# Patient Record
Sex: FEMALE | Race: WHITE | NOT HISPANIC OR LATINO
[De-identification: names, ages, dates, MRNs, and addresses within clinical notes are randomized per-mention and may not be internally consistent; named-entity substitution may affect disease eponyms.]

---

## 2015-10-14 RX ORDER — ROSUVASTATIN CALCIUM 5 MG/1
1 TABLET ORAL
Qty: 0 | Refills: 0 | DISCHARGE
Start: 2015-10-14

## 2015-10-18 RX ORDER — LABETALOL HCL 100 MG
1 TABLET ORAL
Qty: 0 | Refills: 0 | COMMUNITY
Start: 2015-10-18

## 2015-10-18 RX ORDER — HYDRALAZINE HCL 50 MG
1 TABLET ORAL
Qty: 0 | Refills: 0 | COMMUNITY
Start: 2015-10-18

## 2017-01-02 ENCOUNTER — RX RENEWAL (OUTPATIENT)
Age: 67
End: 2017-01-02

## 2017-01-13 ENCOUNTER — MEDICATION RENEWAL (OUTPATIENT)
Age: 67
End: 2017-01-13

## 2017-03-31 ENCOUNTER — RX RENEWAL (OUTPATIENT)
Age: 67
End: 2017-03-31

## 2017-04-19 ENCOUNTER — APPOINTMENT (OUTPATIENT)
Dept: CARDIOLOGY | Facility: CLINIC | Age: 67
End: 2017-04-19

## 2017-04-24 ENCOUNTER — RX RENEWAL (OUTPATIENT)
Age: 67
End: 2017-04-24

## 2017-05-17 ENCOUNTER — APPOINTMENT (OUTPATIENT)
Dept: WOUND CARE | Facility: CLINIC | Age: 67
End: 2017-05-17

## 2017-05-25 ENCOUNTER — RX RENEWAL (OUTPATIENT)
Age: 67
End: 2017-05-25

## 2017-05-25 ENCOUNTER — MEDICATION RENEWAL (OUTPATIENT)
Age: 67
End: 2017-05-25

## 2017-06-22 ENCOUNTER — NON-APPOINTMENT (OUTPATIENT)
Age: 67
End: 2017-06-22

## 2017-06-22 ENCOUNTER — APPOINTMENT (OUTPATIENT)
Dept: CARDIOLOGY | Facility: CLINIC | Age: 67
End: 2017-06-22

## 2017-06-22 VITALS
OXYGEN SATURATION: 95 % | TEMPERATURE: 98 F | HEIGHT: 64 IN | HEART RATE: 77 BPM | SYSTOLIC BLOOD PRESSURE: 160 MMHG | DIASTOLIC BLOOD PRESSURE: 82 MMHG | WEIGHT: 250 LBS | BODY MASS INDEX: 42.68 KG/M2

## 2017-07-21 ENCOUNTER — APPOINTMENT (OUTPATIENT)
Dept: CARDIOLOGY | Facility: CLINIC | Age: 67
End: 2017-07-21

## 2017-07-21 VITALS — HEART RATE: 67 BPM | DIASTOLIC BLOOD PRESSURE: 90 MMHG | OXYGEN SATURATION: 96 % | SYSTOLIC BLOOD PRESSURE: 170 MMHG

## 2017-07-25 ENCOUNTER — NON-APPOINTMENT (OUTPATIENT)
Age: 67
End: 2017-07-25

## 2017-08-11 ENCOUNTER — APPOINTMENT (OUTPATIENT)
Dept: CARDIOLOGY | Facility: CLINIC | Age: 67
End: 2017-08-11
Payer: MEDICARE

## 2017-08-11 VITALS — HEIGHT: 64 IN | SYSTOLIC BLOOD PRESSURE: 156 MMHG | HEART RATE: 76 BPM | DIASTOLIC BLOOD PRESSURE: 88 MMHG

## 2017-08-11 DIAGNOSIS — I87.8 OTHER SPECIFIED DISORDERS OF VEINS: ICD-10-CM

## 2017-08-11 DIAGNOSIS — I89.0 LYMPHEDEMA, NOT ELSEWHERE CLASSIFIED: ICD-10-CM

## 2017-08-11 DIAGNOSIS — I87.2 VENOUS INSUFFICIENCY (CHRONIC) (PERIPHERAL): ICD-10-CM

## 2017-08-11 DIAGNOSIS — R60.9 EDEMA, UNSPECIFIED: ICD-10-CM

## 2017-08-11 DIAGNOSIS — L03.115 CELLULITIS OF RIGHT LOWER LIMB: ICD-10-CM

## 2017-08-11 PROCEDURE — 99215 OFFICE O/P EST HI 40 MIN: CPT

## 2017-09-15 ENCOUNTER — APPOINTMENT (OUTPATIENT)
Dept: CARDIOLOGY | Facility: CLINIC | Age: 67
End: 2017-09-15
Payer: MEDICARE

## 2017-09-15 VITALS
TEMPERATURE: 98.1 F | DIASTOLIC BLOOD PRESSURE: 82 MMHG | BODY MASS INDEX: 46.78 KG/M2 | HEIGHT: 64 IN | SYSTOLIC BLOOD PRESSURE: 140 MMHG | OXYGEN SATURATION: 93 % | WEIGHT: 274 LBS | HEART RATE: 75 BPM

## 2017-09-15 PROCEDURE — 99215 OFFICE O/P EST HI 40 MIN: CPT

## 2017-10-18 ENCOUNTER — MEDICATION RENEWAL (OUTPATIENT)
Age: 67
End: 2017-10-18

## 2017-10-19 ENCOUNTER — RX RENEWAL (OUTPATIENT)
Age: 67
End: 2017-10-19

## 2017-10-20 ENCOUNTER — MEDICATION RENEWAL (OUTPATIENT)
Age: 67
End: 2017-10-20

## 2017-10-22 ENCOUNTER — RX RENEWAL (OUTPATIENT)
Age: 67
End: 2017-10-22

## 2017-11-10 ENCOUNTER — APPOINTMENT (OUTPATIENT)
Dept: CARDIOLOGY | Facility: CLINIC | Age: 67
End: 2017-11-10
Payer: MEDICARE

## 2017-11-10 VITALS
TEMPERATURE: 98.1 F | OXYGEN SATURATION: 96 % | HEART RATE: 76 BPM | SYSTOLIC BLOOD PRESSURE: 162 MMHG | HEIGHT: 64 IN | DIASTOLIC BLOOD PRESSURE: 82 MMHG

## 2017-11-10 PROCEDURE — 99214 OFFICE O/P EST MOD 30 MIN: CPT

## 2017-11-10 PROCEDURE — 93000 ELECTROCARDIOGRAM COMPLETE: CPT

## 2017-11-10 RX ORDER — SILVER SULFADIAZINE 10 MG/G
1 CREAM TOPICAL
Qty: 50 | Refills: 0 | Status: DISCONTINUED | COMMUNITY
Start: 2017-07-11

## 2017-11-10 RX ORDER — CLONIDINE HYDROCHLORIDE 0.1 MG/1
0.1 TABLET ORAL 3 TIMES DAILY
Qty: 90 | Refills: 0 | Status: DISCONTINUED | COMMUNITY
Start: 2017-09-15 | End: 2017-11-10

## 2017-11-10 RX ORDER — SULFAMETHOXAZOLE AND TRIMETHOPRIM 800; 160 MG/1; MG/1
800-160 TABLET ORAL
Qty: 28 | Refills: 0 | Status: DISCONTINUED | COMMUNITY
Start: 2017-08-25

## 2018-01-24 ENCOUNTER — MEDICATION RENEWAL (OUTPATIENT)
Age: 68
End: 2018-01-24

## 2018-02-20 ENCOUNTER — RX RENEWAL (OUTPATIENT)
Age: 68
End: 2018-02-20

## 2018-03-10 ENCOUNTER — INPATIENT (INPATIENT)
Facility: HOSPITAL | Age: 68
LOS: 2 days | Discharge: HOME | DRG: 281 | End: 2018-03-13
Attending: HOSPITALIST | Admitting: STUDENT IN AN ORGANIZED HEALTH CARE EDUCATION/TRAINING PROGRAM
Payer: MEDICARE

## 2018-03-10 VITALS
TEMPERATURE: 97 F | DIASTOLIC BLOOD PRESSURE: 85 MMHG | OXYGEN SATURATION: 95 % | RESPIRATION RATE: 18 BRPM | SYSTOLIC BLOOD PRESSURE: 148 MMHG | HEART RATE: 93 BPM

## 2018-03-10 DIAGNOSIS — E11.9 TYPE 2 DIABETES MELLITUS WITHOUT COMPLICATIONS: ICD-10-CM

## 2018-03-10 DIAGNOSIS — I25.10 ATHEROSCLEROTIC HEART DISEASE OF NATIVE CORONARY ARTERY WITHOUT ANGINA PECTORIS: ICD-10-CM

## 2018-03-10 DIAGNOSIS — E03.9 HYPOTHYROIDISM, UNSPECIFIED: ICD-10-CM

## 2018-03-10 DIAGNOSIS — Z29.9 ENCOUNTER FOR PROPHYLACTIC MEASURES, UNSPECIFIED: ICD-10-CM

## 2018-03-10 DIAGNOSIS — I21.4 NON-ST ELEVATION (NSTEMI) MYOCARDIAL INFARCTION: ICD-10-CM

## 2018-03-10 DIAGNOSIS — I10 ESSENTIAL (PRIMARY) HYPERTENSION: ICD-10-CM

## 2018-03-10 DIAGNOSIS — I89.0 LYMPHEDEMA, NOT ELSEWHERE CLASSIFIED: ICD-10-CM

## 2018-03-10 DIAGNOSIS — Z98.89 OTHER SPECIFIED POSTPROCEDURAL STATES: Chronic | ICD-10-CM

## 2018-03-10 LAB
ALBUMIN SERPL ELPH-MCNC: 4 G/DL — SIGNIFICANT CHANGE UP (ref 3.3–5)
ALP SERPL-CCNC: 95 U/L — SIGNIFICANT CHANGE UP (ref 40–120)
ALT FLD-CCNC: 15 U/L RC — SIGNIFICANT CHANGE UP (ref 10–45)
ANION GAP SERPL CALC-SCNC: 14 MMOL/L — SIGNIFICANT CHANGE UP (ref 5–17)
APPEARANCE UR: CLEAR — SIGNIFICANT CHANGE UP
APTT BLD: 48 SEC — HIGH (ref 27.5–37.4)
AST SERPL-CCNC: 17 U/L — SIGNIFICANT CHANGE UP (ref 10–40)
BASOPHILS # BLD AUTO: 0 K/UL — SIGNIFICANT CHANGE UP (ref 0–0.2)
BASOPHILS NFR BLD AUTO: 0 % — SIGNIFICANT CHANGE UP (ref 0–2)
BILIRUB SERPL-MCNC: 0.7 MG/DL — SIGNIFICANT CHANGE UP (ref 0.2–1.2)
BILIRUB UR-MCNC: NEGATIVE — SIGNIFICANT CHANGE UP
BUN SERPL-MCNC: 21 MG/DL — SIGNIFICANT CHANGE UP (ref 7–23)
CALCIUM SERPL-MCNC: 9 MG/DL — SIGNIFICANT CHANGE UP (ref 8.4–10.5)
CHLORIDE SERPL-SCNC: 100 MMOL/L — SIGNIFICANT CHANGE UP (ref 96–108)
CK MB BLD-MCNC: 7 % — HIGH (ref 0–3.5)
CK MB CFR SERPL CALC: 8.3 NG/ML — HIGH (ref 0–3.8)
CK SERPL-CCNC: 119 U/L — SIGNIFICANT CHANGE UP (ref 25–170)
CO2 SERPL-SCNC: 27 MMOL/L — SIGNIFICANT CHANGE UP (ref 22–31)
COLOR SPEC: YELLOW — SIGNIFICANT CHANGE UP
CREAT SERPL-MCNC: 0.98 MG/DL — SIGNIFICANT CHANGE UP (ref 0.5–1.3)
DIFF PNL FLD: NEGATIVE — SIGNIFICANT CHANGE UP
EOSINOPHIL # BLD AUTO: 0 K/UL — SIGNIFICANT CHANGE UP (ref 0–0.5)
EOSINOPHIL NFR BLD AUTO: 0.6 % — SIGNIFICANT CHANGE UP (ref 0–6)
EPI CELLS # UR: SIGNIFICANT CHANGE UP /HPF
GLUCOSE BLDC GLUCOMTR-MCNC: 114 MG/DL — HIGH (ref 70–99)
GLUCOSE SERPL-MCNC: 115 MG/DL — HIGH (ref 70–99)
GLUCOSE UR QL: NEGATIVE — SIGNIFICANT CHANGE UP
HCT VFR BLD CALC: 41.4 % — SIGNIFICANT CHANGE UP (ref 34.5–45)
HGB BLD-MCNC: 14.1 G/DL — SIGNIFICANT CHANGE UP (ref 11.5–15.5)
INR BLD: 1.08 RATIO — SIGNIFICANT CHANGE UP (ref 0.88–1.16)
KETONES UR-MCNC: NEGATIVE — SIGNIFICANT CHANGE UP
LEUKOCYTE ESTERASE UR-ACNC: NEGATIVE — SIGNIFICANT CHANGE UP
LYMPHOCYTES # BLD AUTO: 0.8 K/UL — LOW (ref 1–3.3)
LYMPHOCYTES # BLD AUTO: 11.2 % — LOW (ref 13–44)
MCHC RBC-ENTMCNC: 28.9 PG — SIGNIFICANT CHANGE UP (ref 27–34)
MCHC RBC-ENTMCNC: 34.1 GM/DL — SIGNIFICANT CHANGE UP (ref 32–36)
MCV RBC AUTO: 84.9 FL — SIGNIFICANT CHANGE UP (ref 80–100)
MONOCYTES # BLD AUTO: 0.5 K/UL — SIGNIFICANT CHANGE UP (ref 0–0.9)
MONOCYTES NFR BLD AUTO: 7.5 % — SIGNIFICANT CHANGE UP (ref 2–14)
NEUTROPHILS # BLD AUTO: 5.7 K/UL — SIGNIFICANT CHANGE UP (ref 1.8–7.4)
NEUTROPHILS NFR BLD AUTO: 80.7 % — HIGH (ref 43–77)
NITRITE UR-MCNC: NEGATIVE — SIGNIFICANT CHANGE UP
NT-PROBNP SERPL-SCNC: 708 PG/ML — HIGH (ref 0–300)
PH UR: 7 — SIGNIFICANT CHANGE UP (ref 5–8)
PLATELET # BLD AUTO: 303 K/UL — SIGNIFICANT CHANGE UP (ref 150–400)
POTASSIUM SERPL-MCNC: 3.7 MMOL/L — SIGNIFICANT CHANGE UP (ref 3.5–5.3)
POTASSIUM SERPL-SCNC: 3.7 MMOL/L — SIGNIFICANT CHANGE UP (ref 3.5–5.3)
PROT SERPL-MCNC: 8.3 G/DL — SIGNIFICANT CHANGE UP (ref 6–8.3)
PROT UR-MCNC: NEGATIVE — SIGNIFICANT CHANGE UP
PROTHROM AB SERPL-ACNC: 11.8 SEC — SIGNIFICANT CHANGE UP (ref 9.8–12.7)
RBC # BLD: 4.87 M/UL — SIGNIFICANT CHANGE UP (ref 3.8–5.2)
RBC # FLD: 14.8 % — HIGH (ref 10.3–14.5)
RBC CASTS # UR COMP ASSIST: SIGNIFICANT CHANGE UP /HPF (ref 0–2)
SODIUM SERPL-SCNC: 141 MMOL/L — SIGNIFICANT CHANGE UP (ref 135–145)
SP GR SPEC: 1.01 — SIGNIFICANT CHANGE UP (ref 1.01–1.02)
TROPONIN T SERPL-MCNC: 0.02 NG/ML — SIGNIFICANT CHANGE UP (ref 0–0.06)
TROPONIN T SERPL-MCNC: 0.08 NG/ML — HIGH (ref 0–0.06)
UROBILINOGEN FLD QL: NEGATIVE — SIGNIFICANT CHANGE UP
WBC # BLD: 7.1 K/UL — SIGNIFICANT CHANGE UP (ref 3.8–10.5)
WBC # FLD AUTO: 7.1 K/UL — SIGNIFICANT CHANGE UP (ref 3.8–10.5)
WBC UR QL: SIGNIFICANT CHANGE UP /HPF (ref 0–5)

## 2018-03-10 PROCEDURE — 99223 1ST HOSP IP/OBS HIGH 75: CPT

## 2018-03-10 PROCEDURE — 99285 EMERGENCY DEPT VISIT HI MDM: CPT | Mod: 25,GC

## 2018-03-10 PROCEDURE — 93010 ELECTROCARDIOGRAM REPORT: CPT | Mod: GC

## 2018-03-10 PROCEDURE — 71045 X-RAY EXAM CHEST 1 VIEW: CPT | Mod: 26

## 2018-03-10 RX ORDER — HEPARIN SODIUM 5000 [USP'U]/ML
5000 INJECTION INTRAVENOUS; SUBCUTANEOUS ONCE
Qty: 0 | Refills: 0 | Status: COMPLETED | OUTPATIENT
Start: 2018-03-10 | End: 2018-03-10

## 2018-03-10 RX ORDER — DEXTROSE 50 % IN WATER 50 %
12.5 SYRINGE (ML) INTRAVENOUS ONCE
Qty: 0 | Refills: 0 | Status: DISCONTINUED | OUTPATIENT
Start: 2018-03-10 | End: 2018-03-13

## 2018-03-10 RX ORDER — CLOPIDOGREL BISULFATE 75 MG/1
75 TABLET, FILM COATED ORAL DAILY
Qty: 0 | Refills: 0 | Status: DISCONTINUED | OUTPATIENT
Start: 2018-03-10 | End: 2018-03-13

## 2018-03-10 RX ORDER — VALSARTAN 80 MG/1
320 TABLET ORAL DAILY
Qty: 0 | Refills: 0 | Status: DISCONTINUED | OUTPATIENT
Start: 2018-03-10 | End: 2018-03-13

## 2018-03-10 RX ORDER — DEXTROSE 50 % IN WATER 50 %
25 SYRINGE (ML) INTRAVENOUS ONCE
Qty: 0 | Refills: 0 | Status: DISCONTINUED | OUTPATIENT
Start: 2018-03-10 | End: 2018-03-13

## 2018-03-10 RX ORDER — LEVOTHYROXINE SODIUM 125 MCG
75 TABLET ORAL DAILY
Qty: 0 | Refills: 0 | Status: DISCONTINUED | OUTPATIENT
Start: 2018-03-10 | End: 2018-03-13

## 2018-03-10 RX ORDER — INSULIN LISPRO 100/ML
VIAL (ML) SUBCUTANEOUS
Qty: 0 | Refills: 0 | Status: DISCONTINUED | OUTPATIENT
Start: 2018-03-10 | End: 2018-03-13

## 2018-03-10 RX ORDER — HEPARIN SODIUM 5000 [USP'U]/ML
6000 INJECTION INTRAVENOUS; SUBCUTANEOUS EVERY 6 HOURS
Qty: 0 | Refills: 0 | Status: DISCONTINUED | OUTPATIENT
Start: 2018-03-10 | End: 2018-03-12

## 2018-03-10 RX ORDER — ETHACRYNIC ACID 25 MG/1
25 TABLET ORAL DAILY
Qty: 0 | Refills: 0 | Status: DISCONTINUED | OUTPATIENT
Start: 2018-03-10 | End: 2018-03-13

## 2018-03-10 RX ORDER — ATORVASTATIN CALCIUM 80 MG/1
80 TABLET, FILM COATED ORAL AT BEDTIME
Qty: 0 | Refills: 0 | Status: DISCONTINUED | OUTPATIENT
Start: 2018-03-10 | End: 2018-03-13

## 2018-03-10 RX ORDER — HEPARIN SODIUM 5000 [USP'U]/ML
1000 INJECTION INTRAVENOUS; SUBCUTANEOUS
Qty: 25000 | Refills: 0 | Status: DISCONTINUED | OUTPATIENT
Start: 2018-03-10 | End: 2018-03-12

## 2018-03-10 RX ORDER — ASPIRIN/CALCIUM CARB/MAGNESIUM 324 MG
81 TABLET ORAL DAILY
Qty: 0 | Refills: 0 | Status: DISCONTINUED | OUTPATIENT
Start: 2018-03-11 | End: 2018-03-13

## 2018-03-10 RX ORDER — SODIUM CHLORIDE 9 MG/ML
3 INJECTION INTRAMUSCULAR; INTRAVENOUS; SUBCUTANEOUS ONCE
Qty: 0 | Refills: 0 | Status: COMPLETED | OUTPATIENT
Start: 2018-03-10 | End: 2018-03-10

## 2018-03-10 RX ORDER — INSULIN LISPRO 100/ML
VIAL (ML) SUBCUTANEOUS AT BEDTIME
Qty: 0 | Refills: 0 | Status: DISCONTINUED | OUTPATIENT
Start: 2018-03-10 | End: 2018-03-13

## 2018-03-10 RX ORDER — GLUCAGON INJECTION, SOLUTION 0.5 MG/.1ML
1 INJECTION, SOLUTION SUBCUTANEOUS ONCE
Qty: 0 | Refills: 0 | Status: DISCONTINUED | OUTPATIENT
Start: 2018-03-10 | End: 2018-03-13

## 2018-03-10 RX ORDER — ASPIRIN/CALCIUM CARB/MAGNESIUM 324 MG
243 TABLET ORAL ONCE
Qty: 0 | Refills: 0 | Status: COMPLETED | OUTPATIENT
Start: 2018-03-10 | End: 2018-03-10

## 2018-03-10 RX ORDER — SODIUM CHLORIDE 9 MG/ML
1000 INJECTION, SOLUTION INTRAVENOUS
Qty: 0 | Refills: 0 | Status: DISCONTINUED | OUTPATIENT
Start: 2018-03-10 | End: 2018-03-13

## 2018-03-10 RX ORDER — INFLUENZA VIRUS VACCINE 15; 15; 15; 15 UG/.5ML; UG/.5ML; UG/.5ML; UG/.5ML
0.5 SUSPENSION INTRAMUSCULAR ONCE
Qty: 0 | Refills: 0 | Status: DISCONTINUED | OUTPATIENT
Start: 2018-03-10 | End: 2018-03-13

## 2018-03-10 RX ORDER — DEXTROSE 50 % IN WATER 50 %
1 SYRINGE (ML) INTRAVENOUS ONCE
Qty: 0 | Refills: 0 | Status: DISCONTINUED | OUTPATIENT
Start: 2018-03-10 | End: 2018-03-13

## 2018-03-10 RX ORDER — HYDRALAZINE HCL 50 MG
100 TABLET ORAL
Qty: 0 | Refills: 0 | Status: DISCONTINUED | OUTPATIENT
Start: 2018-03-10 | End: 2018-03-13

## 2018-03-10 RX ORDER — LABETALOL HCL 100 MG
300 TABLET ORAL
Qty: 0 | Refills: 0 | Status: DISCONTINUED | OUTPATIENT
Start: 2018-03-10 | End: 2018-03-11

## 2018-03-10 RX ADMIN — Medication 243 MILLIGRAM(S): at 19:54

## 2018-03-10 RX ADMIN — SODIUM CHLORIDE 3 MILLILITER(S): 9 INJECTION INTRAMUSCULAR; INTRAVENOUS; SUBCUTANEOUS at 13:00

## 2018-03-10 RX ADMIN — HEPARIN SODIUM 1000 UNIT(S)/HR: 5000 INJECTION INTRAVENOUS; SUBCUTANEOUS at 19:52

## 2018-03-10 RX ADMIN — HEPARIN SODIUM 5000 UNIT(S): 5000 INJECTION INTRAVENOUS; SUBCUTANEOUS at 19:52

## 2018-03-10 NOTE — ED ADULT NURSE NOTE - OBJECTIVE STATEMENT
67 y.o F arrived to ED via EMS from home c/o episode of SOB/dizziness/diaphoresis lasting 10 to 15 minutes that resolved. A&Ox3, ambulatory with walker. able to ambulate short distances without assistance. pt states she was standing and praying when episode came on, states she felt like she was going to pass out but did not pass out. no fall no LOC. states she sat down and episode resolved. no chest pain or palpitations with or prior to episode. pmh HTN, lymphedema, venous insufficiency, CAD s/p stents, morbid obesity. pt states she currently only has minor lightheadedness. respirations nonlabored, O2 sat 95%, pt in no acute distress, abdomen soft nontender, neuro intact, moves all extremities. pt states she has chronic pain to B/L lower extremities. redness, swelling, dry/flaky skin noted to B/L lower extremities, pt states this is her baseline. Denies CP, SOB, N/V/D, fevers, chills, HA, pain/ burning upon urination, abdominal pain. Safety and comfort provided/maintained. VSS. EKG performed, pt on cardiac monitoring. upon performing safety and suicide/homicide screens pt states she feels safe at home. denies HI, states about a month ago she felt some SI due to her declining health but feels better now because one of her doctors has given her hope that she will get better. currently denies any SI/HI. MD Askew made aware, states pt does not need social work consult.

## 2018-03-10 NOTE — ED PROVIDER NOTE - ATTENDING CONTRIBUTION TO CARE
67 yof pmhx cad s/p 3 stents (most recent cath in 2015 w stents to lad and d1, follows w dr lisker), signif bilat lymphadenopathy longstanding to bilat legs, no hx CHF, morbid obesity, presents with episode of sob and near-syncope pta. states was standing at home for prolonged period of time while praying when she became lightheaded - states called [facility] who felt she was anxious, so called EMS. pt states upon sitting felt much better, and is feeling better in ED now. denies cp. states felt anxious. states + SI few months ago, but not recent or today, no HI. lives alone w a roommate who she states "doesn't help me at all." no family in the area. states sob is resolved, no recent prolonged immob or travel.     ROS:   constitutional - no fever, no chills  eyes - no visual changes, no redness  eent - no sore throat, no nasal congestion  cvs - no chest pain, + chronic leg swelling  resp - + shortness of breath, no cough  gi - no abdominal pain, no vomiting, no diarrhea  gu - no dysuria, no hematuria  msk - no acute back pain, no joint swelling  skin - no rashes, no jaundice  neuro - no headache, no focal weakness  psych - no acute mental health issue     Physical Exam:   constitutional - appears somewhat anxious, awake and alert, oriented x3, morbidly obese  head - no external evidence of trauma  cvs - rrr, no murmurs, signif bilat LE lymphedema w chronic overlying skin changes  resp - breath sounds clear and equal bilat  gi - abdomen soft and nontender, no rigidity, guarding or rebound, bowel sounds present  msk - moving all extremities spontaneously  neuro - alert and oriented x3, no focal deficits, CNs 2-12 grossly intact  skin- no jaundice, warm and dry  psych - anxious, no apparent risk to self or others     ? anxiety vs acs (no acute st changes, ekg unchanged from old, most recent cath w 2 stents placed however no subsequent stress or provocative testing since). dx pe considered however no hypoxia or signif tachycardia noted in ED, chronic bilat leg swelling due to lymphedema, however pt is ambulatory and has not had signif prolonged immob. JOSE Askew MD

## 2018-03-10 NOTE — H&P ADULT - NSHPPHYSICALEXAM_GEN_ALL_CORE
PHYSICAL EXAM:  Vital Signs Last 24 Hrs  T(F): 98.1 (10 Mar 2018 20:40), Max: 98.1 (10 Mar 2018 15:05)  HR: 76 (10 Mar 2018 20:40) (73 - 93)  BP: 122/63 (10 Mar 2018 20:40) (101/64 - 148/85)  RR: 20 (10 Mar 2018 20:40) (17 - 20)  SpO2: 97% (10 Mar 2018 20:40) (95% - 97%)  GENERAL: NAD, well-developed  HEAD: Atraumatic, Normocephalic  EYES: EOMI, PERRLA, conjunctiva and sclera clear  NECK: Supple, No JVD  CHEST/LUNG: Clear to auscultation bilaterally; No wheezes/rales/rhonchi  HEART: Regular rate and rhythm; No murmurs, rubs, or gallops  ABDOMEN: Soft, Nontender, Nondistended; Bowel sounds present  EXTREMITIES:  2+ dP pulses b/l, No clubbing, cyanosis, or edema  PSYCH: reactive affect  NEUROLOGY: AAOx3, non-focal  SKIN: No rashes or lesions PHYSICAL EXAM:  Vital Signs Last 24 Hrs  T(F): 98.1 (10 Mar 2018 20:40), Max: 98.1 (10 Mar 2018 15:05)  HR: 76 (10 Mar 2018 20:40) (73 - 93)  BP: 122/63 (10 Mar 2018 20:40) (101/64 - 148/85)  RR: 20 (10 Mar 2018 20:40) (17 - 20)  SpO2: 97% (10 Mar 2018 20:40) (95% - 97%)  GENERAL: NAD, well-developed, obese  HEAD: Atraumatic, Normocephalic  EYES: EOMI, PERRLA, conjunctiva and sclera clear  NECK: Supple  CHEST/LUNG: limited due to body habitus, although clear to auscultation bilaterally; No wheezes/rales/rhonchi appreciated  HEART: Regular rate and rhythm; No murmurs, rubs, or gallops  ABDOMEN: Soft, Nontender, obese; Bowel sounds present  EXTREMITIES: 2+ dP pulses b/l, bilateral LE non-pitting edema R>>L with stasis dermatitis  PSYCH: reactive affect  NEUROLOGY: AAOx3, non-focal, anxious at times  SKIN: see extremities

## 2018-03-10 NOTE — H&P ADULT - ASSESSMENT
66 yo F w/PMH of T2DM, HTN, CAD s/p PAOLA (most recently to LAD and D1 in October 2015), chronic LE lymphedema who presented with a brief, self-limiting episode of dizziness and SOB admitted with NSTEMI 66 yo F w/PMH of T2DM, HTN, CAD s/p PAOLA (most recently to LAD and D1 in October 2015), hypothyroidism, chronic LE lymphedema who presented with a brief, self-limiting episode of dizziness and SOB admitted with NSTEMI

## 2018-03-10 NOTE — ED PROVIDER NOTE - PROGRESS NOTE DETAILS
Cardiologist Jay Lisker's office called, awaiting callback. Spoke w/ Jay Lisker, agreed w/ plan for delta trop and if not uptrending, send pt home to f/u with him. Spoke w/ Lisker about elevated trop, recommends NSTEMI treatment (heparin, aspirin) and admission to hospitalist service; Lisker will see pt.

## 2018-03-10 NOTE — H&P ADULT - FAMILY HISTORY
Family history of lung cancer     Sibling  Still living? Unknown  Family history of myocardial infarction, Age at diagnosis: Age Unknown Sibling  Still living? Unknown  Family history of myocardial infarction, Age at diagnosis: Age Unknown     Father  Still living? Unknown  Family history of lung cancer, Age at diagnosis: Age Unknown

## 2018-03-10 NOTE — H&P ADULT - PROBLEM SELECTOR PLAN 1
Patient presented with an episode of dizziness/shortness of breath that was self-limited, found to have rising troponin in the setting of dynamic EKG changes concerning for NSTEMI  - admit to telemetry  - c/w heparin gtt  - trend cardiac enzymes, repeat EKG with next set  - c/w ASA/Plavix  - per cardiology, for potential cardiac catheterization tomorrow

## 2018-03-10 NOTE — ED PROVIDER NOTE - MEDICAL DECISION MAKING DETAILS
Hx of CAD, DM-2, HTN, p/w episode of dizziness, SOB, and palpitations for approx 15 minutes while praying earlier. Symptoms subsequently resolved. No CP, no cough, no fever. Unclear etiology of symptoms. Considered PE but unlikely as pt has no hx of PE, no recent immobilization or travel, is not tachycardic or hypoxic. ACS considered given hx of CAD and DM, will obtain 2 sets of trops. Infectious etiology also considered although also less likely given lack of fever but will obtain cbc and x-ray.

## 2018-03-10 NOTE — H&P ADULT - HISTORY OF PRESENT ILLNESS
Patient is a 68 yo F w/PMH of T2DM, HTN, CAD s/p PAOLA (most recently to LAD and D1 in October 2015), chronic LE lymphedema who presented with an episode of palpitations x 15 minutes earlier today.    In the ED, VS on presentation: T 97.4, HR 93, /85, RR 18, SpO2 95% on RA  CBC, CMP unremarkable. Initial Troponin 0.02, repeat 0.08. Pro-BnP 708 (previously 300 in 10/2015). UA unremarkable. CXR significant for mild pulmonary vascular congestion. She was given ASA 243mg x 1 and heparin gtt initiated for NSTEMI. She was admitted to medicine on telemetry for further management. Patient is a 68 yo F w/PMH of T2DM, HTN, CAD s/p PAOLA (most recently to LAD and D1 in October 2015), chronic LE lymphedema who presented with an episode of dizziness and SOB x 15 minutes earlier today. She reports that she was with her friends when she developed sudden onset of dizziness and shortness of breath for a brief episode lasting approximately 10-15 minutes around 11:30 this morning, non-exertional, self-resolved. She denies chest pain during this episode. This prompted her to present to the ER given her known cardiac history. She states that she had another episode while in the ER around 3pm which was also brief and self-limited. Denies fevers, chills, wheezing, cough, chest pain, nausea, vomiting, diaphoresis, abdominal pain, dysuria or changes in bowel habits. She is currently symptom free.    Of note, she also reports lower extremity lymphedema for which she has been following with Dr. Maxwell of vascular surgery. Reports right is always more swollen than the left. Notes chronic LE pain in this setting as well.    In the ED, VS on presentation: T 97.4, HR 93, /85, RR 18, SpO2 95% on RA  CBC, CMP unremarkable. Initial Troponin 0.02, repeat 0.08. Pro-BnP 708 (previously 300 in 10/2015). UA unremarkable. CXR significant for mild pulmonary vascular congestion. She was given ASA 243mg x 1 and heparin gtt initiated for NSTEMI. She was admitted to medicine on telemetry for further management. Patient is a 68 yo F w/PMH of T2DM, HTN, CAD s/p PAOLA (most recently to LAD and D1 in October 2015), hypothyroidism, chronic LE lymphedema who presented with an episode of dizziness and SOB x 15 minutes earlier today. She reports that she was with her friends when she developed sudden onset of dizziness and shortness of breath for a brief episode lasting approximately 10-15 minutes around 11:30 this morning, non-exertional, self-resolved. She denies chest pain during this episode. This prompted her to present to the ER given her known cardiac history. She states that she had another episode while in the ER around 3pm which was also brief and self-limited. Denies fevers, chills, wheezing, cough, chest pain, nausea, vomiting, diaphoresis, abdominal pain, dysuria or changes in bowel habits. She is currently symptom free.    Of note, she also reports lower extremity lymphedema for which she has been following with Dr. Maxwell of vascular surgery. Reports right is always more swollen than the left. Notes chronic LE pain in this setting as well.    In the ED, VS on presentation: T 97.4, HR 93, /85, RR 18, SpO2 95% on RA  CBC, CMP unremarkable. Initial Troponin 0.02, repeat 0.08. Pro-BnP 708 (previously 300 in 10/2015). UA unremarkable. CXR significant for mild pulmonary vascular congestion. She was given ASA 243mg x 1 and heparin gtt initiated for NSTEMI. She was admitted to medicine on telemetry for further management.

## 2018-03-10 NOTE — ED PROVIDER NOTE - PMH
Carpal tunnel syndrome of right wrist    DM (diabetes mellitus)    Essential hypertension    HTN (hypertension)    Hypothyroid    Obesity    Obesity (BMI 30-39.9)    Type 2 diabetes mellitus without complication

## 2018-03-10 NOTE — H&P ADULT - PROBLEM SELECTOR PLAN 4
Patient with reported history of T2DM, not currently on medications, patient reports she no longer has diabetes  - will start SSI with [employer] checks AC/HS  - check A1c

## 2018-03-10 NOTE — H&P ADULT - NSHPREVIEWOFSYSTEMS_GEN_ALL_CORE
Constitutional: denies fevers, chills, night sweats, weight loss  HEENT: denies visual changes, hearing changes, rhinitis, odynophagia, or dysphagia  Cardiovascular: denies palpitations, chest pain, edema  Respiratory: denies SOB, wheezing  Gastrointestinal: denies N/V/D, abdominal pain, hematochezia, melena  : denies dysuria, hematuria  MSK: denies weakness, joint pain  Neuro: no numbness or tingling  Psych: no depression or anxiety  Skin: denies new rashes or masses Constitutional: denies fevers, chills  HEENT: denies visual changes, hearing changes, rhinitis  Cardiovascular: denies palpitations, chest pain +chronic LE edema  Respiratory: denies SOB, wheezing  Gastrointestinal: denies N/V/D, abdominal pain  : denies dysuria, hematuria  MSK: denies weakness, joint pain  Neuro: no numbness or tingling  Psych: no depression +anxiety  Skin: denies new rashes or masses

## 2018-03-10 NOTE — H&P ADULT - NSHPSOCIALHISTORY_GEN_ALL_CORE
She is a never smoker, denies EtOH use except for special occasions. No recreational drug use. She is a retired teacher.

## 2018-03-10 NOTE — ED PROVIDER NOTE - CARE PLAN
Principal Discharge DX:	Dizziness Principal Discharge DX:	NSTEMI (non-ST elevated myocardial infarction)

## 2018-03-10 NOTE — ED PROVIDER NOTE - OBJECTIVE STATEMENT
66 y/o F w/ hx of CAD s/p coronary stenting in 2016, DM-2, HTN, lymphedema, p/w episode of dizziness, dyspnea, and palpitations while praying earlier today at approx 11am. Symptoms lasted for approx 15 min. Never had chest pain. No cough, fever, chills, n/v/d.   primary care physician: Pascual Edgar

## 2018-03-10 NOTE — H&P ADULT - ATTENDING COMMENTS
NIGHT HOSPITALIST:  Patient UNKNOWN to me previously, assigned to me at this point via the ER to admit this 68 y/o F--followed by Dr. Winter and Dr. Lisker as above--patient with a history of type 2 DM previously on treatment, essential HTN, CAD with cardiac catheterization in October 2015 with PAOLA to the LAD and D1, hypothyroidism, chronic RIGHT>left chronic lymphedema with patient self referring to [place] with an episode of dizziness and dyspnea while observing prayers.  No chest pain/pressure.  No dyspnea at present.  No HA<no focal weakness.  No abdominal pain, no red blood per rectum or melena.  No back pain, no tearing back pain.  No anorexia.  Patient with severely limited exercise tolerance that patient ascribes to her B/L lymphedema.  Remaining review of systems not contributory.  No tobacco or ethanol history.  Patient is a retired teacher.  Physical ex NIGHT HOSPITALIST:  Patient UNKNOWN to me previously, assigned to me at this point via the ER to admit this 68 y/o F--followed by Dr. Winter and Dr. Lisker as above--patient with a history of type 2 DM previously on treatment, essential HTN, CAD with cardiac catheterization in October 2015 with PAOLA to the LAD and D1, hypothyroidism, chronic RIGHT>left chronic lymphedema with patient self referring to [place] with an episode of dizziness and dyspnea while observing prayers.  No chest pain/pressure.  No dyspnea at present.  No HA<no focal weakness.  No abdominal pain, no red blood per rectum or melena.  No back pain, no tearing back pain.  No anorexia.  Patient with severely limited exercise tolerance that patient ascribes to her B/L lymphedema.  Remaining review of systems not contributory.  No tobacco or ethanol history.  Patient is a retired teacher.  Physical exam with a morbidly obese F in no acute distress.  BP  120/64  Afebrile.  HR  76  RR 14.  96% on RA.  HEENT< PERRL< EOMI, neck supple, chest clear poor effort.  Cor s1 s2, declined breast exam.  Abdomen soft normal bowel sounds, nontender,   Ext with 3+++ B/L oedema with RIGHT slightly more prominent than left.  poor nail hygiene but no ulcers.  Dry skin.  Neurologic exam AxOx3.  Speech fluent.  Cognition intact.  UE/LE 5/5.  EKG tracing reviewed with NSR at 75 with Q V1 V2.  WBC 7.1.  hgb 14.1.  Platelets of 303K.  INR 1.0.  K+ 3.7.  Random glucose of 115.  CR 0.9.  Alb 4.0.  Troponin 0.02>>0.08.  U/A 3 WBC.  Chest radiograph reviewed with mild PVC.  Admitted to telemetry.  On IV heparin for ACS protocol per cardiology.  Risks/benefits reviewed with patient who agrees with IV heparin as above.  Patient with variable compliance with statin and with Plavix as above.  Given patient's comorbidities, patient's long term prognosis is guarded.  Emotional support provided to patient.  Patient aware of course and agrees with plan/care as above.  Care reviewed with Dr. Anna.  Care assumed by the DAY HOSPITALIST.    Sebastián Maier MD  [facility]

## 2018-03-10 NOTE — CHART NOTE - NSCHARTNOTEFT_GEN_A_CORE
Spoke with RN re: patient refusing statin at this time. Discussed with patient at bedside the importance of statin therapy in lowering CVD risk, patient still refused despite likely active coronary event. Patient also refused sliding scale insulin at this time. Discussed importance of glycemic control, patient still refused.    Daniela Anna MD, R2  Pager: [phone] / [phone]

## 2018-03-10 NOTE — ED PROVIDER NOTE - PHYSICAL EXAMINATION
Gen: NAD  Eyes:  sclerae white, no icterus  ENT: Moist mucous membranes. No exudates  Neck: supple, no LAD, mass or goiter, trachea midline  CV: RRR. Audible S1 and S2. No murmurs, rubs, gallops, S3, nor S4  Pulm: Clear to auscultation bilaterally. No wheezes, rales, or rhonchi  Abd: Obese, nontender to palpitation  Musculoskeletal:  LE edema bilaterally  Neurologic: AAOx3

## 2018-03-10 NOTE — H&P ADULT - NSHPLABSRESULTS_GEN_ALL_CORE
Personally reviewed labs.   Personally reviewed imaging. CXR mild pulmonary vascular congestion.  Personally reviewed EKG.                           14.1   7.1   )-----------( 303      ( 10 Mar 2018 14:03 )             41.4       03-10    141  |  100  |  21  ----------------------------<  115<H>  3.7   |  27  |  0.98    Ca    9.0      10 Mar 2018 14:03    TPro  8.3  /  Alb  4.0  /  TBili  0.7  /  DBili  x   /  AST  17  /  ALT  15  /  AlkPhos  95  03-10      CARDIAC MARKERS ( 10 Mar 2018 18:13 )  x     / 0.08 ng/mL / x     / x     / x      CARDIAC MARKERS ( 10 Mar 2018 14:03 )  x     / 0.02 ng/mL / x     / x     / x        LIVER FUNCTIONS - ( 10 Mar 2018 14:03 )  Alb: 4.0 g/dL / Pro: 8.3 g/dL / ALK PHOS: 95 U/L / ALT: 15 U/L RC / AST: 17 U/L / GGT: x           PT/INR - ( 10 Mar 2018 14:03 )   PT: 11.8 sec;   INR: 1.08 ratio    PTT - ( 10 Mar 2018 14:03 )  PTT:48.0 sec    Urinalysis Basic - ( 10 Mar 2018 14:03 )  Color: Yellow / Appearance: Clear / S.010 / pH: x  Gluc: x / Ketone: Negative  / Bili: Negative / Urobili: Negative   Blood: x / Protein: Negative / Nitrite: Negative   Leuk Esterase: Negative / RBC: 0-2 /HPF / WBC 3-5 /HPF   Sq Epi: x / Non Sq Epi: OCC /HPF / Bacteria: x Personally reviewed labs.   Personally reviewed imaging. CXR mild pulmonary vascular congestion.  Personally reviewed EKG. NSR, TWI in V1, V2, I, AvL (old). On repeat, TWI in V2 resolved.                          14.1   7.1   )-----------( 303      ( 10 Mar 2018 14:03 )             41.4       03-10    141  |  100  |  21  ----------------------------<  115<H>  3.7   |  27  |  0.98    Ca    9.0      10 Mar 2018 14:03    TPro  8.3  /  Alb  4.0  /  TBili  0.7  /  DBili  x   /  AST  17  /  ALT  15  /  AlkPhos  95  03-10      CARDIAC MARKERS ( 10 Mar 2018 18:13 )  x     / 0.08 ng/mL / x     / x     / x      CARDIAC MARKERS ( 10 Mar 2018 14:03 )  x     / 0.02 ng/mL / x     / x     / x        LIVER FUNCTIONS - ( 10 Mar 2018 14:03 )  Alb: 4.0 g/dL / Pro: 8.3 g/dL / ALK PHOS: 95 U/L / ALT: 15 U/L RC / AST: 17 U/L / GGT: x           PT/INR - ( 10 Mar 2018 14:03 )   PT: 11.8 sec;   INR: 1.08 ratio    PTT - ( 10 Mar 2018 14:03 )  PTT:48.0 sec    Urinalysis Basic - ( 10 Mar 2018 14:03 )  Color: Yellow / Appearance: Clear / S.010 / pH: x  Gluc: x / Ketone: Negative  / Bili: Negative / Urobili: Negative   Blood: x / Protein: Negative / Nitrite: Negative   Leuk Esterase: Negative / RBC: 0-2 /HPF / WBC 3-5 /HPF   Sq Epi: x / Non Sq Epi: OCC /HPF / Bacteria: x

## 2018-03-10 NOTE — H&P ADULT - PROBLEM SELECTOR PLAN 2
Patient with significant CAD s/p PAOLA x 3, most recently to LAD and D1 in October 2015  - c/w ASA/Plavix and heparin gtt for NSTEMI  - unclear why patient not on statin - previously on rosouvastatin 20, will start therapeutic interchange of atorvastatin 80 will admitted

## 2018-03-10 NOTE — H&P ADULT - PROBLEM SELECTOR PLAN 6
Patient reports long history of lymphedema of bilateral lower extremities with R worse than left  - follows with Dr. Maxwell as an outpatient  - wound care c/s for dressing changes Patient reports long history of lymphedema of bilateral lower extremities with R worse than left  - follows with Dr. aMxwell as an outpatient  - wound care c/s for dressing changes  - c/w ethacrynic acid (patient has reported sulfa allergy)

## 2018-03-11 LAB
ANION GAP SERPL CALC-SCNC: 14 MMOL/L — SIGNIFICANT CHANGE UP (ref 5–17)
ANION GAP SERPL CALC-SCNC: 19 MMOL/L — HIGH (ref 5–17)
APTT BLD: 54 SEC — HIGH (ref 27.5–37.4)
APTT BLD: 56 SEC — HIGH (ref 27.5–37.4)
BASOPHILS # BLD AUTO: 0.1 K/UL — SIGNIFICANT CHANGE UP (ref 0–0.2)
BASOPHILS NFR BLD AUTO: 2.1 % — HIGH (ref 0–2)
BUN SERPL-MCNC: 22 MG/DL — SIGNIFICANT CHANGE UP (ref 7–23)
BUN SERPL-MCNC: 25 MG/DL — HIGH (ref 7–23)
CALCIUM SERPL-MCNC: 9.3 MG/DL — SIGNIFICANT CHANGE UP (ref 8.4–10.5)
CALCIUM SERPL-MCNC: 9.4 MG/DL — SIGNIFICANT CHANGE UP (ref 8.4–10.5)
CHLORIDE SERPL-SCNC: 97 MMOL/L — SIGNIFICANT CHANGE UP (ref 96–108)
CHLORIDE SERPL-SCNC: 99 MMOL/L — SIGNIFICANT CHANGE UP (ref 96–108)
CK MB BLD-MCNC: 6.5 % — HIGH (ref 0–3.5)
CK MB CFR SERPL CALC: 4.9 NG/ML — HIGH (ref 0–3.8)
CK MB CFR SERPL CALC: 6.6 NG/ML — HIGH (ref 0–3.8)
CK SERPL-CCNC: 101 U/L — SIGNIFICANT CHANGE UP (ref 25–170)
CK SERPL-CCNC: 85 U/L — SIGNIFICANT CHANGE UP (ref 25–170)
CO2 SERPL-SCNC: 22 MMOL/L — SIGNIFICANT CHANGE UP (ref 22–31)
CO2 SERPL-SCNC: 28 MMOL/L — SIGNIFICANT CHANGE UP (ref 22–31)
CREAT SERPL-MCNC: 0.94 MG/DL — SIGNIFICANT CHANGE UP (ref 0.5–1.3)
CREAT SERPL-MCNC: 1.13 MG/DL — SIGNIFICANT CHANGE UP (ref 0.5–1.3)
EOSINOPHIL # BLD AUTO: 0.1 K/UL — SIGNIFICANT CHANGE UP (ref 0–0.5)
EOSINOPHIL NFR BLD AUTO: 1.4 % — SIGNIFICANT CHANGE UP (ref 0–6)
GLUCOSE BLDC GLUCOMTR-MCNC: 114 MG/DL — HIGH (ref 70–99)
GLUCOSE BLDC GLUCOMTR-MCNC: 121 MG/DL — HIGH (ref 70–99)
GLUCOSE BLDC GLUCOMTR-MCNC: 200 MG/DL — HIGH (ref 70–99)
GLUCOSE SERPL-MCNC: 103 MG/DL — HIGH (ref 70–99)
GLUCOSE SERPL-MCNC: 120 MG/DL — HIGH (ref 70–99)
HBA1C BLD-MCNC: 5.6 % — SIGNIFICANT CHANGE UP (ref 4–5.6)
HBA1C BLD-MCNC: 5.8 % — HIGH (ref 4–5.6)
HCT VFR BLD CALC: 38.4 % — SIGNIFICANT CHANGE UP (ref 34.5–45)
HCT VFR BLD CALC: 39.5 % — SIGNIFICANT CHANGE UP (ref 34.5–45)
HGB BLD-MCNC: 13.1 G/DL — SIGNIFICANT CHANGE UP (ref 11.5–15.5)
HGB BLD-MCNC: 13.6 G/DL — SIGNIFICANT CHANGE UP (ref 11.5–15.5)
LYMPHOCYTES # BLD AUTO: 0.8 K/UL — LOW (ref 1–3.3)
LYMPHOCYTES # BLD AUTO: 15.8 % — SIGNIFICANT CHANGE UP (ref 13–44)
MAGNESIUM SERPL-MCNC: 1.9 MG/DL — SIGNIFICANT CHANGE UP (ref 1.6–2.6)
MAGNESIUM SERPL-MCNC: 2 MG/DL — SIGNIFICANT CHANGE UP (ref 1.6–2.6)
MCHC RBC-ENTMCNC: 29.1 PG — SIGNIFICANT CHANGE UP (ref 27–34)
MCHC RBC-ENTMCNC: 29.3 PG — SIGNIFICANT CHANGE UP (ref 27–34)
MCHC RBC-ENTMCNC: 34.3 GM/DL — SIGNIFICANT CHANGE UP (ref 32–36)
MCHC RBC-ENTMCNC: 34.3 GM/DL — SIGNIFICANT CHANGE UP (ref 32–36)
MCV RBC AUTO: 84.7 FL — SIGNIFICANT CHANGE UP (ref 80–100)
MCV RBC AUTO: 85.5 FL — SIGNIFICANT CHANGE UP (ref 80–100)
MONOCYTES # BLD AUTO: 0.4 K/UL — SIGNIFICANT CHANGE UP (ref 0–0.9)
MONOCYTES NFR BLD AUTO: 7.8 % — SIGNIFICANT CHANGE UP (ref 2–14)
NEUTROPHILS # BLD AUTO: 3.9 K/UL — SIGNIFICANT CHANGE UP (ref 1.8–7.4)
NEUTROPHILS NFR BLD AUTO: 72.9 % — SIGNIFICANT CHANGE UP (ref 43–77)
PHOSPHATE SERPL-MCNC: 3.5 MG/DL — SIGNIFICANT CHANGE UP (ref 2.5–4.5)
PHOSPHATE SERPL-MCNC: 3.9 MG/DL — SIGNIFICANT CHANGE UP (ref 2.5–4.5)
PLATELET # BLD AUTO: 288 K/UL — SIGNIFICANT CHANGE UP (ref 150–400)
PLATELET # BLD AUTO: 289 K/UL — SIGNIFICANT CHANGE UP (ref 150–400)
POTASSIUM SERPL-MCNC: 3.3 MMOL/L — LOW (ref 3.5–5.3)
POTASSIUM SERPL-MCNC: 3.4 MMOL/L — LOW (ref 3.5–5.3)
POTASSIUM SERPL-SCNC: 3.3 MMOL/L — LOW (ref 3.5–5.3)
POTASSIUM SERPL-SCNC: 3.4 MMOL/L — LOW (ref 3.5–5.3)
RBC # BLD: 4.49 M/UL — SIGNIFICANT CHANGE UP (ref 3.8–5.2)
RBC # BLD: 4.67 M/UL — SIGNIFICANT CHANGE UP (ref 3.8–5.2)
RBC # FLD: 14.8 % — HIGH (ref 10.3–14.5)
RBC # FLD: 14.9 % — HIGH (ref 10.3–14.5)
SODIUM SERPL-SCNC: 139 MMOL/L — SIGNIFICANT CHANGE UP (ref 135–145)
SODIUM SERPL-SCNC: 140 MMOL/L — SIGNIFICANT CHANGE UP (ref 135–145)
TROPONIN T SERPL-MCNC: 0.05 NG/ML — SIGNIFICANT CHANGE UP (ref 0–0.06)
TROPONIN T SERPL-MCNC: 0.07 NG/ML — HIGH (ref 0–0.06)
WBC # BLD: 5.4 K/UL — SIGNIFICANT CHANGE UP (ref 3.8–10.5)
WBC # BLD: 7.1 K/UL — SIGNIFICANT CHANGE UP (ref 3.8–10.5)
WBC # FLD AUTO: 5.4 K/UL — SIGNIFICANT CHANGE UP (ref 3.8–10.5)
WBC # FLD AUTO: 7.1 K/UL — SIGNIFICANT CHANGE UP (ref 3.8–10.5)

## 2018-03-11 PROCEDURE — 12345: CPT | Mod: NC

## 2018-03-11 PROCEDURE — 99233 SBSQ HOSP IP/OBS HIGH 50: CPT

## 2018-03-11 PROCEDURE — 99223 1ST HOSP IP/OBS HIGH 75: CPT | Mod: GC

## 2018-03-11 PROCEDURE — 93010 ELECTROCARDIOGRAM REPORT: CPT

## 2018-03-11 RX ORDER — POTASSIUM CHLORIDE 20 MEQ
40 PACKET (EA) ORAL EVERY 4 HOURS
Qty: 0 | Refills: 0 | Status: COMPLETED | OUTPATIENT
Start: 2018-03-11 | End: 2018-03-11

## 2018-03-11 RX ORDER — CARVEDILOL PHOSPHATE 80 MG/1
6.25 CAPSULE, EXTENDED RELEASE ORAL EVERY 12 HOURS
Qty: 0 | Refills: 0 | Status: DISCONTINUED | OUTPATIENT
Start: 2018-03-11 | End: 2018-03-13

## 2018-03-11 RX ADMIN — Medication 40 MILLIEQUIVALENT(S): at 17:31

## 2018-03-11 RX ADMIN — VALSARTAN 320 MILLIGRAM(S): 80 TABLET ORAL at 09:27

## 2018-03-11 RX ADMIN — Medication 75 MICROGRAM(S): at 09:27

## 2018-03-11 RX ADMIN — Medication 100 MILLIGRAM(S): at 12:27

## 2018-03-11 RX ADMIN — CLOPIDOGREL BISULFATE 75 MILLIGRAM(S): 75 TABLET, FILM COATED ORAL at 14:46

## 2018-03-11 RX ADMIN — ETHACRYNIC ACID 25 MILLIGRAM(S): 25 TABLET ORAL at 09:26

## 2018-03-11 RX ADMIN — Medication 40 MILLIEQUIVALENT(S): at 09:27

## 2018-03-11 RX ADMIN — Medication 81 MILLIGRAM(S): at 13:03

## 2018-03-11 RX ADMIN — Medication 100 MILLIGRAM(S): at 21:31

## 2018-03-11 RX ADMIN — HEPARIN SODIUM 1000 UNIT(S)/HR: 5000 INJECTION INTRAVENOUS; SUBCUTANEOUS at 11:49

## 2018-03-11 RX ADMIN — HEPARIN SODIUM 1000 UNIT(S)/HR: 5000 INJECTION INTRAVENOUS; SUBCUTANEOUS at 03:39

## 2018-03-11 RX ADMIN — Medication 300 MILLIGRAM(S): at 09:27

## 2018-03-11 RX ADMIN — CARVEDILOL PHOSPHATE 6.25 MILLIGRAM(S): 80 CAPSULE, EXTENDED RELEASE ORAL at 17:31

## 2018-03-11 NOTE — PROGRESS NOTE ADULT - SUBJECTIVE AND OBJECTIVE BOX
HPI:    TELE: SR 60 -90 with 5 beats of WCT this AM.    The medical history is unchanged.  Review Of Systems:  No orthopnea or change in leg edema. The remainder of his ROS is unchanged.    Medications:  aspirin enteric coated 81 milliGRAM(s) Oral daily  atorvastatin 80 milliGRAM(s) Oral at bedtime  clopidogrel Tablet 75 milliGRAM(s) Oral daily  dextrose 5%. 1000 milliLiter(s) IV Continuous <Continuous>  dextrose 50% Injectable 12.5 Gram(s) IV Push once  dextrose 50% Injectable 25 Gram(s) IV Push once  dextrose 50% Injectable 25 Gram(s) IV Push once  dextrose Gel 1 Dose(s) Oral once PRN  ethacrynic acid 25 milliGRAM(s) Oral daily  glucagon  Injectable 1 milliGRAM(s) IntraMuscular once PRN  heparin  Infusion. 1000 Unit(s)/Hr IV Continuous <Continuous>  heparin  Injectable 6000 Unit(s) IV Push every 6 hours PRN  hydrALAZINE 100 milliGRAM(s) Oral two times a day  influenza   Vaccine 0.5 milliLiter(s) IntraMuscular once  insulin lispro (HumaLOG) corrective regimen sliding scale   SubCutaneous three times a day before meals  insulin lispro (HumaLOG) corrective regimen sliding scale   SubCutaneous at bedtime  labetalol 300 milliGRAM(s) Oral two times a day  levothyroxine 75 MICROGram(s) Oral daily  potassium chloride    Tablet ER 40 milliEquivalent(s) Oral every 4 hours  valsartan 320 milliGRAM(s) Oral daily    Allergies    penicillin (Hives)  sulfa drugs (Unknown)  Tetracycline Hydrochloride (Unknown)    Intolerances        Vitals:  T(F): 98.3 (03-11-18 @ 03:28), Max: 98.3 (03-11-18 @ 03:28)  HR: 77 (03-11-18 @ 03:28) (73 - 93)  BP: 160/74 (03-11-18 @ 03:28) (101/64 - 160/74)  RR: 20 (03-11-18 @ 03:28) (17 - 20)  SpO2: 94% (03-11-18 @ 03:28) (94% - 97%)  Daily Height in cm: 162.56 (10 Mar 2018 22:21)    Daily   I&O's Summary      Physical Exam:  Appearance: NAD  HEENT: No icterus or JVD  Cardiovascular: Regular rhythm, normal S1, S2, No murmurs or rubs, No edema  Respiratory: clear to ascultation bilaterally, no crackles or wheeze.  Gastrointestinal: +BS, soft  Musculoskeletal: No clubbing  Neurologic: Non-focal, alert and oriented, Mood & affect appropriate  Lymphatic: No lymphadenopathy  Skin: Warm and moist, no cyanosis                          13.6   5.4   )-----------( 289      ( 11 Mar 2018 11:21 )             39.5     03-11    139  |  97  |  22  ----------------------------<  120<H>  3.4<L>   |  28  |  0.94    Ca    9.4      11 Mar 2018 11:21  Phos  3.5     03-11  Mg     2.0     03-11    TPro  8.3  /  Alb  4.0  /  TBili  0.7  /  DBili  x   /  AST  17  /  ALT  15  /  AlkPhos  95  03-10    CARDIAC MARKERS ( 11 Mar 2018 11:21 )  x     / 0.05 ng/mL / 85 U/L / x     / 4.9 ng/mL  CARDIAC MARKERS ( 11 Mar 2018 01:37 )  x     / 0.07 ng/mL / 101 U/L / x     / 6.6 ng/mL  CARDIAC MARKERS ( 10 Mar 2018 18:13 )  x     / 0.08 ng/mL / 119 U/L / x     / 8.3 ng/mL  CARDIAC MARKERS ( 10 Mar 2018 14:03 )  x     / 0.02 ng/mL / x     / x     / x          PT/INR - ( 10 Mar 2018 14:03 )   PT: 11.8 sec;   INR: 1.08 ratio         PTT - ( 11 Mar 2018 11:21 )  PTT:56.0 sec  Serum Pro-Brain Natriuretic Peptide: 708 pg/mL (03-10 @ 14:03)      Hemoglobin A1C, Whole Blood: 5.6 % (03-11 @ 06:52)      Interpretation of Telemetry:  ECG:  Echo:  Stress Testing:   Cath:  Imaging: HPI: She feels okay. No chest pain or dizziness.    TELE: SR 60 - 90 with 5 beats of WCT this AM.    The medical history is unchanged.  Review Of Systems:  No orthopnea or change in leg edema. The remainder of his ROS is unchanged.    Medications:  aspirin enteric coated 81 milliGRAM(s) Oral daily  atorvastatin 80 milliGRAM(s) Oral at bedtime  clopidogrel Tablet 75 milliGRAM(s) Oral daily  dextrose 5%. 1000 milliLiter(s) IV Continuous <Continuous>  dextrose 50% Injectable 12.5 Gram(s) IV Push once  dextrose 50% Injectable 25 Gram(s) IV Push once  dextrose 50% Injectable 25 Gram(s) IV Push once  dextrose Gel 1 Dose(s) Oral once PRN  ethacrynic acid 25 milliGRAM(s) Oral daily  glucagon  Injectable 1 milliGRAM(s) IntraMuscular once PRN  heparin  Infusion. 1000 Unit(s)/Hr IV Continuous <Continuous>  heparin  Injectable 6000 Unit(s) IV Push every 6 hours PRN  hydrALAZINE 100 milliGRAM(s) Oral two times a day  influenza   Vaccine 0.5 milliLiter(s) IntraMuscular once  insulin lispro (HumaLOG) corrective regimen sliding scale   SubCutaneous three times a day before meals  insulin lispro (HumaLOG) corrective regimen sliding scale   SubCutaneous at bedtime  labetalol 300 milliGRAM(s) Oral two times a day  levothyroxine 75 MICROGram(s) Oral daily  potassium chloride    Tablet ER 40 milliEquivalent(s) Oral every 4 hours  valsartan 320 milliGRAM(s) Oral daily    Allergies    penicillin (Hives)  sulfa drugs (Unknown)  Tetracycline Hydrochloride (Unknown)    Intolerances        Vitals:  T(F): 98.3 (03-11-18 @ 03:28), Max: 98.3 (03-11-18 @ 03:28)  HR: 77 (03-11-18 @ 03:28) (73 - 93)  BP: 160/74 (03-11-18 @ 03:28) (101/64 - 160/74)  RR: 20 (03-11-18 @ 03:28) (17 - 20)  SpO2: 94% (03-11-18 @ 03:28) (94% - 97%)  Daily Height in cm: 162.56 (10 Mar 2018 22:21)    Daily   I&O's Summary      Physical Exam:  Appearance: Obese man in NAD  HEENT: No icterus or JVD  Cardiovascular: Regular rhythm, normal S1, S2, No murmurs or rubs, R>>L lymphedema.  Respiratory: clear to ascultation bilaterally, no crackles or wheeze.  Gastrointestinal: +BS, soft  Musculoskeletal: No clubbing  Neurologic: Non-focal, alert and oriented, Mood & affect histrionic.  Skin: Warm and moist, no cyanosis                          13.6   5.4   )-----------( 289      ( 11 Mar 2018 11:21 )             39.5     03-11    139  |  97  |  22  ----------------------------<  120<H>  3.4<L>   |  28  |  0.94    Ca    9.4      11 Mar 2018 11:21  Phos  3.5     03-11  Mg     2.0     03-11    TPro  8.3  /  Alb  4.0  /  TBili  0.7  /  DBili  x   /  AST  17  /  ALT  15  /  AlkPhos  95  03-10    CARDIAC MARKERS ( 11 Mar 2018 11:21 )  x     / 0.05 ng/mL / 85 U/L / x     / 4.9 ng/mL  CARDIAC MARKERS ( 11 Mar 2018 01:37 )  x     / 0.07 ng/mL / 101 U/L / x     / 6.6 ng/mL  CARDIAC MARKERS ( 10 Mar 2018 18:13 )  x     / 0.08 ng/mL / 119 U/L / x     / 8.3 ng/mL  CARDIAC MARKERS ( 10 Mar 2018 14:03 )  x     / 0.02 ng/mL / x     / x     / x          PT/INR - ( 10 Mar 2018 14:03 )   PT: 11.8 sec;   INR: 1.08 ratio         PTT - ( 11 Mar 2018 11:21 )  PTT:56.0 sec  Serum Pro-Brain Natriuretic Peptide: 708 pg/mL (03-10 @ 14:03)      Hemoglobin A1C, Whole Blood: 5.6 % (03-11 @ 06:52)

## 2018-03-11 NOTE — PROGRESS NOTE ADULT - PROBLEM SELECTOR PLAN 1
Feels better, no acute chest pain, SOB or dizziness this am. Tele- no arrythmia, Sinus 60-80s  - Reviewed Krunal, trending down with normal CPKs. Spoke to Cardiologist- Dr Jay Lisker. Might need Ischemic w/u given elevated Krunal  - C/W ASA, Plavix, Statin, and IV heparin gtt  - c/w her home meds Labetalol 300mg bid, Hydralazine 100mg bid and Diovan 320mg daily

## 2018-03-11 NOTE — PROGRESS NOTE ADULT - SUBJECTIVE AND OBJECTIVE BOX
Patient is a 67y old  Female who presents with a chief complaint of dizziness, palpitations.    SUBJECTIVE / OVERNIGHT EVENTS:  Feels lot better, no cheat pain, palpitations or dizziness now. Resting, BP improved. Tele- Sinus 60-80s      MEDICATIONS  (STANDING):  aspirin enteric coated 81 milliGRAM(s) Oral daily  atorvastatin 80 milliGRAM(s) Oral at bedtime  clopidogrel Tablet 75 milliGRAM(s) Oral daily  dextrose 5%. 1000 milliLiter(s) (50 mL/Hr) IV Continuous <Continuous>  dextrose 50% Injectable 12.5 Gram(s) IV Push once  dextrose 50% Injectable 25 Gram(s) IV Push once  dextrose 50% Injectable 25 Gram(s) IV Push once  ethacrynic acid 25 milliGRAM(s) Oral daily  heparin  Infusion. 1000 Unit(s)/Hr (10 mL/Hr) IV Continuous <Continuous>  hydrALAZINE 100 milliGRAM(s) Oral two times a day  influenza   Vaccine 0.5 milliLiter(s) IntraMuscular once  insulin lispro (HumaLOG) corrective regimen sliding scale   SubCutaneous three times a day before meals  insulin lispro (HumaLOG) corrective regimen sliding scale   SubCutaneous at bedtime  labetalol 300 milliGRAM(s) Oral two times a day  levothyroxine 75 MICROGram(s) Oral daily  potassium chloride    Tablet ER 40 milliEquivalent(s) Oral every 4 hours  valsartan 320 milliGRAM(s) Oral daily    MEDICATIONS  (PRN):  dextrose Gel 1 Dose(s) Oral once PRN Blood Glucose LESS THAN 70 milliGRAM(s)/deciliter  glucagon  Injectable 1 milliGRAM(s) IntraMuscular once PRN Glucose LESS THAN 70 milligrams/deciliter  heparin  Injectable 6000 Unit(s) IV Push every 6 hours PRN For aPTT less than 40      Vital Signs Last 24 Hrs  T(C): 36.8 (11 Mar 2018 03:28), Max: 36.8 (11 Mar 2018 03:28)  T(F): 98.3 (11 Mar 2018 03:28), Max: 98.3 (11 Mar 2018 03:28)  HR: 77 (11 Mar 2018 03:28) (73 - 93)  BP: 160/74 (11 Mar 2018 03:28) (101/64 - 160/74)  BP(mean): --  RR: 20 (11 Mar 2018 03:28) (17 - 20)  SpO2: 94% (11 Mar 2018 03:28) (94% - 97%)  CAPILLARY BLOOD GLUCOSE      POCT Blood Glucose.: 114 mg/dL (11 Mar 2018 08:55)  POCT Blood Glucose.: 114 mg/dL (10 Mar 2018 21:46)  POCT Blood Glucose.: 118 mg/dL (10 Mar 2018 12:57)    I&O's Summary      PHYSICAL EXAM:-  GENERAL: NAD, well-developed  EYES: EOMI, PERRLA, conjunctiva and sclera clear  NECK: Supple, No JVD, no thyromegaly  CHEST/LUNG: Clear to auscultation bilaterally; No wheeze  HEART: Regular rate and rhythm; S1, S2 audible, No murmurs, rubs, or gallops  ABDOMEN: Soft, Nontender, Nondistended; Bowel sounds present  EXTREMITIES:  2+ Peripheral Pulses, No clubbing, cyanosis, or edema  NEURO: AAOx3, no focal deficit      LABS:                        13.6   5.4   )-----------( 289      ( 11 Mar 2018 11:21 )             39.5     03-11    140  |  99  |  25<H>  ----------------------------<  103<H>  3.3<L>   |  22  |  1.13    Ca    9.3      11 Mar 2018 01:37  Phos  3.9     03-11  Mg     1.9     03-11    TPro  8.3  /  Alb  4.0  /  TBili  0.7  /  DBili  x   /  AST  17  /  ALT  15  /  AlkPhos  95  03-10    PT/INR - ( 10 Mar 2018 14:03 )   PT: 11.8 sec;   INR: 1.08 ratio        RADIOLOGY & ADDITIONAL TESTS:    Imaging Personally Reviewed: CXR  Consultant(s) Notes Reviewed: Card  Care Discussed with Consultants/Other Providers: card

## 2018-03-11 NOTE — PROGRESS NOTE ADULT - PROBLEM SELECTOR PLAN 2
Patient with significant CAD s/p PAOLA x 3, most recently to LAD and D1 in October 2015  - c/w ASA/Plavix/statin and heparin gtt for NSTEMI  - awaiting on Cardiology plan for possible cath

## 2018-03-11 NOTE — PROVIDER CONTACT NOTE (OTHER) - ASSESSMENT
Pt A/Ox4. VSS. Pt states " I am not diabetic anymore, I am tired of getting poke" Pt refuses finger stick.

## 2018-03-12 LAB
ANION GAP SERPL CALC-SCNC: 11 MMOL/L — SIGNIFICANT CHANGE UP (ref 5–17)
APTT BLD: 52.2 SEC — HIGH (ref 27.5–37.4)
APTT BLD: 57.3 SEC — HIGH (ref 27.5–37.4)
BUN SERPL-MCNC: 21 MG/DL — SIGNIFICANT CHANGE UP (ref 7–23)
CALCIUM SERPL-MCNC: 8.7 MG/DL — SIGNIFICANT CHANGE UP (ref 8.4–10.5)
CHLORIDE SERPL-SCNC: 101 MMOL/L — SIGNIFICANT CHANGE UP (ref 96–108)
CHOLEST SERPL-MCNC: 198 MG/DL — SIGNIFICANT CHANGE UP (ref 10–199)
CO2 SERPL-SCNC: 29 MMOL/L — SIGNIFICANT CHANGE UP (ref 22–31)
CREAT SERPL-MCNC: 0.97 MG/DL — SIGNIFICANT CHANGE UP (ref 0.5–1.3)
GLUCOSE BLDC GLUCOMTR-MCNC: 103 MG/DL — HIGH (ref 70–99)
GLUCOSE BLDC GLUCOMTR-MCNC: 106 MG/DL — HIGH (ref 70–99)
GLUCOSE BLDC GLUCOMTR-MCNC: 123 MG/DL — HIGH (ref 70–99)
GLUCOSE SERPL-MCNC: 126 MG/DL — HIGH (ref 70–99)
HCT VFR BLD CALC: 38.6 % — SIGNIFICANT CHANGE UP (ref 34.5–45)
HDLC SERPL-MCNC: 23 MG/DL — LOW (ref 40–125)
HGB BLD-MCNC: 13.3 G/DL — SIGNIFICANT CHANGE UP (ref 11.5–15.5)
LIPID PNL WITH DIRECT LDL SERPL: 102 MG/DL — SIGNIFICANT CHANGE UP
MCHC RBC-ENTMCNC: 29.3 PG — SIGNIFICANT CHANGE UP (ref 27–34)
MCHC RBC-ENTMCNC: 34.4 GM/DL — SIGNIFICANT CHANGE UP (ref 32–36)
MCV RBC AUTO: 85.3 FL — SIGNIFICANT CHANGE UP (ref 80–100)
PLATELET # BLD AUTO: 266 K/UL — SIGNIFICANT CHANGE UP (ref 150–400)
POTASSIUM SERPL-MCNC: 3.4 MMOL/L — LOW (ref 3.5–5.3)
POTASSIUM SERPL-SCNC: 3.4 MMOL/L — LOW (ref 3.5–5.3)
RBC # BLD: 4.53 M/UL — SIGNIFICANT CHANGE UP (ref 3.8–5.2)
RBC # FLD: 14.8 % — HIGH (ref 10.3–14.5)
SODIUM SERPL-SCNC: 142 MMOL/L — SIGNIFICANT CHANGE UP (ref 135–145)
TOTAL CHOLESTEROL/HDL RATIO MEASUREMENT: 8.6 RATIO — HIGH (ref 3.3–7.1)
TRIGL SERPL-MCNC: 367 MG/DL — HIGH (ref 10–149)
TSH SERPL-MCNC: 1.79 UIU/ML — SIGNIFICANT CHANGE UP (ref 0.27–4.2)
WBC # BLD: 5 K/UL — SIGNIFICANT CHANGE UP (ref 3.8–10.5)
WBC # FLD AUTO: 5 K/UL — SIGNIFICANT CHANGE UP (ref 3.8–10.5)

## 2018-03-12 PROCEDURE — 93458 L HRT ARTERY/VENTRICLE ANGIO: CPT | Mod: 26

## 2018-03-12 PROCEDURE — 99152 MOD SED SAME PHYS/QHP 5/>YRS: CPT

## 2018-03-12 PROCEDURE — 99233 SBSQ HOSP IP/OBS HIGH 50: CPT

## 2018-03-12 PROCEDURE — 99232 SBSQ HOSP IP/OBS MODERATE 35: CPT

## 2018-03-12 RX ORDER — LABETALOL HCL 100 MG
10 TABLET ORAL ONCE
Qty: 0 | Refills: 0 | Status: COMPLETED | OUTPATIENT
Start: 2018-03-12 | End: 2018-03-12

## 2018-03-12 RX ORDER — POTASSIUM CHLORIDE 20 MEQ
40 PACKET (EA) ORAL ONCE
Qty: 0 | Refills: 0 | Status: COMPLETED | OUTPATIENT
Start: 2018-03-12 | End: 2018-03-12

## 2018-03-12 RX ADMIN — Medication 75 MICROGRAM(S): at 09:54

## 2018-03-12 RX ADMIN — Medication 100 MILLIGRAM(S): at 19:14

## 2018-03-12 RX ADMIN — Medication 40 MILLIEQUIVALENT(S): at 19:14

## 2018-03-12 RX ADMIN — HEPARIN SODIUM 1200 UNIT(S)/HR: 5000 INJECTION INTRAVENOUS; SUBCUTANEOUS at 13:37

## 2018-03-12 RX ADMIN — CARVEDILOL PHOSPHATE 6.25 MILLIGRAM(S): 80 CAPSULE, EXTENDED RELEASE ORAL at 21:08

## 2018-03-12 RX ADMIN — Medication 10 MILLIGRAM(S): at 18:06

## 2018-03-12 RX ADMIN — Medication 100 MILLIGRAM(S): at 09:54

## 2018-03-12 RX ADMIN — CARVEDILOL PHOSPHATE 6.25 MILLIGRAM(S): 80 CAPSULE, EXTENDED RELEASE ORAL at 09:55

## 2018-03-12 RX ADMIN — Medication 81 MILLIGRAM(S): at 11:39

## 2018-03-12 RX ADMIN — VALSARTAN 320 MILLIGRAM(S): 80 TABLET ORAL at 09:54

## 2018-03-12 RX ADMIN — HEPARIN SODIUM 1200 UNIT(S)/HR: 5000 INJECTION INTRAVENOUS; SUBCUTANEOUS at 06:38

## 2018-03-12 RX ADMIN — ETHACRYNIC ACID 25 MILLIGRAM(S): 25 TABLET ORAL at 09:54

## 2018-03-12 NOTE — CHART NOTE - NSCHARTNOTEFT_GEN_A_CORE
received patient back from cath lab in no pain   as per RN patient refusing Plavix    Discussed with Cath Lab patient with Patent stent  history   diagnotic study done at cath lab   D/C Heparin gtt    DR Lisker Aware patient refused her plavix will reassess tomorrow   past history stent was over a year   NO possible need for the Plavix and heparin gtt received patient back from cath lab in no pain   as per RN patient refusing Plavix and Tele    patient seen and spoke with report she will put tele back after dinner   sign out to night NP patient understand risk    Discussed with Cath Lab patient with Patent stent  history   diagnotic study done at cath lab   D/C Heparin gtt    DR Lisker Aware patient refused her plavix will reassess tomorrow   past history stent was over a year   NO possible need for the Plavix and heparin gtt

## 2018-03-12 NOTE — CONSULT NOTE ADULT - ATTENDING COMMENTS
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  Has made plans to attend lymphedema clinic in [place]  Denies h/o DVT  previous venous duplex is negative for DVT, none done recently in [facility]  patient is tearful, but reports that she is now hopeful about receiving treatments for her lymphedema  For cardiac cath today  No open wounds of either leg, but findings c/w bilateral lymphedema R>L  status discussed Chart reviewed  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

## 2018-03-12 NOTE — PROGRESS NOTE ADULT - SUBJECTIVE AND OBJECTIVE BOX
HPI: She feels well this morning.  No chest pain or unusual dyspnea.  NO bleeding on heparin.    The medical history is unchanged.  Review Of Systems:  No orthopnea or change in leg edema. The remainder of his ROS is unchanged.    Medications:  aspirin enteric coated 81 milliGRAM(s) Oral daily  atorvastatin 80 milliGRAM(s) Oral at bedtime  carvedilol 6.25 milliGRAM(s) Oral every 12 hours  clopidogrel Tablet 75 milliGRAM(s) Oral daily  dextrose 5%. 1000 milliLiter(s) IV Continuous <Continuous>  dextrose 50% Injectable 12.5 Gram(s) IV Push once  dextrose 50% Injectable 25 Gram(s) IV Push once  dextrose 50% Injectable 25 Gram(s) IV Push once  dextrose Gel 1 Dose(s) Oral once PRN  ethacrynic acid 25 milliGRAM(s) Oral daily  glucagon  Injectable 1 milliGRAM(s) IntraMuscular once PRN  heparin  Infusion. 1000 Unit(s)/Hr IV Continuous <Continuous>  heparin  Injectable 6000 Unit(s) IV Push every 6 hours PRN  hydrALAZINE 100 milliGRAM(s) Oral two times a day  influenza   Vaccine 0.5 milliLiter(s) IntraMuscular once  insulin lispro (HumaLOG) corrective regimen sliding scale   SubCutaneous three times a day before meals  insulin lispro (HumaLOG) corrective regimen sliding scale   SubCutaneous at bedtime  levothyroxine 75 MICROGram(s) Oral daily  potassium chloride    Tablet ER 40 milliEquivalent(s) Oral once  valsartan 320 milliGRAM(s) Oral daily    Allergies    penicillin (Hives)  sulfa drugs (Unknown)  Tetracycline Hydrochloride (Unknown)    Intolerances        Vitals:  T(F): 98.1 (03-12-18 @ 04:40), Max: 98.6 (03-11-18 @ 13:12)  HR: 80 (03-12-18 @ 04:48) (69 - 86)  BP: 160/80 (03-12-18 @ 04:48) (158/70 - 163/80)  RR: 19 (03-11-18 @ 13:12) (19 - 19)  SpO2: 97% (03-11-18 @ 13:12) (97% - 97%)  Daily     Daily   I&O's Summary    11 Mar 2018 07:01  -  12 Mar 2018 07:00  --------------------------------------------------------  IN: 1100 mL / OUT: 500 mL / NET: 600 mL        Physical Exam:  Appearance: NAD  HEENT: No icterus or JVD  Cardiovascular: Regular rhythm, normal S1, S2, No murmurs or rubs, Bilateral pitting edema R>>>L  Respiratory: clear to ascultation bilaterally, no crackles or wheeze.  Gastrointestinal: +BS, soft  Musculoskeletal: No clubbing  Neurologic: Non-focal, alert and oriented, Mood & affect appropriate  Skin: Warm and moist, no cyanosis                          13.3   5.0   )-----------( 266      ( 12 Mar 2018 06:11 )             38.6     03-12    142  |  101  |  21  ----------------------------<  126<H>  3.4<L>   |  29  |  0.97    Ca    8.7      12 Mar 2018 06:11  Phos  3.5     03-11  Mg     2.0     03-11    TPro  8.3  /  Alb  4.0  /  TBili  0.7  /  DBili  x   /  AST  17  /  ALT  15  /  AlkPhos  95  03-10    CARDIAC MARKERS ( 11 Mar 2018 11:21 )  x     / 0.05 ng/mL / 85 U/L / x     / 4.9 ng/mL  CARDIAC MARKERS ( 11 Mar 2018 01:37 )  x     / 0.07 ng/mL / 101 U/L / x     / 6.6 ng/mL  CARDIAC MARKERS ( 10 Mar 2018 18:13 )  x     / 0.08 ng/mL / 119 U/L / x     / 8.3 ng/mL  CARDIAC MARKERS ( 10 Mar 2018 14:03 )  x     / 0.02 ng/mL / x     / x     / x          PT/INR - ( 10 Mar 2018 14:03 )   PT: 11.8 sec;   INR: 1.08 ratio         PTT - ( 12 Mar 2018 06:11 )  PTT:52.2 sec  Serum Pro-Brain Natriuretic Peptide: 708 pg/mL (03-10 @ 14:03)      Hemoglobin A1C, Whole Blood: 5.8 % (03-11 @ 13:34)  Hemoglobin A1C, Whole Blood: 5.6 % (03-11 @ 06:52)      Interpretation of Telemetry: SR no VT today.

## 2018-03-12 NOTE — PROGRESS NOTE ADULT - PROBLEM SELECTOR PLAN 1
atyplical chest pain - no events on telemetry  very upset now - wants heparin gtt stopped (despite cath lab stating she should go down on heparin gtt), refused plavix today (stating she will take it only after cath), and refused potassium.   CE with mildly elevated troponin trending down.   Cardiology f/u appreciated - Dr Jay Lisker - cath today  - recommend C/W ASA, Plavix, Statin, and IV heparin gtt pending cath  - c/w her home meds Labetalol 300mg bid, Hydralazine 100mg bid and Diovan 320mg daily

## 2018-03-12 NOTE — PROGRESS NOTE ADULT - ASSESSMENT
66 yo F w/PMH of T2DM, HTN, CAD s/p PAOLA (most recently to LAD and D1 in October 2015), hypothyroidism, chronic LE lymphedema who presented with a brief, self-limiting episode of dizziness and SOB admitted with NSTEMI
66 yo F w/PMH of T2DM, HTN, CAD s/p PAOLA (most recently to LAD and D1 in October 2015), hypothyroidism, chronic LE lymphedema who presented with a brief, self-limiting episode of dizziness and SOB admitted with NSTEMI
She is a 67 year old with chronic hypertension, obesity, lymphedema and CAD (LAD/D1, RCA stents in 2015) normal EF, p/w atypical symptoms, similar ECG, but  elevated troponin levels that are not in a pattern consistent with acute thrombotic event.   NSVT on tele yesterday.  Would proceed with coronary angiography 3.12. She agrees to proceed.  Continue IV heparin till cath.  Continue asa/plavix/statin.   BP control not optimized, will incresase coreg to 12.5 bid, Continue valsartan and hydralazine for now.     Cath arranged for this PM. She may have a light lunch.
She is a 67 year old with chronic hypertension obesity, lymphedema and CAD (LAD/D1, RCA stents in 2015 p/w atypical symptoms, similar ECG, but  elevated troponin levels that are not in a pattern consistent with acute thrombotic event. Normal EF prior.  Suspect atrial or ventricular arrhythmia as the source of her event. WCT on tele.  Would proceed with coronary angiography 3.12 or sooner if symptoms arise.  Nuclear perfusion imaging is not likely to provide definitive results in this setting.  Monitor on Tele and cardiac enzymes.  Continue IV heparin till cath.  Continue asa/plavix/statin. Stressed the importance of plavix, she will take.  BP control, will attempt coreg 6.25 bid instead of labetalol for its anti-arrhythmic effects. Continue hydralazine for now.     d/w Dr. Day.

## 2018-03-12 NOTE — CONSULT NOTE ADULT - ASSESSMENT
She is a  year old with chronic hypertenion, obesity, lymphedema and CAD (LAD/D1, RCA stents in 2015 p/w atypical symptoms, similar ECG, but uptrending troponin levels with no other explanation. Normal EF prior.  Suspect NSTEMI  Discussed the benefits of cath. She is apprehensive about the procedure as it was emotionally uncomfortably for her, but agrees, as long as the enzymes trend up.  Monitor on Tele and cardiac enzymes.  Urgent cath if symptoms recur.   Continue IV heparin till cath or 24 hours.  Continue asa/plavix/statin.  BP control with labetalol and hydralazine for now.  Vascular - Dr. Andino if any questions re: leg arise.  d/w resident.
A/P: 66 yo F w/PMH of T2DM, HTN, CAD s/p PAOLA (most recently to LAD and D1 in October 2015), hypothyroidism, chronic LE lymphedema who presented with a brief, self-limiting episode of dizziness and SOB admitted with NSTEMI  BLE elevation  Moisturize BLE  BLE compression wrapping & lymphedema tx after r/o DVT  f/u at [facility]- has appt this Friday  Consider Duplex to r/o RLE DVR w/ R>LLE edema  con't Nutrition (as tolerated)  con't Offloading   con't Pericare  Care as per medicine, remain available as requested  Upon discharge [facility] [address] [place] [address] [phone]  Seen w/ attng and D/w team  Thank you for this consult  Siria Finch PA-C [id number]

## 2018-03-12 NOTE — PROGRESS NOTE ADULT - PROBLEM SELECTOR PLAN 4
Patient with reported history of T2DM, not currently on medications, patient reports she no longer has diabetic, HbA1C is 5.6%  HISS for coverage

## 2018-03-12 NOTE — PROGRESS NOTE ADULT - PROBLEM SELECTOR PLAN 2
Patient with significant CAD s/p PAOLA x 3, most recently to LAD and D1 in October 2015  - c/w ASA/Plavix/statin and heparin gtt for NSTEMI  cath today

## 2018-03-12 NOTE — CONSULT NOTE ADULT - SUBJECTIVE AND OBJECTIVE BOX
Patient seen and evaluated @ 9pm  Chief Complaint: Sudden dizziness and shortness of breath.  She is a 67 year old with a history of CAD s/p LAD/D1 and RCA PCI in Oct. 2015. She has chronically difficult to control HTN and lymphedema.  She is currently under the care of Dr. Maxwell for vascular issues/lymphedema in her legs. R>L  No reported chest pain today.   She has chronic orthopnea.       PMH:   Carpal tunnel syndrome of right wrist  Obesity (BMI 30-39.9)  Type 2 diabetes mellitus without complication  Hypothyroid  Essential hypertension  Obesity  HTN (hypertension)  DM (diabetes mellitus)    PSH:   S/P carpal tunnel release    Medications:   heparin  Infusion. 1000 Unit(s)/Hr IV Continuous <Continuous>  heparin  Injectable 6000 Unit(s) IV Push every 6 hours PRN  influenza   Vaccine 0.5 milliLiter(s) IntraMuscular once    Allergies:  penicillin (Hives)  sulfa drugs (Unknown)  Tetracycline Hydrochloride (Unknown)    FAMILY HISTORY:  Family history of myocardial infarction (Sibling)  Family history of lung cancer    Social History:  Smoking:  Alcohol:  Drugs:    Review of Systems:  [ ]Unable to obtain  The remainder of the ROS is negative.    Physical Exam:  T(C): 36.7 (03-10-18 @ 20:40), Max: 36.7 (03-10-18 @ 15:05)  HR: 76 (03-10-18 @ 20:40) (73 - 93)  BP: 122/63 (03-10-18 @ 20:40) (101/64 - 148/85)  RR: 20 (03-10-18 @ 20:40) (17 - 20)  SpO2: 97% (03-10-18 @ 20:40) (95% - 97%)  Wt(kg): --    Daily       Appearance: Morbidly obese but in NAD  Eyes:  EOMI, no scleral icterus   HENT: moist oral mucosa, No JVD  Cardiovascular: Regular rhythm, Normal S1 and S2, no murmur, rub;   no JVD, bilateral leg edema R>>L  Respiratory: Clear to auscultation bilaterally  Gastrointestinal: Soft, +BS  Musculoskeletal: No clubbing   Neurologic: No focal weakness  Psychiatry: A&Ox3 with appropriate mood and affect  Skin: No rashes, ecchymoses, or cyanosis    Cardiovascular Diagnostic Testing:  ECG: SR, LAD, Lateral T-wave inversions and T-wave changes in V1 and V2    Cath:< from: Cardiac Cath Lab - Adult (10.12.15 @ 13:23) >  VENTRICLES: Global left ventricular function was normal. EF estimated was  65 %.  CORONARY VESSELS: The coronary circulation is right dominant.  LM:   --  LM: Normal.  LAD:   --  Proximal LAD: There was a 80 % stenosis.  --  D1: There was a 90 % stenosis.  CX:   --  Circumflex: Normal.  RCA:   --  Mid RCA: There was a 80 % stenosis.  COMPLICATIONS: There were no complications.    < end of copied text >    Imaging:    Labs:                        14.1   7.1   )-----------( 303      ( 10 Mar 2018 14:03 )             41.4     03-10    141  |  100  |  21  ----------------------------<  115<H>  3.7   |  27  |  0.98    Ca    9.0      10 Mar 2018 14:03    TPro  8.3  /  Alb  4.0  /  TBili  0.7  /  DBili  x   /  AST  17  /  ALT  15  /  AlkPhos  95  03-10    PT/INR - ( 10 Mar 2018 14:03 )   PT: 11.8 sec;   INR: 1.08 ratio         PTT - ( 10 Mar 2018 14:03 )  PTT:48.0 sec  CARDIAC MARKERS ( 10 Mar 2018 18:13 )  x     / 0.08 ng/mL / x     / x     / x      CARDIAC MARKERS ( 10 Mar 2018 14:03 )  x     / 0.02 ng/mL / x     / x     / x          Serum Pro-Brain Natriuretic Peptide: 708 pg/mL (03-10 @ 14:03)
Wound SURGERY CONSULT NOTE    HPI:  Patient is a 66 yo F w/PMH of T2DM, HTN, CAD s/p PAOLA (most recently to LAD and D1 in October 2015), hypothyroidism, chronic LE lymphedema who presented with an episode of dizziness and SOB x 15 minutes earlier today. She reports that she was with her friends when she developed sudden onset of dizziness and shortness of breath for a brief episode lasting approximately 10-15 minutes around 11:30 this morning, non-exertional, self-resolved. She denies chest pain during this episode. This prompted her to present to the ER given her known cardiac history. She states that she had another episode while in the ER around 3pm which was also brief and self-limited. Denies fevers, chills, wheezing, cough, chest pain, nausea, vomiting, diaphoresis, abdominal pain, dysuria or changes in bowel habits. She is currently symptom free.    Of note, she also reports lower extremity lymphedema for which she has been following with Dr. Maxwell of vascular surgery. Reports right is always more swollen than the left. Notes chronic LE pain in this setting as well.    In the ED, VS on presentation: T 97.4, HR 93, /85, RR 18, SpO2 95% on RA  CBC, CMP unremarkable. Initial Troponin 0.02, repeat 0.08. Pro-BnP 708 (previously 300 in 10/2015). UA unremarkable. CXR significant for mild pulmonary vascular congestion. She was given ASA 243mg x 1 and heparin gtt initiated for NSTEMI. She was admitted to medicine on telemetry for further management.    Pt well known to team.  Pt w/ PVD & Lymphedema.  RLE has been getting bigger.  no sob or cp.  BLE getting heavier and she can't move.  Pt was noncompliant w/ compression.  Pt now going to try lymphedema therapy.  Pt says she's using the pump daily w/o improvement.  we discussed importance of exercise & weight loss & compression.      PAST MEDICAL & SURGICAL HISTORY:  Hypothyroid  Essential hypertension  Obesity  HTN (hypertension)  DM (diabetes mellitus)  S/P carpal tunnel release      REVIEW OF SYSTEMS    Skin/Musculoskeletal:	see HPI all other systems negative  All other systems negative	    MEDICATIONS  (STANDING):  aspirin enteric coated 81 milliGRAM(s) Oral daily  atorvastatin 80 milliGRAM(s) Oral at bedtime  carvedilol 6.25 milliGRAM(s) Oral every 12 hours  clopidogrel Tablet 75 milliGRAM(s) Oral daily  dextrose 5%. 1000 milliLiter(s) (50 mL/Hr) IV Continuous <Continuous>  dextrose 50% Injectable 12.5 Gram(s) IV Push once  dextrose 50% Injectable 25 Gram(s) IV Push once  dextrose 50% Injectable 25 Gram(s) IV Push once  ethacrynic acid 25 milliGRAM(s) Oral daily  heparin  Infusion. 1000 Unit(s)/Hr (10 mL/Hr) IV Continuous <Continuous>  hydrALAZINE 100 milliGRAM(s) Oral two times a day  influenza   Vaccine 0.5 milliLiter(s) IntraMuscular once  insulin lispro (HumaLOG) corrective regimen sliding scale   SubCutaneous three times a day before meals  insulin lispro (HumaLOG) corrective regimen sliding scale   SubCutaneous at bedtime  levothyroxine 75 MICROGram(s) Oral daily  potassium chloride    Tablet ER 40 milliEquivalent(s) Oral once  valsartan 320 milliGRAM(s) Oral daily    MEDICATIONS  (PRN):  dextrose Gel 1 Dose(s) Oral once PRN Blood Glucose LESS THAN 70 milliGRAM(s)/deciliter  glucagon  Injectable 1 milliGRAM(s) IntraMuscular once PRN Glucose LESS THAN 70 milligrams/deciliter  heparin  Injectable 6000 Unit(s) IV Push every 6 hours PRN For aPTT less than 40      Allergies    penicillin (Hives)  sulfa drugs (Unknown)  Tetracycline Hydrochloride (Unknown)      SOCIAL HISTORY:  single; (+)retired teacher; Denies smoking, ETOH, drugs    FAMILY HISTORY:  Family history of myocardial infarction (Sibling)  Family history of lung cancer (Father)      Vital Signs Last 24 Hrs  T(C): 36.7 (12 Mar 2018 04:40), Max: 36.7 (12 Mar 2018 04:40)  T(F): 98.1 (12 Mar 2018 04:40), Max: 98.1 (12 Mar 2018 04:40)  HR: 75 (12 Mar 2018 09:50) (75 - 86)  BP: 144/84 (12 Mar 2018 09:50) (144/84 - 160/80)  BP(mean): --  RR: --  SpO2: --    NAD /  A&Ox3  MO/ Disheveled  Versa Care P500 bed    Cardiovascular: RRR     Respiratory: CTA    Gastrointestinal soft NT/ND (+)BS    Neurology  weakened strength & sensation grossly intact    Musculoskeletal/Vascular: FROM x4   R>LLE edema  (+)BLE w/ palpable DP/PT pulses  BLE w/hemosiderin staining  no deformities/ contractures  (+)Varicose veins noted  no open wounds  No drainage, odor, erythema, increased warmth, tenderness, induration, fluctuance    Skin:  moist w/ good turgor      LABS:                        13.3   5.0   )-----------( 266      ( 12 Mar 2018 06:11 )             38.6     03-12    142  |  101  |  21  ----------------------------<  126<H>  3.4<L>   |  29  |  0.97    Ca    8.7      12 Mar 2018 06:11  Phos  3.5     03-11  Mg     2.0     03-11      PTT - ( 12 Mar 2018 13:05 )  PTT:57.3 sec      Albumin, Serum: 4.0 g/dL (03-10 @ 14:03)      HgA1c  03-11 @ 13:34  5.8        RADIOLOGY & ADDITIONAL STUDIES:  Xray Chest 1 View AP/PA (03.10.18 @ 14:27) >  Indication: Shortness of breath.    Impression:    Poor inspiratory effort. The heart is enlarged. Mild pulmonary vascular   congestion.

## 2018-03-12 NOTE — PROGRESS NOTE ADULT - SUBJECTIVE AND OBJECTIVE BOX
Patient is a 67y old  Female who presents with a chief complaint of palpitations (10 Mar 2018 21:15)        SUBJECTIVE / OVERNIGHT EVENTS:      MEDICATIONS  (STANDING):  aspirin enteric coated 81 milliGRAM(s) Oral daily  atorvastatin 80 milliGRAM(s) Oral at bedtime  carvedilol 6.25 milliGRAM(s) Oral every 12 hours  clopidogrel Tablet 75 milliGRAM(s) Oral daily  dextrose 5%. 1000 milliLiter(s) (50 mL/Hr) IV Continuous <Continuous>  dextrose 50% Injectable 12.5 Gram(s) IV Push once  dextrose 50% Injectable 25 Gram(s) IV Push once  dextrose 50% Injectable 25 Gram(s) IV Push once  ethacrynic acid 25 milliGRAM(s) Oral daily  heparin  Infusion. 1000 Unit(s)/Hr (10 mL/Hr) IV Continuous <Continuous>  hydrALAZINE 100 milliGRAM(s) Oral two times a day  influenza   Vaccine 0.5 milliLiter(s) IntraMuscular once  insulin lispro (HumaLOG) corrective regimen sliding scale   SubCutaneous three times a day before meals  insulin lispro (HumaLOG) corrective regimen sliding scale   SubCutaneous at bedtime  levothyroxine 75 MICROGram(s) Oral daily  potassium chloride    Tablet ER 40 milliEquivalent(s) Oral once  valsartan 320 milliGRAM(s) Oral daily    MEDICATIONS  (PRN):  dextrose Gel 1 Dose(s) Oral once PRN Blood Glucose LESS THAN 70 milliGRAM(s)/deciliter  glucagon  Injectable 1 milliGRAM(s) IntraMuscular once PRN Glucose LESS THAN 70 milligrams/deciliter  heparin  Injectable 6000 Unit(s) IV Push every 6 hours PRN For aPTT less than 40      Vital Signs Last 24 Hrs  T(C): 36.7 (12 Mar 2018 04:40), Max: 36.7 (12 Mar 2018 04:40)  T(F): 98.1 (12 Mar 2018 04:40), Max: 98.1 (12 Mar 2018 04:40)  HR: 75 (12 Mar 2018 09:50) (75 - 86)  BP: 144/84 (12 Mar 2018 09:50) (144/84 - 160/80)  BP(mean): --  RR: --  SpO2: --  CAPILLARY BLOOD GLUCOSE      POCT Blood Glucose.: 106 mg/dL (12 Mar 2018 12:53)  POCT Blood Glucose.: 123 mg/dL (12 Mar 2018 08:57)  POCT Blood Glucose.: 121 mg/dL (11 Mar 2018 17:57)    I&O's Summary    11 Mar 2018 07:01  -  12 Mar 2018 07:00  --------------------------------------------------------  IN: 1100 mL / OUT: 500 mL / NET: 600 mL    12 Mar 2018 07:01  -  12 Mar 2018 14:47  --------------------------------------------------------  IN: 360 mL / OUT: 0 mL / NET: 360 mL        PHYSICAL EXAM:  GENERAL: NAD  HEAD:  Atraumatic, Normocephalic  EYES: conjunctiva and sclera clear  NECK: No JVD  CHEST/LUNG: CTA b/l  HEART: S1 S2 RRR  ABDOMEN: +BS Soft, NT/ND  EXTREMITIES:  2+ DP Pulses, No c/c/e  NEUROLOGY: AAOx3, no focal deficits   SKIN: No rashes or lesions    LABS:                        13.3   5.0   )-----------( 266      ( 12 Mar 2018 06:11 )             38.6     03-12    142  |  101  |  21  ----------------------------<  126<H>  3.4<L>   |  29  |  0.97    Ca    8.7      12 Mar 2018 06:11  Phos  3.5     03-11  Mg     2.0     03-11      PTT - ( 12 Mar 2018 13:05 )  PTT:57.3 sec  CARDIAC MARKERS ( 11 Mar 2018 11:21 )  x     / 0.05 ng/mL / 85 U/L / x     / 4.9 ng/mL  CARDIAC MARKERS ( 11 Mar 2018 01:37 )  x     / 0.07 ng/mL / 101 U/L / x     / 6.6 ng/mL  CARDIAC MARKERS ( 10 Mar 2018 18:13 )  x     / 0.08 ng/mL / 119 U/L / x     / 8.3 ng/mL          RADIOLOGY & ADDITIONAL TESTS:    Imaging Personally Reviewed: EKG tracing reviewed - no acute ischemic changes  Consultant(s) Notes Reviewed: Cards   Care Discussed with Consultants/Other Providers: Patient is a 67y old  Female who presents with a chief complaint of palpitations (10 Mar 2018 21:15)        SUBJECTIVE / OVERNIGHT EVENTS: c/o telemetry monitor, heparin gtt. denies chest pain      MEDICATIONS  (STANDING):  aspirin enteric coated 81 milliGRAM(s) Oral daily  atorvastatin 80 milliGRAM(s) Oral at bedtime  carvedilol 6.25 milliGRAM(s) Oral every 12 hours  clopidogrel Tablet 75 milliGRAM(s) Oral daily  dextrose 5%. 1000 milliLiter(s) (50 mL/Hr) IV Continuous <Continuous>  dextrose 50% Injectable 12.5 Gram(s) IV Push once  dextrose 50% Injectable 25 Gram(s) IV Push once  dextrose 50% Injectable 25 Gram(s) IV Push once  ethacrynic acid 25 milliGRAM(s) Oral daily  heparin  Infusion. 1000 Unit(s)/Hr (10 mL/Hr) IV Continuous <Continuous>  hydrALAZINE 100 milliGRAM(s) Oral two times a day  influenza   Vaccine 0.5 milliLiter(s) IntraMuscular once  insulin lispro (HumaLOG) corrective regimen sliding scale   SubCutaneous three times a day before meals  insulin lispro (HumaLOG) corrective regimen sliding scale   SubCutaneous at bedtime  levothyroxine 75 MICROGram(s) Oral daily  potassium chloride    Tablet ER 40 milliEquivalent(s) Oral once  valsartan 320 milliGRAM(s) Oral daily    MEDICATIONS  (PRN):  dextrose Gel 1 Dose(s) Oral once PRN Blood Glucose LESS THAN 70 milliGRAM(s)/deciliter  glucagon  Injectable 1 milliGRAM(s) IntraMuscular once PRN Glucose LESS THAN 70 milligrams/deciliter  heparin  Injectable 6000 Unit(s) IV Push every 6 hours PRN For aPTT less than 40      Vital Signs Last 24 Hrs  T(C): 36.7 (12 Mar 2018 04:40), Max: 36.7 (12 Mar 2018 04:40)  T(F): 98.1 (12 Mar 2018 04:40), Max: 98.1 (12 Mar 2018 04:40)  HR: 75 (12 Mar 2018 09:50) (75 - 86)  BP: 144/84 (12 Mar 2018 09:50) (144/84 - 160/80)  BP(mean): --  RR: --  SpO2: --  CAPILLARY BLOOD GLUCOSE      POCT Blood Glucose.: 106 mg/dL (12 Mar 2018 12:53)  POCT Blood Glucose.: 123 mg/dL (12 Mar 2018 08:57)  POCT Blood Glucose.: 121 mg/dL (11 Mar 2018 17:57)    I&O's Summary    11 Mar 2018 07:01  -  12 Mar 2018 07:00  --------------------------------------------------------  IN: 1100 mL / OUT: 500 mL / NET: 600 mL    12 Mar 2018 07:01  -  12 Mar 2018 14:47  --------------------------------------------------------  IN: 360 mL / OUT: 0 mL / NET: 360 mL        PHYSICAL EXAM:  GENERAL: NAD, obese  HEAD:  Atraumatic, Normocephalic  EYES: conjunctiva and sclera clear  NECK: No JVD  CHEST/LUNG: CTA b/l  HEART: S1 S2 RRR  ABDOMEN: +BS Soft, NT/ND  EXTREMITIES:  2+ DP Pulses, No c/c. 3+b/l LE edema R>L  NEUROLOGY: AAOx3, no focal deficits   SKIN: chronic skin changes b/l LE    LABS:                        13.3   5.0   )-----------( 266      ( 12 Mar 2018 06:11 )             38.6     03-12    142  |  101  |  21  ----------------------------<  126<H>  3.4<L>   |  29  |  0.97    Ca    8.7      12 Mar 2018 06:11  Phos  3.5     03-11  Mg     2.0     03-11      PTT - ( 12 Mar 2018 13:05 )  PTT:57.3 sec  CARDIAC MARKERS ( 11 Mar 2018 11:21 )  x     / 0.05 ng/mL / 85 U/L / x     / 4.9 ng/mL  CARDIAC MARKERS ( 11 Mar 2018 01:37 )  x     / 0.07 ng/mL / 101 U/L / x     / 6.6 ng/mL  CARDIAC MARKERS ( 10 Mar 2018 18:13 )  x     / 0.08 ng/mL / 119 U/L / x     / 8.3 ng/mL          RADIOLOGY & ADDITIONAL TESTS:    Imaging Personally Reviewed: EKG tracing reviewed - no acute ischemic changes  Consultant(s) Notes Reviewed: Cards   Care Discussed with Consultants/Other Providers:

## 2018-03-12 NOTE — PROGRESS NOTE ADULT - PROBLEM SELECTOR PLAN 6
Patient reports long history of lymphedema of bilateral lower extremities with R worse than left - chronic per patient - recent outpatient LE doppler negative per patient  - follows with Dr. Maxwell's input  - wound care c/s for dressing changes  - c/w ethacrynic acid (patient has reported sulfa allergy)  - PT consult

## 2018-03-13 ENCOUNTER — TRANSCRIPTION ENCOUNTER (OUTPATIENT)
Age: 68
End: 2018-03-13

## 2018-03-13 VITALS
OXYGEN SATURATION: 96 % | SYSTOLIC BLOOD PRESSURE: 138 MMHG | RESPIRATION RATE: 18 BRPM | DIASTOLIC BLOOD PRESSURE: 78 MMHG | HEART RATE: 88 BPM

## 2018-03-13 LAB
ANION GAP SERPL CALC-SCNC: 14 MMOL/L — SIGNIFICANT CHANGE UP (ref 5–17)
BUN SERPL-MCNC: 15 MG/DL — SIGNIFICANT CHANGE UP (ref 7–23)
CALCIUM SERPL-MCNC: 9.4 MG/DL — SIGNIFICANT CHANGE UP (ref 8.4–10.5)
CHLORIDE SERPL-SCNC: 100 MMOL/L — SIGNIFICANT CHANGE UP (ref 96–108)
CO2 SERPL-SCNC: 27 MMOL/L — SIGNIFICANT CHANGE UP (ref 22–31)
CREAT SERPL-MCNC: 0.88 MG/DL — SIGNIFICANT CHANGE UP (ref 0.5–1.3)
GLUCOSE BLDC GLUCOMTR-MCNC: 109 MG/DL — HIGH (ref 70–99)
GLUCOSE BLDC GLUCOMTR-MCNC: 119 MG/DL — HIGH (ref 70–99)
GLUCOSE SERPL-MCNC: 114 MG/DL — HIGH (ref 70–99)
HCT VFR BLD CALC: 39.7 % — SIGNIFICANT CHANGE UP (ref 34.5–45)
HGB BLD-MCNC: 13.4 G/DL — SIGNIFICANT CHANGE UP (ref 11.5–15.5)
MCHC RBC-ENTMCNC: 28.9 PG — SIGNIFICANT CHANGE UP (ref 27–34)
MCHC RBC-ENTMCNC: 33.9 GM/DL — SIGNIFICANT CHANGE UP (ref 32–36)
MCV RBC AUTO: 85.2 FL — SIGNIFICANT CHANGE UP (ref 80–100)
PLATELET # BLD AUTO: 248 K/UL — SIGNIFICANT CHANGE UP (ref 150–400)
POTASSIUM SERPL-MCNC: 3.6 MMOL/L — SIGNIFICANT CHANGE UP (ref 3.5–5.3)
POTASSIUM SERPL-SCNC: 3.6 MMOL/L — SIGNIFICANT CHANGE UP (ref 3.5–5.3)
RBC # BLD: 4.66 M/UL — SIGNIFICANT CHANGE UP (ref 3.8–5.2)
RBC # FLD: 14.9 % — HIGH (ref 10.3–14.5)
SODIUM SERPL-SCNC: 141 MMOL/L — SIGNIFICANT CHANGE UP (ref 135–145)
WBC # BLD: 5 K/UL — SIGNIFICANT CHANGE UP (ref 3.8–10.5)
WBC # FLD AUTO: 5 K/UL — SIGNIFICANT CHANGE UP (ref 3.8–10.5)

## 2018-03-13 PROCEDURE — 83036 HEMOGLOBIN GLYCOSYLATED A1C: CPT

## 2018-03-13 PROCEDURE — 93458 L HRT ARTERY/VENTRICLE ANGIO: CPT

## 2018-03-13 PROCEDURE — 85730 THROMBOPLASTIN TIME PARTIAL: CPT

## 2018-03-13 PROCEDURE — 83735 ASSAY OF MAGNESIUM: CPT

## 2018-03-13 PROCEDURE — 82550 ASSAY OF CK (CPK): CPT

## 2018-03-13 PROCEDURE — 80048 BASIC METABOLIC PNL TOTAL CA: CPT

## 2018-03-13 PROCEDURE — 85610 PROTHROMBIN TIME: CPT

## 2018-03-13 PROCEDURE — 84100 ASSAY OF PHOSPHORUS: CPT

## 2018-03-13 PROCEDURE — 97162 PT EVAL MOD COMPLEX 30 MIN: CPT

## 2018-03-13 PROCEDURE — 84443 ASSAY THYROID STIM HORMONE: CPT

## 2018-03-13 PROCEDURE — C1769: CPT

## 2018-03-13 PROCEDURE — 93005 ELECTROCARDIOGRAM TRACING: CPT

## 2018-03-13 PROCEDURE — 80061 LIPID PANEL: CPT

## 2018-03-13 PROCEDURE — 99239 HOSP IP/OBS DSCHRG MGMT >30: CPT

## 2018-03-13 PROCEDURE — 82553 CREATINE MB FRACTION: CPT

## 2018-03-13 PROCEDURE — 71045 X-RAY EXAM CHEST 1 VIEW: CPT

## 2018-03-13 PROCEDURE — C1887: CPT

## 2018-03-13 PROCEDURE — C1894: CPT

## 2018-03-13 PROCEDURE — 99285 EMERGENCY DEPT VISIT HI MDM: CPT | Mod: 25

## 2018-03-13 PROCEDURE — 99152 MOD SED SAME PHYS/QHP 5/>YRS: CPT

## 2018-03-13 PROCEDURE — 84484 ASSAY OF TROPONIN QUANT: CPT

## 2018-03-13 PROCEDURE — 81001 URINALYSIS AUTO W/SCOPE: CPT

## 2018-03-13 PROCEDURE — 85027 COMPLETE CBC AUTOMATED: CPT

## 2018-03-13 PROCEDURE — 80053 COMPREHEN METABOLIC PANEL: CPT

## 2018-03-13 PROCEDURE — 83880 ASSAY OF NATRIURETIC PEPTIDE: CPT

## 2018-03-13 PROCEDURE — 82962 GLUCOSE BLOOD TEST: CPT

## 2018-03-13 RX ORDER — ETHACRYNIC ACID 25 MG/1
1 TABLET ORAL
Qty: 0 | Refills: 0 | DISCHARGE
Start: 2018-03-13

## 2018-03-13 RX ORDER — CARVEDILOL PHOSPHATE 80 MG/1
1 CAPSULE, EXTENDED RELEASE ORAL
Qty: 60 | Refills: 0 | OUTPATIENT
Start: 2018-03-13 | End: 2018-04-11

## 2018-03-13 RX ORDER — CLOPIDOGREL BISULFATE 75 MG/1
1 TABLET, FILM COATED ORAL
Qty: 0 | Refills: 0 | COMMUNITY
Start: 2018-03-13

## 2018-03-13 RX ORDER — HYDRALAZINE HCL 50 MG
1 TABLET ORAL
Qty: 0 | Refills: 0 | COMMUNITY
Start: 2018-03-13

## 2018-03-13 RX ORDER — LEVOTHYROXINE SODIUM 125 MCG
1 TABLET ORAL
Qty: 0 | Refills: 0 | DISCHARGE
Start: 2018-03-13

## 2018-03-13 RX ORDER — ASPIRIN/CALCIUM CARB/MAGNESIUM 324 MG
1 TABLET ORAL
Qty: 0 | Refills: 0 | DISCHARGE
Start: 2018-03-13

## 2018-03-13 RX ORDER — VALSARTAN 80 MG/1
1 TABLET ORAL
Qty: 0 | Refills: 0 | COMMUNITY
Start: 2018-03-13

## 2018-03-13 RX ORDER — ACETAMINOPHEN 500 MG
650 TABLET ORAL ONCE
Qty: 0 | Refills: 0 | Status: COMPLETED | OUTPATIENT
Start: 2018-03-13 | End: 2018-03-13

## 2018-03-13 RX ADMIN — Medication 650 MILLIGRAM(S): at 06:55

## 2018-03-13 RX ADMIN — VALSARTAN 320 MILLIGRAM(S): 80 TABLET ORAL at 09:38

## 2018-03-13 RX ADMIN — Medication 100 MILLIGRAM(S): at 09:38

## 2018-03-13 RX ADMIN — ETHACRYNIC ACID 25 MILLIGRAM(S): 25 TABLET ORAL at 09:38

## 2018-03-13 RX ADMIN — Medication 81 MILLIGRAM(S): at 09:53

## 2018-03-13 RX ADMIN — CLOPIDOGREL BISULFATE 75 MILLIGRAM(S): 75 TABLET, FILM COATED ORAL at 09:38

## 2018-03-13 RX ADMIN — Medication 75 MICROGRAM(S): at 09:38

## 2018-03-13 RX ADMIN — CARVEDILOL PHOSPHATE 6.25 MILLIGRAM(S): 80 CAPSULE, EXTENDED RELEASE ORAL at 09:38

## 2018-03-13 RX ADMIN — Medication 650 MILLIGRAM(S): at 06:07

## 2018-03-13 NOTE — DISCHARGE NOTE ADULT - PLAN OF CARE
s/p Diagnostic cath , symptoms resolved please follow up with DR Lisker Coronary artery disease is a condition where the arteries the supply the heart muscle get clogges with fatty deposits & puts you at risk for a heart attack  Call your doctor if you have any new pain, pressure, or discomfort in the center of your chest, pain, tingling or discomfort in arms, back, neck, jaw, or stomach, shortness of breath, nausea, vomiting, burping or heartburn, sweating, cold and clammy skin, racing or abnormal heartbeat for more than 10 minutes or if they keep coming & going.  Call 911 and do not tr to get to hospital by care  You can help yourself with lefestyle changes (quitting smoking if you smoke), eat lots of fruits & vegetables & low fat dairy products, not a lot of meat & fatty foods, walk or some form of physical activity most days of the week, lose weight if you are overweight  Take your cardiac medication as prescribed to lower cholesterol, to lower blood pressure, aspirin to prevent blood clots, and diabetes control  Make sure to keep appointments with doctor for cardiac follow up care Follow up with your medical doctor to establish long term blood pressure treatment goals. HgA1C this admission.  Make sure you get your HgA1c checked every three months.  If you take oral diabetes medications, check your blood glucose two times a day.  If you take insulin, check your blood glucose before meals and at bedtime.  It's important not to skip any meals.  Keep a log of your blood glucose results and always take it with you to your doctor appointments.  Keep a list of your current medications including injectables and over the counter medications and bring this medication list with you to all your doctor appointments.  If you have not seen your opthalmologist this year call for appointment.  Check your feet daily for redness, sores, or openings. Do not self treat. If no improvement in two days call your primary care physician for an appointment.  Low blood sugar (hypoglycemia) is a blood sugar below 70mg/dl. Check your blood sugar if you feel signs/symptoms of hypoglycemia. If your blood sugar is below 70 take 15 grams of carbohydrates (ex 4 oz of apple juice, 3-4 glucosr tablets, or 4-6 oz of regular soda) wait 15 minutes and repeat blood sugar to make sure it comes up above 70.  If your blood sugar is above 70 and you are due for a meal, have a meal.  If you are not due for a meal have a snack.  This snack helps keeps your blood sugar at a safe range. seen by Wound care recommending ACE wrap for Lymphedema   please follow up with DR Andino  and Lymphedema clinic in [place]

## 2018-03-13 NOTE — DISCHARGE NOTE ADULT - CARE PROVIDER_API CALL
Adam Maxwell), Surgery; Vascular Surgery  [address]  [address]  [place] [address]  Phone: [phone]  Fax: [phone]    Lisker, Jay J (MD), Cardiovascular Disease; Internal Medicine  [address]  [address]  [place] [address]  Phone: [phone]  Fax: [phone]

## 2018-03-13 NOTE — DISCHARGE NOTE ADULT - HOME CARE AGENCY
[facility]  [phone] FOR SN AND HHA SERVICES AT HOME [facility] FOR SN AND HHA SERVICES AT HOME

## 2018-03-13 NOTE — PHYSICAL THERAPY INITIAL EVALUATION ADULT - ADDITIONAL COMMENTS
pt lives in co-op with room mate elevator access ; had HHA 4 hrs x 5 days a week x 1 month HCS pt lives in co-op with room mate elevator access ; had HHA 4 hrs x 5 days a week x 1 month HCS ; was paying someone then 3x /wk to help with wrapping legs and personal care ; pt has rollator but is broken and shower chair; pt report situation with room mate is difficult and trying to get room mate to move out (pt report took her in and has known her since age 6, but room mate is not grateful for help) pt lives in co-op with room mate elevator access ; had HHA 4 hrs x 5 days a week x 1 month HCS ; was paying someone then 3x /wk to help with wrapping legs and personal care ; pt has rollator but is one brake is broken ( it is 4 yrs old pt understood to call company where came from ot can but new one ) and shower chair; pt report situation with room mate is difficult and trying to get room mate to move out (pt report took her in and has known her since age 6, but room mate is not grateful for help)

## 2018-03-13 NOTE — CHART NOTE - NSCHARTNOTEFT_GEN_A_CORE
68 yo F w/PMH of T2DM, HTN, CAD s/p PAOLA (most recently to LAD and D1 in October 2015), hypothyroidism, chronic LE lymphedema who presented with a brief, self-limiting episode of dizziness and SOB admitted with NSTEMI     Problem/Plan - 1:  ·  Problem: NSTEMI (non-ST elevated myocardial infarction).  Plan: atyplical chest pain - no events on telemetry  very upset now - wants heparin gtt stopped (despite cath lab stating she should go down on heparin gtt), refused plavix today (stating she will take it only after cath), and refused potassium.   CE with mildly elevated troponin trending down.   Cardiology f/u appreciated - Dr Jay Lisker - cath today  - recommend C/W ASA, Plavix, Statin, and IV heparin gtt pending cath  - c/w her home meds Labetalol 300mg bid, Hydralazine 100mg bid and Diovan 320mg daily.      Problem/Plan - 2:  ·  Problem: Coronary artery disease.  Plan: Patient with significant CAD s/p PAOLA x 3, most recently to LAD and D1 in October 2015  - c/w ASA/Plavix/statin and heparin gtt for NSTEMI  cath today.      Problem/Plan - 3:  ·  Problem: Essential hypertension.  Plan: BP has been better control  - c/w home dose of  Labetalol, Hydralazine, Diovan for hypertension.      Problem/Plan - 4:  ·  Problem: Type 2 diabetes mellitus without complication.  Plan: Patient with reported history of T2DM, not currently on medications, patient reports she no longer has diabetic, HbA1C is 5.6%  HISS for coverage.      Problem/Plan - 5:  ·  Problem: Hypothyroid.  Plan: - c/w home Synthroid 75ug daily.      Problem/Plan - 6:  Problem: Lymphedema of both lower extremities. Plan: Patient reports long history of lymphedema of bilateral lower extremities with R worse than left - chronic per patient - recent outpatient LE doppler negative per patient  - follows with Dr. Maxwell's input  - wound care c/s for dressing changes  - c/w ethacrynic acid (patient has reported sulfa allergy)  - PT consult.     Problem/Plan - 7:  ·  Problem: Need for prophylactic measure.  Plan: Heparin gtt for ACS  DASH/TLC, consistent carbohydrate diet. patient seen and examined. denies chest pain and sob.   66 yo F w/PMH of T2DM, HTN, CAD s/p PAOLA (most recently to LAD and D1 in October 2015), hypothyroidism, chronic LE lymphedema who presented with a brief, self-limiting episode of dizziness and SOB admitted with elevated troponin up to 0.07 initially thought to be NSTEMI- subsequently trended down. patient s/p cath with nonobstructive CAD. plavix, heparin gtt d/porfirio - discussed with Dr. Lisker. Labetalol changed to coreg - patient feels dizziness with coreg. Orthostatics negative. patient was changed back to labetalol. hga1c 5.6 off medications. patient to continue diet control at home. lymphadema unchanged from baseline. patient had US duplex of LE as outpatient - d/w Dr. Maxwell negative. patient recommended for follow up with lymphedema clinic as outpatient. seen by wound care inpatient and recommended BLE elevation, Moisturize BLE, and BLE compression wrapping.   discharge time - 40 minutes patient seen and examined. denies chest pain and sob. Lungs clear. unchanged b/l LE edema and skin changes. vitals and labs reviewed  68 yo F w/PMH of T2DM, HTN, CAD s/p PAOLA (most recently to LAD and D1 in October 2015), hypothyroidism, chronic LE lymphedema who presented with a brief, self-limiting episode of dizziness and SOB admitted with elevated troponin up to 0.07 initially thought to be NSTEMI- subsequently trended down. patient s/p cath with nonobstructive CAD. plavix, heparin gtt d/porfirio - discussed with Dr. Lisker. Labetalol changed to coreg - patient feels dizziness with coreg. Orthostatics negative. patient was changed back to labetalol. hga1c 5.6 off medications. patient to continue diet control at home. lymphadema unchanged from baseline. patient had US duplex of LE as outpatient - d/w Dr. Maxwell negative. patient recommended for follow up with lymphedema clinic as outpatient. seen by wound care inpatient and recommended BLE elevation, Moisturize BLE, and BLE compression wrapping.   discharge time - 40 minutes

## 2018-03-13 NOTE — PHYSICAL THERAPY INITIAL EVALUATION ADULT - TRANSFER SAFETY CONCERNS NOTED: SIT/STAND, REHAB EVAL
decreased balance during turns/pt transfer independent at rolling walker steady fair + balance(w/o device close supervision mild unsteady fair - to fair )/decreased weight-shifting ability/decreased step length

## 2018-03-13 NOTE — DISCHARGE NOTE ADULT - MEDICATION SUMMARY - MEDICATIONS TO STOP TAKING
I will STOP taking the medications listed below when I get home from the hospital:  None I will STOP taking the medications listed below when I get home from the hospital:    labetalol 300 mg oral tablet  -- 1 tab(s) by mouth 2 times a day

## 2018-03-13 NOTE — PHYSICAL THERAPY INITIAL EVALUATION ADULT - PREDICTED DURATION OF THERAPY (DAYS/WKS), PT EVAL
pt clear for discharge from pT standpoint will continue to advance while in hospital and place on AMB AIDE PROGRAM while in hospital

## 2018-03-13 NOTE — DISCHARGE NOTE ADULT - SECONDARY DIAGNOSIS.
Coronary artery disease HTN (hypertension) Type 2 diabetes mellitus without complication Lymphedema of both lower extremities

## 2018-03-13 NOTE — DISCHARGE NOTE ADULT - HOSPITAL COURSE
66 yo F w/PMH of T2DM, HTN, CAD s/p PAOLA (most recently to LAD and D1 in October 2015), hypothyroidism, chronic LE lymphedema who presented with a brief, self-limiting episode of dizziness and SOB admitted with NSTEMI.      Pt was seen by cardiology DR Lisker , s/P Diagnostic cath , symptoms resolved .  Wound care team  recommends B/L ACE wrap and follow up with lympedema clinic in [place] . 66 yo F w/PMH of T2DM, HTN, CAD s/p PAOLA (most recently to LAD and D1 in October 2015), hypothyroidism, chronic LE lymphedema who presented with a brief, self-limiting episode of dizziness and SOB admitted with elevated troponin up to 0.07 initially thought to be NSTEMI- subsequently trended down. patient s/p cath with nonobstructive CAD. plavix, heparin gtt d/porfirio - discussed with Dr. Lisker. Labetalol changed to coreg - patient feels dizziness with coreg. Orthostatics negative. patient was changed back to labetalol. hga1c 5.6 off medications. patient to continue diet control at home. lymphadema unchanged from baseline. patient had US duplex of LE as outpatient - d/w Dr. Maxwell negative. patient recommended for follow up with lymphedema clinic as outpatient. seen by wound care inpatient and recommended BLE elevation, Moisturize BLE, and  Wound care team  recommends B/L ACE wrap and follow up with lympedema clinic in [place]. patient to follow up with pmd - Dr. Winter, cardiology - Dr. Lisker, and Dr. Maxwell

## 2018-03-13 NOTE — DISCHARGE NOTE ADULT - CARE PLAN
Principal Discharge DX:	NSTEMI (non-ST elevated myocardial infarction)  Goal:	s/p Diagnostic cath , symptoms resolved  Secondary Diagnosis:	Coronary artery disease  Secondary Diagnosis:	HTN (hypertension)  Secondary Diagnosis:	Type 2 diabetes mellitus without complication Principal Discharge DX:	NSTEMI (non-ST elevated myocardial infarction)  Goal:	s/p Diagnostic cath , symptoms resolved  Assessment and plan of treatment:	please follow up with DR Lisker  Secondary Diagnosis:	Coronary artery disease  Assessment and plan of treatment:	Coronary artery disease is a condition where the arteries the supply the heart muscle get clogges with fatty deposits & puts you at risk for a heart attack  Call your doctor if you have any new pain, pressure, or discomfort in the center of your chest, pain, tingling or discomfort in arms, back, neck, jaw, or stomach, shortness of breath, nausea, vomiting, burping or heartburn, sweating, cold and clammy skin, racing or abnormal heartbeat for more than 10 minutes or if they keep coming & going.  Call 911 and do not tr to get to hospital by care  You can help yourself with lefestyle changes (quitting smoking if you smoke), eat lots of fruits & vegetables & low fat dairy products, not a lot of meat & fatty foods, walk or some form of physical activity most days of the week, lose weight if you are overweight  Take your cardiac medication as prescribed to lower cholesterol, to lower blood pressure, aspirin to prevent blood clots, and diabetes control  Make sure to keep appointments with doctor for cardiac follow up care  Secondary Diagnosis:	HTN (hypertension)  Assessment and plan of treatment:	Follow up with your medical doctor to establish long term blood pressure treatment goals.  Secondary Diagnosis:	Type 2 diabetes mellitus without complication  Assessment and plan of treatment:	HgA1C this admission.  Make sure you get your HgA1c checked every three months.  If you take oral diabetes medications, check your blood glucose two times a day.  If you take insulin, check your blood glucose before meals and at bedtime.  It's important not to skip any meals.  Keep a log of your blood glucose results and always take it with you to your doctor appointments.  Keep a list of your current medications including injectables and over the counter medications and bring this medication list with you to all your doctor appointments.  If you have not seen your opthalmologist this year call for appointment.  Check your feet daily for redness, sores, or openings. Do not self treat. If no improvement in two days call your primary care physician for an appointment.  Low blood sugar (hypoglycemia) is a blood sugar below 70mg/dl. Check your blood sugar if you feel signs/symptoms of hypoglycemia. If your blood sugar is below 70 take 15 grams of carbohydrates (ex 4 oz of apple juice, 3-4 glucosr tablets, or 4-6 oz of regular soda) wait 15 minutes and repeat blood sugar to make sure it comes up above 70.  If your blood sugar is above 70 and you are due for a meal, have a meal.  If you are not due for a meal have a snack.  This snack helps keeps your blood sugar at a safe range.  Secondary Diagnosis:	Lymphedema of both lower extremities  Assessment and plan of treatment:	seen by Wound care recommending ACE wrap for Lymphedema   please follow up with DR nAdino  and Lymphedema clinic in [place]

## 2018-03-13 NOTE — DISCHARGE NOTE ADULT - MEDICATION SUMMARY - MEDICATIONS TO TAKE
I will START or STAY ON the medications listed below when I get home from the hospital:    aspirin 81 mg oral delayed release tablet  -- 1 tab(s) by mouth once a day  -- Indication: For Coronary artery disease    valsartan 320 mg oral tablet  -- 1 tab(s) by mouth once a day  -- Indication: For HTN (hypertension)    rosuvastatin 20 mg oral tablet  -- 1 tab(s) by mouth once a day (at bedtime)  -- Indication: For Coronary artery disease    clopidogrel 75 mg oral tablet  -- 1 tab(s) by mouth once a day  -- Indication: For Coronary artery disease    labetalol 300 mg oral tablet  -- 1 tab(s) by mouth 2 times a day  -- Indication: For HTN (hypertension)    ethacrynic acid 25 mg oral tablet  -- 1 tab(s) by mouth once a day  -- Indication: For Edema    levothyroxine 75 mcg (0.075 mg) oral tablet  -- 1 tab(s) by mouth once a day  -- Indication: For Hypothyroid    hydrALAZINE 100 mg oral tablet  -- 1 tab(s) by mouth 2 times a day  -- Indication: For HTN (hypertension) I will START or STAY ON the medications listed below when I get home from the hospital:    aspirin 81 mg oral delayed release tablet  -- 1 tab(s) by mouth once a day  -- Indication: For Coronary artery disease    valsartan 320 mg oral tablet  -- 1 tab(s) by mouth once a day  -- Indication: For Coronary artery disease    rosuvastatin 20 mg oral tablet  -- 1 tab(s) by mouth once a day (at bedtime)  -- Indication: For Coronary artery disease    carvedilol 6.25 mg oral tablet  -- 1 tab(s) by mouth every 12 hours  -- Indication: For Coronary artery disease    ethacrynic acid 25 mg oral tablet  -- 1 tab(s) by mouth once a day  -- Indication: For Edema    levothyroxine 75 mcg (0.075 mg) oral tablet  -- 1 tab(s) by mouth once a day  -- Indication: For Hypothyroid    hydrALAZINE 100 mg oral tablet  -- 1 tab(s) by mouth 2 times a day  -- Indication: For HTN (hypertension) I will START or STAY ON the medications listed below when I get home from the hospital:    aspirin 81 mg oral delayed release tablet  -- 1 tab(s) by mouth once a day  -- Indication: For Coronary artery disease    valsartan 320 mg oral tablet  -- 1 tab(s) by mouth once a day  -- Indication: For Coronary artery disease    rosuvastatin 20 mg oral tablet  -- 1 tab(s) by mouth once a day (at bedtime)  -- Indication: For Coronary artery disease    carvedilol 6.25 mg oral tablet  -- 1 tab(s) by mouth every 12 hours  -- Indication: For Cad    ethacrynic acid 25 mg oral tablet  -- 1 tab(s) by mouth once a day  -- Indication: For Edema    levothyroxine 75 mcg (0.075 mg) oral tablet  -- 1 tab(s) by mouth once a day  -- Indication: For Hypothyroid    hydrALAZINE 100 mg oral tablet  -- 1 tab(s) by mouth 2 times a day  -- Indication: For HTN (hypertension)

## 2018-03-13 NOTE — PHYSICAL THERAPY INITIAL EVALUATION ADULT - PLANNED THERAPY INTERVENTIONS, PT EVAL
transfer training/gait training/balance training/strengthening balance training/strengthening/transfer training/GOALS listed below/gait training

## 2018-03-13 NOTE — PHYSICAL THERAPY INITIAL EVALUATION ADULT - ASR EQUIP NEEDS DISCH PT EVAL
pt has rollator but is broken per pt , HAVEN Mathias informed ; pt has shower seat pt has rollator but is broken per pt , HAVEN Mathias informed see additional comment above ; pt has shower seat; pt has compressive sleeves for full legs and unit for lymphedema

## 2018-03-13 NOTE — PHYSICAL THERAPY INITIAL EVALUATION ADULT - LEVEL OF INDEPENDENCE: STAIR NEGOTIATION, REHAB EVAL
pt report does not do steps ; pt has elevator access in coop goes in through back entrance/unable to perform

## 2018-03-13 NOTE — PHYSICAL THERAPY INITIAL EVALUATION ADULT - IMPAIRMENTS CONTRIBUTING TO GAIT DEVIATIONS, PT EVAL
impaired sensory feedback/decreased strength/decrease endurance ; pt amb w/o device 15 ft miln unsteady cg to close supervision pt understood ; pt amb with rolling walker independent steady gait pt understood/decreased sensation/impaired balance/impaired postural control

## 2018-03-13 NOTE — DISCHARGE NOTE ADULT - ADDITIONAL INSTRUCTIONS
please wrap B/L LE with ACE wrap and follow up with Lymph edema clinic in [place]   please follow up with DR Andino   please follow up with DR Lisker   please follow up with PCP with michelle week of discharge continue all medications as prescribed  please wrap B/L LE with ACE wrap and follow up with Lymph edema clinic in [place]   please follow up with DR Andino   please follow up with DR Lisker   please follow up with PCP with michelle week of discharge continue all medications as prescribed  please wrap B/L LE with ACE wrap and follow up with Lymph edema clinic in [place]   please follow up with DR Andino   please follow up with DR Lisker   please follow up with Your PCP DR Herve Garner   please follow up with PCP with in a week of discharge

## 2018-03-13 NOTE — DISCHARGE NOTE ADULT - CARE PROVIDERS DIRECT ADDRESSES
[email or web address]

## 2018-03-13 NOTE — PHYSICAL THERAPY INITIAL EVALUATION ADULT - GAIT DEVIATIONS NOTED, PT EVAL
decreased ruth ann/increased time in double stance/decreased step length/decreased stride length/decreased weight-shifting ability

## 2018-03-13 NOTE — PHYSICAL THERAPY INITIAL EVALUATION ADULT - PRECAUTIONS/LIMITATIONS, REHAB EVAL
66 yo F w/PMH of T2DM, HTN, CAD s/p PAOLA (most recently to LAD and D1 in October 2015), hypothyroidism, chronic LE lymphedema who presented with an episode of dizziness and SOB x 15 minutes earlier today. She reports that she was with her friends when she developed sudden onset of dizziness and shortness of breath for a brief episode lasting approximately 10-15 minutes around 11:30 this morning, non-exertional, self-resolved. She denies chest pain during this episode. This prompted her to present to the ER given her known cardiac history. She states that she had another episode while in the ER around 3pm which was also brief and self-limited. 68 yo F w/PMH of T2DM, HTN, CAD s/p PAOLA (most recently to LAD and D1 in October 2015), hypothyroidism, chronic LE lymphedema who presented with an episode of dizziness and SOB x 15 minutes earlier today. She reports that she was with her friends when she developed sudden onset of dizziness and shortness of breath for a brief episode lasting approximately 10-15 minutes around 11:30 this morning, non-exertional, self-resolved. She denies chest pain during this episode. This prompted her to present to the ER given her known cardiac history. She states that she had another episode while in the ER around 3pm which was also brief and self-limited./fall precautions

## 2018-03-13 NOTE — PHYSICAL THERAPY INITIAL EVALUATION ADULT - BALANCE DISTURBANCE, IDENTIFIED IMPAIRMENT CONTRIBUTE, REHAB EVAL
vc to stand upright x2 pt self correct/decreased sensation/impaired sensory feedback/decreased strength/impaired postural control

## 2018-03-13 NOTE — DISCHARGE NOTE ADULT - PATIENT PORTAL LINK FT
You can access the [facility] Portal, offered by [facility], by registering with the following website: [email or web address]

## 2018-03-13 NOTE — PHYSICAL THERAPY INITIAL EVALUATION ADULT - GENERAL OBSERVATIONS, REHAB EVAL
pt received in BR PCA in room ; pt able to toilet self independent ; pt transfer independent ; amb with no device 10 ft min unsteady cg to close supervision ;see below with amb with rolling walker ; lymphedema le's R> L  pt educate re lymphedema with compressive wrapping of le's , stockings, elevation le's , therex ; pt being followed by Dr Hans rojas and pt going to be going to Lymphedema clinic for therapy for lymphedema ; pt on a diet trying to lose weight as well;

## 2018-05-07 ENCOUNTER — RX RENEWAL (OUTPATIENT)
Age: 68
End: 2018-05-07

## 2018-05-29 ENCOUNTER — RX RENEWAL (OUTPATIENT)
Age: 68
End: 2018-05-29

## 2018-08-13 RX ORDER — HYDRALAZINE HYDROCHLORIDE 100 MG/1
100 TABLET ORAL
Qty: 90 | Refills: 0 | Status: DISCONTINUED | COMMUNITY
Start: 2017-05-25 | End: 2018-08-13

## 2018-08-16 ENCOUNTER — INPATIENT (INPATIENT)
Facility: HOSPITAL | Age: 68
LOS: 3 days | Discharge: NURSING, REHAB OR LONG-TERM CARE | DRG: 554 | End: 2018-08-20
Attending: INTERNAL MEDICINE | Admitting: INTERNAL MEDICINE
Payer: MEDICARE

## 2018-08-16 VITALS
DIASTOLIC BLOOD PRESSURE: 75 MMHG | RESPIRATION RATE: 18 BRPM | HEART RATE: 68 BPM | OXYGEN SATURATION: 97 % | TEMPERATURE: 98 F | WEIGHT: 250 LBS | SYSTOLIC BLOOD PRESSURE: 118 MMHG

## 2018-08-16 DIAGNOSIS — Z98.89 OTHER SPECIFIED POSTPROCEDURAL STATES: Chronic | ICD-10-CM

## 2018-08-16 LAB
ALBUMIN SERPL ELPH-MCNC: 3.3 G/DL — LOW (ref 3.5–5)
ALP SERPL-CCNC: 91 U/L — SIGNIFICANT CHANGE UP (ref 40–120)
ALT FLD-CCNC: 22 U/L DA — SIGNIFICANT CHANGE UP (ref 10–60)
ANION GAP SERPL CALC-SCNC: 6 MMOL/L — SIGNIFICANT CHANGE UP (ref 5–17)
AST SERPL-CCNC: 14 U/L — SIGNIFICANT CHANGE UP (ref 10–40)
BASOPHILS # BLD AUTO: 0.1 K/UL — SIGNIFICANT CHANGE UP (ref 0–0.2)
BASOPHILS NFR BLD AUTO: 1.2 % — SIGNIFICANT CHANGE UP (ref 0–2)
BILIRUB SERPL-MCNC: 0.5 MG/DL — SIGNIFICANT CHANGE UP (ref 0.2–1.2)
BUN SERPL-MCNC: 19 MG/DL — HIGH (ref 7–18)
CALCIUM SERPL-MCNC: 8.3 MG/DL — LOW (ref 8.4–10.5)
CHLORIDE SERPL-SCNC: 106 MMOL/L — SIGNIFICANT CHANGE UP (ref 96–108)
CO2 SERPL-SCNC: 28 MMOL/L — SIGNIFICANT CHANGE UP (ref 22–31)
CREAT SERPL-MCNC: 0.85 MG/DL — SIGNIFICANT CHANGE UP (ref 0.5–1.3)
EOSINOPHIL # BLD AUTO: 0.1 K/UL — SIGNIFICANT CHANGE UP (ref 0–0.5)
EOSINOPHIL NFR BLD AUTO: 1.9 % — SIGNIFICANT CHANGE UP (ref 0–6)
GLUCOSE SERPL-MCNC: 107 MG/DL — HIGH (ref 70–99)
HCT VFR BLD CALC: 40.5 % — SIGNIFICANT CHANGE UP (ref 34.5–45)
HGB BLD-MCNC: 13.4 G/DL — SIGNIFICANT CHANGE UP (ref 11.5–15.5)
LYMPHOCYTES # BLD AUTO: 1 K/UL — SIGNIFICANT CHANGE UP (ref 1–3.3)
LYMPHOCYTES # BLD AUTO: 14.5 % — SIGNIFICANT CHANGE UP (ref 13–44)
MCHC RBC-ENTMCNC: 28.1 PG — SIGNIFICANT CHANGE UP (ref 27–34)
MCHC RBC-ENTMCNC: 33.2 GM/DL — SIGNIFICANT CHANGE UP (ref 32–36)
MCV RBC AUTO: 84.6 FL — SIGNIFICANT CHANGE UP (ref 80–100)
MONOCYTES # BLD AUTO: 0.6 K/UL — SIGNIFICANT CHANGE UP (ref 0–0.9)
MONOCYTES NFR BLD AUTO: 7.9 % — SIGNIFICANT CHANGE UP (ref 2–14)
NEUTROPHILS # BLD AUTO: 5.3 K/UL — SIGNIFICANT CHANGE UP (ref 1.8–7.4)
NEUTROPHILS NFR BLD AUTO: 74.6 % — SIGNIFICANT CHANGE UP (ref 43–77)
PLATELET # BLD AUTO: 262 K/UL — SIGNIFICANT CHANGE UP (ref 150–400)
POTASSIUM SERPL-MCNC: 3.5 MMOL/L — SIGNIFICANT CHANGE UP (ref 3.5–5.3)
POTASSIUM SERPL-SCNC: 3.5 MMOL/L — SIGNIFICANT CHANGE UP (ref 3.5–5.3)
PROT SERPL-MCNC: 7.5 G/DL — SIGNIFICANT CHANGE UP (ref 6–8.3)
RBC # BLD: 4.79 M/UL — SIGNIFICANT CHANGE UP (ref 3.8–5.2)
RBC # FLD: 14.3 % — SIGNIFICANT CHANGE UP (ref 10.3–14.5)
SODIUM SERPL-SCNC: 140 MMOL/L — SIGNIFICANT CHANGE UP (ref 135–145)
WBC # BLD: 7.1 K/UL — SIGNIFICANT CHANGE UP (ref 3.8–10.5)
WBC # FLD AUTO: 7.1 K/UL — SIGNIFICANT CHANGE UP (ref 3.8–10.5)

## 2018-08-16 PROCEDURE — 73562 X-RAY EXAM OF KNEE 3: CPT | Mod: 26,RT

## 2018-08-16 PROCEDURE — 73700 CT LOWER EXTREMITY W/O DYE: CPT | Mod: 26,RT

## 2018-08-16 PROCEDURE — 93971 EXTREMITY STUDY: CPT | Mod: 26,RT

## 2018-08-16 PROCEDURE — 73590 X-RAY EXAM OF LOWER LEG: CPT | Mod: 26,RT

## 2018-08-16 RX ORDER — ACETAMINOPHEN 500 MG
1000 TABLET ORAL ONCE
Qty: 0 | Refills: 0 | Status: COMPLETED | OUTPATIENT
Start: 2018-08-16 | End: 2018-08-16

## 2018-08-16 RX ORDER — ACETAMINOPHEN 500 MG
650 TABLET ORAL ONCE
Qty: 0 | Refills: 0 | Status: COMPLETED | OUTPATIENT
Start: 2018-08-16 | End: 2018-08-16

## 2018-08-16 RX ADMIN — Medication 400 MILLIGRAM(S): at 17:18

## 2018-08-16 RX ADMIN — Medication 1000 MILLIGRAM(S): at 18:20

## 2018-08-16 RX ADMIN — Medication 650 MILLIGRAM(S): at 21:58

## 2018-08-16 RX ADMIN — Medication 1000 MILLIGRAM(S): at 17:40

## 2018-08-16 NOTE — ED ADULT NURSE NOTE - OBJECTIVE STATEMENT
Pt AOx3, obese, BIBA, ambulatory with walker, c/o right leg pain and edema. Pt s/p, ablation 2 weeks ago worsening today. Pt endorses stiffness, joint swelling, tenderness. Pt denies SOB, chest pain, dizziness. No bleeding noted.

## 2018-08-16 NOTE — ED PROVIDER NOTE - PROGRESS NOTE DETAILS
Hall:  Pt received in signout, pending Doppler and X-ray of RLE as well as labs, and if normal anticipate D/C home.  Doppler read as negative for DVT.  Will continue to follow. Hall:  X-ray tib-fib shows possible tibial plateau Fx.  Radiologist recommends X-ray of knee (dedicated films) so I ordered that and CT as well.  Pt tells me that she cannot bear weight even with walker and feels unsafe at home, and lives alone, and wants to be admitted.  No PMD here.  Will call unattached doctor, Dr. Higginbotham.  She only wants PO Tylenol for pain. CT shows no Fx/dislocation, however Pt may have ligamentous injury, consider inpatient MRI.   Dr. Higginbotham paged but no response.

## 2018-08-16 NOTE — ED PROVIDER NOTE - OBJECTIVE STATEMENT
pt with complaint of pain and swelling pt with complaint of pain and swelling  pt had venous ablation last week at doctors office and now thinks she "pulled someithing" in the leg  has hx of lymphedema in leg at baseline  pain is in post knee, lateral calf and post calf

## 2018-08-16 NOTE — ED ADULT NURSE NOTE - NSIMPLEMENTINTERV_GEN_ALL_ED
Implemented All Fall with Harm Risk Interventions:  [place] to call system. Call bell, personal items and telephone within reach. Instruct patient to call for assistance. Room bathroom lighting operational. Non-slip footwear when patient is off stretcher. Physically safe environment: no spills, clutter or unnecessary equipment. Stretcher in lowest position, wheels locked, appropriate side rails in place. Provide visual cue, wrist band, yellow gown, etc. Monitor gait and stability. Monitor for mental status changes and reorient to person, place, and time. Review medications for side effects contributing to fall risk. Reinforce activity limits and safety measures with patient and family. Provide visual clues: red socks.

## 2018-08-16 NOTE — ED PROVIDER NOTE - CARE PLAN
Principal Discharge DX:	Right calf pain Principal Discharge DX:	Right calf pain  Secondary Diagnosis:	Ambulatory dysfunction

## 2018-08-17 DIAGNOSIS — R09.89 OTHER SPECIFIED SYMPTOMS AND SIGNS INVOLVING THE CIRCULATORY AND RESPIRATORY SYSTEMS: ICD-10-CM

## 2018-08-17 DIAGNOSIS — Z29.9 ENCOUNTER FOR PROPHYLACTIC MEASURES, UNSPECIFIED: ICD-10-CM

## 2018-08-17 DIAGNOSIS — M79.661 PAIN IN RIGHT LOWER LEG: ICD-10-CM

## 2018-08-17 DIAGNOSIS — I10 ESSENTIAL (PRIMARY) HYPERTENSION: ICD-10-CM

## 2018-08-17 DIAGNOSIS — M25.561 PAIN IN RIGHT KNEE: ICD-10-CM

## 2018-08-17 DIAGNOSIS — I89.0 LYMPHEDEMA, NOT ELSEWHERE CLASSIFIED: ICD-10-CM

## 2018-08-17 LAB
24R-OH-CALCIDIOL SERPL-MCNC: 25.4 NG/ML — LOW (ref 30–80)
APPEARANCE UR: CLEAR — SIGNIFICANT CHANGE UP
BACTERIA # UR AUTO: ABNORMAL /HPF
BILIRUB UR-MCNC: NEGATIVE — SIGNIFICANT CHANGE UP
COLOR SPEC: YELLOW — SIGNIFICANT CHANGE UP
COMMENT - URINE: SIGNIFICANT CHANGE UP
DIFF PNL FLD: ABNORMAL
EPI CELLS # UR: ABNORMAL /HPF
FOLATE SERPL-MCNC: >20 NG/ML — SIGNIFICANT CHANGE UP
GLUCOSE UR QL: NEGATIVE — SIGNIFICANT CHANGE UP
KETONES UR-MCNC: NEGATIVE — SIGNIFICANT CHANGE UP
LEUKOCYTE ESTERASE UR-ACNC: NEGATIVE — SIGNIFICANT CHANGE UP
NITRITE UR-MCNC: NEGATIVE — SIGNIFICANT CHANGE UP
PH UR: 6 — SIGNIFICANT CHANGE UP (ref 5–8)
PROT UR-MCNC: 30 MG/DL
RBC CASTS # UR COMP ASSIST: SIGNIFICANT CHANGE UP /HPF (ref 0–2)
SP GR SPEC: 1.02 — SIGNIFICANT CHANGE UP (ref 1.01–1.02)
UROBILINOGEN FLD QL: 1
WBC UR QL: SIGNIFICANT CHANGE UP /HPF (ref 0–5)

## 2018-08-17 PROCEDURE — 99285 EMERGENCY DEPT VISIT HI MDM: CPT | Mod: 25

## 2018-08-17 RX ORDER — LABETALOL HCL 100 MG
600 TABLET ORAL THREE TIMES A DAY
Qty: 0 | Refills: 0 | Status: DISCONTINUED | OUTPATIENT
Start: 2018-08-17 | End: 2018-08-17

## 2018-08-17 RX ORDER — ASPIRIN/CALCIUM CARB/MAGNESIUM 324 MG
81 TABLET ORAL DAILY
Qty: 0 | Refills: 0 | Status: DISCONTINUED | OUTPATIENT
Start: 2018-08-17 | End: 2018-08-20

## 2018-08-17 RX ORDER — ACETAMINOPHEN 500 MG
650 TABLET ORAL EVERY 6 HOURS
Qty: 0 | Refills: 0 | Status: DISCONTINUED | OUTPATIENT
Start: 2018-08-17 | End: 2018-08-19

## 2018-08-17 RX ORDER — OXYCODONE AND ACETAMINOPHEN 5; 325 MG/1; MG/1
1 TABLET ORAL EVERY 6 HOURS
Qty: 0 | Refills: 0 | Status: DISCONTINUED | OUTPATIENT
Start: 2018-08-17 | End: 2018-08-17

## 2018-08-17 RX ORDER — LEVOTHYROXINE SODIUM 125 MCG
75 TABLET ORAL DAILY
Qty: 0 | Refills: 0 | Status: DISCONTINUED | OUTPATIENT
Start: 2018-08-17 | End: 2018-08-20

## 2018-08-17 RX ORDER — LOSARTAN POTASSIUM 100 MG/1
100 TABLET, FILM COATED ORAL DAILY
Qty: 0 | Refills: 0 | Status: DISCONTINUED | OUTPATIENT
Start: 2018-08-17 | End: 2018-08-20

## 2018-08-17 RX ORDER — CHOLECALCIFEROL (VITAMIN D3) 125 MCG
1000 CAPSULE ORAL DAILY
Qty: 0 | Refills: 0 | Status: DISCONTINUED | OUTPATIENT
Start: 2018-08-17 | End: 2018-08-20

## 2018-08-17 RX ORDER — LABETALOL HCL 100 MG
600 TABLET ORAL THREE TIMES A DAY
Qty: 0 | Refills: 0 | Status: DISCONTINUED | OUTPATIENT
Start: 2018-08-17 | End: 2018-08-20

## 2018-08-17 RX ORDER — LABETALOL HCL 100 MG
600 TABLET ORAL ONCE
Qty: 0 | Refills: 0 | Status: COMPLETED | OUTPATIENT
Start: 2018-08-17 | End: 2018-08-17

## 2018-08-17 RX ORDER — ENOXAPARIN SODIUM 100 MG/ML
40 INJECTION SUBCUTANEOUS DAILY
Qty: 0 | Refills: 0 | Status: DISCONTINUED | OUTPATIENT
Start: 2018-08-17 | End: 2018-08-20

## 2018-08-17 RX ORDER — ETHACRYNIC ACID 25 MG/1
25 TABLET ORAL DAILY
Qty: 0 | Refills: 0 | Status: DISCONTINUED | OUTPATIENT
Start: 2018-08-17 | End: 2018-08-20

## 2018-08-17 RX ADMIN — Medication 81 MILLIGRAM(S): at 11:29

## 2018-08-17 RX ADMIN — Medication 600 MILLIGRAM(S): at 17:03

## 2018-08-17 RX ADMIN — Medication 1000 UNIT(S): at 11:29

## 2018-08-17 RX ADMIN — ETHACRYNIC ACID 25 MILLIGRAM(S): 25 TABLET ORAL at 10:51

## 2018-08-17 RX ADMIN — Medication 600 MILLIGRAM(S): at 10:48

## 2018-08-17 RX ADMIN — Medication 650 MILLIGRAM(S): at 03:17

## 2018-08-17 RX ADMIN — LOSARTAN POTASSIUM 100 MILLIGRAM(S): 100 TABLET, FILM COATED ORAL at 05:51

## 2018-08-17 RX ADMIN — Medication 650 MILLIGRAM(S): at 02:47

## 2018-08-17 RX ADMIN — Medication 75 MICROGRAM(S): at 05:51

## 2018-08-17 RX ADMIN — Medication 600 MILLIGRAM(S): at 22:00

## 2018-08-17 NOTE — H&P ADULT - ASSESSMENT
68 year old female patient with PMH of hypertension, CAD s/p 3 stents, asthma, lymphedema s/p venous ablation and carpal tunnel syndrome presented to ED with right knee pain. Patient reports that she has hx of venous insufficiency and lymphedema for last 3 years and has been following with her vascular surgeon. She recently got venous ablation of her right leg x 2 weeks ago which improved her swelling however, yesterday she experienced sudden onset of pain in behind her right knee which is radiating to her thigh and has  progressively worsened. Patient rates her pain as 4-5/10 in intensity while sitting and 8/10 on standing and walking. She denies any hx of trauma or fall,  numbness or weakness,  urinary or fecal incontinence.    IN ED,    Vital Signs: HR 68, /75,  TEMP 98.3F,  RR18,  SPO2 97%    X-RAY Tibia and fibula: Possible depression at the lateral tibial plateau..  Moderate to severe osteoarthritis at the right knee.      CT Lower extremity: No fracture or dislocation of the right knee. 68 year old female patient with PMH of hypertension, CAD s/p 3 stents, asthma, lymphedema s/p venous ablation and carpal tunnel syndrome presented to ED with right knee pain. Patient reports that she has hx of venous insufficiency and lymphedema for last 3 years and has been following with her vascular surgeon. She recently got venous ablation of her right leg x 2 weeks ago which improved her swelling however, yesterday she experienced sudden onset of pain in behind her right knee which is radiating to her thigh and has  progressively worsened. Patient rates her pain as 4-5/10 in intensity while sitting and 8/10 on standing and walking. She denies any hx of trauma or fall,  numbness or weakness,  urinary or fecal incontinence.

## 2018-08-17 NOTE — PHYSICAL THERAPY INITIAL EVALUATION ADULT - IMPAIRMENTS FOUND, PT EVAL
gross motor/tone/aerobic capacity/endurance/gait, locomotion, and balance/joint integrity and mobility/muscle strength/ROM

## 2018-08-17 NOTE — H&P ADULT - HISTORY OF PRESENT ILLNESS
68 year old female patient with PMH of hypertension, CAD s/p 3 stents, asthma, lymphedema s/p venous ablation and carpal tunnel syndrome presented to ED with right knee pain. patient reports that she has hx of venous insufficiency and lymphedema for last 3 years and has been following with her vascular surgeon. She recently got venous ablation of her right leg x 2 weeks ago which improved her swelling however, yesterday she experienced sudden onset of pain in behind her right knee which is non-radiating and  progressively worsened. Patient rates her pain as 4-5/10 in intensity while sitting and 8/10 on standing and walking. She denies any hx of trauma or fall,  numbness or weakness,  urinary or fecal incontinence. 68 year old female patient with PMH of hypertension, CAD s/p 3 stents, asthma, lymphedema s/p venous ablation and carpal tunnel syndrome presented to ED with right knee pain. patient reports that she has hx of venous insufficiency and lymphedema for last 3 years and has been following with her vascular surgeon. She recently got venous ablation of her right leg x 2 weeks ago which improved her swelling however, yesterday she experienced sudden onset of pain in behind her right knee which is radiating to her thigh and has  progressively worsened. Patient rates her pain as 4-5/10 in intensity while sitting and 8/10 on standing and walking. She denies any hx of trauma or fall,  numbness or weakness,  urinary or fecal incontinence. 68 year old female patient with PMH of hypertension, CAD s/p 3 stents, asthma, lymphedema s/p venous ablation and carpal tunnel syndrome presented to ED with right knee pain. patient reports that she has hx of venous insufficiency and lymphedema for last 3 years and has been following with her vascular surgeon. She recently got venous ablation of her right leg x 2 weeks ago which improved her swelling however, yesterday she experienced sudden onset of pain in behind her right knee which is radiating to her thigh and has  progressively worsened. Patient rates her pain as 4-5/10 in intensity while sitting and 8/10 on standing and walking. She denies any hx of trauma or fall,  numbness or weakness,  urinary or fecal incontinence.    IN ED,    Vital Signs: HR 68, /75,  TEMP 98.3F,  RR18,  SPO2 97%    X-RAY Tibia and fibula: Possible depression at the lateral tibial plateau..  Moderate to severe osteoarthritis at the right knee.      CT Lower extremity: No fracture or dislocation of the right knee. 68 year old female patient with PMH of hypertension, CAD s/p 3 stents, asthma, lymphedema s/p venous ablation and carpal tunnel syndrome presented to ED with right knee pain. patient reports that she has hx of venous insufficiency and lymphedema for last 3 years and has been following with her vascular surgeon. She recently got venous ablation of her right leg x 2 weeks ago which improved her swelling however, yesterday she experienced sudden onset of pain in behind her right knee which is radiating to her thigh and has  progressively worsened. Patient rates her pain as 4-5/10 in intensity while sitting and 8/10 on standing and walking. She denies any hx of trauma or fall,  numbness or weakness,  urinary or fecal incontinence.    IN ED,    Vital Signs: HR 68, /75,  TEMP 98.3F,  RR18,  SPO2 97%    X-RAY Tibia and fibula: Possible depression at the lateral tibial plateau..  Moderate to severe osteoarthritis at the right knee.    CT Lower extremity: No fracture or dislocation of the right knee. 68 year old female patient with PMH of hypertension, CAD s/p 3 stents, asthma, lymphedema, venous stasis s/p venous ablation and carpal tunnel syndrome presented to ED with right knee pain. Patient reports that she has hx of venous insufficiency and lymphedema for last 3 years and has been following with her vascular surgeon. She recently got venous ablation of her right leg x 2 weeks ago which improved her swelling however, yesterday she experienced sudden onset of pain in behind her right knee which is radiating to her thigh and has  progressively worsened. Patient rates her pain as 4-5/10 in intensity while sitting and 8/10 on standing and walking. She denies any hx of trauma or fall,  numbness or weakness,  urinary or fecal incontinence.    IN ED,    Vital Signs: HR 68, /75,  TEMP 98.3F,  RR18,  SPO2 97%    X-RAY Tibia and fibula: Possible depression at the lateral tibial plateau..  Moderate to severe osteoarthritis at the right knee.    CT Lower extremity: No fracture or dislocation of the right knee.

## 2018-08-17 NOTE — H&P ADULT - NSHPSOCIALHISTORY_GEN_ALL_CORE
Patient lives alone and has no one to take care of her. She denies smoking tobacco, Alcohol  or use of other recreational drugs.

## 2018-08-17 NOTE — H&P ADULT - PROBLEM SELECTOR PLAN 1
-Patient came in with right knee/calf pain, she recently had right sided venous ablation given hx of venous insufficiency.  -Venous Duplex: No evidence of right lower extremity deep venous thrombosis.  -CT Lower extremity: No fracture or dislocation of the right knee. Small baker cyst is seen  -Will start pain medication  -PT consult -Patient came in with right knee/calf pain, she recently had right sided venous ablation given hx of venous insufficiency.  -Venous Duplex: No evidence of right lower extremity deep venous thrombosis.  -CT Lower extremity: No fracture or dislocation of the right knee. Small baker cyst is seen.  -Pain could be secondary to bakers cyst.  -Will start pain medication  -PT consult  -Ortho consult -Patient came in with right knee/calf pain, she recently had right sided venous ablation given hx of venous insufficiency.  -Venous Duplex: No evidence of right lower extremity deep venous thrombosis.  -CT Lower extremity: No fracture or dislocation of the right knee. Small baker cyst is seen.  -Pain could be secondary to bakers cyst.  -Will start pain medication  -PT consult  -Ortho consult sent.

## 2018-08-17 NOTE — H&P ADULT - NSHPPHYSICALEXAM_GEN_ALL_CORE
CONSTITUTIONAL: OBESE, well nourished, awake, alert.  ENMT: Airway patent, Nasal mucosa clear. Mouth with normal mucosa. Throat has no vesicles, no oropharyngeal exudates and uvula is midline.  EYES: Clear bilaterally, pupils equal, round and reactive to light. EOMI.  CARDIAC: Normal rate, regular rhythm.  Heart sounds S1, S2.  No murmurs, rubs or gallops   RESPIRATORY: Breath sounds clear and equal bilaterally. No wheezes, rhales or rhonchi  MUSCULOSKELETAL: Spine appears normal, range of motion is not limited, no muscle or joint tenderness  EXTREMITIES: LYMPHEDEMA BILATERAL R>L, DECREASED RANGE OF MOTION ON RIGHT SIDE AT KNEE.   NEUROLOGICAL: Alert and oriented, no focal deficits, no motor or sensory deficits.  SKIN: No rash, skin turgor

## 2018-08-17 NOTE — CONSULT NOTE ADULT - SUBJECTIVE AND OBJECTIVE BOX
Pt Name: TIANA BROWN  MRN: [id number]    ORTHOPEDIC CONSULT:    Orthopedic diagnosis:    HPI: Patient is a 68y Female presenting with right knee pain for 2 days. Patient states she had RLE venous ablation surgery done 2 weeks ago which went well with no complications. She has been following her vascular surgeon until 2 days ago when the pain started. The pain is generalized to the back of her right knee with no radiation. She states it hurts when she gets up from seated position and also while she walks. Rest and pain meds help alleviate pain. She denies any fall or trauma to right knee other than surgery 2 weeks ago. Denies any CP, SOB, paresthesias.      PAST MEDICAL & SURGICAL HISTORY:  Lymphedema  Essential hypertension  Carpal tunnel syndrome of right wrist  Obesity (BMI 30-39.9)  Type 2 diabetes mellitus without complication  Hypothyroid  Essential hypertension  Obesity  HTN (hypertension)  DM (diabetes mellitus)  No significant past surgical history  S/P carpal tunnel release      ALLERGIES: penicillin (Hives)  penicillin (Rash)  sulfa drugs (Unknown)  Tetracycline Hydrochloride (Unknown)      MEDICATIONS: acetaminophen   Tablet. 650 milliGRAM(s) Oral every 6 hours PRN  aspirin  chewable 81 milliGRAM(s) Oral daily  cholecalciferol 1000 Unit(s) Oral daily  enoxaparin Injectable 40 milliGRAM(s) SubCutaneous daily  ethacrynic acid 25 milliGRAM(s) Oral daily  labetalol 600 milliGRAM(s) Oral three times a day  levothyroxine 75 MICROGram(s) Oral daily  losartan 100 milliGRAM(s) Oral daily      PHYSICAL EXAM:    Vital Signs Last 24 Hrs  T(C): 36.8 (17 Aug 2018 05:47), Max: 37 (16 Aug 2018 18:55)  T(F): 98.3 (17 Aug 2018 05:47), Max: 98.6 (16 Aug 2018 18:55)  HR: 88 (17 Aug 2018 09:49) (68 - 88)  BP: 159/85 (17 Aug 2018 09:49) (117/75 - 188/81)  BP(mean): --  RR: 18 (17 Aug 2018 05:47) (18 - 18)  SpO2: 96% (17 Aug 2018 05:47) (96% - 97%)    Right Knee: Swelling to RLE. Skin intact. Pain at posterior aspect of right knee. Sensation intact. 1+ pulses distally. ROM of toes and ankles B/L. ROM of right knee 0-90 degrees.        LABS:                        13.4   7.1   )-----------( 262      ( 16 Aug 2018 17:17 )             40.5     08-16    140  |  106  |  19<H>  ----------------------------<  107<H>  3.5   |  28  |  0.85    Ca    8.3<L>      16 Aug 2018 17:17    TPro  7.5  /  Alb  3.3<L>  /  TBili  0.5  /  DBili  x   /  AST  14  /  ALT  22  /  AlkPhos  91  08-16        RADIOLOGY: < from: Xray Knee 3 Views, Right (08.16.18 @ 21:57) >  EXAM:  KNEE AP.LAT&OBL.RIGHT                            PROCEDURE DATE:  08/16/2018          INTERPRETATION:  Right knee x-rays    Indication: Possible tibial plateau fracture on x-rays of the right lower   leg.    4 views of the right knee are acquired for evaluation.    Impression: No evidence for an acute fracture or dislocation.    Severe osteoarthritis.    The osseous mineralization is within normal limits.    Mild suprapatellar joint effusion.                CESIA HOUSER M.D., ATTENDING RADIOLOGIST  This document has been electronically signed. Aug 17 2018  9:47AM    < end of copied text >    < from: CT Lower Extremity No Cont, Right (08.16.18 @ 22:44) >  EXAM:  CT LWR EXT RT                            PROCEDURE DATE:  08/16/2018          INTERPRETATION:  CLINICAL INFORMATION: Right knee and lower leg pain and   swelling.    TECHNIQUE: CT of the right knee was performed in bone and soft tissue   windows with coronal and sagittal reformats. No intravenous contrast was   administered.    COMPARISON: No similar prior studies are available for comparison.    FINDINGS:    Bone: No fracture or dislocation is demonstrated. Tricompartmental   osteophyteformation is seen with severe medial tibiofemoral and   patellofemoral compartment cartilage space narrowing compatible with   osteoarthritis.    Soft tissues: Fatty atrophy of the vastus lateralis muscle is noted. A   small Baker's cyst is seen. Moderate subcutaneous inflammatory change and   fluid is seen in the posterior distal thigh and proximal calf which could   be posttraumatic in nature or due to subcutaneous edema.    IMPRESSION:  No fracture or dislocation of the right knee.                DEVAN MICHEL M.D., ATTENDING RADIOLOGIST  This document has been electronically signed. Aug 16 2018 11:59PM    < end of copied text >    IMPRESSION: Pt is a  68y Female with  Right Knee Osteoarthritis  PLAN:  -  Pain management  -  DVT prophylaxis  -  Daily PT- WBAT with walker  -  No orthopedic surgical intervention at this time  -  Discussed with patient osteoarthritis and treatment including but not limited to pain meds, injection, Physical therapy, surgery  -  Patient to follow up as outpatient with Orthopedist Dr. Grant at [phone] within 7-10 days  -  Case discussed with Dr. Grant  -  Patient to follow up with own vascular surgeon post-op

## 2018-08-17 NOTE — H&P ADULT - NSHPREVIEWOFSYSTEMS_GEN_ALL_CORE
REVIEW OF SYSTEMS:    CONSTITUTIONAL: No weakness, fevers or chills  EYES/ENT: No visual changes;  No vertigo or throat pain   NECK: No pain or stiffness  RESPIRATORY: No cough, wheezing, hemoptysis; No shortness of breath  CARDIOVASCULAR: No chest pain or palpitations  GASTROINTESTINAL: No abdominal or epigastric pain. No nausea, vomiting, or hematemesis; No diarrhea or constipation. No melena or hematochezia.  GENITOURINARY: No dysuria, frequency or hematuria  NEUROLOGICAL: No numbness or weakness.   MUSCULOSKELETAL: Difficulty in walking due to pain in right knee.  SKIN: No itching, burning, rashes, or lesions   All other review of systems is negative unless indicated above.

## 2018-08-17 NOTE — CHART NOTE - NSCHARTNOTEFT_GEN_A_CORE
Patient seen and examined at bedside, patient reports that her pain is worse and it is difficult for her to continue taking care of herself at home, she lives alone, she would like to go to rehab. BP was elevated. She has ace wraps applied to right leg. Patient seen and examined at bedside, patient reports that her pain is worse and it is difficult for her to continue taking care of herself at home, she lives alone, she would like to go to rehab. BP was elevated. She has ace wraps applied to right leg.     PT saw patient and recommended rehab, options given to patient by . Patient seen and examined at bedside, patient reports that her pain is worse and it is difficult for her to continue taking care of herself at home, she lives alone, she would like to go to rehab. BP was elevated. She has ace wraps applied to right leg.   Recommended by attending to get vascular consult, spoke with vascular PA that since patient is having chronic lymphedema and currently there is no acute vascular intervention required for her case so rene not f/up for now . Patient vascular surgeon name is Dr. Cherelle Fuchs. ([phone])    PT saw patient and recommended rehab, options given to patient by .

## 2018-08-17 NOTE — H&P ADULT - PROBLEM SELECTOR PLAN 2
-Patient has hx of lymphedema for past 3 years.  -She underwent venous ablation 2 weeks ago.  -Leg elevation and wear support harness  -Out-patient Vascular surgeon: Dr. Cherelle Fuchs -Patient has hx of lymphedema and venous stasis for past 3 years.  -She underwent venous ablation 2 weeks ago.  -Leg elevation and wear support harness  -Out-patient Vascular surgeon: Dr. Cherelle Fuchs

## 2018-08-18 LAB
ANION GAP SERPL CALC-SCNC: 7 MMOL/L — SIGNIFICANT CHANGE UP (ref 5–17)
BUN SERPL-MCNC: 21 MG/DL — HIGH (ref 7–18)
CALCIUM SERPL-MCNC: 8.6 MG/DL — SIGNIFICANT CHANGE UP (ref 8.4–10.5)
CHLORIDE SERPL-SCNC: 105 MMOL/L — SIGNIFICANT CHANGE UP (ref 96–108)
CO2 SERPL-SCNC: 30 MMOL/L — SIGNIFICANT CHANGE UP (ref 22–31)
CREAT SERPL-MCNC: 1.13 MG/DL — SIGNIFICANT CHANGE UP (ref 0.5–1.3)
GLUCOSE SERPL-MCNC: 119 MG/DL — HIGH (ref 70–99)
HCT VFR BLD CALC: 40.6 % — SIGNIFICANT CHANGE UP (ref 34.5–45)
HGB BLD-MCNC: 13.3 G/DL — SIGNIFICANT CHANGE UP (ref 11.5–15.5)
MAGNESIUM SERPL-MCNC: 2.1 MG/DL — SIGNIFICANT CHANGE UP (ref 1.6–2.6)
MCHC RBC-ENTMCNC: 28.2 PG — SIGNIFICANT CHANGE UP (ref 27–34)
MCHC RBC-ENTMCNC: 32.8 GM/DL — SIGNIFICANT CHANGE UP (ref 32–36)
MCV RBC AUTO: 85.8 FL — SIGNIFICANT CHANGE UP (ref 80–100)
PHOSPHATE SERPL-MCNC: 3.5 MG/DL — SIGNIFICANT CHANGE UP (ref 2.5–4.5)
PLATELET # BLD AUTO: 275 K/UL — SIGNIFICANT CHANGE UP (ref 150–400)
POTASSIUM SERPL-MCNC: 3.4 MMOL/L — LOW (ref 3.5–5.3)
POTASSIUM SERPL-SCNC: 3.4 MMOL/L — LOW (ref 3.5–5.3)
RBC # BLD: 4.74 M/UL — SIGNIFICANT CHANGE UP (ref 3.8–5.2)
RBC # FLD: 14.5 % — SIGNIFICANT CHANGE UP (ref 10.3–14.5)
SODIUM SERPL-SCNC: 142 MMOL/L — SIGNIFICANT CHANGE UP (ref 135–145)
WBC # BLD: 7.6 K/UL — SIGNIFICANT CHANGE UP (ref 3.8–10.5)
WBC # FLD AUTO: 7.6 K/UL — SIGNIFICANT CHANGE UP (ref 3.8–10.5)

## 2018-08-18 RX ORDER — POTASSIUM CHLORIDE 20 MEQ
20 PACKET (EA) ORAL
Qty: 0 | Refills: 0 | Status: COMPLETED | OUTPATIENT
Start: 2018-08-18 | End: 2018-08-18

## 2018-08-18 RX ADMIN — Medication 75 MICROGRAM(S): at 06:41

## 2018-08-18 RX ADMIN — Medication 20 MILLIEQUIVALENT(S): at 17:37

## 2018-08-18 RX ADMIN — Medication 650 MILLIGRAM(S): at 18:24

## 2018-08-18 RX ADMIN — Medication 650 MILLIGRAM(S): at 06:40

## 2018-08-18 RX ADMIN — Medication 650 MILLIGRAM(S): at 03:26

## 2018-08-18 RX ADMIN — Medication 20 MILLIEQUIVALENT(S): at 19:28

## 2018-08-18 RX ADMIN — Medication 600 MILLIGRAM(S): at 22:06

## 2018-08-18 RX ADMIN — Medication 600 MILLIGRAM(S): at 06:41

## 2018-08-18 RX ADMIN — Medication 81 MILLIGRAM(S): at 11:37

## 2018-08-18 RX ADMIN — Medication 650 MILLIGRAM(S): at 19:23

## 2018-08-18 RX ADMIN — LOSARTAN POTASSIUM 100 MILLIGRAM(S): 100 TABLET, FILM COATED ORAL at 06:41

## 2018-08-18 RX ADMIN — Medication 1000 UNIT(S): at 11:37

## 2018-08-18 RX ADMIN — ETHACRYNIC ACID 25 MILLIGRAM(S): 25 TABLET ORAL at 06:42

## 2018-08-18 RX ADMIN — Medication 600 MILLIGRAM(S): at 14:16

## 2018-08-19 ENCOUNTER — TRANSCRIPTION ENCOUNTER (OUTPATIENT)
Age: 68
End: 2018-08-19

## 2018-08-19 LAB
ANION GAP SERPL CALC-SCNC: 9 MMOL/L — SIGNIFICANT CHANGE UP (ref 5–17)
BUN SERPL-MCNC: 24 MG/DL — HIGH (ref 7–18)
CALCIUM SERPL-MCNC: 8.2 MG/DL — LOW (ref 8.4–10.5)
CHLORIDE SERPL-SCNC: 107 MMOL/L — SIGNIFICANT CHANGE UP (ref 96–108)
CO2 SERPL-SCNC: 28 MMOL/L — SIGNIFICANT CHANGE UP (ref 22–31)
CREAT SERPL-MCNC: 1.08 MG/DL — SIGNIFICANT CHANGE UP (ref 0.5–1.3)
GLUCOSE BLDC GLUCOMTR-MCNC: 127 MG/DL — HIGH (ref 70–99)
GLUCOSE SERPL-MCNC: 112 MG/DL — HIGH (ref 70–99)
HCT VFR BLD CALC: 38.8 % — SIGNIFICANT CHANGE UP (ref 34.5–45)
HGB BLD-MCNC: 12.5 G/DL — SIGNIFICANT CHANGE UP (ref 11.5–15.5)
MAGNESIUM SERPL-MCNC: 2.2 MG/DL — SIGNIFICANT CHANGE UP (ref 1.6–2.6)
MCHC RBC-ENTMCNC: 27.9 PG — SIGNIFICANT CHANGE UP (ref 27–34)
MCHC RBC-ENTMCNC: 32.2 GM/DL — SIGNIFICANT CHANGE UP (ref 32–36)
MCV RBC AUTO: 86.4 FL — SIGNIFICANT CHANGE UP (ref 80–100)
PHOSPHATE SERPL-MCNC: 3.8 MG/DL — SIGNIFICANT CHANGE UP (ref 2.5–4.5)
PLATELET # BLD AUTO: 260 K/UL — SIGNIFICANT CHANGE UP (ref 150–400)
POTASSIUM SERPL-MCNC: 3.5 MMOL/L — SIGNIFICANT CHANGE UP (ref 3.5–5.3)
POTASSIUM SERPL-SCNC: 3.5 MMOL/L — SIGNIFICANT CHANGE UP (ref 3.5–5.3)
RBC # BLD: 4.49 M/UL — SIGNIFICANT CHANGE UP (ref 3.8–5.2)
RBC # FLD: 14.2 % — SIGNIFICANT CHANGE UP (ref 10.3–14.5)
SODIUM SERPL-SCNC: 144 MMOL/L — SIGNIFICANT CHANGE UP (ref 135–145)
WBC # BLD: 5.5 K/UL — SIGNIFICANT CHANGE UP (ref 3.8–10.5)
WBC # FLD AUTO: 5.5 K/UL — SIGNIFICANT CHANGE UP (ref 3.8–10.5)

## 2018-08-19 RX ORDER — TRAMADOL HYDROCHLORIDE 50 MG/1
25 TABLET ORAL THREE TIMES A DAY
Qty: 0 | Refills: 0 | Status: DISCONTINUED | OUTPATIENT
Start: 2018-08-19 | End: 2018-08-20

## 2018-08-19 RX ORDER — LIDOCAINE HCL 20 MG/ML
5 VIAL (ML) INJECTION ONCE
Qty: 0 | Refills: 0 | Status: DISCONTINUED | OUTPATIENT
Start: 2018-08-19 | End: 2018-08-20

## 2018-08-19 RX ORDER — LIDOCAINE 4 G/100G
1 CREAM TOPICAL DAILY
Qty: 0 | Refills: 0 | Status: DISCONTINUED | OUTPATIENT
Start: 2018-08-19 | End: 2018-08-20

## 2018-08-19 RX ORDER — ACETAMINOPHEN 500 MG
1000 TABLET ORAL
Qty: 0 | Refills: 0 | Status: DISCONTINUED | OUTPATIENT
Start: 2018-08-19 | End: 2018-08-19

## 2018-08-19 RX ORDER — ACETAMINOPHEN 500 MG
650 TABLET ORAL EVERY 6 HOURS
Qty: 0 | Refills: 0 | Status: DISCONTINUED | OUTPATIENT
Start: 2018-08-19 | End: 2018-08-20

## 2018-08-19 RX ADMIN — Medication 1000 UNIT(S): at 11:24

## 2018-08-19 RX ADMIN — Medication 650 MILLIGRAM(S): at 01:58

## 2018-08-19 RX ADMIN — Medication 600 MILLIGRAM(S): at 05:42

## 2018-08-19 RX ADMIN — Medication 650 MILLIGRAM(S): at 15:59

## 2018-08-19 RX ADMIN — LOSARTAN POTASSIUM 100 MILLIGRAM(S): 100 TABLET, FILM COATED ORAL at 05:43

## 2018-08-19 RX ADMIN — Medication 75 MICROGRAM(S): at 05:43

## 2018-08-19 RX ADMIN — Medication 600 MILLIGRAM(S): at 21:43

## 2018-08-19 RX ADMIN — Medication 650 MILLIGRAM(S): at 16:59

## 2018-08-19 RX ADMIN — ETHACRYNIC ACID 25 MILLIGRAM(S): 25 TABLET ORAL at 05:42

## 2018-08-19 RX ADMIN — Medication 650 MILLIGRAM(S): at 02:58

## 2018-08-19 RX ADMIN — Medication 81 MILLIGRAM(S): at 11:24

## 2018-08-19 RX ADMIN — Medication 600 MILLIGRAM(S): at 15:51

## 2018-08-19 NOTE — DISCHARGE NOTE ADULT - PATIENT PORTAL LINK FT
You can access the [facility] Patient Portal, offered by [facility], by registering with the following website: [email or web address]

## 2018-08-19 NOTE — DISCHARGE NOTE ADULT - MEDICATION SUMMARY - MEDICATIONS TO TAKE
I will START or STAY ON the medications listed below when I get home from the hospital:    aspirin 81 mg oral delayed release tablet  -- 1 tab(s) by mouth once a day  -- Indication: For cardiopriotective     rosuvastatin 20 mg oral tablet  -- 1 tab(s) by mouth once a day (at bedtime)  -- Indication: For HLD    labetalol 300 mg oral tablet  -- 2 tab(s) by mouth 3 times a day  -- Indication: For HTN (hypertension)    Lidoderm 5% topical film  -- Apply on skin to affected area once a day   -- For external use only.  Remove old patch prior to applying a new patch.    -- Indication: For Pain of right lower leg    ethacrynic acid 25 mg oral tablet  -- 1 tab(s) by mouth once a day  -- Indication: For HTN (hypertension)    levothyroxine 75 mcg (0.075 mg) oral tablet  -- 1 tab(s) by mouth once a day  -- Indication: For Hypothyroid    cholecalciferol oral tablet  -- 1000 unit(s) by mouth once a day  -- Indication: For Supplements I will START or STAY ON the medications listed below when I get home from the hospital:    aspirin 81 mg oral delayed release tablet  -- 1 tab(s) by mouth once a day  -- Indication: For cardiopriotective     losartan 100 mg oral tablet  -- 1 tab(s) by mouth once a day   -- Do not take this drug if you are pregnant.  It is very important that you take or use this exactly as directed.  Do not skip doses or discontinue unless directed by your doctor.  Some non-prescription drugs may aggravate your condition.  Read all labels carefully.  If a warning appears, check with your doctor before taking.    -- Indication: For HTN (hypertension)    rosuvastatin 20 mg oral tablet  -- 1 tab(s) by mouth once a day (at bedtime)  -- Indication: For HLD    labetalol 300 mg oral tablet  -- 2 tab(s) by mouth 3 times a day  -- Indication: For HTN (hypertension)    Lidoderm 5% topical film  -- Apply on skin to affected area once a day   -- For external use only.  Remove old patch prior to applying a new patch.    -- Indication: For Pain of right lower leg    ethacrynic acid 25 mg oral tablet  -- 1 tab(s) by mouth once a day  -- Indication: For HTN (hypertension)    levothyroxine 75 mcg (0.075 mg) oral tablet  -- 1 tab(s) by mouth once a day  -- Indication: For Hypothyroid    cholecalciferol oral tablet  -- 1000 unit(s) by mouth once a day  -- Indication: For Supplements I will START or STAY ON the medications listed below when I get home from the hospital:    aspirin 81 mg oral delayed release tablet  -- 1 tab(s) by mouth once a day  -- Indication: For cardiopriotective     losartan 100 mg oral tablet  -- 1 tab(s) by mouth once a day   -- Do not take this drug if you are pregnant.  It is very important that you take or use this exactly as directed.  Do not skip doses or discontinue unless directed by your doctor.  Some non-prescription drugs may aggravate your condition.  Read all labels carefully.  If a warning appears, check with your doctor before taking.    -- Indication: For HTN (hypertension)    rosuvastatin 20 mg oral tablet  -- 1 tab(s) by mouth once a day (at bedtime)  -- Indication: For HLD    labetalol 300 mg oral tablet  -- 2 tab(s) by mouth 3 times a day  -- Indication: For HTN (hypertension)    Lidoderm 5% topical film  -- Apply on skin to affected area once a day   -- For external use only.  Remove old patch prior to applying a new patch.    -- Indication: For Pain of right lower leg    ethacrynic acid 25 mg oral tablet  -- 1 tab(s) by mouth once a day  -- Indication: For HTN (hypertension)    levothyroxine 75 mcg (0.075 mg) oral tablet  -- 1 tab(s) by mouth once a day  -- Indication: For Hypothyroid    cholecalciferol oral tablet  -- 1000 unit(s) by mouth once a day  -- Indication: For Supplements     cyanocobalamin 1000 mcg oral tablet  -- 1 tab(s) by mouth once a day  -- Indication: For Supplements I will START or STAY ON the medications listed below when I get home from the hospital:    aspirin 81 mg oral delayed release tablet  -- 1 tab(s) by mouth once a day  -- Indication: For cardiopriotective     losartan 100 mg oral tablet  -- 1 tab(s) by mouth once a day   -- Do not take this drug if you are pregnant.  It is very important that you take or use this exactly as directed.  Do not skip doses or discontinue unless directed by your doctor.  Some non-prescription drugs may aggravate your condition.  Read all labels carefully.  If a warning appears, check with your doctor before taking.    -- Indication: For HTN (hypertension)    rosuvastatin 20 mg oral tablet  -- 1 tab(s) by mouth once a day (at bedtime)  -- Indication: For HLD    labetalol 300 mg oral tablet  -- 2 tab(s) by mouth 3 times a day  -- Indication: For HTN (hypertension)    amLODIPine 10 mg oral tablet  -- 1 tab(s) by mouth once a day  -- Indication: For HTN (hypertension)    Lidoderm 5% topical film  -- Apply on skin to affected area once a day   -- For external use only.  Remove old patch prior to applying a new patch.    -- Indication: For Pain of right lower leg    ethacrynic acid 25 mg oral tablet  -- 1 tab(s) by mouth once a day  -- Indication: For HTN (hypertension)    levothyroxine 75 mcg (0.075 mg) oral tablet  -- 1 tab(s) by mouth once a day  -- Indication: For Hypothyroid    cholecalciferol oral tablet  -- 1000 unit(s) by mouth once a day  -- Indication: For Supplements     cyanocobalamin 1000 mcg oral tablet  -- 1 tab(s) by mouth once a day  -- Indication: For Supplements

## 2018-08-19 NOTE — DISCHARGE NOTE ADULT - CARE PROVIDER_API CALL
Matthew Grant), Orthopaedic Surgery  [address] [place] [address]  Phone: [phone]  Fax: [phone]

## 2018-08-19 NOTE — DISCHARGE NOTE ADULT - PLAN OF CARE
Prevent from worsening You came with R knee pain likely ue to severe arthritis, small baker cyst, seen by ortho, given steroid injection, need to f./up opt with Orthopedist Dr. Grant at [phone] within 7-10 days  Take pain med tylenol, can use Lidoderm patch Outpatient f/up in 7--10 days with vascular surgeon You came with elevated BP, meds adjusted,. c/w losartan 100, labetalol 600 BID, ethacrynic acid 25, need to take low salt duiet <2 gm. outpatient f/up recommended,. C./ w synthyroids You came with elevated BP, meds adjusted,. c/w losartan 100, labetalol 600 TID, ethacrynic acid 25, need to take low salt duiet <2 gm. outpatient f/up recommended,. C./ w synthyroid You came with elevated BP, meds adjusted,. c/w losartan 100, labetalol 600 TID, ethacrynic acid 25, need to take low salt duiet <2 gm. outpatient f/up recommended,., added amlodipine 10 mg

## 2018-08-19 NOTE — DISCHARGE NOTE ADULT - MEDICATION SUMMARY - MEDICATIONS TO CHANGE
I will SWITCH the dose or number of times a day I take the medications listed below when I get home from the hospital:    ethacrynic acid 25 mg oral tablet  -- 2 tab(s) by mouth 2 times a day

## 2018-08-19 NOTE — DISCHARGE NOTE ADULT - MEDICATION SUMMARY - MEDICATIONS TO STOP TAKING
I will STOP taking the medications listed below when I get home from the hospital:    losartan I will STOP taking the medications listed below when I get home from the hospital:  None

## 2018-08-19 NOTE — CONSULT NOTE ADULT - SUBJECTIVE AND OBJECTIVE BOX
HPI: Patient is a 68 year old female patient who is admitted to hospital for medical work up. Patient was noted to complain of right knee pain.     PAST MEDICAL & SURGICAL HISTORY:  Lymphedema  Essential hypertension  Carpal tunnel syndrome of right wrist  Obesity (BMI 30-39.9)  Type 2 diabetes mellitus without complication  Hypothyroid  Essential hypertension  Obesity  HTN (hypertension)  DM (diabetes mellitus)  No significant past surgical history  S/P carpal tunnel release      Review of systems: Non Contributory    MEDICATIONS  (STANDING):  aspirin  chewable 81 milliGRAM(s) Oral daily  cholecalciferol 1000 Unit(s) Oral daily  cyanocobalamin 1000 MICROGram(s) Oral daily  enoxaparin Injectable 40 milliGRAM(s) SubCutaneous daily  ergocalciferol 38586 Unit(s) Oral <User Schedule>  ethacrynic acid 25 milliGRAM(s) Oral daily  insulin lispro (HumaLOG) corrective regimen sliding scale   SubCutaneous Before meals and at bedtime  labetalol 600 milliGRAM(s) Oral three times a day  levothyroxine 75 MICROGram(s) Oral daily  lidocaine   Patch 1 Patch Transdermal daily  lidocaine 1% Injectable 5 milliLiter(s) Local Injection once  losartan 100 milliGRAM(s) Oral daily  methylPREDNISolone acetate Injectable 40 milliGRAM(s) IntraMuscular once    Allergies: penicillin (Hives)  penicillin (Rash)  sulfa drugs (Unknown)  Tetracycline Hydrochloride (Unknown)    Vital Signs Last 24 Hrs  T(C): 36.7 (20 Aug 2018 13:38), Max: 36.8 (19 Aug 2018 20:45)  T(F): 98 (20 Aug 2018 13:38), Max: 98.2 (19 Aug 2018 20:45)  HR: 71 (20 Aug 2018 13:38) (67 - 73)  BP: 187/83 (20 Aug 2018 13:38) (152/82 - 187/83)  BP(mean): --  RR: 18 (20 Aug 2018 13:38) (17 - 18)  SpO2: 97% (20 Aug 2018 13:38) (93% - 97%)    Physical Examination:    Musculoskeletal: Severe swelling of right lower extremity due to lymphedema is noted.     Right knee: Positive tenderness to palpation of joint line, mild  effusion, pain on flexion of the knee, decreased range of motion.      Neurovascularly Intact    LABS:                        12.8   5.6   )-----------( 279      ( 20 Aug 2018 07:08 )             39.8     08-20    141  |  105  |  26<H>  ----------------------------<  136<H>  3.5   |  29  |  1.13    Ca    8.6      20 Aug 2018 07:08  Phos  3.2     08-20  Mg     2.3     08-20      RADIOLOGY & ADDITIONAL STUDIES: CT scan and X-ray of right knee showed severe osteoarthritis.    ASSESSMENT: Right knee DJD, pain    PLAN/RECOMMENDATION:  Under sterile condition, right knee was injected with 40 mg of Depomedrol and 5 CC of lidocaine. Patient tolerated procedure well and expressed relief of pain.     Apply ice. Take anti-inflammatory medication. Apply Voltaren gel as needed.     FOLLOW UP: With office after discharge

## 2018-08-19 NOTE — DISCHARGE NOTE ADULT - HOSPITAL COURSE
68 year old female patient with PMH of hypertension, CAD s/p 3 stents, asthma, lymphedema s/p venous ablation and carpal tunnel syndrome presented to ED with right knee pain. Patient reports that she has hx of venous insufficiency and lymphedema for last 3 years and has been following with her vascular surgery.     In the hospital being managed for right knee pain, all imaging were negative for fracture, shows severe arthritis of right knee and small baker cyst. Patient seen by ortho Dr Cuenca, recommended PT and pain management, no acute orthopedic intervention at this time. Recommended opt f/up. Given steroid injection. ----------. Patient has severe HTN, home meds losartan, ethacrynic acid, labetalol continued, BP remain controlled on it. Leida hypothyroidism synthroid continued. Pain control was achieved with lidocaine, tramadol. PT recommended Subacute rehab.     Patient stable to be discharged to home, discussed with attending  Need to f/up with PCP  Please refer to hospital documentation for complete summary. 68 year old female patient with PMH of hypertension, CAD s/p 3 stents, asthma, lymphedema s/p venous ablation and carpal tunnel syndrome presented to ED with right knee pain. Patient reports that she has hx of venous insufficiency and lymphedema for last 3 years and has been following with her vascular surgery.     In the hospital being managed for right knee pain, all imaging were negative for fracture, shows severe arthritis of right knee and small baker cyst. Patient seen by ortho Dr Cuenca, recommended PT and pain management, no acute orthopedic intervention at this time. Recommended opt f/up. Given steroid injection Patient has severe HTN, home meds losartan, ethacrynic acid, labetalol continued, BP remain controlled on it. Leida hypothyroidism synthroid continued. Pain control was achieved with lidocaine, tramadol. PT recommended Subacute rehab.     Patient stable to be discharged to rehab,  discussed with attending  Need to f/up with PCP, vascular surgeon, orthopedic dr  Please refer to hospital documentation for complete summary.

## 2018-08-19 NOTE — DISCHARGE NOTE ADULT - CARE PLAN
Principal Discharge DX:	Right knee pain  Secondary Diagnosis:	Lymphedema  Secondary Diagnosis:	HTN (hypertension)  Secondary Diagnosis:	Hypothyroid  Secondary Diagnosis:	DM (diabetes mellitus) Principal Discharge DX:	Right knee pain  Goal:	Prevent from worsening  Assessment and plan of treatment:	You came with R knee pain likely ue to severe arthritis, small baker cyst, seen by ortho, given steroid injection, need to f./up opt with Orthopedist Dr. Grant at [phone] within 7-10 days  Take pain med tylenol, can use Lidoderm patch  Secondary Diagnosis:	Lymphedema  Assessment and plan of treatment:	Outpatient f/up in 7--10 days with vascular surgeon  Secondary Diagnosis:	HTN (hypertension)  Assessment and plan of treatment:	You came with elevated BP, meds adjusted,. c/w losartan 100, labetalol 600 BID, ethacrynic acid 25, need to take low salt duiet <2 gm. outpatient f/up recommended,.  Secondary Diagnosis:	Hypothyroid  Assessment and plan of treatment:	C./ w synthyroids Principal Discharge DX:	Right knee pain  Goal:	Prevent from worsening  Assessment and plan of treatment:	You came with R knee pain likely ue to severe arthritis, small baker cyst, seen by ortho, given steroid injection, need to f./up opt with Orthopedist Dr. Grant at [phone] within 7-10 days  Take pain med tylenol, can use Lidoderm patch  Secondary Diagnosis:	Lymphedema  Assessment and plan of treatment:	Outpatient f/up in 7--10 days with vascular surgeon  Secondary Diagnosis:	HTN (hypertension)  Assessment and plan of treatment:	You came with elevated BP, meds adjusted,. c/w losartan 100, labetalol 600 TID, ethacrynic acid 25, need to take low salt duiet <2 gm. outpatient f/up recommended,.  Secondary Diagnosis:	Hypothyroid  Assessment and plan of treatment:	C./ w synthyroid Principal Discharge DX:	Right knee pain  Goal:	Prevent from worsening  Assessment and plan of treatment:	You came with R knee pain likely ue to severe arthritis, small baker cyst, seen by ortho, given steroid injection, need to f./up opt with Orthopedist Dr. Grant at [phone] within 7-10 days  Take pain med tylenol, can use Lidoderm patch  Secondary Diagnosis:	Lymphedema  Assessment and plan of treatment:	Outpatient f/up in 7--10 days with vascular surgeon  Secondary Diagnosis:	HTN (hypertension)  Assessment and plan of treatment:	You came with elevated BP, meds adjusted,. c/w losartan 100, labetalol 600 TID, ethacrynic acid 25, need to take low salt duiet <2 gm. outpatient f/up recommended,., added amlodipine 10 mg  Secondary Diagnosis:	Hypothyroid  Assessment and plan of treatment:	C./ w synthyroid

## 2018-08-20 VITALS
DIASTOLIC BLOOD PRESSURE: 77 MMHG | HEART RATE: 74 BPM | RESPIRATION RATE: 18 BRPM | TEMPERATURE: 98 F | SYSTOLIC BLOOD PRESSURE: 162 MMHG | OXYGEN SATURATION: 100 %

## 2018-08-20 DIAGNOSIS — E03.9 HYPOTHYROIDISM, UNSPECIFIED: ICD-10-CM

## 2018-08-20 LAB
ANION GAP SERPL CALC-SCNC: 7 MMOL/L — SIGNIFICANT CHANGE UP (ref 5–17)
BUN SERPL-MCNC: 26 MG/DL — HIGH (ref 7–18)
CALCIUM SERPL-MCNC: 8.6 MG/DL — SIGNIFICANT CHANGE UP (ref 8.4–10.5)
CHLORIDE SERPL-SCNC: 105 MMOL/L — SIGNIFICANT CHANGE UP (ref 96–108)
CHOLEST SERPL-MCNC: 201 MG/DL — HIGH (ref 10–199)
CO2 SERPL-SCNC: 29 MMOL/L — SIGNIFICANT CHANGE UP (ref 22–31)
CREAT SERPL-MCNC: 1.13 MG/DL — SIGNIFICANT CHANGE UP (ref 0.5–1.3)
GLUCOSE SERPL-MCNC: 136 MG/DL — HIGH (ref 70–99)
HBA1C BLD-MCNC: 5.9 % — HIGH (ref 4–5.6)
HCT VFR BLD CALC: 39.8 % — SIGNIFICANT CHANGE UP (ref 34.5–45)
HDLC SERPL-MCNC: 27 MG/DL — LOW
HGB BLD-MCNC: 12.8 G/DL — SIGNIFICANT CHANGE UP (ref 11.5–15.5)
LIPID PNL WITH DIRECT LDL SERPL: 124 MG/DL — SIGNIFICANT CHANGE UP
MAGNESIUM SERPL-MCNC: 2.3 MG/DL — SIGNIFICANT CHANGE UP (ref 1.6–2.6)
MCHC RBC-ENTMCNC: 28.1 PG — SIGNIFICANT CHANGE UP (ref 27–34)
MCHC RBC-ENTMCNC: 32.1 GM/DL — SIGNIFICANT CHANGE UP (ref 32–36)
MCV RBC AUTO: 87.8 FL — SIGNIFICANT CHANGE UP (ref 80–100)
PHOSPHATE SERPL-MCNC: 3.2 MG/DL — SIGNIFICANT CHANGE UP (ref 2.5–4.5)
PLATELET # BLD AUTO: 279 K/UL — SIGNIFICANT CHANGE UP (ref 150–400)
POTASSIUM SERPL-MCNC: 3.5 MMOL/L — SIGNIFICANT CHANGE UP (ref 3.5–5.3)
POTASSIUM SERPL-SCNC: 3.5 MMOL/L — SIGNIFICANT CHANGE UP (ref 3.5–5.3)
RBC # BLD: 4.53 M/UL — SIGNIFICANT CHANGE UP (ref 3.8–5.2)
RBC # FLD: 14.8 % — HIGH (ref 10.3–14.5)
SODIUM SERPL-SCNC: 141 MMOL/L — SIGNIFICANT CHANGE UP (ref 135–145)
TOTAL CHOLESTEROL/HDL RATIO MEASUREMENT: 7.4 RATIO — HIGH (ref 3.3–7.1)
TRIGL SERPL-MCNC: 250 MG/DL — HIGH (ref 10–149)
TSH SERPL-MCNC: 1.28 UU/ML — SIGNIFICANT CHANGE UP (ref 0.34–4.82)
VIT B12 SERPL-MCNC: 204 PG/ML — LOW (ref 232–1245)
WBC # BLD: 5.6 K/UL — SIGNIFICANT CHANGE UP (ref 3.8–10.5)
WBC # FLD AUTO: 5.6 K/UL — SIGNIFICANT CHANGE UP (ref 3.8–10.5)

## 2018-08-20 PROCEDURE — 82607 VITAMIN B-12: CPT

## 2018-08-20 PROCEDURE — 84443 ASSAY THYROID STIM HORMONE: CPT

## 2018-08-20 PROCEDURE — 82962 GLUCOSE BLOOD TEST: CPT

## 2018-08-20 PROCEDURE — 83735 ASSAY OF MAGNESIUM: CPT

## 2018-08-20 PROCEDURE — 80061 LIPID PANEL: CPT

## 2018-08-20 PROCEDURE — 80048 BASIC METABOLIC PNL TOTAL CA: CPT

## 2018-08-20 PROCEDURE — 73700 CT LOWER EXTREMITY W/O DYE: CPT

## 2018-08-20 PROCEDURE — 81001 URINALYSIS AUTO W/SCOPE: CPT

## 2018-08-20 PROCEDURE — 83036 HEMOGLOBIN GLYCOSYLATED A1C: CPT

## 2018-08-20 PROCEDURE — 97161 PT EVAL LOW COMPLEX 20 MIN: CPT

## 2018-08-20 PROCEDURE — 93971 EXTREMITY STUDY: CPT

## 2018-08-20 PROCEDURE — 99285 EMERGENCY DEPT VISIT HI MDM: CPT | Mod: 25

## 2018-08-20 PROCEDURE — 85027 COMPLETE CBC AUTOMATED: CPT

## 2018-08-20 PROCEDURE — 80053 COMPREHEN METABOLIC PANEL: CPT

## 2018-08-20 PROCEDURE — 82306 VITAMIN D 25 HYDROXY: CPT

## 2018-08-20 PROCEDURE — 84100 ASSAY OF PHOSPHORUS: CPT

## 2018-08-20 PROCEDURE — 96374 THER/PROPH/DIAG INJ IV PUSH: CPT

## 2018-08-20 PROCEDURE — 73562 X-RAY EXAM OF KNEE 3: CPT

## 2018-08-20 PROCEDURE — 82746 ASSAY OF FOLIC ACID SERUM: CPT

## 2018-08-20 PROCEDURE — 93005 ELECTROCARDIOGRAM TRACING: CPT

## 2018-08-20 PROCEDURE — 73590 X-RAY EXAM OF LOWER LEG: CPT

## 2018-08-20 RX ORDER — LEVOTHYROXINE SODIUM 125 MCG
0 TABLET ORAL
Qty: 0 | Refills: 0 | COMMUNITY

## 2018-08-20 RX ORDER — PREGABALIN 225 MG/1
1000 CAPSULE ORAL DAILY
Qty: 0 | Refills: 0 | Status: DISCONTINUED | OUTPATIENT
Start: 2018-08-20 | End: 2018-08-20

## 2018-08-20 RX ORDER — LOSARTAN POTASSIUM 100 MG/1
1 TABLET, FILM COATED ORAL
Qty: 0 | Refills: 0 | COMMUNITY

## 2018-08-20 RX ORDER — LOSARTAN POTASSIUM 100 MG/1
1 TABLET, FILM COATED ORAL
Qty: 30 | Refills: 0 | OUTPATIENT
Start: 2018-08-20 | End: 2018-09-18

## 2018-08-20 RX ORDER — PREGABALIN 225 MG/1
1 CAPSULE ORAL
Qty: 30 | Refills: 0
Start: 2018-08-20 | End: 2018-09-18

## 2018-08-20 RX ORDER — AMLODIPINE BESYLATE 2.5 MG/1
1 TABLET ORAL
Qty: 30 | Refills: 0
Start: 2018-08-20 | End: 2018-09-18

## 2018-08-20 RX ORDER — CHOLECALCIFEROL (VITAMIN D3) 125 MCG
1000 CAPSULE ORAL
Qty: 0 | Refills: 0 | DISCHARGE
Start: 2018-08-20

## 2018-08-20 RX ORDER — LABETALOL HCL 100 MG
0 TABLET ORAL
Qty: 0 | Refills: 0 | COMMUNITY

## 2018-08-20 RX ORDER — INSULIN LISPRO 100/ML
VIAL (ML) SUBCUTANEOUS
Qty: 0 | Refills: 0 | Status: DISCONTINUED | OUTPATIENT
Start: 2018-08-20 | End: 2018-08-20

## 2018-08-20 RX ORDER — CHOLECALCIFEROL (VITAMIN D3) 125 MCG
1 CAPSULE ORAL
Qty: 0 | Refills: 0 | COMMUNITY

## 2018-08-20 RX ORDER — CHOLECALCIFEROL (VITAMIN D3) 125 MCG
2000 CAPSULE ORAL
Qty: 0 | Refills: 0 | DISCHARGE
Start: 2018-08-20

## 2018-08-20 RX ORDER — LIDOCAINE 4 G/100G
1 CREAM TOPICAL
Qty: 5 | Refills: 0
Start: 2018-08-20 | End: 2018-08-24

## 2018-08-20 RX ORDER — LOSARTAN POTASSIUM 100 MG/1
1 TABLET, FILM COATED ORAL
Qty: 30 | Refills: 0
Start: 2018-08-20 | End: 2018-09-18

## 2018-08-20 RX ORDER — ETHACRYNIC ACID 25 MG/1
2 TABLET ORAL
Qty: 0 | Refills: 0 | COMMUNITY

## 2018-08-20 RX ORDER — TRAMADOL HYDROCHLORIDE 50 MG/1
25 TABLET ORAL THREE TIMES A DAY
Qty: 0 | Refills: 0 | Status: DISCONTINUED | OUTPATIENT
Start: 2018-08-20 | End: 2018-08-20

## 2018-08-20 RX ORDER — ASPIRIN/CALCIUM CARB/MAGNESIUM 324 MG
1 TABLET ORAL
Qty: 0 | Refills: 0 | COMMUNITY

## 2018-08-20 RX ORDER — LOSARTAN POTASSIUM 100 MG/1
0 TABLET, FILM COATED ORAL
Qty: 0 | Refills: 0 | COMMUNITY

## 2018-08-20 RX ORDER — AMLODIPINE BESYLATE 2.5 MG/1
10 TABLET ORAL DAILY
Qty: 0 | Refills: 0 | Status: DISCONTINUED | OUTPATIENT
Start: 2018-08-20 | End: 2018-08-20

## 2018-08-20 RX ORDER — ERGOCALCIFEROL 1.25 MG/1
50000 CAPSULE ORAL
Qty: 0 | Refills: 0 | Status: DISCONTINUED | OUTPATIENT
Start: 2018-08-20 | End: 2018-08-20

## 2018-08-20 RX ADMIN — ETHACRYNIC ACID 25 MILLIGRAM(S): 25 TABLET ORAL at 05:56

## 2018-08-20 RX ADMIN — Medication 1000 UNIT(S): at 11:24

## 2018-08-20 RX ADMIN — Medication 75 MICROGRAM(S): at 05:56

## 2018-08-20 RX ADMIN — Medication 600 MILLIGRAM(S): at 13:40

## 2018-08-20 RX ADMIN — LOSARTAN POTASSIUM 100 MILLIGRAM(S): 100 TABLET, FILM COATED ORAL at 05:56

## 2018-08-20 RX ADMIN — Medication 650 MILLIGRAM(S): at 00:56

## 2018-08-20 RX ADMIN — Medication 650 MILLIGRAM(S): at 15:56

## 2018-08-20 RX ADMIN — Medication 600 MILLIGRAM(S): at 05:56

## 2018-08-20 RX ADMIN — Medication 650 MILLIGRAM(S): at 16:56

## 2018-08-20 RX ADMIN — Medication 81 MILLIGRAM(S): at 11:25

## 2018-08-20 RX ADMIN — AMLODIPINE BESYLATE 10 MILLIGRAM(S): 2.5 TABLET ORAL at 17:27

## 2018-08-20 RX ADMIN — Medication 650 MILLIGRAM(S): at 00:26

## 2018-08-20 RX ADMIN — ERGOCALCIFEROL 50000 UNIT(S): 1.25 CAPSULE ORAL at 17:27

## 2018-08-20 NOTE — PROGRESS NOTE ADULT - SUBJECTIVE AND OBJECTIVE BOX
HPI:  68 year old female patient with PMH of hypertension, CAD s/p 3 stents, asthma, lymphedema, venous stasis s/p venous ablation and carpal tunnel syndrome presented to ED with right knee pain. Patient reports that she has hx of venous insufficiency and lymphedema for last 3 years and has been following with her vascular surgeon. She recently got venous ablation of her right leg x 2 weeks ago which improved her swelling however, yesterday she experienced sudden onset of pain in behind her right knee which is radiating to her thigh and has  progressively worsened. Patient rates her pain as 4-5/10 in intensity while sitting and 8/10 on standing and walking. She denies any hx of trauma or fall,  numbness or weakness,  urinary or fecal incontinence.    IN ED,    Vital Signs: HR 68, /75,  TEMP 98.3F,  RR18,  SPO2 97%    X-RAY Tibia and fibula: Possible depression at the lateral tibial plateau..  Moderate to severe osteoarthritis at the right knee.    CT Lower extremity: No fracture or dislocation of the right knee. (17 Aug 2018 00:19)      Patient is a 68y old  Female who presents with a chief complaint of pain behind right knee r/o fracture (17 Aug 2018 00:19)      INTERVAL HPI/OVERNIGHT EVENTS:  T(C): 36.9 (08-19-18 @ 04:55), Max: 36.9 (08-19-18 @ 04:55)  HR: 71 (08-19-18 @ 15:50) (71 - 79)  BP: 172/80 (08-19-18 @ 15:50) (156/78 - 172/80)  RR: 18 (08-19-18 @ 04:55) (18 - 18)  SpO2: 97% (08-19-18 @ 04:55) (94% - 97%)  Wt(kg): --  I&O's Summary    19 Aug 2018 07:01  -  19 Aug 2018 17:46  --------------------------------------------------------  IN: 500 mL / OUT: 4 mL / NET: 496 mL        REVIEW OF SYSTEMS: denies fever, chills, SOB, palpitations, chest pain, abdominal pain, nausea, vomitting, diarrhea, constipation, dizziness    MEDICATIONS  (STANDING):  acetaminophen   Tablet. 1000 milliGRAM(s) Oral two times a day  aspirin  chewable 81 milliGRAM(s) Oral daily  cholecalciferol 1000 Unit(s) Oral daily  enoxaparin Injectable 40 milliGRAM(s) SubCutaneous daily  ethacrynic acid 25 milliGRAM(s) Oral daily  labetalol 600 milliGRAM(s) Oral three times a day  levothyroxine 75 MICROGram(s) Oral daily  lidocaine 1% Injectable 5 milliLiter(s) Local Injection once  losartan 100 milliGRAM(s) Oral daily  methylPREDNISolone acetate Injectable 40 milliGRAM(s) IntraMuscular once    MEDICATIONS  (PRN):      PHYSICAL EXAM:  GENERAL: NAD, well-groomed, well-developed  HEAD:  Atraumatic, Normocephalic  EYES: EOMI, PERRLA, conjunctiva and sclera clear  ENMT: No tonsillar erythema, exudates, or enlargement; Moist mucous membranes, Good dentition, No lesions  NECK: Supple, No JVD, Normal thyroid  NERVOUS SYSTEM:  Alert & Oriented X3, Good concentration; Motor Strength 5/5 B/L upper and lower extremities; DTRs 2+ intact and symmetric  CHEST/LUNG: Clear to percussion bilaterally; No rales, rhonchi, wheezing, or rubs  HEART: Regular rate and rhythm; No murmurs, rubs, or gallops  ABDOMEN: Soft, Nontender, Nondistended; Bowel sounds present  EXTREMITIES:  2+ Peripheral Pulses, No clubbing, cyanosis, or edema  LYMPH: No lymphadenopathy noted  SKIN: No rashes or lesions  LABS:                        12.5   5.5   )-----------( 260      ( 19 Aug 2018 05:51 )             38.8     08-19    144  |  107  |  24<H>  ----------------------------<  112<H>  3.5   |  28  |  1.08    Ca    8.2<L>      19 Aug 2018 05:51  Phos  3.8     08-19  Mg     2.2     08-19          CAPILLARY BLOOD GLUCOSE      POCT Blood Glucose.: 127 mg/dL (19 Aug 2018 07:52)
HPI:  68 year old female patient with PMH of hypertension, CAD s/p 3 stents, asthma, lymphedema, venous stasis s/p venous ablation and carpal tunnel syndrome presented to ED with right knee pain. Patient reports that she has hx of venous insufficiency and lymphedema for last 3 years and has been following with her vascular surgeon. She recently got venous ablation of her right leg x 2 weeks ago which improved her swelling however, yesterday she experienced sudden onset of pain in behind her right knee which is radiating to her thigh and has  progressively worsened. Patient rates her pain as 4-5/10 in intensity while sitting and 8/10 on standing and walking. She denies any hx of trauma or fall,  numbness or weakness,  urinary or fecal incontinence.    IN ED,    Vital Signs: HR 68, /75,  TEMP 98.3F,  RR18,  SPO2 97%    X-RAY Tibia and fibula: Possible depression at the lateral tibial plateau..  Moderate to severe osteoarthritis at the right knee.    CT Lower extremity: No fracture or dislocation of the right knee. (17 Aug 2018 00:19)      Patient is a 68y old  Female who presents with a chief complaint of pain behind right knee r/o fracture (17 Aug 2018 00:19)      INTERVAL HPI/OVERNIGHT EVENTS:  T(C): 36.7 (18 @ 05:43), Max: 36.7 (18 @ 05:43)  HR: 79 (18 @ 14:16) (77 - 87)  BP: 158/88 (18 @ 14:16) (158/88 - 178/90)  RR: 18 (18 @ 05:43) (18 - 18)  SpO2: 95% (18 @ 05:43) (95% - 95%)  Wt(kg): --  I&O's Summary      REVIEW OF SYSTEMS: denies fever, chills, SOB, palpitations, chest pain, abdominal pain, nausea, vomitting, diarrhea, constipation, dizziness    MEDICATIONS  (STANDING):  aspirin  chewable 81 milliGRAM(s) Oral daily  cholecalciferol 1000 Unit(s) Oral daily  enoxaparin Injectable 40 milliGRAM(s) SubCutaneous daily  ethacrynic acid 25 milliGRAM(s) Oral daily  labetalol 600 milliGRAM(s) Oral three times a day  levothyroxine 75 MICROGram(s) Oral daily  losartan 100 milliGRAM(s) Oral daily  potassium chloride    Tablet ER 20 milliEquivalent(s) Oral every 2 hours    MEDICATIONS  (PRN):  acetaminophen   Tablet. 650 milliGRAM(s) Oral every 6 hours PRN Moderate Pain (4 - 6)      PHYSICAL EXAM:  GENERAL: NAD, well-groomed, well-developed  HEAD:  Atraumatic, Normocephalic  EYES: EOMI, PERRLA, conjunctiva and sclera clear  ENMT: No tonsillar erythema, exudates, or enlargement; Moist mucous membranes, Good dentition, No lesions  NECK: Supple, No JVD, Normal thyroid  NERVOUS SYSTEM:  Alert & Oriented X3, Good concentration; Motor Strength 5/5 B/L upper and lower extremities; DTRs 2+ intact and symmetric  CHEST/LUNG: Clear to percussion bilaterally; No rales, rhonchi, wheezing, or rubs  HEART: Regular rate and rhythm; No murmurs, rubs, or gallops  ABDOMEN: Soft, Nontender, Nondistended; Bowel sounds present  EXTREMITIES:  2+ Peripheral Pulses, No clubbing, cyanosis, or edema  LYMPH: No lymphadenopathy noted  SKIN: No rashes or lesions  LABS:                        13.3   7.6   )-----------( 275      ( 18 Aug 2018 08:28 )             40.6     08-18    142  |  105  |  21<H>  ----------------------------<  119<H>  3.4<L>   |  30  |  1.13    Ca    8.6      18 Aug 2018 08:28  Phos  3.5     08-18  Mg     2.1     08-18    TPro  7.5  /  Alb  3.3<L>  /  TBili  0.5  /  DBili  x   /  AST  14  /  ALT  22  /  AlkPhos  91  08-16      Urinalysis Basic - ( 17 Aug 2018 09:17 )    Color: Yellow / Appearance: Clear / S.020 / pH: x  Gluc: x / Ketone: Negative  / Bili: Negative / Urobili: 1   Blood: x / Protein: 30 mg/dL / Nitrite: Negative   Leuk Esterase: Negative / RBC: 0-2 /HPF / WBC 0-2 /HPF   Sq Epi: x / Non Sq Epi: Occasional /HPF / Bacteria: Few /HPF      CAPILLARY BLOOD GLUCOSE            Urinalysis Basic - ( 17 Aug 2018 09:17 )    Color: Yellow / Appearance: Clear / S.020 / pH: x  Gluc: x / Ketone: Negative  / Bili: Negative / Urobili: 1   Blood: x / Protein: 30 mg/dL / Nitrite: Negative   Leuk Esterase: Negative / RBC: 0-2 /HPF / WBC 0-2 /HPF   Sq Epi: x / Non Sq Epi: Occasional /HPF / Bacteria: Few /HPF
HPI:  68 year old female patient with PMH of hypertension, CAD s/p 3 stents, asthma, lymphedema, venous stasis s/p venous ablation and carpal tunnel syndrome presented to ED with right knee pain. Patient reports that she has hx of venous insufficiency and lymphedema for last 3 years and has been following with her vascular surgeon. She recently got venous ablation of her right leg x 2 weeks ago which improved her swelling however, yesterday she experienced sudden onset of pain in behind her right knee which is radiating to her thigh and has  progressively worsened. Patient rates her pain as 4-5/10 in intensity while sitting and 8/10 on standing and walking. She denies any hx of trauma or fall,  numbness or weakness,  urinary or fecal incontinence.    IN ED,    Vital Signs: HR 68, /75,  TEMP 98.3F,  RR18,  SPO2 97%    X-RAY Tibia and fibula: Possible depression at the lateral tibial plateau..  Moderate to severe osteoarthritis at the right knee.    CT Lower extremity: No fracture or dislocation of the right knee. (17 Aug 2018 00:19)      Patient is a 68y old  Female who presents with a chief complaint of pain behind right knee r/o fracture (17 Aug 2018 00:19)      INTERVAL HPI/OVERNIGHT EVENTS:  T(C): 36.8 (18 @ 05:47), Max: 37 (18 @ 18:55)  HR: 88 (18 @ 09:49) (68 - 88)  BP: 159/85 (18 @ 09:49) (117/75 - 188/81)  RR: 18 (18 @ 05:47) (18 - 18)  SpO2: 96% (18 @ 05:47) (96% - 97%)  Wt(kg): --  I&O's Summary      REVIEW OF SYSTEMS: denies fever, chills, SOB, palpitations, chest pain, abdominal pain, nausea, vomitting, diarrhea, constipation, dizziness    MEDICATIONS  (STANDING):  aspirin  chewable 81 milliGRAM(s) Oral daily  cholecalciferol 1000 Unit(s) Oral daily  enoxaparin Injectable 40 milliGRAM(s) SubCutaneous daily  ethacrynic acid 25 milliGRAM(s) Oral daily  labetalol 600 milliGRAM(s) Oral three times a day  levothyroxine 75 MICROGram(s) Oral daily  losartan 100 milliGRAM(s) Oral daily    MEDICATIONS  (PRN):  acetaminophen   Tablet. 650 milliGRAM(s) Oral every 6 hours PRN Moderate Pain (4 - 6)      PHYSICAL EXAM:  GENERAL: NAD, well-groomed, well-developed  HEAD:  Atraumatic, Normocephalic  EYES: EOMI, PERRLA, conjunctiva and sclera clear  ENMT: No tonsillar erythema, exudates, or enlargement; Moist mucous membranes, Good dentition, No lesions  NECK: Supple, No JVD, Normal thyroid  NERVOUS SYSTEM:  Alert & Oriented X3, Good concentration; Motor Strength 5/5 B/L upper and lower extremities; DTRs 2+ intact and symmetric  CHEST/LUNG: Clear to percussion bilaterally; No rales, rhonchi, wheezing, or rubs  HEART: Regular rate and rhythm; No murmurs, rubs, or gallops  ABDOMEN: Soft, Nontender, Nondistended; Bowel sounds present  EXTREMITIES:  2+ Peripheral Pulses, No clubbing, cyanosis, or edema  LYMPH: No lymphadenopathy noted  SKIN: No rashes or lesions  LABS:                        13.4   7.1   )-----------( 262      ( 16 Aug 2018 17:17 )             40.5     08-16    140  |  106  |  19<H>  ----------------------------<  107<H>  3.5   |  28  |  0.85    Ca    8.3<L>      16 Aug 2018 17:17    TPro  7.5  /  Alb  3.3<L>  /  TBili  0.5  /  DBili  x   /  AST  14  /  ALT  22  /  AlkPhos  91  08-16      Urinalysis Basic - ( 17 Aug 2018 09:17 )    Color: Yellow / Appearance: Clear / S.020 / pH: x  Gluc: x / Ketone: Negative  / Bili: Negative / Urobili: 1   Blood: x / Protein: 30 mg/dL / Nitrite: Negative   Leuk Esterase: Negative / RBC: 0-2 /HPF / WBC 0-2 /HPF   Sq Epi: x / Non Sq Epi: Occasional /HPF / Bacteria: Few /HPF      CAPILLARY BLOOD GLUCOSE            Urinalysis Basic - ( 17 Aug 2018 09:17 )    Color: Yellow / Appearance: Clear / S.020 / pH: x  Gluc: x / Ketone: Negative  / Bili: Negative / Urobili: 1   Blood: x / Protein: 30 mg/dL / Nitrite: Negative   Leuk Esterase: Negative / RBC: 0-2 /HPF / WBC 0-2 /HPF   Sq Epi: x / Non Sq Epi: Occasional /HPF / Bacteria: Few /HPF
PGY 1 Note discussed with supervising resident and primary attending    Patient is a 68y old  Female who presents with a chief complaint of pain behind right knee r/o fracture (17 Aug 2018 00:19)      INTERVAL HPI/OVERNIGHT EVENTS: offers no new complaints; current symptoms resolving    MEDICATIONS  (STANDING):  aspirin  chewable 81 milliGRAM(s) Oral daily  cholecalciferol 1000 Unit(s) Oral daily  enoxaparin Injectable 40 milliGRAM(s) SubCutaneous daily  ethacrynic acid 25 milliGRAM(s) Oral daily  labetalol 600 milliGRAM(s) Oral three times a day  levothyroxine 75 MICROGram(s) Oral daily  losartan 100 milliGRAM(s) Oral daily    MEDICATIONS  (PRN):  acetaminophen   Tablet. 650 milliGRAM(s) Oral every 6 hours PRN Moderate Pain (4 - 6)      __________________________________________________  REVIEW OF SYSTEMS:    CONSTITUTIONAL: No fever,   EYES: no acute visual disturbances  NECK: No pain or stiffness  RESPIRATORY: No cough; No shortness of breath  CARDIOVASCULAR: No chest pain, no palpitations  GASTROINTESTINAL: No pain. No nausea or vomiting; No diarrhea   NEUROLOGICAL: No headache or numbness, no tremors  MUSCULOSKELETAL: No joint pain, no muscle pain  GENITOURINARY: no dysuria, no frequency, no hesitancy  PSYCHIATRY: no depression , no anxiety  ALL OTHER  ROS negative        Vital Signs Last 24 Hrs  T(C): 36.7 (18 Aug 2018 05:43), Max: 36.7 (18 Aug 2018 05:43)  T(F): 98.1 (18 Aug 2018 05:43), Max: 98.1 (18 Aug 2018 05:43)  HR: 77 (18 Aug 2018 05:43) (77 - 87)  BP: 176/67 (18 Aug 2018 05:43) (176/67 - 178/90)  BP(mean): --  RR: 18 (18 Aug 2018 05:43) (18 - 18)  SpO2: 95% (18 Aug 2018 05:43) (95% - 95%)    ________________________________________________  PHYSICAL EXAM:  GENERAL: NAD  HEENT: Normocephalic;  conjunctivae and sclerae clear; moist mucous membranes;   NECK : supple  CHEST/LUNG: Clear to auscultation bilaterally with good air entry   HEART: S1 S2  regular; no murmurs, gallops or rubs  ABDOMEN: Soft, Nontender, Nondistended; Bowel sounds present  EXTREMITIES: no cyanosis; no edema; no calf tenderness  SKIN: warm and dry; no rash  NERVOUS SYSTEM:  Awake and alert; Oriented  to place, person and time ; no new deficits    _________________________________________________  LABS:                        13.3   7.6   )-----------( 275      ( 18 Aug 2018 08:28 )             40.6     08-18    142  |  105  |  21<H>  ----------------------------<  119<H>  3.4<L>   |  30  |  1.13    Ca    8.6      18 Aug 2018 08:28  Phos  3.5     08-18  Mg     2.1     08-18    TPro  7.5  /  Alb  3.3<L>  /  TBili  0.5  /  DBili  x   /  AST  14  /  ALT  22  /  AlkPhos  91  08-16      Urinalysis Basic - ( 17 Aug 2018 09:17 )    Color: Yellow / Appearance: Clear / S.020 / pH: x  Gluc: x / Ketone: Negative  / Bili: Negative / Urobili: 1   Blood: x / Protein: 30 mg/dL / Nitrite: Negative   Leuk Esterase: Negative / RBC: 0-2 /HPF / WBC 0-2 /HPF   Sq Epi: x / Non Sq Epi: Occasional /HPF / Bacteria: Few /HPF      CAPILLARY BLOOD GLUCOSE            RADIOLOGY & ADDITIONAL TESTS:    Consultant(s) Notes Reviewed:   YES    Plan of care was discussed with patient and /or primary care giver; all questions and concerns were addressed and care was aligned with patient's wishes. YES
Patient is a 68y old  Female who presents with a chief complaint of pain behind right knee r/o fracture (19 Aug 2018 21:47)      INTERVAL HPI/OVERNIGHT EVENTS: no new complaints, pain much imporved, received injection yesterday     MEDICATIONS  (STANDING):  aspirin  chewable 81 milliGRAM(s) Oral daily  cholecalciferol 1000 Unit(s) Oral daily  enoxaparin Injectable 40 milliGRAM(s) SubCutaneous daily  ethacrynic acid 25 milliGRAM(s) Oral daily  insulin lispro (HumaLOG) corrective regimen sliding scale   SubCutaneous Before meals and at bedtime  labetalol 600 milliGRAM(s) Oral three times a day  levothyroxine 75 MICROGram(s) Oral daily  lidocaine   Patch 1 Patch Transdermal daily  lidocaine 1% Injectable 5 milliLiter(s) Local Injection once  losartan 100 milliGRAM(s) Oral daily  methylPREDNISolone acetate Injectable 40 milliGRAM(s) IntraMuscular once    MEDICATIONS  (PRN):  acetaminophen   Tablet. 650 milliGRAM(s) Oral every 6 hours PRN Mild Pain (1 - 3)  traMADol 25 milliGRAM(s) Oral three times a day PRN Severe Pain (7 - 10)      Allergies    penicillin (Hives)  penicillin (Rash)  sulfa drugs (Unknown)  Tetracycline Hydrochloride (Unknown)      REVIEW OF SYSTEMS:    CONSTITUTIONAL: No fever,   EYES: no acute visual disturbances  NECK: No pain or stiffness  RESPIRATORY: No cough; No shortness of breath  CARDIOVASCULAR: No chest pain, no palpitations  GASTROINTESTINAL: No pain. No nausea or vomiting; No diarrhea   NEUROLOGICAL: No headache or numbness, no tremors  HEME/LYMPH: No  bleeding gums    Vital Signs Last 24 Hrs  T(C): 36.5 (20 Aug 2018 05:47), Max: 36.8 (19 Aug 2018 20:45)  T(F): 97.7 (20 Aug 2018 05:47), Max: 98.2 (19 Aug 2018 20:45)  HR: 70 (20 Aug 2018 09:13) (67 - 73)  BP: 159/76 (20 Aug 2018 09:13) (152/82 - 185/88)  BP(mean): --  RR: 18 (20 Aug 2018 05:47) (17 - 18)  SpO2: 93% (20 Aug 2018 05:47) (93% - 95%)    ________________________________________________  PHYSICAL EXAM:  GENERAL: NAD,   HEAD:  , Normocephalic  EYES:  conjunctiva and sclera clear  NECK: Supple, No JVD    NERVOUS SYSTEM:  Alert & Oriented X3,   CHEST/LUNG: Clear to auscuitation bilaterally;   HEART: S1 S2+;   ABDOMEN: Soft, Nontender, Nondistended; Bowel sounds present  EXTREMITIES: no cyanosis; b/l lymphedema     _________________________________________________  LABS:                        12.8   5.6   )-----------( 279      ( 20 Aug 2018 07:08 )             39.8     08-20    141  |  105  |  26<H>  ----------------------------<  136<H>  3.5   |  29  |  1.13    Ca    8.6      20 Aug 2018 07:08  Phos  3.2     08-20  Mg     2.3     08-20          CAPILLARY BLOOD GLUCOSE

## 2018-08-20 NOTE — PROGRESS NOTE ADULT - PROBLEM SELECTOR PLAN 1
Likely due to worsening osteoarthritis, small baker cyst  Venous Duplex: No evidence of right lower extremity deep venous thrombosis.  CT Lower extremity: No fracture or dislocation of the right knee. Small baker cyst is seen.  c/w pain management ( lidocaine patch and tramadol)  PT eval: rehab  Ortho consult appreciated: no orthopedic surgical intervention at this time, f/up clinic, s/p solumderol injection in R Knee on 8/19  Dispo Rehab
-Patient came in with right knee/calf pain, she recently had right sided venous ablation given hx of venous insufficiency.  -Venous Duplex: No evidence of right lower extremity deep venous thrombosis.  -CT Lower extremity: No fracture or dislocation of the right knee. Small baker cyst is seen.  -Pain could be secondary to bakers cyst.  -c/w pain management  -PT eval: rehab  -Ortho consult appreciated: no orthopedic surgical intervention at this time, f/up clinic  - as per CM , patient has to stay for 3 nights in order to go to rehab

## 2018-08-20 NOTE — PROGRESS NOTE ADULT - ASSESSMENT
68 year old female patient with PMH of hypertension, CAD s/p 3 stents, asthma, lymphedema s/p venous ablation and carpal tunnel syndrome presented to ED with right knee pain, admitted for it.
seen and examined  pt is alert awake  c/o rt hip pain.  as per her she was given injection in rt hip by ortho  Dr Harrison a few hours ago and was told your pain will be better in 30 mnts.  ( no note from ortho)   knee seems ok    in mild pain.  i told her that effect takes some time    ice, tylenol rtc, tramadol prn .  wt reduction  cad stable
68 year old female patient with PMH of hypertension, CAD s/p 3 stents, asthma, lymphedema s/p venous ablation and carpal tunnel syndrome presented to ED with right knee pain. Patient reports that she has hx of venous insufficiency and lymphedema for last 3 years and has been following with her vascular surgeon. She recently got venous ablation of her right leg x 2 weeks ago which improved her swelling however, yesterday she experienced sudden onset of pain in behind her right knee which is radiating to her thigh and has  progressively worsened. Patient rates her pain as 4-5/10 in intensity while sitting and 8/10 on standing and walking. She denies any hx of trauma or fall,  numbness or weakness,  urinary or fecal incontinence.
68 yr old female , obese, was trying to get up from seat 2 days back felt severe pain in back of lt knee  going up to thigh.  no back pain  has h/o asthma, cad s/p stent, lymphedema, had procedure/venous ablation done by vascular recently.  didnt have pain pain before  so acute onset from 2-3 days only.  no numbness in leg  vs stable ( sbp mod high now pt got labetalol will watch)   physical acceptable  rt knee not swollen, non tender  rom ok.    ls spine non tender  labs ok  ct rt lower extrem  no fx, small baker cyst  a/p knee pain severe  pain meds, ortho  cad s/p stent  no cp or sob.  pt has card out pt  obesity  diet wt reduction, bariatric surgery consult out pt  may need PT
binh nd examined  pain little better but still has .  no cp or sob or palp  vs stable physical uncanges  rt knee not swollen  rom ok  k 3.4  ice on knee.  tylenol  short course of nsaid  seen by ortho as per pt was offered for injection tx  otherwise surgery  htn, hld, hypothyrodism, cad s/p stent cont same managment  wt reduction

## 2018-08-20 NOTE — PROGRESS NOTE ADULT - PROBLEM SELECTOR PLAN 4
C/w synthyroid, tsh f//up
IMPROVE VTE score:  [ ] Previous VTE                                                3  [ ] Thrombophilia                                             2  [ ] Lower limb paralysis                                  2        (unable to hold up >15 seconds)    [ ] Current Cancer (within 6 months)            2   [x] Immobilization > 24 hrs                              1  [ ] ICU/CCU stay > 24 hours                            1  [x] Age > 60                                                         1  IMPROVE Score 2, will start Lovenox 40 mg OD

## 2018-08-20 NOTE — PROGRESS NOTE ADULT - PROBLEM SELECTOR PLAN 3
Uncontrolled  C/w Labetalol 300mg 2 tabs x 3 daily and Losartan 100mg OD, ethacrynic acid 25 mg  DASH diet
-Patient has hx of Hypertension  -She takes Labetalol 300mg 2 tabs x 3 daily and Losartan 100mg OD.  -Her BP in hospital has been stable.  -Will continue Losartan and hold Labetalol for now.  -Monitor BP, may start Labetalol if BP is elevated.  -DASH diet

## 2018-08-20 NOTE — PROGRESS NOTE ADULT - PROBLEM SELECTOR PLAN 5
IMPROVE VTE score:  [ ] Previous VTE                                                3  [ ] Thrombophilia                                             2  [ ] Lower limb paralysis                                  2        (unable to hold up >15 seconds)    [ ] Current Cancer (within 6 months)            2   [x] Immobilization > 24 hrs                              1  [ ] ICU/CCU stay > 24 hours                            1  [x] Age > 60                                                         1  IMPROVE Score 2, c/w Lovenox 40 mg OD

## 2018-08-20 NOTE — PROGRESS NOTE ADULT - PROBLEM SELECTOR PLAN 2
Hx of lymphedema and venous stasis for past 3 years., underwent venous ablation 2 weeks ago.  Recommended Leg elevation and wear support harness  Out-patient Vascular surgeon: Dr. Cherelle Fuchs  Folate wnl, Vitamin d low s/p replacement
-Patient has hx of lymphedema and venous stasis for past 3 years.  -She underwent venous ablation 2 weeks ago.  -Leg elevation and wear support harness  -Out-patient Vascular surgeon: Dr. Cherelle Fuchs

## 2019-01-08 NOTE — PHYSICAL THERAPY INITIAL EVALUATION ADULT - PERTINENT HX OF CURRENT PROBLEM, REHAB EVAL
Chasidy Acevedo is a 5 year old male who presents for 5 year well child exam.  Patient is accompanied by both his parents    Attends [employer]    -    At school - a nuturer -  One day declined to do family picture because he does not like to draw         Nursing notes reviewed and accepted.   Birth history, medical history, surgical history, and family history reviewed and updated.  Medications and allergies reviewed.    PARENTAL CONCERNS:  none    INTERVAL HISTORY:   Significant or recent illnesses:  Hives in September  Diet/Feeding:    Number of meals:  3 meals and healthy snacks   Diet:  Milk 1 cup with dinner and  - eats yogurt and cheese  - eats fruits 7p97 0c  80 5    Drinking water:   Teeth seem good - well    Elimination concerns:  Regular  Toilet training:   Dry at night -  - limit drink at night -   Sleep:    Naps: nap at  -    Night:  Good bedtime ritual [id number]     Speech concerns:    none  Hearing/Vision issues:  None  Developmental issues:  None  Surgeries/Hospitalizations:  None    SOCIAL:  Lives with:  Parents and 2 older siblings  :  [facility]    Pets:  none  Smoke exposure:  none    GROWTH AND DEVELOPMENT:  [x] Yes [] No - Balances on 1 foot for 5 seconds?  [x] Yes [] No - Hops on 1 foot?  [x] Yes [] No - Throws a ball overhead?   [x] Yes [] No - Counts 4 pennies/circles?  [x] Yes [] No - Tells a story?  [x] Yes [] No - Comprehends adjectives - cold, tired, hungry?   JACKET - GO TO SLEEP - EAT FOOD  - counts 1 through 19 100  [x] Yes [] No - Speech understandable?  [x] Yes [] No - Plays parallel with others well?  [x] Yes [] No - Starting to share?  [x] Yes [] No - Recognizes at least 3-4 colors?   [x] Yes [] No - Can dress/do bigger buttons?    ANTICIPATORY GUIDANCE PROVIDED:              Safety/car/bicycle/fire/sharp objects/falls/water, stranger safety              Development: independent, imitates adults, fast moving   Questions about gender/parts,  importance of  parental role models,               Children are like sponges with regard to:  TV, aggression exposure, language    Plays well with children, dresses/undresses              Discipline:  Time outs, 1,2,3, Magic, SOS for Parents              Diet:  3 Meals/day with snacks, offer good choices              Television/Screen Time - importance of PLAY              Analgesics/antipyretics              Sun exposure / insect repellent              Tobacco-free home              Dental care    Past Medical History: No past medical history on file.    Family History:  Family History   Problem Relation Age of Onset   • Myocardial Infarction Maternal Grandfather         2 small heart attacks in his late 50's   • Cancer Paternal Grandfather         prostate   • Other Paternal Grandmother         autoimmune disease - gave her swelling and paralysis of the spine       Problem List:  There is no problem list on file for this patient.      PHYSICAL EXAMINATION: (Check if normal, discuss abnormals)  Blood pressure (!) 76/48, pulse 88, height 3' 7.25\" (1.099 m), weight 20.3 kg.  GROWTH CURVE PARAMETERS: 95 %ile (Z= 1.63) based on CDC (Boys, 2-20 Years) weight-for-age data using vitals from 1/8/2019. 96 %ile (Z= 1.71) based on CDC (Boys, 2-20 Years) Stature-for-age data based on Stature recorded on 1/8/2019. No head circumference on file for this encounter.  General Appearance  [x]    Skin    [x]  Head    [x]  Eyes    [x]  Ears    [x]  Nose    [x]  Oral pharynx   [x]  Neck    [x]  Nodes    [x]  Chest    [x]  Lungs    [x]  Heart    [x]  Abdomen   [x]  External genitalia  [x]  Neurological   [x]  Hips/Extremities  [x]    SCREENING TESTS:  Hearing:Pure tones   Left:  [x] Normal [] Abnormal   Right:   [x] Normal [] Abnormal  Vision:  Right: 20/25    Left 20/25 [x] Shane chart     ASSESSMENT:  5 year old male well child.  Normal growth and development    PLAN:  Discussed the differences between Lucio and Chasidy.  Discussed differences in  approach to parenting style  All parental concerns and questions discussed.  Anticipatory guidance reviewed.     WIR reviewed and updated. Received ProQuad, Kinrix, influenza  Immunizations ordered per orders.  Risks and benefits reviewed.     Return to clinic in 1 year for well child examination or sooner as needed for illness/concerns.                       In the ED, VS on presentation: T 97.4, HR 93, /85, RR 18, SpO2 95% on RA; CBC, CMP unremarkable. Initial Troponin 0.02, repeat 0.08. Pro-BnP 708 (previously 300 in 10/2015). UA unremarkable. CXR significant for mild pulmonary vascular congestion. She was given ASA 243mg x 1 and heparin gtt initiated for NSTEMI.

## 2019-02-22 ENCOUNTER — APPOINTMENT (OUTPATIENT)
Dept: ORTHOPEDIC SURGERY | Facility: CLINIC | Age: 69
End: 2019-02-22
Payer: MEDICARE

## 2019-02-22 VITALS — BODY MASS INDEX: 46.78 KG/M2 | HEIGHT: 64 IN | RESPIRATION RATE: 20 BRPM | WEIGHT: 274 LBS

## 2019-02-22 DIAGNOSIS — G56.00 CARPAL TUNNEL SYNDROME, UNSPECIFIED UPPER LIMB: ICD-10-CM

## 2019-02-22 PROCEDURE — 73110 X-RAY EXAM OF WRIST: CPT | Mod: 50

## 2019-02-22 PROCEDURE — 20526 THER INJECTION CARP TUNNEL: CPT | Mod: LT

## 2019-02-22 PROCEDURE — 99214 OFFICE O/P EST MOD 30 MIN: CPT | Mod: 25

## 2019-04-04 ENCOUNTER — EMERGENCY (EMERGENCY)
Facility: HOSPITAL | Age: 69
LOS: 1 days | Discharge: HOME | End: 2019-04-04
Attending: EMERGENCY MEDICINE
Payer: MEDICARE

## 2019-04-04 VITALS
DIASTOLIC BLOOD PRESSURE: 118 MMHG | OXYGEN SATURATION: 95 % | HEIGHT: 65 IN | HEART RATE: 86 BPM | TEMPERATURE: 98 F | SYSTOLIC BLOOD PRESSURE: 196 MMHG | WEIGHT: 250 LBS | RESPIRATION RATE: 18 BRPM

## 2019-04-04 VITALS — DIASTOLIC BLOOD PRESSURE: 88 MMHG | SYSTOLIC BLOOD PRESSURE: 154 MMHG

## 2019-04-04 DIAGNOSIS — Z98.89 OTHER SPECIFIED POSTPROCEDURAL STATES: Chronic | ICD-10-CM

## 2019-04-04 PROCEDURE — 99284 EMERGENCY DEPT VISIT MOD MDM: CPT | Mod: 25

## 2019-04-04 PROCEDURE — 93010 ELECTROCARDIOGRAM REPORT: CPT

## 2019-04-04 PROCEDURE — 93005 ELECTROCARDIOGRAM TRACING: CPT

## 2019-04-04 NOTE — ED PROVIDER NOTE - CLINICAL SUMMARY MEDICAL DECISION MAKING FREE TEXT BOX
------------ATTENDING NOTE------------   pt w/ aide sent to ED by PCP for concerns of asymptomatic hypertension, pt describes being noncompliant w/ salt diet and ate while chicken pot pie 2 days and has increased stress/anxiety, has been compliant w/ blood pressure medications and took extra dose per her cardiologist, asymptomatic in ED apart from anxiety (no HA/AMS or visual change or chest pain/discomfort or SOB/dyspnea), BP decreasing in ED w/o treatment, d/w cardiologist, in depth dw all about ddx, tx, hines, continued close outpt fu.  - Huan Jacques MD   --------------------------------------------------------

## 2019-04-04 NOTE — ED PROVIDER NOTE - FAMILY HISTORY
No pertinent family history in first degree relatives     Sibling  Still living? Unknown  Family history of myocardial infarction, Age at diagnosis: Age Unknown     Father  Still living? Unknown  Family history of lung cancer, Age at diagnosis: Age Unknown

## 2019-04-04 NOTE — ED PROVIDER NOTE - PMH
Carpal tunnel syndrome of right wrist    DM (diabetes mellitus)    Essential hypertension    Essential hypertension    HTN (hypertension)    Hypothyroid    Lymphedema    Obesity    Obesity (BMI 30-39.9)    Type 2 diabetes mellitus without complication

## 2019-04-04 NOTE — ED PROVIDER NOTE - PHYSICAL EXAMINATION
Well Appearing, Nontoxic, NAD;  Symm Facies, PERRL 3mm, (-)Pallor, Anicteric, MMM;  No JVD/Bruits or stridor;  RRR w/o m/g/r;   CTAB w/o w/r/r;   Abd soft, nt/nd, +bs;  No CVAT;  No calf tender, (+) symmetric peripheral edema;  No rash;  AOX3, Normal speech, normal strength/sensation/gait

## 2019-04-10 ENCOUNTER — NON-APPOINTMENT (OUTPATIENT)
Age: 69
End: 2019-04-10

## 2019-04-10 ENCOUNTER — APPOINTMENT (OUTPATIENT)
Dept: CARDIOLOGY | Facility: CLINIC | Age: 69
End: 2019-04-10
Payer: MEDICARE

## 2019-04-10 VITALS
TEMPERATURE: 98.4 F | BODY MASS INDEX: 46.1 KG/M2 | DIASTOLIC BLOOD PRESSURE: 100 MMHG | OXYGEN SATURATION: 96 % | WEIGHT: 270 LBS | HEIGHT: 64 IN | SYSTOLIC BLOOD PRESSURE: 144 MMHG | HEART RATE: 82 BPM

## 2019-04-10 PROCEDURE — 93000 ELECTROCARDIOGRAM COMPLETE: CPT

## 2019-04-10 PROCEDURE — 99214 OFFICE O/P EST MOD 30 MIN: CPT

## 2019-04-10 NOTE — DISCUSSION/SUMMARY
[FreeTextEntry1] : She is a 68-year-old, morbidly obese woman with resistant hypertension, Lymphedema and coronary artery disease, status post drug-eluting stents. She has no anginal symptoms or CHF. \par Leg edema is due to Lymphedema and she will see the specialist in 2 months.\par Her BP is still high, but better. Continue all meds as is.\par We stressed diet as a means of weight loss.\par

## 2019-04-10 NOTE — HISTORY OF PRESENT ILLNESS
[FreeTextEntry1] : Recent hospitalization for accelerated HTN.\par She had been reducing her medicatoins on her own.\par Recently told that her leg swelling would be treated in another 2 months.\par  \par No change in dyspnea.\par Off clondine.\par \par

## 2019-04-10 NOTE — REASON FOR VISIT
[FreeTextEntry1] : She returns today for followup of her hypertension, leg edema, and coronary artery disease.

## 2019-04-10 NOTE — CARDIOLOGY SUMMARY
[___] : [unfilled] [LVEF ___%] : LVEF [unfilled]% [None] : no pulmonary hypertension [Mild] : mild mitral regurgitation

## 2019-04-10 NOTE — REVIEW OF SYSTEMS
[Headache] : no headache [Recent Weight Gain (___ Lbs)] : no recent weight gain [Feeling Fatigued] : not feeling fatigued [Recent Weight Loss (___ Lbs)] : no recent weight loss [Blurry Vision] : blurred vision [Nausea] : nausea [Palpitations] : palpitations [Joint Stiffness] : joint stiffness [Ankle Pain] : ankle pain [Ankle Swelling] : ankle swelling [Itching] : itching [Anxiety] : anxiety [Negative] : Heme/Lymph

## 2019-04-10 NOTE — PHYSICAL EXAM
[General Appearance - Well Nourished] : well nourished [Normal Conjunctiva] : the conjunctiva exhibited no abnormalities [No Oral Pallor] : no oral pallor [Normal Jugular Venous V Waves Present] : normal jugular venous V waves present [Respiration, Rhythm And Depth] : normal respiratory rhythm and effort [Auscultation Breath Sounds / Voice Sounds] : lungs were clear to auscultation bilaterally [Heart Sounds] : normal S1 and S2 [Heart Rate And Rhythm] : heart rate and rhythm were normal [Murmurs] : no murmurs present [Abdomen Soft] : soft [Abdomen Tenderness] : non-tender [Abdomen Mass (___ Cm)] : no abdominal mass palpated [] : no ischemic changes [Oriented To Time, Place, And Person] : oriented to person, place, and time [FreeTextEntry1] : anxious Affect [Skin Color & Pigmentation] : normal skin color and pigmentation

## 2019-10-31 ENCOUNTER — NON-APPOINTMENT (OUTPATIENT)
Age: 69
End: 2019-10-31

## 2019-10-31 ENCOUNTER — APPOINTMENT (OUTPATIENT)
Dept: CARDIOLOGY | Facility: CLINIC | Age: 69
End: 2019-10-31
Payer: MEDICARE

## 2019-10-31 VITALS
OXYGEN SATURATION: 96 % | HEIGHT: 64 IN | WEIGHT: 271 LBS | HEART RATE: 85 BPM | DIASTOLIC BLOOD PRESSURE: 125 MMHG | SYSTOLIC BLOOD PRESSURE: 207 MMHG | BODY MASS INDEX: 46.26 KG/M2

## 2019-10-31 PROCEDURE — 93000 ELECTROCARDIOGRAM COMPLETE: CPT

## 2019-10-31 PROCEDURE — 99214 OFFICE O/P EST MOD 30 MIN: CPT

## 2019-10-31 NOTE — HISTORY OF PRESENT ILLNESS
[FreeTextEntry1] : Recent hospitalization for GI bleeding. Stopped baby aspirin.\par Had her medication changed.\par Hasnt taken her labetalol today.\par Possible transient AF after colonoscopy.\par No chest pains or unusual dyspnea.\par \par \par

## 2019-10-31 NOTE — PHYSICAL EXAM
[General Appearance - Well Nourished] : well nourished [Normal Conjunctiva] : the conjunctiva exhibited no abnormalities [No Oral Pallor] : no oral pallor [Normal Jugular Venous V Waves Present] : normal jugular venous V waves present [Respiration, Rhythm And Depth] : normal respiratory rhythm and effort [Auscultation Breath Sounds / Voice Sounds] : lungs were clear to auscultation bilaterally [Heart Rate And Rhythm] : heart rate and rhythm were normal [Heart Sounds] : normal S1 and S2 [Murmurs] : no murmurs present [Abdomen Soft] : soft [Abdomen Tenderness] : non-tender [Abdomen Mass (___ Cm)] : no abdominal mass palpated [] : no ischemic changes [Skin Color & Pigmentation] : normal skin color and pigmentation [Oriented To Time, Place, And Person] : oriented to person, place, and time [FreeTextEntry1] : anxious Affect

## 2019-10-31 NOTE — REVIEW OF SYSTEMS
[Headache] : no headache [Recent Weight Gain (___ Lbs)] : no recent weight gain [Feeling Fatigued] : not feeling fatigued [Recent Weight Loss (___ Lbs)] : no recent weight loss [Blurry Vision] : blurred vision [Palpitations] : palpitations [Nausea] : nausea [Joint Stiffness] : joint stiffness [Ankle Pain] : ankle pain [Ankle Swelling] : ankle swelling [Itching] : itching [Anxiety] : anxiety [Negative] : Heme/Lymph

## 2019-10-31 NOTE — DISCUSSION/SUMMARY
[FreeTextEntry1] : She is a 69-year-old, morbidly obese woman with resistant hypertension, Lymphedema and coronary artery disease, status post drug-eluting stents. She has no anginal symptoms or CHF.\par Urged her to restart aspirin unless directed by GI not to. \par Leg edema is due to Lymphedema and she stil wants to see the specialist in the near future.\par Her BP is still high, I increased her labetalol and stressed compliance.\par We discussed diet as a means of weight loss and improved lymph edema.\par

## 2019-11-01 LAB
APPEARANCE: CLEAR
BILIRUBIN URINE: NEGATIVE
BLOOD URINE: NEGATIVE
COLOR: NORMAL
GLUCOSE QUALITATIVE U: NEGATIVE
KETONES URINE: NEGATIVE
LEUKOCYTE ESTERASE URINE: NEGATIVE
NITRITE URINE: NEGATIVE
PH URINE: 7.5
PROTEIN URINE: NEGATIVE
SPECIFIC GRAVITY URINE: 1.01
UROBILINOGEN URINE: NORMAL

## 2019-11-04 ENCOUNTER — INPATIENT (INPATIENT)
Facility: HOSPITAL | Age: 69
LOS: 8 days | Discharge: NURSING, REHAB OR LONG-TERM CARE | DRG: 872 | End: 2019-11-13
Attending: STUDENT IN AN ORGANIZED HEALTH CARE EDUCATION/TRAINING PROGRAM | Admitting: STUDENT IN AN ORGANIZED HEALTH CARE EDUCATION/TRAINING PROGRAM
Payer: MEDICARE

## 2019-11-04 VITALS
TEMPERATURE: 99 F | HEART RATE: 116 BPM | SYSTOLIC BLOOD PRESSURE: 132 MMHG | DIASTOLIC BLOOD PRESSURE: 83 MMHG | RESPIRATION RATE: 28 BRPM | HEIGHT: 62 IN | OXYGEN SATURATION: 92 % | WEIGHT: 160.06 LBS

## 2019-11-04 DIAGNOSIS — Z98.89 OTHER SPECIFIED POSTPROCEDURAL STATES: Chronic | ICD-10-CM

## 2019-11-04 DIAGNOSIS — Z29.9 ENCOUNTER FOR PROPHYLACTIC MEASURES, UNSPECIFIED: ICD-10-CM

## 2019-11-04 DIAGNOSIS — E03.9 HYPOTHYROIDISM, UNSPECIFIED: ICD-10-CM

## 2019-11-04 DIAGNOSIS — I25.10 ATHEROSCLEROTIC HEART DISEASE OF NATIVE CORONARY ARTERY WITHOUT ANGINA PECTORIS: ICD-10-CM

## 2019-11-04 DIAGNOSIS — J18.9 PNEUMONIA, UNSPECIFIED ORGANISM: ICD-10-CM

## 2019-11-04 DIAGNOSIS — A41.9 SEPSIS, UNSPECIFIED ORGANISM: ICD-10-CM

## 2019-11-04 DIAGNOSIS — I10 ESSENTIAL (PRIMARY) HYPERTENSION: ICD-10-CM

## 2019-11-04 DIAGNOSIS — Z71.89 OTHER SPECIFIED COUNSELING: ICD-10-CM

## 2019-11-04 DIAGNOSIS — L03.90 CELLULITIS, UNSPECIFIED: ICD-10-CM

## 2019-11-04 DIAGNOSIS — R06.02 SHORTNESS OF BREATH: ICD-10-CM

## 2019-11-04 DIAGNOSIS — E11.9 TYPE 2 DIABETES MELLITUS WITHOUT COMPLICATIONS: ICD-10-CM

## 2019-11-04 DIAGNOSIS — R94.5 ABNORMAL RESULTS OF LIVER FUNCTION STUDIES: ICD-10-CM

## 2019-11-04 LAB
ALBUMIN SERPL ELPH-MCNC: 3.7 G/DL — SIGNIFICANT CHANGE UP (ref 3.5–5)
ALP SERPL-CCNC: 189 U/L — HIGH (ref 40–120)
ALT FLD-CCNC: 163 U/L DA — HIGH (ref 10–60)
ANION GAP SERPL CALC-SCNC: 7 MMOL/L — SIGNIFICANT CHANGE UP (ref 5–17)
APPEARANCE UR: CLEAR — SIGNIFICANT CHANGE UP
APTT BLD: 36.1 SEC — SIGNIFICANT CHANGE UP (ref 27.5–36.3)
AST SERPL-CCNC: 257 U/L — HIGH (ref 10–40)
BASE EXCESS BLDV CALC-SCNC: 2.8 MMOL/L — HIGH (ref -2–2)
BASOPHILS # BLD AUTO: 0 K/UL — SIGNIFICANT CHANGE UP (ref 0–0.2)
BASOPHILS NFR BLD AUTO: 0 % — SIGNIFICANT CHANGE UP (ref 0–2)
BILIRUB SERPL-MCNC: 2.1 MG/DL — HIGH (ref 0.2–1.2)
BILIRUB UR-MCNC: NEGATIVE — SIGNIFICANT CHANGE UP
BUN SERPL-MCNC: 21 MG/DL — HIGH (ref 7–18)
CALCIUM SERPL-MCNC: 8.4 MG/DL — SIGNIFICANT CHANGE UP (ref 8.4–10.5)
CHLORIDE SERPL-SCNC: 103 MMOL/L — SIGNIFICANT CHANGE UP (ref 96–108)
CO2 SERPL-SCNC: 28 MMOL/L — SIGNIFICANT CHANGE UP (ref 22–31)
COLOR SPEC: YELLOW — SIGNIFICANT CHANGE UP
CREAT SERPL-MCNC: 1.08 MG/DL — SIGNIFICANT CHANGE UP (ref 0.5–1.3)
DIFF PNL FLD: ABNORMAL
EOSINOPHIL # BLD AUTO: 0 K/UL — SIGNIFICANT CHANGE UP (ref 0–0.5)
EOSINOPHIL NFR BLD AUTO: 0 % — SIGNIFICANT CHANGE UP (ref 0–6)
FLU A RESULT: SIGNIFICANT CHANGE UP
FLU A RESULT: SIGNIFICANT CHANGE UP
FLUAV AG NPH QL: SIGNIFICANT CHANGE UP
FLUBV AG NPH QL: SIGNIFICANT CHANGE UP
GLUCOSE BLDC GLUCOMTR-MCNC: 113 MG/DL — HIGH (ref 70–99)
GLUCOSE BLDC GLUCOMTR-MCNC: 129 MG/DL — HIGH (ref 70–99)
GLUCOSE BLDC GLUCOMTR-MCNC: 155 MG/DL — HIGH (ref 70–99)
GLUCOSE SERPL-MCNC: 143 MG/DL — HIGH (ref 70–99)
GLUCOSE UR QL: NEGATIVE — SIGNIFICANT CHANGE UP
HCO3 BLDV-SCNC: 28 MMOL/L — SIGNIFICANT CHANGE UP (ref 21–29)
HCT VFR BLD CALC: 38.6 % — SIGNIFICANT CHANGE UP (ref 34.5–45)
HGB BLD-MCNC: 11.7 G/DL — SIGNIFICANT CHANGE UP (ref 11.5–15.5)
HOROWITZ INDEX BLDV+IHG-RTO: 21 — SIGNIFICANT CHANGE UP
INR BLD: 1.14 RATIO — SIGNIFICANT CHANGE UP (ref 0.88–1.16)
KETONES UR-MCNC: NEGATIVE — SIGNIFICANT CHANGE UP
LACTATE SERPL-SCNC: 2.5 MMOL/L — HIGH (ref 0.7–2)
LACTATE SERPL-SCNC: 3.7 MMOL/L — HIGH (ref 0.7–2)
LEUKOCYTE ESTERASE UR-ACNC: NEGATIVE — SIGNIFICANT CHANGE UP
LIDOCAIN IGE QN: 234 U/L — SIGNIFICANT CHANGE UP (ref 73–393)
LYMPHOCYTES # BLD AUTO: 0.26 K/UL — LOW (ref 1–3.3)
LYMPHOCYTES # BLD AUTO: 2 % — LOW (ref 13–44)
MCHC RBC-ENTMCNC: 26.7 PG — LOW (ref 27–34)
MCHC RBC-ENTMCNC: 30.3 GM/DL — LOW (ref 32–36)
MCV RBC AUTO: 87.9 FL — SIGNIFICANT CHANGE UP (ref 80–100)
MONOCYTES # BLD AUTO: 0.79 K/UL — SIGNIFICANT CHANGE UP (ref 0–0.9)
MONOCYTES NFR BLD AUTO: 6 % — SIGNIFICANT CHANGE UP (ref 2–14)
NEUTROPHILS # BLD AUTO: 11.97 K/UL — HIGH (ref 1.8–7.4)
NEUTROPHILS NFR BLD AUTO: 84 % — HIGH (ref 43–77)
NITRITE UR-MCNC: NEGATIVE — SIGNIFICANT CHANGE UP
PCO2 BLDV: 52 MMHG — HIGH (ref 35–50)
PH BLDV: 7.36 — SIGNIFICANT CHANGE UP (ref 7.35–7.45)
PH UR: 6 — SIGNIFICANT CHANGE UP (ref 5–8)
PLATELET # BLD AUTO: 293 K/UL — SIGNIFICANT CHANGE UP (ref 150–400)
PO2 BLDV: 29 MMHG — SIGNIFICANT CHANGE UP (ref 25–45)
POTASSIUM SERPL-MCNC: 3.4 MMOL/L — LOW (ref 3.5–5.3)
POTASSIUM SERPL-SCNC: 3.4 MMOL/L — LOW (ref 3.5–5.3)
PROT SERPL-MCNC: 8.3 G/DL — SIGNIFICANT CHANGE UP (ref 6–8.3)
PROT UR-MCNC: 15
PROTHROM AB SERPL-ACNC: 12.7 SEC — SIGNIFICANT CHANGE UP (ref 10–12.9)
RAPID RVP RESULT: SIGNIFICANT CHANGE UP
RBC # BLD: 4.39 M/UL — SIGNIFICANT CHANGE UP (ref 3.8–5.2)
RBC # FLD: 15.3 % — HIGH (ref 10.3–14.5)
RSV RESULT: SIGNIFICANT CHANGE UP
RSV RNA RESP QL NAA+PROBE: SIGNIFICANT CHANGE UP
SAO2 % BLDV: 44 % — LOW (ref 67–88)
SODIUM SERPL-SCNC: 138 MMOL/L — SIGNIFICANT CHANGE UP (ref 135–145)
SP GR SPEC: 1.01 — SIGNIFICANT CHANGE UP (ref 1.01–1.02)
TROPONIN I SERPL-MCNC: 0.02 NG/ML — SIGNIFICANT CHANGE UP (ref 0–0.04)
TSH SERPL-MCNC: 1.82 UU/ML — SIGNIFICANT CHANGE UP (ref 0.34–4.82)
TSH SERPL-MCNC: 1.88 UU/ML — SIGNIFICANT CHANGE UP (ref 0.34–4.82)
UROBILINOGEN FLD QL: NEGATIVE — SIGNIFICANT CHANGE UP
WBC # BLD: 13.15 K/UL — HIGH (ref 3.8–10.5)
WBC # FLD AUTO: 13.15 K/UL — HIGH (ref 3.8–10.5)

## 2019-11-04 PROCEDURE — 99285 EMERGENCY DEPT VISIT HI MDM: CPT

## 2019-11-04 PROCEDURE — 71045 X-RAY EXAM CHEST 1 VIEW: CPT | Mod: 26

## 2019-11-04 PROCEDURE — 99223 1ST HOSP IP/OBS HIGH 75: CPT | Mod: AI,GC

## 2019-11-04 RX ORDER — INSULIN LISPRO 100/ML
VIAL (ML) SUBCUTANEOUS
Refills: 0 | Status: DISCONTINUED | OUTPATIENT
Start: 2019-11-04 | End: 2019-11-13

## 2019-11-04 RX ORDER — VANCOMYCIN HCL 1 G
1000 VIAL (EA) INTRAVENOUS ONCE
Refills: 0 | Status: COMPLETED | OUTPATIENT
Start: 2019-11-04 | End: 2019-11-04

## 2019-11-04 RX ORDER — ENOXAPARIN SODIUM 100 MG/ML
40 INJECTION SUBCUTANEOUS DAILY
Refills: 0 | Status: DISCONTINUED | OUTPATIENT
Start: 2019-11-05 | End: 2019-11-13

## 2019-11-04 RX ORDER — ACETAMINOPHEN 500 MG
975 TABLET ORAL ONCE
Refills: 0 | Status: COMPLETED | OUTPATIENT
Start: 2019-11-04 | End: 2019-11-04

## 2019-11-04 RX ORDER — ENOXAPARIN SODIUM 100 MG/ML
120 INJECTION SUBCUTANEOUS ONCE
Refills: 0 | Status: COMPLETED | OUTPATIENT
Start: 2019-11-04 | End: 2019-11-04

## 2019-11-04 RX ORDER — FOLIC ACID 0.8 MG
1 TABLET ORAL DAILY
Refills: 0 | Status: DISCONTINUED | OUTPATIENT
Start: 2019-11-04 | End: 2019-11-13

## 2019-11-04 RX ORDER — LABETALOL HCL 100 MG
400 TABLET ORAL
Refills: 0 | Status: DISCONTINUED | OUTPATIENT
Start: 2019-11-04 | End: 2019-11-06

## 2019-11-04 RX ORDER — SODIUM CHLORIDE 9 MG/ML
1000 INJECTION INTRAMUSCULAR; INTRAVENOUS; SUBCUTANEOUS
Refills: 0 | Status: DISCONTINUED | OUTPATIENT
Start: 2019-11-04 | End: 2019-11-05

## 2019-11-04 RX ORDER — LABETALOL HCL 100 MG
2 TABLET ORAL
Qty: 0 | Refills: 0 | DISCHARGE

## 2019-11-04 RX ORDER — TRAMADOL HYDROCHLORIDE 50 MG/1
25 TABLET ORAL EVERY 8 HOURS
Refills: 0 | Status: DISCONTINUED | OUTPATIENT
Start: 2019-11-04 | End: 2019-11-05

## 2019-11-04 RX ORDER — CHOLECALCIFEROL (VITAMIN D3) 125 MCG
2000 CAPSULE ORAL DAILY
Refills: 0 | Status: DISCONTINUED | OUTPATIENT
Start: 2019-11-04 | End: 2019-11-13

## 2019-11-04 RX ORDER — ETHACRYNIC ACID 25 MG/1
25 TABLET ORAL
Refills: 0 | Status: DISCONTINUED | OUTPATIENT
Start: 2019-11-04 | End: 2019-11-06

## 2019-11-04 RX ORDER — ALPRAZOLAM 0.25 MG
0.5 TABLET ORAL ONCE
Refills: 0 | Status: DISCONTINUED | OUTPATIENT
Start: 2019-11-04 | End: 2019-11-04

## 2019-11-04 RX ORDER — LOSARTAN POTASSIUM 100 MG/1
100 TABLET, FILM COATED ORAL DAILY
Refills: 0 | Status: DISCONTINUED | OUTPATIENT
Start: 2019-11-04 | End: 2019-11-13

## 2019-11-04 RX ORDER — ACETAMINOPHEN 500 MG
650 TABLET ORAL EVERY 6 HOURS
Refills: 0 | Status: DISCONTINUED | OUTPATIENT
Start: 2019-11-04 | End: 2019-11-13

## 2019-11-04 RX ORDER — AZTREONAM 2 G
2000 VIAL (EA) INJECTION ONCE
Refills: 0 | Status: COMPLETED | OUTPATIENT
Start: 2019-11-04 | End: 2019-11-04

## 2019-11-04 RX ORDER — VANCOMYCIN HCL 1 G
1000 VIAL (EA) INTRAVENOUS EVERY 8 HOURS
Refills: 0 | Status: DISCONTINUED | OUTPATIENT
Start: 2019-11-04 | End: 2019-11-05

## 2019-11-04 RX ORDER — HYDRALAZINE HCL 50 MG
10 TABLET ORAL ONCE
Refills: 0 | Status: COMPLETED | OUTPATIENT
Start: 2019-11-04 | End: 2019-11-04

## 2019-11-04 RX ORDER — IPRATROPIUM/ALBUTEROL SULFATE 18-103MCG
3 AEROSOL WITH ADAPTER (GRAM) INHALATION
Refills: 0 | Status: COMPLETED | OUTPATIENT
Start: 2019-11-04 | End: 2019-11-04

## 2019-11-04 RX ORDER — IPRATROPIUM/ALBUTEROL SULFATE 18-103MCG
3 AEROSOL WITH ADAPTER (GRAM) INHALATION EVERY 6 HOURS
Refills: 0 | Status: DISCONTINUED | OUTPATIENT
Start: 2019-11-04 | End: 2019-11-13

## 2019-11-04 RX ORDER — SODIUM CHLORIDE 9 MG/ML
1500 INJECTION INTRAMUSCULAR; INTRAVENOUS; SUBCUTANEOUS ONCE
Refills: 0 | Status: COMPLETED | OUTPATIENT
Start: 2019-11-04 | End: 2019-11-04

## 2019-11-04 RX ADMIN — Medication 3 MILLILITER(S): at 07:50

## 2019-11-04 RX ADMIN — Medication 2000 UNIT(S): at 22:16

## 2019-11-04 RX ADMIN — Medication 975 MILLIGRAM(S): at 11:59

## 2019-11-04 RX ADMIN — Medication 3 MILLILITER(S): at 07:49

## 2019-11-04 RX ADMIN — Medication 0.5 MILLIGRAM(S): at 09:28

## 2019-11-04 RX ADMIN — Medication 1 MILLIGRAM(S): at 09:13

## 2019-11-04 RX ADMIN — SODIUM CHLORIDE 750 MILLILITER(S): 9 INJECTION INTRAMUSCULAR; INTRAVENOUS; SUBCUTANEOUS at 09:21

## 2019-11-04 RX ADMIN — Medication 100 MILLIGRAM(S): at 09:44

## 2019-11-04 RX ADMIN — Medication 250 MILLIGRAM(S): at 22:13

## 2019-11-04 RX ADMIN — Medication 100 MILLIGRAM(S): at 18:12

## 2019-11-04 RX ADMIN — ETHACRYNIC ACID 25 MILLIGRAM(S): 25 TABLET ORAL at 22:21

## 2019-11-04 RX ADMIN — Medication 400 MILLIGRAM(S): at 17:41

## 2019-11-04 RX ADMIN — Medication 3 MILLILITER(S): at 07:40

## 2019-11-04 RX ADMIN — Medication 975 MILLIGRAM(S): at 09:09

## 2019-11-04 RX ADMIN — Medication 1: at 22:13

## 2019-11-04 RX ADMIN — LOSARTAN POTASSIUM 100 MILLIGRAM(S): 100 TABLET, FILM COATED ORAL at 22:21

## 2019-11-04 RX ADMIN — Medication 250 MILLIGRAM(S): at 09:10

## 2019-11-04 NOTE — H&P ADULT - PROBLEM SELECTOR PLAN 2
- p/w SOB and pitting edema hx of chronic lymphedema)  - no  orthopnea or PND   - possible 2/2 RIDGE or Obesity hypoventilation S less likely PNA  - no h/o HF , hb 11.7 , proBNP 810 , can be falsely low in obese people  - ABG showed high CO2 chronic retainer   - flu -ve   - O2 Sat on RA : 97%   - CXR mild interstitial infiltrate (official reading no focal conslolidation)  - c/w  duonebs PRN   - f/u CT chest   - f/u ECHo to r/u PHTN (if HF consult Dr. Rosenberg) , (if Diastolic dysf or PHTN consult Dr. Murray)  ** ID Consulted Dr. Pettit

## 2019-11-04 NOTE — ED PROVIDER NOTE - NS ED ROS FT
Review of Systems:  	•	CONSTITUTIONAL: no fever  	•	SKIN: no rash  	•	RESPIRATORY: +shortness of breath; cough  	•	CARDIAC: +chest pain, no palpitations  	•	GI:  no abd pain, no nausea, no vomiting, no diarrhea  	•	GENITO-URINARY:  no dysuria; no hematuria    	•	MUSCULOSKELETAL:  no back pain  	•	NEUROLOGIC: no weakness  	•	ALLERGY: no rhinitis  	•	PSYSCHIATRIC: +anxiety

## 2019-11-04 NOTE — H&P ADULT - HISTORY OF PRESENT ILLNESS
69 year old Female Lives at home with roommate ambulates with a walker (pt was getting HHA visits) with PMH hypertension, CAD s/p 3 PAOLA , asthma, Chronic Lymphedema , venous stasis s/p venous ablation and carpal tunnel syndrome presenting to ED with worsening shortness of breath. . She was found to have elevated WBC and fever in ED to 102F. Patient is moaning sitting at edge of bed on my arrival. says she feels cold and chills. Also complained of back pain for which she takes Advil chronically. Pt denies wheezing, cough, chest pain, nausea, vomiting, diaphoresis, abdominal pain, dysuria or changes in bowel habits.     Pt is FULL CODE.   pt had colonoscopy 2 weeks ago which showed no abnormalities

## 2019-11-04 NOTE — H&P ADULT - NSHPPHYSICALEXAM_GEN_ALL_CORE
Vital Signs Last 24 Hrs  T(C): 37.7 (04 Nov 2019 10:21), Max: 39.3 (04 Nov 2019 08:32)  T(F): 99.8 (04 Nov 2019 10:21), Max: 102.8 (04 Nov 2019 08:32)  HR: 108 (04 Nov 2019 10:21) (108 - 116)  BP: 192/79 (04 Nov 2019 10:21) (132/83 - 192/79)  BP(mean): --  RR: 24 (04 Nov 2019 10:21) (22 - 28)  SpO2: 92% (04 Nov 2019 10:21) (92% - 98%) Vital Signs Last 24 Hrs  T(C): 37.7 (04 Nov 2019 10:21), Max: 39.3 (04 Nov 2019 08:32)  T(F): 99.8 (04 Nov 2019 10:21), Max: 102.8 (04 Nov 2019 08:32)  HR: 108 (04 Nov 2019 10:21) (108 - 116)  BP: 192/79 (04 Nov 2019 10:21) (132/83 - 192/79)  BP(mean): --  RR: 24 (04 Nov 2019 10:21) (22 - 28)  SpO2: 92% (04 Nov 2019 10:21) (92% - 98%)    *GEN: No acute distress, well appearing; A+O x3 ,  Obese  *HEAD: Normocephalic, Atraumatic  *EYES/NOSE: PERRL & EOMI b/l  *THROAT: airway patent, moist mucous membranes  *NECK: Neck supple, no masses  *PULMONARY: CTAb/l, rales bibasilar  *CARDIAC: s1s2, regular rhythm, no Murmur  *ABDOMEN:  Non Tender, Distended, soft, no guarding, no rebound, no masses   *BACK: no CVA tenderness, Normal  spine   *EXTREMITIES: Right LE redness, warmth and swelling from thigh to calf medially , Left LE edema without any redness  *SKIN: no rash or bruising   *NEUROLOGIC: alert, CN 2-12 intact, moves all 4 extremities, full active & passive ROM in all extremities, normal baseline gait  *PSYCH: anxiety causing hyperventilation

## 2019-11-04 NOTE — H&P ADULT - PROBLEM SELECTOR PLAN 3
- p/w Elevated liver function tests on admission   - soft non tender distended abdomen  - ALK : 189  - AST : 257  - ALT : 163  - Bilirubin 2.1  - c/w IVF.  - f/u lipase 234  - f/u U/S liver  - f/u LFTs at AM  - f/u gamma glutamyl transferase   - f/u hepatitis panel  - f/u acetaminophen level , direct bilirubin

## 2019-11-04 NOTE — ED PROVIDER NOTE - PROGRESS NOTE DETAILS
supriya: Discussed need to admit with patient & discussed risk and benefits.  Patient agreed to admission.  Discussed case w/ admitting doctor - agreed to admit to their service &MAR

## 2019-11-04 NOTE — H&P ADULT - PROBLEM SELECTOR PLAN 4
- Patient takes Losartan , Ethacrynic acid and Labetalol   - c/w Losartan , Ethacrynic acid (patient has reported sulfa allergy) and Labetalol with parameters  - Monitor BP closely and adjust medications if clinically indicated.  - DASH diet.

## 2019-11-04 NOTE — ED ADULT NURSE REASSESSMENT NOTE - NS ED NURSE REASSESS COMMENT FT1
10 am - axox3 ,nad , refuse for HAWK cath to be inserted , endorsed to nexty nurse in stable condition BASANDA <pt admitted to REG FLOOR 10 am - axox3 ,nad , refuse for HAWK cath to be inserted , endorsed to nexty nurse in stable condition BASANDA <pt admitted to REG FLOOR- pt is not complaints  with IV fluid - not done 0n time - still getting  it ,..

## 2019-11-04 NOTE — H&P ADULT - ATTENDING COMMENTS
Patient seen and examined with PGY2; Agree with PGY2 MAR A/P above with editing as needed. My independent assessment, findings on exam, diagnosis and plan of care as listed below. Discussed with Dr. Field.    79 y/o Female presenting to ED with worsening shortness of breath. Poor historian. She was found to have elevated WBC and fever in ED. Patient seen and examined with PGY2; Agree with PGY2 MAR A/P above with editing as needed. My independent assessment, findings on exam, diagnosis and plan of care as listed below. Discussed with Dr. Field.    79 y/o Female with hx Obesity and Chronic Lymphedema  presenting to ED with worsening shortness of breath. Poor historian. She was found to have elevated WBC and fever in ED to 102F. Patient is moaning sitting at edge of bed on my arrival. says she feels cold and chills. Also complained of back pain for which she takes Advil chronically. She is obese with increased abdominal girth Truncal obesity. She ambulate with a walker at baseline. Shares apartment with a friend according to the patient.     Patient admitted with concern for Pneumonia. On my exam found to have significant redness and swelling in her right leg from thigh to calf. Also swollen left leg without any redness.       ROS/FH/SH: As above;     Vital Signs Last 24 Hrs  T(C): 36.5 (04 Nov 2019 11:50), Max: 39.3 (04 Nov 2019 08:32)  T(F): 97.7 (04 Nov 2019 11:50), Max: 102.8 (04 Nov 2019 08:32)  HR: 101 (04 Nov 2019 11:50) (101 - 116)  BP: 171/76 (04 Nov 2019 11:50) (132/83 - 192/79)  RR: 20 (04 Nov 2019 11:50) (20 - 28)  SpO2: 94% (04 Nov 2019 11:50) (92% - 98%)    P/E:  Elderly female, morbidly obese in mild distress due to pain and shortness of breath  Neuro: AAO x3; No gross focal neurological deficits  CVs: S1S2 present  Resp: BLAE+, No wheeze or Rhonchi  GI: Soft, BS+, Obese, Non tender, Distended  Extr: Right LE redness, warmth and swelling from thigh to calf medially  Left LE edema without any redness    Labs:                        11.7   13.15 )-----------( 293      ( 04 Nov 2019 07:46 )             38.6   11-04    138  |  103  |  21<H>  ----------------------------<  143<H>  3.4<L>   |  28  |  1.08    Ca    8.4      04 Nov 2019 07:46    TPro  8.3  /  Alb  3.7  /  TBili  2.1<H>  /  DBili  x   /  AST  257<H>  /  ALT  163<H>  /  AlkPhos  189<H>  11-04    Xray Chest 1 View-PORTABLE IMMEDIATE (11.04.19 @ 07:45)    FINDINGS: There is no focal consolidation. There are low lung volumes. The heart size is enlarged, as on prior exam 3/10/2018. There is no pleural   effusion or pneumothorax.    IMPRESSION: No focal consolidation.    D/D;  Fever. leucocytosis likely due to Sepsis with Right LE Cellulitis Less likely Flu   SOB with leg edema and increased lung markings concerning for Heart failure especially Diastolic dysfunction  SOB with Obesity concerning for Pneumonia although less likely, more likely RIDGE with  Pulmonary HTN  Uncontrolled HTN possibly related to Sleep apnea  Type 2 DM  Hypothyroid    Plan:  Medicine; Blood Cx x 2; U/A and Urine Cx; Rapid flu; Contact isolation  IV vancomycin as per body weight; Vanco trough before 4th dose  Get CT chest non contrast; If evidence of Pneumonia will add broader coverage  Discussed with ID consult Dr. Pettit  Will get 2 D Echo to assess LV functio as well as evaluate for any diastolic dysfunction.   If any evidence of Diastolic dysfunction and Pulmonary HTN will get Pulmonary evaluation Dr. Murray in AM  If any Heart failure will get Cardio Dr. Rosenberg tomorrow  Get venous doppler of both lower extremities to rule out DVT; Lovenox therapeutic x 1 dose until DVT is negative  Humalog scale; Add on TSH, A1c  Continue rest of home meds; Please confirm with her pharmacy    Discussed with PGY2 REEMA Field and ED Yanira RN  Discussed with patient plan of care

## 2019-11-04 NOTE — H&P ADULT - NSICDXPASTMEDICALHX_GEN_ALL_CORE_FT
PAST MEDICAL HISTORY:  Carpal tunnel syndrome of right wrist     DM (diabetes mellitus)     Essential hypertension     Essential hypertension     HTN (hypertension)     Hypothyroid     Lymphedema     Obesity     Obesity (BMI 30-39.9)     Type 2 diabetes mellitus without complication

## 2019-11-04 NOTE — INPATIENT CERTIFICATION FOR MEDICARE PATIENTS - RISKS OF ADVERSE EVENTS
Concern for delay in diagnosis and treatment/Concern for worsening infectious process Concern for delay in diagnosis and treatment

## 2019-11-04 NOTE — ED PROVIDER NOTE - PHYSICAL EXAMINATION
*GEN: No acute distress, well appearing; A+O x3   *HEAD: Normocephalic, Atraumatic  *EYES/NOSE: PERRL & EOMI b/l  *THROAT: airway patent, moist mucous membranes  *NECK: Neck supple, no masses  *PULMONARY: CTA b/l, rales bibasilar  *CARDIAC: s1s2, regular rhythm, no Murmur  *ABDOMEN:  Non Tender, Non Distended, soft, no guarding, no rebound, no masses   *BACK: no CVA tenderness, Normal  spine   *EXTREMITIES: symmetric pulses, 2+ dp & radial pulses, capillary refill < 2 seconds, no cyanosis, chronic b/l LE lymphedema   *SKIN: no rash or bruising   *NEUROLOGIC: alert, CN 2-12 intact, moves all 4 extremities, full active & passive ROM in all extremities, normal baseline gait  *PSYCH: anxiety causing hyperventilation

## 2019-11-04 NOTE — ED PROVIDER NOTE - OBJECTIVE STATEMENT
69yoF HTN, CAD s/p 3 stents, asthma, lymphedema, venous stasis s/p venous ablation p/w cc sob x1day. also having f/c, & dry cough. had cp earlier.

## 2019-11-04 NOTE — H&P ADULT - PROBLEM SELECTOR PLAN 1
- p/w SOB and sepsis and found to have Right LE redness, warmth and swelling from thigh to calf medially warm to touch with some tenderness  Left LE edema without any redness  - s/p Vanco and Aztreonam in ED  - Leukocytosis 13 K  - febrile   - Recommended B/L leg elevation to the patient  - fall precautions   - Lactate 3.7--> 2.5   - UA -ve    - c/w pain meds Tylenol q6 while awake   - c/w vanco 1000 q8 weght base according eGFR (actual weight 122 Kg)   - f/u Vanco trough before 4th dose   - f/u Doppler LE to r/o DVT (s/p full dose lovenox)  - f/u ESR , CRP  - f/u Lactate AM   - f/u Blood Cx (Specimen received)  - f/u Urine Cx (Specimen received)  - f/u PT.   - f/u CM (pt was getting HHA visits)  ** ID Consulted Dr. Pettit

## 2019-11-04 NOTE — H&P ADULT - PROBLEM SELECTOR PLAN 5
- Patient takes no medications at home   - well controlled DM   - will start sliding scale  - Monitor blood sugars AC/HS and adjust as needed  - goal -180.   - f/u HgA1c  - Carbohydrate consistent diabetic diet with evening snacks.

## 2019-11-04 NOTE — H&P ADULT - ASSESSMENT
69 year old Female Lives at home with roommate ambulates with a walker (pt was getting HHA visits) with PMH hypertension, CAD s/p 3 PAOLA , asthma, Chronic Lymphedema , venous stasis s/p venous ablation and carpal tunnel syndrome presenting to ED with worsening shortness of breath. . She was found to have elevated WBC and fever in ED to 102F. Patient is moaning sitting at edge of bed on my arrival. says she feels cold and chills. Also complained of back pain for which she takes Advil chronically. Pt denies wheezing, cough, chest pain, nausea, vomiting, diaphoresis, abdominal pain, dysuria or changes in bowel habits.     Pt is FULL CODE.   pt had colonoscopy 2 weeks ago which showed no abnormalities     Pt admitted for management of Sepsis

## 2019-11-04 NOTE — H&P ADULT - PROBLEM SELECTOR PLAN 7
- pt has hx of CAD with 3 PAOLA  - not on ASA or Statins  *** I tried to reach the pharmacy on this phone number [phone] but no response morning team to confirm   - would hold statins anyways for elevated LFTs

## 2019-11-05 DIAGNOSIS — D64.9 ANEMIA, UNSPECIFIED: ICD-10-CM

## 2019-11-05 LAB
ALBUMIN SERPL ELPH-MCNC: 3 G/DL — LOW (ref 3.5–5)
ALP SERPL-CCNC: 167 U/L — HIGH (ref 40–120)
ALT FLD-CCNC: 127 U/L DA — HIGH (ref 10–60)
ANION GAP SERPL CALC-SCNC: 8 MMOL/L — SIGNIFICANT CHANGE UP (ref 5–17)
ANION GAP SERPL CALC-SCNC: 9 MMOL/L — SIGNIFICANT CHANGE UP (ref 5–17)
APAP SERPL-MCNC: <2 UG/ML — LOW (ref 10–30)
AST SERPL-CCNC: 72 U/L — HIGH (ref 10–40)
BASOPHILS # BLD AUTO: 0.02 K/UL — SIGNIFICANT CHANGE UP (ref 0–0.2)
BASOPHILS NFR BLD AUTO: 0.3 % — SIGNIFICANT CHANGE UP (ref 0–2)
BILIRUB DIRECT SERPL-MCNC: 3 MG/DL — HIGH (ref 0–0.2)
BILIRUB INDIRECT FLD-MCNC: 1.6 MG/DL — HIGH (ref 0.2–1)
BILIRUB SERPL-MCNC: 4.6 MG/DL — HIGH (ref 0.2–1.2)
BILIRUB SERPL-MCNC: 4.6 MG/DL — HIGH (ref 0.2–1.2)
BUN SERPL-MCNC: 21 MG/DL — HIGH (ref 7–18)
BUN SERPL-MCNC: 25 MG/DL — HIGH (ref 7–18)
CALCIUM SERPL-MCNC: 8.2 MG/DL — LOW (ref 8.4–10.5)
CALCIUM SERPL-MCNC: 8.5 MG/DL — SIGNIFICANT CHANGE UP (ref 8.4–10.5)
CHLORIDE SERPL-SCNC: 100 MMOL/L — SIGNIFICANT CHANGE UP (ref 96–108)
CHLORIDE SERPL-SCNC: 97 MMOL/L — SIGNIFICANT CHANGE UP (ref 96–108)
CHOLEST SERPL-MCNC: 148 MG/DL — SIGNIFICANT CHANGE UP (ref 10–199)
CO2 SERPL-SCNC: 30 MMOL/L — SIGNIFICANT CHANGE UP (ref 22–31)
CO2 SERPL-SCNC: 33 MMOL/L — HIGH (ref 22–31)
CREAT SERPL-MCNC: 1.12 MG/DL — SIGNIFICANT CHANGE UP (ref 0.5–1.3)
CREAT SERPL-MCNC: 1.4 MG/DL — HIGH (ref 0.5–1.3)
CRP SERPL-MCNC: 16.93 MG/DL — HIGH (ref 0–0.4)
CULTURE RESULTS: SIGNIFICANT CHANGE UP
EOSINOPHIL # BLD AUTO: 0.01 K/UL — SIGNIFICANT CHANGE UP (ref 0–0.5)
EOSINOPHIL NFR BLD AUTO: 0.1 % — SIGNIFICANT CHANGE UP (ref 0–6)
ERYTHROCYTE [SEDIMENTATION RATE] IN BLOOD: 33 MM/HR — HIGH (ref 0–20)
GGT SERPL-CCNC: 270 U/L — HIGH (ref 8–40)
GLUCOSE BLDC GLUCOMTR-MCNC: 115 MG/DL — HIGH (ref 70–99)
GLUCOSE BLDC GLUCOMTR-MCNC: 165 MG/DL — HIGH (ref 70–99)
GLUCOSE BLDC GLUCOMTR-MCNC: 170 MG/DL — HIGH (ref 70–99)
GLUCOSE BLDC GLUCOMTR-MCNC: 173 MG/DL — HIGH (ref 70–99)
GLUCOSE SERPL-MCNC: 128 MG/DL — HIGH (ref 70–99)
GLUCOSE SERPL-MCNC: 147 MG/DL — HIGH (ref 70–99)
HAV IGM SER-ACNC: SIGNIFICANT CHANGE UP
HBA1C BLD-MCNC: 5.4 % — SIGNIFICANT CHANGE UP (ref 4–5.6)
HBV CORE IGM SER-ACNC: SIGNIFICANT CHANGE UP
HBV SURFACE AG SER-ACNC: SIGNIFICANT CHANGE UP
HCT VFR BLD CALC: 32.1 % — LOW (ref 34.5–45)
HCV AB S/CO SERPL IA: 0.18 S/CO — SIGNIFICANT CHANGE UP (ref 0–0.99)
HCV AB SERPL-IMP: SIGNIFICANT CHANGE UP
HDLC SERPL-MCNC: 26 MG/DL — LOW
HGB BLD-MCNC: 9.9 G/DL — LOW (ref 11.5–15.5)
IMM GRANULOCYTES NFR BLD AUTO: 0.5 % — SIGNIFICANT CHANGE UP (ref 0–1.5)
LACTATE SERPL-SCNC: 1.2 MMOL/L — SIGNIFICANT CHANGE UP (ref 0.7–2)
LIPID PNL WITH DIRECT LDL SERPL: 98 MG/DL — SIGNIFICANT CHANGE UP
LYMPHOCYTES # BLD AUTO: 0.32 K/UL — LOW (ref 1–3.3)
LYMPHOCYTES # BLD AUTO: 4.1 % — LOW (ref 13–44)
MAGNESIUM SERPL-MCNC: 2 MG/DL — SIGNIFICANT CHANGE UP (ref 1.6–2.6)
MCHC RBC-ENTMCNC: 26.6 PG — LOW (ref 27–34)
MCHC RBC-ENTMCNC: 30.8 GM/DL — LOW (ref 32–36)
MCV RBC AUTO: 86.3 FL — SIGNIFICANT CHANGE UP (ref 80–100)
MONOCYTES # BLD AUTO: 0.62 K/UL — SIGNIFICANT CHANGE UP (ref 0–0.9)
MONOCYTES NFR BLD AUTO: 7.9 % — SIGNIFICANT CHANGE UP (ref 2–14)
NEUTROPHILS # BLD AUTO: 6.8 K/UL — SIGNIFICANT CHANGE UP (ref 1.8–7.4)
NEUTROPHILS NFR BLD AUTO: 87.1 % — HIGH (ref 43–77)
NRBC # BLD: 0 /100 WBCS — SIGNIFICANT CHANGE UP (ref 0–0)
PHOSPHATE SERPL-MCNC: 3 MG/DL — SIGNIFICANT CHANGE UP (ref 2.5–4.5)
PLATELET # BLD AUTO: 248 K/UL — SIGNIFICANT CHANGE UP (ref 150–400)
POTASSIUM SERPL-MCNC: 2.5 MMOL/L — CRITICAL LOW (ref 3.5–5.3)
POTASSIUM SERPL-MCNC: 2.7 MMOL/L — CRITICAL LOW (ref 3.5–5.3)
POTASSIUM SERPL-SCNC: 2.5 MMOL/L — CRITICAL LOW (ref 3.5–5.3)
POTASSIUM SERPL-SCNC: 2.7 MMOL/L — CRITICAL LOW (ref 3.5–5.3)
PROT SERPL-MCNC: 7.2 G/DL — SIGNIFICANT CHANGE UP (ref 6–8.3)
RBC # BLD: 3.72 M/UL — LOW (ref 3.8–5.2)
RBC # FLD: 15.7 % — HIGH (ref 10.3–14.5)
SODIUM SERPL-SCNC: 138 MMOL/L — SIGNIFICANT CHANGE UP (ref 135–145)
SODIUM SERPL-SCNC: 139 MMOL/L — SIGNIFICANT CHANGE UP (ref 135–145)
SPECIMEN SOURCE: SIGNIFICANT CHANGE UP
TOTAL CHOLESTEROL/HDL RATIO MEASUREMENT: 5.7 RATIO — SIGNIFICANT CHANGE UP (ref 3.3–7.1)
TRIGL SERPL-MCNC: 120 MG/DL — SIGNIFICANT CHANGE UP (ref 10–149)
WBC # BLD: 7.81 K/UL — SIGNIFICANT CHANGE UP (ref 3.8–10.5)
WBC # FLD AUTO: 7.81 K/UL — SIGNIFICANT CHANGE UP (ref 3.8–10.5)

## 2019-11-05 PROCEDURE — 99233 SBSQ HOSP IP/OBS HIGH 50: CPT | Mod: GC

## 2019-11-05 PROCEDURE — 71250 CT THORAX DX C-: CPT | Mod: 26

## 2019-11-05 RX ORDER — BENZOCAINE AND MENTHOL 5; 1 G/100ML; G/100ML
1 LIQUID ORAL EVERY 4 HOURS
Refills: 0 | Status: DISCONTINUED | OUTPATIENT
Start: 2019-11-05 | End: 2019-11-13

## 2019-11-05 RX ORDER — BENZOCAINE AND MENTHOL 5; 1 G/100ML; G/100ML
1 LIQUID ORAL ONCE
Refills: 0 | Status: COMPLETED | OUTPATIENT
Start: 2019-11-05 | End: 2019-11-05

## 2019-11-05 RX ORDER — ONDANSETRON 8 MG/1
4 TABLET, FILM COATED ORAL ONCE
Refills: 0 | Status: COMPLETED | OUTPATIENT
Start: 2019-11-05 | End: 2019-11-05

## 2019-11-05 RX ORDER — POTASSIUM CHLORIDE 20 MEQ
40 PACKET (EA) ORAL ONCE
Refills: 0 | Status: COMPLETED | OUTPATIENT
Start: 2019-11-05 | End: 2019-11-05

## 2019-11-05 RX ORDER — PREGABALIN 225 MG/1
1000 CAPSULE ORAL DAILY
Refills: 0 | Status: DISCONTINUED | OUTPATIENT
Start: 2019-11-05 | End: 2019-11-13

## 2019-11-05 RX ORDER — DEXTROSE MONOHYDRATE, SODIUM CHLORIDE, AND POTASSIUM CHLORIDE 50; .745; 4.5 G/1000ML; G/1000ML; G/1000ML
1000 INJECTION, SOLUTION INTRAVENOUS
Refills: 0 | Status: DISCONTINUED | OUTPATIENT
Start: 2019-11-05 | End: 2019-11-06

## 2019-11-05 RX ORDER — POTASSIUM CHLORIDE 20 MEQ
40 PACKET (EA) ORAL EVERY 4 HOURS
Refills: 0 | Status: DISCONTINUED | OUTPATIENT
Start: 2019-11-05 | End: 2019-11-05

## 2019-11-05 RX ORDER — IOHEXOL 300 MG/ML
30 INJECTION, SOLUTION INTRAVENOUS ONCE
Refills: 0 | Status: COMPLETED | OUTPATIENT
Start: 2019-11-06 | End: 2019-11-06

## 2019-11-05 RX ORDER — AZTREONAM 2 G
2000 VIAL (EA) INJECTION ONCE
Refills: 0 | Status: COMPLETED | OUTPATIENT
Start: 2019-11-05 | End: 2019-11-05

## 2019-11-05 RX ORDER — ONDANSETRON 8 MG/1
4 TABLET, FILM COATED ORAL ONCE
Refills: 0 | Status: DISCONTINUED | OUTPATIENT
Start: 2019-11-05 | End: 2019-11-05

## 2019-11-05 RX ORDER — AZTREONAM 2 G
2000 VIAL (EA) INJECTION EVERY 8 HOURS
Refills: 0 | Status: DISCONTINUED | OUTPATIENT
Start: 2019-11-05 | End: 2019-11-07

## 2019-11-05 RX ORDER — LEVOTHYROXINE SODIUM 125 MCG
75 TABLET ORAL DAILY
Refills: 0 | Status: DISCONTINUED | OUTPATIENT
Start: 2019-11-05 | End: 2019-11-13

## 2019-11-05 RX ORDER — PANTOPRAZOLE SODIUM 20 MG/1
40 TABLET, DELAYED RELEASE ORAL
Refills: 0 | Status: DISCONTINUED | OUTPATIENT
Start: 2019-11-05 | End: 2019-11-13

## 2019-11-05 RX ORDER — POTASSIUM CHLORIDE 20 MEQ
10 PACKET (EA) ORAL
Refills: 0 | Status: COMPLETED | OUTPATIENT
Start: 2019-11-05 | End: 2019-11-06

## 2019-11-05 RX ORDER — POTASSIUM CHLORIDE 20 MEQ
10 PACKET (EA) ORAL
Refills: 0 | Status: DISCONTINUED | OUTPATIENT
Start: 2019-11-05 | End: 2019-11-05

## 2019-11-05 RX ORDER — AZTREONAM 2 G
VIAL (EA) INJECTION
Refills: 0 | Status: DISCONTINUED | OUTPATIENT
Start: 2019-11-05 | End: 2019-11-07

## 2019-11-05 RX ADMIN — Medication 40 MILLIEQUIVALENT(S): at 10:45

## 2019-11-05 RX ADMIN — Medication 100 MILLIGRAM(S): at 16:38

## 2019-11-05 RX ADMIN — Medication 650 MILLIGRAM(S): at 13:05

## 2019-11-05 RX ADMIN — ONDANSETRON 4 MILLIGRAM(S): 8 TABLET, FILM COATED ORAL at 22:42

## 2019-11-05 RX ADMIN — ETHACRYNIC ACID 25 MILLIGRAM(S): 25 TABLET ORAL at 06:19

## 2019-11-05 RX ADMIN — LOSARTAN POTASSIUM 100 MILLIGRAM(S): 100 TABLET, FILM COATED ORAL at 06:18

## 2019-11-05 RX ADMIN — Medication 100 MILLIGRAM(S): at 22:41

## 2019-11-05 RX ADMIN — ETHACRYNIC ACID 25 MILLIGRAM(S): 25 TABLET ORAL at 17:27

## 2019-11-05 RX ADMIN — Medication 100 MILLIGRAM(S): at 17:26

## 2019-11-05 RX ADMIN — Medication 100 MILLIEQUIVALENT(S): at 22:54

## 2019-11-05 RX ADMIN — Medication 250 MILLIGRAM(S): at 06:18

## 2019-11-05 RX ADMIN — BENZOCAINE AND MENTHOL 1 LOZENGE: 5; 1 LIQUID ORAL at 10:44

## 2019-11-05 RX ADMIN — Medication 400 MILLIGRAM(S): at 17:26

## 2019-11-05 RX ADMIN — Medication 250 MILLIGRAM(S): at 13:32

## 2019-11-05 RX ADMIN — Medication 2000 UNIT(S): at 12:05

## 2019-11-05 RX ADMIN — BENZOCAINE AND MENTHOL 1 LOZENGE: 5; 1 LIQUID ORAL at 04:22

## 2019-11-05 RX ADMIN — Medication 650 MILLIGRAM(S): at 12:04

## 2019-11-05 RX ADMIN — Medication 400 MILLIGRAM(S): at 06:19

## 2019-11-05 NOTE — PROGRESS NOTE ADULT - PROBLEM SELECTOR PLAN 5
- Patient takes no medications at home   - well controlled DM   - will start sliding scale  - Monitor blood sugars AC/HS and adjust as needed  - goal -180.   -  HgA1c is 5.4  - Carbohydrate consistent diabetic diet with evening snacks.

## 2019-11-05 NOTE — PROGRESS NOTE ADULT - PROBLEM SELECTOR PLAN 2
- p/w SOB and pitting edema hx of chronic lymphedema)  - improving  - possible 2/2 RIDGE or Obesity hypoventilation   - ECHO showed EF >55%  - flu -ve   - CXR and CT chest showed no signs of PNA   - c/w  duonebs PRN

## 2019-11-05 NOTE — PROGRESS NOTE ADULT - PROBLEM SELECTOR PLAN 2
- p/w SOB and pitting edema hx of chronic lymphedema)  - no  orthopnea or PND   - possible 2/2 RIDGE or Obesity hypoventilation S less likely PNA  - no h/o HF , hb 11.7 , proBNP 810 , can be falsely low in obese people  - ABG showed high CO2 chronic retainer   - flu -ve   - O2 Sat on RA : 97%   - CXR mild interstitial infiltrate (official reading no focal consolidation)  - c/w  duonebs PRN   - f/u CT chest   - Echo- mild MR, mild diastolic dysfunction  ** ID Consulted- Dr. Pettit

## 2019-11-05 NOTE — PROGRESS NOTE ADULT - SUBJECTIVE AND OBJECTIVE BOX
PGY 1 Note discussed with supervising resident and primary attending    Patient is a 69y old  Female who presents with a chief complaint of Shortness of breath and fever (2019 11:22)    INTERVAL HPI/OVERNIGHT EVENTS: Patient seen and examined at bedside. Patient states she feels a little better from this morning. CT chest     MEDICATIONS  (STANDING):  aztreonam  IVPB      cholecalciferol 2000 Unit(s) Oral daily  clindamycin IVPB      enoxaparin Injectable 40 milliGRAM(s) SubCutaneous daily  ethacrynic acid 25 milliGRAM(s) Oral two times a day  folic acid 1 milliGRAM(s) Oral daily  insulin lispro (HumaLOG) corrective regimen sliding scale   SubCutaneous Before meals and at bedtime  labetalol 400 milliGRAM(s) Oral two times a day  losartan 100 milliGRAM(s) Oral daily  sodium chloride 0.9%. 1000 milliLiter(s) (50 mL/Hr) IV Continuous <Continuous>    MEDICATIONS  (PRN):  acetaminophen   Tablet .. 650 milliGRAM(s) Oral every 6 hours PRN Temp greater or equal to 38C (100.4F), Mild Pain (1 - 3)  albuterol/ipratropium for Nebulization 3 milliLiter(s) Nebulizer every 6 hours PRN Shortness of Breath and/or Wheezing  benzocaine 15 mG/menthol 3.6 mG (Sugar-Free) Lozenge 1 Lozenge Oral every 4 hours PRN Sore Throat  guaiFENesin   Syrup  (Sugar-Free) 100 milliGRAM(s) Oral every 6 hours PRN Cough  traMADol 25 milliGRAM(s) Oral every 8 hours PRN Moderate Pain (4 - 6)      __________________________________________________  REVIEW OF SYSTEMS:  CONSTITUTIONAL: No fever   EYES: no visual disturbances  NECK: No pain    RESPIRATORY: No cough; Mild shortness of breath  CARDIOVASCULAR: No chest pain   GASTROINTESTINAL: No pain. No nausea or vomiting  NEUROLOGICAL: No headache    MUSCULOSKELETAL: No joint pain, no muscle pain  GENITOURINARY: no dysuria  PSYCHIATRY: no depression   ALL OTHER  ROS negative        Vital Signs Last 24 Hrs  T(C): 36.8 (2019 08:10), Max: 37.4 (2019 17:27)  T(F): 98.2 (2019 08:10), Max: 99.3 (2019 17:27)  HR: 75 (2019 08:10) (75 - 100)  BP: 141/55 (2019 08:10) (141/55 - 170/77)  RR: 17 (2019 08:10) (17 - 18)  SpO2: 99% (2019 08:10) (93% - 99%)    ________________________________________________  PHYSICAL EXAM:  GENERAL: Patient seen resting in bed and in mild distress  HEENT: Normocephalic; conjunctivae and sclerae clear   NECK: supple  CHEST/LUNG: Congestion present in lungs bilaterally  HEART: S1 S2, regular; no murmurs   ABDOMEN: Soft, Nontender, Distended; Bowel sounds present; tenderness on left side of back   EXTREMITIES: no cyanosis; chronic edema of lower extremities bilaterally; no calf tenderness; erythema of RLE  SKIN: warm and dry  NERVOUS SYSTEM: Awake and alert; Oriented to place, person and time      _________________________________________________  LABS:                        9.9    7.81  )-----------( 248      ( 2019 06:24 )             32.1         139  |  100  |  21<H>  ----------------------------<  128<H>  2.5<LL>   |  30  |  1.12    Ca    8.2<L>      2019 06:24  Phos  3.0       Mg     2.0         TPro  7.2  /  Alb  3.0<L>  /  TBili  4.6<H>  /  DBili  3.0<H>  /  AST  72<H>  /  ALT  127<H>  /  AlkPhos  167<H>      PT/INR - ( 2019 07:46 )   PT: 12.7 sec;   INR: 1.14 ratio         PTT - ( 2019 07:46 )  PTT:36.1 sec  Urinalysis Basic - ( 2019 08:53 )    Color: Yellow / Appearance: Clear / S.010 / pH: x  Gluc: x / Ketone: Negative  / Bili: Negative / Urobili: Negative   Blood: x / Protein: 15 / Nitrite: Negative   Leuk Esterase: Negative / RBC: 0-2 /HPF / WBC 0-2 /HPF   Sq Epi: x / Non Sq Epi: Few /HPF / Bacteria: Few /HPF      CAPILLARY BLOOD GLUCOSE    POCT Blood Glucose.: 170 mg/dL (2019 11:53)  POCT Blood Glucose.: 173 mg/dL (2019 08:23)  POCT Blood Glucose.: 155 mg/dL (2019 21:24)  POCT Blood Glucose.: 129 mg/dL (2019 17:01)      RADIOLOGY & ADDITIONAL TESTS:    Imaging Personally Reviewed:  YES     < from: Transthoracic Echocardiogram (19 @ 15:02) >  CONCLUSIONS:  1. Mitral annular calcification. Mild mitral regurgitation.    2. Calcified aortic valve with decreased opening. Peak  transaortic valve gradient equals 21 mm Hg, mean  transaortic valve gradient equals 12 mm Hg, estimated  aortic valve area equals 1.8 sqcm (by continuity equation),  consistent with mild aortic stenosis.  3. Mild left atrial enlargement.  4. Mild concentric left ventricular hypertrophy.  5. Endocardium not well visualized; grossly normal left  ventricular systolic function.   Segmental wall motion  could not be assessed.   Mild diastolic dysfunction (stage  I).  6. Normal right ventricular size and function.    < end of copied text >    < from: CT Chest No Cont (19 @ 09:12) >  Impression: No evidence for pulmonary consolidation.    Small collapsed hiatal hernia versus distal esophageal mural thickening.   EGD may be pursued for further evaluation.    Splenomegaly.    < end of copied text >    Consultant(s) Notes Reviewed:   YES     Care Discussed with Consultants :     Plan of care was discussed with patient and /or primary care giver; all questions and concerns were addressed and care was aligned with patient's wishes.

## 2019-11-05 NOTE — CONSULT NOTE ADULT - MUSCULOSKELETAL
detailed exam no joint erythema/ROM intact/no joint swelling no joint warmth/no joint swelling/no joint erythema

## 2019-11-05 NOTE — PROGRESS NOTE ADULT - PROBLEM SELECTOR PLAN 1
- p/w SOB and sepsis and found to have Right LE redness, warmth and swelling   -resolving  - currently afebrile   - Lactate currently wnl  - UA -ve    - c/w pain meds Tylenol q6    - as per ID, discontinue vancomycin    - f/u Doppler LE to r/o DVT (s/p full dose lovenox)  - Blood Cx and urine cx pending   - f/u PT   -CT abdomen with oral and IV contrast ordered to evaluate for cholecystitis  -f/u ID Dr. Pettit

## 2019-11-05 NOTE — PROGRESS NOTE ADULT - SUBJECTIVE AND OBJECTIVE BOX
MS3 Note discussed with supervising resident    Patient is a 69y old  Female who presents with a chief complaint of Shortness of breath and fever (2019 11:22)      INTERVAL HPI/OVERNIGHT EVENTS: Patient seen and examined at bedside with no new complaints    MEDICATIONS  (STANDING):  cholecalciferol 2000 Unit(s) Oral daily  enoxaparin Injectable 40 milliGRAM(s) SubCutaneous daily  ethacrynic acid 25 milliGRAM(s) Oral two times a day  folic acid 1 milliGRAM(s) Oral daily  insulin lispro (HumaLOG) corrective regimen sliding scale   SubCutaneous Before meals and at bedtime  labetalol 400 milliGRAM(s) Oral two times a day  losartan 100 milliGRAM(s) Oral daily  sodium chloride 0.9%. 1000 milliLiter(s) (50 mL/Hr) IV Continuous <Continuous>  vancomycin  IVPB 1000 milliGRAM(s) IV Intermittent every 8 hours    MEDICATIONS  (PRN):  acetaminophen   Tablet .. 650 milliGRAM(s) Oral every 6 hours PRN Temp greater or equal to 38C (100.4F), Mild Pain (1 - 3)  albuterol/ipratropium for Nebulization 3 milliLiter(s) Nebulizer every 6 hours PRN Shortness of Breath and/or Wheezing  benzocaine 15 mG/menthol 3.6 mG (Sugar-Free) Lozenge 1 Lozenge Oral every 4 hours PRN Sore Throat  guaiFENesin   Syrup  (Sugar-Free) 100 milliGRAM(s) Oral every 6 hours PRN Cough  traMADol 25 milliGRAM(s) Oral every 8 hours PRN Moderate Pain (4 - 6)      __________________________________________________  REVIEW OF SYSTEMS:  RESPIRATORY: No cough; No shortness of breath  CARDIOVASCULAR: No chest pain, no palpitations  GASTROINTESTINAL: No pain. No nausea or vomiting; No diarrhea       Vital Signs Last 24 Hrs  T(C): 36.8 (2019 08:10), Max: 37.4 (2019 17:27)  T(F): 98.2 (2019 08:10), Max: 99.3 (2019 17:27)  HR: 75 (2019 08:10) (75 - 100)  BP: 141/55 (2019 08:10) (141/55 - 170/77)  BP(mean): --  RR: 17 (2019 08:10) (17 - 18)  SpO2: 99% (2019 08:10) (93% - 99%)    ________________________________________________  PHYSICAL EXAM:  GENERAL: NAD,   HEENT: Normocephalic;  conjunctivae and sclerae clear; moist mucous membranes;   NECK : supple  CHEST/LUNG: Clear to auscultation bilaterally with good air entry   HEART: S1 S2  regular; no murmurs, gallops or rubs  ABDOMEN: Soft, Nontender, Nondistended; Bowel sounds present  EXTREMITIES: No cyanosis; chronic lymphedema; no calf tenderness  NERVOUS SYSTEM:  Awake and alert; Oriented to place, person and time ; no new deficits    _________________________________________________  LABS:                        9.9    7.81  )-----------( 248      ( 2019 06:24 )             32.1     11    139  |  100  |  21<H>  ----------------------------<  128<H>  2.5<LL>   |  30  |  1.12    Ca    8.2<L>      2019 06:24  Phos  3.0       Mg     2.0         TPro  7.2  /  Alb  3.0<L>  /  TBili  4.6<H>  /  DBili  3.0<H>  /  AST  72<H>  /  ALT  127<H>  /  AlkPhos  167<H>  1105    PT/INR - ( 2019 07:46 )   PT: 12.7 sec;   INR: 1.14 ratio         PTT - ( 2019 07:46 )  PTT:36.1 sec  Urinalysis Basic - ( 2019 08:53 )    Color: Yellow / Appearance: Clear / S.010 / pH: x  Gluc: x / Ketone: Negative  / Bili: Negative / Urobili: Negative   Blood: x / Protein: 15 / Nitrite: Negative   Leuk Esterase: Negative / RBC: 0-2 /HPF / WBC 0-2 /HPF   Sq Epi: x / Non Sq Epi: Few /HPF / Bacteria: Few /HPF      CAPILLARY BLOOD GLUCOSE      POCT Blood Glucose.: 170 mg/dL (2019 11:53)  POCT Blood Glucose.: 173 mg/dL (2019 08:23)  POCT Blood Glucose.: 155 mg/dL (2019 21:24)  POCT Blood Glucose.: 129 mg/dL (2019 17:01)      Lactate, Blood: 1.2 mmol/L ( @ 06:19)    ECHO (TTE)     1. Mitral annular calcification. Mild mitral regurgitation.    2. Calcified aortic valve with decreased opening. Peak  transaortic valve gradient equals 21 mm Hg, mean  transaortic valve gradient equals 12 mm Hg, estimated  aortic valve area equals 1.8 sqcm (by continuity equation),  consistent with mild aortic stenosis.  3. Mild left atrial enlargement.  4. Mild concentric left ventricular hypertrophy.  5. Endocardium not well visualized; grossly normal left  ventricular systolic function.   Segmental wall motion  could not be assessed.   Mild diastolic dysfunction (stage  I).  6. Normal right ventricular size and function.      RADIOLOGY, EKG & ADDITIONAL TESTS: Reviewed.     RADIOLOGY & ADDITIONAL TESTS:    Imaging Personally Reviewed:  YES    Consultant(s) Notes Reviewed:   YES    Care Discussed with Consultants : YES    Plan of care was discussed with patient and /or primary care giver; all questions and concerns were addressed and care was aligned with patient's wishes.

## 2019-11-05 NOTE — CONSULT NOTE ADULT - EXTREMITIES COMMENTS
R leg ; warm to touch  , non tender , edematous and red from knee to ankle with chronic skin changes  L leg ; edematous less than right ,non tenderness , no redness R leg ; warmer to touch than left leg( always that way as per patient)  , non tender , edematous and red from knee to ankle with chronic skin changes  L leg ; edematous less than right ,no tenderness , no redness

## 2019-11-05 NOTE — CONSULT NOTE ADULT - GASTROINTESTINAL DETAILS
no organomegaly/bowel sounds normal/no guarding/nontender/no rebound tenderness/no rigidity/no masses palpable/soft

## 2019-11-05 NOTE — PROGRESS NOTE ADULT - PROBLEM SELECTOR PLAN 3
- p/w Elevated liver function tests on admission   -now downtrending but still elevated; high direct bili  - soft non tender distended abdomen  - f/u U/S liver  - f/u hepatitis panel  -f/u CT abd  -started on azactam and clindamycin (pt has allergy to pcn)

## 2019-11-05 NOTE — PROGRESS NOTE ADULT - PROBLEM SELECTOR PLAN 3
- p/w Elevated liver function tests on admission   - soft non tender distended abdomen  - ALK : 167  - AST : 72  - ALT : 127  - Bilirubin 4.6  - gamma glutamyl transferase elevated-270  - acetaminophen level WNL  - f/u hepatitis panel  - f/u lipase   - f/u U/S liver  - f/u LFTs at AM  - c/w IVF  - f/u MRCP

## 2019-11-05 NOTE — CONSULT NOTE ADULT - ASSESSMENT
Fever - unknown etiology  Transaminitis with elevated Bili - r/o acute cholecystitis  Leukocytosis - normalized    Plan - DC Vanco  Start Azactam 2 gms iv q8hrs( she weighs 260 LBS and not 72kgs)  Start Clindamycin 900mgs iv q8hrs  get CT scan of abdomen with po and IV contrast in am.

## 2019-11-05 NOTE — CONSULT NOTE ADULT - SUBJECTIVE AND OBJECTIVE BOX
HPI:  69 year old Female Lives at home with roommate ambulates with a walker (pt was getting HHA visits) with PMH hypertension, CAD s/p 3 PAOLA , asthma, Chronic Lymphedema , venous stasis s/p venous ablation and carpal tunnel syndrome presenting to ED with worsening shortness of breath. . She was found to have elevated WBC and fever in ED to 102F. Patient is moaning sitting at edge of bed on my arrival. says she feels cold and chills. Also complained of back pain for which she takes Advil chronically. Pt denies wheezing, cough, chest pain, nausea, vomiting, diaphoresis, abdominal pain, dysuria or changes in bowel habits.     Pt is FULL CODE.   pt had colonoscopy 2 weeks ago which showed no abnormalities (2019 11:43)    History as above, 69 y f morbidly obese with Asthma( not on inhalers) chronic Lymphedema of right leg and venous stasis s/p ablation of the Leg leg presented with SOB and fever at home. ID consulted for for eval of right leg cellulitis and concern of pneumonia. Pt anxious about infection and doesnt believe that she has infection of R leg.Admits to have some SOB but denies any cough, sputum production,nausea , vomiting , abdominal pain,dysuria , diarrhea or constipation.Pt says she has gall stones.     PAST MEDICAL & SURGICAL HISTORY:  Lymphedema  Essential hypertension  Carpal tunnel syndrome of right wrist  Obesity (BMI 30-39.9)  Type 2 diabetes mellitus without complication  Hypothyroid  Essential hypertension  Obesity  HTN (hypertension)  DM (diabetes mellitus)  No significant past surgical history  S/P carpal tunnel release      penicillin (Hives)  penicillin (Rash)  sulfa drugs (Unknown)  Tetracycline Hydrochloride (Unknown)      Meds:  acetaminophen   Tablet .. 650 milliGRAM(s) Oral every 6 hours PRN  albuterol/ipratropium for Nebulization 3 milliLiter(s) Nebulizer every 6 hours PRN  benzocaine 15 mG/menthol 3.6 mG (Sugar-Free) Lozenge 1 Lozenge Oral every 4 hours PRN  cholecalciferol 2000 Unit(s) Oral daily  enoxaparin Injectable 40 milliGRAM(s) SubCutaneous daily  ethacrynic acid 25 milliGRAM(s) Oral two times a day  folic acid 1 milliGRAM(s) Oral daily  guaiFENesin   Syrup  (Sugar-Free) 100 milliGRAM(s) Oral every 6 hours PRN  insulin lispro (HumaLOG) corrective regimen sliding scale   SubCutaneous Before meals and at bedtime  labetalol 400 milliGRAM(s) Oral two times a day  losartan 100 milliGRAM(s) Oral daily  sodium chloride 0.9%. 1000 milliLiter(s) IV Continuous <Continuous>  traMADol 25 milliGRAM(s) Oral every 8 hours PRN  vancomycin  IVPB 1000 milliGRAM(s) IV Intermittent every 8 hours      SOCIAL HISTORY:  Smoker:  No   ETOH use:  No   Ilicit Drug use:  no   Occupation:  Assisted device use (Cane / Walker): walker  Live with: roommates     FAMILY HISTORY:  Family history of myocardial infarction (Sibling)  Family history of lung cancer      VITALS:  Vital Signs Last 24 Hrs  T(C): 36.8 (2019 08:10), Max: 37.4 (2019 17:27)  T(F): 98.2 (2019 08:10), Max: 99.3 (2019 17:27)  HR: 75 (2019 08:10) (75 - 102)  BP: 141/55 (2019 08:10) (141/55 - 177/80)  BP(mean): --  RR: 17 (2019 08:10) (17 - 20)  SpO2: 99% (2019 08:10) (93% - 99%)    LABS/DIAGNOSTIC TESTS:                          9.9    7.81  )-----------( 248      ( 2019 06:24 )             32.1     WBC Count: 7.81 K/uL ( @ 06:24)  WBC Count: 13.15 K/uL ( @ 07:46)          139  |  100  |  21<H>  ----------------------------<  128<H>  2.5<LL>   |  30  |  1.12    Ca    8.2<L>      2019 06:24  Phos  3.0       Mg     2.0         TPro  7.2  /  Alb  3.0<L>  /  TBili  4.6<H>  /  DBili  3.0<H>  /  AST  72<H>  /  ALT  127<H>  /  AlkPhos  167<H>        Urinalysis Basic - ( 2019 08:53 )    Color: Yellow / Appearance: Clear / S.010 / pH: x  Gluc: x / Ketone: Negative  / Bili: Negative / Urobili: Negative   Blood: x / Protein: 15 / Nitrite: Negative   Leuk Esterase: Negative / RBC: 0-2 /HPF / WBC 0-2 /HPF   Sq Epi: x / Non Sq Epi: Few /HPF / Bacteria: Few /HPF        LIVER FUNCTIONS - ( 2019 09:29 )  Alb: x     / Pro: x     / ALK PHOS: x     / ALT: x     / AST: x     / GGT: 270 U/L         PT/INR - ( 2019 07:46 )   PT: 12.7 sec;   INR: 1.14 ratio         PTT - ( 2019 07:46 )  PTT:36.1 sec    LACTATE:Lactate, Blood: 1.2 mmol/L ( @ 06:19)  Lactate, Blood: 2.5 mmol/L ( @ 14:53)      ABG -     CULTURES:   .Urine   @ 14:39   <10,000 CFU/mL Normal Urogenital Chitra  --  --    RADIOLOGY:  < from: CT Chest No Cont (19 @ 09:12) >  INTERPRETATION:  Chest CT without IV contrast    Indication: Shortness of breath.    Technique: Axial multidetector CT images of the chest are acquired   without IV contrast.    Comparison: No prior chest CT is available for comparison.    Findings: No pleural or pericardial effusion. Cardiomegaly. Aortic,   mitral annular and coronary artery calcifications are present. Mild   ectasia of the ascending aorta at 3.8 cm in diameter. Small collapsed   hiatal hernia versus distal esophageal mural thickening. EGD may be   pursued for further evaluation. Allowing for the noncontrast technique,   there is no grossly enlarged mediastinal, hilar or axillary lymph node.    The trachea and central bronchi are patent.    Mild scattered atelectasis bilaterally. No evidence for pulmonary   consolidation or pneumothorax.    Limited sections through the upper abdomen demonstrate colonic   diverticulosis. Nonspecific mild left adrenal thickening. Splenomegaly at   15.1 cm.    Impression: No evidence for pulmonary consolidation.    Small collapsed hiatal hernia versus distal esophageal mural thickening.   EGD may be pursued for further evaluation.    Splenomegaly.      < end of copied text >      < from: Xray Chest 1 View-PORTABLE IMMEDIATE (19 @ 07:45) >  EXAM:  XR CHEST PORTABLE IMMED 1V                            PROCEDURE DATE:  2019          INTERPRETATION:  CLINICAL INFORMATION: Shortness of breath.    TECHNIQUE: Single view of the chest.    COMPARISON: Chest radiograph 3/10/2018.    FINDINGS:     There is no focal consolidation. There are low lung volumes. The heart   size is enlarged, as on prior exam 3/10/2018. There is no pleural   effusion or pneumothorax.    IMPRESSION:     No focal consolidation.    < end of copied text >    ROS :   REVIEW OF SYSTEMS:  CONSTITUTIONAL: No fever , chills   EYES/ENT: No visual changes;  No vertigo or throat pain   RESPIRATORY: No cough, wheezing, hemoptysis; mild SOB  CARDIOVASCULAR: No chest pain or palpitations  GASTROINTESTINAL: No abdominal  pain. No nausea, vomiting. No diarrhea or constipation. No melena or hematochezia.  GENITOURINARY: No dysuria,hematuria , increased frequency   NEUROLOGICAL: No numbness or weakness  SKIN: No itching, rashes HPI:  69 year old Female Lives at home with roommate ambulates with a walker (pt was getting HHA visits) with PMH hypertension, CAD s/p 3 PAOLA , asthma, Chronic Lymphedema , venous stasis s/p venous ablation and carpal tunnel syndrome presenting to ED with worsening shortness of breath. . She was found to have elevated WBC and fever in ED to 102F. Patient is moaning sitting at edge of bed on my arrival. says she feels cold and chills. Also complained of back pain for which she takes Advil chronically. Pt denies wheezing, cough, chest pain, nausea, vomiting, diaphoresis, abdominal pain, dysuria or changes in bowel habits.     Pt is FULL CODE.   pt had colonoscopy 2 weeks ago which showed no abnormalities (2019 11:43)    History as above, 69 y f morbidly obese with Asthma( not on inhalers) chronic Lymphedema of right leg and venous stasis s/p ablation of the Leg leg presented with SOB and fever at home. ID consulted for for eval of right leg cellulitis and concern of pneumonia. Pt anxious about infection and doesnt believe that she has infection of R leg.Admits to have some SOB but denies any cough, sputum production,nausea , vomiting , abdominal pain,dysuria , diarrhea or constipation.Pt says she has gall stones. Stool color is light brwon     PAST MEDICAL & SURGICAL HISTORY:  Lymphedema  Essential hypertension  Carpal tunnel syndrome of right wrist  Obesity (BMI 30-39.9)  Type 2 diabetes mellitus without complication  Hypothyroid  Essential hypertension  Obesity  HTN (hypertension)  DM (diabetes mellitus)  No significant past surgical history  S/P carpal tunnel release      penicillin (Hives)  penicillin (Rash)  sulfa drugs (Unknown)  Tetracycline Hydrochloride (Unknown)      Meds:  acetaminophen   Tablet .. 650 milliGRAM(s) Oral every 6 hours PRN  albuterol/ipratropium for Nebulization 3 milliLiter(s) Nebulizer every 6 hours PRN  benzocaine 15 mG/menthol 3.6 mG (Sugar-Free) Lozenge 1 Lozenge Oral every 4 hours PRN  cholecalciferol 2000 Unit(s) Oral daily  enoxaparin Injectable 40 milliGRAM(s) SubCutaneous daily  ethacrynic acid 25 milliGRAM(s) Oral two times a day  folic acid 1 milliGRAM(s) Oral daily  guaiFENesin   Syrup  (Sugar-Free) 100 milliGRAM(s) Oral every 6 hours PRN  insulin lispro (HumaLOG) corrective regimen sliding scale   SubCutaneous Before meals and at bedtime  labetalol 400 milliGRAM(s) Oral two times a day  losartan 100 milliGRAM(s) Oral daily  sodium chloride 0.9%. 1000 milliLiter(s) IV Continuous <Continuous>  traMADol 25 milliGRAM(s) Oral every 8 hours PRN  vancomycin  IVPB 1000 milliGRAM(s) IV Intermittent every 8 hours      SOCIAL HISTORY:  Smoker:  No   ETOH use:  No   Ilicit Drug use:  no   Occupation:  Assisted device use (Cane / Walker): walker  Live with: roommates     FAMILY HISTORY:  Family history of myocardial infarction (Sibling)  Family history of lung cancer      VITALS:  Vital Signs Last 24 Hrs  T(C): 36.8 (2019 08:10), Max: 37.4 (2019 17:27)  T(F): 98.2 (2019 08:10), Max: 99.3 (2019 17:27)  HR: 75 (2019 08:10) (75 - 102)  BP: 141/55 (2019 08:10) (141/55 - 177/80)  BP(mean): --  RR: 17 (2019 08:10) (17 - 20)  SpO2: 99% (2019 08:10) (93% - 99%)    LABS/DIAGNOSTIC TESTS:                          9.9    7.81  )-----------( 248      ( 2019 06:24 )             32.1     WBC Count: 7.81 K/uL ( @ 06:24)  WBC Count: 13.15 K/uL ( @ 07:46)          139  |  100  |  21<H>  ----------------------------<  128<H>  2.5<LL>   |  30  |  1.12    Ca    8.2<L>      2019 06:24  Phos  3.0       Mg     2.0         TPro  7.2  /  Alb  3.0<L>  /  TBili  4.6<H>  /  DBili  3.0<H>  /  AST  72<H>  /  ALT  127<H>  /  AlkPhos  167<H>        Urinalysis Basic - ( 2019 08:53 )    Color: Yellow / Appearance: Clear / S.010 / pH: x  Gluc: x / Ketone: Negative  / Bili: Negative / Urobili: Negative   Blood: x / Protein: 15 / Nitrite: Negative   Leuk Esterase: Negative / RBC: 0-2 /HPF / WBC 0-2 /HPF   Sq Epi: x / Non Sq Epi: Few /HPF / Bacteria: Few /HPF        LIVER FUNCTIONS - ( 2019 09:29 )  Alb: x     / Pro: x     / ALK PHOS: x     / ALT: x     / AST: x     / GGT: 270 U/L         PT/INR - ( 2019 07:46 )   PT: 12.7 sec;   INR: 1.14 ratio         PTT - ( 2019 07:46 )  PTT:36.1 sec    LACTATE:Lactate, Blood: 1.2 mmol/L ( @ 06:19)  Lactate, Blood: 2.5 mmol/L ( @ 14:53)      ABG -     CULTURES:   .Urine   @ 14:39   <10,000 CFU/mL Normal Urogenital Chitra  --  --    RADIOLOGY:  < from: CT Chest No Cont (19 @ 09:12) >  INTERPRETATION:  Chest CT without IV contrast    Indication: Shortness of breath.    Technique: Axial multidetector CT images of the chest are acquired   without IV contrast.    Comparison: No prior chest CT is available for comparison.    Findings: No pleural or pericardial effusion. Cardiomegaly. Aortic,   mitral annular and coronary artery calcifications are present. Mild   ectasia of the ascending aorta at 3.8 cm in diameter. Small collapsed   hiatal hernia versus distal esophageal mural thickening. EGD may be   pursued for further evaluation. Allowing for the noncontrast technique,   there is no grossly enlarged mediastinal, hilar or axillary lymph node.    The trachea and central bronchi are patent.    Mild scattered atelectasis bilaterally. No evidence for pulmonary   consolidation or pneumothorax.    Limited sections through the upper abdomen demonstrate colonic   diverticulosis. Nonspecific mild left adrenal thickening. Splenomegaly at   15.1 cm.    Impression: No evidence for pulmonary consolidation.    Small collapsed hiatal hernia versus distal esophageal mural thickening.   EGD may be pursued for further evaluation.    Splenomegaly.      < end of copied text >      < from: Xray Chest 1 View-PORTABLE IMMEDIATE (19 @ 07:45) >  EXAM:  XR CHEST PORTABLE IMMED 1V                            PROCEDURE DATE:  2019          INTERPRETATION:  CLINICAL INFORMATION: Shortness of breath.    TECHNIQUE: Single view of the chest.    COMPARISON: Chest radiograph 3/10/2018.    FINDINGS:     There is no focal consolidation. There are low lung volumes. The heart   size is enlarged, as on prior exam 3/10/2018. There is no pleural   effusion or pneumothorax.    IMPRESSION:     No focal consolidation.    < end of copied text >    ROS :   REVIEW OF SYSTEMS:  CONSTITUTIONAL: No fever , chills   EYES/ENT: No visual changes;  No vertigo or throat pain   RESPIRATORY: No cough, wheezing, hemoptysis; mild SOB  CARDIOVASCULAR: No chest pain or palpitations  GASTROINTESTINAL: No abdominal  pain. No nausea, vomiting. No diarrhea or constipation. No melena or hematochezia.  GENITOURINARY: No dysuria,hematuria , increased frequency   NEUROLOGICAL: No numbness or weakness  SKIN: No itching, rashes HPI:  69 year old Female Lives at home with roommate ambulates with a walker (pt was getting HHA visits) with PMH hypertension, CAD s/p 3 PAOLA , asthma, Chronic Lymphedema , venous stasis s/p venous ablation and carpal tunnel syndrome presenting to ED with worsening shortness of breath. . She was found to have elevated WBC and fever in ED to 102F. Patient is moaning sitting at edge of bed on my arrival. says she feels cold and chills. Also complained of back pain for which she takes Advil chronically. Pt denies wheezing, cough, chest pain, nausea, vomiting, diaphoresis, abdominal pain, dysuria or changes in bowel habits.     Pt is FULL CODE.   pt had colonoscopy 2 weeks ago which showed no abnormalities (2019 11:43)    History as above, 69 y f morbidly obese with Asthma( not on inhalers) chronic Lymphedema of right leg and venous stasis s/p ablation of the Leg leg presented with SOB and fever at home. ID consulted for for eval of right leg cellulitis and concern of pneumonia. Pt anxious about infection and doesnt believe that she has infection of R leg.Admits to have some SOB but denies any cough, sputum production,nausea , vomiting , abdominal pain,dysuria , diarrhea or constipation.Pt says she has gall stones. Stool color is light brown. Pt afebrile since admission , white cell count and lactate normalised . Direct Bilirubin and GGT trending up. ESR and CRP high.     PAST MEDICAL & SURGICAL HISTORY:  Lymphedema  Essential hypertension  Carpal tunnel syndrome of right wrist  Obesity (BMI 30-39.9)  Type 2 diabetes mellitus without complication  Hypothyroid  Essential hypertension  Obesity  HTN (hypertension)  DM (diabetes mellitus)  No significant past surgical history  S/P carpal tunnel release      penicillin (Hives)  penicillin (Rash)  sulfa drugs (Unknown)  Tetracycline Hydrochloride (Unknown)      Meds:  acetaminophen   Tablet .. 650 milliGRAM(s) Oral every 6 hours PRN  albuterol/ipratropium for Nebulization 3 milliLiter(s) Nebulizer every 6 hours PRN  benzocaine 15 mG/menthol 3.6 mG (Sugar-Free) Lozenge 1 Lozenge Oral every 4 hours PRN  cholecalciferol 2000 Unit(s) Oral daily  enoxaparin Injectable 40 milliGRAM(s) SubCutaneous daily  ethacrynic acid 25 milliGRAM(s) Oral two times a day  folic acid 1 milliGRAM(s) Oral daily  guaiFENesin   Syrup  (Sugar-Free) 100 milliGRAM(s) Oral every 6 hours PRN  insulin lispro (HumaLOG) corrective regimen sliding scale   SubCutaneous Before meals and at bedtime  labetalol 400 milliGRAM(s) Oral two times a day  losartan 100 milliGRAM(s) Oral daily  sodium chloride 0.9%. 1000 milliLiter(s) IV Continuous <Continuous>  traMADol 25 milliGRAM(s) Oral every 8 hours PRN  vancomycin  IVPB 1000 milliGRAM(s) IV Intermittent every 8 hours      SOCIAL HISTORY:  Smoker:  No   ETOH use:  No   Ilicit Drug use:  no   Occupation:  Assisted device use (Cane / Walker): walker  Live with: roommates     FAMILY HISTORY:  Family history of myocardial infarction (Sibling)  Family history of lung cancer      VITALS:  Vital Signs Last 24 Hrs  T(C): 36.8 (2019 08:10), Max: 37.4 (2019 17:27)  T(F): 98.2 (2019 08:10), Max: 99.3 (2019 17:27)  HR: 75 (2019 08:10) (75 - 102)  BP: 141/55 (2019 08:10) (141/55 - 177/80)  BP(mean): --  RR: 17 (2019 08:10) (17 - 20)  SpO2: 99% (2019 08:10) (93% - 99%)    LABS/DIAGNOSTIC TESTS:                          9.9    7.81  )-----------( 248      ( 2019 06:24 )             32.1     WBC Count: 7.81 K/uL ( @ 06:24)  WBC Count: 13.15 K/uL ( @ 07:46)          139  |  100  |  21<H>  ----------------------------<  128<H>  2.5<LL>   |  30  |  1.12    Ca    8.2<L>      2019 06:24  Phos  3.0       Mg     2.0         TPro  7.2  /  Alb  3.0<L>  /  TBili  4.6<H>  /  DBili  3.0<H>  /  AST  72<H>  /  ALT  127<H>  /  AlkPhos  167<H>        Urinalysis Basic - ( 2019 08:53 )    Color: Yellow / Appearance: Clear / S.010 / pH: x  Gluc: x / Ketone: Negative  / Bili: Negative / Urobili: Negative   Blood: x / Protein: 15 / Nitrite: Negative   Leuk Esterase: Negative / RBC: 0-2 /HPF / WBC 0-2 /HPF   Sq Epi: x / Non Sq Epi: Few /HPF / Bacteria: Few /HPF        LIVER FUNCTIONS - ( 2019 09:29 )  Alb: x     / Pro: x     / ALK PHOS: x     / ALT: x     / AST: x     / GGT: 270 U/L         PT/INR - ( 2019 07:46 )   PT: 12.7 sec;   INR: 1.14 ratio         PTT - ( 2019 07:46 )  PTT:36.1 sec    LACTATE:Lactate, Blood: 1.2 mmol/L ( @ 06:19)  Lactate, Blood: 2.5 mmol/L ( @ 14:53)      ABG -     CULTURES:   .Urine   @ 14:39   <10,000 CFU/mL Normal Urogenital Chitra  --  --    RADIOLOGY:  < from: CT Chest No Cont (19 @ 09:12) >  INTERPRETATION:  Chest CT without IV contrast    Indication: Shortness of breath.    Technique: Axial multidetector CT images of the chest are acquired   without IV contrast.    Comparison: No prior chest CT is available for comparison.    Findings: No pleural or pericardial effusion. Cardiomegaly. Aortic,   mitral annular and coronary artery calcifications are present. Mild   ectasia of the ascending aorta at 3.8 cm in diameter. Small collapsed   hiatal hernia versus distal esophageal mural thickening. EGD may be   pursued for further evaluation. Allowing for the noncontrast technique,   there is no grossly enlarged mediastinal, hilar or axillary lymph node.    The trachea and central bronchi are patent.    Mild scattered atelectasis bilaterally. No evidence for pulmonary   consolidation or pneumothorax.    Limited sections through the upper abdomen demonstrate colonic   diverticulosis. Nonspecific mild left adrenal thickening. Splenomegaly at   15.1 cm.    Impression: No evidence for pulmonary consolidation.    Small collapsed hiatal hernia versus distal esophageal mural thickening.   EGD may be pursued for further evaluation.    Splenomegaly.      < end of copied text >      < from: Xray Chest 1 View-PORTABLE IMMEDIATE (19 @ 07:45) >  EXAM:  XR CHEST PORTABLE IMMED 1V                            PROCEDURE DATE:  2019          INTERPRETATION:  CLINICAL INFORMATION: Shortness of breath.    TECHNIQUE: Single view of the chest.    COMPARISON: Chest radiograph 3/10/2018.    FINDINGS:     There is no focal consolidation. There are low lung volumes. The heart   size is enlarged, as on prior exam 3/10/2018. There is no pleural   effusion or pneumothorax.    IMPRESSION:     No focal consolidation.    < end of copied text >    ROS :   REVIEW OF SYSTEMS:  CONSTITUTIONAL: No fever , chills   EYES/ENT: No visual changes;  No vertigo or throat pain   RESPIRATORY: No cough, wheezing, hemoptysis; mild SOB  CARDIOVASCULAR: No chest pain or palpitations  GASTROINTESTINAL: No abdominal  pain. No nausea, vomiting. No diarrhea or constipation. No melena or hematochezia.  GENITOURINARY: No dysuria,hematuria , increased frequency   NEUROLOGICAL: No numbness or weakness  SKIN: No itching, rashes HPI:  69 year old Female Lives at home with roommate ambulates with a walker (pt was getting HHA visits) with PMH hypertension, CAD s/p 3 PAOLA , asthma, Chronic Lymphedema , venous stasis s/p venous ablation and carpal tunnel syndrome presenting to ED with worsening shortness of breath. . She was found to have elevated WBC and fever in ED to 102F. Patient is moaning sitting at edge of bed on my arrival. says she feels cold and chills. Also complained of back pain for which she takes Advil chronically. Pt denies wheezing, cough, chest pain, nausea, vomiting, diaphoresis, abdominal pain, dysuria or changes in bowel habits.     Pt is FULL CODE.   pt had colonoscopy 2 weeks ago for GIB, showed diverticulosis.    History as above, 69 y f morbidly obese with Asthma( not on inhalers) chronic Lymphedema of right leg and venous stasis s/p ablation of the Leg leg presented with SOB and fever at home. ID consulted for  eval of ?right leg cellulitis and concern of pneumonia. Pt anxious about infection and doesn't believe that she has infection of R leg.Admits to have some SOB but denies any cough, sputum production,nausea , vomiting , abdominal pain, dysuria , diarrhea or constipation. Pt says she has gall stones. Stool color is light brown. Pt afebrile since admission , white cell count and lactate normalized . Direct Bilirubin and GGT trending up. ESR and CRP high. Had dental work in the last 6 months.    PAST MEDICAL & SURGICAL HISTORY:  Lymphedema  Essential hypertension  Carpal tunnel syndrome of right wrist  Obesity (BMI 30-39.9)  Type 2 diabetes mellitus without complication  Hypothyroid  Essential hypertension  Obesity  HTN (hypertension)  DM (diabetes mellitus)  No significant past surgical history  S/P carpal tunnel release      penicillin (Hives)  penicillin (Rash)  sulfa drugs (Unknown)  Tetracycline Hydrochloride (Unknown)      Meds:  acetaminophen   Tablet .. 650 milliGRAM(s) Oral every 6 hours PRN  albuterol/ipratropium for Nebulization 3 milliLiter(s) Nebulizer every 6 hours PRN  benzocaine 15 mG/menthol 3.6 mG (Sugar-Free) Lozenge 1 Lozenge Oral every 4 hours PRN  cholecalciferol 2000 Unit(s) Oral daily  enoxaparin Injectable 40 milliGRAM(s) SubCutaneous daily  ethacrynic acid 25 milliGRAM(s) Oral two times a day  folic acid 1 milliGRAM(s) Oral daily  guaiFENesin   Syrup  (Sugar-Free) 100 milliGRAM(s) Oral every 6 hours PRN  insulin lispro (HumaLOG) corrective regimen sliding scale   SubCutaneous Before meals and at bedtime  labetalol 400 milliGRAM(s) Oral two times a day  losartan 100 milliGRAM(s) Oral daily  sodium chloride 0.9%. 1000 milliLiter(s) IV Continuous <Continuous>  traMADol 25 milliGRAM(s) Oral every 8 hours PRN  vancomycin  IVPB 1000 milliGRAM(s) IV Intermittent every 8 hours      SOCIAL HISTORY:  Smoker:  No   ETOH use:  No   Ilicit Drug use:  no   Occupation:  Assisted device use (Cane / Walker): walker  Live with: roommates     FAMILY HISTORY:  Family history of myocardial infarction (Sibling)  Family history of lung cancer      VITALS:  Vital Signs Last 24 Hrs  T(C): 36.8 (2019 08:10), Max: 37.4 (2019 17:27)  T(F): 98.2 (2019 08:10), Max: 99.3 (2019 17:27)  HR: 75 (2019 08:10) (75 - 102)  BP: 141/55 (2019 08:10) (141/55 - 177/80)  BP(mean): --  RR: 17 (2019 08:10) (17 - 20)  SpO2: 99% (2019 08:10) (93% - 99%)    LABS/DIAGNOSTIC TESTS:                          9.9    7.81  )-----------( 248      ( 2019 06:24 )             32.1     WBC Count: 7.81 K/uL ( @ 06:24)  WBC Count: 13.15 K/uL ( @ 07:46)          139  |  100  |  21<H>  ----------------------------<  128<H>  2.5<LL>   |  30  |  1.12    Ca    8.2<L>      2019 06:24  Phos  3.0       Mg     2.0         TPro  7.2  /  Alb  3.0<L>  /  TBili  4.6<H>  /  DBili  3.0<H>  /  AST  72<H>  /  ALT  127<H>  /  AlkPhos  167<H>        Urinalysis Basic - ( 2019 08:53 )    Color: Yellow / Appearance: Clear / S.010 / pH: x  Gluc: x / Ketone: Negative  / Bili: Negative / Urobili: Negative   Blood: x / Protein: 15 / Nitrite: Negative   Leuk Esterase: Negative / RBC: 0-2 /HPF / WBC 0-2 /HPF   Sq Epi: x / Non Sq Epi: Few /HPF / Bacteria: Few /HPF        LIVER FUNCTIONS - ( 2019 09:29 )  Alb: x     / Pro: x     / ALK PHOS: x     / ALT: x     / AST: x     / GGT: 270 U/L         PT/INR - ( 2019 07:46 )   PT: 12.7 sec;   INR: 1.14 ratio         PTT - ( 2019 07:46 )  PTT:36.1 sec    LACTATE:Lactate, Blood: 1.2 mmol/L ( @ 06:19)  Lactate, Blood: 2.5 mmol/L ( @ 14:53)      ABG -     CULTURES:   .Urine   @ 14:39   <10,000 CFU/mL Normal Urogenital Chitra  --  --    RADIOLOGY:  < from: CT Chest No Cont (19 @ 09:12) >  INTERPRETATION:  Chest CT without IV contrast    Indication: Shortness of breath.    Technique: Axial multidetector CT images of the chest are acquired   without IV contrast.    Comparison: No prior chest CT is available for comparison.    Findings: No pleural or pericardial effusion. Cardiomegaly. Aortic,   mitral annular and coronary artery calcifications are present. Mild   ectasia of the ascending aorta at 3.8 cm in diameter. Small collapsed   hiatal hernia versus distal esophageal mural thickening. EGD may be   pursued for further evaluation. Allowing for the noncontrast technique,   there is no grossly enlarged mediastinal, hilar or axillary lymph node.    The trachea and central bronchi are patent.    Mild scattered atelectasis bilaterally. No evidence for pulmonary   consolidation or pneumothorax.    Limited sections through the upper abdomen demonstrate colonic   diverticulosis. Nonspecific mild left adrenal thickening. Splenomegaly at   15.1 cm.    Impression: No evidence for pulmonary consolidation.    Small collapsed hiatal hernia versus distal esophageal mural thickening.   EGD may be pursued for further evaluation.    Splenomegaly.      < end of copied text >      < from: Xray Chest 1 View-PORTABLE IMMEDIATE (19 @ 07:45) >  EXAM:  XR CHEST PORTABLE IMMED 1V                            PROCEDURE DATE:  2019          INTERPRETATION:  CLINICAL INFORMATION: Shortness of breath.    TECHNIQUE: Single view of the chest.    COMPARISON: Chest radiograph 3/10/2018.    FINDINGS:     There is no focal consolidation. There are low lung volumes. The heart   size is enlarged, as on prior exam 3/10/2018. There is no pleural   effusion or pneumothorax.    IMPRESSION:     No focal consolidation.    < end of copied text >    ROS :   REVIEW OF SYSTEMS:  CONSTITUTIONAL: No fever , chills   EYES/ENT: No visual changes;  No vertigo or throat pain   RESPIRATORY: No cough, wheezing, hemoptysis; mild SOB  CARDIOVASCULAR: No chest pain or palpitations  GASTROINTESTINAL: No abdominal  pain. No nausea, vomiting. No diarrhea or constipation. No melena or hematochezia.  GENITOURINARY: No dysuria,hematuria , increased frequency   NEUROLOGICAL: No numbness or weakness  SKIN: No itching

## 2019-11-05 NOTE — PROGRESS NOTE ADULT - PROBLEM SELECTOR PLAN 1
- p/w SOB and sepsis and found to have Right LE redness, warmth and swelling from thigh to calf medially warm to touch with some tenderness  Left LE edema without any redness  - fall precautions   - UA -ve    - c/w pain meds Tylenol q6 while awake   - c/w vanco 1000 q8 weght base according eGFR (actual weight 122 Kg)   - f/u Vanco trough before 4th dose   - f/u Doppler LE to r/o DVT (s/p full dose lovenox)  -WBC normalized now - 7.8K  - ESR= 33, will follow  -  Lactate normalized 1.2  - f/u Blood Cx (Specimen received)  - f/u PT.   - f/u CM (pt was getting HHA visits)  ** ID Consulted Dr. Pettit

## 2019-11-05 NOTE — PROGRESS NOTE ADULT - PROBLEM SELECTOR PLAN 5
- Patient takes Losartan, Ethacrynic acid and Labetalol   - c/w Losartan , Ethacrynic acid (patient has reported sulfa allergy) and Labetalol with parameters  - Monitor BP closely and adjust medications if clinically indicated.  - DASH diet.

## 2019-11-05 NOTE — PROGRESS NOTE ADULT - ATTENDING COMMENTS
Patient is a 69y old  Female who presents with a chief complaint of Shortness of breath (2019 14:30), found to have sepsis with tachycardia, fever and leukocytosis. Was started on Vancomycin empirically for Cellulitis. CXR and CT chest neg for pneumonia. Patient found to have transaminitis and elevated ALP with direct bilirubin.   Patient reports sudden severe (8/10) sharp RUQ intermittent pain day before admission which resolved with antibiotics  (?) she had at home ( does not remember the name). She reports the RLE swelling and erythema is same for last 3 years, denies any pain. She reports having h/o gall stones.   She also reports her lymphedema for both LE is same for last 3 years.   PMH:  PMH hypertension, CAD s/p 3 PAOLA , asthma, Chronic Lymphedema , venous stasis s/p venous ablation and carpal tunnel syndrome, hypothyroidism.    Today:  Patient was seen and examined at bedside today  She reports feeling better today  SOB improved     REVIEW OF SYSTEMS: denies fever, chills, SOB, palpitations, chest pain, abdominal pain, nausea, vomiting, diarrhea, constipation, dizziness    MEDICATIONS  (STANDING):  aztreonam  IVPB 2000 milliGRAM(s) IV Intermittent once  aztreonam  IVPB 2000 milliGRAM(s) IV Intermittent every 8 hours  aztreonam  IVPB      cholecalciferol 2000 Unit(s) Oral daily  clindamycin IVPB 900 milliGRAM(s) IV Intermittent once  clindamycin IVPB 900 milliGRAM(s) IV Intermittent every 8 hours  clindamycin IVPB      enoxaparin Injectable 40 milliGRAM(s) SubCutaneous daily  ethacrynic acid 25 milliGRAM(s) Oral two times a day  folic acid 1 milliGRAM(s) Oral daily  insulin lispro (HumaLOG) corrective regimen sliding scale   SubCutaneous Before meals and at bedtime  labetalol 400 milliGRAM(s) Oral two times a day  losartan 100 milliGRAM(s) Oral daily  sodium chloride 0.9%. 1000 milliLiter(s) (50 mL/Hr) IV Continuous <Continuous>    MEDICATIONS  (PRN):  acetaminophen   Tablet .. 650 milliGRAM(s) Oral every 6 hours PRN Temp greater or equal to 38C (100.4F), Mild Pain (1 - 3)  albuterol/ipratropium for Nebulization 3 milliLiter(s) Nebulizer every 6 hours PRN Shortness of Breath and/or Wheezing  benzocaine 15 mG/menthol 3.6 mG (Sugar-Free) Lozenge 1 Lozenge Oral every 4 hours PRN Sore Throat  guaiFENesin   Syrup  (Sugar-Free) 100 milliGRAM(s) Oral every 6 hours PRN Cough  traMADol 25 milliGRAM(s) Oral every 8 hours PRN Moderate Pain (4 - 6)    PHYSICAL EXAM:  GENERAL: NAD, morbidly obese  NERVOUS SYSTEM:  Alert & Oriented X3, Good concentration; no focal deficit  CHEST/LUNG: Clear to auscultation bilaterally; No rales, rhonchi, wheezing, or rubs  HEART: Regular rate and rhythm; No murmurs, rubs, or gallops  ABDOMEN: obese, Soft, Nontender, Bowel sounds present, tender in Left renal angle (exam was difficult for body habitus)  EXTREMITIES: chronic lymphedema, right leg more swollen and erythematous then left, no tenderness or warmth  LYMPH: No lymphadenopathy noted  SKIN: No rashes or lesions  LABS:                      9.9    7.81  )-----------( 248      ( 2019 06:24 )             32.1     139  |  100  |  21<H>  ----------------------------<  128<H>  2.5<LL>   |  30  |  1.12    TPro  7.2  /  Alb  3.0<L>  /  TBili  4.6<H>  /  DBili  3.0<H>  /  AST  72<H>  /  ALT  127<H>  /  AlkPhos  167<H>  11-05  PT/INR - ( 2019 07:46 )   PT: 12.7 sec;   INR: 1.14 ratio    Urinalysis Basic - ( 2019 08:53 )  Color: Yellow / Appearance: Clear / S.010 / pH: x  Gluc: x / Ketone: Negative  / Bili: Negative / Urobili: Negative   Blood: x / Protein: 15 / Nitrite: Negative   Leuk Esterase: Negative / RBC: 0-2 /HPF / WBC 0-2 /HPF   Sq Epi: x / Non Sq Epi: Few /HPF / Bacteria: Few /HPF Patient is a 69y old  Female who presents with a chief complaint of Shortness of breath (05 Nov 2019 14:30), found to have sepsis with tachycardia, fever and leukocytosis. Was started on Vancomycin empirically for Cellulitis. CXR and CT chest neg for pneumonia. Patient found to have transaminitis and elevated ALP with direct bilirubin.   Patient reports sudden severe (8/10) sharp RUQ intermittent pain day before admission which resolved with antibiotics  (?) she had at home ( does not remember the name). She reports the RLE swelling and erythema is same for last 3 years, denies any pain. She reports having h/o renal stones before.  She also reports her lymphedema for both LE is same for last 3 years.   PMH:  PMH hypertension, CAD s/p 3 PAOLA , asthma, Chronic Lymphedema , venous stasis s/p venous ablation and carpal tunnel syndrome, hypothyroidism, recent GIB due to diverticulosis     Today:  Patient was seen and examined at bedside today  She reports feeling better today  SOB improved     REVIEW OF SYSTEMS: denies fever, chills, SOB, palpitations, chest pain, abdominal pain, nausea, vomiting, diarrhea, constipation, dizziness    MEDICATIONS  (STANDING):  aztreonam  IVPB 2000 milliGRAM(s) IV Intermittent once  aztreonam  IVPB 2000 milliGRAM(s) IV Intermittent every 8 hours  aztreonam  IVPB      cholecalciferol 2000 Unit(s) Oral daily  clindamycin IVPB 900 milliGRAM(s) IV Intermittent once  clindamycin IVPB 900 milliGRAM(s) IV Intermittent every 8 hours  clindamycin IVPB      enoxaparin Injectable 40 milliGRAM(s) SubCutaneous daily  ethacrynic acid 25 milliGRAM(s) Oral two times a day  folic acid 1 milliGRAM(s) Oral daily  insulin lispro (HumaLOG) corrective regimen sliding scale   SubCutaneous Before meals and at bedtime  labetalol 400 milliGRAM(s) Oral two times a day  losartan 100 milliGRAM(s) Oral daily  sodium chloride 0.9%. 1000 milliLiter(s) (50 mL/Hr) IV Continuous <Continuous>    MEDICATIONS  (PRN):  acetaminophen   Tablet .. 650 milliGRAM(s) Oral every 6 hours PRN Temp greater or equal to 38C (100.4F), Mild Pain (1 - 3)  albuterol/ipratropium for Nebulization 3 milliLiter(s) Nebulizer every 6 hours PRN Shortness of Breath and/or Wheezing  benzocaine 15 mG/menthol 3.6 mG (Sugar-Free) Lozenge 1 Lozenge Oral every 4 hours PRN Sore Throat  guaiFENesin   Syrup  (Sugar-Free) 100 milliGRAM(s) Oral every 6 hours PRN Cough  traMADol 25 milliGRAM(s) Oral every 8 hours PRN Moderate Pain (4 - 6)    PHYSICAL EXAM:  GENERAL: NAD, morbidly obese  NERVOUS SYSTEM:  Alert & Oriented X3, Good concentration; no focal deficit  CHEST/LUNG: Clear to auscultation bilaterally; No rales, rhonchi, wheezing, or rubs  HEART: Regular rate and rhythm; No murmurs, rubs, or gallops  ABDOMEN: obese, Soft, Nontender, Bowel sounds present, tender in Left renal angle (exam was difficult for body habitus)  EXTREMITIES: chronic lymphedema, right leg more swollen and erythematous then left, no tenderness or warmth  LYMPH: No lymphadenopathy noted  SKIN: No rashes or lesions  LABS:                      9.9    7.81  )-----------( 248      ( 05 Nov 2019 06:24 )             32.1     139  |  100  |  21<H>  ----------------------------<  128<H>  2.5<LL>   |  30  |  1.12    TPro  7.2  /  Alb  3.0<L>  /  TBili  4.6<H>  /  DBili  3.0<H>  /  AST  72<H>  /  ALT  127<H>  /  AlkPhos  167<H>  11-05  PT/INR - ( 04 Nov 2019 07:46 )   PT: 12.7 sec;   INR: 1.14 ratio    UA neg  Assessment and plan:  1. Sepsis present on admission   Possible source Cellulitis/ Cholecystitis  Unlikely PNA  Cellulitis improved today- Vancomycin discontinued; Started on Aztreonam and Clindamycin  Lactate trended down to normal  Will obtain CT abd with PO and IV contrast tomorrow   Trend LFTs  Send hepatitis panel    2. Abnormal LFT: both hepatocellular and obstructive pattern, will abd CT abd with PO and IV contrast   PCP Dr Edgar at [phone] was contacted, patient had blood work done on 9/24 when LFT was WNL    3. CAD s/p stents   Spoke with Dr Lisker [phone], TTE was normal  TTE noted here, Grade 1 diastolic dysfunction     4. Anemia   Recent H/o GIB  Obtain Anemia panel    5. HTN: Cont Home medications  6. DM: ISS  7. Hypothyroidism: Cont home dose synthroid  8. Supportive: Diet Diabetic, Lovenox for DVT prophylaxis, Fall precaution

## 2019-11-05 NOTE — CHART NOTE - NSCHARTNOTEFT_GEN_A_CORE
Paged by RN that potassium is 2.7 . KCl tablet 40mg x 3 was given. Please follow up in the morning.   Also pt is vomiting after dose of IV abx aztreonam. Zofran IV was given. Paged by RN that potassium is 2.7 . IV K 10mEq x 3 was given. Please follow up in the morning.   Also pt is vomiting after dose of IV abx aztreonam. Zofran IV was given.  Cr is trending up to 1.4. Paged by RN that potassium is 2.7 . IV K 10mEq x 3 was given. Cr is trending up to 1.4 and IVF (NS w/ 20mEq K) is started for 12 hrs. Please follow up BMP in the morning.   Also pt is vomiting after a dose of IV abx aztreonam. Zofran IV was given. Paged by RN that potassium is 2.7 . IV K 10mEq x 3 was given. Cr is trending up to 1.4 and IVF (NS w/ 20mEq K) is started for 12 hrs but pt is refusing as per RN.. Please follow up BMP in the morning.   Also pt is vomiting after a dose of IV abx aztreonam. Zofran IV was given. Pt had n/v again and IV Reglan was given.

## 2019-11-05 NOTE — PROGRESS NOTE ADULT - ASSESSMENT
69 year old Female Lives at home with roommate ambulates with a walker (pt was getting HHA visits) with PMH hypertension, CAD s/p 3 PAOLA , asthma, Chronic Lymphedema , venous stasis s/p venous ablation and carpal tunnel syndrome presenting to ED with worsening shortness of breath. She was found to have elevated WBC and fever in ED to 102F. WBC normalized now patient tolerating vanco, breathing improved with duonebs, Blood cultures pending. LFTs trending down, pending ultrasound liver and pancreas given obstructive pattern of labs and elevated GGT. Patient seen at bedside no apparent distress.

## 2019-11-06 ENCOUNTER — TRANSCRIPTION ENCOUNTER (OUTPATIENT)
Age: 69
End: 2019-11-06

## 2019-11-06 LAB
ALBUMIN SERPL ELPH-MCNC: 3.3 G/DL — LOW (ref 3.5–5)
ALP SERPL-CCNC: 186 U/L — HIGH (ref 40–120)
ALT FLD-CCNC: 107 U/L DA — HIGH (ref 10–60)
ANION GAP SERPL CALC-SCNC: 8 MMOL/L — SIGNIFICANT CHANGE UP (ref 5–17)
ANION GAP SERPL CALC-SCNC: 9 MMOL/L — SIGNIFICANT CHANGE UP (ref 5–17)
AST SERPL-CCNC: 41 U/L — HIGH (ref 10–40)
BASOPHILS # BLD AUTO: 0.02 K/UL — SIGNIFICANT CHANGE UP (ref 0–0.2)
BASOPHILS NFR BLD AUTO: 0.4 % — SIGNIFICANT CHANGE UP (ref 0–2)
BILIRUB DIRECT SERPL-MCNC: 2.7 MG/DL — HIGH (ref 0–0.2)
BILIRUB SERPL-MCNC: 3.6 MG/DL — HIGH (ref 0.2–1.2)
BUN SERPL-MCNC: 23 MG/DL — HIGH (ref 7–18)
BUN SERPL-MCNC: 24 MG/DL — HIGH (ref 7–18)
CALCIUM SERPL-MCNC: 8.5 MG/DL — SIGNIFICANT CHANGE UP (ref 8.4–10.5)
CALCIUM SERPL-MCNC: 8.6 MG/DL — SIGNIFICANT CHANGE UP (ref 8.4–10.5)
CHLORIDE SERPL-SCNC: 97 MMOL/L — SIGNIFICANT CHANGE UP (ref 96–108)
CHLORIDE SERPL-SCNC: 97 MMOL/L — SIGNIFICANT CHANGE UP (ref 96–108)
CHLORIDE UR-SCNC: 97 MMOL/L — SIGNIFICANT CHANGE UP (ref 55–125)
CO2 SERPL-SCNC: 33 MMOL/L — HIGH (ref 22–31)
CO2 SERPL-SCNC: 34 MMOL/L — HIGH (ref 22–31)
CREAT SERPL-MCNC: 1.36 MG/DL — HIGH (ref 0.5–1.3)
CREAT SERPL-MCNC: 1.37 MG/DL — HIGH (ref 0.5–1.3)
EOSINOPHIL # BLD AUTO: 0.05 K/UL — SIGNIFICANT CHANGE UP (ref 0–0.5)
EOSINOPHIL NFR BLD AUTO: 1.1 % — SIGNIFICANT CHANGE UP (ref 0–6)
FERRITIN SERPL-MCNC: 125 NG/ML — SIGNIFICANT CHANGE UP (ref 15–150)
FOLATE SERPL-MCNC: 12.6 NG/ML — SIGNIFICANT CHANGE UP
FOLATE SERPL-MCNC: 12.7 NG/ML — SIGNIFICANT CHANGE UP
GLUCOSE BLDC GLUCOMTR-MCNC: 124 MG/DL — HIGH (ref 70–99)
GLUCOSE BLDC GLUCOMTR-MCNC: 131 MG/DL — HIGH (ref 70–99)
GLUCOSE BLDC GLUCOMTR-MCNC: 140 MG/DL — HIGH (ref 70–99)
GLUCOSE BLDC GLUCOMTR-MCNC: 153 MG/DL — HIGH (ref 70–99)
GLUCOSE SERPL-MCNC: 130 MG/DL — HIGH (ref 70–99)
GLUCOSE SERPL-MCNC: 131 MG/DL — HIGH (ref 70–99)
HAPTOGLOB SERPL-MCNC: 253 MG/DL — HIGH (ref 34–200)
HAV IGM SER-ACNC: SIGNIFICANT CHANGE UP
HBV CORE IGM SER-ACNC: SIGNIFICANT CHANGE UP
HBV SURFACE AG SER-ACNC: SIGNIFICANT CHANGE UP
HCT VFR BLD CALC: 33.4 % — LOW (ref 34.5–45)
HCV AB S/CO SERPL IA: 0.18 S/CO — SIGNIFICANT CHANGE UP (ref 0–0.99)
HCV AB SERPL-IMP: SIGNIFICANT CHANGE UP
HGB BLD-MCNC: 10.1 G/DL — LOW (ref 11.5–15.5)
IMM GRANULOCYTES NFR BLD AUTO: 0.4 % — SIGNIFICANT CHANGE UP (ref 0–1.5)
IRON SATN MFR SERPL: 25 UG/DL — LOW (ref 40–150)
IRON SATN MFR SERPL: 9 % — LOW (ref 15–50)
LDH SERPL L TO P-CCNC: 188 U/L — SIGNIFICANT CHANGE UP (ref 120–225)
LYMPHOCYTES # BLD AUTO: 0.33 K/UL — LOW (ref 1–3.3)
LYMPHOCYTES # BLD AUTO: 7.4 % — LOW (ref 13–44)
MAGNESIUM SERPL-MCNC: 2 MG/DL — SIGNIFICANT CHANGE UP (ref 1.6–2.6)
MCHC RBC-ENTMCNC: 26.4 PG — LOW (ref 27–34)
MCHC RBC-ENTMCNC: 30.2 GM/DL — LOW (ref 32–36)
MCV RBC AUTO: 87.2 FL — SIGNIFICANT CHANGE UP (ref 80–100)
MONOCYTES # BLD AUTO: 0.63 K/UL — SIGNIFICANT CHANGE UP (ref 0–0.9)
MONOCYTES NFR BLD AUTO: 14.1 % — HIGH (ref 2–14)
NEUTROPHILS # BLD AUTO: 3.41 K/UL — SIGNIFICANT CHANGE UP (ref 1.8–7.4)
NEUTROPHILS NFR BLD AUTO: 76.6 % — SIGNIFICANT CHANGE UP (ref 43–77)
NRBC # BLD: 0 /100 WBCS — SIGNIFICANT CHANGE UP (ref 0–0)
OSMOLALITY UR: 319 MOS/KG — SIGNIFICANT CHANGE UP (ref 50–1200)
PHOSPHATE SERPL-MCNC: 3.3 MG/DL — SIGNIFICANT CHANGE UP (ref 2.5–4.5)
PLATELET # BLD AUTO: 258 K/UL — SIGNIFICANT CHANGE UP (ref 150–400)
POTASSIUM SERPL-MCNC: 2.5 MMOL/L — CRITICAL LOW (ref 3.5–5.3)
POTASSIUM SERPL-MCNC: 2.6 MMOL/L — CRITICAL LOW (ref 3.5–5.3)
POTASSIUM SERPL-SCNC: 2.5 MMOL/L — CRITICAL LOW (ref 3.5–5.3)
POTASSIUM SERPL-SCNC: 2.6 MMOL/L — CRITICAL LOW (ref 3.5–5.3)
POTASSIUM UR-SCNC: 22 MMOL/L — LOW (ref 25–125)
PROT ?TM UR-MCNC: 14 MG/DL — HIGH (ref 0–12)
PROT SERPL-MCNC: 7.7 G/DL — SIGNIFICANT CHANGE UP (ref 6–8.3)
RBC # BLD: 3.83 M/UL — SIGNIFICANT CHANGE UP (ref 3.8–5.2)
RBC # BLD: 3.83 M/UL — SIGNIFICANT CHANGE UP (ref 3.8–5.2)
RBC # FLD: 15.9 % — HIGH (ref 10.3–14.5)
RETICS #: 74.7 K/UL — SIGNIFICANT CHANGE UP (ref 25–125)
RETICS/RBC NFR: 2 % — SIGNIFICANT CHANGE UP (ref 0.5–2.5)
SODIUM SERPL-SCNC: 138 MMOL/L — SIGNIFICANT CHANGE UP (ref 135–145)
SODIUM SERPL-SCNC: 140 MMOL/L — SIGNIFICANT CHANGE UP (ref 135–145)
SODIUM UR-SCNC: 76 MMOL/L — SIGNIFICANT CHANGE UP (ref 40–220)
TIBC SERPL-MCNC: 277 UG/DL — SIGNIFICANT CHANGE UP (ref 250–450)
UIBC SERPL-MCNC: 252 UG/DL — SIGNIFICANT CHANGE UP (ref 110–370)
VIT B12 SERPL-MCNC: 494 PG/ML — SIGNIFICANT CHANGE UP (ref 232–1245)
VIT B12 SERPL-MCNC: 502 PG/ML — SIGNIFICANT CHANGE UP (ref 232–1245)
WBC # BLD: 4.46 K/UL — SIGNIFICANT CHANGE UP (ref 3.8–10.5)
WBC # FLD AUTO: 4.46 K/UL — SIGNIFICANT CHANGE UP (ref 3.8–10.5)

## 2019-11-06 PROCEDURE — 76705 ECHO EXAM OF ABDOMEN: CPT | Mod: 26

## 2019-11-06 PROCEDURE — 93970 EXTREMITY STUDY: CPT | Mod: 26

## 2019-11-06 PROCEDURE — 99233 SBSQ HOSP IP/OBS HIGH 50: CPT | Mod: GC

## 2019-11-06 PROCEDURE — 74177 CT ABD & PELVIS W/CONTRAST: CPT | Mod: 26

## 2019-11-06 RX ORDER — ONDANSETRON 8 MG/1
4 TABLET, FILM COATED ORAL ONCE
Refills: 0 | Status: COMPLETED | OUTPATIENT
Start: 2019-11-06 | End: 2019-11-06

## 2019-11-06 RX ORDER — POTASSIUM CHLORIDE 20 MEQ
10 PACKET (EA) ORAL
Refills: 0 | Status: DISCONTINUED | OUTPATIENT
Start: 2019-11-06 | End: 2019-11-06

## 2019-11-06 RX ORDER — SODIUM CHLORIDE 9 MG/ML
1000 INJECTION, SOLUTION INTRAVENOUS
Refills: 0 | Status: DISCONTINUED | OUTPATIENT
Start: 2019-11-06 | End: 2019-11-11

## 2019-11-06 RX ORDER — ZINC OXIDE 200 MG/G
1 OINTMENT TOPICAL
Refills: 0 | Status: DISCONTINUED | OUTPATIENT
Start: 2019-11-06 | End: 2019-11-13

## 2019-11-06 RX ORDER — POLYETHYLENE GLYCOL 3350 17 G/17G
17 POWDER, FOR SOLUTION ORAL ONCE
Refills: 0 | Status: COMPLETED | OUTPATIENT
Start: 2019-11-06 | End: 2019-11-06

## 2019-11-06 RX ORDER — POTASSIUM CHLORIDE 20 MEQ
40 PACKET (EA) ORAL EVERY 4 HOURS
Refills: 0 | Status: COMPLETED | OUTPATIENT
Start: 2019-11-06 | End: 2019-11-06

## 2019-11-06 RX ORDER — METOCLOPRAMIDE HCL 10 MG
10 TABLET ORAL ONCE
Refills: 0 | Status: COMPLETED | OUTPATIENT
Start: 2019-11-06 | End: 2019-11-06

## 2019-11-06 RX ORDER — LABETALOL HCL 100 MG
400 TABLET ORAL THREE TIMES A DAY
Refills: 0 | Status: DISCONTINUED | OUTPATIENT
Start: 2019-11-06 | End: 2019-11-10

## 2019-11-06 RX ORDER — SODIUM CHLORIDE 9 MG/ML
1000 INJECTION, SOLUTION INTRAVENOUS
Refills: 0 | Status: DISCONTINUED | OUTPATIENT
Start: 2019-11-06 | End: 2019-11-06

## 2019-11-06 RX ADMIN — Medication 100 MILLIEQUIVALENT(S): at 00:06

## 2019-11-06 RX ADMIN — Medication 2000 UNIT(S): at 12:38

## 2019-11-06 RX ADMIN — POLYETHYLENE GLYCOL 3350 17 GRAM(S): 17 POWDER, FOR SOLUTION ORAL at 22:43

## 2019-11-06 RX ADMIN — Medication 75 MICROGRAM(S): at 05:05

## 2019-11-06 RX ADMIN — Medication 100 MILLIGRAM(S): at 14:02

## 2019-11-06 RX ADMIN — ZINC OXIDE 1 APPLICATION(S): 200 OINTMENT TOPICAL at 17:58

## 2019-11-06 RX ADMIN — Medication 100 MILLIGRAM(S): at 23:34

## 2019-11-06 RX ADMIN — PREGABALIN 1000 MICROGRAM(S): 225 CAPSULE ORAL at 12:38

## 2019-11-06 RX ADMIN — IOHEXOL 30 MILLILITER(S): 300 INJECTION, SOLUTION INTRAVENOUS at 09:30

## 2019-11-06 RX ADMIN — SODIUM CHLORIDE 120 MILLILITER(S): 9 INJECTION, SOLUTION INTRAVENOUS at 14:02

## 2019-11-06 RX ADMIN — Medication 400 MILLIGRAM(S): at 14:11

## 2019-11-06 RX ADMIN — ONDANSETRON 4 MILLIGRAM(S): 8 TABLET, FILM COATED ORAL at 17:47

## 2019-11-06 RX ADMIN — Medication 40 MILLIEQUIVALENT(S): at 17:51

## 2019-11-06 RX ADMIN — DEXTROSE MONOHYDRATE, SODIUM CHLORIDE, AND POTASSIUM CHLORIDE 60 MILLILITER(S): 50; .745; 4.5 INJECTION, SOLUTION INTRAVENOUS at 01:04

## 2019-11-06 RX ADMIN — Medication 10 MILLIGRAM(S): at 05:04

## 2019-11-06 RX ADMIN — Medication 400 MILLIGRAM(S): at 22:43

## 2019-11-06 RX ADMIN — PANTOPRAZOLE SODIUM 40 MILLIGRAM(S): 20 TABLET, DELAYED RELEASE ORAL at 05:09

## 2019-11-06 RX ADMIN — LOSARTAN POTASSIUM 100 MILLIGRAM(S): 100 TABLET, FILM COATED ORAL at 05:05

## 2019-11-06 RX ADMIN — ETHACRYNIC ACID 25 MILLIGRAM(S): 25 TABLET ORAL at 05:05

## 2019-11-06 RX ADMIN — Medication 100 MILLIGRAM(S): at 14:01

## 2019-11-06 RX ADMIN — Medication 100 MILLIEQUIVALENT(S): at 01:03

## 2019-11-06 RX ADMIN — ONDANSETRON 4 MILLIGRAM(S): 8 TABLET, FILM COATED ORAL at 02:01

## 2019-11-06 RX ADMIN — Medication 40 MILLIEQUIVALENT(S): at 12:38

## 2019-11-06 RX ADMIN — Medication 400 MILLIGRAM(S): at 05:05

## 2019-11-06 RX ADMIN — Medication 40 MILLIEQUIVALENT(S): at 22:10

## 2019-11-06 RX ADMIN — SODIUM CHLORIDE 120 MILLILITER(S): 9 INJECTION, SOLUTION INTRAVENOUS at 17:54

## 2019-11-06 NOTE — PROGRESS NOTE ADULT - ATTENDING COMMENTS
Patient is a 69y old  Female who presents with a chief complaint of Shortness of breath (05 Nov 2019 14:30), found to have sepsis with tachycardia, fever and leukocytosis. Was started on Vancomycin empirically for Cellulitis. CXR and CT chest neg for pneumonia. Patient found to have transaminitis and elevated ALP with direct bilirubin.   Patient reports sudden severe (8/10) sharp RUQ intermittent pain day before admission which resolved with antibiotics  (?) she had at home ( does not remember the name). She reports the RLE swelling and erythema is same for last 3 years, denies any pain. She reports having h/o renal stones before.  She also reports her lymphedema for both LE is same for last 3 years.   PMH:  PMH hypertension, CAD s/p 3 PAOLA , asthma, Chronic Lymphedema , venous stasis s/p venous ablation and carpal tunnel syndrome, hypothyroidism, recent GIB due to diverticulosis     Today:  Patient was seen and examined at bedside today  Denies any pain  had nausea last night but it has resolved now    REVIEW OF SYSTEMS: denies fever, chills, SOB, palpitations, chest pain, abdominal pain    PHYSICAL EXAM:  GENERAL: NAD, morbidly obese  NERVOUS SYSTEM:  Alert & Oriented X3, Good concentration; no focal deficit  CHEST/LUNG: Clear to auscultation bilaterally; No rales, rhonchi, wheezing, or rubs  HEART: Regular rate and rhythm; No murmurs, rubs, or gallops  ABDOMEN: obese, Soft, Nontender, Bowel sounds present, (exam was difficult for body habitus)  EXTREMITIES: chronic lymphedema, right leg more swollen and erythematous then left, no tenderness or warmth    LABS:   138  |  97  |  23<H>  ----------------------------<  131<H>  2.5<LL>   |  33<H>  |  1.37<H>    TPro  7.7  /  Alb  3.3<L>  /  TBili  3.6<H>  /  DBili  2.7<H>  /  AST  41<H>  /  ALT  107<H>  /  AlkPhos  186<H>  11-06  PT/INR - ( 04 Nov 2019 07:46 )   PT: 12.7 sec;   INR: 1.14 ratio        Assessment and plan:  1. Sepsis present on admission   Possible source Cellulitis/ Cholecystitis  US shows sludge in GB with wall thickening   CT abd neg   Will obtain HIDA   Unlikely PNA  Cellulitis improved Vancomycin discontinued; Started on Aztreonam and Clindamycin  Trend LFTs    2. Abnormal LFT: both hepatocellular and obstructive pattern, CT abd and US noted   PCP Dr Edgar at [phone] was contacted, patient had blood work done on 9/24 when LFT was WNL    3. Hypokalemia: Supplemented accordingly, dc ethacrynic acid     3. CAD s/p stents   Spoke with Dr Lisker [phone], TTE was normal  TTE noted here, Grade 1 diastolic dysfunction     4. Anemia   Recent H/o GIB  Obtain Anemia panel    5. HTN: increase Labetalol to 400 TID   6. DM: ISS  7. Hypothyroidism: Cont home dose synthroid  8. Supportive: Diet Diabetic, Lovenox for DVT prophylaxis, Fall precaution

## 2019-11-06 NOTE — PROGRESS NOTE ADULT - PROBLEM SELECTOR PLAN 8
- pt has hx of CAD with 3 PAOLA  - not on ASA or Statins  - would hold statins for elevated LFTs - pt has hx of CAD with 3 PAOLA  - not on ASA or Statins  - hold statins for elevated LFTs

## 2019-11-06 NOTE — DISCHARGE NOTE PROVIDER - PROVIDER TOKENS
PROVIDER:[TOKEN:[[id number]]],PROVIDER:[TOKEN:[[id number]]] PROVIDER:[TOKEN:[[id number]]],PROVIDER:[TOKEN:[[id number]]],PROVIDER:[TOKEN:[[id number]]]

## 2019-11-06 NOTE — DISCHARGE NOTE PROVIDER - NSDCPNSUBOBJ_GEN_ALL_CORE
Patient is a 69y old  Female who presents with a chief complaint of Shortness of breath (05 Nov 2019 14:30), found to have sepsis with tachycardia, fever and leukocytosis. Was started on Vancomycin empirically for Cellulitis. CXR and CT chest neg for pneumonia. Patient found to have transaminitis and elevated ALP with direct bilirubin which improved. She had difficult to control HTN, metabolic alkalosis and persistent hypokalemia.         Today:    Patient was seen and examined at bedside    BP well controlled today        REVIEW OF SYSTEMS: denies fever, chills, SOB, palpitations, chest pain, abdominal pain, nausea, vomiting, diarrhea, constipation, dizziness        MEDICATIONS  (STANDING):    cholecalciferol 2000 Unit(s) Oral daily    cyanocobalamin 1000 MICROGram(s) Oral daily    enoxaparin Injectable 40 milliGRAM(s) SubCutaneous daily    folic acid 1 milliGRAM(s) Oral daily    hydrALAZINE 25 milliGRAM(s) Oral every 8 hours    insulin lispro (HumaLOG) corrective regimen sliding scale   SubCutaneous Before meals and at bedtime    labetalol 600 milliGRAM(s) Oral three times a day    levothyroxine 75 MICROGram(s) Oral daily    losartan 100 milliGRAM(s) Oral daily    pantoprazole    Tablet 40 milliGRAM(s) Oral before breakfast    potassium acid phosphate/sodium acid phosphate tablet (K-PHOS No. 2) 1 Tablet(s) Oral four times a day with meals    zinc oxide 20% Ointment 1 Application(s) Topical two times a day        MEDICATIONS  (PRN):    acetaminophen   Tablet .. 650 milliGRAM(s) Oral every 6 hours PRN Temp greater or equal to 38C (100.4F), Mild Pain (1 - 3)    albuterol/ipratropium for Nebulization 3 milliLiter(s) Nebulizer every 6 hours PRN Shortness of Breath and/or Wheezing    benzocaine 15 mG/menthol 3.6 mG (Sugar-Free) Lozenge 1 Lozenge Oral every 4 hours PRN Sore Throat    guaiFENesin   Syrup  (Sugar-Free) 100 milliGRAM(s) Oral every 6 hours PRN Cough    hydrocortisone 1% Cream 1 Application(s) Topical three times a day PRN Itching    polyethylene glycol 3350 17 Gram(s) Oral daily PRN Constipation            PHYSICAL EXAM:    GENERAL: NAD, morbidly obese    NERVOUS SYSTEM:  Alert & Oriented X3, Good concentration; no focal deficit    CHEST/LUNG: Clear to auscultation bilaterally; No rales, rhonchi, wheezing, or rubs    HEART: Regular rate and rhythm; No murmurs, rubs, or gallops    ABDOMEN: obese, Soft, Nontender, Bowel sounds present    EXTREMITIES: chronic BL lymphedema, right leg swelling and erythema has improved,  chronic skin change, no tenderness or warmth        LABS:                            10.8     5.17  )-----------( 354      ( 13 Nov 2019 05:33 )               36.5         11-13        138  |  99  |  16    ----------------------------<  135<H>    3.4<L>   |  34<H>  |  1.01        Ca    8.7      13 Nov 2019 05:33    Phos  4.1     11-13    Mg     2.3     11-13        Assessment and plan:     1. Persistent Hypokalemia with metabolic alkalosis with resistant HTN:     BP improved after adding Hydralazine today     Unlikely hyperaldo     Pending work up for Cushing syndrome and Pheochromocytoma    Renal US shows non obstructive calculus     Cont Labetalol 600mg TID, Losartan 100mg  and add Hydralazine 25mg TID    Patient reports allergy to Aldactone too    Endocrine and renal consult appreciated         2.  HTN: cont Labetalol to 600 TID and Losartan 100mg qd, would avoid CCB given chronic lymphedema, she is allergic to Norvasc     Cont Hydralazine 25mg TID         3. Sepsis present on admission - resolved    Possible source Cellulitis/ Cholecystitis with cholelithiasis; Unlikely PNA    S/p Vanc, Aztreonam and Clindamycin     Allergic to multiple antibiotics- off any antibiotics now         4. CAD s/p stents     Spoke with Dr Lisker- cardiologist [phone], TTE was normal    TTE noted here, Grade 1 diastolic dysfunction         5. TITO    Continue Folic acid    Start Iron supplement if patient agrees        6. DM: ISS        7. Hypothyroidism: cont home dose synthroid, TSH in 4 weeks         8. Chronic lymphedema: Cont Zinc Oxide and Hydrocortisone cream         Patient is stable to be discharged to [facility]. Discharge plan discussed with patient. She will follow up with Endocrine, nephrology and GI clinic as outpatient.

## 2019-11-06 NOTE — PROGRESS NOTE ADULT - SUBJECTIVE AND OBJECTIVE BOX
PGY 1 Note discussed with supervising resident and primary attending    Patient is a 69y old  Female who presents with a chief complaint of Shortness of breath (2019 14:30)      INTERVAL HPI/OVERNIGHT EVENTS: Patient seen and examined at the bedside.     MEDICATIONS  (STANDING):  aztreonam  IVPB 2000 milliGRAM(s) IV Intermittent every 8 hours  aztreonam  IVPB      cholecalciferol 2000 Unit(s) Oral daily  clindamycin IVPB 900 milliGRAM(s) IV Intermittent every 8 hours  clindamycin IVPB      cyanocobalamin 1000 MICROGram(s) Oral daily  enoxaparin Injectable 40 milliGRAM(s) SubCutaneous daily  ethacrynic acid 25 milliGRAM(s) Oral two times a day  folic acid 1 milliGRAM(s) Oral daily  insulin lispro (HumaLOG) corrective regimen sliding scale   SubCutaneous Before meals and at bedtime  iohexol 300 mG (iodine)/mL Oral Solution 30 milliLiter(s) Oral once  labetalol 400 milliGRAM(s) Oral two times a day  levothyroxine 75 MICROGram(s) Oral daily  losartan 100 milliGRAM(s) Oral daily  pantoprazole    Tablet 40 milliGRAM(s) Oral before breakfast  sodium chloride 0.9% with potassium chloride 20 mEq/L 1000 milliLiter(s) (60 mL/Hr) IV Continuous <Continuous>    MEDICATIONS  (PRN):  acetaminophen   Tablet .. 650 milliGRAM(s) Oral every 6 hours PRN Temp greater or equal to 38C (100.4F), Mild Pain (1 - 3)  albuterol/ipratropium for Nebulization 3 milliLiter(s) Nebulizer every 6 hours PRN Shortness of Breath and/or Wheezing  benzocaine 15 mG/menthol 3.6 mG (Sugar-Free) Lozenge 1 Lozenge Oral every 4 hours PRN Sore Throat  guaiFENesin   Syrup  (Sugar-Free) 100 milliGRAM(s) Oral every 6 hours PRN Cough      __________________________________________________  REVIEW OF SYSTEMS:    CONSTITUTIONAL: No fever,   EYES: no acute visual disturbances  NECK: No pain or stiffness  RESPIRATORY: No cough; No shortness of breath  CARDIOVASCULAR: No chest pain, no palpitations  GASTROINTESTINAL: No pain. No nausea or vomiting; No diarrhea   NEUROLOGICAL: No headache or numbness, no tremors  MUSCULOSKELETAL: No joint pain, no muscle pain  GENITOURINARY: no dysuria, no frequency, no hesitancy  PSYCHIATRY: no depression , no anxiety  ALL OTHER  ROS negative        Vital Signs Last 24 Hrs  T(C): 37 (2019 00:47), Max: 37 (2019 00:47)  T(F): 98.6 (2019 00:47), Max: 98.6 (2019 00:47)  HR: 75 (2019 05:11) (75 - 80)  BP: 167/92 (2019 05:11) (141/55 - 175/-)  BP(mean): --  RR: 17 (2019 00:47) (17 - 17)  SpO2: 89% (2019 00:47) (89% - 99%)    ________________________________________________  PHYSICAL EXAM:  GENERAL: NAD  HEENT: Normocephalic;  conjunctivae and sclerae clear; moist mucous membranes;   NECK : supple  CHEST/LUNG: Clear to auscultation bilaterally with good air entry   HEART: S1 S2  regular; no murmurs, gallops or rubs  ABDOMEN: Soft, Nontender, Nondistended; Bowel sounds present  EXTREMITIES: no cyanosis; no edema; no calf tenderness  SKIN: warm and dry; no rash  NERVOUS SYSTEM:  Awake and alert; Oriented  to place, person and time ; no new deficits    _________________________________________________  LABS:                        10.1   4.46  )-----------( 258      ( 2019 06:31 )             33.4     11-06    138  |  97  |  23<H>  ----------------------------<  131<H>  2.5<LL>   |  33<H>  |  1.37<H>    Ca    8.6      2019 06:31  Phos  3.3     11-  Mg     2.0     11    TPro  7.7  /  Alb  3.3<L>  /  TBili  3.6<H>  /  DBili  2.7<H>  /  AST  41<H>  /  ALT  107<H>  /  AlkPhos  186<H>  11-    PT/INR - ( 2019 07:46 )   PT: 12.7 sec;   INR: 1.14 ratio         PTT - ( 2019 07:46 )  PTT:36.1 sec  Urinalysis Basic - ( 2019 08:53 )    Color: Yellow / Appearance: Clear / S.010 / pH: x  Gluc: x / Ketone: Negative  / Bili: Negative / Urobili: Negative   Blood: x / Protein: 15 / Nitrite: Negative   Leuk Esterase: Negative / RBC: 0-2 /HPF / WBC 0-2 /HPF   Sq Epi: x / Non Sq Epi: Few /HPF / Bacteria: Few /HPF      CAPILLARY BLOOD GLUCOSE      POCT Blood Glucose.: 165 mg/dL (2019 21:22)  POCT Blood Glucose.: 115 mg/dL (2019 16:38)  POCT Blood Glucose.: 170 mg/dL (2019 11:53)  POCT Blood Glucose.: 173 mg/dL (2019 08:23)        RADIOLOGY & ADDITIONAL TESTS:    Imaging Personally Reviewed:  YES/NO    Consultant(s) Notes Reviewed:   YES/ No    Care Discussed with Consultants :     Plan of care was discussed with patient and /or primary care giver; all questions and concerns were addressed and care was aligned with patient's wishes. PGY 1 Note discussed with supervising resident and primary attending    Patient is a 69y old  Female who presents with a chief complaint of Shortness of breath (2019 14:30)    INTERVAL HPI/OVERNIGHT EVENTS: Patient seen and examined at the bedside. Patient noted to have low potassium levels since yesterday. Potassium levels continue to be low today. Patient to undergo CT scan of abdomen and hepatic US. Patient currently denies any concerns or complaints.     MEDICATIONS  (STANDING):  aztreonam  IVPB 2000 milliGRAM(s) IV Intermittent every 8 hours  aztreonam  IVPB      cholecalciferol 2000 Unit(s) Oral daily  clindamycin IVPB 900 milliGRAM(s) IV Intermittent every 8 hours  clindamycin IVPB      cyanocobalamin 1000 MICROGram(s) Oral daily  enoxaparin Injectable 40 milliGRAM(s) SubCutaneous daily  ethacrynic acid 25 milliGRAM(s) Oral two times a day  folic acid 1 milliGRAM(s) Oral daily  insulin lispro (HumaLOG) corrective regimen sliding scale   SubCutaneous Before meals and at bedtime  iohexol 300 mG (iodine)/mL Oral Solution 30 milliLiter(s) Oral once  labetalol 400 milliGRAM(s) Oral two times a day  levothyroxine 75 MICROGram(s) Oral daily  losartan 100 milliGRAM(s) Oral daily  pantoprazole    Tablet 40 milliGRAM(s) Oral before breakfast  sodium chloride 0.9% with potassium chloride 20 mEq/L 1000 milliLiter(s) (60 mL/Hr) IV Continuous <Continuous>    MEDICATIONS  (PRN):  acetaminophen   Tablet .. 650 milliGRAM(s) Oral every 6 hours PRN Temp greater or equal to 38C (100.4F), Mild Pain (1 - 3)  albuterol/ipratropium for Nebulization 3 milliLiter(s) Nebulizer every 6 hours PRN Shortness of Breath and/or Wheezing  benzocaine 15 mG/menthol 3.6 mG (Sugar-Free) Lozenge 1 Lozenge Oral every 4 hours PRN Sore Throat  guaiFENesin   Syrup  (Sugar-Free) 100 milliGRAM(s) Oral every 6 hours PRN Cough      __________________________________________________  REVIEW OF SYSTEMS:  CONSTITUTIONAL: No fever  EYES: no visual disturbances  NECK: No pain   RESPIRATORY: No cough; No shortness of breath  CARDIOVASCULAR: No chest pain  GASTROINTESTINAL: No pain. No nausea or vomiting   NEUROLOGICAL: No headache  MUSCULOSKELETAL: No joint pain, no muscle pain  GENITOURINARY: no dysuria   PSYCHIATRY: no anxiety  ALL OTHER  ROS negative        Vital Signs Last 24 Hrs  T(C): 37 (2019 00:47), Max: 37 (2019 00:47)  T(F): 98.6 (2019 00:47), Max: 98.6 (2019 00:47)  HR: 75 (2019 05:11) (75 - 80)  BP: 167/92 (2019 05:11) (141/55 - 175/-)  RR: 17 (2019 00:47) (17 - 17)  SpO2: 89% (2019 00:47) (89% - 99%)    ________________________________________________  PHYSICAL EXAM:  GENERAL: Patient seen sitting in chair and in no apparent distress  HEENT: Normocephalic; conjunctivae and sclerae clear   NECK: supple  CHEST/LUNG: Clear to auscultation bilaterally   HEART: S1 S2, regular; no murmurs   ABDOMEN: Soft, Nontender, Distended; Bowel sounds present  EXTREMITIES: chronic edema of lower extremities; no tenderness to palpation  SKIN: warm and dry  NERVOUS SYSTEM: Awake and alert; Oriented to place, person and time     _________________________________________________  LABS:                        10.1   4.46  )-----------( 258      ( 2019 06:31 )             33.4     11-06    138  |  97  |  23<H>  ----------------------------<  131<H>  2.5<LL>   |  33<H>  |  1.37<H>    Ca    8.6      2019 06:31  Phos  3.3       Mg     2.0         TPro  7.7  /  Alb  3.3<L>  /  TBili  3.6<H>  /  DBili  2.7<H>  /  AST  41<H>  /  ALT  107<H>  /  AlkPhos  186<H>  11-    PT/INR - ( 2019 07:46 )   PT: 12.7 sec;   INR: 1.14 ratio         PTT - ( 2019 07:46 )  PTT:36.1 sec  Urinalysis Basic - ( 2019 08:53 )    Color: Yellow / Appearance: Clear / S.010 / pH: x  Gluc: x / Ketone: Negative  / Bili: Negative / Urobili: Negative   Blood: x / Protein: 15 / Nitrite: Negative   Leuk Esterase: Negative / RBC: 0-2 /HPF / WBC 0-2 /HPF   Sq Epi: x / Non Sq Epi: Few /HPF / Bacteria: Few /HPF      CAPILLARY BLOOD GLUCOSE    POCT Blood Glucose.: 165 mg/dL (2019 21:22)  POCT Blood Glucose.: 115 mg/dL (2019 16:38)  POCT Blood Glucose.: 170 mg/dL (2019 11:53)  POCT Blood Glucose.: 173 mg/dL (2019 08:23)      RADIOLOGY & ADDITIONAL TESTS:    Imaging Personally Reviewed:  YES    < from: CT Chest No Cont (19 @ 09:12) >  Impression: No evidence for pulmonary consolidation.    Small collapsed hiatal hernia versus distal esophageal mural thickening.   EGD may be pursued for further evaluation.    Splenomegaly.    < end of copied text >    Consultant(s) Notes Reviewed:   YES/ No    Care Discussed with Consultants :     Plan of care was discussed with patient and /or primary care giver; all questions and concerns were addressed and care was aligned with patient's wishes.

## 2019-11-06 NOTE — PROGRESS NOTE ADULT - PROBLEM SELECTOR PLAN 5
- Patient takes Losartan, Ethacrynic acid and Labetalol   - c/w Losartan , Ethacrynic acid (patient has reported sulfa allergy) and Labetalol with parameters  - Monitor BP closely and adjust medications if clinically indicated.  - DASH diet. - Patient takes Losartan, Ethacrynic acid and Labetalol   - c/w Losartan,   - Labetalol increased to TID to help with high bp  - Monitor BP closely and adjust medications if clinically indicated.  - DASH diet  -hold ethacrynic acid due to elevated bicarb

## 2019-11-06 NOTE — DISCHARGE NOTE PROVIDER - CARE PROVIDER_API CALL
Jessy Graff (MD)  Internal Medicine  [address] [facility], [address]rd [place] [address]  Phone: [phone]  Follow Up Time:     Nasir Syed)  Nephrology  [address] [id number]  Phone: [phone]  Fax: [phone]  Follow Up Time: Jessy Graff (MD)  Internal Medicine  [address] [facility], [address]rd [place] [address]  Phone: [phone]  Follow Up Time:     Nasir Syed)  Nephrology  [address] [id number]  Phone: [phone]  Fax: [phone]  Follow Up Time:     Tai Rolon)  Gastroenterology; Internal Medicine  [address] [place] [address]  Phone: [phone]  Fax: [phone]  Follow Up Time:

## 2019-11-06 NOTE — PROGRESS NOTE ADULT - PROBLEM SELECTOR PLAN 4
-f/u anemia panel -possibly from iron def  -will continue to monitor  -if sudden drop in Hgb, do FOBT and consult GI

## 2019-11-06 NOTE — PROGRESS NOTE ADULT - PROBLEM SELECTOR PLAN 9
IMPROVE VTE Individual Risk Assessment    RISK                                                                Points    [  ] Previous VTE                                                  3    [  ] Thrombophilia                                               2    [  ] Lower limb paralysis                                      2        (unable to hold up >15 seconds)    [  ] Current Cancer                                              2         (within 6 months)  [X] Immobilization > 24 hrs                                1  [  ] ICU/CCU stay > 24 hours                              1  [X] Age > 60                                                      1    IMPROVE VTE Score _____2____  c/w Lovenox 40 mg qd  no need for GI ppx. IMPROVE VTE Individual Risk Assessment    RISK                                                                Points    [  ] Previous VTE                                                  3    [  ] Thrombophilia                                               2    [  ] Lower limb paralysis                                      2        (unable to hold up >15 seconds)    [  ] Current Cancer                                              2         (within 6 months)  [X] Immobilization > 24 hrs                                1  [  ] ICU/CCU stay > 24 hours                              1  [X] Age > 60                                                      1    IMPROVE VTE Score _____2____  c/w Lovenox 40 mg qd

## 2019-11-06 NOTE — PHYSICAL THERAPY INITIAL EVALUATION ADULT - CRITERIA FOR SKILLED THERAPEUTIC INTERVENTIONS
therapy frequency/predicted duration of therapy intervention/rehab potential/anticipated discharge recommendation/impairments found

## 2019-11-06 NOTE — PROGRESS NOTE ADULT - PROBLEM SELECTOR PLAN 1
- p/w SOB and sepsis and found to have Right LE redness, warmth and swelling; resolving  - currently afebrile   - Lactate currently wnl  - UA -ve    - c/w pain meds Tylenol q6    - as per ID, discontinue vancomycin    - f/u Doppler LE to r/o DVT (s/p full dose lovenox)  - Blood Cx and urine cx pending   - f/u PT   -CT abdomen with oral and IV contrast ordered to evaluate for cholecystitis  -f/u ID Dr. Pettit - p/w SOB and sepsis and found to have Right LE redness, warmth and swelling; resolving  - currently afebrile   - c/w pain meds Tylenol q6    -concern for possible cholecystitis  - continue clindamycin and azactam  - f/u Doppler LE to r/o DVT    - Blood Cx and urine cx negative  - f/u PT eval  -CT abdomen with oral and IV contrast ordered to evaluate for cholecystitis  -f/u ID Dr. Pettit  -f/u US hepatic - p/w SOB and sepsis and found to have Right LE redness, warmth and swelling; resolving  - currently afebrile   - c/w pain meds Tylenol q6    -concern for possible cholecystitis  - continue clindamycin and azactam  - f/u Doppler LE to r/o DVT    - Blood Cx and urine cx negative  - f/u PT eval  -CT abdomen with oral and IV contrast ordered to evaluate for cholecystitis  -f/u ID Dr. Pettit  -f/u US hepatic  -ANIKA possibly due to dehydration and vancomycin; will give LR for 24 hours and monitor for improvement

## 2019-11-06 NOTE — DISCHARGE NOTE PROVIDER - NSDCCPCAREPLAN_GEN_ALL_CORE_FT
PRINCIPAL DISCHARGE DIAGNOSIS  Diagnosis: Sepsis  Assessment and Plan of Treatment:       SECONDARY DISCHARGE DIAGNOSES  Diagnosis: Hypokalemia  Assessment and Plan of Treatment: Hypokalemia    Diagnosis: Anemia  Assessment and Plan of Treatment: Anemia    Diagnosis: Liver function test abnormality  Assessment and Plan of Treatment: Liver function test abnormality    Diagnosis: Essential hypertension  Assessment and Plan of Treatment: Essential hypertension    Diagnosis: ANIKA (acute kidney injury)  Assessment and Plan of Treatment: ANIKA (acute kidney injury)    Diagnosis: Hypothyroid  Assessment and Plan of Treatment: Hypothyroid PRINCIPAL DISCHARGE DIAGNOSIS  Diagnosis: Sepsis  Assessment and Plan of Treatment: Patient came to the ED with worsening shortness of breath.  Noted to have some possible cellulitis of RLE.   She was found to have elevated WBC and fever in ED to 102F.   Given a dose of vancomycin and aztreonam in ED.   UA was negative. Treated with pain meds Tylenol q6.   Vancomycin was continued. Infectious Diseases Dr. Pettit was consulted.   Vancomycin was discontinued and patient was started on aztreonam and clindamycin.   Patient was discontinued off of antibiotics and did not need further antibiotics.   Doppler done of lower extremities showed no signs of DVT.   Blood and urine cultures were negative.   ECHO showed grade I diastolic dysfunction. Flu panel was negative.   Patient noted to have elevated LFTs. Hepatic US showed gallstones. LFTs downtrended during admission.  Recommend to follow up with your PCP within 1 week for further evaluation and management.      SECONDARY DISCHARGE DIAGNOSES  Diagnosis: Hypokalemia  Assessment and Plan of Treatment: You were noted to have low potassium levels during admission. You were supplemented with oral and IV potassium.    Diagnosis: Anemia  Assessment and Plan of Treatment: You were noted to have low hemoglobin levels. Your Hgb levels were stable during admission.    Diagnosis: Liver function test abnormality  Assessment and Plan of Treatment: Patient noted to have elevated LFTs. Hepatic US showed gallstones. LFTs downtrended during admission.  Follow up with your PCP for further evaluation and management.    Diagnosis: Essential hypertension  Assessment and Plan of Treatment: You were noted to have high blood pressure despite being on losartan and labetalol. You were also started on clonidine. Renal US was ordered.   Recommended to have a low salt/DASH diet. Follow up with PCP for management of hypertension.    Diagnosis: ANIKA (acute kidney injury)  Assessment and Plan of Treatment: Noted to have elevated creatinine levels while in hospital. Treated with IVF. Likely due to contrast from CT scan and from vancomycin. Your creatinine level returned to normal limits.    Diagnosis: Hypothyroid  Assessment and Plan of Treatment: Continue your home medications. Follow up with your PCP for further evaluation and management. PRINCIPAL DISCHARGE DIAGNOSIS  Diagnosis: Sepsis  Assessment and Plan of Treatment: Patient came to the ED with worsening shortness of breath.  Noted to have some possible cellulitis of RLE.   She was found to have elevated WBC and fever in ED to 102F.   Given a dose of vancomycin and aztreonam in ED.   UA was negative. Treated with pain meds Tylenol q6.   Infectious Diseases Dr. Pettit was consulted.   Vancomycin was discontinued and patient was started on aztreonam and clindamycin.   Patient was discontinued off of antibiotics and did not need further antibiotics.   Doppler done of lower extremities showed no signs of DVT.   Blood and urine cultures were negative.   ECHO showed grade I diastolic dysfunction. Flu panel was negative.   Patient noted to have elevated LFTs. Hepatic US showed gallstones. LFTs downtrended during admission.  Recommend to follow up with your PCP within 1 week for further evaluation and management.      SECONDARY DISCHARGE DIAGNOSES  Diagnosis: Hypokalemia  Assessment and Plan of Treatment: You were noted to have low potassium levels during admission. You were supplemented with oral and IV potassium. Your low potassium was likely due to ethacrynic acid. Your potassium levels have been improving since discontinuing the ethacrynic acid.  Recommend to continue taking potassium supplement daily. Recommended to repeat BMP with your PCP within 3 days to follow up potassium levels. Follow up with nephrologist, Dr. Syed as outpatient for further evaluation and management.    Diagnosis: Anemia  Assessment and Plan of Treatment: You were noted to have low hemoglobin levels. Your Hgb levels were stable during admission.   Follow up with your PCP within 1 week for further evaluation and management.    Diagnosis: Liver function test abnormality  Assessment and Plan of Treatment: Patient noted to have elevated LFTs. Hepatic US showed gallstones. LFTs downtrended during admission.  Follow up with your PCP for further evaluation and management.    Diagnosis: Essential hypertension  Assessment and Plan of Treatment: You were noted to have high blood pressure despite being on losartan and labetalol. You were started on hydralazine which helped improve your blood pressure. Renal US done showed nonobstructing calculi. Left kidney slightly smaller than the left. Renin and aldosterone levels are pending to rule out secondary causes of hypertension.   Recommended to have a low salt/DASH diet. Follow up with PCP for management of hypertension. Follow up with endocrinologist, Dr. Graff for further evaluation and recommendation. May increase hydralazine dose if needed for better blood pressure control.    Diagnosis: ANIKA (acute kidney injury)  Assessment and Plan of Treatment: Noted to have elevated creatinine levels while in hospital. Treated with IVF. Likely due to contrast from CT scan and from  Your creatinine level returned to normal limits.  Recommended to follow up with PCP for further evaluation. Follow up with nephrologist, Dr. Syed as outpatient.    Diagnosis: Hypothyroid  Assessment and Plan of Treatment: Continue your home medications of synthroid. Follow up with your PCP for further evaluation and management. Recommended to repeat TSH levels in about 2-4 weeks. PRINCIPAL DISCHARGE DIAGNOSIS  Diagnosis: Sepsis  Assessment and Plan of Treatment: Patient came to the ED with worsening shortness of breath.  Noted to have some possible cellulitis of RLE.   She was found to have elevated WBC and fever in ED to 102F.   Given a dose of vancomycin and aztreonam in ED.   UA was negative. Treated with pain meds Tylenol q6.   Infectious Diseases Dr. Pettit was consulted.   Vancomycin was discontinued and patient was started on aztreonam and clindamycin.   Patient was discontinued off of antibiotics and did not need further antibiotics.   Doppler done of lower extremities showed no signs of DVT.   Blood and urine cultures were negative.   ECHO showed grade I diastolic dysfunction. Flu panel was negative.   Patient noted to have elevated LFTs. Hepatic US showed gallstones. LFTs downtrended during admission.  Recommend to follow up with your PCP within 1 week for further evaluation and management.      SECONDARY DISCHARGE DIAGNOSES  Diagnosis: Hypokalemia  Assessment and Plan of Treatment: You were noted to have low potassium levels during admission. You were supplemented with oral and IV potassium. Your low potassium was likely due to ethacrynic acid. Your potassium levels have been improving since discontinuing the ethacrynic acid.  Recommend to continue taking potassium supplement daily. Recommended to repeat BMP with your PCP within 3 days to follow up potassium levels. Follow up with nephrologist, Dr. Syed as outpatient for further evaluation and management.    Diagnosis: Anemia  Assessment and Plan of Treatment: You were noted to have low hemoglobin levels. Your Hgb levels were stable during admission.   Follow up with your PCP within 1 week for further evaluation and management.    Diagnosis: Liver function test abnormality  Assessment and Plan of Treatment: Patient noted to have elevated LFTs. Hepatic US showed gallstones. LFTs downtrended during admission.  Follow up with your PCP for further evaluation and management. Can repeat LFTs as outpatient once acute issues resolved as per Dr. Gonzales ZAYAS.    Diagnosis: Essential hypertension  Assessment and Plan of Treatment: You were noted to have high blood pressure despite being on losartan and labetalol. You were started on hydralazine which helped improve your blood pressure. Renal US done showed nonobstructing calculi. Left kidney slightly smaller than the left. Renin and aldosterone levels are pending to rule out secondary causes of hypertension.   Recommended to have a low salt/DASH diet. Follow up with PCP for management of hypertension. Follow up with endocrinologist, Dr. Graff for further evaluation and recommendation. May increase hydralazine dose if needed for better blood pressure control.    Diagnosis: ANIKA (acute kidney injury)  Assessment and Plan of Treatment: Noted to have elevated creatinine levels while in hospital. Treated with IVF. Likely due to contrast from CT scan and from  Your creatinine level returned to normal limits.  Recommended to follow up with PCP for further evaluation. Follow up with nephrologist, Dr. Syed as outpatient.    Diagnosis: Hypothyroid  Assessment and Plan of Treatment: Continue your home medications of synthroid. Follow up with your PCP for further evaluation and management. Recommended to repeat TSH levels in about 2-4 weeks. PRINCIPAL DISCHARGE DIAGNOSIS  Diagnosis: Sepsis  Assessment and Plan of Treatment: She was found to have elevated WBC and fever in ED to 102F. Noted to have possible cellulitis of RLE. Given a dose of vancomycin and aztreonam in ED and continued on Vancomycin. Infectious Diseases Dr. Pettit was consulted. CXR and CT abd did not show any PNA. She was also noted to have transaminitis and elevated Bilirubin and Alk Phos. Vancomycin was discontinued and patient was started on aztreonam and clindamycin. Hepatic US showed gallstones but no dilatation of CBD. Patient was to have HIDA scan done but refused due to length of exam and not being able to lay back for a long period of time. LFTs downtrended during admission. Blood and urine cultures were negative. Patient was discontinued off of antibiotics and did not need further antibiotics.      SECONDARY DISCHARGE DIAGNOSES  Diagnosis: Essential hypertension  Assessment and Plan of Treatment: You were noted to have high blood pressure despite being on losartan and labetalol. You were started on hydralazine which helped improve your blood pressure. Renal US done showed nonobstructing calculi. Left kidney slightly smaller than the left. Renin and aldosterone levels are pending to rule out secondary causes of hypertension.   Recommended to have a low salt/DASH diet. Follow up with PCP for management of hypertension. Follow up with endocrinologist, Dr. Graff for further evaluation and recommendation. May increase hydralazine dose if needed for better blood pressure control.    Diagnosis: Hypothyroid  Assessment and Plan of Treatment: Continue your home medications of synthroid. Follow up with your PCP for further evaluation and management. Recommended to repeat TSH levels in about 2-4 weeks.    Diagnosis: Liver function test abnormality  Assessment and Plan of Treatment: Patient noted to have elevated LFTs. Hepatic US showed gallstones. LFTs downtrended during admission.  Follow up with your PCP for further evaluation and management. Can repeat LFTs as outpatient once acute issues resolved as per Dr. Gonzales ZAYAS.    Diagnosis: Anemia  Assessment and Plan of Treatment: You were noted to have low hemoglobin levels. Your Hgb levels were stable during admission.   Follow up with your PCP within 1 week for further evaluation and management.    Diagnosis: Hypokalemia  Assessment and Plan of Treatment: You were noted to have low potassium levels during admission. You were supplemented with oral and IV potassium. Your low potassium was likely due to ethacrynic acid. Your potassium levels have been improving since discontinuing the ethacrynic acid.  Recommend to continue taking potassium supplement daily. Recommended to repeat BMP with your PCP within 3 days to follow up potassium levels. Follow up with nephrologist, Dr. Syed as outpatient for further evaluation and management.    Diagnosis: ANIKA (acute kidney injury)  Assessment and Plan of Treatment: Noted to have elevated creatinine levels while in hospital. Treated with IVF. Likely due to contrast from CT scan and from  Your creatinine level returned to normal limits.  Recommended to follow up with PCP for further evaluation. Follow up with nephrologist, Dr. Syed as outpatient.

## 2019-11-06 NOTE — DISCHARGE NOTE PROVIDER - NSDCMRMEDTOKEN_GEN_ALL_CORE_FT
cholecalciferol oral tablet: 2000 unit(s) orally once a day  ethacrynic acid 25 mg oral tablet: 1 tab(s) orally 2 times a day  folic acid 1 mg oral tablet: 1 tab(s) orally once a day  labetalol 200 mg oral tablet: 2 tab(s) orally 2 times a day  levothyroxine 75 mcg (0.075 mg) oral tablet: 1 tab(s) orally once a day  losartan 100 mg oral tablet: 1 tab(s) orally once a day   pantoprazole 40 mg oral delayed release tablet: 1 tab(s) orally once a day  Vitamin B-12 1000 mcg oral tablet: 1 tab(s) orally once a day cholecalciferol oral tablet: 2000 unit(s) orally once a day  folic acid 1 mg oral tablet: 1 tab(s) orally once a day  hydrALAZINE 25 mg oral tablet: 1 tab(s) orally every 8 hours  hydrocortisone 1% topical cream: 1 application topically 3 times a day, As needed, Itching  K-Tab 20 mEq oral tablet, extended release: 2 tab(s) orally once a day   labetalol 300 mg oral tablet: 2 tab(s) orally 3 times a day  levothyroxine 75 mcg (0.075 mg) oral tablet: 1 tab(s) orally once a day  losartan 100 mg oral tablet: 1 tab(s) orally once a day  pantoprazole 40 mg oral delayed release tablet: 1 tab(s) orally once a day  Vitamin B-12 1000 mcg oral tablet: 1 tab(s) orally once a day  zinc oxide 20% topical ointment: 1 application topically 2 times a day

## 2019-11-06 NOTE — CHART NOTE - NSCHARTNOTEFT_GEN_A_CORE
Am lab showed K is 2.5, trended down after 3 rider + IVF w/ Kcl. I ordered BMP STAT again. Please f/u repeat BMP.

## 2019-11-06 NOTE — PROGRESS NOTE ADULT - PROBLEM SELECTOR PLAN 6
- Patient takes no medications at home   - well controlled DM   - will start sliding scale  - Monitor blood sugars AC/HS and adjust as needed  - goal -180.   - f/u HgA1c  - Carbohydrate consistent diabetic diet with evening snacks. - Patient takes no medications at home   - well controlled DM   - continue sliding scale  - Monitor blood sugars AC/HS and adjust as needed  - goal -180.   - Carbohydrate consistent diabetic diet with evening snacks.

## 2019-11-06 NOTE — DISCHARGE NOTE PROVIDER - HOSPITAL COURSE
Patient is a 69 year old female, who lives at home with roommate, ambulates with a walker (pt was getting HHA visits), with PMH of hypertension, CAD s/p 3 PAOLA , asthma, Chronic Lymphedema , venous stasis s/p venous ablation and carpal tunnel syndrome presenting to ED with worsening shortness of breath. . She was found to have elevated WBC and fever in ED to 102F. Patient is moaning sitting at edge of bed on my arrival. says she feels cold and chills. Also complained of back pain for which she takes Advil chronically. Pt denies wheezing, cough, chest pain, nausea, vomiting, diaphoresis, abdominal pain, dysuria or changes in bowel habits. Patient is a 69 year old female, who lives at home with roommate, ambulates with a walker (pt was getting HHA visits), with PMH of hypertension, CAD s/p 3 PAOLA, asthma, chronic lymphedema, venous stasis s/p venous ablation and carpal tunnel syndrome presenting to the ED with worsening shortness of breath.        Noted to have some possible cellulitis of RLE.         She was found to have elevated WBC and fever in ED to 102F.         Given a dose of vancomycin and aztreonam in ED.         UA was negative. Treated with pain meds Tylenol q6.         Vancomycin was continued. Infectious Diseases Dr. Pettit was consulted.     Vancomycin was discontinued and patient was started on aztreonam and clindamycin.         Doppler done of lower extremities showed no signs of DVT.         Blood and urine cultures were negative.         ECHO showed grade I diastolic dysfunction. Flu panel was negative.         Patient noted to have elevated LFTs. Hepatic US showed gallstones. LFTs downtrended during admission.        Patient was to have HIDA scan done but refused due to length of exam and not being able to lay back for a long     period of time.         Noted to have hypokalemia on admission which was not being corrected with supplements. Patient's ethacrynic acid was     held due to possible hypokalemia from diuretics. IVF with potassium given.         Elevated creatinine during admission possibly due to diuretic use and vancomycin. Treated with IVF.         NOTE NOT COMPLETE Patient is a 69 year old female, who lives at home with roommate, ambulates with a walker (pt was getting HHA visits), with PMH of hypertension, CAD s/p 3 PAOLA, asthma, chronic lymphedema, venous stasis s/p venous ablation and carpal tunnel syndrome presenting to the ED with worsening shortness of breath.        Noted to have some possible cellulitis of RLE.         She was found to have elevated WBC and fever in ED to 102F.         Given a dose of vancomycin and aztreonam in ED.         UA was negative. Treated with pain meds Tylenol q6.         Vancomycin was continued. Infectious Diseases Dr. Pettit was consulted.     Vancomycin was discontinued and patient was started on aztreonam and clindamycin.     Patient was discontinued off of antibiotics and did not need further antibiotics.         Doppler done of lower extremities showed no signs of DVT.         Blood and urine cultures were negative.         ECHO showed grade I diastolic dysfunction. Flu panel was negative.         Patient noted to have elevated LFTs. Hepatic US showed gallstones. LFTs downtrended during admission.        Patient was to have HIDA scan done but refused due to length of exam and not being able to lay back for a long     period of time.         Noted to have hypokalemia on admission which was not being corrected with supplements. Patient's ethacrynic acid was     held due to possible hypokalemia from diuretics. IVF with potassium given.         Elevated creatinine during admission possibly due to diuretic use and vancomycin. Treated with IVF. Creatinine now within normal limits.         NOTE NOT COMPLETE Patient is a 69 year old female, who lives at home with roommate, ambulates with a walker (pt was getting HHA visits), with PMH of hypertension, CAD s/p 3 PAOLA, asthma, chronic lymphedema, venous stasis s/p venous ablation and carpal tunnel syndrome presenting to the ED with worsening shortness of breath.        Noted to have some possible cellulitis of RLE.         She was found to have elevated WBC and fever in ED to 102F.         Given a dose of vancomycin and aztreonam in ED.         UA was negative. Treated with pain meds Tylenol q6.         Vancomycin was continued. Infectious Diseases Dr. Pettit was consulted.     Vancomycin was discontinued and patient was started on aztreonam and clindamycin.     Patient was discontinued off of antibiotics and did not need further antibiotics.         Doppler done of lower extremities showed no signs of DVT.         Blood and urine cultures were negative.         ECHO showed grade I diastolic dysfunction. Flu panel was negative.         Patient noted to have elevated LFTs. Hepatic US showed gallstones. LFTs downtrended during admission.        Patient was to have HIDA scan done but refused due to length of exam and not being able to lay back for a long     period of time.         Noted to have hypokalemia on admission which was not being corrected with supplements. Patient's ethacrynic acid was     held due to possible hypokalemia from diuretics. IVF with potassium given.         Elevated creatinine during admission possibly due to diuretic use and vancomycin. Treated with IVF. Creatinine now within normal limits.         Patient noted to have elevated blood pressure despite being on losartan and labetalol. Clonidine was added. Nephro consulted. Renal US ordered.         NOTE NOT COMPLETE Patient is a 69 year old female, who lives at home with roommate, ambulates with a walker (pt was getting HHA visits), with PMH of hypertension, CAD s/p 3 PAOLA, asthma, chronic lymphedema, venous stasis s/p venous ablation and carpal tunnel syndrome presenting to the ED with worsening shortness of breath.        Noted to have some possible cellulitis of RLE.         She was found to have elevated WBC and fever in ED to 102F.         Given a dose of vancomycin and aztreonam in ED.         UA was negative. Treated with pain meds Tylenol q6.         Vancomycin was continued. Infectious Diseases Dr. Pettit was consulted.     Vancomycin was discontinued and patient was started on aztreonam and clindamycin.     Patient was discontinued off of antibiotics and did not need further antibiotics.         Doppler done of lower extremities showed no signs of DVT.         Blood and urine cultures were negative.         ECHO showed grade I diastolic dysfunction. Flu panel was negative.         Patient noted to have elevated LFTs. Hepatic US showed gallstones. LFTs downtrended during admission.        Patient was to have HIDA scan done but refused due to length of exam and not being able to lay back for a long     period of time.         Noted to have hypokalemia on admission which was not being corrected with supplements. Patient's ethacrynic acid was     held due to possible hypokalemia from diuretics. IVF with potassium given.         Elevated creatinine during admission possibly due to diuretic use and vancomycin. Treated with IVF. Creatinine now within normal limits.         Patient noted to have elevated blood pressure despite being on losartan and labetalol. Labetalol dose was increased. Hydralazine added.        Nephro consulted. Renal US done showing nonobstructing calculi. Left kidney slightly smaller than right.         Endocrine was consulted. Renin and aldosterone levels sent.         Patient's blood pressure improved. Patient to follow up as outpatient with endocrinologist, Dr. Graff and nephrologist, Dr. Syed.        [facility].        Patient is medically stable for discharge. Case was discussed with attending. Patient is a 69 year old female, who lives at home with roommate, ambulates with a walker (pt was getting HHA visits), with PMH of hypertension, CAD s/p 3 PAOLA, asthma, chronic lymphedema, venous stasis s/p venous ablation and carpal tunnel syndrome presenting to the ED with worsening shortness of breath.        Noted to have possible cellulitis of RLE.         She was found to have elevated WBC and fever in ED to 102F.         Given a dose of vancomycin and aztreonam in ED.         UA was negative. Treated with pain meds Tylenol q6.         Infectious Diseases Dr. Pettit was consulted.     Vancomycin was discontinued and patient was started on aztreonam and clindamycin.     Patient was discontinued off of antibiotics and did not need further antibiotics.         Doppler done of lower extremities showed no signs of DVT.         Blood and urine cultures were negative.         ECHO showed grade I diastolic dysfunction. Flu panel was negative.         Patient noted to have elevated LFTs. Hepatic US showed gallstones. LFTs downtrended during admission.        Patient was to have HIDA scan done but refused due to length of exam and not being able to lay back for a long     period of time.         Noted to have hypokalemia on admission which was not being corrected with supplements. Patient's ethacrynic acid was     held due to possible hypokalemia from diuretics. IVF with potassium given.         Elevated creatinine during admission possibly due to diuretic use and vancomycin. Treated with IVF. Creatinine now within normal limits.         Patient noted to have elevated blood pressure despite being on losartan and labetalol. Labetalol dose was increased. Hydralazine added.        Nephro consulted. Renal US done showing nonobstructing calculi. Left kidney slightly smaller than right.         Endocrine was consulted. Renin and aldosterone levels sent.         Patient's blood pressure improved. Patient to follow up as outpatient with endocrinologist, Dr. Graff and nephrologist, Dr. Syed.        Patient to be [facility].        Patient is medically stable for discharge. Case was discussed with attending. Patient is a 69 year old female, who lives at home with roommate, ambulates with a walker (pt was getting HHA visits), with PMH of hypertension, CAD s/p 3 PAOLA, asthma, chronic lymphedema, venous stasis s/p venous ablation and carpal tunnel syndrome presenting to the ED with worsening shortness of breath.        Noted to have possible cellulitis of RLE.         She was found to have elevated WBC and fever in ED to 102F.         Given a dose of vancomycin and aztreonam in ED.         UA was negative. Treated with pain meds Tylenol q6.         Infectious Diseases Dr. Pettit was consulted.     Vancomycin was discontinued and patient was started on aztreonam and clindamycin.     Patient was discontinued off of antibiotics and did not need further antibiotics.         Doppler done of lower extremities showed no signs of DVT.         Blood and urine cultures were negative.         ECHO showed grade I diastolic dysfunction. Flu panel was negative.         Patient noted to have elevated LFTs. Hepatic US showed gallstones. LFTs downtrended during admission.        Patient was to have HIDA scan done but refused due to length of exam and not being able to lay back for a long     period of time.         Noted to have hypokalemia on admission which was not being corrected with supplements. Patient's ethacrynic acid was     held due to possible hypokalemia from diuretics. IVF with potassium given.         Elevated creatinine during admission possibly due to diuretic use and vancomycin. Treated with IVF. Creatinine now within normal limits.         Patient noted to have elevated blood pressure despite being on losartan and labetalol. Labetalol dose was increased. Hydralazine added.        Nephro consulted. Renal US done showing nonobstructing calculi. Left kidney slightly smaller than right.         Endocrine was consulted. Renin and aldosterone levels sent. Possible secondary cause of hypertension includes hyperaldosteronism.         Patient's blood pressure improved. Patient to follow up as outpatient with endocrinologist, Dr. Graff and nephrologist, Dr. Syed.        Patient to be [facility].        Patient is medically stable for discharge. Case was discussed with attending. Patient is a 69 year old female, who lives at home with roommate, ambulates with a walker (pt was getting HHA visits), with PMH of hypertension, CAD s/p 3 PAOLA, asthma, chronic lymphedema, venous stasis s/p venous ablation and carpal tunnel syndrome presenting to the ED with worsening shortness of breath. She was found to have elevated WBC and fever in ED to 102F. Noted to have possible cellulitis of RLE. Given a dose of vancomycin and aztreonam in ED and continued on Vancomycin. Infectious Diseases Dr. Pettit was consulted. CXR and CT abd did not show any PNA. She was also noted to have transaminitis and elevated Bilirubin and Alk Phos. Vancomycin was discontinued and patient was started on aztreonam and clindamycin. Hepatic US showed gallstones. LFTs downtrended during admission. Patient was discontinued off of antibiotics and did not need further antibiotics.         Doppler done of lower extremities showed no signs of DVT.         Blood and urine cultures were negative.         ECHO showed grade I diastolic dysfunction. Flu panel was negative.         Patient noted to have elevated LFTs.         Patient was to have HIDA scan done but refused due to length of exam and not being able to lay back for a long     period of time.         Noted to have hypokalemia on admission which was not being corrected with supplements. Patient's ethacrynic acid was     held due to possible hypokalemia from diuretics. IVF with potassium given.         Elevated creatinine during admission possibly due to diuretic use and vancomycin. Treated with IVF. Creatinine now within normal limits.         Patient noted to have elevated blood pressure despite being on losartan and labetalol. Labetalol dose was increased. Hydralazine added.        Nephro consulted. Renal US done showing nonobstructing calculi. Left kidney slightly smaller than right.         Endocrine was consulted. Renin and aldosterone levels sent. Possible secondary cause of hypertension includes hyperaldosteronism.         Patient's blood pressure improved. Patient to follow up as outpatient with endocrinologist, Dr. Graff and nephrologist, Dr. Syed.        Patient to be [facility] to [facility].        Patient is medically stable for discharge. Case was discussed with attending. Patient is a 69 year old female, who lives at home with roommate, ambulates with a walker (pt was getting HHA visits), with PMH of hypertension, CAD s/p 3 PAOLA, asthma, chronic lymphedema, venous stasis s/p venous ablation and carpal tunnel syndrome presenting to the ED with worsening shortness of breath. She was found to have elevated WBC and fever in ED to 102F. Noted to have possible cellulitis of RLE. Given a dose of vancomycin and aztreonam in ED and continued on Vancomycin. Infectious Diseases Dr. Pettit was consulted. CXR and CT abd did not show any PNA. She was also noted to have transaminitis and elevated Bilirubin and Alk Phos. Vancomycin was discontinued and patient was started on aztreonam and clindamycin. Hepatic US showed gallstones but no dilatation of CBD. Patient was to have HIDA scan done but refused due to length of exam and not being able to lay back for a long period of time. LFTs downtrended during admission. Blood and urine cultures were negative. Patient was discontinued off of antibiotics and did not need further antibiotics.     Elevated creatinine during admission possibly due to diuretic use and vancomycin. Treated with IVF. Creatinine now within normal limits.         ECHO showed grade I diastolic dysfunction. Flu panel was negative. Doppler done of lower extremities showed no signs of DVT.         Noted to have hypokalemia on admission which was not being corrected with supplements. Patient's ethacrynic acid was     held due to possible hypokalemia from diuretics. Patient noted to have elevated blood pressure despite being on losartan and labetalol. Labetalol dose was increased. Hydralazine added with better control of BP. Nephro consulted. Renal US done showing nonobstructing calculi. Left kidney slightly smaller than right. Endocrine was consulted. Renin and aldosterone levels sent. Possible secondary cause of hypertension includes hyperaldosteronism.         Patient's blood pressure improved. Patient to follow up as outpatient with endocrinologist, Dr. Graff and nephrologist, Dr. Syed for pending work ups.         Patient to be [facility].        Patient is medically stable for discharge. Case was discussed with attending.

## 2019-11-06 NOTE — PROGRESS NOTE ADULT - ASSESSMENT
69 year old Female Lives at home with roommate ambulates with a walker (pt was getting HHA visits) with PMH hypertension, CAD s/p 3 PAOLA , asthma, Chronic Lymphedema , venous stasis s/p venous ablation, gi bleed 2/2 to diverticulosis and carpal tunnel syndrome presenting to ED with worsening shortness of breath. . She was found to have elevated WBC and fever in ED to 102F. Patient is moaning sitting at edge of bed on my arrival. says she feels cold and chills. Also complained of back pain for which she takes Advil chronically. Pt denies wheezing, cough, chest pain, nausea, vomiting, diaphoresis, abdominal pain, dysuria or changes in bowel habits.     Pt is FULL CODE.   pt had colonoscopy 2 weeks ago which showed no abnormalities     Pt admitted for management of Sepsis

## 2019-11-06 NOTE — DISCHARGE NOTE PROVIDER - CARE PROVIDERS DIRECT ADDRESSES
,DirectAddress_Unknown,DirectAddress_Unknown ,DirectAddress_Unknown,DirectAddress_Unknown,[email or web address]

## 2019-11-06 NOTE — PROGRESS NOTE ADULT - PROBLEM SELECTOR PLAN 3
- p/w Elevated liver function tests on admission   -now downtrending but still elevated; high direct bili  - soft non tender distended abdomen  - f/u U/S liver  - f/u hepatitis panel  -f/u CT abd  -started on azactam and clindamycin (pt has allergy to pcn) - p/w Elevated liver function tests on admission   -now downtrending but still elevated; high direct bili  - soft non tender distended abdomen  - f/u U/S liver  - hepatitis panel negative  -f/u CT abd  -continue azactam and clindamycin (pt has allergy to pcn)

## 2019-11-07 DIAGNOSIS — N17.9 ACUTE KIDNEY FAILURE, UNSPECIFIED: ICD-10-CM

## 2019-11-07 DIAGNOSIS — E87.6 HYPOKALEMIA: ICD-10-CM

## 2019-11-07 LAB
ALBUMIN SERPL ELPH-MCNC: 2.9 G/DL — LOW (ref 3.5–5)
ALP SERPL-CCNC: 180 U/L — HIGH (ref 40–120)
ALT FLD-CCNC: 81 U/L DA — HIGH (ref 10–60)
ANION GAP SERPL CALC-SCNC: 7 MMOL/L — SIGNIFICANT CHANGE UP (ref 5–17)
ANION GAP SERPL CALC-SCNC: 8 MMOL/L — SIGNIFICANT CHANGE UP (ref 5–17)
ANION GAP SERPL CALC-SCNC: 9 MMOL/L — SIGNIFICANT CHANGE UP (ref 5–17)
AST SERPL-CCNC: 34 U/L — SIGNIFICANT CHANGE UP (ref 10–40)
BASOPHILS # BLD AUTO: 0.04 K/UL — SIGNIFICANT CHANGE UP (ref 0–0.2)
BASOPHILS NFR BLD AUTO: 1 % — SIGNIFICANT CHANGE UP (ref 0–2)
BILIRUB DIRECT SERPL-MCNC: 1.9 MG/DL — HIGH (ref 0–0.2)
BILIRUB SERPL-MCNC: 2.8 MG/DL — HIGH (ref 0.2–1.2)
BUN SERPL-MCNC: 22 MG/DL — HIGH (ref 7–18)
BUN SERPL-MCNC: 23 MG/DL — HIGH (ref 7–18)
BUN SERPL-MCNC: 24 MG/DL — HIGH (ref 7–18)
CALCIUM SERPL-MCNC: 8 MG/DL — LOW (ref 8.4–10.5)
CALCIUM SERPL-MCNC: 8.1 MG/DL — LOW (ref 8.4–10.5)
CALCIUM SERPL-MCNC: 8.1 MG/DL — LOW (ref 8.4–10.5)
CHLORIDE SERPL-SCNC: 95 MMOL/L — LOW (ref 96–108)
CHLORIDE SERPL-SCNC: 98 MMOL/L — SIGNIFICANT CHANGE UP (ref 96–108)
CHLORIDE SERPL-SCNC: 98 MMOL/L — SIGNIFICANT CHANGE UP (ref 96–108)
CO2 SERPL-SCNC: 31 MMOL/L — SIGNIFICANT CHANGE UP (ref 22–31)
CO2 SERPL-SCNC: 32 MMOL/L — HIGH (ref 22–31)
CO2 SERPL-SCNC: 33 MMOL/L — HIGH (ref 22–31)
CREAT SERPL-MCNC: 1.36 MG/DL — HIGH (ref 0.5–1.3)
CREAT SERPL-MCNC: 1.41 MG/DL — HIGH (ref 0.5–1.3)
CREAT SERPL-MCNC: 1.5 MG/DL — HIGH (ref 0.5–1.3)
EOSINOPHIL # BLD AUTO: 0.02 K/UL — SIGNIFICANT CHANGE UP (ref 0–0.5)
EOSINOPHIL NFR BLD AUTO: 0.5 % — SIGNIFICANT CHANGE UP (ref 0–6)
GIANT PLATELETS BLD QL SMEAR: PRESENT — SIGNIFICANT CHANGE UP
GLUCOSE BLDC GLUCOMTR-MCNC: 134 MG/DL — HIGH (ref 70–99)
GLUCOSE BLDC GLUCOMTR-MCNC: 136 MG/DL — HIGH (ref 70–99)
GLUCOSE BLDC GLUCOMTR-MCNC: 153 MG/DL — HIGH (ref 70–99)
GLUCOSE BLDC GLUCOMTR-MCNC: 190 MG/DL — HIGH (ref 70–99)
GLUCOSE SERPL-MCNC: 126 MG/DL — HIGH (ref 70–99)
GLUCOSE SERPL-MCNC: 154 MG/DL — HIGH (ref 70–99)
GLUCOSE SERPL-MCNC: 155 MG/DL — HIGH (ref 70–99)
HCT VFR BLD CALC: 32.5 % — LOW (ref 34.5–45)
HGB BLD-MCNC: 9.7 G/DL — LOW (ref 11.5–15.5)
LYMPHOCYTES # BLD AUTO: 0.79 K/UL — LOW (ref 1–3.3)
LYMPHOCYTES # BLD AUTO: 22 % — SIGNIFICANT CHANGE UP (ref 13–44)
MAGNESIUM SERPL-MCNC: 1.8 MG/DL — SIGNIFICANT CHANGE UP (ref 1.6–2.6)
MANUAL SMEAR VERIFICATION: SIGNIFICANT CHANGE UP
MCHC RBC-ENTMCNC: 26.3 PG — LOW (ref 27–34)
MCHC RBC-ENTMCNC: 29.8 GM/DL — LOW (ref 32–36)
MCV RBC AUTO: 88.1 FL — SIGNIFICANT CHANGE UP (ref 80–100)
MONOCYTES # BLD AUTO: 0.48 K/UL — SIGNIFICANT CHANGE UP (ref 0–0.9)
MONOCYTES NFR BLD AUTO: 13.5 % — SIGNIFICANT CHANGE UP (ref 2–14)
NEUTROPHILS # BLD AUTO: 2.26 K/UL — SIGNIFICANT CHANGE UP (ref 1.8–7.4)
NEUTROPHILS NFR BLD AUTO: 60.5 % — SIGNIFICANT CHANGE UP (ref 43–77)
NEUTS BAND # BLD: 2.5 % — SIGNIFICANT CHANGE UP (ref 0–8)
NRBC # BLD: 0 /100 — SIGNIFICANT CHANGE UP (ref 0–0)
PHOSPHATE 24H UR-MCNC: 16.2 MG/DL — SIGNIFICANT CHANGE UP
PHOSPHATE SERPL-MCNC: 3.5 MG/DL — SIGNIFICANT CHANGE UP (ref 2.5–4.5)
PLAT MORPH BLD: NORMAL — SIGNIFICANT CHANGE UP
PLATELET # BLD AUTO: 229 K/UL — SIGNIFICANT CHANGE UP (ref 150–400)
POTASSIUM SERPL-MCNC: 2.7 MMOL/L — CRITICAL LOW (ref 3.5–5.3)
POTASSIUM SERPL-MCNC: 2.8 MMOL/L — CRITICAL LOW (ref 3.5–5.3)
POTASSIUM SERPL-MCNC: 2.8 MMOL/L — CRITICAL LOW (ref 3.5–5.3)
POTASSIUM SERPL-SCNC: 2.7 MMOL/L — CRITICAL LOW (ref 3.5–5.3)
POTASSIUM SERPL-SCNC: 2.8 MMOL/L — CRITICAL LOW (ref 3.5–5.3)
POTASSIUM SERPL-SCNC: 2.8 MMOL/L — CRITICAL LOW (ref 3.5–5.3)
PROT SERPL-MCNC: 7.4 G/DL — SIGNIFICANT CHANGE UP (ref 6–8.3)
RBC # BLD: 3.69 M/UL — LOW (ref 3.8–5.2)
RBC # FLD: 15.9 % — HIGH (ref 10.3–14.5)
RBC BLD AUTO: NORMAL — SIGNIFICANT CHANGE UP
SODIUM SERPL-SCNC: 136 MMOL/L — SIGNIFICANT CHANGE UP (ref 135–145)
SODIUM SERPL-SCNC: 137 MMOL/L — SIGNIFICANT CHANGE UP (ref 135–145)
SODIUM SERPL-SCNC: 138 MMOL/L — SIGNIFICANT CHANGE UP (ref 135–145)
WBC # BLD: 3.59 K/UL — LOW (ref 3.8–10.5)
WBC # FLD AUTO: 3.59 K/UL — LOW (ref 3.8–10.5)

## 2019-11-07 PROCEDURE — 99233 SBSQ HOSP IP/OBS HIGH 50: CPT | Mod: GC

## 2019-11-07 RX ORDER — POTASSIUM CHLORIDE 20 MEQ
40 PACKET (EA) ORAL
Refills: 0 | Status: COMPLETED | OUTPATIENT
Start: 2019-11-07 | End: 2019-11-07

## 2019-11-07 RX ORDER — ONDANSETRON 8 MG/1
4 TABLET, FILM COATED ORAL ONCE
Refills: 0 | Status: COMPLETED | OUTPATIENT
Start: 2019-11-07 | End: 2019-11-07

## 2019-11-07 RX ORDER — POTASSIUM CHLORIDE 20 MEQ
40 PACKET (EA) ORAL EVERY 4 HOURS
Refills: 0 | Status: DISCONTINUED | OUTPATIENT
Start: 2019-11-07 | End: 2019-11-07

## 2019-11-07 RX ORDER — HYDRALAZINE HCL 50 MG
10 TABLET ORAL ONCE
Refills: 0 | Status: COMPLETED | OUTPATIENT
Start: 2019-11-07 | End: 2019-11-07

## 2019-11-07 RX ORDER — POTASSIUM CHLORIDE 20 MEQ
10 PACKET (EA) ORAL ONCE
Refills: 0 | Status: COMPLETED | OUTPATIENT
Start: 2019-11-07 | End: 2019-11-07

## 2019-11-07 RX ADMIN — Medication 2000 UNIT(S): at 11:14

## 2019-11-07 RX ADMIN — Medication 650 MILLIGRAM(S): at 05:57

## 2019-11-07 RX ADMIN — Medication 40 MILLIEQUIVALENT(S): at 11:15

## 2019-11-07 RX ADMIN — Medication 100 MILLIGRAM(S): at 13:02

## 2019-11-07 RX ADMIN — ONDANSETRON 4 MILLIGRAM(S): 8 TABLET, FILM COATED ORAL at 22:02

## 2019-11-07 RX ADMIN — PREGABALIN 1000 MICROGRAM(S): 225 CAPSULE ORAL at 11:14

## 2019-11-07 RX ADMIN — Medication 400 MILLIGRAM(S): at 13:03

## 2019-11-07 RX ADMIN — PANTOPRAZOLE SODIUM 40 MILLIGRAM(S): 20 TABLET, DELAYED RELEASE ORAL at 06:28

## 2019-11-07 RX ADMIN — Medication 40 MILLIEQUIVALENT(S): at 06:28

## 2019-11-07 RX ADMIN — Medication 75 MICROGRAM(S): at 06:27

## 2019-11-07 RX ADMIN — Medication 50 MILLIEQUIVALENT(S): at 19:57

## 2019-11-07 RX ADMIN — Medication 100 MILLIGRAM(S): at 06:28

## 2019-11-07 RX ADMIN — ZINC OXIDE 1 APPLICATION(S): 200 OINTMENT TOPICAL at 17:01

## 2019-11-07 RX ADMIN — Medication 400 MILLIGRAM(S): at 22:02

## 2019-11-07 RX ADMIN — ZINC OXIDE 1 APPLICATION(S): 200 OINTMENT TOPICAL at 06:36

## 2019-11-07 RX ADMIN — Medication 40 MILLIEQUIVALENT(S): at 03:02

## 2019-11-07 RX ADMIN — Medication 1 MILLIGRAM(S): at 11:14

## 2019-11-07 RX ADMIN — SODIUM CHLORIDE 80 MILLILITER(S): 9 INJECTION, SOLUTION INTRAVENOUS at 16:55

## 2019-11-07 RX ADMIN — Medication 400 MILLIGRAM(S): at 06:27

## 2019-11-07 RX ADMIN — Medication 10 MILLIGRAM(S): at 16:55

## 2019-11-07 RX ADMIN — Medication 40 MILLIEQUIVALENT(S): at 13:02

## 2019-11-07 RX ADMIN — Medication 650 MILLIGRAM(S): at 06:54

## 2019-11-07 RX ADMIN — LOSARTAN POTASSIUM 100 MILLIGRAM(S): 100 TABLET, FILM COATED ORAL at 06:28

## 2019-11-07 NOTE — PROGRESS NOTE ADULT - ASSESSMENT
Cellulitis of right leg that has resolved  Fever - resolved    Plan - dc azactam as she has refused to take all other abxs will not start anything else  DC planning  reconsult prn.

## 2019-11-07 NOTE — PROGRESS NOTE ADULT - SUBJECTIVE AND OBJECTIVE BOX
PGY 1 Note discussed with supervising resident and primary attending    Patient is a 69y old  Female who presents with a chief complaint of Shortness of breath (06 Nov 2019 14:25)    INTERVAL HPI/OVERNIGHT EVENTS: Patient seen and examined at the bedside. No acute events overnight. Patient continues to have low potassium noted. Patient refused to have HIDA scan done today due to length of test. Bilirubin levels have been downtrending. No white count and afebrile.     MEDICATIONS  (STANDING):  aztreonam  IVPB 2000 milliGRAM(s) IV Intermittent every 8 hours  aztreonam  IVPB      cholecalciferol 2000 Unit(s) Oral daily  clindamycin IVPB 900 milliGRAM(s) IV Intermittent every 8 hours  clindamycin IVPB      cyanocobalamin 1000 MICROGram(s) Oral daily  enoxaparin Injectable 40 milliGRAM(s) SubCutaneous daily  folic acid 1 milliGRAM(s) Oral daily  insulin lispro (HumaLOG) corrective regimen sliding scale   SubCutaneous Before meals and at bedtime  labetalol 400 milliGRAM(s) Oral three times a day  lactated ringers 1000 milliLiter(s) (120 mL/Hr) IV Continuous <Continuous>  levothyroxine 75 MICROGram(s) Oral daily  losartan 100 milliGRAM(s) Oral daily  pantoprazole    Tablet 40 milliGRAM(s) Oral before breakfast  potassium chloride    Tablet ER 40 milliEquivalent(s) Oral every 2 hours  zinc oxide 20% Ointment 1 Application(s) Topical two times a day    MEDICATIONS  (PRN):  acetaminophen   Tablet .. 650 milliGRAM(s) Oral every 6 hours PRN Temp greater or equal to 38C (100.4F), Mild Pain (1 - 3)  albuterol/ipratropium for Nebulization 3 milliLiter(s) Nebulizer every 6 hours PRN Shortness of Breath and/or Wheezing  benzocaine 15 mG/menthol 3.6 mG (Sugar-Free) Lozenge 1 Lozenge Oral every 4 hours PRN Sore Throat  guaiFENesin   Syrup  (Sugar-Free) 100 milliGRAM(s) Oral every 6 hours PRN Cough      __________________________________________________  REVIEW OF SYSTEMS:  CONSTITUTIONAL: No fever   EYES: no visual disturbances  NECK: No pain   RESPIRATORY: No cough; No shortness of breath  CARDIOVASCULAR: No chest pain  GASTROINTESTINAL: No pain. No nausea or vomiting   NEUROLOGICAL: No headache  MUSCULOSKELETAL: Denies lower extremity pain; mild pain in right scapular area   GENITOURINARY: no dysuria  PSYCHIATRY: no anxiety  ALL OTHER  ROS negative        Vital Signs Last 24 Hrs  T(C): 36.9 (07 Nov 2019 08:17), Max: 37.7 (06 Nov 2019 15:54)  T(F): 98.4 (07 Nov 2019 08:17), Max: 99.9 (06 Nov 2019 15:54)  HR: 64 (07 Nov 2019 08:17) (64 - 85)  BP: 157/78 (07 Nov 2019 08:17) (157/71 - 188/78)  RR: 16 (07 Nov 2019 08:17) (16 - 17)  SpO2: 97% (07 Nov 2019 08:17) (93% - 97%)    ________________________________________________  PHYSICAL EXAM:  GENERAL: Patient seen sitting in chair and in mild distress  HEENT: Normocephalic; conjunctivae and sclerae clear   NECK: supple  CHEST/LUNG: Clear to auscultation bilaterally with good air entry   HEART: S1 S2, regular; no murmurs  ABDOMEN: Soft, Nontender, Distended; Bowel sounds present  EXTREMITIES: chronic edema; no calf tenderness  SKIN: warm and dry  NERVOUS SYSTEM: Awake and alert; Oriented to place, person and time     _________________________________________________  LABS:                        9.7    3.59  )-----------( 229      ( 07 Nov 2019 06:35 )             32.5     11-07    137  |  98  |  23<H>  ----------------------------<  126<H>  2.8<LL>   |  31  |  1.41<H>    Ca    8.0<L>      07 Nov 2019 06:35  Phos  3.5     11-07  Mg     1.8     11-07    TPro  7.4  /  Alb  2.9<L>  /  TBili  2.8<H>  /  DBili  1.9<H>  /  AST  34  /  ALT  81<H>  /  AlkPhos  180<H>  11-07      CAPILLARY BLOOD GLUCOSE    POCT Blood Glucose.: 134 mg/dL (07 Nov 2019 08:39)  POCT Blood Glucose.: 131 mg/dL (06 Nov 2019 21:25)  POCT Blood Glucose.: 124 mg/dL (06 Nov 2019 17:02)  POCT Blood Glucose.: 153 mg/dL (06 Nov 2019 12:42)      RADIOLOGY & ADDITIONAL TESTS:    Imaging Personally Reviewed:  YES     < from: CT Abdomen and Pelvis w/ Oral Cont and w/ IV Cont (11.06.19 @ 11:13) >  IMPRESSION: Mild gallbladder wall thickening. No evidence of radiopaque   gallstone or biliary dilatation. Duodenal diverticulum versus   choledochocele.    < end of copied text >    < from: US Hepatic & Pancreatic (11.06.19 @ 11:48) >  Impression: Mild hepatomegaly.    Gallstones, layering sludge, and mild bladder wall thickening are noted.   No evidence for pericholecystic fluid or ultrasonicMurphy's sign.   Findings are equivocal for acute cholecystitis. If clinically indicated,   HIDA scan may be pursued for further evaluation.    < end of copied text >    Consultant(s) Notes Reviewed:   YES     Care Discussed with Consultants :     Plan of care was discussed with patient and /or primary care giver; all questions and concerns were addressed and care was aligned with patient's wishes.

## 2019-11-07 NOTE — PROGRESS NOTE ADULT - PROBLEM SELECTOR PLAN 2
-ANIKA possibly due to dehydration and vancomycin; will give LR and monitor for improvement  -possibly due to contrast and vancomycin

## 2019-11-07 NOTE — PROGRESS NOTE ADULT - PROBLEM SELECTOR PLAN 6
- Patient takes Losartan, Ethacrynic acid and Labetalol   - c/w Losartan and Labetalol   - Monitor BP closely and adjust medications if clinically indicated.  - DASH diet  -hold ethacrynic acid due to elevated bicarb

## 2019-11-07 NOTE — PROGRESS NOTE ADULT - RS GEN PE MLT RESP DETAILS PC
rales/no wheezes/no rhonchi no rales/breath sounds equal/clear to auscultation bilaterally/good air movement/no wheezes/no rhonchi

## 2019-11-07 NOTE — PROGRESS NOTE ADULT - SUBJECTIVE AND OBJECTIVE BOX
69y Female admitted for Fever, transaminitis. Overnight Pt refused Clindamycin IV as she complains that it makes her nauseous. CT a/p with cont didnt showed any evidence of GB stone and CBD dilatation m,showed duodenal diverticula . US RUQ noted for  Gallstones, layering sludge, and mild bladder wall thickening are noted. No evidence for pericholecystic fluid or ultrasonic Cunningham's sign. Pt refused HIDA scan. Pt has been afebrile since yesterday afternoon , normal white count.Bili and LFTs trending down. Pt feels ok,denies any abd pain , leg pain , SOB or  CP     Meds:  aztreonam  IVPB 2000 milliGRAM(s) IV Intermittent every 8 hours  aztreonam  IVPB      clindamycin IVPB 900 milliGRAM(s) IV Intermittent every 8 hours  clindamycin IVPB        Allergies    penicillin (Hives)  penicillin (Rash)  sulfa drugs (Unknown)  Tetracycline Hydrochloride (Unknown)    Intolerances        VITALS:  Vital Signs Last 24 Hrs  T(C): 36.9 (07 Nov 2019 08:17), Max: 37.7 (06 Nov 2019 15:54)  T(F): 98.4 (07 Nov 2019 08:17), Max: 99.9 (06 Nov 2019 15:54)  HR: 68 (07 Nov 2019 11:32) (64 - 85)  BP: 157/78 (07 Nov 2019 08:17) (157/71 - 188/78)  BP(mean): --  RR: 16 (07 Nov 2019 08:17) (16 - 17)  SpO2: 98% (07 Nov 2019 11:32) (94% - 98%)    LABS/DIAGNOSTIC TESTS:                          9.7    3.59  )-----------( 229      ( 07 Nov 2019 06:35 )             32.5         11-07    137  |  98  |  23<H>  ----------------------------<  126<H>  2.8<LL>   |  31  |  1.41<H>    Ca    8.0<L>      07 Nov 2019 06:35  Phos  3.5     11-07  Mg     1.8     11-07    TPro  7.4  /  Alb  2.9<L>  /  TBili  2.8<H>  /  DBili  1.9<H>  /  AST  34  /  ALT  81<H>  /  AlkPhos  180<H>  11-07      LIVER FUNCTIONS - ( 07 Nov 2019 06:35 )  Alb: 2.9 g/dL / Pro: 7.4 g/dL / ALK PHOS: 180 U/L / ALT: 81 U/L DA / AST: 34 U/L / GGT: x             CULTURES: .Urine  11-04 @ 14:39   <10,000 CFU/mL Normal Urogenital Chitra  --  --      .Blood  11-04 @ 14:28   No growth to date.  --  --    RADIOLOGY:    < from: US Hepatic & Pancreatic (11.06.19 @ 11:48) >  EXAM:  US LIVER AND PANCREAS                            PROCEDURE DATE:  11/06/2019          INTERPRETATION:  Right upper quadrant abdominal ultrasound    Indication: Elevated liver enzymes.    Right upper quadrant abdominal ultrasound is performedwithout a previous   abdominal ultrasound for comparison. The liver spans 17.4 cm which is   mildly enlarged. The liver maintains normal echogenicity and echotexture   without evidence for a focal hepatic lesion. Duplex Doppler interrogation   of themain portal vein demonstrates normal hepatopedal flow.    The common bile duct measures 5.5 mm in caliber which is within normal   limits.    Gallstones, layering sludge, and mild bladder wall thickening are noted.   No evidence for pericholecystic fluid or ultrasonic Cunningham's sign.    The visualized pancreas appears grossly unremarkable.     The right kidney measures 11.3 cm in length which is within normal limits   in size. The right kidney appears grossly unremarkable without   hydronephrosis.    No ascites is detected. The visualized IVC and aorta appear grossly   unremarkable.    Impression: Mild hepatomegaly.    Gallstones, layering sludge, and mild bladder wall thickening are noted.   No evidence for pericholecystic fluid or ultrasonicMurphy's sign.   Findings are equivocal for acute cholecystitis. If clinically indicated,   HIDA scan may be pursued for further evaluation.      < from: CT Abdomen and Pelvis w/ Oral Cont and w/ IV Cont (11.06.19 @ 11:13) >  EXAM:  CT ABDOMEN AND PELVIS OC IC                            PROCEDURE DATE:  11/06/2019          INTERPRETATION:  CT of the Abdomen with contrast and CT of the Pelvis   with contrast    HISTORY: Cholecystitis    TECHNIQUE: Multiple axial scans ofthe abdomen and pelvis were obtained   after administration of bowel contrast and 90 mL of Omnipaque 350   intravenous contrast material.      Interpretation: Liver: No focal lesions.      Biliary tree: Not dilated. The gallbladder shows mild wall thickening   without pericholecystic fluid or inflammation of adjacent fat.    Solid organs: Small left renal cyst and nonobstructing calculus in the   right collecting system.      Retroperitoneum: Small air-fluid level near the second portion of the   duodenum either represents duodenal diverticulum or choledochocele.      Bowel: No evidence of ascites, bowel obstruction or free air. There are   diverticuli of the descending colon and sigmoid colon without   radiographic evidence of diverticulitis    Appendix: Unremarkable    Urinary bladder: Collapsed.      Pelvis: Shows no free fluid.      The inguinal regions are unremarkable.      The lung bases are clear.      IMPRESSION: Mild gallbladder wall thickening. No evidence of radiopaque   gallstone or biliary dilatation. Duodenal diverticulum versus   choledochocele.      < from: US Duplex Venous Lower Ext Complete, Bilateral (11.06.19 @ 11:14) >    EXAM:  US DPLX LWR EXT VEINS COMPL BI                            PROCEDURE DATE:  11/06/2019          INTERPRETATION:  Venous Duplex Ultrasound of the Lower Extremities    Clinical information: Lower extremity lymphedema    Sonographic evaluation of the lower extremity veins to the level of the   posterior tibial veins was performed using gray-scale and color Doppler   imaging. Evaluation of the left calf vessels is limited due to patient's   large body habitus.    Interpretation:     Right leg:  The visualized veins are patent and compressible.  No   abnormal echoes or flow disturbances are identified.    Left leg:  The visualized veins are patent and compressible.  No abnormal   echoes or flow disturbances are identified.      IMPRESSION: No ultrasound evidence of DVT. 69y Female admitted for Fever, transaminitis. Overnight Pt refused Clindamycin IV as she complains that it makes her nauseous. CT a/p with cont didnt showed any evidence of GB stone and CBD dilatation m,showed duodenal diverticula . US RUQ noted for  Gallstones, layering sludge, and mild bladder wall thickening are noted. No evidence for pericholecystic fluid or ultrasonic Cunningham's sign. Pt refused HIDA scan. Pt has been afebrile since yesterday afternoon , normal white count.Bili and LFTs trending down. Pt feels ok,denies any abd pain , leg pain , SOB or  CP . The patient has been refusing her clindamycin stating it makes her feel funny. She refused to do a HIDA scan and states she is allergic to quinolones in addition to azithromycin , PCN , doxycycline and bactrim but from the reactions she tells us it is not allergic reactions, pt will not listen to reason as far using various abxs either.    Meds:  aztreonam  IVPB 2000 milliGRAM(s) IV Intermittent every 8 hours  aztreonam  IVPB      clindamycin IVPB 900 milliGRAM(s) IV Intermittent every 8 hours  clindamycin IVPB        Allergies    penicillin (Hives)  penicillin (Rash)  sulfa drugs (Unknown)  Tetracycline Hydrochloride (Unknown)    Intolerances        VITALS:  Vital Signs Last 24 Hrs  T(C): 36.9 (07 Nov 2019 08:17), Max: 37.7 (06 Nov 2019 15:54)  T(F): 98.4 (07 Nov 2019 08:17), Max: 99.9 (06 Nov 2019 15:54)  HR: 68 (07 Nov 2019 11:32) (64 - 85)  BP: 157/78 (07 Nov 2019 08:17) (157/71 - 188/78)  BP(mean): --  RR: 16 (07 Nov 2019 08:17) (16 - 17)  SpO2: 98% (07 Nov 2019 11:32) (94% - 98%)    LABS/DIAGNOSTIC TESTS:                          9.7    3.59  )-----------( 229      ( 07 Nov 2019 06:35 )             32.5         11-07    137  |  98  |  23<H>  ----------------------------<  126<H>  2.8<LL>   |  31  |  1.41<H>    Ca    8.0<L>      07 Nov 2019 06:35  Phos  3.5     11-07  Mg     1.8     11-07    TPro  7.4  /  Alb  2.9<L>  /  TBili  2.8<H>  /  DBili  1.9<H>  /  AST  34  /  ALT  81<H>  /  AlkPhos  180<H>  11-07      LIVER FUNCTIONS - ( 07 Nov 2019 06:35 )  Alb: 2.9 g/dL / Pro: 7.4 g/dL / ALK PHOS: 180 U/L / ALT: 81 U/L DA / AST: 34 U/L / GGT: x             CULTURES: .Urine  11-04 @ 14:39   <10,000 CFU/mL Normal Urogenital Chitra  --  --      .Blood  11-04 @ 14:28   No growth to date.  --  --    RADIOLOGY:    < from: US Hepatic & Pancreatic (11.06.19 @ 11:48) >  EXAM:  US LIVER AND PANCREAS                            PROCEDURE DATE:  11/06/2019          INTERPRETATION:  Right upper quadrant abdominal ultrasound    Indication: Elevated liver enzymes.    Right upper quadrant abdominal ultrasound is performedwithout a previous   abdominal ultrasound for comparison. The liver spans 17.4 cm which is   mildly enlarged. The liver maintains normal echogenicity and echotexture   without evidence for a focal hepatic lesion. Duplex Doppler interrogation   of themain portal vein demonstrates normal hepatopedal flow.    The common bile duct measures 5.5 mm in caliber which is within normal   limits.    Gallstones, layering sludge, and mild bladder wall thickening are noted.   No evidence for pericholecystic fluid or ultrasonic Cunningham's sign.    The visualized pancreas appears grossly unremarkable.     The right kidney measures 11.3 cm in length which is within normal limits   in size. The right kidney appears grossly unremarkable without   hydronephrosis.    No ascites is detected. The visualized IVC and aorta appear grossly   unremarkable.    Impression: Mild hepatomegaly.    Gallstones, layering sludge, and mild bladder wall thickening are noted.   No evidence for pericholecystic fluid or ultrasonicMurphy's sign.   Findings are equivocal for acute cholecystitis. If clinically indicated,   HIDA scan may be pursued for further evaluation.      < from: CT Abdomen and Pelvis w/ Oral Cont and w/ IV Cont (11.06.19 @ 11:13) >  EXAM:  CT ABDOMEN AND PELVIS OC IC                            PROCEDURE DATE:  11/06/2019          INTERPRETATION:  CT of the Abdomen with contrast and CT of the Pelvis   with contrast    HISTORY: Cholecystitis    TECHNIQUE: Multiple axial scans ofthe abdomen and pelvis were obtained   after administration of bowel contrast and 90 mL of Omnipaque 350   intravenous contrast material.      Interpretation: Liver: No focal lesions.      Biliary tree: Not dilated. The gallbladder shows mild wall thickening   without pericholecystic fluid or inflammation of adjacent fat.    Solid organs: Small left renal cyst and nonobstructing calculus in the   right collecting system.      Retroperitoneum: Small air-fluid level near the second portion of the   duodenum either represents duodenal diverticulum or choledochocele.      Bowel: No evidence of ascites, bowel obstruction or free air. There are   diverticuli of the descending colon and sigmoid colon without   radiographic evidence of diverticulitis    Appendix: Unremarkable    Urinary bladder: Collapsed.      Pelvis: Shows no free fluid.      The inguinal regions are unremarkable.      The lung bases are clear.      IMPRESSION: Mild gallbladder wall thickening. No evidence of radiopaque   gallstone or biliary dilatation. Duodenal diverticulum versus   choledochocele.      < from: US Duplex Venous Lower Ext Complete, Bilateral (11.06.19 @ 11:14) >    EXAM:  US DPLX LWR EXT VEINS COMPL BI                            PROCEDURE DATE:  11/06/2019          INTERPRETATION:  Venous Duplex Ultrasound of the Lower Extremities    Clinical information: Lower extremity lymphedema    Sonographic evaluation of the lower extremity veins to the level of the   posterior tibial veins was performed using gray-scale and color Doppler   imaging. Evaluation of the left calf vessels is limited due to patient's   large body habitus.    Interpretation:     Right leg:  The visualized veins are patent and compressible.  No   abnormal echoes or flow disturbances are identified.    Left leg:  The visualized veins are patent and compressible.  No abnormal   echoes or flow disturbances are identified.      IMPRESSION: No ultrasound evidence of DVT.

## 2019-11-07 NOTE — CHART NOTE - NSCHARTNOTEFT_GEN_A_CORE
paged by RN that pt is refusing one of IV abx. Pt stated she doesn't want to take clindamycin as it makes her feel uncomfortable. I explained that it's important to take antibiotics as we suspect infection in gallbladder, and pt got angry a doctor during the day said she doesn't have infection is her abdomen. Pt kept refusing and clindamycin due at 10pm was not given.

## 2019-11-07 NOTE — PROGRESS NOTE ADULT - EXTREMITIES COMMENTS
R LE more edematous than L LE( usually like that as per pt) . No warmth, erythema or tenderness , chronic skin discoloration with scaling present  L LLE : Edematous less than right, skin discoloration present, no warmth, erythema or tenderness R LE more edematous than L LE( usually like that as per pt) . No warmth, erythema or tenderness , chronic skin discoloration with scaling present  L LLE : Edematous less than right, skin discoloration present, no warmth, erythema or tenderness  In fact her right leg is cooler than it was 2 days ago and so she actually may have had cellulitis of the leg that resolved.

## 2019-11-07 NOTE — PROGRESS NOTE ADULT - ATTENDING COMMENTS
Patient is a 69y old  Female who presents with a chief complaint of Shortness of breath (05 Nov 2019 14:30), found to have sepsis with tachycardia, fever and leukocytosis. Was started on Vancomycin empirically for Cellulitis. CXR and CT chest neg for pneumonia. Patient found to have transaminitis and elevated ALP with direct bilirubin.   She also reports her lymphedema for both LE is same for last 3 years.   PMH:  HTN, CAD s/p 3 PAOLA , asthma, Chronic Lymphedema , venous stasis s/p venous ablation and carpal tunnel syndrome, hypothyroidism, recent GIB due to diverticulosis     Today:  Patient was seen and examined at bedside today  Reports feeling better  Refuses HIDA scan- unable to lie flat on back     REVIEW OF SYSTEMS: denies fever, chills, SOB, palpitations, chest pain, abdominal pain    PHYSICAL EXAM:  GENERAL: NAD, morbidly obese  NERVOUS SYSTEM:  Alert & Oriented X3, Good concentration; no focal deficit  CHEST/LUNG: Clear to auscultation bilaterally; No rales, rhonchi, wheezing, or rubs  HEART: Regular rate and rhythm; No murmurs, rubs, or gallops  ABDOMEN: obese, Soft, Nontender, Bowel sounds present, (exam was difficult for body habitus)  EXTREMITIES: chronic lymphedema, right leg more swollen and erythematous then left, chronic skin change, no tenderness or warmth    LABS:   LFTs including bilirubin trending down  Hypokalemia with metabolic alkalosis   Cr stable at 1.36      Assessment and plan:  1. Sepsis present on admission   Resolved now  Possible source Cellulitis/ Cholecystitis with cholelithiasis; Unlikely PNA  US shows sludge in GB with wall thickening   CT abd neg for acute cholecystitis  LFTs trending down  Refused HIDA   Cellulitis improved ; Started on Aztreonam and Clindamycin but patient refused clindamycin  Allergic to multiple antibiotics  Trend LFTs  Dr Edgar at [phone] was contacted, patient had blood work done on 9/24 when LFT was WNL    3. Persistent Hypokalemia with metabolic alkalosis with HTN: discontinued ethacrynic acid yesterday, still low; will send Renin and Aldosterone level    3. CAD s/p stents   Spoke with Dr Lisker- cardiologist [phone], TTE was normal  TTE noted here, Grade 1 diastolic dysfunction     4. TITO  Recent H/o GIB due to diverticulosis  Anemia panel suggestive of TITO  Continue Folic acid  Start Iron supplement if patient agrees    5. HTN: cont Labetalol to 400 TID and Losartan 100mg qd, would avoid CCB given chronic lymphedema     6. DM: ISS    7. Hypothyroidism: Cont home dose synthroid    8. Supportive: Diet Diabetic, Lovenox for DVT prophylaxis, Fall precaution; Zinc oxide for LE skin changes   PT eval recommended home PT  Discharge plan discussed with patient wants to go Rehab, will ask for PT re-evaluation Patient is a 69y old  Female who presents with a chief complaint of Shortness of breath (05 Nov 2019 14:30), found to have sepsis with tachycardia, fever and leukocytosis. Was started on Vancomycin empirically for Cellulitis. CXR and CT chest neg for pneumonia. Patient found to have transaminitis and elevated ALP with direct bilirubin.   She also reports her lymphedema for both LE is same for last 3 years.   PMH:  HTN, CAD s/p 3 PAOLA , asthma, Chronic Lymphedema , venous stasis s/p venous ablation and carpal tunnel syndrome, hypothyroidism, recent GIB due to diverticulosis     Today:  Patient was seen and examined at bedside today  Reports feeling better  Refuses HIDA scan- unable to lie flat on back     REVIEW OF SYSTEMS: denies fever, chills, SOB, palpitations, chest pain, abdominal pain    PHYSICAL EXAM:  GENERAL: NAD, morbidly obese  NERVOUS SYSTEM:  Alert & Oriented X3, Good concentration; no focal deficit  CHEST/LUNG: Clear to auscultation bilaterally; No rales, rhonchi, wheezing, or rubs  HEART: Regular rate and rhythm; No murmurs, rubs, or gallops  ABDOMEN: obese, Soft, Nontender, Bowel sounds present, (exam was difficult for body habitus)  EXTREMITIES: chronic lymphedema, right leg more swollen and erythematous then left, chronic skin change, no tenderness or warmth    LABS:   LFTs including bilirubin trending down  Hypokalemia with metabolic alkalosis   Cr stable at 1.36      Assessment and plan:  1. Sepsis present on admission   Resolved now  Possible source Cellulitis/ Cholecystitis with cholelithiasis; Unlikely PNA  US shows sludge in GB with wall thickening   CT abd neg for acute cholecystitis  LFTs trending down  Refused HIDA   Cellulitis improved ; Started on Aztreonam and Clindamycin but patient refused clindamycin  Allergic to multiple antibiotics- off any antibiotics now  Trend LFTs  Dr Edgar at [phone] was contacted, patient had blood work done on 9/24 when LFT was WNL    3. Persistent Hypokalemia with metabolic alkalosis with HTN: discontinued ethacrynic acid yesterday, still low; will send Renin and Aldosterone level    3. CAD s/p stents   Spoke with Dr Lisker- cardiologist [phone], TTE was normal  TTE noted here, Grade 1 diastolic dysfunction     4. TITO  Recent H/o GIB due to diverticulosis  Anemia panel suggestive of TITO  Continue Folic acid  Start Iron supplement if patient agrees    5. HTN: cont Labetalol to 400 TID and Losartan 100mg qd, would avoid CCB given chronic lymphedema     6. DM: ISS    7. Hypothyroidism: Cont home dose synthroid    8. Supportive: Diet Diabetic, Lovenox for DVT prophylaxis, Fall precaution; Zinc oxide for LE skin changes   PT eval recommended home PT  Discharge plan discussed with patient wants to go Rehab, will ask for PT re-evaluation

## 2019-11-07 NOTE — PROGRESS NOTE ADULT - PROBLEM SELECTOR PLAN 4
- p/w Elevated liver function tests on admission   -now downtrending    - soft non tender distended abdomen  - U/S liver showing gallstones  - patient refusing HIDA scan   -continue azactam and clindamycin (pt has allergy to pcn)  -f/u ID Dr Pettit

## 2019-11-07 NOTE — PROGRESS NOTE ADULT - PROBLEM SELECTOR PLAN 1
-noted to have low K levels the past few days  -has been replaced with tablets and powder with minimal resolution  -potassium added to IVF  -continue to monitor potassium levels

## 2019-11-07 NOTE — PROGRESS NOTE ADULT - GASTROINTESTINAL DETAILS
nontender/no masses palpable/soft/bowel sounds normal nontender/no distention/soft/no masses palpable/bowel sounds normal/no rebound tenderness/no guarding/no rigidity

## 2019-11-07 NOTE — PROGRESS NOTE ADULT - ASSESSMENT
69 year old Female Lives at home with roommate ambulates with a walker (pt was getting HHA visits) with PMH hypertension, CAD s/p 3 PAOLA , asthma, Chronic Lymphedema , venous stasis s/p venous ablation, gi bleed 2/2 to diverticulosis and carpal tunnel syndrome presenting to ED with worsening shortness of breath.    Pt admitted for management of Sepsis

## 2019-11-07 NOTE — PROGRESS NOTE ADULT - PROBLEM SELECTOR PLAN 3
- p/w SOB and sepsis and found to have Right LE redness, warmth and swelling; resolving  -may be due to cellulitis vs cholecystitis  - currently afebrile with no leukocytosis   - c/w pain meds Tylenol q6    - continue clindamycin and azactam  - Blood Cx and urine cx negative  -f/u ID Dr. Pettit  -patient refused to have HIDA scan due to length of test and didn't want sedation as well

## 2019-11-08 LAB
ALBUMIN SERPL ELPH-MCNC: 2.7 G/DL — LOW (ref 3.5–5)
ALDOST SERPL-MCNC: 10.4 NG/DL — SIGNIFICANT CHANGE UP
ALP SERPL-CCNC: 181 U/L — HIGH (ref 40–120)
ALT FLD-CCNC: 62 U/L DA — HIGH (ref 10–60)
ANION GAP SERPL CALC-SCNC: 6 MMOL/L — SIGNIFICANT CHANGE UP (ref 5–17)
AST SERPL-CCNC: 20 U/L — SIGNIFICANT CHANGE UP (ref 10–40)
BASOPHILS # BLD AUTO: 0.03 K/UL — SIGNIFICANT CHANGE UP (ref 0–0.2)
BASOPHILS NFR BLD AUTO: 0.7 % — SIGNIFICANT CHANGE UP (ref 0–2)
BILIRUB DIRECT SERPL-MCNC: 2 MG/DL — HIGH (ref 0–0.2)
BILIRUB SERPL-MCNC: 2.7 MG/DL — HIGH (ref 0.2–1.2)
BUN SERPL-MCNC: 17 MG/DL — SIGNIFICANT CHANGE UP (ref 7–18)
CALCIUM SERPL-MCNC: 8.5 MG/DL — SIGNIFICANT CHANGE UP (ref 8.4–10.5)
CHLORIDE SERPL-SCNC: 97 MMOL/L — SIGNIFICANT CHANGE UP (ref 96–108)
CO2 SERPL-SCNC: 33 MMOL/L — HIGH (ref 22–31)
CREAT SERPL-MCNC: 1.07 MG/DL — SIGNIFICANT CHANGE UP (ref 0.5–1.3)
EOSINOPHIL # BLD AUTO: 0.06 K/UL — SIGNIFICANT CHANGE UP (ref 0–0.5)
EOSINOPHIL NFR BLD AUTO: 1.3 % — SIGNIFICANT CHANGE UP (ref 0–6)
GLUCOSE BLDC GLUCOMTR-MCNC: 126 MG/DL — HIGH (ref 70–99)
GLUCOSE BLDC GLUCOMTR-MCNC: 139 MG/DL — HIGH (ref 70–99)
GLUCOSE BLDC GLUCOMTR-MCNC: 148 MG/DL — HIGH (ref 70–99)
GLUCOSE SERPL-MCNC: 138 MG/DL — HIGH (ref 70–99)
HCT VFR BLD CALC: 32.1 % — LOW (ref 34.5–45)
HGB BLD-MCNC: 9.7 G/DL — LOW (ref 11.5–15.5)
IMM GRANULOCYTES NFR BLD AUTO: 0.4 % — SIGNIFICANT CHANGE UP (ref 0–1.5)
LYMPHOCYTES # BLD AUTO: 0.58 K/UL — LOW (ref 1–3.3)
LYMPHOCYTES # BLD AUTO: 13 % — SIGNIFICANT CHANGE UP (ref 13–44)
MAGNESIUM SERPL-MCNC: 1.9 MG/DL — SIGNIFICANT CHANGE UP (ref 1.6–2.6)
MCHC RBC-ENTMCNC: 26.4 PG — LOW (ref 27–34)
MCHC RBC-ENTMCNC: 30.2 GM/DL — LOW (ref 32–36)
MCV RBC AUTO: 87.2 FL — SIGNIFICANT CHANGE UP (ref 80–100)
MONOCYTES # BLD AUTO: 0.57 K/UL — SIGNIFICANT CHANGE UP (ref 0–0.9)
MONOCYTES NFR BLD AUTO: 12.8 % — SIGNIFICANT CHANGE UP (ref 2–14)
NEUTROPHILS # BLD AUTO: 3.21 K/UL — SIGNIFICANT CHANGE UP (ref 1.8–7.4)
NEUTROPHILS NFR BLD AUTO: 71.8 % — SIGNIFICANT CHANGE UP (ref 43–77)
NRBC # BLD: 0 /100 WBCS — SIGNIFICANT CHANGE UP (ref 0–0)
PHOSPHATE SERPL-MCNC: 2.5 MG/DL — SIGNIFICANT CHANGE UP (ref 2.5–4.5)
PLATELET # BLD AUTO: 266 K/UL — SIGNIFICANT CHANGE UP (ref 150–400)
POTASSIUM SERPL-MCNC: 3 MMOL/L — LOW (ref 3.5–5.3)
POTASSIUM SERPL-SCNC: 3 MMOL/L — LOW (ref 3.5–5.3)
PROT SERPL-MCNC: 7.1 G/DL — SIGNIFICANT CHANGE UP (ref 6–8.3)
RBC # BLD: 3.68 M/UL — LOW (ref 3.8–5.2)
RBC # FLD: 15.9 % — HIGH (ref 10.3–14.5)
SODIUM SERPL-SCNC: 136 MMOL/L — SIGNIFICANT CHANGE UP (ref 135–145)
WBC # BLD: 4.47 K/UL — SIGNIFICANT CHANGE UP (ref 3.8–10.5)
WBC # FLD AUTO: 4.47 K/UL — SIGNIFICANT CHANGE UP (ref 3.8–10.5)

## 2019-11-08 PROCEDURE — 99233 SBSQ HOSP IP/OBS HIGH 50: CPT | Mod: GC

## 2019-11-08 RX ORDER — POTASSIUM CHLORIDE 20 MEQ
40 PACKET (EA) ORAL EVERY 4 HOURS
Refills: 0 | Status: DISCONTINUED | OUTPATIENT
Start: 2019-11-08 | End: 2019-11-08

## 2019-11-08 RX ORDER — POTASSIUM CHLORIDE 20 MEQ
40 PACKET (EA) ORAL ONCE
Refills: 0 | Status: COMPLETED | OUTPATIENT
Start: 2019-11-08 | End: 2019-11-08

## 2019-11-08 RX ORDER — ONDANSETRON 8 MG/1
4 TABLET, FILM COATED ORAL ONCE
Refills: 0 | Status: COMPLETED | OUTPATIENT
Start: 2019-11-08 | End: 2019-11-08

## 2019-11-08 RX ORDER — POTASSIUM CHLORIDE 20 MEQ
10 PACKET (EA) ORAL
Refills: 0 | Status: COMPLETED | OUTPATIENT
Start: 2019-11-08 | End: 2019-11-08

## 2019-11-08 RX ORDER — HYDRALAZINE HCL 50 MG
5 TABLET ORAL ONCE
Refills: 0 | Status: COMPLETED | OUTPATIENT
Start: 2019-11-08 | End: 2019-11-08

## 2019-11-08 RX ORDER — LABETALOL HCL 100 MG
10 TABLET ORAL ONCE
Refills: 0 | Status: COMPLETED | OUTPATIENT
Start: 2019-11-08 | End: 2019-11-08

## 2019-11-08 RX ORDER — HYDROCORTISONE 1 %
1 OINTMENT (GRAM) TOPICAL THREE TIMES A DAY
Refills: 0 | Status: DISCONTINUED | OUTPATIENT
Start: 2019-11-08 | End: 2019-11-13

## 2019-11-08 RX ORDER — POLYETHYLENE GLYCOL 3350 17 G/17G
17 POWDER, FOR SOLUTION ORAL ONCE
Refills: 0 | Status: COMPLETED | OUTPATIENT
Start: 2019-11-08 | End: 2019-11-08

## 2019-11-08 RX ORDER — POTASSIUM CHLORIDE 20 MEQ
40 PACKET (EA) ORAL EVERY 4 HOURS
Refills: 0 | Status: COMPLETED | OUTPATIENT
Start: 2019-11-08 | End: 2019-11-09

## 2019-11-08 RX ADMIN — ZINC OXIDE 1 APPLICATION(S): 200 OINTMENT TOPICAL at 06:02

## 2019-11-08 RX ADMIN — ONDANSETRON 4 MILLIGRAM(S): 8 TABLET, FILM COATED ORAL at 02:49

## 2019-11-08 RX ADMIN — ZINC OXIDE 1 APPLICATION(S): 200 OINTMENT TOPICAL at 17:29

## 2019-11-08 RX ADMIN — Medication 400 MILLIGRAM(S): at 06:00

## 2019-11-08 RX ADMIN — Medication 1 MILLIGRAM(S): at 11:57

## 2019-11-08 RX ADMIN — Medication 100 MILLIEQUIVALENT(S): at 13:22

## 2019-11-08 RX ADMIN — LOSARTAN POTASSIUM 100 MILLIGRAM(S): 100 TABLET, FILM COATED ORAL at 06:00

## 2019-11-08 RX ADMIN — Medication 400 MILLIGRAM(S): at 21:58

## 2019-11-08 RX ADMIN — POLYETHYLENE GLYCOL 3350 17 GRAM(S): 17 POWDER, FOR SOLUTION ORAL at 03:54

## 2019-11-08 RX ADMIN — ONDANSETRON 4 MILLIGRAM(S): 8 TABLET, FILM COATED ORAL at 16:39

## 2019-11-08 RX ADMIN — Medication 10 MILLIGRAM(S): at 17:25

## 2019-11-08 RX ADMIN — Medication 40 MILLIEQUIVALENT(S): at 16:48

## 2019-11-08 RX ADMIN — Medication 2000 UNIT(S): at 11:57

## 2019-11-08 RX ADMIN — PREGABALIN 1000 MICROGRAM(S): 225 CAPSULE ORAL at 11:57

## 2019-11-08 RX ADMIN — Medication 400 MILLIGRAM(S): at 13:22

## 2019-11-08 RX ADMIN — Medication 1 APPLICATION(S): at 06:01

## 2019-11-08 NOTE — DIETITIAN INITIAL EVALUATION ADULT. - PERTINENT LABORATORY DATA
11-08 Na136 mmol/L Glu 138 mg/dL<H> K+ 3.0 mmol/L<L> Cr  1.07 mg/dL BUN 17 mg/dL 11-08 Phos 2.5 mg/dL 11-08 Alb 2.7 g/dL<L> 11-05 [id number] 5.4 % 11-05 Chol 148 mg/dL LDL 98 mg/dL HDL 26 mg/dL<L> Trig 120 mg/dL

## 2019-11-08 NOTE — PROGRESS NOTE ADULT - ATTENDING COMMENTS
Patient was seen and examined by myself. Case was discussed with house staff in details. I have reviewed and agree with the plan as outlined above with edits where appropriate.    Vital Signs Last 24 Hrs  T(C): 36.8 (08 Nov 2019 15:49), Max: 37.1 (08 Nov 2019 05:22)  T(F): 98.2 (08 Nov 2019 15:49), Max: 98.8 (08 Nov 2019 05:22)  HR: 73 (08 Nov 2019 22:38) (71 - 84)  BP: 174/76 (08 Nov 2019 22:38) (157/81 - 191/88)  BP(mean): --  RR: 16 (08 Nov 2019 15:49) (16 - 18)  SpO2: 94% (08 Nov 2019 15:49) (91% - 94%)                          9.7    4.47  )-----------( 266      ( 08 Nov 2019 07:17 )             32.1       11-08    136  |  97  |  17  ----------------------------<  138<H>  3.0<L>   |  33<H>  |  1.07    Ca    8.5      08 Nov 2019 07:17  Phos  2.5     11-08  Mg     1.9     11-08    TPro  7.1  /  Alb  2.7<L>  /  TBili  2.7<H>  /  DBili  2.0<H>  /  AST  20  /  ALT  62<H>  /  AlkPhos  181<H>  11-08      A/P: 1. lower extremity cellulitis  2. Chronic lymphedema  3. Morbid obesity  4. Hypokalemia  5. Hypoalbuminemia  6. anemia      elevate legs while in bed  Potassium replaced.  awaiting discharge to rehab possibly tomorrow. Patient was seen and examined by myself. Case was discussed with house staff in details. I have reviewed and agree with the plan as outlined above with edits where appropriate.    Vital Signs Last 24 Hrs  T(C): 36.8 (08 Nov 2019 15:49), Max: 37.1 (08 Nov 2019 05:22)  T(F): 98.2 (08 Nov 2019 15:49), Max: 98.8 (08 Nov 2019 05:22)  HR: 73 (08 Nov 2019 22:38) (71 - 84)  BP: 174/76 (08 Nov 2019 22:38) (157/81 - 191/88)  BP(mean): --  RR: 16 (08 Nov 2019 15:49) (16 - 18)  SpO2: 94% (08 Nov 2019 15:49) (91% - 94%)                          9.7    4.47  )-----------( 266      ( 08 Nov 2019 07:17 )             32.1       11-08    136  |  97  |  17  ----------------------------<  138<H>  3.0<L>   |  33<H>  |  1.07    Ca    8.5      08 Nov 2019 07:17  Phos  2.5     11-08  Mg     1.9     11-08    TPro  7.1  /  Alb  2.7<L>  /  TBili  2.7<H>  /  DBili  2.0<H>  /  AST  20  /  ALT  62<H>  /  AlkPhos  181<H>  11-08      A/P: 1. lower extremity cellulitis  2. Chronic lymphedema  3. Uncontrolled HTN- continue Losartan, labetalol and Ethacrynic acid  4. Hypokalemia  5. Hypoalbuminemia  6. anemia  7. Morbid obesity    elevate legs while in bed  Potassium replaced.  Encourage compliance with meds.  Dose adjustments of antihypertensives to optimize BP control.  other plan as outlined above  awaiting discharge to rehab possibly tomorrow.

## 2019-11-08 NOTE — DIETITIAN INITIAL EVALUATION ADULT. - OTHER INFO
Patient declined interview at this time. per Rn , patient is not consuming meals due to food preferences. no information on wt loss available

## 2019-11-08 NOTE — PROGRESS NOTE ADULT - PROBLEM SELECTOR PLAN 4
- p/w Elevated liver function tests on admission   -now downtrending    - soft non tender distended abdomen  - U/S liver showing gallstones  - patient refusing HIDA scan   -no longer on abx

## 2019-11-08 NOTE — PROGRESS NOTE ADULT - SUBJECTIVE AND OBJECTIVE BOX
PGY 1 Note discussed with supervising resident and primary attending    Patient is a 69y old  Female who presents with a chief complaint of Shortness of breath (07 Nov 2019 13:32)    INTERVAL HPI/OVERNIGHT EVENTS: Patient seen and examined at the bedside. No acute events overnight. Patient's potassium slowly improving. LFTs continue to downtrend. No longer on antibiotics. Patient denies any complaints at this time.     MEDICATIONS  (STANDING):  cholecalciferol 2000 Unit(s) Oral daily  cyanocobalamin 1000 MICROGram(s) Oral daily  enoxaparin Injectable 40 milliGRAM(s) SubCutaneous daily  folic acid 1 milliGRAM(s) Oral daily  insulin lispro (HumaLOG) corrective regimen sliding scale   SubCutaneous Before meals and at bedtime  labetalol 400 milliGRAM(s) Oral three times a day  lactated ringers 1000 milliLiter(s) (80 mL/Hr) IV Continuous <Continuous>  levothyroxine 75 MICROGram(s) Oral daily  losartan 100 milliGRAM(s) Oral daily  pantoprazole    Tablet 40 milliGRAM(s) Oral before breakfast  potassium chloride  10 mEq/100 mL IVPB 10 milliEquivalent(s) IV Intermittent every 1 hour  zinc oxide 20% Ointment 1 Application(s) Topical two times a day    MEDICATIONS  (PRN):  acetaminophen   Tablet .. 650 milliGRAM(s) Oral every 6 hours PRN Temp greater or equal to 38C (100.4F), Mild Pain (1 - 3)  albuterol/ipratropium for Nebulization 3 milliLiter(s) Nebulizer every 6 hours PRN Shortness of Breath and/or Wheezing  benzocaine 15 mG/menthol 3.6 mG (Sugar-Free) Lozenge 1 Lozenge Oral every 4 hours PRN Sore Throat  guaiFENesin   Syrup  (Sugar-Free) 100 milliGRAM(s) Oral every 6 hours PRN Cough  hydrocortisone 1% Cream 1 Application(s) Topical three times a day PRN Itching      __________________________________________________  REVIEW OF SYSTEMS:  CONSTITUTIONAL: No fever   EYES: no visual disturbances  NECK: No pain   RESPIRATORY: No cough; No shortness of breath  CARDIOVASCULAR: No chest pain  GASTROINTESTINAL: No pain. No nausea or vomiting   NEUROLOGICAL: No headache   MUSCULOSKELETAL: No joint pain, no muscle pain  GENITOURINARY: no dysuria   PSYCHIATRY: no depression   ALL OTHER  ROS negative        Vital Signs Last 24 Hrs  T(C): 37.1 (08 Nov 2019 07:47), Max: 37.1 (07 Nov 2019 15:48)  T(F): 98.7 (08 Nov 2019 07:47), Max: 98.8 (08 Nov 2019 05:22)  HR: 78 (08 Nov 2019 07:47) (68 - 84)  BP: 190/96 (08 Nov 2019 07:47) (169/69 - 190/96)  RR: 18 (08 Nov 2019 07:47) (16 - 18)  SpO2: 92% (08 Nov 2019 07:47) (91% - 95%)    ________________________________________________  PHYSICAL EXAM:  GENERAL: Patient seen sitting in chair and in no apparent distress  HEENT: Normocephalic; conjunctivae and sclerae clear   NECK: supple  CHEST/LUNG: Clear to auscultation bilaterally with good air entry   HEART: S1 S2, regular; no murmurs  ABDOMEN: Soft, Nontender, Distended; Bowel sounds present  EXTREMITIES: chronic edema right slightly worse than left; no calf tenderness  SKIN: warm and dry   NERVOUS SYSTEM: Awake and alert; Oriented to place, person and time     _________________________________________________  LABS:                        9.7    4.47  )-----------( 266      ( 08 Nov 2019 07:17 )             32.1     11-08    136  |  97  |  17  ----------------------------<  138<H>  3.0<L>   |  33<H>  |  1.07    Ca    8.5      08 Nov 2019 07:17  Phos  2.5     11-08  Mg     1.9     11-08    TPro  7.1  /  Alb  2.7<L>  /  TBili  2.7<H>  /  DBili  2.0<H>  /  AST  20  /  ALT  62<H>  /  AlkPhos  181<H>  11-08    CAPILLARY BLOOD GLUCOSE    POCT Blood Glucose.: 126 mg/dL (08 Nov 2019 11:47)  POCT Blood Glucose.: 139 mg/dL (08 Nov 2019 08:17)  POCT Blood Glucose.: 153 mg/dL (07 Nov 2019 20:58)  POCT Blood Glucose.: 136 mg/dL (07 Nov 2019 16:48)      RADIOLOGY & ADDITIONAL TESTS:    Imaging Personally Reviewed:  YES    No new imaging     Consultant(s) Notes Reviewed:   YES    Care Discussed with Consultants :     Plan of care was discussed with patient and /or primary care giver; all questions and concerns were addressed and care was aligned with patient's wishes.

## 2019-11-08 NOTE — PROGRESS NOTE ADULT - PROBLEM SELECTOR PLAN 2
-ANIKA possibly due to dehydration and vancomycin; was given LR for past 2 days  -creatinine now within normal limits; was possibly due to contrast and vancomycin

## 2019-11-08 NOTE — PROGRESS NOTE ADULT - PROBLEM SELECTOR PLAN 5
-possibly from iron def  -has been stable  -will continue to monitor  -if sudden drop in Hgb, do FOBT and consult GI

## 2019-11-08 NOTE — PROGRESS NOTE ADULT - PROBLEM SELECTOR PLAN 3
- p/w SOB and sepsis and found to have Right LE redness, warmth and swelling; resolving  -may be due to cellulitis vs cholecystitis  - currently afebrile with no leukocytosis   - c/w pain meds Tylenol q6    - antibiotics discontinued  - Blood Cx and urine cx negative  -patient refused to have HIDA scan due to length of test and didn't want sedation as well

## 2019-11-08 NOTE — PROGRESS NOTE ADULT - PROBLEM SELECTOR PLAN 1
-noted to have low K levels the past few days; possibly due to diuretic use. ethacrynic acid was stopped   -has been replaced with tablets and powder with minimal resolution  -potassium added to IVF  -levels slowly improving; replaced with IV riders today  -continue to monitor potassium levels

## 2019-11-09 LAB
ALBUMIN SERPL ELPH-MCNC: 2.7 G/DL — LOW (ref 3.5–5)
ALP SERPL-CCNC: 193 U/L — HIGH (ref 40–120)
ALT FLD-CCNC: 52 U/L DA — SIGNIFICANT CHANGE UP (ref 10–60)
ANION GAP SERPL CALC-SCNC: 5 MMOL/L — SIGNIFICANT CHANGE UP (ref 5–17)
AST SERPL-CCNC: 21 U/L — SIGNIFICANT CHANGE UP (ref 10–40)
BASOPHILS # BLD AUTO: 0.03 K/UL — SIGNIFICANT CHANGE UP (ref 0–0.2)
BASOPHILS NFR BLD AUTO: 0.6 % — SIGNIFICANT CHANGE UP (ref 0–2)
BILIRUB SERPL-MCNC: 3.2 MG/DL — HIGH (ref 0.2–1.2)
BUN SERPL-MCNC: 15 MG/DL — SIGNIFICANT CHANGE UP (ref 7–18)
CALCIUM SERPL-MCNC: 8.8 MG/DL — SIGNIFICANT CHANGE UP (ref 8.4–10.5)
CHLORIDE SERPL-SCNC: 99 MMOL/L — SIGNIFICANT CHANGE UP (ref 96–108)
CHLORIDE UR-SCNC: 142 MMOL/L — HIGH (ref 55–125)
CO2 SERPL-SCNC: 34 MMOL/L — HIGH (ref 22–31)
CREAT ?TM UR-MCNC: 14 MG/DL — SIGNIFICANT CHANGE UP
CREAT ?TM UR-MCNC: <13 MG/DL — SIGNIFICANT CHANGE UP
CREAT SERPL-MCNC: 1.01 MG/DL — SIGNIFICANT CHANGE UP (ref 0.5–1.3)
CULTURE RESULTS: SIGNIFICANT CHANGE UP
CULTURE RESULTS: SIGNIFICANT CHANGE UP
EOSINOPHIL # BLD AUTO: 0.06 K/UL — SIGNIFICANT CHANGE UP (ref 0–0.5)
EOSINOPHIL NFR BLD AUTO: 1.2 % — SIGNIFICANT CHANGE UP (ref 0–6)
GLUCOSE BLDC GLUCOMTR-MCNC: 135 MG/DL — HIGH (ref 70–99)
GLUCOSE BLDC GLUCOMTR-MCNC: 137 MG/DL — HIGH (ref 70–99)
GLUCOSE BLDC GLUCOMTR-MCNC: 169 MG/DL — HIGH (ref 70–99)
GLUCOSE BLDC GLUCOMTR-MCNC: 196 MG/DL — HIGH (ref 70–99)
GLUCOSE SERPL-MCNC: 136 MG/DL — HIGH (ref 70–99)
HCT VFR BLD CALC: 32.5 % — LOW (ref 34.5–45)
HGB BLD-MCNC: 9.7 G/DL — LOW (ref 11.5–15.5)
IMM GRANULOCYTES NFR BLD AUTO: 0.8 % — SIGNIFICANT CHANGE UP (ref 0–1.5)
LYMPHOCYTES # BLD AUTO: 0.64 K/UL — LOW (ref 1–3.3)
LYMPHOCYTES # BLD AUTO: 13.3 % — SIGNIFICANT CHANGE UP (ref 13–44)
MAGNESIUM SERPL-MCNC: 2 MG/DL — SIGNIFICANT CHANGE UP (ref 1.6–2.6)
MCHC RBC-ENTMCNC: 26 PG — LOW (ref 27–34)
MCHC RBC-ENTMCNC: 29.8 GM/DL — LOW (ref 32–36)
MCV RBC AUTO: 87.1 FL — SIGNIFICANT CHANGE UP (ref 80–100)
MONOCYTES # BLD AUTO: 0.56 K/UL — SIGNIFICANT CHANGE UP (ref 0–0.9)
MONOCYTES NFR BLD AUTO: 11.6 % — SIGNIFICANT CHANGE UP (ref 2–14)
NEUTROPHILS # BLD AUTO: 3.49 K/UL — SIGNIFICANT CHANGE UP (ref 1.8–7.4)
NEUTROPHILS NFR BLD AUTO: 72.5 % — SIGNIFICANT CHANGE UP (ref 43–77)
NRBC # BLD: 0 /100 WBCS — SIGNIFICANT CHANGE UP (ref 0–0)
OSMOLALITY UR: 288 MOS/KG — SIGNIFICANT CHANGE UP (ref 50–1200)
PHOSPHATE SERPL-MCNC: 2.3 MG/DL — LOW (ref 2.5–4.5)
PLATELET # BLD AUTO: 254 K/UL — SIGNIFICANT CHANGE UP (ref 150–400)
POTASSIUM SERPL-MCNC: 3.1 MMOL/L — LOW (ref 3.5–5.3)
POTASSIUM SERPL-SCNC: 3.1 MMOL/L — LOW (ref 3.5–5.3)
POTASSIUM UR-SCNC: 25 MMOL/L — SIGNIFICANT CHANGE UP (ref 25–125)
PROT ?TM UR-MCNC: 8 MG/DL — SIGNIFICANT CHANGE UP (ref 0–12)
PROT ?TM UR-MCNC: <5 MG/DL — SIGNIFICANT CHANGE UP (ref 0–12)
PROT SERPL-MCNC: 7 G/DL — SIGNIFICANT CHANGE UP (ref 6–8.3)
RBC # BLD: 3.73 M/UL — LOW (ref 3.8–5.2)
RBC # FLD: 16 % — HIGH (ref 10.3–14.5)
RENIN DIRECT, PLASMA: 2.7 PG/ML — SIGNIFICANT CHANGE UP
SODIUM SERPL-SCNC: 138 MMOL/L — SIGNIFICANT CHANGE UP (ref 135–145)
SODIUM UR-SCNC: 117 MMOL/L — SIGNIFICANT CHANGE UP (ref 40–220)
SPECIMEN SOURCE: SIGNIFICANT CHANGE UP
SPECIMEN SOURCE: SIGNIFICANT CHANGE UP
WBC # BLD: 4.82 K/UL — SIGNIFICANT CHANGE UP (ref 3.8–10.5)
WBC # FLD AUTO: 4.82 K/UL — SIGNIFICANT CHANGE UP (ref 3.8–10.5)

## 2019-11-09 PROCEDURE — 99233 SBSQ HOSP IP/OBS HIGH 50: CPT | Mod: GC

## 2019-11-09 RX ORDER — ETHACRYNIC ACID 25 MG/1
25 TABLET ORAL
Refills: 0 | Status: DISCONTINUED | OUTPATIENT
Start: 2019-11-09 | End: 2019-11-09

## 2019-11-09 RX ORDER — POTASSIUM PHOSPHATE, MONOBASIC POTASSIUM PHOSPHATE, DIBASIC 236; 224 MG/ML; MG/ML
15 INJECTION, SOLUTION INTRAVENOUS ONCE
Refills: 0 | Status: COMPLETED | OUTPATIENT
Start: 2019-11-09 | End: 2019-11-09

## 2019-11-09 RX ORDER — POTASSIUM CHLORIDE 20 MEQ
40 PACKET (EA) ORAL ONCE
Refills: 0 | Status: COMPLETED | OUTPATIENT
Start: 2019-11-09 | End: 2019-11-09

## 2019-11-09 RX ORDER — LABETALOL HCL 100 MG
10 TABLET ORAL ONCE
Refills: 0 | Status: COMPLETED | OUTPATIENT
Start: 2019-11-09 | End: 2019-11-09

## 2019-11-09 RX ORDER — SODIUM,POTASSIUM PHOSPHATES 278-250MG
1 POWDER IN PACKET (EA) ORAL
Refills: 0 | Status: DISCONTINUED | OUTPATIENT
Start: 2019-11-09 | End: 2019-11-13

## 2019-11-09 RX ORDER — ONDANSETRON 8 MG/1
4 TABLET, FILM COATED ORAL ONCE
Refills: 0 | Status: COMPLETED | OUTPATIENT
Start: 2019-11-09 | End: 2019-11-09

## 2019-11-09 RX ADMIN — ZINC OXIDE 1 APPLICATION(S): 200 OINTMENT TOPICAL at 18:38

## 2019-11-09 RX ADMIN — Medication 40 MILLIEQUIVALENT(S): at 10:08

## 2019-11-09 RX ADMIN — PREGABALIN 1000 MICROGRAM(S): 225 CAPSULE ORAL at 12:46

## 2019-11-09 RX ADMIN — Medication 400 MILLIGRAM(S): at 21:15

## 2019-11-09 RX ADMIN — Medication 2000 UNIT(S): at 12:46

## 2019-11-09 RX ADMIN — Medication 1 APPLICATION(S): at 18:28

## 2019-11-09 RX ADMIN — Medication 0.1 MILLIGRAM(S): at 18:27

## 2019-11-09 RX ADMIN — Medication 10 MILLIGRAM(S): at 05:25

## 2019-11-09 RX ADMIN — Medication 1 TABLET(S): at 21:15

## 2019-11-09 RX ADMIN — Medication 1 TABLET(S): at 18:27

## 2019-11-09 RX ADMIN — LOSARTAN POTASSIUM 100 MILLIGRAM(S): 100 TABLET, FILM COATED ORAL at 05:37

## 2019-11-09 RX ADMIN — ONDANSETRON 4 MILLIGRAM(S): 8 TABLET, FILM COATED ORAL at 10:05

## 2019-11-09 RX ADMIN — ZINC OXIDE 1 APPLICATION(S): 200 OINTMENT TOPICAL at 05:37

## 2019-11-09 RX ADMIN — ETHACRYNIC ACID 25 MILLIGRAM(S): 25 TABLET ORAL at 10:43

## 2019-11-09 RX ADMIN — Medication 400 MILLIGRAM(S): at 12:46

## 2019-11-09 NOTE — PROGRESS NOTE ADULT - PROBLEM SELECTOR PLAN 4
- p/w Elevated liver function tests on admission   -now downtrending    - soft non tender distended abdomen  - U/S liver showing gallstones  - patient refusing HIDA scan   -no longer on abx - p/w SOB and sepsis and found to have Right LE redness, warmth and swelling; resolving  -may be due to cellulitis vs cholecystitis  - currently afebrile with no leukocytosis   - c/w pain meds Tylenol q6    - antibiotics discontinued  - Blood Cx and urine cx negative  -patient refused to have HIDA scan due to length of test and didn't want sedation as well

## 2019-11-09 NOTE — PROGRESS NOTE ADULT - SUBJECTIVE AND OBJECTIVE BOX
PGY 1 Note discussed with supervising resident and primary attending    Patient is a 69y old  Female who presents with a chief complaint of Shortness of breath (08 Nov 2019 13:19)    INTERVAL HPI/OVERNIGHT EVENTS: Patient seen and examined at the bedside. Patient's blood pressure noted to be elevated. Patient restarted back on ethacrynic acid. Potassium levels noted to be improving.     MEDICATIONS  (STANDING):  cholecalciferol 2000 Unit(s) Oral daily  cyanocobalamin 1000 MICROGram(s) Oral daily  enoxaparin Injectable 40 milliGRAM(s) SubCutaneous daily  ethacrynic acid 25 milliGRAM(s) Oral two times a day  folic acid 1 milliGRAM(s) Oral daily  insulin lispro (HumaLOG) corrective regimen sliding scale   SubCutaneous Before meals and at bedtime  labetalol 400 milliGRAM(s) Oral three times a day  lactated ringers 1000 milliLiter(s) (80 mL/Hr) IV Continuous <Continuous>  levothyroxine 75 MICROGram(s) Oral daily  losartan 100 milliGRAM(s) Oral daily  pantoprazole    Tablet 40 milliGRAM(s) Oral before breakfast  zinc oxide 20% Ointment 1 Application(s) Topical two times a day    MEDICATIONS  (PRN):  acetaminophen   Tablet .. 650 milliGRAM(s) Oral every 6 hours PRN Temp greater or equal to 38C (100.4F), Mild Pain (1 - 3)  albuterol/ipratropium for Nebulization 3 milliLiter(s) Nebulizer every 6 hours PRN Shortness of Breath and/or Wheezing  benzocaine 15 mG/menthol 3.6 mG (Sugar-Free) Lozenge 1 Lozenge Oral every 4 hours PRN Sore Throat  guaiFENesin   Syrup  (Sugar-Free) 100 milliGRAM(s) Oral every 6 hours PRN Cough  hydrocortisone 1% Cream 1 Application(s) Topical three times a day PRN Itching      __________________________________________________  REVIEW OF SYSTEMS:  CONSTITUTIONAL: No fever   EYES: no visual disturbances  NECK: No pain   RESPIRATORY: No cough; No shortness of breath  CARDIOVASCULAR: No chest pain  GASTROINTESTINAL: No pain. No nausea or vomiting   NEUROLOGICAL: No headache   MUSCULOSKELETAL: No joint pain, no muscle pain  GENITOURINARY: no dysuria  ALL OTHER  ROS negative        Vital Signs Last 24 Hrs  T(C): 36.3 (09 Nov 2019 07:38), Max: 37.1 (09 Nov 2019 05:28)  T(F): 97.3 (09 Nov 2019 07:38), Max: 98.7 (09 Nov 2019 05:28)  HR: 73 (09 Nov 2019 07:38) (69 - 75)  BP: 207/80 (09 Nov 2019 07:38) (157/81 - 218/87)  RR: 18 (09 Nov 2019 07:38) (16 - 18)  SpO2: 90% (09 Nov 2019 07:38) (90% - 94%)    ________________________________________________  PHYSICAL EXAM:  GENERAL: Patient seen resting in bed and in no acute distress  HEENT: Normocephalic; conjunctivae and sclerae clear   NECK: supple  CHEST/LUNG: Clear to auscultation bilaterally with good air entry   HEART: S1 S2  regular  ABDOMEN: Soft, Nontender, Distended; Bowel sounds present  EXTREMITIES: chronic lower extremity edema; no calf tenderness  SKIN: warm and dry  NERVOUS SYSTEM: Awake and alert; Oriented to place, person and time    _________________________________________________  LABS:                        9.7    4.82  )-----------( 254      ( 09 Nov 2019 06:13 )             32.5     11-09    138  |  99  |  15  ----------------------------<  136<H>  3.1<L>   |  34<H>  |  1.01    Ca    8.8      09 Nov 2019 06:13  Phos  2.3     11-09  Mg     2.0     11-09    TPro  7.0  /  Alb  2.7<L>  /  TBili  3.2<H>  /  DBili  x   /  AST  21  /  ALT  52  /  AlkPhos  193<H>  11-09      CAPILLARY BLOOD GLUCOSE      POCT Blood Glucose.: 135 mg/dL (09 Nov 2019 11:37)  POCT Blood Glucose.: 169 mg/dL (09 Nov 2019 08:13)  POCT Blood Glucose.: 148 mg/dL (08 Nov 2019 16:44)      RADIOLOGY & ADDITIONAL TESTS:    Imaging Personally Reviewed:  YES     No new imaging     Consultant(s) Notes Reviewed:   YES     Care Discussed with Consultants :     Plan of care was discussed with patient and /or primary care giver; all questions and concerns were addressed and care was aligned with patient's wishes.

## 2019-11-09 NOTE — PROGRESS NOTE ADULT - PROBLEM SELECTOR PLAN 3
- p/w SOB and sepsis and found to have Right LE redness, warmth and swelling; resolving  -may be due to cellulitis vs cholecystitis  - currently afebrile with no leukocytosis   - c/w pain meds Tylenol q6    - antibiotics discontinued  - Blood Cx and urine cx negative  -patient refused to have HIDA scan due to length of test and didn't want sedation as well -ANIKA possibly due to dehydration and vancomycin; was given LR   -creatinine now within normal limits

## 2019-11-09 NOTE — PROGRESS NOTE ADULT - PROBLEM SELECTOR PLAN 1
-noted to have low K levels the past few days; possibly due to diuretic use.   -has been replaced with tablets and powder with minimal resolution  -potassium levels improving  -continue to monitor potassium levels - Patient takes Losartan, Ethacrynic acid and Labetalol   - ethacrynic acid was on hold before due to low potassium  -bp noted to be elevated; since potassium improving will restart ethacrynic acid  -recheck BP   - Monitor BP closely and adjust medications if clinically indicated.  - DASH diet  -f/u cortisol level in AM  -f/u renal US  -started on clonidine

## 2019-11-09 NOTE — PROGRESS NOTE ADULT - PROBLEM SELECTOR PLAN 2
-ANIKA possibly due to dehydration and vancomycin; was given LR   -creatinine now within normal limits -noted to have low K levels the past few days; possibly due to diuretic use.   -has been replaced with tablets and powder with minimal resolution  -potassium levels improving  -continue to monitor potassium levels  -renal US  -f/u nephro Dr. Sky

## 2019-11-09 NOTE — PROGRESS NOTE ADULT - PROBLEM SELECTOR PLAN 5
-possibly from iron def  -has been stable  -will continue to monitor  -if sudden drop in Hgb, do FOBT and consult GI - p/w Elevated liver function tests on admission   -now downtrending    - soft non tender distended abdomen  - U/S liver showing gallstones  - patient refusing HIDA scan   -no longer on abx

## 2019-11-09 NOTE — PROGRESS NOTE ADULT - ATTENDING COMMENTS
Patient was examined and discussed with Dr. Alvarez.     BP is high today.  Patient c/o nausea from hydralazine.  Ethacrinic acid has been held because of hypokalemia.   No headache, no SOB, no chest pain.  Patient c/o frequent urination.     Alert woman, poor hygiene, in NAD, seated  Vital Signs Last 24 Hrs  T(C): 36.3 (09 Nov 2019 07:38), Max: 37.1 (09 Nov 2019 05:28)  T(F): 97.3 (09 Nov 2019 07:38), Max: 98.7 (09 Nov 2019 05:28)  HR: 73 (09 Nov 2019 07:38) (69 - 75)  BP: 207/80 (09 Nov 2019 07:38) (157/81 - 218/87)  BP(mean): --  RR: 18 (09 Nov 2019 07:38) (16 - 18)  SpO2: 90% (09 Nov 2019 07:38) (90% - 94%)  Lungs, clear  Cor, I/VI systolic murmur  Abdomen, obese, soft  Ext, edema bilaterally, R > L, no skin breakdown.                           9.7    4.82  )-----------( 254      ( 09 Nov 2019 06:13 )             32.5   11-09    138  |  99  |  15  ----------------------------<  136<H>  3.1<L>   |  34<H>  |  1.01    Ca    8.8      09 Nov 2019 06:13  Phos  2.3     11-09  Mg     2.0     11-09    TPro  7.0  /  Alb  2.7<L>  /  TBili  3.2<H>  /  DBili  x   /  AST  21  /  ALT  52  /  AlkPhos  193<H>  11-09  Osmolality, Random Urine: 319 mos/kg (11.06.19 @ 14:17)  Potassium, Random Urine: 22: Reference Ranges have NOT been established for random urine analytes due  to variability in fluid intake and concentration. mmol/L (11.06.19 @ 14:17)  Sodium, Random Urine: 76: Reference Ranges have NOT been established for random urine analytes due  to variability in fluid intake and concentration. mmol/L (11.06.19 @ 14:17)  Urinalysis (11.04.19 @ 08:53)    Glucose Qualitative, Urine: Negative    Blood, Urine: Small    pH Urine: 6.0    Color: Yellow    Urine Appearance: Clear    Bilirubin: Negative    Ketone - Urine: Negative    Specific Gravity: 1.010    Protein, Urine: 15    Urobilinogen: Negative    Nitrite: Negative    Leukocyte Esterase Concentration: Negative  < from: Transthoracic Echocardiogram (11.05.19 @ 15:02) >    CONCLUSIONS:  1. Mitral annular calcification. Mild mitral regurgitation.    2. Calcified aortic valve with decreased opening. Peak  transaortic valve gradient equals 21 mm Hg, mean  transaortic valve gradient equals 12 mm Hg, estimated  aortic valve area equals 1.8 sqcm (by continuity equation),  consistent with mild aortic stenosis.  3. Mild left atrial enlargement.  4. Mild concentric left ventricular hypertrophy.  5. Endocardium not well visualized; grossly normal left  ventricular systolic function.   Segmental wall motion  could not be assessed.   Mild diastolic dysfunction (stage  I).  6. Normal right ventricular size and function.    < end of copied text >    IMP:  Hypertension, poorly controlled without a diuretic.  Ethacrinic acid was chosen because she is allergic to sulfa.  Held         because of hypokalemia, which persists, despite replacement.  Patient does not wish to take hydralazine. r/o secondary         hypertension.  Renin and nickolas are WNL.           Lymphedema, will go to a lymphedema clinic.  Venous stasis, also, by history          Obesity          Urinary frequency, r/o overflow incontinence  Plan:  Clonidine 0.1 mg x 1;  if tolerated, 0.1 mg po q 12 hours           Repeat urine electrolytes           Cortisol level           Nephrology consultation, Dr. Sky           Renal and bladder ultrasound to r/o overflow.

## 2019-11-09 NOTE — PROGRESS NOTE ADULT - PROBLEM SELECTOR PLAN 6
- Patient takes Losartan, Ethacrynic acid and Labetalol   - ethacrynic acid was on hold before due to low potassium  -bp noted to be elevated; since potassium improving will restart ethacrynic acid  -recheck BP   - Monitor BP closely and adjust medications if clinically indicated.  - DASH diet -possibly from iron def  -has been stable  -will continue to monitor  -if sudden drop in Hgb, do FOBT and consult GI

## 2019-11-10 LAB
ALBUMIN SERPL ELPH-MCNC: 2.8 G/DL — LOW (ref 3.5–5)
ALP SERPL-CCNC: 206 U/L — HIGH (ref 40–120)
ALT FLD-CCNC: 57 U/L DA — SIGNIFICANT CHANGE UP (ref 10–60)
ANION GAP SERPL CALC-SCNC: 7 MMOL/L — SIGNIFICANT CHANGE UP (ref 5–17)
ANION GAP SERPL CALC-SCNC: 7 MMOL/L — SIGNIFICANT CHANGE UP (ref 5–17)
AST SERPL-CCNC: 30 U/L — SIGNIFICANT CHANGE UP (ref 10–40)
BASOPHILS # BLD AUTO: 0.06 K/UL — SIGNIFICANT CHANGE UP (ref 0–0.2)
BASOPHILS NFR BLD AUTO: 1 % — SIGNIFICANT CHANGE UP (ref 0–2)
BILIRUB SERPL-MCNC: 4.5 MG/DL — HIGH (ref 0.2–1.2)
BUN SERPL-MCNC: 17 MG/DL — SIGNIFICANT CHANGE UP (ref 7–18)
BUN SERPL-MCNC: 19 MG/DL — HIGH (ref 7–18)
CALCIUM SERPL-MCNC: 8.5 MG/DL — SIGNIFICANT CHANGE UP (ref 8.4–10.5)
CALCIUM SERPL-MCNC: 8.9 MG/DL — SIGNIFICANT CHANGE UP (ref 8.4–10.5)
CHLORIDE SERPL-SCNC: 94 MMOL/L — LOW (ref 96–108)
CHLORIDE SERPL-SCNC: 96 MMOL/L — SIGNIFICANT CHANGE UP (ref 96–108)
CO2 SERPL-SCNC: 33 MMOL/L — HIGH (ref 22–31)
CO2 SERPL-SCNC: 35 MMOL/L — HIGH (ref 22–31)
COLLECT DURATION TIME UR: 24 HR — SIGNIFICANT CHANGE UP
CORTIS AM PEAK SERPL-MCNC: 16.5 UG/DL — SIGNIFICANT CHANGE UP (ref 6–18.4)
CREAT SERPL-MCNC: 1.13 MG/DL — SIGNIFICANT CHANGE UP (ref 0.5–1.3)
CREAT SERPL-MCNC: 1.17 MG/DL — SIGNIFICANT CHANGE UP (ref 0.5–1.3)
EOSINOPHIL # BLD AUTO: 0.07 K/UL — SIGNIFICANT CHANGE UP (ref 0–0.5)
EOSINOPHIL NFR BLD AUTO: 1.2 % — SIGNIFICANT CHANGE UP (ref 0–6)
GLUCOSE BLDC GLUCOMTR-MCNC: 135 MG/DL — HIGH (ref 70–99)
GLUCOSE BLDC GLUCOMTR-MCNC: 154 MG/DL — HIGH (ref 70–99)
GLUCOSE SERPL-MCNC: 138 MG/DL — HIGH (ref 70–99)
GLUCOSE SERPL-MCNC: 158 MG/DL — HIGH (ref 70–99)
HCT VFR BLD CALC: 34.9 % — SIGNIFICANT CHANGE UP (ref 34.5–45)
HGB BLD-MCNC: 10.5 G/DL — LOW (ref 11.5–15.5)
IMM GRANULOCYTES NFR BLD AUTO: 0.3 % — SIGNIFICANT CHANGE UP (ref 0–1.5)
LYMPHOCYTES # BLD AUTO: 0.76 K/UL — LOW (ref 1–3.3)
LYMPHOCYTES # BLD AUTO: 12.8 % — LOW (ref 13–44)
MAGNESIUM SERPL-MCNC: 2 MG/DL — SIGNIFICANT CHANGE UP (ref 1.6–2.6)
MCHC RBC-ENTMCNC: 26.3 PG — LOW (ref 27–34)
MCHC RBC-ENTMCNC: 30.1 GM/DL — LOW (ref 32–36)
MCV RBC AUTO: 87.3 FL — SIGNIFICANT CHANGE UP (ref 80–100)
MONOCYTES # BLD AUTO: 0.59 K/UL — SIGNIFICANT CHANGE UP (ref 0–0.9)
MONOCYTES NFR BLD AUTO: 9.9 % — SIGNIFICANT CHANGE UP (ref 2–14)
NEUTROPHILS # BLD AUTO: 4.43 K/UL — SIGNIFICANT CHANGE UP (ref 1.8–7.4)
NEUTROPHILS NFR BLD AUTO: 74.8 % — SIGNIFICANT CHANGE UP (ref 43–77)
NRBC # BLD: 0 /100 WBCS — SIGNIFICANT CHANGE UP (ref 0–0)
PHOSPHATE 24H UR-MCNC: <5 MG/DL — SIGNIFICANT CHANGE UP
PHOSPHATE SERPL-MCNC: 3.7 MG/DL — SIGNIFICANT CHANGE UP (ref 2.5–4.5)
PLATELET # BLD AUTO: 318 K/UL — SIGNIFICANT CHANGE UP (ref 150–400)
POTASSIUM SERPL-MCNC: 2.7 MMOL/L — CRITICAL LOW (ref 3.5–5.3)
POTASSIUM SERPL-MCNC: 3 MMOL/L — LOW (ref 3.5–5.3)
POTASSIUM SERPL-SCNC: 2.7 MMOL/L — CRITICAL LOW (ref 3.5–5.3)
POTASSIUM SERPL-SCNC: 3 MMOL/L — LOW (ref 3.5–5.3)
PROT SERPL-MCNC: 7.5 G/DL — SIGNIFICANT CHANGE UP (ref 6–8.3)
RBC # BLD: 4 M/UL — SIGNIFICANT CHANGE UP (ref 3.8–5.2)
RBC # FLD: 16.6 % — HIGH (ref 10.3–14.5)
SODIUM SERPL-SCNC: 136 MMOL/L — SIGNIFICANT CHANGE UP (ref 135–145)
SODIUM SERPL-SCNC: 136 MMOL/L — SIGNIFICANT CHANGE UP (ref 135–145)
TOTAL VOLUME - 24 HOUR: 1700 ML — SIGNIFICANT CHANGE UP
URINE CREATININE CALCULATION: 0.8 G/24 H — SIGNIFICANT CHANGE UP (ref 0.6–2.5)
WBC # BLD: 5.93 K/UL — SIGNIFICANT CHANGE UP (ref 3.8–10.5)
WBC # FLD AUTO: 5.93 K/UL — SIGNIFICANT CHANGE UP (ref 3.8–10.5)

## 2019-11-10 PROCEDURE — 99233 SBSQ HOSP IP/OBS HIGH 50: CPT | Mod: GC

## 2019-11-10 PROCEDURE — 99233 SBSQ HOSP IP/OBS HIGH 50: CPT | Mod: 25

## 2019-11-10 RX ORDER — POTASSIUM CHLORIDE 20 MEQ
40 PACKET (EA) ORAL EVERY 4 HOURS
Refills: 0 | Status: COMPLETED | OUTPATIENT
Start: 2019-11-10 | End: 2019-11-10

## 2019-11-10 RX ORDER — LABETALOL HCL 100 MG
600 TABLET ORAL THREE TIMES A DAY
Refills: 0 | Status: DISCONTINUED | OUTPATIENT
Start: 2019-11-10 | End: 2019-11-13

## 2019-11-10 RX ORDER — POTASSIUM CHLORIDE 20 MEQ
10 PACKET (EA) ORAL
Refills: 0 | Status: COMPLETED | OUTPATIENT
Start: 2019-11-10 | End: 2019-11-10

## 2019-11-10 RX ORDER — SPIRONOLACTONE 25 MG/1
25 TABLET, FILM COATED ORAL
Refills: 0 | Status: DISCONTINUED | OUTPATIENT
Start: 2019-11-10 | End: 2019-11-11

## 2019-11-10 RX ADMIN — SPIRONOLACTONE 25 MILLIGRAM(S): 25 TABLET, FILM COATED ORAL at 17:48

## 2019-11-10 RX ADMIN — Medication 1 MILLIGRAM(S): at 12:00

## 2019-11-10 RX ADMIN — Medication 650 MILLIGRAM(S): at 21:17

## 2019-11-10 RX ADMIN — Medication 40 MILLIEQUIVALENT(S): at 16:21

## 2019-11-10 RX ADMIN — Medication 1 TABLET(S): at 23:20

## 2019-11-10 RX ADMIN — Medication 40 MILLIEQUIVALENT(S): at 21:31

## 2019-11-10 RX ADMIN — PREGABALIN 1000 MICROGRAM(S): 225 CAPSULE ORAL at 12:00

## 2019-11-10 RX ADMIN — Medication 650 MILLIGRAM(S): at 20:17

## 2019-11-10 RX ADMIN — Medication 40 MILLIEQUIVALENT(S): at 12:00

## 2019-11-10 RX ADMIN — Medication 40 MILLIEQUIVALENT(S): at 17:25

## 2019-11-10 RX ADMIN — Medication 1 TABLET(S): at 12:00

## 2019-11-10 RX ADMIN — Medication 1 TABLET(S): at 17:25

## 2019-11-10 RX ADMIN — Medication 600 MILLIGRAM(S): at 21:31

## 2019-11-10 RX ADMIN — Medication 400 MILLIGRAM(S): at 05:50

## 2019-11-10 RX ADMIN — ZINC OXIDE 1 APPLICATION(S): 200 OINTMENT TOPICAL at 05:50

## 2019-11-10 RX ADMIN — LOSARTAN POTASSIUM 100 MILLIGRAM(S): 100 TABLET, FILM COATED ORAL at 05:50

## 2019-11-10 RX ADMIN — Medication 2000 UNIT(S): at 12:00

## 2019-11-10 RX ADMIN — Medication 0.1 MILLIGRAM(S): at 05:50

## 2019-11-10 NOTE — CONSULT NOTE ADULT - SUBJECTIVE AND OBJECTIVE BOX
This consult is incomplete  Patient is a 69y Female whom presented to the hospital with     HPI:  69 year old Female Lives at home with roommate ambulates with a walker (pt was getting HHA visits) with PMH hypertension, CAD s/p 3 PAOLA , asthma, Chronic Lymphedema , venous stasis s/p venous ablation and carpal tunnel syndrome presenting to ED with worsening shortness of breath. . She was found to have elevated WBC and fever in ED to 102F. Patient is moaning sitting at edge of bed on my arrival. says she feels cold and chills. Also complained of back pain for which she takes Advil chronically. Pt denies wheezing, cough, chest pain, nausea, vomiting, diaphoresis, abdominal pain, dysuria or changes in bowel habits.     Pt is FULL CODE.   pt had colonoscopy 2 weeks ago which showed no abnormalities (04 Nov 2019 11:43)    pts current chart reviewed and case discussed with resident  pt denies pmh of renal ds, proteinuria, renal stones, recurrent uti or nephrotic edema or nephrotic syndrome  pt give pmh of retinopathy and s/p laser treatment  pt denies Nsaids use or otc other nephrotoxic supplements  PAST MEDICAL & SURGICAL HISTORY:  Lymphedema  Essential hypertension  Carpal tunnel syndrome of right wrist  Obesity (BMI 30-39.9)  Type 2 diabetes mellitus without complication  Hypothyroid  Essential hypertension  Obesity  HTN (hypertension)  DM (diabetes mellitus)  No significant past surgical history  S/P carpal tunnel release      Home Medications: Reviewed    MEDICATIONS  (STANDING):  cholecalciferol 2000 Unit(s) Oral daily  cyanocobalamin 1000 MICROGram(s) Oral daily  enoxaparin Injectable 40 milliGRAM(s) SubCutaneous daily  folic acid 1 milliGRAM(s) Oral daily  insulin lispro (HumaLOG) corrective regimen sliding scale   SubCutaneous Before meals and at bedtime  labetalol 600 milliGRAM(s) Oral three times a day  lactated ringers 1000 milliLiter(s) (80 mL/Hr) IV Continuous <Continuous>  levothyroxine 75 MICROGram(s) Oral daily  losartan 100 milliGRAM(s) Oral daily  pantoprazole    Tablet 40 milliGRAM(s) Oral before breakfast  potassium acid phosphate/sodium acid phosphate tablet (K-PHOS No. 2) 1 Tablet(s) Oral four times a day with meals  potassium chloride    Tablet ER 40 milliEquivalent(s) Oral every 4 hours  spironolactone 25 milliGRAM(s) Oral two times a day  zinc oxide 20% Ointment 1 Application(s) Topical two times a day    MEDICATIONS  (PRN):  acetaminophen   Tablet .. 650 milliGRAM(s) Oral every 6 hours PRN Temp greater or equal to 38C (100.4F), Mild Pain (1 - 3)  albuterol/ipratropium for Nebulization 3 milliLiter(s) Nebulizer every 6 hours PRN Shortness of Breath and/or Wheezing  benzocaine 15 mG/menthol 3.6 mG (Sugar-Free) Lozenge 1 Lozenge Oral every 4 hours PRN Sore Throat  guaiFENesin   Syrup  (Sugar-Free) 100 milliGRAM(s) Oral every 6 hours PRN Cough  hydrocortisone 1% Cream 1 Application(s) Topical three times a day PRN Itching      Allergies    penicillin (Hives)  penicillin (Rash)  sulfa drugs (Unknown)  Tetracycline Hydrochloride (Unknown)    Intolerances        SOCIAL HISTORY:  Alcohol use [X ] No  [ ] Yes  Smoking  [ X] No  [ ] Yes  Drug Abuse [X ] No  [ ] Yes  Tattoo [X ] No  [ ] Yes  History of Blood transfusion [ X] No  [ ] Yes    FAMILY HISTORY:  Family history of myocardial infarction (Sibling)  Family history of lung cancer      Diabetes [ ]  Hypertension [ ]  Kidney Stones [ ]  Heart Disease [ ]  Hyperlipidemia [ ]  Cancer [ ]    REVIEW OF SYSTEMS:  CONSTITUTIONAL: No weakness, fevers or chills  EYES/ENT: No visual changes;  No vertigo or throat pain   NECK: No pain or stiffness  RESPIRATORY: No cough, wheezing, hemoptysis; No shortness of breath  CARDIOVASCULAR: No chest pain or palpitations  GASTROINTESTINAL: No abdominal or epigastric pain. No nausea, vomiting, or hematemesis; No diarrhea or constipation. No melena or hematochezia.  GENITOURINARY: No dysuria, frequency or hematuria  NEUROLOGICAL: No numbness or weakness  SKIN: No itching, burning, rashes, or lesions   All other review of systems is negative unless indicated above    Vital Signs  T(F): 98.3 (11-10-19 @ 08:30), Max: 98.4 (11-09-19 @ 21:02)  HR: 65 (11-10-19 @ 14:10) (58 - 71)  BP: 187/74 (11-10-19 @ 14:10) (181/64 - 206/86)  ABP: --  RR: 18 (11-10-19 @ 14:10) (18 - 18)  SpO2: 95% (11-10-19 @ 14:10) (90% - 95%)  Wt(kg): --  CVP(cm H2O): --  CO: --  PCWP: --    I and O's:    Daily     Daily     PHYSICAL EXAM:  Constitutional: well developed, well nourished  and in nad  HEENT: PERRLA,  no icteric sclera and mild pallor of conjunctiva noted  Neck: No JVD, thyromegaly or adenopathy  Respiratory: reduced air entry lower lungs with no rales, wheezing or rhonchi  Cardiovascular: S1 and S2 normally heard  Gastrointestinal: soft, nondistended, nontender and normal bowel sounds heard  Extremities: No peripheral edema or cyanosis  Neurological: A/O x 3, no focal deficits  Psychiatric: Normal mood, normal affect  : No flank tenderness  Skin: No rashes        LABS:                        10.5   5.93  )-----------( 318      ( 10 Nov 2019 06:20 )             34.9     3.7  --  2.3  --  2.5  --        11-10    136  |  94<L>  |  17  ----------------------------<  158<H>  2.7<LL>   |  35<H>  |  1.17  11-09    138  |  99  |  15  ----------------------------<  136<H>  3.1<L>   |  34<H>  |  1.01  11-08    136  |  97  |  17  ----------------------------<  138<H>  3.0<L>   |  33<H>  |  1.07    Ca    8.9      10 Nov 2019 06:20  Ca    8.8      09 Nov 2019 06:13  Ca    8.5      08 Nov 2019 07:17  Phos  3.7     11-10  Phos  2.3     11-09  Phos  2.5     11-08  Mg     2.0     11-10  Mg     2.0     11-09  Mg     1.9     11-08    TPro  7.5  /  Alb  2.8<L>  /  TBili  4.5<H>  /  DBili  x   /  AST  30  /  ALT  57  /  AlkPhos  206<H>  11-10  TPro  7.0  /  Alb  2.7<L>  /  TBili  3.2<H>  /  DBili  x   /  AST  21  /  ALT  52  /  AlkPhos  193<H>  11-09  TPro  7.1  /  Alb  2.7<L>  /  TBili  2.7<H>  /  DBili  2.0<H>  /  AST  20  /  ALT  62<H>  /  AlkPhos  181<H>  11-08          URINE STUDIES:      Osmolality, Random Urine: 288 mos/kg (11-09-19 @ 17:23)  Creatinine, Random Urine: <13 mg/dL (11-09-19 @ 17:23)  Chloride, Random Urine: 142 mmol/L (11-09-19 @ 15:08)  Creatinine, Random Urine: 14 mg/dL (11-09-19 @ 15:08)  Sodium, Random Urine: 117 mmol/L (11-09-19 @ 15:08)  Potassium, Random Urine: 25 mmol/L (11-09-19 @ 15:08)  Potassium, Random Urine: 22 mmol/L (11-06-19 @ 14:17)  Chloride, Random Urine: 97 mmol/L (11-06-19 @ 14:17)  Sodium, Random Urine: 76 mmol/L (11-06-19 @ 14:17)  Osmolality, Random Urine: 319 mos/kg (11-06-19 @ 14:17)                RADIOLOGY & ADDITIONAL STUDIES: Patient is a 69y Female whom presented to the hospital with sob and back pain , being treated for hypertensive urgency , noted to have persistant hypokalemia.    Medical HPI:  69 year old Female Lives at home with roommate ambulates with a walker (pt was getting HHA visits) with PMH hypertension, CAD s/p 3 PAOLA , asthma, Chronic Lymphedema , venous stasis s/p venous ablation and carpal tunnel syndrome presenting to ED with worsening shortness of breath. . She was found to have elevated WBC and fever in ED to 102F. Patient is moaning sitting at edge of bed on my arrival. says she feels cold and chills. Also complained of back pain for which she takes Advil chronically. Pt denies wheezing, cough, chest pain, nausea, vomiting, diaphoresis, abdominal pain, dysuria or changes in bowel habits.     Renal HPI:  pts current chart reviewed and case discussed with resident  pt is known to have ckd-satge 3 as per old labs with baseline cr 1.13  and  long standing hx of htn   pt denies pmh of hypertensive urgency or hypokalemia in the past  pt denies pmh of  proteinuria, renal stones, recurrent uti or nephrotic edema or nephrotic syndrome  pt gives no hx of diabetic retinopathy  pt denies Nsaids use or otc other nephrotoxic supplements recently  pt does have chronic lymhedema on Ethacrynic acid for >3 yrs  with polyurea   she currently denies vomiting or chronic laxative use  pt was given iv contrast few days ago with mild elevated cr post contrast noted  PAST MEDICAL & SURGICAL HISTORY:  Lymphedema  Essential hypertension  Carpal tunnel syndrome of right wrist  Obesity (BMI 30-39.9)  Type 2 diabetes mellitus without complication  Hypothyroid  Essential hypertension  Obesity  CAD  HTN (hypertension)  DM (diabetes mellitus)  past surgical history  S/P carpal tunnel release  S/P multiple coronary stent -last one 4 yrs ago    Home Medications: Reviewed    MEDICATIONS  (STANDING):  cholecalciferol 2000 Unit(s) Oral daily  cyanocobalamin 1000 MICROGram(s) Oral daily  enoxaparin Injectable 40 milliGRAM(s) SubCutaneous daily  folic acid 1 milliGRAM(s) Oral daily  insulin lispro (HumaLOG) corrective regimen sliding scale   SubCutaneous Before meals and at bedtime  labetalol 600 milliGRAM(s) Oral three times a day  lactated ringers 1000 milliLiter(s) (80 mL/Hr) IV Continuous <Continuous>  levothyroxine 75 MICROGram(s) Oral daily  losartan 100 milliGRAM(s) Oral daily  pantoprazole    Tablet 40 milliGRAM(s) Oral before breakfast  potassium acid phosphate/sodium acid phosphate tablet (K-PHOS No. 2) 1 Tablet(s) Oral four times a day with meals  potassium chloride    Tablet ER 40 milliEquivalent(s) Oral every 4 hours  spironolactone 25 milliGRAM(s) Oral two times a day  zinc oxide 20% Ointment 1 Application(s) Topical two times a day    MEDICATIONS  (PRN):  acetaminophen   Tablet .. 650 milliGRAM(s) Oral every 6 hours PRN Temp greater or equal to 38C (100.4F), Mild Pain (1 - 3)  albuterol/ipratropium for Nebulization 3 milliLiter(s) Nebulizer every 6 hours PRN Shortness of Breath and/or Wheezing  benzocaine 15 mG/menthol 3.6 mG (Sugar-Free) Lozenge 1 Lozenge Oral every 4 hours PRN Sore Throat  guaiFENesin   Syrup  (Sugar-Free) 100 milliGRAM(s) Oral every 6 hours PRN Cough  hydrocortisone 1% Cream 1 Application(s) Topical three times a day PRN Itching      Allergies    penicillin (Hives)  penicillin (Rash)  sulfa drugs (Unknown)  Tetracycline Hydrochloride (Unknown)    Intolerances        SOCIAL HISTORY:  Alcohol use [X ] No  [ ] Yes  Smoking  [ X] No  [ ] Yes  Drug Abuse [X ] No  [ ] Yes  Tattoo [X ] No  [ ] Yes  History of Blood transfusion [ X] No  [ ] Yes    FAMILY HISTORY:  Family history of myocardial infarction (Sibling)  Family history of lung cancer      REVIEW OF SYSTEMS:  CONSTITUTIONAL: No weakness, fevers or chills  EYES/ENT: No visual changes;  No vertigo or throat pain   NECK: No pain or stiffness  RESPIRATORY: No cough, wheezing, hemoptysis; No shortness of breath  CARDIOVASCULAR: No chest pain or palpitations  GASTROINTESTINAL: No abdominal or epigastric pain. No nausea, vomiting, or hematemesis; No diarrhea or constipation. No melena or hematochezia.  GENITOURINARY: No dysuria, frequency or hematuria  +polyurea  NEUROLOGICAL: No numbness or weakness  SKIN: No itching, burning, rashes, or lesions   All other review of systems is negative unless indicated above    Vital Signs  T(F): 98.3 (11-10-19 @ 08:30), Max: 98.4 (11-09-19 @ 21:02)  HR: 65 (11-10-19 @ 14:10) (58 - 71)  BP: 187/74 (11-10-19 @ 14:10) (181/64 - 206/86)  ABP: --  RR: 18 (11-10-19 @ 14:10) (18 - 18)  SpO2: 95% (11-10-19 @ 14:10) (90% - 95%)  Wt(kg): --  CVP(cm H2O): --  CO: --  PCWP: --    I and O's:    Daily     Daily     PHYSICAL EXAM:  Constitutional: well developed, obese  and in nad  HEENT: PERRLA,  no icteric sclera and mild pallor of conjunctiva noted  Neck: No JVD, thyromegaly   Respiratory: reduced air entry lower lungs with no rales, wheezing or rhonchi  Cardiovascular: S1 and S2 normally heard  Gastrointestinal: soft, nondistended, nontender and normal bowel sounds heard  Extremities: B/L Lymphedema present   Neurological: A/O x 3, no focal deficits  Skin: No rashes        LABS:                        10.5   5.93  )-----------( 318      ( 10 Nov 2019 06:20 )             34.9     3.7  --  2.3  --  2.5  --        11-10    136  |  94<L>  |  17  ----------------------------<  158<H>  2.7<LL>   |  35<H>  |  1.17  11-09    138  |  99  |  15  ----------------------------<  136<H>  3.1<L>   |  34<H>  |  1.01  11-08    136  |  97  |  17  ----------------------------<  138<H>  3.0<L>   |  33<H>  |  1.07    Ca    8.9      10 Nov 2019 06:20  Ca    8.8      09 Nov 2019 06:13  Ca    8.5      08 Nov 2019 07:17  Phos  3.7     11-10  Phos  2.3     11-09  Phos  2.5     11-08  Mg     2.0     11-10  Mg     2.0     11-09  Mg     1.9     11-08    TPro  7.5  /  Alb  2.8<L>  /  TBili  4.5<H>  /  DBili  x   /  AST  30  /  ALT  57  /  AlkPhos  206<H>  11-10  TPro  7.0  /  Alb  2.7<L>  /  TBili  3.2<H>  /  DBili  x   /  AST  21  /  ALT  52  /  AlkPhos  193<H>  11-09  TPro  7.1  /  Alb  2.7<L>  /  TBili  2.7<H>  /  DBili  2.0<H>  /  AST  20  /  ALT  62<H>  /  AlkPhos  181<H>  11-08          URINE STUDIES:  Osmolality, Random Urine: 288 mos/kg (11-09-19 @ 17:23)  Chloride, Random Urine: 142 mmol/L (11-09-19 @ 15:08)  Sodium, Random Urine: 117 mmol/L (11-09-19 @ 15:08)  Potassium, Random Urine: 25 mmol/L (11-09-19 @ 15:08)  TTKG(Trans Tubular K Gradient>8 % ) c/w renal wasting    Potassium, Random Urine: 22 mmol/L (11-06-19 @ 14:17)  Chloride, Random Urine: 97 mmol/L (11-06-19 @ 14:17)  Sodium, Random Urine: 76 mmol/L (11-06-19 @ 14:17)  Osmolality, Random Urine: 319 mos/kg (11-06-19 @ 14:17)      Total Protein, Random Urine (11.09.19 @ 17:23)    Total Protein, Random Urine: <5 mg/dL    Creatinine, Random Urine (11.09.19 @ 17:23)    Creatinine, Random Urine: <13: Reference Ranges have NOT been established for random urine analytes due  to variability in fluid intake and concentration. mg/dL            RADIOLOGY & ADDITIONAL STUDIES:    < from: US Hepatic & Pancreatic (11.06.19 @ 11:48) >  EXAM:  US LIVER AND PANCREAS                            PROCEDURE DATE:  11/06/2019          INTERPRETATION:  Right upper quadrant abdominal ultrasound    Indication: Elevated liver enzymes.    Right upper quadrant abdominal ultrasound is performedwithout a previous   abdominal ultrasound for comparison. The liver spans 17.4 cm which is   mildly enlarged. The liver maintains normal echogenicity and echotexture   without evidence for a focal hepatic lesion. Duplex Doppler interrogation   of themain portal vein demonstrates normal hepatopedal flow.    The common bile duct measures 5.5 mm in caliber which is within normal   limits.    Gallstones, layering sludge, and mild bladder wall thickening are noted.   No evidence for pericholecystic fluid or ultrasonic Cunningham's sign.    The visualized pancreas appears grossly unremarkable.     The right kidney measures 11.3 cm in length which is within normal limits   in size. The right kidney appears grossly unremarkable without   hydronephrosis.    No ascites is detected. The visualized IVC and aorta appear grossly   unremarkable.    Impression: Mild hepatomegaly.    Gallstones, layering sludge, and mild bladder wall thickening are noted.   No evidence for pericholecystic fluid or ultrasonicMurphy's sign.   Findings are equivocal for acute cholecystitis. If clinically indicated,   HIDA scan may be pursued for further evaluation.      < end of copied text >  < from: CT Abdomen and Pelvis w/ Oral Cont and w/ IV Cont (11.06.19 @ 11:13) >  EXAM:  CT ABDOMEN AND PELVIS OC IC                            PROCEDURE DATE:  11/06/2019          INTERPRETATION:  CT of the Abdomen with contrast and CT of the Pelvis   with contrast    HISTORY: Cholecystitis    TECHNIQUE: Multiple axial scans ofthe abdomen and pelvis were obtained   after administration of bowel contrast and 90 mL of Omnipaque 350   intravenous contrast material.      Interpretation: Liver: No focal lesions.      Biliary tree: Not dilated. The gallbladder shows mild wall thickening   without pericholecystic fluid or inflammation of adjacent fat.    Solid organs: Small left renal cyst and nonobstructing calculus in the   right collecting system.      Retroperitoneum: Small air-fluid level near the second portion of the   duodenum either represents duodenal diverticulum or choledochocele.      Bowel: No evidence of ascites, bowel obstruction or free air. There are   diverticuli of the descending colon and sigmoid colon without   radiographic evidence of diverticulitis    Appendix: Unremarkable    Urinary bladder: Collapsed.      Pelvis: Shows no free fluid.      The inguinal regions are unremarkable.      The lung bases are clear.      IMPRESSION: Mild gallbladder wall thickening. No evidence of radiopaque   gallstone or biliary dilatation. Duodenal diverticulum versus   choledochocele.      < end of copied text >  < from: US Duplex Venous Lower Ext Complete, Bilateral (11.06.19 @ 11:14) >  EXAM:  US DPLX LWR EXT VEINS COMPL BI                            PROCEDURE DATE:  11/06/2019          INTERPRETATION:  Venous Duplex Ultrasound of the Lower Extremities    Clinical information: Lower extremity lymphedema    Sonographic evaluation of the lower extremity veins to the level of the   posterior tibial veins was performed using gray-scale and color Doppler   imaging. Evaluation of the left calf vessels is limited due to patient's   large body habitus.    Interpretation:     Right leg:  The visualized veins are patent and compressible.  No   abnormal echoes or flow disturbances are identified.    Left leg:  The visualized veins are patent and compressible.  No abnormal   echoes or flow disturbances are identified.      IMPRESSION: No ultrasound evidence of DVT.    < end of copied text >

## 2019-11-10 NOTE — CONSULT NOTE ADULT - ASSESSMENT
A/P:  1.HYPOKALEMIA: secondary to renal wasting(high TTKG as above) ,r/o secondary to diuretic induced (Ethacrynic acid) vs hyperlado(secondary ) secondary to piyush-vascular htn(GINA) vs Endocrine disorder like excess cortisol secretion  -suggest to add PO Aldactone to counter act renal k wasting and get Endocrine consult  -f/u 24 hr urine for Cortisol, Na ,K and cr  -keep k >4 and <5  -f/u k daily  -replace po kcl prn for k >3 and <3.5  -add iv kcl for k<3 prn  -avoid Ethacrynic acid   2.HTN: most likley essential htn, r/o renal artery stenosis induced ,secondary to atherosclerosis from DM/HTN  -suggest to titrate bp meds to keep bp<130/80  -low salt diet  -add Aldactone 25mg po bid  3.CKD: stage 3 with baseline cr 1.13 with no proteinuria, secondary to ischemic renal ds (secondary to atherosclerosis)  -pts renal function at baseline today   -keep pt evolemic, adjust meds as per egfr and avoid nephrotoxic meds like Nsaids/Aminoglycosides/iv contrast ,,etc  -may continue Losartan for now   4.METABOLIC ALKALOSIS:mild  with high urine Cl ,c/w volume resistant alkalosis  -avoid iv hydration  -f/u co2 daily  5.ANEMIA:mild  -f/u Hb daily

## 2019-11-10 NOTE — PROGRESS NOTE ADULT - SUBJECTIVE AND OBJECTIVE BOX
Patient is a 69y old  Female who presents with a chief complaint of Shortness of breath (09 Nov 2019 11:59)    HPI:  69 year old Female Lives at home with roommate ambulates with a walker (pt was getting A visits) with PMH hypertension, CAD s/p 3 PAOLA , asthma, Chronic Lymphedema , venous stasis s/p venous ablation and carpal tunnel syndrome presenting to ED with worsening shortness of breath. She was found to have elevated WBC and fever in ED to 102F. Also complained of back pain for which she takes Advil chronically. Pt denies wheezing, cough, chest pain, nausea, vomiting, diaphoresis, abdominal pain, dysuria or changes in bowel habits.     pt had colonoscopy 2 weeks ago which showed no abnormalities (04 Nov 2019 11:43)    Today:   Pt is upset on renal US being ordered prior to her knowledge. Pt is in agreement to have US done now. Pt also noted to have uncontrolled BP. Pt is on clonidine po and is refusing to use the clonidine patch. Pt states she is allergic to hydralazine and norvasc.   No chest pain, no sob, no headaches or weakness.     PAST MEDICAL & SURGICAL HISTORY:  Lymphedema  Essential hypertension  Carpal tunnel syndrome of right wrist  Obesity (BMI 30-39.9)  Type 2 diabetes mellitus without complication  Hypothyroid  Essential hypertension  Obesity  HTN (hypertension)  DM (diabetes mellitus)  No significant past surgical history  S/P carpal tunnel release    MEDICATIONS  (STANDING):  cholecalciferol 2000 Unit(s) Oral daily  cyanocobalamin 1000 MICROGram(s) Oral daily  enoxaparin Injectable 40 milliGRAM(s) SubCutaneous daily  folic acid 1 milliGRAM(s) Oral daily  insulin lispro (HumaLOG) corrective regimen sliding scale   SubCutaneous Before meals and at bedtime  labetalol 600 milliGRAM(s) Oral three times a day  lactated ringers 1000 milliLiter(s) (80 mL/Hr) IV Continuous <Continuous>  levothyroxine 75 MICROGram(s) Oral daily  losartan 100 milliGRAM(s) Oral daily  pantoprazole    Tablet 40 milliGRAM(s) Oral before breakfast  potassium acid phosphate/sodium acid phosphate tablet (K-PHOS No. 2) 1 Tablet(s) Oral four times a day with meals  potassium chloride   Powder 40 milliEquivalent(s) Oral every 4 hours  zinc oxide 20% Ointment 1 Application(s) Topical two times a day    MEDICATIONS  (PRN):  acetaminophen   Tablet .. 650 milliGRAM(s) Oral every 6 hours PRN Temp greater or equal to 38C (100.4F), Mild Pain (1 - 3)  albuterol/ipratropium for Nebulization 3 milliLiter(s) Nebulizer every 6 hours PRN Shortness of Breath and/or Wheezing  benzocaine 15 mG/menthol 3.6 mG (Sugar-Free) Lozenge 1 Lozenge Oral every 4 hours PRN Sore Throat  guaiFENesin   Syrup  (Sugar-Free) 100 milliGRAM(s) Oral every 6 hours PRN Cough  hydrocortisone 1% Cream 1 Application(s) Topical three times a day PRN Itching    EXAM:  Vital Signs Last 24 Hrs  T(C): 36.8 (10 Nov 2019 08:30), Max: 36.9 (09 Nov 2019 21:02)  T(F): 98.3 (10 Nov 2019 08:30), Max: 98.4 (09 Nov 2019 21:02)  HR: 58 (10 Nov 2019 08:30) (58 - 71)  BP: 181/64 (10 Nov 2019 08:30) (181/64 - 206/86)  BP(mean): --  RR: 18 (10 Nov 2019 08:30) (18 - 18)  SpO2: 95% (10 Nov 2019 08:30) (90% - 95%)    PHYSICAL EXAM:  Constitutional: awake, sitting in her chair. NAD.   Neck: supple  Respiratory: bilaterally clear to auscultation, no wheezing, no rhonchi, no crackles, no decreased air entry  Cardiovascular: s1s2  Neurological: alert and oriented to person, date and place.   Psychiatric: appropriate affect.     LABS:  LIVER FUNCTIONS - ( 10 Nov 2019 06:20 )  Alb: 2.8 g/dL / Pro: 7.5 g/dL / ALK PHOS: 206 U/L / ALT: 57 U/L DA / AST: 30 U/L / GGT: x                           10.5   5.93  )-----------( 318      ( 10 Nov 2019 06:20 )             34.9   11-10  136  |  94<L>  |  17  ----------------------------<  158<H>  2.7<LL>   |  35<H>  |  1.17  Ca    8.9      10 Nov 2019 06:20  Phos  3.7     11-10  Mg     2.0     11-10    TPro  7.5  /  Alb  2.8<L>  /  TBili  4.5<H>  /  DBili  x   /  AST  30  /  ALT  57  /  AlkPhos  206<H>  11-10

## 2019-11-10 NOTE — PROGRESS NOTE ADULT - ASSESSMENT
69 year old Female Lives at home with roommate ambulates with a walker (pt was getting HHA visits) with PMH hypertension, CAD s/p 3 PAOLA , asthma, Chronic Lymphedema , venous stasis s/p venous ablation, gi bleed 2/2 to diverticulosis and carpal tunnel syndrome presenting to ED with worsening shortness of breath.  Pt admitted for sepsis.    -Hypertensive Urgency:  Plan: - Patient takes Losartan, Ethacrynic acid and Labetalol at home.   ethacrynic acid was held due to low potassium.   Bp has remained elevated. Discussed with pt who was started on clonidine 0.1mg bid with no relief. Pt does not want to use the catapres patch.   Pt refusing to take Norvasc and hydralazine as she notes having allergies.   Will increase losartan to 600mg TID (1800mg daily)  Will cw losartan.   Renal US pending.     - Hypokalemia.    persistent hypokalemia. Ethacrynic acid stopped as cause of hypokalemia.   Today's K is 2.7. Started on KCL 40meq x2 and daily K-Phos.   Repeat BMP.   Renal consulted. Dr Sky pending. Renal US pending.     - ANIKA (acute kidney injury).  Plan: -ANIKA possibly due to dehydration  sp hydration. Creatinine now within normal limits.     -Sepsis due to cellulitis vs ?cholecystitis"   Pt on admission noted for RLE cellulitis Sp Vancomycin and was to be switched to clindamycin but refused. ID consulted and discontinued all antibiotics. Pt has no leukocytosis and has remained afebrile.   No suggestion of cholecystitis clinically. Pt refused on HIDA.     -Liver function test abnormality: remains elevated.   p/w Elevated liver function tests on admission   U/S liver showing gallstones   patient refusing HIDA scan, will monitor    -Normocytic Anemia.  stable. May benefit from iron po trial.     - DM (diabetes mellitus).    Plan: - Patient takes no medications at home   - Monitor blood sugars AC/HS and adjust as needed  - goal -196.   - Carbohydrate consistent diabetic diet with evening snacks.     -Hypothyroid.  Plan: - pt has hx of hypothyroidism   - Pt does not take meds at home  - TSH 1.82.     -Prophylactic measure.  Plan: IMPROVE VTE Individual Risk Assessment  RISK                                                                Point  [  ] Previous VTE                                                  3    [  ] Thrombophilia                                               2    [  ] Lower limb paralysis                                      2        (unable to hold up >15 seconds)    [  ] Current Cancer                                              2         (within 6 months)  [X] Immobilization > 24 hrs                                1  [  ] ICU/CCU stay > 24 hours                              1  [X] Age > 60                                                      1    IMPROVE VTE Score _____2____  c/w Lovenox 40 mg qd.

## 2019-11-11 LAB
ALBUMIN SERPL ELPH-MCNC: 2.8 G/DL — LOW (ref 3.5–5)
ALP SERPL-CCNC: 205 U/L — HIGH (ref 40–120)
ALT FLD-CCNC: 53 U/L DA — SIGNIFICANT CHANGE UP (ref 10–60)
ANION GAP SERPL CALC-SCNC: 3 MMOL/L — LOW (ref 5–17)
AST SERPL-CCNC: 25 U/L — SIGNIFICANT CHANGE UP (ref 10–40)
BASOPHILS # BLD AUTO: 0.07 K/UL — SIGNIFICANT CHANGE UP (ref 0–0.2)
BASOPHILS NFR BLD AUTO: 1.3 % — SIGNIFICANT CHANGE UP (ref 0–2)
BILIRUB SERPL-MCNC: 4.3 MG/DL — HIGH (ref 0.2–1.2)
BUN SERPL-MCNC: 17 MG/DL — SIGNIFICANT CHANGE UP (ref 7–18)
CALCIUM SERPL-MCNC: 8.7 MG/DL — SIGNIFICANT CHANGE UP (ref 8.4–10.5)
CHLORIDE SERPL-SCNC: 99 MMOL/L — SIGNIFICANT CHANGE UP (ref 96–108)
CO2 SERPL-SCNC: 35 MMOL/L — HIGH (ref 22–31)
COLLECT DURATION TIME UR: 24 HR — SIGNIFICANT CHANGE UP
COLLECT DURATION TIME UR: 24 HR — SIGNIFICANT CHANGE UP
CREAT SERPL-MCNC: 1.13 MG/DL — SIGNIFICANT CHANGE UP (ref 0.5–1.3)
EOSINOPHIL # BLD AUTO: 0.16 K/UL — SIGNIFICANT CHANGE UP (ref 0–0.5)
EOSINOPHIL NFR BLD AUTO: 2.9 % — SIGNIFICANT CHANGE UP (ref 0–6)
GLUCOSE BLDC GLUCOMTR-MCNC: 138 MG/DL — HIGH (ref 70–99)
GLUCOSE BLDC GLUCOMTR-MCNC: 145 MG/DL — HIGH (ref 70–99)
GLUCOSE BLDC GLUCOMTR-MCNC: 154 MG/DL — HIGH (ref 70–99)
GLUCOSE SERPL-MCNC: 123 MG/DL — HIGH (ref 70–99)
HCT VFR BLD CALC: 35.6 % — SIGNIFICANT CHANGE UP (ref 34.5–45)
HGB BLD-MCNC: 10.7 G/DL — LOW (ref 11.5–15.5)
IMM GRANULOCYTES NFR BLD AUTO: 0.6 % — SIGNIFICANT CHANGE UP (ref 0–1.5)
LYMPHOCYTES # BLD AUTO: 0.75 K/UL — LOW (ref 1–3.3)
LYMPHOCYTES # BLD AUTO: 13.8 % — SIGNIFICANT CHANGE UP (ref 13–44)
MAGNESIUM SERPL-MCNC: 2.2 MG/DL — SIGNIFICANT CHANGE UP (ref 1.6–2.6)
MCHC RBC-ENTMCNC: 26.3 PG — LOW (ref 27–34)
MCHC RBC-ENTMCNC: 30.1 GM/DL — LOW (ref 32–36)
MCV RBC AUTO: 87.5 FL — SIGNIFICANT CHANGE UP (ref 80–100)
MONOCYTES # BLD AUTO: 0.44 K/UL — SIGNIFICANT CHANGE UP (ref 0–0.9)
MONOCYTES NFR BLD AUTO: 8.1 % — SIGNIFICANT CHANGE UP (ref 2–14)
NEUTROPHILS # BLD AUTO: 4 K/UL — SIGNIFICANT CHANGE UP (ref 1.8–7.4)
NEUTROPHILS NFR BLD AUTO: 73.3 % — SIGNIFICANT CHANGE UP (ref 43–77)
NRBC # BLD: 0 /100 WBCS — SIGNIFICANT CHANGE UP (ref 0–0)
PHOSPHATE SERPL-MCNC: 3.8 MG/DL — SIGNIFICANT CHANGE UP (ref 2.5–4.5)
PLATELET # BLD AUTO: 332 K/UL — SIGNIFICANT CHANGE UP (ref 150–400)
POTASSIUM 24H UR-MRATE: 33 MMOL/24HR — SIGNIFICANT CHANGE UP (ref 25–125)
POTASSIUM SERPL-MCNC: 3.3 MMOL/L — LOW (ref 3.5–5.3)
POTASSIUM SERPL-SCNC: 3.3 MMOL/L — LOW (ref 3.5–5.3)
PROT SERPL-MCNC: 7.4 G/DL — SIGNIFICANT CHANGE UP (ref 6–8.3)
RBC # BLD: 4.07 M/UL — SIGNIFICANT CHANGE UP (ref 3.8–5.2)
RBC # FLD: 16.6 % — HIGH (ref 10.3–14.5)
SODIUM ?TM UR-SCNC: 56 MMOL/24HR — SIGNIFICANT CHANGE UP (ref 40–220)
SODIUM SERPL-SCNC: 137 MMOL/L — SIGNIFICANT CHANGE UP (ref 135–145)
TOTAL VOLUME - 24 HOUR: 1500 ML — SIGNIFICANT CHANGE UP
TOTAL VOLUME - 24 HOUR: 1500 ML — SIGNIFICANT CHANGE UP
URINE CREATININE CALCULATION: 1 G/24 H — SIGNIFICANT CHANGE UP (ref 0.6–2.5)
URINE CREATININE CALCULATION: 1 G/24 H — SIGNIFICANT CHANGE UP (ref 0.6–2.5)
WBC # BLD: 5.45 K/UL — SIGNIFICANT CHANGE UP (ref 3.8–10.5)
WBC # FLD AUTO: 5.45 K/UL — SIGNIFICANT CHANGE UP (ref 3.8–10.5)

## 2019-11-11 PROCEDURE — 76770 US EXAM ABDO BACK WALL COMP: CPT | Mod: 26

## 2019-11-11 PROCEDURE — 76775 US EXAM ABDO BACK WALL LIM: CPT | Mod: 26

## 2019-11-11 PROCEDURE — 99233 SBSQ HOSP IP/OBS HIGH 50: CPT | Mod: GC

## 2019-11-11 PROCEDURE — 99223 1ST HOSP IP/OBS HIGH 75: CPT

## 2019-11-11 RX ORDER — POTASSIUM CHLORIDE 20 MEQ
40 PACKET (EA) ORAL EVERY 4 HOURS
Refills: 0 | Status: COMPLETED | OUTPATIENT
Start: 2019-11-11 | End: 2019-11-11

## 2019-11-11 RX ADMIN — Medication 600 MILLIGRAM(S): at 05:49

## 2019-11-11 RX ADMIN — Medication 1 TABLET(S): at 12:11

## 2019-11-11 RX ADMIN — Medication 40 MILLIEQUIVALENT(S): at 17:39

## 2019-11-11 RX ADMIN — Medication 40 MILLIEQUIVALENT(S): at 12:11

## 2019-11-11 RX ADMIN — Medication 1 TABLET(S): at 23:13

## 2019-11-11 RX ADMIN — Medication 600 MILLIGRAM(S): at 14:27

## 2019-11-11 RX ADMIN — Medication 1 APPLICATION(S): at 17:40

## 2019-11-11 RX ADMIN — LOSARTAN POTASSIUM 100 MILLIGRAM(S): 100 TABLET, FILM COATED ORAL at 05:49

## 2019-11-11 RX ADMIN — Medication 2000 UNIT(S): at 12:12

## 2019-11-11 RX ADMIN — Medication 1 TABLET(S): at 08:52

## 2019-11-11 RX ADMIN — SPIRONOLACTONE 25 MILLIGRAM(S): 25 TABLET, FILM COATED ORAL at 05:49

## 2019-11-11 RX ADMIN — PREGABALIN 1000 MICROGRAM(S): 225 CAPSULE ORAL at 12:12

## 2019-11-11 RX ADMIN — ZINC OXIDE 1 APPLICATION(S): 200 OINTMENT TOPICAL at 05:51

## 2019-11-11 RX ADMIN — Medication 600 MILLIGRAM(S): at 23:14

## 2019-11-11 RX ADMIN — Medication 1 TABLET(S): at 17:39

## 2019-11-11 NOTE — PROGRESS NOTE ADULT - ASSESSMENT
69 year old Female Lives at home with roommate ambulates with a walker (pt was getting HHA visits) with PMH hypertension, CAD s/p 3 PAOLA, asthma, Chronic Lymphedema, venous stasis s/p venous ablation, gi bleed 2/2 to diverticulosis and carpal tunnel syndrome presenting to ED with worsening shortness of breath.    Pt admitted for management of Sepsis

## 2019-11-11 NOTE — PROGRESS NOTE ADULT - PROBLEM SELECTOR PLAN 2
-noted to have low K levels the past few days; possibly due to diuretic use.   -has been replaced with tablets and powder   -potassium levels improving; 3.3 today  -continue to monitor potassium levels  -renal US  -f/u nephro Dr. Sky

## 2019-11-11 NOTE — PROGRESS NOTE ADULT - ASSESSMENT
1.HYPOKALEMIA: secondary to renal wasting(high TTKG as above) ,r/o secondary to diuretic induced (Ethacrynic acid) vs hyperlado(secondary ) secondary to piyush-vascular htn(GINA) vs Endocrine disorder like excess cortisol secretion  -pt reported of allergic reaction to aldactone,   - pt continues to have hypokalemia, metabolic acidosis, and hypertensive  - serum cortisol AM WNL. serum Nickolas 10.4  -f/u repeat serum nickolas, renin  -f/u 24 hr urine cortisol and aldosterone level   -f/u 24 hr urine for Cortisol, Na ,K and cr  -keep k >4 and <5  -f/u k daily  -replace po kcl prn for k >3 and <3.5  -add iv kcl for k<3 prn  -avoid Ethacrynic acid   2.HTN: most likley essential htn, r/o renal artery stenosis induced ,secondary to atherosclerosis from DM/HTN  -suggest to titrate bp meds to keep bp<130/80  -low salt diet  -add Aldactone 25mg po bid  3.CKD: stage 3 with baseline cr 1.13 with no proteinuria, secondary to ischemic renal ds (secondary to atherosclerosis)  -pts renal function at baseline today   -keep pt evolemic, adjust meds as per egfr and avoid nephrotoxic meds like Nsaids/Aminoglycosides/iv contrast ,,etc  -may continue Losartan for now   4.METABOLIC ALKALOSIS:mild  with high urine Cl ,c/w volume resistant alkalosis  -avoid iv hydration  -f/u co2 daily  5.ANEMIA:mild  -f/u Hb daily 1.HYPOKALEMIA: secondary to renal wasting(high TTKG as above) ,r/o secondary to diuretic induced (Ethacrynic acid) vs hyperlado(secondary ) secondary to piyush-vascular htn(GINA) vs Endocrine disorder like excess cortisol secretion  -pt reported of allergic reaction to aldactone,   - pt continues to have hypokalemia, metabolic acidosis, and hypertensive  - serum cortisol AM WNL. serum Nickolas 10.4  -f/u repeat serum nickolas, renin  -f/u 24 hr urine cortisol and aldosterone level   -f/u 24 hr urine for Cortisol, Na ,K and cr  -keep k >4 and <5  -f/u k daily  -replace po kcl prn for k >3 and <3.5  -add iv kcl for k<3 prn  -avoid Ethacrynic acid   -Endocrine consult  2.HTN: most likley essential htn, r/o renal artery stenosis induced(small left kidney) ,secondary to atherosclerosis from DM/HTN  -suggest to titrate bp meds to keep bp<130/80  -low salt diet  -no Aldactone as pt is having side effects/allergic reaction  3.CKD: stage 3 with baseline cr 1.13 with no proteinuria, secondary to ischemic renal ds (secondary to atherosclerosis)  -pts renal function at baseline today   -keep pt evolemic, adjust meds as per egfr and avoid nephrotoxic meds like Nsaids/Aminoglycosides/iv contrast ,,etc  -may continue Losartan for now   4.METABOLIC ALKALOSIS:mild  with high urine Cl ,c/w volume resistant alkalosis  -avoid iv hydration  -f/u co2 daily  5.ANEMIA:mild  -f/u Hb daily  6.B/L RENAL STONES:pt is known to have renal stones ,asympatomatic at this time, no flank pain or gross hematuria,,etc  -needs renal f/u as outpatient for further care 1.HYPOKALEMIA: secondary to renal wasting(high TTKG as above) ,r/o secondary to diuretic induced (Ethacrynic acid) vs hyperlado(secondary ) secondary to piyush-vascular htn(GINA) vs Endocrine disorder like excess cortisol secretion  -pt reported of allergic reaction to aldactone,   - pt continues to have hypokalemia, alkalosis, and hypertensive  - serum cortisol AM WNL. serum Nickolas 10.4  -f/u repeat serum nickolas, renin  -f/u 24 hr urine cortisol and aldosterone level   -f/u 24 hr urine for Cortisol, Na ,K and cr  -keep k >4 and <5  -f/u k daily  -replace po kcl prn for k >3 and <3.5  -add iv kcl for k<3 prn  -avoid Ethacrynic acid   -Endocrine consult  2.HTN: most likley essential htn, r/o renal artery stenosis induced(small left kidney) ,secondary to atherosclerosis from DM/HTN  -suggest to titrate bp meds to keep bp<130/80  -low salt diet  -no Aldactone as pt is having side effects/allergic reaction  3.CKD: stage 3 with baseline cr 1.13 with no proteinuria, secondary to ischemic renal ds (secondary to atherosclerosis)  -pts renal function at baseline today   -keep pt evolemic, adjust meds as per egfr and avoid nephrotoxic meds like Nsaids/Aminoglycosides/iv contrast ,,etc  -may continue Losartan for now   4.METABOLIC ALKALOSIS:mild  with high urine Cl ,c/w volume resistant alkalosis  -avoid iv hydration  -f/u co2 daily  5.ANEMIA:mild  -f/u Hb daily  6.B/L RENAL STONES:pt is known to have renal stones ,asympatomatic at this time, no flank pain or gross hematuria,,etc  -needs renal f/u as outpatient for further care

## 2019-11-11 NOTE — PROGRESS NOTE ADULT - PROBLEM SELECTOR PLAN 3
-ANIKA possibly due to dehydration and vancomycin; was given LR   -creatinine now within normal limits  -resolved

## 2019-11-11 NOTE — PROGRESS NOTE ADULT - SUBJECTIVE AND OBJECTIVE BOX
PGY 1 Note discussed with supervising resident and primary attending    Patient is a 69y old  Female who presents with a chief complaint of Shortness of breath (10 Nov 2019 15:30)    INTERVAL HPI/OVERNIGHT EVENTS: Patient seen and examined at the bedside. Patient was taken off of clonidine yesterday. Labetalol dose increased. Patient to have renal US done today. Patient states she had a reaction to aldactone this morning. Patient feeling better now. Denies any other complaints at this time.     MEDICATIONS  (STANDING):  cholecalciferol 2000 Unit(s) Oral daily  cyanocobalamin 1000 MICROGram(s) Oral daily  enoxaparin Injectable 40 milliGRAM(s) SubCutaneous daily  folic acid 1 milliGRAM(s) Oral daily  insulin lispro (HumaLOG) corrective regimen sliding scale   SubCutaneous Before meals and at bedtime  labetalol 600 milliGRAM(s) Oral three times a day  levothyroxine 75 MICROGram(s) Oral daily  losartan 100 milliGRAM(s) Oral daily  pantoprazole    Tablet 40 milliGRAM(s) Oral before breakfast  potassium acid phosphate/sodium acid phosphate tablet (K-PHOS No. 2) 1 Tablet(s) Oral four times a day with meals  potassium chloride    Tablet ER 40 milliEquivalent(s) Oral every 4 hours  zinc oxide 20% Ointment 1 Application(s) Topical two times a day    MEDICATIONS  (PRN):  acetaminophen   Tablet .. 650 milliGRAM(s) Oral every 6 hours PRN Temp greater or equal to 38C (100.4F), Mild Pain (1 - 3)  albuterol/ipratropium for Nebulization 3 milliLiter(s) Nebulizer every 6 hours PRN Shortness of Breath and/or Wheezing  benzocaine 15 mG/menthol 3.6 mG (Sugar-Free) Lozenge 1 Lozenge Oral every 4 hours PRN Sore Throat  guaiFENesin   Syrup  (Sugar-Free) 100 milliGRAM(s) Oral every 6 hours PRN Cough  hydrocortisone 1% Cream 1 Application(s) Topical three times a day PRN Itching    __________________________________________________  REVIEW OF SYSTEMS:  CONSTITUTIONAL: No fever  EYES: no visual disturbances  NECK: No pain   RESPIRATORY: No cough; No shortness of breath  CARDIOVASCULAR: No chest pain  GASTROINTESTINAL: No pain. No nausea or vomiting   NEUROLOGICAL: No headache  MUSCULOSKELETAL: No joint pain, no muscle pain  GENITOURINARY: no dysuria  ALL OTHER  ROS negative        Vital Signs Last 24 Hrs  T(C): 36.7 (11 Nov 2019 08:11), Max: 36.7 (10 Nov 2019 15:32)  T(F): 98 (11 Nov 2019 08:11), Max: 98 (10 Nov 2019 15:32)  HR: 65 (11 Nov 2019 08:11) (65 - 68)  BP: 162/69 (11 Nov 2019 08:11) (162/69 - 199/89)  RR: 18 (11 Nov 2019 08:11) (18 - 18)  SpO2: 93% (11 Nov 2019 08:11) (63% - 96%)    ________________________________________________  PHYSICAL EXAM:  GENERAL: Patient seen resting in bed and in no apparent distress  HEENT: Normocephalic; conjunctivae and sclerae clear   NECK: supple  CHEST/LUNG: Clear to auscultation bilaterally with good air entry   HEART: S1 S2, regular  ABDOMEN: Soft, Nontender, Distended; Bowel sounds present  EXTREMITIES: no cyanosis; no calf tenderness; chronic edema bilaterally  SKIN: warm and dry  NERVOUS SYSTEM: Awake and alert; Oriented to place, person and time    _________________________________________________  LABS:                        10.7   5.45  )-----------( 332      ( 11 Nov 2019 05:54 )             35.6     11-11    137  |  99  |  17  ----------------------------<  123<H>  3.3<L>   |  35<H>  |  1.13    Ca    8.7      11 Nov 2019 05:54  Phos  3.8     11-11  Mg     2.2     11-11    TPro  7.4  /  Alb  2.8<L>  /  TBili  4.3<H>  /  DBili  x   /  AST  25  /  ALT  53  /  AlkPhos  205<H>  11-11      CAPILLARY BLOOD GLUCOSE      POCT Blood Glucose.: 145 mg/dL (11 Nov 2019 08:33)  POCT Blood Glucose.: 154 mg/dL (10 Nov 2019 17:00)  POCT Blood Glucose.: 135 mg/dL (10 Nov 2019 12:06)      RADIOLOGY & ADDITIONAL TESTS:    Imaging Personally Reviewed:  YES     No new imaging     Consultant(s) Notes Reviewed:   YES    Care Discussed with Consultants :     Plan of care was discussed with patient and /or primary care giver; all questions and concerns were addressed and care was aligned with patient's wishes.

## 2019-11-11 NOTE — PROGRESS NOTE ADULT - SUBJECTIVE AND OBJECTIVE BOX
pt seen and examined.pts current chart reviewed and case discussed with resident covering.    SUBJECTIVE: pt feels fine and denies cp,sob,gi or gu/uremic  symptons, patient had an allergic response to aldactone, complained to rash, itching and throat pain, continues to have low K,     REVIEW OF SYSTEMS:  CONSTITUTIONAL: No weakness, fevers or chills  EYES/ENT: No visual changes;  No vertigo or throat pain   NECK: No pain or stiffness  RESPIRATORY: No cough, wheezing, hemoptysis; No shortness of breath  CARDIOVASCULAR: No chest pain or palpitations  GASTROINTESTINAL: No abdominal or epigastric pain. No nausea, vomiting, or hematemesis; No diarrhea or constipation. No melena or hematochezia.  GENITOURINARY: No dysuria, frequency , hematuria, flank pain or nocturia  NEUROLOGICAL: No numbness or weakness  SKIN: chronic lower Ext swelling   All other review of systems is negative unless indicated above    Current meds:    acetaminophen   Tablet .. 650 milliGRAM(s) Oral every 6 hours PRN  albuterol/ipratropium for Nebulization 3 milliLiter(s) Nebulizer every 6 hours PRN  benzocaine 15 mG/menthol 3.6 mG (Sugar-Free) Lozenge 1 Lozenge Oral every 4 hours PRN  cholecalciferol 2000 Unit(s) Oral daily  cyanocobalamin 1000 MICROGram(s) Oral daily  enoxaparin Injectable 40 milliGRAM(s) SubCutaneous daily  folic acid 1 milliGRAM(s) Oral daily  guaiFENesin   Syrup  (Sugar-Free) 100 milliGRAM(s) Oral every 6 hours PRN  hydrocortisone 1% Cream 1 Application(s) Topical three times a day PRN  insulin lispro (HumaLOG) corrective regimen sliding scale   SubCutaneous Before meals and at bedtime  labetalol 600 milliGRAM(s) Oral three times a day  levothyroxine 75 MICROGram(s) Oral daily  losartan 100 milliGRAM(s) Oral daily  pantoprazole    Tablet 40 milliGRAM(s) Oral before breakfast  potassium acid phosphate/sodium acid phosphate tablet (K-PHOS No. 2) 1 Tablet(s) Oral four times a day with meals  potassium chloride    Tablet ER 40 milliEquivalent(s) Oral every 4 hours  zinc oxide 20% Ointment 1 Application(s) Topical two times a day      Vital Signs    T(F): 98 (11-11-19 @ 08:11), Max: 98 (11-10-19 @ 15:32)  HR: 65 (11-11-19 @ 08:11) (65 - 68)  BP: 162/69 (11-11-19 @ 08:11) (162/69 - 199/89)  ABP: --  RR: 18 (11-11-19 @ 08:11) (18 - 18)  SpO2: 93% (11-11-19 @ 08:11) (63% - 96%)  Wt(kg): --  CVP(cm H2O): --  CO: --  PCWP: --    I and O's:    Daily     Daily     PHYSICAL EXAM:  Constitutional: well developed, obese  and in nad  HEENT: PERRLA,  no icteric sclera and mild pallor of conjunctiva noted  Neck: No JVD, thyromegaly   Respiratory: reduced air entry lower lungs with no rales, wheezing or rhonchi  Cardiovascular: S1 and S2 normally heard  Gastrointestinal: soft, nondistended, nontender and normal bowel sounds heard  Extremities: B/L Lymphedema present with chronic skin pigmentation and chronic skin changes   Neurological: A/O x 3, no focal deficits  Skin: No rashes        LABS:    CBC:                          10.7   5.45  )-----------( 332      ( 11 Nov 2019 05:54 )             35.6           BMP:    11-11    137  |  99  |  17  ----------------------------<  123<H>  3.3<L>   |  35<H>  |  1.13  11-10    136  |  96  |  19<H>  ----------------------------<  138<H>  3.0<L>   |  33<H>  |  1.13  11-10    136  |  94<L>  |  17  ----------------------------<  158<H>  2.7<LL>   |  35<H>  |  1.17  11-09    138  |  99  |  15  ----------------------------<  136<H>  3.1<L>   |  34<H>  |  1.01    Ca    8.7      11 Nov 2019 05:54  Ca    8.5      10 Nov 2019 16:12  Ca    8.9      10 Nov 2019 06:20  Ca    8.8      09 Nov 2019 06:13  Phos  3.8     11-11  Phos  3.7     11-10  Phos  2.3     11-09  Mg     2.2     11-11  Mg     2.0     11-10  Mg     2.0     11-09    TPro  7.4  /  Alb  2.8<L>  /  TBili  4.3<H>  /  DBili  x   /  AST  25  /  ALT  53  /  AlkPhos  205<H>  11-11  TPro  7.5  /  Alb  2.8<L>  /  TBili  4.5<H>  /  DBili  x   /  AST  30  /  ALT  57  /  AlkPhos  206<H>  11-10  TPro  7.0  /  Alb  2.7<L>  /  TBili  3.2<H>  /  DBili  x   /  AST  21  /  ALT  52  /  AlkPhos  193<H>  11-09          URINE STUDIES:        Osmolality, Random Urine: 288 mos/kg (11-09 @ 17:23)  Creatinine, Random Urine: <13 mg/dL (11-09 @ 17:23)  Chloride, Random Urine: 142 mmol/L (11-09 @ 15:08)  Creatinine, Random Urine: 14 mg/dL (11-09 @ 15:08)  Sodium, Random Urine: 117 mmol/L (11-09 @ 15:08)  Potassium, Random Urine: 25 mmol/L (11-09 @ 15:08)  Potassium, Random Urine: 22 mmol/L (11-06 @ 14:17)  Chloride, Random Urine: 97 mmol/L (11-06 @ 14:17)  Sodium, Random Urine: 76 mmol/L (11-06 @ 14:17)  Osmolality, Random Urine: 319 mos/kg (11-06 @ 14:17)                  RADIOLOGY & ADDITIONAL STUDIES:      EXAM:  US KIDNEY(S)                            PROCEDURE DATE:  11/11/2019          INTERPRETATION:  CLINICAL INFORMATION: Hypertension    COMPARISON: CT abdomen and pelvis dated 11/06/2019.    TECHNIQUE: Sonography of the kidneys and bladder.     FINDINGS:    The studies limited by patient body habitus.    Right kidney:  11.1 cm. No hydronephrosis. Nonobstructing calculus in the   lower pole measuring 1.0 cm.    Left kidney:  10.2 cm. No hydronephrosis. There is a 0.8 cm calculus in   the lower pole. 1.3 cm cyst in the upper pole.    Urinary bladder: Underdistended.    IMPRESSION:     No hydronephrosis. Bilateral nonobstructing renal calculi, as described   above. pt seen and examined.pts current chart reviewed and case discussed with resident covering.    SUBJECTIVE: pt feels fine and denies cp,sob,gi or gu/uremic  symptons, patient had an allergic response to aldactone, complained to rash, itching and throat pain, continues to have low K,     REVIEW OF SYSTEMS:  CONSTITUTIONAL: No weakness, fevers or chills  EYES/ENT: No visual changes;  No vertigo or throat pain   NECK: No pain or stiffness  RESPIRATORY: No cough, wheezing, hemoptysis; No shortness of breath  CARDIOVASCULAR: No chest pain or palpitations  GASTROINTESTINAL: No abdominal or epigastric pain. No nausea, vomiting, or hematemesis; No diarrhea or constipation. No melena or hematochezia.  GENITOURINARY: No dysuria, frequency , hematuria, flank pain or nocturia  NEUROLOGICAL: No numbness or weakness  SKIN: chronic lower Ext swelling   All other review of systems is negative unless indicated above    Current meds:    acetaminophen   Tablet .. 650 milliGRAM(s) Oral every 6 hours PRN  albuterol/ipratropium for Nebulization 3 milliLiter(s) Nebulizer every 6 hours PRN  benzocaine 15 mG/menthol 3.6 mG (Sugar-Free) Lozenge 1 Lozenge Oral every 4 hours PRN  cholecalciferol 2000 Unit(s) Oral daily  cyanocobalamin 1000 MICROGram(s) Oral daily  enoxaparin Injectable 40 milliGRAM(s) SubCutaneous daily  folic acid 1 milliGRAM(s) Oral daily  guaiFENesin   Syrup  (Sugar-Free) 100 milliGRAM(s) Oral every 6 hours PRN  hydrocortisone 1% Cream 1 Application(s) Topical three times a day PRN  insulin lispro (HumaLOG) corrective regimen sliding scale   SubCutaneous Before meals and at bedtime  labetalol 600 milliGRAM(s) Oral three times a day  levothyroxine 75 MICROGram(s) Oral daily  losartan 100 milliGRAM(s) Oral daily  pantoprazole    Tablet 40 milliGRAM(s) Oral before breakfast  potassium acid phosphate/sodium acid phosphate tablet (K-PHOS No. 2) 1 Tablet(s) Oral four times a day with meals  potassium chloride    Tablet ER 40 milliEquivalent(s) Oral every 4 hours  zinc oxide 20% Ointment 1 Application(s) Topical two times a day      Vital Signs    T(F): 98 (11-11-19 @ 08:11), Max: 98 (11-10-19 @ 15:32)  HR: 65 (11-11-19 @ 08:11) (65 - 68)  BP: 162/69 (11-11-19 @ 08:11) (162/69 - 199/89)  ABP: --  RR: 18 (11-11-19 @ 08:11) (18 - 18)  SpO2: 93% (11-11-19 @ 08:11) (63% - 96%)  Wt(kg): --  CVP(cm H2O): --  CO: --  PCWP: --    I and O's:    Daily     Daily     PHYSICAL EXAM:  Constitutional: well developed, obese  and in nad  HEENT: PERRLA,  no icteric sclera and mild pallor of conjunctiva noted  Neck: No JVD, thyromegaly   Respiratory: reduced air entry lower lungs with no rales, wheezing or rhonchi  Cardiovascular: S1 and S2 normally heard  Gastrointestinal: soft, nondistended, nontender and normal bowel sounds heard  Extremities: B/L Lymphedema present with chronic skin pigmentation and chronic skin changes   Neurological: A/O x 3, no focal deficits  Skin: No rashes        LABS:    CBC:                          10.7   5.45  )-----------( 332      ( 11 Nov 2019 05:54 )             35.6           BMP:    11-11    137  |  99  |  17  ----------------------------<  123<H>  3.3<L>   |  35<H>  |  1.13  11-10    136  |  96  |  19<H>  ----------------------------<  138<H>  3.0<L>   |  33<H>  |  1.13  11-10    136  |  94<L>  |  17  ----------------------------<  158<H>  2.7<LL>   |  35<H>  |  1.17  11-09    138  |  99  |  15  ----------------------------<  136<H>  3.1<L>   |  34<H>  |  1.01    Ca    8.7      11 Nov 2019 05:54  Ca    8.5      10 Nov 2019 16:12  Ca    8.9      10 Nov 2019 06:20  Ca    8.8      09 Nov 2019 06:13  Phos  3.8     11-11  Phos  3.7     11-10  Phos  2.3     11-09  Mg     2.2     11-11  Mg     2.0     11-10  Mg     2.0     11-09    TPro  7.4  /  Alb  2.8<L>  /  TBili  4.3<H>  /  DBili  x   /  AST  25  /  ALT  53  /  AlkPhos  205<H>  11-11  TPro  7.5  /  Alb  2.8<L>  /  TBili  4.5<H>  /  DBili  x   /  AST  30  /  ALT  57  /  AlkPhos  206<H>  11-10  TPro  7.0  /  Alb  2.7<L>  /  TBili  3.2<H>  /  DBili  x   /  AST  21  /  ALT  52  /  AlkPhos  193<H>  11-09          URINE STUDIES:  Osmolality, Random Urine: 288 mos/kg (11-09 @ 17:23)  Creatinine, Random Urine: <13 mg/dL (11-09 @ 17:23)  Chloride, Random Urine: 142 mmol/L (11-09 @ 15:08)  Creatinine, Random Urine: 14 mg/dL (11-09 @ 15:08)  Sodium, Random Urine: 117 mmol/L (11-09 @ 15:08)  Potassium, Random Urine: 25 mmol/L (11-09 @ 15:08)  Potassium, Random Urine: 22 mmol/L (11-06 @ 14:17)  Chloride, Random Urine: 97 mmol/L (11-06 @ 14:17)  Sodium, Random Urine: 76 mmol/L (11-06 @ 14:17)  Osmolality, Random Urine: 319 mos/kg (11-06 @ 14:17)    Sodium, Urine 24 Hour (11.11.19 @ 18:14)    Interval Timed: 24 HR    Sodium Calculation: 56 mmol/24hr    Total Volume - 24 Hour: 1500 mL    Urine Creatinine Calculation: 1.0 g/24 h    Potassium, Urine 24 Hour (11.11.19 @ 18:14)    Urine Creatinine Calculation: 1.0 g/24 h    Total Volume - 24 Hour: 1500 mL    Potassium Calculation: 33 mmol/24hr    Interval Timed: 24 HR    Creatinine, Random Urine (11.09.19 @ 17:23)    Creatinine, Random Urine: <13: Reference Ranges have NOT been established for random urine analytes due  to variability in fluid intake and concentration. mg/dL                  RADIOLOGY & ADDITIONAL STUDIES:      EXAM:  US KIDNEY(S)                            PROCEDURE DATE:  11/11/2019          INTERPRETATION:  CLINICAL INFORMATION: Hypertension    COMPARISON: CT abdomen and pelvis dated 11/06/2019.    TECHNIQUE: Sonography of the kidneys and bladder.     FINDINGS:    The studies limited by patient body habitus.    Right kidney:  11.1 cm. No hydronephrosis. Nonobstructing calculus in the   lower pole measuring 1.0 cm.    Left kidney:  10.2 cm. No hydronephrosis. There is a 0.8 cm calculus in   the lower pole. 1.3 cm cyst in the upper pole.    Urinary bladder: Underdistended.    IMPRESSION:     No hydronephrosis. Bilateral nonobstructing renal calculi, as described   above.

## 2019-11-11 NOTE — CONSULT NOTE ADULT - ASSESSMENT
69 year old female with PMH of hypertension, CAD s/p 3 PAOLA , asthma, CKD 3, Chronic Lymphedema, venous stasis s/p venous ablation, and carpal tunnel syndrome presented to ED with worsening shortness of breath. Endocrinology was consulted for evaluation of secondary HTN.    1. Secondary HTN  -concern for endocrine etiology of uncontrolled HTN with hypokalemia   -Will rule out secondary causes of HTN including pheochromocytoma, primary hyperaldosteronism, and Cushing's syndrome  -initial renin and aldosterone levels were within normal limits, though difficult to interpret as hypokalemia can affect these results  -repeat renin and aldosterone levels tomorrow AM  -pending results of urine catecholamines and 24 hr urine free cortisol   -TFTS within normal limits  -also consider Obstructive sleep apnea given obesity  -replete K    2. DM  -A1c at goal 5.4%  -BG well controlled on low dose insulin sliding scale with meals  -recommend to continue mealtime rapid acting insulin as below  BG 70-100mg/dL 0 units  -150 mg/dL 0 units  -200 mg/dL 1 units  -250 mg/dL 2 units  -300 mg/dL 3 units  -350 mg/dL 4 units  -400 mg/dL 5 units  BG > 400mg/dL 6 units  -FS AC HS  -ensure consistent carbohydrate diet  -will monitor FS and adjust accordingly   --if patient is NPO for any reason please change FS and sliding scale to NPO lispro scale     3. Hypothyroidism  -TFTs at goal  -patient self discontinued levothyroxine 10 days prior to admission and has refused levothyroxine throughout hospital stay  -recommend repeat TFTS in 2 weeks (takes ~4-6 weeks to see change in TSH) for evaluation of need for levothyroxine     Plan discussed with primary team  Thank you for allowing me to participate in the care of this patient  Please  call  w/ any questions or concerns [phone] 69 year old female with PMH of hypertension, CAD s/p 3 PAOLA , asthma, CKD 3, Chronic Lymphedema, venous stasis s/p venous ablation, and carpal tunnel syndrome presented to ED with worsening shortness of breath. Endocrinology was consulted for evaluation of secondary HTN.    1. Uncontrolled HTN  -concern for endocrine etiology of uncontrolled HTN with hypokalemia   -Will rule out secondary causes of HTN including pheochromocytoma, primary hyperaldosteronism, and Cushing's syndrome  -initial renin and aldosterone levels were within normal limits, though difficult to interpret as hypokalemia can affect these results  -repeat renin and aldosterone levels tomorrow AM once K is repleted   -pending results of urine catecholamines and 24 hr urine free cortisol   -TFTS within normal limits  -also consider Obstructive sleep apnea given obesity  -replete K    2. DM  -A1c at goal 5.4%  -BG well controlled on low dose insulin sliding scale with meals  -recommend to continue mealtime rapid acting insulin as below  BG 70-100mg/dL 0 units  -150 mg/dL 0 units  -200 mg/dL 1 units  -250 mg/dL 2 units  -300 mg/dL 3 units  -350 mg/dL 4 units  -400 mg/dL 5 units  BG > 400mg/dL 6 units  -FS AC HS  -ensure consistent carbohydrate diet  -will monitor FS and adjust accordingly   --if patient is NPO for any reason please change FS and sliding scale to NPO lispro scale     3. Hypothyroidism  -TFTs at goal  -patient self discontinued levothyroxine 10 days prior to admission and has refused levothyroxine throughout hospital stay  -recommend repeat TFTS in 2 weeks (takes ~4-6 weeks to see change in TSH) for evaluation of need for levothyroxine     Plan discussed with primary team  Thank you for allowing me to participate in the care of this patient  Please  call  w/ any questions or concerns [phone]

## 2019-11-11 NOTE — PROGRESS NOTE ADULT - PROBLEM SELECTOR PLAN 4
- p/w SOB and sepsis and found to have Right LE redness, warmth and swelling; resolving  -may be due to cellulitis    - currently afebrile with no leukocytosis   - c/w pain meds Tylenol q6    - antibiotics discontinued  - Blood Cx and urine cx negative

## 2019-11-11 NOTE — CONSULT NOTE ADULT - SUBJECTIVE AND OBJECTIVE BOX
69 year old female with PMH of hypertension, CAD s/p 3 PAOLA , asthma, CKD 3, Chronic Lymphedema, venous stasis s/p venous ablation, and carpal tunnel syndrome presented to ED with worsening shortness of breath. Endocrinology was consulted for evaluation of secondary HTN.    The patient reports a longstanding history of uncontrolled HTN diagnosed 10 years ago. She is on a home regimen of labetalol and losartan. She has also intermittently been on ethacrynic acid for over 3 years. The patient has remained hypokalemic throughout the hospital stay. Renin and aldosterone levels were within normal limits, though interpretation is limited given hypokalemia at time of blood draw. She received 1 dose of spironolactone and was unable to tolerate as she reported allergy to the medication.  BP remains above goal on current regimen of Labetalol and Losartan. She denies episodes of flushing, tremors, or palpitations. She also reports a longstanding history of borderline hypothyroidism treated with levothyroxine 75mcg PO daily. In an attempt to decrease her medications, she stopped the levothyroxine 10 days prior to admission. She has been refusing the levothyroxine while inpatient. TSH on admission was at goal 1.8. She reports chronic fatigue and difficulty losing weight but denies temperature intolerance or constipation.    Allergies    penicillin (Hives)  penicillin (Rash)  sulfa drugs (Unknown)  Tetracycline Hydrochloride (Unknown)    PAST MEDICAL & SURGICAL HISTORY:  Lymphedema  Essential hypertension  Carpal tunnel syndrome of right wrist  Obesity (BMI 30-39.9)  Type 2 diabetes mellitus without complication  Hypothyroid  Essential hypertension  Obesity  HTN (hypertension)  DM (diabetes mellitus)  No significant past surgical history  S/P carpal tunnel release      SOCIAL HISTORY  Alcohol:  Tobacco:  Illicit substance use:      FAMILY HISTORY:          MEDICATIONS  (STANDING):  cholecalciferol 2000 Unit(s) Oral daily  cyanocobalamin 1000 MICROGram(s) Oral daily  enoxaparin Injectable 40 milliGRAM(s) SubCutaneous daily  folic acid 1 milliGRAM(s) Oral daily  insulin lispro (HumaLOG) corrective regimen sliding scale   SubCutaneous Before meals and at bedtime  labetalol 600 milliGRAM(s) Oral three times a day  levothyroxine 75 MICROGram(s) Oral daily  losartan 100 milliGRAM(s) Oral daily  pantoprazole    Tablet 40 milliGRAM(s) Oral before breakfast  potassium acid phosphate/sodium acid phosphate tablet (K-PHOS No. 2) 1 Tablet(s) Oral four times a day with meals  potassium chloride    Tablet ER 40 milliEquivalent(s) Oral every 4 hours  zinc oxide 20% Ointment 1 Application(s) Topical two times a day    MEDICATIONS  (PRN):  acetaminophen   Tablet .. 650 milliGRAM(s) Oral every 6 hours PRN Temp greater or equal to 38C (100.4F), Mild Pain (1 - 3)  albuterol/ipratropium for Nebulization 3 milliLiter(s) Nebulizer every 6 hours PRN Shortness of Breath and/or Wheezing  benzocaine 15 mG/menthol 3.6 mG (Sugar-Free) Lozenge 1 Lozenge Oral every 4 hours PRN Sore Throat  guaiFENesin   Syrup  (Sugar-Free) 100 milliGRAM(s) Oral every 6 hours PRN Cough  hydrocortisone 1% Cream 1 Application(s) Topical three times a day PRN Itching      REVIEW OF SYSTEMS:  CONSTITUTIONAL: No fever, weight loss, or fatigue  EYES: No eye pain, visual disturbances, or discharge  ENMT:  No difficulty hearing, tinnitus, vertigo; No sinus or throat pain  NECK: No pain or stiffness  RESPIRATORY: No cough, wheezing, chills or hemoptysis; No shortness of breath  CARDIOVASCULAR: No chest pain, palpitations, dizziness, or leg swelling  GASTROINTESTINAL: No abdominal or epigastric pain. No nausea, vomiting, or hematemesis; No diarrhea or constipation. No melena or hematochezia.  GENITOURINARY: No dysuria, frequency, hematuria, or incontinence  NEUROLOGICAL: No headaches, memory loss, loss of strength, numbness, or tremors  SKIN: chronic lymphedema, No itching, burning, rashes, or lesions   LYMPH NODES: No enlarged glands  ENDOCRINE: No heat or cold intolerance; No hair loss  MUSCULOSKELETAL: No joint pain or swelling; No muscle, back, or extremity pain  PSYCHIATRIC: No depression, anxiety, mood swings, or difficulty sleeping  HEME/LYMPH: No easy bruising, or bleeding gums  ALLERGY AND IMMUNOLOGIC: No hives or eczema    PHYSICAL EXAM:    VITAL SIGNS  T(C): 36.7 (11-11-19 @ 08:11), Max: 36.7 (11-10-19 @ 15:32)  HR: 65 (11-11-19 @ 08:11) (65 - 68)  BP: 162/69 (11-11-19 @ 08:11) (162/69 - 199/89)  RR: 18 (11-11-19 @ 08:11) (18 - 18)  SpO2: 93% (11-11-19 @ 08:11) (63% - 96%)    GENERAL: NAD, well-groomed, well-developed  HEAD:  Atraumatic, Normocephalic  EYES: EOMI, PERRLA, conjunctiva and sclera clear  ENMT: No tonsillar erythema, exudates, or enlargement; Moist mucous membranes, No lesions  NECK: Supple, No JVD, Normal thyroid  NERVOUS SYSTEM:  Alert & Oriented X3, Good concentration; Motor Strength 5/5 B/L upper and lower extremities;  CHEST/LUNG: Clear to ausculation bilaterally; No rales, rhonchi, wheezing, or rubs  HEART: Regular rate and rhythm; No murmurs, rubs, or gallops  ABDOMEN: Soft, Nontender, Nondistended; Bowel sounds present  EXTREMITIES:  2+ Peripheral Pulses, No clubbing, cyanosis, or edema  LYMPH: No lymphadenopathy noted  SKIN: No rashes or lesions    LABS:                        10.7   5.45  )-----------( 332      ( 11 Nov 2019 05:54 )             35.6     11-11    137  |  99  |  17  ----------------------------<  123<H>  3.3<L>   |  35<H>  |  1.13    Ca    8.7      11 Nov 2019 05:54  Phos  3.8     11-11  Mg     2.2     11-11    TPro  7.4  /  Alb  2.8<L>  /  TBili  4.3<H>  /  DBili  x   /  AST  25  /  ALT  53  /  AlkPhos  205<H>  11-11        CAPILLARY BLOOD GLUCOSE      POCT Blood Glucose.: 154 mg/dL (11 Nov 2019 12:12)  POCT Blood Glucose.: 145 mg/dL (11 Nov 2019 08:33)  POCT Blood Glucose.: 154 mg/dL (10 Nov 2019 17:00)

## 2019-11-11 NOTE — PROGRESS NOTE ADULT - ATTENDING COMMENTS
Patient is a 69y old  Female who presents with a chief complaint of Shortness of breath (05 Nov 2019 14:30), found to have sepsis with tachycardia, fever and leukocytosis. Was started on Vancomycin empirically for Cellulitis. CXR and CT chest neg for pneumonia. Patient found to have transaminitis and elevated ALP with direct bilirubin which improved. She had difficult to control HTN, metabolic alkalosis and persistent hypokalemia.     PMH:  HTN, CAD s/p 3 PAOLA , asthma, Chronic Lymphedema , venous stasis s/p venous ablation and carpal tunnel syndrome, hypothyroidism, recent GIB due to diverticulosis     Today:  Patient was seen and examined at bedside today  Denies any complains     REVIEW OF SYSTEMS: denies fever, chills, SOB, palpitations, chest pain, abdominal pain    Vitals: BP still high    PHYSICAL EXAM:  GENERAL: NAD, morbidly obese  NERVOUS SYSTEM:  Alert & Oriented X3, Good concentration; no focal deficit  CHEST/LUNG: Clear to auscultation bilaterally; No rales, rhonchi, wheezing, or rubs  HEART: Regular rate and rhythm; No murmurs, rubs, or gallops  ABDOMEN: obese, Soft, Nontender, Bowel sounds present  EXTREMITIES: chronic lymphedema, right leg more swollen and erythematous then left, chronic skin change, no tenderness or warmth    LABS:   Transaminitis improved but ALP and Bilirubin still high  Hypokalemia with metabolic alkalosis   Cr stable     Assessment and plan:  1. Persistent Hypokalemia with metabolic alkalosis with resistant HTN:   discontinued ethacrynic acid initial Renin and Aldosterone level was done while patient was hypokalemic  Will repeat tomorrow  Pending work up for Cushing syndrome and Pheochromocytoma  Renal US shows non obstructive calculus   Replete K as per protocol   Cont Labetalol 600mg TID  Patient reports allergy to Aldactone too  Will consider starting Eplerenone tomorrow   Endocrine and renal consult appreciated     2.  HTN: cont Labetalol to 600 TID and Losartan 100mg qd, would avoid CCB given chronic lymphedema, she is allergic to Norvasc and Hydralazine     3. Sepsis present on admission   Possible source Cellulitis/ Cholecystitis with cholelithiasis; Unlikely PNA  S/p Vanc,  Aztreonam and Clindamycin   Allergic to multiple antibiotics- off any antibiotics now  Trend LFTs     4. CAD s/p stents   Spoke with Dr Lisker- cardiologist [phone], TTE was normal  TTE noted here, Grade 1 diastolic dysfunction     5. TITO  Continue Folic acid  Start Iron supplement if patient agrees    6. DM: ISS    7. Hypothyroidism: cont home dose synthroid, TSH normal now, TSH in 4 weeks     8. Chronic lymphedema: Cont Zinc Oxide and Hydrocortisone cream     9. Supportive: Diet Diabetic, Lovenox for DVT prophylaxis, Fall precaution; PT re-evaluation

## 2019-11-11 NOTE — PROGRESS NOTE ADULT - PROBLEM SELECTOR PLAN 1
- Patient takes Losartan, Ethacrynic acid and Labetalol   - ethacrynic acid was on hold before due to low potassium  -labetalol dose increased yesterday; bp better controlled this morning   -monitor BP and adjust medications if clinically indicated.  - DASH diet  -f/u renal US  -f/u nephro  -f/u endo Dr. Graff consulted

## 2019-11-12 LAB
ALBUMIN SERPL ELPH-MCNC: 2.7 G/DL — LOW (ref 3.5–5)
ALP SERPL-CCNC: 203 U/L — HIGH (ref 40–120)
ALT FLD-CCNC: 45 U/L DA — SIGNIFICANT CHANGE UP (ref 10–60)
ANION GAP SERPL CALC-SCNC: 7 MMOL/L — SIGNIFICANT CHANGE UP (ref 5–17)
AST SERPL-CCNC: 18 U/L — SIGNIFICANT CHANGE UP (ref 10–40)
BASOPHILS # BLD AUTO: 0.06 K/UL — SIGNIFICANT CHANGE UP (ref 0–0.2)
BASOPHILS NFR BLD AUTO: 1.1 % — SIGNIFICANT CHANGE UP (ref 0–2)
BILIRUB SERPL-MCNC: 4.2 MG/DL — HIGH (ref 0.2–1.2)
BUN SERPL-MCNC: 18 MG/DL — SIGNIFICANT CHANGE UP (ref 7–18)
CALCIUM SERPL-MCNC: 9 MG/DL — SIGNIFICANT CHANGE UP (ref 8.4–10.5)
CHLORIDE SERPL-SCNC: 98 MMOL/L — SIGNIFICANT CHANGE UP (ref 96–108)
CO2 SERPL-SCNC: 32 MMOL/L — HIGH (ref 22–31)
CREAT SERPL-MCNC: 1.15 MG/DL — SIGNIFICANT CHANGE UP (ref 0.5–1.3)
EOSINOPHIL # BLD AUTO: 0.12 K/UL — SIGNIFICANT CHANGE UP (ref 0–0.5)
EOSINOPHIL NFR BLD AUTO: 2.3 % — SIGNIFICANT CHANGE UP (ref 0–6)
GLUCOSE BLDC GLUCOMTR-MCNC: 130 MG/DL — HIGH (ref 70–99)
GLUCOSE SERPL-MCNC: 167 MG/DL — HIGH (ref 70–99)
HCT VFR BLD CALC: 35.7 % — SIGNIFICANT CHANGE UP (ref 34.5–45)
HGB BLD-MCNC: 10.6 G/DL — LOW (ref 11.5–15.5)
IMM GRANULOCYTES NFR BLD AUTO: 0.6 % — SIGNIFICANT CHANGE UP (ref 0–1.5)
LYMPHOCYTES # BLD AUTO: 0.82 K/UL — LOW (ref 1–3.3)
LYMPHOCYTES # BLD AUTO: 15.7 % — SIGNIFICANT CHANGE UP (ref 13–44)
MCHC RBC-ENTMCNC: 26 PG — LOW (ref 27–34)
MCHC RBC-ENTMCNC: 29.7 GM/DL — LOW (ref 32–36)
MCV RBC AUTO: 87.5 FL — SIGNIFICANT CHANGE UP (ref 80–100)
MONOCYTES # BLD AUTO: 0.44 K/UL — SIGNIFICANT CHANGE UP (ref 0–0.9)
MONOCYTES NFR BLD AUTO: 8.4 % — SIGNIFICANT CHANGE UP (ref 2–14)
NEUTROPHILS # BLD AUTO: 3.76 K/UL — SIGNIFICANT CHANGE UP (ref 1.8–7.4)
NEUTROPHILS NFR BLD AUTO: 71.9 % — SIGNIFICANT CHANGE UP (ref 43–77)
NRBC # BLD: 0 /100 WBCS — SIGNIFICANT CHANGE UP (ref 0–0)
PLATELET # BLD AUTO: 349 K/UL — SIGNIFICANT CHANGE UP (ref 150–400)
POTASSIUM SERPL-MCNC: 3.3 MMOL/L — LOW (ref 3.5–5.3)
POTASSIUM SERPL-SCNC: 3.3 MMOL/L — LOW (ref 3.5–5.3)
PROT SERPL-MCNC: 7.2 G/DL — SIGNIFICANT CHANGE UP (ref 6–8.3)
RBC # BLD: 4.08 M/UL — SIGNIFICANT CHANGE UP (ref 3.8–5.2)
RBC # FLD: 16.6 % — HIGH (ref 10.3–14.5)
RENIN PLAS-CCNC: <0.167 NG/ML/HR — LOW (ref 0.17–5.38)
SODIUM SERPL-SCNC: 137 MMOL/L — SIGNIFICANT CHANGE UP (ref 135–145)
WBC # BLD: 5.23 K/UL — SIGNIFICANT CHANGE UP (ref 3.8–10.5)
WBC # FLD AUTO: 5.23 K/UL — SIGNIFICANT CHANGE UP (ref 3.8–10.5)

## 2019-11-12 PROCEDURE — 99231 SBSQ HOSP IP/OBS SF/LOW 25: CPT

## 2019-11-12 PROCEDURE — 99233 SBSQ HOSP IP/OBS HIGH 50: CPT | Mod: GC

## 2019-11-12 RX ORDER — HYDRALAZINE HCL 50 MG
25 TABLET ORAL EVERY 8 HOURS
Refills: 0 | Status: DISCONTINUED | OUTPATIENT
Start: 2019-11-12 | End: 2019-11-13

## 2019-11-12 RX ORDER — POLYETHYLENE GLYCOL 3350 17 G/17G
17 POWDER, FOR SOLUTION ORAL DAILY
Refills: 0 | Status: DISCONTINUED | OUTPATIENT
Start: 2019-11-12 | End: 2019-11-13

## 2019-11-12 RX ORDER — POTASSIUM CHLORIDE 20 MEQ
40 PACKET (EA) ORAL EVERY 4 HOURS
Refills: 0 | Status: COMPLETED | OUTPATIENT
Start: 2019-11-12 | End: 2019-11-12

## 2019-11-12 RX ORDER — ONDANSETRON 8 MG/1
4 TABLET, FILM COATED ORAL ONCE
Refills: 0 | Status: COMPLETED | OUTPATIENT
Start: 2019-11-12 | End: 2019-11-12

## 2019-11-12 RX ADMIN — Medication 40 MILLIEQUIVALENT(S): at 10:31

## 2019-11-12 RX ADMIN — Medication 1 TABLET(S): at 09:09

## 2019-11-12 RX ADMIN — Medication 600 MILLIGRAM(S): at 22:19

## 2019-11-12 RX ADMIN — Medication 25 MILLIGRAM(S): at 15:55

## 2019-11-12 RX ADMIN — ONDANSETRON 4 MILLIGRAM(S): 8 TABLET, FILM COATED ORAL at 02:24

## 2019-11-12 RX ADMIN — Medication 1 TABLET(S): at 17:27

## 2019-11-12 RX ADMIN — ZINC OXIDE 1 APPLICATION(S): 200 OINTMENT TOPICAL at 17:28

## 2019-11-12 RX ADMIN — ZINC OXIDE 1 APPLICATION(S): 200 OINTMENT TOPICAL at 07:07

## 2019-11-12 RX ADMIN — Medication 600 MILLIGRAM(S): at 15:55

## 2019-11-12 RX ADMIN — Medication 25 MILLIGRAM(S): at 22:19

## 2019-11-12 RX ADMIN — Medication 1 TABLET(S): at 12:39

## 2019-11-12 RX ADMIN — Medication 600 MILLIGRAM(S): at 07:05

## 2019-11-12 RX ADMIN — PREGABALIN 1000 MICROGRAM(S): 225 CAPSULE ORAL at 12:39

## 2019-11-12 RX ADMIN — Medication 1 TABLET(S): at 22:19

## 2019-11-12 RX ADMIN — Medication 40 MILLIEQUIVALENT(S): at 15:55

## 2019-11-12 RX ADMIN — LOSARTAN POTASSIUM 100 MILLIGRAM(S): 100 TABLET, FILM COATED ORAL at 07:08

## 2019-11-12 RX ADMIN — Medication 2000 UNIT(S): at 12:39

## 2019-11-12 NOTE — PROGRESS NOTE ADULT - ATTENDING COMMENTS
Patient is a 69y old  Female who presents with a chief complaint of Shortness of breath (05 Nov 2019 14:30), found to have sepsis with tachycardia, fever and leukocytosis. Was started on Vancomycin empirically for Cellulitis. CXR and CT chest neg for pneumonia. Patient found to have transaminitis and elevated ALP with direct bilirubin which improved. She had difficult to control HTN, metabolic alkalosis and persistent hypokalemia.     PMH:  HTN, CAD s/p 3 PAOLA , asthma, Chronic Lymphedema , venous stasis s/p venous ablation and carpal tunnel syndrome, hypothyroidism, recent GIB due to diverticulosis     Today:  Patient was seen and examined at bedside today  Reports she feels better today but still concerned about HTN   Having problem with her housing    REVIEW OF SYSTEMS: denies fever, chills, SOB, palpitations, chest pain, abdominal pain    Vitals: BP still high    PHYSICAL EXAM:  GENERAL: NAD, morbidly obese  NERVOUS SYSTEM:  Alert & Oriented X3, Good concentration; no focal deficit  CHEST/LUNG: Clear to auscultation bilaterally; No rales, rhonchi, wheezing, or rubs  HEART: Regular rate and rhythm; No murmurs, rubs, or gallops  ABDOMEN: obese, Soft, Nontender, Bowel sounds present  EXTREMITIES: chronic BL lymphedema, right leg swelling and erythema has improved,  chronic skin change, no tenderness or warmth    LABS:   LFT- Obstructive pattern   Hypokalemia with metabolic alkalosis   Cr stable   Repeat renin borderline low, pending aldosterone level     Assessment and plan:  1. Persistent Hypokalemia with metabolic alkalosis with resistant HTN:   To r/o  hyperaldosteronism   Repeat renin borderline low, pending aldosterone level   discontinued ethacrynic acid   Pending work up for Cushing syndrome and Pheochromocytoma  Renal US shows non obstructive calculus   Replete K as per protocol   Cont Labetalol 600mg TID, Losartan 100mg  and add Hydralazine 25mg TID  Patient reports allergy to Aldactone too  Endocrine and renal consult appreciated     2.  HTN: cont Labetalol to 600 TID and Losartan 100mg qd, would avoid CCB given chronic lymphedema, she is allergic to Norvasc   Will start Hydralazine 25mg TID     3. Sepsis present on admission - resolved  Possible source Cellulitis/ Cholecystitis with cholelithiasis; Unlikely PNA  S/p Vanc, Aztreonam and Clindamycin   Allergic to multiple antibiotics- off any antibiotics now  Trend LFTs     4. CAD s/p stents   Spoke with Dr Lisker- cardiologist [phone], TTE was normal  TTE noted here, Grade 1 diastolic dysfunction     5. TITO  Continue Folic acid  Start Iron supplement if patient agrees    6. DM: ISS    7. Hypothyroidism: cont home dose synthroid, TSH in 4 weeks     8. Chronic lymphedema: Cont Zinc Oxide and Hydrocortisone cream     9. Supportive: Diet Diabetic, Lovenox for DVT prophylaxis, Fall precaution; PT re-evaluation

## 2019-11-12 NOTE — PROGRESS NOTE ADULT - SUBJECTIVE AND OBJECTIVE BOX
pt seen and examined.pts current chart reviewed and case discussed with resident covering.    SUBJECTIVE:  pt feels fine and denies cp,sob,gi or gu/uremic  symptons    REVIEW OF SYSTEMS:  CONSTITUTIONAL: No weakness, fevers or chills  EYES/ENT: No visual changes;  No vertigo or throat pain   NECK: No pain or stiffness  RESPIRATORY: No cough, wheezing, hemoptysis; No shortness of breath  CARDIOVASCULAR: No chest pain or palpitations  GASTROINTESTINAL: No abdominal or epigastric pain. No nausea, vomiting, or hematemesis; No diarrhea or constipation. No melena or hematochezia.  GENITOURINARY: No dysuria, frequency , hematuria, flank pain or nocturia  NEUROLOGICAL: No numbness or weakness  SKIN: No itching, burning, rashes, or lesions   All other review of systems is negative unless indicated above    Current meds:    acetaminophen   Tablet .. 650 milliGRAM(s) Oral every 6 hours PRN  albuterol/ipratropium for Nebulization 3 milliLiter(s) Nebulizer every 6 hours PRN  benzocaine 15 mG/menthol 3.6 mG (Sugar-Free) Lozenge 1 Lozenge Oral every 4 hours PRN  cholecalciferol 2000 Unit(s) Oral daily  cyanocobalamin 1000 MICROGram(s) Oral daily  enoxaparin Injectable 40 milliGRAM(s) SubCutaneous daily  folic acid 1 milliGRAM(s) Oral daily  guaiFENesin   Syrup  (Sugar-Free) 100 milliGRAM(s) Oral every 6 hours PRN  hydrALAZINE 25 milliGRAM(s) Oral every 8 hours  hydrocortisone 1% Cream 1 Application(s) Topical three times a day PRN  insulin lispro (HumaLOG) corrective regimen sliding scale   SubCutaneous Before meals and at bedtime  labetalol 600 milliGRAM(s) Oral three times a day  levothyroxine 75 MICROGram(s) Oral daily  losartan 100 milliGRAM(s) Oral daily  pantoprazole    Tablet 40 milliGRAM(s) Oral before breakfast  potassium acid phosphate/sodium acid phosphate tablet (K-PHOS No. 2) 1 Tablet(s) Oral four times a day with meals  potassium chloride    Tablet ER 40 milliEquivalent(s) Oral every 4 hours  zinc oxide 20% Ointment 1 Application(s) Topical two times a day      Vital Signs    T(F): 97.8 (11-12-19 @ 08:15), Max: 98 (11-11-19 @ 16:57)  HR: 64 (11-12-19 @ 08:15) (60 - 66)  BP: 178/68 (11-12-19 @ 08:15) (158/69 - 186/81)  ABP: --  RR: 18 (11-12-19 @ 08:15) (16 - 18)  SpO2: 97% (11-12-19 @ 08:15) (91% - 97%)  Wt(kg): --  CVP(cm H2O): --  CO: --  PCWP: --    I and O's:    Daily     Daily     PHYSICAL EXAM:  Constitutional: well developed, well nourished  and in nad  HEENT: PERRLA,  no icteric sclera and mild pallor of conjunctiva noted  Neck: No JVD, thyromegaly or adenopathy  Respiratory: reduced air entry lower lungs with no rales, wheezing or rhonchi  Cardiovascular: S1 and S2 normally heard  Gastrointestinal: soft, nondistended, nontender and normal bowel sounds heard  Extremities: No peripheral edema or cyanosis  Neurological: A/O x 3, no focal deficits  : No flank or cva tenderness palpated.  Skin: No rashes      LABS:    CBC:                          10.6   5.23  )-----------( 349      ( 12 Nov 2019 06:49 )             35.7           BMP:    11-12    137  |  98  |  18  ----------------------------<  167<H>  3.3<L>   |  32<H>  |  1.15  11-11    137  |  99  |  17  ----------------------------<  123<H>  3.3<L>   |  35<H>  |  1.13  11-10    136  |  96  |  19<H>  ----------------------------<  138<H>  3.0<L>   |  33<H>  |  1.13  11-10    136  |  94<L>  |  17  ----------------------------<  158<H>  2.7<LL>   |  35<H>  |  1.17    Ca    9.0      12 Nov 2019 06:49  Ca    8.7      11 Nov 2019 05:54  Ca    8.5      10 Nov 2019 16:12  Ca    8.9      10 Nov 2019 06:20  Phos  3.8     11-11  Phos  3.7     11-10  Mg     2.2     11-11  Mg     2.0     11-10    TPro  7.2  /  Alb  2.7<L>  /  TBili  4.2<H>  /  DBili  x   /  AST  18  /  ALT  45  /  AlkPhos  203<H>  11-12  TPro  7.4  /  Alb  2.8<L>  /  TBili  4.3<H>  /  DBili  x   /  AST  25  /  ALT  53  /  AlkPhos  205<H>  11-11  TPro  7.5  /  Alb  2.8<L>  /  TBili  4.5<H>  /  DBili  x   /  AST  30  /  ALT  57  /  AlkPhos  206<H>  11-10          URINE STUDIES:        Osmolality, Random Urine: 288 mos/kg (11-09 @ 17:23)  Creatinine, Random Urine: <13 mg/dL (11-09 @ 17:23)  Chloride, Random Urine: 142 mmol/L (11-09 @ 15:08)  Creatinine, Random Urine: 14 mg/dL (11-09 @ 15:08)  Sodium, Random Urine: 117 mmol/L (11-09 @ 15:08)  Potassium, Random Urine: 25 mmol/L (11-09 @ 15:08)  Potassium, Random Urine: 22 mmol/L (11-06 @ 14:17)  Chloride, Random Urine: 97 mmol/L (11-06 @ 14:17)  Sodium, Random Urine: 76 mmol/L (11-06 @ 14:17)  Osmolality, Random Urine: 319 mos/kg (11-06 @ 14:17)                  RADIOLOGY & ADDITIONAL STUDIES: pt seen and examined.pts current chart reviewed and case discussed with resident covering.    SUBJECTIVE: Patient denies any symptoms, continues to have hypokalemia and Hypertensive   pt feels fine and denies cp,sob,gi or gu/uremic  symptons    REVIEW OF SYSTEMS:  CONSTITUTIONAL: No weakness, fevers or chills  EYES/ENT: No visual changes;  No vertigo or throat pain   NECK: No pain or stiffness  RESPIRATORY: No cough, wheezing, hemoptysis; No shortness of breath  CARDIOVASCULAR: No chest pain or palpitations  GASTROINTESTINAL: No abdominal or epigastric pain. No nausea, vomiting, or hematemesis; No diarrhea or constipation. No melena or hematochezia.  GENITOURINARY: No dysuria, frequency , hematuria, flank pain or nocturia  NEUROLOGICAL: No numbness or weakness  SKIN: No itching, burning, rashes, or lesions   All other review of systems is negative unless indicated above    Current meds:    acetaminophen   Tablet .. 650 milliGRAM(s) Oral every 6 hours PRN  albuterol/ipratropium for Nebulization 3 milliLiter(s) Nebulizer every 6 hours PRN  benzocaine 15 mG/menthol 3.6 mG (Sugar-Free) Lozenge 1 Lozenge Oral every 4 hours PRN  cholecalciferol 2000 Unit(s) Oral daily  cyanocobalamin 1000 MICROGram(s) Oral daily  enoxaparin Injectable 40 milliGRAM(s) SubCutaneous daily  folic acid 1 milliGRAM(s) Oral daily  guaiFENesin   Syrup  (Sugar-Free) 100 milliGRAM(s) Oral every 6 hours PRN  hydrALAZINE 25 milliGRAM(s) Oral every 8 hours  hydrocortisone 1% Cream 1 Application(s) Topical three times a day PRN  insulin lispro (HumaLOG) corrective regimen sliding scale   SubCutaneous Before meals and at bedtime  labetalol 600 milliGRAM(s) Oral three times a day  levothyroxine 75 MICROGram(s) Oral daily  losartan 100 milliGRAM(s) Oral daily  pantoprazole    Tablet 40 milliGRAM(s) Oral before breakfast  potassium acid phosphate/sodium acid phosphate tablet (K-PHOS No. 2) 1 Tablet(s) Oral four times a day with meals  potassium chloride    Tablet ER 40 milliEquivalent(s) Oral every 4 hours  zinc oxide 20% Ointment 1 Application(s) Topical two times a day      Vital Signs    T(F): 97.8 (11-12-19 @ 08:15), Max: 98 (11-11-19 @ 16:57)  HR: 64 (11-12-19 @ 08:15) (60 - 66)  BP: 178/68 (11-12-19 @ 08:15) (158/69 - 186/81)  ABP: --  RR: 18 (11-12-19 @ 08:15) (16 - 18)  SpO2: 97% (11-12-19 @ 08:15) (91% - 97%)  Wt(kg): --  CVP(cm H2O): --  CO: --  PCWP: --    I and O's:    Daily     Daily     PHYSICAL EXAM:  Constitutional: well developed, obese  and in nad  HEENT: PERRLA,  no icteric sclera and mild pallor of conjunctiva noted  Neck: No JVD, thyromegaly   Respiratory: reduced air entry lower lungs with no rales, wheezing or rhonchi  Cardiovascular: S1 and S2 normally heard  Gastrointestinal: soft, nondistended, nontender and normal bowel sounds heard  Extremities: B/L Lymphedema present with chronic skin pigmentation and chronic skin changes   Neurological: A/O x 3, no focal deficits  Skin: No rashes        LABS:    CBC:                          10.6   5.23  )-----------( 349      ( 12 Nov 2019 06:49 )             35.7           BMP:    11-12    137  |  98  |  18  ----------------------------<  167<H>  3.3<L>   |  32<H>  |  1.15  11-11    137  |  99  |  17  ----------------------------<  123<H>  3.3<L>   |  35<H>  |  1.13  11-10    136  |  96  |  19<H>  ----------------------------<  138<H>  3.0<L>   |  33<H>  |  1.13  11-10    136  |  94<L>  |  17  ----------------------------<  158<H>  2.7<LL>   |  35<H>  |  1.17    Ca    9.0      12 Nov 2019 06:49  Ca    8.7      11 Nov 2019 05:54  Ca    8.5      10 Nov 2019 16:12  Ca    8.9      10 Nov 2019 06:20  Phos  3.8     11-11  Phos  3.7     11-10  Mg     2.2     11-11  Mg     2.0     11-10    TPro  7.2  /  Alb  2.7<L>  /  TBili  4.2<H>  /  DBili  x   /  AST  18  /  ALT  45  /  AlkPhos  203<H>  11-12  TPro  7.4  /  Alb  2.8<L>  /  TBili  4.3<H>  /  DBili  x   /  AST  25  /  ALT  53  /  AlkPhos  205<H>  11-11  TPro  7.5  /  Alb  2.8<L>  /  TBili  4.5<H>  /  DBili  x   /  AST  30  /  ALT  57  /  AlkPhos  206<H>  11-10          URINE STUDIES:        Osmolality, Random Urine: 288 mos/kg (11-09 @ 17:23)  Creatinine, Random Urine: <13 mg/dL (11-09 @ 17:23)  Chloride, Random Urine: 142 mmol/L (11-09 @ 15:08)  Creatinine, Random Urine: 14 mg/dL (11-09 @ 15:08)  Sodium, Random Urine: 117 mmol/L (11-09 @ 15:08)  Potassium, Random Urine: 25 mmol/L (11-09 @ 15:08)  Potassium, Random Urine: 22 mmol/L (11-06 @ 14:17)  Chloride, Random Urine: 97 mmol/L (11-06 @ 14:17)  Sodium, Random Urine: 76 mmol/L (11-06 @ 14:17)  Osmolality, Random Urine: 319 mos/kg (11-06 @ 14:17)                  RADIOLOGY & ADDITIONAL STUDIES: pt seen , examined and case discussed with resident covering.    SUBJECTIVE: Patient denies any symptoms, continues to have hypokalemia and Hypertensive   pt feels fine and denies cp,sob,gi or gu  symptoms    REVIEW OF SYSTEMS:  CONSTITUTIONAL: No weakness, fevers or chills  RESPIRATORY: No cough, wheezing, hemoptysis; No shortness of breath  CARDIOVASCULAR: No chest pain or palpitations  GASTROINTESTINAL: No abdominal or epigastric pain. No nausea, vomiting, or hematemesis; No diarrhea or constipation. No melena or hematochezia.  GENITOURINARY: No dysuria, frequency , hematuria, flank pain or nocturia  NEUROLOGICAL: No numbness or weakness  SKIN: No itching, burning, rashes, or lesions   All other review of systems is negative unless indicated above    Current meds:    acetaminophen   Tablet .. 650 milliGRAM(s) Oral every 6 hours PRN  albuterol/ipratropium for Nebulization 3 milliLiter(s) Nebulizer every 6 hours PRN  benzocaine 15 mG/menthol 3.6 mG (Sugar-Free) Lozenge 1 Lozenge Oral every 4 hours PRN  cholecalciferol 2000 Unit(s) Oral daily  cyanocobalamin 1000 MICROGram(s) Oral daily  enoxaparin Injectable 40 milliGRAM(s) SubCutaneous daily  folic acid 1 milliGRAM(s) Oral daily  guaiFENesin   Syrup  (Sugar-Free) 100 milliGRAM(s) Oral every 6 hours PRN  hydrALAZINE 25 milliGRAM(s) Oral every 8 hours  hydrocortisone 1% Cream 1 Application(s) Topical three times a day PRN  insulin lispro (HumaLOG) corrective regimen sliding scale   SubCutaneous Before meals and at bedtime  labetalol 600 milliGRAM(s) Oral three times a day  levothyroxine 75 MICROGram(s) Oral daily  losartan 100 milliGRAM(s) Oral daily  pantoprazole    Tablet 40 milliGRAM(s) Oral before breakfast  potassium acid phosphate/sodium acid phosphate tablet (K-PHOS No. 2) 1 Tablet(s) Oral four times a day with meals  potassium chloride    Tablet ER 40 milliEquivalent(s) Oral every 4 hours  zinc oxide 20% Ointment 1 Application(s) Topical two times a day      Vital Signs    T(F): 97.8 (11-12-19 @ 08:15), Max: 98 (11-11-19 @ 16:57)  HR: 64 (11-12-19 @ 08:15) (60 - 66)  BP: 178/68 (11-12-19 @ 08:15) (158/69 - 186/81)  ABP: --  RR: 18 (11-12-19 @ 08:15) (16 - 18)  SpO2: 97% (11-12-19 @ 08:15) (91% - 97%)  Wt(kg): --  CVP(cm H2O): --  CO: --  PCWP: --    I and O's:    Daily     Daily     PHYSICAL EXAM:  Constitutional: well developed, obese  and in nad  HEENT: PERRLA,  no icteric sclera and mild pallor of conjunctiva noted  Neck: No JVD, thyromegaly   Respiratory: reduced air entry lower lungs with no rales, wheezing or rhonchi  Cardiovascular: S1 and S2 normally heard  Gastrointestinal: soft, nondistended, nontender and normal bowel sounds heard  Extremities: B/L Lymphedema present with chronic skin pigmentation and chronic skin changes   Neurological: A/O x 3, no focal deficits  Skin: No rashes        LABS:    CBC:                          10.6   5.23  )-----------( 349      ( 12 Nov 2019 06:49 )             35.7       BMP:    11-12    137  |  98  |  18  ----------------------------<  167<H>  3.3<L>   |  32<H>  |  1.15  11-11    137  |  99  |  17  ----------------------------<  123<H>  3.3<L>   |  35<H>  |  1.13  11-10    136  |  96  |  19<H>  ----------------------------<  138<H>  3.0<L>   |  33<H>  |  1.13  11-10    136  |  94<L>  |  17  ----------------------------<  158<H>  2.7<LL>   |  35<H>  |  1.17    Ca    9.0      12 Nov 2019 06:49  Ca    8.7      11 Nov 2019 05:54  Ca    8.5      10 Nov 2019 16:12  Ca    8.9      10 Nov 2019 06:20  Phos  3.8     11-11  Phos  3.7     11-10  Mg     2.2     11-11  Mg     2.0     11-10    TPro  7.2  /  Alb  2.7<L>  /  TBili  4.2<H>  /  DBili  x   /  AST  18  /  ALT  45  /  AlkPhos  203<H>  11-12  TPro  7.4  /  Alb  2.8<L>  /  TBili  4.3<H>  /  DBili  x   /  AST  25  /  ALT  53  /  AlkPhos  205<H>  11-11  TPro  7.5  /  Alb  2.8<L>  /  TBili  4.5<H>  /  DBili  x   /  AST  30  /  ALT  57  /  AlkPhos  206<H>  11-10      Renin Activity, Plasma (11.09.19 @ 06:08)    Renin Activity, Plasma: <0.167: This test was developed and its performance characteristics  determined by [facility]. It has not been cleared or  approved by the Food and Drug Administration.  Performed At: [address] [id number]  Steven Neves MD Ph:[phone] ng/mL/hr    Renin Direct, Plasma (11.08.19 @ 12:55)    Renin Direct, Plasma: 2.7: Values flagged as "Out of Range" require correlation with information  below to determine if clinically abnormal.  The expected values for Renin are pg/mL  Patient's Posture                                  Age     Range(pg/mL)  Upright                                  < 40                4.2 - 52.2  Supine                                                  < 40                3.2 - 33.2  Upright                                                 > 40                3.6 - 81.6  Supine                                               > 40                2.5 - 45.1 pg/mL    Aldosterone, Serum (11.08.19 @ 09:56)    Aldosterone, Serum: 10.4: Values flagged as "Out of Range" require correlation with information  below to determine if clinically abnormal.  The expected values for Aldosterone are ng/dL  Patients Posture                                  Age            Range (ng/dL)  Upright                                            21-65                  <3.0-39.2  Supine                                               21-65                  <3.0-23.2  Upright - EDTA                                 21-65            <3.0-35.3  Supine - EDTA                                  21-65             <3.0-23.6 ng/dL    Cortisol AM, Serum (11.10.19 @ 11:38)    Cortisol AM, Serum: 16.5 ug/dL        URINE STUDIES:  Osmolality, Random Urine: 288 mos/kg (11-09 @ 17:23)  Creatinine, Random Urine: <13 mg/dL (11-09 @ 17:23)  Chloride, Random Urine: 142 mmol/L (11-09 @ 15:08)  Creatinine, Random Urine: 14 mg/dL (11-09 @ 15:08)  Sodium, Random Urine: 117 mmol/L (11-09 @ 15:08)  Potassium, Random Urine: 25 mmol/L (11-09 @ 15:08)  Potassium, Random Urine: 22 mmol/L (11-06 @ 14:17)  Chloride, Random Urine: 97 mmol/L (11-06 @ 14:17)  Sodium, Random Urine: 76 mmol/L (11-06 @ 14:17)  Osmolality, Random Urine: 319 mos/kg (11-06 @ 14:17)    Potassium, Urine 24 Hour (11.11.19 @ 18:14)    Potassium Calculation: 33 mmol/24hr    Interval Timed: 24 HR    Total Volume - 24 Hour: 1500 mL    Urine Creatinine Calculation: 1.0 g/24 h    Sodium, Urine 24 Hour (11.11.19 @ 18:14)    Urine Creatinine Calculation: 1.0 g/24 h    Total Volume - 24 Hour: 1500 mL    Sodium Calculation: 56 mmol/24hr    Interval Timed: 24 HR    Creatinine, Urine 24 Hour (11.10.19 @ 18:05)    Total Volume - 24 Hour: 1700 mL    Urine Creatinine Calculation: 0.8 g/24 h    Interval Timed: 24 HR

## 2019-11-12 NOTE — PROGRESS NOTE ADULT - SUBJECTIVE AND OBJECTIVE BOX
PGY 1 Note discussed with supervising resident and primary attending    Patient is a 69y old  Female who presents with a chief complaint of Shortness of breath (12 Nov 2019 10:47)    INTERVAL HPI/OVERNIGHT EVENTS: Patient seen and examined at the bedside. Patient's blood pressure remains high. Renin and aldosterone levels were repeated this morning. Patient states she is doing fine this morning. Denies any acute complaints or concerns at this time.     MEDICATIONS  (STANDING):  cholecalciferol 2000 Unit(s) Oral daily  cyanocobalamin 1000 MICROGram(s) Oral daily  enoxaparin Injectable 40 milliGRAM(s) SubCutaneous daily  folic acid 1 milliGRAM(s) Oral daily  insulin lispro (HumaLOG) corrective regimen sliding scale   SubCutaneous Before meals and at bedtime  labetalol 600 milliGRAM(s) Oral three times a day  levothyroxine 75 MICROGram(s) Oral daily  losartan 100 milliGRAM(s) Oral daily  pantoprazole    Tablet 40 milliGRAM(s) Oral before breakfast  potassium acid phosphate/sodium acid phosphate tablet (K-PHOS No. 2) 1 Tablet(s) Oral four times a day with meals  potassium chloride    Tablet ER 40 milliEquivalent(s) Oral every 4 hours  zinc oxide 20% Ointment 1 Application(s) Topical two times a day    MEDICATIONS  (PRN):  acetaminophen   Tablet .. 650 milliGRAM(s) Oral every 6 hours PRN Temp greater or equal to 38C (100.4F), Mild Pain (1 - 3)  albuterol/ipratropium for Nebulization 3 milliLiter(s) Nebulizer every 6 hours PRN Shortness of Breath and/or Wheezing  benzocaine 15 mG/menthol 3.6 mG (Sugar-Free) Lozenge 1 Lozenge Oral every 4 hours PRN Sore Throat  guaiFENesin   Syrup  (Sugar-Free) 100 milliGRAM(s) Oral every 6 hours PRN Cough  hydrocortisone 1% Cream 1 Application(s) Topical three times a day PRN Itching      __________________________________________________  REVIEW OF SYSTEMS:  CONSTITUTIONAL: No fever   EYES: no visual disturbances  NECK: No pain   RESPIRATORY: No cough; No shortness of breath  CARDIOVASCULAR: No chest pain  GASTROINTESTINAL: No pain. No diarrhea   NEUROLOGICAL: No headache   MUSCULOSKELETAL: No joint pain, no muscle pain  GENITOURINARY: no dysuria  PSYCHIATRY: no anxiety  ALL OTHER  ROS negative        Vital Signs Last 24 Hrs  T(C): 36.6 (12 Nov 2019 08:15), Max: 36.7 (11 Nov 2019 16:57)  T(F): 97.8 (12 Nov 2019 08:15), Max: 98 (11 Nov 2019 16:57)  HR: 64 (12 Nov 2019 08:15) (60 - 66)  BP: 178/68 (12 Nov 2019 08:15) (158/69 - 186/81)  RR: 18 (12 Nov 2019 08:15) (16 - 18)  SpO2: 97% (12 Nov 2019 08:15) (91% - 97%)    ________________________________________________  PHYSICAL EXAM:  GENERAL: Patient seen sitting in chair and in no apparent distress  HEENT: Normocephalic; conjunctivae and sclerae clear  NECK: supple  CHEST/LUNG: Clear to auscultation bilaterally with good air entry   HEART: S1 S2, regular; no murmurs  ABDOMEN: Soft, Nontender, Distended; Bowel sounds present  EXTREMITIES: chronic b/l edema; no calf tenderness; no increased warmth  SKIN: warm and dry  NERVOUS SYSTEM: Awake and alert; Oriented to place, person and time     _________________________________________________  LABS:                        10.6   5.23  )-----------( 349      ( 12 Nov 2019 06:49 )             35.7     11-12    137  |  98  |  18  ----------------------------<  167<H>  3.3<L>   |  32<H>  |  1.15    Ca    9.0      12 Nov 2019 06:49  Phos  3.8     11-11  Mg     2.2     11-11    TPro  7.2  /  Alb  2.7<L>  /  TBili  4.2<H>  /  DBili  x   /  AST  18  /  ALT  45  /  AlkPhos  203<H>  11-12      CAPILLARY BLOOD GLUCOSE    POCT Blood Glucose.: 138 mg/dL (11 Nov 2019 21:15)  POCT Blood Glucose.: 154 mg/dL (11 Nov 2019 12:12)      RADIOLOGY & ADDITIONAL TESTS:    Imaging Personally Reviewed:  YES     < from: US Renal (11.11.19 @ 12:35) >  IMPRESSION:     No hydronephrosis. Bilateral nonobstructing renal calculi, as described   above.    < end of copied text >    Consultant(s) Notes Reviewed:   YES    Care Discussed with Consultants :     Plan of care was discussed with patient and /or primary care giver; all questions and concerns were addressed and care was aligned with patient's wishes.

## 2019-11-12 NOTE — PROGRESS NOTE ADULT - PROBLEM SELECTOR PROBLEM 7
CAD (coronary artery disease)
DM (diabetes mellitus)
Hypothyroid
DM (diabetes mellitus)
DM (diabetes mellitus)
Hypothyroid

## 2019-11-12 NOTE — PROGRESS NOTE ADULT - PROBLEM/PLAN-7
Discontinue Regimen: Ketoconazole shampoo
Detail Level: Zone
Action 2: Continue
Continue Regimen: Ketoconazole cream once a week for maintenance
Otc Regimen: T/Sal shampoo QD or QOD, leaving on scalp for course of shower
DISPLAY PLAN FREE TEXT

## 2019-11-12 NOTE — PROGRESS NOTE ADULT - PROBLEM SELECTOR PLAN 1
- Patient takes Losartan, Ethacrynic acid and Labetalol at home  - ethacrynic acid was on hold before due to low potassium  -bp continue to be elevated while being on high doses of losartan and labetalol    -monitor BP  - DASH diet  -f/u nephro  -f/u endo Dr. Graff; awaiting renin and aldosterone levels

## 2019-11-12 NOTE — PROGRESS NOTE ADULT - PROBLEM SELECTOR PLAN 2
-noted to have low K levels the past few days; possibly due to diuretic use.   -has been replaced with tablets and powder   -potassium levels improving; 3.3 today  -continue to monitor potassium levels  -f/u nephro Dr. Sky

## 2019-11-12 NOTE — PROGRESS NOTE ADULT - PROBLEM SELECTOR PLAN 10
Goals of care discussed with patient  meaning of CPR described in detail and wants everything to be done after understanding risk associated with age.  Pt is FULL CODE.

## 2019-11-12 NOTE — PROGRESS NOTE ADULT - ASSESSMENT
1.HYPOKALEMIA: secondary to renal wasting(high TTKG as above) ,r/o secondary to diuretic induced (Ethacrynic acid) vs hyperlado(secondary ) secondary to piyush-vascular htn(GINA) vs Endocrine disorder like excess cortisol secretion  -pt reported of allergic reaction to aldactone,   - pt continues to have hypokalemia, alkalosis, and hypertensive  - serum cortisol AM WNL. serum Nickolas 10.4  -f/u repeat serum nickolas, renin  -f/u 24 hr urine cortisol and aldosterone level   -f/u 24 hr urine for Cortisol, Na ,K and cr  -keep k >4 and <5  -f/u k daily  -replace po kcl prn for k >3 and <3.5  -add iv kcl for k<3 prn  -avoid Ethacrynic acid   -Endocrine consult  2.HTN: most likley essential htn, r/o renal artery stenosis induced(small left kidney) ,secondary to atherosclerosis from DM/HTN  -suggest to titrate bp meds to keep bp<130/80  -low salt diet  -no Aldactone as pt is having side effects/allergic reaction  3.CKD: stage 3 with baseline cr 1.13 with no proteinuria, secondary to ischemic renal ds (secondary to atherosclerosis)  -pts renal function at baseline today   -keep pt evolemic, adjust meds as per egfr and avoid nephrotoxic meds like Nsaids/Aminoglycosides/iv contrast ,,etc  -may continue Losartan for now   4.METABOLIC ALKALOSIS:mild  with high urine Cl ,c/w volume resistant alkalosis  -avoid iv hydration  -f/u co2 daily  5.ANEMIA:mild  -f/u Hb daily  6.B/L RENAL STONES:pt is known to have renal stones ,asympatomatic at this time, no flank pain or gross hematuria,,etc  -needs renal f/u as outpatient for further care 1.HYPOKALEMIA: secondary to renal wasting(high TTKG as above) ,r/o secondary to diuretic induced (Ethacrynic acid) vs hyperlado(secondary ) secondary to piyush-vascular htn(GINA) vs Endocrine disorder like excess cortisol secretion  - pt continues to have hypokalemia, alkalosis, and hypertensive  - serum cortisol AM WNL. serum Nickolas 10.4  - Renin plasma activity <0.1, with borderline high serum Nickolas, still concern of hyperaldo    -f/u repeat serum nickolas, renin  -f/u 24 hr urine cortisol and aldosterone level   -f/u 24 hr urine for Cortisol, Na ,K and cr  -keep k >4 and <5  -f/u k daily  -replace po kcl prn for k >3 and <3.5  -add iv kcl for k<3 prn  -avoid Ethacrynic acid   -Endocrine consult  2.HTN: most likley essential htn, r/o renal artery stenosis induced(small left kidney) ,secondary to atherosclerosis from DM/HTN  -suggest to titrate bp meds to keep bp<130/80  - recommend to add hydralazine 25 mg TID to home medications   -low salt diet  -no Aldactone as pt is having side effects/allergic reaction  3.CKD: stage 3 with baseline cr 1.13 with no proteinuria, secondary to ischemic renal ds (secondary to atherosclerosis)  -pts renal function at baseline today   -keep pt evolemic, adjust meds as per egfr and avoid nephrotoxic meds like Nsaids/Aminoglycosides/iv contrast ,,etc  -may continue Losartan for now   4.METABOLIC ALKALOSIS:mild  with high urine Cl ,c/w volume resistant alkalosis  -avoid iv hydration  -f/u co2 daily  5.ANEMIA:mild  -f/u Hb daily  6.B/L RENAL STONES:pt is known to have renal stones ,asympatomatic at this time, no flank pain or gross hematuria,,etc  -needs renal f/u as outpatient for further care 1.HYPOKALEMIA: secondary to renal wasting(high TTKG as above) ,r/o secondary to diuretic induced (Ethacrynic acid) vs hyperlado(secondary ) secondary to piyush-vascular htn(GINA) vs Endocrine disorder like excess cortisol secretion  - pt continues to have hypokalemia, alkalosis, and hypertensive  - serum cortisol AM WNL. serum Nickolas 10.4  - Renin plasma activity <0.1, with borderline high serum Nickolas, still concern of hyperaldo    -f/u repeat serum nickolas, renin  -f/u 24 hr urine cortisol and aldosterone level   -f/u 24 hr urine for Cortisol  -keep k >4 and <5  -f/u k daily  -replace po kcl prn for k >3 and <3.5  -add iv kcl for k<3 prn  -avoid Ethacrynic acid   -Endocrine f/u   2.HTN: most likley essential htn, r/o renal artery stenosis induced(small left kidney) ,secondary to atherosclerosis from DM/HTN  -suggest to titrate bp meds to keep bp<130/80  - recommend to add hydralazine 25 mg TID to home medications   -low salt diet  -no Aldactone as pt is having side effects/allergic reaction  3.CKD: stage 3 with baseline cr 1.13 with no proteinuria, secondary to ischemic renal ds (secondary to atherosclerosis)  -pts renal function at baseline today   -keep pt evolemic, adjust meds as per egfr and avoid nephrotoxic meds like Nsaids/Aminoglycosides/iv contrast ,,etc  -may continue Losartan for now   4.METABOLIC ALKALOSIS:mild  with high urine Cl ,c/w volume resistant alkalosis  -avoid iv hydration  -f/u co2 daily  5.ANEMIA:mild  -f/u Hb daily  6.B/L RENAL STONES:pt is known to have renal stones ,asympatomatic at this time, no flank pain or gross hematuria,,etc  -needs renal f/u as outpatient for further care

## 2019-11-12 NOTE — PROGRESS NOTE ADULT - PROBLEM SELECTOR PLAN 8
- pt has hx of hypothyroidism; not currently on any meds  - TSH 1.82  -as per Dr goss Anton, patient can repeat TSH in a few weeks to monitor

## 2019-11-12 NOTE — PROGRESS NOTE ADULT - PROBLEM SELECTOR PROBLEM 8
CAD (coronary artery disease)
Hypothyroid
Prophylactic measure
Hypothyroid
Hypothyroid
CAD (coronary artery disease)

## 2019-11-12 NOTE — PROGRESS NOTE ADULT - PROBLEM SELECTOR PROBLEM 9
Goals of care, counseling/discussion
Prophylactic measure

## 2019-11-12 NOTE — PROGRESS NOTE ADULT - ASSESSMENT
69 year old female with PMH of hypertension, CAD s/p 3 PAOLA , asthma, CKD 3, Chronic Lymphedema, venous stasis s/p venous ablation, and carpal tunnel syndrome presented to ED with worsening shortness of breath. Endocrinology was consulted for evaluation of secondary HTN.    1. Uncontrolled HTN  -concern for endocrine etiology of uncontrolled HTN with hypokalemia   -Will rule out secondary causes of HTN including pheochromocytoma, primary hyperaldosteronism, and Cushing's syndrome  -initial renin and aldosterone levels were within normal limits, though difficult to interpret as hypokalemia can affect these results  -pending results of repeat renin and aldosterone levels drawn today  -pending results of urine catecholamines and 24 hr urine free cortisol   -TFTS within normal limits  -also consider Obstructive sleep apnea given obesity  -replete K    2. DM  -A1c at goal 5.4%  -BG well controlled on low dose insulin sliding scale with meals  -recommend to continue mealtime rapid acting insulin as below  BG 70-100mg/dL 0 units  -150 mg/dL 0 units  -200 mg/dL 1 units  -250 mg/dL 2 units  -300 mg/dL 3 units  -350 mg/dL 4 units  -400 mg/dL 5 units  BG > 400mg/dL 6 units  -FS AC HS  -ensure consistent carbohydrate diet  -will monitor FS and adjust accordingly   --if patient is NPO for any reason please change FS and sliding scale to NPO lispro scale     Plan discussed with primary team  Please  call  w/ any questions or concerns [phone] 69 year old female with PMH of hypertension, CAD s/p 3 PAOLA , asthma, CKD 3, Chronic Lymphedema, venous stasis s/p venous ablation, and carpal tunnel syndrome presented to ED with worsening shortness of breath. Endocrinology was consulted for evaluation of secondary HTN.    1. Uncontrolled HTN  -concern for endocrine etiology of uncontrolled HTN with hypokalemia   -Will rule out secondary causes of HTN including pheochromocytoma, primary hyperaldosteronism, and Cushing's syndrome  -initial renin and aldosterone levels were within normal limits, though difficult to interpret as hypokalemia can affect these results  -pending results of repeat renin and aldosterone levels drawn today  -pending results of urine catecholamines and 24 hr urine free cortisol   -TFTS within normal limits  -also consider Obstructive sleep apnea given obesity  -adjust BP as needed   -replete K    2. DM  -A1c at goal 5.4%  -BG well controlled on low dose insulin sliding scale with meals  -recommend to continue mealtime rapid acting insulin as below  BG 70-100mg/dL 0 units  -150 mg/dL 0 units  -200 mg/dL 1 units  -250 mg/dL 2 units  -300 mg/dL 3 units  -350 mg/dL 4 units  -400 mg/dL 5 units  BG > 400mg/dL 6 units  -FS AC HS  -ensure consistent carbohydrate diet  -will monitor FS and adjust accordingly   --if patient is NPO for any reason please change FS and sliding scale to NPO lispro scale     Plan discussed with primary team  Please  call  w/ any questions or concerns [phone]

## 2019-11-12 NOTE — CHART NOTE - NSCHARTNOTEFT_GEN_A_CORE
RYLIE Beckford reviewed patient's medicine note and noticed a goals of care discussion occurred today and the patient stated she wishes to be full code.  RYLIE Beckford went to the bedside and introduced herself.  Patient stated she does not speak with anyone when she is eating.  RYLIE Beckford requested patient's permission to return tomorrow.  Patient agreed.  RYLIE Beckford will follow up with the patient tomorrow.

## 2019-11-12 NOTE — PROGRESS NOTE ADULT - SUBJECTIVE AND OBJECTIVE BOX
69 year old female with PMH of hypertension, CAD s/p 3 PAOLA , asthma, CKD 3, Chronic Lymphedema, venous stasis s/p venous ablation, and carpal tunnel syndrome presented to ED with worsening shortness of breath. Endocrinology was consulted for evaluation of secondary HTN.    Patient seen and examined at bedside. She reports feeling well overall and is anxious for discharge due to concerns about losing her rehab bed. BP remains uncontrolled despite labetalol and losartan. BG remains well controlled on current low dose insulin sliding scale with meals.    MEDICATIONS  (STANDING):  cholecalciferol 2000 Unit(s) Oral daily  cyanocobalamin 1000 MICROGram(s) Oral daily  enoxaparin Injectable 40 milliGRAM(s) SubCutaneous daily  folic acid 1 milliGRAM(s) Oral daily  insulin lispro (HumaLOG) corrective regimen sliding scale   SubCutaneous Before meals and at bedtime  labetalol 600 milliGRAM(s) Oral three times a day  levothyroxine 75 MICROGram(s) Oral daily  losartan 100 milliGRAM(s) Oral daily  pantoprazole    Tablet 40 milliGRAM(s) Oral before breakfast  potassium acid phosphate/sodium acid phosphate tablet (K-PHOS No. 2) 1 Tablet(s) Oral four times a day with meals  potassium chloride    Tablet ER 40 milliEquivalent(s) Oral every 4 hours  zinc oxide 20% Ointment 1 Application(s) Topical two times a day    MEDICATIONS  (PRN):  acetaminophen   Tablet .. 650 milliGRAM(s) Oral every 6 hours PRN Temp greater or equal to 38C (100.4F), Mild Pain (1 - 3)  albuterol/ipratropium for Nebulization 3 milliLiter(s) Nebulizer every 6 hours PRN Shortness of Breath and/or Wheezing  benzocaine 15 mG/menthol 3.6 mG (Sugar-Free) Lozenge 1 Lozenge Oral every 4 hours PRN Sore Throat  guaiFENesin   Syrup  (Sugar-Free) 100 milliGRAM(s) Oral every 6 hours PRN Cough  hydrocortisone 1% Cream 1 Application(s) Topical three times a day PRN Itching      REVIEW OF SYSTEMS:  CONSTITUTIONAL: No fever, weight loss, or fatigue  EYES: No eye pain, visual disturbances, or discharge  ENMT:  No difficulty hearing, tinnitus, vertigo; No sinus or throat pain  NECK: No pain or stiffness  RESPIRATORY: No cough, wheezing, chills or hemoptysis; No shortness of breath  CARDIOVASCULAR: No chest pain, palpitations, dizziness, or leg swelling  GASTROINTESTINAL: No abdominal or epigastric pain. No nausea, vomiting, or hematemesis; No diarrhea or constipation. No melena or hematochezia.  GENITOURINARY: No dysuria, frequency, hematuria, or incontinence  NEUROLOGICAL: No headaches, memory loss, loss of strength, numbness, or tremors  SKIN: chronic lymphedema, No itching, burning, rashes, or lesions   LYMPH NODES: No enlarged glands  ENDOCRINE: No heat or cold intolerance; No hair loss  MUSCULOSKELETAL: No joint pain or swelling; No muscle, back, or extremity pain  PSYCHIATRIC: No depression, anxiety, mood swings, or difficulty sleeping  HEME/LYMPH: No easy bruising, or bleeding gums  ALLERGY AND IMMUNOLOGIC: No hives or eczema        PHYSICAL EXAM:    VITAL SIGNS  T(C): 36.6 (11-12-19 @ 08:15), Max: 36.7 (11-11-19 @ 16:57)  HR: 64 (11-12-19 @ 08:15) (60 - 66)  BP: 178/68 (11-12-19 @ 08:15) (158/69 - 186/81)  RR: 18 (11-12-19 @ 08:15) (16 - 18)  SpO2: 97% (11-12-19 @ 08:15) (91% - 97%)    General: Alert and oriented. No acute distress.   Eye: Extraocular movements are intact.    HENT:  Normocephalic.    Respiratory: Respirations are non-labored, Symmetrical chest wall expansion.    Gastrointestinal:  Non-distended.    Neurologic:  Alert and oriented X 3, No focal defects, Cranial Nerves II-XII are grossly intact.    Psychiatric:  Cooperative, Appropriate mood & affect.  SKIN: No rashes or lesions    LABS:                        10.6   5.23  )-----------( 349      ( 12 Nov 2019 06:49 )             35.7     11-12    137  |  98  |  18  ----------------------------<  167<H>  3.3<L>   |  32<H>  |  1.15    Ca    9.0      12 Nov 2019 06:49  Phos  3.8     11-11  Mg     2.2     11-11    TPro  7.2  /  Alb  2.7<L>  /  TBili  4.2<H>  /  DBili  x   /  AST  18  /  ALT  45  /  AlkPhos  203<H>  11-12        CAPILLARY BLOOD GLUCOSE      POCT Blood Glucose.: 138 mg/dL (11 Nov 2019 21:15)  POCT Blood Glucose.: 154 mg/dL (11 Nov 2019 12:12)

## 2019-11-12 NOTE — PROGRESS NOTE ADULT - PROBLEM SELECTOR PLAN 7
- Patient takes no medications at home   - well controlled DM   - continue sliding scale  - Monitor blood sugars AC/HS and adjust as needed  - goal -180.   - Carbohydrate consistent diabetic diet with evening snacks.
- pt has hx of CAD with 3 PAOLA  - not on ASA or Statins  *** I tried to reach the pharmacy on this phone number [phone] but no response morning team to confirm   - Holding statins regardless given elevated LFTs
- pt has hx of hypothyroidism   - Pt does not take meds at home  - TSH 1.82
- Patient takes no medications at home   - well controlled DM   - continue sliding scale  - Monitor blood sugars AC/HS and adjust as needed  - goal -180.   - Carbohydrate consistent diabetic diet with evening snacks.
- Patient takes no medications at home   - well controlled DM   - continue sliding scale  - Monitor blood sugars AC/HS and adjust as needed  - goal -180.   - Carbohydrate consistent diabetic diet with evening snacks.
- pt has hx of hypothyroidism   - Pt does not take meds at home  - TSH 1.82

## 2019-11-13 ENCOUNTER — TRANSCRIPTION ENCOUNTER (OUTPATIENT)
Age: 69
End: 2019-11-13

## 2019-11-13 VITALS
HEART RATE: 59 BPM | SYSTOLIC BLOOD PRESSURE: 150 MMHG | OXYGEN SATURATION: 95 % | RESPIRATION RATE: 16 BRPM | DIASTOLIC BLOOD PRESSURE: 65 MMHG | TEMPERATURE: 98 F

## 2019-11-13 DIAGNOSIS — K81.9 CHOLECYSTITIS, UNSPECIFIED: ICD-10-CM

## 2019-11-13 LAB
ALBUMIN SERPL ELPH-MCNC: 2.8 G/DL — LOW (ref 3.5–5)
ALDOST SERPL-MCNC: 11.8 NG/DL — SIGNIFICANT CHANGE UP
ALP SERPL-CCNC: 212 U/L — HIGH (ref 40–120)
ALT FLD-CCNC: 45 U/L DA — SIGNIFICANT CHANGE UP (ref 10–60)
ANION GAP SERPL CALC-SCNC: 5 MMOL/L — SIGNIFICANT CHANGE UP (ref 5–17)
AST SERPL-CCNC: 21 U/L — SIGNIFICANT CHANGE UP (ref 10–40)
BASOPHILS # BLD AUTO: 0.06 K/UL — SIGNIFICANT CHANGE UP (ref 0–0.2)
BASOPHILS NFR BLD AUTO: 1.2 % — SIGNIFICANT CHANGE UP (ref 0–2)
BILIRUB SERPL-MCNC: 4.1 MG/DL — HIGH (ref 0.2–1.2)
BUN SERPL-MCNC: 16 MG/DL — SIGNIFICANT CHANGE UP (ref 7–18)
CALCIUM SERPL-MCNC: 8.7 MG/DL — SIGNIFICANT CHANGE UP (ref 8.4–10.5)
CHLORIDE SERPL-SCNC: 99 MMOL/L — SIGNIFICANT CHANGE UP (ref 96–108)
CO2 SERPL-SCNC: 34 MMOL/L — HIGH (ref 22–31)
CORTIS 24H UR-MRATE: 7.7 MCG/24 H — SIGNIFICANT CHANGE UP (ref 3.5–45)
CORTIS UR-MCNC: 1700 ML — SIGNIFICANT CHANGE UP
CORTIS UR-MCNC: 24 H — SIGNIFICANT CHANGE UP
CREAT SERPL-MCNC: 1.01 MG/DL — SIGNIFICANT CHANGE UP (ref 0.5–1.3)
EOSINOPHIL # BLD AUTO: 0.1 K/UL — SIGNIFICANT CHANGE UP (ref 0–0.5)
EOSINOPHIL NFR BLD AUTO: 1.9 % — SIGNIFICANT CHANGE UP (ref 0–6)
GLUCOSE SERPL-MCNC: 135 MG/DL — HIGH (ref 70–99)
HCT VFR BLD CALC: 36.5 % — SIGNIFICANT CHANGE UP (ref 34.5–45)
HGB BLD-MCNC: 10.8 G/DL — LOW (ref 11.5–15.5)
IMM GRANULOCYTES NFR BLD AUTO: 0.6 % — SIGNIFICANT CHANGE UP (ref 0–1.5)
LYMPHOCYTES # BLD AUTO: 0.73 K/UL — LOW (ref 1–3.3)
LYMPHOCYTES # BLD AUTO: 14.1 % — SIGNIFICANT CHANGE UP (ref 13–44)
MAGNESIUM SERPL-MCNC: 2.3 MG/DL — SIGNIFICANT CHANGE UP (ref 1.6–2.6)
MCHC RBC-ENTMCNC: 26 PG — LOW (ref 27–34)
MCHC RBC-ENTMCNC: 29.6 GM/DL — LOW (ref 32–36)
MCV RBC AUTO: 88 FL — SIGNIFICANT CHANGE UP (ref 80–100)
MONOCYTES # BLD AUTO: 0.43 K/UL — SIGNIFICANT CHANGE UP (ref 0–0.9)
MONOCYTES NFR BLD AUTO: 8.3 % — SIGNIFICANT CHANGE UP (ref 2–14)
NEUTROPHILS # BLD AUTO: 3.82 K/UL — SIGNIFICANT CHANGE UP (ref 1.8–7.4)
NEUTROPHILS NFR BLD AUTO: 73.9 % — SIGNIFICANT CHANGE UP (ref 43–77)
NRBC # BLD: 0 /100 WBCS — SIGNIFICANT CHANGE UP (ref 0–0)
PHOSPHATE SERPL-MCNC: 4.1 MG/DL — SIGNIFICANT CHANGE UP (ref 2.5–4.5)
PLATELET # BLD AUTO: 354 K/UL — SIGNIFICANT CHANGE UP (ref 150–400)
POTASSIUM SERPL-MCNC: 3.4 MMOL/L — LOW (ref 3.5–5.3)
POTASSIUM SERPL-SCNC: 3.4 MMOL/L — LOW (ref 3.5–5.3)
PROT SERPL-MCNC: 7.4 G/DL — SIGNIFICANT CHANGE UP (ref 6–8.3)
RBC # BLD: 4.15 M/UL — SIGNIFICANT CHANGE UP (ref 3.8–5.2)
RBC # FLD: 16.8 % — HIGH (ref 10.3–14.5)
RENIN DIRECT, PLASMA: <2.1 PG/ML — SIGNIFICANT CHANGE UP
SODIUM SERPL-SCNC: 138 MMOL/L — SIGNIFICANT CHANGE UP (ref 135–145)
WBC # BLD: 5.17 K/UL — SIGNIFICANT CHANGE UP (ref 3.8–10.5)
WBC # FLD AUTO: 5.17 K/UL — SIGNIFICANT CHANGE UP (ref 3.8–10.5)

## 2019-11-13 PROCEDURE — 82570 ASSAY OF URINE CREATININE: CPT

## 2019-11-13 PROCEDURE — 83935 ASSAY OF URINE OSMOLALITY: CPT

## 2019-11-13 PROCEDURE — 82607 VITAMIN B-12: CPT

## 2019-11-13 PROCEDURE — 83010 ASSAY OF HAPTOGLOBIN QUANT: CPT

## 2019-11-13 PROCEDURE — 83550 IRON BINDING TEST: CPT

## 2019-11-13 PROCEDURE — 93306 TTE W/DOPPLER COMPLETE: CPT

## 2019-11-13 PROCEDURE — 85730 THROMBOPLASTIN TIME PARTIAL: CPT

## 2019-11-13 PROCEDURE — 84484 ASSAY OF TROPONIN QUANT: CPT

## 2019-11-13 PROCEDURE — 87631 RESP VIRUS 3-5 TARGETS: CPT

## 2019-11-13 PROCEDURE — 85652 RBC SED RATE AUTOMATED: CPT

## 2019-11-13 PROCEDURE — 83690 ASSAY OF LIPASE: CPT

## 2019-11-13 PROCEDURE — 82746 ASSAY OF FOLIC ACID SERUM: CPT

## 2019-11-13 PROCEDURE — 86140 C-REACTIVE PROTEIN: CPT

## 2019-11-13 PROCEDURE — 71045 X-RAY EXAM CHEST 1 VIEW: CPT

## 2019-11-13 PROCEDURE — 76775 US EXAM ABDO BACK WALL LIM: CPT

## 2019-11-13 PROCEDURE — 83615 LACTATE (LD) (LDH) ENZYME: CPT

## 2019-11-13 PROCEDURE — 82436 ASSAY OF URINE CHLORIDE: CPT

## 2019-11-13 PROCEDURE — 87086 URINE CULTURE/COLONY COUNT: CPT

## 2019-11-13 PROCEDURE — 71250 CT THORAX DX C-: CPT

## 2019-11-13 PROCEDURE — 36415 COLL VENOUS BLD VENIPUNCTURE: CPT

## 2019-11-13 PROCEDURE — 80053 COMPREHEN METABOLIC PANEL: CPT

## 2019-11-13 PROCEDURE — 84156 ASSAY OF PROTEIN URINE: CPT

## 2019-11-13 PROCEDURE — 83540 ASSAY OF IRON: CPT

## 2019-11-13 PROCEDURE — 96374 THER/PROPH/DIAG INJ IV PUSH: CPT | Mod: XU

## 2019-11-13 PROCEDURE — 87581 M.PNEUMON DNA AMP PROBE: CPT

## 2019-11-13 PROCEDURE — 83036 HEMOGLOBIN GLYCOSYLATED A1C: CPT

## 2019-11-13 PROCEDURE — 82384 ASSAY THREE CATECHOLAMINES: CPT

## 2019-11-13 PROCEDURE — 82533 TOTAL CORTISOL: CPT

## 2019-11-13 PROCEDURE — 94640 AIRWAY INHALATION TREATMENT: CPT

## 2019-11-13 PROCEDURE — 84244 ASSAY OF RENIN: CPT

## 2019-11-13 PROCEDURE — 93005 ELECTROCARDIOGRAM TRACING: CPT

## 2019-11-13 PROCEDURE — 96375 TX/PRO/DX INJ NEW DRUG ADDON: CPT

## 2019-11-13 PROCEDURE — 76770 US EXAM ABDO BACK WALL COMP: CPT

## 2019-11-13 PROCEDURE — 82088 ASSAY OF ALDOSTERONE: CPT

## 2019-11-13 PROCEDURE — 87633 RESP VIRUS 12-25 TARGETS: CPT

## 2019-11-13 PROCEDURE — 87798 DETECT AGENT NOS DNA AMP: CPT

## 2019-11-13 PROCEDURE — 83880 ASSAY OF NATRIURETIC PEPTIDE: CPT

## 2019-11-13 PROCEDURE — 83605 ASSAY OF LACTIC ACID: CPT

## 2019-11-13 PROCEDURE — 82728 ASSAY OF FERRITIN: CPT

## 2019-11-13 PROCEDURE — 87040 BLOOD CULTURE FOR BACTERIA: CPT

## 2019-11-13 PROCEDURE — 84300 ASSAY OF URINE SODIUM: CPT

## 2019-11-13 PROCEDURE — 99239 HOSP IP/OBS DSCHRG MGMT >30: CPT

## 2019-11-13 PROCEDURE — 80074 ACUTE HEPATITIS PANEL: CPT

## 2019-11-13 PROCEDURE — 82977 ASSAY OF GGT: CPT

## 2019-11-13 PROCEDURE — 76705 ECHO EXAM OF ABDOMEN: CPT

## 2019-11-13 PROCEDURE — 99285 EMERGENCY DEPT VISIT HI MDM: CPT | Mod: 25

## 2019-11-13 PROCEDURE — 83735 ASSAY OF MAGNESIUM: CPT

## 2019-11-13 PROCEDURE — 84105 ASSAY OF URINE PHOSPHORUS: CPT

## 2019-11-13 PROCEDURE — 82530 CORTISOL FREE: CPT

## 2019-11-13 PROCEDURE — 82248 BILIRUBIN DIRECT: CPT

## 2019-11-13 PROCEDURE — 85027 COMPLETE CBC AUTOMATED: CPT

## 2019-11-13 PROCEDURE — 84133 ASSAY OF URINE POTASSIUM: CPT

## 2019-11-13 PROCEDURE — 93970 EXTREMITY STUDY: CPT

## 2019-11-13 PROCEDURE — 80061 LIPID PANEL: CPT

## 2019-11-13 PROCEDURE — 80048 BASIC METABOLIC PNL TOTAL CA: CPT

## 2019-11-13 PROCEDURE — 74177 CT ABD & PELVIS W/CONTRAST: CPT

## 2019-11-13 PROCEDURE — 85045 AUTOMATED RETICULOCYTE COUNT: CPT

## 2019-11-13 PROCEDURE — 82803 BLOOD GASES ANY COMBINATION: CPT

## 2019-11-13 PROCEDURE — 87486 CHLMYD PNEUM DNA AMP PROBE: CPT

## 2019-11-13 PROCEDURE — 84443 ASSAY THYROID STIM HORMONE: CPT

## 2019-11-13 PROCEDURE — 80307 DRUG TEST PRSMV CHEM ANLYZR: CPT

## 2019-11-13 PROCEDURE — 85610 PROTHROMBIN TIME: CPT

## 2019-11-13 PROCEDURE — 84100 ASSAY OF PHOSPHORUS: CPT

## 2019-11-13 PROCEDURE — 82962 GLUCOSE BLOOD TEST: CPT

## 2019-11-13 PROCEDURE — 81001 URINALYSIS AUTO W/SCOPE: CPT

## 2019-11-13 RX ORDER — HYDRALAZINE HCL 50 MG
1 TABLET ORAL
Qty: 0 | Refills: 0 | DISCHARGE
Start: 2019-11-13

## 2019-11-13 RX ORDER — LABETALOL HCL 100 MG
2 TABLET ORAL
Qty: 0 | Refills: 0 | DISCHARGE
Start: 2019-11-13

## 2019-11-13 RX ORDER — LOSARTAN POTASSIUM 100 MG/1
1 TABLET, FILM COATED ORAL
Qty: 0 | Refills: 0 | DISCHARGE
Start: 2019-11-13

## 2019-11-13 RX ORDER — POTASSIUM CHLORIDE 20 MEQ
2 PACKET (EA) ORAL
Qty: 60 | Refills: 0
Start: 2019-11-13 | End: 2019-12-12

## 2019-11-13 RX ORDER — HYDROCORTISONE 1 %
1 OINTMENT (GRAM) TOPICAL
Qty: 0 | Refills: 0 | DISCHARGE
Start: 2019-11-13

## 2019-11-13 RX ORDER — LABETALOL HCL 100 MG
2 TABLET ORAL
Qty: 0 | Refills: 0 | DISCHARGE

## 2019-11-13 RX ORDER — ZINC OXIDE 200 MG/G
1 OINTMENT TOPICAL
Qty: 0 | Refills: 0 | DISCHARGE
Start: 2019-11-13

## 2019-11-13 RX ADMIN — PREGABALIN 1000 MICROGRAM(S): 225 CAPSULE ORAL at 12:59

## 2019-11-13 RX ADMIN — ENOXAPARIN SODIUM 40 MILLIGRAM(S): 100 INJECTION SUBCUTANEOUS at 13:00

## 2019-11-13 RX ADMIN — LOSARTAN POTASSIUM 100 MILLIGRAM(S): 100 TABLET, FILM COATED ORAL at 06:34

## 2019-11-13 RX ADMIN — Medication 1 MILLIGRAM(S): at 13:00

## 2019-11-13 RX ADMIN — Medication 2000 UNIT(S): at 12:59

## 2019-11-13 RX ADMIN — Medication 650 MILLIGRAM(S): at 13:00

## 2019-11-13 RX ADMIN — Medication 600 MILLIGRAM(S): at 06:34

## 2019-11-13 RX ADMIN — Medication 1 TABLET(S): at 13:02

## 2019-11-13 RX ADMIN — Medication 1 TABLET(S): at 08:38

## 2019-11-13 RX ADMIN — ZINC OXIDE 1 APPLICATION(S): 200 OINTMENT TOPICAL at 06:37

## 2019-11-13 RX ADMIN — Medication 25 MILLIGRAM(S): at 14:20

## 2019-11-13 RX ADMIN — Medication 600 MILLIGRAM(S): at 14:20

## 2019-11-13 RX ADMIN — Medication 25 MILLIGRAM(S): at 06:34

## 2019-11-13 NOTE — CONSULT NOTE ADULT - SUBJECTIVE AND OBJECTIVE BOX
Patient is a 69y old  Female who presents with a chief complaint of Shortness of breath (13 Nov 2019 13:00)    69 year old Female Lives at home with roommate ambulates with a walker (pt was getting HHA visits) with PMH hypertension, CAD s/p 3 PAOLA , asthma, Chronic Lymphedema , venous stasis s/p venous ablation and carpal tunnel syndrome presenting to ED with worsening shortness of breath. . She was found to have elevated WBC and fever in ED to 102F. Patient is moaning sitting at edge of bed on my arrival. says she feels cold and chills. Also complained of back pain for which she takes Advil chronically. Pt denies wheezing, cough, chest pain, nausea, vomiting, diaphoresis, abdominal pain, dysuria or changes in bowel habits.       69 year old female with a past medical history of CAD and stents , chronic lymphedema, presents to our ED with worsening SOB. She was founds to have a significant leukocytosis and fever.   She is currently under treatment  of cellulitis. GI was consulted for persistent elevated bilirubin.         REVIEW OF SYSTEMS  Constitutional:   No fever, no fatigue, no pallor, no night sweats, no weight loss.  HEENT:   No eye pain, no vision changes, no icterus, no mouth ulcers.  Respiratory:   No shortness of breath, no cough, no respiratory distress.   Cardiovascular:   No chest pain, no palpitations.   Gastrointestinal: No abdominal pain, no nausea, no vomiting , no diarrhea no constipation, no hematochezia, no melena.  Skin:   No rashes, no jaundice, no eczema.   Musculoskeletal:   No joint pain, no swelling, no myalgia.   Neurologic:   No headache, no seizure, no weakness.   Genitourinary:   No dysuria, no decreased urine output.  Psychiatric:  No depression, no anxiety,   Endocrine:   No thyroid disease, no diabetes.  Heme/Lymphatic:   No anemia, no blood transfusions, no lymph node enlargement, no bleeding, no bruising.  ___________________________________________________________________________________________  Allergies    penicillin (Hives)  penicillin (Rash)  sulfa drugs (Unknown)  Tetracycline Hydrochloride (Unknown)    Intolerances      MEDICATIONS  (STANDING):  cholecalciferol 2000 Unit(s) Oral daily  cyanocobalamin 1000 MICROGram(s) Oral daily  enoxaparin Injectable 40 milliGRAM(s) SubCutaneous daily  folic acid 1 milliGRAM(s) Oral daily  hydrALAZINE 25 milliGRAM(s) Oral every 8 hours  insulin lispro (HumaLOG) corrective regimen sliding scale   SubCutaneous Before meals and at bedtime  labetalol 600 milliGRAM(s) Oral three times a day  levothyroxine 75 MICROGram(s) Oral daily  losartan 100 milliGRAM(s) Oral daily  pantoprazole    Tablet 40 milliGRAM(s) Oral before breakfast  potassium acid phosphate/sodium acid phosphate tablet (K-PHOS No. 2) 1 Tablet(s) Oral four times a day with meals  zinc oxide 20% Ointment 1 Application(s) Topical two times a day    MEDICATIONS  (PRN):  acetaminophen   Tablet .. 650 milliGRAM(s) Oral every 6 hours PRN Temp greater or equal to 38C (100.4F), Mild Pain (1 - 3)  albuterol/ipratropium for Nebulization 3 milliLiter(s) Nebulizer every 6 hours PRN Shortness of Breath and/or Wheezing  benzocaine 15 mG/menthol 3.6 mG (Sugar-Free) Lozenge 1 Lozenge Oral every 4 hours PRN Sore Throat  guaiFENesin   Syrup  (Sugar-Free) 100 milliGRAM(s) Oral every 6 hours PRN Cough  hydrocortisone 1% Cream 1 Application(s) Topical three times a day PRN Itching  polyethylene glycol 3350 17 Gram(s) Oral daily PRN Constipation      PAST MEDICAL & SURGICAL HISTORY:  Lymphedema  Essential hypertension  Carpal tunnel syndrome of right wrist  Obesity (BMI 30-39.9)  Type 2 diabetes mellitus without complication  Hypothyroid  Essential hypertension  Obesity  HTN (hypertension)  DM (diabetes mellitus)  No significant past surgical history  S/P carpal tunnel release    FAMILY HISTORY:  Family history of myocardial infarction (Sibling)  Family history of lung cancer    Social History: No history of : Tobacco use, IVDA, EToH  ______________________________________________________________________________________    PHYSICAL EXAM    Daily     Daily   BMI: 29.3 (11-04 @ 07:03)  Change in Weight:  Vital Signs Last 24 Hrs  T(C): 36.7 (13 Nov 2019 07:52), Max: 36.7 (13 Nov 2019 05:34)  T(F): 98 (13 Nov 2019 07:52), Max: 98 (13 Nov 2019 05:34)  HR: 59 (13 Nov 2019 07:52) (59 - 70)  BP: 150/65 (13 Nov 2019 07:52) (150/65 - 194/80)  BP(mean): --  RR: 16 (13 Nov 2019 07:52) (16 - 18)  SpO2: 95% (13 Nov 2019 07:52) (95% - 99%)    General:  Well developed, well nourished, alert and active, no pallor, NAD.  HEENT:    Normal appearance of conjunctiva, ears, nose, lips, oropharynx, and oral mucosa, anicteric.  Neck:  No masses, no asymmetry.  Lymph Nodes:  No lymphadenopathy.   Cardiovascular:  RRR normal S1/S2, no murmur.  Respiratory:  CTA B/L, normal respiratory effort.   Abdominal:   soft, no masses or tenderness, normoactive BS, NT/ND, no HSM.  Extremities:   No clubbing or cyanosis, normal capillary refill, no edema.     _______________________________________________________________________________________________  Lab Results:                          10.8   5.17  )-----------( 354      ( 13 Nov 2019 05:33 )             36.5     11-13    138  |  99  |  16  ----------------------------<  135<H>  3.4<L>   |  34<H>  |  1.01    Ca    8.7      13 Nov 2019 05:33  Phos  4.1     11-13  Mg     2.3     11-13    TPro  7.4  /  Alb  2.8<L>  /  TBili  4.1<H>  /  DBili  x   /  AST  21  /  ALT  45  /  AlkPhos  212<H>  11-13    LIVER FUNCTIONS - ( 13 Nov 2019 05:33 )  Alb: 2.8 g/dL / Pro: 7.4 g/dL / ALK PHOS: 212 U/L / ALT: 45 U/L DA / AST: 21 U/L / GGT: x                   Stool Results:          RADIOLOGY RESULTS:  < from: US Hepatic & Pancreatic (11.06.19 @ 11:48) >    EXAM:  US LIVER AND PANCREAS                            PROCEDURE DATE:  11/06/2019          INTERPRETATION:  Right upper quadrant abdominal ultrasound    Indication: Elevated liver enzymes.    Right upper quadrant abdominal ultrasound is performedwithout a previous   abdominal ultrasound for comparison. The liver spans 17.4 cm which is   mildly enlarged. The liver maintains normal echogenicity and echotexture   without evidence for a focal hepatic lesion. Duplex Doppler interrogation   of themain portal vein demonstrates normal hepatopedal flow.    The common bile duct measures 5.5 mm in caliber which is within normal   limits.    Gallstones, layering sludge, and mild bladder wall thickening are noted.   No evidence for pericholecystic fluid or ultrasonic Cunningham's sign.    The visualized pancreas appears grossly unremarkable.     The right kidney measures 11.3 cm in length which is within normal limits   in size. The right kidney appears grossly unremarkable without   hydronephrosis.    No ascites is detected. The visualized IVC and aorta appear grossly   unremarkable.    Impression: Mild hepatomegaly.    Gallstones, layering sludge, and mild bladder wall thickening are noted.   No evidence for pericholecystic fluid or ultrasonicMurphy's sign.   Findings are equivocal for acute cholecystitis. If clinically indicated,   HIDA scan may be pursued for further evaluation.                CESIA HOUSER M.D., ATTENDING RADIOLOGIST  This document has been electronically signed. Nov 6 2019  2:08PM                < end of copied text >    SURGICAL PATHOLOGY:

## 2019-11-13 NOTE — CONSULT NOTE ADULT - ATTENDING COMMENTS
I was physically present for the key portions of the evaluation and management (E/M) service provided.  The patient was personally seen and examined at bedside.    Thank you for your consultation and allowing  me to participate in the care of your patients. If you have further questions please contact me at [phone].     Tai Bhatti M.D.       _________________________________________________________________________________________________  [facility]  [address] [place] [address]  Office: [phone]    Benjamin Lofton PA-C  ___________________________________________________________________________________________________
pt seen and  examined with ID resident

## 2019-11-13 NOTE — PROGRESS NOTE ADULT - ASSESSMENT
1.HYPOKALEMIA: secondary to renal wasting(high TTKG as above) ,r/o secondary to diuretic induced (Ethacrynic acid) vs hyperlado(secondary ) secondary to piyush-vascular htn(GINA) vs Endocrine disorder like excess cortisol secretion  - pt continues to have hypokalemia, alkalosis, and hypertensive  - Renin plasma activity <0.1, with borderline high serum Nickolas, still concern of hyperaldo    -f/u repeat serum nickolas, renin  -f/u 24 hr urine cortisol and aldosterone level   -f/u 24 hr urine for Cortisol  -keep k >4 and <5  -f/u k daily  -replace po kcl prn for k >3 and <3.5  -add iv kcl for k<3 prn  -avoid Ethacrynic acid   -Endocrine f/u   2.HTN: most likley essential htn, r/o renal artery stenosis induced(small left kidney) ,secondary to atherosclerosis from DM/HTN  -suggest to titrate bp meds to keep bp<130/80  - recommend to add hydralazine 25 mg TID to home medications   -low salt diet  -no Aldactone as pt is having side effects/allergic reaction  3.CKD: stage 3 with baseline cr 1.13 with no proteinuria, secondary to ischemic renal ds (secondary to atherosclerosis)  -pts renal function at baseline today   -keep pt evolemic, adjust meds as per egfr and avoid nephrotoxic meds like Nsaids/Aminoglycosides/iv contrast ,,etc  -may continue Losartan for now   4.METABOLIC ALKALOSIS:mild  with high urine Cl ,c/w volume resistant alkalosis  -avoid iv hydration  -f/u co2 daily  5.ANEMIA:mild  -f/u Hb daily  6.B/L RENAL STONES:pt is known to have renal stones ,asympatomatic at this time, no flank pain or gross hematuria,,etc  -needs renal f/u as outpatient for further care with Dr Mcdonnell   - outpatient Endocrinology F/u 1.HYPOKALEMIA: secondary to renal wasting(high TTKG as above) ,r/o secondary to diuretic induced (Ethacrynic acid) vs hyperlado(secondary ) secondary to piyush-vascular htn(GINA) vs Endocrine disorder like excess cortisol secretion  - pt continues to have hypokalemia, alkalosis, and hypertensive but bp is improving and ok for discharge today  - Renin plasma activity <0.1, with borderline high serum Zia, still concern of hyperaldo    -f/u repeat serum zia, renin-pending   -f/u 24 hr urine cortisol and aldosterone level -pending  -f/u 24 hr urine for Cortisol-pending   -keep k >4 and <5  -f/u k in 3 days   -avoid Ethacrynic acid   -Endocrine f/u as outpatient and renal f/u with Dr Yahaira Best for further care   2.HTN: most likley essential htn, r/o renal artery stenosis induced(small left kidney) ,secondary to atherosclerosis from DM/HTN  -suggest to titrate bp meds to keep bp<130/80  - recommend to titrate  hydralazine dose  TID to keep bp<130/80 as out patient  -low salt diet  -no Aldactone as pt is having side effects/allergic reaction  3.CKD: stage 3 with baseline cr 1.13 with no proteinuria, secondary to ischemic renal ds (secondary to atherosclerosis)  -pts renal function at baseline last 2 days  -keep pt evolemic, adjust meds as per egfr and avoid nephrotoxic meds like Nsaids/Aminoglycosides/iv contrast ,,etc  -may continue Losartan for now   4.METABOLIC ALKALOSIS:mild  with high urine Cl ,c/w volume resistant alkalosis  -f/u co2 daily  5.ANEMIA:mild  -f/u Hb daily  6.B/L RENAL STONES:pt is known to have renal stones ,asympatomatic at this time, no flank pain or gross hematuria,,etc  -needs renal f/u as outpatient for further care with Dr Mcdonnell   - outpatient Endocrinology F/u

## 2019-11-13 NOTE — CONSULT NOTE ADULT - PROBLEM SELECTOR RECOMMENDATION 9
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  -No further Gi workup needed

## 2019-11-13 NOTE — DISCHARGE NOTE NURSING/CASE MANAGEMENT/SOCIAL WORK - PATIENT PORTAL LINK FT
You can access the FollowMyHealth Patient Portal offered by [facility] by registering at the following website: [email or web address]. By joining [employer]’s FollowMyHealth portal, you will also be able to view your health information using other applications (apps) compatible with our system.

## 2019-11-13 NOTE — PROGRESS NOTE ADULT - SUBJECTIVE AND OBJECTIVE BOX
pt seen and examined.pts current chart reviewed and case discussed with resident covering.    SUBJECTIVE: Patient denies any complaints, Hypokalemia continues to improve   pt feels fine and denies cp,sob,gi or gu/uremic  symptons    REVIEW OF SYSTEMS:  CONSTITUTIONAL: No weakness, fevers or chills  EYES/ENT: No visual changes;  No vertigo or throat pain   NECK: No pain or stiffness  RESPIRATORY: No cough, wheezing, hemoptysis; No shortness of breath  CARDIOVASCULAR: No chest pain or palpitations  GASTROINTESTINAL: No abdominal or epigastric pain. No nausea, vomiting, or hematemesis; No diarrhea or constipation. No melena or hematochezia.  GENITOURINARY: No dysuria, frequency , hematuria, flank pain or nocturia  NEUROLOGICAL: No numbness or weakness  SKIN: No itching, burning, rashes, or lesions   All other review of systems is negative unless indicated above    Current meds:    acetaminophen   Tablet .. 650 milliGRAM(s) Oral every 6 hours PRN  albuterol/ipratropium for Nebulization 3 milliLiter(s) Nebulizer every 6 hours PRN  benzocaine 15 mG/menthol 3.6 mG (Sugar-Free) Lozenge 1 Lozenge Oral every 4 hours PRN  cholecalciferol 2000 Unit(s) Oral daily  cyanocobalamin 1000 MICROGram(s) Oral daily  enoxaparin Injectable 40 milliGRAM(s) SubCutaneous daily  folic acid 1 milliGRAM(s) Oral daily  guaiFENesin   Syrup  (Sugar-Free) 100 milliGRAM(s) Oral every 6 hours PRN  hydrALAZINE 25 milliGRAM(s) Oral every 8 hours  hydrocortisone 1% Cream 1 Application(s) Topical three times a day PRN  insulin lispro (HumaLOG) corrective regimen sliding scale   SubCutaneous Before meals and at bedtime  labetalol 600 milliGRAM(s) Oral three times a day  levothyroxine 75 MICROGram(s) Oral daily  losartan 100 milliGRAM(s) Oral daily  pantoprazole    Tablet 40 milliGRAM(s) Oral before breakfast  polyethylene glycol 3350 17 Gram(s) Oral daily PRN  potassium acid phosphate/sodium acid phosphate tablet (K-PHOS No. 2) 1 Tablet(s) Oral four times a day with meals  zinc oxide 20% Ointment 1 Application(s) Topical two times a day      Vital Signs    T(F): 98 (11-13-19 @ 07:52), Max: 98 (11-13-19 @ 05:34)  HR: 59 (11-13-19 @ 07:52) (59 - 70)  BP: 150/65 (11-13-19 @ 07:52) (150/65 - 194/80)  ABP: --  RR: 16 (11-13-19 @ 07:52) (16 - 18)  SpO2: 95% (11-13-19 @ 07:52) (95% - 99%)  Wt(kg): --  CVP(cm H2O): --  CO: --  PCWP: --    I and O's:    Daily     Daily     PHYSICAL EXAM:  Constitutional: well developed, obese  and in nad  HEENT: PERRLA,  no icteric sclera and mild pallor of conjunctiva noted  Neck: No JVD, thyromegaly   Respiratory: reduced air entry lower lungs with no rales, wheezing or rhonchi  Cardiovascular: S1 and S2 normally heard  Gastrointestinal: soft, nondistended, nontender and normal bowel sounds heard  Extremities: B/L Lymphedema present with chronic skin pigmentation and chronic skin changes   Neurological: A/O x 3, no focal deficits  Skin: No rashes    LABS:    CBC:                          10.8   5.17  )-----------( 354      ( 13 Nov 2019 05:33 )             36.5           BMP:    11-13    138  |  99  |  16  ----------------------------<  135<H>  3.4<L>   |  34<H>  |  1.01  11-12    137  |  98  |  18  ----------------------------<  167<H>  3.3<L>   |  32<H>  |  1.15  11-11    137  |  99  |  17  ----------------------------<  123<H>  3.3<L>   |  35<H>  |  1.13  11-10    136  |  96  |  19<H>  ----------------------------<  138<H>  3.0<L>   |  33<H>  |  1.13    Ca    8.7      13 Nov 2019 05:33  Ca    9.0      12 Nov 2019 06:49  Ca    8.7      11 Nov 2019 05:54  Ca    8.5      10 Nov 2019 16:12  Phos  4.1     11-13  Phos  3.8     11-11  Mg     2.3     11-13  Mg     2.2     11-11    TPro  7.4  /  Alb  2.8<L>  /  TBili  4.1<H>  /  DBili  x   /  AST  21  /  ALT  45  /  AlkPhos  212<H>  11-13  TPro  7.2  /  Alb  2.7<L>  /  TBili  4.2<H>  /  DBili  x   /  AST  18  /  ALT  45  /  AlkPhos  203<H>  11-12  TPro  7.4  /  Alb  2.8<L>  /  TBili  4.3<H>  /  DBili  x   /  AST  25  /  ALT  53  /  AlkPhos  205<H>  11-11          URINE STUDIES:        Osmolality, Random Urine: 288 mos/kg (11-09 @ 17:23)  Creatinine, Random Urine: <13 mg/dL (11-09 @ 17:23)  Chloride, Random Urine: 142 mmol/L (11-09 @ 15:08)  Creatinine, Random Urine: 14 mg/dL (11-09 @ 15:08)  Sodium, Random Urine: 117 mmol/L (11-09 @ 15:08)  Potassium, Random Urine: 25 mmol/L (11-09 @ 15:08)  Potassium, Random Urine: 22 mmol/L (11-06 @ 14:17)  Chloride, Random Urine: 97 mmol/L (11-06 @ 14:17)  Sodium, Random Urine: 76 mmol/L (11-06 @ 14:17)  Osmolality, Random Urine: 319 mos/kg (11-06 @ 14:17)                  RADIOLOGY & ADDITIONAL STUDIES: pt seen , examined and case discussed with resident covering.    SUBJECTIVE: Patient denies any complaints, Hypokalemia continues to improve   pt feels fine and denies cp,sob,gi or gu symptoms  pt is voiding normally  REVIEW OF SYSTEMS:  CONSTITUTIONAL: No weakness, fevers or chills  RESPIRATORY: No cough, wheezing, hemoptysis; No shortness of breath  CARDIOVASCULAR: No chest pain or palpitations  GASTROINTESTINAL: No abdominal or epigastric pain. No nausea, vomiting, or hematemesis; No diarrhea or constipation. No melena or hematochezia.  GENITOURINARY: No dysuria, frequency , hematuria, flank pain or nocturia  NEUROLOGICAL: No numbness or weakness  SKIN: No itching, burning, rashes, or lesions   All other review of systems is negative unless indicated above    Current meds:    acetaminophen   Tablet .. 650 milliGRAM(s) Oral every 6 hours PRN  albuterol/ipratropium for Nebulization 3 milliLiter(s) Nebulizer every 6 hours PRN  benzocaine 15 mG/menthol 3.6 mG (Sugar-Free) Lozenge 1 Lozenge Oral every 4 hours PRN  cholecalciferol 2000 Unit(s) Oral daily  cyanocobalamin 1000 MICROGram(s) Oral daily  enoxaparin Injectable 40 milliGRAM(s) SubCutaneous daily  folic acid 1 milliGRAM(s) Oral daily  guaiFENesin   Syrup  (Sugar-Free) 100 milliGRAM(s) Oral every 6 hours PRN  hydrALAZINE 25 milliGRAM(s) Oral every 8 hours  hydrocortisone 1% Cream 1 Application(s) Topical three times a day PRN  insulin lispro (HumaLOG) corrective regimen sliding scale   SubCutaneous Before meals and at bedtime  labetalol 600 milliGRAM(s) Oral three times a day  levothyroxine 75 MICROGram(s) Oral daily  losartan 100 milliGRAM(s) Oral daily  pantoprazole    Tablet 40 milliGRAM(s) Oral before breakfast  polyethylene glycol 3350 17 Gram(s) Oral daily PRN  potassium acid phosphate/sodium acid phosphate tablet (K-PHOS No. 2) 1 Tablet(s) Oral four times a day with meals  zinc oxide 20% Ointment 1 Application(s) Topical two times a day      Vital Signs    T(F): 98 (11-13-19 @ 07:52), Max: 98 (11-13-19 @ 05:34)  HR: 59 (11-13-19 @ 07:52) (59 - 70)  BP: 150/65 (11-13-19 @ 07:52) (150/65 - 194/80)  ABP: --  RR: 16 (11-13-19 @ 07:52) (16 - 18)  SpO2: 95% (11-13-19 @ 07:52) (95% - 99%)  Wt(kg): --  CVP(cm H2O): --  CO: --  PCWP: --    I and O's:    Daily     Daily     PHYSICAL EXAM:  Constitutional: well developed, obese  and in nad  HEENT: PERRLA,  no icteric sclera and mild pallor of conjunctiva noted  Neck: No JVD, thyromegaly   Respiratory: reduced air entry lower lungs with no rales, wheezing or rhonchi  Cardiovascular: S1 and S2 normally heard  Gastrointestinal: soft, nondistended, nontender and normal bowel sounds heard  Extremities: B/L Lymphedema present with chronic skin pigmentation and chronic skin changes   Neurological: A/O x 3, no focal deficits  Skin: No rashes    LABS:    CBC:                          10.8   5.17  )-----------( 354      ( 13 Nov 2019 05:33 )             36.5           BMP:    11-13    138  |  99  |  16  ----------------------------<  135<H>  3.4<L>   |  34<H>  |  1.01  11-12    137  |  98  |  18  ----------------------------<  167<H>  3.3<L>   |  32<H>  |  1.15  11-11    137  |  99  |  17  ----------------------------<  123<H>  3.3<L>   |  35<H>  |  1.13  11-10    136  |  96  |  19<H>  ----------------------------<  138<H>  3.0<L>   |  33<H>  |  1.13    Ca    8.7      13 Nov 2019 05:33  Ca    9.0      12 Nov 2019 06:49  Ca    8.7      11 Nov 2019 05:54  Ca    8.5      10 Nov 2019 16:12  Phos  4.1     11-13  Phos  3.8     11-11  Mg     2.3     11-13  Mg     2.2     11-11    TPro  7.4  /  Alb  2.8<L>  /  TBili  4.1<H>  /  DBili  x   /  AST  21  /  ALT  45  /  AlkPhos  212<H>  11-13  TPro  7.2  /  Alb  2.7<L>  /  TBili  4.2<H>  /  DBili  x   /  AST  18  /  ALT  45  /  AlkPhos  203<H>  11-12  TPro  7.4  /  Alb  2.8<L>  /  TBili  4.3<H>  /  DBili  x   /  AST  25  /  ALT  53  /  AlkPhos  205<H>  11-11

## 2019-11-17 LAB
ALDOST 24H UR-MCNC: SIGNIFICANT CHANGE UP
CREAT ?TM UR-MCNC: 84 MG/DL — SIGNIFICANT CHANGE UP (ref 20–275)
DOPAMINE UR-MCNC: 61 MCG/G CR — SIGNIFICANT CHANGE UP (ref 40–390)
EPINEPH UR-MCNC: SIGNIFICANT CHANGE UP
EPINEPHRINE/NOREPINEPHRINE - RANDOM URINE: 24 MCG/G CR — SIGNIFICANT CHANGE UP (ref 9–74)
NOREPINEPH 24H UR-MCNC: 24 MCG/G CR — SIGNIFICANT CHANGE UP (ref 7–65)

## 2019-11-21 ENCOUNTER — NON-APPOINTMENT (OUTPATIENT)
Age: 69
End: 2019-11-21

## 2019-11-21 ENCOUNTER — APPOINTMENT (OUTPATIENT)
Dept: CARDIOLOGY | Facility: CLINIC | Age: 69
End: 2019-11-21
Payer: MEDICARE

## 2019-11-21 VITALS — HEART RATE: 74 BPM | SYSTOLIC BLOOD PRESSURE: 125 MMHG | DIASTOLIC BLOOD PRESSURE: 70 MMHG

## 2019-11-21 VITALS — HEART RATE: 65 BPM | WEIGHT: 261 LBS | BODY MASS INDEX: 44.8 KG/M2 | OXYGEN SATURATION: 95 %

## 2019-11-21 PROCEDURE — 93000 ELECTROCARDIOGRAM COMPLETE: CPT

## 2019-11-21 PROCEDURE — 99214 OFFICE O/P EST MOD 30 MIN: CPT

## 2019-11-21 NOTE — HISTORY OF PRESENT ILLNESS
[FreeTextEntry1] : In rehab.\par BP has been up and down.\par She has been losing weight. No chest pain. Diffuse itching noted. Elevated Bilirubin noted on prior labs.\par Labetalol has been increased.\par \par

## 2019-11-21 NOTE — REVIEW OF SYSTEMS
[Blurry Vision] : blurred vision [Palpitations] : palpitations [Nausea] : nausea [Joint Stiffness] : joint stiffness [Ankle Pain] : ankle pain [Ankle Swelling] : ankle swelling [Itching] : itching [Anxiety] : anxiety [Negative] : Heme/Lymph [Headache] : no headache [Recent Weight Gain (___ Lbs)] : no recent weight gain [Feeling Fatigued] : not feeling fatigued [Recent Weight Loss (___ Lbs)] : no recent weight loss

## 2019-11-21 NOTE — DISCUSSION/SUMMARY
[FreeTextEntry1] : She is a 69-year-old, morbidly obese woman with resistant hypertension, Lymphedema and coronary artery disease, status post drug-eluting stents. She has no anginal symptoms or CHF.\par Her reduced BP on her current regimen is encouraging.\par I told her to avoid salt.\par Continue current regimen as is.\par Use a large cuff to check BP.\par Labs to monitor LFTs. Unclear source of itching.\par Continue weight loss. Hold diuretic for now.

## 2019-11-22 LAB
ALBUMIN SERPL ELPH-MCNC: 3.8 G/DL
ALP BLD-CCNC: 237 U/L
ALT SERPL-CCNC: 21 U/L
ANION GAP SERPL CALC-SCNC: 14 MMOL/L
AST SERPL-CCNC: 17 U/L
BASOPHILS # BLD AUTO: 0.09 K/UL
BASOPHILS NFR BLD AUTO: 1.4 %
BILIRUB SERPL-MCNC: 3.7 MG/DL
BUN SERPL-MCNC: 26 MG/DL
CALCIUM SERPL-MCNC: 9.5 MG/DL
CHLORIDE SERPL-SCNC: 100 MMOL/L
CO2 SERPL-SCNC: 25 MMOL/L
CREAT SERPL-MCNC: 1.25 MG/DL
EOSINOPHIL # BLD AUTO: 0.17 K/UL
EOSINOPHIL NFR BLD AUTO: 2.7 %
GLUCOSE SERPL-MCNC: 121 MG/DL
HCT VFR BLD CALC: 40.9 %
HGB BLD-MCNC: 12.1 G/DL
IMM GRANULOCYTES NFR BLD AUTO: 0.3 %
LYMPHOCYTES # BLD AUTO: 0.99 K/UL
LYMPHOCYTES NFR BLD AUTO: 15.6 %
MAN DIFF?: NORMAL
MCHC RBC-ENTMCNC: 26 PG
MCHC RBC-ENTMCNC: 29.6 GM/DL
MCV RBC AUTO: 88 FL
MONOCYTES # BLD AUTO: 0.58 K/UL
MONOCYTES NFR BLD AUTO: 9.1 %
NEUTROPHILS # BLD AUTO: 4.49 K/UL
NEUTROPHILS NFR BLD AUTO: 70.9 %
PLATELET # BLD AUTO: 504 K/UL
POTASSIUM SERPL-SCNC: 4.3 MMOL/L
PROT SERPL-MCNC: 7.1 G/DL
RBC # BLD: 4.65 M/UL
RBC # FLD: 16.2 %
SODIUM SERPL-SCNC: 139 MMOL/L
TSH SERPL-ACNC: 4.69 UIU/ML
WBC # FLD AUTO: 6.34 K/UL

## 2020-01-07 ENCOUNTER — APPOINTMENT (OUTPATIENT)
Dept: CARDIOLOGY | Facility: CLINIC | Age: 70
End: 2020-01-07
Payer: MEDICARE

## 2020-01-07 VITALS
WEIGHT: 250 LBS | BODY MASS INDEX: 42.68 KG/M2 | OXYGEN SATURATION: 95 % | DIASTOLIC BLOOD PRESSURE: 82 MMHG | HEART RATE: 72 BPM | SYSTOLIC BLOOD PRESSURE: 186 MMHG | HEIGHT: 64 IN

## 2020-01-07 PROCEDURE — 99214 OFFICE O/P EST MOD 30 MIN: CPT

## 2020-01-07 RX ORDER — LOSARTAN POTASSIUM 100 MG/1
100 TABLET, FILM COATED ORAL
Refills: 0 | Status: DISCONTINUED | COMMUNITY
End: 2020-01-07

## 2020-01-08 RX ORDER — POTASSIUM CHLORIDE 1500 MG/1
20 TABLET, EXTENDED RELEASE ORAL
Qty: 60 | Refills: 0 | Status: DISCONTINUED | COMMUNITY
Start: 2019-12-28

## 2020-01-08 RX ORDER — CHLORHEXIDINE GLUCONATE 4 %
1000 LIQUID (ML) TOPICAL
Qty: 30 | Refills: 0 | Status: DISCONTINUED | COMMUNITY
Start: 2019-12-28

## 2020-01-08 RX ORDER — SIMVASTATIN 20 MG/1
20 TABLET, FILM COATED ORAL
Qty: 30 | Refills: 0 | Status: DISCONTINUED | COMMUNITY
Start: 2019-12-28

## 2020-01-08 RX ORDER — MULTIVIT-MIN/FOLIC/VIT K/LYCOP 400-300MCG
25 MCG TABLET ORAL
Qty: 60 | Refills: 0 | Status: DISCONTINUED | COMMUNITY
Start: 2019-12-28

## 2020-01-08 RX ORDER — AZITHROMYCIN 250 MG/1
250 TABLET, FILM COATED ORAL
Qty: 6 | Refills: 0 | Status: DISCONTINUED | COMMUNITY
Start: 2020-01-03

## 2020-01-08 RX ORDER — SITAGLIPTIN 100 MG/1
100 TABLET, FILM COATED ORAL
Qty: 30 | Refills: 0 | Status: DISCONTINUED | COMMUNITY
Start: 2019-12-26

## 2020-01-08 NOTE — DISCUSSION/SUMMARY
[FreeTextEntry1] : She is a 69-year-old, morbidly obese woman with resistant hypertension, Lymphedema and coronary artery disease, status post drug-eluting stents. She has no anginal symptoms or CHF.\par Her  BP on her current regimen is not optimal, and her leg edema is concerning. I suggested that she try stopping Januvia for a week then restart if no improvement.\par \par Use a large cuff to check BP.\par LFTs are normal. Continue diuretic as is .\par See me in one month.

## 2020-01-08 NOTE — HISTORY OF PRESENT ILLNESS
[FreeTextEntry1] : Now home. She had blood work recently.\par She is very concerned about her variable BP \par Recent labs have an elevated a1c and she was started on Januvia. LDL is too high.\par Her leg swelling is concering to her as she thinks it coincided with starting Januvia.\par Labs reviewed.\par \par

## 2020-02-20 ENCOUNTER — NON-APPOINTMENT (OUTPATIENT)
Age: 70
End: 2020-02-20

## 2020-02-20 ENCOUNTER — APPOINTMENT (OUTPATIENT)
Dept: CARDIOLOGY | Facility: CLINIC | Age: 70
End: 2020-02-20
Payer: MEDICARE

## 2020-02-20 VITALS
BODY MASS INDEX: 43.54 KG/M2 | WEIGHT: 255 LBS | OXYGEN SATURATION: 97 % | HEART RATE: 78 BPM | RESPIRATION RATE: 17 BRPM | DIASTOLIC BLOOD PRESSURE: 96 MMHG | HEIGHT: 64 IN | TEMPERATURE: 97.9 F | SYSTOLIC BLOOD PRESSURE: 156 MMHG

## 2020-02-20 PROCEDURE — 93000 ELECTROCARDIOGRAM COMPLETE: CPT

## 2020-02-20 PROCEDURE — 99214 OFFICE O/P EST MOD 30 MIN: CPT

## 2020-02-20 NOTE — DISCUSSION/SUMMARY
[FreeTextEntry1] : She is a 69-year-old, morbidly obese woman with resistant hypertension, Lymphedema and coronary artery disease, status post drug-eluting stents. She has no anginal symptoms or CHF. Her palpitations are likely to tino related to her reduction of labetalol. Restart TID labetalol...or at least bid.\par Her  BP on her current regimen is not optimal, and her leg edema is similar to prior.\par ECG is unchanged.\par

## 2020-02-20 NOTE — HISTORY OF PRESENT ILLNESS
[FreeTextEntry1] : Reduced her own labetalol frequiency.\par Had some increased weight.\par Brief episode of palpitations.\par She also stopped her baby aspirin. \par Seen by Dr. Krueger and has a good LDL on simvastatin 20mg. LFTs okay.\par

## 2020-02-20 NOTE — CARDIOLOGY SUMMARY
[LVEF ___%] : LVEF [unfilled]% [___] : [unfilled] [Mild] : mild mitral regurgitation [None] : no pulmonary hypertension

## 2020-02-20 NOTE — PHYSICAL EXAM
[General Appearance - Well Nourished] : well nourished [Normal Conjunctiva] : the conjunctiva exhibited no abnormalities [No Oral Pallor] : no oral pallor [Normal Jugular Venous V Waves Present] : normal jugular venous V waves present [Auscultation Breath Sounds / Voice Sounds] : lungs were clear to auscultation bilaterally [Respiration, Rhythm And Depth] : normal respiratory rhythm and effort [Heart Rate And Rhythm] : heart rate and rhythm were normal [Heart Sounds] : normal S1 and S2 [Murmurs] : no murmurs present [Abdomen Soft] : soft [Abdomen Tenderness] : non-tender [Abdomen Mass (___ Cm)] : no abdominal mass palpated [] : no ischemic changes [Skin Color & Pigmentation] : normal skin color and pigmentation [Oriented To Time, Place, And Person] : oriented to person, place, and time [FreeTextEntry1] : anxious Affect

## 2020-02-20 NOTE — REVIEW OF SYSTEMS
[Feeling Fatigued] : not feeling fatigued [Recent Weight Gain (___ Lbs)] : no recent weight gain [Headache] : no headache [Blurry Vision] : blurred vision [Recent Weight Loss (___ Lbs)] : no recent weight loss [Nausea] : nausea [Palpitations] : palpitations [Ankle Pain] : ankle pain [Joint Stiffness] : joint stiffness [Ankle Swelling] : ankle swelling [Itching] : itching [Anxiety] : anxiety [Negative] : Heme/Lymph

## 2020-03-30 ENCOUNTER — APPOINTMENT (OUTPATIENT)
Dept: CARDIOLOGY | Facility: CLINIC | Age: 70
End: 2020-03-30
Payer: MEDICARE

## 2020-03-30 PROCEDURE — 99442: CPT | Mod: PD

## 2020-03-30 PROCEDURE — G2012 BRIEF CHECK IN BY MD/QHP: CPT

## 2020-03-30 NOTE — DISCUSSION/SUMMARY
[FreeTextEntry1] : She is a 69-year-old, morbidly obese woman with resistant hypertension, Lymphedema and coronary artery disease, status post drug-eluting stents. She has no reported anginal symptoms or CHF. \par Her pain seems to be the biggest issue.\par I agree with continued neurontin 600 tid. She may use PRN tylenol or motrin (only for a short time)\par Referrd to ortho and PMD - [place].\par

## 2020-03-30 NOTE — HISTORY OF PRESENT ILLNESS
[FreeTextEntry1] : \par TELEPHONE VISIT\par \par TeleHealth Telephone Encounter:\par  \par Initiated by:\par _x_Patient Call\par __Patient Election for TeleHealth visit\par  \par Consented for TeleHealth visit\par Patient Location:  Home\par Physician Location:\par _x_Office(238; [address], [address], [place], [place], [address])\par __Home\par  \par Narrative:\par  She is taking labetalol bid\par clonidine and edecrine qd.\par She is unable to leave her home due to an elevator repair issue.\par She complains of excruciating pain in her leg/groin.\par \par Follow-up:\par  2 weeks\par  \par Duration of Encounter:  16 minutes, at least 50% of which was spent in direct counseling and coordination of care\par \par \par (reminder to task Jaylan)\par \par \par

## 2020-03-30 NOTE — PHYSICAL EXAM
[Auscultation Breath Sounds / Voice Sounds] : lungs were clear to auscultation bilaterally [Murmurs] : no murmurs present [Abdomen Tenderness] : non-tender [Abdomen Mass (___ Cm)] : no abdominal mass palpated [] : no ischemic changes

## 2020-04-02 ENCOUNTER — INPATIENT (INPATIENT)
Facility: HOSPITAL | Age: 70
LOS: 0 days | Discharge: HOME | DRG: 918 | End: 2020-04-03
Attending: INTERNAL MEDICINE | Admitting: INTERNAL MEDICINE
Payer: COMMERCIAL

## 2020-04-02 VITALS
DIASTOLIC BLOOD PRESSURE: 75 MMHG | OXYGEN SATURATION: 90 % | HEIGHT: 67 IN | RESPIRATION RATE: 18 BRPM | WEIGHT: 229.94 LBS | TEMPERATURE: 98 F | SYSTOLIC BLOOD PRESSURE: 112 MMHG | HEART RATE: 76 BPM

## 2020-04-02 DIAGNOSIS — T42.6X1A POISONING BY OTHER ANTIEPILEPTIC AND SEDATIVE-HYPNOTIC DRUGS, ACCIDENTAL (UNINTENTIONAL), INITIAL ENCOUNTER: ICD-10-CM

## 2020-04-02 DIAGNOSIS — Z98.89 OTHER SPECIFIED POSTPROCEDURAL STATES: Chronic | ICD-10-CM

## 2020-04-02 LAB
ALBUMIN SERPL ELPH-MCNC: 3.9 G/DL — SIGNIFICANT CHANGE UP (ref 3.3–5)
ALP SERPL-CCNC: 92 U/L — SIGNIFICANT CHANGE UP (ref 40–120)
ALT FLD-CCNC: 25 U/L — SIGNIFICANT CHANGE UP (ref 10–45)
ANION GAP SERPL CALC-SCNC: 18 MMOL/L — HIGH (ref 5–17)
AST SERPL-CCNC: 23 U/L — SIGNIFICANT CHANGE UP (ref 10–40)
BASOPHILS # BLD AUTO: 0.07 K/UL — SIGNIFICANT CHANGE UP (ref 0–0.2)
BASOPHILS NFR BLD AUTO: 0.7 % — SIGNIFICANT CHANGE UP (ref 0–2)
BILIRUB SERPL-MCNC: 1 MG/DL — SIGNIFICANT CHANGE UP (ref 0.2–1.2)
BUN SERPL-MCNC: 34 MG/DL — HIGH (ref 7–23)
CALCIUM SERPL-MCNC: 9.2 MG/DL — SIGNIFICANT CHANGE UP (ref 8.4–10.5)
CHLORIDE SERPL-SCNC: 99 MMOL/L — SIGNIFICANT CHANGE UP (ref 96–108)
CK MB CFR SERPL CALC: 1.7 NG/ML — SIGNIFICANT CHANGE UP (ref 0–3.8)
CK SERPL-CCNC: 42 U/L — SIGNIFICANT CHANGE UP (ref 25–170)
CO2 SERPL-SCNC: 25 MMOL/L — SIGNIFICANT CHANGE UP (ref 22–31)
CREAT SERPL-MCNC: 1.68 MG/DL — HIGH (ref 0.5–1.3)
EOSINOPHIL # BLD AUTO: 0.05 K/UL — SIGNIFICANT CHANGE UP (ref 0–0.5)
EOSINOPHIL NFR BLD AUTO: 0.5 % — SIGNIFICANT CHANGE UP (ref 0–6)
GAS PNL BLDV: SIGNIFICANT CHANGE UP
GLUCOSE SERPL-MCNC: 159 MG/DL — HIGH (ref 70–99)
HCT VFR BLD CALC: 46.9 % — HIGH (ref 34.5–45)
HGB BLD-MCNC: 14.6 G/DL — SIGNIFICANT CHANGE UP (ref 11.5–15.5)
IMM GRANULOCYTES NFR BLD AUTO: 0.4 % — SIGNIFICANT CHANGE UP (ref 0–1.5)
LYMPHOCYTES # BLD AUTO: 0.88 K/UL — LOW (ref 1–3.3)
LYMPHOCYTES # BLD AUTO: 8.6 % — LOW (ref 13–44)
MAGNESIUM SERPL-MCNC: 2.2 MG/DL — SIGNIFICANT CHANGE UP (ref 1.6–2.6)
MCHC RBC-ENTMCNC: 27.5 PG — SIGNIFICANT CHANGE UP (ref 27–34)
MCHC RBC-ENTMCNC: 31.1 GM/DL — LOW (ref 32–36)
MCV RBC AUTO: 88.3 FL — SIGNIFICANT CHANGE UP (ref 80–100)
MONOCYTES # BLD AUTO: 0.75 K/UL — SIGNIFICANT CHANGE UP (ref 0–0.9)
MONOCYTES NFR BLD AUTO: 7.3 % — SIGNIFICANT CHANGE UP (ref 2–14)
NEUTROPHILS # BLD AUTO: 8.42 K/UL — HIGH (ref 1.8–7.4)
NEUTROPHILS NFR BLD AUTO: 82.5 % — HIGH (ref 43–77)
NRBC # BLD: 0 /100 WBCS — SIGNIFICANT CHANGE UP (ref 0–0)
PLATELET # BLD AUTO: 302 K/UL — SIGNIFICANT CHANGE UP (ref 150–400)
POTASSIUM SERPL-MCNC: 3.9 MMOL/L — SIGNIFICANT CHANGE UP (ref 3.5–5.3)
POTASSIUM SERPL-SCNC: 3.9 MMOL/L — SIGNIFICANT CHANGE UP (ref 3.5–5.3)
PROT SERPL-MCNC: 7.9 G/DL — SIGNIFICANT CHANGE UP (ref 6–8.3)
RBC # BLD: 5.31 M/UL — HIGH (ref 3.8–5.2)
RBC # FLD: 14.6 % — HIGH (ref 10.3–14.5)
SODIUM SERPL-SCNC: 142 MMOL/L — SIGNIFICANT CHANGE UP (ref 135–145)
TROPONIN T, HIGH SENSITIVITY RESULT: 30 NG/L — SIGNIFICANT CHANGE UP (ref 0–51)
TROPONIN T, HIGH SENSITIVITY RESULT: 30 NG/L — SIGNIFICANT CHANGE UP (ref 0–51)
TROPONIN T, HIGH SENSITIVITY RESULT: 31 NG/L — SIGNIFICANT CHANGE UP (ref 0–51)
WBC # BLD: 10.21 K/UL — SIGNIFICANT CHANGE UP (ref 3.8–10.5)
WBC # FLD AUTO: 10.21 K/UL — SIGNIFICANT CHANGE UP (ref 3.8–10.5)

## 2020-04-02 PROCEDURE — 72100 X-RAY EXAM L-S SPINE 2/3 VWS: CPT | Mod: 26

## 2020-04-02 PROCEDURE — 99285 EMERGENCY DEPT VISIT HI MDM: CPT

## 2020-04-02 PROCEDURE — 71045 X-RAY EXAM CHEST 1 VIEW: CPT | Mod: 26

## 2020-04-02 RX ORDER — GLUCAGON INJECTION, SOLUTION 0.5 MG/.1ML
1 INJECTION, SOLUTION SUBCUTANEOUS ONCE
Refills: 0 | Status: DISCONTINUED | OUTPATIENT
Start: 2020-04-02 | End: 2020-04-03

## 2020-04-02 RX ORDER — DEXTROSE 50 % IN WATER 50 %
12.5 SYRINGE (ML) INTRAVENOUS ONCE
Refills: 0 | Status: DISCONTINUED | OUTPATIENT
Start: 2020-04-02 | End: 2020-04-03

## 2020-04-02 RX ORDER — SODIUM CHLORIDE 9 MG/ML
500 INJECTION INTRAMUSCULAR; INTRAVENOUS; SUBCUTANEOUS ONCE
Refills: 0 | Status: COMPLETED | OUTPATIENT
Start: 2020-04-02 | End: 2020-04-02

## 2020-04-02 RX ORDER — OXYCODONE AND ACETAMINOPHEN 5; 325 MG/1; MG/1
1 TABLET ORAL EVERY 6 HOURS
Refills: 0 | Status: DISCONTINUED | OUTPATIENT
Start: 2020-04-02 | End: 2020-04-03

## 2020-04-02 RX ORDER — HEPARIN SODIUM 5000 [USP'U]/ML
5000 INJECTION INTRAVENOUS; SUBCUTANEOUS EVERY 12 HOURS
Refills: 0 | Status: DISCONTINUED | OUTPATIENT
Start: 2020-04-02 | End: 2020-04-03

## 2020-04-02 RX ORDER — LEVOTHYROXINE SODIUM 125 MCG
75 TABLET ORAL DAILY
Refills: 0 | Status: DISCONTINUED | OUTPATIENT
Start: 2020-04-02 | End: 2020-04-03

## 2020-04-02 RX ORDER — PANTOPRAZOLE SODIUM 20 MG/1
40 TABLET, DELAYED RELEASE ORAL
Refills: 0 | Status: DISCONTINUED | OUTPATIENT
Start: 2020-04-02 | End: 2020-04-03

## 2020-04-02 RX ORDER — ACETAMINOPHEN 500 MG
650 TABLET ORAL EVERY 6 HOURS
Refills: 0 | Status: DISCONTINUED | OUTPATIENT
Start: 2020-04-02 | End: 2020-04-03

## 2020-04-02 RX ORDER — SODIUM CHLORIDE 9 MG/ML
1000 INJECTION, SOLUTION INTRAVENOUS
Refills: 0 | Status: DISCONTINUED | OUTPATIENT
Start: 2020-04-02 | End: 2020-04-03

## 2020-04-02 RX ORDER — ACETAMINOPHEN 500 MG
1000 TABLET ORAL ONCE
Refills: 0 | Status: COMPLETED | OUTPATIENT
Start: 2020-04-02 | End: 2020-04-02

## 2020-04-02 RX ORDER — DEXTROSE 50 % IN WATER 50 %
15 SYRINGE (ML) INTRAVENOUS ONCE
Refills: 0 | Status: DISCONTINUED | OUTPATIENT
Start: 2020-04-02 | End: 2020-04-03

## 2020-04-02 RX ORDER — INSULIN LISPRO 100/ML
VIAL (ML) SUBCUTANEOUS AT BEDTIME
Refills: 0 | Status: DISCONTINUED | OUTPATIENT
Start: 2020-04-02 | End: 2020-04-03

## 2020-04-02 RX ORDER — POTASSIUM CHLORIDE 20 MEQ
20 PACKET (EA) ORAL DAILY
Refills: 0 | Status: DISCONTINUED | OUTPATIENT
Start: 2020-04-02 | End: 2020-04-03

## 2020-04-02 RX ORDER — LABETALOL HCL 100 MG
600 TABLET ORAL THREE TIMES A DAY
Refills: 0 | Status: DISCONTINUED | OUTPATIENT
Start: 2020-04-02 | End: 2020-04-03

## 2020-04-02 RX ORDER — DEXTROSE 50 % IN WATER 50 %
25 SYRINGE (ML) INTRAVENOUS ONCE
Refills: 0 | Status: DISCONTINUED | OUTPATIENT
Start: 2020-04-02 | End: 2020-04-03

## 2020-04-02 RX ORDER — HYDRALAZINE HCL 50 MG
25 TABLET ORAL EVERY 8 HOURS
Refills: 0 | Status: DISCONTINUED | OUTPATIENT
Start: 2020-04-02 | End: 2020-04-03

## 2020-04-02 RX ORDER — OXYCODONE AND ACETAMINOPHEN 5; 325 MG/1; MG/1
2 TABLET ORAL EVERY 6 HOURS
Refills: 0 | Status: DISCONTINUED | OUTPATIENT
Start: 2020-04-02 | End: 2020-04-03

## 2020-04-02 RX ORDER — INSULIN LISPRO 100/ML
VIAL (ML) SUBCUTANEOUS
Refills: 0 | Status: DISCONTINUED | OUTPATIENT
Start: 2020-04-02 | End: 2020-04-03

## 2020-04-02 RX ORDER — LOSARTAN POTASSIUM 100 MG/1
100 TABLET, FILM COATED ORAL DAILY
Refills: 0 | Status: DISCONTINUED | OUTPATIENT
Start: 2020-04-02 | End: 2020-04-03

## 2020-04-02 RX ORDER — GABAPENTIN 400 MG/1
300 CAPSULE ORAL
Refills: 0 | Status: DISCONTINUED | OUTPATIENT
Start: 2020-04-02 | End: 2020-04-03

## 2020-04-02 RX ADMIN — SODIUM CHLORIDE 500 MILLILITER(S): 9 INJECTION INTRAMUSCULAR; INTRAVENOUS; SUBCUTANEOUS at 13:15

## 2020-04-02 RX ADMIN — SODIUM CHLORIDE 500 MILLILITER(S): 9 INJECTION INTRAMUSCULAR; INTRAVENOUS; SUBCUTANEOUS at 14:49

## 2020-04-02 RX ADMIN — Medication 1000 MILLIGRAM(S): at 14:49

## 2020-04-02 RX ADMIN — Medication 600 MILLIGRAM(S): at 22:32

## 2020-04-02 NOTE — ED PROVIDER NOTE - PROGRESS NOTE DETAILS
PA note: Patient presents with low back pain, doubled her Neurontin dose this morning and passed out. Will get L-spine xrays, labs, reassess. ~JEFF Mix PA note: Labs, CXR, EKG discussed with patient. Advised patient to recheck kidney functions in 1 week. Rec Ortho spine Dr. for back pain f/u, possible epidural injections. Patient re-examined and re-evaluated. Patient feels better at this time. Patient is not a good candidate for NSAIDS/Narcotics, will give Tylenol here in ED, same rec outpatient. Patient spoke with her cardiologist Dr. Lisker who lowered her Clonidine dosing and changed Lasix to QOD. ED evaluation, Diagnosis and management discussed with the patient in detail. Workup results discussed with ED attending LISETH Dolan to dc home. ORTHO SPINE follow up encouraged.  Strict ED return instructions discussed in detail and patient given the opportunity to ask any questions about their discharge diagnosis and instructions. Patient verbalized understanding. ~ JEFF Mix Dr. Yao re-evaluated patient, now she wants to go to a rehab facility because she has no aids at home to help her and she has bad back pain. Dr. Yao to call  for dispo planning. ~JEFF Mix Dr. Yao discussed case with SW, will get PT evaluation and dispo. ~JEFF Mix Maximilian allen: discussed w dr bradley accepts pt for unable to ambulate and PT eval for rehab placement. Dr. Yao discussed case with SW, will get PT evaluation and dispo. ~JEFF Mix md: discussed w patient about non-ambulatory status w/ her sciatica pain. She agrees w/ SW and possible PT and placement in rehab. Discussed w SW about admit, PT and will try to get PT today or tomorrow for placement. Labs show Cr elevated likely 2/2 constipation, urinated/bowel movement in ED.

## 2020-04-02 NOTE — ED PROVIDER NOTE - ATTENDING CONTRIBUTION TO CARE
Dr. Maximilian COPELAND: I performed a face to face bedside interview with patient regarding history of present illness, review of symptoms and past medical history. I completed an independent physical exam.  I have discussed patient's plan of care with PA.   I agree with note as stated above, having amended the EMR as needed to reflect my findings.   This includes HISTORY OF PRESENT ILLNESS, HIV, PAST MEDICAL/SURGICAL/FAMILY/SOCIAL HISTORY, ALLERGIES AND HOME MEDICATIONS, REVIEW OF SYSTEMS, PHYSICAL EXAM, and any PROGRESS NOTES during the time I functioned as the attending physician for this patient.

## 2020-04-02 NOTE — ED PROVIDER NOTE - CHPI ED SYMPTOMS NEG
no cough/no shortness of breath/no chest pain/no dizziness/no fever/no vomiting/no diaphoresis/no chills/no nausea

## 2020-04-02 NOTE — ED PROVIDER NOTE - OBJECTIVE STATEMENT
PA note: Patient is a 69 year old female with PMHx of DM, HTN, Hypothyroidism, Lymphedema, Obesity, chronic back pain who presents to [facility] ED with a syncope episode this morning. Patient was found in her chair, passed out. No fall injury or trauma. Patient usually takes Neurontin 600mg TID for back pain, but doubled her dose this morning because of worsening lower back pain. Patient DENIES any narcotics use for pain control. EMS found her BP 70/40, , gave 500cc of NS and repeat BP was 112/70. Patient c/o lower back pain, otherwise feeling a lot better at this time in ED. DENIES cough, fever, chest congestion, n/v/d. No known COVID exposure or sick contacts. DENIES dizziness, lightheadedness, chest pain, palpitations, TRAORE, SOB. No prior syncope episodes in the past. ~JEFF Mix

## 2020-04-02 NOTE — H&P ADULT - NSHPREVIEWOFSYSTEMS_GEN_ALL_CORE
REVIEW OF SYSTEMS:    CONSTITUTIONAL: No weakness, fevers or chills  EYES/ENT: No visual changes;  No vertigo or throat pain   NECK: No pain or stiffness  RESPIRATORY: No cough, wheezing, hemoptysis; No shortness of breath  CARDIOVASCULAR: No chest pain or palpitations  GASTROINTESTINAL: No abdominal or epigastric pain. No nausea, vomiting, or hematemesis; No diarrhea or constipation. No melena or hematochezia.  GENITOURINARY: No dysuria, frequency or hematuria  NEUROLOGICAL: No numbness or weakness Back pain with radiation L leg  SKIN: No itching, burning, rashes, or lesions   All other review of systems is negative unless indicated above.

## 2020-04-02 NOTE — H&P ADULT - NSHPLABSRESULTS_GEN_ALL_CORE
14.6   10.21 )-----------( 302      ( 02 Apr 2020 12:14 )             46.9       04-02    142  |  99  |  34<H>  ----------------------------<  159<H>  3.9   |  25  |  1.68<H>    Ca    9.2      02 Apr 2020 12:14  Mg     2.2     04-02    TPro  7.9  /  Alb  3.9  /  TBili  1.0  /  DBili  x   /  AST  23  /  ALT  25  /  AlkPhos  92  04-02          POCT Blood Glucose.: 138 mg/dL (02 Apr 2020 12:50)    < from: Xray Chest 1 View AP/PA (04.02.20 @ 12:23) >    Impression: Probable cardiomegaly. Clear lungs.    < end of copied text >  < from: Xray Lumbar Spine AP + Lateral (04.02.20 @ 12:23) >    IMPRESSION:  Multilevel spondylosis of the lumbar spine, similar in appearance to prior CT scan.    < end of copied text >    EKG SR NSST/T

## 2020-04-02 NOTE — ED ADULT NURSE NOTE - OBJECTIVE STATEMENT
70 y/o female BIBA from home, pt's roommate called EMS d/t finding pt unresponsive sitting in chair, EMS found pt to be 70/40 [id number] minimally responsive. EMS inserted IV and administered 500cc LR. Upon arrival, pt is A&Ox4, does not remember event but remembers feeling weak this AM. C/o sciatica pain down L leg and reports taking 600mg neurotin TID but doubled dose this AM d/t pain. Reports feeling much better and "like normal" upon arrival to ED. Denies N/V/D, feeling ill recently, fevers, cough, dizziness, chest pain or tightness, palpitations, headache, SOB, weakness, decreased PO intake. PMH of sciatica, HTN, obesity, DM II, hypothyroid, lymphedema. A&Ox4, breath sounds clear bilaterally, abd is obese but nontender and soft, BLE edema, skin warm dry and intact, IV placed by EMS. Pt normotensive upon arrival.

## 2020-04-02 NOTE — PATIENT PROFILE ADULT - DISASTER - FUNCTIONAL SCREEN CURRENT LEVEL: COMMUNICATION, MLM
Mildly to Moderately Impaired: difficulty hearing in some environments or speaker may need to increase volume or speak distinctly
0 = understands/communicates without difficulty

## 2020-04-02 NOTE — H&P ADULT - HISTORY OF PRESENT ILLNESS
69 year old female with PMHx of DM, HTN, Hypothyroidism, Lymphedema, Obesity, chronic back pain who presents to [facility] ED with a syncope episode this morning. Patient was found in her chair, passed out. No fall injury or trauma. Patient usually takes Neurontin 600mg TID for back pain, but doubled her dose this morning because of worsening lower back pain. Patient DENIES any narcotics use for pain control. EMS found her BP 70/40, , gave 500cc of NS and repeat BP was 112/70. Patient c/o lower back pain, otherwise feeling a lot better at this time in ED. DENIES cough, fever, chest congestion, n/v/d. No known COVID exposure or sick contacts. DENIES dizziness, lightheadedness, chest pain, palpitations, TRAORE, SOB. No prior syncope episodes in the past.

## 2020-04-02 NOTE — PHYSICAL THERAPY INITIAL EVALUATION ADULT - GENERAL OBSERVATIONS, REHAB EVAL
Pt rashid 35 min eval fair. Pt a/w LBP. Pt flagged by Lyndsay [id number]. Pt rec'd in bed, NAD. Pt agreed to session. Pt reports "they said if I met with you, I could go to [facility]." Explained role and benefits of PT to pt.

## 2020-04-02 NOTE — PHYSICAL THERAPY INITIAL EVALUATION ADULT - SOCIAL CONCERNS
pt reports HHA recently stopped working 2/2 Covid-19 exposure concerns. pt states roommate unable to assist her. pt reports HHA recently stopped working 2/2 Covid-19 concerns/wishing to social distance herself. pt states roommate unable to assist her. pt reports HHA recently stopped working 2/2 Covid-19 concerns/wishing to socially distance herself. pt states roommate unable to assist her.

## 2020-04-02 NOTE — PHYSICAL THERAPY INITIAL EVALUATION ADULT - ADDITIONAL COMMENTS
Pt states she lives with a roommate in an apartment, ~5 steps to enter, then ~20 steps to room (+handrail). Pt states the elevator stopped working and won't be fixed for a time. Pt states prior to admission being independent with functional mobility using a rollator. Pt states she had a HHA coming to her house 6 hours a day/7 days a week, but HHA stopped coming (Covid-19 concerns). Pt states HHA would assist her with ADLs.

## 2020-04-02 NOTE — ED PROVIDER NOTE - FAMILY HISTORY
Family history of lung cancer     Sibling  Still living? Unknown  Family history of myocardial infarction, Age at diagnosis: Age Unknown

## 2020-04-02 NOTE — H&P ADULT - NSHPSOCIALHISTORY_GEN_ALL_CORE
Social History:    Marital Status:  (   )    ( x  ) Single    (   )    (  )   Occupation:   Lives with: (  ) alone  (  ) children   (  ) spouse   (  ) parents  ( x ) other    Substance Use (street drugs): ( x ) never used  (  ) other:  Tobacco Usage:  ( x ) never smoked   (   ) former smoker   (   ) current smoker  (     ) pack years  (        ) last cigarette date  Alcohol Usage:    (     ) Advanced Directives: (     ) None    (      ) DNR    (     ) DNI    (     ) Health Care Proxy:

## 2020-04-02 NOTE — PHYSICAL THERAPY INITIAL EVALUATION ADULT - PLANNED THERAPY INTERVENTIONS, PT EVAL
balance training/bed mobility training/transfer training/GOAL: Pt will negotiate 9 steps with unilateral handrail in a step-to pattern independently within 4 weeks/gait training/strengthening

## 2020-04-02 NOTE — H&P ADULT - ASSESSMENT
69 f with    Syncope  - probably from extra dose of Gabapentin  - cardiac enzymes  - cardiology evaluation Dr. Lisker    Back pain  - pain control  - PT    Hypothyroid  - continue Rx  - TSH    DM (diabetes mellitus)   - BS control  - ADA diet    Essential hypertension  - control     CKD   - follow closely    Lymphedema   - local Rx    Obesity   - nutrition education    DCP rehab    Further action as per clinical course     Russell Bowling MD pager [phone]

## 2020-04-02 NOTE — ED PROVIDER NOTE - NSFOLLOWUPINSTRUCTIONS_ED_ALL_ED_FT
Return to the emergency room if you experience worsening symptoms, fevers, nausea/vomiting, abdominal pain, weakness, unable to walk, difficulty with urinating or defecation, or new concerning symptoms.    Follow up with your primary care doctor in 1-2 days    C/w with your current prescription regiment. Return to the emergency room if you experience worsening symptoms, fevers, nausea/vomiting, abdominal pain, weakness, unable to walk, difficulty with urinating or defecation, or new concerning symptoms.    Follow up with your primary care doctor in 1-2 days    C/w with your current prescription regiment.      BACK PAIN - General Information     Back Pain    WHAT YOU NEED TO KNOW:    What do I need to know about back pain? Back pain is common. You may feel sore or stiff on one or both sides of your back. The pain may spread to your buttocks or thighs.    What causes or increases my risk for back pain? Conditions that affect the spine, joints, or muscles can cause back pain. These may include arthritis, spinal stenosis (narrowing of the spinal column), muscle tension, or breakdown of the spinal discs. The following increase your risk of back pain:     Repeated bending, lifting, or twisting, or lifting heavy items      Injury from a fall or accident      Lack of regular physical activity       Obesity or pregnancy       Smoking      Aging      Driving, sitting, or standing for long periods      Bad posture while sitting or standing    How is back pain diagnosed? Your healthcare provider will ask if you have any medical conditions. He or she may ask if you have a history of back pain and how it started. He or she may watch you stand and walk, and check your range of motion. Show him or her where you feel pain and what makes it better or worse. Describe the pain, how bad it is, and how long it lasts. Tell your provider if your pain worsens at night or when you lie on your back.    How is back pain treated?    NSAIDs help decrease swelling and pain. This medicine is available with or without a doctor's order. NSAIDs can cause stomach bleeding or kidney problems in certain people. If you take blood thinner medicine, always ask your healthcare provider if NSAIDs are safe for you. Always read the medicine label and follow directions.      Acetaminophen decreases pain and fever. It is available without a doctor's order. Ask how much to take and how often to take it. Follow directions. Read the labels of all other medicines you are using to see if they also contain acetaminophen, or ask your doctor or pharmacist. Acetaminophen can cause liver damage if not taken correctly. Do not use more than 4 grams (4,000 milligrams) total of acetaminophen in one day.       Muscle relaxers help decrease muscle spasms and back pain.      Prescription pain medicine may be given. Ask your healthcare provider how to take this medicine safely. Some prescription pain medicines contain acetaminophen. Do not take other medicines that contain acetaminophen without talking to your healthcare provider. Too much acetaminophen may cause liver damage. Prescription pain medicine may cause constipation. Ask your healthcare provider how to prevent or treat constipation.     How do I manage my back pain?     Apply ice on your back for 15 to 20 minutes every hour or as directed. Use an ice pack, or put crushed ice in a plastic bag. Cover it with a towel before you apply it to your skin. Ice helps prevent tissue damage and decreases pain.      Apply heat on your back for 20 to 30 minutes every 2 hours for as many days as directed. Heat helps decrease pain and muscle spasms.      Stay active as much as you can without causing more pain. Bed rest could make your back pain worse. Avoid heavy lifting until your pain is gone.      Go to physical therapy as directed. A physical therapist can teach you exercises to help improve movement and strength, and to decrease pain.    When should I seek immediate care?     You have pain, numbness, or weakness in one or both legs.      Your pain becomes so severe that you cannot walk.      You cannot control your urine or bowel movements.      You have severe back pain with chest pain.      You have severe back pain, nausea, and vomiting.      You have severe back pain that spreads to your side or genital area.    When should I contact my healthcare provider?     You have back pain that does not get better with rest and pain medicine.      You have a fever.      You have pain that worsens when you are on your back or when you rest.      You have pain that worsens when you cough or sneeze.      You lose weight without trying.      You have questions or concerns about your condition or care.    CARE AGREEMENT:    You have the right to help plan your care. Learn about your health condition and how it may be treated. Discuss treatment options with your healthcare providers to decide what care you want to receive. You always have the right to refuse treatment.

## 2020-04-02 NOTE — ED PROVIDER NOTE - CARE PLAN
Principal Discharge DX:	Gabapentin overdose, accidental or unintentional, initial encounter Principal Discharge DX:	Gabapentin overdose, accidental or unintentional, initial encounter  Secondary Diagnosis:	Unable to walk  Secondary Diagnosis:	Syncope, unspecified syncope type

## 2020-04-02 NOTE — PHYSICAL THERAPY INITIAL EVALUATION ADULT - BALANCE TRAINING, PT EVAL
GOAL: Pt will increase static/dynamic sitting and standing balance to at least "good" within 4 weeks

## 2020-04-02 NOTE — H&P ADULT - NSICDXFAMILYHX_GEN_ALL_CORE_FT
FAMILY HISTORY:  Family history of lung cancer    Sibling  Still living? Unknown  Family history of myocardial infarction, Age at diagnosis: Age Unknown

## 2020-04-02 NOTE — H&P ADULT - NSHPLANGLIMITEDENGLISH_GEN_A_CORE
No Implemented All Universal Safety Interventions:  [place] to call system. Call bell, personal items and telephone within reach. Instruct patient to call for assistance. Room bathroom lighting operational. Non-slip footwear when patient is off stretcher. Physically safe environment: no spills, clutter or unnecessary equipment. Stretcher in lowest position, wheels locked, appropriate side rails in place.

## 2020-04-02 NOTE — ED ADULT NURSE NOTE - NSIMPLEMENTINTERV_GEN_ALL_ED
Implemented All Fall Risk Interventions:  [place] to call system. Call bell, personal items and telephone within reach. Instruct patient to call for assistance. Room bathroom lighting operational. Non-slip footwear when patient is off stretcher. Physically safe environment: no spills, clutter or unnecessary equipment. Stretcher in lowest position, wheels locked, appropriate side rails in place. Provide visual cue, wrist band, yellow gown, etc. Monitor gait and stability. Monitor for mental status changes and reorient to person, place, and time. Review medications for side effects contributing to fall risk. Reinforce activity limits and safety measures with patient and family.

## 2020-04-02 NOTE — ED PROVIDER NOTE - CLINICAL SUMMARY MEDICAL DECISION MAKING FREE TEXT BOX
69F PMHx of sciatca, obesity, dm, htn p/w syncope and hypotension after doubling her dose of gabapentin at 12PM (600 mg x 2). (+) LOC. Patient took normal dose of 1200mg gabapentin in AM, began having her typical left sided sciatica pain described as left sided, achy, radiating down leg. She then took 2 extra doses of gabapentin and passed out. Denies any (+) SI/HI, n/v, abdominal pain, cp, sob, cough, fevers, headaches, neck pain, weakness, subjective neurological deficits, fecal or urinary incontinence or retention, trauma/fall. Lives w/ a housemate for 4 years. VS: reviewed. 69F PMHx of sciatca, obesity, dm, htn p/w syncope and hypotension after doubling her dose of gabapentin at 12PM (600 mg x 2). (+) LOC. Patient took normal dose of 1200mg gabapentin in AM, began having her typical left sided sciatica pain described as left sided, achy, radiating down leg. She then took 2 extra doses of gabapentin and passed out. Denies any (+) SI/HI, n/v, abdominal pain, cp, sob, cough, fevers, headaches, neck pain, weakness, subjective neurological deficits, fecal or urinary incontinence or retention, trauma/fall. Lives w/ a housemate for 4 years. VS: reviewed. Gen well appearing, in nad, HEENT: NC/AT, PERRLA/EOMI, supple neck, MMM. CV: rrr no mgr, lungs CTABL, no wrr, abd; soft nd nt (+) BS, ext: moving all extremities x 4, no joint/muscular tenderness, neuro intact, 1/5 str in LLE, 5/5 in RLE, otherwise 5/5 str in UE b/l, no sensory deficits, good distal pulses 2+. PLAN: XR, lumbar, CXR, labs, likely 2/2 extra dose of Gabapentin which is a sedative-hypnotic. Otherwise stable VS, well appearing, re-eval for Dispo. 69F PMHx of sciatca, obesity, dm, htn p/w syncope and hypotension after doubling her dose of gabapentin at 12PM (600 mg x 2). (+) LOC. Patient took normal dose of 1200mg gabapentin in AM, began having her typical left sided sciatica pain described as left sided, achy, radiating down leg. She then took 2 extra doses of gabapentin and passed out. Denies any (+) SI/HI, n/v, abdominal pain, cp, sob, cough, fevers, headaches, neck pain, weakness, subjective neurological deficits, fecal or urinary incontinence or retention, trauma/fall. Lives w/ a housemate for 4 years. VS: reviewed. Gen well appearing, in nad, HEENT: NC/AT, PERRLA/EOMI, supple neck, MMM. CV: rrr no mgr, lungs CTABL, no wrr, abd; soft nd nt (+) BS, ext: moving all extremities x 4, no joint/muscular tenderness, neuro intact, 1/5 str in LLE, 5/5 in RLE, otherwise 5/5 str in UE b/l, no sensory deficits, good distal pulses 2+. PLAN: Nonconcerning for cord compression, no saddle anesthesia, or sensory deficits, no red flags for back pain. XR, lumbar, CXR, labs, likely 2/2 extra dose of Gabapentin which is a sedative-hypnotic. Otherwise stable VS, well appearing, re-eval for Dispo.

## 2020-04-02 NOTE — ED PROVIDER NOTE - MUSCULOSKELETAL, MLM
Spine appears normal, range of motion is not limited, no muscle or joint tenderness. BACK: +Mild muscular tenderness lower back paravertebral lumbar area. Painful ROM.

## 2020-04-02 NOTE — PHYSICAL THERAPY INITIAL EVALUATION ADULT - PERTINENT HX OF CURRENT PROBLEM, REHAB EVAL
68 y/o F with PMHx of DM, HTN, Hypothyroidism, Lymphedema, Obesity, chronic back pain p/w syncope episode. Pt was found in her chair, passed out. Pt usually takes Neurontin 600mg TID for back pain, but doubled her dose this morning because of worsening lower back pain. EMS found her BP 70/40, , gave 500cc of NS and repeat BP was 112/70. XRAY LUMBAR SPINE: multilevel spondylosis of lumbar spine.

## 2020-04-03 ENCOUNTER — TRANSCRIPTION ENCOUNTER (OUTPATIENT)
Age: 70
End: 2020-04-03

## 2020-04-03 VITALS
RESPIRATION RATE: 18 BRPM | TEMPERATURE: 98 F | SYSTOLIC BLOOD PRESSURE: 123 MMHG | DIASTOLIC BLOOD PRESSURE: 70 MMHG | HEART RATE: 78 BPM | OXYGEN SATURATION: 95 %

## 2020-04-03 LAB
ANION GAP SERPL CALC-SCNC: 16 MMOL/L — SIGNIFICANT CHANGE UP (ref 5–17)
BUN SERPL-MCNC: 38 MG/DL — HIGH (ref 7–23)
CALCIUM SERPL-MCNC: 9.1 MG/DL — SIGNIFICANT CHANGE UP (ref 8.4–10.5)
CHLORIDE SERPL-SCNC: 97 MMOL/L — SIGNIFICANT CHANGE UP (ref 96–108)
CO2 SERPL-SCNC: 25 MMOL/L — SIGNIFICANT CHANGE UP (ref 22–31)
CREAT SERPL-MCNC: 1.45 MG/DL — HIGH (ref 0.5–1.3)
GLUCOSE BLDC GLUCOMTR-MCNC: 122 MG/DL — HIGH (ref 70–99)
GLUCOSE BLDC GLUCOMTR-MCNC: 142 MG/DL — HIGH (ref 70–99)
GLUCOSE BLDC GLUCOMTR-MCNC: 190 MG/DL — HIGH (ref 70–99)
GLUCOSE BLDC GLUCOMTR-MCNC: 192 MG/DL — HIGH (ref 70–99)
GLUCOSE SERPL-MCNC: 151 MG/DL — HIGH (ref 70–99)
HCT VFR BLD CALC: 39.7 % — SIGNIFICANT CHANGE UP (ref 34.5–45)
HCV AB S/CO SERPL IA: 0.22 S/CO — SIGNIFICANT CHANGE UP (ref 0–0.99)
HCV AB SERPL-IMP: SIGNIFICANT CHANGE UP
HGB BLD-MCNC: 12.4 G/DL — SIGNIFICANT CHANGE UP (ref 11.5–15.5)
MCHC RBC-ENTMCNC: 27.7 PG — SIGNIFICANT CHANGE UP (ref 27–34)
MCHC RBC-ENTMCNC: 31.2 GM/DL — LOW (ref 32–36)
MCV RBC AUTO: 88.6 FL — SIGNIFICANT CHANGE UP (ref 80–100)
NRBC # BLD: 0 /100 WBCS — SIGNIFICANT CHANGE UP (ref 0–0)
PLATELET # BLD AUTO: 263 K/UL — SIGNIFICANT CHANGE UP (ref 150–400)
POTASSIUM SERPL-MCNC: 3.5 MMOL/L — SIGNIFICANT CHANGE UP (ref 3.5–5.3)
POTASSIUM SERPL-SCNC: 3.5 MMOL/L — SIGNIFICANT CHANGE UP (ref 3.5–5.3)
RBC # BLD: 4.48 M/UL — SIGNIFICANT CHANGE UP (ref 3.8–5.2)
RBC # FLD: 14.6 % — HIGH (ref 10.3–14.5)
SODIUM SERPL-SCNC: 138 MMOL/L — SIGNIFICANT CHANGE UP (ref 135–145)
TROPONIN T, HIGH SENSITIVITY RESULT: 26 NG/L — SIGNIFICANT CHANGE UP (ref 0–51)
TSH SERPL-MCNC: 0.6 UIU/ML — SIGNIFICANT CHANGE UP (ref 0.27–4.2)
WBC # BLD: 6.31 K/UL — SIGNIFICANT CHANGE UP (ref 3.8–10.5)
WBC # FLD AUTO: 6.31 K/UL — SIGNIFICANT CHANGE UP (ref 3.8–10.5)

## 2020-04-03 PROCEDURE — 97116 GAIT TRAINING THERAPY: CPT

## 2020-04-03 PROCEDURE — 97110 THERAPEUTIC EXERCISES: CPT

## 2020-04-03 PROCEDURE — 82435 ASSAY OF BLOOD CHLORIDE: CPT

## 2020-04-03 PROCEDURE — 83605 ASSAY OF LACTIC ACID: CPT

## 2020-04-03 PROCEDURE — 82803 BLOOD GASES ANY COMBINATION: CPT

## 2020-04-03 PROCEDURE — 84295 ASSAY OF SERUM SODIUM: CPT

## 2020-04-03 PROCEDURE — 71045 X-RAY EXAM CHEST 1 VIEW: CPT

## 2020-04-03 PROCEDURE — 82550 ASSAY OF CK (CPK): CPT

## 2020-04-03 PROCEDURE — 84484 ASSAY OF TROPONIN QUANT: CPT

## 2020-04-03 PROCEDURE — 82330 ASSAY OF CALCIUM: CPT

## 2020-04-03 PROCEDURE — 82553 CREATINE MB FRACTION: CPT

## 2020-04-03 PROCEDURE — 84132 ASSAY OF SERUM POTASSIUM: CPT

## 2020-04-03 PROCEDURE — 82947 ASSAY GLUCOSE BLOOD QUANT: CPT

## 2020-04-03 PROCEDURE — 83735 ASSAY OF MAGNESIUM: CPT

## 2020-04-03 PROCEDURE — 97161 PT EVAL LOW COMPLEX 20 MIN: CPT

## 2020-04-03 PROCEDURE — 82962 GLUCOSE BLOOD TEST: CPT

## 2020-04-03 PROCEDURE — 80048 BASIC METABOLIC PNL TOTAL CA: CPT

## 2020-04-03 PROCEDURE — 99285 EMERGENCY DEPT VISIT HI MDM: CPT | Mod: 25

## 2020-04-03 PROCEDURE — 72100 X-RAY EXAM L-S SPINE 2/3 VWS: CPT

## 2020-04-03 PROCEDURE — 96360 HYDRATION IV INFUSION INIT: CPT

## 2020-04-03 PROCEDURE — 99223 1ST HOSP IP/OBS HIGH 75: CPT

## 2020-04-03 PROCEDURE — 80053 COMPREHEN METABOLIC PANEL: CPT

## 2020-04-03 PROCEDURE — 85014 HEMATOCRIT: CPT

## 2020-04-03 PROCEDURE — 85027 COMPLETE CBC AUTOMATED: CPT

## 2020-04-03 PROCEDURE — 93005 ELECTROCARDIOGRAM TRACING: CPT

## 2020-04-03 PROCEDURE — 86803 HEPATITIS C AB TEST: CPT

## 2020-04-03 PROCEDURE — 84443 ASSAY THYROID STIM HORMONE: CPT

## 2020-04-03 RX ORDER — OXYCODONE HYDROCHLORIDE 5 MG/1
1 TABLET ORAL
Qty: 0 | Refills: 0 | DISCHARGE
Start: 2020-04-03

## 2020-04-03 RX ORDER — OXYCODONE HYDROCHLORIDE 5 MG/1
10 TABLET ORAL EVERY 6 HOURS
Refills: 0 | Status: DISCONTINUED | OUTPATIENT
Start: 2020-04-03 | End: 2020-04-03

## 2020-04-03 RX ORDER — GABAPENTIN 400 MG/1
1 CAPSULE ORAL
Qty: 0 | Refills: 0 | DISCHARGE
Start: 2020-04-03

## 2020-04-03 RX ORDER — GABAPENTIN 400 MG/1
2 CAPSULE ORAL
Qty: 0 | Refills: 0 | DISCHARGE
Start: 2020-04-03

## 2020-04-03 RX ORDER — OXYCODONE HYDROCHLORIDE 5 MG/1
5 TABLET ORAL EVERY 4 HOURS
Refills: 0 | Status: DISCONTINUED | OUTPATIENT
Start: 2020-04-03 | End: 2020-04-03

## 2020-04-03 RX ORDER — ASPIRIN/CALCIUM CARB/MAGNESIUM 324 MG
81 TABLET ORAL DAILY
Refills: 0 | Status: DISCONTINUED | OUTPATIENT
Start: 2020-04-03 | End: 2020-04-03

## 2020-04-03 RX ORDER — LABETALOL HCL 100 MG
400 TABLET ORAL THREE TIMES A DAY
Refills: 0 | Status: DISCONTINUED | OUTPATIENT
Start: 2020-04-03 | End: 2020-04-03

## 2020-04-03 RX ORDER — LABETALOL HCL 100 MG
2 TABLET ORAL
Qty: 0 | Refills: 0 | DISCHARGE
Start: 2020-04-03

## 2020-04-03 RX ORDER — ACETAMINOPHEN 500 MG
2 TABLET ORAL
Qty: 0 | Refills: 0 | DISCHARGE
Start: 2020-04-03

## 2020-04-03 RX ADMIN — Medication 20 MILLIEQUIVALENT(S): at 13:27

## 2020-04-03 RX ADMIN — OXYCODONE HYDROCHLORIDE 10 MILLIGRAM(S): 5 TABLET ORAL at 18:02

## 2020-04-03 RX ADMIN — Medication 650 MILLIGRAM(S): at 05:50

## 2020-04-03 RX ADMIN — GABAPENTIN 300 MILLIGRAM(S): 400 CAPSULE ORAL at 05:49

## 2020-04-03 RX ADMIN — Medication 600 MILLIGRAM(S): at 05:50

## 2020-04-03 RX ADMIN — Medication 75 MICROGRAM(S): at 05:49

## 2020-04-03 RX ADMIN — GABAPENTIN 300 MILLIGRAM(S): 400 CAPSULE ORAL at 17:14

## 2020-04-03 RX ADMIN — OXYCODONE HYDROCHLORIDE 5 MILLIGRAM(S): 5 TABLET ORAL at 13:25

## 2020-04-03 RX ADMIN — Medication 81 MILLIGRAM(S): at 13:27

## 2020-04-03 RX ADMIN — LOSARTAN POTASSIUM 100 MILLIGRAM(S): 100 TABLET, FILM COATED ORAL at 05:49

## 2020-04-03 NOTE — DISCHARGE NOTE PROVIDER - CARE PROVIDER_API CALL
Lisker, Jay J (MD)  Cardiovascular Disease; Internal Medicine  [address]  [place] [address]  Phone: [phone]  Fax: [phone]  Follow Up Time:

## 2020-04-03 NOTE — PROGRESS NOTE ADULT - SUBJECTIVE AND OBJECTIVE BOX
Patient is a 69y old  Female who presents with a chief complaint of syncope, leg pain, back pain (03 Apr 2020 11:38)      SUBJECTIVE / OVERNIGHT EVENTS: feels better.  Review of Systems  chest pain no  palpitations no  sob no  nausea no  headache no    MEDICATIONS  (STANDING):  aspirin enteric coated 81 milliGRAM(s) Oral daily  dextrose 5%. 1000 milliLiter(s) (50 mL/Hr) IV Continuous <Continuous>  dextrose 50% Injectable 12.5 Gram(s) IV Push once  dextrose 50% Injectable 25 Gram(s) IV Push once  dextrose 50% Injectable 25 Gram(s) IV Push once  gabapentin 300 milliGRAM(s) Oral two times a day  heparin  Injectable 5000 Unit(s) SubCutaneous every 12 hours  hydrALAZINE 25 milliGRAM(s) Oral every 8 hours  insulin lispro (HumaLOG) corrective regimen sliding scale   SubCutaneous three times a day before meals  insulin lispro (HumaLOG) corrective regimen sliding scale   SubCutaneous at bedtime  labetalol 400 milliGRAM(s) Oral three times a day  levothyroxine 75 MICROGram(s) Oral daily  losartan 100 milliGRAM(s) Oral daily  pantoprazole    Tablet 40 milliGRAM(s) Oral before breakfast  potassium chloride    Tablet ER 20 milliEquivalent(s) Oral daily    MEDICATIONS  (PRN):  acetaminophen   Tablet .. 650 milliGRAM(s) Oral every 6 hours PRN Temp greater or equal to 38.5C (101.3F), Mild Pain (1 - 3)  dextrose 40% Gel 15 Gram(s) Oral once PRN Blood Glucose LESS THAN 70 milliGRAM(s)/deciliter  glucagon  Injectable 1 milliGRAM(s) IntraMuscular once PRN Glucose LESS THAN 70 milligrams/deciliter  oxyCODONE    IR 5 milliGRAM(s) Oral every 4 hours PRN Moderate Pain (4 - 6)  oxyCODONE    IR 10 milliGRAM(s) Oral every 6 hours PRN Severe Pain (7 - 10)      Vital Signs Last 24 Hrs  T(C): 36.8 (03 Apr 2020 17:00), Max: 37.1 (03 Apr 2020 13:23)  T(F): 98.2 (03 Apr 2020 17:00), Max: 98.7 (03 Apr 2020 13:23)  HR: 78 (03 Apr 2020 17:00) (72 - 78)  BP: 123/70 (03 Apr 2020 17:00) (105/68 - 166/75)  BP(mean): --  RR: 18 (03 Apr 2020 17:00) (18 - 18)  SpO2: 95% (03 Apr 2020 17:00) (93% - 95%)    PHYSICAL EXAM:  GENERAL: NAD, well-developed  HEAD:  Atraumatic, Normocephalic  EYES: EOMI, PERRLA, conjunctiva and sclera clear  NECK: Supple, No JVD  CHEST/LUNG: Clear to auscultation bilaterally; No wheeze  HEART: Regular rate and rhythm; No murmurs, rubs, or gallops  ABDOMEN: Soft, Nontender, Nondistended; Bowel sounds present  EXTREMITIES:  2+bipedal edema  PSYCH: AAOx3  NEUROLOGY: non-focal  SKIN: No rashes or lesions    LABS:                        12.4   6.31  )-----------( 263      ( 03 Apr 2020 11:12 )             39.7     04-03    138  |  97  |  38<H>  ----------------------------<  151<H>  3.5   |  25  |  1.45<H>    Ca    9.1      03 Apr 2020 11:12  Mg     2.2     04-02    TPro  7.9  /  Alb  3.9  /  TBili  1.0  /  DBili  x   /  AST  23  /  ALT  25  /  AlkPhos  92  04-02      CARDIAC MARKERS ( 02 Apr 2020 22:29 )  x     / x     / 42 U/L / x     / 1.7 ng/mL            RADIOLOGY & ADDITIONAL TESTS:    Imaging Personally Reviewed:    Consultant(s) Notes Reviewed:      Care Discussed with Consultants/Other Providers:

## 2020-04-03 NOTE — DISCHARGE NOTE PROVIDER - NSDCMRMEDTOKEN_GEN_ALL_CORE_FT
cholecalciferol oral tablet: 2000 unit(s) orally once a day  folic acid 1 mg oral tablet: 1 tab(s) orally once a day  hydrALAZINE 25 mg oral tablet: 1 tab(s) orally every 8 hours  hydrocortisone 1% topical cream: 1 application topically 3 times a day, As needed, Itching  K-Tab 20 mEq oral tablet, extended release: 2 tab(s) orally once a day   labetalol 300 mg oral tablet: 2 tab(s) orally 3 times a day  levothyroxine 75 mcg (0.075 mg) oral tablet: 1 tab(s) orally once a day  losartan 100 mg oral tablet: 1 tab(s) orally once a day  pantoprazole 40 mg oral delayed release tablet: 1 tab(s) orally once a day  Vitamin B-12 1000 mcg oral tablet: 1 tab(s) orally once a day  zinc oxide 20% topical ointment: 1 application topically 2 times a day acetaminophen 325 mg oral tablet: 2 tab(s) orally every 6 hours, As needed, Temp greater or equal to 38.5C (101.3F), Mild Pain (1 - 3)  aspirin 81 mg oral delayed release tablet: 1 tab(s) orally once a day  cholecalciferol oral tablet: 2000 unit(s) orally once a day  folic acid 1 mg oral tablet: 1 tab(s) orally once a day  gabapentin 300 mg oral capsule: 1 cap(s) orally 2 times a day  hydrALAZINE 25 mg oral tablet: 1 tab(s) orally every 8 hours  hydrocortisone 1% topical cream: 1 application topically 3 times a day, As needed, Itching  K-Tab 20 mEq oral tablet, extended release: 2 tab(s) orally once a day   labetalol 200 mg oral tablet: 2 tab(s) orally 3 times a day  levothyroxine 75 mcg (0.075 mg) oral tablet: 1 tab(s) orally once a day  losartan 100 mg oral tablet: 1 tab(s) orally once a day  oxyCODONE 10 mg oral tablet: 1 tab(s) orally every 6 hours, As needed, Severe Pain (7 - 10)  oxyCODONE 5 mg oral tablet: 1 tab(s) orally every 4 hours, As needed, Moderate Pain (4 - 6)  pantoprazole 40 mg oral delayed release tablet: 1 tab(s) orally once a day  Vitamin B-12 1000 mcg oral tablet: 1 tab(s) orally once a day  zinc oxide 20% topical ointment: 1 application topically 2 times a day

## 2020-04-03 NOTE — DISCHARGE NOTE PROVIDER - NSDCFUADDAPPT_GEN_ALL_CORE_FT
Follow up with PMD 1-2 weeks after discharge  Follow up with Cardiology 1-2 weeks after discharge, Dr. Lisker

## 2020-04-03 NOTE — DISCHARGE NOTE PROVIDER - NSDCCPCAREPLAN_GEN_ALL_CORE_FT
PRINCIPAL DISCHARGE DIAGNOSIS  Diagnosis: Syncope, unspecified syncope type  Assessment and Plan of Treatment: keep well hydrated, cautious when changing positions, when lying down sit up and dangle feet for a few mintues before standing up

## 2020-04-03 NOTE — DISCHARGE NOTE PROVIDER - HOSPITAL COURSE
69 year old female with PMHx of DM, HTN, Hypothyroidism, Lymphedema, Obesity, chronic back pain who presents to [facility] ED with a syncope episode this morning. Patient was found in her chair, passed out. No fall injury or trauma. Patient usually takes Neurontin 600mg TID for back pain, but doubled her dose this morning because of worsening lower back pain. Patient DENIES any narcotics use for pain control. EMS found her BP 70/40, , gave 500cc of NS and repeat BP was 112/70. Patient c/o lower back pain, otherwise feeling a lot better at this time in ED. DENIES cough, fever, chest congestion, n/v/d. No known COVID exposure or sick contacts. DENIES dizziness, lightheadedness, chest pain, palpitations, TRAORE, SOB. No prior syncope episodes in the past. 69 year old female with PMHx of DM, HTN, Hypothyroidism, Lymphedema, Obesity, chronic back pain who presents to [facility] ED with a syncope episode this morning. Patient was found in her chair, passed out. No fall injury or trauma. Patient usually takes Neurontin 600mg TID for back pain, but doubled her dose this morning because of worsening lower back pain. Patient DENIES any narcotics use for pain control. EMS found her BP 70/40, , gave 500cc of NS and repeat BP was 112/70. Patient c/o lower back pain, otherwise feeling a lot better at this time in ED. DENIES cough, fever, chest congestion, n/v/d. No known COVID exposure or sick contacts. DENIES dizziness, lightheadedness, chest pain, palpitations, TRAORE, SOB. No prior syncope episodes in the past.     4/3: troponin negative x3, no further episodes of dizziness or syncope, to be discharged to [facility]

## 2020-04-03 NOTE — CONSULT NOTE ADULT - SUBJECTIVE AND OBJECTIVE BOX
70 yo F with hx. of DM, HTN, hypothyroidism, obesity & chronic back pain.  Presents to [facility] ED yesterday after syncope episode that morning. Patient was found in her chair, passed out. No fall injury or trauma. Patient usually takes Neurontin 600mg TID for back pain, but doubled her dose yesterday morning because of worsening lower back pain. Patient DENIES any narcotics use for pain control.   EMS found her BP to be 70/40 & .  EMS gave 500cc of NS and repeat BP was 112/70. Patient c/o lower back pain, otherwise feeling a lot better by time of arrival in ED.  Denied cough, fever, chest congestion, n/v/d. No known COVID exposure or sick contacts. DENIES dizziness, lightheadedness, chest pain, palpitations, TRAORE, SOB.   No prior syncope episodes in the past. (02 Apr 2020 20:06)            PMH:   Lymphedema  Essential hypertension  Carpal tunnel syndrome of right wrist  Obesity (BMI 30-39.9)  Type 2 diabetes mellitus without complication  Hypothyroid    PSH:   S/P carpal tunnel release      Home Medications:  cholecalciferol oral tablet: 2000 unit(s) orally once a day (05 Nov 2019 08:50)  folic acid 1 mg oral tablet: 1 tab(s) orally once a day (05 Nov 2019 08:50)  hydrALAZINE 25 mg oral tablet: 1 tab(s) orally every 8 hours (13 Nov 2019 11:47)  hydrocortisone 1% topical cream: 1 application topically 3 times a day, As needed, Itching (13 Nov 2019 11:47)  labetalol 300 mg oral tablet: 2 tab(s) orally 3 times a day (13 Nov 2019 11:47)  levothyroxine 75 mcg (0.075 mg) oral tablet: 1 tab(s) orally once a day (05 Nov 2019 08:50)  losartan 100 mg oral tablet: 1 tab(s) orally once a day (13 Nov 2019 11:47)  pantoprazole 40 mg oral delayed release tablet: 1 tab(s) orally once a day (05 Nov 2019 08:50)  Vitamin B-12 1000 mcg oral tablet: 1 tab(s) orally once a day (05 Nov 2019 08:50)  zinc oxide 20% topical ointment: 1 application topically 2 times a day (13 Nov 2019 11:47)        Medications:   acetaminophen   Tablet .. 650 milliGRAM(s) Oral every 6 hours PRN  dextrose 40% Gel 15 Gram(s) Oral once PRN  dextrose 5%. 1000 milliLiter(s) IV Continuous <Continuous>  dextrose 50% Injectable 12.5 Gram(s) IV Push once  dextrose 50% Injectable 25 Gram(s) IV Push once  dextrose 50% Injectable 25 Gram(s) IV Push once  gabapentin 300 milliGRAM(s) Oral two times a day  glucagon  Injectable 1 milliGRAM(s) IntraMuscular once PRN  heparin  Injectable 5000 Unit(s) SubCutaneous every 12 hours  hydrALAZINE 25 milliGRAM(s) Oral every 8 hours  insulin lispro (HumaLOG) corrective regimen sliding scale   SubCutaneous three times a day before meals  insulin lispro (HumaLOG) corrective regimen sliding scale   SubCutaneous at bedtime  labetalol 600 milliGRAM(s) Oral three times a day  levothyroxine 75 MICROGram(s) Oral daily  losartan 100 milliGRAM(s) Oral daily  oxycodone    5 mG/acetaminophen 325 mG 1 Tablet(s) Oral every 6 hours PRN  oxycodone    5 mG/acetaminophen 325 mG 2 Tablet(s) Oral every 6 hours PRN  pantoprazole    Tablet 40 milliGRAM(s) Oral before breakfast  potassium chloride    Tablet ER 20 milliEquivalent(s) Oral daily    Allergies:  penicillin (Hives; rash)  sulfa drugs (Unknown)  Tetracycline Hydrochloride (Unknown)      FAMILY HISTORY:  Family history of myocardial infarction (Sibling)  Family history of lung cancer      Social History:  Smoking:  No  Alcohol:  No  Drugs:  No      ROS:  General: no fatigue/malaise, weight loss/gain.  Skin: no rashes.  Eyes: no blurred vision, no loss of vision. 	  ENT: no sore throat, rhinorrhea, sinus congestion.  Respiratory: no SOB, cough or wheeze.  Gastrointestinal:  no N/V/D, no melena/hematemesis/hematochezia.  Genitourinary: no dysuria/hesitancy or hematuria.  Musculoskeletal: no myalgias or arthralgias.  Neurological: no changes in vision or hearing.	  Psychiatric: no unusual stress/anxiety.   Hematology/Lymphatics: no unusual bleeding, bruising and no lymphadenopathy.  All others negative except as stated above and in HPI.       Vital Signs Last 24 Hrs  T(C): 36.6 (03 Apr 2020 05:42), Max: 36.9 (02 Apr 2020 11:15)  T(F): 97.8 (03 Apr 2020 05:42), Max: 98.5 (02 Apr 2020 11:15)  HR: 76 (03 Apr 2020 09:24) (72 - 82)  BP: 105/68 (03 Apr 2020 09:24) (105/68 - 166/75)  RR: 18 (03 Apr 2020 05:42) (18 - 20)  SpO2: 93% (03 Apr 2020 05:42) (90% - 97%)            EXAM:  GENERAL:  Alert, no distress  HEENT: Normocephalic, EOM intact, PERRLA  NECK: Normal carotid upstrokes, no bruit; No JVD  CHEST:  Clear to auscultation  HEART: PMI not displaced. Normal S1 and S2.  No murmur, rub or gallop.  ABDOMEN: Normal bowel sounds, no bruit. Soft, non-tender.  Liver and spleen not palpable; aorta not palpable.  EXT:  No edema, no varicosities, 2+ pulses in upper and lower extremities.  PSYCH:  A&Ox3, normal insight, no anxiety  NEURO: Non-focal  SKIN:  No rash            12 Lead ECG (04.02.20 @ 12:03)    NSR at 74 BPM   P-R Interval 184 ms, QRS Duration 88 ms, Q-T Interval 446 ms   Axis -14 degrees   POSSIBLE LEFT ATRIAL ENLARGEMENT, LEFT VENTRICULAR HYPERTROPHY WITH REPOLARIZATION ABNORMALITY  Confirmed by ANNEMARIE CORNEJO MD ([id number]) on 4/3/2020 8:43:24 AM    Labs:                      14.6   10.21 )-----------( 302      ( 02 Apr 2020 12:14 )             46.9     04-02  142  |  99  |  34<H>  ----------------------------<  159<H>  3.9   |  25  |  1.68<H>    Ca    9.2      02 Apr 2020 12:14  Mg     2.2     04-02    TPro  7.9  /  Alb  3.9  /  TBili  1.0  /  DBili  x   /  AST  23  /  ALT  25  /  AlkPhos  92  04-02    CARDIAC MARKERS ( 02 Apr 2020 22:29 )  x     / x     / 42 U/L / x     / 1.7 ng/mL 70 yo F with hx. of DM, HTN, CAD s/p PCI/stent in 2015 to LAD, 1st diagonal a. and to RCA.  Also, hx of hypothyroidism, morbid obesity,  chronic back pain and lymphedema.  Presents to [facility] ED yesterday.  Was sitting in chair; when roommate called her, there was no response and she had suffered apparent LOC while in chair.  EMS found her BP to be 70/40 & .  EMS gave 500cc of NS and repeat BP was 112/70 and she felt much better..  No cardiac sxs - no dizziness, lightheadedness, chest pain, palpitations, TRAORE, SOB.   No fall, injury or trauma.   Denied cough, fever, chest congestion, n/v/d. No known COVID exposure or sick contacts.  No prior syncope episodes in the past.     At home, takes clonidine 0.1 mg tab per day and Edecrin 25 mg. qod for lymphedema.  Also, says she took extra gabapentin for back pain.      PMH:   Lymphedema  Essential hypertension  Carpal tunnel syndrome of right wrist  Obesity (BMI 30-39.9)  Type 2 diabetes mellitus without complication  Hypothyroid    PSH:   S/P carpal tunnel release      Medications:   acetaminophen   Tablet .. 650 milliGRAM(s) Oral every 6 hours PRN  dextrose 40% Gel 15 Gram(s) Oral once PRN  dextrose 5%. 1000 milliLiter(s) IV Continuous <Continuous>  dextrose 50% Injectable 12.5 Gram(s) IV Push once  dextrose 50% Injectable 25 Gram(s) IV Push once  dextrose 50% Injectable 25 Gram(s) IV Push once  gabapentin 300 milliGRAM(s) Oral two times a day  glucagon  Injectable 1 milliGRAM(s) IntraMuscular once PRN  heparin  Injectable 5000 Unit(s) SubCutaneous every 12 hours  hydrALAZINE 25 milliGRAM(s) Oral every 8 hours  insulin lispro (HumaLOG) corrective regimen sliding scale   SubCutaneous three times a day before meals  insulin lispro (HumaLOG) corrective regimen sliding scale   SubCutaneous at bedtime  labetalol 600 milliGRAM(s) Oral three times a day  levothyroxine 75 MICROGram(s) Oral daily  losartan 100 milliGRAM(s) Oral daily  oxycodone    5 mG/acetaminophen 325 mG 1 Tablet(s) Oral every 6 hours PRN  oxycodone    5 mG/acetaminophen 325 mG 2 Tablet(s) Oral every 6 hours PRN  pantoprazole    Tablet 40 milliGRAM(s) Oral before breakfast  potassium chloride    Tablet ER 20 milliEquivalent(s) Oral daily    Allergies:  penicillin (Hives; rash)  sulfa drugs (Unknown)  Tetracycline Hydrochloride (Unknown)      FAMILY HISTORY:  Family history of myocardial infarction (Sibling)  Family history of lung cancer      Social History:  Smoking:  No  Alcohol:  No  Drugs:  No      ROS:  General: no fatigue/malaise, weight loss/gain.  Skin: no rashes.  Eyes: no blurred vision, no loss of vision. 	  ENT: no sore throat, rhinorrhea, sinus congestion.  Respiratory: no SOB, cough or wheeze.  Gastrointestinal:  no N/V/D, no melena/hematemesis/hematochezia.  Genitourinary: no dysuria/hesitancy or hematuria.  Musculoskeletal: no myalgias or arthralgias.  Neurological: no changes in vision or hearing.	  Psychiatric: no unusual stress/anxiety.   Hematology/Lymphatics: no unusual bleeding, bruising and no lymphadenopathy.  All others negative except as stated above and in HPI.       Vital Signs Last 24 Hrs  T(C): 36.6 (03 Apr 2020 05:42), Max: 36.9 (02 Apr 2020 11:15)  T(F): 97.8 (03 Apr 2020 05:42), Max: 98.5 (02 Apr 2020 11:15)  HR: 76 (03 Apr 2020 09:24) (72 - 82)  BP: 105/68 (03 Apr 2020 09:24) (105/68 - 166/75)  RR: 18 (03 Apr 2020 05:42) (18 - 20)  SpO2: 93% (03 Apr 2020 05:42) (90% - 97%)      GENERAL:  Alert, no distress  HEENT: Normocephalic, EOM intact, PERRLA  NECK: Normal carotid upstrokes, no bruit; No JVD  CHEST:  Clear to auscultation  HEART: PMI not displaced. Normal S1 and S2.  2/6 systolic murmur at base; no rub or gallop.  ABDOMEN: Normal bowel sounds, no bruit. Soft, non-tender.  Liver and spleen not palpable; aorta not palpable.  EXT:  2+ LE edema, 2+ pulses in upper and lower extremities.  PSYCH:  A&Ox3, normal insight  NEURO: Non-focal  SKIN:  No rash            12 Lead ECG (04.02.20 @ 12:03)    NSR at 74 BPM   P-R Interval 184 ms, QRS Duration 88 ms, Q-T Interval 446 ms   Axis -14 degrees   POSSIBLE LEFT ATRIAL ENLARGEMENT, LEFT VENTRICULAR HYPERTROPHY WITH REPOLARIZATION ABNORMALITY  Confirmed by ANNEMARIE CORNEJO MD ([id number]) on 4/3/2020 8:43:24 AM    Labs:                      14.6   10.21 )-----------( 302      ( 02 Apr 2020 12:14 )             46.9     04-02  142  |  99  |  34<H>  ----------------------------<  159<H>  3.9   |  25  |  1.68<H>    Ca    9.2      02 Apr 2020 12:14  Mg     2.2     04-02    TPro  7.9  /  Alb  3.9  /  TBili  1.0  /  DBili  x   /  AST  23  /  ALT  25  /  AlkPhos  92  04-02    CARDIAC MARKERS ( 02 Apr 2020 22:29 )  x     / x     / 42 U/L / x     / 1.7 ng/mL    Troponin T, High Sensitivity (04.02.20 @ 22:29)    Troponin T, High Sensitivity Result: 30: Rapid upward or downward changes in high-sensitivity troponin levels  suggest acute myocardial injury. Renal impairment may cause sustained  troponin elevations.  Normal: <6 - 14 ng/L  Indeterminate: 15-51 ng/L  Elevated: > 51 ng/L  See http://labs/test/[email or web address] on the [facility] for more  information ng/L    Troponin T, High Sensitivity (04.02.20 @ 12:14)    Troponin T, High Sensitivity Result: 31: Rapid upward or downward changes in high-sensitivity troponin levels  suggest acute myocardial injury. Renal impairment may cause sustained  troponin elevations.  Normal: <6 - 14 ng/L  Indeterminate: 15-51 ng/L  Elevated: > 51 ng/L  See [email or web address]/test/[email or web address] on the [facility] for more  information ng/L      Transthoracic Echocardiogram (11.05.19 @ 15:02) >  Patient name: TIANA BROWN  YOB: 1950   Age: 69 (F)   MR#: [id number]  Study Date: 11/5/2019  Location: [address] [place], [place] [address]onographer: Stephany Phelan [facility]  Study quality: Technically difficult    DIMENSIONS:  Dimensions:     Normal Values:  LA:     5.0 cm    2.0 - 4.0 cm  Ao:     3.2 cm    2.0 - 3.8 cm  SEPTUM: 1.0 cm    0.6 - 1.2 cm  PWT:    1.0 cm    0.6 - 1.1 cm  LVIDd:  5.4 cm    3.0 - 5.6 cm  LVIDs:  3.4 cm    1.8 - 4.0 cm    Derived Variables:  LVMI: 107 g/m2  RWT: 0.37  Ejection Fraction Visual Estimate: >55 %  Peak Velocity (m/sec): AoV=2.3  ------------------------------------------------------------------------  OBSERVATIONS:  Mitral Valve: Mitral annular calcification. Mild mitral regurgitation.  Aortic Root: Normal aortic root.  Aortic Valve: Calcified aortic valve with decreased opening. Peak transaortic valve gradient equals 21 mm Hg, mean transaortic valve gradient equals 12 mm Hg, estimated aortic valve area equals 1.8 sqcm (by continuity equation), consistent with mild aortic stenosis.   Left Atrium: Mild left atrial enlargement.   Left Ventricle: Endocardium not well visualized; grossly normal left ventricular systolic function.   Segmental wall motion could not be assessed.   Mild diastolic dysfunction (stage I). Mild concentric left ventricular hypertrophy.   Right Heart: Normal right atrium. Normal right ventricular size and function. There is trace tricuspid regurgitation. There is trace pulmonic regurgitation.  Pericardium/PleuraNormal pericardium with no pericardial effusion.  Hemodynamic: Incomplete tricuspid regurgitation jet precludes accurate assessment of pulmonary artery systolic pressure.  ------------------------------------------------------------------------  CONCLUSIONS:  1. Mitral annular calcification. Mild mitral regurgitation.  2. Calcified aortic valve with decreased opening. Peak transaortic valve gradient equals 21 mm Hg, mean transaortic valve gradient equals 12 mm Hg, estimated aortic valve area equals 1.8 sqcm (by continuity equation),  consistent with mild aortic stenosis.  3. Mild left atrial enlargement.  4. Mild concentric left ventricular hypertrophy.  5. Endocardium not well visualized; grossly normal left ventricular systolic function.   Segmental wall motion could not be assessed.   Mild diastolic dysfunction (stage I).  6. Normal right ventricular size and function.  ------------------------------------------------------------------------  Confirmed on  11/5/2019 - 12:26:58 by Jeet Coppola MD  ------------------------------------------------------------------------

## 2020-04-03 NOTE — CONSULT NOTE ADULT - ASSESSMENT
68 yo F with hx. of DM, HTN, hypothyroidism, obesity & chronic back pain.  Presents to [facility] ED yesterday after syncope episode that morning. Patient was found in her chair, passed out. No fall injury or trauma. Patient usually takes Neurontin 600mg TID for back pain, but doubled her dose yesterday morning because of worsening lower back pain. Patient DENIES any narcotics use for pain control.   EMS found her BP to be 70/40 & .  EMS gave 500cc of NS and repeat BP was 112/70. Patient c/o lower back pain, otherwise feeling a lot better by time of arrival in ED.  Denied cough, fever, chest congestion, n/v/d. No known COVID exposure or sick contacts. DENIES dizziness, lightheadedness, chest pain, palpitations, TRAORE, SOB.   No prior syncope episodes in the past. (02 Apr 2020 20:06)      69 f with    Syncope  - probably from extra dose of Gabapentin  - cardiac enzymes  - cardiology evaluation Dr. Lisker    Back pain  - pain control  - PT    Hypothyroid  - continue Rx  - TSH    DM (diabetes mellitus)   - BS control  - ADA diet    Essential hypertension  - control     CKD   - follow closely    Lymphedema   - local Rx    Obesity   - nutrition education 68 yo F with hx. of DM, HTN, hypothyroidism, obesity & chronic back pain.  Presented to [facility] ED yesterday after unresponsive episode/apparent syncope.  Found to be hypotensive when EMS arrived (BP 70/40);   EMS gave 500cc of NS and repeat BP was 112/70.   No known COVID exposure or sick contacts.  No prior syncope episodes in the past.   Reports that she took  extra gabapentin for chronic back  pain.      REC:  1.  Syncope, with decreased BP reported by EMS  - ?overmedicated.  Acknowledged that she took extra dose of Gabapentin; not clear to me that she takes BP meds as prescribed by Dr. Lisker.  - cardiac enzymes appear neg.    2.  Mild A.S. by TTE in Nov. 2019; insufficient to cause LOC.    3.  CAD  - no evidence of MI by cardiac enz.  - ASA 81 mg qd  - continue statin  - BP meds    4. HLD  - continue simvastatin    5.  Essential hypertension  - would d/c clonidine (has not been receiving here).  - would reduce labetolol to 400 mg tid given hypotension on presentation    6.  Lymphedema  - continue Edecrin 25 mg. qod      Will discuss with Dr. Lisker and with Dr. Tawnya Wesley M.D.  Office: [phone]  Beeper: [phone] 68 yo F with hx. of DM, HTN, hypothyroidism, obesity & chronic back pain.  Presented to [facility] ED yesterday after unresponsive episode/apparent syncope.  Found to be hypotensive when EMS arrived (BP 70/40);   EMS gave 500cc of NS and repeat BP was 112/70.   No known COVID exposure or sick contacts.  No prior syncope episodes in the past.   Reports that she took  extra gabapentin for chronic back  pain.      REC:  1.  Syncope, apparently due to hypotension as reported by EMS  - ?overmedicated.  Acknowledged that she took extra dose of gabapentin; not clear to me that she takes BP meds as prescribed by Dr. Lisker.  - cardiac enzymes appear neg.    2.  Mild A.S. by TTE in Nov. 2019; insufficient to cause LOC.    3.  CAD  - no evidence of MI by cardiac enz.  - ASA 81 mg qd  - continue statin  - BP meds    4. HLD  - continue simvastatin    5.  Essential hypertension  - would d/c clonidine (has not been receiving here).  - would reduce labetolol to 400 mg tid given hypotension on presentation    6.  Lymphedema  - continue Edecrin 25 mg. qod      Will discuss with Dr. Lisker and with Dr. Tawnya Wesley M.D.  Office: [phone]  Beeper: [phone]

## 2020-04-03 NOTE — PROGRESS NOTE ADULT - ASSESSMENT
69 f with    Syncope  - probably from extra dose of Gabapentin  - cardiac enzymes noted  - cardiology evaluation     Back pain  - pain control  - PT    Hypothyroid  - continue Rx    DM (diabetes mellitus)   - BS control  - ADA diet    Essential hypertension  - control     CKD   - follow closely    Lymphedema   - local Rx    Obesity   - nutrition education    DCP rehab vs home.     Russell Bowling MD pager [phone]

## 2020-04-07 ENCOUNTER — APPOINTMENT (OUTPATIENT)
Dept: CARDIOLOGY | Facility: CLINIC | Age: 70
End: 2020-04-07
Payer: MEDICARE

## 2020-04-07 PROCEDURE — 99441: CPT

## 2020-04-07 NOTE — HISTORY OF PRESENT ILLNESS
[FreeTextEntry1] : TELEPHONE VISIT\par \par TeleHealth Telephone Encounter:\par  \par Initiated by:\par _x_Patient Call\par x__Patient Election for TeleHealth visit\par  \par Consented for TeleHealth visit\par Patient Location:  Home\par Physician Location:\par x__Office([address]; [address], [address], [place], [place], [address])\par __Home\par  \par Narrative[facility] \par Taking some medicine for pain\par concerned about UTI\par Weaning off clonidine at rehab.\par  \par Assessment: Pt reasured\par  \par Plan: Follouwp in May\par  \par \par Follow-up:\par  \par  \par Duration of Encounter:  __5_ minutes, at least 50% of which was spent in direct counseling and coordination of care\par \par  \par

## 2020-05-19 ENCOUNTER — RX RENEWAL (OUTPATIENT)
Age: 70
End: 2020-05-19

## 2020-06-03 ENCOUNTER — APPOINTMENT (OUTPATIENT)
Dept: CARDIOLOGY | Facility: CLINIC | Age: 70
End: 2020-06-03

## 2020-06-15 ENCOUNTER — APPOINTMENT (OUTPATIENT)
Dept: CARDIOLOGY | Facility: CLINIC | Age: 70
End: 2020-06-15
Payer: MEDICARE

## 2020-06-15 ENCOUNTER — NON-APPOINTMENT (OUTPATIENT)
Age: 70
End: 2020-06-15

## 2020-06-15 VITALS
SYSTOLIC BLOOD PRESSURE: 142 MMHG | TEMPERATURE: 97.4 F | HEART RATE: 71 BPM | DIASTOLIC BLOOD PRESSURE: 88 MMHG | OXYGEN SATURATION: 97 %

## 2020-06-15 DIAGNOSIS — I25.10 ATHEROSCLEROTIC HEART DISEASE OF NATIVE CORONARY ARTERY W/OUT ANGINA PECTORIS: ICD-10-CM

## 2020-06-15 PROCEDURE — 93000 ELECTROCARDIOGRAM COMPLETE: CPT

## 2020-06-15 PROCEDURE — 99214 OFFICE O/P EST MOD 30 MIN: CPT

## 2020-06-15 RX ORDER — NYSTATIN 100000 [USP'U]/G
100000 POWDER TOPICAL
Qty: 30 | Refills: 0 | Status: DISCONTINUED | COMMUNITY
Start: 2020-05-19

## 2020-06-15 RX ORDER — GABAPENTIN 100 MG/1
100 CAPSULE ORAL
Qty: 14 | Refills: 0 | Status: DISCONTINUED | COMMUNITY
Start: 2020-03-18

## 2020-06-15 RX ORDER — ASCORBIC ACID 500 MG
500 TABLET ORAL
Qty: 30 | Refills: 0 | Status: DISCONTINUED | COMMUNITY
Start: 2020-06-07

## 2020-06-15 RX ORDER — POTASSIUM CHLORIDE 1.5 G/1.58G
20 POWDER, FOR SOLUTION ORAL
Qty: 60 | Refills: 0 | Status: DISCONTINUED | COMMUNITY
Start: 2020-05-19

## 2020-06-15 RX ORDER — LOSARTAN POTASSIUM 100 MG/1
100 TABLET, FILM COATED ORAL
Qty: 1 | Refills: 1 | Status: DISCONTINUED | COMMUNITY
Start: 2018-08-13 | End: 2020-06-15

## 2020-06-15 RX ORDER — LEVOTHYROXINE SODIUM 75 UG/1
75 TABLET ORAL
Refills: 0 | Status: DISCONTINUED | COMMUNITY
End: 2020-06-15

## 2020-06-15 RX ORDER — ADHESIVE TAPE 3"X 2.3 YD
50 MCG TAPE, NON-MEDICATED TOPICAL
Qty: 30 | Refills: 0 | Status: DISCONTINUED | COMMUNITY
Start: 2020-01-30

## 2020-06-15 RX ORDER — POTASSIUM CHLORIDE 1500 MG/1
20 TABLET, FILM COATED, EXTENDED RELEASE ORAL
Refills: 0 | Status: DISCONTINUED | COMMUNITY
End: 2020-06-15

## 2020-06-15 RX ORDER — TOBRAMYCIN AND DEXAMETHASONE 3; 1 MG/ML; MG/ML
0.3-0.1 SUSPENSION/ DROPS OPHTHALMIC
Qty: 2 | Refills: 0 | Status: DISCONTINUED | COMMUNITY
Start: 2020-02-21

## 2020-06-15 RX ORDER — LIDOCAINE 5% 700 MG/1
5 PATCH TOPICAL
Qty: 30 | Refills: 0 | Status: DISCONTINUED | COMMUNITY
Start: 2020-03-22

## 2020-06-15 NOTE — CARDIOLOGY SUMMARY
[___] : [unfilled] [LVEF ___%] : LVEF [unfilled]% [None] : no pulmonary hypertension [Mild] : mild mitral regurgitation [___] : [unfilled]

## 2020-06-15 NOTE — HISTORY OF PRESENT ILLNESS
[FreeTextEntry1] : She was seen by a PMD, Dr. Rivera.\par Covid Ab seen.\par She is concerned about getting COVID.\par She is accompanied by home health aide.\par She has lost some weight and her leg edema appears less.\par She has been taking her medications and reviewed them in detail.\par She reports not walking very much at home and relies on wheelchairs.\par

## 2020-06-15 NOTE — PHYSICAL EXAM
[Murmurs] : no murmurs present [Auscultation Breath Sounds / Voice Sounds] : lungs were clear to auscultation bilaterally [Abdomen Mass (___ Cm)] : no abdominal mass palpated [Abdomen Tenderness] : non-tender [] : no ischemic changes [Normal Conjunctiva] : the conjunctiva exhibited no abnormalities [Normal Jugular Venous V Waves Present] : normal jugular venous V waves present [No Oral Pallor] : no oral pallor [Heart Sounds] : normal S1 and S2 [Respiration, Rhythm And Depth] : normal respiratory rhythm and effort [Arterial Pulses Normal] : the arterial pulses were normal [Abdomen Soft] : soft [Bowel Sounds] : normal bowel sounds [Cyanosis, Localized] : no localized cyanosis [FreeTextEntry1] : Gait was not assessed. [Skin Turgor] : normal skin turgor [Oriented To Time, Place, And Person] : oriented to person, place, and time

## 2020-06-15 NOTE — DISCUSSION/SUMMARY
[FreeTextEntry1] : She is a 69-year-old, morbidly obese woman with resistant hypertension, Lymphedema and coronary artery disease, status post drug-eluting stents. She has no reported anginal symptoms or CHF. \par She has lost weight and I encouraged her to be more physically active.  Her blood pressure control is much improved and I have suggested that she continue her current medical regimen as is.\par I encouraged her to follow-up with her primary doctor, Dr. Rivera and to have his labs forwarded to me for review.

## 2020-06-18 ENCOUNTER — RX RENEWAL (OUTPATIENT)
Age: 70
End: 2020-06-18

## 2020-07-20 ENCOUNTER — TRANSCRIPTION ENCOUNTER (OUTPATIENT)
Age: 70
End: 2020-07-20

## 2020-07-20 ENCOUNTER — INPATIENT (INPATIENT)
Facility: HOSPITAL | Age: 70
LOS: 13 days | Discharge: NURSING, REHAB OR LONG-TERM CARE | DRG: 281 | End: 2020-08-03
Attending: INTERNAL MEDICINE | Admitting: INTERNAL MEDICINE
Payer: MEDICARE

## 2020-07-20 VITALS — HEART RATE: 112 BPM | RESPIRATION RATE: 20 BRPM | WEIGHT: 199.96 LBS | OXYGEN SATURATION: 92 %

## 2020-07-20 DIAGNOSIS — Z98.89 OTHER SPECIFIED POSTPROCEDURAL STATES: Chronic | ICD-10-CM

## 2020-07-20 DIAGNOSIS — E11.9 TYPE 2 DIABETES MELLITUS WITHOUT COMPLICATIONS: ICD-10-CM

## 2020-07-20 DIAGNOSIS — N17.9 ACUTE KIDNEY FAILURE, UNSPECIFIED: ICD-10-CM

## 2020-07-20 DIAGNOSIS — I10 ESSENTIAL (PRIMARY) HYPERTENSION: ICD-10-CM

## 2020-07-20 DIAGNOSIS — Z29.9 ENCOUNTER FOR PROPHYLACTIC MEASURES, UNSPECIFIED: ICD-10-CM

## 2020-07-20 DIAGNOSIS — I48.91 UNSPECIFIED ATRIAL FIBRILLATION: ICD-10-CM

## 2020-07-20 DIAGNOSIS — E66.9 OBESITY, UNSPECIFIED: ICD-10-CM

## 2020-07-20 DIAGNOSIS — E03.9 HYPOTHYROIDISM, UNSPECIFIED: ICD-10-CM

## 2020-07-20 DIAGNOSIS — I89.0 LYMPHEDEMA, NOT ELSEWHERE CLASSIFIED: ICD-10-CM

## 2020-07-20 LAB
ALBUMIN SERPL ELPH-MCNC: 3.3 G/DL — LOW (ref 3.5–5)
ALP SERPL-CCNC: 107 U/L — SIGNIFICANT CHANGE UP (ref 40–120)
ALT FLD-CCNC: 41 U/L DA — SIGNIFICANT CHANGE UP (ref 10–60)
ANION GAP SERPL CALC-SCNC: 9 MMOL/L — SIGNIFICANT CHANGE UP (ref 5–17)
APTT BLD: 30.8 SEC — SIGNIFICANT CHANGE UP (ref 27.5–35.5)
AST SERPL-CCNC: 41 U/L — HIGH (ref 10–40)
BASOPHILS # BLD AUTO: 0.06 K/UL — SIGNIFICANT CHANGE UP (ref 0–0.2)
BASOPHILS NFR BLD AUTO: 0.7 % — SIGNIFICANT CHANGE UP (ref 0–2)
BILIRUB SERPL-MCNC: 1.7 MG/DL — HIGH (ref 0.2–1.2)
BUN SERPL-MCNC: 15 MG/DL — SIGNIFICANT CHANGE UP (ref 7–18)
CALCIUM SERPL-MCNC: 9.3 MG/DL — SIGNIFICANT CHANGE UP (ref 8.4–10.5)
CHLORIDE SERPL-SCNC: 106 MMOL/L — SIGNIFICANT CHANGE UP (ref 96–108)
CO2 SERPL-SCNC: 26 MMOL/L — SIGNIFICANT CHANGE UP (ref 22–31)
CREAT SERPL-MCNC: 1.64 MG/DL — HIGH (ref 0.5–1.3)
D DIMER BLD IA.RAPID-MCNC: 410 NG/ML DDU — HIGH
EOSINOPHIL # BLD AUTO: 0.04 K/UL — SIGNIFICANT CHANGE UP (ref 0–0.5)
EOSINOPHIL NFR BLD AUTO: 0.5 % — SIGNIFICANT CHANGE UP (ref 0–6)
GLUCOSE BLDC GLUCOMTR-MCNC: 117 MG/DL — HIGH (ref 70–99)
GLUCOSE SERPL-MCNC: 130 MG/DL — HIGH (ref 70–99)
HCT VFR BLD CALC: 43 % — SIGNIFICANT CHANGE UP (ref 34.5–45)
HGB BLD-MCNC: 14.9 G/DL — SIGNIFICANT CHANGE UP (ref 11.5–15.5)
IMM GRANULOCYTES NFR BLD AUTO: 0.3 % — SIGNIFICANT CHANGE UP (ref 0–1.5)
INR BLD: 1.15 RATIO — SIGNIFICANT CHANGE UP (ref 0.88–1.16)
LYMPHOCYTES # BLD AUTO: 0.96 K/UL — LOW (ref 1–3.3)
LYMPHOCYTES # BLD AUTO: 10.8 % — LOW (ref 13–44)
MCHC RBC-ENTMCNC: 29.9 PG — SIGNIFICANT CHANGE UP (ref 27–34)
MCHC RBC-ENTMCNC: 34.7 GM/DL — SIGNIFICANT CHANGE UP (ref 32–36)
MCV RBC AUTO: 86.3 FL — SIGNIFICANT CHANGE UP (ref 80–100)
MONOCYTES # BLD AUTO: 0.59 K/UL — SIGNIFICANT CHANGE UP (ref 0–0.9)
MONOCYTES NFR BLD AUTO: 6.7 % — SIGNIFICANT CHANGE UP (ref 2–14)
NEUTROPHILS # BLD AUTO: 7.19 K/UL — SIGNIFICANT CHANGE UP (ref 1.8–7.4)
NEUTROPHILS NFR BLD AUTO: 81 % — HIGH (ref 43–77)
NRBC # BLD: 0 /100 WBCS — SIGNIFICANT CHANGE UP (ref 0–0)
NT-PROBNP SERPL-SCNC: 4439 PG/ML — HIGH (ref 0–125)
PLATELET # BLD AUTO: 330 K/UL — SIGNIFICANT CHANGE UP (ref 150–400)
POTASSIUM SERPL-MCNC: 3.6 MMOL/L — SIGNIFICANT CHANGE UP (ref 3.5–5.3)
POTASSIUM SERPL-SCNC: 3.6 MMOL/L — SIGNIFICANT CHANGE UP (ref 3.5–5.3)
PROT SERPL-MCNC: 7.4 G/DL — SIGNIFICANT CHANGE UP (ref 6–8.3)
PROTHROM AB SERPL-ACNC: 13.4 SEC — SIGNIFICANT CHANGE UP (ref 10.6–13.6)
RBC # BLD: 4.98 M/UL — SIGNIFICANT CHANGE UP (ref 3.8–5.2)
RBC # FLD: 13.6 % — SIGNIFICANT CHANGE UP (ref 10.3–14.5)
SODIUM SERPL-SCNC: 141 MMOL/L — SIGNIFICANT CHANGE UP (ref 135–145)
TROPONIN I SERPL-MCNC: 2.81 NG/ML — HIGH (ref 0–0.04)
TSH SERPL-MCNC: 0.6 UU/ML — SIGNIFICANT CHANGE UP (ref 0.34–4.82)
WBC # BLD: 8.87 K/UL — SIGNIFICANT CHANGE UP (ref 3.8–10.5)
WBC # FLD AUTO: 8.87 K/UL — SIGNIFICANT CHANGE UP (ref 3.8–10.5)

## 2020-07-20 PROCEDURE — 71045 X-RAY EXAM CHEST 1 VIEW: CPT | Mod: 26

## 2020-07-20 PROCEDURE — 99285 EMERGENCY DEPT VISIT HI MDM: CPT | Mod: 25

## 2020-07-20 PROCEDURE — 93970 EXTREMITY STUDY: CPT | Mod: 26

## 2020-07-20 PROCEDURE — 93880 EXTRACRANIAL BILAT STUDY: CPT | Mod: 26

## 2020-07-20 RX ORDER — SODIUM CHLORIDE 9 MG/ML
1000 INJECTION INTRAMUSCULAR; INTRAVENOUS; SUBCUTANEOUS
Refills: 0 | Status: DISCONTINUED | OUTPATIENT
Start: 2020-07-20 | End: 2020-07-20

## 2020-07-20 RX ORDER — ACETAMINOPHEN 500 MG
650 TABLET ORAL EVERY 6 HOURS
Refills: 0 | Status: DISCONTINUED | OUTPATIENT
Start: 2020-07-20 | End: 2020-08-03

## 2020-07-20 RX ORDER — LABETALOL HCL 100 MG
400 TABLET ORAL THREE TIMES A DAY
Refills: 0 | Status: DISCONTINUED | OUTPATIENT
Start: 2020-07-20 | End: 2020-07-23

## 2020-07-20 RX ORDER — PANTOPRAZOLE SODIUM 20 MG/1
40 TABLET, DELAYED RELEASE ORAL
Refills: 0 | Status: DISCONTINUED | OUTPATIENT
Start: 2020-07-20 | End: 2020-08-03

## 2020-07-20 RX ORDER — CHOLECALCIFEROL (VITAMIN D3) 125 MCG
1000 CAPSULE ORAL DAILY
Refills: 0 | Status: DISCONTINUED | OUTPATIENT
Start: 2020-07-20 | End: 2020-08-03

## 2020-07-20 RX ORDER — DIGOXIN 250 MCG
0.5 TABLET ORAL ONCE
Refills: 0 | Status: DISCONTINUED | OUTPATIENT
Start: 2020-07-20 | End: 2020-07-20

## 2020-07-20 RX ORDER — DIGOXIN 250 MCG
0.5 TABLET ORAL ONCE
Refills: 0 | Status: COMPLETED | OUTPATIENT
Start: 2020-07-20 | End: 2020-07-20

## 2020-07-20 RX ORDER — FOLIC ACID 0.8 MG
1 TABLET ORAL DAILY
Refills: 0 | Status: DISCONTINUED | OUTPATIENT
Start: 2020-07-20 | End: 2020-08-03

## 2020-07-20 RX ORDER — BENZOCAINE AND MENTHOL 5; 1 G/100ML; G/100ML
1 LIQUID ORAL THREE TIMES A DAY
Refills: 0 | Status: DISCONTINUED | OUTPATIENT
Start: 2020-07-20 | End: 2020-08-03

## 2020-07-20 RX ORDER — PREGABALIN 225 MG/1
1000 CAPSULE ORAL DAILY
Refills: 0 | Status: DISCONTINUED | OUTPATIENT
Start: 2020-07-20 | End: 2020-08-03

## 2020-07-20 RX ORDER — ASPIRIN/CALCIUM CARB/MAGNESIUM 324 MG
81 TABLET ORAL DAILY
Refills: 0 | Status: DISCONTINUED | OUTPATIENT
Start: 2020-07-20 | End: 2020-08-03

## 2020-07-20 RX ORDER — TOBRAMYCIN 0.3 %
1 DROPS OPHTHALMIC (EYE)
Refills: 0 | Status: COMPLETED | OUTPATIENT
Start: 2020-07-20 | End: 2020-07-27

## 2020-07-20 RX ORDER — DILTIAZEM HCL 120 MG
5 CAPSULE, EXT RELEASE 24 HR ORAL ONCE
Refills: 0 | Status: COMPLETED | OUTPATIENT
Start: 2020-07-20 | End: 2020-07-20

## 2020-07-20 RX ORDER — HEPARIN SODIUM 5000 [USP'U]/ML
INJECTION INTRAVENOUS; SUBCUTANEOUS
Qty: 25000 | Refills: 0 | Status: DISCONTINUED | OUTPATIENT
Start: 2020-07-20 | End: 2020-07-22

## 2020-07-20 RX ORDER — INSULIN LISPRO 100/ML
VIAL (ML) SUBCUTANEOUS
Refills: 0 | Status: DISCONTINUED | OUTPATIENT
Start: 2020-07-20 | End: 2020-08-03

## 2020-07-20 RX ORDER — DILTIAZEM HCL 120 MG
20 CAPSULE, EXT RELEASE 24 HR ORAL ONCE
Refills: 0 | Status: DISCONTINUED | OUTPATIENT
Start: 2020-07-20 | End: 2020-07-20

## 2020-07-20 RX ORDER — SODIUM CHLORIDE 9 MG/ML
500 INJECTION INTRAMUSCULAR; INTRAVENOUS; SUBCUTANEOUS ONCE
Refills: 0 | Status: COMPLETED | OUTPATIENT
Start: 2020-07-20 | End: 2020-07-20

## 2020-07-20 RX ORDER — GABAPENTIN 400 MG/1
300 CAPSULE ORAL
Refills: 0 | Status: DISCONTINUED | OUTPATIENT
Start: 2020-07-20 | End: 2020-08-03

## 2020-07-20 RX ORDER — LEVOTHYROXINE SODIUM 125 MCG
75 TABLET ORAL DAILY
Refills: 0 | Status: DISCONTINUED | OUTPATIENT
Start: 2020-07-20 | End: 2020-08-03

## 2020-07-20 RX ADMIN — SODIUM CHLORIDE 85 MILLILITER(S): 9 INJECTION INTRAMUSCULAR; INTRAVENOUS; SUBCUTANEOUS at 15:16

## 2020-07-20 RX ADMIN — HEPARIN SODIUM 1700 UNIT(S)/HR: 5000 INJECTION INTRAVENOUS; SUBCUTANEOUS at 17:30

## 2020-07-20 RX ADMIN — BENZOCAINE AND MENTHOL 1 LOZENGE: 5; 1 LIQUID ORAL at 22:08

## 2020-07-20 RX ADMIN — Medication 5 MILLIGRAM(S): at 13:44

## 2020-07-20 RX ADMIN — SODIUM CHLORIDE 500 MILLILITER(S): 9 INJECTION INTRAMUSCULAR; INTRAVENOUS; SUBCUTANEOUS at 15:16

## 2020-07-20 RX ADMIN — Medication 400 MILLIGRAM(S): at 22:08

## 2020-07-20 RX ADMIN — Medication 0.5 MILLIGRAM(S): at 14:00

## 2020-07-20 NOTE — DISCHARGE NOTE PROVIDER - PROVIDER TOKENS
PROVIDER:[TOKEN:[[id number]],FOLLOWUP:[1 week]],PROVIDER:[TOKEN:[[id number]],FOLLOWUP:[1 week]]

## 2020-07-20 NOTE — DISCHARGE NOTE PROVIDER - CARE PROVIDER_API CALL
JUAN DANIEL FORBES  Family Practice  [id number] [place] [address]  Phone: [phone]  Fax: [phone]  Follow Up Time: 1 week    Miller Kuo)  Cardiology  [address] [place] [address]  Phone: [phone]  Fax: [phone]  Follow Up Time: 1 week

## 2020-07-20 NOTE — CHART NOTE - NSCHARTNOTEFT_GEN_A_CORE
EVENT:   - HPI: 69 y/o F with a significant PMHx of carpal tunnel syndrome, HTN, hypothyroid, lymphedema, presents to the ED for lightheadedness and dizziness today. In the ED,  Pt is noted to be AAOX3, No visible distress Vitals- Hypotensive 88/52 On admission, s/p Dig  Repeat 181/106, CT Head- no acute changes, EKG- new onset AFib with RVR, S/p Dig loading with  0.5mg and Diltiazem 5mg. Repeat EKG showed NSR with T wave inversions. Trops- 2.8, BNP- 4400. Pt admitted for New onset Afib RVR.    - Pt seen at bedside and reported that she's been taking Tobramycin 0.3% eye drops for both eyes, BID, for the past 3 days and has to take it for a total of 10 days as per her PCP for meet eye infection. She has not brought her med with her.    OBJECTIVE:  Vital Signs Last 24 Hrs  T(C): 36.9 (20 Jul 2020 22:00), Max: 37.8 (20 Jul 2020 13:05)  T(F): 98.5 (20 Jul 2020 22:00), Max: 100 (20 Jul 2020 13:05)  HR: 84 (20 Jul 2020 22:00) (84 - 112)  BP: 197/97 (20 Jul 2020 22:00) (88/52 - 197/97)  BP(mean): 130 (20 Jul 2020 22:00) (130 - 130)  RR: 20 (20 Jul 2020 22:00) (20 - 20)  SpO2: 97% (20 Jul 2020 22:00) (92% - 97%)    FOCUSED PHYSICAL EXAM:  Neuro: awake, alert, oriented x 3. No neuro deficit  Cardiovascular: Pulses +2 B/L in lower and upper extremities, HR regular, BP stable, No edema.  Respiratory: Respirations regular, unlabored, breath sounds clear B/L.   GI: Abdomen soft, non-tender, positive bowel sounds.  : no bladder distention noted. No complaints at this time.  Skin: Dry, intact, no bruising, no diaphoresis.    LABS:                        14.9   8.87  )-----------( 330      ( 20 Jul 2020 13:36 )             43.0   CARDIAC MARKERS ( 20 Jul 2020 13:36 )  2.810 ng/mL / x     / x     / x     / x        07-20    141  |  106  |  15  ----------------------------<  130<H>  3.6   |  26  |  1.64<H>    Ca    9.3      20 Jul 2020 13:36    TPro  7.4  /  Alb  3.3<L>  /  TBili  1.7<H>  /  DBili  x   /  AST  41<H>  /  ALT  41  /  AlkPhos  107  07-20      EKG:   IMGAGING:      Assessment/problem: Meet eye infection    PLAN:   1. order Tobramycin 0.3% Opth solution, instill in OU, BID x 7 more days  2. Cont present care/treatment

## 2020-07-20 NOTE — H&P ADULT - PROBLEM SELECTOR PLAN 3
Pt has PMH of HTN   Pt on admission was Hypotensive 88/52  s/p Dig loading BP - 188/108  Home meds- Labetalol 400mg TID   c/w home meds with parameters  Monitor vitals

## 2020-07-20 NOTE — DISCHARGE NOTE PROVIDER - NSDCCPCAREPLAN_GEN_ALL_CORE_FT
PRINCIPAL DISCHARGE DIAGNOSIS  Diagnosis: NSTEMI (non-ST elevated myocardial infarction)  Assessment and Plan of Treatment: You were admitted to hospital with dizziness and light headedness. Youwere admitted to telemetry unit for syncopal workup . YOu cardiac enzymes were elevated and you were found to have NSTEMI. We started you on heparin drip and later switched it to oral anticoagulants. Your stress test was negative,  Echo showed structual defects but you are at higher risk of further cardiac compications so it is very important to take all the medications as prescribed and closely monitor your blood pressure. Your blood pressure is poorly controlled which puts additional strain on your heart      SECONDARY DISCHARGE DIAGNOSES  Diagnosis: Hypertensive urgency  Assessment and Plan of Treatment: Your blood pressure was running on higher side in the hospital. You are taking clonidine outpatient and it is a very potent medication for BP control but stopping htis medications all of a sudden can lead to rebound hypertension which is very dangerous as it can lead to stroke.  It is recommended to take your medications as prescribed regularly, Take regular diet, do regular execrise  DASh Diet. This diet emphasizes vegetables, fruits, and fat-free or low-fat dairy products.  Includes whole grains, fish, poultry, beans, seeds, nuts, and vegetable oils. Please limit sodium, sweets, sugary beverages, and red meat  Monitor your Heart rate aswell    Diagnosis: DM (diabetes mellitus)  Assessment and Plan of Treatment: .Your sugar was controlled in hospital, we gave you insulin in hospital. Continue with your home medications on discharge and adhere to low carbohydrate diet.    Diagnosis: Atrial fibrillation, unspecified type  Assessment and Plan of Treatment: You were diagnosed with paroxysmal atrial fibrillation.   Please continue your arrythmia medication and anticoagulation   Follow up with PCP within 2 weeks of discharge.    Diagnosis: Hypothyroid  Assessment and Plan of Treatment: Continue your home medications    Diagnosis: ANIKA (acute kidney injury)  Assessment and Plan of Treatment: - You presented with Elevated Creatinine unknown baseline likely multifactorial due to severe dehydration , Hypertension and diabetes.    - Please continue oral hydration as much as possible within the daily drinking limit of 2 L per day  - You are medically stable to be discharged from the hospital.  - You need to follow up with your Primary Care Physician .    Diagnosis: Anxiety  Assessment and Plan of Treatment: Your anxiety leads to nausea and elevated blood pressure. It is recommended to keep yourself relaxed and keep yourself busy in health activities.   At this point, you will likely benefit from anti anxiety medications PRINCIPAL DISCHARGE DIAGNOSIS  Diagnosis: NSTEMI (non-ST elevated myocardial infarction)  Assessment and Plan of Treatment: You were admitted to hospital with dizziness and light headedness. Youwere admitted to telemetry unit for syncopal workup . YOu cardiac enzymes were elevated and you were found to have NSTEMI. We started you on heparin drip and later switched it to oral anticoagulants. Your stress test was negative,  Echo showed structual defects but you are at higher risk of further cardiac compications so it is very important to take all the medications as prescribed and closely monitor your blood pressure. Your blood pressure is poorly controlled which puts additional strain on your heart. Please continue taking your Eliquis as prescribed.  FOLLOW UP WITH CARDIOLOGIST IN 1 WEEK.         SECONDARY DISCHARGE DIAGNOSES  Diagnosis: Complicated UTI (urinary tract infection)  Assessment and Plan of Treatment: A urine test was done and it showed that you had an infection in your bladder. You were started on IV antibiotics. Your urine infection has since improved, please continue taking your antibiotics as directed. Please follow up with your PCP .    Diagnosis: Hypertensive urgency  Assessment and Plan of Treatment: Your blood pressure was very high in the hospital. You are taking clonidine at home and it is a very strong medication for BP control but stopping this medications all of a sudden can lead to rebound hypertension which is very dangerous as it can lead to stroke.  It is recommended to take your medications as prescribed regularly, Take regular diet, do regular execrise, DASH Diet. This diet emphasizes vegetables, fruits, and fat-free or low-fat dairy products.  Includes whole grains, fish, poultry, beans, seeds, nuts, and vegetable oils. Please limit sodium, sweets, sugary beverages, and red meat. Please follow up with your PCP in 1 week.    Diagnosis: ANIKA (acute kidney injury)  Assessment and Plan of Treatment: -You presented with Elevated kidney function with unknown baseline likely multifactorial due to severe dehydration , Hypertension and diabetes.    - Please continue oral hydration as much as possible within the daily drinking limit of 2 L per day  - You are medically stable to be discharged from the hospital.  - You need to follow up with your  PCP in 1 week.    Diagnosis: Atrial fibrillation, unspecified type  Assessment and Plan of Treatment: You were diagnosed with atrial fibrillation which is an abnormal heart rhythm.Please continue your medications as prescribed.  Follow up with PCP within 1 week.    Diagnosis: Hypothyroid  Assessment and Plan of Treatment: You have history of hypothyroidism. Please continue your home medications as prescribed.    Diagnosis: DM (diabetes mellitus)  Assessment and Plan of Treatment: Your blood sugar was controlled in hospital, we gave you insulin. Continue with your home medications on discharge and adhere to low carbohydrate diet.    Diagnosis: Anxiety  Assessment and Plan of Treatment: Your anxiety leads to nausea and elevated blood pressure. It is recommended to keep yourself relaxed and keep yourself busy in health activities. At this point, you will likely benefit from anti anxiety medications. PRINCIPAL DISCHARGE DIAGNOSIS  Diagnosis: NSTEMI (non-ST elevated myocardial infarction)  Assessment and Plan of Treatment: You were admitted to hospital with dizziness and light headedness. Youwere admitted to telemetry unit for syncopal workup . YOu cardiac enzymes were elevated and you were found to have NSTEMI. We started you on heparin drip and later switched it to oral anticoagulants. Your stress test was negative,  Echo showed structual defects but you are at higher risk of further cardiac compications so it is very important to take all the medications as prescribed and closely monitor your blood pressure. Your blood pressure is poorly controlled which puts additional strain on your heart. Please continue taking your Eliquis as prescribed.  FOLLOW UP WITH CARDIOLOGIST IN 1 WEEK.         SECONDARY DISCHARGE DIAGNOSES  Diagnosis: Complicated UTI (urinary tract infection)  Assessment and Plan of Treatment: A urine test was done and it showed that you had an infection in your bladder. You were started on IV antibiotics. Your urine infection has since improved, please continue taking ZYVOX until 8/4/20. Please follow up with your PCP .    Diagnosis: Hypertensive urgency  Assessment and Plan of Treatment: Your blood pressure was very high in the hospital. You are taking clonidine at home and it is a very strong medication for BP control but stopping this medications all of a sudden can lead to rebound hypertension which is very dangerous as it can lead to stroke.  It is recommended to take your medications as prescribed regularly, Take regular diet, do regular execrise, DASH Diet. This diet emphasizes vegetables, fruits, and fat-free or low-fat dairy products.  Includes whole grains, fish, poultry, beans, seeds, nuts, and vegetable oils. Please limit sodium, sweets, sugary beverages, and red meat. Please follow up with your PCP in 1 week.    Diagnosis: ANIKA (acute kidney injury)  Assessment and Plan of Treatment: -You presented with Elevated kidney function with unknown baseline likely multifactorial due to severe dehydration , Hypertension and diabetes.    - Please continue oral hydration as much as possible within the daily drinking limit of 2 L per day  - You are medically stable to be discharged from the hospital.  - You need to follow up with your  PCP in 1 week.    Diagnosis: Atrial fibrillation, unspecified type  Assessment and Plan of Treatment: You were diagnosed with atrial fibrillation which is an abnormal heart rhythm.Please continue your medications as prescribed.  Follow up with PCP within 1 week.    Diagnosis: Hypothyroid  Assessment and Plan of Treatment: You have history of hypothyroidism. Please continue your home medications as prescribed.    Diagnosis: DM (diabetes mellitus)  Assessment and Plan of Treatment: Your blood sugar was controlled in hospital, we gave you insulin. Continue with your home medications on discharge and adhere to low carbohydrate diet.    Diagnosis: Anxiety  Assessment and Plan of Treatment: Your anxiety leads to nausea and elevated blood pressure. It is recommended to keep yourself relaxed and keep yourself busy in health activities. At this point, you will likely benefit from anti anxiety medications. PRINCIPAL DISCHARGE DIAGNOSIS  Diagnosis: NSTEMI (non-ST elevated myocardial infarction)  Assessment and Plan of Treatment: You were admitted to hospital with dizziness and light headedness. Youwere admitted to telemetry unit for syncopal workup . YOu cardiac enzymes were elevated and you were found to have NSTEMI. We started you on heparin drip and later switched it to oral anticoagulants. Your stress test was negative,  Echo showed structual defects but you are at higher risk of further cardiac compications so it is very important to take all the medications as prescribed and closely monitor your blood pressure. Your blood pressure is poorly controlled which puts additional strain on your heart. Please continue taking your Eliquis as prescribed.  FOLLOW UP WITH CARDIOLOGIST IN 1 WEEK.         SECONDARY DISCHARGE DIAGNOSES  Diagnosis: Complicated UTI (urinary tract infection)  Assessment and Plan of Treatment: A urine test was done and it showed that you had an infection in your bladder. You were started on IV antibiotics. Your urine infection has since improved, you completed antibiotics. Please follow up with your PCP .    Diagnosis: Hypertensive urgency  Assessment and Plan of Treatment: Your blood pressure was very high in the hospital. You are taking clonidine at home and it is a very strong medication for BP control but stopping this medications all of a sudden can lead to rebound hypertension which is very dangerous as it can lead to stroke.  It is recommended to take your medications as prescribed regularly, Take regular diet, do regular execrise, DASH Diet. This diet emphasizes vegetables, fruits, and fat-free or low-fat dairy products.  Includes whole grains, fish, poultry, beans, seeds, nuts, and vegetable oils. Please limit sodium, sweets, sugary beverages, and red meat. Please follow up with your PCP in 1 week.    Diagnosis: ANIKA (acute kidney injury)  Assessment and Plan of Treatment: -You presented with Elevated kidney function with unknown baseline likely multifactorial due to severe dehydration , Hypertension and diabetes.    - Please continue oral hydration as much as possible within the daily drinking limit of 2 L per day  - You are medically stable to be discharged from the hospital.  - You need to follow up with your  PCP in 1 week.    Diagnosis: Atrial fibrillation, unspecified type  Assessment and Plan of Treatment: You were diagnosed with atrial fibrillation which is an abnormal heart rhythm.Please continue your medications as prescribed.  Follow up with PCP within 1 week.    Diagnosis: Hypothyroid  Assessment and Plan of Treatment: You have history of hypothyroidism. Please continue your home medications as prescribed.    Diagnosis: DM (diabetes mellitus)  Assessment and Plan of Treatment: Your blood sugar was controlled in hospital, we gave you insulin. Continue with your home medications on discharge and adhere to low carbohydrate diet.    Diagnosis: Anxiety  Assessment and Plan of Treatment: Your anxiety leads to nausea and elevated blood pressure. It is recommended to keep yourself relaxed and keep yourself busy in health activities. At this point, you will likely benefit from anti anxiety medications.    Diagnosis: Constipation  Assessment and Plan of Treatment: you were also found to have constipation, you were started on Laxatives and stool softners. continue medications as prescribed

## 2020-07-20 NOTE — ED PROVIDER NOTE - OBJECTIVE STATEMENT
71 y/o F with a significant PMHx of carpal tunnel syndrome, DM, HTN, hypothyroid, lymphedema, presents to the ED for lightheadedness and dizziness today. Patient reports feeling dehydrated. Denies chest pain, nausea, vomiting, room spinning, headache, calf pain/swelling or any other acute complaints. Otherwise, endorses that her visiting home nurse checked in on her and told her to come to the hospital.

## 2020-07-20 NOTE — H&P ADULT - PROBLEM SELECTOR PLAN 2
Pt had elevated cr levels on admission 1.64  Likely preRenal from dehydration  F.u urinelytes  f/u BMP   c/w mild hydration x 12 hours

## 2020-07-20 NOTE — H&P ADULT - NSHPPHYSICALEXAM_GEN_ALL_CORE
Vital Signs (24 Hrs):  T(C): 36.4 (07-20-20 @ 14:34), Max: 37.8 (07-20-20 @ 13:05)  HR: 88 (07-20-20 @ 14:34) (88 - 112)  BP: 181/106 (07-20-20 @ 14:34) (88/52 - 181/106)  RR: 20 (07-20-20 @ 14:34) (20 - 20)  SpO2: 95% (07-20-20 @ 14:34) (92% - 95%)  Wt(kg): --  Daily     Daily     I&O's Summary

## 2020-07-20 NOTE — ED ADULT TRIAGE NOTE - CHIEF COMPLAINT QUOTE
BIBA c/o dizziness, patient verbalized that she is dehydrated, patient had nothing to eat and drink since last night and took her DM medication this morning

## 2020-07-20 NOTE — ED ADULT NURSE NOTE - OBJECTIVE STATEMENT
pt is here for dizziness.  pt stated that dizziness, dehydrated, nothing to eat and drink since last night, denied pain or palpitation, denied N/V/D, no distress noted at this time. pt is here for dizziness.  pt stated that dizziness, dehydrated, nothing to eat and drink since last night, denied pain or palpitation, denied N/V/D, b/l discoloration at lower extremities no distress noted at this time.

## 2020-07-20 NOTE — DISCHARGE NOTE PROVIDER - HOSPITAL COURSE
69 y/o F with a significant PMHx of carpal tunnel syndrome, HTN, hypothyroid, lymphedema, presents to the ED for lightheadedness and dizziness today. In the ED,    Pt is noted to be AAOX3, No visible distress Vitals- Hypotensive 88/52 On admission, s/p Dig  Repeat 181/106, CT Head- no acute changes, EKG- new onset AFib with RVR, S/p Dig loading with  0.5mg and Diltiazem 5mg. Repeat EKG showed NSR with T wave inversions. Trops- 2.8, BNP- 4400. Pt admitted for New onset Afib RVR. Cardiologist, Dr. Kuo was consulted. ECHO ordered.            >>>>>>>>>>>>>>>>>>>>>>INCOMPLETE>>>>>>>>>>>>>>>>>>>>>>>>>>>>>>>>>>>>>> 71 y/o F with PMHx of obesity, HTN, Hypothyroidism and lymphedema presented to ED for  dizziness. Patient felt thirsty and became lightheaded when she stood up from bed for water. Describes it as lightheadedness and not room spinning. Also endorsed nausea and lack of appetite for last 3-4 days. In ED CT head was negative, found to have hypotension 88/52 and Afib with RVR, s/p Digoxin loading (5mg)-> /106 and NSR with T wave inversions. Initial Trop was 2.8, BNP was 4400 and D-dimer was 410. Patient was started on Heparin drip and admitted for NSTEMI. Doppler US of b/l lower extremities was negative for DVT, CXR negative for infiltrate. Cardiology following patient. Echo on 7/21 significant for LV hypertrophy and stage I diastolic dysfunction. Patient refused heparin drip, was started on lovenox which she also refused. Was eventually started on oral AC (Eliquis). Stress test was negative for any reversible ischemia. After stress test patient was nauseous and has hypertension. Managed with zofran and reglan. Due to nausea missed afternoon meds, received IV labetalol and hydralazine without improvement in BP. Critical care was consulted and patient was placed back on full home regimen of HTN medications. Critical care followed patient overnight with stabilization of BP. Some meds were held to avoid hypotension. BP was monitored and medications optimized. Plan for discharge home on PO AC (Eliquis). 69 y/o F with PMH of obesity, HTN, Hypothyroidism and lymphedema presented to ED for dizziness. Patient felt thirsty and became lightheaded when she stood up from bed for water. Describes it as lightheadedness and not room spinning. Also endorsed nausea and lack of appetite for last 3-4 days. In ED CT head was negative, found to have hypotension and in Afib with RVR, s/p Digoxin loading (5mg)-> BP elevated and EKG showed NSR with T wave inversions. Initial Troponin, BNP, and D-dimer were elevated. Patient was started on Heparin drip and admitted for NSTEMI. Doppler US of b/l lower extremities was negative for DVT, CXR negative for infiltrate. Cardiology was consulted. Echo on 7/21 significant for LV hypertrophy and stage I diastolic dysfunction. Patient refused heparin drip, was started on lovenox which she also refused. She was eventually started on oral AC (Eliquis). Stress test was negative for any reversible ischemia. After stress test patient was nauseous and was hypertensive. Managed with zofran and reglan. Due to nausea missed afternoon meds, received IV labetalol and hydralazine without improvement in BP. Critical care was consulted and patient was placed back on full home regimen of HTN medications. Critical care followed patient overnight with stabilization of BP. Some meds were held to avoid hypotension. BP was monitored and medications optimized. Pt was found to have ANIKA, which improved with IV fluids. Nephrology was consulted. UA was (+), IV Ceftriaxone was started. Urine culture grew enterococcus. ID was consulted, Vancomycin was started.         Pt was evaluated by PT and was recommended for BINH. Pt is clinically improving and medically optimized for discharge on PO Eliquis.                  >>>>>>>>>>>>>>>>>>>>>>INCOMPLETE 7/29 69 y/o F with PMH of obesity, HTN, Hypothyroidism and lymphedema presented to ED for dizziness. Patient felt thirsty and became lightheaded when she stood up from bed for water. Describes it as lightheadedness and not room spinning. Also endorsed nausea and lack of appetite for last 3-4 days. In ED CT head was negative, found to have hypotension and in Afib with RVR, s/p Digoxin loading (5mg)-> BP elevated and EKG showed NSR with T wave inversions. Initial Troponin, BNP, and D-dimer were elevated. Patient was started on Heparin drip and admitted for NSTEMI. Doppler US of b/l lower extremities was negative for DVT, CXR negative for infiltrate. Cardiology was consulted. Echo on 7/21 significant for LV hypertrophy and stage I diastolic dysfunction. Patient refused heparin drip, was started on lovenox which she also refused. She was eventually started on oral AC (Eliquis). Stress test was negative for any reversible ischemia. After stress test patient was nauseous and was hypertensive. Managed with zofran and reglan. Due to nausea missed afternoon meds, received IV labetalol and hydralazine without improvement in BP. Critical care was consulted and patient was placed back on full home regimen of HTN medications. Critical care followed patient overnight with stabilization of BP. Some meds were held to avoid hypotension. BP was monitored and medications optimized. Pt was found to have ANIKA, which improved with IV fluids. Nephrology was consulted. UA was (+), IV Ceftriaxone was started. Urine culture grew enterococcus. ID was consulted, Oral Zyvox was started to be completed on 8/4/20. Pt was evaluated by PT and was recommended for BINH. Pt is clinically improving and medically optimized for discharge.                >> 69 y/o F with PMH of obesity, HTN, Hypothyroidism and lymphedema presented to ED for dizziness. Patient felt thirsty and became lightheaded when she stood up from bed for water. Describes it as lightheadedness and not room spinning. Also endorsed nausea and lack of appetite for last 3-4 days. In ED CT head was negative, found to have hypotension and in Afib with RVR, s/p Digoxin loading (5mg)-> BP elevated and EKG showed NSR with T wave inversions. Initial Troponin, BNP, and D-dimer were elevated. Patient was started on Heparin drip and admitted for NSTEMI. Doppler US of b/l lower extremities was negative for DVT, CXR negative for infiltrate. Cardiology was consulted. Echo on 7/21 significant for LV hypertrophy and stage I diastolic dysfunction. Patient refused heparin drip, was started on lovenox which she also refused. She was eventually started on oral AC (Eliquis). Stress test was negative for any reversible ischemia. After stress test patient was nauseous and was hypertensive. Managed with zofran and reglan. Due to nausea missed afternoon meds, received IV labetalol and hydralazine without improvement in BP. Critical care was consulted and patient was placed back on full home regimen of HTN medications. Critical care followed patient overnight with stabilization of BP. Some meds were held to avoid hypotension. BP was monitored and medications optimized. Pt was found to have ANIKA, which improved with IV fluids. Nephrology was consulted. UA was (+), IV Ceftriaxone was started. Urine culture grew enterococcus. ID was consulted, Oral Zyvox was completed  pt c/o intermittent abd pain, likely due to persistent constipation, CT abd with ? colitis, afebrile, no leukocytosis, diarrhea or abd tenderness, started on  bowel regimen, Surgery consulted for findings of questionable Early ischemic changes and no acute interventions were recommended     Pt was evaluated by PT and was recommended for BINH. Pt is clinically improving and medically optimized for discharge.

## 2020-07-20 NOTE — ED PROVIDER NOTE - CLINICAL SUMMARY MEDICAL DECISION MAKING FREE TEXT BOX
Patient presenting with afib, new onset. noting hypotension but normal mental status. will control hr. reassess vital. lab, assess acs, pe. ed obs and reassess

## 2020-07-20 NOTE — H&P ADULT - PROBLEM SELECTOR PLAN 1
Pt admitted for dizziness  EKG- new onset afib with RVR  S/P Dig 0.5 and diltiazem 5mg  Repeat EKG - Normal sinus rhythm   CHADSVASc score- 3 ( eligible for AC)  HAS-BLED score- 3   Cardio- Dr. Kuo  f/u ECHO Pt admitted for dizziness  EKG- new onset afib with RVR, trops- 2.8  S/P Dig 0.5 and diltiazem 5mg  Repeat EKG - Normal sinus rhythm   CHADSVASc score- 3 ( eligible for AC)  HAS-BLED score- 3   Cardio- Dr. Kuo  f/u ECHO  f/u trops 2 and 3 Pt admitted for dizziness  EKG- new onset afib with RVR, trops- 2.8  S/P Dig 0.5 and diltiazem 5mg  Repeat EKG - Normal sinus rhythm   CHADSVASc score- 3 ( eligible for AC)  HAS-BLED score- 3   Cardio- Dr. Kuo  c/w labetelol  c/w heparin infusion  f/u ECHO  f/u trops 2 and 3

## 2020-07-20 NOTE — H&P ADULT - ATTENDING COMMENTS
70 F with pmhx hypothyroidism, htn, lymphedema p/w dizziness noted to have A.fib with rvr.   Patient seen and examined. Patient very anxious, tearful.     T(C): 36.4 (07-20-20 @ 14:34), Max: 37.8 (07-20-20 @ 13:05)  HR: 88 (07-20-20 @ 14:34) (88 - 112)  BP: 181/106 (07-20-20 @ 14:34) (88/52 - 181/106)  RR: 20 (07-20-20 @ 14:34) (20 - 20)  SpO2: 95% (07-20-20 @ 14:34) (92% - 95%)    Reviewed labs, Ekg.   Elevated Troponin.                         14.9   8.87  )-----------( 330      ( 20 Jul 2020 13:36 )             43.0       CARDIAC MARKERS ( 20 Jul 2020 13:36 )  2.810 ng/mL / x     / x     / x     / x          LIVER FUNCTIONS - ( 20 Jul 2020 13:36 )  Alb: 3.3 g/dL / Pro: 7.4 g/dL / ALK PHOS: 107 U/L / ALT: 41 U/L DA / AST: 41 U/L / GGT: x           PT/INR - ( 20 Jul 2020 14:52 )   PT: 13.4 sec;   INR: 1.15 ratio         PTT - ( 20 Jul 2020 14:52 )  PTT:30.8 sec  141|106|15<130  3.6|26|1.64  9.3,--,--  07-20 @ 13:36    Echo reviewed 11/2019-normal LV systolic function and mild diastolic dysfunction   s/p Diltiazem and Digoxin in the ed.     A.fib with RVR  R/O ACS  ANIKA on CKD   Hypothyroidism   HTN   Anxiety     Start patient on Heparin drip, follow up Echo.   Cards consult. Trend down CE. Elevated Troponin likely from demand ischemia.   Add Xanax   Follow up TSH. Follow up A1C.   Dopplers r//o DVT

## 2020-07-20 NOTE — ED PROVIDER NOTE - PROGRESS NOTE DETAILS
hr improved, hr86 on repeat ekg, sinus hr improved, hr86 on repeat ekg, sinus. bp now hypertensive, no longer hypotensive with rate controlled. admitted to dr davila. patient never had cp, trop likely due afib, repeat ekg show no avelina elevatioin

## 2020-07-20 NOTE — DISCHARGE NOTE PROVIDER - NSDCMRMEDTOKEN_GEN_ALL_CORE_FT
acetaminophen 325 mg oral tablet: 2 tab(s) orally every 6 hours, As needed, Temp greater or equal to 38.5C (101.3F), Mild Pain (1 - 3)  aspirin 81 mg oral delayed release tablet: 1 tab(s) orally once a day  cholecalciferol oral tablet: 2000 unit(s) orally once a day  folic acid 1 mg oral tablet: 1 tab(s) orally once a day  gabapentin 300 mg oral capsule: 1 cap(s) orally 2 times a day  hydrocortisone 1% topical cream: 1 application topically 3 times a day, As needed, Itching  labetalol 200 mg oral tablet: 2 tab(s) orally 3 times a day  levothyroxine 75 mcg (0.075 mg) oral tablet: 1 tab(s) orally once a day  oxyCODONE 10 mg oral tablet: 1 tab(s) orally every 6 hours, As needed, Severe Pain (7 - 10)  oxyCODONE 5 mg oral tablet: 1 tab(s) orally every 4 hours, As needed, Moderate Pain (4 - 6)  pantoprazole 40 mg oral delayed release tablet: 1 tab(s) orally once a day  Vitamin B-12 1000 mcg oral tablet: 1 tab(s) orally once a day  zinc oxide 20% topical ointment: 1 application topically 2 times a day acetaminophen 325 mg oral tablet: 2 tab(s) orally every 6 hours, As needed, Moderate Pain (4 - 6)  apixaban 5 mg oral tablet: 1 tab(s) orally every 12 hours  aspirin 81 mg oral delayed release tablet: 1 tab(s) orally once a day  atorvastatin 80 mg oral tablet: 1 tab(s) orally once a day (at bedtime)  cholecalciferol oral tablet: 2000 unit(s) orally once a day  cloNIDine 0.1 mg oral tablet: 2 tab(s) orally 3 times a day  ethacrynic acid 25 mg oral tablet: 1 tab(s) orally once a day  folic acid 1 mg oral tablet: 1 tab(s) orally once a day  gabapentin 300 mg oral capsule: 2 cap(s) orally 3 times a day  hydrALAZINE 25 mg oral tablet: 1 tab(s) orally every 8 hours  hydrocortisone 1% topical cream: 1 application topically 3 times a day, As needed, Itching  Januvia 100 mg oral tablet: 1 tab(s) orally once a day  labetalol 300 mg oral tablet: 2 tab(s) orally 3 times a day  levothyroxine 75 mcg (0.075 mg) oral tablet: 1 tab(s) orally once a day  pantoprazole 40 mg oral delayed release tablet: 1 tab(s) orally once a day  potassium chloride 20 mEq oral powder for reconstitution: 2 packet(s) orally once a day  pregabalin 50 mg oral capsule: 1 cap(s) orally 2 times a day  simvastatin 20 mg oral tablet: 1 tab(s) orally once a day (at bedtime)  Vitamin B-12 1000 mcg oral tablet: 1 tab(s) orally once a day  Vitamin B1: 1000 microgram(s) orally once a day  Vitamin C 500 mg oral capsule: 1 cap(s) orally once a day  zinc oxide 20% topical ointment: 1 application topically 2 times a day acetaminophen 325 mg oral tablet: 2 tab(s) orally every 6 hours, As needed, Moderate Pain (4 - 6)  ALPRAZolam 1 mg oral tablet: 1 tab(s) orally every 8 hours  apixaban 5 mg oral tablet: 1 tab(s) orally every 12 hours  aspirin 81 mg oral delayed release tablet: 1 tab(s) orally once a day  atorvastatin 80 mg oral tablet: 1 tab(s) orally once a day (at bedtime)  cholecalciferol oral tablet: 2000 unit(s) orally once a day  cloNIDine 0.1 mg oral tablet: 2 tab(s) orally 3 times a day  clopidogrel 75 mg oral tablet: 1 tab(s) orally once a day  ethacrynic acid 25 mg oral tablet: 1 tab(s) orally once a day  folic acid 1 mg oral tablet: 1 tab(s) orally once a day  gabapentin 300 mg oral capsule: 2 cap(s) orally 3 times a day  hydrALAZINE 25 mg oral tablet: 1 tab(s) orally every 8 hours  hydrocortisone 1% topical cream: 1 application topically 3 times a day, As needed, Itching  Januvia 100 mg oral tablet: 1 tab(s) orally once a day  labetalol 300 mg oral tablet: 2 tab(s) orally 3 times a day  levothyroxine 75 mcg (0.075 mg) oral tablet: 1 tab(s) orally once a day  linezolid 600 mg oral tablet: 1 tab(s) orally every 12 hours  menthol-benzocaine 3.6 mg-15 mg mucous membrane lozenge:  mucous membrane   pantoprazole 40 mg oral delayed release tablet: 1 tab(s) orally once a day  potassium chloride 20 mEq oral powder for reconstitution: 2 packet(s) orally once a day  pregabalin 50 mg oral capsule: 1 cap(s) orally 2 times a day  simvastatin 20 mg oral tablet: 1 tab(s) orally once a day (at bedtime)  Vitamin B-12 1000 mcg oral tablet: 1 tab(s) orally once a day  Vitamin B1: 1000 microgram(s) orally once a day  Vitamin C 500 mg oral capsule: 1 cap(s) orally once a day  zinc oxide 20% topical ointment: 1 application topically 2 times a day acetaminophen 325 mg oral tablet: 2 tab(s) orally every 6 hours, As needed, Moderate Pain (4 - 6)  ALPRAZolam 1 mg oral tablet: 0.5 milligram(s) orally every 8 hours  apixaban 5 mg oral tablet: 1 tab(s) orally every 12 hours  aspirin 81 mg oral delayed release tablet: 1 tab(s) orally once a day  atorvastatin 80 mg oral tablet: 1 tab(s) orally once a day (at bedtime)  bisacodyl 10 mg rectal suppository: 1 suppository(ies) rectal once a day, As needed, Constipation  cholecalciferol oral tablet: 2000 unit(s) orally once a day  cloNIDine 0.1 mg oral tablet: 2 tab(s) orally 3 times a day  clopidogrel 75 mg oral tablet: 1 tab(s) orally once a day  ethacrynic acid 25 mg oral tablet: 1 tab(s) orally once a day  folic acid 1 mg oral tablet: 1 tab(s) orally once a day  gabapentin 300 mg oral capsule: 2 cap(s) orally 3 times a day  hydrALAZINE 50 mg oral tablet: 1 tab(s) orally 3 times a day  hydrocortisone 1% topical cream: 1 application topically 3 times a day, As needed, Itching  Januvia 100 mg oral tablet: 1 tab(s) orally once a day  labetalol 300 mg oral tablet: 2 tab(s) orally 3 times a day  lactulose 10 g/15 mL oral syrup: 15 milliliter(s) orally once a day   levothyroxine 75 mcg (0.075 mg) oral tablet: 1 tab(s) orally once a day  menthol-benzocaine 3.6 mg-15 mg mucous membrane lozenge:  mucous membrane   pantoprazole 40 mg oral delayed release tablet: 1 tab(s) orally once a day  polyethylene glycol 3350 oral powder for reconstitution: 17 gram(s) orally once a day  potassium chloride 20 mEq oral powder for reconstitution: 2 packet(s) orally once a day  pregabalin 50 mg oral capsule: 1 cap(s) orally 2 times a day  senna oral tablet: 2 tab(s) orally once a day (at bedtime)  Vitamin B-12 1000 mcg oral tablet: 1 tab(s) orally once a day  Vitamin B1: 1000 microgram(s) orally once a day  Vitamin C 500 mg oral capsule: 1 cap(s) orally once a day  zinc oxide 20% topical ointment: 1 application topically 2 times a day

## 2020-07-20 NOTE — H&P ADULT - HISTORY OF PRESENT ILLNESS
71 y/o F with a significant PMHx of carpal tunnel syndrome, DM, HTN, hypothyroid, lymphedema, presents to the ED for lightheadedness and dizziness today. Patient reports feeling dehydrated. Denies chest pain, nausea, vomiting, room spinning, headache, calf pain/swelling or any other acute complaints. Pt was recently admitted in [facility] for a syncopal episode from gabapentin overdosing,     In the ED,  Pt is noted to be AAOX3, No visible distress  Vitals- Hypotensive 88/52 On admission, s/p Dig  Repeat 181/106  CT Head- no acute changes  EKG- new onset AFib with RVR, S/p Dig loading 0.5mg and Diltiazem 5mg  Repeat EKG- NSR with T wave inversions  Trops- 2.8, BNP- 4400 71 y/o F with a significant PMHx of, HTN, Hypothyroidism,  carpal tunnel syndrome, lymphedema, presents to the ED for  dizziness today. Pt in ED AAOX3 and able to give all pertinent medical information. As per the Patient, she was in her usual state of health until this morning when she got up from her bed to get water as her throat was very dry and itchy and she noted sudden onset of  dizziness. She was unable and laid down tbut the dizziness did not resolved and her room mate then called the 911. As per the patient, she had not been eating/drinking well since 2 days due to nausea which is resolved now. Pt denies any chest pain, vomiting, room spinning, headache, shortness of breath, fall , any recent head trauma.  Pt was recently admitted in [facility] for a syncopal episode from gabapentin overdosing,     In the ED,  Pt is noted to be AAOX3, No visible distress. Pt very upset about being hospitalized  Vitals- Hypotensive 88/52 On admission, s/p Dig  Repeat 181/106  CT Head- no acute changes  EKG- new onset AFib with RVR, S/p Dig loading 0.5mg and Diltiazem 5mg  Repeat EKG- NSR with T wave inversions  Trops- 2.8, BNP- 4400

## 2020-07-20 NOTE — H&P ADULT - NSHPSOCIALHISTORY_GEN_ALL_CORE
Pt stays at home with HHA 7Hrs x 7 days  Pt denies any smoking, alcohol or any form of substance abuse

## 2020-07-20 NOTE — H&P ADULT - NSHPLABSRESULTS_GEN_ALL_CORE
14.9   8.87  )-----------( 330      ( 20 Jul 2020 13:36 )             43.0       07-20    141  |  106  |  15  ----------------------------<  130<H>  3.6   |  26  |  1.64<H>    Ca    9.3      20 Jul 2020 13:36    TPro  7.4  /  Alb  3.3<L>  /  TBili  1.7<H>  /  DBili  x   /  AST  41<H>  /  ALT  41  /  AlkPhos  107  07-20                      Lactate Trend      CARDIAC MARKERS ( 20 Jul 2020 13:36 )  2.810 ng/mL / x     / x     / x     / x            CAPILLARY BLOOD GLUCOSE      POCT Blood Glucose.: 148 mg/dL (20 Jul 2020 12:54) 14.9   8.87  )-----------( 330      ( 20 Jul 2020 13:36 )             43.0       07-20    141  |  106  |  15  ----------------------------<  130<H>  3.6   |  26  |  1.64<H>    Ca    9.3      20 Jul 2020 13:36    TPro  7.4  /  Alb  3.3<L>  /  TBili  1.7<H>  /  DBili  x   /  AST  41<H>  /  ALT  41  /  AlkPhos  107  07-20    < from: Xray Chest 1 View-PORTABLE IMMEDIATE (07.20.20 @ 13:43) >    IMPRESSION: No focal consolidation or pleural effusion.     < end of copied text >                          Lactate Trend      CARDIAC MARKERS ( 20 Jul 2020 13:36 )  2.810 ng/mL / x     / x     / x     / x            CAPILLARY BLOOD GLUCOSE      POCT Blood Glucose.: 148 mg/dL (20 Jul 2020 12:54)

## 2020-07-20 NOTE — H&P ADULT - ASSESSMENT
69 y/o F with a significant PMHx of carpal tunnel syndrome, DM, HTN, hypothyroid, lymphedema, presents to the ED for lightheadedness and dizziness today.      In the ED,  Pt is noted to be AAOX3, No visible distress  Vitals- Hypotensive 88/52 On admission, s/p Dig  Repeat 181/106  CT Head- no acute changes  EKG- new onset AFib with RVR, S/p Dig loading 0.5mg and Diltiazem 5mg  Repeat EKG- NSR with T wave inversions  Trops- 2.8, BNP- 4400     Pt admitted for New onset Afib RVR 69 y/o F with a significant PMHx of carpal tunnel syndrome, HTN, hypothyroid, lymphedema, presents to the ED for lightheadedness and dizziness today.      In the ED,  Pt is noted to be AAOX3, No visible distress  Vitals- Hypotensive 88/52 On admission, s/p Dig  Repeat 181/106  CT Head- no acute changes  EKG- new onset AFib with RVR, S/p Dig loading 0.5mg and Diltiazem 5mg  Repeat EKG- NSR with T wave inversions  Trops- 2.8, BNP- 4400     Pt admitted for New onset Afib RVR

## 2020-07-21 DIAGNOSIS — I21.4 NON-ST ELEVATION (NSTEMI) MYOCARDIAL INFARCTION: ICD-10-CM

## 2020-07-21 LAB
A1C WITH ESTIMATED AVERAGE GLUCOSE RESULT: 5.7 % — HIGH (ref 4–5.6)
ANION GAP SERPL CALC-SCNC: 11 MMOL/L — SIGNIFICANT CHANGE UP (ref 5–17)
ANION GAP SERPL CALC-SCNC: 9 MMOL/L — SIGNIFICANT CHANGE UP (ref 5–17)
APTT BLD: 139.5 SEC — CRITICAL HIGH (ref 27.5–35.5)
APTT BLD: 158 SEC — CRITICAL HIGH (ref 27.5–35.5)
APTT BLD: 93.6 SEC — HIGH (ref 27.5–35.5)
BUN SERPL-MCNC: 18 MG/DL — SIGNIFICANT CHANGE UP (ref 7–18)
BUN SERPL-MCNC: 20 MG/DL — HIGH (ref 7–18)
CALCIUM SERPL-MCNC: 8.7 MG/DL — SIGNIFICANT CHANGE UP (ref 8.4–10.5)
CALCIUM SERPL-MCNC: 8.8 MG/DL — SIGNIFICANT CHANGE UP (ref 8.4–10.5)
CHLORIDE SERPL-SCNC: 102 MMOL/L — SIGNIFICANT CHANGE UP (ref 96–108)
CHLORIDE SERPL-SCNC: 104 MMOL/L — SIGNIFICANT CHANGE UP (ref 96–108)
CHOLEST SERPL-MCNC: 233 MG/DL — HIGH (ref 10–199)
CK MB BLD-MCNC: 12 % — HIGH (ref 0–3.5)
CK MB CFR SERPL CALC: 66.4 NG/ML — HIGH (ref 0–3.6)
CK SERPL-CCNC: 553 U/L — HIGH (ref 21–215)
CO2 SERPL-SCNC: 23 MMOL/L — SIGNIFICANT CHANGE UP (ref 22–31)
CO2 SERPL-SCNC: 25 MMOL/L — SIGNIFICANT CHANGE UP (ref 22–31)
CREAT SERPL-MCNC: 1.23 MG/DL — SIGNIFICANT CHANGE UP (ref 0.5–1.3)
CREAT SERPL-MCNC: 1.53 MG/DL — HIGH (ref 0.5–1.3)
ESTIMATED AVERAGE GLUCOSE: 117 MG/DL — HIGH (ref 68–114)
FERRITIN SERPL-MCNC: 107 NG/ML — SIGNIFICANT CHANGE UP (ref 15–150)
FOLATE SERPL-MCNC: 13.2 NG/ML — SIGNIFICANT CHANGE UP
GLUCOSE BLDC GLUCOMTR-MCNC: 111 MG/DL — HIGH (ref 70–99)
GLUCOSE BLDC GLUCOMTR-MCNC: 124 MG/DL — HIGH (ref 70–99)
GLUCOSE BLDC GLUCOMTR-MCNC: 140 MG/DL — HIGH (ref 70–99)
GLUCOSE BLDC GLUCOMTR-MCNC: 145 MG/DL — HIGH (ref 70–99)
GLUCOSE SERPL-MCNC: 106 MG/DL — HIGH (ref 70–99)
GLUCOSE SERPL-MCNC: 169 MG/DL — HIGH (ref 70–99)
HCT VFR BLD CALC: 41.6 % — SIGNIFICANT CHANGE UP (ref 34.5–45)
HCT VFR BLD CALC: 44 % — SIGNIFICANT CHANGE UP (ref 34.5–45)
HDLC SERPL-MCNC: 27 MG/DL — LOW
HGB BLD-MCNC: 14.5 G/DL — SIGNIFICANT CHANGE UP (ref 11.5–15.5)
HGB BLD-MCNC: 15.4 G/DL — SIGNIFICANT CHANGE UP (ref 11.5–15.5)
LIPID PNL WITH DIRECT LDL SERPL: 157 MG/DL — SIGNIFICANT CHANGE UP
MAGNESIUM SERPL-MCNC: 1.8 MG/DL — SIGNIFICANT CHANGE UP (ref 1.6–2.6)
MCHC RBC-ENTMCNC: 29.7 PG — SIGNIFICANT CHANGE UP (ref 27–34)
MCHC RBC-ENTMCNC: 30.1 PG — SIGNIFICANT CHANGE UP (ref 27–34)
MCHC RBC-ENTMCNC: 34.9 GM/DL — SIGNIFICANT CHANGE UP (ref 32–36)
MCHC RBC-ENTMCNC: 35 GM/DL — SIGNIFICANT CHANGE UP (ref 32–36)
MCV RBC AUTO: 85.2 FL — SIGNIFICANT CHANGE UP (ref 80–100)
MCV RBC AUTO: 85.9 FL — SIGNIFICANT CHANGE UP (ref 80–100)
NRBC # BLD: 0 /100 WBCS — SIGNIFICANT CHANGE UP (ref 0–0)
NRBC # BLD: 0 /100 WBCS — SIGNIFICANT CHANGE UP (ref 0–0)
PHOSPHATE SERPL-MCNC: 3.1 MG/DL — SIGNIFICANT CHANGE UP (ref 2.5–4.5)
PLATELET # BLD AUTO: 260 K/UL — SIGNIFICANT CHANGE UP (ref 150–400)
PLATELET # BLD AUTO: 280 K/UL — SIGNIFICANT CHANGE UP (ref 150–400)
POTASSIUM SERPL-MCNC: 2.7 MMOL/L — CRITICAL LOW (ref 3.5–5.3)
POTASSIUM SERPL-MCNC: 3.3 MMOL/L — LOW (ref 3.5–5.3)
POTASSIUM SERPL-SCNC: 2.7 MMOL/L — CRITICAL LOW (ref 3.5–5.3)
POTASSIUM SERPL-SCNC: 3.3 MMOL/L — LOW (ref 3.5–5.3)
RBC # BLD: 4.88 M/UL — SIGNIFICANT CHANGE UP (ref 3.8–5.2)
RBC # BLD: 5.12 M/UL — SIGNIFICANT CHANGE UP (ref 3.8–5.2)
RBC # FLD: 13.5 % — SIGNIFICANT CHANGE UP (ref 10.3–14.5)
RBC # FLD: 13.6 % — SIGNIFICANT CHANGE UP (ref 10.3–14.5)
SARS-COV-2 IGG SERPL QL IA: POSITIVE
SARS-COV-2 IGM SERPL IA-ACNC: 7.72 INDEX — HIGH
SARS-COV-2 RNA SPEC QL NAA+PROBE: SIGNIFICANT CHANGE UP
SODIUM SERPL-SCNC: 136 MMOL/L — SIGNIFICANT CHANGE UP (ref 135–145)
SODIUM SERPL-SCNC: 138 MMOL/L — SIGNIFICANT CHANGE UP (ref 135–145)
TOTAL CHOLESTEROL/HDL RATIO MEASUREMENT: 8.6 RATIO — HIGH (ref 3.3–7.1)
TRIGL SERPL-MCNC: 243 MG/DL — HIGH (ref 10–149)
TROPONIN I SERPL-MCNC: 13.4 NG/ML — HIGH (ref 0–0.04)
TROPONIN I SERPL-MCNC: 16.9 NG/ML — HIGH (ref 0–0.04)
TROPONIN I SERPL-MCNC: 18.1 NG/ML — HIGH (ref 0–0.04)
TSH SERPL-MCNC: 0.34 UU/ML — SIGNIFICANT CHANGE UP (ref 0.34–4.82)
VIT B12 SERPL-MCNC: 1156 PG/ML — SIGNIFICANT CHANGE UP (ref 232–1245)
WBC # BLD: 5.93 K/UL — SIGNIFICANT CHANGE UP (ref 3.8–10.5)
WBC # BLD: 7.3 K/UL — SIGNIFICANT CHANGE UP (ref 3.8–10.5)
WBC # FLD AUTO: 5.93 K/UL — SIGNIFICANT CHANGE UP (ref 3.8–10.5)
WBC # FLD AUTO: 7.3 K/UL — SIGNIFICANT CHANGE UP (ref 3.8–10.5)

## 2020-07-21 PROCEDURE — 70450 CT HEAD/BRAIN W/O DYE: CPT | Mod: 26

## 2020-07-21 RX ORDER — MAGNESIUM SULFATE 500 MG/ML
1 VIAL (ML) INJECTION ONCE
Refills: 0 | Status: COMPLETED | OUTPATIENT
Start: 2020-07-21 | End: 2020-07-21

## 2020-07-21 RX ORDER — HYDRALAZINE HCL 50 MG
5 TABLET ORAL ONCE
Refills: 0 | Status: COMPLETED | OUTPATIENT
Start: 2020-07-21 | End: 2020-07-21

## 2020-07-21 RX ORDER — ATORVASTATIN CALCIUM 80 MG/1
80 TABLET, FILM COATED ORAL AT BEDTIME
Refills: 0 | Status: DISCONTINUED | OUTPATIENT
Start: 2020-07-21 | End: 2020-08-03

## 2020-07-21 RX ORDER — POTASSIUM CHLORIDE 20 MEQ
40 PACKET (EA) ORAL EVERY 4 HOURS
Refills: 0 | Status: COMPLETED | OUTPATIENT
Start: 2020-07-21 | End: 2020-07-21

## 2020-07-21 RX ORDER — CLOPIDOGREL BISULFATE 75 MG/1
300 TABLET, FILM COATED ORAL ONCE
Refills: 0 | Status: COMPLETED | OUTPATIENT
Start: 2020-07-21 | End: 2020-07-21

## 2020-07-21 RX ORDER — ATORVASTATIN CALCIUM 80 MG/1
40 TABLET, FILM COATED ORAL AT BEDTIME
Refills: 0 | Status: DISCONTINUED | OUTPATIENT
Start: 2020-07-21 | End: 2020-07-21

## 2020-07-21 RX ORDER — CLOPIDOGREL BISULFATE 75 MG/1
75 TABLET, FILM COATED ORAL DAILY
Refills: 0 | Status: DISCONTINUED | OUTPATIENT
Start: 2020-07-22 | End: 2020-08-03

## 2020-07-21 RX ORDER — ALPRAZOLAM 0.25 MG
1 TABLET ORAL EVERY 8 HOURS
Refills: 0 | Status: DISCONTINUED | OUTPATIENT
Start: 2020-07-21 | End: 2020-07-28

## 2020-07-21 RX ORDER — POTASSIUM CHLORIDE 20 MEQ
40 PACKET (EA) ORAL ONCE
Refills: 0 | Status: DISCONTINUED | OUTPATIENT
Start: 2020-07-21 | End: 2020-07-21

## 2020-07-21 RX ADMIN — BENZOCAINE AND MENTHOL 1 LOZENGE: 5; 1 LIQUID ORAL at 14:36

## 2020-07-21 RX ADMIN — BENZOCAINE AND MENTHOL 1 LOZENGE: 5; 1 LIQUID ORAL at 05:18

## 2020-07-21 RX ADMIN — HEPARIN SODIUM 0 UNIT(S)/HR: 5000 INJECTION INTRAVENOUS; SUBCUTANEOUS at 17:24

## 2020-07-21 RX ADMIN — HEPARIN SODIUM 1400 UNIT(S)/HR: 5000 INJECTION INTRAVENOUS; SUBCUTANEOUS at 10:25

## 2020-07-21 RX ADMIN — HEPARIN SODIUM 0 UNIT(S)/HR: 5000 INJECTION INTRAVENOUS; SUBCUTANEOUS at 09:08

## 2020-07-21 RX ADMIN — Medication 1 MILLIGRAM(S): at 12:38

## 2020-07-21 RX ADMIN — Medication 75 MICROGRAM(S): at 05:18

## 2020-07-21 RX ADMIN — Medication 400 MILLIGRAM(S): at 05:17

## 2020-07-21 RX ADMIN — Medication 1 DROP(S): at 17:26

## 2020-07-21 RX ADMIN — PANTOPRAZOLE SODIUM 40 MILLIGRAM(S): 20 TABLET, DELAYED RELEASE ORAL at 05:17

## 2020-07-21 RX ADMIN — Medication 400 MILLIGRAM(S): at 14:37

## 2020-07-21 RX ADMIN — Medication 40 MILLIEQUIVALENT(S): at 21:50

## 2020-07-21 RX ADMIN — Medication 5 MILLIGRAM(S): at 01:43

## 2020-07-21 RX ADMIN — HEPARIN SODIUM 1700 UNIT(S)/HR: 5000 INJECTION INTRAVENOUS; SUBCUTANEOUS at 01:43

## 2020-07-21 RX ADMIN — HEPARIN SODIUM 1100 UNIT(S)/HR: 5000 INJECTION INTRAVENOUS; SUBCUTANEOUS at 18:55

## 2020-07-21 RX ADMIN — Medication 1 DROP(S): at 06:11

## 2020-07-21 RX ADMIN — Medication 1000 UNIT(S): at 12:38

## 2020-07-21 RX ADMIN — CLOPIDOGREL BISULFATE 300 MILLIGRAM(S): 75 TABLET, FILM COATED ORAL at 14:37

## 2020-07-21 RX ADMIN — ATORVASTATIN CALCIUM 80 MILLIGRAM(S): 80 TABLET, FILM COATED ORAL at 21:50

## 2020-07-21 RX ADMIN — Medication 40 MILLIEQUIVALENT(S): at 09:12

## 2020-07-21 RX ADMIN — BENZOCAINE AND MENTHOL 1 LOZENGE: 5; 1 LIQUID ORAL at 21:51

## 2020-07-21 RX ADMIN — Medication 100 GRAM(S): at 12:37

## 2020-07-21 RX ADMIN — Medication 400 MILLIGRAM(S): at 21:50

## 2020-07-21 RX ADMIN — PREGABALIN 1000 MICROGRAM(S): 225 CAPSULE ORAL at 12:37

## 2020-07-21 RX ADMIN — Medication 40 MILLIEQUIVALENT(S): at 14:37

## 2020-07-21 RX ADMIN — Medication 81 MILLIGRAM(S): at 14:35

## 2020-07-21 NOTE — PROGRESS NOTE ADULT - PROBLEM SELECTOR PLAN 6
IMPROVE VTE Individual Risk Assessment    RISK                                                          Points  [] Previous VTE                                           3  [] Thrombophilia                                        2  [] Lower limb paralysis                              2   [] Current Cancer                                       2   [x] Immobilization > 24 hrs                        1  [] ICU/CCU stay > 24 hours                       1  [x] Age > 60                                                   1    IMPROVE VTE Score: 2 Pt has PMh of B/l lymphedema  R> L lymphedema   c/o pain in LE  F/U venous doppler= NEGATIVE for DVT

## 2020-07-21 NOTE — CHART NOTE - NSCHARTNOTEFT_GEN_A_CORE
EVENT:   - HPI:  69 y/o F with a significant PMHx of, HTN, Hypothyroidism,  carpal tunnel syndrome, lymphedema, presents to the ED for  dizziness today. Pt in ED AAOX3 and able to give all pertinent medical information. As per the Patient, she was in her usual state of health until this morning when she got up from her bed to get water as her throat was very dry and itchy and she noted sudden onset of  dizziness. She was unable and laid down tbut the dizziness did not resolved and her room mate then called the 911. As per the patient, she had not been eating/drinking well since 2 days due to nausea which is resolved now. Pt denies any chest pain, vomiting, room spinning, headache, shortness of breath, fall , any recent head trauma.  Pt was recently admitted in [facility] for a syncopal episode from gabapentin overdosing,   In the ED, Pt is noted to be AAOX3, No visible distress. Pt very upset about being hospitalized  Vitals- Hypotensive 88/52 On admission, s/p Dig  Repeat 181/106  CT Head- no acute changes  EKG- new onset AFib with RVR, S/p Dig loading 0.5mg and Diltiazem 5mg  Repeat EKG- NSR with T wave inversions  Trops- 2.8, BNP- 4400 (20 Jul 2020 14:53)    - 2nd Troponin was ordered to be done. Pt is refusing any more blood draws for tonight      OBJECTIVE:  Vital Signs Last 24 Hrs  T(C): 36.7 (21 Jul 2020 00:36), Max: 37.8 (20 Jul 2020 13:05)  T(F): 98.1 (21 Jul 2020 00:36), Max: 100 (20 Jul 2020 13:05)  HR: 70 (21 Jul 2020 00:36) (70 - 112)  BP: 182/90 (21 Jul 2020 00:36) (88/52 - 197/97)  BP(mean): 130 (20 Jul 2020 22:00) (130 - 130)  RR: 20 (21 Jul 2020 00:36) (20 - 20)  SpO2: 97% (21 Jul 2020 00:36) (92% - 97%)    FOCUSED PHYSICAL EXAM:  Neuro: awake, alert, oriented x 3. No neuro deficit  Cardiovascular: Pulses +2 B/L in lower and upper extremities, HR regular, BP stable, No edema.  Respiratory: Respirations regular, unlabored, breath sounds clear B/L.   GI: Abdomen soft, non-tender, positive bowel sounds.  : no bladder distention noted. No complaints at this time.  Skin: Dry, intact, no bruising, no diaphoresis.    LABS:                        15.4   7.30  )-----------( 280      ( 21 Jul 2020 00:26 )             44.0   CARDIAC MARKERS ( 20 Jul 2020 13:36 )  2.810 ng/mL / x     / x     / x     / x        07-20    141  |  106  |  15  ----------------------------<  130<H>  3.6   |  26  |  1.64<H>    Ca    9.3      20 Jul 2020 13:36    TPro  7.4  /  Alb  3.3<L>  /  TBili  1.7<H>  /  DBili  x   /  AST  41<H>  /  ALT  41  /  AlkPhos  107  07-20      EKG:   IMGAGING:    ASSESSMENT/Problem: Refusal of blood draw for Troponin      PLAN:   1. Educate pt on the importance of trending her troponin  2. Supportive care given  3. Despite counseling, pt still refused any more blood draw for now.  4. Will try again with AM labs

## 2020-07-21 NOTE — PROGRESS NOTE ADULT - PROBLEM SELECTOR PLAN 3
Pt has PMH of HTN   Pt on admission was Hypotensive 88/52  s/p Dig loading BP - 188/108  Home meds- Labetalol 400mg TID   c/w home meds with parameters  Monitor vitals Pt had elevated cr levels on admission 1.64  Likely preRenal from dehydration  F.u urinel ytes  f/u BMP   c/w mild hydration x 12 hours

## 2020-07-21 NOTE — CONSULT NOTE ADULT - ASSESSMENT
Patient is a 69yo Female with HTN, Hypothyroidism, lymphedema p/w dizziness. Nephrology consulted for Elevated serum creatinine.     1. ANIKA  2. Hpokalemia  3. Afib with RVR-  4. Essential HTN Patient is a 71yo Female with HTN, Hypothyroidism, lymphedema p/w dizziness a/w Afib with RVR, NSTEMI and ANIKA.  Nephrology consulted for Elevated serum creatinine.     1. ANIKA- likely hemodynamically mediated in the setting of decreased PO intake with hypotension/ Afib. Renal function improving s/p IVF. Can defer urine lytes and Renal US since ANIKA is resolving. If worsening, check urine lytes and restart IVF. Strict I/Os. Avoid nephrotoxins/ NSAIDs/ RCA. Monitor BMP.  2. Hypokalemia- pt give PO KCl. Repeat BMP this evening. Monitor lytes.   3. Afib with RVR- plan as per cardiology.   4. Essential HTN- BP controlled. Continue with current anti-hypertensive medications. Monitor BP.

## 2020-07-21 NOTE — PROGRESS NOTE ADULT - PROBLEM SELECTOR PLAN 5
Pt has PMh of B/l lymphedema  R> L lymphedema   c/o pain in LE  F/U venous doppler Pt has PMh of HYpothyroidism   Home meds- levothyroxine  c/w home meds  f/u TSH

## 2020-07-21 NOTE — PROGRESS NOTE ADULT - SUBJECTIVE AND OBJECTIVE BOX
PGY-1 Progress Note discussed with attending    PAGER #: [[phone]] TILL 5:00 PM  PLEASE CONTACT ON CALL TEAM:   - On Call Team (Please refer to Ashley) FROM 5:00 PM - 8:30PM  - Nightfloat Team FROM 8:30 -7:30 AM    CHIEF COMPLAINT & BRIEF HOSPITAL COURSE:      INTERVAL HPI/OVERNIGHT EVENTS:       REVIEW OF SYSTEMS:  CONSTITUTIONAL: No fever, weight loss, or fatigue  RESPIRATORY: No cough, wheezing, chills or hemoptysis; No shortness of breath  CARDIOVASCULAR: No chest pain, palpitations, dizziness, or leg swelling  GASTROINTESTINAL: No abdominal pain. No nausea, vomiting, or hematemesis; No diarrhea or constipation. No melena or hematochezia.  GENITOURINARY: No dysuria or hematuria, urinary frequency  NEUROLOGICAL: No headaches, memory loss, loss of strength, numbness, or tremors  SKIN: No itching, burning, rashes, or lesions     MEDICATIONS  (STANDING):  ALPRAZolam 1 milliGRAM(s) Oral every 8 hours  aspirin enteric coated 81 milliGRAM(s) Oral daily  benzocaine 15 mG/menthol 3.6 mG (Sugar-Free) Lozenge 1 Lozenge Oral three times a day  cholecalciferol 1000 Unit(s) Oral daily  clopidogrel Tablet 300 milliGRAM(s) Oral once  cyanocobalamin 1000 MICROGram(s) Oral daily  folic acid 1 milliGRAM(s) Oral daily  gabapentin 300 milliGRAM(s) Oral two times a day  heparin  Infusion.  Unit(s)/Hr (17 mL/Hr) IV Continuous <Continuous>  insulin lispro (HumaLOG) corrective regimen sliding scale   SubCutaneous three times a day before meals  labetalol 400 milliGRAM(s) Oral three times a day  levothyroxine 75 MICROGram(s) Oral daily  pantoprazole    Tablet 40 milliGRAM(s) Oral before breakfast  potassium chloride    Tablet ER 40 milliEquivalent(s) Oral every 4 hours  tobramycin 0.3% Ophthalmic Solution 1 Drop(s) Both EYES two times a day    MEDICATIONS  (PRN):  acetaminophen   Tablet .. 650 milliGRAM(s) Oral every 6 hours PRN Moderate Pain (4 - 6)      Vital Signs Last 24 Hrs  T(C): 36.7 (21 Jul 2020 11:05), Max: 36.9 (20 Jul 2020 22:00)  T(F): 98.1 (21 Jul 2020 11:05), Max: 98.5 (20 Jul 2020 22:00)  HR: 70 (21 Jul 2020 12:23) (67 - 88)  BP: 145/78 (21 Jul 2020 12:23) (128/88 - 197/97)  BP(mean): 130 (20 Jul 2020 22:00) (130 - 130)  RR: 18 (21 Jul 2020 11:05) (16 - 20)  SpO2: 99% (21 Jul 2020 12:23) (94% - 100%)    PHYSICAL EXAMINATION:  GENERAL: NAD, well built  HEAD:  Atraumatic, Normocephalic  EYES:  conjunctiva and sclera clear  NECK: Supple, No JVD, Normal thyroid  CHEST/LUNG: Clear to auscultation. Clear to percussion bilaterally; No rales, rhonchi, wheezing, or rubs  HEART: Regular rate and rhythm; No murmurs, rubs, or gallops  ABDOMEN: Soft, Nontender, Nondistended; Bowel sounds present  NERVOUS SYSTEM:  Alert & Oriented X3,    EXTREMITIES:  2+ Peripheral Pulses, No clubbing, cyanosis, or edema  SKIN: warm dry                          14.5   5.93  )-----------( 260      ( 21 Jul 2020 07:04 )             41.6     07-21    138  |  104  |  18  ----------------------------<  106<H>  2.7<LL>   |  23  |  1.23    Ca    8.7      21 Jul 2020 07:04  Phos  3.1     07-21  Mg     1.8     07-21    TPro  7.4  /  Alb  3.3<L>  /  TBili  1.7<H>  /  DBili  x   /  AST  41<H>  /  ALT  41  /  AlkPhos  107  07-20    LIVER FUNCTIONS - ( 20 Jul 2020 13:36 )  Alb: 3.3 g/dL / Pro: 7.4 g/dL / ALK PHOS: 107 U/L / ALT: 41 U/L DA / AST: 41 U/L / GGT: x           CARDIAC MARKERS ( 21 Jul 2020 08:35 )  x     / x     / 553 U/L / x     / 66.4 ng/mL  CARDIAC MARKERS ( 21 Jul 2020 07:04 )  16.900 ng/mL / x     / x     / x     / x      CARDIAC MARKERS ( 20 Jul 2020 13:36 )  2.810 ng/mL / x     / x     / x     / x          PT/INR - ( 20 Jul 2020 14:52 )   PT: 13.4 sec;   INR: 1.15 ratio         PTT - ( 21 Jul 2020 08:20 )  PTT:139.5 sec        I&O's Summary        CAPILLARY BLOOD GLUCOSE  CAPILLARY BLOOD GLUCOSE      POCT Blood Glucose.: 140 mg/dL (21 Jul 2020 11:23)    CAPILLARY BLOOD GLUCOSE      POCT Blood Glucose.: 140 mg/dL (21 Jul 2020 11:23)  POCT Blood Glucose.: 124 mg/dL (21 Jul 2020 08:02)  POCT Blood Glucose.: 117 mg/dL (20 Jul 2020 22:07)      RADIOLOGY & ADDITIONAL TESTS: PGY-1 Progress Note discussed with attending    PAGER #: [[phone]] TILL 5:00 PM  PLEASE CONTACT ON CALL TEAM:   - On Call Team (Please refer to Ashley) FROM 5:00 PM - 8:30PM  - Nightfloat Team FROM 8:30 -7:30 AM    CHIEF COMPLAINT & BRIEF HOSPITAL COURSE:  69 y/o F with PMHx of obesity, HTN, Hypothyroidism and lymphedema presented to ED for  dizziness. Patient felt thirsty and became lightheaded when she stood up from bed for water. Describes it as lightheadedness and not room spinning. Also endorsed nausea and lack of appetite for last 3-4 days. In ED CT head was negative, found to have hypotension 88/52 and Afib with RVR, s/p Digoxin loading (5mg)-> /106 and NSR with T wave inversions. Initial Trop was 2.8, BNP was 4400 and D-dimer was 410. Patient was started on Heparin drip and admitted for NSTEMI. Doppler US of b/l lower extremities was negative for DVT, CXR negative for infiltrate ***    In the ED,  Pt is noted to be AAOX3, No visible distress. Pt very upset about being hospitalized  Vitals- Hypotensive 88/52 On admission, s/p Dig  Repeat 181/106  CT Head- no acute changes  EKG- new onset AFib with RVR, S/p Dig loading 0.5mg and Diltiazem 5mg  Repeat EKG- NSR with T wave inversions  Trops- 2.8, BNP- 4400 (20 Jul 2020 14:53)        INTERVAL HPI/OVERNIGHT EVENTS:       REVIEW OF SYSTEMS:  CONSTITUTIONAL: No fever, weight loss, or fatigue  RESPIRATORY: No cough, wheezing, chills or hemoptysis; No shortness of breath  CARDIOVASCULAR: No chest pain, palpitations, dizziness, or leg swelling  GASTROINTESTINAL: No abdominal pain. No nausea, vomiting, or hematemesis; No diarrhea or constipation. No melena or hematochezia.  GENITOURINARY: No dysuria or hematuria, urinary frequency  NEUROLOGICAL: No headaches, memory loss, loss of strength, numbness, or tremors  SKIN: No itching, burning, rashes, or lesions     MEDICATIONS  (STANDING):  ALPRAZolam 1 milliGRAM(s) Oral every 8 hours  aspirin enteric coated 81 milliGRAM(s) Oral daily  benzocaine 15 mG/menthol 3.6 mG (Sugar-Free) Lozenge 1 Lozenge Oral three times a day  cholecalciferol 1000 Unit(s) Oral daily  clopidogrel Tablet 300 milliGRAM(s) Oral once  cyanocobalamin 1000 MICROGram(s) Oral daily  folic acid 1 milliGRAM(s) Oral daily  gabapentin 300 milliGRAM(s) Oral two times a day  heparin  Infusion.  Unit(s)/Hr (17 mL/Hr) IV Continuous <Continuous>  insulin lispro (HumaLOG) corrective regimen sliding scale   SubCutaneous three times a day before meals  labetalol 400 milliGRAM(s) Oral three times a day  levothyroxine 75 MICROGram(s) Oral daily  pantoprazole    Tablet 40 milliGRAM(s) Oral before breakfast  potassium chloride    Tablet ER 40 milliEquivalent(s) Oral every 4 hours  tobramycin 0.3% Ophthalmic Solution 1 Drop(s) Both EYES two times a day    MEDICATIONS  (PRN):  acetaminophen   Tablet .. 650 milliGRAM(s) Oral every 6 hours PRN Moderate Pain (4 - 6)      Vital Signs Last 24 Hrs  T(C): 36.7 (21 Jul 2020 11:05), Max: 36.9 (20 Jul 2020 22:00)  T(F): 98.1 (21 Jul 2020 11:05), Max: 98.5 (20 Jul 2020 22:00)  HR: 70 (21 Jul 2020 12:23) (67 - 88)  BP: 145/78 (21 Jul 2020 12:23) (128/88 - 197/97)  BP(mean): 130 (20 Jul 2020 22:00) (130 - 130)  RR: 18 (21 Jul 2020 11:05) (16 - 20)  SpO2: 99% (21 Jul 2020 12:23) (94% - 100%)    PHYSICAL EXAMINATION:  GENERAL: NAD, well built  HEAD:  Atraumatic, Normocephalic  EYES:  conjunctiva and sclera clear  NECK: Supple, No JVD, Normal thyroid  CHEST/LUNG: Clear to auscultation. Clear to percussion bilaterally; No rales, rhonchi, wheezing, or rubs  HEART: Regular rate and rhythm; No murmurs, rubs, or gallops  ABDOMEN: Soft, Nontender, Nondistended; Bowel sounds present  NERVOUS SYSTEM:  Alert & Oriented X3,    EXTREMITIES:  2+ Peripheral Pulses, No clubbing, cyanosis, or edema  SKIN: warm dry                          14.5   5.93  )-----------( 260      ( 21 Jul 2020 07:04 )             41.6     07-21    138  |  104  |  18  ----------------------------<  106<H>  2.7<LL>   |  23  |  1.23    Ca    8.7      21 Jul 2020 07:04  Phos  3.1     07-21  Mg     1.8     07-21    TPro  7.4  /  Alb  3.3<L>  /  TBili  1.7<H>  /  DBili  x   /  AST  41<H>  /  ALT  41  /  AlkPhos  107  07-20    LIVER FUNCTIONS - ( 20 Jul 2020 13:36 )  Alb: 3.3 g/dL / Pro: 7.4 g/dL / ALK PHOS: 107 U/L / ALT: 41 U/L DA / AST: 41 U/L / GGT: x           CARDIAC MARKERS ( 21 Jul 2020 08:35 )  x     / x     / 553 U/L / x     / 66.4 ng/mL  CARDIAC MARKERS ( 21 Jul 2020 07:04 )  16.900 ng/mL / x     / x     / x     / x      CARDIAC MARKERS ( 20 Jul 2020 13:36 )  2.810 ng/mL / x     / x     / x     / x          PT/INR - ( 20 Jul 2020 14:52 )   PT: 13.4 sec;   INR: 1.15 ratio         PTT - ( 21 Jul 2020 08:20 )  PTT:139.5 sec        I&O's Summary        CAPILLARY BLOOD GLUCOSE  CAPILLARY BLOOD GLUCOSE      POCT Blood Glucose.: 140 mg/dL (21 Jul 2020 11:23)    CAPILLARY BLOOD GLUCOSE      POCT Blood Glucose.: 140 mg/dL (21 Jul 2020 11:23)  POCT Blood Glucose.: 124 mg/dL (21 Jul 2020 08:02)  POCT Blood Glucose.: 117 mg/dL (20 Jul 2020 22:07)      RADIOLOGY & ADDITIONAL TESTS: PGY-1 Progress Note discussed with attending    PAGER #: [[phone]] TILL 5:00 PM  PLEASE CONTACT ON CALL TEAM:   - On Call Team (Please refer to Ashley) FROM 5:00 PM - 8:30PM  - Nightfloat Team FROM 8:30 -7:30 AM    CHIEF COMPLAINT & BRIEF HOSPITAL COURSE:  71 y/o F with PMHx of obesity, HTN, Hypothyroidism and lymphedema presented to ED for  dizziness. Patient felt thirsty and became lightheaded when she stood up from bed for water. Describes it as lightheadedness and not room spinning. Also endorsed nausea and lack of appetite for last 3-4 days. In ED CT head was negative, found to have hypotension 88/52 and Afib with RVR, s/p Digoxin loading (5mg)-> /106 and NSR with T wave inversions. Initial Trop was 2.8, BNP was 4400 and D-dimer was 410. Patient was started on Heparin drip and admitted for NSTEMI. Doppler US of b/l lower extremities was negative for DVT, CXR negative for infiltrate. Cardiology following patient, plan for ECHO and Cath.     INTERVAL HPI/OVERNIGHT EVENTS:   Patient refused labs overnight, refused repeat troponin. On lab results Troponin trended upwards. Repeat EKG at bedside showed no significant ST elevations or depressions. No T wave inversions. Patient remained asymptomatic although anxious and irritable. No chest pain, SOB. Denies any complaints beyond some nausea prior to meals. Cardiology saw patient. Echo today with plans for Cath.     REVIEW OF SYSTEMS:  CONSTITUTIONAL: No fever, weight loss, or fatigue  RESPIRATORY: No cough, wheezing, chills or hemoptysis; No shortness of breath  CARDIOVASCULAR: + lightheadedness. No chest pain, palpitations, dizziness, or leg swelling  GASTROINTESTINAL: + Nausea. No abdominal pain. No vomiting, or hematemesis; No diarrhea or constipation. No melena or hematochezia.  GENITOURINARY: No dysuria or hematuria, urinary frequency  NEUROLOGICAL: No headaches, memory loss, loss of strength, numbness, or tremors  PSYCH: + anxiety  SKIN: No itching, burning, rashes, or lesions     MEDICATIONS  (STANDING):  ALPRAZolam 1 milliGRAM(s) Oral every 8 hours  aspirin enteric coated 81 milliGRAM(s) Oral daily  benzocaine 15 mG/menthol 3.6 mG (Sugar-Free) Lozenge 1 Lozenge Oral three times a day  cholecalciferol 1000 Unit(s) Oral daily  clopidogrel Tablet 300 milliGRAM(s) Oral once  cyanocobalamin 1000 MICROGram(s) Oral daily  folic acid 1 milliGRAM(s) Oral daily  gabapentin 300 milliGRAM(s) Oral two times a day  heparin  Infusion.  Unit(s)/Hr (17 mL/Hr) IV Continuous <Continuous>  insulin lispro (HumaLOG) corrective regimen sliding scale   SubCutaneous three times a day before meals  labetalol 400 milliGRAM(s) Oral three times a day  levothyroxine 75 MICROGram(s) Oral daily  pantoprazole    Tablet 40 milliGRAM(s) Oral before breakfast  potassium chloride    Tablet ER 40 milliEquivalent(s) Oral every 4 hours  tobramycin 0.3% Ophthalmic Solution 1 Drop(s) Both EYES two times a day    MEDICATIONS  (PRN):  acetaminophen   Tablet .. 650 milliGRAM(s) Oral every 6 hours PRN Moderate Pain (4 - 6)      Vital Signs Last 24 Hrs  T(C): 36.7 (21 Jul 2020 11:05), Max: 36.9 (20 Jul 2020 22:00)  T(F): 98.1 (21 Jul 2020 11:05), Max: 98.5 (20 Jul 2020 22:00)  HR: 70 (21 Jul 2020 12:23) (67 - 88)  BP: 145/78 (21 Jul 2020 12:23) (128/88 - 197/97)  BP(mean): 130 (20 Jul 2020 22:00) (130 - 130)  RR: 18 (21 Jul 2020 11:05) (16 - 20)  SpO2: 99% (21 Jul 2020 12:23) (94% - 100%)    PHYSICAL EXAMINATION:  GENERAL: Obese female, anxious affect, poor hygiene  HEAD:  Atraumatic, Normocephalic  EYES:  conjunctiva and sclera clear  NECK: Supple, No JVD, Normal thyroid  CHEST/LUNG: Clear to auscultation. Clear to percussion bilaterally; No rales, rhonchi, wheezing, or rubs  HEART: Regular rate and rhythm; No murmurs, rubs, or gallops  ABDOMEN: Soft, Nontender, Nondistended; Bowel sounds present  NERVOUS SYSTEM:  Alert & Oriented X3,    EXTREMITIES:  2+ Peripheral Pulses, No clubbing, cyanosis. + Chronic lower extremity edema, non-pitting.   SKIN: warm dry, + diffuse ecchymoses to b/l lower extremities.                            14.5   5.93  )-----------( 260      ( 21 Jul 2020 07:04 )             41.6     07-21    138  |  104  |  18  ----------------------------<  106<H>  2.7<LL>   |  23  |  1.23    Ca    8.7      21 Jul 2020 07:04  Phos  3.1     07-21  Mg     1.8     07-21    TPro  7.4  /  Alb  3.3<L>  /  TBili  1.7<H>  /  DBili  x   /  AST  41<H>  /  ALT  41  /  AlkPhos  107  07-20    LIVER FUNCTIONS - ( 20 Jul 2020 13:36 )  Alb: 3.3 g/dL / Pro: 7.4 g/dL / ALK PHOS: 107 U/L / ALT: 41 U/L DA / AST: 41 U/L / GGT: x           TROPONINS:  Troponin I, Serum: 18.100 @ 14:32 on 7/21/2020  CARDIAC MARKERS ( 21 Jul 2020 08:35 )  x     / x     / 553 U/L / x     / 66.4 ng/mL  CARDIAC MARKERS ( 21 Jul 2020 07:04 )  16.900 ng/mL / x     / x     / x     / x      CARDIAC MARKERS ( 20 Jul 2020 13:36 )  2.810 ng/mL / x     / x     / x     / x          PT/INR - ( 20 Jul 2020 14:52 )   PT: 13.4 sec;   INR: 1.15 ratio    PTT - ( 21 Jul 2020 08:20 )  PTT:139.5 sec [on HEPARIN DRIP]    I&O's Summary        CAPILLARY BLOOD GLUCOSE  POCT Blood Glucose.: 140 mg/dL (21 Jul 2020 11:23)    CAPILLARY BLOOD GLUCOSE  POCT Blood Glucose.: 140 mg/dL (21 Jul 2020 11:23)  POCT Blood Glucose.: 124 mg/dL (21 Jul 2020 08:02)  POCT Blood Glucose.: 117 mg/dL (20 Jul 2020 22:07)      RADIOLOGY & ADDITIONAL TESTS:      --------------------------------------------------------------------------------------------  CT BRAIN 7/21    EXAM:  CT BRAIN                        PROCEDURE DATE:  07/21/2020    INTERPRETATION:  HISTORY: Dizziness. Evaluate for stroke.  COMPARISON: None.    TECHNIQUE: Axial noncontrast CT images from the skull base to the vertex were obtainedand submitted for interpretation. Coronal and sagittal reformatted images were performed. Bone and soft tissue windows were evaluated.    FINDINGS:    There is no acute intracranial mass-effect, hemorrhage, midline shift, or abnormal extra-axial fluid collection. Gray-white differentiation is maintained.    Patchy and confluent regions of periventricular and deep cerebral white matter hypoattenuation due to chronic microangiopathic ischemic changes. Atheromatous calcifications along the carotid siphons are present.    Age-indeterminate left corona radiata lacunar infarction noted.    Mild centrally predominant cerebral volume loss noted.  No evidence of hydrocephalus. Basal cisterns are patent.     Right greater than left maxillary sinus polyps or retention cyst noted. Remaining paranasal sinuses  and mastoid air cells are clear. Calvarium is intact.     IMPRESSION:     Age-indeterminate left corona radiata lacunar infarction. No acute intracranial bleeding. Consider MRI as clinically indicated.     --------------------------------------------------------------------------------------------  CXR 7/21    EXAM:  XR CHEST PORTABLE IMMED 1V                        PROCEDURE DATE:  07/20/2020    INTERPRETATION:  CLINICAL INFORMATION:Dizziness    TECHNIQUE: AP chest film. Comparison is made to 04/02/2020    FINDINGS:There is no focal consolidation or pleural effusion.  Heart size appears enlarged, likely exaggerated by projection. The osseous structures are intact.    IMPRESSION: No focal consolidation or pleural effusion.         --------------------------------------------------------------------------------------------  ECHO previous 11/2019:    Patient name: TIANA BROWN  YOB: 1950   Age: 69 (F)   MR#: [id number]  Study Date: 11/5/2019  Location: [address] [place], [place] [address]onographer: Stephany Phelan [facility]  Study quality: Technically difficult  Referring Physician: Cassi Galan MD  Blood Pressure: 141/55 mmHg  Height: 157 cm  Weight: 93 kg  BSA: 1.9 m2  ------------------------------------------------------------------------    PROCEDURE: Transthoracic echocardiogram with 2-D, M-Mode  and complete spectral and color flow Doppler.  INDICATION: Dyspnea, unspecified (R06.00)  HISTORY:  ------------------------------------------------------------------------  DIMENSIONS:  Dimensions:     Normal Values:  LA:     5.0 cm    2.0 - 4.0 cm  Ao:     3.2 cm    2.0 - 3.8 cm  SEPTUM: 1.0 cm    0.6 - 1.2 cm  PWT:    1.0 cm    0.6 - 1.1 cm  LVIDd:  5.4 cm    3.0 - 5.6 cm  LVIDs:  3.4 cm    1.8 - 4.0 cm      Derived Variables:  LVMI: 107 g/m2  RWT: 0.37  Ejection Fraction Visual Estimate: >55 %  Peak Velocity (m/sec): AoV=2.3  ------------------------------------------------------------------------  ------------------------------------------------------------------------  CONCLUSIONS:  1. Mitral annular calcification. Mild mitral regurgitation.    2. Calcified aortic valve with decreased opening. Peak  transaortic valve gradient equals 21 mm Hg, mean  transaortic valve gradient equals 12 mm Hg, estimated  aortic valve area equals 1.8 sqcm (by continuity equation),  consistent with mild aortic stenosis.  3. Mild left atrial enlargement.  4. Mild concentric left ventricular hypertrophy.  5. Endocardium not well visualized; grossly normal left  ventricular systolic function.   Segmental wall motion  could not be assessed.   Mild diastolic dysfunction (stage  I).  6. Normal right ventricular size and function.    ------------------------------------------------------------------------  --------------------------------------------------------------------------------------------

## 2020-07-21 NOTE — PHYSICAL THERAPY INITIAL EVALUATION ADULT - LIVES WITH, PROFILE
alone/in an apartment with elevator access; no stairs to negotiate. Pt. has a private hire help for 5 hours daily.

## 2020-07-21 NOTE — CONSULT NOTE ADULT - SUBJECTIVE AND OBJECTIVE BOX
HPI:  69 y/o F with a significant PMHx of, HTN, Hypothyroidism,  carpal tunnel syndrome, lymphedema, presents to the ED for  dizziness today. Pt in ED AAOX3 and able to give all pertinent medical information. As per the Patient, she was in her usual state of health until this morning when she got up from her bed to get water as her throat was very dry and itchy and she noted sudden onset of  dizziness. She was unable and laid down tbut the dizziness did not resolved and her room mate then called the 911. As per the patient, she had not been eating/drinking well since 2 days due to nausea which is resolved now. Pt denies any chest pain, vomiting, room spinning, headache, shortness of breath, fall , any recent head trauma.  Pt was recently admitted in [facility] for a syncopal episode from gabapentin overdosing,     In the ED,  Pt is noted to be AAOX3, No visible distress. Pt very upset about being hospitalized  Vitals- Hypotensive 88/52 On admission, s/p Dig  Repeat 181/106  CT Head- no acute changes  EKG- new onset AFib with RVR, S/p Dig loading 0.5mg and Diltiazem 5mg  Repeat EKG- NSR with T wave inversions  Trops- 2.8, BNP- 4400 (20 Jul 2020 14:53)    Interval History:     MEDICATIONS  (STANDING):  aspirin enteric coated 81 milliGRAM(s) Oral daily  atorvastatin 40 milliGRAM(s) Oral at bedtime  benzocaine 15 mG/menthol 3.6 mG (Sugar-Free) Lozenge 1 Lozenge Oral three times a day  cholecalciferol 1000 Unit(s) Oral daily  cyanocobalamin 1000 MICROGram(s) Oral daily  folic acid 1 milliGRAM(s) Oral daily  gabapentin 300 milliGRAM(s) Oral two times a day  heparin  Infusion.  Unit(s)/Hr (17 mL/Hr) IV Continuous <Continuous>  insulin lispro (HumaLOG) corrective regimen sliding scale   SubCutaneous three times a day before meals  labetalol 400 milliGRAM(s) Oral three times a day  levothyroxine 75 MICROGram(s) Oral daily  pantoprazole    Tablet 40 milliGRAM(s) Oral before breakfast  potassium chloride    Tablet ER 40 milliEquivalent(s) Oral every 4 hours  tobramycin 0.3% Ophthalmic Solution 1 Drop(s) Both EYES two times a day    MEDICATIONS  (PRN):  acetaminophen   Tablet .. 650 milliGRAM(s) Oral every 6 hours PRN Moderate Pain (4 - 6)    PAST MEDICAL & SURGICAL HISTORY:  Lymphedema  Essential hypertension  Carpal tunnel syndrome of right wrist  Obesity (BMI 30-39.9)  Type 2 diabetes mellitus without complication  Hypothyroid  Essential hypertension  Obesity  HTN (hypertension)  DM (diabetes mellitus)  No significant past surgical history  S/P carpal tunnel release    SOCIAL HISTORY:  Smoker:  YES / NO        PACK YEARS:                         WHEN QUIT?  ETOH use:  YES / NO               FREQUENCY / QUANTITY:  Ilicit Drug use:  YES / NO      REVIEW OF SYSTEMS:    CONSTITUTIONAL: No weakness, fevers or chills  RESPIRATORY: No cough, wheezing, hemoptysis; No shortness of breath  CARDIOVASCULAR: No chest pain or palpitations  GASTROINTESTINAL: No abdominal or epigastric pain. No nausea, vomiting, or hematemesis; No diarrhea or constipation. No melena or hematochezia.  NEUROLOGICAL: No numbness or weakness  All other review of systems is negative unless indicated above.    Vital Signs Last 24 Hrs  T(C): 36.8 (21 Jul 2020 07:38), Max: 37.8 (20 Jul 2020 13:05)  T(F): 98.2 (21 Jul 2020 07:38), Max: 100 (20 Jul 2020 13:05)  HR: 67 (21 Jul 2020 07:38) (67 - 112)  BP: 143/81 (21 Jul 2020 07:38) (88/52 - 197/97)  BP(mean): 130 (20 Jul 2020 22:00) (130 - 130)  RR: 18 (21 Jul 2020 07:38) (16 - 20)  SpO2: 97% (21 Jul 2020 07:38) (92% - 98%)  General: A/ox 3, No acute Distress  Neck: Supple, NO JVD  Cardiac: S1 S2, No M/R/G  Pulmonary: CTAB, Breathing unlabored, No Rhonchi/Rales/Wheezing  Abdomen: Soft, Non -tender, +BS x 4 quads  Extremities: No Rashes, No edema  Neuro: A/o x 3, No focal deficits        Telemetry:  EKG:  CHADSVASC:                          14.5   5.93  )-----------( 260      ( 21 Jul 2020 07:04 )             41.6     07-21    138  |  104  |  18  ----------------------------<  106<H>  2.7<LL>   |  23  |  1.23    Ca    8.7      21 Jul 2020 07:04  Phos  3.1     07-21  Mg     1.8     07-21    TPro  7.4  /  Alb  3.3<L>  /  TBili  1.7<H>  /  DBili  x   /  AST  41<H>  /  ALT  41  /  AlkPhos  107  07-20

## 2020-07-21 NOTE — PROGRESS NOTE ADULT - PROBLEM SELECTOR PLAN 1
Pt admitted for dizziness  EKG- new onset afib with RVR, trops- 2.8  S/P Dig 0.5 and diltiazem 5mg  Repeat EKG - Normal sinus rhythm   CHADSVASc score- 3 ( eligible for AC)  HAS-BLED score- 3   Cardio- Dr. Kuo  c/w labetelol  c/w heparin infusion  f/u ECHO  f/u trops 2 and 3 P/w lightheadedness and nausea x 1 day.   - initally AFib with RVR then NSR with T wave unversions after Dig load.  - No ST elevations or depressions  - trop 2.8/16.9/18.001  - repeat EKG negative for ischemic findings  - f/u ECHO  - pending Cath.  - Cardiology following Dr. Kuo  - full AC with Heparin drip / ASA/ Plavix

## 2020-07-21 NOTE — PHYSICAL THERAPY INITIAL EVALUATION ADULT - PERTINENT HX OF CURRENT PROBLEM, REHAB EVAL
Pt. admitted for dizziness. Head CT revealed age-indeterminate left corona radiata lacunar infarction. No acute intracranial bleeding.

## 2020-07-21 NOTE — PROGRESS NOTE ADULT - ASSESSMENT
71 y/o F with a significant PMHx of carpal tunnel syndrome, HTN, hypothyroid, lymphedema, presents to the ED for lightheadedness and dizziness today.      In the ED,  Pt is noted to be AAOX3, No visible distress  Vitals- Hypotensive 88/52 On admission, s/p Dig  Repeat 181/106  CT Head- no acute changes  EKG- new onset AFib with RVR, S/p Dig loading 0.5mg and Diltiazem 5mg  Repeat EKG- NSR with T wave inversions  Trops- 2.8, BNP- 4400     Pt admitted for New onset Afib RVR 69 y/o F with a significant PMHx of carpal tunnel syndrome, HTN, hypothyroid, lymphedema, presents to the ED for lightheadedness and dizziness. Found to be hypotensive with elevated troponin. Admitted for NSTEMI.

## 2020-07-21 NOTE — PROGRESS NOTE ADULT - PROBLEM SELECTOR PLAN 2
Pt had elevated cr levels on admission 1.64  Likely preRenal from dehydration  F.u urinelytes  f/u BMP   c/w mild hydration x 12 hours Pt admitted for dizziness/ligheadedness  S/P Dig 0.5 and diltiazem 5mg  Repeat EKG - Normal sinus rhythm   CHADSVASc score- 3 ( eligible for AC)  HAS-BLED score- 3   Cardio- Dr. Kuo  - c/w labetalol  - c/w heparin infusion  - f/u ECHO  - Trops- 2.8 > 16.9 > 18.001

## 2020-07-21 NOTE — PROGRESS NOTE ADULT - PROBLEM SELECTOR PLAN 4
Pt has PMh of HYpothyroidism   Home meds- levothyroxine  c/w home meds  f/u TSH Pt has PMH of HTN   Pt on admission was Hypotensive 88/52  s/p Dig loading BP - 188/108  - BP at /54 HR 70  Home meds- Labetalol 400mg TID   c/w home meds with parameters  Monitor vitals
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
## 2020-07-21 NOTE — CONSULT NOTE ADULT - ASSESSMENT
Problem/Plan - 1:   NSTEMI with episode of afib+RVR   patient withy h/o CAD s/p 3 stent 3 years ago, admitted with Dizziness 2/2 dehydration vs hypoxia in setting of NSTEMI with episode of Afib with RVR   ECG on admission significant  for Afib wit ST depression   pt s/p Dig 0.5 on ED,   Current ECG remarkable for ST depression in lateral leads   Troponin trended up T1 : 2.8 , T2 :16.9  c/w heparin drip, start dual anti platelet therapy with Plavix and aspirin   c/w Statin and Beta blocker  check Echo  cardiac Catheretization after Echo    replace potassium   keep potassium >4 and magnesium >2   Pro BNP 4439, mild pulmonary congestion   CHADSVASc score- 3 ( eligible for AC)  HAS-BLED score- 3        Problem/Plan - 2:  ·  Problem: ANIKA (acute kidney injury).  Plan: Pt had elevated cr levels on admission 1.64  resolved   Likely preRenal from dehydration  F.u urine lytes   f/u BMP   s/p gentle hydrationn      Problem/Plan - 3:  ·  Problem: HTN (hypertension).  Plan: Pt has PMH of HTN   Pt on admission was Hypotensive 88/52, could be a wrong read   s/p Dig loading BP - 188/108  Thyroid Stimulating Hormone, Serum: 0.34 uU/mL (07.21.20 @ 07:04)    Home meds- Labetalol 400mg TID   c/w home meds with parameters  Monitor vitals.      Problem/Plan - 4:  ·  Problem: Hypothyroid.  Plan: Pt has PMh of HYpothyroidism   Home meds- levothyroxine  c/w home meds  TSH WNL      Problem/Plan - 5:  ·  Problem: Lymphedema.  Plan: Pt has PMh of B/l lymphedema  R> L lymphedema   c/o pain in LE  F/U venous doppler.      Problem/Plan - 6:  Problem: Prophylactic measure. Plan: IMPROVE VTE Individual Risk Assessment    RISK                                                          Points  [] Previous VTE                                           3  [] Thrombophilia                                        2  [] Lower limb paralysis                              2   [] Current Cancer                                       2   [x] Immobilization > 24 hrs                        1  [] ICU/CCU stay > 24 hours                       1  [x] Age > 60                                                   1    IMPROVE VTE Score: 2.

## 2020-07-21 NOTE — CHART NOTE - NSCHARTNOTEFT_GEN_A_CORE
Patient refusing second set of cardiac enzymes at this time. Primary team to follow enzymes with morning labs. Patient refusing second set of cardiac enzymes at this time. Primary team to follow enzymes with morning labs. Patient's blood pressure also noted to be running high. Given hydralazine 5mg IV push x1 with improvement in BP. Will continue monitoring.

## 2020-07-22 ENCOUNTER — TRANSCRIPTION ENCOUNTER (OUTPATIENT)
Age: 70
End: 2020-07-22

## 2020-07-22 DIAGNOSIS — F41.9 ANXIETY DISORDER, UNSPECIFIED: ICD-10-CM

## 2020-07-22 LAB
ANION GAP SERPL CALC-SCNC: 7 MMOL/L — SIGNIFICANT CHANGE UP (ref 5–17)
APPEARANCE UR: CLEAR — SIGNIFICANT CHANGE UP
APTT BLD: 55.9 SEC — HIGH (ref 27.5–35.5)
BACTERIA # UR AUTO: ABNORMAL /HPF
BILIRUB UR-MCNC: ABNORMAL
BUN SERPL-MCNC: 19 MG/DL — HIGH (ref 7–18)
CALCIUM SERPL-MCNC: 8.6 MG/DL — SIGNIFICANT CHANGE UP (ref 8.4–10.5)
CHLORIDE SERPL-SCNC: 105 MMOL/L — SIGNIFICANT CHANGE UP (ref 96–108)
CO2 SERPL-SCNC: 27 MMOL/L — SIGNIFICANT CHANGE UP (ref 22–31)
COLOR SPEC: YELLOW — SIGNIFICANT CHANGE UP
COMMENT - URINE: SIGNIFICANT CHANGE UP
CREAT SERPL-MCNC: 1.33 MG/DL — HIGH (ref 0.5–1.3)
DIFF PNL FLD: ABNORMAL
EPI CELLS # UR: SIGNIFICANT CHANGE UP /HPF
GLUCOSE BLDC GLUCOMTR-MCNC: 103 MG/DL — HIGH (ref 70–99)
GLUCOSE BLDC GLUCOMTR-MCNC: 132 MG/DL — HIGH (ref 70–99)
GLUCOSE SERPL-MCNC: 113 MG/DL — HIGH (ref 70–99)
GLUCOSE UR QL: 100 MG/DL
KETONES UR-MCNC: ABNORMAL
LEUKOCYTE ESTERASE UR-ACNC: ABNORMAL
NITRITE UR-MCNC: NEGATIVE — SIGNIFICANT CHANGE UP
PH UR: 5 — SIGNIFICANT CHANGE UP (ref 5–8)
POTASSIUM SERPL-MCNC: 3.7 MMOL/L — SIGNIFICANT CHANGE UP (ref 3.5–5.3)
POTASSIUM SERPL-SCNC: 3.7 MMOL/L — SIGNIFICANT CHANGE UP (ref 3.5–5.3)
PROT UR-MCNC: 30 MG/DL
RBC CASTS # UR COMP ASSIST: SIGNIFICANT CHANGE UP /HPF (ref 0–2)
SODIUM SERPL-SCNC: 139 MMOL/L — SIGNIFICANT CHANGE UP (ref 135–145)
SP GR SPEC: 1.02 — SIGNIFICANT CHANGE UP (ref 1.01–1.02)
UROBILINOGEN FLD QL: 4
WBC UR QL: ABNORMAL /HPF (ref 0–5)

## 2020-07-22 RX ORDER — LABETALOL HCL 100 MG
5 TABLET ORAL ONCE
Refills: 0 | Status: DISCONTINUED | OUTPATIENT
Start: 2020-07-22 | End: 2020-07-22

## 2020-07-22 RX ORDER — ENOXAPARIN SODIUM 100 MG/ML
90 INJECTION SUBCUTANEOUS EVERY 12 HOURS
Refills: 0 | Status: DISCONTINUED | OUTPATIENT
Start: 2020-07-22 | End: 2020-07-23

## 2020-07-22 RX ORDER — HYDRALAZINE HCL 50 MG
10 TABLET ORAL
Refills: 0 | Status: DISCONTINUED | OUTPATIENT
Start: 2020-07-22 | End: 2020-07-22

## 2020-07-22 RX ORDER — HYDRALAZINE HCL 50 MG
25 TABLET ORAL EVERY 8 HOURS
Refills: 0 | Status: DISCONTINUED | OUTPATIENT
Start: 2020-07-22 | End: 2020-07-31

## 2020-07-22 RX ORDER — ONDANSETRON 8 MG/1
4 TABLET, FILM COATED ORAL ONCE
Refills: 0 | Status: COMPLETED | OUTPATIENT
Start: 2020-07-22 | End: 2020-07-22

## 2020-07-22 RX ADMIN — BENZOCAINE AND MENTHOL 1 LOZENGE: 5; 1 LIQUID ORAL at 06:08

## 2020-07-22 RX ADMIN — Medication 1 MILLIGRAM(S): at 12:53

## 2020-07-22 RX ADMIN — Medication 1000 UNIT(S): at 12:53

## 2020-07-22 RX ADMIN — Medication 10 MILLIGRAM(S): at 16:38

## 2020-07-22 RX ADMIN — Medication 1 DROP(S): at 17:41

## 2020-07-22 RX ADMIN — HEPARIN SODIUM 1300 UNIT(S)/HR: 5000 INJECTION INTRAVENOUS; SUBCUTANEOUS at 02:13

## 2020-07-22 RX ADMIN — Medication 1 DROP(S): at 06:10

## 2020-07-22 RX ADMIN — PREGABALIN 1000 MICROGRAM(S): 225 CAPSULE ORAL at 12:53

## 2020-07-22 RX ADMIN — CLOPIDOGREL BISULFATE 75 MILLIGRAM(S): 75 TABLET, FILM COATED ORAL at 12:53

## 2020-07-22 RX ADMIN — Medication 81 MILLIGRAM(S): at 12:51

## 2020-07-22 RX ADMIN — Medication 400 MILLIGRAM(S): at 12:58

## 2020-07-22 RX ADMIN — Medication 400 MILLIGRAM(S): at 06:09

## 2020-07-22 RX ADMIN — Medication 400 MILLIGRAM(S): at 21:25

## 2020-07-22 RX ADMIN — BENZOCAINE AND MENTHOL 1 LOZENGE: 5; 1 LIQUID ORAL at 17:40

## 2020-07-22 RX ADMIN — ONDANSETRON 4 MILLIGRAM(S): 8 TABLET, FILM COATED ORAL at 09:23

## 2020-07-22 RX ADMIN — Medication 1 MILLIGRAM(S): at 21:24

## 2020-07-22 RX ADMIN — PANTOPRAZOLE SODIUM 40 MILLIGRAM(S): 20 TABLET, DELAYED RELEASE ORAL at 06:10

## 2020-07-22 RX ADMIN — Medication 25 MILLIGRAM(S): at 21:25

## 2020-07-22 RX ADMIN — Medication 25 MILLIGRAM(S): at 17:40

## 2020-07-22 RX ADMIN — Medication 75 MICROGRAM(S): at 06:06

## 2020-07-22 NOTE — PROGRESS NOTE ADULT - PROBLEM SELECTOR PLAN 1
P/w lightheadedness and nausea x 1 day.   - initally AFib with RVR then NSR with T wave unversions after Dig load.  - No ST elevations or depressions  - Trops- 2.8 > 16.9 > 18.001 > 13.4 downtrend on 7/22  - repeat EKG negative for ischemic findings  - f/u ECHO  - pending Cath.  - Cardiology following Dr. Kuo  - full AC with Heparin drip / ASA/ Plavix > Heparin D/C'd on 7/22  - start Lovenox 7/22  / ASA/ Plavix   - Echo sig for LV hypertrophy and stage I diastolic dysfunction (see above)  - Plan for stress test and eventual cath if patient agreeable

## 2020-07-22 NOTE — PROGRESS NOTE ADULT - PROBLEM SELECTOR PLAN 4
Pt has PMH of HTN. Pt on admission was Hypotensive 88/52. s/p Dig loading BP of 188/108  - BP at /54 HR 70 on 7/21  - increasing /102 on 7/22, patient refused Hydralazine. WIll recheck and monitor. Patient remains agitated.   - c/w home Labetalol 400mg TID   - monitor vitals

## 2020-07-22 NOTE — PROGRESS NOTE ADULT - SUBJECTIVE AND OBJECTIVE BOX
DATE OF SERVICE:Patient was seen and examined :07/22/2020    PRESENTING CC:Dizziness    SUBJ: 69 y/o F with a significant PMHx of, HTN, Hypothyroidism,  carpal tunnel syndrome, lymphedema, presents to the ED for  dizzinessEKG- new onset AFib with RVR,now in sinus rhythm no chest pain      PMH -reviewed admission note, no change since admission  Heart failure: acute [ ] chronic [ ] acute or chronic [ ] diastolic [ ] systolic [ ] combined systolic and diastolic[ ]  ANIKA: ATN[ ] renal medullary necrosis [ ] CKD I [ ]CKDII [ ]CKD III [ ]CKD IV [ ]CKD V [ ]Other pathological lesions [ ]    MEDICATIONS  (STANDING):  ALPRAZolam 1 milliGRAM(s) Oral every 8 hours  aspirin enteric coated 81 milliGRAM(s) Oral daily  atorvastatin 80 milliGRAM(s) Oral at bedtime  benzocaine 15 mG/menthol 3.6 mG (Sugar-Free) Lozenge 1 Lozenge Oral three times a day  cholecalciferol 1000 Unit(s) Oral daily  clopidogrel Tablet 75 milliGRAM(s) Oral daily  cyanocobalamin 1000 MICROGram(s) Oral daily  folic acid 1 milliGRAM(s) Oral daily  gabapentin 300 milliGRAM(s) Oral two times a day  insulin lispro (HumaLOG) corrective regimen sliding scale   SubCutaneous three times a day before meals  labetalol 400 milliGRAM(s) Oral three times a day  levothyroxine 75 MICROGram(s) Oral daily  pantoprazole    Tablet 40 milliGRAM(s) Oral before breakfast  tobramycin 0.3% Ophthalmic Solution 1 Drop(s) Both EYES two times a day    MEDICATIONS  (PRN):  acetaminophen   Tablet .. 650 milliGRAM(s) Oral every 6 hours PRN Moderate Pain (4 - 6)              REVIEW OF SYSTEMS:  Constitutional: [ ] fever, [ ]weight loss,  [x ]fatigue  Eyes: [ ] visual changes  Respiratory: [ ]shortness of breath;  [ ] cough, [ ]wheezing, [ ]chills, [ ]hemoptysis  Cardiovascular: [ ] chest pain, [ ]palpitations, [ ]dizziness,  [ ]leg swelling[ ]orthopnea[ ]PND  Gastrointestinal: [ ] abdominal pain, [ ]nausea, [ ]vomiting,  [ ]diarrhea   Genitourinary: [ ] dysuria, [ ] hematuria  Neurologic: [ ] headaches [ ] tremors[ ]weakness  Skin: [ ] itching, [ ]burning, [ ] rashes  Endocrine: [ ] heat or cold intolerance  Musculoskeletal: [ ] joint pain or swelling; [ ] muscle, back, or extremity pain  Psychiatric: [ ] depression, [ x]anxiety, [ ]mood swings, or [ ]difficulty sleeping  Hematologic: [ ] easy bruising, [ ] bleeding gums    [x] All remaining systems negative except as per above.   [ ]Unable to obtain.    Vital Signs Last 24 Hrs  T(C): 36.3 (22 Jul 2020 07:43), Max: 36.9 (21 Jul 2020 23:49)  T(F): 97.4 (22 Jul 2020 07:43), Max: 98.5 (21 Jul 2020 23:49)  HR: 72 (22 Jul 2020 07:55) (70 - 79)  BP: 199/99 (22 Jul 2020 07:55) (145/78 - 227/107)  RR: 20 (22 Jul 2020 07:43) (18 - 20)  SpO2: 96% (22 Jul 2020 07:43) (96% - 100%)  I&O's Summary    21 Jul 2020 07:01  -  22 Jul 2020 07:00  --------------------------------------------------------  IN: 143 mL / OUT: 0 mL / NET: 143 mL        PHYSICAL EXAM:  General: No acute distress BMI-34  HEENT: EOMI, PERRL  Neck: Supple, [ ] JVD  Lungs: Equal air entry bilaterally; [ ] rales [ ] wheezing [ ] rhonchi  Heart: Regular rate and rhythm; [x ] murmur   2/6 [x ] systolic [ ] diastolic [ ] radiation[ ] rubs [ ]  gallops  Abdomen: Nontender, bowel sounds present  Extremities: No clubbing, cyanosis, [ ] edema  Nervous system:  Alert & Oriented X3, no focal deficits  Psychiatric: Normal affect  Skin: No rashes or lesions    LABS:  07-21    136  |  102  |  20<H>  ----------------------------<  169<H>  3.3<L>   |  25  |  1.53<H>    Ca    8.8      21 Jul 2020 14:32  Phos  3.1     07-21  Mg     1.8     07-21    TPro  7.4  /  Alb  3.3<L>  /  TBili  1.7<H>  /  DBili  x   /  AST  41<H>  /  ALT  41  /  AlkPhos  107  07-20    Creatinine Trend: 1.53<--, 1.23<--, 1.64<--                        14.5   5.93  )-----------( 260      ( 21 Jul 2020 07:04 )             41.6     PT/INR - ( 20 Jul 2020 14:52 )   PT: 13.4 sec;   INR: 1.15 ratio         PTT - ( 22 Jul 2020 00:55 )  PTT:55.9 sec      Cardiac Enzymes: CARDIAC MARKERS ( 21 Jul 2020 21:53 )  13.400 ng/mL / x     / x     / x     / x      CARDIAC MARKERS ( 21 Jul 2020 14:32 )  18.100 ng/mL / x     / x     / x     / x      CARDIAC MARKERS ( 21 Jul 2020 08:35 )  x     / x     / 553 U/L / x     / 66.4 ng/mL  CARDIAC MARKERS ( 21 Jul 2020 07:04 )  16.900 ng/mL / x     / x     / x     / x      CARDIAC MARKERS ( 20 Jul 2020 13:36 )  2.810 ng/mL / x     / x     / x     / x          Serum Pro-Brain Natriuretic Peptide: 4439 pg/mL (07-20-20 @ 13:36)      ECG [my interpretation]:Sinus rhythm LVH    TELEMETRY:Sinus Rhythm    ECHO:  Study Date: 7/21/2020  CONCLUSIONS:  1. Mitral annular calcification, and calcified mitral leaflets with normal diastolic opening. Trace mitral regurgitation.  2. Mildly dilated left atrium.  LA volume index = 39 cc/m2.  3. Severe concentric left ventricular hypertrophy.  4. Endocardium not well visualized; grossly hyperdynamic left ventricular systolic function.        Mild diastolic dysfunction (stage I).  There is a mild intracavitary gradient of 19 mmHg.  5. Normal right ventricular size and function.  6. Trivial pericardial effusion.    CATHETERIZATION:Study date: 03/12/2018  CORONARY VESSELS: The coronary circulation is right dominant.  LM:   --  LM: Normal.  LAD:   --  Mid LAD: There was a 50 % stenosis.  --  D1: There was a 30 % stenosis.  CX:   --  Circumflex: Normal.  RCA:   --  Proximal RCA: There was a 30 % stenosis.    IMPRESSION AND PLAN:    Problem/Plan - 1:   NSTEMI with episode of afib+RVR   patient withy h/o CAD s/p 3 stent 3 years ago, admitted with Dizziness 2/2 dehydration vs hypoxia in setting of NSTEMI with episode of Afib with RVR   ECG on admission significant  for Afib wit ST depression   pt s/p Dig 0.5 on ED,   CHADSVASc score- 3 ( eligible for AC)  HAS-BLED score- 3  Continue DAPT  TTE Hyperdynamic LV Function LVH  Discontinue Heparin gtt  Possible troponin leak 2/2 demand  Pharmacological Nuclear stress Test         Problem/Plan - 2:  ·  Problem: ANIKA (acute kidney injury).  Plan: Pt had elevated cr levels on admission 1.64  resolved   Likely preRenal from dehydration  F.u urine lytes   f/u BMP   s/p gentle hydrationn      Problem/Plan - 3:  ·  Problem: HTN (hypertension).  Plan: Pt has PMH of HTN   Pt on admission was Hypotensive 88/52, could be a wrong read   s/p Dig loading BP - 188/108  Thyroid Stimulating Hormone, Serum: 0.34 uU/mL (07.21.20 @ 07:04)    Home meds- Labetalol 400mg TID   c/w home meds with parameters  Monitor vitals.      Problem/Plan - 4:  ·  Problem: Hypothyroid.  Plan: Pt has PMh of HYpothyroidism   Home meds- levothyroxine  c/w home meds  TSH WNL      Problem/Plan - 5:  ·  Problem: Lymphedema.  Plan: Pt has PMh of B/l lymphedema  R> L lymphedema   c/o pain in LE  F/U venous doppler.

## 2020-07-22 NOTE — PROGRESS NOTE ADULT - PROBLEM SELECTOR PLAN 2
Pt admitted for dizziness/ligheadedness  S/P Dig 0.5 and diltiazem 5mg  Repeat EKG - Normal sinus rhythm; rate controlled  CHADSVASc score- 3 ( eligible for AC)  HAS-BLED score- 3   Cardio- Dr. Kuo  - c/w labetalol  - transition from heparin infusion > lovenox on 7/22  - watch aPTT  - Trops- 2.8 > 16.9 > 18.001 > 13.4 downtrend on 7/22

## 2020-07-22 NOTE — PROGRESS NOTE ADULT - PROBLEM SELECTOR PLAN 8
- on full AC for NSTEMI and Afib    IMPROVE VTE Individual Risk Assessment    RISK                                                          Points  [] Previous VTE                                           3  [] Thrombophilia                                        2  [] Lower limb paralysis                              2   [] Current Cancer                                       2   [x] Immobilization > 24 hrs                        1  [] ICU/CCU stay > 24 hours                       1  [x] Age > 60                                                   1    IMPROVE VTE Score: 2

## 2020-07-22 NOTE — PROGRESS NOTE ADULT - PROBLEM SELECTOR PLAN 3
Pt had elevated cr levels on admission 1.64. Likely pre Renal from dehydration - patient not eating/drinking for 3-4 days.  - f/u urinel ytes  - f/u BMP   - c/w mild hydration x 12 hours  - BUN/Creat 19/1.33 on 7/22 (improving)

## 2020-07-22 NOTE — PROGRESS NOTE ADULT - SUBJECTIVE AND OBJECTIVE BOX
PGY-1 Progress Note discussed with attending    PAGER #: [[phone]] TILL 5:00 PM  PLEASE CONTACT ON CALL TEAM:   - On Call Team (Please refer to Ashley) FROM 5:00 PM - 8:30PM  - Nightfloat Team FROM 8:30 -7:30 AM    CHIEF COMPLAINT & BRIEF HOSPITAL COURSE:      INTERVAL HPI/OVERNIGHT EVENTS:       REVIEW OF SYSTEMS:  CONSTITUTIONAL: No fever, weight loss, or fatigue  RESPIRATORY: No cough, wheezing, chills or hemoptysis; No shortness of breath  CARDIOVASCULAR: No chest pain, palpitations, dizziness, or leg swelling  GASTROINTESTINAL: No abdominal pain. No nausea, vomiting, or hematemesis; No diarrhea or constipation. No melena or hematochezia.  GENITOURINARY: No dysuria or hematuria, urinary frequency  NEUROLOGICAL: No headaches, memory loss, loss of strength, numbness, or tremors  SKIN: No itching, burning, rashes, or lesions     MEDICATIONS  (STANDING):  ALPRAZolam 1 milliGRAM(s) Oral every 8 hours  aspirin enteric coated 81 milliGRAM(s) Oral daily  atorvastatin 80 milliGRAM(s) Oral at bedtime  benzocaine 15 mG/menthol 3.6 mG (Sugar-Free) Lozenge 1 Lozenge Oral three times a day  cholecalciferol 1000 Unit(s) Oral daily  clopidogrel Tablet 75 milliGRAM(s) Oral daily  cyanocobalamin 1000 MICROGram(s) Oral daily  folic acid 1 milliGRAM(s) Oral daily  gabapentin 300 milliGRAM(s) Oral two times a day  heparin  Infusion.  Unit(s)/Hr (17 mL/Hr) IV Continuous <Continuous>  insulin lispro (HumaLOG) corrective regimen sliding scale   SubCutaneous three times a day before meals  labetalol 400 milliGRAM(s) Oral three times a day  levothyroxine 75 MICROGram(s) Oral daily  pantoprazole    Tablet 40 milliGRAM(s) Oral before breakfast  tobramycin 0.3% Ophthalmic Solution 1 Drop(s) Both EYES two times a day    MEDICATIONS  (PRN):  acetaminophen   Tablet .. 650 milliGRAM(s) Oral every 6 hours PRN Moderate Pain (4 - 6)      Vital Signs Last 24 Hrs  T(C): 36.3 (22 Jul 2020 07:43), Max: 36.9 (21 Jul 2020 23:49)  T(F): 97.4 (22 Jul 2020 07:43), Max: 98.5 (21 Jul 2020 23:49)  HR: 72 (22 Jul 2020 07:55) (70 - 79)  BP: 199/99 (22 Jul 2020 07:55) (145/78 - 227/107)  BP(mean): --  RR: 20 (22 Jul 2020 07:43) (18 - 20)  SpO2: 96% (22 Jul 2020 07:43) (96% - 100%)    PHYSICAL EXAMINATION:  GENERAL: NAD, well built  HEAD:  Atraumatic, Normocephalic  EYES:  conjunctiva and sclera clear  NECK: Supple, No JVD, Normal thyroid  CHEST/LUNG: Clear to auscultation. Clear to percussion bilaterally; No rales, rhonchi, wheezing, or rubs  HEART: Regular rate and rhythm; No murmurs, rubs, or gallops  ABDOMEN: Soft, Nontender, Nondistended; Bowel sounds present  NERVOUS SYSTEM:  Alert & Oriented X3,    EXTREMITIES:  2+ Peripheral Pulses, No clubbing, cyanosis, or edema  SKIN: warm dry                          14.5   5.93  )-----------( 260      ( 21 Jul 2020 07:04 )             41.6     07-21    136  |  102  |  20<H>  ----------------------------<  169<H>  3.3<L>   |  25  |  1.53<H>    Ca    8.8      21 Jul 2020 14:32  Phos  3.1     07-21  Mg     1.8     07-21    TPro  7.4  /  Alb  3.3<L>  /  TBili  1.7<H>  /  DBili  x   /  AST  41<H>  /  ALT  41  /  AlkPhos  107  07-20    LIVER FUNCTIONS - ( 20 Jul 2020 13:36 )  Alb: 3.3 g/dL / Pro: 7.4 g/dL / ALK PHOS: 107 U/L / ALT: 41 U/L DA / AST: 41 U/L / GGT: x           CARDIAC MARKERS ( 21 Jul 2020 21:53 )  13.400 ng/mL / x     / x     / x     / x      CARDIAC MARKERS ( 21 Jul 2020 14:32 )  18.100 ng/mL / x     / x     / x     / x      CARDIAC MARKERS ( 21 Jul 2020 08:35 )  x     / x     / 553 U/L / x     / 66.4 ng/mL  CARDIAC MARKERS ( 21 Jul 2020 07:04 )  16.900 ng/mL / x     / x     / x     / x      CARDIAC MARKERS ( 20 Jul 2020 13:36 )  2.810 ng/mL / x     / x     / x     / x          PT/INR - ( 20 Jul 2020 14:52 )   PT: 13.4 sec;   INR: 1.15 ratio         PTT - ( 22 Jul 2020 00:55 )  PTT:55.9 sec        I&O's Summary    21 Jul 2020 07:01  -  22 Jul 2020 07:00  --------------------------------------------------------  IN: 143 mL / OUT: 0 mL / NET: 143 mL          CAPILLARY BLOOD GLUCOSE  CAPILLARY BLOOD GLUCOSE      POCT Blood Glucose.: 145 mg/dL (21 Jul 2020 21:42)    CAPILLARY BLOOD GLUCOSE      POCT Blood Glucose.: 145 mg/dL (21 Jul 2020 21:42)  POCT Blood Glucose.: 111 mg/dL (21 Jul 2020 16:38)  POCT Blood Glucose.: 140 mg/dL (21 Jul 2020 11:23)      RADIOLOGY & ADDITIONAL TESTS: PGY-1 Progress Note discussed with attending    PAGER #: [[phone]] TILL 5:00 PM  PLEASE CONTACT ON CALL TEAM:   - On Call Team (Please refer to Ashley) FROM 5:00 PM - 8:30PM  - Nightfloat Team FROM 8:30 -7:30 AM    CHIEF COMPLAINT & BRIEF HOSPITAL COURSE:  69 y/o F with PMHx of obesity, HTN, Hypothyroidism and lymphedema presented to ED for  dizziness. Patient felt thirsty and became lightheaded when she stood up from bed for water. Describes it as lightheadedness and not room spinning. Also endorsed nausea and lack of appetite for last 3-4 days. In ED CT head was negative, found to have hypotension 88/52 and Afib with RVR, s/p Digoxin loading (5mg)-> /106 and NSR with T wave inversions. Initial Trop was 2.8, BNP was 4400 and D-dimer was 410. Patient was started on Heparin drip and admitted for NSTEMI. Doppler US of b/l lower extremities was negative for DVT, CXR negative for infiltrate. Cardiology following patient. Echo on 7/21 significant for LV hypertrophy and stage I diastolic dysfunction.       INTERVAL HPI/OVERNIGHT EVENTS:   Patient refused repeat trop overnight. Slight increase yesterday in trop has now come down. No EKG signs, no symptoms. Trops trending down on this AM labs. Patient refusing heparin, discontinued and will begin Lovenox this PM. Patient does not want cath for ischemic w/u and intervention. Is agreeable to stress test.     REVIEW OF SYSTEMS:  CONSTITUTIONAL: No fever, weight loss, or fatigue  RESPIRATORY: No cough, wheezing, chills or hemoptysis; No shortness of breath  CARDIOVASCULAR: No chest pain, palpitations, dizziness, or leg swelling  GASTROINTESTINAL:+ nausea. No abdominal pain. No vomiting, or hematemesis; No diarrhea or constipation. No melena or hematochezia.  GENITOURINARY: No dysuria or hematuria, urinary frequency  NEUROLOGICAL: No headaches, memory loss, loss of strength, numbness, or tremors  SKIN: No itching, burning, rashes, or lesions     MEDICATIONS  (STANDING):  ALPRAZolam 1 milliGRAM(s) Oral every 8 hours  aspirin enteric coated 81 milliGRAM(s) Oral daily  atorvastatin 80 milliGRAM(s) Oral at bedtime  benzocaine 15 mG/menthol 3.6 mG (Sugar-Free) Lozenge 1 Lozenge Oral three times a day  cholecalciferol 1000 Unit(s) Oral daily  clopidogrel Tablet 75 milliGRAM(s) Oral daily  cyanocobalamin 1000 MICROGram(s) Oral daily  folic acid 1 milliGRAM(s) Oral daily  gabapentin 300 milliGRAM(s) Oral two times a day  heparin  Infusion.  Unit(s)/Hr (17 mL/Hr) IV Continuous <Continuous>  insulin lispro (HumaLOG) corrective regimen sliding scale   SubCutaneous three times a day before meals  labetalol 400 milliGRAM(s) Oral three times a day  levothyroxine 75 MICROGram(s) Oral daily  pantoprazole    Tablet 40 milliGRAM(s) Oral before breakfast  tobramycin 0.3% Ophthalmic Solution 1 Drop(s) Both EYES two times a day    MEDICATIONS  (PRN):  acetaminophen   Tablet .. 650 milliGRAM(s) Oral every 6 hours PRN Moderate Pain (4 - 6)      Vital Signs Last 24 Hrs  T(C): 36.3 (22 Jul 2020 07:43), Max: 36.9 (21 Jul 2020 23:49)  T(F): 97.4 (22 Jul 2020 07:43), Max: 98.5 (21 Jul 2020 23:49)  HR: 72 (22 Jul 2020 07:55) (70 - 79)  BP: 199/99 (22 Jul 2020 07:55) (145/78 - 227/107)  BP(mean): --  RR: 20 (22 Jul 2020 07:43) (18 - 20)  SpO2: 96% (22 Jul 2020 07:43) (96% - 100%)    PHYSICAL EXAMINATION:  GENERAL: Obese female  HEAD:  Atraumatic, Normocephalic  EYES:  conjunctiva and sclera clear  NECK: Supple, No JVD, Normal thyroid  CHEST/LUNG: Clear to auscultation. Clear to percussion bilaterally; No rales, rhonchi, wheezing, or rubs  HEART: Regular rate and rhythm; No murmurs, rubs, or gallops  ABDOMEN: Soft, Nontender, Nondistended; Bowel sounds present  NERVOUS SYSTEM:  Alert & Oriented X3,    EXTREMITIES:  2+ Peripheral Pulses, No clubbing, cyanosis. + Chronic lower extremity edema, non-pitting.   SKIN: warm dry, + diffuse ecchymoses to b/l lower extremities stable.                            14.5   5.93  )-----------( 260      ( 21 Jul 2020 07:04 )             41.6     07-21    136  |  102  |  20<H>  ----------------------------<  169<H>  3.3<L>   |  25  |  1.53<H>    Ca    8.8      21 Jul 2020 14:32  Phos  3.1     07-21  Mg     1.8     07-21    TPro  7.4  /  Alb  3.3<L>  /  TBili  1.7<H>  /  DBili  x   /  AST  41<H>  /  ALT  41  /  AlkPhos  107  07-20    LIVER FUNCTIONS - ( 20 Jul 2020 13:36 )  Alb: 3.3 g/dL / Pro: 7.4 g/dL / ALK PHOS: 107 U/L / ALT: 41 U/L DA / AST: 41 U/L / GGT: x           CARDIAC MARKERS ( 21 Jul 2020 21:53 )  13.400 ng/mL / x     / x     / x     / x      CARDIAC MARKERS ( 21 Jul 2020 14:32 )  18.100 ng/mL / x     / x     / x     / x      CARDIAC MARKERS ( 21 Jul 2020 08:35 )  x     / x     / 553 U/L / x     / 66.4 ng/mL  CARDIAC MARKERS ( 21 Jul 2020 07:04 )  16.900 ng/mL / x     / x     / x     / x      CARDIAC MARKERS ( 20 Jul 2020 13:36 )  2.810 ng/mL / x     / x     / x     / x          PT/INR - ( 20 Jul 2020 14:52 )   PT: 13.4 sec;   INR: 1.15 ratio      PTT - ( 22 Jul 2020 00:55 )  PTT:55.9 sec        I&O's Summary    21 Jul 2020 07:01  -  22 Jul 2020 07:00  --------------------------------------------------------  IN: 143 mL / OUT: 0 mL / NET: 143 mL          CAPILLARY BLOOD GLUCOSE  POCT Blood Glucose.: 145 mg/dL (21 Jul 2020 21:42)    CAPILLARY BLOOD GLUCOSE  POCT Blood Glucose.: 145 mg/dL (21 Jul 2020 21:42)  POCT Blood Glucose.: 111 mg/dL (21 Jul 2020 16:38)  POCT Blood Glucose.: 140 mg/dL (21 Jul 2020 11:23)    A1C with Estimated Average Glucose Result: 5.7      RADIOLOGY & ADDITIONAL TESTS:    Patient name: TIANA BROWN  YOB: 1950   Age: 70 (F)   MR#: [id number]  Study Date: 7/21/2020  Location: [address] [place], [place] [address]Sonographer: Matthew Hargrove [facility]  Study quality: Technically difficult  Referring Physician: Miya Francis MD  Blood Pressure: 128/88 mmHg  Height: 160 cm  Weight: 91 kg  BSA: 1.9 m2  ------------------------------------------------------------------------    PROCEDURE: Transthoracic echocardiogram with 2-D, M-Mode  and complete spectral and color flow Doppler.  INDICATION: Unspecified atrial fibrillation (I48.91)  HISTORY:  ------------------------------------------------------------------------  DIMENSIONS:  Dimensions:     Normal Values:  LA:     4.2 cm    2.0 - 4.0 cm  Ao:     3.3 cm    2.0 - 3.8 cm  SEPTUM: 1.4 cm    0.6 - 1.2 cm  PWT:    1.3 cm    0.6 - 1.1 cm  LVIDd:  4.0 cm    3.0 - 5.6 cm  LVIDs:  2.7 cm    1.8 - 4.0 cm      Derived Variables:  LVMI: 102 g/m2  RWT: 0.65  Ejection Fraction Visual Estimate: >55 %    ------------------------------------------------------------------------  OBSERVATIONS:  Mitral Valve: Mitral annular calcification, and calcified  mitral leaflets with normal diastolic opening. Trace mitral  regurgitation.  Aortic Root: Normal aortic root.  Aortic Valve: Calcified aortic valve with normal opening.  Left Atrium: Mildly dilated left atrium.  LA volume index =  39 cc/m2.  Left Ventricle: Endocardium not well visualized; grossly  hyperdynamic left ventricular systolic function.   Mild  diastolic dysfunction (stage I).There is a mild  intracavitary gradient of 19 mmHg. Severe concentric left  ventricular hypertrophy.  Right Heart: Normal right atrium. Normal right ventricular  size and function. There is mild tricuspid regurgitation.  There is trace pulmonic regurgitation.  Pericardium/PleuraTrivial pericardial effusion.  Hemodynamic: Incomplete tricuspid regurgitation jet  precludes accurate assessment of pulmonary artery systolic  pressure.  ------------------------------------------------------------------------  CONCLUSIONS:  1. Mitral annular calcification, and calcified mitral  leaflets with normal diastolic opening. Trace mitral  regurgitation.  2. Mildly dilated left atrium.  LA volume index = 39 cc/m2.  3. Severe concentric left ventricular hypertrophy.  4. Endocardium not well visualized; grossly hyperdynamic  left ventricular systolic function.   Mild diastolic  dysfunction (stage I).  There is a mild intracavitary  gradient of 19 mmHg.  5. Normal right ventricular size and function.  6. Trivial pericardial effusion.    ------------------------------------------------------------------------  Confirmed on  7/21/2020 - 17:53:59 by Jeet Coppola MD  ------------------------------------------------------------------------

## 2020-07-22 NOTE — PROGRESS NOTE ADULT - SUBJECTIVE AND OBJECTIVE BOX
[facility] NEPHROLOGY- PROGRESS NOTE    Patient is a 71yo Female with HTN, Hypothyroidism, lymphedema p/w dizziness a/w Afib with RVR, NSTEMI and ANIKA.  Nephrology consulted for Elevated serum creatinine.     Hospital Medications: Medications reviewed.  REVIEW OF SYSTEMS:  CONSTITUTIONAL: No fevers or chills  RESPIRATORY: No shortness of breath  CARDIOVASCULAR: No chest pain.  GASTROINTESTINAL: No nausea, vomiting, diarrhea or abdominal pain.   VASCULAR: No bilateral lower extremity edema.     VITALS:  T(F): 98.1 (20 @ 11:16), Max: 98.5 (20 @ 23:49)  HR: 77 (20 @ 11:17)  BP: 165/82 (20 @ 11:17)  RR: 18 (20 @ 11:16)  SpO2: 97% (20 @ 11:16)  Wt(kg): --    Weight (kg): 90.7 ( @ 12:47)     @ 07:01  -   @ 07:00  --------------------------------------------------------  IN: 143 mL / OUT: 0 mL / NET: 143 mL      Neurological: Awake and Alert/ upset  HEENT: anicteric sclera,   Respiratory: CTA b/l   Cardiovascular: S1, S2, RRR  Gastrointestinal: BS+, soft, NT  : No aguilar.  Extremities: No peripheral edema  hyperpigmentation of LE/     LABS:      139  |  105  |  19<H>  ----------------------------<  113<H>  3.7   |  27  |  1.33<H>    Ca    8.6      2020 12:51  Phos  3.1       Mg     1.8           Creatinine Trend: 1.33 <--, 1.53 <--, 1.23 <--, 1.64 <--                        14.5   5.93  )-----------( 260      ( 2020 07:04 )             41.6     Urine Studies:  Urinalysis Basic - ( 2020 12:16 )    Color: Yellow / Appearance: Clear / S.025 / pH:   Gluc:  / Ketone: Trace  / Bili: Small / Urobili: 4   Blood:  / Protein: 30 mg/dL / Nitrite: Negative   Leuk Esterase: Moderate / RBC: 0-2 /HPF / WBC 6-10 /HPF   Sq Epi:  / Non Sq Epi: Few /HPF / Bacteria: Few /HPF        RADIOLOGY & ADDITIONAL STUDIES:

## 2020-07-22 NOTE — PROGRESS NOTE ADULT - ASSESSMENT
Patient is a 69yo Female with HTN, Hypothyroidism, lymphedema p/w dizziness a/w Afib with RVR, NSTEMI and ANIKA.  Nephrology consulted for Elevated serum creatinine.     1. ANIKA- likely hemodynamically mediated in the setting of decreased PO intake with hypotension/ Afib. Renal function improving s/p IVF. Recc NS @ 75cc/hr x 24 hrs. Can defer urine lytes and Renal US since ANIKA is resolving. If worsening, check urine lytes and restart IVF. Strict I/Os. Avoid nephrotoxins/ NSAIDs/ RCA. Monitor BMP.  2. Hypokalemia- Recc KCl 20 meq PO x1; keep K at 4.  Monitor lytes.   3. Afib with RVR- plan as per cardiology.   4. Essential HTN- BP elevated, Recc to d/c Hydralazine and start Nifedipine ER 30mg PO daily, titrate as needed. Monitor BP.

## 2020-07-22 NOTE — PROGRESS NOTE ADULT - PROBLEM SELECTOR PLAN 6
Pt has PMh of B/l lymphedema, R> L lymphedema and c/o pain in LE on admission.  - no further complaints of pain  - venous doppler= NEGATIVE for DVT

## 2020-07-22 NOTE — DISCHARGE NOTE NURSING/CASE MANAGEMENT/SOCIAL WORK - PATIENT PORTAL LINK FT
You can access the FollowMyHealth Patient Portal offered by [facility] by registering at the following website: [email or web address]. By joining [facility]’s FollowMyHealth portal, you will also be able to view your health information using other applications (apps) compatible with our system.

## 2020-07-23 LAB
ANION GAP SERPL CALC-SCNC: 9 MMOL/L — SIGNIFICANT CHANGE UP (ref 5–17)
BUN SERPL-MCNC: 19 MG/DL — HIGH (ref 7–18)
CALCIUM SERPL-MCNC: 8.7 MG/DL — SIGNIFICANT CHANGE UP (ref 8.4–10.5)
CHLORIDE SERPL-SCNC: 108 MMOL/L — SIGNIFICANT CHANGE UP (ref 96–108)
CK MB BLD-MCNC: 5.9 % — HIGH (ref 0–3.5)
CK MB CFR SERPL CALC: 3.6 NG/ML — SIGNIFICANT CHANGE UP (ref 0–3.6)
CK SERPL-CCNC: 61 U/L — SIGNIFICANT CHANGE UP (ref 21–215)
CO2 SERPL-SCNC: 25 MMOL/L — SIGNIFICANT CHANGE UP (ref 22–31)
CREAT SERPL-MCNC: 1.27 MG/DL — SIGNIFICANT CHANGE UP (ref 0.5–1.3)
GLUCOSE BLDC GLUCOMTR-MCNC: 114 MG/DL — HIGH (ref 70–99)
GLUCOSE BLDC GLUCOMTR-MCNC: 136 MG/DL — HIGH (ref 70–99)
GLUCOSE BLDC GLUCOMTR-MCNC: 155 MG/DL — HIGH (ref 70–99)
GLUCOSE SERPL-MCNC: 88 MG/DL — SIGNIFICANT CHANGE UP (ref 70–99)
HCT VFR BLD CALC: 41.8 % — SIGNIFICANT CHANGE UP (ref 34.5–45)
HGB BLD-MCNC: 14.3 G/DL — SIGNIFICANT CHANGE UP (ref 11.5–15.5)
MCHC RBC-ENTMCNC: 29.9 PG — SIGNIFICANT CHANGE UP (ref 27–34)
MCHC RBC-ENTMCNC: 34.2 GM/DL — SIGNIFICANT CHANGE UP (ref 32–36)
MCV RBC AUTO: 87.3 FL — SIGNIFICANT CHANGE UP (ref 80–100)
NRBC # BLD: 0 /100 WBCS — SIGNIFICANT CHANGE UP (ref 0–0)
PLATELET # BLD AUTO: 232 K/UL — SIGNIFICANT CHANGE UP (ref 150–400)
POTASSIUM SERPL-MCNC: 3.3 MMOL/L — LOW (ref 3.5–5.3)
POTASSIUM SERPL-SCNC: 3.3 MMOL/L — LOW (ref 3.5–5.3)
RBC # BLD: 4.79 M/UL — SIGNIFICANT CHANGE UP (ref 3.8–5.2)
RBC # FLD: 14 % — SIGNIFICANT CHANGE UP (ref 10.3–14.5)
SODIUM SERPL-SCNC: 142 MMOL/L — SIGNIFICANT CHANGE UP (ref 135–145)
T4 FREE SERPL-MCNC: 1.4 NG/DL — SIGNIFICANT CHANGE UP (ref 0.9–1.8)
TROPONIN I SERPL-MCNC: 4.87 NG/ML — HIGH (ref 0–0.04)
WBC # BLD: 5.22 K/UL — SIGNIFICANT CHANGE UP (ref 3.8–10.5)
WBC # FLD AUTO: 5.22 K/UL — SIGNIFICANT CHANGE UP (ref 3.8–10.5)

## 2020-07-23 PROCEDURE — 93016 CV STRESS TEST SUPVJ ONLY: CPT

## 2020-07-23 PROCEDURE — 78452 HT MUSCLE IMAGE SPECT MULT: CPT | Mod: 26

## 2020-07-23 PROCEDURE — 93018 CV STRESS TEST I&R ONLY: CPT

## 2020-07-23 PROCEDURE — 93010 ELECTROCARDIOGRAM REPORT: CPT | Mod: 59

## 2020-07-23 RX ORDER — LABETALOL HCL 100 MG
400 TABLET ORAL ONCE
Refills: 0 | Status: DISCONTINUED | OUTPATIENT
Start: 2020-07-23 | End: 2020-07-23

## 2020-07-23 RX ORDER — POTASSIUM CHLORIDE 20 MEQ
40 PACKET (EA) ORAL ONCE
Refills: 0 | Status: COMPLETED | OUTPATIENT
Start: 2020-07-23 | End: 2020-07-23

## 2020-07-23 RX ORDER — LABETALOL HCL 100 MG
600 TABLET ORAL THREE TIMES A DAY
Refills: 0 | Status: DISCONTINUED | OUTPATIENT
Start: 2020-07-23 | End: 2020-07-24

## 2020-07-23 RX ORDER — LABETALOL HCL 100 MG
10 TABLET ORAL ONCE
Refills: 0 | Status: COMPLETED | OUTPATIENT
Start: 2020-07-23 | End: 2020-07-23

## 2020-07-23 RX ORDER — LABETALOL HCL 100 MG
10 TABLET ORAL EVERY 4 HOURS
Refills: 0 | Status: DISCONTINUED | OUTPATIENT
Start: 2020-07-23 | End: 2020-08-03

## 2020-07-23 RX ORDER — HYDRALAZINE HCL 50 MG
5 TABLET ORAL EVERY 6 HOURS
Refills: 0 | Status: DISCONTINUED | OUTPATIENT
Start: 2020-07-23 | End: 2020-08-03

## 2020-07-23 RX ORDER — HYDRALAZINE HCL 50 MG
20 TABLET ORAL ONCE
Refills: 0 | Status: DISCONTINUED | OUTPATIENT
Start: 2020-07-23 | End: 2020-07-23

## 2020-07-23 RX ORDER — HYDRALAZINE HCL 50 MG
25 TABLET ORAL ONCE
Refills: 0 | Status: DISCONTINUED | OUTPATIENT
Start: 2020-07-23 | End: 2020-07-23

## 2020-07-23 RX ORDER — ONDANSETRON 8 MG/1
4 TABLET, FILM COATED ORAL ONCE
Refills: 0 | Status: COMPLETED | OUTPATIENT
Start: 2020-07-23 | End: 2020-07-23

## 2020-07-23 RX ORDER — HYDRALAZINE HCL 50 MG
20 TABLET ORAL ONCE
Refills: 0 | Status: COMPLETED | OUTPATIENT
Start: 2020-07-23 | End: 2020-07-23

## 2020-07-23 RX ORDER — ALPRAZOLAM 0.25 MG
1 TABLET ORAL ONCE
Refills: 0 | Status: DISCONTINUED | OUTPATIENT
Start: 2020-07-23 | End: 2020-07-23

## 2020-07-23 RX ORDER — HYDRALAZINE HCL 50 MG
10 TABLET ORAL ONCE
Refills: 0 | Status: COMPLETED | OUTPATIENT
Start: 2020-07-23 | End: 2020-07-23

## 2020-07-23 RX ORDER — APIXABAN 2.5 MG/1
5 TABLET, FILM COATED ORAL EVERY 12 HOURS
Refills: 0 | Status: DISCONTINUED | OUTPATIENT
Start: 2020-07-23 | End: 2020-08-03

## 2020-07-23 RX ORDER — METOCLOPRAMIDE HCL 10 MG
10 TABLET ORAL ONCE
Refills: 0 | Status: COMPLETED | OUTPATIENT
Start: 2020-07-23 | End: 2020-07-23

## 2020-07-23 RX ORDER — LABETALOL HCL 100 MG
600 TABLET ORAL THREE TIMES A DAY
Refills: 0 | Status: DISCONTINUED | OUTPATIENT
Start: 2020-07-23 | End: 2020-07-23

## 2020-07-23 RX ORDER — HYDRALAZINE HCL 50 MG
25 TABLET ORAL ONCE
Refills: 0 | Status: COMPLETED | OUTPATIENT
Start: 2020-07-23 | End: 2020-07-23

## 2020-07-23 RX ADMIN — Medication 1 DROP(S): at 06:26

## 2020-07-23 RX ADMIN — Medication 10 MILLIGRAM(S): at 14:23

## 2020-07-23 RX ADMIN — Medication 25 MILLIGRAM(S): at 19:55

## 2020-07-23 RX ADMIN — Medication 10 MILLIGRAM(S): at 13:19

## 2020-07-23 RX ADMIN — Medication 25 MILLIGRAM(S): at 06:21

## 2020-07-23 RX ADMIN — Medication 10 MILLIGRAM(S): at 17:59

## 2020-07-23 RX ADMIN — Medication 1 PATCH: at 15:05

## 2020-07-23 RX ADMIN — Medication 650 MILLIGRAM(S): at 09:19

## 2020-07-23 RX ADMIN — Medication 1 MILLIGRAM(S): at 06:21

## 2020-07-23 RX ADMIN — Medication 75 MICROGRAM(S): at 05:48

## 2020-07-23 RX ADMIN — Medication 20 MILLIGRAM(S): at 18:38

## 2020-07-23 RX ADMIN — Medication 650 MILLIGRAM(S): at 08:20

## 2020-07-23 RX ADMIN — Medication 1 MILLIGRAM(S): at 18:37

## 2020-07-23 RX ADMIN — APIXABAN 5 MILLIGRAM(S): 2.5 TABLET, FILM COATED ORAL at 18:37

## 2020-07-23 RX ADMIN — ONDANSETRON 4 MILLIGRAM(S): 8 TABLET, FILM COATED ORAL at 11:21

## 2020-07-23 RX ADMIN — Medication 400 MILLIGRAM(S): at 06:21

## 2020-07-23 RX ADMIN — PANTOPRAZOLE SODIUM 40 MILLIGRAM(S): 20 TABLET, DELAYED RELEASE ORAL at 06:23

## 2020-07-23 RX ADMIN — Medication 600 MILLIGRAM(S): at 20:00

## 2020-07-23 RX ADMIN — ONDANSETRON 4 MILLIGRAM(S): 8 TABLET, FILM COATED ORAL at 12:54

## 2020-07-23 RX ADMIN — Medication 0.2 MILLIGRAM(S): at 20:52

## 2020-07-23 NOTE — PROGRESS NOTE ADULT - ASSESSMENT
Patient is a 71yo Female with HTN, Hypothyroidism, lymphedema p/w dizziness a/w Afib with RVR, NSTEMI and ANIKA.  Nephrology consulted for Elevated serum creatinine.     1. ANIKA- likely hemodynamically mediated in the setting of decreased PO intake with hypotension/ Afib. Renal function improving s/p IVF. Pt now with n/v, unable to tolerate PO; consider short course of IVF.    Can defer urine lytes and Renal US since ANIKA is resolving. If worsening, check urine lytes and restart IVF. Strict I/Os. Avoid nephrotoxins/ NSAIDs/ RCA. Monitor BMP.  2. Hypokalemia- Recc KCl 40 meq PO x1; keep K at 4.  Monitor lytes.   3. Afib with RVR- plan as per cardiology.   4. Essential HTN- BP elevated, Pt unable to tolerate PO . Pt started on Clonidine patch. Consider Labetalol 10mg IV q6hrs prn BP >160/90. Monitor BP.

## 2020-07-23 NOTE — PROGRESS NOTE ADULT - ASSESSMENT
IMPRESSION AND PLAN:    Problem/Plan - 1:   NSTEMI with episode of afib+RVR   patient withy h/o CAD s/p 3 stent 3 years ago, admitted with Dizziness 2/2 dehydration vs hypoxia in setting of NSTEMI with episode of Afib with RVR   ECG on admission significant  for Afib wit ST depression   pt s/p Dig 0.5 on ED,   CHADSVASc score- 3 ( eligible for AC)  HAS-BLED score- 3  Continue DAPT  TTE Hyperdynamic LV Function LVH  Discontinue Heparin gtt   patient currently on Lovenox full dose  Possible troponin leak 2/2 demand  Pharmacological Nuclear stress Test negative for reversible ischemia  Consider Dc full dose Lovenox        Problem/Plan - 2:  ·  Problem: ANIKA (acute kidney injury).  Plan: Pt had elevated cr levels on admission 1.64  resolved   Likely preRenal from dehydration  F.u urine lytes   f/u BMP   s/p gentle hydrationn      Problem/Plan - 3:  ·  Problem: HTN (hypertension).  Plan: Pt has PMH of HTN   Pt on admission was Hypotensive 88/52, could be a wrong read   s/p Dig loading BP - 188/108  Thyroid Stimulating Hormone, Serum: 0.34 uU/mL (07.21.20 @ 07:04)    Home meds- Labetalol 400mg TID   c/w home meds with parameters  Monitor vitals.      Problem/Plan - 4:  ·  Problem: Hypothyroid.  Plan: Pt has PMh of HYpothyroidism   Home meds- levothyroxine  c/w home meds  TSH WNL      Problem/Plan - 5:  ·  Problem: Lymphedema.  Plan: Pt has PMh of B/l lymphedema  R> L lymphedema   c/o pain in LE  F/U venous doppler. IMPRESSION AND PLAN:    Problem/Plan - 1:   NSTEMI with episode of afib+RVR   patient withy h/o CAD s/p 3 stent 3 years ago, admitted with Dizziness 2/2 dehydration vs hypoxia in setting of NSTEMI with episode of Afib with RVR   ECG on admission significant  for Afib wit ST depression   pt s/p Dig 0.5 on ED,   CHADSVASc score- 3 ( eligible for AC)  HAS-BLED score- 3  Continue DAPT  TTE Hyperdynamic LV Function LVH  Discontinue Heparin gtt   patient currently on Lovenox full dose  Possible troponin leak 2/2 demand  Pharmacological Nuclear stress Test negative for reversible ischemia  Consider Dc full dose Lovenox AM        Problem/Plan - 2:  ·  Problem: ANIKA (acute kidney injury).  Plan: Pt had elevated cr levels on admission 1.64  resolved   Likely preRenal from dehydration  F.u urine lytes   f/u BMP   s/p gentle hydrationn      Problem/Plan - 3:  ·  Problem: HTN (hypertension).  Plan: Pt has PMH of HTN   Pt on admission was Hypotensive 88/52, could be a wrong read   s/p Dig loading BP - 188/108  Thyroid Stimulating Hormone, Serum: 0.34 uU/mL (07.21.20 @ 07:04)    Home meds- Labetalol 400mg TID   c/w home meds with parameters  Monitor vitals.      Problem/Plan - 4:  ·  Problem: Hypothyroid.  Plan: Pt has PMh of HYpothyroidism   Home meds- levothyroxine  c/w home meds  TSH WNL      Problem/Plan - 5:  ·  Problem: Lymphedema.  Plan: Pt has PMh of B/l lymphedema  R> L lymphedema   c/o pain in LE  F/U venous doppler.

## 2020-07-23 NOTE — PROGRESS NOTE ADULT - PROBLEM SELECTOR PLAN 5
PMhx hypothyroidism  - c/w home med levothyroxine 75 mcg QD  - TSH: 0.34  - f/u fT4 PMhx hypothyroidism  - c/w home med levothyroxine 75 mcg QD  - TSH: 0.34  - Free Thyroxine, Serum: 1.4 ng/dL (07.23.20 @ 10:24)

## 2020-07-23 NOTE — CONSULT NOTE ADULT - ATTENDING COMMENTS
[facility] NEPHROLOGY  Hi Perez M.D.  Nicholas Martin D.O.  Patria Zhang M.D.  Riri Schulz, MSN, ANP-C  [phone]    [address] [place] [address]
69 y/o F with  HTN, Hypothyroidism,  carpal tunnel syndrome, lymphedema, admitted with dizziness   She was admitted to [facility] for NSTEMI   Hospital course complicated by Afib w RVR requirng rate controlling meds, Elevated Troponins prompted Cardiology evaluationa dn stress test done which showed no areas of reversible ischemia   ICU consulted for elevated BP    Problem:  1. Rebound hypertension due to clonidine withdrawal  2. NSTEMI  3. AFib, paroxysmal  4. Hypothyroidism  5. Lymphedema  6. Anxiety    Plan   Missed anti HTN meds earlier as hse was off the floor presumably at tsress test. Will re initiate oral anti HTn and PRN IVp. Aim to reduce SBP by 25%  over next 4-6 hrs and keep SBP around 140 -160. will initiate IV infusion only if above rec fails   Rate control for Afib

## 2020-07-23 NOTE — CONSULT NOTE ADULT - SUBJECTIVE AND OBJECTIVE BOX
Patient is a 70y old  Female who presents with a chief complaint of Dizziness (2020 15:33)      HPI:  69 y/o F with a significant PMHx of, HTN, Hypothyroidism,  carpal tunnel syndrome, lymphedema, presents to the ED for  dizziness today. Pt in ED AAOX3 and able to give all pertinent medical information. As per the Patient, she was in her usual state of health until this morning when she got up from her bed to get water as her throat was very dry and itchy and she noted sudden onset of  dizziness. She was unable and laid down tbut the dizziness did not resolved and her room mate then called the 911. As per the patient, she had not been eating/drinking well since 2 days due to nausea which is resolved now. Pt denies any chest pain, vomiting, room spinning, headache, shortness of breath, fall , any recent head trauma.  Pt was recently admitted in [facility] for a syncopal episode from gabapentin overdosing.    Hospital course:  In ED patient had Afib with RVR, was stared on dig loading, received 1 dose of digoxin 0.5, did not complete the full loading. Was later given one dose of cardizem 5mg IV. Repeat EKG showed sinus rythm with atrial enlargement. During hospital course patient had elevation of troponin. Trop trended up to 18. She was started on heparin drip, plavix, aspirin, statin. Echo showed BAILEY with mild diastolic dysfunction. Stress test showed no reversible ischemia. After stress test she started having nausea and vomiting. She had 4 episodes with bilious vomitus, no food contents, small amount. Her Bp was high running above 200/100. She is also very anxious women and refused her oral BP medications due to nausea. She was given Iv labetalol and hydralazine after which BP came down to 190/100.    In the ED,  Pt is noted to be AAOX3, No visible distress. Pt very upset about being hospitalized  Vitals- Hypotensive 88/52 On admission, s/p Dig  Repeat 181/106  CT Head- no acute changes  EKG- new onset AFib with RVR, S/p Dig loading 0.5mg and Diltiazem 5mg  Repeat EKG- NSR with T wave inversions  Trops- 2.8, BNP- 4400 (2020 14:53)      Allergies    penicillin (Hives)  penicillin (Rash)  sulfa drugs (Unknown)  Tetracycline Hydrochloride (Unknown)    Intolerances        MEDICATIONS  (STANDING):  ALPRAZolam 1 milliGRAM(s) Oral every 8 hours  apixaban 5 milliGRAM(s) Oral every 12 hours  aspirin enteric coated 81 milliGRAM(s) Oral daily  atorvastatin 80 milliGRAM(s) Oral at bedtime  benzocaine 15 mG/menthol 3.6 mG (Sugar-Free) Lozenge 1 Lozenge Oral three times a day  cholecalciferol 1000 Unit(s) Oral daily  clopidogrel Tablet 75 milliGRAM(s) Oral daily  cyanocobalamin 1000 MICROGram(s) Oral daily  folic acid 1 milliGRAM(s) Oral daily  gabapentin 300 milliGRAM(s) Oral two times a day  hydrALAZINE 25 milliGRAM(s) Oral every 8 hours  hydrALAZINE 25 milliGRAM(s) Oral once  insulin lispro (HumaLOG) corrective regimen sliding scale   SubCutaneous three times a day before meals  labetalol 400 milliGRAM(s) Oral three times a day  labetalol 400 milliGRAM(s) Oral once  levothyroxine 75 MICROGram(s) Oral daily  pantoprazole    Tablet 40 milliGRAM(s) Oral before breakfast  potassium chloride    Tablet ER 40 milliEquivalent(s) Oral once  tobramycin 0.3% Ophthalmic Solution 1 Drop(s) Both EYES two times a day    MEDICATIONS  (PRN):  acetaminophen   Tablet .. 650 milliGRAM(s) Oral every 6 hours PRN Moderate Pain (4 - 6)  hydrALAZINE Injectable 5 milliGRAM(s) IV Push every 6 hours PRN BP >200/100  labetalol Injectable 10 milliGRAM(s) IV Push every 4 hours PRN Systolic/Diastolic > 200/100      Drug Dosing Weight  Height (cm): 162.6 (2020 18:15)  Weight (kg): 90.7 (2020 12:47)  BMI (kg/m2): 34.3 (2020 12:47)  BSA (m2): 1.96 (2020 12:47)    PAST MEDICAL & SURGICAL HISTORY:  Lymphedema  Essential hypertension  Carpal tunnel syndrome of right wrist  Obesity (BMI 30-39.9)  Type 2 diabetes mellitus without complication  Hypothyroid  Essential hypertension  Obesity  HTN (hypertension)  DM (diabetes mellitus)  No significant past surgical history  S/P carpal tunnel release      FAMILY HISTORY:  Family history of myocardial infarction (Sibling)  Family history of lung cancer      SOCIAL HISTORY:    ADVANCE DIRECTIVES:    REVIEW OF SYSTEMS:    CONSTITUTIONAL: No fever, weight loss, or fatigue  EYES: No eye pain, visual disturbances, or discharge  ENMT:  No difficulty hearing, tinnitus, vertigo; No sinus or throat pain  NECK: No pain or stiffness  BREASTS: No pain, masses, or nipple discharge  RESPIRATORY: No cough, wheezing, chills or hemoptysis; No shortness of breath  CARDIOVASCULAR: No chest pain, palpitations, dizziness, or leg swelling  GASTROINTESTINAL: No abdominal or epigastric pain. No nausea, vomiting, or hematemesis; No diarrhea or constipation. No melena or hematochezia.  GENITOURINARY: No dysuria, frequency, hematuria, or incontinence  NEUROLOGICAL: No headaches, memory loss, loss of strength, numbness, or tremors  SKIN: No itching, burning, rashes, or lesions   LYMPH NODES: No enlarged glands  ENDOCRINE: No heat or cold intolerance; No hair loss  MUSCULOSKELETAL: No joint pain or swelling; No muscle, back, or extremity pain  PSYCHIATRIC: No depression, anxiety, mood swings, or difficulty sleeping  HEME/LYMPH: No easy bruising, or bleeding gums  ALLERGY AND IMMUNOLOGIC: No hives or eczema          ICU Vital Signs Last 24 Hrs  T(C): 36.7 (2020 17:22), Max: 36.9 (2020 19:40)  T(F): 98 (2020 17:22), Max: 98.4 (2020 19:40)  HR: 106 (2020 18:51) (75 - 106)  BP: 190/102 (2020 18:51) (165/85 - 229/98)  RR: 18 (2020 17:22) (18 - 19)  SpO2: 96% (2020 17:22) (96% - 98%)    I&O's Detail  PHYSICAL EXAM:  GENERAL: Obese female, complaining of nausea  HEAD:  Atraumatic, Normocephalic  EYES:  conjunctiva and sclera clear  NECK: Supple, No JVD, Normal thyroid  CHEST/LUNG: Clear to auscultation. Clear to percussion bilaterally; No rales, rhonchi, wheezing, or rubs  HEART: Regular rate and rhythm; No murmurs, rubs, or gallops  ABDOMEN: Soft, Nontender, Nondistended; Bowel sounds present  NERVOUS SYSTEM:  Alert & Oriented X3,    EXTREMITIES:  2+ Peripheral Pulses, No clubbing, cyanosis. + Chronic lower extremity edema,    LABS:  CBC Full  -  ( 2020 07:09 )  WBC Count : 5.22 K/uL  RBC Count : 4.79 M/uL  Hemoglobin : 14.3 g/dL  Hematocrit : 41.8 %  Platelet Count - Automated : 232 K/uL  Mean Cell Volume : 87.3 fl  Mean Cell Hemoglobin : 29.9 pg  Mean Cell Hemoglobin Concentration : 34.2 gm/dL  Auto Neutrophil # : x  Auto Lymphocyte # : x  Auto Monocyte # : x  Auto Eosinophil # : x  Auto Basophil # : x  Auto Neutrophil % : x  Auto Lymphocyte % : x  Auto Monocyte % : x  Auto Eosinophil % : x  Auto Basophil % : x    07-    142  |  108  |  19<H>  ----------------------------<  88  3.3<L>   |  25  |  1.27    Ca    8.7      2020 07:09      CAPILLARY BLOOD GLUCOSE      POCT Blood Glucose.: 155 mg/dL (2020 16:46)    PTT - ( 2020 00:55 )  PTT:55.9 sec  Urinalysis Basic - ( 2020 12:16 )    Color: Yellow / Appearance: Clear / S.025 / pH: x  Gluc: x / Ketone: Trace  / Bili: Small / Urobili: 4   Blood: x / Protein: 30 mg/dL / Nitrite: Negative   Leuk Esterase: Moderate / RBC: 0-2 /HPF / WBC 6-10 /HPF   Sq Epi: x / Non Sq Epi: Few /HPF / Bacteria: Few /HPF      CARDIAC MARKERS ( 2020 21:53 )  13.400 ng/mL / x     / x     / x     / x          EKG:    ECHO, US:  1. Mitral annular calcification, and calcified mitral  leaflets with normal diastolic opening. Trace mitral  regurgitation.  2. Mildly dilated left atrium.  LA volume index = 39 cc/m2.  3. Severe concentric left ventricular hypertrophy.  4. Endocardium not well visualized; grossly hyperdynamic  left ventricular systolic function.   Mild diastolic  dysfunction (stage I).  There is a mild intracavitary  gradient of 19 mmHg.  5. Normal right ventricular size and function.  6. Trivial pericardial effusion.    Stress test  * There is asmall, predominantly fixed, mild intensity  defect in the mid to basal anterior wall that seems to  thicken, most consistent with attenuation artifact.  * Transient ischemic dilation (TID) ratio was elevated at:  1.37. However, this may be due to gating and processing  artifact in setting of LVH.  * Unable to accurately assess LVEF due to gating artifact,  appears hyperdynamic by visual estimation.  * Negative study for significant reversible ischemia        RADIOLOGY:    CRITICAL CARE TIME SPENT:

## 2020-07-23 NOTE — PROGRESS NOTE ADULT - SUBJECTIVE AND OBJECTIVE BOX
[facility] NEPHROLOGY- PROGRESS NOTE    Patient is a 71yo Female with HTN, Hypothyroidism, lymphedema p/w dizziness a/w Afib with RVR, NSTEMI and ANIKA.  Nephrology consulted for Elevated serum creatinine.     Hospital Medications: Medications reviewed.  REVIEW OF SYSTEMS:  CONSTITUTIONAL: No fevers or chills  RESPIRATORY: No shortness of breath  CARDIOVASCULAR: No chest pain.  GASTROINTESTINAL: No diarrhea. Pt c/o severe nausea with vomiting with diffuse abdominal pain s/p contrast from nuclear stress test  VASCULAR: No bilateral lower extremity edema.     VITALS:  T(F): 97.6 (20 @ 12:12), Max: 98.8 (20 @ 15:36)  HR: 82 (20 @ 12:30)  BP: 229/98 (20 @ 12:30)  RR: 18 (20 @ 12:12)  SpO2: 97% (20 @ 12:30)  Wt(kg): --      General: Sleepy  HEENT: anicteric sclera,   Respiratory: CTA b/l   Cardiovascular: S1, S2, RRR  Gastrointestinal: BS+, diffuse tenderness  : No aguilar.  Extremities: No peripheral edema  hyperpigmentation of LE/       LABS:      142  |  108  |  19<H>  ----------------------------<  88  3.3<L>   |  25  |  1.27    Ca    8.7      2020 07:09      Creatinine Trend: 1.27 <--, 1.33 <--, 1.53 <--, 1.23 <--, 1.64 <--                        14.3   5.22  )-----------( 232      ( 2020 07:09 )             41.8     Urine Studies:  Urinalysis Basic - ( 2020 12:16 )    Color: Yellow / Appearance: Clear / S.025 / pH:   Gluc:  / Ketone: Trace  / Bili: Small / Urobili: 4   Blood:  / Protein: 30 mg/dL / Nitrite: Negative   Leuk Esterase: Moderate / RBC: 0-2 /HPF / WBC 6-10 /HPF   Sq Epi:  / Non Sq Epi: Few /HPF / Bacteria: Few /HPF

## 2020-07-23 NOTE — PROGRESS NOTE ADULT - SUBJECTIVE AND OBJECTIVE BOX
SUBJ:  patient agreed on NST    MEDICATIONS  (STANDING):  ALPRAZolam 1 milliGRAM(s) Oral every 8 hours  aspirin enteric coated 81 milliGRAM(s) Oral daily  atorvastatin 80 milliGRAM(s) Oral at bedtime  benzocaine 15 mG/menthol 3.6 mG (Sugar-Free) Lozenge 1 Lozenge Oral three times a day  cholecalciferol 1000 Unit(s) Oral daily  cloNIDine Patch 0.2 mG/24Hr(s) 1 patch Transdermal every 7 days  clopidogrel Tablet 75 milliGRAM(s) Oral daily  cyanocobalamin 1000 MICROGram(s) Oral daily  enoxaparin Injectable 90 milliGRAM(s) SubCutaneous every 12 hours  folic acid 1 milliGRAM(s) Oral daily  gabapentin 300 milliGRAM(s) Oral two times a day  hydrALAZINE 25 milliGRAM(s) Oral every 8 hours  insulin lispro (HumaLOG) corrective regimen sliding scale   SubCutaneous three times a day before meals  labetalol 400 milliGRAM(s) Oral three times a day  levothyroxine 75 MICROGram(s) Oral daily  pantoprazole    Tablet 40 milliGRAM(s) Oral before breakfast  potassium chloride    Tablet ER 40 milliEquivalent(s) Oral once  tobramycin 0.3% Ophthalmic Solution 1 Drop(s) Both EYES two times a day    MEDICATIONS  (PRN):  acetaminophen   Tablet .. 650 milliGRAM(s) Oral every 6 hours PRN Moderate Pain (4 - 6)            Vital Signs Last 24 Hrs  T(C): 36.4 (23 Jul 2020 12:12), Max: 37.1 (22 Jul 2020 15:36)  T(F): 97.6 (23 Jul 2020 12:12), Max: 98.8 (22 Jul 2020 15:36)  HR: 82 (23 Jul 2020 12:30) (74 - 83)  BP: 229/98 (23 Jul 2020 12:30) (152/77 - 229/98)  BP(mean): --  RR: 18 (23 Jul 2020 12:12) (18 - 19)  SpO2: 97% (23 Jul 2020 12:30) (96% - 99%)    TELEMETRY:    ECG:    TTE:    LABS:                        14.3   5.22  )-----------( 232      ( 23 Jul 2020 07:09 )             41.8     07-23    142  |  108  |  19<H>  ----------------------------<  88  3.3<L>   |  25  |  1.27    Ca    8.7      23 Jul 2020 07:09      CARDIAC MARKERS ( 21 Jul 2020 21:53 )  13.400 ng/mL / x     / x     / x     / x          PTT - ( 22 Jul 2020 00:55 )  PTT:55.9 sec  Creatinine Trend: 1.27<--, 1.33<--, 1.53<--, 1.23<--, 1.64<--  I&O's Summary    BNP  RADIOLOGY & ADDITIONAL STUDIES:    IMPRESSION AND PLAN:

## 2020-07-23 NOTE — PROGRESS NOTE ADULT - ASSESSMENT
71 y/o F with a significant PMHx of carpal tunnel syndrome, HTN, hypothyroid, lymphedema, presents to the ED for lightheadedness and dizziness. Found to be hypotensive with elevated troponin. Admitted for NSTEMI.

## 2020-07-23 NOTE — CHART NOTE - NSCHARTNOTEFT_GEN_A_CORE
Patient remains with elevated BP with Nausea. NO headache, NO neurological deficit. Patient is anxious and upset. Last BP was ~ 200/100. Difficult to measure accurate BP with device. Manual BP cuff used and systolic was under 200 (approx 170).     Need to use LARGE CUFF and instruct patient to relax. Use manual BP if possible.    Patient Given: IV labetalol, IV Hydralazine at 2:30 PM. Repeat BP remains high on machine (230/110 HR 90 @ 6 pm). Will order IV Hydralazine 20 mg STAT.   Dr. Francis aware.     Will re-asses BP in 30 and monitor. Patient remains with elevated BP with Nausea. NO headache, NO neurological deficit. Patient is anxious and upset. Last BP was ~ 200/100. Difficult to measure accurate BP with device. Manual BP cuff used and systolic was under 200 (approx 170).     Need to use LARGE CUFF and instruct patient to relax. Use manual BP if possible.    Patient Given: IV labetalol, IV Hydralazine at 2:30 PM. Repeat BP remains high on machine (230/110 HR 90 @ 6 pm). NO neurological deficit. NO headache.    Ordered IV Hydralazine 20 mg STAT.   Dr. Francis aware.     Will re-asses BP in 30 and monitor. Patient remains with elevated BP with Nausea. NO headache, NO neurological deficit. Patient is anxious and upset. Last BP was ~ 200/100. Difficult to measure accurate BP with device. Manual BP cuff used and systolic was under 200 (approx 170).   Need to use LARGE CUFF and instruct patient to relax. Use manual BP if possible.    Patient Given: IV labetalol, IV Hydralazine at 2:30 PM. Patient refused noon PO medications due to nausea.  Repeat BP remains high on machine (230/110 HR 90 @ 6 pm).   NO neurological deficit. NO headache.    Ordered IV Hydralazine 20 mg STAT -> improved to 190/100.   Patient feels asymptomatic.    Dr. Francis aware.   Critical Care was consulted. Will follow.  Will follow BP     Giving PO antihypertensives evening dose early since patient's nausea has resolved and she is able to tolerate PO meds.    MED REC DONE. UP TO DATE.  HOME HTN MEDS:  Clonidine 0.02 three times a day  Labetalol 600mg three times a day  Losartan-K 100mg x daily Patient remains with elevated BP with Nausea. NO headache, NO neurological deficit. Patient is anxious and upset. Last BP was ~ 200/100. Difficult to measure accurate BP with device. Manual BP cuff used and systolic was under 200 (approx 170).   Need to use LARGE CUFF and instruct patient to relax. Use manual BP if possible.    Patient Given: IV labetalol, IV Hydralazine at 2:30 PM. Patient refused noon PO medications due to nausea.  Repeat BP remains high on machine (230/110 HR 90 @ 6 pm).   NO neurological deficit. NO headache.    Ordered IV Hydralazine 20 mg STAT -> improved to 190/100.   Patient feels asymptomatic.    Dr. Francis aware.   Critical Care was consulted. Will follow.  Will follow BP     Giving PO antihypertensives evening dose early since patient's nausea has resolved and she is able to tolerate PO meds.  Repeat /109 > Giving PO Labetalol 600 and PO Hydral 25  Will monitor and make night team aware.    MED REC DONE. UP TO DATE on 7/23    HOME HTN MEDS:    Clonidine 0.02 three times a day    Labetalol 600mg three times a day    Losartan-K 100mg x daily (holding for now) Patient remains with elevated BP with Nausea. NO headache, NO neurological deficit. Patient is anxious and upset. Last BP was ~ 200/100. Difficult to measure accurate BP with device. Manual BP cuff used and systolic was under 200 (approx 170).   Need to use LARGE CUFF and instruct patient to relax. Use manual BP if possible.    Patient Given: IV labetalol, IV Hydralazine at 2:30 PM. Patient refused noon PO medications due to nausea.  Repeat BP remains high on machine (230/110 HR 90 @ 6 pm).   NO neurological deficit. NO headache.    Ordered IV Hydralazine 20 mg STAT -> improved to 190/100.   Patient feels asymptomatic.    Dr. Francis aware.   Critical Care was consulted. Will follow.  Will follow BP     Giving PO antihypertensives evening dose early since patient's nausea has resolved and she is able to tolerate PO meds.  Repeat /109 > Giving PO Labetalol 600 and PO Hydral 25  Will monitor and make night team aware.    MED REC DONE. UP TO DATE on 7/23    HOME HTN MEDS:    Clonidine 0.2 three times a day    Labetalol 600mg three times a day    Losartan-K 100mg x daily (holding for now)

## 2020-07-23 NOTE — CONSULT NOTE ADULT - ASSESSMENT
HPI:  71 y/o F with a significant PMHx of, HTN, Hypothyroidism,  carpal tunnel syndrome, lymphedema, presents to the ED for  dizziness today. Pt in ED AAOX3 and able to give all pertinent medical information. As per the Patient, she was in her usual state of health until this morning when she got up from her bed to get water as her throat was very dry and itchy and she noted sudden onset of  dizziness. She was unable and laid down tbut the dizziness did not resolved and her room mate then called the 911. As per the patient, she had not been eating/drinking well since 2 days due to nausea which is resolved now. Pt denies any chest pain, vomiting, room spinning, headache, shortness of breath, fall , any recent head trauma.  Pt was recently admitted in [facility] for a syncopal episode from gabapentin overdosing.    She was admitted to [facility] for NSTEMI  Hospital course:  In ED patient had Afib with RVR, was stared on dig loading, received 1 dose of digoxin 0.5, did not complete the full loading. Was later given one dose of cardizem 5mg IV. Repeat EKG showed sinus rythm with atrial enlargement. During hospital course patient had elevation of troponin. Trop trended up to 18. She was started on heparin drip, plavix, aspirin, statin. Echo showed BAILEY with mild diastolic dysfunction. Stress test showed no reversible ischemia. After stress test she started having nausea and vomiting. She had 4 episodes with bilious vomitus, no food contents, small amount. Her Bp was high running above 200/100. She is also very anxious women and refused her oral BP medications due to nausea. She was given Iv labetalol and hydralazine after which BP came down to 190/100. She has been hypertensive since she came, except for one reading in ED which showed low BP, could be wrong reading. She was taking losartan 100mg, labetalol 600xtid, clonidine 0.2xtid at home. On floor she was receiving labetalol 300 tid, hydralazine 25 tid. Clonidine was restarted today. Her  hypertensive urgency could be due to clonidine withdrawal. ICU was consulted for hypertensive urgency. Will adjust current medications as per home doses and observe on floor. Reconsult ICU if needed.     Problem:  1. Rebound hypertension due to clonidine withdrawal  2. NSTEMI  3. AFib, paroxysmal  4. Hypothyroidism  5. Lymphedema  6. Anxiety      Neuro:  Patient has h/o anxiety. She is very anxious at baseline. Was being given xanax on floor. She has been refusing her anti hypertensives due to anxiety.    Cardio:  Patient was found to have afib with RVR on admission, was given dig 0.5 one dose, cardizem 5mg one dose after which rythm was changed to sinus.  Troponin trended up without significant EKG changes, upto 18.   Ischemic workup was negative. Echo showed normal EF.  She was started on heparin drip, later she refused it. Then was started on lovenox full dose AC, also refused. Was switched to eliquis for paroxysmal afib vs NSTEMI.  She has h/o Htn, was taking labetalol, losartan , clonidine at home. She was started on labetalol and hydralazine on floor. Clonidine was not given since admission. She went into withdrawal hypertension on floor, Bp was consistently high in 200s/100s. She was started on clonidine today.   Will recommend adjusting dosage of labetalol as per her home dose, 600mg tid  c/w clonidine 0.2xtid  Will hold losartan in view of resolving ANIKA  c/w hydralazine which was started during hospital course.  Monitor Bp     Pulmonology:  No issues    GI:  Complaining of nausea and vomiting since she had stress test this AM.  Was given reglan and zofran   Symptoms are resolving  Monitor for signs of Htn emergency    Nephro:  Patient had ANIKA on admission which is resolved now  Will hold off losartan in view of resolving ANIKA  Patient has chronic lymphedema of b/l LE, doppler neg for DVT  Monitor BMP    Endo:  Has h/o hypothyroidism  Will c/w levo home dose    Prophylactic:  On eliquis  On protonix for  prophylaxis

## 2020-07-23 NOTE — PROGRESS NOTE ADULT - PROBLEM SELECTOR PLAN 4
Pt has PMH of HTN. Pt on admission was Hypotensive 88/52. s/p Dig loading BP of 188/108  - BP at /54 HR 70 on 7/21  - increasing /102 on 7/22, patient refused Hydralazine. WIll recheck and monitor. Patient remains agitated.   - c/w home Labetalol 400mg TID   - monitor vitals Pt has PMH of HTN. Pt on admission was Hypotensive 88/52. s/p Dig loading BP of 188/108  - BP now trending up and not responding well to medications. No neurological compromise. No headache. Will medically manage.  - patient on clonidine as outpatient (0.1 mg x 2 three times a day), will restart clonidine patch as patient is nauseous and vomiting  - increasing /102 on 7/22, patient refused Hydralazine. WIll recheck and monitor. Patient remains agitated.   - c/w home Labetalol 400mg TID   - monitor vitals

## 2020-07-23 NOTE — PROGRESS NOTE ADULT - SUBJECTIVE AND OBJECTIVE BOX
PGY-1 Progress Note discussed with attending    PAGER #: [[phone]] TILL 5:00 PM  PLEASE CONTACT ON CALL TEAM:   - On Call Team (Please refer to Ashley) FROM 5:00 PM - 8:30PM  - Nightfloat Team FROM 8:30 -7:30 AM    CHIEF COMPLAINT & BRIEF HOSPITAL COURSE:      INTERVAL HPI/OVERNIGHT EVENTS:       REVIEW OF SYSTEMS:  CONSTITUTIONAL: No fever, weight loss, or fatigue  RESPIRATORY: No cough, wheezing, chills or hemoptysis; No shortness of breath  CARDIOVASCULAR: No chest pain, palpitations, dizziness, or leg swelling  GASTROINTESTINAL: No abdominal pain. No nausea, vomiting, or hematemesis; No diarrhea or constipation. No melena or hematochezia.  GENITOURINARY: No dysuria or hematuria, urinary frequency  NEUROLOGICAL: No headaches, memory loss, loss of strength, numbness, or tremors  SKIN: No itching, burning, rashes, or lesions     MEDICATIONS  (STANDING):  ALPRAZolam 1 milliGRAM(s) Oral every 8 hours  aspirin enteric coated 81 milliGRAM(s) Oral daily  atorvastatin 80 milliGRAM(s) Oral at bedtime  benzocaine 15 mG/menthol 3.6 mG (Sugar-Free) Lozenge 1 Lozenge Oral three times a day  cholecalciferol 1000 Unit(s) Oral daily  clopidogrel Tablet 75 milliGRAM(s) Oral daily  cyanocobalamin 1000 MICROGram(s) Oral daily  enoxaparin Injectable 90 milliGRAM(s) SubCutaneous every 12 hours  folic acid 1 milliGRAM(s) Oral daily  gabapentin 300 milliGRAM(s) Oral two times a day  hydrALAZINE 25 milliGRAM(s) Oral every 8 hours  insulin lispro (HumaLOG) corrective regimen sliding scale   SubCutaneous three times a day before meals  labetalol 400 milliGRAM(s) Oral three times a day  levothyroxine 75 MICROGram(s) Oral daily  ondansetron    Tablet 4 milliGRAM(s) Oral once  pantoprazole    Tablet 40 milliGRAM(s) Oral before breakfast  tobramycin 0.3% Ophthalmic Solution 1 Drop(s) Both EYES two times a day    MEDICATIONS  (PRN):  acetaminophen   Tablet .. 650 milliGRAM(s) Oral every 6 hours PRN Moderate Pain (4 - 6)      Vital Signs Last 24 Hrs  T(C): 36.7 (23 Jul 2020 08:18), Max: 37.1 (22 Jul 2020 15:36)  T(F): 98 (23 Jul 2020 08:18), Max: 98.8 (22 Jul 2020 15:36)  HR: 75 (23 Jul 2020 05:46) (74 - 83)  BP: 185/96 (23 Jul 2020 05:46) (152/77 - 197/91)  BP(mean): --  RR: 18 (23 Jul 2020 08:18) (18 - 19)  SpO2: 98% (23 Jul 2020 08:18) (96% - 99%)    PHYSICAL EXAMINATION:  GENERAL: NAD, well built  HEAD:  Atraumatic, Normocephalic  EYES:  conjunctiva and sclera clear  NECK: Supple, No JVD, Normal thyroid  CHEST/LUNG: Clear to auscultation. Clear to percussion bilaterally; No rales, rhonchi, wheezing, or rubs  HEART: Regular rate and rhythm; No murmurs, rubs, or gallops  ABDOMEN: Soft, Nontender, Nondistended; Bowel sounds present  NERVOUS SYSTEM:  Alert & Oriented X3,    EXTREMITIES:  2+ Peripheral Pulses, No clubbing, cyanosis, or edema  SKIN: warm dry                          14.3   5.22  )-----------( 232      ( 23 Jul 2020 07:09 )             41.8     07-23    142  |  108  |  19<H>  ----------------------------<  88  3.3<L>   |  25  |  1.27    Ca    8.7      23 Jul 2020 07:09        CARDIAC MARKERS ( 21 Jul 2020 21:53 )  13.400 ng/mL / x     / x     / x     / x      CARDIAC MARKERS ( 21 Jul 2020 14:32 )  18.100 ng/mL / x     / x     / x     / x          PTT - ( 22 Jul 2020 00:55 )  PTT:55.9 sec        I&O's Summary        CAPILLARY BLOOD GLUCOSE  CAPILLARY BLOOD GLUCOSE      POCT Blood Glucose.: 103 mg/dL (22 Jul 2020 16:58)    CAPILLARY BLOOD GLUCOSE      POCT Blood Glucose.: 103 mg/dL (22 Jul 2020 16:58)  POCT Blood Glucose.: 132 mg/dL (22 Jul 2020 11:45)      RADIOLOGY & ADDITIONAL TESTS: PGY-1 Progress Note discussed with attending    PAGER #: [[phone]] TILL 5:00 PM  PLEASE CONTACT ON CALL TEAM:   - On Call Team (Please refer to Ashley) FROM 5:00 PM - 8:30PM  - Nightfloat Team FROM 8:30 -7:30 AM    CHIEF COMPLAINT & BRIEF HOSPITAL COURSE:  71 y/o F with PMHx of obesity, HTN, Hypothyroidism and lymphedema presented to ED for  dizziness. Patient felt thirsty and became lightheaded when she stood up from bed for water. Describes it as lightheadedness and not room spinning. Also endorsed nausea and lack of appetite for last 3-4 days. In ED CT head was negative, found to have hypotension 88/52 and Afib with RVR, s/p Digoxin loading (5mg)-> /106 and NSR with T wave inversions. Initial Trop was 2.8, BNP was 4400 and D-dimer was 410. Patient was started on Heparin drip and admitted for NSTEMI. Doppler US of b/l lower extremities was negative for DVT, CXR negative for infiltrate. Cardiology following patient. Echo on  significant for LV hypertrophy and stage I diastolic dysfunction.       INTERVAL HPI/OVERNIGHT EVENTS:   Patient refused her lovenox injection, aspirin, gabapentin medications. Went for cardiac stress test today. Afterwards was nauseous and found to be hypertensive. Not responding anti-hypertensive medications. Ordered IV Hydralazine, IV labetalol and clonidine patch. No complaints of chest pain, headache, numbness or weakness. BP on manual cuff shows under 200 systolic, approx 170 systolic BP.     REVIEW OF SYSTEMS:  CONSTITUTIONAL: No fever, weight loss, or fatigue  RESPIRATORY: No cough, wheezing, chills or hemoptysis; No shortness of breath  CARDIOVASCULAR: No chest pain, palpitations, dizziness, or leg swelling  GASTROINTESTINAL: No abdominal pain. No nausea, vomiting, or hematemesis; No diarrhea or constipation. No melena or hematochezia.  GENITOURINARY: No dysuria or hematuria, urinary frequency  NEUROLOGICAL: No headaches, memory loss, loss of strength, numbness, or tremors  SKIN: No itching, burning, rashes, or lesions     MEDICATIONS  (STANDING):  ALPRAZolam 1 milliGRAM(s) Oral every 8 hours  aspirin enteric coated 81 milliGRAM(s) Oral daily  atorvastatin 80 milliGRAM(s) Oral at bedtime  benzocaine 15 mG/menthol 3.6 mG (Sugar-Free) Lozenge 1 Lozenge Oral three times a day  cholecalciferol 1000 Unit(s) Oral daily  clopidogrel Tablet 75 milliGRAM(s) Oral daily  cyanocobalamin 1000 MICROGram(s) Oral daily  enoxaparin Injectable 90 milliGRAM(s) SubCutaneous every 12 hours  folic acid 1 milliGRAM(s) Oral daily  gabapentin 300 milliGRAM(s) Oral two times a day  hydrALAZINE 25 milliGRAM(s) Oral every 8 hours  insulin lispro (HumaLOG) corrective regimen sliding scale   SubCutaneous three times a day before meals  labetalol 400 milliGRAM(s) Oral three times a day  levothyroxine 75 MICROGram(s) Oral daily  ondansetron    Tablet 4 milliGRAM(s) Oral once  pantoprazole    Tablet 40 milliGRAM(s) Oral before breakfast  tobramycin 0.3% Ophthalmic Solution 1 Drop(s) Both EYES two times a day    MEDICATIONS  (PRN):  acetaminophen   Tablet .. 650 milliGRAM(s) Oral every 6 hours PRN Moderate Pain (4 - 6)      Vital Signs Last 24 Hrs  T(C): 36.7 (2020 08:18), Max: 37.1 (2020 15:36)  T(F): 98 (2020 08:18), Max: 98.8 (2020 15:36)  HR: 75 (2020 05:46) (74 - 83)  BP: 185/96 (2020 05:46) (152/77 - 197/91)  BP(mean): --  RR: 18 (2020 08:18) (18 - 19)  SpO2: 98% (2020 08:18) (96% - 99%)    PHYSICAL EXAMINATION:  GENERAL: Obese female, complaining of nausea  HEAD:  Atraumatic, Normocephalic  EYES:  conjunctiva and sclera clear  NECK: Supple, No JVD, Normal thyroid  CHEST/LUNG: Clear to auscultation. Clear to percussion bilaterally; No rales, rhonchi, wheezing, or rubs  HEART: Regular rate and rhythm; No murmurs, rubs, or gallops  ABDOMEN: Soft, Nontender, Nondistended; Bowel sounds present  NERVOUS SYSTEM:  Alert & Oriented X3,    EXTREMITIES:  2+ Peripheral Pulses, No clubbing, cyanosis. + Chronic lower extremity edema, non-pitting.   SKIN: warm dry, + diffuse ecchymoses to b/l lower extremities stable.                          14.3   5.22  )-----------( 232      ( 2020 07:09 )             41.8     07-    142  |  108  |  19<H>  ----------------------------<  88  3.3<L>   |  25  |  1.27    Ca    8.7      2020 07:09        CARDIAC MARKERS ( 2020 21:53 )  13.400 ng/mL / x     / x     / x     / x      CARDIAC MARKERS ( 2020 14:32 )  18.100 ng/mL / x     / x     / x     / x          PTT - ( 2020 00:55 )  PTT:55.9 sec        I&O's Summary        CAPILLARY BLOOD GLUCOSE  POCT Blood Glucose.: 103 mg/dL (2020 16:58)    CAPILLARY BLOOD GLUCOSE  POCT Blood Glucose.: 103 mg/dL (2020 16:58)  POCT Blood Glucose.: 132 mg/dL (2020 11:45)      RADIOLOGY & ADDITIONAL TESTS:      PATIENT: TIANA BROWN  : 1950   AGE: 70 (F)   MR#: [id number]  STUDY DATE: 2020  LOCATION: [address] [place] [address]. PHYSICIAN(S): Miya Francis MD  FELLOW:  ------------------------------------------------------------------------    TYPEOF TEST: Rest/Stress Pharmacologic  INDICATION: Chest pain, unspecified (R07.9)  HEIGHT: 165 cm  WEIGHT: 91.0 kg  ------------------------------------------------------------------------  HISTORY:  CARDIAC HISTORY: 70 years old female with  OTHER HISTORY: HTN,DM,Obesity,Hypothryoid  RISK FACTORS: Diabetes, Hypertension, Obesity, Post  Menopausal  MEDICATIONS: Palvix,Lovenox,Neurontin,Normopdyne,Synthroid  ------------------------------------------------------------------------    BASELINE ELECTROCARDIOGRAM:  Rhythm: Normal Sinus Rhythm with nonspecific ST-T wave  abnormality    ------------------------------------------------------------------------    HEMODYNAMIC PARAMETERS:                                      HR      BP  Baseline  Pre-Injection             74  153/98  00:00     Inject Regadenoson         0  00:30     Post Injection            75  168/93  01:00     Post Injection            80  167/98  01:00     Recovery                  81  109/63  02:00     Recovery                  81  110/66  03:00     Recovery                  82  118/62  04:00     Recovery                  81  123/64  ------------------------------------------------------------------------    Agent: Regadenoson 0.4 mg/5 ml NS. injected over 10 sec.  HR: Baseline HR: 74 bpm   Peak HR: 82 bpm (54% of MPHR)  MPHR: 151 bpm   85% of MPHR: 128 bpm  BP: Baseline BP: 153/98 mmHg   Peak BP: 168/93 mmHg   Peak  RPP: [id number] (Rate Pressure Product)  HR Isotope Injected: 74 bpm (Tc 99m Tetrofosmin)  75 bpm (Tc 99m Tetrofosmin)  Last Caffeine intake: 12 hrs  Terminated: Completion of protocol  ------------------------------------------------------------------------    SYMPTOMS/FINDINGS:  Symptoms: Abdominal discomfort  ------------------------------------------------------------------------    ECG ABNORMALITIES DURING/AFTER STRESS:   Abnormalities: no changes  ------------------------------------------------------------------------    NEW ARRHYTHMIAS DEVELOPED DURING/AFTER STRESS:  None  ------------------------------------------------------------------------    STRESS TEST IMPRESSIONS:    Symptom: Abdominal discomfort.  Heart Rhythm: Normal Sinus Rhythm with nonspecific ST-T  wave abnormality.  ECG Abnormalities: no changes.  Arrhythmia: None.    ------------------------------------------------------------------------    PROCEDURE:  9.6 mCi of Tc 99m Tetrofosmin were injected intravenously  at rest. Approximately 45 minutes later, tomographic  images were obtained in a 180 degree arc from right  anterioroblique to left anterior oblique with 64 stops.  At a separate time, 29.9 mCi of Tc 99m Tetrofosmin were  injected intravenously during stress protocol.  Approximately 45 minute(s) later, tomographic images were  obtained in a 180 degree arc from right anterior oblique  to left anterior oblique with 64 stops. The tomographic  slices were reconstructed in 3 orthogonal planes (short  axis, horizontal long axis and vertical long axis).  Interpretation was performed both by visual and  quantitative analysis.    ------------------------------------------------------------------------    NUCLEAR FINDINGS:  Review of raw data shows: Gating artifact.  There is a small, mild defect in anterior wall consistent  with attenuation artifact.  Transient ischemic dilation (TID) ratio was elevated at:  1.37. However, this may be due to gating and processing  artifact.  ------------------------------------------------------------------------      GATED ANALYSIS:  Unable to accurately assess LVEF due to gating artifact.  ------------------------------------------------------------------------      LV/RV OBSERVATION:  No gross wall motion abnormalities  ------------------------------------------------------------------------    IMPRESSIONS:  * There is a small, predominantly fixed, mild intensity  defect in the mid to basal anterior wall that seems to  thicken, most consistent with attenuation artifact.  * Transient ischemic dilation (TID) ratio was elevated at:  1.37. However, this may be due to gating and processing  artifact in setting of LVH.  * Unable to accurately assess LVEF due to gating artifact,  appears hyperdynamic by visual estimation.  * Negative study for significant reversible ischemia    Results discussed with cardiologist Dr. Kuo    ------------------------------------------------------------------------      ------------------------------------------------------------------------    Confirmed on  2020 - 13:57:18 at [place] by  Jewel Rosenberg MD  ------------------------------------------------------------------------

## 2020-07-24 LAB
ANION GAP SERPL CALC-SCNC: 8 MMOL/L — SIGNIFICANT CHANGE UP (ref 5–17)
BUN SERPL-MCNC: 21 MG/DL — HIGH (ref 7–18)
CALCIUM SERPL-MCNC: 8.9 MG/DL — SIGNIFICANT CHANGE UP (ref 8.4–10.5)
CHLORIDE SERPL-SCNC: 105 MMOL/L — SIGNIFICANT CHANGE UP (ref 96–108)
CO2 SERPL-SCNC: 26 MMOL/L — SIGNIFICANT CHANGE UP (ref 22–31)
CREAT SERPL-MCNC: 1.43 MG/DL — HIGH (ref 0.5–1.3)
GLUCOSE BLDC GLUCOMTR-MCNC: 109 MG/DL — HIGH (ref 70–99)
GLUCOSE BLDC GLUCOMTR-MCNC: 121 MG/DL — HIGH (ref 70–99)
GLUCOSE BLDC GLUCOMTR-MCNC: 161 MG/DL — HIGH (ref 70–99)
GLUCOSE BLDC GLUCOMTR-MCNC: 194 MG/DL — HIGH (ref 70–99)
GLUCOSE SERPL-MCNC: 106 MG/DL — HIGH (ref 70–99)
HCT VFR BLD CALC: 43.9 % — SIGNIFICANT CHANGE UP (ref 34.5–45)
HGB BLD-MCNC: 15 G/DL — SIGNIFICANT CHANGE UP (ref 11.5–15.5)
MCHC RBC-ENTMCNC: 29.6 PG — SIGNIFICANT CHANGE UP (ref 27–34)
MCHC RBC-ENTMCNC: 34.2 GM/DL — SIGNIFICANT CHANGE UP (ref 32–36)
MCV RBC AUTO: 86.8 FL — SIGNIFICANT CHANGE UP (ref 80–100)
NRBC # BLD: 0 /100 WBCS — SIGNIFICANT CHANGE UP (ref 0–0)
PLATELET # BLD AUTO: 287 K/UL — SIGNIFICANT CHANGE UP (ref 150–400)
POTASSIUM SERPL-MCNC: 3.3 MMOL/L — LOW (ref 3.5–5.3)
POTASSIUM SERPL-SCNC: 3.3 MMOL/L — LOW (ref 3.5–5.3)
RBC # BLD: 5.06 M/UL — SIGNIFICANT CHANGE UP (ref 3.8–5.2)
RBC # FLD: 14.1 % — SIGNIFICANT CHANGE UP (ref 10.3–14.5)
SODIUM SERPL-SCNC: 139 MMOL/L — SIGNIFICANT CHANGE UP (ref 135–145)
WBC # BLD: 6.74 K/UL — SIGNIFICANT CHANGE UP (ref 3.8–10.5)
WBC # FLD AUTO: 6.74 K/UL — SIGNIFICANT CHANGE UP (ref 3.8–10.5)

## 2020-07-24 RX ORDER — POTASSIUM CHLORIDE 20 MEQ
40 PACKET (EA) ORAL
Refills: 0 | Status: COMPLETED | OUTPATIENT
Start: 2020-07-24 | End: 2020-07-25

## 2020-07-24 RX ORDER — APIXABAN 2.5 MG/1
1 TABLET, FILM COATED ORAL
Qty: 0 | Refills: 0 | DISCHARGE
Start: 2020-07-24

## 2020-07-24 RX ORDER — ACETAMINOPHEN 500 MG
2 TABLET ORAL
Qty: 0 | Refills: 0 | DISCHARGE
Start: 2020-07-24

## 2020-07-24 RX ORDER — ATORVASTATIN CALCIUM 80 MG/1
1 TABLET, FILM COATED ORAL
Qty: 0 | Refills: 0 | DISCHARGE
Start: 2020-07-24

## 2020-07-24 RX ORDER — HYDRALAZINE HCL 50 MG
1 TABLET ORAL
Qty: 0 | Refills: 0 | DISCHARGE
Start: 2020-07-24

## 2020-07-24 RX ORDER — LOSARTAN POTASSIUM 100 MG/1
1 TABLET, FILM COATED ORAL
Qty: 0 | Refills: 0 | DISCHARGE

## 2020-07-24 RX ORDER — LABETALOL HCL 100 MG
400 TABLET ORAL THREE TIMES A DAY
Refills: 0 | Status: DISCONTINUED | OUTPATIENT
Start: 2020-07-24 | End: 2020-08-03

## 2020-07-24 RX ADMIN — Medication 25 MILLIGRAM(S): at 22:03

## 2020-07-24 RX ADMIN — BENZOCAINE AND MENTHOL 1 LOZENGE: 5; 1 LIQUID ORAL at 14:39

## 2020-07-24 RX ADMIN — Medication 75 MICROGRAM(S): at 05:54

## 2020-07-24 RX ADMIN — Medication 25 MILLIGRAM(S): at 05:54

## 2020-07-24 RX ADMIN — Medication 1 DROP(S): at 06:52

## 2020-07-24 RX ADMIN — Medication 1 MILLIGRAM(S): at 14:43

## 2020-07-24 RX ADMIN — BENZOCAINE AND MENTHOL 1 LOZENGE: 5; 1 LIQUID ORAL at 22:02

## 2020-07-24 RX ADMIN — Medication 0.2 MILLIGRAM(S): at 22:03

## 2020-07-24 RX ADMIN — Medication 400 MILLIGRAM(S): at 14:41

## 2020-07-24 RX ADMIN — BENZOCAINE AND MENTHOL 1 LOZENGE: 5; 1 LIQUID ORAL at 06:23

## 2020-07-24 RX ADMIN — ATORVASTATIN CALCIUM 80 MILLIGRAM(S): 80 TABLET, FILM COATED ORAL at 22:03

## 2020-07-24 RX ADMIN — PREGABALIN 1000 MICROGRAM(S): 225 CAPSULE ORAL at 14:43

## 2020-07-24 RX ADMIN — Medication 600 MILLIGRAM(S): at 05:54

## 2020-07-24 RX ADMIN — Medication 1 DROP(S): at 20:00

## 2020-07-24 RX ADMIN — Medication 0.2 MILLIGRAM(S): at 14:39

## 2020-07-24 RX ADMIN — CLOPIDOGREL BISULFATE 75 MILLIGRAM(S): 75 TABLET, FILM COATED ORAL at 14:43

## 2020-07-24 RX ADMIN — Medication 25 MILLIGRAM(S): at 14:40

## 2020-07-24 RX ADMIN — Medication 1: at 12:26

## 2020-07-24 RX ADMIN — Medication 0.2 MILLIGRAM(S): at 05:54

## 2020-07-24 RX ADMIN — Medication 1 MILLIGRAM(S): at 14:45

## 2020-07-24 RX ADMIN — Medication 81 MILLIGRAM(S): at 14:42

## 2020-07-24 RX ADMIN — APIXABAN 5 MILLIGRAM(S): 2.5 TABLET, FILM COATED ORAL at 05:55

## 2020-07-24 RX ADMIN — Medication 1 MILLIGRAM(S): at 05:57

## 2020-07-24 RX ADMIN — Medication 1000 UNIT(S): at 14:42

## 2020-07-24 RX ADMIN — PANTOPRAZOLE SODIUM 40 MILLIGRAM(S): 20 TABLET, DELAYED RELEASE ORAL at 05:54

## 2020-07-24 RX ADMIN — Medication 1: at 17:31

## 2020-07-24 RX ADMIN — GABAPENTIN 300 MILLIGRAM(S): 400 CAPSULE ORAL at 05:57

## 2020-07-24 RX ADMIN — Medication 400 MILLIGRAM(S): at 22:03

## 2020-07-24 NOTE — PROGRESS NOTE ADULT - SUBJECTIVE AND OBJECTIVE BOX
PGY-1 Progress Note discussed with attending    PAGER #: [[phone]] TILL 5:00 PM  PLEASE CONTACT ON CALL TEAM:   - On Call Team (Please refer to Ashley) FROM 5:00 PM - 8:30PM  - Nightfloat Team FROM 8:30 -7:30 AM    CHIEF COMPLAINT & BRIEF HOSPITAL COURSE:      INTERVAL HPI/OVERNIGHT EVENTS:       REVIEW OF SYSTEMS:  CONSTITUTIONAL: No fever, weight loss, or fatigue  RESPIRATORY: No cough, wheezing, chills or hemoptysis; No shortness of breath  CARDIOVASCULAR: No chest pain, palpitations, dizziness, or leg swelling  GASTROINTESTINAL: No abdominal pain. No nausea, vomiting, or hematemesis; No diarrhea or constipation. No melena or hematochezia.  GENITOURINARY: No dysuria or hematuria, urinary frequency  NEUROLOGICAL: No headaches, memory loss, loss of strength, numbness, or tremors  SKIN: No itching, burning, rashes, or lesions     MEDICATIONS  (STANDING):  ALPRAZolam 1 milliGRAM(s) Oral every 8 hours  apixaban 5 milliGRAM(s) Oral every 12 hours  aspirin enteric coated 81 milliGRAM(s) Oral daily  atorvastatin 80 milliGRAM(s) Oral at bedtime  benzocaine 15 mG/menthol 3.6 mG (Sugar-Free) Lozenge 1 Lozenge Oral three times a day  cholecalciferol 1000 Unit(s) Oral daily  cloNIDine 0.2 milliGRAM(s) Oral three times a day  clopidogrel Tablet 75 milliGRAM(s) Oral daily  cyanocobalamin 1000 MICROGram(s) Oral daily  folic acid 1 milliGRAM(s) Oral daily  gabapentin 300 milliGRAM(s) Oral two times a day  hydrALAZINE 25 milliGRAM(s) Oral every 8 hours  insulin lispro (HumaLOG) corrective regimen sliding scale   SubCutaneous three times a day before meals  labetalol 400 milliGRAM(s) Oral three times a day  levothyroxine 75 MICROGram(s) Oral daily  pantoprazole    Tablet 40 milliGRAM(s) Oral before breakfast  tobramycin 0.3% Ophthalmic Solution 1 Drop(s) Both EYES two times a day    MEDICATIONS  (PRN):  acetaminophen   Tablet .. 650 milliGRAM(s) Oral every 6 hours PRN Moderate Pain (4 - 6)  hydrALAZINE Injectable 5 milliGRAM(s) IV Push every 6 hours PRN BP >200/100  labetalol Injectable 10 milliGRAM(s) IV Push every 4 hours PRN Systolic/Diastolic > 200/100      Vital Signs Last 24 Hrs  T(C): 36.5 (24 Jul 2020 07:30), Max: 37.7 (23 Jul 2020 20:22)  T(F): 97.7 (24 Jul 2020 07:30), Max: 99.8 (23 Jul 2020 20:22)  HR: 71 (24 Jul 2020 07:30) (71 - 114)  BP: 119/67 (24 Jul 2020 07:30) (102/50 - 229/98)  BP(mean): --  RR: 18 (24 Jul 2020 07:30) (18 - 18)  SpO2: 95% (24 Jul 2020 07:30) (95% - 98%)    PHYSICAL EXAMINATION:  GENERAL: NAD, well built  HEAD:  Atraumatic, Normocephalic  EYES:  conjunctiva and sclera clear  NECK: Supple, No JVD, Normal thyroid  CHEST/LUNG: Clear to auscultation. Clear to percussion bilaterally; No rales, rhonchi, wheezing, or rubs  HEART: Regular rate and rhythm; No murmurs, rubs, or gallops  ABDOMEN: Soft, Nontender, Nondistended; Bowel sounds present  NERVOUS SYSTEM:  Alert & Oriented X3,    EXTREMITIES:  2+ Peripheral Pulses, No clubbing, cyanosis, or edema  SKIN: warm dry                          15.0   6.74  )-----------( 287      ( 24 Jul 2020 07:29 )             43.9     07-24    139  |  105  |  21<H>  ----------------------------<  106<H>  3.3<L>   |  26  |  1.43<H>    Ca    8.9      24 Jul 2020 07:29        CARDIAC MARKERS ( 23 Jul 2020 19:17 )  4.870 ng/mL / x     / 61 U/L / x     / 3.6 ng/mL              I&O's Summary        CAPILLARY BLOOD GLUCOSE  CAPILLARY BLOOD GLUCOSE      POCT Blood Glucose.: 136 mg/dL (23 Jul 2020 20:51)    CAPILLARY BLOOD GLUCOSE      POCT Blood Glucose.: 136 mg/dL (23 Jul 2020 20:51)  POCT Blood Glucose.: 155 mg/dL (23 Jul 2020 16:46)      RADIOLOGY & ADDITIONAL TESTS: PGY-1 Progress Note discussed with attending    PAGER #: [[phone]] TILL 5:00 PM  PLEASE CONTACT ON CALL TEAM:   - On Call Team (Please refer to Ashley) FROM 5:00 PM - 8:30PM  - Nightfloat Team FROM 8:30 -7:30 AM    CHIEF COMPLAINT & BRIEF HOSPITAL COURSE:  71 y/o F with PMHx of obesity, HTN, Hypothyroidism and lymphedema presented to ED for  dizziness. Patient felt thirsty and became lightheaded when she stood up from bed for water. Describes it as lightheadedness and not room spinning. Also endorsed nausea and lack of appetite for last 3-4 days. In ED CT head was negative, found to have hypotension 88/52 and Afib with RVR, s/p Digoxin loading (5mg)-> /106 and NSR with T wave inversions. Initial Trop was 2.8, BNP was 4400 and D-dimer was 410. Patient was started on Heparin drip and admitted for NSTEMI. Doppler US of b/l lower extremities was negative for DVT, CXR negative for infiltrate. Cardiology following patient. Echo on  significant for LV hypertrophy and stage I diastolic dysfunction. Patient refused heparin drip, was started on lovenox which she also refused. Was eventually started on oral AC (Eliquis). Stress test was negative for any reversible ischemia. After stress test patient was nauseous and has hypertension. Managed with zofran and reglan. Due to nausea missed afternoon meds, received IV labetalol and hydralazine without improvement in BP. Critical care was consulted and patient was placed back on full home regimen of HTN medications. Critical care followed patient overnight with stabilization of BP. Some meds were held to avoid hypotension. BP was monitored and medications optimized. Plan for discharge home on PO AC (Eliquis).       INTERVAL HPI/OVERNIGHT EVENTS:   Yesterday patient had stress test and was nauseous afterwards. BP was high and not responding to meds. Critical care was consulted and BP was managed with home meds. Held some BP meds due to c/f hypotension. Lowered home dosing for same reason. Patient feels much improved. No headache, no weakness no nausea or vomiting since. BP stable.    REVIEW OF SYSTEMS:  CONSTITUTIONAL: No fever, weight loss, or fatigue  RESPIRATORY: No cough, wheezing, chills or hemoptysis; No shortness of breath  CARDIOVASCULAR: No chest pain, palpitations, dizziness, or leg swelling  GASTROINTESTINAL: No abdominal pain. No nausea, vomiting, or hematemesis; No diarrhea or constipation. No melena or hematochezia.  GENITOURINARY: No dysuria or hematuria, urinary frequency  NEUROLOGICAL: No headaches, memory loss, loss of strength, numbness, or tremors  SKIN: No itching, burning, rashes, or lesions     MEDICATIONS  (STANDING):  ALPRAZolam 1 milliGRAM(s) Oral every 8 hours  apixaban 5 milliGRAM(s) Oral every 12 hours  aspirin enteric coated 81 milliGRAM(s) Oral daily  atorvastatin 80 milliGRAM(s) Oral at bedtime  benzocaine 15 mG/menthol 3.6 mG (Sugar-Free) Lozenge 1 Lozenge Oral three times a day  cholecalciferol 1000 Unit(s) Oral daily  cloNIDine 0.2 milliGRAM(s) Oral three times a day  clopidogrel Tablet 75 milliGRAM(s) Oral daily  cyanocobalamin 1000 MICROGram(s) Oral daily  folic acid 1 milliGRAM(s) Oral daily  gabapentin 300 milliGRAM(s) Oral two times a day  hydrALAZINE 25 milliGRAM(s) Oral every 8 hours  insulin lispro (HumaLOG) corrective regimen sliding scale   SubCutaneous three times a day before meals  labetalol 400 milliGRAM(s) Oral three times a day  levothyroxine 75 MICROGram(s) Oral daily  pantoprazole    Tablet 40 milliGRAM(s) Oral before breakfast  tobramycin 0.3% Ophthalmic Solution 1 Drop(s) Both EYES two times a day    MEDICATIONS  (PRN):  acetaminophen   Tablet .. 650 milliGRAM(s) Oral every 6 hours PRN Moderate Pain (4 - 6)  hydrALAZINE Injectable 5 milliGRAM(s) IV Push every 6 hours PRN BP >200/100  labetalol Injectable 10 milliGRAM(s) IV Push every 4 hours PRN Systolic/Diastolic > 200/100      Vital Signs Last 24 Hrs  T(C): 36.5 (2020 07:30), Max: 37.7 (2020 20:22)  T(F): 97.7 (2020 07:30), Max: 99.8 (2020 20:22)  HR: 71 (2020 07:30) (71 - 114)  BP: 119/67 (2020 07:30) (102/50 - 229/98)  BP(mean): --  RR: 18 (2020 07:30) (18 - 18)  SpO2: 95% (2020 07:30) (95% - 98%)    PHYSICAL EXAMINATION:  GENERAL: Obese female, in NAD  HEAD:  Atraumatic, Normocephalic  EYES:  conjunctiva and sclera clear  NECK: Supple, No JVD, Normal thyroid  CHEST/LUNG: Clear to auscultation. Clear to percussion bilaterally; No rales, rhonchi, wheezing, or rubs  HEART: Regular rate and rhythm; No murmurs, rubs, or gallops  ABDOMEN: Soft, Nontender, Nondistended; Bowel sounds present  NERVOUS SYSTEM:  Alert & Oriented X3,    EXTREMITIES:  2+ Peripheral Pulses, No clubbing, cyanosis. + Chronic lower extremity edema, non-pitting.   SKIN: warm dry, + improved ecchymoses to b/l lower extremities stable.                              15.0   6.74  )-----------( 287      ( 2020 07:29 )             43.9     07-24    139  |  105  |  21<H>  ----------------------------<  106<H>  3.3<L>   |  26  |  1.43<H>    Ca    8.9      2020 07:29        CARDIAC MARKERS ( 2020 19:17 )  4.870 ng/mL / x     / 61 U/L / x     / 3.6 ng/mL      I&O's Summary        CAPILLARY BLOOD GLUCOSE  POCT Blood Glucose.: 136 mg/dL (2020 20:51)    CAPILLARY BLOOD GLUCOSE  POCT Blood Glucose.: 136 mg/dL (2020 20:51)  POCT Blood Glucose.: 155 mg/dL (2020 16:46)      RADIOLOGY & ADDITIONAL TESTS:    PATIENT: TIANA BROWN  : 1950   AGE: 70 (F)   MR#: [id number]  STUDY DATE: 2020  LOCATION: [address] [place] [address]  REF. PHYSICIAN(S): Miya Francis MD  FELLOW:  ------------------------------------------------------------------------    TYPEOF TEST: Rest/Stress Pharmacologic  INDICATION: Chest pain, unspecified (R07.9)  HEIGHT: 165 cm  WEIGHT: 91.0 kg  ------------------------------------------------------------------------  HISTORY:  CARDIAC HISTORY: 70 years old female with  OTHER HISTORY: HTN,DM,Obesity,Hypothryoid  RISK FACTORS: Diabetes, Hypertension, Obesity, Post  Menopausal  MEDICATIONS: Palvix,Lovenox,Neurontin,Normopdyne,Synthroid  ------------------------------------------------------------------------    BASELINE ELECTROCARDIOGRAM:  Rhythm: Normal Sinus Rhythm with nonspecific ST-T wave  abnormality    ------------------------------------------------------------------------    HEMODYNAMIC PARAMETERS:                                      HR      BP  Baseline  Pre-Injection             74  153/98  00:00     Inject Regadenoson         0  00:30     Post Injection            75  168/93  01:00     Post Injection            80  167/98  01:00     Recovery                  81  109/63  02:00     Recovery                  81  110/66  03:00     Recovery                  82  118/62  04:00     Recovery                  81  123/64  ------------------------------------------------------------------------    Agent: Regadenoson 0.4 mg/5 ml NS. injected over 10 sec.  HR: Baseline HR: 74 bpm   Peak HR: 82 bpm (54% of MPHR)  MPHR: 151 bpm   85% of MPHR: 128 bpm  BP: Baseline BP: 153/98 mmHg   Peak BP: 168/93 mmHg   Peak  RPP: 01776 (Rate Pressure Product)  HR Isotope Injected: 74 bpm (Tc 99m Tetrofosmin)  75 bpm (Tc 99m Tetrofosmin)  Last Caffeine intake: 12 hrs  Terminated: Completion of protocol  ------------------------------------------------------------------------    SYMPTOMS/FINDINGS:  Symptoms: Abdominal discomfort  ------------------------------------------------------------------------    ECG ABNORMALITIES DURING/AFTER STRESS:   Abnormalities: no changes  ------------------------------------------------------------------------    NEW ARRHYTHMIAS DEVELOPED DURING/AFTER STRESS:  None  ------------------------------------------------------------------------    STRESS TEST IMPRESSIONS:    Symptom: Abdominal discomfort.  Heart Rhythm: Normal Sinus Rhythm with nonspecific ST-T  wave abnormality.  ECG Abnormalities: no changes.  Arrhythmia: None.    ------------------------------------------------------------------------    PROCEDURE:  9.6 mCi of Tc 99m Tetrofosmin were injected intravenously  at rest. Approximately 45 minutes later, tomographic  images were obtained in a 180 degree arc from right  anterioroblique to left anterior oblique with 64 stops.  At a separate time, 29.9 mCi of Tc 99m Tetrofosmin were  injected intravenously during stress protocol.  Approximately 45 minute(s) later, tomographic images were  obtained in a 180 degree arc from right anterior oblique  to left anterior oblique with 64 stops. The tomographic  slices were reconstructed in 3 orthogonal planes (short  axis, horizontal long axis and vertical long axis).  Interpretation was performed both by visual and  quantitative analysis.    ------------------------------------------------------------------------    NUCLEAR FINDINGS:  Review of raw data shows: Gating artifact.  There is a small, mild defect in anterior wall consistent  with attenuation artifact.  Transient ischemic dilation (TID) ratio was elevated at:  1.37. However, this may be due to gating and processing  artifact.  ------------------------------------------------------------------------      GATED ANALYSIS:  Unable to accurately assess LVEF due to gating artifact.  ------------------------------------------------------------------------      LV/RV OBSERVATION:  No gross wall motion abnormalities  ------------------------------------------------------------------------    IMPRESSIONS:  * There is asmall, predominantly fixed, mild intensity  defect in the mid to basal anterior wall that seems to  thicken, most consistent with attenuation artifact.  * Transient ischemic dilation (TID) ratio was elevated at:  1.37. However, this may be due to gating and processing  artifact in setting of LVH.  * Unable to accurately assess LVEF due to gating artifact,  appears hyperdynamic by visual estimation.  * Negative study for significant reversible ischemia    Results discussed with cardiologist Dr. Kuo    ------------------------------------------------------------------------      ------------------------------------------------------------------------    Confirmed on  2020 - 13:57:18 at [facility] by  Jewel Rosenberg MD  --------------------------------------------------------------------

## 2020-07-24 NOTE — PROGRESS NOTE ADULT - PROBLEM SELECTOR PLAN 1
P/w lightheadedness and nausea x 1 day.   - initally AFib with RVR then NSR with T wave unversions after Dig load.  - No ST elevations or depressions  - Trops- 2.8 > 16.9 > 18.001 > 13.4 downtrend on 7/22  - repeat EKG negative for ischemic findings  - f/u ECHO  - pending Cath.  - Cardiology following Dr. Kuo  - full AC with Heparin drip / ASA/ Plavix > Heparin D/C'd on 7/22  - start Lovenox 7/22  / ASA/ Plavix   - Echo sig for LV hypertrophy and stage I diastolic dysfunction (see above)  - Plan for stress test and eventual cath if patient agreeable P/w lightheadedness and nausea x 1 day.   - initally AFib with RVR then NSR with T wave unversions after Dig load.  - No ST elevations or depressions  - Trops- 2.8 > 16.9 > 18.001 > 13.4 downtrend on 7/22  - repeat EKG negative for ischemic findings  - Cardiology following Dr. Kuo  - full AC with Heparin drip / ASA/ Plavix > Heparin D/C'd on 7/22  - start Lovenox 7/22  / ASA/ Plavix   - refused Lovenox on 7/22 > started on PO Eliquis  - Echo sig for LV hypertrophy and stage I diastolic dysfunction (see above)  - Stress test negative for reversible ischemia  - HTN with repeat EKG no ST elev/dep in contiguous leads, repeat trop trended down to 4.870  - refusing any invasive testing including cardiac cath

## 2020-07-24 NOTE — PROGRESS NOTE ADULT - PROBLEM SELECTOR PLAN 5
PMhx hypothyroidism  - c/w home med levothyroxine 75 mcg QD  - TSH: 0.34  - Free Thyroxine, Serum: 1.4 ng/dL (07.23.20 @ 10:24)

## 2020-07-24 NOTE — PROGRESS NOTE ADULT - PROBLEM SELECTOR PLAN 2
Pt admitted for dizziness/ligheadedness  S/P Dig 0.5 and diltiazem 5mg  Repeat EKG - Normal sinus rhythm; rate controlled  CHADSVASc score- 3 ( eligible for AC)  HAS-BLED score- 3   Cardio- Dr. Kuo  - c/w labetalol  - transition from heparin infusion > lovenox on 7/22  - watch aPTT  - Trops- 2.8 > 16.9 > 18.001 > 13.4 downtrend on 7/22 Pt admitted for dizziness/ligheadedness  S/P Dig 0.5 and diltiazem 5mg  Repeat EKG - Normal sinus rhythm; rate controlled  CHADSVASc score- 3 ( eligible for AC)  HAS-BLED score- 3   Cardio- Dr. Kuo  - c/w labetalol  - transition from heparin infusion > lovenox on 7/22 > refused 7/22  - started on Eliquis 7/23  - Trops- 2.8 > 16.9 > 18.001 > 13.4 > 4.870 on 7/24

## 2020-07-24 NOTE — PROGRESS NOTE ADULT - SUBJECTIVE AND OBJECTIVE BOX
[facility] NEPHROLOGY- PROGRESS NOTE    Patient is a 71yo Female with HTN, Hypothyroidism, lymphedema p/w dizziness a/w Afib with RVR, NSTEMI and ANIKA.  Nephrology consulted for Elevated serum creatinine.     Hospital Medications: Medications reviewed.  REVIEW OF SYSTEMS:  CONSTITUTIONAL: No fevers or chills  RESPIRATORY: No shortness of breath  CARDIOVASCULAR: No chest pain.  GASTROINTESTINAL: No diarrhea. Pt c/o severe nausea with vomiting with diffuse abdominal pain s/p contrast from nuclear stress test  VASCULAR: No bilateral lower extremity edema.     VITALS:  T(F): 97.6 (20 @ 12:12), Max: 98.8 (20 @ 15:36)  HR: 82 (20 @ 12:30)  BP: 229/98 (20 @ 12:30)  RR: 18 (20 @ 12:12)  SpO2: 97% (20 @ 12:30)  Wt(kg): --      General: Sleepy  HEENT: anicteric sclera,   Respiratory: CTA b/l   Cardiovascular: S1, S2, RRR  Gastrointestinal: BS+, diffuse tenderness  : No aguilar.  Extremities: No peripheral edema  hyperpigmentation of LE/       LABS:      142  |  108  |  19<H>  ----------------------------<  88  3.3<L>   |  25  |  1.27    Ca    8.7      2020 07:09      Creatinine Trend: 1.27 <--, 1.33 <--, 1.53 <--, 1.23 <--, 1.64 <--                        14.3   5.22  )-----------( 232      ( 2020 07:09 )             41.8     Urine Studies:  Urinalysis Basic - ( 2020 12:16 )    Color: Yellow / Appearance: Clear / S.025 / pH:   Gluc:  / Ketone: Trace  / Bili: Small / Urobili: 4   Blood:  / Protein: 30 mg/dL / Nitrite: Negative   Leuk Esterase: Moderate / RBC: 0-2 /HPF / WBC 6-10 /HPF   Sq Epi:  / Non Sq Epi: Few /HPF / Bacteria: Few /HPF [facility] NEPHROLOGY- PROGRESS NOTE    Patient is a 69yo Female with HTN, Hypothyroidism, lymphedema p/w dizziness a/w Afib with RVR, NSTEMI and ANIKA.  Nephrology consulted for Elevated serum creatinine.     Hospital Medications: Medications reviewed.  REVIEW OF SYSTEMS:  CONSTITUTIONAL: No fevers or chills  RESPIRATORY: No shortness of breath  CARDIOVASCULAR: No chest pain.  GASTROINTESTINAL: No diarrhea. N/V resolved; denies any episodes today. +lack of appetite; just wants to sleep  VASCULAR: No bilateral lower extremity edema.     VITALS:  T(F): 98.7 (20 @ 11:35), Max: 99.8 (20 @ 20:22)  HR: 74 (20 @ 11:35)  BP: 133/71 (20 @ 11:35)  RR: 18 (20 @ 11:35)  SpO2: 98% (20 @ 11:35)  Wt(kg): --    General: Sleepy  HEENT: anicteric sclera,   Respiratory: CTA b/l   Cardiovascular: S1, S2, RRR  Gastrointestinal: BS+, diffuse tenderness  : No aguilar.  Extremities: No peripheral edema  hyperpigmentation of b/l LE      LABS:      139  |  105  |  21<H>  ----------------------------<  106<H>  3.3<L>   |  26  |  1.43<H>    Ca    8.9      2020 07:29      Creatinine Trend: 1.43 <--, 1.27 <--, 1.33 <--, 1.53 <--, 1.23 <--, 1.64 <--                        15.0   6.74  )-----------( 287      ( 2020 07:29 )             43.9     Urine Studies:  Urinalysis Basic - ( 2020 12:16 )    Color: Yellow / Appearance: Clear / S.025 / pH:   Gluc:  / Ketone: Trace  / Bili: Small / Urobili: 4   Blood:  / Protein: 30 mg/dL / Nitrite: Negative   Leuk Esterase: Moderate / RBC: 0-2 /HPF / WBC 6-10 /HPF   Sq Epi:  / Non Sq Epi: Few /HPF / Bacteria: Few /HPF

## 2020-07-24 NOTE — PROGRESS NOTE ADULT - ASSESSMENT
Assessment and Plan:   · Assessment	  IMPRESSION AND PLAN:    Problem/Plan - 1:   NSTEMI with episode of afib+RVR   patient withy h/o CAD s/p 3 stent 3 years ago, admitted with Dizziness 2/2 dehydration vs hypoxia in setting of NSTEMI with episode of Afib with RVR   ECG on admission significant  for Afib wit ST depression   pt s/p Dig 0.5 on ED,   CHADSVASc score- 3 ( eligible for AC)  HAS-BLED score- 3  Continue DAPT  TTE Hyperdynamic LV Function LVH  Discontinue Heparin gtt   patient currently on Lovenox full dose  Possible troponin leak 2/2 demand  Pharmacological Nuclear stress Test negative for reversible ischemia  Eliquis can be resumed       Problem/Plan - 2:  ·  Problem: ANIKA (acute kidney injury).  Plan: Pt had elevated cr levels on admission 1.64  resolved   Likely preRenal from dehydration  F.u urine lytes   f/u BMP   s/p gentle hydrationn      Problem/Plan - 3:  ·  Problem: HTN (hypertension).  Plan: Pt has PMH of HTN   Pt on admission was Hypotensive 88/52, could be a wrong read   s/p Dig loading BP - 188/108  Thyroid Stimulating Hormone, Serum: 0.34 uU/mL (07.21.20 @ 07:04)    Home meds- Labetalol 400mg TID   c/w home meds with parameters  Monitor vitals.      Problem/Plan - 4:  ·  Problem: Hypothyroid.  Plan: Pt has PMh of HYpothyroidism   Home meds- levothyroxine  c/w home meds  TSH WNL      Problem/Plan - 5:  ·  Problem: Lymphedema.  Plan: Pt has PMh of B/l lymphedema  R> L lymphedema   c/o pain in LE  F/U venous doppler.

## 2020-07-24 NOTE — PROGRESS NOTE ADULT - ASSESSMENT
Patient is a 71yo Female with HTN, Hypothyroidism, lymphedema p/w dizziness a/w Afib with RVR, NSTEMI and ANIKA.  Nephrology consulted for Elevated serum creatinine.     1. ANIKA- likely hemodynamically mediated in the setting of decreased PO intake with hypotension/ Afib. Renal function improving s/p IVF. Pt now with n/v, unable to tolerate PO; consider short course of IVF.    Can defer urine lytes and Renal US since ANIKA is resolving. If worsening, check urine lytes and restart IVF. Strict I/Os. Avoid nephrotoxins/ NSAIDs/ RCA. Monitor BMP.  2. Hypokalemia- Recc KCl 40 meq PO x1; keep K at 4.  Monitor lytes.   3. Afib with RVR- plan as per cardiology.   4. Essential HTN- BP elevated, Pt unable to tolerate PO . Pt started on Clonidine patch. Consider Labetalol 10mg IV q6hrs prn BP >160/90. Monitor BP. Patient is a 69yo Female with HTN, Hypothyroidism, lymphedema p/w dizziness a/w Afib with RVR, NSTEMI and ANIKA.  Nephrology consulted for Elevated serum creatinine.     1. ANIKA- likely hemodynamically mediated in the setting of decreased PO intake with hypotension/ Afib. Initially improved s/p IVF; now with mild increase in the setting of n/v; lack of appetite.   Recc brief course of IVF NS @ 85 cc/hr x12 hrs. Can defer urine lytes and Renal US at this time. If worsening, check urine lytes. Strict I/Os. Avoid nephrotoxins/ NSAIDs/ RCA. Monitor BMP.  2. Hypokalemia- Recc KCl 40 meq PO x1; keep K at 4.  Monitor lytes.   3. Afib with RVR- plan as per cardiology.   4. Essential HTN- BP improving. c/w current antihypertensive medications. Monitor BP.

## 2020-07-24 NOTE — PROGRESS NOTE ADULT - PROBLEM SELECTOR PLAN 4
Pt has PMH of HTN. Pt on admission was Hypotensive 88/52. s/p Dig loading BP of 188/108  - BP now trending up and not responding well to medications. No neurological compromise. No headache. Will medically manage.  - patient on clonidine as outpatient (0.1 mg x 2 three times a day), will restart clonidine patch as patient is nauseous and vomiting  - increasing /102 on 7/22, patient refused Hydralazine. WIll recheck and monitor. Patient remains agitated.   - c/w home Labetalol 400mg TID   - monitor vitals Pt has PMH of HTN. Pt on admission was Hypotensive 88/52. s/p Dig loading BP of 188/108.  - increasing /102 on 7/22, patient refused PO Hydralazine. WIll recheck and monitor. Patient remains agitated.   - BP now trending up and not responding well to medications. No neurological compromise. No headache. Will medically manage.  - patient on clonidine as outpatient (0.1 mg x 2 three times a day)  - c/w home Labetalol 400 mg TID (lowered from home dose of 600 TID 2/2 hypotension)   - monitor vitals + BP  - IV 5 Hydral and IV 10 labetalol PRN for breakthrough HTN  - Critical care consulted on 7/24. Following.

## 2020-07-24 NOTE — PROGRESS NOTE ADULT - PROBLEM SELECTOR PLAN 3
Pt had elevated cr levels on admission 1.64. Likely pre Renal from dehydration - patient not eating/drinking for 3-4 days.  - f/u urinel ytes  - f/u BMP   - c/w mild hydration x 12 hours  - BUN/Creat 19/1.33 on 7/22 (improving) Pt had elevated cr levels on admission 1.64. Likely pre Renal from dehydration - patient not eating/drinking for 3-4 days.  - f/u urine lytes  - f/u BMP   - c/w mild hydration x 12 hours  - BUN/Creat monitoring

## 2020-07-24 NOTE — PROGRESS NOTE ADULT - SUBJECTIVE AND OBJECTIVE BOX
SUBJ: Patient seems to be lethargic, likely 2/2 BP over correction over night     Patient with high BP over night , s/p labetalol and hydralazine IV , BP over corrected       MEDICATIONS  (STANDING):  ALPRAZolam 1 milliGRAM(s) Oral every 8 hours  apixaban 5 milliGRAM(s) Oral every 12 hours  aspirin enteric coated 81 milliGRAM(s) Oral daily  atorvastatin 80 milliGRAM(s) Oral at bedtime  benzocaine 15 mG/menthol 3.6 mG (Sugar-Free) Lozenge 1 Lozenge Oral three times a day  cholecalciferol 1000 Unit(s) Oral daily  cloNIDine 0.2 milliGRAM(s) Oral three times a day  clopidogrel Tablet 75 milliGRAM(s) Oral daily  cyanocobalamin 1000 MICROGram(s) Oral daily  folic acid 1 milliGRAM(s) Oral daily  gabapentin 300 milliGRAM(s) Oral two times a day  hydrALAZINE 25 milliGRAM(s) Oral every 8 hours  insulin lispro (HumaLOG) corrective regimen sliding scale   SubCutaneous three times a day before meals  labetalol 400 milliGRAM(s) Oral three times a day  levothyroxine 75 MICROGram(s) Oral daily  pantoprazole    Tablet 40 milliGRAM(s) Oral before breakfast  tobramycin 0.3% Ophthalmic Solution 1 Drop(s) Both EYES two times a day    MEDICATIONS  (PRN):  acetaminophen   Tablet .. 650 milliGRAM(s) Oral every 6 hours PRN Moderate Pain (4 - 6)  hydrALAZINE Injectable 5 milliGRAM(s) IV Push every 6 hours PRN BP >200/100  labetalol Injectable 10 milliGRAM(s) IV Push every 4 hours PRN Systolic/Diastolic > 200/100            Vital Signs Last 24 Hrs  T(C): 37.1 (24 Jul 2020 11:35), Max: 37.7 (23 Jul 2020 20:22)  T(F): 98.7 (24 Jul 2020 11:35), Max: 99.8 (23 Jul 2020 20:22)  HR: 74 (24 Jul 2020 11:35) (71 - 114)  BP: 133/71 (24 Jul 2020 11:35) (102/50 - 229/98)  BP(mean): --  RR: 18 (24 Jul 2020 11:35) (18 - 18)  SpO2: 98% (24 Jul 2020 11:35) (95% - 98%)        ECG:    TTE:    LABS:                        15.0   6.74  )-----------( 287      ( 24 Jul 2020 07:29 )             43.9     07-24    139  |  105  |  21<H>  ----------------------------<  106<H>  3.3<L>   |  26  |  1.43<H>    Ca    8.9      24 Jul 2020 07:29      CARDIAC MARKERS ( 23 Jul 2020 19:17 )  4.870 ng/mL / x     / 61 U/L / x     / 3.6 ng/mL        Creatinine Trend: 1.43<--, 1.27<--, 1.33<--, 1.53<--, 1.23<--, 1.64<--  I&O's Summary    BNP  RADIOLOGY & ADDITIONAL STUDIES:    IMPRESSION AND PLAN:

## 2020-07-25 LAB
ANION GAP SERPL CALC-SCNC: 6 MMOL/L — SIGNIFICANT CHANGE UP (ref 5–17)
APPEARANCE UR: CLEAR — SIGNIFICANT CHANGE UP
BACTERIA # UR AUTO: ABNORMAL /HPF
BILIRUB UR-MCNC: ABNORMAL
BUN SERPL-MCNC: 31 MG/DL — HIGH (ref 7–18)
CALCIUM SERPL-MCNC: 8.6 MG/DL — SIGNIFICANT CHANGE UP (ref 8.4–10.5)
CHLORIDE SERPL-SCNC: 105 MMOL/L — SIGNIFICANT CHANGE UP (ref 96–108)
CO2 SERPL-SCNC: 29 MMOL/L — SIGNIFICANT CHANGE UP (ref 22–31)
COLOR SPEC: YELLOW — SIGNIFICANT CHANGE UP
CREAT ?TM UR-MCNC: 224 MG/DL — SIGNIFICANT CHANGE UP
CREAT SERPL-MCNC: 1.82 MG/DL — HIGH (ref 0.5–1.3)
DIFF PNL FLD: ABNORMAL
EPI CELLS # UR: SIGNIFICANT CHANGE UP /HPF
GLUCOSE BLDC GLUCOMTR-MCNC: 124 MG/DL — HIGH (ref 70–99)
GLUCOSE BLDC GLUCOMTR-MCNC: 180 MG/DL — HIGH (ref 70–99)
GLUCOSE BLDC GLUCOMTR-MCNC: 187 MG/DL — HIGH (ref 70–99)
GLUCOSE SERPL-MCNC: 105 MG/DL — HIGH (ref 70–99)
GLUCOSE UR QL: 50 MG/DL
HCT VFR BLD CALC: 41.8 % — SIGNIFICANT CHANGE UP (ref 34.5–45)
HGB BLD-MCNC: 13.9 G/DL — SIGNIFICANT CHANGE UP (ref 11.5–15.5)
HYALINE CASTS # UR AUTO: ABNORMAL /LPF
KETONES UR-MCNC: ABNORMAL
LEUKOCYTE ESTERASE UR-ACNC: ABNORMAL
MCHC RBC-ENTMCNC: 30.1 PG — SIGNIFICANT CHANGE UP (ref 27–34)
MCHC RBC-ENTMCNC: 33.3 GM/DL — SIGNIFICANT CHANGE UP (ref 32–36)
MCV RBC AUTO: 90.5 FL — SIGNIFICANT CHANGE UP (ref 80–100)
NITRITE UR-MCNC: NEGATIVE — SIGNIFICANT CHANGE UP
NRBC # BLD: 0 /100 WBCS — SIGNIFICANT CHANGE UP (ref 0–0)
PH UR: 5 — SIGNIFICANT CHANGE UP (ref 5–8)
PLATELET # BLD AUTO: 242 K/UL — SIGNIFICANT CHANGE UP (ref 150–400)
POTASSIUM SERPL-MCNC: 3.6 MMOL/L — SIGNIFICANT CHANGE UP (ref 3.5–5.3)
POTASSIUM SERPL-SCNC: 3.6 MMOL/L — SIGNIFICANT CHANGE UP (ref 3.5–5.3)
PROT UR-MCNC: 30 MG/DL
RBC # BLD: 4.62 M/UL — SIGNIFICANT CHANGE UP (ref 3.8–5.2)
RBC # FLD: 14.6 % — HIGH (ref 10.3–14.5)
RBC CASTS # UR COMP ASSIST: ABNORMAL /HPF (ref 0–2)
SODIUM SERPL-SCNC: 140 MMOL/L — SIGNIFICANT CHANGE UP (ref 135–145)
SODIUM UR-SCNC: 30 MMOL/L — SIGNIFICANT CHANGE UP
SP GR SPEC: 1.02 — SIGNIFICANT CHANGE UP (ref 1.01–1.02)
UROBILINOGEN FLD QL: 1
WBC # BLD: 6.6 K/UL — SIGNIFICANT CHANGE UP (ref 3.8–10.5)
WBC # FLD AUTO: 6.6 K/UL — SIGNIFICANT CHANGE UP (ref 3.8–10.5)
WBC UR QL: ABNORMAL /HPF (ref 0–5)

## 2020-07-25 RX ORDER — POTASSIUM CHLORIDE 20 MEQ
40 PACKET (EA) ORAL ONCE
Refills: 0 | Status: DISCONTINUED | OUTPATIENT
Start: 2020-07-25 | End: 2020-07-25

## 2020-07-25 RX ORDER — SODIUM CHLORIDE 9 MG/ML
1000 INJECTION INTRAMUSCULAR; INTRAVENOUS; SUBCUTANEOUS
Refills: 0 | Status: DISCONTINUED | OUTPATIENT
Start: 2020-07-25 | End: 2020-08-03

## 2020-07-25 RX ADMIN — Medication 0.2 MILLIGRAM(S): at 06:35

## 2020-07-25 RX ADMIN — BENZOCAINE AND MENTHOL 1 LOZENGE: 5; 1 LIQUID ORAL at 06:36

## 2020-07-25 RX ADMIN — Medication 0.2 MILLIGRAM(S): at 13:13

## 2020-07-25 RX ADMIN — PREGABALIN 1000 MICROGRAM(S): 225 CAPSULE ORAL at 13:13

## 2020-07-25 RX ADMIN — Medication 81 MILLIGRAM(S): at 13:13

## 2020-07-25 RX ADMIN — ATORVASTATIN CALCIUM 80 MILLIGRAM(S): 80 TABLET, FILM COATED ORAL at 21:57

## 2020-07-25 RX ADMIN — Medication 1 DROP(S): at 17:27

## 2020-07-25 RX ADMIN — Medication 1 MILLIGRAM(S): at 13:19

## 2020-07-25 RX ADMIN — Medication 400 MILLIGRAM(S): at 21:58

## 2020-07-25 RX ADMIN — Medication 40 MILLIEQUIVALENT(S): at 06:37

## 2020-07-25 RX ADMIN — APIXABAN 5 MILLIGRAM(S): 2.5 TABLET, FILM COATED ORAL at 06:35

## 2020-07-25 RX ADMIN — Medication 1000 UNIT(S): at 13:14

## 2020-07-25 RX ADMIN — BENZOCAINE AND MENTHOL 1 LOZENGE: 5; 1 LIQUID ORAL at 13:14

## 2020-07-25 RX ADMIN — BENZOCAINE AND MENTHOL 1 LOZENGE: 5; 1 LIQUID ORAL at 21:58

## 2020-07-25 RX ADMIN — Medication 25 MILLIGRAM(S): at 06:37

## 2020-07-25 RX ADMIN — Medication 1 MILLIGRAM(S): at 22:07

## 2020-07-25 RX ADMIN — SODIUM CHLORIDE 85 MILLILITER(S): 9 INJECTION INTRAMUSCULAR; INTRAVENOUS; SUBCUTANEOUS at 13:19

## 2020-07-25 RX ADMIN — GABAPENTIN 300 MILLIGRAM(S): 400 CAPSULE ORAL at 06:41

## 2020-07-25 RX ADMIN — CLOPIDOGREL BISULFATE 75 MILLIGRAM(S): 75 TABLET, FILM COATED ORAL at 13:13

## 2020-07-25 RX ADMIN — Medication 1 MILLIGRAM(S): at 07:47

## 2020-07-25 RX ADMIN — APIXABAN 5 MILLIGRAM(S): 2.5 TABLET, FILM COATED ORAL at 17:28

## 2020-07-25 RX ADMIN — PANTOPRAZOLE SODIUM 40 MILLIGRAM(S): 20 TABLET, DELAYED RELEASE ORAL at 06:35

## 2020-07-25 RX ADMIN — Medication 75 MICROGRAM(S): at 06:35

## 2020-07-25 RX ADMIN — Medication 400 MILLIGRAM(S): at 06:35

## 2020-07-25 RX ADMIN — Medication 1: at 12:50

## 2020-07-25 RX ADMIN — Medication 400 MILLIGRAM(S): at 13:13

## 2020-07-25 RX ADMIN — Medication 1 DROP(S): at 06:37

## 2020-07-25 RX ADMIN — Medication 1 MILLIGRAM(S): at 13:13

## 2020-07-25 RX ADMIN — Medication 25 MILLIGRAM(S): at 21:57

## 2020-07-25 RX ADMIN — Medication 0.2 MILLIGRAM(S): at 21:57

## 2020-07-25 NOTE — PROGRESS NOTE ADULT - SUBJECTIVE AND OBJECTIVE BOX
PGY-1 Progress Note discussed with attending    PAGER #: [[phone]] TILL 5:00 PM  PLEASE CONTACT ON CALL TEAM:   - On Call Team (Please refer to Ashley) FROM 5:00 PM - 8:30PM  - Nightfloat Team FROM 8:30 -7:30 AM    CHIEF COMPLAINT & BRIEF HOSPITAL COURSE:  71 y/o F with PMHx of obesity, HTN, Hypothyroidism and lymphedema presented to ED for  dizziness. Patient felt thirsty and became lightheaded when she stood up from bed for water. Describes it as lightheadedness and not room spinning. Also endorsed nausea and lack of appetite for last 3-4 days. In ED CT head was negative, found to have hypotension 88/52 and Afib with RVR, s/p Digoxin loading (5mg)-> /106 and NSR with T wave inversions. Initial Trop was 2.8, BNP was 4400 and D-dimer was 410. Patient was started on Heparin drip and admitted for NSTEMI. Doppler US of b/l lower extremities was negative for DVT, CXR negative for infiltrate. Cardiology following patient. Echo on 7/21 significant for LV hypertrophy and stage I diastolic dysfunction. Patient refused heparin drip, was started on lovenox which she also refused. Was eventually started on oral AC (Eliquis). Stress test was negative for any reversible ischemia. After stress test patient was nauseous and has hypertension. Managed with zofran and reglan. Due to nausea missed afternoon meds, received IV labetalol and hydralazine without improvement in BP. Critical care was consulted and patient was placed back on full home regimen of HTN medications. Critical care followed patient overnight with stabilization of BP. Some meds were held to avoid hypotension. BP was monitored and medications optimized. Given gentle short course NS IV fluid hydration for ANIKA. Plan for discharge home on PO AC (Eliquis).       INTERVAL HPI/OVERNIGHT EVENTS: ***      REVIEW OF SYSTEMS:  CONSTITUTIONAL: No fever, weight loss, or fatigue  RESPIRATORY: No cough, wheezing, chills or hemoptysis; No shortness of breath  CARDIOVASCULAR: No chest pain, palpitations, dizziness, or leg swelling  GASTROINTESTINAL: No abdominal pain. No nausea, vomiting, or hematemesis; No diarrhea or constipation. No melena or hematochezia.  GENITOURINARY: No dysuria or hematuria, urinary frequency  NEUROLOGICAL: No headaches, memory loss, loss of strength, numbness, or tremors  SKIN: No itching, burning, rashes, or lesions     MEDICATIONS  (STANDING):  ALPRAZolam 1 milliGRAM(s) Oral every 8 hours  apixaban 5 milliGRAM(s) Oral every 12 hours  aspirin enteric coated 81 milliGRAM(s) Oral daily  atorvastatin 80 milliGRAM(s) Oral at bedtime  benzocaine 15 mG/menthol 3.6 mG (Sugar-Free) Lozenge 1 Lozenge Oral three times a day  cholecalciferol 1000 Unit(s) Oral daily  cloNIDine 0.2 milliGRAM(s) Oral three times a day  clopidogrel Tablet 75 milliGRAM(s) Oral daily  cyanocobalamin 1000 MICROGram(s) Oral daily  folic acid 1 milliGRAM(s) Oral daily  gabapentin 300 milliGRAM(s) Oral two times a day  hydrALAZINE 25 milliGRAM(s) Oral every 8 hours  insulin lispro (HumaLOG) corrective regimen sliding scale   SubCutaneous three times a day before meals  labetalol 400 milliGRAM(s) Oral three times a day  levothyroxine 75 MICROGram(s) Oral daily  pantoprazole    Tablet 40 milliGRAM(s) Oral before breakfast  potassium chloride    Tablet ER 40 milliEquivalent(s) Oral once  sodium chloride 0.9%. 1000 milliLiter(s) (85 mL/Hr) IV Continuous <Continuous>  tobramycin 0.3% Ophthalmic Solution 1 Drop(s) Both EYES two times a day    MEDICATIONS  (PRN):  acetaminophen   Tablet .. 650 milliGRAM(s) Oral every 6 hours PRN Moderate Pain (4 - 6)  hydrALAZINE Injectable 5 milliGRAM(s) IV Push every 6 hours PRN BP >200/100  labetalol Injectable 10 milliGRAM(s) IV Push every 4 hours PRN Systolic/Diastolic > 200/100      Vital Signs Last 24 Hrs  T(C): 36.8 (25 Jul 2020 05:00), Max: 37.1 (24 Jul 2020 11:35)  T(F): 98.2 (25 Jul 2020 05:00), Max: 98.7 (24 Jul 2020 11:35)  HR: 70 (25 Jul 2020 05:00) (70 - 74)  BP: 122/68 (25 Jul 2020 05:00) (104/60 - 133/71)  BP(mean): --  RR: 18 (25 Jul 2020 05:00) (18 - 18)  SpO2: 96% (25 Jul 2020 05:00) (93% - 98%)    PHYSICAL EXAMINATION:  GENERAL: NAD, well built  HEAD:  Atraumatic, Normocephalic  EYES:  conjunctiva and sclera clear  NECK: Supple, No JVD, Normal thyroid  CHEST/LUNG: Clear to auscultation. Clear to percussion bilaterally; No rales, rhonchi, wheezing, or rubs  HEART: Regular rate and rhythm; No murmurs, rubs, or gallops  ABDOMEN: Soft, Nontender, Nondistended; Bowel sounds present  NERVOUS SYSTEM:  Alert & Oriented X3,    EXTREMITIES:  2+ Peripheral Pulses, No clubbing, cyanosis, or edema  SKIN: warm dry                          15.0   6.74  )-----------( 287      ( 24 Jul 2020 07:29 )             43.9     07-24    139  |  105  |  21<H>  ----------------------------<  106<H>  3.3<L>   |  26  |  1.43<H>    Ca    8.9      24 Jul 2020 07:29        CARDIAC MARKERS ( 23 Jul 2020 19:17 )  4.870 ng/mL / x     / 61 U/L / x     / 3.6 ng/mL              I&O's Summary        CAPILLARY BLOOD GLUCOSE  CAPILLARY BLOOD GLUCOSE      POCT Blood Glucose.: 121 mg/dL (24 Jul 2020 21:31)    CAPILLARY BLOOD GLUCOSE      POCT Blood Glucose.: 121 mg/dL (24 Jul 2020 21:31)  POCT Blood Glucose.: 194 mg/dL (24 Jul 2020 16:44)  POCT Blood Glucose.: 161 mg/dL (24 Jul 2020 11:46)      RADIOLOGY & ADDITIONAL TESTS: PGY-1 Progress Note discussed with attending    PAGER #: [[phone]] TILL 5:00 PM  PLEASE CONTACT ON CALL TEAM:   - On Call Team (Please refer to Ashley) FROM 5:00 PM - 8:30PM  - Nightfloat Team FROM 8:30 -7:30 AM    CHIEF COMPLAINT & BRIEF HOSPITAL COURSE:  69 y/o F with PMHx of obesity, HTN, Hypothyroidism and lymphedema presented to ED for  dizziness. Patient felt thirsty and became lightheaded when she stood up from bed for water. Describes it as lightheadedness and not room spinning. Also endorsed nausea and lack of appetite for last 3-4 days. In ED CT head was negative, found to have hypotension 88/52 and Afib with RVR, s/p Digoxin loading (5mg)-> /106 and NSR with T wave inversions. Initial Trop was 2.8, BNP was 4400 and D-dimer was 410. Patient was started on Heparin drip and admitted for NSTEMI. Doppler US of b/l lower extremities was negative for DVT, CXR negative for infiltrate. Cardiology following patient. Echo on 7/21 significant for LV hypertrophy and stage I diastolic dysfunction. Patient refused heparin drip, was started on lovenox which she also refused. Was eventually started on oral AC (Eliquis). Stress test was negative for any reversible ischemia. After stress test patient was nauseous and has hypertension. Managed with zofran and reglan. Due to nausea missed afternoon meds, received IV labetalol and hydralazine without improvement in BP. Critical care was consulted and patient was placed back on full home regimen of HTN medications. Critical care followed patient overnight with stabilization of BP. Some meds were held to avoid hypotension. BP was monitored and medications optimized. Given gentle short course NS IV fluid hydration for ANIKA. Plan for discharge home on PO AC (Eliquis).       INTERVAL HPI/OVERNIGHT EVENTS:   Patient was seen and examined at bedside. No complaints. NAD. VSS, BP stable. No complaints. Refused some medications overnight.      REVIEW OF SYSTEMS:  CONSTITUTIONAL: No fever, weight loss, or fatigue  RESPIRATORY: No cough, wheezing, chills or hemoptysis; No shortness of breath  CARDIOVASCULAR: No chest pain, palpitations, dizziness, or leg swelling  GASTROINTESTINAL: No abdominal pain. No nausea, vomiting, or hematemesis; No diarrhea or constipation. No melena or hematochezia.  GENITOURINARY: No dysuria or hematuria, urinary frequency  NEUROLOGICAL: No headaches, memory loss, loss of strength, numbness, or tremors  SKIN: No itching, burning, rashes, or lesions     MEDICATIONS  (STANDING):  ALPRAZolam 1 milliGRAM(s) Oral every 8 hours  apixaban 5 milliGRAM(s) Oral every 12 hours  aspirin enteric coated 81 milliGRAM(s) Oral daily  atorvastatin 80 milliGRAM(s) Oral at bedtime  benzocaine 15 mG/menthol 3.6 mG (Sugar-Free) Lozenge 1 Lozenge Oral three times a day  cholecalciferol 1000 Unit(s) Oral daily  cloNIDine 0.2 milliGRAM(s) Oral three times a day  clopidogrel Tablet 75 milliGRAM(s) Oral daily  cyanocobalamin 1000 MICROGram(s) Oral daily  folic acid 1 milliGRAM(s) Oral daily  gabapentin 300 milliGRAM(s) Oral two times a day  hydrALAZINE 25 milliGRAM(s) Oral every 8 hours  insulin lispro (HumaLOG) corrective regimen sliding scale   SubCutaneous three times a day before meals  labetalol 400 milliGRAM(s) Oral three times a day  levothyroxine 75 MICROGram(s) Oral daily  pantoprazole    Tablet 40 milliGRAM(s) Oral before breakfast  potassium chloride    Tablet ER 40 milliEquivalent(s) Oral once  sodium chloride 0.9%. 1000 milliLiter(s) (85 mL/Hr) IV Continuous <Continuous>  tobramycin 0.3% Ophthalmic Solution 1 Drop(s) Both EYES two times a day    MEDICATIONS  (PRN):  acetaminophen   Tablet .. 650 milliGRAM(s) Oral every 6 hours PRN Moderate Pain (4 - 6)  hydrALAZINE Injectable 5 milliGRAM(s) IV Push every 6 hours PRN BP >200/100  labetalol Injectable 10 milliGRAM(s) IV Push every 4 hours PRN Systolic/Diastolic > 200/100      Vital Signs Last 24 Hrs  T(C): 36.8 (25 Jul 2020 05:00), Max: 37.1 (24 Jul 2020 11:35)  T(F): 98.2 (25 Jul 2020 05:00), Max: 98.7 (24 Jul 2020 11:35)  HR: 70 (25 Jul 2020 05:00) (70 - 74)  BP: 122/68 (25 Jul 2020 05:00) (104/60 - 133/71)  BP(mean): --  RR: 18 (25 Jul 2020 05:00) (18 - 18)  SpO2: 96% (25 Jul 2020 05:00) (93% - 98%)    PHYSICAL EXAMINATION:  GENERAL: Obese female, in NAD  HEAD:  Atraumatic, Normocephalic  EYES:  conjunctiva and sclera clear  NECK: Supple, No JVD, Normal thyroid  CHEST/LUNG: Clear to auscultation. Clear to percussion bilaterally; No rales, rhonchi, wheezing, or rubs  HEART: Regular rate and rhythm; No murmurs, rubs, or gallops  ABDOMEN: Soft, Nontender, Nondistended; Bowel sounds present  NERVOUS SYSTEM:  Alert & Oriented X3,    EXTREMITIES:  2+ Peripheral Pulses, No clubbing, cyanosis. + Chronic lower extremity edema, non-pitting.   SKIN: warm dry, + resolving ecchymoses to b/l lower extremities stable.                            15.0   6.74  )-----------( 287      ( 24 Jul 2020 07:29 )             43.9     07-24    139  |  105  |  21<H>  ----------------------------<  106<H>  3.3<L>   |  26  |  1.43<H>    Ca    8.9      24 Jul 2020 07:29        CARDIAC MARKERS ( 23 Jul 2020 19:17 )  4.870 ng/mL / x     / 61 U/L / x     / 3.6 ng/mL      I&O's Summary        CAPILLARY BLOOD GLUCOSE  POCT Blood Glucose.: 121 mg/dL (24 Jul 2020 21:31)    CAPILLARY BLOOD GLUCOSE  POCT Blood Glucose.: 121 mg/dL (24 Jul 2020 21:31)  POCT Blood Glucose.: 194 mg/dL (24 Jul 2020 16:44)  POCT Blood Glucose.: 161 mg/dL (24 Jul 2020 11:46)      RADIOLOGY & ADDITIONAL TESTS:

## 2020-07-25 NOTE — PROGRESS NOTE ADULT - ASSESSMENT
69 y/o F with a significant PMHx of carpal tunnel syndrome, HTN, hypothyroid, lymphedema, presents to the ED for lightheadedness and dizziness. Found to be hypotensive with elevated troponin. Admitted for NSTEMI.

## 2020-07-25 NOTE — PROGRESS NOTE ADULT - PROBLEM SELECTOR PLAN 4
Pt has PMH of HTN. Pt on admission was Hypotensive 88/52. s/p Dig loading BP of 188/108.  - increasing /102 on 7/22, patient refused PO Hydralazine. WIll recheck and monitor. Patient remains agitated.   - BP now trending up and not responding well to medications. No neurological compromise. No headache. Will medically manage.  - patient on clonidine as outpatient (0.1 mg x 2 three times a day)  - c/w home Labetalol 400 mg TID (lowered from home dose of 600 TID 2/2 hypotension)   - monitor vitals + BP  - IV 5 Hydral and IV 10 labetalol PRN for breakthrough HTN  - Critical care consulted on 7/24. Following.  - gentle fluid hydration 1000ml NS @ 85 x 12 hours 7/25 Pt has PMH of HTN. Pt on admission was Hypotensive 88/52. s/p Dig loading BP of 188/108.  - increasing /102 on 7/22, patient refused PO Hydralazine. WIll recheck and monitor. Patient remains agitated.   - BP now trending up and not responding well to medications. No neurological compromise. No headache. Will medically manage.  - patient on clonidine as outpatient (0.1 mg x 2 three times a day)  - c/w home Labetalol 400 mg TID (lowered from home dose of 600 TID 2/2 hypotension)   - monitor vitals + BP  - IV 5 Hydral and IV 10 labetalol PRN for breakthrough HTN  - Critical care consulted on 7/24. Following  - refusing hydralazine on occasions 7/25

## 2020-07-25 NOTE — PROGRESS NOTE ADULT - ASSESSMENT
Patient is a 69yo Female with HTN, Hypothyroidism, lymphedema p/w dizziness a/w Afib with RVR, NSTEMI and ANIKA.  Nephrology consulted for Elevated serum creatinine.     1. ANIKA- likely hemodynamically mediated in the setting of decreased PO intake with hypotension/ Afib. Initially improved s/p IVF; now with increasing Scr in the setting of n/v; lack of appetite.   IVF ordered this morning. Check UA, urine sodium and urine creatinine. Check bladder scan. Strict I/Os. Avoid nephrotoxins/ NSAIDs/ RCA. Monitor BMP.  2. Hypokalemia- Resolved s/p repletion, keep K at 4.  Monitor lytes.   3. Afib with RVR- plan as per cardiology.   4. Essential HTN- BP improving. c/w current antihypertensive medications. Monitor BP.

## 2020-07-25 NOTE — PROGRESS NOTE ADULT - PROBLEM SELECTOR PLAN 1
P/w lightheadedness and nausea x 1 day.   - initally AFib with RVR then NSR with T wave unversions after Dig load.  - No ST elevations or depressions  - Trops- 2.8 > 16.9 > 18.001 > 13.4 downtrend on 7/22  - repeat EKG negative for ischemic findings  - Cardiology following Dr. Kuo  - full AC with Heparin drip / ASA/ Plavix > Heparin D/C'd on 7/22  - start Lovenox 7/22  / ASA/ Plavix   - refused Lovenox on 7/22 > started on PO Eliquis  - Echo sig for LV hypertrophy and stage I diastolic dysfunction (see above)  - Stress test negative for reversible ischemia  - HTN with repeat EKG no ST elev/dep in contiguous leads, repeat trop trended down to 4.870  - refusing any invasive testing including cardiac cath P/w lightheadedness and nausea x 1 day.   - initally AFib with RVR then NSR with T wave unversions after Dig load.  - No ST elevations or depressions  - Trops- 2.8 > 16.9 > 18.001 > 13.4 downtrend on 7/22  - repeat EKG negative for ischemic findings  - Cardiology following Dr. Kuo  - full AC with Heparin drip / ASA/ Plavix > Heparin D/C'd on 7/22  - start Lovenox 7/22  / ASA/ Plavix   - refused Lovenox on 7/22 > started on PO Eliquis  - Echo sig for LV hypertrophy and stage I diastolic dysfunction (see above)  - Stress test negative for reversible ischemia  - HTN with repeat EKG no ST elev/dep in contiguous leads, repeat trop trended down to 4.870  - refusing any invasive testing including cardiac cath  - outpatient f/u with cardiology

## 2020-07-25 NOTE — PROGRESS NOTE ADULT - PROBLEM SELECTOR PLAN 3
Pt had elevated cr levels on admission 1.64. Likely pre Renal from dehydration - patient not eating/drinking for 3-4 days.  - f/u urine lytes  - f/u BMP   - c/w mild hydration x 12 hours  - BUN/Creat monitoring  - given 40mEq PO K, keep K over 4

## 2020-07-25 NOTE — PROGRESS NOTE ADULT - PROBLEM SELECTOR PLAN 2
Pt admitted for dizziness/ligheadedness  S/P Dig 0.5 and diltiazem 5mg  Repeat EKG - Normal sinus rhythm; rate controlled  CHADSVASc score- 3 ( eligible for AC)  HAS-BLED score- 3   Cardio- Dr. Kuo  - c/w labetalol  - transition from heparin infusion > lovenox on 7/22 > refused 7/22  - started on Eliquis 7/23  - Trops- 2.8 > 16.9 > 18.001 > 13.4 > 4.870 on 7/24

## 2020-07-26 LAB
ANION GAP SERPL CALC-SCNC: 6 MMOL/L — SIGNIFICANT CHANGE UP (ref 5–17)
BUN SERPL-MCNC: 30 MG/DL — HIGH (ref 7–18)
CALCIUM SERPL-MCNC: 8.3 MG/DL — LOW (ref 8.4–10.5)
CHLORIDE SERPL-SCNC: 107 MMOL/L — SIGNIFICANT CHANGE UP (ref 96–108)
CO2 SERPL-SCNC: 25 MMOL/L — SIGNIFICANT CHANGE UP (ref 22–31)
CREAT SERPL-MCNC: 1.49 MG/DL — HIGH (ref 0.5–1.3)
GLUCOSE BLDC GLUCOMTR-MCNC: 129 MG/DL — HIGH (ref 70–99)
GLUCOSE BLDC GLUCOMTR-MCNC: 134 MG/DL — HIGH (ref 70–99)
GLUCOSE BLDC GLUCOMTR-MCNC: 172 MG/DL — HIGH (ref 70–99)
GLUCOSE BLDC GLUCOMTR-MCNC: 205 MG/DL — HIGH (ref 70–99)
GLUCOSE SERPL-MCNC: 111 MG/DL — HIGH (ref 70–99)
HCT VFR BLD CALC: 40.6 % — SIGNIFICANT CHANGE UP (ref 34.5–45)
HGB BLD-MCNC: 13.5 G/DL — SIGNIFICANT CHANGE UP (ref 11.5–15.5)
MCHC RBC-ENTMCNC: 30 PG — SIGNIFICANT CHANGE UP (ref 27–34)
MCHC RBC-ENTMCNC: 33.3 GM/DL — SIGNIFICANT CHANGE UP (ref 32–36)
MCV RBC AUTO: 90.2 FL — SIGNIFICANT CHANGE UP (ref 80–100)
NRBC # BLD: 0 /100 WBCS — SIGNIFICANT CHANGE UP (ref 0–0)
PLATELET # BLD AUTO: 214 K/UL — SIGNIFICANT CHANGE UP (ref 150–400)
POTASSIUM SERPL-MCNC: 4 MMOL/L — SIGNIFICANT CHANGE UP (ref 3.5–5.3)
POTASSIUM SERPL-SCNC: 4 MMOL/L — SIGNIFICANT CHANGE UP (ref 3.5–5.3)
RBC # BLD: 4.5 M/UL — SIGNIFICANT CHANGE UP (ref 3.8–5.2)
RBC # FLD: 14.2 % — SIGNIFICANT CHANGE UP (ref 10.3–14.5)
SODIUM SERPL-SCNC: 138 MMOL/L — SIGNIFICANT CHANGE UP (ref 135–145)
WBC # BLD: 7.54 K/UL — SIGNIFICANT CHANGE UP (ref 3.8–10.5)
WBC # FLD AUTO: 7.54 K/UL — SIGNIFICANT CHANGE UP (ref 3.8–10.5)

## 2020-07-26 RX ADMIN — Medication 0.2 MILLIGRAM(S): at 06:37

## 2020-07-26 RX ADMIN — Medication 400 MILLIGRAM(S): at 06:37

## 2020-07-26 RX ADMIN — BENZOCAINE AND MENTHOL 1 LOZENGE: 5; 1 LIQUID ORAL at 14:50

## 2020-07-26 RX ADMIN — Medication 25 MILLIGRAM(S): at 14:51

## 2020-07-26 RX ADMIN — Medication 0.2 MILLIGRAM(S): at 21:16

## 2020-07-26 RX ADMIN — Medication 1 DROP(S): at 06:38

## 2020-07-26 RX ADMIN — BENZOCAINE AND MENTHOL 1 LOZENGE: 5; 1 LIQUID ORAL at 06:37

## 2020-07-26 RX ADMIN — Medication 1 MILLIGRAM(S): at 06:43

## 2020-07-26 RX ADMIN — Medication 1 MILLIGRAM(S): at 14:50

## 2020-07-26 RX ADMIN — CLOPIDOGREL BISULFATE 75 MILLIGRAM(S): 75 TABLET, FILM COATED ORAL at 12:17

## 2020-07-26 RX ADMIN — APIXABAN 5 MILLIGRAM(S): 2.5 TABLET, FILM COATED ORAL at 06:39

## 2020-07-26 RX ADMIN — APIXABAN 5 MILLIGRAM(S): 2.5 TABLET, FILM COATED ORAL at 18:19

## 2020-07-26 RX ADMIN — PANTOPRAZOLE SODIUM 40 MILLIGRAM(S): 20 TABLET, DELAYED RELEASE ORAL at 06:38

## 2020-07-26 RX ADMIN — BENZOCAINE AND MENTHOL 1 LOZENGE: 5; 1 LIQUID ORAL at 21:25

## 2020-07-26 RX ADMIN — Medication 1 MILLIGRAM(S): at 12:17

## 2020-07-26 RX ADMIN — PREGABALIN 1000 MICROGRAM(S): 225 CAPSULE ORAL at 12:17

## 2020-07-26 RX ADMIN — Medication 1000 UNIT(S): at 12:17

## 2020-07-26 RX ADMIN — Medication 25 MILLIGRAM(S): at 21:16

## 2020-07-26 RX ADMIN — Medication 1 DROP(S): at 18:19

## 2020-07-26 RX ADMIN — Medication 0.2 MILLIGRAM(S): at 14:50

## 2020-07-26 RX ADMIN — GABAPENTIN 300 MILLIGRAM(S): 400 CAPSULE ORAL at 06:43

## 2020-07-26 RX ADMIN — GABAPENTIN 300 MILLIGRAM(S): 400 CAPSULE ORAL at 18:19

## 2020-07-26 RX ADMIN — Medication 75 MICROGRAM(S): at 06:39

## 2020-07-26 RX ADMIN — Medication 81 MILLIGRAM(S): at 12:17

## 2020-07-26 RX ADMIN — Medication 2: at 12:16

## 2020-07-26 RX ADMIN — Medication 25 MILLIGRAM(S): at 06:40

## 2020-07-26 RX ADMIN — Medication 400 MILLIGRAM(S): at 14:51

## 2020-07-26 RX ADMIN — Medication 400 MILLIGRAM(S): at 21:15

## 2020-07-26 NOTE — PROGRESS NOTE ADULT - PROBLEM SELECTOR PLAN 1
P/w lightheadedness and nausea x 1 day.   - initally AFib with RVR then NSR with T wave unversions after Dig load.  - No ST elevations or depressions  - Trops- 2.8 > 16.9 > 18.001 > 13.4 downtrend on 7/22  - repeat EKG negative for ischemic findings  - Cardiology following Dr. Kuo  - full AC with Heparin drip / ASA/ Plavix > Heparin D/C'd on 7/22  - start Lovenox 7/22  / ASA/ Plavix   - refused Lovenox on 7/22 > started on PO Eliquis  - Echo sig for LV hypertrophy and stage I diastolic dysfunction (see above)  - Stress test negative for reversible ischemia  - HTN with repeat EKG no ST elev/dep in contiguous leads, repeat trop trended down to 4.870  - refusing any invasive testing including cardiac cath  - outpatient f/u with cardiology

## 2020-07-26 NOTE — PROGRESS NOTE ADULT - SUBJECTIVE AND OBJECTIVE BOX
CHIEF COMPLAINT & BRIEF HOSPITAL COURSE:  71 y/o F with PMHx of obesity, HTN, Hypothyroidism and lymphedema presented to ED for  dizziness. Patient felt thirsty and became lightheaded when she stood up from bed for water. Describes it as lightheadedness and not room spinning. Also endorsed nausea and lack of appetite for last 3-4 days. In ED CT head was negative, found to have hypotension 88/52 and Afib with RVR, s/p Digoxin loading (5mg)-> /106 and NSR with T wave inversions. Initial Trop was 2.8, BNP was 4400 and D-dimer was 410. Patient was started on Heparin drip and admitted for NSTEMI. Doppler US of b/l lower extremities was negative for DVT, CXR negative for infiltrate. Cardiology following patient. Echo on 7/21 significant for LV hypertrophy and stage I diastolic dysfunction. Patient refused heparin drip, was started on lovenox which she also refused. Was eventually started on oral AC (Eliquis). Stress test was negative for any reversible ischemia. After stress test patient was nauseous and has hypertension. Managed with zofran and reglan. Due to nausea missed afternoon meds, received IV labetalol and hydralazine without improvement in BP. Critical care was consulted and patient was placed back on full home regimen of HTN medications. Critical care followed patient overnight with stabilization of BP. Some meds were held to avoid hypotension. BP was monitored and medications optimized. Given gentle short course NS IV fluid hydration for ANIKA. Plan for discharge home on PO AC (Eliquis).       INTERVAL HPI/OVERNIGHT EVENTS:   Patient was seen and examined at bedside. No complaints. NAD. VSS, BP stable. No complaints. Refused some medications overnight.      REVIEW OF SYSTEMS:  CONSTITUTIONAL: No fever, weight loss, or fatigue  RESPIRATORY: No cough, wheezing, chills or hemoptysis; No shortness of breath  CARDIOVASCULAR: No chest pain, palpitations, dizziness, or leg swelling  GASTROINTESTINAL: No abdominal pain. No nausea, vomiting, or hematemesis; No diarrhea or constipation. No melena or hematochezia.  GENITOURINARY: No dysuria or hematuria, urinary frequency  NEUROLOGICAL: No headaches, memory loss, loss of strength, numbness, or tremors  SKIN: No itching, burning, rashes, or lesions     MEDICATIONS  (STANDING):  ALPRAZolam 1 milliGRAM(s) Oral every 8 hours  apixaban 5 milliGRAM(s) Oral every 12 hours  aspirin enteric coated 81 milliGRAM(s) Oral daily  atorvastatin 80 milliGRAM(s) Oral at bedtime  benzocaine 15 mG/menthol 3.6 mG (Sugar-Free) Lozenge 1 Lozenge Oral three times a day  cholecalciferol 1000 Unit(s) Oral daily  cloNIDine 0.2 milliGRAM(s) Oral three times a day  clopidogrel Tablet 75 milliGRAM(s) Oral daily  cyanocobalamin 1000 MICROGram(s) Oral daily  folic acid 1 milliGRAM(s) Oral daily  gabapentin 300 milliGRAM(s) Oral two times a day  hydrALAZINE 25 milliGRAM(s) Oral every 8 hours  insulin lispro (HumaLOG) corrective regimen sliding scale   SubCutaneous three times a day before meals  labetalol 400 milliGRAM(s) Oral three times a day  levothyroxine 75 MICROGram(s) Oral daily  pantoprazole    Tablet 40 milliGRAM(s) Oral before breakfast  sodium chloride 0.9%. 1000 milliLiter(s) (85 mL/Hr) IV Continuous <Continuous>  tobramycin 0.3% Ophthalmic Solution 1 Drop(s) Both EYES two times a day    MEDICATIONS  (PRN):  acetaminophen   Tablet .. 650 milliGRAM(s) Oral every 6 hours PRN Moderate Pain (4 - 6)  hydrALAZINE Injectable 5 milliGRAM(s) IV Push every 6 hours PRN BP >200/100  labetalol Injectable 10 milliGRAM(s) IV Push every 4 hours PRN Systolic/Diastolic > 200/100          PHYSICAL EXAMINATION:  GENERAL: Obese female, in NAD  HEAD:  Atraumatic, Normocephalic  EYES:  conjunctiva and sclera clear  NECK: Supple, No JVD, Normal thyroid  CHEST/LUNG: Clear to auscultation. Clear to percussion bilaterally; No rales, rhonchi, wheezing, or rubs  HEART: Regular rate and rhythm; No murmurs, rubs, or gallops  ABDOMEN: Soft, Nontender, Nondistended; Bowel sounds present  NERVOUS SYSTEM:  Alert & Oriented X3,    EXTREMITIES:  2+ Peripheral Pulses, No clubbing, cyanosis. + Chronic lower extremity edema, non-pitting.   SKIN: warm dry, + resolving ecchymoses to b/l lower extremities stable.                                     13.5   7.54  )-----------( 214      ( 26 Jul 2020 07:16 )             40.6               138|107|30<111  4.0|25|1.49  8.3,--,--  07-26 @ 07:16      CAPILLARY BLOOD GLUCOSE      POCT Blood Glucose.: 172 mg/dL (26 Jul 2020 20:45)  POCT Blood Glucose.: 134 mg/dL (26 Jul 2020 17:07)  POCT Blood Glucose.: 205 mg/dL (26 Jul 2020 11:27)  POCT Blood Glucose.: 129 mg/dL (26 Jul 2020 07:48)    RADIOLOGY & ADDITIONAL TESTS:

## 2020-07-26 NOTE — PROGRESS NOTE ADULT - SUBJECTIVE AND OBJECTIVE BOX
[facility] NEPHROLOGY- PROGRESS NOTE    Patient is a 71yo Female with HTN, Hypothyroidism, lymphedema p/w dizziness a/w Afib with RVR, NSTEMI and ANIKA.  Nephrology consulted for Elevated serum creatinine.     Hospital Medications: Medications reviewed.      REVIEW OF SYSTEMS:  CONSTITUTIONAL: No fevers or chills  RESPIRATORY: No shortness of breath  CARDIOVASCULAR: No chest pain.  GASTROINTESTINAL: No diarrhea. N/V resolved; denies any episodes today. +lack of appetite  VASCULAR: No bilateral lower extremity edema.       VITALS:  T(F): 97.5 (20 @ 11:04), Max: 99 (20 @ 15:10)  HR: 70 (20 @ 11:04)  BP: 100/71 (20 @ 11:04)  RR: 18 (20 @ 11:04)  SpO2: 98% (20 @ 11:04)  Wt(kg): --     @ 07:01  -   @ 14:18  --------------------------------------------------------  IN: 200 mL / OUT: 0 mL / NET: 200 mL        General: Sleepy  Respiratory: CTA b/l   Cardiovascular: S1, S2, RRR  Gastrointestinal: BS+, diffuse tenderness  : No aguilar.  Extremities: No peripheral edema  hyperpigmentation of b/l LE      LABS:      138  |  107  |  30<H>  ----------------------------<  111<H>  4.0   |  25  |  1.49<H>    Ca    8.3<L>      2020 07:16      Creatinine Trend: 1.49 <--, 1.82 <--, 1.43 <--, 1.27 <--, 1.33 <--, 1.53 <--, 1.23 <--, 1.64 <--                        13.5   7.54  )-----------( 214      ( 2020 07:16 )             40.6     Urine Studies:  Urinalysis Basic - ( 2020 16:29 )    Color: Yellow / Appearance: Clear / S.020 / pH:   Gluc:  / Ketone: Trace  / Bili: Small / Urobili: 1   Blood:  / Protein: 30 mg/dL / Nitrite: Negative   Leuk Esterase: Moderate / RBC: 25-50 /HPF / WBC 11-25 /HPF   Sq Epi:  / Non Sq Epi: Few /HPF / Bacteria: Few /HPF      Sodium, Random Urine: 30 mmol/L ( @ 16:29)  Creatinine, Random Urine: 224 mg/dL ( @ 16:29)

## 2020-07-26 NOTE — PROGRESS NOTE ADULT - PROBLEM SELECTOR PLAN 4
Pt has PMH of HTN. Pt on admission was Hypotensive 88/52. s/p Dig loading BP of 188/108.  - patient on clonidine as outpatient (0.1 mg x 2 three times a day)  - c/w home Labetalol 400 mg TID (lowered from home dose of 600 TID 2/2 hypotension)   - IV 5 Hydral and IV 10 labetalol PRN for breakthrough HTN  - Critical care consulted on 7/24. Following  - refusing hydralazine on occasions 7/25

## 2020-07-26 NOTE — DIETITIAN INITIAL EVALUATION ADULT. - OTHER INFO
Pt from home, alert, oriented, well-communicated, seen by RD 7/21/2020 for food/nutrition related issues, visited today, able to communicate, but very weak today; appetite good PTA,, wts fluctuated, denied GI distress, chewing or swallowing problem at present, food choices obtained and forwarded to Dietary, decreased appetite with varied intake depending on food served in-house, 50 to 100% intake at times per flow sheet. Noted h/o DM, not on any DM medication, AwaO9I=6.7, finger stick ulwln=369 to 194, not on DM diet per MD Pt from home, alert, oriented, well-communicated, seen by RD 7/21/2020 for food/nutrition related issues; visited again, able to communicate, but very weak today; appetite good PTA, wts fluctuated, denied GI distress, chewing or swallowing problem at present, food choices updated and forwarded to Dietary, decreased appetite with varied intake depending on food served in-house, 50 to 100% intake at times per flow sheet. Noted h/o DM, not on any DM medication, NweM4F=5.7, finger stick wmlni=724 to 194, not on DM diet per MD

## 2020-07-26 NOTE — DIETITIAN INITIAL EVALUATION ADULT. - PERTINENT LABORATORY DATA
07-26 Na138 mmol/L Glu 111 mg/dL<H> K+ 4.0 mmol/L Cr  1.49 mg/dL<H> BUN 30 mg/dL<H>   07-21 Phos 3.1 mg/dL   07-20 Alb 3.3 g/dL<L>       07-21 Chol 233 mg/dL<H>  mg/dL HDL 27 mg/dL<L> Trig 243 mg/dL<H>  07-21-20 @ 10:18 HgbA1C 5.7 [4.0 - 5.6]

## 2020-07-26 NOTE — PROGRESS NOTE ADULT - ASSESSMENT
Patient is a 69yo Female with HTN, Hypothyroidism, lymphedema p/w dizziness a/w Afib with RVR, NSTEMI and ANIKA.  Nephrology consulted for Elevated serum creatinine.     1. ANIKA- likely hemodynamically mediated in the setting of decreased PO intake with hypotension/ Afib. Initially improved s/p IVF; now with increasing Scr in the setting of n/v; lack of appetite.   Scr improved with IVF ordered on 7/25 with hyaline casts on UA and low FeNa. Encourage PO intake. Check urine culture give pyuria and microscopic hematuria r/o UTI. Strict I/Os. Avoid nephrotoxins/ NSAIDs/ RCA. Monitor BMP.  2. Hypokalemia- Resolved s/p repletion, keep K at 4.  Monitor lytes.   3. Afib with RVR- plan as per cardiology.   4. Essential HTN- BP low this morning. Can stagger BP medications to avoid hypotension. Monitor BP.

## 2020-07-26 NOTE — DIETITIAN INITIAL EVALUATION ADULT. - PERTINENT MEDS FT
MEDICATIONS  (STANDING):  ALPRAZolam 1 milliGRAM(s) Oral every 8 hours  apixaban 5 milliGRAM(s) Oral every 12 hours  aspirin enteric coated 81 milliGRAM(s) Oral daily  atorvastatin 80 milliGRAM(s) Oral at bedtime  benzocaine 15 mG/menthol 3.6 mG (Sugar-Free) Lozenge 1 Lozenge Oral three times a day  cholecalciferol 1000 Unit(s) Oral daily  cloNIDine 0.2 milliGRAM(s) Oral three times a day  clopidogrel Tablet 75 milliGRAM(s) Oral daily  cyanocobalamin 1000 MICROGram(s) Oral daily  folic acid 1 milliGRAM(s) Oral daily  gabapentin 300 milliGRAM(s) Oral two times a day  hydrALAZINE 25 milliGRAM(s) Oral every 8 hours  insulin lispro (HumaLOG) corrective regimen sliding scale   SubCutaneous three times a day before meals  labetalol 400 milliGRAM(s) Oral three times a day  levothyroxine 75 MICROGram(s) Oral daily  pantoprazole    Tablet 40 milliGRAM(s) Oral before breakfast  sodium chloride 0.9%. 1000 milliLiter(s) (85 mL/Hr) IV Continuous <Continuous>  tobramycin 0.3% Ophthalmic Solution 1 Drop(s) Both EYES two times a day

## 2020-07-26 NOTE — DIETITIAN INITIAL EVALUATION ADULT. - NS FNS WEIGHT USED FOR CALC
ideal/Ht=5' 4"   WKL=516 lb+10%=132 lb    Admission ga=083 lb   BMI=34.2   Current gt=842 lb 7/22/2020; 203.4 lb 7/24/2020

## 2020-07-26 NOTE — PROGRESS NOTE ADULT - PROBLEM SELECTOR PLAN 3
Pt had elevated cr levels on admission 1.64. Likely pre Renal from dehydration - patient not eating/drinking for 3-4 days.  - f/u urine lytes  - s/p mild hydration x 12 hours  - BUN/Creat monitoring  - given 40mEq PO K, keep K over 4

## 2020-07-27 DIAGNOSIS — N39.0 URINARY TRACT INFECTION, SITE NOT SPECIFIED: ICD-10-CM

## 2020-07-27 LAB
ANION GAP SERPL CALC-SCNC: 7 MMOL/L — SIGNIFICANT CHANGE UP (ref 5–17)
BUN SERPL-MCNC: 33 MG/DL — HIGH (ref 7–18)
CALCIUM SERPL-MCNC: 8.4 MG/DL — SIGNIFICANT CHANGE UP (ref 8.4–10.5)
CHLORIDE SERPL-SCNC: 105 MMOL/L — SIGNIFICANT CHANGE UP (ref 96–108)
CO2 SERPL-SCNC: 26 MMOL/L — SIGNIFICANT CHANGE UP (ref 22–31)
CREAT SERPL-MCNC: 1.66 MG/DL — HIGH (ref 0.5–1.3)
GLUCOSE BLDC GLUCOMTR-MCNC: 148 MG/DL — HIGH (ref 70–99)
GLUCOSE BLDC GLUCOMTR-MCNC: 159 MG/DL — HIGH (ref 70–99)
GLUCOSE BLDC GLUCOMTR-MCNC: 168 MG/DL — HIGH (ref 70–99)
GLUCOSE SERPL-MCNC: 143 MG/DL — HIGH (ref 70–99)
HCT VFR BLD CALC: 40.3 % — SIGNIFICANT CHANGE UP (ref 34.5–45)
HGB BLD-MCNC: 13.4 G/DL — SIGNIFICANT CHANGE UP (ref 11.5–15.5)
MCHC RBC-ENTMCNC: 30.2 PG — SIGNIFICANT CHANGE UP (ref 27–34)
MCHC RBC-ENTMCNC: 33.3 GM/DL — SIGNIFICANT CHANGE UP (ref 32–36)
MCV RBC AUTO: 91 FL — SIGNIFICANT CHANGE UP (ref 80–100)
NRBC # BLD: 0 /100 WBCS — SIGNIFICANT CHANGE UP (ref 0–0)
PLATELET # BLD AUTO: 230 K/UL — SIGNIFICANT CHANGE UP (ref 150–400)
POTASSIUM SERPL-MCNC: 4.1 MMOL/L — SIGNIFICANT CHANGE UP (ref 3.5–5.3)
POTASSIUM SERPL-SCNC: 4.1 MMOL/L — SIGNIFICANT CHANGE UP (ref 3.5–5.3)
RBC # BLD: 4.43 M/UL — SIGNIFICANT CHANGE UP (ref 3.8–5.2)
RBC # FLD: 14.4 % — SIGNIFICANT CHANGE UP (ref 10.3–14.5)
SODIUM SERPL-SCNC: 138 MMOL/L — SIGNIFICANT CHANGE UP (ref 135–145)
WBC # BLD: 5.64 K/UL — SIGNIFICANT CHANGE UP (ref 3.8–10.5)
WBC # FLD AUTO: 5.64 K/UL — SIGNIFICANT CHANGE UP (ref 3.8–10.5)

## 2020-07-27 RX ORDER — CEFTRIAXONE 500 MG/1
INJECTION, POWDER, FOR SOLUTION INTRAMUSCULAR; INTRAVENOUS
Refills: 0 | Status: DISCONTINUED | OUTPATIENT
Start: 2020-07-27 | End: 2020-07-29

## 2020-07-27 RX ORDER — CEFTRIAXONE 500 MG/1
1000 INJECTION, POWDER, FOR SOLUTION INTRAMUSCULAR; INTRAVENOUS ONCE
Refills: 0 | Status: COMPLETED | OUTPATIENT
Start: 2020-07-27 | End: 2020-07-27

## 2020-07-27 RX ORDER — CEFTRIAXONE 500 MG/1
1000 INJECTION, POWDER, FOR SOLUTION INTRAMUSCULAR; INTRAVENOUS EVERY 24 HOURS
Refills: 0 | Status: DISCONTINUED | OUTPATIENT
Start: 2020-07-28 | End: 2020-07-29

## 2020-07-27 RX ADMIN — APIXABAN 5 MILLIGRAM(S): 2.5 TABLET, FILM COATED ORAL at 05:21

## 2020-07-27 RX ADMIN — Medication 1 MILLIGRAM(S): at 11:26

## 2020-07-27 RX ADMIN — Medication 25 MILLIGRAM(S): at 22:08

## 2020-07-27 RX ADMIN — PANTOPRAZOLE SODIUM 40 MILLIGRAM(S): 20 TABLET, DELAYED RELEASE ORAL at 08:34

## 2020-07-27 RX ADMIN — GABAPENTIN 300 MILLIGRAM(S): 400 CAPSULE ORAL at 05:22

## 2020-07-27 RX ADMIN — Medication 0.2 MILLIGRAM(S): at 22:08

## 2020-07-27 RX ADMIN — Medication 25 MILLIGRAM(S): at 14:47

## 2020-07-27 RX ADMIN — CEFTRIAXONE 100 MILLIGRAM(S): 500 INJECTION, POWDER, FOR SOLUTION INTRAMUSCULAR; INTRAVENOUS at 11:25

## 2020-07-27 RX ADMIN — Medication 1 DROP(S): at 05:22

## 2020-07-27 RX ADMIN — ATORVASTATIN CALCIUM 80 MILLIGRAM(S): 80 TABLET, FILM COATED ORAL at 22:08

## 2020-07-27 RX ADMIN — Medication 0.2 MILLIGRAM(S): at 14:47

## 2020-07-27 RX ADMIN — PREGABALIN 1000 MICROGRAM(S): 225 CAPSULE ORAL at 11:26

## 2020-07-27 RX ADMIN — Medication 400 MILLIGRAM(S): at 22:09

## 2020-07-27 RX ADMIN — Medication 1: at 08:01

## 2020-07-27 RX ADMIN — Medication 400 MILLIGRAM(S): at 05:22

## 2020-07-27 RX ADMIN — Medication 0.2 MILLIGRAM(S): at 05:21

## 2020-07-27 RX ADMIN — Medication 1000 UNIT(S): at 11:26

## 2020-07-27 RX ADMIN — Medication 1 DROP(S): at 18:08

## 2020-07-27 RX ADMIN — BENZOCAINE AND MENTHOL 1 LOZENGE: 5; 1 LIQUID ORAL at 22:11

## 2020-07-27 RX ADMIN — Medication 75 MICROGRAM(S): at 05:22

## 2020-07-27 RX ADMIN — Medication 81 MILLIGRAM(S): at 11:26

## 2020-07-27 RX ADMIN — CLOPIDOGREL BISULFATE 75 MILLIGRAM(S): 75 TABLET, FILM COATED ORAL at 11:25

## 2020-07-27 RX ADMIN — APIXABAN 5 MILLIGRAM(S): 2.5 TABLET, FILM COATED ORAL at 17:22

## 2020-07-27 RX ADMIN — Medication 1 MILLIGRAM(S): at 14:47

## 2020-07-27 RX ADMIN — BENZOCAINE AND MENTHOL 1 LOZENGE: 5; 1 LIQUID ORAL at 14:49

## 2020-07-27 RX ADMIN — Medication 1 MILLIGRAM(S): at 23:48

## 2020-07-27 RX ADMIN — Medication 400 MILLIGRAM(S): at 14:49

## 2020-07-27 RX ADMIN — Medication 25 MILLIGRAM(S): at 05:22

## 2020-07-27 RX ADMIN — BENZOCAINE AND MENTHOL 1 LOZENGE: 5; 1 LIQUID ORAL at 05:22

## 2020-07-27 RX ADMIN — Medication 1 MILLIGRAM(S): at 05:22

## 2020-07-27 NOTE — PROGRESS NOTE ADULT - PROBLEM SELECTOR PLAN 7
- c/w home medication Alprazolam 1mg PO q 8 Pt has PMh of B/l lymphedema, R> L lymphedema and c/o pain in LE on admission.  - no further complaints of pain  - venous doppler= NEGATIVE for DVT

## 2020-07-27 NOTE — PROGRESS NOTE ADULT - PROBLEM SELECTOR PLAN 5
PMhx hypothyroidism  - c/w home med levothyroxine 75 mcg QD Pt has PMH of HTN. Pt on admission was Hypotensive 88/52. s/p Dig loading BP of 188/108.  - patient on clonidine as outpatient (0.1 mg x 2 three times a day)  - c/w home Labetalol 400 mg TID (lowered from home dose of 600 TID 2/2 hypotension)   - IV 5 Hydral and IV 10 labetalol PRN for breakthrough HTN  - Critical care consulted on 7/24. Following  - refusing hydralazine on occasions 7/25

## 2020-07-27 NOTE — PROGRESS NOTE ADULT - ASSESSMENT
Patient is a 69yo Female with HTN, Hypothyroidism, lymphedema p/w dizziness a/w Afib with RVR, NSTEMI and ANIKA.  Nephrology consulted for Elevated serum creatinine.     1. ANIKA- likely hemodynamically mediated in the setting of decreased PO intake with hypotension/ Afib. Initially improved s/p IVF; now with increasing Scr in the setting of n/v; lack of appetite.   Scr improved with IVF ordered on 7/25 with hyaline casts on UA and low FeNa. Encourage PO intake; pt refusing further IVF. Urine culture sent; will f/u given pyuria and microscopic hematuria r/o UTI. Strict I/Os. Avoid nephrotoxins/ NSAIDs/ RCA. Monitor BMP.  2. Hypokalemia- Resolved, keep K at 4.  Monitor lytes.   3. Afib with RVR- plan as per cardiology.   4. Essential HTN- BP elevated this am. Repeat BP, if no improvement, consider increasing Hydralazine to 50mg PO q8hrs. Monitor BP.

## 2020-07-27 NOTE — PROGRESS NOTE ADULT - SUBJECTIVE AND OBJECTIVE BOX
[facility] NEPHROLOGY- PROGRESS NOTE    Patient is a 69yo Female with HTN, Hypothyroidism, lymphedema p/w dizziness a/w Afib with RVR, NSTEMI and ANIKA.  Nephrology consulted for Elevated serum creatinine.     Hospital Medications: Medications reviewed.      REVIEW OF SYSTEMS:  CONSTITUTIONAL: No fevers or chills  RESPIRATORY: No shortness of breath  CARDIOVASCULAR: No chest pain.  GASTROINTESTINAL: No diarrhea. N/V resolved; denies any episodes today. +lack of appetite  VASCULAR: No bilateral lower extremity edema.     VITALS:  T(F): 97.8 (20 @ 11:31), Max: 98.4 (20 @ 07:28)  HR: 70 (20 @ 11:31)  BP: 159/83 (20 @ 11:31)  RR: 18 (20 @ 11:31)  SpO2: 98% (20 @ 11:31)  Wt(kg): --     @ 07:  -   @ 07:00  --------------------------------------------------------  IN: 200 mL / OUT: 0 mL / NET: 200 mL     @ 07:01  -   @ 12:32  --------------------------------------------------------  IN: 430 mL / OUT: 0 mL / NET: 430 mL      General: Sleepy  Respiratory: CTA b/l   Cardiovascular: S1, S2, RRR  Gastrointestinal: BS+, dNT  : No aguilar.  Extremities: No peripheral edema  hyperpigmentation of b/l LE      LABS:      138  |  105  |  33<H>  ----------------------------<  143<H>  4.1   |  26  |  1.66<H>    Ca    8.4      2020 07:28      Creatinine Trend: 1.66 <--, 1.49 <--, 1.82 <--, 1.43 <--, 1.27 <--, 1.33 <--, 1.53 <--, 1.23 <--, 1.64 <--                        13.4   5.64  )-----------( 230      ( 2020 07:28 )             40.3     Urine Studies:  Urinalysis Basic - ( 2020 16:29 )    Color: Yellow / Appearance: Clear / S.020 / pH:   Gluc:  / Ketone: Trace  / Bili: Small / Urobili: 1   Blood:  / Protein: 30 mg/dL / Nitrite: Negative   Leuk Esterase: Moderate / RBC: 25-50 /HPF / WBC 11-25 /HPF   Sq Epi:  / Non Sq Epi: Few /HPF / Bacteria: Few /HPF      Sodium, Random Urine: 30 mmol/L ( @ 16:29)  Creatinine, Random Urine: 224 mg/dL ( @ 16:29)

## 2020-07-27 NOTE — PROGRESS NOTE ADULT - PROBLEM SELECTOR PLAN 8
- on full AC for NSTEMI and Afib    IMPROVE VTE Individual Risk Assessment    RISK                                                          Points  [] Previous VTE                                           3  [] Thrombophilia                                        2  [] Lower limb paralysis                              2   [] Current Cancer                                       2   [x] Immobilization > 24 hrs                        1  [] ICU/CCU stay > 24 hours                       1  [x] Age > 60                                                   1    IMPROVE VTE Score: 2 - c/w home medication Alprazolam 1mg PO q 8

## 2020-07-27 NOTE — PROGRESS NOTE ADULT - PROBLEM SELECTOR PLAN 4
Pt has PMH of HTN. Pt on admission was Hypotensive 88/52. s/p Dig loading BP of 188/108.  - patient on clonidine as outpatient (0.1 mg x 2 three times a day)  - c/w home Labetalol 400 mg TID (lowered from home dose of 600 TID 2/2 hypotension)   - IV 5 Hydral and IV 10 labetalol PRN for breakthrough HTN  - Critical care consulted on 7/24. Following  - refusing hydralazine on occasions 7/25 -UA showed signs of infection  -pending urine cultures.  -patient started on ROCEPHIN

## 2020-07-27 NOTE — PROGRESS NOTE ADULT - SUBJECTIVE AND OBJECTIVE BOX
PGY-1 Progress Note discussed with attending    PAGER #: [[phone]] TILL 5:00 PM  PLEASE CONTACT ON CALL TEAM:  - On Call Team (Please refer to Ashley) FROM 5:00 PM - 8:30PM  - Nightfloat Team FROM 8:30 -7:30 AM    CHIEF COMPLAINT & BRIEF HOSPITAL COURSE:    71 y/o F with a significant PMHx of, HTN, Hypothyroidism,  carpal tunnel syndrome, lymphedema, presents to the ED for  dizziness today. Pt in ED AAOX3 and able to give all pertinent medical information. As per the Patient, she was in her usual state of health until this morning when she got up from her bed to get water as her throat was very dry and itchy and she noted sudden onset of  dizziness.     INTERVAL HPI/OVERNIGHT EVENTS:   patient examined bed side. no acute events over night.      REVIEW OF SYSTEMS:  CONSTITUTIONAL: No fever, weight loss, or fatigue  RESPIRATORY: No cough, wheezing, chills or hemoptysis; No shortness of breath  CARDIOVASCULAR: No chest pain, palpitations, dizziness, or leg swelling  GASTROINTESTINAL: No abdominal pain. No nausea, vomiting, or hematemesis; No diarrhea or constipation. No melena or hematochezia.  GENITOURINARY: No dysuria or hematuria, urinary frequency  NEUROLOGICAL: No headaches, memory loss, loss of strength, numbness, or tremors  SKIN: No itching, burning, rashes, or lesions     MEDICATIONS  (STANDING):  ALPRAZolam 1 milliGRAM(s) Oral every 8 hours  apixaban 5 milliGRAM(s) Oral every 12 hours  aspirin enteric coated 81 milliGRAM(s) Oral daily  atorvastatin 80 milliGRAM(s) Oral at bedtime  benzocaine 15 mG/menthol 3.6 mG (Sugar-Free) Lozenge 1 Lozenge Oral three times a day  cholecalciferol 1000 Unit(s) Oral daily  cloNIDine 0.2 milliGRAM(s) Oral three times a day  clopidogrel Tablet 75 milliGRAM(s) Oral daily  cyanocobalamin 1000 MICROGram(s) Oral daily  folic acid 1 milliGRAM(s) Oral daily  gabapentin 300 milliGRAM(s) Oral two times a day  hydrALAZINE 25 milliGRAM(s) Oral every 8 hours  insulin lispro (HumaLOG) corrective regimen sliding scale   SubCutaneous three times a day before meals  labetalol 400 milliGRAM(s) Oral three times a day  levothyroxine 75 MICROGram(s) Oral daily  pantoprazole    Tablet 40 milliGRAM(s) Oral before breakfast  sodium chloride 0.9%. 1000 milliLiter(s) (85 mL/Hr) IV Continuous <Continuous>  tobramycin 0.3% Ophthalmic Solution 1 Drop(s) Both EYES two times a day    MEDICATIONS  (PRN):  acetaminophen   Tablet .. 650 milliGRAM(s) Oral every 6 hours PRN Moderate Pain (4 - 6)  hydrALAZINE Injectable 5 milliGRAM(s) IV Push every 6 hours PRN BP >200/100  labetalol Injectable 10 milliGRAM(s) IV Push every 4 hours PRN Systolic/Diastolic > 200/100      Vital Signs Last 24 Hrs  T(C): 36.9 (27 Jul 2020 07:28), Max: 36.9 (27 Jul 2020 07:28)  T(F): 98.4 (27 Jul 2020 07:28), Max: 98.4 (27 Jul 2020 07:28)  HR: 65 (27 Jul 2020 07:28) (65 - 75)  BP: 167/88 (27 Jul 2020 07:28) (100/71 - 180/66)  BP(mean): --  RR: 18 (27 Jul 2020 07:28) (18 - 18)  SpO2: 97% (27 Jul 2020 07:28) (96% - 100%)    PHYSICAL EXAMINATION:  GENERAL: NAD, obese  HEAD:  Atraumatic, Normocephalic  EYES:  conjunctiva and sclera clear  NECK: Supple, No JVD, Normal thyroid  CHEST/LUNG: Clear to auscultation. Clear to percussion bilaterally; No rales, rhonchi, wheezing, or rubs  HEART: Regular rate and rhythm; No murmurs, rubs, or gallops  ABDOMEN: Soft, Nontender, Nondistended; Bowel sounds present  NERVOUS SYSTEM:  Alert & Oriented X3,    EXTREMITIES:  2+ Peripheral Pulses, No clubbing, Chronic lower extremity edema, non-pitting  SKIN: warm dry                          13.4   5.64  )-----------( 230      ( 27 Jul 2020 07:28 )             40.3     07-27    138  |  105  |  33<H>  ----------------------------<  143<H>  4.1   |  26  |  1.66<H>    Ca    8.4      27 Jul 2020 07:28                    CAPILLARY BLOOD GLUCOSE  CAPILLARY BLOOD GLUCOSE      POCT Blood Glucose.: 159 mg/dL (27 Jul 2020 07:39)    CAPILLARY BLOOD GLUCOSE      POCT Blood Glucose.: 159 mg/dL (27 Jul 2020 07:39)  POCT Blood Glucose.: 172 mg/dL (26 Jul 2020 20:45)  POCT Blood Glucose.: 134 mg/dL (26 Jul 2020 17:07)  POCT Blood Glucose.: 205 mg/dL (26 Jul 2020 11:27)      RADIOLOGY & ADDITIONAL TESTS:    ct head:Age-indeterminate left corona radiata lacunar infarction. No acute intracranial bleeding. Consider MRI as clinically indicated.

## 2020-07-27 NOTE — PROGRESS NOTE ADULT - PROBLEM SELECTOR PLAN 6
Pt has PMh of B/l lymphedema, R> L lymphedema and c/o pain in LE on admission.  - no further complaints of pain  - venous doppler= NEGATIVE for DVT PMhx hypothyroidism  - c/w home med levothyroxine 75 mcg QD

## 2020-07-28 DIAGNOSIS — Z02.9 ENCOUNTER FOR ADMINISTRATIVE EXAMINATIONS, UNSPECIFIED: ICD-10-CM

## 2020-07-28 LAB
ALBUMIN SERPL ELPH-MCNC: 2.4 G/DL — LOW (ref 3.5–5)
ALP SERPL-CCNC: 73 U/L — SIGNIFICANT CHANGE UP (ref 40–120)
ALT FLD-CCNC: 18 U/L DA — SIGNIFICANT CHANGE UP (ref 10–60)
ANION GAP SERPL CALC-SCNC: 4 MMOL/L — LOW (ref 5–17)
AST SERPL-CCNC: 18 U/L — SIGNIFICANT CHANGE UP (ref 10–40)
BILIRUB SERPL-MCNC: 0.4 MG/DL — SIGNIFICANT CHANGE UP (ref 0.2–1.2)
BUN SERPL-MCNC: 31 MG/DL — HIGH (ref 7–18)
CALCIUM SERPL-MCNC: 8.5 MG/DL — SIGNIFICANT CHANGE UP (ref 8.4–10.5)
CHLORIDE SERPL-SCNC: 108 MMOL/L — SIGNIFICANT CHANGE UP (ref 96–108)
CO2 SERPL-SCNC: 29 MMOL/L — SIGNIFICANT CHANGE UP (ref 22–31)
CREAT ?TM UR-MCNC: 126 MG/DL — SIGNIFICANT CHANGE UP
CREAT SERPL-MCNC: 1.46 MG/DL — HIGH (ref 0.5–1.3)
GLUCOSE BLDC GLUCOMTR-MCNC: 122 MG/DL — HIGH (ref 70–99)
GLUCOSE BLDC GLUCOMTR-MCNC: 124 MG/DL — HIGH (ref 70–99)
GLUCOSE BLDC GLUCOMTR-MCNC: 130 MG/DL — HIGH (ref 70–99)
GLUCOSE BLDC GLUCOMTR-MCNC: 177 MG/DL — HIGH (ref 70–99)
GLUCOSE BLDC GLUCOMTR-MCNC: 202 MG/DL — HIGH (ref 70–99)
GLUCOSE SERPL-MCNC: 113 MG/DL — HIGH (ref 70–99)
HCT VFR BLD CALC: 39.5 % — SIGNIFICANT CHANGE UP (ref 34.5–45)
HGB BLD-MCNC: 12.9 G/DL — SIGNIFICANT CHANGE UP (ref 11.5–15.5)
MAGNESIUM SERPL-MCNC: 2.2 MG/DL — SIGNIFICANT CHANGE UP (ref 1.6–2.6)
MCHC RBC-ENTMCNC: 29.8 PG — SIGNIFICANT CHANGE UP (ref 27–34)
MCHC RBC-ENTMCNC: 32.7 GM/DL — SIGNIFICANT CHANGE UP (ref 32–36)
MCV RBC AUTO: 91.2 FL — SIGNIFICANT CHANGE UP (ref 80–100)
NRBC # BLD: 0 /100 WBCS — SIGNIFICANT CHANGE UP (ref 0–0)
OSMOLALITY UR: 532 MOS/KG — SIGNIFICANT CHANGE UP (ref 50–1200)
PHOSPHATE SERPL-MCNC: 3.8 MG/DL — SIGNIFICANT CHANGE UP (ref 2.5–4.5)
PLATELET # BLD AUTO: 232 K/UL — SIGNIFICANT CHANGE UP (ref 150–400)
POTASSIUM SERPL-MCNC: 4.1 MMOL/L — SIGNIFICANT CHANGE UP (ref 3.5–5.3)
POTASSIUM SERPL-SCNC: 4.1 MMOL/L — SIGNIFICANT CHANGE UP (ref 3.5–5.3)
PROT SERPL-MCNC: 6.1 G/DL — SIGNIFICANT CHANGE UP (ref 6–8.3)
RBC # BLD: 4.33 M/UL — SIGNIFICANT CHANGE UP (ref 3.8–5.2)
RBC # FLD: 14 % — SIGNIFICANT CHANGE UP (ref 10.3–14.5)
SODIUM SERPL-SCNC: 141 MMOL/L — SIGNIFICANT CHANGE UP (ref 135–145)
SODIUM UR-SCNC: 19 MMOL/L — SIGNIFICANT CHANGE UP
WBC # BLD: 5.55 K/UL — SIGNIFICANT CHANGE UP (ref 3.8–10.5)
WBC # FLD AUTO: 5.55 K/UL — SIGNIFICANT CHANGE UP (ref 3.8–10.5)

## 2020-07-28 RX ORDER — ALPRAZOLAM 0.25 MG
1 TABLET ORAL EVERY 8 HOURS
Refills: 0 | Status: DISCONTINUED | OUTPATIENT
Start: 2020-07-29 | End: 2020-08-03

## 2020-07-28 RX ADMIN — Medication 1 MILLIGRAM(S): at 21:57

## 2020-07-28 RX ADMIN — CEFTRIAXONE 100 MILLIGRAM(S): 500 INJECTION, POWDER, FOR SOLUTION INTRAMUSCULAR; INTRAVENOUS at 08:55

## 2020-07-28 RX ADMIN — APIXABAN 5 MILLIGRAM(S): 2.5 TABLET, FILM COATED ORAL at 18:34

## 2020-07-28 RX ADMIN — Medication 25 MILLIGRAM(S): at 05:54

## 2020-07-28 RX ADMIN — Medication 0.2 MILLIGRAM(S): at 05:54

## 2020-07-28 RX ADMIN — Medication 0.2 MILLIGRAM(S): at 21:55

## 2020-07-28 RX ADMIN — Medication 1 MILLIGRAM(S): at 11:16

## 2020-07-28 RX ADMIN — CLOPIDOGREL BISULFATE 75 MILLIGRAM(S): 75 TABLET, FILM COATED ORAL at 11:16

## 2020-07-28 RX ADMIN — Medication 25 MILLIGRAM(S): at 21:55

## 2020-07-28 RX ADMIN — Medication 400 MILLIGRAM(S): at 13:54

## 2020-07-28 RX ADMIN — BENZOCAINE AND MENTHOL 1 LOZENGE: 5; 1 LIQUID ORAL at 21:57

## 2020-07-28 RX ADMIN — Medication 400 MILLIGRAM(S): at 21:55

## 2020-07-28 RX ADMIN — Medication 2: at 11:28

## 2020-07-28 RX ADMIN — ATORVASTATIN CALCIUM 80 MILLIGRAM(S): 80 TABLET, FILM COATED ORAL at 21:55

## 2020-07-28 RX ADMIN — Medication 1 MILLIGRAM(S): at 13:56

## 2020-07-28 RX ADMIN — Medication 75 MICROGRAM(S): at 05:55

## 2020-07-28 RX ADMIN — Medication 5 MILLIGRAM(S): at 18:32

## 2020-07-28 RX ADMIN — Medication 1000 UNIT(S): at 11:17

## 2020-07-28 RX ADMIN — Medication 0.2 MILLIGRAM(S): at 13:54

## 2020-07-28 RX ADMIN — PANTOPRAZOLE SODIUM 40 MILLIGRAM(S): 20 TABLET, DELAYED RELEASE ORAL at 05:57

## 2020-07-28 RX ADMIN — Medication 400 MILLIGRAM(S): at 05:55

## 2020-07-28 RX ADMIN — APIXABAN 5 MILLIGRAM(S): 2.5 TABLET, FILM COATED ORAL at 05:55

## 2020-07-28 RX ADMIN — Medication 25 MILLIGRAM(S): at 13:54

## 2020-07-28 RX ADMIN — Medication 81 MILLIGRAM(S): at 11:16

## 2020-07-28 RX ADMIN — PREGABALIN 1000 MICROGRAM(S): 225 CAPSULE ORAL at 11:16

## 2020-07-28 RX ADMIN — Medication 1 MILLIGRAM(S): at 05:59

## 2020-07-28 NOTE — PROGRESS NOTE ADULT - PROBLEM SELECTOR PLAN 1
NSTEMI (non-ST elevated myocardial infarction).  Plan: P/w lightheadedness and nausea x 1 day.    initally AFib with RVR then NSR with T wave inversions after dig load.  No ST elevations or depressions  Repeat EKG negative for ischemic findings  Echo-see above  Stress test negative for reversible ischemia  Pt  is refusing any invasive testing including cardiac cath  Continue Eliquis, Plavix, high dose statin, and baby ASA  outpatient f/u with cardiology  Cardiology Dr. Kuo

## 2020-07-28 NOTE — PROGRESS NOTE ADULT - SUBJECTIVE AND OBJECTIVE BOX
69 y/o F with PMHx of obesity, HTN, Hypothyroidism and lymphedema presented to ED for dizziness.  In ED CT head was negative, found hypotensive and  Afib with RVR, s/p Digoxin loading (5mg)-> /106 and NSR with T wave inversions. Initial Trop was 2.8, BNP was 4400 and D-dimer was 410. Patient was started on Heparin drip and admitted for NSTEMI. Doppler US of b/l lower extremities was negative for DVT, CXR negative for infiltrate. Cardiology following. Echo on 7/21 significant for LV hypertrophy and stage I diastolic dysfunction. Patient refused heparin drip, was started on lovenox which she also refused. Was eventually started on oral AC (Eliquis). Stress test was negative for any reversible ischemia. After stress test patient was nauseous and has hypertension. Managed with Zofran and Reglan. Due to nausea missed afternoon meds, received IV labetalol and hydralazine without improvement in BP. Critical care was consulted and patient was placed back on full home regimen of HTN medications. Critical care followed patient overnight with stabilization of BP. Some meds were held to avoid hypotension. BP was monitored and medications optimized. Pt with ANIKA on admission, improving s/p IV fluids. UA (+), on Ceftriaxone. Urine C&S testing.      INTERVAL HPI/OVERNIGHT EVENTS: Seen at bedside, feels weak    MEDICATIONS  (STANDING):  ALPRAZolam 1 milliGRAM(s) Oral every 8 hours  apixaban 5 milliGRAM(s) Oral every 12 hours  aspirin enteric coated 81 milliGRAM(s) Oral daily  atorvastatin 80 milliGRAM(s) Oral at bedtime  benzocaine 15 mG/menthol 3.6 mG (Sugar-Free) Lozenge 1 Lozenge Oral three times a day  cefTRIAXone   IVPB      cefTRIAXone   IVPB 1000 milliGRAM(s) IV Intermittent every 24 hours  cholecalciferol 1000 Unit(s) Oral daily  cloNIDine 0.2 milliGRAM(s) Oral three times a day  clopidogrel Tablet 75 milliGRAM(s) Oral daily  cyanocobalamin 1000 MICROGram(s) Oral daily  folic acid 1 milliGRAM(s) Oral daily  gabapentin 300 milliGRAM(s) Oral two times a day  hydrALAZINE 25 milliGRAM(s) Oral every 8 hours  insulin lispro (HumaLOG) corrective regimen sliding scale   SubCutaneous three times a day before meals  labetalol 400 milliGRAM(s) Oral three times a day  levothyroxine 75 MICROGram(s) Oral daily  pantoprazole    Tablet 40 milliGRAM(s) Oral before breakfast  sodium chloride 0.9%. 1000 milliLiter(s) (85 mL/Hr) IV Continuous <Continuous>    MEDICATIONS  (PRN):  acetaminophen   Tablet .. 650 milliGRAM(s) Oral every 6 hours PRN Moderate Pain (4 - 6)  hydrALAZINE Injectable 5 milliGRAM(s) IV Push every 6 hours PRN BP >200/100  labetalol Injectable 10 milliGRAM(s) IV Push every 4 hours PRN Systolic/Diastolic > 200/100      __________________________________________________  REVIEW OF SYSTEMS:    CONSTITUTIONAL: No fever,   EYES: no acute visual disturbances  NECK: No pain or stiffness  RESPIRATORY: No cough; No shortness of breath  CARDIOVASCULAR: No chest pain, no palpitations  GASTROINTESTINAL: No pain. No nausea or vomiting; No diarrhea   NEUROLOGICAL: No headache or numbness, no tremors  MUSCULOSKELETAL: No joint pain, no muscle pain  GENITOURINARY: no dysuria, no frequency, no hesitancy  PSYCHIATRY: no depression , no anxiety  ALL OTHER  ROS negative        Vital Signs Last 24 Hrs  T(C): 36.3 (28 Jul 2020 13:23), Max: 36.9 (27 Jul 2020 20:45)  T(F): 97.4 (28 Jul 2020 13:23), Max: 98.5 (27 Jul 2020 20:45)  HR: 77 (28 Jul 2020 09:29) (77 - 77)  BP: 151/82 (28 Jul 2020 09:29) (151/82 - 151/82)  BP(mean): --  RR: 18 (28 Jul 2020 13:23) (18 - 18)  SpO2: 98% (28 Jul 2020 13:23) (97% - 99%)    ________________________________________________  PHYSICAL EXAM:  GENERAL: NAD  HEENT: Normocephalic;  conjunctivae and sclerae clear; moist mucous membranes;   NECK : supple  CHEST/LUNG: Clear to auscultation bilaterally with good air entry   HEART: S1 S2  regular; no murmurs, gallops or rubs  ABDOMEN: Soft, Nontender, Nondistended; Bowel sounds present  EXTREMITIES: no cyanosis; no edema; no calf tenderness  SKIN: warm and dry; no rash  NERVOUS SYSTEM:  Awake and alert; Oriented  to place, person and time ; no new deficits    _________________________________________________  LABS:                        12.9   5.55  )-----------( 232      ( 28 Jul 2020 07:05 )             39.5     07-28    141  |  108  |  31<H>  ----------------------------<  113<H>  4.1   |  29  |  1.46<H>    Ca    8.5      28 Jul 2020 07:05  Phos  3.8     07-28  Mg     2.2     07-28    TPro  6.1  /  Alb  2.4<L>  /  TBili  0.4  /  DBili  x   /  AST  18  /  ALT  18  /  AlkPhos  73  07-28        CAPILLARY BLOOD GLUCOSE      POCT Blood Glucose.: 130 mg/dL (28 Jul 2020 16:45)  POCT Blood Glucose.: 202 mg/dL (28 Jul 2020 11:10)  POCT Blood Glucose.: 124 mg/dL (28 Jul 2020 07:48)  POCT Blood Glucose.: 168 mg/dL (27 Jul 2020 20:37)        RADIOLOGY & ADDITIONAL TESTS:    Imaging  Reviewed:  YES    Consultant(s) Notes Reviewed:   YES      Plan of care was discussed with patient and /or primary care giver; all questions and concerns were addressed

## 2020-07-28 NOTE — PROGRESS NOTE ADULT - PROBLEM SELECTOR PLAN 7
B/l lymphedema, R> L lymphedema and c/o pain in LE on admission  No further complaints of pain  LE doppler-negative for DVT

## 2020-07-28 NOTE — PROGRESS NOTE ADULT - PROBLEM SELECTOR PLAN 4
Likely pre-renal secondary to dehydration  sCr-1.4  Pt refusing further IV fluids  Avoid nephrotoxins  F/u BMP in Am  Nephro Dr Zhang

## 2020-07-28 NOTE — PROGRESS NOTE ADULT - SUBJECTIVE AND OBJECTIVE BOX
[facility] NEPHROLOGY- PROGRESS NOTE    Patient is a 69yo Female with HTN, Hypothyroidism, lymphedema p/w dizziness a/w Afib with RVR, NSTEMI and ANIKA.  Nephrology consulted for Elevated serum creatinine.     Hospital Medications: Medications reviewed.      REVIEW OF SYSTEMS:  CONSTITUTIONAL: No fevers or chills  RESPIRATORY: No shortness of breath  CARDIOVASCULAR: No chest pain.  GASTROINTESTINAL: No diarrhea. No n/v/d. +lack of appetite  VASCULAR: No bilateral lower extremity edema.     VITALS:  T(F): 98 (20 @ 06:07), Max: 98.5 (20 @ 20:45)  HR: 77 (20 @ 09:29)  BP: 151/82 (20 @ 09:29)  RR: 18 (20 @ 06:07)  SpO2: 99% (20 @ 09:29)  Wt(kg): --     @ 07:01  -   @ 07:00  --------------------------------------------------------  IN: 430 mL / OUT: 0 mL / NET: 430 mL      Height (cm): 162.6 ( @ 20:45)    PHYSICAL EXAM:   General: NAD  Respiratory: CTA b/l   Cardiovascular: S1, S2, RRR  Gastrointestinal: BS+, dNT  : No aguilar.  Extremities: No peripheral edema  hyperpigmentation of b/l LE      LABS:      141  |  108  |  31<H>  ----------------------------<  113<H>  4.1   |  29  |  1.46<H>    Ca    8.5      2020 07:05  Phos  3.8       Mg     2.2         TPro  6.1  /  Alb  2.4<L>  /  TBili  0.4  /  DBili      /  AST  18  /  ALT  18  /  AlkPhos  73      Creatinine Trend: 1.46 <--, 1.66 <--, 1.49 <--, 1.82 <--, 1.43 <--, 1.27 <--, 1.33 <--, 1.53 <--                        12.9   5.55  )-----------( 232      ( 2020 07:05 )             39.5     Urine Studies:  Urinalysis Basic - ( 2020 16:29 )    Color: Yellow / Appearance: Clear / S.020 / pH:   Gluc:  / Ketone: Trace  / Bili: Small / Urobili: 1   Blood:  / Protein: 30 mg/dL / Nitrite: Negative   Leuk Esterase: Moderate / RBC: 25-50 /HPF / WBC 11-25 /HPF   Sq Epi:  / Non Sq Epi: Few /HPF / Bacteria: Few /HPF      Sodium, Random Urine: 30 mmol/L ( @ 16:29)  Creatinine, Random Urine: 224 mg/dL ( @ 16:29)

## 2020-07-28 NOTE — PROVIDER CONTACT NOTE (MEDICATION) - ASSESSMENT
BP elevated 199/87 on right arm, 187/106 on left arm HR 67.  pt diaphoretic but denies any discomfort, dizziness, headache.

## 2020-07-28 NOTE — PROGRESS NOTE ADULT - ASSESSMENT
Patient is a 71yo Female with HTN, Hypothyroidism, lymphedema p/w dizziness a/w Afib with RVR, NSTEMI and ANIKA.  Nephrology consulted for Elevated serum creatinine.     1. ANIKA- likely hemodynamically mediated in the setting of decreased PO intake with hypotension/ Afib. Initially improved s/p IVF; then with increasing Scr in the setting of n/v; lack of appetite.  Renal function improving; encourage PO intake.  Scr improved with IVF ordered on 7/25 with hyaline casts on UA and low FeNa. Encourage PO intake; pt refusing further IVF. Urine culture sent; will f/u given pyuria and microscopic hematuria r/o UTI. Strict I/Os. Avoid nephrotoxins/ NSAIDs/ RCA. Monitor BMP.  2. Hypokalemia- Resolved, keep K at 4.  Monitor lytes.   3. Afib with RVR- plan as per cardiology.   4. Essential HTN- BP elevated this am, consider increasing Hydralazine to 50mg PO q8hrs. Monitor BP.

## 2020-07-29 DIAGNOSIS — N39.0 URINARY TRACT INFECTION, SITE NOT SPECIFIED: ICD-10-CM

## 2020-07-29 LAB
ANION GAP SERPL CALC-SCNC: 6 MMOL/L — SIGNIFICANT CHANGE UP (ref 5–17)
BUN SERPL-MCNC: 28 MG/DL — HIGH (ref 7–18)
CALCIUM SERPL-MCNC: 8.4 MG/DL — SIGNIFICANT CHANGE UP (ref 8.4–10.5)
CHLORIDE SERPL-SCNC: 107 MMOL/L — SIGNIFICANT CHANGE UP (ref 96–108)
CO2 SERPL-SCNC: 29 MMOL/L — SIGNIFICANT CHANGE UP (ref 22–31)
CREAT SERPL-MCNC: 1.27 MG/DL — SIGNIFICANT CHANGE UP (ref 0.5–1.3)
GLUCOSE BLDC GLUCOMTR-MCNC: 126 MG/DL — HIGH (ref 70–99)
GLUCOSE BLDC GLUCOMTR-MCNC: 151 MG/DL — HIGH (ref 70–99)
GLUCOSE BLDC GLUCOMTR-MCNC: 169 MG/DL — HIGH (ref 70–99)
GLUCOSE BLDC GLUCOMTR-MCNC: 171 MG/DL — HIGH (ref 70–99)
GLUCOSE SERPL-MCNC: 144 MG/DL — HIGH (ref 70–99)
HCT VFR BLD CALC: 39.1 % — SIGNIFICANT CHANGE UP (ref 34.5–45)
HGB BLD-MCNC: 12.7 G/DL — SIGNIFICANT CHANGE UP (ref 11.5–15.5)
MCHC RBC-ENTMCNC: 29.9 PG — SIGNIFICANT CHANGE UP (ref 27–34)
MCHC RBC-ENTMCNC: 32.5 GM/DL — SIGNIFICANT CHANGE UP (ref 32–36)
MCV RBC AUTO: 92 FL — SIGNIFICANT CHANGE UP (ref 80–100)
NRBC # BLD: 0 /100 WBCS — SIGNIFICANT CHANGE UP (ref 0–0)
PLATELET # BLD AUTO: 264 K/UL — SIGNIFICANT CHANGE UP (ref 150–400)
POTASSIUM SERPL-MCNC: 3.9 MMOL/L — SIGNIFICANT CHANGE UP (ref 3.5–5.3)
POTASSIUM SERPL-SCNC: 3.9 MMOL/L — SIGNIFICANT CHANGE UP (ref 3.5–5.3)
RBC # BLD: 4.25 M/UL — SIGNIFICANT CHANGE UP (ref 3.8–5.2)
RBC # FLD: 14.4 % — SIGNIFICANT CHANGE UP (ref 10.3–14.5)
SARS-COV-2 RNA SPEC QL NAA+PROBE: SIGNIFICANT CHANGE UP
SODIUM SERPL-SCNC: 142 MMOL/L — SIGNIFICANT CHANGE UP (ref 135–145)
WBC # BLD: 5.65 K/UL — SIGNIFICANT CHANGE UP (ref 3.8–10.5)
WBC # FLD AUTO: 5.65 K/UL — SIGNIFICANT CHANGE UP (ref 3.8–10.5)

## 2020-07-29 RX ORDER — VANCOMYCIN HCL 1 G
1000 VIAL (EA) INTRAVENOUS EVERY 12 HOURS
Refills: 0 | Status: DISCONTINUED | OUTPATIENT
Start: 2020-07-29 | End: 2020-07-29

## 2020-07-29 RX ORDER — LINEZOLID 600 MG/300ML
600 INJECTION, SOLUTION INTRAVENOUS EVERY 12 HOURS
Refills: 0 | Status: COMPLETED | OUTPATIENT
Start: 2020-07-29 | End: 2020-08-01

## 2020-07-29 RX ADMIN — Medication 25 MILLIGRAM(S): at 15:28

## 2020-07-29 RX ADMIN — Medication 25 MILLIGRAM(S): at 21:18

## 2020-07-29 RX ADMIN — Medication 650 MILLIGRAM(S): at 21:17

## 2020-07-29 RX ADMIN — APIXABAN 5 MILLIGRAM(S): 2.5 TABLET, FILM COATED ORAL at 18:36

## 2020-07-29 RX ADMIN — Medication 1000 UNIT(S): at 15:29

## 2020-07-29 RX ADMIN — Medication 400 MILLIGRAM(S): at 15:27

## 2020-07-29 RX ADMIN — PREGABALIN 1000 MICROGRAM(S): 225 CAPSULE ORAL at 15:28

## 2020-07-29 RX ADMIN — Medication 81 MILLIGRAM(S): at 15:28

## 2020-07-29 RX ADMIN — Medication 0.2 MILLIGRAM(S): at 15:30

## 2020-07-29 RX ADMIN — Medication 0.2 MILLIGRAM(S): at 21:18

## 2020-07-29 RX ADMIN — Medication 75 MICROGRAM(S): at 05:34

## 2020-07-29 RX ADMIN — CEFTRIAXONE 100 MILLIGRAM(S): 500 INJECTION, POWDER, FOR SOLUTION INTRAMUSCULAR; INTRAVENOUS at 15:27

## 2020-07-29 RX ADMIN — Medication 650 MILLIGRAM(S): at 22:15

## 2020-07-29 RX ADMIN — BENZOCAINE AND MENTHOL 1 LOZENGE: 5; 1 LIQUID ORAL at 15:29

## 2020-07-29 RX ADMIN — BENZOCAINE AND MENTHOL 1 LOZENGE: 5; 1 LIQUID ORAL at 05:34

## 2020-07-29 RX ADMIN — Medication 1 MILLIGRAM(S): at 15:28

## 2020-07-29 RX ADMIN — Medication 0.2 MILLIGRAM(S): at 05:33

## 2020-07-29 RX ADMIN — Medication 25 MILLIGRAM(S): at 05:33

## 2020-07-29 RX ADMIN — APIXABAN 5 MILLIGRAM(S): 2.5 TABLET, FILM COATED ORAL at 05:34

## 2020-07-29 RX ADMIN — LINEZOLID 600 MILLIGRAM(S): 600 INJECTION, SOLUTION INTRAVENOUS at 20:17

## 2020-07-29 RX ADMIN — Medication 400 MILLIGRAM(S): at 05:33

## 2020-07-29 RX ADMIN — PANTOPRAZOLE SODIUM 40 MILLIGRAM(S): 20 TABLET, DELAYED RELEASE ORAL at 05:33

## 2020-07-29 RX ADMIN — Medication 400 MILLIGRAM(S): at 21:18

## 2020-07-29 RX ADMIN — BENZOCAINE AND MENTHOL 1 LOZENGE: 5; 1 LIQUID ORAL at 21:19

## 2020-07-29 RX ADMIN — CLOPIDOGREL BISULFATE 75 MILLIGRAM(S): 75 TABLET, FILM COATED ORAL at 15:28

## 2020-07-29 NOTE — PROGRESS NOTE ADULT - PROBLEM SELECTOR PLAN 2
Plan-see above  Continue BB NSTEMI (non-ST elevated myocardial infarction).  Plan: P/w lightheadedness and nausea x 1 day.    Initially AFib with RVR then NSR with T wave inversions after dig load.  No ST elevations or depressions  Repeat EKG negative for ischemic findings  Echo-see above  Stress test negative for reversible ischemia  Pt is refusing any invasive testing including cardiac cath  Continue Eliquis, Plavix, high dose statin, and baby ASA  outpatient f/u with cardiology  Cardiology Dr. Kuo

## 2020-07-29 NOTE — PROGRESS NOTE ADULT - SUBJECTIVE AND OBJECTIVE BOX
[facility] NEPHROLOGY- PROGRESS NOTE    Patient is a 71yo Female with HTN, Hypothyroidism, lymphedema p/w dizziness a/w Afib with RVR, NSTEMI and ANIKA.  Nephrology consulted for Elevated serum creatinine.     Hospital Medications: Medications reviewed.      REVIEW OF SYSTEMS:  CONSTITUTIONAL: No fevers or chills  RESPIRATORY: No shortness of breath  CARDIOVASCULAR: No chest pain.  GASTROINTESTINAL: No diarrhea. No n/v/d.   VASCULAR: No bilateral lower extremity edema.     VITALS:  T(F): 97.8 (20 @ 13:36), Max: 98.4 (20 @ 18:14)  HR: 72 (20 @ 13:36)  BP: 120/69 (20 @ 13:36)  RR: 18 (20 @ 13:36)  SpO2: 95% (20 @ 13:36)  Wt(kg): --      PHYSICAL EXAM:   General: NAD  Respiratory: CTA b/l   Cardiovascular: S1, S2, RRR  Gastrointestinal: BS+, dNT  : No aguilar.  Extremities: No peripheral edema  hyperpigmentation of b/l LE      LABS:      142  |  107  |  28<H>  ----------------------------<  144<H>  3.9   |  29  |  1.27    Ca    8.4      2020 10:34  Phos  3.8       Mg     2.2         TPro  6.1  /  Alb  2.4<L>  /  TBili  0.4  /  DBili      /  AST  18  /  ALT  18  /  AlkPhos  73      Creatinine Trend: 1.27 <--, 1.46 <--, 1.66 <--, 1.49 <--, 1.82 <--, 1.43 <--, 1.27 <--                        12.7   5.65  )-----------( 264      ( 2020 10:34 )             39.1     Urine Studies:  Urinalysis Basic - ( 2020 16:29 )    Color: Yellow / Appearance: Clear / S.020 / pH:   Gluc:  / Ketone: Trace  / Bili: Small / Urobili: 1   Blood:  / Protein: 30 mg/dL / Nitrite: Negative   Leuk Esterase: Moderate / RBC: 25-50 /HPF / WBC 11-25 /HPF   Sq Epi:  / Non Sq Epi: Few /HPF / Bacteria: Few /HPF      Sodium, Random Urine: 19 mmol/L ( @ 15:57)  Osmolality, Random Urine: 532 mos/kg ( @ 15:57)  Creatinine, Random Urine: 126 mg/dL ( @ 15:57)  Sodium, Random Urine: 30 mmol/L ( @ 16:29)  Creatinine, Random Urine: 224 mg/dL ( @ 16:29)

## 2020-07-29 NOTE — CONSULT NOTE ADULT - RS GEN PE MLT RESP DETAILS PC
clear to auscultation bilaterally/good air movement/no rales/no wheezes/no rhonchi/breath sounds equal

## 2020-07-29 NOTE — PROGRESS NOTE ADULT - PROBLEM SELECTOR PLAN 4
Likely pre-renal secondary to dehydration  pt refused AM labs, is now agreeable  Pt refusing further IV fluids  Avoid nephrotoxins  F/u BMP in Am  Nephro Dr Zhang

## 2020-07-29 NOTE — PROGRESS NOTE ADULT - ASSESSMENT
Patient is a 71yo Female with HTN, Hypothyroidism, lymphedema p/w dizziness a/w Afib with RVR, NSTEMI and ANIKA.  Nephrology consulted for Elevated serum creatinine.     1. ANIKA- likely hemodynamically mediated in the setting of decreased PO intake with hypotension/ Afib. Initially improved s/p IVF; then with increasing Scr in the setting of n/v; lack of appetite.  Renal function improving; encourage PO intake.  Scr improved with IVF ordered on 7/25 with hyaline casts on UA and low FeNa. Encourage PO intake; pt refusing further IVF. Urine culture Enterococcus f/u sensitivities; will f/u given pyuria and microscopic hematuria r/o UTI. Strict I/Os. Avoid nephrotoxins/ NSAIDs/ RCA. Monitor BMP.  2. Hypokalemia- Resolved, keep K at 4.  Monitor lytes.   3. Afib with RVR- plan as per cardiology.   4. Essential HTN- BP elevated overnight, consider increasing Hydralazine to 50mg PO q8hrs. Monitor BP.

## 2020-07-29 NOTE — PROGRESS NOTE ADULT - PROBLEM SELECTOR PLAN 1
NSTEMI (non-ST elevated myocardial infarction).  Plan: P/w lightheadedness and nausea x 1 day.    Initially AFib with RVR then NSR with T wave inversions after dig load.  No ST elevations or depressions  Repeat EKG negative for ischemic findings  Echo-see above  Stress test negative for reversible ischemia  Pt is refusing any invasive testing including cardiac cath  Continue Eliquis, Plavix, high dose statin, and baby ASA  outpatient f/u with cardiology  Cardiology Dr. Kuo UA (+)  Urine cx grew enterococcus   Ceftriaxone d/c'd  F/u sensitivity   ID Dr. Pettit consulted UA (+)  Urine cx grew enterococcus   Ceftriaxone changed to Vanco 1g q 12  F/u sensitivity   ID Dr. Pettit consulted

## 2020-07-29 NOTE — PROGRESS NOTE ADULT - SUBJECTIVE AND OBJECTIVE BOX
69 y/o F with PMHx of obesity, HTN, Hypothyroidism and lymphedema presented to ED for dizziness.  In ED CT head was negative, found hypotensive and  Afib with RVR, s/p Digoxin loading (5mg)-> /106 and NSR with T wave inversions. Initial Trop was 2.8, BNP was 4400 and D-dimer was 410. Patient was started on Heparin drip and admitted for NSTEMI. Doppler US of b/l lower extremities was negative for DVT, CXR negative for infiltrate. Cardiology following. Echo on 7/21 significant for LV hypertrophy and stage I diastolic dysfunction. Patient refused heparin drip, was started on lovenox which she also refused. Was eventually started on oral AC (Eliquis). Stress test was negative for any reversible ischemia. After stress test patient was nauseous and has hypertension. Managed with Zofran and Reglan. Due to nausea missed afternoon meds, received IV labetalol and hydralazine without improvement in BP. Critical care was consulted and patient was placed back on full home regimen of HTN medications. Critical care followed patient overnight with stabilization of BP. Some meds were held to avoid hypotension. BP was monitored and medications optimized. Pt with ANIKA on admission, improving s/p IV fluids. UA (+), on Ceftriaxone. Urine cx grew enterococcus, sensitivities testing.      INTERVAL HPI/OVERNIGHT EVENTS: Seen at bedside, wants to go home. Refused AM labs, now agreeable.     MEDICATIONS  (STANDING):  ALPRAZolam 1 milliGRAM(s) Oral every 8 hours  apixaban 5 milliGRAM(s) Oral every 12 hours  aspirin enteric coated 81 milliGRAM(s) Oral daily  atorvastatin 80 milliGRAM(s) Oral at bedtime  benzocaine 15 mG/menthol 3.6 mG (Sugar-Free) Lozenge 1 Lozenge Oral three times a day  cefTRIAXone   IVPB      cefTRIAXone   IVPB 1000 milliGRAM(s) IV Intermittent every 24 hours  cholecalciferol 1000 Unit(s) Oral daily  cloNIDine 0.2 milliGRAM(s) Oral three times a day  clopidogrel Tablet 75 milliGRAM(s) Oral daily  cyanocobalamin 1000 MICROGram(s) Oral daily  folic acid 1 milliGRAM(s) Oral daily  gabapentin 300 milliGRAM(s) Oral two times a day  hydrALAZINE 25 milliGRAM(s) Oral every 8 hours  insulin lispro (HumaLOG) corrective regimen sliding scale   SubCutaneous three times a day before meals  labetalol 400 milliGRAM(s) Oral three times a day  levothyroxine 75 MICROGram(s) Oral daily  pantoprazole    Tablet 40 milliGRAM(s) Oral before breakfast  sodium chloride 0.9%. 1000 milliLiter(s) (85 mL/Hr) IV Continuous <Continuous>    MEDICATIONS  (PRN):  acetaminophen   Tablet .. 650 milliGRAM(s) Oral every 6 hours PRN Moderate Pain (4 - 6)  hydrALAZINE Injectable 5 milliGRAM(s) IV Push every 6 hours PRN BP >200/100  labetalol Injectable 10 milliGRAM(s) IV Push every 4 hours PRN Systolic/Diastolic > 200/100      __________________________________________________  REVIEW OF SYSTEMS:    CONSTITUTIONAL: No fever,   EYES: no acute visual disturbances  NECK: No pain or stiffness  RESPIRATORY: No cough; No shortness of breath  CARDIOVASCULAR: No chest pain, no palpitations  GASTROINTESTINAL: No pain. No nausea or vomiting; No diarrhea   NEUROLOGICAL: No headache or numbness, no tremors  MUSCULOSKELETAL: No joint pain, no muscle pain  GENITOURINARY: no dysuria, no frequency, no hesitancy  PSYCHIATRY: no depression , no anxiety  ALL OTHER  ROS negative        Vital Signs Last 24 Hrs  T(C): 36.6 (29 Jul 2020 05:33), Max: 36.9 (28 Jul 2020 18:14)  T(F): 97.8 (29 Jul 2020 05:33), Max: 98.4 (28 Jul 2020 18:14)  HR: 71 (28 Jul 2020 18:51) (67 - 71)  BP: 153/85 (28 Jul 2020 18:51) (153/85 - 199/87)  BP(mean): --  RR: 16 (29 Jul 2020 05:33) (16 - 18)  SpO2: 95% (29 Jul 2020 05:33) (95% - 100%)    ________________________________________________  PHYSICAL EXAM:  GENERAL: NAD  HEENT: Normocephalic;  conjunctivae and sclerae clear; moist mucous membranes;   NECK : supple  CHEST/LUNG: Clear to auscultation bilaterally with good air entry   HEART: S1 S2  regular; no murmurs, gallops or rubs  ABDOMEN: Soft, Nontender, Nondistended; Bowel sounds present  EXTREMITIES: no cyanosis; no edema; no calf tenderness  SKIN: warm and dry; no rash  NERVOUS SYSTEM:  Awake and alert; Oriented  to place, person and time ; no new deficits    _________________________________________________  LABS:                        12.9   5.55  )-----------( 232      ( 28 Jul 2020 07:05 )             39.5     07-28    141  |  108  |  31<H>  ----------------------------<  113<H>  4.1   |  29  |  1.46<H>    Ca    8.5      28 Jul 2020 07:05  Phos  3.8     07-28  Mg     2.2     07-28    TPro  6.1  /  Alb  2.4<L>  /  TBili  0.4  /  DBili  x   /  AST  18  /  ALT  18  /  AlkPhos  73  07-28        CAPILLARY BLOOD GLUCOSE      POCT Blood Glucose.: 151 mg/dL (29 Jul 2020 07:51)  POCT Blood Glucose.: 177 mg/dL (28 Jul 2020 21:14)  POCT Blood Glucose.: 122 mg/dL (28 Jul 2020 18:32)  POCT Blood Glucose.: 130 mg/dL (28 Jul 2020 16:45)  POCT Blood Glucose.: 202 mg/dL (28 Jul 2020 11:10)        RADIOLOGY & ADDITIONAL TESTS:            Imaging  Reviewed:  YES    Consultant(s) Notes Reviewed:   YES      Plan of care was discussed with patient and /or primary care giver; all questions and concerns were addressed 69 y/o F with PMHx of obesity, HTN, Hypothyroidism and lymphedema presented to ED for dizziness.  In ED CT head was negative, found hypotensive and  Afib with RVR, s/p Digoxin loading (5mg)-> /106 and NSR with T wave inversions. Initial Trop was 2.8, BNP was 4400 and D-dimer was 410. Patient was started on Heparin drip and admitted for NSTEMI. Doppler US of b/l lower extremities was negative for DVT, CXR negative for infiltrate. Cardiology following. Echo on 7/21 significant for LV hypertrophy and stage I diastolic dysfunction. Patient refused heparin drip, was started on lovenox which she also refused. Was eventually started on oral AC (Eliquis). Stress test was negative for any reversible ischemia. After stress test patient was nauseous and has hypertension. Managed with Zofran and Reglan. Due to nausea missed afternoon meds, received IV labetalol and hydralazine without improvement in BP. Critical care was consulted and patient was placed back on full home regimen of HTN medications. Critical care followed patient overnight with stabilization of BP. Some meds were held to avoid hypotension. BP was monitored and medications optimized. Pt with ANIKA on admission, improving s/p IV fluids. UA (+), on Ceftriaxone. Urine cx grew enterococcus, sensitivities testing.      INTERVAL HPI/OVERNIGHT EVENTS: Seen at bedside, wants to go home. Refused AM labs, now agreeable.     MEDICATIONS  (STANDING):  ALPRAZolam 1 milliGRAM(s) Oral every 8 hours  apixaban 5 milliGRAM(s) Oral every 12 hours  aspirin enteric coated 81 milliGRAM(s) Oral daily  atorvastatin 80 milliGRAM(s) Oral at bedtime  benzocaine 15 mG/menthol 3.6 mG (Sugar-Free) Lozenge 1 Lozenge Oral three times a day  cefTRIAXone   IVPB      cefTRIAXone   IVPB 1000 milliGRAM(s) IV Intermittent every 24 hours  cholecalciferol 1000 Unit(s) Oral daily  cloNIDine 0.2 milliGRAM(s) Oral three times a day  clopidogrel Tablet 75 milliGRAM(s) Oral daily  cyanocobalamin 1000 MICROGram(s) Oral daily  folic acid 1 milliGRAM(s) Oral daily  gabapentin 300 milliGRAM(s) Oral two times a day  hydrALAZINE 25 milliGRAM(s) Oral every 8 hours  insulin lispro (HumaLOG) corrective regimen sliding scale   SubCutaneous three times a day before meals  labetalol 400 milliGRAM(s) Oral three times a day  levothyroxine 75 MICROGram(s) Oral daily  pantoprazole    Tablet 40 milliGRAM(s) Oral before breakfast  sodium chloride 0.9%. 1000 milliLiter(s) (85 mL/Hr) IV Continuous <Continuous>    MEDICATIONS  (PRN):  acetaminophen   Tablet .. 650 milliGRAM(s) Oral every 6 hours PRN Moderate Pain (4 - 6)  hydrALAZINE Injectable 5 milliGRAM(s) IV Push every 6 hours PRN BP >200/100  labetalol Injectable 10 milliGRAM(s) IV Push every 4 hours PRN Systolic/Diastolic > 200/100      __________________________________________________  REVIEW OF SYSTEMS:    CONSTITUTIONAL: No fever,   EYES: no acute visual disturbances  NECK: No pain or stiffness  RESPIRATORY: No cough; No shortness of breath  CARDIOVASCULAR: No chest pain, no palpitations  GASTROINTESTINAL: No pain. No nausea or vomiting; No diarrhea   NEUROLOGICAL: No headache or numbness, no tremors  MUSCULOSKELETAL: No joint pain, no muscle pain  GENITOURINARY: no dysuria, no frequency, no hesitancy  PSYCHIATRY: no depression , no anxiety  ALL OTHER  ROS negative        Vital Signs Last 24 Hrs  T(C): 36.6 (29 Jul 2020 05:33), Max: 36.9 (28 Jul 2020 18:14)  T(F): 97.8 (29 Jul 2020 05:33), Max: 98.4 (28 Jul 2020 18:14)  HR: 71 (28 Jul 2020 18:51) (67 - 71)  BP: 153/85 (28 Jul 2020 18:51) (153/85 - 199/87)  BP(mean): --  RR: 16 (29 Jul 2020 05:33) (16 - 18)  SpO2: 95% (29 Jul 2020 05:33) (95% - 100%)    ________________________________________________  PHYSICAL EXAM:  GENERAL: NAD  HEENT: Normocephalic;  conjunctivae and sclerae clear; moist mucous membranes;   NECK : supple  CHEST/LUNG: Clear to auscultation bilaterally with good air entry   HEART: S1 S2  regular; no murmurs, gallops or rubs  ABDOMEN: Soft, Nontender, Nondistended; Bowel sounds present  EXTREMITIES: no cyanosis; no edema; no calf tenderness  SKIN: warm and dry; no rash  NERVOUS SYSTEM:  Awake and alert; Oriented  to place, person and time ; no new deficits    _________________________________________________  LABS:                        12.9   5.55  )-----------( 232      ( 28 Jul 2020 07:05 )             39.5     07-28    141  |  108  |  31<H>  ----------------------------<  113<H>  4.1   |  29  |  1.46<H>    Ca    8.5      28 Jul 2020 07:05  Phos  3.8     07-28  Mg     2.2     07-28    TPro  6.1  /  Alb  2.4<L>  /  TBili  0.4  /  DBili  x   /  AST  18  /  ALT  18  /  AlkPhos  73  07-28        CAPILLARY BLOOD GLUCOSE      POCT Blood Glucose.: 151 mg/dL (29 Jul 2020 07:51)  POCT Blood Glucose.: 177 mg/dL (28 Jul 2020 21:14)  POCT Blood Glucose.: 122 mg/dL (28 Jul 2020 18:32)  POCT Blood Glucose.: 130 mg/dL (28 Jul 2020 16:45)  POCT Blood Glucose.: 202 mg/dL (28 Jul 2020 11:10)        RADIOLOGY & ADDITIONAL TESTS:    Culture - Urine (07.26.20 @ 22:21)    Specimen Source: .Urine Clean Catch (Midstream)    Culture Results:   >100,000 CFU/ml Enterococcus species      Urinalysis + Microscopic Examination (07.25.20 @ 16:29)    pH Urine: 5.0    Glucose Qualitative, Urine: 50 mg/dL    Blood, Urine: Large    Protein, Urine: 30 mg/dL    Specific Gravity: 1.020    Color: Yellow    Ketone - Urine: Trace    Bilirubin: Small    Leukocyte Esterase Concentration: Moderate    Nitrite: Negative    Urine Appearance: Clear    Urobilinogen: 1    Red Blood Cell - Urine: 25-50 /HPF    White Blood Cell - Urine: 11-25 /HPF    Epithelial Cells: Few /HPF    Hyaline Casts: 2-5 /LPF    Bacteria: Few /HPF        Imaging  Reviewed:  YES    Consultant(s) Notes Reviewed:   YES      Plan of care was discussed with patient and /or primary care giver; all questions and concerns were addressed 69 y/o F with PMHx of obesity, HTN, Hypothyroidism and lymphedema presented to ED for dizziness.  In ED CT head was negative, found hypotensive and  Afib with RVR, s/p Digoxin loading (5mg)-> /106 and NSR with T wave inversions. Initial Trop was 2.8, BNP was 4400 and D-dimer was 410. Patient was started on Heparin drip and admitted for NSTEMI. Doppler US of b/l lower extremities was negative for DVT, CXR negative for infiltrate. Cardiology following. Echo on 7/21 significant for LV hypertrophy and stage I diastolic dysfunction. Patient refused heparin drip, was started on lovenox which she also refused. Was eventually started on oral AC (Eliquis). Stress test was negative for any reversible ischemia. After stress test patient was nauseous and has hypertension. Managed with Zofran and Reglan. Due to nausea missed afternoon meds, received IV labetalol and hydralazine without improvement in BP. Critical care was consulted and patient was placed back on full home regimen of HTN medications. Critical care followed patient overnight with stabilization of BP. Some meds were held to avoid hypotension. BP was monitored and medications optimized. Pt with ANIKA on admission, improving s/p IV fluids. UA (+),Ceftriaxone was started. Urine cx grew enterococcus, sensitivities testing.      INTERVAL HPI/OVERNIGHT EVENTS: Seen at bedside, wants to go home. Refused AM labs, now agreeable.     MEDICATIONS  (STANDING):  ALPRAZolam 1 milliGRAM(s) Oral every 8 hours  apixaban 5 milliGRAM(s) Oral every 12 hours  aspirin enteric coated 81 milliGRAM(s) Oral daily  atorvastatin 80 milliGRAM(s) Oral at bedtime  benzocaine 15 mG/menthol 3.6 mG (Sugar-Free) Lozenge 1 Lozenge Oral three times a day  cefTRIAXone   IVPB      cefTRIAXone   IVPB 1000 milliGRAM(s) IV Intermittent every 24 hours  cholecalciferol 1000 Unit(s) Oral daily  cloNIDine 0.2 milliGRAM(s) Oral three times a day  clopidogrel Tablet 75 milliGRAM(s) Oral daily  cyanocobalamin 1000 MICROGram(s) Oral daily  folic acid 1 milliGRAM(s) Oral daily  gabapentin 300 milliGRAM(s) Oral two times a day  hydrALAZINE 25 milliGRAM(s) Oral every 8 hours  insulin lispro (HumaLOG) corrective regimen sliding scale   SubCutaneous three times a day before meals  labetalol 400 milliGRAM(s) Oral three times a day  levothyroxine 75 MICROGram(s) Oral daily  pantoprazole    Tablet 40 milliGRAM(s) Oral before breakfast  sodium chloride 0.9%. 1000 milliLiter(s) (85 mL/Hr) IV Continuous <Continuous>    MEDICATIONS  (PRN):  acetaminophen   Tablet .. 650 milliGRAM(s) Oral every 6 hours PRN Moderate Pain (4 - 6)  hydrALAZINE Injectable 5 milliGRAM(s) IV Push every 6 hours PRN BP >200/100  labetalol Injectable 10 milliGRAM(s) IV Push every 4 hours PRN Systolic/Diastolic > 200/100      __________________________________________________  REVIEW OF SYSTEMS:    CONSTITUTIONAL: No fever,   EYES: no acute visual disturbances  NECK: No pain or stiffness  RESPIRATORY: No cough; No shortness of breath  CARDIOVASCULAR: No chest pain, no palpitations  GASTROINTESTINAL: No pain. No nausea or vomiting; No diarrhea   NEUROLOGICAL: No headache or numbness, no tremors  MUSCULOSKELETAL: No joint pain, no muscle pain  GENITOURINARY: no dysuria, no frequency, no hesitancy  PSYCHIATRY: no depression , no anxiety  ALL OTHER  ROS negative        Vital Signs Last 24 Hrs  T(C): 36.6 (29 Jul 2020 05:33), Max: 36.9 (28 Jul 2020 18:14)  T(F): 97.8 (29 Jul 2020 05:33), Max: 98.4 (28 Jul 2020 18:14)  HR: 71 (28 Jul 2020 18:51) (67 - 71)  BP: 153/85 (28 Jul 2020 18:51) (153/85 - 199/87)  BP(mean): --  RR: 16 (29 Jul 2020 05:33) (16 - 18)  SpO2: 95% (29 Jul 2020 05:33) (95% - 100%)    ________________________________________________  PHYSICAL EXAM:  GENERAL: NAD  HEENT: Normocephalic;  conjunctivae and sclerae clear; moist mucous membranes;   NECK : supple  CHEST/LUNG: Clear to auscultation bilaterally with good air entry   HEART: S1 S2  regular; no murmurs, gallops or rubs  ABDOMEN: Soft, Nontender, Nondistended; Bowel sounds present  EXTREMITIES: no cyanosis; no edema; no calf tenderness  SKIN: warm and dry; no rash  NERVOUS SYSTEM:  Awake and alert; Oriented  to place, person and time ; no new deficits    _________________________________________________  LABS:                        12.9   5.55  )-----------( 232      ( 28 Jul 2020 07:05 )             39.5     07-28    141  |  108  |  31<H>  ----------------------------<  113<H>  4.1   |  29  |  1.46<H>    Ca    8.5      28 Jul 2020 07:05  Phos  3.8     07-28  Mg     2.2     07-28    TPro  6.1  /  Alb  2.4<L>  /  TBili  0.4  /  DBili  x   /  AST  18  /  ALT  18  /  AlkPhos  73  07-28        CAPILLARY BLOOD GLUCOSE      POCT Blood Glucose.: 151 mg/dL (29 Jul 2020 07:51)  POCT Blood Glucose.: 177 mg/dL (28 Jul 2020 21:14)  POCT Blood Glucose.: 122 mg/dL (28 Jul 2020 18:32)  POCT Blood Glucose.: 130 mg/dL (28 Jul 2020 16:45)  POCT Blood Glucose.: 202 mg/dL (28 Jul 2020 11:10)        RADIOLOGY & ADDITIONAL TESTS:    Culture - Urine (07.26.20 @ 22:21)    Specimen Source: .Urine Clean Catch (Midstream)    Culture Results:   >100,000 CFU/ml Enterococcus species      Urinalysis + Microscopic Examination (07.25.20 @ 16:29)    pH Urine: 5.0    Glucose Qualitative, Urine: 50 mg/dL    Blood, Urine: Large    Protein, Urine: 30 mg/dL    Specific Gravity: 1.020    Color: Yellow    Ketone - Urine: Trace    Bilirubin: Small    Leukocyte Esterase Concentration: Moderate    Nitrite: Negative    Urine Appearance: Clear    Urobilinogen: 1    Red Blood Cell - Urine: 25-50 /HPF    White Blood Cell - Urine: 11-25 /HPF    Epithelial Cells: Few /HPF    Hyaline Casts: 2-5 /LPF    Bacteria: Few /HPF        Imaging  Reviewed:  YES    Consultant(s) Notes Reviewed:   YES      Plan of care was discussed with patient and /or primary care giver; all questions and concerns were addressed 69 y/o F with PMHx of obesity, HTN, Hypothyroidism and lymphedema presented to ED for dizziness.  In ED CT head was negative, found hypotensive and  Afib with RVR, s/p Digoxin loading (5mg)-> /106 and NSR with T wave inversions. Initial Trop was 2.8, BNP was 4400 and D-dimer was 410. Patient was started on Heparin drip and admitted for NSTEMI. Doppler US of b/l lower extremities was negative for DVT, CXR negative for infiltrate. Cardiology following. Echo on 7/21 significant for LV hypertrophy and stage I diastolic dysfunction. Patient refused heparin drip, was started on lovenox which she also refused. Was eventually started on oral AC (Eliquis). Stress test was negative for any reversible ischemia. After stress test patient was nauseous and has hypertension. Managed with Zofran and Reglan. Due to nausea missed afternoon meds, received IV labetalol and hydralazine without improvement in BP. Critical care was consulted and patient was placed back on full home regimen of HTN medications. Critical care followed patient overnight with stabilization of BP. Some meds were held to avoid hypotension. BP was monitored and medications optimized. Pt with ANIKA on admission, improving s/p IV fluids. UA (+), was on Ceftriaxone. Urine cx grew enterococcus, sensitivities testing. ID was consulted. Ceftriaxone was discontinued and Vancomycin 1G started.     INTERVAL HPI/OVERNIGHT EVENTS: Seen at bedside, wants to go home. Refused AM labs, now agreeable.     MEDICATIONS  (STANDING):  ALPRAZolam 1 milliGRAM(s) Oral every 8 hours  apixaban 5 milliGRAM(s) Oral every 12 hours  aspirin enteric coated 81 milliGRAM(s) Oral daily  atorvastatin 80 milliGRAM(s) Oral at bedtime  benzocaine 15 mG/menthol 3.6 mG (Sugar-Free) Lozenge 1 Lozenge Oral three times a day  cefTRIAXone   IVPB      cefTRIAXone   IVPB 1000 milliGRAM(s) IV Intermittent every 24 hours  cholecalciferol 1000 Unit(s) Oral daily  cloNIDine 0.2 milliGRAM(s) Oral three times a day  clopidogrel Tablet 75 milliGRAM(s) Oral daily  cyanocobalamin 1000 MICROGram(s) Oral daily  folic acid 1 milliGRAM(s) Oral daily  gabapentin 300 milliGRAM(s) Oral two times a day  hydrALAZINE 25 milliGRAM(s) Oral every 8 hours  insulin lispro (HumaLOG) corrective regimen sliding scale   SubCutaneous three times a day before meals  labetalol 400 milliGRAM(s) Oral three times a day  levothyroxine 75 MICROGram(s) Oral daily  pantoprazole    Tablet 40 milliGRAM(s) Oral before breakfast  sodium chloride 0.9%. 1000 milliLiter(s) (85 mL/Hr) IV Continuous <Continuous>    MEDICATIONS  (PRN):  acetaminophen   Tablet .. 650 milliGRAM(s) Oral every 6 hours PRN Moderate Pain (4 - 6)  hydrALAZINE Injectable 5 milliGRAM(s) IV Push every 6 hours PRN BP >200/100  labetalol Injectable 10 milliGRAM(s) IV Push every 4 hours PRN Systolic/Diastolic > 200/100      __________________________________________________  REVIEW OF SYSTEMS:    CONSTITUTIONAL: No fever,   EYES: no acute visual disturbances  NECK: No pain or stiffness  RESPIRATORY: No cough; No shortness of breath  CARDIOVASCULAR: No chest pain, no palpitations  GASTROINTESTINAL: No pain. No nausea or vomiting; No diarrhea   NEUROLOGICAL: No headache or numbness, no tremors  MUSCULOSKELETAL: No joint pain, no muscle pain  GENITOURINARY: no dysuria, no frequency, no hesitancy  PSYCHIATRY: no depression , no anxiety  ALL OTHER  ROS negative        Vital Signs Last 24 Hrs  T(C): 36.6 (29 Jul 2020 05:33), Max: 36.9 (28 Jul 2020 18:14)  T(F): 97.8 (29 Jul 2020 05:33), Max: 98.4 (28 Jul 2020 18:14)  HR: 71 (28 Jul 2020 18:51) (67 - 71)  BP: 153/85 (28 Jul 2020 18:51) (153/85 - 199/87)  BP(mean): --  RR: 16 (29 Jul 2020 05:33) (16 - 18)  SpO2: 95% (29 Jul 2020 05:33) (95% - 100%)    ________________________________________________  PHYSICAL EXAM:  GENERAL: NAD  HEENT: Normocephalic;  conjunctivae and sclerae clear; moist mucous membranes;   NECK : supple  CHEST/LUNG: Clear to auscultation bilaterally with good air entry   HEART: S1 S2  regular; no murmurs, gallops or rubs  ABDOMEN: Soft, Nontender, Nondistended; Bowel sounds present  EXTREMITIES: no cyanosis; no edema; no calf tenderness  SKIN: warm and dry; no rash  NERVOUS SYSTEM:  Awake and alert; Oriented  to place, person and time ; no new deficits    _________________________________________________  LABS:                        12.9   5.55  )-----------( 232      ( 28 Jul 2020 07:05 )             39.5     07-28    141  |  108  |  31<H>  ----------------------------<  113<H>  4.1   |  29  |  1.46<H>    Ca    8.5      28 Jul 2020 07:05  Phos  3.8     07-28  Mg     2.2     07-28    TPro  6.1  /  Alb  2.4<L>  /  TBili  0.4  /  DBili  x   /  AST  18  /  ALT  18  /  AlkPhos  73  07-28        CAPILLARY BLOOD GLUCOSE      POCT Blood Glucose.: 151 mg/dL (29 Jul 2020 07:51)  POCT Blood Glucose.: 177 mg/dL (28 Jul 2020 21:14)  POCT Blood Glucose.: 122 mg/dL (28 Jul 2020 18:32)  POCT Blood Glucose.: 130 mg/dL (28 Jul 2020 16:45)  POCT Blood Glucose.: 202 mg/dL (28 Jul 2020 11:10)        RADIOLOGY & ADDITIONAL TESTS:    Culture - Urine (07.26.20 @ 22:21)    Specimen Source: .Urine Clean Catch (Midstream)    Culture Results:   >100,000 CFU/ml Enterococcus species      Urinalysis + Microscopic Examination (07.25.20 @ 16:29)    pH Urine: 5.0    Glucose Qualitative, Urine: 50 mg/dL    Blood, Urine: Large    Protein, Urine: 30 mg/dL    Specific Gravity: 1.020    Color: Yellow    Ketone - Urine: Trace    Bilirubin: Small    Leukocyte Esterase Concentration: Moderate    Nitrite: Negative    Urine Appearance: Clear    Urobilinogen: 1    Red Blood Cell - Urine: 25-50 /HPF    White Blood Cell - Urine: 11-25 /HPF    Epithelial Cells: Few /HPF    Hyaline Casts: 2-5 /LPF    Bacteria: Few /HPF        Imaging  Reviewed:  YES    Consultant(s) Notes Reviewed:   YES      Plan of care was discussed with patient and /or primary care giver; all questions and concerns were addressed

## 2020-07-29 NOTE — CONSULT NOTE ADULT - ASSESSMENT
UTI - with Enterococcus    Plan - DC Vancomycin( no IV access)  Start Zyvox 600mgs po q12hrs will likely only treat for 3-4 days as clinically she does not appear to have a UTI.  if going to rehab she can complete her abx course there if [facility] willing to pay for medication.

## 2020-07-29 NOTE — PROGRESS NOTE ADULT - PROBLEM SELECTOR PLAN 9
PT recommended d/c plan to [facility]-pt would like to go to [facility] swab negative 7/28   CM following PT recommended d/c plan to [facility]-choices were given  Covid swab negative 7/28   CM following

## 2020-07-29 NOTE — PROGRESS NOTE ADULT - PROBLEM SELECTOR PLAN 3
UA (+)  Urine cx grew enterococcus   On ceftriaxone day 3  F/u sensitivity   If sensitive, will start Amoxicillin 500 mg PO TID  ID Dr. Pettit consulted UA (+)  Urine cx grew enterococcus   Ceftriaxone d/c  F/u sensitivity   ID Dr. Pettit consulted Plan-see above  Continue BB

## 2020-07-29 NOTE — CONSULT NOTE ADULT - GASTROINTESTINAL DETAILS
no masses palpable/no guarding/soft/bowel sounds normal/nontender/no distention/no organomegaly/no rigidity

## 2020-07-29 NOTE — CONSULT NOTE ADULT - SUBJECTIVE AND OBJECTIVE BOX
HPI:  69 y/o F with a significant PMHx of, HTN, Hypothyroidism,  carpal tunnel syndrome, lymphedema, presents to the ED for  dizziness today. Pt in ED AAOX3 and able to give all pertinent medical information. As per the Patient, she was in her usual state of health until this morning when she got up from her bed to get water as her throat was very dry and itchy and she noted sudden onset of  dizziness. She was unable and laid down tbut the dizziness did not resolved and her room mate then called the 911. As per the patient, she had not been eating/drinking well since 2 days due to nausea which is resolved now. Pt denies any chest pain, vomiting, room spinning, headache, shortness of breath, fall , any recent head trauma.  Pt was recently admitted in [facility] for a syncopal episode from gabapentin overdosing,     In the ED,  Pt is noted to be AAOX3, No visible distress. Pt very upset about being hospitalized  Vitals- Hypotensive 88/52 On admission, s/p Dig  Repeat 181/106  CT Head- no acute changes  EKG- new onset AFib with RVR, S/p Dig loading 0.5mg and Diltiazem 5mg  Repeat EKG- NSR with T wave inversions  Trops- 2.8, BNP- 4400 (20 Jul 2020 14:53)      PAST MEDICAL & SURGICAL HISTORY:  Lymphedema  Essential hypertension  Carpal tunnel syndrome of right wrist  Obesity (BMI 30-39.9)  Type 2 diabetes mellitus without complication  Hypothyroid  Essential hypertension  Obesity  HTN (hypertension)  DM (diabetes mellitus)  No significant past surgical history  S/P carpal tunnel release      penicillin (Hives)  penicillin (Rash)  sulfa drugs (Unknown)  Tetracycline Hydrochloride (Unknown)      Meds:  acetaminophen   Tablet .. 650 milliGRAM(s) Oral every 6 hours PRN  ALPRAZolam 1 milliGRAM(s) Oral every 8 hours  apixaban 5 milliGRAM(s) Oral every 12 hours  aspirin enteric coated 81 milliGRAM(s) Oral daily  atorvastatin 80 milliGRAM(s) Oral at bedtime  benzocaine 15 mG/menthol 3.6 mG (Sugar-Free) Lozenge 1 Lozenge Oral three times a day  cholecalciferol 1000 Unit(s) Oral daily  cloNIDine 0.2 milliGRAM(s) Oral three times a day  clopidogrel Tablet 75 milliGRAM(s) Oral daily  cyanocobalamin 1000 MICROGram(s) Oral daily  folic acid 1 milliGRAM(s) Oral daily  gabapentin 300 milliGRAM(s) Oral two times a day  hydrALAZINE 25 milliGRAM(s) Oral every 8 hours  hydrALAZINE Injectable 5 milliGRAM(s) IV Push every 6 hours PRN  insulin lispro (HumaLOG) corrective regimen sliding scale   SubCutaneous three times a day before meals  labetalol 400 milliGRAM(s) Oral three times a day  labetalol Injectable 10 milliGRAM(s) IV Push every 4 hours PRN  levothyroxine 75 MICROGram(s) Oral daily  pantoprazole    Tablet 40 milliGRAM(s) Oral before breakfast  sodium chloride 0.9%. 1000 milliLiter(s) IV Continuous <Continuous>  vancomycin  IVPB 1000 milliGRAM(s) IV Intermittent every 12 hours      SOCIAL HISTORY:  Smoker:  YES / NO        PACK YEARS:                         WHEN QUIT?  ETOH use:  YES / NO               FREQUENCY / QUANTITY:  Ilicit Drug use:  YES / NO  Occupation:  Assisted device use (Cane / Walker):  Live with:    FAMILY HISTORY:  Family history of myocardial infarction (Sibling)  Family history of lung cancer      VITALS:  Vital Signs Last 24 Hrs  T(C): 36.6 (29 Jul 2020 13:36), Max: 36.7 (28 Jul 2020 20:50)  T(F): 97.8 (29 Jul 2020 13:36), Max: 98 (28 Jul 2020 20:50)  HR: 72 (29 Jul 2020 13:36) (71 - 72)  BP: 120/69 (29 Jul 2020 13:36) (120/69 - 153/85)  BP(mean): --  RR: 18 (29 Jul 2020 13:36) (16 - 18)  SpO2: 95% (29 Jul 2020 13:36) (95% - 97%)    LABS/DIAGNOSTIC TESTS:                          12.7   5.65  )-----------( 264      ( 29 Jul 2020 10:34 )             39.1     WBC Count: 5.65 K/uL (07-29 @ 10:34)  WBC Count: 5.55 K/uL (07-28 @ 07:05)  WBC Count: 5.64 K/uL (07-27 @ 07:28)      07-29    142  |  107  |  28<H>  ----------------------------<  144<H>  3.9   |  29  |  1.27    Ca    8.4      29 Jul 2020 10:34  Phos  3.8     07-28  Mg     2.2     07-28    TPro  6.1  /  Alb  2.4<L>  /  TBili  0.4  /  DBili  x   /  AST  18  /  ALT  18  /  AlkPhos  73  07-28          LIVER FUNCTIONS - ( 28 Jul 2020 07:05 )  Alb: 2.4 g/dL / Pro: 6.1 g/dL / ALK PHOS: 73 U/L / ALT: 18 U/L DA / AST: 18 U/L / GGT: x                 LACTATE:    ABG -     CULTURES:   .Urine Clean Catch (Midstream)  07-26 @ 22:21   >100,000 CFU/ml Enterococcus species  --  --          RADIOLOGY:< from: US Duplex Venous Lower Ext Complete, Bilateral (07.20.20 @ 20:12) >  EXAM:  US DPLX LWR EXT VEINS COMPL BI                            PROCEDURE DATE:  07/20/2020          INTERPRETATION:  CLINICAL INFORMATION: Weakness and lymphedema    COMPARISON: 11/16/2019.    TECHNIQUE: Duplex sonography of the BILATERAL LOWER extremity veins with color and spectral Doppler, with and without compression. Study was done with limited compression due to patient's sensitivity    FINDINGS:    There is normal compressibility of the bilateral common femoral, femoral and popliteal veins.   Doppler examination shows normal spontaneous and phasic flow.    No calf vein thrombosis is detected.    IMPRESSION:   No evidence of deep venous thrombosis in either lower extremity.                SARAHY SAUNDERS M.D.,ATTENDING RADIOLOGIST  This document has been electronically signed. Jul 20 2020  8:25PM        < end of copied text >        ROS  [  ] UNABLE TO ELICIT HPI:  71 y/o F with a significant PMHx of, HTN, Hypothyroidism,  carpal tunnel syndrome, lymphedema, presents to the ED for  dizziness today. Pt in ED AAOX3 and able to give all pertinent medical information. As per the Patient, she was in her usual state of health until this morning when she got up from her bed to get water as her throat was very dry and itchy and she noted sudden onset of  dizziness. She was unable and laid down tbut the dizziness did not resolved and her room mate then called the 911. As per the patient, she had not been eating/drinking well since 2 days due to nausea which is resolved now. Pt denies any chest pain, vomiting, room spinning, headache, shortness of breath, fall , any recent head trauma.  Pt was recently admitted in [facility] for a syncopal episode from gabapentin overdosing,     In the ED,  Pt is noted to be AAOX3, No visible distress. Pt very upset about being hospitalized  Vitals- Hypotensive 88/52 On admission, s/p Dig  Repeat 181/106  CT Head- no acute changes  EKG- new onset AFib with RVR, S/p Dig loading 0.5mg and Diltiazem 5mg  Repeat EKG- NSR with T wave inversions  Trops- 2.8, BNP- 4400 (20 Jul 2020 14:53)      History as above, pt who is very anxious, who  was admitted with dizziness and found to have a UTI, she was being treated with rocephin but is growing out Enterococcus. She has no fevers or chills and her wbc count is wnl. She is allergic to multiple abxs and is unwilling to try amoxicillin and so ordered vancomycin for her UTI but she just lost her IV access and is a hard stick and so have put her on Zyvox po.        PAST MEDICAL & SURGICAL HISTORY:  Lymphedema  Essential hypertension  Carpal tunnel syndrome of right wrist  Obesity (BMI 30-39.9)  Type 2 diabetes mellitus without complication  Hypothyroid  Essential hypertension  Obesity  HTN (hypertension)  DM (diabetes mellitus)  No significant past surgical history  S/P carpal tunnel release      penicillin (Hives)  penicillin (Rash)  sulfa drugs (Unknown)  Tetracycline Hydrochloride (Unknown)      Meds:  acetaminophen   Tablet .. 650 milliGRAM(s) Oral every 6 hours PRN  ALPRAZolam 1 milliGRAM(s) Oral every 8 hours  apixaban 5 milliGRAM(s) Oral every 12 hours  aspirin enteric coated 81 milliGRAM(s) Oral daily  atorvastatin 80 milliGRAM(s) Oral at bedtime  benzocaine 15 mG/menthol 3.6 mG (Sugar-Free) Lozenge 1 Lozenge Oral three times a day  cholecalciferol 1000 Unit(s) Oral daily  cloNIDine 0.2 milliGRAM(s) Oral three times a day  clopidogrel Tablet 75 milliGRAM(s) Oral daily  cyanocobalamin 1000 MICROGram(s) Oral daily  folic acid 1 milliGRAM(s) Oral daily  gabapentin 300 milliGRAM(s) Oral two times a day  hydrALAZINE 25 milliGRAM(s) Oral every 8 hours  hydrALAZINE Injectable 5 milliGRAM(s) IV Push every 6 hours PRN  insulin lispro (HumaLOG) corrective regimen sliding scale   SubCutaneous three times a day before meals  labetalol 400 milliGRAM(s) Oral three times a day  labetalol Injectable 10 milliGRAM(s) IV Push every 4 hours PRN  levothyroxine 75 MICROGram(s) Oral daily  pantoprazole    Tablet 40 milliGRAM(s) Oral before breakfast  sodium chloride 0.9%. 1000 milliLiter(s) IV Continuous <Continuous>  vancomycin  IVPB 1000 milliGRAM(s) IV Intermittent every 12 hours      SOCIAL HISTORY:  Smoker:  no  ETOH use: no     FAMILY HISTORY:  Family history of myocardial infarction (Sibling)  Family history of lung cancer      VITALS:  Vital Signs Last 24 Hrs  T(C): 36.6 (29 Jul 2020 13:36), Max: 36.7 (28 Jul 2020 20:50)  T(F): 97.8 (29 Jul 2020 13:36), Max: 98 (28 Jul 2020 20:50)  HR: 72 (29 Jul 2020 13:36) (71 - 72)  BP: 120/69 (29 Jul 2020 13:36) (120/69 - 153/85)  BP(mean): --  RR: 18 (29 Jul 2020 13:36) (16 - 18)  SpO2: 95% (29 Jul 2020 13:36) (95% - 97%)    LABS/DIAGNOSTIC TESTS:                          12.7   5.65  )-----------( 264      ( 29 Jul 2020 10:34 )             39.1     WBC Count: 5.65 K/uL (07-29 @ 10:34)  WBC Count: 5.55 K/uL (07-28 @ 07:05)  WBC Count: 5.64 K/uL (07-27 @ 07:28)      07-29    142  |  107  |  28<H>  ----------------------------<  144<H>  3.9   |  29  |  1.27    Ca    8.4      29 Jul 2020 10:34  Phos  3.8     07-28  Mg     2.2     07-28    TPro  6.1  /  Alb  2.4<L>  /  TBili  0.4  /  DBili  x   /  AST  18  /  ALT  18  /  AlkPhos  73  07-28          LIVER FUNCTIONS - ( 28 Jul 2020 07:05 )  Alb: 2.4 g/dL / Pro: 6.1 g/dL / ALK PHOS: 73 U/L / ALT: 18 U/L DA / AST: 18 U/L / GGT: x                 LACTATE:    ABG -     CULTURES:   .Urine Clean Catch (Midstream)  07-26 @ 22:21   >100,000 CFU/ml Enterococcus species  --  --          RADIOLOGY:< from: US Duplex Venous Lower Ext Complete, Bilateral (07.20.20 @ 20:12) >  EXAM:  US DPLX LWR EXT VEINS COMPL BI                            PROCEDURE DATE:  07/20/2020          INTERPRETATION:  CLINICAL INFORMATION: Weakness and lymphedema    COMPARISON: 11/16/2019.    TECHNIQUE: Duplex sonography of the BILATERAL LOWER extremity veins with color and spectral Doppler, with and without compression. Study was done with limited compression due to patient's sensitivity    FINDINGS:    There is normal compressibility of the bilateral common femoral, femoral and popliteal veins.   Doppler examination shows normal spontaneous and phasic flow.    No calf vein thrombosis is detected.    IMPRESSION:   No evidence of deep venous thrombosis in either lower extremity.                SARAHY SAUNDERS M.D.,ATTENDING RADIOLOGIST  This document has been electronically signed. Jul 20 2020  8:25PM        < end of copied text >        ROS  [  ] UNABLE TO ELICIT

## 2020-07-30 LAB
-  AMPICILLIN: SIGNIFICANT CHANGE UP
-  CIPROFLOXACIN: SIGNIFICANT CHANGE UP
-  LEVOFLOXACIN: SIGNIFICANT CHANGE UP
-  NITROFURANTOIN: SIGNIFICANT CHANGE UP
-  TETRACYCLINE: SIGNIFICANT CHANGE UP
-  VANCOMYCIN: SIGNIFICANT CHANGE UP
CULTURE RESULTS: SIGNIFICANT CHANGE UP
GLUCOSE BLDC GLUCOMTR-MCNC: 139 MG/DL — HIGH (ref 70–99)
GLUCOSE BLDC GLUCOMTR-MCNC: 143 MG/DL — HIGH (ref 70–99)
GLUCOSE BLDC GLUCOMTR-MCNC: 192 MG/DL — HIGH (ref 70–99)
METHOD TYPE: SIGNIFICANT CHANGE UP
ORGANISM # SPEC MICROSCOPIC CNT: SIGNIFICANT CHANGE UP
ORGANISM # SPEC MICROSCOPIC CNT: SIGNIFICANT CHANGE UP
SPECIMEN SOURCE: SIGNIFICANT CHANGE UP

## 2020-07-30 RX ORDER — BENZOCAINE AND MENTHOL 5; 1 G/100ML; G/100ML
0 LIQUID ORAL
Qty: 0 | Refills: 0 | DISCHARGE
Start: 2020-07-30

## 2020-07-30 RX ORDER — ALPRAZOLAM 0.25 MG
0.5 TABLET ORAL
Qty: 0 | Refills: 0 | DISCHARGE
Start: 2020-07-30

## 2020-07-30 RX ORDER — APIXABAN 2.5 MG/1
1 TABLET, FILM COATED ORAL
Qty: 0 | Refills: 0 | DISCHARGE
Start: 2020-07-30

## 2020-07-30 RX ORDER — CLOPIDOGREL BISULFATE 75 MG/1
1 TABLET, FILM COATED ORAL
Qty: 0 | Refills: 0 | DISCHARGE
Start: 2020-07-30

## 2020-07-30 RX ORDER — ALPRAZOLAM 0.25 MG
1 TABLET ORAL
Qty: 0 | Refills: 0 | DISCHARGE
Start: 2020-07-30

## 2020-07-30 RX ORDER — LINEZOLID 600 MG/300ML
1 INJECTION, SOLUTION INTRAVENOUS
Qty: 0 | Refills: 0 | DISCHARGE
Start: 2020-07-30

## 2020-07-30 RX ADMIN — APIXABAN 5 MILLIGRAM(S): 2.5 TABLET, FILM COATED ORAL at 18:00

## 2020-07-30 RX ADMIN — LINEZOLID 600 MILLIGRAM(S): 600 INJECTION, SOLUTION INTRAVENOUS at 18:00

## 2020-07-30 RX ADMIN — LINEZOLID 600 MILLIGRAM(S): 600 INJECTION, SOLUTION INTRAVENOUS at 06:33

## 2020-07-30 RX ADMIN — Medication 400 MILLIGRAM(S): at 14:25

## 2020-07-30 RX ADMIN — Medication 25 MILLIGRAM(S): at 21:14

## 2020-07-30 RX ADMIN — PANTOPRAZOLE SODIUM 40 MILLIGRAM(S): 20 TABLET, DELAYED RELEASE ORAL at 06:33

## 2020-07-30 RX ADMIN — Medication 0.2 MILLIGRAM(S): at 21:15

## 2020-07-30 RX ADMIN — Medication 1000 UNIT(S): at 11:34

## 2020-07-30 RX ADMIN — CLOPIDOGREL BISULFATE 75 MILLIGRAM(S): 75 TABLET, FILM COATED ORAL at 11:34

## 2020-07-30 RX ADMIN — Medication 81 MILLIGRAM(S): at 11:34

## 2020-07-30 RX ADMIN — Medication 0.2 MILLIGRAM(S): at 14:25

## 2020-07-30 RX ADMIN — APIXABAN 5 MILLIGRAM(S): 2.5 TABLET, FILM COATED ORAL at 06:33

## 2020-07-30 RX ADMIN — Medication 400 MILLIGRAM(S): at 21:14

## 2020-07-30 RX ADMIN — PREGABALIN 1000 MICROGRAM(S): 225 CAPSULE ORAL at 11:34

## 2020-07-30 RX ADMIN — Medication 1 MILLIGRAM(S): at 11:34

## 2020-07-30 RX ADMIN — BENZOCAINE AND MENTHOL 1 LOZENGE: 5; 1 LIQUID ORAL at 21:15

## 2020-07-30 RX ADMIN — BENZOCAINE AND MENTHOL 1 LOZENGE: 5; 1 LIQUID ORAL at 06:33

## 2020-07-30 RX ADMIN — Medication 400 MILLIGRAM(S): at 06:33

## 2020-07-30 RX ADMIN — Medication 30 MILLILITER(S): at 15:43

## 2020-07-30 RX ADMIN — Medication 25 MILLIGRAM(S): at 14:25

## 2020-07-30 RX ADMIN — BENZOCAINE AND MENTHOL 1 LOZENGE: 5; 1 LIQUID ORAL at 14:25

## 2020-07-30 RX ADMIN — Medication 25 MILLIGRAM(S): at 06:33

## 2020-07-30 RX ADMIN — Medication 0.2 MILLIGRAM(S): at 06:33

## 2020-07-30 RX ADMIN — Medication 75 MICROGRAM(S): at 06:33

## 2020-07-30 NOTE — PROGRESS NOTE ADULT - SUBJECTIVE AND OBJECTIVE BOX
70y Female is under our care for UTI with enterococcus. Patient is feeling better and admits her abdominal pain from this am has resolved on its own. Remains afebrile with normal wbc count. Was due for dc, but patient is refusing to go home or to rehab.     REVIEW OF SYSTEMS:  [  ] Not able to illicit  General: no fevers no malaise  Chest: no cough no sob  GI: no nvd +resolved abdominal pain  : no urinary sxs   Skin: no rashes  Musculoskeletal: no trauma no LBP  Neuro: no ha's no dizziness     MEDS:  linezolid    Tablet 600 milliGRAM(s) Oral every 12 hours    ALLERGIES: Allergies    penicillin (Hives)  penicillin (Rash)  sulfa drugs (Unknown)  Tetracycline Hydrochloride (Unknown)    Intolerances      VITALS:  Vital Signs Last 24 Hrs  T(C): 36.9 (30 Jul 2020 13:27), Max: 37.1 (29 Jul 2020 21:20)  T(F): 98.4 (30 Jul 2020 13:27), Max: 98.7 (29 Jul 2020 21:20)  HR: 73 (30 Jul 2020 13:27) (70 - 73)  BP: 152/90 (30 Jul 2020 13:27) (152/90 - 198/114)  BP(mean): --  RR: 18 (30 Jul 2020 13:27) (16 - 18)  SpO2: 96% (30 Jul 2020 13:27) (96% - 100%)      PHYSICAL EXAM:  HEENT: n/a  Neck: supple no LN's   Respiratory: lungs clear no rales  Cardiovascular: S1 S2 reg no murmurs  Gastrointestinal: +BS with soft, nondistended abdomen; nontender  Extremities: no edema  Skin: no rashes, bilateral chronic venous stasis of bilateral lower legs  Ortho: n/a  Neuro: AAO x 2-3      LABS/DIAGNOSTIC TESTS:  No new labs - patient refused                        12.7   5.65  )-----------( 264      ( 29 Jul 2020 10:34 )             39.1     WBC Count: 5.65 K/uL (07-29 @ 10:34)  WBC Count: 5.55 K/uL (07-28 @ 07:05)  WBC Count: 5.64 K/uL (07-27 @ 07:28)  WBC Count: 7.54 K/uL (07-26 @ 07:16)    07-29    142  |  107  |  28<H>  ----------------------------<  144<H>  3.9   |  29  |  1.27    Ca    8.4      29 Jul 2020 10:34        CULTURES:   .Urine Clean Catch (Midstream)  07-26 @ 22:21   >100,000 CFU/ml Enterococcus faecalis  --  Enterococcus faecalis        RADIOLOGY:  no new studies

## 2020-07-30 NOTE — PROGRESS NOTE ADULT - ASSESSMENT
Patient is a 71yo Female with HTN, Hypothyroidism, lymphedema p/w dizziness a/w Afib with RVR, NSTEMI and ANIKA.  Nephrology consulted for Elevated serum creatinine.     1. ANIKA- likely hemodynamically mediated in the setting of decreased PO intake with hypotension/ Afib. Initially improved s/p IVF; then with increasing Scr in the setting of n/v; lack of appetite.  Renal function improving; encourage PO intake.  hyaline casts on UA and low FeNa. Encourage PO intake; pt refusing further IVF. Urine culture Enterococcus- now on Zyvox. Strict I/Os. Avoid nephrotoxins/ NSAIDs/ RCA. Monitor BMP.  2. Hypokalemia- Resolved, keep K at 4.  Monitor lytes.   3. Afib with RVR- plan as per cardiology.   4. Essential HTN- BP elevated overnight, consider increasing Hydralazine to 50mg PO q8hrs. Monitor BP.

## 2020-07-30 NOTE — PROGRESS NOTE ADULT - PROBLEM SELECTOR PLAN 2
NSTEMI (non-ST elevated myocardial infarction).  Plan: P/w lightheadedness and nausea x 1 day.    Initially AFib with RVR then NSR with T wave inversions after dig load.  No ST elevations or depressions  Repeat EKG negative for ischemic findings  Echo-see above  Stress test negative for reversible ischemia  Pt is refusing any invasive testing including cardiac cath  Continue Eliquis, Plavix, high dose statin, and baby ASA  Outpatient f/u with cardiology  Cardiology Dr. Kuo

## 2020-07-30 NOTE — PROGRESS NOTE ADULT - PROBLEM SELECTOR PLAN 4
Likely pre-renal secondary to dehydration  sCr improving  Pt refusing further IV fluids  Avoid nephrotoxins  F/u BMP in AM  Nephro Dr Zhang

## 2020-07-30 NOTE — PROGRESS NOTE ADULT - SUBJECTIVE AND OBJECTIVE BOX
Physical Therapy Daily Treatment    Visit Count: 8/18                           Plan of Care: 2/22/2019 Through: 6/14/2019  Insurance Information: Medical necessity, auth after 30, used 12  Referred by: Hany Ernst MD; Next provider visit (if known/scheduled): 2/22/19  Medical Diagnosis (from order):   337.22 (ICD-9-CM) - G90.521 (ICD-10-CM) - Complex regional pain syndrome type 1 of right lower extremity   337.21 (ICD-9-CM) - G90.512 (ICD-10-CM) - Complex regional pain syndrome type 1 of left upper extremity   729.5, 338.29 (ICD-9-CM) - M79.602, G89.29 (ICD-10-CM) - Chronic pain of left upper extremity   729.5, 338.29 (ICD-9-CM) - M79.604, G89.29 (ICD-10-CM) - Chronic pain of right lower extremity     Date of onset/injury: April 2018  Diagnosis Precautions: CRPS, TOS left arm     SUBJECTIVE   Patient states that she is very stiff and sore today, especially in the hip, feels that up into her back is tight and swollen  Current Pain (0-10 scale): 1-2  Functional Change: CRPS worse with weather    OBJECTIVE     Treatment   Manual therapy:  Supine hip physiologic mobilization: flexion, IR, ER, circumduction  Piriformis stretching  Adductor stretching  sidelying hip flexor and ITB/TFL stretching  Prone STM and trigger pointing throughout posterior hip and right lower back with and without hip IR/ER movement  Prone STM and trigger pointing to lateral hip  Long axis distraction  Education in use of tennis ball for trigger pointing at home      Skilled input: manual interventions    Home Program:   Exercise: Date issued Date DC Comments   HEP from hip surgery, HEP from previous TOS PT         Pool exercises  2/27/19    [id number]                                   Writer verbally educated the patient and received verbal consent from the patient on hand placement, positioning of patient, and techniques to be performed today and how they are pertinent to the patient's plan of care.      Suggestions for next session  [facility] NEPHROLOGY- PROGRESS NOTE    Patient is a 69yo Female with HTN, Hypothyroidism, lymphedema p/w dizziness a/w Afib with RVR, NSTEMI and ANIKA.  Nephrology consulted for Elevated serum creatinine.     Hospital Medications: Medications reviewed.      REVIEW OF SYSTEMS:  CONSTITUTIONAL: No fevers or chills  RESPIRATORY: + mild shortness of breath  CARDIOVASCULAR: No chest pain.  GASTROINTESTINAL: No diarrhea. No n/v/d.   VASCULAR: No bilateral lower extremity edema.     VITALS:  T(F): 98.3 (20 @ 06:19), Max: 98.7 (20 @ 21:20)  HR: 72 (20 @ 10:59)  BP: 156/77 (20 @ 10:59)  RR: 16 (20 @ 06:19)  SpO2: 100% (20 @ 09:31)  Wt(kg): --      PHYSICAL EXAM:   General: NAD  Respiratory: CTA b/l   Cardiovascular: S1, S2, RRR  Gastrointestinal: BS+, dNT  : No aguilar.  Extremities: No peripheral edema  hyperpigmentation of b/l LE      LABS:      142  |  107  |  28<H>  ----------------------------<  144<H>  3.9   |  29  |  1.27    Ca    8.4      2020 10:34      Creatinine Trend: 1.27 <--, 1.46 <--, 1.66 <--, 1.49 <--, 1.82 <--, 1.43 <--                        12.7   5.65  )-----------( 264      ( 2020 10:34 )             39.1     Urine Studies:  Urinalysis Basic - ( 2020 16:29 )    Color: Yellow / Appearance: Clear / S.020 / pH:   Gluc:  / Ketone: Trace  / Bili: Small / Urobili: 1   Blood:  / Protein: 30 mg/dL / Nitrite: Negative   Leuk Esterase: Moderate / RBC: 25-50 /HPF / WBC 11-25 /HPF   Sq Epi:  / Non Sq Epi: Few /HPF / Bacteria: Few /HPF      Sodium, Random Urine: 19 mmol/L ( @ 15:57)  Osmolality, Random Urine: 532 mos/kg ( @ 15:57)  Creatinine, Random Urine: 126 mg/dL ( @ 15:57)  Sodium, Random Urine: 30 mmol/L ( @ 16:29)  Creatinine, Random Urine: 224 mg/dL ( @ 16:29) as indicated: progress per plan of care, UE/LE range of motion, UE/LE strength and endurance using pool as needed (caution with any resistance equipment)     ASSESSMENT   Patient with significant increase in pain and stiffness in right hip and lower back. She had multiple trigger points and tender spots throughout the posterior and lateral hip, did have a reported improvement in pain and freedom of movement at the end of the session.   Education in use of tennis ball for trigger pointing at home, use heat if sore tonight  Pain after treatment (patient reported, 0-10 scale): NR  Result of above outlined education: Verbalizes understanding and Demonstrates understanding    THERAPY DAILY BILLING   Insurance: [place] [employer] [facility] [place] 2. N/A    Evaluation Procedures:  No evaluation codes were used on this date of service    Timed Procedures:  Manual Therapy, 40 minutes    Untimed Procedures:  No untimed codes were used on this date of service    Total Treatment Time: 40 minutes   [facility] NEPHROLOGY- PROGRESS NOTE    Patient is a 69yo Female with HTN, Hypothyroidism, lymphedema p/w dizziness a/w Afib with RVR, NSTEMI and ANIKA.  Nephrology consulted for Elevated serum creatinine.     Hospital Medications: Medications reviewed.      REVIEW OF SYSTEMS:  CONSTITUTIONAL: No fevers or chills  RESPIRATORY: + mild shortness of breath  CARDIOVASCULAR: No chest pain.  GASTROINTESTINAL: No diarrhea. No n/v/d. +abd discomfort  VASCULAR: No bilateral lower extremity edema.     VITALS:  T(F): 98.3 (20 @ 06:19), Max: 98.7 (20 @ 21:20)  HR: 72 (20 @ 10:59)  BP: 156/77 (20 @ 10:59)  RR: 16 (20 @ 06:19)  SpO2: 100% (20 @ 09:31)  Wt(kg): --      PHYSICAL EXAM:   General: NAD  Respiratory: CTA b/l   Cardiovascular: S1, S2, RRR  Gastrointestinal: BS+, dNT  : No aguilar.  Extremities: No peripheral edema  hyperpigmentation of b/l LE      LABS:      142  |  107  |  28<H>  ----------------------------<  144<H>  3.9   |  29  |  1.27    Ca    8.4      2020 10:34      Creatinine Trend: 1.27 <--, 1.46 <--, 1.66 <--, 1.49 <--, 1.82 <--, 1.43 <--                        12.7   5.65  )-----------( 264      ( 2020 10:34 )             39.1     Urine Studies:  Urinalysis Basic - ( 2020 16:29 )    Color: Yellow / Appearance: Clear / S.020 / pH:   Gluc:  / Ketone: Trace  / Bili: Small / Urobili: 1   Blood:  / Protein: 30 mg/dL / Nitrite: Negative   Leuk Esterase: Moderate / RBC: 25-50 /HPF / WBC 11-25 /HPF   Sq Epi:  / Non Sq Epi: Few /HPF / Bacteria: Few /HPF      Sodium, Random Urine: 19 mmol/L ( @ 15:57)  Osmolality, Random Urine: 532 mos/kg ( @ 15:57)  Creatinine, Random Urine: 126 mg/dL ( @ 15:57)  Sodium, Random Urine: 30 mmol/L ( @ 16:29)  Creatinine, Random Urine: 224 mg/dL ( @ 16:29)

## 2020-07-30 NOTE — PROGRESS NOTE ADULT - PROBLEM SELECTOR PLAN 9
D/c plan to [facility]-choices were given   Covid swab negative 7/28   CM following D/c plan to [facility]-choices were given-on oral Zyvox  Covid swab negative 7/28   CM following D/c plan to [facility] on oral Zyvox  Covid swab negative 7/28   CM following PT recommended [facility], pt is refusing  Plan for possible d/c home on Sunday   Covid swab negative 7/28   CM following PT recommended [facility], pt wants to go home but is now considering [facility]. [facility]  Covid swab negative 7/28   CM following

## 2020-07-30 NOTE — PROGRESS NOTE ADULT - ASSESSMENT
1.	UTI with Enterococcus  ·	dc planning on hold since patient is refusing to go home or to rehab  ·	cont Zyvox 600mgs po q12hrs until Saturday AM dose  ·	reconsult prn

## 2020-07-31 DIAGNOSIS — K59.00 CONSTIPATION, UNSPECIFIED: ICD-10-CM

## 2020-07-31 LAB
ANION GAP SERPL CALC-SCNC: 6 MMOL/L — SIGNIFICANT CHANGE UP (ref 5–17)
BUN SERPL-MCNC: 25 MG/DL — HIGH (ref 7–18)
CALCIUM SERPL-MCNC: 8.7 MG/DL — SIGNIFICANT CHANGE UP (ref 8.4–10.5)
CHLORIDE SERPL-SCNC: 107 MMOL/L — SIGNIFICANT CHANGE UP (ref 96–108)
CO2 SERPL-SCNC: 25 MMOL/L — SIGNIFICANT CHANGE UP (ref 22–31)
CREAT SERPL-MCNC: 1.23 MG/DL — SIGNIFICANT CHANGE UP (ref 0.5–1.3)
GLUCOSE BLDC GLUCOMTR-MCNC: 128 MG/DL — HIGH (ref 70–99)
GLUCOSE SERPL-MCNC: 133 MG/DL — HIGH (ref 70–99)
HCT VFR BLD CALC: 39.5 % — SIGNIFICANT CHANGE UP (ref 34.5–45)
HGB BLD-MCNC: 13.1 G/DL — SIGNIFICANT CHANGE UP (ref 11.5–15.5)
MCHC RBC-ENTMCNC: 29.8 PG — SIGNIFICANT CHANGE UP (ref 27–34)
MCHC RBC-ENTMCNC: 33.2 GM/DL — SIGNIFICANT CHANGE UP (ref 32–36)
MCV RBC AUTO: 90 FL — SIGNIFICANT CHANGE UP (ref 80–100)
NRBC # BLD: 0 /100 WBCS — SIGNIFICANT CHANGE UP (ref 0–0)
PLATELET # BLD AUTO: 291 K/UL — SIGNIFICANT CHANGE UP (ref 150–400)
POTASSIUM SERPL-MCNC: 3.7 MMOL/L — SIGNIFICANT CHANGE UP (ref 3.5–5.3)
POTASSIUM SERPL-SCNC: 3.7 MMOL/L — SIGNIFICANT CHANGE UP (ref 3.5–5.3)
RBC # BLD: 4.39 M/UL — SIGNIFICANT CHANGE UP (ref 3.8–5.2)
RBC # FLD: 13.4 % — SIGNIFICANT CHANGE UP (ref 10.3–14.5)
SODIUM SERPL-SCNC: 138 MMOL/L — SIGNIFICANT CHANGE UP (ref 135–145)
WBC # BLD: 6.2 K/UL — SIGNIFICANT CHANGE UP (ref 3.8–10.5)
WBC # FLD AUTO: 6.2 K/UL — SIGNIFICANT CHANGE UP (ref 3.8–10.5)

## 2020-07-31 PROCEDURE — 74176 CT ABD & PELVIS W/O CONTRAST: CPT | Mod: 26

## 2020-07-31 PROCEDURE — 99232 SBSQ HOSP IP/OBS MODERATE 35: CPT

## 2020-07-31 RX ORDER — HYDRALAZINE HCL 50 MG
50 TABLET ORAL EVERY 8 HOURS
Refills: 0 | Status: DISCONTINUED | OUTPATIENT
Start: 2020-07-31 | End: 2020-08-03

## 2020-07-31 RX ORDER — SENNA PLUS 8.6 MG/1
2 TABLET ORAL AT BEDTIME
Refills: 0 | Status: DISCONTINUED | OUTPATIENT
Start: 2020-07-31 | End: 2020-08-03

## 2020-07-31 RX ORDER — POLYETHYLENE GLYCOL 3350 17 G/17G
17 POWDER, FOR SOLUTION ORAL DAILY
Refills: 0 | Status: DISCONTINUED | OUTPATIENT
Start: 2020-07-31 | End: 2020-08-02

## 2020-07-31 RX ORDER — LACTOBACILLUS ACIDOPHILUS 100MM CELL
1 CAPSULE ORAL DAILY
Refills: 0 | Status: DISCONTINUED | OUTPATIENT
Start: 2020-07-31 | End: 2020-08-03

## 2020-07-31 RX ADMIN — CLOPIDOGREL BISULFATE 75 MILLIGRAM(S): 75 TABLET, FILM COATED ORAL at 11:55

## 2020-07-31 RX ADMIN — Medication 1 MILLIGRAM(S): at 11:55

## 2020-07-31 RX ADMIN — APIXABAN 5 MILLIGRAM(S): 2.5 TABLET, FILM COATED ORAL at 18:26

## 2020-07-31 RX ADMIN — APIXABAN 5 MILLIGRAM(S): 2.5 TABLET, FILM COATED ORAL at 05:09

## 2020-07-31 RX ADMIN — Medication 0.2 MILLIGRAM(S): at 21:29

## 2020-07-31 RX ADMIN — PANTOPRAZOLE SODIUM 40 MILLIGRAM(S): 20 TABLET, DELAYED RELEASE ORAL at 05:09

## 2020-07-31 RX ADMIN — SENNA PLUS 2 TABLET(S): 8.6 TABLET ORAL at 21:31

## 2020-07-31 RX ADMIN — Medication 400 MILLIGRAM(S): at 13:52

## 2020-07-31 RX ADMIN — POLYETHYLENE GLYCOL 3350 17 GRAM(S): 17 POWDER, FOR SOLUTION ORAL at 11:55

## 2020-07-31 RX ADMIN — Medication 650 MILLIGRAM(S): at 09:46

## 2020-07-31 RX ADMIN — Medication 1 MILLIGRAM(S): at 21:33

## 2020-07-31 RX ADMIN — PREGABALIN 1000 MICROGRAM(S): 225 CAPSULE ORAL at 11:55

## 2020-07-31 RX ADMIN — BENZOCAINE AND MENTHOL 1 LOZENGE: 5; 1 LIQUID ORAL at 05:09

## 2020-07-31 RX ADMIN — BENZOCAINE AND MENTHOL 1 LOZENGE: 5; 1 LIQUID ORAL at 21:31

## 2020-07-31 RX ADMIN — Medication 0.2 MILLIGRAM(S): at 05:09

## 2020-07-31 RX ADMIN — Medication 75 MICROGRAM(S): at 05:09

## 2020-07-31 RX ADMIN — Medication 10 MILLIGRAM(S): at 13:20

## 2020-07-31 RX ADMIN — Medication 81 MILLIGRAM(S): at 11:55

## 2020-07-31 RX ADMIN — Medication 650 MILLIGRAM(S): at 22:25

## 2020-07-31 RX ADMIN — Medication 25 MILLIGRAM(S): at 21:27

## 2020-07-31 RX ADMIN — Medication 30 MILLILITER(S): at 00:56

## 2020-07-31 RX ADMIN — Medication 400 MILLIGRAM(S): at 21:29

## 2020-07-31 RX ADMIN — LINEZOLID 600 MILLIGRAM(S): 600 INJECTION, SOLUTION INTRAVENOUS at 05:08

## 2020-07-31 RX ADMIN — Medication 25 MILLIGRAM(S): at 05:09

## 2020-07-31 RX ADMIN — Medication 30 MILLILITER(S): at 14:02

## 2020-07-31 RX ADMIN — Medication 1 TABLET(S): at 11:55

## 2020-07-31 RX ADMIN — LINEZOLID 600 MILLIGRAM(S): 600 INJECTION, SOLUTION INTRAVENOUS at 18:26

## 2020-07-31 RX ADMIN — Medication 400 MILLIGRAM(S): at 05:08

## 2020-07-31 RX ADMIN — Medication 0.2 MILLIGRAM(S): at 13:53

## 2020-07-31 RX ADMIN — Medication 650 MILLIGRAM(S): at 10:30

## 2020-07-31 RX ADMIN — Medication 1000 UNIT(S): at 11:56

## 2020-07-31 RX ADMIN — Medication 25 MILLIGRAM(S): at 13:53

## 2020-07-31 RX ADMIN — Medication 650 MILLIGRAM(S): at 21:43

## 2020-07-31 NOTE — PROGRESS NOTE ADULT - SUBJECTIVE AND OBJECTIVE BOX
[facility] NEPHROLOGY- PROGRESS NOTE    Patient is a 71yo Female with HTN, Hypothyroidism, lymphedema p/w dizziness a/w Afib with RVR, NSTEMI and ANIKA.  Nephrology consulted for Elevated serum creatinine.     Hospital Medications: Medications reviewed.      REVIEW OF SYSTEMS:  CONSTITUTIONAL: No fevers or chills  RESPIRATORY: No shortness of breath  CARDIOVASCULAR: No chest pain.  GASTROINTESTINAL: No diarrhea. No n/v/d. +abd discomfort/ constipation; pt adamantly refusing Abd Xray  VASCULAR: No bilateral lower extremity edema.     VITALS:  T(F): 97.8 (20 @ 05:15), Max: 98.4 (20 @ 13:27)  HR: 73 (20 @ 05:15)  BP: 192/105 (20 @ 05:15)  RR: 17 (20 @ 05:15)  SpO2: 97% (20 @ 05:15)  Wt(kg): --      PHYSICAL EXAM:   General: NAD  Respiratory: CTA b/l   Cardiovascular: S1, S2, RRR  Gastrointestinal: BS+, NT  : No aguilar.  Extremities: No peripheral edema  hyperpigmentation of b/l LE      LABS:      138  |  107  |  25<H>  ----------------------------<  133<H>  3.7   |  25  |  1.23    Ca    8.7      2020 07:18      Creatinine Trend: 1.23 <--, 1.27 <--, 1.46 <--, 1.66 <--, 1.49 <--, 1.82 <--                        13.1   6.20  )-----------( 291      ( 2020 07:18 )             39.5     Urine Studies:  Urinalysis Basic - ( 2020 16:29 )    Color: Yellow / Appearance: Clear / S.020 / pH:   Gluc:  / Ketone: Trace  / Bili: Small / Urobili: 1   Blood:  / Protein: 30 mg/dL / Nitrite: Negative   Leuk Esterase: Moderate / RBC: 25-50 /HPF / WBC 11-25 /HPF   Sq Epi:  / Non Sq Epi: Few /HPF / Bacteria: Few /HPF      Sodium, Random Urine: 19 mmol/L ( @ 15:57)  Osmolality, Random Urine: 532 mos/kg ( @ 15:57)  Creatinine, Random Urine: 126 mg/dL ( @ 15:57)  Sodium, Random Urine: 30 mmol/L ( @ 16:29)  Creatinine, Random Urine: 224 mg/dL ( @ 16:29)

## 2020-07-31 NOTE — PROGRESS NOTE ADULT - PROBLEM SELECTOR PLAN 8
Pt refusing DC to [facility] today because of constipation  Pt is blanca observer, plan for DC to [facility] on sunday

## 2020-07-31 NOTE — PROGRESS NOTE ADULT - ASSESSMENT
Patient is a 71yo Female with HTN, Hypothyroidism, lymphedema p/w dizziness a/w Afib with RVR, NSTEMI and ANIKA.  Nephrology consulted for Elevated serum creatinine.     1. ANIKA- likely hemodynamically mediated in the setting of decreased PO intake with hypotension/ Afib. Initially improved s/p IVF; then with increasing Scr in the setting of n/v; lack of appetite.  Renal function improving; encourage PO intake.  hyaline casts on UA and low FeNa. Encourage PO intake; pt refusing further IVF. Urine culture Enterococcus- now on Zyvox. Strict I/Os. Avoid nephrotoxins/ NSAIDs/ RCA. Monitor BMP.  2. Hypokalemia- Resolved, keep K at 4.  Monitor lytes.   3. Afib with RVR- plan as per cardiology.   4. Essential HTN- BP uncontrolled. Please repeat BP, consider increasing Hydralazine to 50mg PO q8hrs. Monitor BP.

## 2020-07-31 NOTE — PROGRESS NOTE ADULT - PROBLEM SELECTOR PLAN 1
Complaining of constipation  Had small amount of hard stool this AM  Given laxatives, enema, and suppository today   Plan for CT abdomen and pelvis non contrast

## 2020-07-31 NOTE — PROGRESS NOTE ADULT - SUBJECTIVE AND OBJECTIVE BOX
NP Note discussed with  Primary Attending    Patient is a 70y old  Female who presents with a chief complaint of Dizziness (31 Jul 2020 12:16)      INTERVAL HPI/OVERNIGHT EVENTS: no new complaints    MEDICATIONS  (STANDING):  ALPRAZolam 1 milliGRAM(s) Oral every 8 hours  apixaban 5 milliGRAM(s) Oral every 12 hours  aspirin enteric coated 81 milliGRAM(s) Oral daily  atorvastatin 80 milliGRAM(s) Oral at bedtime  benzocaine 15 mG/menthol 3.6 mG (Sugar-Free) Lozenge 1 Lozenge Oral three times a day  cholecalciferol 1000 Unit(s) Oral daily  cloNIDine 0.2 milliGRAM(s) Oral three times a day  clopidogrel Tablet 75 milliGRAM(s) Oral daily  cyanocobalamin 1000 MICROGram(s) Oral daily  folic acid 1 milliGRAM(s) Oral daily  gabapentin 300 milliGRAM(s) Oral two times a day  hydrALAZINE 25 milliGRAM(s) Oral every 8 hours  insulin lispro (HumaLOG) corrective regimen sliding scale   SubCutaneous three times a day before meals  labetalol 400 milliGRAM(s) Oral three times a day  lactobacillus acidophilus 1 Tablet(s) Oral daily  levothyroxine 75 MICROGram(s) Oral daily  linezolid    Tablet 600 milliGRAM(s) Oral every 12 hours  pantoprazole    Tablet 40 milliGRAM(s) Oral before breakfast  polyethylene glycol 3350 17 Gram(s) Oral daily  senna 2 Tablet(s) Oral at bedtime  sodium chloride 0.9%. 1000 milliLiter(s) (85 mL/Hr) IV Continuous <Continuous>    MEDICATIONS  (PRN):  acetaminophen   Tablet .. 650 milliGRAM(s) Oral every 6 hours PRN Moderate Pain (4 - 6)  aluminum hydroxide/magnesium hydroxide/simethicone Suspension 30 milliLiter(s) Oral every 4 hours PRN Dyspepsia  bisacodyl Suppository 10 milliGRAM(s) Rectal daily PRN Constipation  hydrALAZINE Injectable 5 milliGRAM(s) IV Push every 6 hours PRN BP >200/100  labetalol Injectable 10 milliGRAM(s) IV Push every 4 hours PRN Systolic/Diastolic > 200/100      __________________________________________________  REVIEW OF SYSTEMS:    CONSTITUTIONAL: No fever,   EYES: no acute visual disturbances  NECK: No pain or stiffness  RESPIRATORY: No cough; No shortness of breath  CARDIOVASCULAR: No chest pain, no palpitations  GASTROINTESTINAL: No pain. No nausea or vomiting; No diarrhea   NEUROLOGICAL: No headache or numbness, no tremors  MUSCULOSKELETAL: No joint pain, no muscle pain  GENITOURINARY: no dysuria, no frequency, no hesitancy  PSYCHIATRY: no depression , no anxiety  ALL OTHER  ROS negative        Vital Signs Last 24 Hrs  T(C): 36.6 (31 Jul 2020 13:51), Max: 36.6 (30 Jul 2020 21:10)  T(F): 97.9 (31 Jul 2020 13:51), Max: 97.9 (31 Jul 2020 13:51)  HR: 79 (31 Jul 2020 13:51) (72 - 79)  BP: 164/83 (31 Jul 2020 13:51) (164/83 - 192/105)  BP(mean): --  RR: 16 (31 Jul 2020 13:51) (16 - 18)  SpO2: 97% (31 Jul 2020 13:51) (95% - 97%)    ________________________________________________  PHYSICAL EXAM:  GENERAL: NAD  HEENT: Normocephalic;  conjunctivae and sclerae clear; moist mucous membranes;   NECK : supple  CHEST/LUNG: Clear to auscultation bilaterally with good air entry   HEART: S1 S2  regular; no murmurs, gallops or rubs  ABDOMEN: Soft, Nontender, Nondistended; Bowel sounds present  EXTREMITIES: no cyanosis; no edema; no calf tenderness  SKIN: warm and dry; no rash  NERVOUS SYSTEM:  Awake and alert; Oriented  to place, person and time ; no new deficits    _________________________________________________  LABS:                        13.1   6.20  )-----------( 291      ( 31 Jul 2020 07:18 )             39.5     07-31    138  |  107  |  25<H>  ----------------------------<  133<H>  3.7   |  25  |  1.23    Ca    8.7      31 Jul 2020 07:18      CAPILLARY BLOOD GLUCOSE    POCT Blood Glucose.: 128 mg/dL (31 Jul 2020 11:38)  POCT Blood Glucose.: 143 mg/dL (30 Jul 2020 21:14)      RADIOLOGY & ADDITIONAL TESTS:    Imaging  Reviewed:  YES    Consultant(s) Notes Reviewed:   YES      Plan of care was discussed with patient and /or primary care giver; all questions and concerns were addressed NP Note discussed with  Primary Attending    71 y/o F with PMHx of obesity, HTN, Hypothyroidism and lymphedema presented to ED for dizziness.  In ED, CT head was negative, admitted for afib with RVR and NSTEMI. LE dopplers negative for DVT. CXR negative. Cardiology following. Echo on 7/21 showed LV hypertrophy and stage I diastolic dysfunction. Patient refused heparin drip and lovenox. Was eventually started on oral AC (Eliquis). Stress test was negative for any reversible ischemia. After stress test patient was nauseous and hypertensive. Managed with Zofran and Reglan. Due to nausea missed afternoon meds, received IV labetalol and hydralazine without improvement in BP. Critical care was consulted and patient was placed back on full home regimen of HTN medications. Critical care followed patient overnight with stabilization of BP. Some meds were held to avoid hypotension. BP was monitored and medications optimized. Pt with ANIKA on admission, improved with IV hydration. UA (+), was on Ceftriaxone. Urine cx grew enterococcus, sensitivities testing. ID was consulted. Transitioned to PO Zyvox. Pt complaining of abdominal pain and constipation today, plan for CT abdomen and pelvis non contrast.      INTERVAL HPI/OVERNIGHT EVENTS: Complaining of abdominal pain, bloating, and constipation     MEDICATIONS  (STANDING):  ALPRAZolam 1 milliGRAM(s) Oral every 8 hours  apixaban 5 milliGRAM(s) Oral every 12 hours  aspirin enteric coated 81 milliGRAM(s) Oral daily  atorvastatin 80 milliGRAM(s) Oral at bedtime  benzocaine 15 mG/menthol 3.6 mG (Sugar-Free) Lozenge 1 Lozenge Oral three times a day  cholecalciferol 1000 Unit(s) Oral daily  cloNIDine 0.2 milliGRAM(s) Oral three times a day  clopidogrel Tablet 75 milliGRAM(s) Oral daily  cyanocobalamin 1000 MICROGram(s) Oral daily  folic acid 1 milliGRAM(s) Oral daily  gabapentin 300 milliGRAM(s) Oral two times a day  hydrALAZINE 25 milliGRAM(s) Oral every 8 hours  insulin lispro (HumaLOG) corrective regimen sliding scale   SubCutaneous three times a day before meals  labetalol 400 milliGRAM(s) Oral three times a day  lactobacillus acidophilus 1 Tablet(s) Oral daily  levothyroxine 75 MICROGram(s) Oral daily  linezolid    Tablet 600 milliGRAM(s) Oral every 12 hours  pantoprazole    Tablet 40 milliGRAM(s) Oral before breakfast  polyethylene glycol 3350 17 Gram(s) Oral daily  senna 2 Tablet(s) Oral at bedtime  sodium chloride 0.9%. 1000 milliLiter(s) (85 mL/Hr) IV Continuous <Continuous>    MEDICATIONS  (PRN):  acetaminophen   Tablet .. 650 milliGRAM(s) Oral every 6 hours PRN Moderate Pain (4 - 6)  aluminum hydroxide/magnesium hydroxide/simethicone Suspension 30 milliLiter(s) Oral every 4 hours PRN Dyspepsia  bisacodyl Suppository 10 milliGRAM(s) Rectal daily PRN Constipation  hydrALAZINE Injectable 5 milliGRAM(s) IV Push every 6 hours PRN BP >200/100  labetalol Injectable 10 milliGRAM(s) IV Push every 4 hours PRN Systolic/Diastolic > 200/100      __________________________________________________  REVIEW OF SYSTEMS:    CONSTITUTIONAL: No fever,   EYES: no acute visual disturbances  NECK: No pain or stiffness  RESPIRATORY: No cough; No shortness of breath  CARDIOVASCULAR: No chest pain, no palpitations  GASTROINTESTINAL: No pain. No nausea or vomiting; No diarrhea   NEUROLOGICAL: No headache or numbness, no tremors  MUSCULOSKELETAL: No joint pain, no muscle pain  GENITOURINARY: no dysuria, no frequency, no hesitancy  PSYCHIATRY: no depression , no anxiety  ALL OTHER  ROS negative        Vital Signs Last 24 Hrs  T(C): 36.6 (31 Jul 2020 13:51), Max: 36.6 (30 Jul 2020 21:10)  T(F): 97.9 (31 Jul 2020 13:51), Max: 97.9 (31 Jul 2020 13:51)  HR: 79 (31 Jul 2020 13:51) (72 - 79)  BP: 164/83 (31 Jul 2020 13:51) (164/83 - 192/105)  BP(mean): --  RR: 16 (31 Jul 2020 13:51) (16 - 18)  SpO2: 97% (31 Jul 2020 13:51) (95% - 97%)    ________________________________________________  PHYSICAL EXAM:  GENERAL: NAD  HEENT: Normocephalic;  conjunctivae and sclerae clear; moist mucous membranes;   NECK : supple  CHEST/LUNG: Clear to auscultation bilaterally with good air entry   HEART: S1 S2  regular; no murmurs, gallops or rubs  ABDOMEN: Obese, Soft, Nontender, Nondistended; Bowel sounds present  EXTREMITIES: chronic venous stasis of bilateral lower legs   SKIN: warm and dry; no rash  NERVOUS SYSTEM:  Awake and alert; Oriented  to place, person and time ; no new deficits    _________________________________________________  LABS:                        13.1   6.20  )-----------( 291      ( 31 Jul 2020 07:18 )             39.5     07-31    138  |  107  |  25<H>  ----------------------------<  133<H>  3.7   |  25  |  1.23    Ca    8.7      31 Jul 2020 07:18      CAPILLARY BLOOD GLUCOSE    POCT Blood Glucose.: 128 mg/dL (31 Jul 2020 11:38)  POCT Blood Glucose.: 143 mg/dL (30 Jul 2020 21:14)      RADIOLOGY & ADDITIONAL TESTS:    Imaging  Reviewed:  YES    Consultant(s) Notes Reviewed:   YES      Plan of care was discussed with patient and /or primary care giver; all questions and concerns were addressed

## 2020-07-31 NOTE — PROGRESS NOTE ADULT - PROBLEM SELECTOR PLAN 2
Afebrile, no leukocytosis   UTI with enterococcus   Continue Zyvox 600mgs po q12hrs until Saturday AM dose  ID Dr. Pettit

## 2020-08-01 LAB
ANION GAP SERPL CALC-SCNC: 5 MMOL/L — SIGNIFICANT CHANGE UP (ref 5–17)
BUN SERPL-MCNC: 20 MG/DL — HIGH (ref 7–18)
CALCIUM SERPL-MCNC: 8.4 MG/DL — SIGNIFICANT CHANGE UP (ref 8.4–10.5)
CHLORIDE SERPL-SCNC: 109 MMOL/L — HIGH (ref 96–108)
CO2 SERPL-SCNC: 27 MMOL/L — SIGNIFICANT CHANGE UP (ref 22–31)
CREAT SERPL-MCNC: 1.06 MG/DL — SIGNIFICANT CHANGE UP (ref 0.5–1.3)
GLUCOSE BLDC GLUCOMTR-MCNC: 119 MG/DL — HIGH (ref 70–99)
GLUCOSE BLDC GLUCOMTR-MCNC: 123 MG/DL — HIGH (ref 70–99)
GLUCOSE BLDC GLUCOMTR-MCNC: 126 MG/DL — HIGH (ref 70–99)
GLUCOSE BLDC GLUCOMTR-MCNC: 133 MG/DL — HIGH (ref 70–99)
GLUCOSE SERPL-MCNC: 120 MG/DL — HIGH (ref 70–99)
HCT VFR BLD CALC: 38.3 % — SIGNIFICANT CHANGE UP (ref 34.5–45)
HGB BLD-MCNC: 12.9 G/DL — SIGNIFICANT CHANGE UP (ref 11.5–15.5)
MCHC RBC-ENTMCNC: 29.6 PG — SIGNIFICANT CHANGE UP (ref 27–34)
MCHC RBC-ENTMCNC: 33.7 GM/DL — SIGNIFICANT CHANGE UP (ref 32–36)
MCV RBC AUTO: 87.8 FL — SIGNIFICANT CHANGE UP (ref 80–100)
NRBC # BLD: 0 /100 WBCS — SIGNIFICANT CHANGE UP (ref 0–0)
PLATELET # BLD AUTO: 284 K/UL — SIGNIFICANT CHANGE UP (ref 150–400)
POTASSIUM SERPL-MCNC: 3.7 MMOL/L — SIGNIFICANT CHANGE UP (ref 3.5–5.3)
POTASSIUM SERPL-SCNC: 3.7 MMOL/L — SIGNIFICANT CHANGE UP (ref 3.5–5.3)
RBC # BLD: 4.36 M/UL — SIGNIFICANT CHANGE UP (ref 3.8–5.2)
RBC # FLD: 13.3 % — SIGNIFICANT CHANGE UP (ref 10.3–14.5)
SARS-COV-2 RNA SPEC QL NAA+PROBE: SIGNIFICANT CHANGE UP
SODIUM SERPL-SCNC: 141 MMOL/L — SIGNIFICANT CHANGE UP (ref 135–145)
WBC # BLD: 5.46 K/UL — SIGNIFICANT CHANGE UP (ref 3.8–10.5)
WBC # FLD AUTO: 5.46 K/UL — SIGNIFICANT CHANGE UP (ref 3.8–10.5)

## 2020-08-01 RX ORDER — METRONIDAZOLE 500 MG
500 TABLET ORAL ONCE
Refills: 0 | Status: COMPLETED | OUTPATIENT
Start: 2020-08-01 | End: 2020-08-01

## 2020-08-01 RX ORDER — CIPROFLOXACIN LACTATE 400MG/40ML
400 VIAL (ML) INTRAVENOUS ONCE
Refills: 0 | Status: COMPLETED | OUTPATIENT
Start: 2020-08-01 | End: 2020-08-01

## 2020-08-01 RX ORDER — LACTULOSE 10 G/15ML
20 SOLUTION ORAL ONCE
Refills: 0 | Status: COMPLETED | OUTPATIENT
Start: 2020-08-01 | End: 2020-08-01

## 2020-08-01 RX ORDER — LACTULOSE 10 G/15ML
15 SOLUTION ORAL ONCE
Refills: 0 | Status: COMPLETED | OUTPATIENT
Start: 2020-08-01 | End: 2020-08-01

## 2020-08-01 RX ORDER — METRONIDAZOLE 500 MG
TABLET ORAL
Refills: 0 | Status: DISCONTINUED | OUTPATIENT
Start: 2020-08-01 | End: 2020-08-03

## 2020-08-01 RX ORDER — HYDRALAZINE HCL 50 MG
25 TABLET ORAL ONCE
Refills: 0 | Status: COMPLETED | OUTPATIENT
Start: 2020-08-01 | End: 2020-08-01

## 2020-08-01 RX ORDER — CIPROFLOXACIN LACTATE 400MG/40ML
VIAL (ML) INTRAVENOUS
Refills: 0 | Status: DISCONTINUED | OUTPATIENT
Start: 2020-08-01 | End: 2020-08-03

## 2020-08-01 RX ORDER — METRONIDAZOLE 500 MG
500 TABLET ORAL EVERY 8 HOURS
Refills: 0 | Status: DISCONTINUED | OUTPATIENT
Start: 2020-08-02 | End: 2020-08-03

## 2020-08-01 RX ORDER — CIPROFLOXACIN LACTATE 400MG/40ML
400 VIAL (ML) INTRAVENOUS EVERY 12 HOURS
Refills: 0 | Status: DISCONTINUED | OUTPATIENT
Start: 2020-08-02 | End: 2020-08-03

## 2020-08-01 RX ADMIN — Medication 30 MILLILITER(S): at 03:07

## 2020-08-01 RX ADMIN — Medication 1000 UNIT(S): at 11:34

## 2020-08-01 RX ADMIN — Medication 50 MILLIGRAM(S): at 05:49

## 2020-08-01 RX ADMIN — Medication 400 MILLIGRAM(S): at 22:31

## 2020-08-01 RX ADMIN — Medication 400 MILLIGRAM(S): at 05:50

## 2020-08-01 RX ADMIN — Medication 30 MILLILITER(S): at 11:41

## 2020-08-01 RX ADMIN — PREGABALIN 1000 MICROGRAM(S): 225 CAPSULE ORAL at 11:33

## 2020-08-01 RX ADMIN — Medication 1 MILLIGRAM(S): at 16:01

## 2020-08-01 RX ADMIN — Medication 1 MILLIGRAM(S): at 23:39

## 2020-08-01 RX ADMIN — Medication 650 MILLIGRAM(S): at 12:55

## 2020-08-01 RX ADMIN — Medication 1 MILLIGRAM(S): at 11:34

## 2020-08-01 RX ADMIN — Medication 0.2 MILLIGRAM(S): at 05:51

## 2020-08-01 RX ADMIN — Medication 0.2 MILLIGRAM(S): at 15:59

## 2020-08-01 RX ADMIN — Medication 25 MILLIGRAM(S): at 00:56

## 2020-08-01 RX ADMIN — LINEZOLID 600 MILLIGRAM(S): 600 INJECTION, SOLUTION INTRAVENOUS at 05:50

## 2020-08-01 RX ADMIN — APIXABAN 5 MILLIGRAM(S): 2.5 TABLET, FILM COATED ORAL at 07:05

## 2020-08-01 RX ADMIN — Medication 1 MILLIGRAM(S): at 06:06

## 2020-08-01 RX ADMIN — Medication 0.2 MILLIGRAM(S): at 22:32

## 2020-08-01 RX ADMIN — Medication 50 MILLIGRAM(S): at 16:00

## 2020-08-01 RX ADMIN — Medication 1 TABLET(S): at 11:35

## 2020-08-01 RX ADMIN — Medication 75 MICROGRAM(S): at 05:54

## 2020-08-01 RX ADMIN — BENZOCAINE AND MENTHOL 1 LOZENGE: 5; 1 LIQUID ORAL at 16:00

## 2020-08-01 RX ADMIN — LACTULOSE 15 GRAM(S): 10 SOLUTION ORAL at 09:34

## 2020-08-01 RX ADMIN — Medication 400 MILLIGRAM(S): at 16:00

## 2020-08-01 RX ADMIN — PANTOPRAZOLE SODIUM 40 MILLIGRAM(S): 20 TABLET, DELAYED RELEASE ORAL at 05:50

## 2020-08-01 RX ADMIN — CLOPIDOGREL BISULFATE 75 MILLIGRAM(S): 75 TABLET, FILM COATED ORAL at 11:34

## 2020-08-01 RX ADMIN — Medication 81 MILLIGRAM(S): at 11:34

## 2020-08-01 RX ADMIN — Medication 50 MILLIGRAM(S): at 22:32

## 2020-08-01 RX ADMIN — LACTULOSE 20 GRAM(S): 10 SOLUTION ORAL at 23:36

## 2020-08-01 RX ADMIN — Medication 650 MILLIGRAM(S): at 12:45

## 2020-08-01 RX ADMIN — POLYETHYLENE GLYCOL 3350 17 GRAM(S): 17 POWDER, FOR SOLUTION ORAL at 11:35

## 2020-08-01 RX ADMIN — SENNA PLUS 2 TABLET(S): 8.6 TABLET ORAL at 22:32

## 2020-08-01 RX ADMIN — APIXABAN 5 MILLIGRAM(S): 2.5 TABLET, FILM COATED ORAL at 18:43

## 2020-08-01 NOTE — CHART NOTE - NSCHARTNOTEFT_GEN_A_CORE
CC: constipation      Vital Signs Last 24 Hrs  T(C): 36.7 (01 Aug 2020 21:05), Max: 36.9 (01 Aug 2020 06:10)  T(F): 98 (01 Aug 2020 21:05), Max: 98.5 (01 Aug 2020 06:10)  HR: 71 (01 Aug 2020 21:05) (69 - 80)  BP: 178/87 (01 Aug 2020 21:05) (128/87 - 195/99)  BP(mean): --  RR: 18 (01 Aug 2020 21:05) (17 - 18)  SpO2: 97% (01 Aug 2020 21:05) (95% - 100%) Pt c/o constipation x 1 day, has had lactulose ordered last night, which she never took much of as per RN, fleet enema was ordered early in day but pt did not retain as per PCA. Pt with fecal retention on CT and c/o no BM, had very small hard BM's today as per pt and primary nsg team.    Brief Exam  General: NAD  Resp: clear  CV: S1S2  ABD: soft NTND    Vital Signs Last 24 Hrs  T(C): 36.7 (01 Aug 2020 21:05), Max: 36.9 (01 Aug 2020 06:10)  T(F): 98 (01 Aug 2020 21:05), Max: 98.5 (01 Aug 2020 06:10)  HR: 71 (01 Aug 2020 21:05) (69 - 80)  BP: 178/87 (01 Aug 2020 21:05) (128/87 - 195/99)  BP(mean): --  RR: 18 (01 Aug 2020 21:05) (17 - 18)  SpO2: 97% (01 Aug 2020 21:05) (95% - 100%)    A/P  Constipation  - lactulose 30ml x 1 now (she complied)

## 2020-08-01 NOTE — PROGRESS NOTE ADULT - SUBJECTIVE AND OBJECTIVE BOX
71 y/o F with PMHx of obesity, HTN, Hypothyroidism and lymphedema presented to ED for dizziness.  In ED, CT head was negative, admitted for afib with RVR and NSTEMI. LE dopplers negative for DVT. CXR negative. Cardiology following. Echo on 7/21 showed LV hypertrophy and stage I diastolic dysfunction. Patient refused heparin drip and lovenox. Was eventually started on oral AC (Eliquis). Stress test was negative for any reversible ischemia. After stress test patient was nauseous and hypertensive. Managed with Zofran and Reglan. Due to nausea missed afternoon meds, received IV labetalol and hydralazine without improvement in BP. Critical care was consulted and patient was placed back on full home regimen of HTN medications. Critical care followed patient overnight with stabilization of BP. Some meds were held to avoid hypotension. BP was monitored and medications optimized. Pt with ANIKA on admission, improved with IV hydration. UA (+), was on Ceftriaxone. Urine cx grew enterococcus, sensitivities testing. ID was consulted. Transitioned to PO Zyvox. Pt complaining of abdominal pain and constipation today, plan for CT abdomen and pelvis non contrast.      INTERVAL HPI/OVERNIGHT EVENTS: Complaining of abdominal pain, bloating, and constipation   _____________________________________  REVIEW OF SYSTEMS:    CONSTITUTIONAL: No fever,   EYES: no acute visual disturbances  NECK: No pain or stiffness  RESPIRATORY: No cough; No shortness of breath  CARDIOVASCULAR: No chest pain, no palpitations  GASTROINTESTINAL: No pain. No nausea or vomiting; No diarrhea   NEUROLOGICAL: No headache or numbness, no tremors  MUSCULOSKELETAL: No joint pain, no muscle pain  GENITOURINARY: no dysuria, no frequency, no hesitancy  PSYCHIATRY: no depression , no anxiety  ALL OTHER  ROS negative        T(C): 36.7 (08-01-20 @ 21:05), Max: 36.7 (08-01-20 @ 13:06)  HR: 71 (08-01-20 @ 21:05) (71 - 80)  BP: 178/87 (08-01-20 @ 21:05) (128/87 - 178/87)  RR: 18 (08-01-20 @ 21:05) (18 - 18)  SpO2: 97% (08-01-20 @ 21:05) (97% - 99%)    ________________________________________________  PHYSICAL EXAM:  GENERAL: NAD  HEENT: Normocephalic;  conjunctivae and sclerae clear; moist mucous membranes;   NECK : supple  CHEST/LUNG: Clear to auscultation bilaterally with good air entry   HEART: S1 S2  regular; no murmurs, gallops or rubs  ABDOMEN: Obese, Soft, Nontender, Nondistended; Bowel sounds present  EXTREMITIES: chronic venous stasis of bilateral lower legs   SKIN: warm and dry; no rash  NERVOUS SYSTEM:  Awake and alert; Oriented  to place, person and time ; no new deficits    _________________________________________________  LABS:                        13.1   6.20  )-----------( 291      ( 31 Jul 2020 07:18 )             39.5     07-31    138  |  107  |  25<H>  ----------------------------<  133<H>  3.7   |  25  |  1.23    Ca    8.7      31 Jul 2020 07:18      CAPILLARY BLOOD GLUCOSE      POCT Blood Glucose.: 119 mg/dL (01 Aug 2020 22:13)  POCT Blood Glucose.: 123 mg/dL (01 Aug 2020 16:50)  POCT Blood Glucose.: 133 mg/dL (01 Aug 2020 11:14)  POCT Blood Glucose.: 126 mg/dL (01 Aug 2020 07:52)      RADIOLOGY & ADDITIONAL TESTS:    Imaging  Reviewed:  YES    Consultant(s) Notes Reviewed:   YES      Plan of care was discussed with patient and /or primary care giver; all questions and concerns were addressed

## 2020-08-01 NOTE — PROGRESS NOTE ADULT - PROBLEM SELECTOR PLAN 3
Resolved with IV hydration   Avoid nephrotoxins   Daily [facility]  Nephrology Dr. Zhang

## 2020-08-01 NOTE — PROGRESS NOTE ADULT - ASSESSMENT
Patient is a 69yo Female with HTN, Hypothyroidism, lymphedema p/w dizziness a/w Afib with RVR, NSTEMI and ANIKA.  Nephrology consulted for Elevated serum creatinine.     1. ANIKA- likely hemodynamically mediated in the setting of decreased PO intake with hypotension/ Afib. Initially improved s/p IVF; then with increasing Scr in the setting of n/v; lack of appetite.  Renal function improving; encourage PO intake.  hyaline casts on UA and low FeNa. Urine culture Enterococcus- completed Zyvox. Strict I/Os. Avoid nephrotoxins/ NSAIDs/ RCA. Monitor BMP.  2. Hypokalemia- Resolved,.  Monitor lytes.   3. Afib with RVR- plan as per cardiology.   4. Essential HTN- BP elevated, for which Hydralazine was increased to 50mg PO q8hrs, titrate as needed. Monitor BP.

## 2020-08-01 NOTE — PROGRESS NOTE ADULT - PROBLEM SELECTOR PLAN 1
Complaining of constipation  Had small amount of hard stool this AM  Given laxatives, enema, and suppository today   CT abdomen and pelvis non contrast shows colitis with moderate stool burden.  Will start Cipro and Flagyl.

## 2020-08-01 NOTE — PROGRESS NOTE ADULT - SUBJECTIVE AND OBJECTIVE BOX
[facility] NEPHROLOGY- PROGRESS NOTE    Patient is a 71yo Female with HTN, Hypothyroidism, lymphedema p/w dizziness a/w Afib with RVR, NSTEMI and ANIKA.  Nephrology consulted for Elevated serum creatinine.     Hospital Medications: Medications reviewed.      REVIEW OF SYSTEMS:  CONSTITUTIONAL: No fevers or chills  RESPIRATORY: No shortness of breath  CARDIOVASCULAR: No chest pain.  GASTROINTESTINAL: No diarrhea. No n/v/d. +abd pain/ constipation;  VASCULAR: No bilateral lower extremity edema.     VITALS:  T(F): 98 (20 @ 13:06), Max: 98.5 (20 @ 06:10)  HR: 78 (20 @ 14:29)  BP: 168/89 (20 @ 14:29)  RR: 18 (20 @ 13:06)  SpO2: 99% (20 @ 13:06)  Wt(kg): --      PHYSICAL EXAM:   General: NAD  Respiratory: CTA b/l   Cardiovascular: S1, S2, RRR  Gastrointestinal: BS+, soft, mild distention, no pain on palpation  Extremities: No peripheral edema  hyperpigmentation of b/l LE      LABS:      141  |  109<H>  |  20<H>  ----------------------------<  120<H>  3.7   |  27  |  1.06    Ca    8.4      01 Aug 2020 06:12      Creatinine Trend: 1.06 <--, 1.23 <--, 1.27 <--, 1.46 <--, 1.66 <--, 1.49 <--                        12.9   5.46  )-----------( 284      ( 01 Aug 2020 06:12 )             38.3     Urine Studies:  Urinalysis Basic - ( 2020 16:29 )    Color: Yellow / Appearance: Clear / S.020 / pH:   Gluc:  / Ketone: Trace  / Bili: Small / Urobili: 1   Blood:  / Protein: 30 mg/dL / Nitrite: Negative   Leuk Esterase: Moderate / RBC: 25-50 /HPF / WBC 11-25 /HPF   Sq Epi:  / Non Sq Epi: Few /HPF / Bacteria: Few /HPF      Sodium, Random Urine: 19 mmol/L (07-28 @ 15:57)  Osmolality, Random Urine: 532 mos/kg ( @ 15:57)  Creatinine, Random Urine: 126 mg/dL ( @ 15:57)  Sodium, Random Urine: 30 mmol/L ( @ 16:29)  Creatinine, Random Urine: 224 mg/dL ( @ 16:29)

## 2020-08-01 NOTE — CHART NOTE - NSCHARTNOTEFT_GEN_A_CORE
Call by RN to assess pt for uncontrolled and escalating BP. Chart reviewed, blood pressure noted with SBP range 190's past 48 hrs and mostly poorly controlled since admission 7/20/2020. Pt asymptomatic denies chest pain, headache, dizziness, vision changes, N/V, Chart reviewed    Vital Signs Last 24 Hrs  T(C): 36.6 (01 Aug 2020 00:00), Max: 36.6 (31 Jul 2020 05:15)  T(F): 97.9 (01 Aug 2020 00:00), Max: 97.9 (31 Jul 2020 13:51)  HR: 69 (01 Aug 2020 00:00) (69 - 79)  BP: 195/99 (01 Aug 2020 00:00) (164/83 - 195/99)  BP(mean): --  RR: 17 (01 Aug 2020 00:00) (16 - 18)  SpO2: 98% (01 Aug 2020 00:00) (97% - 100%)    70 year old Woman with hx of obesity, HTN, Hypothyroidism and lymphedema who on 7/20 presented to [facility] ED with c/o same day lightheadedness and dizziness.  In ED initial BP finding with 88/52 repeated at 181/106. CTH negative for acute pathology, and REMY with AFIB RVR-new onset for which she was treated with digoxin (was loaded), and diltiazem. She was admitted for further eval. BP was relatively controlled and on 7/23 pt underwent MPI, with nausea and hypertensive post MPI, nausea abated but BP remains elevated.    Essential hypertension poorly controlled  appreciate renal (Dr Zhang) input-->increase hydralazine if BP remains elevated; will increase to 50mg Qhrs  monitor vitals per routine  con't remaining treatment plan for afib, [place]on, enterococcus UTI  d/w primary RN

## 2020-08-02 DIAGNOSIS — K56.41 FECAL IMPACTION: ICD-10-CM

## 2020-08-02 LAB
GLUCOSE BLDC GLUCOMTR-MCNC: 124 MG/DL — HIGH (ref 70–99)
GLUCOSE BLDC GLUCOMTR-MCNC: 128 MG/DL — HIGH (ref 70–99)
GLUCOSE BLDC GLUCOMTR-MCNC: 139 MG/DL — HIGH (ref 70–99)
GLUCOSE BLDC GLUCOMTR-MCNC: 155 MG/DL — HIGH (ref 70–99)

## 2020-08-02 RX ORDER — POLYETHYLENE GLYCOL 3350 17 G/17G
17 POWDER, FOR SOLUTION ORAL
Refills: 0 | Status: DISCONTINUED | OUTPATIENT
Start: 2020-08-02 | End: 2020-08-03

## 2020-08-02 RX ADMIN — Medication 400 MILLIGRAM(S): at 22:15

## 2020-08-02 RX ADMIN — Medication 0.2 MILLIGRAM(S): at 22:14

## 2020-08-02 RX ADMIN — CLOPIDOGREL BISULFATE 75 MILLIGRAM(S): 75 TABLET, FILM COATED ORAL at 12:56

## 2020-08-02 RX ADMIN — Medication 0.2 MILLIGRAM(S): at 16:04

## 2020-08-02 RX ADMIN — APIXABAN 5 MILLIGRAM(S): 2.5 TABLET, FILM COATED ORAL at 17:02

## 2020-08-02 RX ADMIN — ATORVASTATIN CALCIUM 80 MILLIGRAM(S): 80 TABLET, FILM COATED ORAL at 22:14

## 2020-08-02 RX ADMIN — Medication 50 MILLIGRAM(S): at 06:20

## 2020-08-02 RX ADMIN — Medication 1 MILLIGRAM(S): at 12:57

## 2020-08-02 RX ADMIN — PREGABALIN 1000 MICROGRAM(S): 225 CAPSULE ORAL at 12:57

## 2020-08-02 RX ADMIN — Medication 1 MILLIGRAM(S): at 16:19

## 2020-08-02 RX ADMIN — Medication 1 TABLET(S): at 12:56

## 2020-08-02 RX ADMIN — SENNA PLUS 2 TABLET(S): 8.6 TABLET ORAL at 22:15

## 2020-08-02 RX ADMIN — Medication 400 MILLIGRAM(S): at 06:19

## 2020-08-02 RX ADMIN — Medication 1 MILLIGRAM(S): at 09:32

## 2020-08-02 RX ADMIN — Medication 50 MILLIGRAM(S): at 22:14

## 2020-08-02 RX ADMIN — Medication 400 MILLIGRAM(S): at 16:02

## 2020-08-02 RX ADMIN — Medication 81 MILLIGRAM(S): at 12:57

## 2020-08-02 RX ADMIN — PANTOPRAZOLE SODIUM 40 MILLIGRAM(S): 20 TABLET, DELAYED RELEASE ORAL at 06:20

## 2020-08-02 RX ADMIN — BENZOCAINE AND MENTHOL 1 LOZENGE: 5; 1 LIQUID ORAL at 22:14

## 2020-08-02 RX ADMIN — BENZOCAINE AND MENTHOL 1 LOZENGE: 5; 1 LIQUID ORAL at 15:39

## 2020-08-02 RX ADMIN — Medication 1 MILLIGRAM(S): at 22:19

## 2020-08-02 RX ADMIN — APIXABAN 5 MILLIGRAM(S): 2.5 TABLET, FILM COATED ORAL at 09:34

## 2020-08-02 RX ADMIN — POLYETHYLENE GLYCOL 3350 17 GRAM(S): 17 POWDER, FOR SOLUTION ORAL at 17:02

## 2020-08-02 RX ADMIN — Medication 1: at 16:41

## 2020-08-02 RX ADMIN — Medication 50 MILLIGRAM(S): at 16:03

## 2020-08-02 RX ADMIN — Medication 1000 UNIT(S): at 12:56

## 2020-08-02 RX ADMIN — Medication 75 MICROGRAM(S): at 06:20

## 2020-08-02 RX ADMIN — Medication 0.2 MILLIGRAM(S): at 06:20

## 2020-08-02 NOTE — PROGRESS NOTE ADULT - SUBJECTIVE AND OBJECTIVE BOX
[facility] NEPHROLOGY- PROGRESS NOTE    Patient is a 71yo Female with HTN, Hypothyroidism, lymphedema p/w dizziness a/w Afib with RVR, NSTEMI and ANIKA.  Nephrology consulted for Elevated serum creatinine.     Hospital Medications: Medications reviewed.      REVIEW OF SYSTEMS:   CONSTITUTIONAL: No fevers or chills  RESPIRATORY: No shortness of breath  CARDIOVASCULAR: No chest pain.  GASTROINTESTINAL: No diarrhea. No n/v/d. +abd pain/ +BM today  VASCULAR: No bilateral lower extremity edema.     VITALS:  T(F): 98.2 (20 @ 13:20), Max: 98.4 (20 @ 05:51)  HR: 75 (20 @ 13:20)  BP: 157/72 (20 @ 13:20)  RR: 18 (20 @ 13:20)  SpO2: 95% (20 @ 13:20)  Wt(kg): --        PHYSICAL EXAM:   General: NAD  Respiratory: CTA b/l   Cardiovascular: S1, S2, RRR  Gastrointestinal: BS+, soft, NT  Extremities: No peripheral edema  hyperpigmentation of b/l LE      LABS: No labs     141  |  109<H>  |  20<H>  ----------------------------<  120<H>  3.7   |  27  |  1.06    Ca    8.4      01 Aug 2020 06:12      Creatinine Trend: 1.06 <--, 1.23 <--, 1.27 <--, 1.46 <--, 1.66 <--, 1.49 <--                        12.9   5.46  )-----------( 284      ( 01 Aug 2020 06:12 )             38.3     Urine Studies:  Urinalysis Basic - ( 2020 16:29 )    Color: Yellow / Appearance: Clear / S.020 / pH:   Gluc:  / Ketone: Trace  / Bili: Small / Urobili: 1   Blood:  / Protein: 30 mg/dL / Nitrite: Negative   Leuk Esterase: Moderate / RBC: 25-50 /HPF / WBC 11-25 /HPF   Sq Epi:  / Non Sq Epi: Few /HPF / Bacteria: Few /HPF      Sodium, Random Urine: 19 mmol/L ( @ 15:57)  Osmolality, Random Urine: 532 mos/kg ( @ 15:57)  Creatinine, Random Urine: 126 mg/dL ( @ 15:57)  Sodium, Random Urine: 30 mmol/L ( @ 16:29)  Creatinine, Random Urine: 224 mg/dL ( @ 16:29)

## 2020-08-02 NOTE — PROGRESS NOTE ADULT - SUBJECTIVE AND OBJECTIVE BOX
70y Female    Meds:  ciprofloxacin   IVPB      ciprofloxacin   IVPB 400 milliGRAM(s) IV Intermittent every 12 hours  metroNIDAZOLE  IVPB      metroNIDAZOLE  IVPB 500 milliGRAM(s) IV Intermittent every 8 hours    Allergies    penicillin (Hives)  penicillin (Rash)  sulfa drugs (Unknown)  Tetracycline Hydrochloride (Unknown)    Intolerances        VITALS:  Vital Signs Last 24 Hrs  T(C): 36.8 (02 Aug 2020 13:20), Max: 36.9 (02 Aug 2020 05:51)  T(F): 98.2 (02 Aug 2020 13:20), Max: 98.4 (02 Aug 2020 05:51)  HR: 79 (02 Aug 2020 15:37) (71 - 80)  BP: 143/68 (02 Aug 2020 15:37) (127/75 - 178/87)  BP(mean): 93 (02 Aug 2020 13:20) (93 - 93)  RR: 18 (02 Aug 2020 13:20) (18 - 18)  SpO2: 95% (02 Aug 2020 13:20) (95% - 97%)    LABS/DIAGNOSTIC TESTS:                          12.9   5.46  )-----------( 284      ( 01 Aug 2020 06:12 )             38.3         08-01    141  |  109<H>  |  20<H>  ----------------------------<  120<H>  3.7   |  27  |  1.06    Ca    8.4      01 Aug 2020 06:12            CULTURES: .Urine Clean Catch (Midstream)  07-26 @ 22:21   >100,000 CFU/ml Enterococcus faecalis  --  Enterococcus faecalis            RADIOLOGY:< from: CT Abdomen and Pelvis No Cont (07.31.20 @ 19:40) >  EXAM:  CT ABDOMEN AND PELVIS                            PROCEDURE DATE:  07/31/2020          INTERPRETATION:  CLINICAL STATEMENT: abdominal pain    TECHNIQUE: CT of the abdomen and pelvis was performed without IV and oral contrast.    COMPARISON: CT abdomen pelvis 11/6/2019    FINDINGS:  Atelectasis lung bases. Cardiomegaly.    The evaluation of the abdominal viscera and the bowel is limited without contrast.    The liver, gallbladder are grossly unremarkable.    The spleen, pancreas, right adrenal gland are grossly unremarkable. Thickening of left adrenal gland    No hydronephrosis. Nonobstructive bilateral renal stones noted measuring 4 mm on the left and 6 mm on the right.    Colonic diverticulosis. Suggestion of duodenal diverticulum. Evaluation limited due to lack of oral contrast    There is large amount of stool in the colon. Long segment. Colonic inflammatory stranding adjacent to the descending and proximal sigmoid colon most compatible with colitis. Multiple nondependent air likely trapped air pockets adjacent to stool.    One exception of linear air on axial series 2 image 80 and coronal image 55. Early ischemic changes not excluded    There is no intraperitoneal free air.  Small free fluid    Urinary bladder underdistended. No gross adnexal mass.    Small fat-containing periumbilical and inguinal hernias noted.    Degenerative changes noted.    IMPRESSION:  Colitis at the descending and proximal sigmoid colon with large amount of stool as described above. Focal linear air appearance in the mid descending colon as described above. Early ischemic changes not excluded. Correlate clinically    No bowel obstruction.    Additional findings as described above              ANTHONY HENDRIKCS M.D., ATTENDING RADIOLOGIST  This document has been electronically signed. Jul 31 2020  8:17PM        < end of copied text >        ROS:  [  ] UNABLE TO ELICIT 70y Female who I was asked to reevaluate as she has been c/o abdominal pain and had a CT abdomen and showed her to have some colitis  of her descending colon and sigmoid colon, she is also full of stool. She has been given multiple laxatives and then 2 enemas and 2 only 2 BM's (solid). She c/o about everything bothering her stomach including the Zyvox she was on, she seems absolutely comfortable when I went to see her and has no abdominal pain now , she has no fevers , chills , diarrhea or leukocytosis. She also has no IV access and does not want one. She is being very difficult with the staff as well.    Meds:  ciprofloxacin   IVPB      ciprofloxacin   IVPB 400 milliGRAM(s) IV Intermittent every 12 hours  metroNIDAZOLE  IVPB      metroNIDAZOLE  IVPB 500 milliGRAM(s) IV Intermittent every 8 hours    Allergies    penicillin (Hives)  penicillin (Rash)  sulfa drugs (Unknown)  Tetracycline Hydrochloride (Unknown)    Intolerances        VITALS:  Vital Signs Last 24 Hrs  T(C): 36.8 (02 Aug 2020 13:20), Max: 36.9 (02 Aug 2020 05:51)  T(F): 98.2 (02 Aug 2020 13:20), Max: 98.4 (02 Aug 2020 05:51)  HR: 79 (02 Aug 2020 15:37) (71 - 80)  BP: 143/68 (02 Aug 2020 15:37) (127/75 - 178/87)  BP(mean): 93 (02 Aug 2020 13:20) (93 - 93)  RR: 18 (02 Aug 2020 13:20) (18 - 18)  SpO2: 95% (02 Aug 2020 13:20) (95% - 97%)    LABS/DIAGNOSTIC TESTS:                          12.9   5.46  )-----------( 284      ( 01 Aug 2020 06:12 )             38.3         08-01    141  |  109<H>  |  20<H>  ----------------------------<  120<H>  3.7   |  27  |  1.06    Ca    8.4      01 Aug 2020 06:12            CULTURES: .Urine Clean Catch (Midstream)  07-26 @ 22:21   >100,000 CFU/ml Enterococcus faecalis  --  Enterococcus faecalis            RADIOLOGY:< from: CT Abdomen and Pelvis No Cont (07.31.20 @ 19:40) >  EXAM:  CT ABDOMEN AND PELVIS                            PROCEDURE DATE:  07/31/2020          INTERPRETATION:  CLINICAL STATEMENT: abdominal pain    TECHNIQUE: CT of the abdomen and pelvis was performed without IV and oral contrast.    COMPARISON: CT abdomen pelvis 11/6/2019    FINDINGS:  Atelectasis lung bases. Cardiomegaly.    The evaluation of the abdominal viscera and the bowel is limited without contrast.    The liver, gallbladder are grossly unremarkable.    The spleen, pancreas, right adrenal gland are grossly unremarkable. Thickening of left adrenal gland    No hydronephrosis. Nonobstructive bilateral renal stones noted measuring 4 mm on the left and 6 mm on the right.    Colonic diverticulosis. Suggestion of duodenal diverticulum. Evaluation limited due to lack of oral contrast    There is large amount of stool in the colon. Long segment. Colonic inflammatory stranding adjacent to the descending and proximal sigmoid colon most compatible with colitis. Multiple nondependent air likely trapped air pockets adjacent to stool.    One exception of linear air on axial series 2 image 80 and coronal image 55. Early ischemic changes not excluded    There is no intraperitoneal free air.  Small free fluid    Urinary bladder underdistended. No gross adnexal mass.    Small fat-containing periumbilical and inguinal hernias noted.    Degenerative changes noted.    IMPRESSION:  Colitis at the descending and proximal sigmoid colon with large amount of stool as described above. Focal linear air appearance in the mid descending colon as described above. Early ischemic changes not excluded. Correlate clinically    No bowel obstruction.    Additional findings as described above              ANTHONY HENDRICKS M.D., ATTENDING RADIOLOGIST  This document has been electronically signed. Jul 31 2020  8:17PM        < end of copied text >        ROS:  [  ] UNABLE TO ELICIT

## 2020-08-02 NOTE — CHART NOTE - NSCHARTNOTEFT_GEN_A_CORE
Patient is a 70y old  Female who presents with a chief complaint of Dizziness (01 Aug 2020 16:19)  Vital Signs Last 24 Hrs  T(F): 98.4 (02 Aug 2020 05:51), Max: 98.4 (02 Aug 2020 05:51)  HR: 75 (02 Aug 2020 05:51) (71 - 80)  BP: 127/75 (02 Aug 2020 06:57) (127/75 - 178/87)  RR: 18 (02 Aug 2020 05:51) (18 - 18)  SpO2: 95% (02 Aug 2020 05:51) (95% - 99%)    LABS:                      12.9   5.46  )-----------( 284      ( 01 Aug 2020 06:12 )             38.3   08-01  141  |  109<H>  |  20<H>  ----------------------------<  120<H>  3.7   |  27  |  1.06    Ca    8.4      01 Aug 2020 06:12      REVIEW OF SYSTEMS:  CONSTITUTIONAL: + fatigue  RESPIRATORY: No cough, wheezing, chills or hemoptysis; No shortness of breath  CARDIOVASCULAR: No chest pain  GASTROINTESTINAL: generalized abdominal pain, had large BM today, eating well   NEUROLOGICAL: No headaches  MUSCULOSKELETAL: No muscle, back, or extremity pain    PHYSICAL EXAM:  GENERAL: found in bed, in no distress   HEAD:  Atraumatic  EYES: conjunctiva and sclera clear  ENMT: moist mucous membranes  NECK: Supple  NERVOUS SYSTEM:  Alert & Oriented Xx  CHEST/LUNG: Clear to auscultation bilaterally  HEART: Regular rate and rhythm  ABDOMEN: Soft, minimal generalized tenderness, obese but non-distended; bowel sounds present  EXTREMITIES:  moving all extremities     A/P  Patient was scheduled for D/C 7/31/20 but to facilitate observance Sabbath re-scheduled for today 8/2/20   EMS/transportation at the bedside - patient states is not feeling well, feels generalized weakness after bowel movement and needs one more day of hospitalization to feel better.   Per ID Dr Pettit no abtx recommended at this time as patient completed full course during hospitalization.  Case discussed with attending Dr Francis, she will re-evaluate patient and tailor further medical plan of care       Care Collaborated Discussed with Consultants/Other Providers  YES  [ x] Dr Pettit and attending Dr Francis and case management Mindy

## 2020-08-02 NOTE — CHART NOTE - NSCHARTNOTEFT_GEN_A_CORE
Patient is a 70y old  Female who presents with a chief complaint of Dizziness (02 Aug 2020 14:02)  as per attending surgery/house staff officer Dr Chatterjee consulted to address abdominal CT findings     Vital Signs Last 24 Hrs  T(F): 98.2 (02 Aug 2020 13:20), Max: 98.4 (02 Aug 2020 05:51)  HR: 79 (02 Aug 2020 15:37) (71 - 80)  BP: 143/68 (02 Aug 2020 15:37) (127/75 - 178/87)  BP(mean): 93 (02 Aug 2020 13:20) (93 - 93)  RR: 18 (02 Aug 2020 13:20) (18 - 18)  SpO2: 95% (02 Aug 2020 13:20) (95% - 97%)    f/u surgery consult recommendations     Care Collaborated Discussed with Consultants/Other Providers [x] YES  [ ] NO

## 2020-08-02 NOTE — PROGRESS NOTE ADULT - PROBLEM SELECTOR PLAN 3
Resolved with IV hydration   Avoid nephrotoxins   [facility]  Nephrology Dr. Zhang

## 2020-08-02 NOTE — PROGRESS NOTE ADULT - SUBJECTIVE AND OBJECTIVE BOX
71 y/o F with PMHx of obesity, HTN, Hypothyroidism and lymphedema presented to ED for dizziness.  In ED, CT head was negative, admitted for afib with RVR and NSTEMI. LE dopplers negative for DVT. CXR negative. Cardiology following. Echo on 7/21 showed LV hypertrophy and stage I diastolic dysfunction. Patient refused heparin drip and lovenox. Was eventually started on oral AC (Eliquis). Stress test was negative for any reversible ischemia. After stress test patient was nauseous and hypertensive. Managed with Zofran and Reglan. Due to nausea missed afternoon meds, received IV labetalol and hydralazine without improvement in BP. Critical care was consulted and patient was placed back on full home regimen of HTN medications. Critical care followed patient overnight with stabilization of BP. Some meds were held to avoid hypotension. BP was monitored and medications optimized. Pt with ANIKA on admission, improved with IV hydration. UA (+), was on Ceftriaxone. Urine cx grew enterococcus, sensitivities testing. ID was consulted. Transitioned to PO Zyvox. Pt complaining of abdominal pain and constipation today, plan for CT abdomen and pelvis non contrast.      INTERVAL HPI/OVERNIGHT EVENTS: Complaining of abdominal pain, bloating, and constipation   _____________________________________  REVIEW OF SYSTEMS:    CONSTITUTIONAL: No fever,   EYES: no acute visual disturbances  NECK: No pain or stiffness  RESPIRATORY: No cough; No shortness of breath  CARDIOVASCULAR: No chest pain, no palpitations  GASTROINTESTINAL: No pain. No nausea or vomiting; No diarrhea   NEUROLOGICAL: No headache or numbness, no tremors  MUSCULOSKELETAL: No joint pain, no muscle pain  GENITOURINARY: no dysuria, no frequency, no hesitancy  PSYCHIATRY: no depression , no anxiety  ALL OTHER  ROS negative      T(C): 36.8 (08-02-20 @ 13:20), Max: 36.9 (08-02-20 @ 05:51)  HR: 75 (08-02-20 @ 13:20) (75 - 75)  BP: 157/72 (08-02-20 @ 13:20) (127/75 - 166/108)  RR: 18 (08-02-20 @ 13:20) (18 - 18)  SpO2: 95% (08-02-20 @ 13:20) (95% - 95%)  ________________________________________________  PHYSICAL EXAM:  GENERAL: NAD  HEENT: Normocephalic;  conjunctivae and sclerae clear; moist mucous membranes;   NECK : supple  CHEST/LUNG: Clear to auscultation bilaterally with good air entry   HEART: S1 S2  regular; no murmurs, gallops or rubs  ABDOMEN: Obese, Soft, Nontender, Nondistended; Bowel sounds present  EXTREMITIES: chronic venous stasis of bilateral lower legs   SKIN: warm and dry; no rash  NERVOUS SYSTEM:  Awake and alert; Oriented  to place, person and time ; no new deficits                          12.9   5.46  )-----------( 284      ( 01 Aug 2020 06:12 )             38.3                   CAPILLARY BLOOD GLUCOSE      POCT Blood Glucose.: 128 mg/dL (02 Aug 2020 11:14)  POCT Blood Glucose.: 139 mg/dL (02 Aug 2020 08:17)  POCT Blood Glucose.: 119 mg/dL (01 Aug 2020 22:13)  POCT Blood Glucose.: 123 mg/dL (01 Aug 2020 16:50)      RADIOLOGY & ADDITIONAL TESTS:    Imaging  Reviewed:  YES    Consultant(s) Notes Reviewed:   YES      Plan of care was discussed with patient and /or primary care giver; all questions and concerns were addressed

## 2020-08-02 NOTE — PROGRESS NOTE ADULT - ASSESSMENT
Colitis ?? - no symptoms of colitis at this time, she has no fevers , leukocytosis or diarrhea and no abdominal tenderness  Abdominal pain  - is likely secondary to persistent constipation     Plan - agree with surgical eval  I do not think she needs abxs at this time  would cont with laxatives to clean her out.

## 2020-08-02 NOTE — PROGRESS NOTE ADULT - PROBLEM SELECTOR PLAN 1
Patient had bowel movements, abdominal pain better than yesterday, reviewed CT abdomen shows colitis, fecal impaction, r/o ischemic bowel. Surgery consult called, follow up recs. D/C planning am tomorrow. Cancelled discharge today pending surgery eval. Given laxatives, enema, and suppository today

## 2020-08-02 NOTE — PROGRESS NOTE ADULT - ASSESSMENT
Patient is a 71yo Female with HTN, Hypothyroidism, lymphedema p/w dizziness a/w Afib with RVR, NSTEMI and ANIKA.  Nephrology consulted for Elevated serum creatinine.     1. ANIKA- likely hemodynamically mediated in the setting of decreased PO intake with hypotension/ Afib. Initially improved s/p IVF; then with increasing Scr in the setting of n/v; lack of appetite.  Renal function improving; encourage PO intake.  hyaline casts on UA and low FeNa. Urine culture Enterococcus- completed Zyvox. Strict I/Os. Avoid nephrotoxins/ NSAIDs/ RCA. Monitor BMP.  2. Hypokalemia- Resolved,.  Monitor lytes.   3. Afib with RVR- plan as per cardiology.   4. Essential HTN- BP improved on Hydralazine 50mg PO q8hrs, titrate as needed. Monitor BP.

## 2020-08-02 NOTE — PROGRESS NOTE ADULT - GASTROINTESTINAL DETAILS
no organomegaly/no distention/no guarding/bowel sounds normal/soft/no masses palpable/nontender/no rebound tenderness/no rigidity

## 2020-08-03 VITALS
RESPIRATION RATE: 17 BRPM | TEMPERATURE: 98 F | DIASTOLIC BLOOD PRESSURE: 62 MMHG | HEART RATE: 74 BPM | SYSTOLIC BLOOD PRESSURE: 138 MMHG | OXYGEN SATURATION: 98 %

## 2020-08-03 LAB
ALBUMIN SERPL ELPH-MCNC: 2.4 G/DL — LOW (ref 3.5–5)
ALP SERPL-CCNC: 80 U/L — SIGNIFICANT CHANGE UP (ref 40–120)
ALT FLD-CCNC: 13 U/L DA — SIGNIFICANT CHANGE UP (ref 10–60)
ANION GAP SERPL CALC-SCNC: 5 MMOL/L — SIGNIFICANT CHANGE UP (ref 5–17)
AST SERPL-CCNC: 11 U/L — SIGNIFICANT CHANGE UP (ref 10–40)
BILIRUB SERPL-MCNC: 0.8 MG/DL — SIGNIFICANT CHANGE UP (ref 0.2–1.2)
BUN SERPL-MCNC: 22 MG/DL — HIGH (ref 7–18)
CALCIUM SERPL-MCNC: 8.5 MG/DL — SIGNIFICANT CHANGE UP (ref 8.4–10.5)
CHLORIDE SERPL-SCNC: 106 MMOL/L — SIGNIFICANT CHANGE UP (ref 96–108)
CO2 SERPL-SCNC: 28 MMOL/L — SIGNIFICANT CHANGE UP (ref 22–31)
CREAT SERPL-MCNC: 1.17 MG/DL — SIGNIFICANT CHANGE UP (ref 0.5–1.3)
GLUCOSE BLDC GLUCOMTR-MCNC: 122 MG/DL — HIGH (ref 70–99)
GLUCOSE BLDC GLUCOMTR-MCNC: 172 MG/DL — HIGH (ref 70–99)
GLUCOSE BLDC GLUCOMTR-MCNC: >600 MG/DL — CRITICAL HIGH (ref 70–99)
GLUCOSE SERPL-MCNC: 112 MG/DL — HIGH (ref 70–99)
HCT VFR BLD CALC: 38.7 % — SIGNIFICANT CHANGE UP (ref 34.5–45)
HGB BLD-MCNC: 12.7 G/DL — SIGNIFICANT CHANGE UP (ref 11.5–15.5)
MCHC RBC-ENTMCNC: 29.9 PG — SIGNIFICANT CHANGE UP (ref 27–34)
MCHC RBC-ENTMCNC: 32.8 GM/DL — SIGNIFICANT CHANGE UP (ref 32–36)
MCV RBC AUTO: 91.1 FL — SIGNIFICANT CHANGE UP (ref 80–100)
NRBC # BLD: 0 /100 WBCS — SIGNIFICANT CHANGE UP (ref 0–0)
PLATELET # BLD AUTO: 339 K/UL — SIGNIFICANT CHANGE UP (ref 150–400)
POTASSIUM SERPL-MCNC: 3.8 MMOL/L — SIGNIFICANT CHANGE UP (ref 3.5–5.3)
POTASSIUM SERPL-SCNC: 3.8 MMOL/L — SIGNIFICANT CHANGE UP (ref 3.5–5.3)
PROT SERPL-MCNC: 6.4 G/DL — SIGNIFICANT CHANGE UP (ref 6–8.3)
RBC # BLD: 4.25 M/UL — SIGNIFICANT CHANGE UP (ref 3.8–5.2)
RBC # FLD: 14 % — SIGNIFICANT CHANGE UP (ref 10.3–14.5)
SODIUM SERPL-SCNC: 139 MMOL/L — SIGNIFICANT CHANGE UP (ref 135–145)
WBC # BLD: 8.25 K/UL — SIGNIFICANT CHANGE UP (ref 3.8–10.5)
WBC # FLD AUTO: 8.25 K/UL — SIGNIFICANT CHANGE UP (ref 3.8–10.5)

## 2020-08-03 PROCEDURE — 85610 PROTHROMBIN TIME: CPT

## 2020-08-03 PROCEDURE — 82728 ASSAY OF FERRITIN: CPT

## 2020-08-03 PROCEDURE — 93970 EXTREMITY STUDY: CPT

## 2020-08-03 PROCEDURE — A9502: CPT

## 2020-08-03 PROCEDURE — 93306 TTE W/DOPPLER COMPLETE: CPT

## 2020-08-03 PROCEDURE — 84439 ASSAY OF FREE THYROXINE: CPT

## 2020-08-03 PROCEDURE — 87086 URINE CULTURE/COLONY COUNT: CPT

## 2020-08-03 PROCEDURE — 83036 HEMOGLOBIN GLYCOSYLATED A1C: CPT

## 2020-08-03 PROCEDURE — 93880 EXTRACRANIAL BILAT STUDY: CPT

## 2020-08-03 PROCEDURE — 97530 THERAPEUTIC ACTIVITIES: CPT

## 2020-08-03 PROCEDURE — 82607 VITAMIN B-12: CPT

## 2020-08-03 PROCEDURE — 83735 ASSAY OF MAGNESIUM: CPT

## 2020-08-03 PROCEDURE — 93005 ELECTROCARDIOGRAM TRACING: CPT

## 2020-08-03 PROCEDURE — 82550 ASSAY OF CK (CPK): CPT

## 2020-08-03 PROCEDURE — 84443 ASSAY THYROID STIM HORMONE: CPT

## 2020-08-03 PROCEDURE — 86769 SARS-COV-2 COVID-19 ANTIBODY: CPT

## 2020-08-03 PROCEDURE — 81001 URINALYSIS AUTO W/SCOPE: CPT

## 2020-08-03 PROCEDURE — 85730 THROMBOPLASTIN TIME PARTIAL: CPT

## 2020-08-03 PROCEDURE — 78452 HT MUSCLE IMAGE SPECT MULT: CPT

## 2020-08-03 PROCEDURE — 99285 EMERGENCY DEPT VISIT HI MDM: CPT | Mod: 25

## 2020-08-03 PROCEDURE — 93017 CV STRESS TEST TRACING ONLY: CPT

## 2020-08-03 PROCEDURE — 84300 ASSAY OF URINE SODIUM: CPT

## 2020-08-03 PROCEDURE — 85379 FIBRIN DEGRADATION QUANT: CPT

## 2020-08-03 PROCEDURE — 71045 X-RAY EXAM CHEST 1 VIEW: CPT

## 2020-08-03 PROCEDURE — 82553 CREATINE MB FRACTION: CPT

## 2020-08-03 PROCEDURE — 87635 SARS-COV-2 COVID-19 AMP PRB: CPT

## 2020-08-03 PROCEDURE — 84100 ASSAY OF PHOSPHORUS: CPT

## 2020-08-03 PROCEDURE — 82746 ASSAY OF FOLIC ACID SERUM: CPT

## 2020-08-03 PROCEDURE — 84484 ASSAY OF TROPONIN QUANT: CPT

## 2020-08-03 PROCEDURE — 80061 LIPID PANEL: CPT

## 2020-08-03 PROCEDURE — 82962 GLUCOSE BLOOD TEST: CPT

## 2020-08-03 PROCEDURE — U0003: CPT

## 2020-08-03 PROCEDURE — 80053 COMPREHEN METABOLIC PANEL: CPT

## 2020-08-03 PROCEDURE — 80048 BASIC METABOLIC PNL TOTAL CA: CPT

## 2020-08-03 PROCEDURE — 36415 COLL VENOUS BLD VENIPUNCTURE: CPT

## 2020-08-03 PROCEDURE — 85027 COMPLETE CBC AUTOMATED: CPT

## 2020-08-03 PROCEDURE — 74176 CT ABD & PELVIS W/O CONTRAST: CPT

## 2020-08-03 PROCEDURE — 82570 ASSAY OF URINE CREATININE: CPT

## 2020-08-03 PROCEDURE — 70450 CT HEAD/BRAIN W/O DYE: CPT

## 2020-08-03 PROCEDURE — 83880 ASSAY OF NATRIURETIC PEPTIDE: CPT

## 2020-08-03 PROCEDURE — 87186 SC STD MICRODIL/AGAR DIL: CPT

## 2020-08-03 PROCEDURE — 97162 PT EVAL MOD COMPLEX 30 MIN: CPT

## 2020-08-03 PROCEDURE — 83935 ASSAY OF URINE OSMOLALITY: CPT

## 2020-08-03 PROCEDURE — 97110 THERAPEUTIC EXERCISES: CPT

## 2020-08-03 RX ORDER — SIMVASTATIN 20 MG/1
1 TABLET, FILM COATED ORAL
Qty: 0 | Refills: 0 | DISCHARGE

## 2020-08-03 RX ORDER — POLYETHYLENE GLYCOL 3350 17 G/17G
17 POWDER, FOR SOLUTION ORAL
Qty: 0 | Refills: 0 | DISCHARGE
Start: 2020-08-03

## 2020-08-03 RX ORDER — SENNA PLUS 8.6 MG/1
2 TABLET ORAL
Qty: 0 | Refills: 0 | DISCHARGE
Start: 2020-08-03

## 2020-08-03 RX ORDER — LACTULOSE 10 G/15ML
15 SOLUTION ORAL
Qty: 45 | Refills: 0
Start: 2020-08-03 | End: 2020-08-05

## 2020-08-03 RX ADMIN — GABAPENTIN 300 MILLIGRAM(S): 400 CAPSULE ORAL at 06:28

## 2020-08-03 RX ADMIN — POLYETHYLENE GLYCOL 3350 17 GRAM(S): 17 POWDER, FOR SOLUTION ORAL at 06:27

## 2020-08-03 RX ADMIN — BENZOCAINE AND MENTHOL 1 LOZENGE: 5; 1 LIQUID ORAL at 06:32

## 2020-08-03 RX ADMIN — Medication 1 MILLIGRAM(S): at 11:41

## 2020-08-03 RX ADMIN — Medication 50 MILLIGRAM(S): at 06:28

## 2020-08-03 RX ADMIN — CLOPIDOGREL BISULFATE 75 MILLIGRAM(S): 75 TABLET, FILM COATED ORAL at 11:41

## 2020-08-03 RX ADMIN — PANTOPRAZOLE SODIUM 40 MILLIGRAM(S): 20 TABLET, DELAYED RELEASE ORAL at 06:27

## 2020-08-03 RX ADMIN — Medication 0.2 MILLIGRAM(S): at 14:49

## 2020-08-03 RX ADMIN — Medication 400 MILLIGRAM(S): at 06:28

## 2020-08-03 RX ADMIN — Medication 400 MILLIGRAM(S): at 14:49

## 2020-08-03 RX ADMIN — Medication 75 MICROGRAM(S): at 06:27

## 2020-08-03 RX ADMIN — Medication 1000 UNIT(S): at 11:37

## 2020-08-03 RX ADMIN — Medication 0.2 MILLIGRAM(S): at 06:28

## 2020-08-03 RX ADMIN — Medication 50 MILLIGRAM(S): at 14:50

## 2020-08-03 RX ADMIN — APIXABAN 5 MILLIGRAM(S): 2.5 TABLET, FILM COATED ORAL at 06:28

## 2020-08-03 RX ADMIN — Medication 81 MILLIGRAM(S): at 11:41

## 2020-08-03 RX ADMIN — PREGABALIN 1000 MICROGRAM(S): 225 CAPSULE ORAL at 11:36

## 2020-08-03 NOTE — CONSULT NOTE ADULT - SUBJECTIVE AND OBJECTIVE BOX
HPI:  71 y/o F with a significant PMHx of HTN, Hypothyroidism, carpal tunnel syndrome, lymphedema, presents to the ED for dizziness today. Pt in ED AAOX3 and able to give all pertinent medical information. As per the Patient, she was in her usual state of health until this morning when she got up from her bed to get water as her throat was very dry and itchy and she noted sudden onset of dizziness. She was unable and laid down but the dizziness did not resolved and her room mate then called the 911. As per the patient, she had not been eating/drinking well since 2 days due to nausea which is resolved now. Pt denies any chest pain, vomiting, room spinning, headache, shortness of breath, fall, any recent head trauma. Pt was recently admitted in [facility] for a syncopal episode from gabapentin overdosing,     Patient seen and examined at bedside of surgical consult due to abdominal pain and constipation. Patient states that she presented to the hospital due to dizziness from dehydration. She states that she had not been eating and drinking well because it was too difficult for her to go to her to go to the kitchen and get food and water. Patient states that her last BM was today and that she moves her bowel approximately every 3 days. States that she never had any abdominal pain prior to hospitalization and blames the pain on a medication that she received, but she does not know the name or what it was called. States she only feels the pain after she eats and if she is laying flat. States pain is improved from when it began. She states she does not want antibiotics. Patient states she does not ambulate.    PAST MEDICAL & SURGICAL HISTORY:  Lymphedema  Essential hypertension  Carpal tunnel syndrome of right wrist  Obesity (BMI 30-39.9)  Type 2 diabetes mellitus without complication  Hypothyroid  Essential hypertension  Obesity  HTN (hypertension)  DM (diabetes mellitus)  No significant past surgical history  S/P carpal tunnel release    Review of Systems: Contained within HPI    MEDICATIONS  (STANDING):  ALPRAZolam 1 milliGRAM(s) Oral every 8 hours  apixaban 5 milliGRAM(s) Oral every 12 hours  aspirin enteric coated 81 milliGRAM(s) Oral daily  atorvastatin 80 milliGRAM(s) Oral at bedtime  benzocaine 15 mG/menthol 3.6 mG (Sugar-Free) Lozenge 1 Lozenge Oral three times a day  cholecalciferol 1000 Unit(s) Oral daily  ciprofloxacin   IVPB      ciprofloxacin   IVPB 400 milliGRAM(s) IV Intermittent every 12 hours  cloNIDine 0.2 milliGRAM(s) Oral three times a day  clopidogrel Tablet 75 milliGRAM(s) Oral daily  cyanocobalamin 1000 MICROGram(s) Oral daily  folic acid 1 milliGRAM(s) Oral daily  gabapentin 300 milliGRAM(s) Oral two times a day  hydrALAZINE 50 milliGRAM(s) Oral every 8 hours  insulin lispro (HumaLOG) corrective regimen sliding scale   SubCutaneous three times a day before meals  labetalol 400 milliGRAM(s) Oral three times a day  lactobacillus acidophilus 1 Tablet(s) Oral daily  levothyroxine 75 MICROGram(s) Oral daily  metroNIDAZOLE  IVPB      metroNIDAZOLE  IVPB 500 milliGRAM(s) IV Intermittent every 8 hours  pantoprazole    Tablet 40 milliGRAM(s) Oral before breakfast  polyethylene glycol 3350 17 Gram(s) Oral two times a day  senna 2 Tablet(s) Oral at bedtime  sodium chloride 0.9%. 1000 milliLiter(s) (85 mL/Hr) IV Continuous <Continuous>    MEDICATIONS  (PRN):  acetaminophen   Tablet .. 650 milliGRAM(s) Oral every 6 hours PRN Moderate Pain (4 - 6)  aluminum hydroxide/magnesium hydroxide/simethicone Suspension 30 milliLiter(s) Oral every 4 hours PRN Dyspepsia  bisacodyl Suppository 10 milliGRAM(s) Rectal daily PRN Constipation  hydrALAZINE Injectable 5 milliGRAM(s) IV Push every 6 hours PRN BP >200/100  labetalol Injectable 10 milliGRAM(s) IV Push every 4 hours PRN Systolic/Diastolic > 200/100  saline laxative (FLEET) Rectal Enema 1 Enema Rectal once PRN constipation    Allergies:   penicillin (Hives)  penicillin (Rash)  sulfa drugs (Unknown)  Tetracycline Hydrochloride (Unknown)    FAMILY HISTORY:  Family history of myocardial infarction (Sibling)  Family history of lung cancer    Vital Signs Last 24 Hrs  T(C): 36.7 (03 Aug 2020 05:48), Max: 37.3 (02 Aug 2020 20:43)  T(F): 98.1 (03 Aug 2020 05:48), Max: 99.2 (02 Aug 2020 20:43)  HR: 72 (03 Aug 2020 05:48) (72 - 82)  BP: 149/74 (03 Aug 2020 05:48) (143/68 - 185/79)  BP(mean): 93 (02 Aug 2020 13:20) (93 - 93)  RR: 18 (03 Aug 2020 05:48) (18 - 18)  SpO2: 97% (03 Aug 2020 05:48) (94% - 97%)    Physical Exam:  General:  Appears stated age, well-groomed, well-nourished, no distress  Eyes: EOMI  HENT:  WNL, no JVD  Chest: respirations nonlabored  Abdomen: Obese, soft, suprapubic tenderness to palpation   Extremities: no edema bilaterally  Skin: warm and dry  Musculoskeletal: no calf tenderness  Neuro:  Alert, oriented to time, place and person   Psych: normal affect    LABS:                        12.7   8.25  )-----------( 339      ( 03 Aug 2020 12:49 )             38.7     RADIOLOGY & ADDITIONAL STUDIES:  < from: CT Abdomen and Pelvis No Cont (07.31.20 @ 19:40) >  EXAM:  CT ABDOMEN AND PELVIS                          PROCEDURE DATE:  07/31/2020      INTERPRETATION:  CLINICAL STATEMENT: abdominal pain    TECHNIQUE: CT of the abdomen and pelvis was performed without IV and oral contrast.    COMPARISON: CT abdomen pelvis 11/6/2019    FINDINGS:  Atelectasis lung bases. Cardiomegaly.    The evaluation of the abdominal viscera and the bowel is limited without contrast.    The liver, gallbladder are grossly unremarkable.    The spleen, pancreas, right adrenal gland are grossly unremarkable. Thickening of left adrenal gland    No hydronephrosis. Nonobstructive bilateral renal stones noted measuring 4 mm on the left and 6 mm on the right.    Colonic diverticulosis. Suggestion of duodenal diverticulum. Evaluation limited due to lack of oral contrast    There is large amount of stool in the colon. Long segment. Colonic inflammatory stranding adjacent to the descending and proximal sigmoid colon most compatible with colitis. Multiple nondependent air likely trapped air pockets adjacent to stool.    One exception of linear air on axial series 2 image 80 and coronal image 55. Early ischemic changes not excluded    There is no intraperitoneal free air.  Small free fluid    Urinary bladder underdistended. No gross adnexal mass.    Small fat-containing periumbilical and inguinal hernias noted.    Degenerative changes noted.    IMPRESSION:  Colitis at the descending and proximal sigmoid colon with large amount of stool as described above. Focal linear air appearance in the mid descending colon as described above. Early ischemic changes not excluded. Correlate clinically    No bowel obstruction.    Additional findings as described above    ANTHONY HENDRICKS M.D., ATTENDING RADIOLOGIST  This document has been electronically signed. Jul 31 2020  8:17PM    < end of copied text >

## 2020-08-03 NOTE — PROGRESS NOTE ADULT - REASON FOR ADMISSION
Dizziness

## 2020-08-03 NOTE — PROGRESS NOTE ADULT - SUBJECTIVE AND OBJECTIVE BOX
[facility] NEPHROLOGY- PROGRESS NOTE    Patient is a 71yo Female with HTN, Hypothyroidism, lymphedema p/w dizziness a/w Afib with RVR, NSTEMI and ANIKA.  Nephrology consulted for Elevated serum creatinine.     Hospital Medications: Medications reviewed.      REVIEW OF SYSTEMS:   CONSTITUTIONAL: No fevers or chills  RESPIRATORY: No shortness of breath  CARDIOVASCULAR: No chest pain.  GASTROINTESTINAL: No diarrhea. No n/v/d. +lower abd pain; no BM today (last BM 8/2)  VASCULAR: No bilateral lower extremity edema.     VITALS:  T(F): 98.1 (08-03-20 @ 05:48), Max: 99.2 (08-02-20 @ 20:43)  HR: 72 (08-03-20 @ 05:48)  BP: 149/74 (08-03-20 @ 05:48)  RR: 18 (08-03-20 @ 05:48)  SpO2: 97% (08-03-20 @ 05:48)  Wt(kg): --      PHYSICAL EXAM:   General: NAD  Respiratory: CTA b/l   Cardiovascular: S1, S2, RRR  Gastrointestinal: BS+, soft, NT  Extremities: No peripheral edema  hyperpigmentation of b/l LE      LABS:        Creatinine Trend: 1.06 <--, 1.23 <--, 1.27 <--, 1.46 <--                        12.7   8.25  )-----------( 339      ( 03 Aug 2020 12:49 )             38.7     Urine Studies:    Sodium, Random Urine: 19 mmol/L (07-28 @ 15:57)  Osmolality, Random Urine: 532 mos/kg (07-28 @ 15:57)  Creatinine, Random Urine: 126 mg/dL (07-28 @ 15:57)

## 2020-08-03 NOTE — PROGRESS NOTE ADULT - SUBJECTIVE AND OBJECTIVE BOX
Patient is a 70y old  Female who presents with a chief complaint of Dizziness (02 Aug 2020 16:32)    OVERNIGHT EVENTS: no acute events overnight     REVIEW OF SYSTEMS:  CONSTITUTIONAL: No fever, chills  RESPIRATORY: No cough, SOB  CARDIOVASCULAR: No chest pain, palpitations  GASTROINTESTINAL: intermittent abdominal pain. No nausea, vomiting, or diarrhea  GENITOURINARY: No dysuria  NEUROLOGICAL: No HA  MUSCULOSKELETAL: No joint pain or swelling; No muscle, back, or extremity pain    T(C): 36.7 (08-03-20 @ 05:48), Max: 37.3 (08-02-20 @ 20:43)  HR: 72 (08-03-20 @ 05:48) (72 - 82)  BP: 149/74 (08-03-20 @ 05:48) (143/68 - 185/79)  RR: 18 (08-03-20 @ 05:48) (18 - 18)  SpO2: 97% (08-03-20 @ 05:48) (94% - 97%)  Wt(kg): --Vital Signs Last 24 Hrs  T(C): 36.7 (03 Aug 2020 05:48), Max: 37.3 (02 Aug 2020 20:43)  T(F): 98.1 (03 Aug 2020 05:48), Max: 99.2 (02 Aug 2020 20:43)  HR: 72 (03 Aug 2020 05:48) (72 - 82)  BP: 149/74 (03 Aug 2020 05:48) (143/68 - 185/79)  BP(mean): 93 (02 Aug 2020 13:20) (93 - 93)  RR: 18 (03 Aug 2020 05:48) (18 - 18)  SpO2: 97% (03 Aug 2020 05:48) (94% - 97%)    MEDICATIONS  (STANDING):  ALPRAZolam 1 milliGRAM(s) Oral every 8 hours  apixaban 5 milliGRAM(s) Oral every 12 hours  aspirin enteric coated 81 milliGRAM(s) Oral daily  atorvastatin 80 milliGRAM(s) Oral at bedtime  benzocaine 15 mG/menthol 3.6 mG (Sugar-Free) Lozenge 1 Lozenge Oral three times a day  cholecalciferol 1000 Unit(s) Oral daily  ciprofloxacin   IVPB      ciprofloxacin   IVPB 400 milliGRAM(s) IV Intermittent every 12 hours  cloNIDine 0.2 milliGRAM(s) Oral three times a day  clopidogrel Tablet 75 milliGRAM(s) Oral daily  cyanocobalamin 1000 MICROGram(s) Oral daily  folic acid 1 milliGRAM(s) Oral daily  gabapentin 300 milliGRAM(s) Oral two times a day  hydrALAZINE 50 milliGRAM(s) Oral every 8 hours  insulin lispro (HumaLOG) corrective regimen sliding scale   SubCutaneous three times a day before meals  labetalol 400 milliGRAM(s) Oral three times a day  lactobacillus acidophilus 1 Tablet(s) Oral daily  levothyroxine 75 MICROGram(s) Oral daily  metroNIDAZOLE  IVPB      metroNIDAZOLE  IVPB 500 milliGRAM(s) IV Intermittent every 8 hours  pantoprazole    Tablet 40 milliGRAM(s) Oral before breakfast  polyethylene glycol 3350 17 Gram(s) Oral two times a day  senna 2 Tablet(s) Oral at bedtime  sodium chloride 0.9%. 1000 milliLiter(s) (85 mL/Hr) IV Continuous <Continuous>    MEDICATIONS  (PRN):  acetaminophen   Tablet .. 650 milliGRAM(s) Oral every 6 hours PRN Moderate Pain (4 - 6)  aluminum hydroxide/magnesium hydroxide/simethicone Suspension 30 milliLiter(s) Oral every 4 hours PRN Dyspepsia  bisacodyl Suppository 10 milliGRAM(s) Rectal daily PRN Constipation  hydrALAZINE Injectable 5 milliGRAM(s) IV Push every 6 hours PRN BP >200/100  labetalol Injectable 10 milliGRAM(s) IV Push every 4 hours PRN Systolic/Diastolic > 200/100  saline laxative (FLEET) Rectal Enema 1 Enema Rectal once PRN constipation      PHYSICAL EXAM:  GENERAL: NAD  NECK: Supple, No JVD  CHEST/LUNG: Clear to auscultation bilaterally; No rales, rhonchi, wheezing, or rubs  HEART: S1, S2, Regular rate and rhythm  ABDOMEN: Soft, Nontender, Nondistended; Bowel sounds present  NEURO: Alert & Oriented X3  EXTREMITIES: No LE edema, no calf tenderness    Consultant(s) Notes Reviewed:  [x ] YES  [ ] NO  Care Discussed with Consultants/Other Providers [ x] YES  [ ] NO    LABS:    REFUSED LABS THIS AM     CAPILLARY BLOOD GLUCOSE      POCT Blood Glucose.: 122 mg/dL (03 Aug 2020 07:43)  POCT Blood Glucose.: 124 mg/dL (02 Aug 2020 21:20)  POCT Blood Glucose.: 155 mg/dL (02 Aug 2020 16:39)  POCT Blood Glucose.: 128 mg/dL (02 Aug 2020 11:14)    RADIOLOGY & ADDITIONAL TESTS:    < from: CT Abdomen and Pelvis No Cont (07.31.20 @ 19:40) >    EXAM:  CT ABDOMEN AND PELVIS                            PROCEDURE DATE:  07/31/2020          INTERPRETATION:  CLINICAL STATEMENT: abdominal pain    TECHNIQUE: CT of the abdomen and pelvis was performed without IV and oral contrast.    COMPARISON: CT abdomen pelvis 11/6/2019    FINDINGS:  Atelectasis lung bases. Cardiomegaly.    The evaluation of the abdominal viscera and the bowel is limited without contrast.    The liver, gallbladder are grossly unremarkable.    The spleen, pancreas, right adrenal gland are grossly unremarkable. Thickening of left adrenal gland    No hydronephrosis. Nonobstructive bilateral renal stones noted measuring 4 mm on the left and 6 mm on the right.    Colonic diverticulosis. Suggestion of duodenal diverticulum. Evaluation limited due to lack of oral contrast    There is large amount of stool in the colon. Long segment. Colonic inflammatory stranding adjacent to the descending and proximal sigmoid colon most compatible with colitis. Multiple nondependent air likely trapped air pockets adjacent to stool.    One exception of linear air on axial series 2 image 80 and coronal image 55. Early ischemic changes not excluded    There is no intraperitoneal free air.  Small free fluid    Urinary bladder underdistended. No gross adnexal mass.    Small fat-containing periumbilical and inguinal hernias noted.    Degenerative changes noted.    IMPRESSION:  Colitis at the descending and proximal sigmoid colon with large amount of stool as described above. Focal linear air appearance in the mid descending colon as described above. Early ischemic changes not excluded. Correlate clinically    No bowel obstruction.    Additional findings as described above    < end of copied text >      Imaging Personally Reviewed:  [ ] YES  [ ] NO

## 2020-08-03 NOTE — PROGRESS NOTE ADULT - PROBLEM SELECTOR PLAN 3
-likely due to rapid afib, hypotension and poor PO intake, now resolved   -s/p IVF   -Nephro Dr. Zhang

## 2020-08-03 NOTE — CONSULT NOTE ADULT - ASSESSMENT
70 year old female with colitis and constipation, tolerating regular diet, no leukocytosis, lactate not done, stable, doubt ischemic colitis     - continue regular diet  - recommend stool softeners and enemas   - recommend fecal disimpaction as needed  - recommend OOB and increased ambulation   - continue antibiotics for colitis  - continue anticoagulation  - if concerned for ischemic bowel would need CT angiogram  - no surgical intervention at this time  - continue medical management  - Discussed with Dr. Card

## 2020-08-03 NOTE — PROGRESS NOTE ADULT - ASSESSMENT
71 y/o F with PMHx of obesity, HTN, Hypothyroidism and lymphedema presented to ED for dizziness.  In ED, CT head was negative, admitted for afib with RVR and NSTEMI. LE dopplers negative for DVT. Cardiology following, Echo on 7/21 showed LV hypertrophy and stage I diastolic dysfunction. Patient refused heparin drip and lovenox. Was eventually started on oral AC (Eliquis). Stress test was negative for any reversible ischemia. After stress test patient was nauseous and hypertensive. Managed with Zofran and Reglan.  Critical care was consulted due to uncontrolled BP and patient was placed back on full home regimen of HTN medications. BP was monitored and medications optimized. Also noted withAKI on admission, improved with IV hydration. UA (+), was on Ceftriaxone. Urine cx grew enterococcus, followed by ID Dr. Pettit. Hospital course complicated with ongoing c/o abd pain, CT abd with colitis and fecal impaction and suspicion forearly ischemic changes, started on bowel regimen, surgery consulted

## 2020-08-03 NOTE — PROGRESS NOTE ADULT - ATTENDING COMMENTS
Patient was seen and examined by me on 07/22/2020,interim events noted,labs and radiology studies reviewed.  Miller Kuo MD,FACC.  [address] [place] [address].  [facility].  [phone]
[facility] NEPHROLOGY  Hi Perez M.D.  Nicholas Martin D.O.  Patria Zhang M.D.  Riri Schulz, MSN, ANP-C  [phone]    [address] [place] [address]
[facility] NEPHROLOGY  Hi Perez M.D.  Nicholas Martin D.O.  Patria Zhang M.D.  Riri Schulz, MSN, ANP-C  [phone]    [address] [place] [address]
[facility] NEPHROLOGY  Hi Perez M.D.  Nicholas Martin D.O.  Patria Zhang M.D.  Riri Schulz, MSN, ANP-C  [phone]    [address] [place] [address]
[facility] NEPHROLOGY  Hi Perez M.D.  Nicholas Martin D.O.  Patria Zhang M.D.  Riri Schulz, MSN, ANP-C  [phone]    [address] [place] [address]
[facility] NEPHROLOGY  Hi Perez M.D.  Nicholas Martin D.O.  Patria Zhang M.D.  Riri Schulz, MSN, ANP-C  [phone]    [address] [place] [address]
[facility] NEPHROLOGY  Hi Perez M.D.  Nicholas Martin D.O.  Patria Zhang M.D.  Riri Schulz, MSN, ANP-C  [phone]    [address] [place] [address]
[facility] NEPHROLOGY  Hi Perez M.D.  Nicholas Martin D.O.  Patria Zhang M.D.  Riri Schulz, MSN, ANP-C  [phone]    [address] [place] [address]
[facility] NEPHROLOGY  Hi Perez M.D.  Nicholas Martin D.O.  Patria Zhang M.D.  Riri Schulz, MSN, ANP-C  [phone]    [address] [place] [address]
[facility] NEPHROLOGY  Hi Perez M.D.  Nicholas Martin D.O.  Patria Zhang M.D.  Riri Schulz, MSN, ANP-C  [phone]    [address] [place] [address]
[facility] NEPHROLOGY  Hi Perez M.D.  Nicholas Martin D.O.  Patria Zhang M.D.  Riri Schulz, MSN, ANP-C  [phone]    [address] [place] [address]
[facility] NEPHROLOGY  Hi Perez M.D.  Nicholas Martin D.O.  Patria Zhang M.D.  Riri Schulz, MSN, ANP-C  [phone]    [address] [place] [address]
[facility] NEPHROLOGY  Hi Perez M.D.  Nicholas Martin D.O.  Patria Zhang M.D.  Riri Schulz, MSN, ANP-C  [phone]    [address] [place] [address]
[facility] NEPHROLOGY  Hi Perez M.D.  Nicholas Martin D.O.  Patria Zhang M.D.  Riri Schulz, MSN, ANP-C  [phone]    [address] [place] [address]
I agree with above
Patient seen and examined. Reports nausea, constipation.     T(C): 36.6 (07-31-20 @ 13:51), Max: 36.6 (07-31-20 @ 13:51)  HR: 79 (07-31-20 @ 13:51) (79 - 79)  BP: 164/83 (07-31-20 @ 13:51) (164/83 - 164/83)  RR: 16 (07-31-20 @ 13:51) (16 - 16)  SpO2: 97% (07-31-20 @ 13:51) (97% - 97%)    Admitted with A.fib with rvr, positive enzymes- demand ischemia.   Cards consulted, stress test negative for ischemia.   UTI on abx   Constipation   Abdominal pain     Follow up CT abdomen. Bowel regimen.   D/C planning.
Patient seen and examined.     T(C): 37.1 (07-22-20 @ 15:36), Max: 37.1 (07-22-20 @ 15:36)  HR: 74 (07-22-20 @ 18:14) (74 - 83)  BP: 152/77 (07-22-20 @ 18:14) (152/77 - 186/104)  RR: 19 (07-22-20 @ 15:36) (18 - 19)  SpO2: 99% (07-22-20 @ 15:36) (97% - 99%)    NSTEMI   A.fib with rvr  Cad s/p stents  ANIKA    Patient refusing Cath. Follow up nuclear stress test. Continue with medical management for now.  Renal recommend Nifedipine. Continue with Aspirin, Plavix and Lovenox.
D/C planning today.
Patient seen and examined.   D/C Planning.
Patient seen and examined.   T(C): 37.7 (07-23-20 @ 20:22), Max: 37.7 (07-23-20 @ 20:22)  HR: 87 (07-23-20 @ 22:51) (81 - 114)  BP: 138/70 (07-23-20 @ 22:51) (138/70 - 229/98)  RR: 18 (07-23-20 @ 20:22) (18 - 18)  SpO2: 95% (07-23-20 @ 22:51) (95% - 98%)    Hypertensive urgency  NSTEMI  A.fib with rvr     Switch to Eliquis.   Resume Blood pressure Home meds.   Spoke to patients cards Dr. Lisker.
D/C planning to [facility].
Patient seen and examined.     T(C): 36.8 (07-21-20 @ 19:47), Max: 36.8 (07-21-20 @ 15:25)  HR: 71 (07-21-20 @ 19:47) (70 - 76)  BP: 154/59 (07-21-20 @ 19:47) (145/78 - 154/59)  RR: 18 (07-21-20 @ 19:47) (18 - 18)  SpO2: 97% (07-21-20 @ 19:47) (97% - 100%)    NSTEMI   A.fib with rvr    Follow up echo. Possible cath after Echo. Continue with Heparin drip.   Cards following.
Patient seen and examined.     T(C): 36.9 (07-27-20 @ 20:45), Max: 36.9 (07-27-20 @ 20:45)  HR: 76 (07-27-20 @ 16:00) (70 - 76)  BP: 152/70 (07-27-20 @ 16:00) (152/70 - 159/83)  RR: 18 (07-27-20 @ 20:45) (18 - 18)  SpO2: 97% (07-27-20 @ 20:45) (96% - 98%)    A.fib   Demand ischemia   Stress test negative. BP well controlled.   UA positive, follow up urine cultures. Continue with Ceftriaxone.
D/C planning to [facility].
Patient seen and examined.     T(C): 36.7 (07-28-20 @ 20:50), Max: 36.9 (07-28-20 @ 18:14)  HR: 71 (07-28-20 @ 18:51) (67 - 71)  BP: 153/85 (07-28-20 @ 18:51) (153/85 - 199/87)  RR: 18 (07-28-20 @ 20:50) (18 - 18)  SpO2: 97% (07-28-20 @ 20:50) (97% - 100%)    A.fib with rvr on Eliquis   HTN urgency resolved with blood pressure meds.   UTI on Ceftriaxone  ANIKA     Follow up urine cultures.   D/C Planning on po abx

## 2020-08-03 NOTE — PROGRESS NOTE ADULT - PROVIDER SPECIALTY LIST ADULT
Cardiology
Cardiology
Infectious Disease
Infectious Disease
Internal Medicine
Nephrology
Internal Medicine
Cardiology
Nephrology
Internal Medicine

## 2020-08-03 NOTE — PROGRESS NOTE ADULT - PROBLEM SELECTOR PLAN 1
-c/o intermittent abd pain, likely due to persistent constipation   -CT abd with ? colitis, afebrile, no leukocytosis, diarrhea or abd tenderness  -cont bowel regimen   -Surgery consult for findings of questionable Early ischemic changes

## 2020-08-12 ENCOUNTER — TRANSCRIPTION ENCOUNTER (OUTPATIENT)
Age: 70
End: 2020-08-12

## 2020-08-13 ENCOUNTER — INPATIENT (INPATIENT)
Facility: HOSPITAL | Age: 70
LOS: 11 days | Discharge: NURSING, REHAB OR LONG-TERM CARE | DRG: 853 | End: 2020-08-25
Attending: INTERNAL MEDICINE | Admitting: INTERNAL MEDICINE
Payer: MEDICARE

## 2020-08-13 VITALS
DIASTOLIC BLOOD PRESSURE: 100 MMHG | TEMPERATURE: 103 F | HEART RATE: 90 BPM | WEIGHT: 209 LBS | SYSTOLIC BLOOD PRESSURE: 178 MMHG | RESPIRATION RATE: 18 BRPM | OXYGEN SATURATION: 94 %

## 2020-08-13 DIAGNOSIS — N13.2 HYDRONEPHROSIS WITH RENAL AND URETERAL CALCULOUS OBSTRUCTION: ICD-10-CM

## 2020-08-13 DIAGNOSIS — E11.9 TYPE 2 DIABETES MELLITUS WITHOUT COMPLICATIONS: ICD-10-CM

## 2020-08-13 DIAGNOSIS — I25.10 ATHEROSCLEROTIC HEART DISEASE OF NATIVE CORONARY ARTERY WITHOUT ANGINA PECTORIS: ICD-10-CM

## 2020-08-13 DIAGNOSIS — G93.41 METABOLIC ENCEPHALOPATHY: ICD-10-CM

## 2020-08-13 DIAGNOSIS — I10 ESSENTIAL (PRIMARY) HYPERTENSION: ICD-10-CM

## 2020-08-13 DIAGNOSIS — I48.91 UNSPECIFIED ATRIAL FIBRILLATION: ICD-10-CM

## 2020-08-13 DIAGNOSIS — Z98.89 OTHER SPECIFIED POSTPROCEDURAL STATES: Chronic | ICD-10-CM

## 2020-08-13 DIAGNOSIS — A41.9 SEPSIS, UNSPECIFIED ORGANISM: ICD-10-CM

## 2020-08-13 LAB
ALBUMIN SERPL ELPH-MCNC: 3.8 G/DL — SIGNIFICANT CHANGE UP (ref 3.3–5)
ALP SERPL-CCNC: 69 U/L — SIGNIFICANT CHANGE UP (ref 40–120)
ALT FLD-CCNC: 8 U/L — LOW (ref 10–45)
ANION GAP SERPL CALC-SCNC: 11 MMOL/L — SIGNIFICANT CHANGE UP (ref 5–17)
APPEARANCE UR: ABNORMAL
APTT BLD: 46.8 SEC — HIGH (ref 27.5–35.5)
AST SERPL-CCNC: 12 U/L — SIGNIFICANT CHANGE UP (ref 10–40)
BACTERIA # UR AUTO: NEGATIVE — SIGNIFICANT CHANGE UP
BASE EXCESS BLDV CALC-SCNC: 2.8 MMOL/L — HIGH (ref -2–2)
BASE EXCESS BLDV CALC-SCNC: 4.1 MMOL/L — HIGH (ref -2–2)
BASOPHILS # BLD AUTO: 0.04 K/UL — SIGNIFICANT CHANGE UP (ref 0–0.2)
BASOPHILS NFR BLD AUTO: 0.4 % — SIGNIFICANT CHANGE UP (ref 0–2)
BILIRUB SERPL-MCNC: 0.7 MG/DL — SIGNIFICANT CHANGE UP (ref 0.2–1.2)
BILIRUB UR-MCNC: NEGATIVE — SIGNIFICANT CHANGE UP
BLD GP AB SCN SERPL QL: NEGATIVE — SIGNIFICANT CHANGE UP
BUN SERPL-MCNC: 22 MG/DL — SIGNIFICANT CHANGE UP (ref 7–23)
CA-I SERPL-SCNC: 1.05 MMOL/L — LOW (ref 1.12–1.3)
CA-I SERPL-SCNC: 1.15 MMOL/L — SIGNIFICANT CHANGE UP (ref 1.12–1.3)
CALCIUM SERPL-MCNC: 9.1 MG/DL — SIGNIFICANT CHANGE UP (ref 8.4–10.5)
CHLORIDE BLDV-SCNC: 102 MMOL/L — SIGNIFICANT CHANGE UP (ref 96–108)
CHLORIDE BLDV-SCNC: 102 MMOL/L — SIGNIFICANT CHANGE UP (ref 96–108)
CHLORIDE SERPL-SCNC: 102 MMOL/L — SIGNIFICANT CHANGE UP (ref 96–108)
CO2 BLDV-SCNC: 29 MMOL/L — SIGNIFICANT CHANGE UP (ref 22–30)
CO2 BLDV-SCNC: 30 MMOL/L — SIGNIFICANT CHANGE UP (ref 22–30)
CO2 SERPL-SCNC: 26 MMOL/L — SIGNIFICANT CHANGE UP (ref 22–31)
COLOR SPEC: SIGNIFICANT CHANGE UP
CREAT SERPL-MCNC: 1.35 MG/DL — HIGH (ref 0.5–1.3)
DIFF PNL FLD: ABNORMAL
EOSINOPHIL # BLD AUTO: 0.02 K/UL — SIGNIFICANT CHANGE UP (ref 0–0.5)
EOSINOPHIL NFR BLD AUTO: 0.2 % — SIGNIFICANT CHANGE UP (ref 0–6)
EPI CELLS # UR: 6 /HPF — HIGH
GAS PNL BLDV: 136 MMOL/L — SIGNIFICANT CHANGE UP (ref 135–145)
GAS PNL BLDV: 141 MMOL/L — SIGNIFICANT CHANGE UP (ref 135–145)
GAS PNL BLDV: SIGNIFICANT CHANGE UP
GLUCOSE BLDV-MCNC: 117 MG/DL — HIGH (ref 70–99)
GLUCOSE BLDV-MCNC: 91 MG/DL — SIGNIFICANT CHANGE UP (ref 70–99)
GLUCOSE SERPL-MCNC: 94 MG/DL — SIGNIFICANT CHANGE UP (ref 70–99)
GLUCOSE UR QL: NEGATIVE — SIGNIFICANT CHANGE UP
HCO3 BLDV-SCNC: 28 MMOL/L — SIGNIFICANT CHANGE UP (ref 21–29)
HCO3 BLDV-SCNC: 28 MMOL/L — SIGNIFICANT CHANGE UP (ref 21–29)
HCT VFR BLD CALC: 38.1 % — SIGNIFICANT CHANGE UP (ref 34.5–45)
HCT VFR BLDA CALC: 36 % — LOW (ref 39–50)
HCT VFR BLDA CALC: 41 % — SIGNIFICANT CHANGE UP (ref 39–50)
HGB BLD CALC-MCNC: 11.6 G/DL — SIGNIFICANT CHANGE UP (ref 11.5–15.5)
HGB BLD CALC-MCNC: 13.2 G/DL — SIGNIFICANT CHANGE UP (ref 11.5–15.5)
HGB BLD-MCNC: 12.3 G/DL — SIGNIFICANT CHANGE UP (ref 11.5–15.5)
HYALINE CASTS # UR AUTO: 7 /LPF — HIGH (ref 0–2)
IMM GRANULOCYTES NFR BLD AUTO: 0.4 % — SIGNIFICANT CHANGE UP (ref 0–1.5)
INR BLD: 1.48 RATIO — HIGH (ref 0.88–1.16)
KETONES UR-MCNC: NEGATIVE — SIGNIFICANT CHANGE UP
LACTATE BLDV-MCNC: 1.2 MMOL/L — SIGNIFICANT CHANGE UP (ref 0.7–2)
LACTATE BLDV-MCNC: 2.3 MMOL/L — HIGH (ref 0.7–2)
LEUKOCYTE ESTERASE UR-ACNC: ABNORMAL
LYMPHOCYTES # BLD AUTO: 0.74 K/UL — LOW (ref 1–3.3)
LYMPHOCYTES # BLD AUTO: 7 % — LOW (ref 13–44)
MCHC RBC-ENTMCNC: 29.7 PG — SIGNIFICANT CHANGE UP (ref 27–34)
MCHC RBC-ENTMCNC: 32.3 GM/DL — SIGNIFICANT CHANGE UP (ref 32–36)
MCV RBC AUTO: 92 FL — SIGNIFICANT CHANGE UP (ref 80–100)
MONOCYTES # BLD AUTO: 0.87 K/UL — SIGNIFICANT CHANGE UP (ref 0–0.9)
MONOCYTES NFR BLD AUTO: 8.3 % — SIGNIFICANT CHANGE UP (ref 2–14)
NEUTROPHILS # BLD AUTO: 8.81 K/UL — HIGH (ref 1.8–7.4)
NEUTROPHILS NFR BLD AUTO: 83.7 % — HIGH (ref 43–77)
NITRITE UR-MCNC: NEGATIVE — SIGNIFICANT CHANGE UP
NRBC # BLD: 0 /100 WBCS — SIGNIFICANT CHANGE UP (ref 0–0)
OTHER CELLS CSF MANUAL: 8 ML/DL — LOW (ref 18–22)
PCO2 BLDV: 40 MMHG — SIGNIFICANT CHANGE UP (ref 35–50)
PCO2 BLDV: 50 MMHG — SIGNIFICANT CHANGE UP (ref 35–50)
PH BLDV: 7.37 — SIGNIFICANT CHANGE UP (ref 7.35–7.45)
PH BLDV: 7.46 — HIGH (ref 7.35–7.45)
PH UR: 6 — SIGNIFICANT CHANGE UP (ref 5–8)
PLATELET # BLD AUTO: 294 K/UL — SIGNIFICANT CHANGE UP (ref 150–400)
PO2 BLDV: 26 MMHG — SIGNIFICANT CHANGE UP (ref 25–45)
PO2 BLDV: 41 MMHG — SIGNIFICANT CHANGE UP (ref 25–45)
POTASSIUM BLDV-SCNC: 3.9 MMOL/L — SIGNIFICANT CHANGE UP (ref 3.5–5.3)
POTASSIUM BLDV-SCNC: 5.4 MMOL/L — HIGH (ref 3.5–5.3)
POTASSIUM SERPL-MCNC: 4.2 MMOL/L — SIGNIFICANT CHANGE UP (ref 3.5–5.3)
POTASSIUM SERPL-SCNC: 4.2 MMOL/L — SIGNIFICANT CHANGE UP (ref 3.5–5.3)
PROT SERPL-MCNC: 6.9 G/DL — SIGNIFICANT CHANGE UP (ref 6–8.3)
PROT UR-MCNC: ABNORMAL
PROTHROM AB SERPL-ACNC: 17.3 SEC — HIGH (ref 10.6–13.6)
RBC # BLD: 4.14 M/UL — SIGNIFICANT CHANGE UP (ref 3.8–5.2)
RBC # FLD: 14.8 % — HIGH (ref 10.3–14.5)
RBC CASTS # UR COMP ASSIST: 46 /HPF — HIGH (ref 0–4)
RH IG SCN BLD-IMP: POSITIVE — SIGNIFICANT CHANGE UP
RH IG SCN BLD-IMP: POSITIVE — SIGNIFICANT CHANGE UP
SAO2 % BLDV: 41 % — LOW (ref 67–88)
SAO2 % BLDV: 78 % — SIGNIFICANT CHANGE UP (ref 67–88)
SARS-COV-2 RNA SPEC QL NAA+PROBE: SIGNIFICANT CHANGE UP
SODIUM SERPL-SCNC: 139 MMOL/L — SIGNIFICANT CHANGE UP (ref 135–145)
SP GR SPEC: 1.01 — SIGNIFICANT CHANGE UP (ref 1.01–1.02)
UROBILINOGEN FLD QL: NEGATIVE — SIGNIFICANT CHANGE UP
WBC # BLD: 10.52 K/UL — HIGH (ref 3.8–10.5)
WBC # FLD AUTO: 10.52 K/UL — HIGH (ref 3.8–10.5)
WBC UR QL: 6 /HPF — HIGH (ref 0–5)

## 2020-08-13 PROCEDURE — 99285 EMERGENCY DEPT VISIT HI MDM: CPT | Mod: CS,GC

## 2020-08-13 PROCEDURE — 70450 CT HEAD/BRAIN W/O DYE: CPT | Mod: 26

## 2020-08-13 PROCEDURE — 93010 ELECTROCARDIOGRAM REPORT: CPT

## 2020-08-13 PROCEDURE — 74176 CT ABD & PELVIS W/O CONTRAST: CPT | Mod: 26

## 2020-08-13 RX ORDER — HYDRALAZINE HCL 50 MG
50 TABLET ORAL ONCE
Refills: 0 | Status: COMPLETED | OUTPATIENT
Start: 2020-08-13 | End: 2020-08-13

## 2020-08-13 RX ORDER — VANCOMYCIN HCL 1 G
1000 VIAL (EA) INTRAVENOUS ONCE
Refills: 0 | Status: DISCONTINUED | OUTPATIENT
Start: 2020-08-13 | End: 2020-08-13

## 2020-08-13 RX ORDER — POLYETHYLENE GLYCOL 3350 17 G/17G
17 POWDER, FOR SOLUTION ORAL DAILY
Refills: 0 | Status: DISCONTINUED | OUTPATIENT
Start: 2020-08-13 | End: 2020-08-15

## 2020-08-13 RX ORDER — SODIUM CHLORIDE 9 MG/ML
1000 INJECTION, SOLUTION INTRAVENOUS ONCE
Refills: 0 | Status: COMPLETED | OUTPATIENT
Start: 2020-08-13 | End: 2020-08-13

## 2020-08-13 RX ORDER — HYDRALAZINE HCL 50 MG
50 TABLET ORAL THREE TIMES A DAY
Refills: 0 | Status: DISCONTINUED | OUTPATIENT
Start: 2020-08-13 | End: 2020-08-25

## 2020-08-13 RX ORDER — LINEZOLID 600 MG/300ML
600 INJECTION, SOLUTION INTRAVENOUS EVERY 12 HOURS
Refills: 0 | Status: DISCONTINUED | OUTPATIENT
Start: 2020-08-13 | End: 2020-08-14

## 2020-08-13 RX ORDER — ALPRAZOLAM 0.25 MG
0.5 TABLET ORAL EVERY 8 HOURS
Refills: 0 | Status: DISCONTINUED | OUTPATIENT
Start: 2020-08-13 | End: 2020-08-16

## 2020-08-13 RX ORDER — ASPIRIN/CALCIUM CARB/MAGNESIUM 324 MG
81 TABLET ORAL DAILY
Refills: 0 | Status: DISCONTINUED | OUTPATIENT
Start: 2020-08-13 | End: 2020-08-25

## 2020-08-13 RX ORDER — ATORVASTATIN CALCIUM 80 MG/1
80 TABLET, FILM COATED ORAL AT BEDTIME
Refills: 0 | Status: DISCONTINUED | OUTPATIENT
Start: 2020-08-13 | End: 2020-08-25

## 2020-08-13 RX ORDER — ACETAMINOPHEN 500 MG
1000 TABLET ORAL ONCE
Refills: 0 | Status: COMPLETED | OUTPATIENT
Start: 2020-08-13 | End: 2020-08-13

## 2020-08-13 RX ORDER — LEVOTHYROXINE SODIUM 125 MCG
75 TABLET ORAL DAILY
Refills: 0 | Status: DISCONTINUED | OUTPATIENT
Start: 2020-08-13 | End: 2020-08-25

## 2020-08-13 RX ORDER — CIPROFLOXACIN LACTATE 400MG/40ML
400 VIAL (ML) INTRAVENOUS ONCE
Refills: 0 | Status: COMPLETED | OUTPATIENT
Start: 2020-08-13 | End: 2020-08-13

## 2020-08-13 RX ORDER — LABETALOL HCL 100 MG
300 TABLET ORAL THREE TIMES A DAY
Refills: 0 | Status: DISCONTINUED | OUTPATIENT
Start: 2020-08-13 | End: 2020-08-18

## 2020-08-13 RX ORDER — HYDROMORPHONE HYDROCHLORIDE 2 MG/ML
0.5 INJECTION INTRAMUSCULAR; INTRAVENOUS; SUBCUTANEOUS
Refills: 0 | Status: DISCONTINUED | OUTPATIENT
Start: 2020-08-13 | End: 2020-08-14

## 2020-08-13 RX ORDER — METRONIDAZOLE 500 MG
500 TABLET ORAL ONCE
Refills: 0 | Status: COMPLETED | OUTPATIENT
Start: 2020-08-13 | End: 2020-08-13

## 2020-08-13 RX ORDER — LABETALOL HCL 100 MG
300 TABLET ORAL ONCE
Refills: 0 | Status: COMPLETED | OUTPATIENT
Start: 2020-08-13 | End: 2020-08-13

## 2020-08-13 RX ORDER — SODIUM CHLORIDE 9 MG/ML
1000 INJECTION, SOLUTION INTRAVENOUS
Refills: 0 | Status: DISCONTINUED | OUTPATIENT
Start: 2020-08-13 | End: 2020-08-14

## 2020-08-13 RX ORDER — SENNA PLUS 8.6 MG/1
2 TABLET ORAL AT BEDTIME
Refills: 0 | Status: DISCONTINUED | OUTPATIENT
Start: 2020-08-13 | End: 2020-08-25

## 2020-08-13 RX ADMIN — SODIUM CHLORIDE 75 MILLILITER(S): 9 INJECTION, SOLUTION INTRAVENOUS at 23:50

## 2020-08-13 RX ADMIN — SODIUM CHLORIDE 1000 MILLILITER(S): 9 INJECTION, SOLUTION INTRAVENOUS at 21:34

## 2020-08-13 RX ADMIN — Medication 400 MILLIGRAM(S): at 20:28

## 2020-08-13 RX ADMIN — Medication 300 MILLIGRAM(S): at 16:24

## 2020-08-13 RX ADMIN — Medication 400 MILLIGRAM(S): at 15:27

## 2020-08-13 RX ADMIN — SODIUM CHLORIDE 1000 MILLILITER(S): 9 INJECTION, SOLUTION INTRAVENOUS at 16:24

## 2020-08-13 RX ADMIN — Medication 100 MILLIGRAM(S): at 16:23

## 2020-08-13 RX ADMIN — Medication 1000 MILLIGRAM(S): at 15:27

## 2020-08-13 RX ADMIN — Medication 50 MILLIGRAM(S): at 16:23

## 2020-08-13 RX ADMIN — Medication 200 MILLIGRAM(S): at 18:29

## 2020-08-13 RX ADMIN — LINEZOLID 600 MILLIGRAM(S): 600 INJECTION, SOLUTION INTRAVENOUS at 23:49

## 2020-08-13 RX ADMIN — Medication 300 MILLIGRAM(S): at 23:50

## 2020-08-13 RX ADMIN — ATORVASTATIN CALCIUM 80 MILLIGRAM(S): 80 TABLET, FILM COATED ORAL at 23:49

## 2020-08-13 RX ADMIN — Medication 50 MILLIGRAM(S): at 23:50

## 2020-08-13 NOTE — H&P ADULT - PROBLEM SELECTOR PROBLEM 4
Type 2 diabetes mellitus without complication, without long-term current use of insulin Essential hypertension Atrial fibrillation, unspecified type

## 2020-08-13 NOTE — ED ADULT NURSE NOTE - OBJECTIVE STATEMENT
70y f presents to ED from nursing home with high fever and AMS. Pt denies headache, numbness/tingling, cp, sob, abd pain, changes in urinary habits. 70y f presents to ED from nursing home with high fever and AMS. Pt arrived with 103 fever, very agitatedPt denies headache, numbness/tingling, cp, sob, abd pain, changes in urinary habits. 70y f presents to ED from nursing home with high fever and AMS. Pt arrived with 103 fever, very agitated, appears lethargic, but follows commands. A&Ox2, disoriented to time, lungs clear, abd soft nondistended non tender. PMH DM, HTN, hypothyroidism, Pt denies headache, numbness/tingling, cp, sob, abd pain, changes in urinary habits. Patient undressed and placed into gown, call bell in hand and side rails up with bed in lowest position for safety. blanket provided. Comfort and safety provided.

## 2020-08-13 NOTE — BRIEF OPERATIVE NOTE - NSICDXBRIEFPREOP_GEN_ALL_CORE_FT
PRE-OP DIAGNOSIS:  Ureteral stone with hydronephrosis 13-Aug-2020 22:54:08 Ureteral stone with hydronephrosis Henry Crockett

## 2020-08-13 NOTE — BRIEF OPERATIVE NOTE - NSICDXBRIEFPOSTOP_GEN_ALL_CORE_FT
POST-OP DIAGNOSIS:  Ureteral stone with hydronephrosis 13-Aug-2020 22:54:04 Ureteral stone with hydronephrosis Henry Crockett

## 2020-08-13 NOTE — H&P ADULT - PROBLEM SELECTOR PLAN 4
Patient with hx labile blood pressure, hypertensive urgency in the past will continue with Labetalol, Hydralazine and Clonidine. Rate controlled, hold Eliquis for now.   Continue with Aspirin.

## 2020-08-13 NOTE — CONSULT NOTE ADULT - SUBJECTIVE AND OBJECTIVE BOX
HPI: 70 F with PMHx of DM, HTN and hypothyroidism, discharged on  after being admitted for NSTEMI and later developed a UTI, colitis and A fib with RVR sent from NH for AMS and fever. Urology team consulted for 5x6mm L proximal ureteral stone with mild hydro in the setting of fever to 103F, hypertension to 170s/100s, mild leukocytosis to 10.5, UA with moderate LE/moderate blood, elevated lactate to 2.3 and bump in Cr from 1.17 to 1.35. Pt seen and examined at the bedside. Pt with altered mental status (A&Ox1) but states she had kidney stones in the past, unsure of how they were treated or if she has a urologist. Patient able to answer questions with yes/no answers, but appears lethargic. Denies chest pain, sob, or other acute urologic complaint at this time. Unable to obtain further hx given patient's mental status.     PAST MEDICAL & SURGICAL HISTORY:  Lymphedema  Essential hypertension  Carpal tunnel syndrome of right wrist  Obesity (BMI 30-39.9)  Type 2 diabetes mellitus without complication  Hypothyroid  Essential hypertension  Obesity  HTN (hypertension)  DM (diabetes mellitus)  No significant past surgical history  S/P carpal tunnel release      MEDICATIONS  (STANDING):  lactated ringers Bolus 1000 milliLiter(s) IV Bolus once    MEDICATIONS  (PRN):      FAMILY HISTORY:  Family history of myocardial infarction (Sibling)  Family history of lung cancer      Allergies    penicillin (Hives)  penicillin (Rash)  sulfa drugs (Unknown)  Tetracycline Hydrochloride (Unknown)    Intolerances        SOCIAL HISTORY:    REVIEW OF SYSTEMS: Otherwise negative as stated in HPI    Physical Exam  Vital signs  T(C): 39.4 (20 @ 19:37), Max: 39.5 (20 @ 14:06)  HR: 93 (20 @ 19:37)  BP: 178/78 (20 @ 19:37)  SpO2: 95% (20 @ 19:37)    Gen: A&Ox1. Lethargic. Appears uncomfortable  Pulm: No respiratory distress  Abd: Soft / nondistended / + LUQ TTP  : No suprapubic tenderness    LABS:                        12.3   10.52 )-----------( 294      ( 13 Aug 2020 15:10 )             38.1        08-13    139  |  102  |  22  ----------------------------<  94  4.2   |  26  |  1.35<H>    Ca    9.1      13 Aug 2020 15:10    TPro  6.9  /  Alb  3.8  /  TBili  0.7  /  DBili  x   /  AST  12  /  ALT  8<L>  /  AlkPhos  69  08-13    PT/INR - ( 13 Aug 2020 15:10 )   PT: 17.3 sec;   INR: 1.48 ratio         PTT - ( 13 Aug 2020 15:10 )  PTT:46.8 sec  Urinalysis Basic - ( 13 Aug 2020 17:00 )    Color: Light Yellow / Appearance: Slightly Turbid / S.015 / pH: x  Gluc: x / Ketone: Negative  / Bili: Negative / Urobili: Negative   Blood: x / Protein: 30 mg/dL / Nitrite: Negative   Leuk Esterase: Moderate / RBC: 46 /hpf / WBC 6 /HPF   Sq Epi: x / Non Sq Epi: 6 /hpf / Bacteria: Negative      Urine Cx: pending  Blood Cx: pending    RADIOLOGY:    < from: CT Abdomen and Pelvis No Cont (20 @ 18:01) >  EXAM:  CT ABDOMEN AND PELVIS                          PROCEDURE DATE:  2020      INTERPRETATION:  CLINICAL INFORMATION: Fever left upper quadrant pain. Recent colitis.    COMPARISON: CT abdomen pelvis 2020.    PROCEDURE:  CT of the Abdomen and Pelvis was performed without intravenous contrast.  Intravenous contrast: None.  Oral contrast: None.  Sagittal and coronal reformats were performed.    FINDINGS:  LOWER CHEST: Cardiomegaly. Coronary artery calcifications.    LIVER: Within normal limits.  BILE DUCTS: Normal caliber.  GALLBLADDER: Within normal limits.  SPLEEN: Splenomegaly.  PANCREAS: Within normal limits.  ADRENALS: Within normal limits.  KIDNEYS/URETERS: Obstructing 5 x 6 mm proximal left ureteral calculus with mild hydronephrosis. Nonobstructing right intrarenal calculi.    BLADDER: Within normal limits.  REPRODUCTIVE ORGANS: Uterus and adnexa within normal limits.    BOWEL: No bowel obstruction. Appendix is normal. Colonic diverticulosis. Circumferential mural thickening involving the distal transverse and sigmoid colon as well as the rectum.  PERITONEUM: No ascites.  VESSELS: Calcified arterial plaques.  RETROPERITONEUM/LYMPH NODES: No lymphadenopathy.  ABDOMINAL WALL: Tiny fat-containing umbilical and small bilateral inguinal hernias.  BONES: Degenerative changes.    IMPRESSION:  Obstructing 5 x 6 mm proximal left ureteral calculus with mild hydronephrosis.    Circumferential mural thickening involving the distal transverse colon through the rectumconsistent with clinical history of recent colitis.    < end of copied text > HPI: 70 F with PMHx of DM, HTN and hypothyroidism, discharged on  after being admitted for NSTEMI and later developed a UTI, colitis and A fib with RVR sent from NH for AMS and fever. Urology team consulted for 5x6mm L proximal ureteral stone with mild hydro in the setting of fever to 103F, hypertension to 170s/100s, mild leukocytosis to 10.5, UA with moderate LE/moderate blood, elevated lactate to 2.3 and bump in Cr from 1.17 to 1.35. Pt seen and examined at the bedside. Pt with altered mental status (A&Ox1) but states she had kidney stones in the past, unsure of how they were treated or if she has a urologist. Patient able to answer questions with yes/no answers, but appears lethargic. Denies chest pain, sob, or other acute urologic complaint at this time. Unable to obtain further hx given patient's mental status.     PAST MEDICAL & SURGICAL HISTORY:  Lymphedema  Essential hypertension  Carpal tunnel syndrome of right wrist  Obesity (BMI 30-39.9)  Type 2 diabetes mellitus without complication  Hypothyroid  Essential hypertension  Obesity  HTN (hypertension)  DM (diabetes mellitus)  No significant past surgical history  S/P carpal tunnel release      MEDICATIONS  (STANDING):  lactated ringers Bolus 1000 milliLiter(s) IV Bolus once    MEDICATIONS  (PRN):      FAMILY HISTORY:  Family history of myocardial infarction (Sibling)  Family history of lung cancer      Allergies    penicillin (Hives)  penicillin (Rash)  sulfa drugs (Unknown)  Tetracycline Hydrochloride (Unknown)    Intolerances        SOCIAL HISTORY:    REVIEW OF SYSTEMS: Otherwise negative as stated in HPI    Physical Exam  Vital signs  T(C): 39.4 (20 @ 19:37), Max: 39.5 (20 @ 14:06)  HR: 93 (20 @ 19:37)  BP: 178/78 (20 @ 19:37)  SpO2: 95% (20 @ 19:37)    Gen: A&Ox1. Lethargic. Appears uncomfortable  Pulm: No respiratory distress  Abd: Soft / nondistended / + LUQ TTP  Back: NO flank or CVAT bilaterally.  : No suprapubic tenderness    LABS:                        12.3   10.52 )-----------( 294      ( 13 Aug 2020 15:10 )             38.1        08-13    139  |  102  |  22  ----------------------------<  94  4.2   |  26  |  1.35<H>    Ca    9.1      13 Aug 2020 15:10    TPro  6.9  /  Alb  3.8  /  TBili  0.7  /  DBili  x   /  AST  12  /  ALT  8<L>  /  AlkPhos  69  13    PT/INR - ( 13 Aug 2020 15:10 )   PT: 17.3 sec;   INR: 1.48 ratio         PTT - ( 13 Aug 2020 15:10 )  PTT:46.8 sec  Urinalysis Basic - ( 13 Aug 2020 17:00 )    Color: Light Yellow / Appearance: Slightly Turbid / S.015 / pH: x  Gluc: x / Ketone: Negative  / Bili: Negative / Urobili: Negative   Blood: x / Protein: 30 mg/dL / Nitrite: Negative   Leuk Esterase: Moderate / RBC: 46 /hpf / WBC 6 /HPF   Sq Epi: x / Non Sq Epi: 6 /hpf / Bacteria: Negative      Urine Cx: pending  Blood Cx: pending    RADIOLOGY:    < from: CT Abdomen and Pelvis No Cont (20 @ 18:01) >  EXAM:  CT ABDOMEN AND PELVIS                          PROCEDURE DATE:  2020      INTERPRETATION:  CLINICAL INFORMATION: Fever left upper quadrant pain. Recent colitis.    COMPARISON: CT abdomen pelvis 2020.    PROCEDURE:  CT of the Abdomen and Pelvis was performed without intravenous contrast.  Intravenous contrast: None.  Oral contrast: None.  Sagittal and coronal reformats were performed.    FINDINGS:  LOWER CHEST: Cardiomegaly. Coronary artery calcifications.    LIVER: Within normal limits.  BILE DUCTS: Normal caliber.  GALLBLADDER: Within normal limits.  SPLEEN: Splenomegaly.  PANCREAS: Within normal limits.  ADRENALS: Within normal limits.  KIDNEYS/URETERS: Obstructing 5 x 6 mm proximal left ureteral calculus with mild hydronephrosis. Nonobstructing right intrarenal calculi.    BLADDER: Within normal limits.  REPRODUCTIVE ORGANS: Uterus and adnexa within normal limits.    BOWEL: No bowel obstruction. Appendix is normal. Colonic diverticulosis. Circumferential mural thickening involving the distal transverse and sigmoid colon as well as the rectum.  PERITONEUM: No ascites.  VESSELS: Calcified arterial plaques.  RETROPERITONEUM/LYMPH NODES: No lymphadenopathy.  ABDOMINAL WALL: Tiny fat-containing umbilical and small bilateral inguinal hernias.  BONES: Degenerative changes.    IMPRESSION:  Obstructing 5 x 6 mm proximal left ureteral calculus with mild hydronephrosis.    Circumferential mural thickening involving the distal transverse colon through the rectumconsistent with clinical history of recent colitis.    < end of copied text >

## 2020-08-13 NOTE — ED PROVIDER NOTE - OBJECTIVE STATEMENT
70 F with hx of DM, HTN and hypothyroidism, discharged on August 3rd after being admitted for NSTEMI and later developed a UTI, colitis and A fib with RVR sent from NH for AMS and fever. Patient able to answer questions, but appears lethargic. Reports LUQ abdominal pain. Denies chest pain, sob, headache, photophobia or neck stiffness. Unable to obtain further hx given patient's mental status. Will call NH. Fever of 103.1 at triage.

## 2020-08-13 NOTE — H&P ADULT - ASSESSMENT
69 y/o F with a significant PMHx of, HTN, Hypothyroidism,  carpal tunnel syndrome, lymphedema admitted to my service discharged 08/03/20 to [facility] p/w Altered mental status.

## 2020-08-13 NOTE — ED ADULT NURSE NOTE - NSIMPLEMENTINTERV_GEN_ALL_ED
Implemented All Fall Risk Interventions:  [place] to call system. Call bell, personal items and telephone within reach. Instruct patient to call for assistance. Room bathroom lighting operational. Non-slip footwear when patient is off stretcher. Physically safe environment: no spills, clutter or unnecessary equipment. Stretcher in lowest position, wheels locked, appropriate side rails in place. Provide visual cue, wrist band, yellow gown, etc. Monitor gait and stability. Monitor for mental status changes and reorient to person, place, and time. Review medications for side effects contributing to fall risk. Reinforce activity limits and safety measures with patient and family.

## 2020-08-13 NOTE — ED PROVIDER NOTE - PHYSICAL EXAMINATION
Gen: AAOx2, lethargic, slurred speech.   Head: NCAT  HEENT: EOMI, oral mucosa moist, normal conjunctiva  Lung: CTAB, no respiratory distress, no wheezes/rhonchi/rales B/L  CV: RRR, no murmurs, rubs or gallops  Abd: Marked epigastric and LUQ TTP,  no CVA tenderness  MSK: no visible deformities  Neuro: No focal sensory or motor deficits, although limited, no neck stiffness   Skin: Warm, well perfused, no rash

## 2020-08-13 NOTE — ED PROVIDER NOTE - ATTENDING CONTRIBUTION TO CARE
70F, pmh dm, htn, hypothyroidism, d/c'd 8/3 s/p admission for NSTEMI, c/b UTI, colitis, afib with RVR, presents from nursing home with ams, fever. +abd pain, primarily LUQ. pt denies cp, sob, n/v. History limted as patient inconsistently answering questions.    PE: NAD, NCAT, MMM, Trachea midline, Normal conjunctiva, lungs CTAB, S1/S2 tachycardic, Normal perfusion, 2+ radial pulses bilat, Abdomen Soft, ND, +epigastric, LUQ ttp, No rebound/guarding, No LE edema, No deformity of extremities, No rashes,  No focal motor or sensory deficits.     Pt febrile on arrival. Confused. Ddx includes but not limited to infection (uti vs pneumonia vs intra-abd pathology), lower suspicion metabolic disturbance, acute intracranial pathology. Check labs. CT a/p. CTH. Urinalysis. Cultures. Give IVF. Antibiotics. Plan for admission. - Darryl Ferrera MD

## 2020-08-13 NOTE — ED PROVIDER NOTE - PROGRESS NOTE DETAILS
Law Fuchs MD. pt signed out to me. CT and labs noted. Obstructing stone seen on CT. Uro consulted; will see patient. pt states has diffuse pain but denies n/v/d. abd is soft, distended, diffusely tender. Law Fuchs MD. spoke w/ uro, who plan on possibly taking patient to OR however, given extensive medical hx, would like medicine admission. spoke w/ dr. davila, who accepts pt for admission ( was previously admitted under her service). will send for type and screen as well as tylenol IV. Law Fuchs MD. spoke w/ HCP, Linus ([phone]). updated on patient's course.

## 2020-08-13 NOTE — CHART NOTE - NSCHARTNOTEFT_GEN_A_CORE
Pt's healthcare proxy, Linus Morrison called back.  Discussed the urgency for procedure and risks/benefits.  Answered all questions and HCP is agreeable to proceed with cystoscopy, left ureteral stent placement.  Consent filled out and witnessed by myself and JEFF Carson.   Consent placed in chart pt's chart.     Please keep HCP updated on pt's status and care.  Can be reached at [phone]

## 2020-08-13 NOTE — ED PROVIDER NOTE - CLINICAL SUMMARY MEDICAL DECISION MAKING FREE TEXT BOX
70 F with hx of DM, HTN and hypothyroidism, discharged on August 3rd after being admitted for NSTEMI and later developed a UTI, colitis and A fib with RVR sent from NH for AMS and fever. Febrile at 103. 1, meeting criteria for sepsis.  Likely secondary to intraabdominal infection given fever, abdominal pain and AMS vs meningitis. Will do sepsis work up, get head CT and abdominal CT, provide fluids, antipyretics. Will consider LP if CT abd WNL and no other sources of infection are identified.

## 2020-08-13 NOTE — PRE-ANESTHESIA EVALUATION ADULT - NSANTHOSAYNRD_GEN_A_CORE
No. RIDGE screening performed.  STOP BANG Legend: 0-2 = LOW Risk; 3-4 = INTERMEDIATE Risk; 5-8 = HIGH Risk

## 2020-08-13 NOTE — ED PROVIDER NOTE - NS ED ROS FT
GENERAL: fever  EYES: no change in vision  HEENT: no trouble swallowing or speaking  CARDIAC: no chest pain or palpiations   PULMONARY: no cough or SOB  GI: see hpi  : No changes in urination  SKIN: no rashes  NEURO: see hpi  MSK: No joint pain

## 2020-08-13 NOTE — H&P ADULT - PROBLEM SELECTOR PROBLEM 3
Essential hypertension Atrial fibrillation, unspecified type Sepsis, due to unspecified organism, unspecified whether acute organ dysfunction present

## 2020-08-13 NOTE — BRIEF OPERATIVE NOTE - NSICDXBRIEFOPLAUNCH_GEN_ALL_CORE
Opening Shift Note

Assumed care of patient, awake and alert x4. No S/S of distress/SOB or pain. Call light is 
within reach, side rails up x2, bed is in the lowest position, fall precautions are in 
place. Instructed on POC and to call for assist PRN, will continue to monitor for changes 
Q1hr and PRN. <--- Click to Launch ICDx for PreOp, PostOp and Procedure

## 2020-08-13 NOTE — H&P ADULT - HISTORY OF PRESENT ILLNESS
71 y/o F with a significant PMHx of, HTN, Hypothyroidism,  carpal tunnel syndrome, lymphedema admitted to my service discharged 08/03/20 to [facility] p/w Altered mental status.     Patient had a complicated hospital course last admission with elevated cardiac enzymes from demand ischemia due to A.fib with rvr, refused cardiac cath had normal stress test, course complicated with Enterococcus UTI and colitis requiring antibiotics discharged to [facility] p/w AMS x 1 day. As per [facility] patient very agitated, altered refusing to take medications.     In the ed patient had CT head with no acute findings, CT abdomen shows 5-6 mm ureteral stones with mild hydronephrosis.   Patient was seen by urology,     Currently patient alert, awake, oriented x 3, denies abdominal pain/ nausea/ vomiting, noted to have fever 103 F, received Ciprofloxacin and Flagyl.

## 2020-08-13 NOTE — CONSULT NOTE ADULT - ASSESSMENT
A/P: 70 F with PMHx of DM, HTN and hypothyroidism, discharged on August 3rd after being admitted for NSTEMI and later developed a UTI, colitis and A fib with RVR sent from NH for AMS and fever found to have a 5x6mm L proximal ureteral stone with mild hydro in the setting of fever to 103F, hypertension to 170s/100s, mild leukocytosis to 10.5, UA with moderate LE/moderate blood, elevated lactate to 2.3 and bump in Cr from 1.17 to 1.35.    Recs:  - Will need emergent cysto/L ureteral stent placement for obstructing septic stone. Please keep pt NPO for procedure. Due to AMS (A&Ox1), attempted to contact listed medical contact numbers without any success. Due to the need for emergent cysto/L ureteral stent placement to relieve obstructing, will get two physician consent for this procedure  - Continue ABX  - Follow Urine Cx  - Trend labs/vitals  - Due to recent NSTEMI and complicated medical history, pt should be admitted to the medicine team for further management and care. Urology team will closely follow pt.    Will discuss with on call attending Dr. Araujo A/P: 70 F with PMHx of DM, HTN and hypothyroidism, discharged on August 3rd after being admitted for NSTEMI and later developed a UTI, colitis and A fib with RVR sent from NH for AMS and fever found to have a 5x6mm L proximal ureteral stone with mild hydro in the setting of fever to 103F, hypertension to 170s/100s, mild leukocytosis to 10.5, UA with moderate LE/moderate blood, elevated lactate to 2.3 and bump in Cr from 1.17 to 1.35.    Recs:  - Will need emergent cysto/L ureteral stent placement for obstructing septic stone. Please keep pt NPO for procedure. Due to AMS (A&Ox1), attempted to contact listed medical contact numbers without any success. Due to the need for emergent cysto/L ureteral stent placement to relieve obstruction; will get two physician consent for this procedure.  - Continue ABX  - Follow Urine/Blood Cx  - Trend labs/vitals  - Due to recent NSTEMI and complicated medical history, pt should be admitted to the medicine team for further management and care. Urology team will closely follow pt.    Will discuss with on call attending Dr. Araujo A/P: 70 F with PMHx of DM, HTN and hypothyroidism, discharged on August 3rd after being admitted for NSTEMI and later developed a UTI, colitis and A fib with RVR sent from NH for AMS and fever found to have a 5x6mm L proximal ureteral stone with mild hydro in the setting of fever to 103F, hypertension to 170s/100s, mild leukocytosis to 10.5, UA with moderate LE/moderate blood, elevated lactate to 2.3 and bump in Cr from 1.17 to 1.35.    Recs:  - Will need emergent cysto/L ureteral stent placement for obstructing septic stone. Please keep pt NPO for procedure. Due to AMS (A&Ox1), attempted to contact listed medical contact numbers without any success. Due to the need for emergent cysto/L ureteral stent placement to relieve obstruction; will get two physician consent for this procedure.  - Continue ABX  - Follow Urine/Blood Cx  - Trend labs/vitals  - Due to recent NSTEMI and complicated medical history, pt should be admitted to the medicine team for further management and care. Urology team will closely follow pt.    Discussed with attending on call Dr. Araujo Xenograft Text: The defect edges were debeveled with a #15 scalpel blade.  Given the location of the defect, shape of the defect and the proximity to free margins a xenograft was deemed most appropriate.  The graft was then trimmed to fit the size of the defect.  The graft was then placed in the primary defect and oriented appropriately.

## 2020-08-13 NOTE — H&P ADULT - PROBLEM SELECTOR PLAN 2
Urology consult appreciated, plan for Cystoscopy with left ureteral stent placement. Keep patient NPO after mid night.  Patient high risk for intermediate risk procedure. Cards pending.

## 2020-08-13 NOTE — H&P ADULT - PROBLEM SELECTOR PLAN 5
Patient with hx labile blood pressure, hypertensive urgency in the past will continue with Labetalol, Hydralazine and Clonidine.

## 2020-08-13 NOTE — H&P ADULT - PROBLEM SELECTOR PLAN 1
Due to high fevers in the setting of ureteral stone with hydronephrosis. Last Urine cultures positive for Enterococcus, will start patient on Zyvox. Follow up Urine and Blood cultures.

## 2020-08-14 DIAGNOSIS — Z51.5 ENCOUNTER FOR PALLIATIVE CARE: ICD-10-CM

## 2020-08-14 DIAGNOSIS — R78.81 BACTEREMIA: ICD-10-CM

## 2020-08-14 DIAGNOSIS — R53.81 OTHER MALAISE: ICD-10-CM

## 2020-08-14 DIAGNOSIS — Z71.89 OTHER SPECIFIED COUNSELING: ICD-10-CM

## 2020-08-14 DIAGNOSIS — F41.9 ANXIETY DISORDER, UNSPECIFIED: ICD-10-CM

## 2020-08-14 LAB
ANION GAP SERPL CALC-SCNC: 11 MMOL/L — SIGNIFICANT CHANGE UP (ref 5–17)
BUN SERPL-MCNC: 21 MG/DL — SIGNIFICANT CHANGE UP (ref 7–23)
CALCIUM SERPL-MCNC: 8.6 MG/DL — SIGNIFICANT CHANGE UP (ref 8.4–10.5)
CHLORIDE SERPL-SCNC: 103 MMOL/L — SIGNIFICANT CHANGE UP (ref 96–108)
CO2 SERPL-SCNC: 26 MMOL/L — SIGNIFICANT CHANGE UP (ref 22–31)
CREAT SERPL-MCNC: 1.08 MG/DL — SIGNIFICANT CHANGE UP (ref 0.5–1.3)
ENTEROCOC DNA BLD POS QL NAA+NON-PROBE: SIGNIFICANT CHANGE UP
GLUCOSE SERPL-MCNC: 217 MG/DL — HIGH (ref 70–99)
GRAM STN FLD: SIGNIFICANT CHANGE UP
GRAM STN FLD: SIGNIFICANT CHANGE UP
HCT VFR BLD CALC: 36.7 % — SIGNIFICANT CHANGE UP (ref 34.5–45)
HGB BLD-MCNC: 11.3 G/DL — LOW (ref 11.5–15.5)
MCHC RBC-ENTMCNC: 29.8 PG — SIGNIFICANT CHANGE UP (ref 27–34)
MCHC RBC-ENTMCNC: 30.8 GM/DL — LOW (ref 32–36)
MCV RBC AUTO: 96.8 FL — SIGNIFICANT CHANGE UP (ref 80–100)
METHOD TYPE: SIGNIFICANT CHANGE UP
NRBC # BLD: 0 /100 WBCS — SIGNIFICANT CHANGE UP (ref 0–0)
PLATELET # BLD AUTO: 221 K/UL — SIGNIFICANT CHANGE UP (ref 150–400)
POTASSIUM SERPL-MCNC: 4.2 MMOL/L — SIGNIFICANT CHANGE UP (ref 3.5–5.3)
POTASSIUM SERPL-SCNC: 4.2 MMOL/L — SIGNIFICANT CHANGE UP (ref 3.5–5.3)
RBC # BLD: 3.79 M/UL — LOW (ref 3.8–5.2)
RBC # FLD: 14.9 % — HIGH (ref 10.3–14.5)
SODIUM SERPL-SCNC: 140 MMOL/L — SIGNIFICANT CHANGE UP (ref 135–145)
SPECIMEN SOURCE: SIGNIFICANT CHANGE UP
SPECIMEN SOURCE: SIGNIFICANT CHANGE UP
TSH SERPL-MCNC: 0.58 UIU/ML — SIGNIFICANT CHANGE UP (ref 0.27–4.2)
WBC # BLD: 11.94 K/UL — HIGH (ref 3.8–10.5)
WBC # FLD AUTO: 11.94 K/UL — HIGH (ref 3.8–10.5)

## 2020-08-14 PROCEDURE — 99497 ADVNCD CARE PLAN 30 MIN: CPT | Mod: 25

## 2020-08-14 PROCEDURE — 99223 1ST HOSP IP/OBS HIGH 75: CPT

## 2020-08-14 PROCEDURE — 99232 SBSQ HOSP IP/OBS MODERATE 35: CPT

## 2020-08-14 RX ORDER — VANCOMYCIN HCL 1 G
1250 VIAL (EA) INTRAVENOUS EVERY 12 HOURS
Refills: 0 | Status: DISCONTINUED | OUTPATIENT
Start: 2020-08-14 | End: 2020-08-19

## 2020-08-14 RX ORDER — LACTOBACILLUS ACIDOPHILUS 100MM CELL
1 CAPSULE ORAL DAILY
Refills: 0 | Status: DISCONTINUED | OUTPATIENT
Start: 2020-08-14 | End: 2020-08-25

## 2020-08-14 RX ORDER — APIXABAN 2.5 MG/1
5 TABLET, FILM COATED ORAL EVERY 12 HOURS
Refills: 0 | Status: DISCONTINUED | OUTPATIENT
Start: 2020-08-14 | End: 2020-08-25

## 2020-08-14 RX ADMIN — Medication 0.2 MILLIGRAM(S): at 06:05

## 2020-08-14 RX ADMIN — SODIUM CHLORIDE 75 MILLILITER(S): 9 INJECTION, SOLUTION INTRAVENOUS at 07:00

## 2020-08-14 RX ADMIN — Medication 0.2 MILLIGRAM(S): at 13:21

## 2020-08-14 RX ADMIN — Medication 300 MILLIGRAM(S): at 13:21

## 2020-08-14 RX ADMIN — Medication 75 MICROGRAM(S): at 06:05

## 2020-08-14 RX ADMIN — Medication 300 MILLIGRAM(S): at 06:05

## 2020-08-14 RX ADMIN — Medication 166.67 MILLIGRAM(S): at 17:03

## 2020-08-14 RX ADMIN — Medication 50 MILLIGRAM(S): at 04:37

## 2020-08-14 RX ADMIN — LINEZOLID 600 MILLIGRAM(S): 600 INJECTION, SOLUTION INTRAVENOUS at 06:05

## 2020-08-14 RX ADMIN — Medication 30 MILLILITER(S): at 21:05

## 2020-08-14 RX ADMIN — Medication 300 MILLIGRAM(S): at 23:49

## 2020-08-14 RX ADMIN — SENNA PLUS 2 TABLET(S): 8.6 TABLET ORAL at 23:46

## 2020-08-14 RX ADMIN — Medication 0.2 MILLIGRAM(S): at 23:46

## 2020-08-14 RX ADMIN — POLYETHYLENE GLYCOL 3350 17 GRAM(S): 17 POWDER, FOR SOLUTION ORAL at 13:22

## 2020-08-14 RX ADMIN — Medication 50 MILLIGRAM(S): at 13:21

## 2020-08-14 RX ADMIN — Medication 81 MILLIGRAM(S): at 13:22

## 2020-08-14 RX ADMIN — ATORVASTATIN CALCIUM 80 MILLIGRAM(S): 80 TABLET, FILM COATED ORAL at 23:46

## 2020-08-14 RX ADMIN — Medication 50 MILLIGRAM(S): at 23:49

## 2020-08-14 NOTE — CONSULT NOTE ADULT - ASSESSMENT
Full Note to Follow.    ·	no acute symptoms  ·	advanced directives = Full Code  ·	HCP form redone as noted above    Palliative Care will SIGN OFF. This readmission seemed largely unavoidable. 71yo F with PMH of HTN, Hypothyroidism, Carpal Tunnel Syndrome, and Lymphedema p/w AMS in the setting of obstructing renal calculi s/p cystoscopy and stent placement. Palliative consulted for GOC due to readmissions.    ·	no acute symptoms  ·	HCP form redone as noted above    Palliative Care will SIGN OFF. This readmission seemed largely unavoidable.

## 2020-08-14 NOTE — PROVIDER CONTACT NOTE (OTHER) - ASSESSMENT
Pt BP elevated in 170s. Pt resting comfortably at this time. No signs and symptoms of pain or distress noted. Pt denies pain. Denies headaches or dizziness

## 2020-08-14 NOTE — CONSULT NOTE ADULT - SUBJECTIVE AND OBJECTIVE BOX
Patient is a 70y old  Female who presents with a chief complaint of AMS x 1 day (14 Aug 2020 11:52)    HPI:    70 year old female PMH HTN Hypothyroidism,  carpal tunnel syndrome, lymphedema admitted to my service discharged 20 to [facility] p/w Altered mental status.     Patient had a complicated hospital course last admission with elevated cardiac enzymes from demand ischemia due to A.fib with rvr, refused cardiac cath had normal stress test, course complicated with Enterococcus UTI and colitis requiring antibiotics discharged to [facility] p/w AMS x 1 day. As per [facility] patient very agitated, altered refusing to take medications.     presented to [facility] on 20 with encephalopathy. In the ED febrile to 103.1 and tachycardic to > 90. On presentation WBC of 10.52 with 83.7% PMN. ANIKA to Cr of 1.35. Lactic acid of 2.3. U/A with 6 WBC and 46 RBC. Blood cultures with 2/2 Enterococcus (based on biofire). COVID19 PCR () negative. CT A/P () with Obstructing 5 x 6 mm proximal left ureteral calculus with mild hydronephrosis and Circumferential mural thickening involving the distal transverse colon through the rectum consistent with clinical history of recent colitis.  s/p Cystoscopy with stent placement.      Tolerated CTX 2020  Tolerated Vanco 2019     prior hospital charts reviewed [  ]  primary team notes reviewed [  ]  other consultant notes reviewed [  ]    PAST MEDICAL & SURGICAL HISTORY:  Lymphedema  Essential hypertension  Carpal tunnel syndrome of right wrist  Obesity (BMI 30-39.9)  Type 2 diabetes mellitus without complication  Hypothyroid  Essential hypertension  Obesity  HTN (hypertension)  DM (diabetes mellitus)  No significant past surgical history  S/P carpal tunnel release      Allergies  penicillin (Hives)  penicillin (Rash)  sulfa drugs (Unknown)  Tetracycline Hydrochloride (Unknown)    ANTIMICROBIALS (past 90 days)  MEDICATIONS  (STANDING):  ciprofloxacin   IVPB   200 mL/Hr IV Intermittent (20 @ 18:29)    linezolid    Tablet   600 milliGRAM(s) Oral (20 @ 06:05)   600 milliGRAM(s) Oral (20 @ 23:49)    metroNIDAZOLE  IVPB   100 mL/Hr IV Intermittent (20 @ 16:23)      ANTIMICROBIALS:    linezolid    Tablet 600 every 12 hours    OTHER MEDS: MEDICATIONS  (STANDING):  ALPRAZolam 0.5 every 8 hours PRN  aspirin enteric coated 81 daily  atorvastatin 80 at bedtime  cloNIDine 0.2 three times a day  hydrALAZINE 50 three times a day  labetalol 300 three times a day  levothyroxine 75 daily  polyethylene glycol 3350 17 daily  senna 2 at bedtime    SOCIAL HISTORY:   hx smoking  non-smoker    FAMILY HISTORY:  Family history of myocardial infarction (Sibling)  Family history of lung cancer    REVIEW OF SYSTEMS  [  ] ROS unobtainable because:    [  ] All other systems negative except as noted below:	    Constitutional:  [ ] fever [ ] chills  [ ] weight loss  [ ] weakness  Skin:  [ ] rash [ ] phlebitis	  Eyes: [ ] icterus [ ] pain  [ ] discharge	  ENMT: [ ] sore throat  [ ] thrush [ ] ulcers [ ] exudates  Respiratory: [ ] dyspnea [ ] hemoptysis [ ] cough [ ] sputum	  Cardiovascular:  [ ] chest pain [ ] palpitations [ ] edema	  Gastrointestinal:  [ ] nausea [ ] vomiting [ ] diarrhea [ ] constipation [ ] pain	  Genitourinary:  [ ] dysuria [ ] frequency [ ] hematuria [ ] discharge [ ] flank pain  [ ] incontinence  Musculoskeletal:  [ ] myalgias [ ] arthralgias [ ] arthritis  [ ] back pain  Neurological:  [ ] headache [ ] seizures  [ ] confusion/altered mental status  Psychiatric:  [ ] anxiety [ ] depression	  Hematology/Lymphatics:  [ ] lymphadenopathy  Endocrine:  [ ] adrenal [ ] thyroid  Allergic/Immunologic:	 [ ] transplant [ ] seasonal    Vital Signs Last 24 Hrs  T(F): 98.1 (20 @ 08:20), Max: 103.1 (20 @ 14:06)  Vital Signs Last 24 Hrs  HR: 72 (20 @ 08:20) (71 - 93)  BP: 167/84 (20 @ 08:20) (119/79 - 194/89)  RR: 18 (20 @ 08:20)  SpO2: 98% (20 @ 08:20) (92% - 98%)  Wt(kg): --    PHYSICAL EXAMINATION:  General: Alert and Awake, NAD  HEENT: PERRL, EOMI, No subconjunctival hemorrhages, Oropharynx Clear, MMM  Neck: Supple, No DARLINE  Cardiac: RRR, No M/R/G  Resp: CTAB, No Wh/Rh/Ra  Abdomen: NBS, NT/ND, No HSM, No rigidity or guarding  MSK: No LE edema. No stigmata of IE. No evidence of phlebitis. No evidence of synovitis.  : No aguilar  Skin: No rashes or lesions. Skin is warm and dry to the touch.   Neuro: Alert and Awake. CN 2-12 Grossly intact. Moves all four extremities spontaneously.  Psych: Calm, Pleasant, Cooperative                          11.3   11.94 )-----------( 221      ( 14 Aug 2020 11:10 )             36.7     08-14    140  |  103  |  21  ----------------------------<  217<H>  4.2   |  26  |  1.08    Ca    8.6      14 Aug 2020 11:10    TPro  6.9  /  Alb  3.8  /  TBili  0.7  /  DBili  x   /  AST  12  /  ALT  8<L>  /  AlkPhos  69  08-13    Urinalysis Basic - ( 13 Aug 2020 17:00 )    Color: Light Yellow / Appearance: Slightly Turbid / S.015 / pH: x  Gluc: x / Ketone: Negative  / Bili: Negative / Urobili: Negative   Blood: x / Protein: 30 mg/dL / Nitrite: Negative   Leuk Esterase: Moderate / RBC: 46 /hpf / WBC 6 /HPF   Sq Epi: x / Non Sq Epi: 6 /hpf / Bacteria: Negative    MICROBIOLOGY:  Culture - Blood (collected 13 Aug 2020 20:36)  Source: .Blood Blood-Peripheral  Gram Stain (14 Aug 2020 10:01):    Growth in aerobic bottle: Gram Positive Cocci in Pairs and Chains  Preliminary Report (14 Aug 2020 10:02):    Growth in aerobic bottle: Gram Positive Cocci in Pairs and Chains    "Due to technical problems, Proteus sp. will Not be reported as part of    the BCID panel until further notice"    ***Blood Panel PCR results on this specimen are available    approximately 3 hours after the Gram stain result.***    Gram stain, PCR, and/or culture results may not always    correspond due to difference in methodologies.    ************************************************************    This PCR assay was performed using [employer].    The following targets are tested for: Enterococcus,    vancomycin resistant enterococci, Listeria monocytogenes,    coagulase negative staphylococci, S. aureus,    methicillin resistant S. aureus, Streptococcus agalactiae    (Group B), S. pneumoniae, S. pyogenes (Group A),    Acinetobacter baumannii, Enterobacter cloacae, E. coli,    Klebsiella oxytoca, K. pneumoniae, Proteus sp.,    Serratia marcescens, Haemophilus influenzae,    Neisseria meningitidis, Pseudomonas aeruginosa, Candida    albicans, C.glabrata, C krusei, C parapsilosis,    C. tropicalis and the KPC resistance gene.  Organism: Blood Culture PCR (14 Aug 2020 11:34)  Organism: Blood Culture PCR (14 Aug 2020 11:34)      -  Enterococcus species: Detec      Method Type: PCR    Culture - Blood (collected 13 Aug 2020 20:36)  Source: .Blood Blood-Peripheral  Gram Stain (14 Aug 2020 10:04):    Growth in anaerobic bottle: Gram Positive Cocci in Pairs and Chains  Preliminary Report (14 Aug 2020 10:04):    Growth in anaerobic bottle: Gram Positive Cocci in Pairs and Chains                    RADIOLOGY:    <The imaging below has been reviewed and visualized by me independently. Findings as detailed in report below>    EXAM:  CT ABDOMEN AND PELVIS                        PROCEDURE DATE:  2020    Obstructing 5 x 6 mm proximal left ureteral calculus with mild hydronephrosis.  Circumferential mural thickening involving the distal transverse colon through the rectum consistent with clinical history of recent colitis. Patient is a 70y old  Female who presents with a chief complaint of AMS x 1 day (14 Aug 2020 11:52)    HPI:    70 year old female PMH HTN Hypothyroidism,  carpal tunnel syndrome, lymphedema presented to [facility] on 20 with encephalopathy. Patient had a complicated hospital course last admission with elevated cardiac enzymes from demand ischemia due to A.fib with rvr, refused cardiac cath had normal stress test, course complicated with Enterococcus UTI and colitis requiring antibiotics discharged to [facility] p/w AMS x 1 day. As per [facility] patient very agitated, altered refusing to take medications.      In the ED febrile to 103.1 and tachycardic to > 90. On presentation WBC of 10.52 with 83.7% PMN. ANIKA to Cr of 1.35. Lactic acid of 2.3. U/A with 6 WBC and 46 RBC. Blood cultures with 2/2 Enterococcus (based on biofire). COVID19 PCR () negative. CT A/P () with Obstructing 5 x 6 mm proximal left ureteral calculus with mild hydronephrosis and Circumferential mural thickening involving the distal transverse colon through the rectum consistent with clinical history of recent colitis.  s/p Cystoscopy with stent placement.    Tolerated CTX 2020  Tolerated Vanco 2019     prior hospital charts reviewed [x  ]  primary team notes reviewed [x  ]  other consultant notes reviewed [  x]    PAST MEDICAL & SURGICAL HISTORY:  Lymphedema  Essential hypertension  Carpal tunnel syndrome of right wrist  Obesity (BMI 30-39.9)  Type 2 diabetes mellitus without complication  Hypothyroid  Essential hypertension  Obesity  HTN (hypertension)  DM (diabetes mellitus)  No significant past surgical history  S/P carpal tunnel release      Allergies  penicillin (Hives)  penicillin (Rash)  sulfa drugs (Unknown)  Tetracycline Hydrochloride (Unknown)    ANTIMICROBIALS (past 90 days)  MEDICATIONS  (STANDING):  ciprofloxacin   IVPB   200 mL/Hr IV Intermittent (20 @ 18:29)    linezolid    Tablet   600 milliGRAM(s) Oral (20 @ 06:05)   600 milliGRAM(s) Oral (20 @ 23:49)    metroNIDAZOLE  IVPB   100 mL/Hr IV Intermittent (20 @ 16:23)      ANTIMICROBIALS:    linezolid    Tablet 600 every 12 hours    OTHER MEDS: MEDICATIONS  (STANDING):  ALPRAZolam 0.5 every 8 hours PRN  aspirin enteric coated 81 daily  atorvastatin 80 at bedtime  cloNIDine 0.2 three times a day  hydrALAZINE 50 three times a day  labetalol 300 three times a day  levothyroxine 75 daily  polyethylene glycol 3350 17 daily  senna 2 at bedtime    SOCIAL HISTORY:  denies smoking history. social etoh use. no drug use     FAMILY HISTORY:  Family history of myocardial infarction (Sibling)  Family history of lung cancer    REVIEW OF SYSTEMS  [  ] ROS unobtainable because:    [ x ] All other systems negative except as noted below:	    Constitutional:  [ ] fever [ ] chills  [ ] weight loss  [ ] weakness  Skin:  [ ] rash [ ] phlebitis	  Eyes: [ ] icterus [ ] pain  [ ] discharge	  ENMT: [ ] sore throat  [ ] thrush [ ] ulcers [ ] exudates  Respiratory: [ ] dyspnea [ ] hemoptysis [ ] cough [ ] sputum	  Cardiovascular:  [ ] chest pain [ ] palpitations [ ] edema	  Gastrointestinal:  [ ] nausea [ ] vomiting [ ] diarrhea [ ] constipation [ x] pain	  Genitourinary:  [x ] dysuria [ ] frequency [ ] hematuria [ ] discharge [ ] flank pain  [ ] incontinence  Musculoskeletal:  [ ] myalgias [ ] arthralgias [ ] arthritis  [ ] back pain  Neurological:  [ ] headache [ ] seizures  [ ] confusion/altered mental status  Psychiatric:  [ ] anxiety [ ] depression	  Hematology/Lymphatics:  [ ] lymphadenopathy  Endocrine:  [ ] adrenal [ ] thyroid  Allergic/Immunologic:	 [ ] transplant [ ] seasonal    Vital Signs Last 24 Hrs  T(F): 98.1 (20 @ 08:20), Max: 103.1 (20 @ 14:06)  Vital Signs Last 24 Hrs  HR: 72 (20 @ 08:20) (71 - 93)  BP: 167/84 (20 @ 08:20) (119/79 - 194/89)  RR: 18 (20 @ 08:20)  SpO2: 98% (20 @ 08:20) (92% - 98%)  Wt(kg): --    PHYSICAL EXAMINATION:  General: Alert and Awake, NAD  HEENT: PERRL, EOMI  Neck: Supple, No DARLINE  Cardiac: RRR, No M/R/G  Resp: CTAB, No Wh/Rh/Ra  Abdomen: NBS, NT/ND, No HSM, No rigidity or guarding, No CVA tenderness to fist percussion   MSK: trace to 1+ LE edema. No stigmata of IE. No evidence of phlebitis. No evidence of synovitis.  Skin: No rashes or lesions. Skin is warm and dry to the touch.   Neuro: Alert and Awake. CN 2-12 Grossly intact. Moves all four extremities spontaneously.  Psych: Calm, Pleasant, Cooperative                          11.3   11.94 )-----------( 221      ( 14 Aug 2020 11:10 )             36.7     08-14    140  |  103  |  21  ----------------------------<  217<H>  4.2   |  26  |  1.08    Ca    8.6      14 Aug 2020 11:10    TPro  6.9  /  Alb  3.8  /  TBili  0.7  /  DBili  x   /  AST  12  /  ALT  8<L>  /  AlkPhos  69  08-13    Urinalysis Basic - ( 13 Aug 2020 17:00 )    Color: Light Yellow / Appearance: Slightly Turbid / S.015 / pH: x  Gluc: x / Ketone: Negative  / Bili: Negative / Urobili: Negative   Blood: x / Protein: 30 mg/dL / Nitrite: Negative   Leuk Esterase: Moderate / RBC: 46 /hpf / WBC 6 /HPF   Sq Epi: x / Non Sq Epi: 6 /hpf / Bacteria: Negative    MICROBIOLOGY:    Culture - Blood (collected 13 Aug 2020 20:36)  Source: .Blood Blood-Peripheral  Gram Stain (14 Aug 2020 10:01):    Growth in aerobic bottle: Gram Positive Cocci in Pairs and Chains  Preliminary Report (14 Aug 2020 10:02):    Growth in aerobic bottle: Gram Positive Cocci in Pairs and Chains    "Due to technical problems, Proteus sp. will Not be reported as part of    the BCID panel until further notice"    ***Blood Panel PCR results on this specimen are available    approximately 3 hours after the Gram stain result.***    Gram stain, PCR, and/or culture results may not always    correspond due to difference in methodologies.    ************************************************************    This PCR assay was performed using [employer].    The following targets are tested for: Enterococcus,    vancomycin resistant enterococci, Listeria monocytogenes,    coagulase negative staphylococci, S. aureus,    methicillin resistant S. aureus, Streptococcus agalactiae    (Group B), S. pneumoniae, S. pyogenes (Group A),    Acinetobacter baumannii, Enterobacter cloacae, E. coli,    Klebsiella oxytoca, K. pneumoniae, Proteus sp.,    Serratia marcescens, Haemophilus influenzae,    Neisseria meningitidis, Pseudomonas aeruginosa, Candida    albicans, C.glabrata, C krusei, C parapsilosis,    C. tropicalis and the KPC resistance gene.  Organism: Blood Culture PCR (14 Aug 2020 11:34)  Organism: Blood Culture PCR (14 Aug 2020 11:34)      -  Enterococcus species: Detec      Method Type: PCR    Culture - Blood (collected 13 Aug 2020 20:36)  Source: .Blood Blood-Peripheral  Gram Stain (14 Aug 2020 10:04):    Growth in anaerobic bottle: Gram Positive Cocci in Pairs and Chains  Preliminary Report (14 Aug 2020 10:04):    Growth in anaerobic bottle: Gram Positive Cocci in Pairs and Chains    RADIOLOGY:    <The imaging below has been reviewed and visualized by me independently. Findings as detailed in report below>    EXAM:  CT ABDOMEN AND PELVIS                        PROCEDURE DATE:  2020    Obstructing 5 x 6 mm proximal left ureteral calculus with mild hydronephrosis.  Circumferential mural thickening involving the distal transverse colon through the rectum consistent with clinical history of recent colitis.

## 2020-08-14 NOTE — CONSULT NOTE ADULT - SUBJECTIVE AND OBJECTIVE BOX
[facility] Geriatrics and Palliative Care  Paramjit Wallace, Palliative Care Fellow  Questions/Concerns (including nights/weekends): Page [phone] ([facility]) or [phone] ([facility])      HPI:  71 y/o F with a significant PMHx of, HTN, Hypothyroidism,  carpal tunnel syndrome, lymphedema admitted to my service discharged 20 to [facility] p/w Altered mental status. Patient had a complicated hospital course last admission with elevated cardiac enzymes from demand ischemia due to A.fib with rvr, refused cardiac cath had normal stress test, course complicated with Enterococcus UTI and colitis requiring antibiotics discharged to [facility] p/w AMS x 1 day. As per [facility] patient very agitated, altered refusing to take medications. In the ed patient had CT head with no acute findings, CT abdomen shows 5-6 mm ureteral stones with mild hydronephrosis. (13 Aug 2020 20:23)    Patient is s/p cystoscopy with stent placement. Seen and examined at bedside, AAOx3. Denies any specific complaints, just anxious because she does not have her telephone book. Patient denies any pain, nausea, or constipation. She would like to add her cousin as an alternate HCP.    PERTINENT PM/SXH:   Lymphedema  Essential hypertension  Carpal tunnel syndrome of right wrist  Obesity (BMI 30-39.9)  Type 2 diabetes mellitus without complication  Hypothyroid  Essential hypertension  Obesity  HTN (hypertension)  DM (diabetes mellitus)    No significant past surgical history  S/P carpal tunnel release    FAMILY HISTORY:  Family history of myocardial infarction (Sibling)  Family history of lung cancer    ITEMS NOT CHECKED ARE NOT PRESENT    SOCIAL HISTORY:   Significant other/partner:  []  Children:  []  Mormonism/Spirituality:  Substance hx:  []   Tobacco hx:  []   Alcohol hx: []   Home Opioid hx:  [] I-Stop Reference No:  Living Situation: []Home  []Long term care  [x]Rehab []Other    ADVANCE DIRECTIVES:    DNR  []MOLST  []Living Will  DECISION MAKER(s):  [x] Health Care Proxy(s)  [] Surrogate(s)  [] Guardian           Name(s): 1) Norman Morrison  2) Jerrod Munoz             Phone Number(s): [phone] [phone]    BASELINE (I)ADL(s) (prior to admission):  [place]: []Total  [x] Moderate []Dependent    ALLERGIES:  penicillin (Hives)  penicillin (Rash)  sulfa drugs (Unknown)  Tetracycline Hydrochloride (Unknown)    MEDICATIONS  (STANDING):  aspirin enteric coated 81 milliGRAM(s) Oral daily  atorvastatin 80 milliGRAM(s) Oral at bedtime  cloNIDine 0.2 milliGRAM(s) Oral three times a day  hydrALAZINE 50 milliGRAM(s) Oral three times a day  labetalol 300 milliGRAM(s) Oral three times a day  lactated ringers. 1000 milliLiter(s) (75 mL/Hr) IV Continuous <Continuous>  levothyroxine 75 MICROGram(s) Oral daily  linezolid    Tablet 600 milliGRAM(s) Oral every 12 hours  polyethylene glycol 3350 17 Gram(s) Oral daily  senna 2 Tablet(s) Oral at bedtime    MEDICATIONS  (PRN):  ALPRAZolam 0.5 milliGRAM(s) Oral every 8 hours PRN anxiety    PRESENT SYMPTOMS: []Unable to obtain due to poor mentation/encephalopathy  Source if other than patient:  []Family   []Team     Pain: [ ] yes [x] no  QOL impact -   Location -                    Aggravating factors -  Quality -  Radiation -  Timing -  Severity (0-10 scale):  Minimal acceptable level (0-10 scale):    PAIN AD Score: 0  http://geriatrictoolkit.[place].[facility]/cog/painad.pdf (press ctrl +  left click to view)    Dyspnea:                           []Mild  []Moderate []Severe  Anxiety:                             [x]Mild []Moderate []Severe  Fatigue:                             []Mild []Moderate []Severe  Nausea:                             []Mild []Moderate []Severe  Loss of appetite:              []Mild []Moderate []Severe  Constipation:                    []Mild []Moderate []Severe    Other Symptoms:  [x]All other review of systems negative     Palliative Performance Status Version 2:  60%    http://npcrc.org/files/news/palliative_performance_scale_ppsv2.pdf    PHYSICAL EXAM:  Vital Signs Last 24 Hrs  T(C): 36.7 (14 Aug 2020 08:20), Max: 39.5 (13 Aug 2020 14:06)  T(F): 98.1 (14 Aug 2020 08:20), Max: 103.1 (13 Aug 2020 14:06)  HR: 72 (14 Aug 2020 08:20) (71 - 93)  BP: 167/84 (14 Aug 2020 08:20) (119/79 - 194/89)  BP(mean): 99 (14 Aug 2020 01:00) (99 - 128)  RR: 18 (14 Aug 2020 08:20) (14 - 20)  SpO2: 98% (14 Aug 2020 08:20) (92% - 98%) I&O's Summary    13 Aug 2020 07:  -  14 Aug 2020 07:00  --------------------------------------------------------  IN: 650 mL / OUT: 95 mL / NET: 555 mL    14 Aug 2020 07:  -  14 Aug 2020 12:35  --------------------------------------------------------  IN: 240 mL / OUT: 700 mL / NET: -460 mL    GENERAL:  [x]Alert  [x]Oriented x3   []Lethargic  []Cachexia  []Unarousable  [x]Verbal  []Non-Verbal  Behavioral:   [x] Anxiety  [] Delirium [] Agitation [] Other  HEENT:  [x]Normal   []Dry mouth   []ET Tube/Trach  []Oral lesions  PULMONARY:   [x]Clear []Tachypnea  []Audible excessive secretions   []Rhonchi        []Right []Left []Bilateral  []Crackles        []Right []Left []Bilateral  []Wheezing     []Right []Left []Bilateral  CARDIOVASCULAR:    [x]Regular []Irregular []Tachy  []Amaury []Murmur []Other  GASTROINTESTINAL:  [x]Soft  []Distended   [x]+BS  [x]Non tender []Tender  []PEG []OGT/ NGT  Last BM:   GENITOURINARY:  []Normal [] Incontinent   []Oliguria/Anuria   [x]Cruz  MUSCULOSKELETAL:   []Normal   [x]Weakness  []Bed/Wheelchair bound []Edema  NEUROLOGIC:   [x]No focal deficits  [] Cognitive impairment  [] Dysphagia []Dysarthria [] Paresis []Other   SKIN:   [x]Normal   []Pressure ulcer(s)  []Rash    CRITICAL CARE:  [ ] Shock Present  [ ]Septic [ ]Cardiogenic [ ]Neurologic [ ]Hypovolemic  [ ]  Vasopressors [ ]  Inotropes   [ ] Respiratory failure present [ ] Mechanical Ventilation [ ] Non-invasive ventilatory support [ ] High-Flow  [ ] Acute  [ ] Chronic [ ] Hypoxic  [ ] Hypercarbic [ ] Other  [ ] Other organ failure    LABS:                        11.3   11.94 )-----------( 221      ( 14 Aug 2020 11:10 )             36.7   08-14    140  |  103  |  21  ----------------------------<  217<H>  4.2   |  26  |  1.08    Ca    8.6      14 Aug 2020 11:10    TPro  6.9  /  Alb  3.8  /  TBili  0.7  /  DBili  x   /  AST  12  /  ALT  8<L>  /  AlkPhos  69  08-13  PT/INR - ( 13 Aug 2020 15:10 )   PT: 17.3 sec;   INR: 1.48 ratio   PTT - ( 13 Aug 2020 15:10 )  PTT:46.8 sec    Urinalysis Basic - ( 13 Aug 2020 17:00 )  Color: Light Yellow / Appearance: Slightly Turbid / S.015 / pH: x  Gluc: x / Ketone: Negative  / Bili: Negative / Urobili: Negative   Blood: x / Protein: 30 mg/dL / Nitrite: Negative   Leuk Esterase: Moderate / RBC: 46 /hpf / WBC 6 /HPF   Sq Epi: x / Non Sq Epi: 6 /hpf / Bacteria: Negative    RADIOLOGY & ADDITIONAL STUDIES:  < from: CT Head No Cont (20 @ 18:05) >  No CT evidence of acute intracranial hemorrhage, extra-axial collection or mass effect.    < from: CT Abdomen and Pelvis No Cont (20 @ 18:01) >  Obstructing 5 x 6 mm proximal left ureteral calculus with mild hydronephrosis.  Circumferential mural thickening involving the distal transverse colon through the rectumconsistent with clinical history of recent colitis.    PROTEIN CALORIE MALNUTRITION PRESENT: [ ]mild [ ]moderate [ ]severe [ ]underweight [ ]morbid obesity  []PPSV2 < or = to 30% []significant weight loss  []poor nutritional intake []catabolic state []anasarca     Albumin, Serum: 3.8 g/dL (20 @ 15:10)  Artificial Nutrition []     REFERRALS:   []Chaplaincy  []Hospice  []Child Life  [x]Social Work  []Case management []Holistic Therapy     Goals of Care Document:   Care Coordination Assessment 201 [C. Provider] (20 @ 12:11)

## 2020-08-14 NOTE — PROGRESS NOTE ADULT - SUBJECTIVE AND OBJECTIVE BOX
Post op Check    Pt seen and examined without complaints. Pain is controlled. Hypertensive to 180s/80s at time of post-op check but missed scheduled HTN meds due to surgery. Receiving HTN meds now. Denies SOB/CP/N/V.     Vital Signs Last 24 Hrs  T(C): 36.6 (13 Aug 2020 23:10), Max: 39.5 (13 Aug 2020 14:06)  T(F): 97.9 (13 Aug 2020 23:10), Max: 103.1 (13 Aug 2020 14:06)  HR: 80 (14 Aug 2020 00:00) (80 - 93)  BP: 171/72 (14 Aug 2020 00:00) (119/79 - 194/89)  BP(mean): 103 (14 Aug 2020 00:00) (103 - 128)  RR: 17 (14 Aug 2020 00:00) (14 - 20)  SpO2: 92% (14 Aug 2020 00:00) (92% - 98%)    I&O's Summary      Physical Exam  Gen: NAD, A&Ox3  Pulm: No respiratory distress, no subcostal retractions  Abd: Soft, NT, ND  Back: No flank or CVAT bilaterally.  : Cruz secured with fruit punch colored urine draining. No clots visualized in tubing.

## 2020-08-14 NOTE — PROVIDER CONTACT NOTE (OTHER) - ACTION/TREATMENT ORDERED:
As per MD Yan give clonidine due for 6am now and give other medications 1 hr later. No further interventions ordered at this time. Will continue to monitor.

## 2020-08-14 NOTE — PROGRESS NOTE ADULT - SUBJECTIVE AND OBJECTIVE BOX
HPI:  71 y/o F with a significant PMHx of, HTN, Hypothyroidism,  carpal tunnel syndrome, lymphedema admitted to my service discharged 20 to [facility] p/w Altered mental status.     Patient had a complicated hospital course last admission with elevated cardiac enzymes from demand ischemia due to A.fib with rvr, refused cardiac cath had normal stress test, course complicated with Enterococcus UTI and colitis requiring antibiotics discharged to [facility] p/w AMS x 1 day. As per [facility] patient very agitated, altered refusing to take medications.     In the ed patient had CT head with no acute findings, CT abdomen shows 5-6 mm ureteral stones with mild hydronephrosis.   Patient was seen by urology,     Currently patient alert, awake, oriented x 3, denies abdominal pain/ nausea/ vomiting, noted to have fever 103 F, received Ciprofloxacin and Flagyl. (13 Aug 2020 20:23)      Patient is a 70y old  Female who presents with a chief complaint of AMS x 1 day (14 Aug 2020 17:06)      SUBJECTIVE / OVERNIGHT EVENTS:     MEDICATIONS  (STANDING):  apixaban 5 milliGRAM(s) Oral every 12 hours  aspirin enteric coated 81 milliGRAM(s) Oral daily  atorvastatin 80 milliGRAM(s) Oral at bedtime  cloNIDine 0.2 milliGRAM(s) Oral three times a day  hydrALAZINE 50 milliGRAM(s) Oral three times a day  labetalol 300 milliGRAM(s) Oral three times a day  levothyroxine 75 MICROGram(s) Oral daily  polyethylene glycol 3350 17 Gram(s) Oral daily  senna 2 Tablet(s) Oral at bedtime  vancomycin  IVPB 1250 milliGRAM(s) IV Intermittent every 12 hours    MEDICATIONS  (PRN):  ALPRAZolam 0.5 milliGRAM(s) Oral every 8 hours PRN anxiety      CAPILLARY BLOOD GLUCOSE        I&O's Summary    13 Aug 2020 07:  -  14 Aug 2020 07:00  --------------------------------------------------------  IN: 650 mL / OUT: 95 mL / NET: 555 mL    14 Aug 2020 07:  -  14 Aug 2020 21:20  --------------------------------------------------------  IN: 600 mL / OUT: 1200 mL / NET: -600 mL      T(C): 37 (20 @ 17:03), Max: 37 (20 @ 17:03)  HR: 88 (20 @ 20:36) (73 - 88)  BP: 154/90 (20 @ 20:36) (154/78 - 167/76)  RR: 18 (20 @ 20:36) (18 - 18)  SpO2: 94% (20 @ 20:36) (94% - 97%)    PHYSICAL EXAM:  GENERAL: NAD, well-developed  HEAD:  Atraumatic, Normocephalic  EYES: EOMI, PERRLA, conjunctiva and sclera clear  NECK: Supple, No JVD  CHEST/LUNG: Clear to auscultation bilaterally; No wheeze  HEART: Regular rate and rhythm; No murmurs, rubs, or gallops  ABDOMEN: Soft, Nontender, rlq tenderness, Nondistended; Bowel sounds present  EXTREMITIES:  2+ Peripheral Pulses, No clubbing, cyanosis, or edema  PSYCH: AAOx3  NEUROLOGY: non-focal  SKIN: No rashes or lesions    LABS:                        11.3   11.94 )-----------( 221      ( 14 Aug 2020 11:10 )             36.7     08-14    140  |  103  |  21  ----------------------------<  217<H>  4.2   |  26  |  1.08    Ca    8.6      14 Aug 2020 11:10    TPro  6.9  /  Alb  3.8  /  TBili  0.7  /  DBili  x   /  AST  12  /  ALT  8<L>  /  AlkPhos  69  08-13    PT/INR - ( 13 Aug 2020 15:10 )   PT: 17.3 sec;   INR: 1.48 ratio         PTT - ( 13 Aug 2020 15:10 )  PTT:46.8 sec      Urinalysis Basic - ( 13 Aug 2020 17:00 )    Color: Light Yellow / Appearance: Slightly Turbid / S.015 / pH: x  Gluc: x / Ketone: Negative  / Bili: Negative / Urobili: Negative   Blood: x / Protein: 30 mg/dL / Nitrite: Negative   Leuk Esterase: Moderate / RBC: 46 /hpf / WBC 6 /HPF   Sq Epi: x / Non Sq Epi: 6 /hpf / Bacteria: Negative        RADIOLOGY & ADDITIONAL TESTS:    Imaging Personally Reviewed:    Consultant(s) Notes Reviewed:      Care Discussed with Consultants/Other Providers:

## 2020-08-14 NOTE — PROGRESS NOTE ADULT - SUBJECTIVE AND OBJECTIVE BOX
UROLOGY DAILY PROGRESS NOTE:     Subjective:    Patient feels well s/p stent.  No pain.  Tolerating diet.  Upset about being started on vancomycin    Objective:    NAD, awake and alert  Respirations nonlabored  Abdomen soft, nontender, nondistended  Cruz clear    MEDICATIONS  (STANDING):  aspirin enteric coated 81 milliGRAM(s) Oral daily  atorvastatin 80 milliGRAM(s) Oral at bedtime  cloNIDine 0.2 milliGRAM(s) Oral three times a day  hydrALAZINE 50 milliGRAM(s) Oral three times a day  labetalol 300 milliGRAM(s) Oral three times a day  levothyroxine 75 MICROGram(s) Oral daily  polyethylene glycol 3350 17 Gram(s) Oral daily  senna 2 Tablet(s) Oral at bedtime  vancomycin  IVPB 1250 milliGRAM(s) IV Intermittent every 12 hours    MEDICATIONS  (PRN):  ALPRAZolam 0.5 milliGRAM(s) Oral every 8 hours PRN anxiety      Vital Signs Last 24 Hrs  T(C): 36.8 (14 Aug 2020 14:55), Max: 39.4 (13 Aug 2020 19:37)  T(F): 98.3 (14 Aug 2020 14:55), Max: 103 (13 Aug 2020 19:37)  HR: 75 (14 Aug 2020 14:55) (71 - 93)  BP: 154/78 (14 Aug 2020 14:55) (119/79 - 194/89)  BP(mean): 99 (14 Aug 2020 01:00) (99 - 128)  RR: 18 (14 Aug 2020 14:55) (14 - 20)  SpO2: 95% (14 Aug 2020 14:55) (92% - 98%)    I&O's Detail    13 Aug 2020 07:01  -  14 Aug 2020 07:00  --------------------------------------------------------  IN:    lactated ringers.: 450 mL    Oral Fluid: 200 mL  Total IN: 650 mL    OUT:    Indwelling Catheter - Urethral: 95 mL  Total OUT: 95 mL    Total NET: 555 mL      14 Aug 2020 07:01  -  14 Aug 2020 17:06  --------------------------------------------------------  IN:    Oral Fluid: 240 mL  Total IN: 240 mL    OUT:    Indwelling Catheter - Urethral: 700 mL  Total OUT: 700 mL    Total NET: -460 mL          Daily     Daily     LABS:                        11.3   11.94 )-----------( 221      ( 14 Aug 2020 11:10 )             36.7     08-14    140  |  103  |  21  ----------------------------<  217<H>  4.2   |  26  |  1.08    Ca    8.6      14 Aug 2020 11:10    TPro  6.9  /  Alb  3.8  /  TBili  0.7  /  DBili  x   /  AST  12  /  ALT  8<L>  /  AlkPhos  69  08-13    PT/INR - ( 13 Aug 2020 15:10 )   PT: 17.3 sec;   INR: 1.48 ratio         PTT - ( 13 Aug 2020 15:10 )  PTT:46.8 sec  Urinalysis Basic - ( 13 Aug 2020 17:00 )    Color: Light Yellow / Appearance: Slightly Turbid / S.015 / pH: x  Gluc: x / Ketone: Negative  / Bili: Negative / Urobili: Negative   Blood: x / Protein: 30 mg/dL / Nitrite: Negative   Leuk Esterase: Moderate / RBC: 46 /hpf / WBC 6 /HPF   Sq Epi: x / Non Sq Epi: 6 /hpf / Bacteria: Negative

## 2020-08-14 NOTE — CONSULT NOTE ADULT - CONVERSATION DETAILS
Completed Health Care Proxy form.    Primary Agent: Norman Morrison [phone]    Secondary Agent: Jerrod Munoz [phone]

## 2020-08-14 NOTE — CONSULT NOTE ADULT - ASSESSMENT
70 year old female PMH HTN Hypothyroidism,  carpal tunnel syndrome, lymphedema presented to [facility] on 8/13/20 with encephalopathy. In the ED febrile to 103.1 and tachycardic to > 90. On presentation WBC of 10.52 with 83.7% PMN. ANIKA to Cr of 1.35. Lactic acid of 2.3.     U/A with 6 WBC and 46 RBC.   Blood cultures with 2/2 Enterococcus (based on biofire).   COVID19 PCR (8/13) negative.   CT A/P (8/13) with Obstructing 5 x 6 mm proximal left ureteral calculus with mild hydronephrosis and Circumferential mural thickening involving the distal transverse colon through the rectum consistent with clinical history of recent colitis.   8/13 s/p Cystoscopy with stent placement.    Suspect urinary source of enterococcus  Patient without IV access and being treated with PO Linezolid  Would benefit from obtaining IV access and being switched to IV Vancomycin pending susceptibilities    Overall, Enterococcus Bacteremia, Leukocytosis, Fever, Pyelonephritis, Hydronephrosis, ANIKA     --Recommend 2x repeat BCx with next lab draw  --Recommend TTE  --Recommend obtaining PIV access  --After obtaining IV Access would start Vancomycin 1.25g IV q12H. Trough prior to fourth dose. Target trough 15-20  --Continue to monitor renal function and vancomycin troughs to avoid nephrotoxicity / otoxicity secondary to vancomycin  --If unable to obtain IV access would maintain on Linezolid 600 mg PO Q12H  --Continue to follow CBC with diff  --Continue to follow renal function (Cr/BUN)  --Continue to follow temperature curve  --Follow up on Enterococcus susceptibilities   --Followup on preliminary urine cultures  --Urology recommendations appreciated    I will be away over this upcoming weekend. Please contact the Infectious Diseases Office with any further questions or concerns.     Delfino Gomez M.D.  [facility] Division of Infectious Disease  8AM-5PM: Pager Number [phone]  After Hours (or if no response): Please contact the Infectious Diseases Office at [phone]     The above assessment and plan were discussed with Medicine NP

## 2020-08-15 LAB
ALBUMIN SERPL ELPH-MCNC: 3.2 G/DL — LOW (ref 3.3–5)
ALBUMIN SERPL ELPH-MCNC: 3.3 G/DL — SIGNIFICANT CHANGE UP (ref 3.3–5)
ALP SERPL-CCNC: 65 U/L — SIGNIFICANT CHANGE UP (ref 40–120)
ALP SERPL-CCNC: 66 U/L — SIGNIFICANT CHANGE UP (ref 40–120)
ALT FLD-CCNC: 6 U/L — LOW (ref 10–45)
ALT FLD-CCNC: 6 U/L — LOW (ref 10–45)
ANION GAP SERPL CALC-SCNC: 10 MMOL/L — SIGNIFICANT CHANGE UP (ref 5–17)
ANION GAP SERPL CALC-SCNC: 12 MMOL/L — SIGNIFICANT CHANGE UP (ref 5–17)
APTT BLD: 41.3 SEC — HIGH (ref 27.5–35.5)
AST SERPL-CCNC: 12 U/L — SIGNIFICANT CHANGE UP (ref 10–40)
AST SERPL-CCNC: 9 U/L — LOW (ref 10–40)
BASE EXCESS BLDV CALC-SCNC: 3.9 MMOL/L — HIGH (ref -2–2)
BASOPHILS # BLD AUTO: 0.03 K/UL — SIGNIFICANT CHANGE UP (ref 0–0.2)
BASOPHILS NFR BLD AUTO: 0.2 % — SIGNIFICANT CHANGE UP (ref 0–2)
BILIRUB SERPL-MCNC: 0.4 MG/DL — SIGNIFICANT CHANGE UP (ref 0.2–1.2)
BILIRUB SERPL-MCNC: 0.4 MG/DL — SIGNIFICANT CHANGE UP (ref 0.2–1.2)
BLD GP AB SCN SERPL QL: NEGATIVE — SIGNIFICANT CHANGE UP
BUN SERPL-MCNC: 22 MG/DL — SIGNIFICANT CHANGE UP (ref 7–23)
BUN SERPL-MCNC: 24 MG/DL — HIGH (ref 7–23)
CA-I SERPL-SCNC: 1.13 MMOL/L — SIGNIFICANT CHANGE UP (ref 1.12–1.3)
CALCIUM SERPL-MCNC: 8.6 MG/DL — SIGNIFICANT CHANGE UP (ref 8.4–10.5)
CALCIUM SERPL-MCNC: 8.8 MG/DL — SIGNIFICANT CHANGE UP (ref 8.4–10.5)
CHLORIDE BLDV-SCNC: 104 MMOL/L — SIGNIFICANT CHANGE UP (ref 96–108)
CHLORIDE SERPL-SCNC: 100 MMOL/L — SIGNIFICANT CHANGE UP (ref 96–108)
CHLORIDE SERPL-SCNC: 101 MMOL/L — SIGNIFICANT CHANGE UP (ref 96–108)
CO2 BLDV-SCNC: 30 MMOL/L — SIGNIFICANT CHANGE UP (ref 22–30)
CO2 SERPL-SCNC: 26 MMOL/L — SIGNIFICANT CHANGE UP (ref 22–31)
CO2 SERPL-SCNC: 28 MMOL/L — SIGNIFICANT CHANGE UP (ref 22–31)
CREAT SERPL-MCNC: 1.16 MG/DL — SIGNIFICANT CHANGE UP (ref 0.5–1.3)
CREAT SERPL-MCNC: 1.22 MG/DL — SIGNIFICANT CHANGE UP (ref 0.5–1.3)
EOSINOPHIL # BLD AUTO: 0.01 K/UL — SIGNIFICANT CHANGE UP (ref 0–0.5)
EOSINOPHIL NFR BLD AUTO: 0.1 % — SIGNIFICANT CHANGE UP (ref 0–6)
GAS PNL BLDV: 136 MMOL/L — SIGNIFICANT CHANGE UP (ref 135–145)
GAS PNL BLDV: SIGNIFICANT CHANGE UP
GAS PNL BLDV: SIGNIFICANT CHANGE UP
GLUCOSE BLDC GLUCOMTR-MCNC: 182 MG/DL — HIGH (ref 70–99)
GLUCOSE BLDV-MCNC: 131 MG/DL — HIGH (ref 70–99)
GLUCOSE SERPL-MCNC: 130 MG/DL — HIGH (ref 70–99)
GLUCOSE SERPL-MCNC: 144 MG/DL — HIGH (ref 70–99)
HCO3 BLDV-SCNC: 29 MMOL/L — SIGNIFICANT CHANGE UP (ref 21–29)
HCT VFR BLD CALC: 32.3 % — LOW (ref 34.5–45)
HCT VFR BLD CALC: 32.8 % — LOW (ref 34.5–45)
HCT VFR BLDA CALC: 34 % — LOW (ref 39–50)
HGB BLD CALC-MCNC: 11 G/DL — LOW (ref 11.5–15.5)
HGB BLD-MCNC: 10.5 G/DL — LOW (ref 11.5–15.5)
HGB BLD-MCNC: 10.6 G/DL — LOW (ref 11.5–15.5)
IMM GRANULOCYTES NFR BLD AUTO: 0.6 % — SIGNIFICANT CHANGE UP (ref 0–1.5)
INR BLD: 1.31 RATIO — HIGH (ref 0.88–1.16)
LACTATE BLDV-MCNC: 1.4 MMOL/L — SIGNIFICANT CHANGE UP (ref 0.7–2)
LACTATE BLDV-MCNC: 1.4 MMOL/L — SIGNIFICANT CHANGE UP (ref 0.7–2)
LDH SERPL L TO P-CCNC: 140 U/L — SIGNIFICANT CHANGE UP (ref 50–242)
LYMPHOCYTES # BLD AUTO: 0.53 K/UL — LOW (ref 1–3.3)
LYMPHOCYTES # BLD AUTO: 4.1 % — LOW (ref 13–44)
MAGNESIUM SERPL-MCNC: 1.9 MG/DL — SIGNIFICANT CHANGE UP (ref 1.6–2.6)
MCHC RBC-ENTMCNC: 29.7 PG — SIGNIFICANT CHANGE UP (ref 27–34)
MCHC RBC-ENTMCNC: 30.2 PG — SIGNIFICANT CHANGE UP (ref 27–34)
MCHC RBC-ENTMCNC: 32.3 GM/DL — SIGNIFICANT CHANGE UP (ref 32–36)
MCHC RBC-ENTMCNC: 32.5 GM/DL — SIGNIFICANT CHANGE UP (ref 32–36)
MCV RBC AUTO: 91.9 FL — SIGNIFICANT CHANGE UP (ref 80–100)
MCV RBC AUTO: 92.8 FL — SIGNIFICANT CHANGE UP (ref 80–100)
MONOCYTES # BLD AUTO: 1.21 K/UL — HIGH (ref 0–0.9)
MONOCYTES NFR BLD AUTO: 9.3 % — SIGNIFICANT CHANGE UP (ref 2–14)
NEUTROPHILS # BLD AUTO: 11.22 K/UL — HIGH (ref 1.8–7.4)
NEUTROPHILS NFR BLD AUTO: 85.7 % — HIGH (ref 43–77)
NRBC # BLD: 0 /100 WBCS — SIGNIFICANT CHANGE UP (ref 0–0)
NRBC # BLD: 0 /100 WBCS — SIGNIFICANT CHANGE UP (ref 0–0)
OTHER CELLS CSF MANUAL: 13 ML/DL — LOW (ref 18–22)
PCO2 BLDV: 49 MMHG — SIGNIFICANT CHANGE UP (ref 35–50)
PH BLDV: 7.39 — SIGNIFICANT CHANGE UP (ref 7.35–7.45)
PHOSPHATE SERPL-MCNC: 2.8 MG/DL — SIGNIFICANT CHANGE UP (ref 2.5–4.5)
PLATELET # BLD AUTO: 240 K/UL — SIGNIFICANT CHANGE UP (ref 150–400)
PLATELET # BLD AUTO: 242 K/UL — SIGNIFICANT CHANGE UP (ref 150–400)
PO2 BLDV: 55 MMHG — HIGH (ref 25–45)
POTASSIUM BLDV-SCNC: 3.3 MMOL/L — LOW (ref 3.5–5.3)
POTASSIUM SERPL-MCNC: 3.6 MMOL/L — SIGNIFICANT CHANGE UP (ref 3.5–5.3)
POTASSIUM SERPL-MCNC: 3.7 MMOL/L — SIGNIFICANT CHANGE UP (ref 3.5–5.3)
POTASSIUM SERPL-SCNC: 3.6 MMOL/L — SIGNIFICANT CHANGE UP (ref 3.5–5.3)
POTASSIUM SERPL-SCNC: 3.7 MMOL/L — SIGNIFICANT CHANGE UP (ref 3.5–5.3)
PROT SERPL-MCNC: 6.1 G/DL — SIGNIFICANT CHANGE UP (ref 6–8.3)
PROT SERPL-MCNC: 6.2 G/DL — SIGNIFICANT CHANGE UP (ref 6–8.3)
PROTHROM AB SERPL-ACNC: 15.4 SEC — HIGH (ref 10.6–13.6)
RBC # BLD: 3.48 M/UL — LOW (ref 3.8–5.2)
RBC # BLD: 3.57 M/UL — LOW (ref 3.8–5.2)
RBC # FLD: 14.7 % — HIGH (ref 10.3–14.5)
RBC # FLD: 14.8 % — HIGH (ref 10.3–14.5)
RH IG SCN BLD-IMP: POSITIVE — SIGNIFICANT CHANGE UP
SAO2 % BLDV: 88 % — SIGNIFICANT CHANGE UP (ref 67–88)
SODIUM SERPL-SCNC: 138 MMOL/L — SIGNIFICANT CHANGE UP (ref 135–145)
SODIUM SERPL-SCNC: 139 MMOL/L — SIGNIFICANT CHANGE UP (ref 135–145)
WBC # BLD: 13.08 K/UL — HIGH (ref 3.8–10.5)
WBC # BLD: 13.31 K/UL — HIGH (ref 3.8–10.5)
WBC # FLD AUTO: 13.08 K/UL — HIGH (ref 3.8–10.5)
WBC # FLD AUTO: 13.31 K/UL — HIGH (ref 3.8–10.5)

## 2020-08-15 PROCEDURE — 71045 X-RAY EXAM CHEST 1 VIEW: CPT | Mod: 26

## 2020-08-15 PROCEDURE — 99223 1ST HOSP IP/OBS HIGH 75: CPT

## 2020-08-15 PROCEDURE — 70498 CT ANGIOGRAPHY NECK: CPT | Mod: 26

## 2020-08-15 PROCEDURE — 70496 CT ANGIOGRAPHY HEAD: CPT | Mod: 26

## 2020-08-15 RX ORDER — ACETAMINOPHEN 500 MG
650 TABLET ORAL EVERY 6 HOURS
Refills: 0 | Status: DISCONTINUED | OUTPATIENT
Start: 2020-08-15 | End: 2020-08-25

## 2020-08-15 RX ORDER — POLYETHYLENE GLYCOL 3350 17 G/17G
17 POWDER, FOR SOLUTION ORAL
Refills: 0 | Status: DISCONTINUED | OUTPATIENT
Start: 2020-08-15 | End: 2020-08-25

## 2020-08-15 RX ORDER — ACETAMINOPHEN 500 MG
1000 TABLET ORAL ONCE
Refills: 0 | Status: COMPLETED | OUTPATIENT
Start: 2020-08-15 | End: 2020-08-15

## 2020-08-15 RX ORDER — PANTOPRAZOLE SODIUM 20 MG/1
40 TABLET, DELAYED RELEASE ORAL ONCE
Refills: 0 | Status: COMPLETED | OUTPATIENT
Start: 2020-08-15 | End: 2020-08-15

## 2020-08-15 RX ORDER — NYSTATIN CREAM 100000 [USP'U]/G
1 CREAM TOPICAL
Refills: 0 | Status: DISCONTINUED | OUTPATIENT
Start: 2020-08-15 | End: 2020-08-25

## 2020-08-15 RX ORDER — FLUCONAZOLE 150 MG/1
150 TABLET ORAL ONCE
Refills: 0 | Status: COMPLETED | OUTPATIENT
Start: 2020-08-15 | End: 2020-08-15

## 2020-08-15 RX ADMIN — Medication 300 MILLIGRAM(S): at 05:47

## 2020-08-15 RX ADMIN — POLYETHYLENE GLYCOL 3350 17 GRAM(S): 17 POWDER, FOR SOLUTION ORAL at 11:10

## 2020-08-15 RX ADMIN — ATORVASTATIN CALCIUM 80 MILLIGRAM(S): 80 TABLET, FILM COATED ORAL at 21:25

## 2020-08-15 RX ADMIN — Medication 1 TABLET(S): at 11:10

## 2020-08-15 RX ADMIN — PANTOPRAZOLE SODIUM 40 MILLIGRAM(S): 20 TABLET, DELAYED RELEASE ORAL at 00:15

## 2020-08-15 RX ADMIN — APIXABAN 5 MILLIGRAM(S): 2.5 TABLET, FILM COATED ORAL at 05:47

## 2020-08-15 RX ADMIN — Medication 50 MILLIGRAM(S): at 05:47

## 2020-08-15 RX ADMIN — Medication 400 MILLIGRAM(S): at 00:15

## 2020-08-15 RX ADMIN — SENNA PLUS 2 TABLET(S): 8.6 TABLET ORAL at 21:25

## 2020-08-15 RX ADMIN — Medication 1000 MILLIGRAM(S): at 01:15

## 2020-08-15 RX ADMIN — NYSTATIN CREAM 1 APPLICATION(S): 100000 CREAM TOPICAL at 18:16

## 2020-08-15 RX ADMIN — Medication 1000 MILLIGRAM(S): at 22:30

## 2020-08-15 RX ADMIN — APIXABAN 5 MILLIGRAM(S): 2.5 TABLET, FILM COATED ORAL at 18:15

## 2020-08-15 RX ADMIN — Medication 300 MILLIGRAM(S): at 21:25

## 2020-08-15 RX ADMIN — Medication 1000 MILLIGRAM(S): at 13:22

## 2020-08-15 RX ADMIN — Medication 0.5 MILLIGRAM(S): at 21:44

## 2020-08-15 RX ADMIN — Medication 75 MICROGRAM(S): at 05:47

## 2020-08-15 RX ADMIN — Medication 400 MILLIGRAM(S): at 21:23

## 2020-08-15 RX ADMIN — NYSTATIN CREAM 1 APPLICATION(S): 100000 CREAM TOPICAL at 05:48

## 2020-08-15 RX ADMIN — Medication 166.67 MILLIGRAM(S): at 05:47

## 2020-08-15 RX ADMIN — Medication 50 MILLIGRAM(S): at 12:44

## 2020-08-15 RX ADMIN — Medication 0.2 MILLIGRAM(S): at 12:44

## 2020-08-15 RX ADMIN — Medication 166.67 MILLIGRAM(S): at 18:15

## 2020-08-15 RX ADMIN — POLYETHYLENE GLYCOL 3350 17 GRAM(S): 17 POWDER, FOR SOLUTION ORAL at 18:15

## 2020-08-15 RX ADMIN — FLUCONAZOLE 150 MILLIGRAM(S): 150 TABLET ORAL at 07:32

## 2020-08-15 RX ADMIN — Medication 400 MILLIGRAM(S): at 12:44

## 2020-08-15 RX ADMIN — Medication 300 MILLIGRAM(S): at 12:44

## 2020-08-15 RX ADMIN — Medication 0.2 MILLIGRAM(S): at 21:25

## 2020-08-15 RX ADMIN — Medication 0.2 MILLIGRAM(S): at 05:47

## 2020-08-15 RX ADMIN — Medication 81 MILLIGRAM(S): at 11:11

## 2020-08-15 RX ADMIN — Medication 50 MILLIGRAM(S): at 21:25

## 2020-08-15 NOTE — SWALLOW BEDSIDE ASSESSMENT ADULT - ADDITIONAL RECOMMENDATIONS
Maintain adequate oral hygiene.   This service remains available is s/s of dysphagia or aspiration arise.

## 2020-08-15 NOTE — PROVIDER CONTACT NOTE (OTHER) - ASSESSMENT
pt presents w/ elevated temp 102.2 orally, B/P 201/98, , O2 sat 92% on 1 L, pt c/o epigastric pain, pt unable to form sentences, pt only able to answer direct questions, pt presents very agitated & restless, unable to console, pt forgetful but A+Ox4, able to answer all neuro questions correctly, neurovasc intact.

## 2020-08-15 NOTE — RAPID RESPONSE TEAM SUMMARY - NSOTHERSPECIFYRRT3_GEN_ALL_CORE
CBC, BMP, Mg, Phos, aPTT/PT/INR, T&S, Blood culture x2, Code stroke - CT Brain stroke protocol, CT/CTA head & neck. CBC, BMP, Mg, Phos, aPTT/PT/INR, VBG with lytes, T&S, Blood culture x2, Code stroke - CT Brain stroke protocol, CT/CTA head & neck.

## 2020-08-15 NOTE — RAPID RESPONSE TEAM SUMMARY - NSADDTLFINDINGSRRT_GEN_ALL_CORE
On arrival to RRT pt SBP 180s, HR 90s-100s Afib, SpO2 98% on 4L NC, RR 18, Temp 102.5F orally. Pt AAOx3 (self, age, hospital, year and not month), agitated however answering appropriately to questioning with intermittent tangential thoughts and difficulty findings words however redirectable. PERRL, no nystagmus, no facial asymmetry, no drift in UE, 5/5 strength b/l UE, 2/5 strength b/l LE, sensation intact in all extremities. As per primary team, pt's baseline AAOx3, walks with walker however presented to the hospital altered - CTH negative at the time however concern for continual difficulty findings words. Code stroke initiated for formal neurological eval and r/o CVA. To note, pt Enterococcus UTI sensitive to Vancomycin (s/p IV Cipro/Flagyl, PO Linezolid) and pt s/p 1 dose only so far.

## 2020-08-15 NOTE — SWALLOW BEDSIDE ASSESSMENT ADULT - SLP PERTINENT HISTORY OF CURRENT PROBLEM
71 y/o F with a significant PMHx of, HTN, Hypothyroidism, carpal tunnel syndrome, lymphedema admitted to my service discharged 08/03/20 to [facility] p/w Altered mental status. Patient had a complicated hospital course last admission with elevated cardiac enzymes from demand ischemia due to A.fib with rvr, refused cardiac cath, normal stress test with course complicated with Enterococcus UTI and colitis requiring antibiotics discharged to [facility] p/w AMS x 1 day. As per [facility] patient very agitated, altered refusing to take medications. In the ED patient had CT head with no acute findings, CT abdomen shows 5-6 mm ureteral stones with mild hydronephrosis. Admitted with acute metabolic encephalopathy due to high fevers in the setting of ureteral stone with hydronephrosis. Last Urine cultures positive for Enterococcus, started on Zyvox. Patient was seen by urology.

## 2020-08-15 NOTE — PROVIDER CONTACT NOTE (OTHER) - ACTION/TREATMENT ORDERED:
Oriana AGUILAR made aware. As per PA IV Tylenol will be ordered and ok to give scheduled 1400 BP meds earlier - see eMAR Safety maintained. Will continue to monitor.

## 2020-08-15 NOTE — RAPID RESPONSE TEAM SUMMARY - NSOTHERINTERVENTIONSRRT_GEN_ALL_CORE
#Altered mental status (difficulty findings words) likely due to sepsis 2/2 Enterococcus bacteremia d/t UTI vs CVA  - continue Vancomycin IV (not yet therapeutic at this time) and monitor trough levels  - monitor for fever and treat as appropriate, monitor WBCs, f/u Enterococcus sensitivities and f/u r/p blood cultures  - F/u Neurology recommendations s/p code stroke, f/u CT/CTA head/neck final read; neuro checks q4 hrs  - continue with Eliquis  #Hypertensive (SBP 180s on arrival to [facility])  - SBP 150s-170s this admission, on hydralazine 50q8, labetalol 600q8, clonidine 0.2q8 as outpt  - as per primary team pt received anti-hypertensive medications later than scheduled this shift; continue to monitor and f/u blood pressure goals to be determined based on imaging results per Neurology #Altered mental status (difficulty findings words) likely d/t sepsis 2/2 Enterococcus bacteremia/UTI vs CVA  - continue Vancomycin IV (not yet therapeutic at this time) and monitor trough levels, fu ID recommendations  - monitor for fever and treat as appropriate, monitor WBCs, f/u Enterococcus sensitivities and f/u r/p blood cultures  - F/u Neurology recommendations s/p code stroke, f/u CT/CTA head/neck final read; neuro checks q4 hrs  - continue with Eliquis; f/u electrolytes and monitor for Afib RVR  #Hypertensive (SBP 180s on arrival to [facility])  - SBP 150s-170s this admission, on hydralazine 50q8, labetalol 600q8, clonidine 0.2q8 as outpt  - as per primary team pt received anti-hypertensive medications later than scheduled this shift; continue to monitor and f/u blood pressure goals to be determined based on imaging results per Neurology

## 2020-08-15 NOTE — RAPID RESPONSE TEAM SUMMARY - NSSITUATIONBACKGROUNDRRT_GEN_ALL_CORE
69 yo female with PMHx of HTN, hypothyroidism, carpal tunnel, lymphedema, new onset Afib on Eliquis, past hospitalization c/b elevated cardiac enzymes d/t demand ischemia I/s/o Afib RVR (refused cardiac cath, normal stress test), Enterococcus UTI and colitis s/p Abx d/c'd to [facility]. Pt presenting 8/13 with AMS found to have sepsis 2/2 Enterococcus bacteremia from UTI and L ureteral stone with hydronephrosis s/p cystoscopy with stent placement. [facility] called this AM (8/15) for Altered mental status.

## 2020-08-15 NOTE — CONSULT NOTE ADULT - SUBJECTIVE AND OBJECTIVE BOX
HPI:  69yo RH woman h/o HTN, hypothyroidism, carpal tunnel, lymphedema on Eliquis for new onset afib admitted for urosepsis with code stroke called for word finding difficulty LWK 2020 unclear time. Nursing was checking on patient when she appeared to exhibit difficulty with word finding. Per documentation patient has been agitated and has been altered in the setting of urosepsis. Sign out per nursing was that patient has been AAO x 4, and has been having fevers. From documentation patient found to have afib w/ rvr while septic.    (Stroke only)  NIHSS: 6  MRS: 3 (requires a walker, came from rehab)    REVIEW OF SYSTEMS  patient refusing to answer    PAST MEDICAL & SURGICAL HISTORY:  Lymphedema  Essential hypertension  Carpal tunnel syndrome of right wrist  Obesity (BMI 30-39.9)  Type 2 diabetes mellitus without complication  Hypothyroid  Essential hypertension  Obesity  HTN (hypertension)  DM (diabetes mellitus)  No significant past surgical history  S/P carpal tunnel release    FAMILY HISTORY:  Family history of myocardial infarction (Sibling)  Family history of lung cancer    MEDICATIONS (HOME):  Home Medications:  acetaminophen 325 mg oral tablet: 2 tab(s) orally every 6 hours, As needed, Moderate Pain (4 - 6) (2020 13:45)  ALPRAZolam 1 mg oral tablet: 0.5 milligram(s) orally every 8 hours (03 Aug 2020 13:35)  apixaban 5 mg oral tablet: 1 tab(s) orally every 12 hours (2020 11:00)  aspirin 81 mg oral delayed release tablet: 1 tab(s) orally once a day (2020 19:29)  atorvastatin 80 mg oral tablet: 1 tab(s) orally once a day (at bedtime) (2020 13:45)  bisacodyl 10 mg rectal suppository: 1 suppository(ies) rectal once a day, As needed, Constipation (03 Aug 2020 13:33)  cholecalciferol oral tablet: 2000 unit(s) orally once a day (2020 19:29)  cloNIDine 0.1 mg oral tablet: 2 tab(s) orally 3 times a day (2020 19:29)  clopidogrel 75 mg oral tablet: 1 tab(s) orally once a day (2020 11:00)  ethacrynic acid 25 mg oral tablet: 1 tab(s) orally once a day (:29)  folic acid 1 mg oral tablet: 1 tab(s) orally once a day (:29)  gabapentin 300 mg oral capsule: 2 cap(s) orally 3 times a day (:29)  hydrALAZINE 50 mg oral tablet: 1 tab(s) orally 3 times a day (03 Aug 2020 13:33)  hydrocortisone 1% topical cream: 1 application topically 3 times a day, As needed, Itching ()  Januvia 100 mg oral tablet: 1 tab(s) orally once a day (:)  labetalol 300 mg oral tablet: 2 tab(s) orally 3 times a day (:)  levothyroxine 75 mcg (0.075 mg) oral tablet: 1 tab(s) orally once a day (:)  menthol-benzocaine 3.6 mg-15 mg mucous membrane lozenge:  mucous membrane  (2020 11:00)  pantoprazole 40 mg oral delayed release tablet: 1 tab(s) orally once a day (:29)  polyethylene glycol 3350 oral powder for reconstitution: 17 gram(s) orally once a day (03 Aug 2020 13:33)  potassium chloride 20 mEq oral powder for reconstitution: 2 packet(s) orally once a day (:29)  pregabalin 50 mg oral capsule: 1 cap(s) orally 2 times a day (:)  senna oral tablet: 2 tab(s) orally once a day (at bedtime) (03 Aug 2020 13:33)  Vitamin B-12 1000 mcg oral tablet: 1 tab(s) orally once a day (:29)  Vitamin B1: 1000 microgram(s) orally once a day (:29)  Vitamin C 500 mg oral capsule: 1 cap(s) orally once a day (:29)  zinc oxide 20% topical ointment: 1 application topically 2 times a day (:29)    MEDICATIONS  (STANDING):  apixaban 5 milliGRAM(s) Oral every 12 hours  aspirin enteric coated 81 milliGRAM(s) Oral daily  atorvastatin 80 milliGRAM(s) Oral at bedtime  cloNIDine 0.2 milliGRAM(s) Oral three times a day  hydrALAZINE 50 milliGRAM(s) Oral three times a day  labetalol 300 milliGRAM(s) Oral three times a day  lactobacillus acidophilus 1 Tablet(s) Oral daily  levothyroxine 75 MICROGram(s) Oral daily  nystatin Powder 1 Application(s) Topical two times a day  polyethylene glycol 3350 17 Gram(s) Oral daily  senna 2 Tablet(s) Oral at bedtime  vancomycin  IVPB 1250 milliGRAM(s) IV Intermittent every 12 hours    MEDICATIONS  (PRN):  ALPRAZolam 0.5 milliGRAM(s) Oral every 8 hours PRN anxiety    ALLERGIES/INTOLERANCES:  Allergies  penicillin (Hives)  penicillin (Rash)  sulfa drugs (Unknown)  Tetracycline Hydrochloride (Unknown)    VITALS & EXAMINATION:  Vital Signs Last 24 Hrs  T(C): 39 (14 Aug 2020 23:48), Max: 39 (14 Aug 2020 23:48)  T(F): 102.2 (14 Aug 2020 23:48), Max: 102.2 (14 Aug 2020 23:48)  HR: 101 (14 Aug 2020 23:48) (71 - 101)  BP: 201/98 (14 Aug 2020 23:48) (153/76 - 201/98)  BP(mean): --  RR: 18 (14 Aug 2020 23:48) (18 - 18)  SpO2: 92% (14 Aug 2020 23:48) (92% - 98%)    Neurological (>12):  MS: eyes open, alert, patient knows self, age, cannot tell me the month, speech clear though mildly dysfluent with intact naming, repetition, comprehension  CN: II - XII: PERRL (3mm OD, OS), VFF, EOMI no nystagmus, no facial asymmetry, hearing intact to finger rubbing, tongue protrudes midline  Motor: no drift in upper extremities, both legs fall to bed within 5 seconds  Sensation: intact to LT/PP b/l upper and lower extremities  Cerebellar: no dysmetria upon FTN b/l  Gait: deferred    LABORATORY:  CBC                       11.3   11.94 )-----------( 221      ( 14 Aug 2020 11:10 )             36.7     Chem 08-14    140  |  103  |  21  ----------------------------<  217<H>  4.2   |  26  |  1.08    Ca    8.6      14 Aug 2020 11:10    TPro  6.9  /  Alb  3.8  /  TBili  0.7  /  DBili  x   /  AST  12  /  ALT  8<L>  /  AlkPhos  69  08-13    LFTs LIVER FUNCTIONS - ( 13 Aug 2020 15:10 )  Alb: 3.8 g/dL / Pro: 6.9 g/dL / ALK PHOS: 69 U/L / ALT: 8 U/L / AST: 12 U/L / GGT: x           Coagulopathy PT/INR - ( 13 Aug 2020 15:10 )   PT: 17.3 sec;   INR: 1.48 ratio         PTT - ( 13 Aug 2020 15:10 )  PTT:46.8 sec  Lipid Panel  Chol 233<H>  HDL 27<L> Trig 243<H>  A1c   Cardiac enzymes     U/A Urinalysis Basic - ( 13 Aug 2020 17:00 )    Color: Light Yellow / Appearance: Slightly Turbid / S.015 / pH: x  Gluc: x / Ketone: Negative  / Bili: Negative / Urobili: Negative   Blood: x / Protein: 30 mg/dL / Nitrite: Negative   Leuk Esterase: Moderate / RBC: 46 /hpf / WBC 6 /HPF   Sq Epi: x / Non Sq Epi: 6 /hpf / Bacteria: Negative    STUDIES & IMAGING:  Studies (EKG, EEG, EMG, etc):     Radiology (XR, CT, MR, U/S, TTE/IFEANYI): HPI:  69yo RH woman h/o HTN, hypothyroidism, carpal tunnel, lymphedema on Eliquis for new onset afib admitted for urosepsis with code stroke called for word finding difficulty LWK 2020 unclear time. Nursing was checking on patient when she appeared to exhibit difficulty with word finding. Per documentation patient has been agitated and has been altered in the setting of urosepsis. Sign out per nursing was that patient has been AAO x 4, and has been having fevers. From documentation patient found to have afib w/ rvr while septic.    (Stroke only)  NIHSS: 6  MRS: 3 (requires a walker, came from rehab)    REVIEW OF SYSTEMS  patient refusing to answer    PAST MEDICAL & SURGICAL HISTORY:  Lymphedema  Essential hypertension  Carpal tunnel syndrome of right wrist  Obesity (BMI 30-39.9)  Type 2 diabetes mellitus without complication  Hypothyroid  Essential hypertension  Obesity  HTN (hypertension)  DM (diabetes mellitus)  No significant past surgical history  S/P carpal tunnel release    FAMILY HISTORY:  Family history of myocardial infarction (Sibling)  Family history of lung cancer    MEDICATIONS (HOME):  Home Medications:  acetaminophen 325 mg oral tablet: 2 tab(s) orally every 6 hours, As needed, Moderate Pain (4 - 6) (2020 13:45)  ALPRAZolam 1 mg oral tablet: 0.5 milligram(s) orally every 8 hours (03 Aug 2020 13:35)  apixaban 5 mg oral tablet: 1 tab(s) orally every 12 hours (2020 11:00)  aspirin 81 mg oral delayed release tablet: 1 tab(s) orally once a day (2020 19:29)  atorvastatin 80 mg oral tablet: 1 tab(s) orally once a day (at bedtime) (2020 13:45)  bisacodyl 10 mg rectal suppository: 1 suppository(ies) rectal once a day, As needed, Constipation (03 Aug 2020 13:33)  cholecalciferol oral tablet: 2000 unit(s) orally once a day (2020 19:29)  cloNIDine 0.1 mg oral tablet: 2 tab(s) orally 3 times a day (2020 19:29)  clopidogrel 75 mg oral tablet: 1 tab(s) orally once a day (2020 11:00)  ethacrynic acid 25 mg oral tablet: 1 tab(s) orally once a day (:29)  folic acid 1 mg oral tablet: 1 tab(s) orally once a day (:29)  gabapentin 300 mg oral capsule: 2 cap(s) orally 3 times a day (:29)  hydrALAZINE 50 mg oral tablet: 1 tab(s) orally 3 times a day (03 Aug 2020 13:33)  hydrocortisone 1% topical cream: 1 application topically 3 times a day, As needed, Itching ()  Januvia 100 mg oral tablet: 1 tab(s) orally once a day (:)  labetalol 300 mg oral tablet: 2 tab(s) orally 3 times a day (:)  levothyroxine 75 mcg (0.075 mg) oral tablet: 1 tab(s) orally once a day (:)  menthol-benzocaine 3.6 mg-15 mg mucous membrane lozenge:  mucous membrane  (2020 11:00)  pantoprazole 40 mg oral delayed release tablet: 1 tab(s) orally once a day (:29)  polyethylene glycol 3350 oral powder for reconstitution: 17 gram(s) orally once a day (03 Aug 2020 13:33)  potassium chloride 20 mEq oral powder for reconstitution: 2 packet(s) orally once a day (:29)  pregabalin 50 mg oral capsule: 1 cap(s) orally 2 times a day (:)  senna oral tablet: 2 tab(s) orally once a day (at bedtime) (03 Aug 2020 13:33)  Vitamin B-12 1000 mcg oral tablet: 1 tab(s) orally once a day (:29)  Vitamin B1: 1000 microgram(s) orally once a day (:29)  Vitamin C 500 mg oral capsule: 1 cap(s) orally once a day (:29)  zinc oxide 20% topical ointment: 1 application topically 2 times a day (:29)    MEDICATIONS  (STANDING):  apixaban 5 milliGRAM(s) Oral every 12 hours  aspirin enteric coated 81 milliGRAM(s) Oral daily  atorvastatin 80 milliGRAM(s) Oral at bedtime  cloNIDine 0.2 milliGRAM(s) Oral three times a day  hydrALAZINE 50 milliGRAM(s) Oral three times a day  labetalol 300 milliGRAM(s) Oral three times a day  lactobacillus acidophilus 1 Tablet(s) Oral daily  levothyroxine 75 MICROGram(s) Oral daily  nystatin Powder 1 Application(s) Topical two times a day  polyethylene glycol 3350 17 Gram(s) Oral daily  senna 2 Tablet(s) Oral at bedtime  vancomycin  IVPB 1250 milliGRAM(s) IV Intermittent every 12 hours    MEDICATIONS  (PRN):  ALPRAZolam 0.5 milliGRAM(s) Oral every 8 hours PRN anxiety    ALLERGIES/INTOLERANCES:  Allergies  penicillin (Hives)  penicillin (Rash)  sulfa drugs (Unknown)  Tetracycline Hydrochloride (Unknown)    VITALS & EXAMINATION:  Vital Signs Last 24 Hrs  T(C): 39 (14 Aug 2020 23:48), Max: 39 (14 Aug 2020 23:48)  T(F): 102.2 (14 Aug 2020 23:48), Max: 102.2 (14 Aug 2020 23:48)  HR: 101 (14 Aug 2020 23:48) (71 - 101)  BP: 201/98 (14 Aug 2020 23:48) (153/76 - 201/98)  BP(mean): --  RR: 18 (14 Aug 2020 23:48) (18 - 18)  SpO2: 92% (14 Aug 2020 23:48) (92% - 98%)    Neurological (>12):  MS: eyes open, alert, patient knows self, age, cannot tell me the month, speech clear though mildly dysfluent with intact naming, repetition, comprehension  CN: II - XII: PERRL (3mm OD, OS), VFF, EOMI no nystagmus, no facial asymmetry, hearing intact to finger rubbing, tongue protrudes midline  Motor: no drift in upper extremities, both legs fall to bed within 5 seconds  Sensation: intact to LT/PP b/l upper and lower extremities  Cerebellar: no dysmetria upon FTN b/l  Gait: deferred    LABORATORY:  CBC                       11.3   11.94 )-----------( 221      ( 14 Aug 2020 11:10 )             36.7     Chem 08-14    140  |  103  |  21  ----------------------------<  217<H>  4.2   |  26  |  1.08    Ca    8.6      14 Aug 2020 11:10    TPro  6.9  /  Alb  3.8  /  TBili  0.7  /  DBili  x   /  AST  12  /  ALT  8<L>  /  AlkPhos  69  08-13    LFTs LIVER FUNCTIONS - ( 13 Aug 2020 15:10 )  Alb: 3.8 g/dL / Pro: 6.9 g/dL / ALK PHOS: 69 U/L / ALT: 8 U/L / AST: 12 U/L / GGT: x           Coagulopathy PT/INR - ( 13 Aug 2020 15:10 )   PT: 17.3 sec;   INR: 1.48 ratio         PTT - ( 13 Aug 2020 15:10 )  PTT:46.8 sec  Lipid Panel  Chol 233<H>  HDL 27<L> Trig 243<H>  A1c   Cardiac enzymes     U/A Urinalysis Basic - ( 13 Aug 2020 17:00 )    Color: Light Yellow / Appearance: Slightly Turbid / S.015 / pH: x  Gluc: x / Ketone: Negative  / Bili: Negative / Urobili: Negative   Blood: x / Protein: 30 mg/dL / Nitrite: Negative   Leuk Esterase: Moderate / RBC: 46 /hpf / WBC 6 /HPF   Sq Epi: x / Non Sq Epi: 6 /hpf / Bacteria: Negative    STUDIES & IMAGING:  Studies (EKG, EEG, EMG, etc):     Radiology (XR, CT, MR, U/S, TTE/IFEANYI):  CTH noncon negative per verbal radiology read  CTA H & N per verbal radiology read: 7mm severe stenosis prox basilar, R V4 irregular stenosis, 5mm ectasia R cavernous ICA, 3mm R Acomm fusiform aneurysm HPI:  69yo RH woman h/o HTN, hypothyroidism, carpal tunnel, lymphedema on Eliquis for new onset afib admitted for urosepsis with code stroke called for word finding difficulty LWK 2020 unclear time. Nursing was checking on patient when she appeared to exhibit difficulty with word finding. Per documentation patient has been agitated and has been altered in the setting of urosepsis. Sign out per nursing was that patient has been AAO x 4, and has been having fevers. From documentation patient found to have afib w/ rvr while septic.    (Stroke only)  NIHSS: 6  MRS: 3 (requires a walker, came from rehab)    REVIEW OF SYSTEMS  patient refusing to answer    PAST MEDICAL & SURGICAL HISTORY:  Lymphedema  Essential hypertension  Carpal tunnel syndrome of right wrist  Obesity (BMI 30-39.9)  Type 2 diabetes mellitus without complication  Hypothyroid  Essential hypertension  Obesity  HTN (hypertension)  DM (diabetes mellitus)  No significant past surgical history  S/P carpal tunnel release    FAMILY HISTORY:  Family history of myocardial infarction (Sibling)  Family history of lung cancer    MEDICATIONS (HOME):  Home Medications:  acetaminophen 325 mg oral tablet: 2 tab(s) orally every 6 hours, As needed, Moderate Pain (4 - 6) (2020 13:45)  ALPRAZolam 1 mg oral tablet: 0.5 milligram(s) orally every 8 hours (03 Aug 2020 13:35)  apixaban 5 mg oral tablet: 1 tab(s) orally every 12 hours (2020 11:00)  aspirin 81 mg oral delayed release tablet: 1 tab(s) orally once a day (2020 19:29)  atorvastatin 80 mg oral tablet: 1 tab(s) orally once a day (at bedtime) (2020 13:45)  bisacodyl 10 mg rectal suppository: 1 suppository(ies) rectal once a day, As needed, Constipation (03 Aug 2020 13:33)  cholecalciferol oral tablet: 2000 unit(s) orally once a day (2020 19:29)  cloNIDine 0.1 mg oral tablet: 2 tab(s) orally 3 times a day (2020 19:29)  clopidogrel 75 mg oral tablet: 1 tab(s) orally once a day (2020 11:00)  ethacrynic acid 25 mg oral tablet: 1 tab(s) orally once a day (:29)  folic acid 1 mg oral tablet: 1 tab(s) orally once a day (:29)  gabapentin 300 mg oral capsule: 2 cap(s) orally 3 times a day (:29)  hydrALAZINE 50 mg oral tablet: 1 tab(s) orally 3 times a day (03 Aug 2020 13:33)  hydrocortisone 1% topical cream: 1 application topically 3 times a day, As needed, Itching ()  Januvia 100 mg oral tablet: 1 tab(s) orally once a day (:)  labetalol 300 mg oral tablet: 2 tab(s) orally 3 times a day (:)  levothyroxine 75 mcg (0.075 mg) oral tablet: 1 tab(s) orally once a day (:)  menthol-benzocaine 3.6 mg-15 mg mucous membrane lozenge:  mucous membrane  (2020 11:00)  pantoprazole 40 mg oral delayed release tablet: 1 tab(s) orally once a day (:29)  polyethylene glycol 3350 oral powder for reconstitution: 17 gram(s) orally once a day (03 Aug 2020 13:33)  potassium chloride 20 mEq oral powder for reconstitution: 2 packet(s) orally once a day (:29)  pregabalin 50 mg oral capsule: 1 cap(s) orally 2 times a day (:)  senna oral tablet: 2 tab(s) orally once a day (at bedtime) (03 Aug 2020 13:33)  Vitamin B-12 1000 mcg oral tablet: 1 tab(s) orally once a day (:29)  Vitamin B1: 1000 microgram(s) orally once a day (:29)  Vitamin C 500 mg oral capsule: 1 cap(s) orally once a day (:29)  zinc oxide 20% topical ointment: 1 application topically 2 times a day (:29)    MEDICATIONS  (STANDING):  apixaban 5 milliGRAM(s) Oral every 12 hours  aspirin enteric coated 81 milliGRAM(s) Oral daily  atorvastatin 80 milliGRAM(s) Oral at bedtime  cloNIDine 0.2 milliGRAM(s) Oral three times a day  hydrALAZINE 50 milliGRAM(s) Oral three times a day  labetalol 300 milliGRAM(s) Oral three times a day  lactobacillus acidophilus 1 Tablet(s) Oral daily  levothyroxine 75 MICROGram(s) Oral daily  nystatin Powder 1 Application(s) Topical two times a day  polyethylene glycol 3350 17 Gram(s) Oral daily  senna 2 Tablet(s) Oral at bedtime  vancomycin  IVPB 1250 milliGRAM(s) IV Intermittent every 12 hours    MEDICATIONS  (PRN):  ALPRAZolam 0.5 milliGRAM(s) Oral every 8 hours PRN anxiety    ALLERGIES/INTOLERANCES:  Allergies  penicillin (Hives)  penicillin (Rash)  sulfa drugs (Unknown)  Tetracycline Hydrochloride (Unknown)    VITALS & EXAMINATION:  Vital Signs Last 24 Hrs  T(C): 39 (14 Aug 2020 23:48), Max: 39 (14 Aug 2020 23:48)  T(F): 102.2 (14 Aug 2020 23:48), Max: 102.2 (14 Aug 2020 23:48)  HR: 101 (14 Aug 2020 23:48) (71 - 101)  BP: 201/98 (14 Aug 2020 23:48) (153/76 - 201/98)  BP(mean): --  RR: 18 (14 Aug 2020 23:48) (18 - 18)  SpO2: 92% (14 Aug 2020 23:48) (92% - 98%)    Neurological (>12):  MS: eyes open, alert, patient knows self, age, cannot tell me the month, speech clear though mildly dysfluent with intact naming, repetition, comprehension  CN: II - XII: PERRL (3mm OD, OS), VFF, EOMI no nystagmus, no facial asymmetry, hearing intact to finger rubbing, tongue protrudes midline  Motor: no drift in upper extremities, both legs fall to bed within 5 seconds  Sensation: intact to LT/PP b/l upper and lower extremities  Cerebellar: no dysmetria upon FTN b/l  Gait: deferred    LABORATORY:  CBC                       11.3   11.94 )-----------( 221      ( 14 Aug 2020 11:10 )             36.7     Chem 08-14    140  |  103  |  21  ----------------------------<  217<H>  4.2   |  26  |  1.08    Ca    8.6      14 Aug 2020 11:10    TPro  6.9  /  Alb  3.8  /  TBili  0.7  /  DBili  x   /  AST  12  /  ALT  8<L>  /  AlkPhos  69  0813    LFTs LIVER FUNCTIONS - ( 13 Aug 2020 15:10 )  Alb: 3.8 g/dL / Pro: 6.9 g/dL / ALK PHOS: 69 U/L / ALT: 8 U/L / AST: 12 U/L / GGT: x           Coagulopathy PT/INR - ( 13 Aug 2020 15:10 )   PT: 17.3 sec;   INR: 1.48 ratio         PTT - ( 13 Aug 2020 15:10 )  PTT:46.8 sec  Lipid Panel  Chol 233<H>  HDL 27<L> Trig 243<H>  A1c   Cardiac enzymes     U/A Urinalysis Basic - ( 13 Aug 2020 17:00 )    Color: Light Yellow / Appearance: Slightly Turbid / S.015 / pH: x  Gluc: x / Ketone: Negative  / Bili: Negative / Urobili: Negative   Blood: x / Protein: 30 mg/dL / Nitrite: Negative   Leuk Esterase: Moderate / RBC: 46 /hpf / WBC 6 /HPF   Sq Epi: x / Non Sq Epi: 6 /hpf / Bacteria: Negative    STUDIES & IMAGING:  Studies (EKG, EEG, EMG, etc):     Radiology (XR, CT, MR, U/S, TTE/IFEANYI):    < from: CT Angio Neck w/ IV Cont (08.15.20 @ 01:29) >  IMPRESSION:    CT HEAD: No acute intracranial hemorrhage. These findings were discussed with Dr. Moran on 8/15/2020 at 1:30 AM by Dr. Mcginnis with read back confirmation.    CTA BRAIN: Patent  1. 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  2. V4 segment right vertebral artery demonstrates irregular stenoses with a long segment of occlusion/near occlusion in the distal aspect. Distal reconstitution occurs.  3. 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  4. 5 mm fusiform ectasia of the cavernous segment of the right ICA.    CTA NECK: Patent cervical vasculature. No flow limiting stenosis or occlusion.    < end of copied text >

## 2020-08-15 NOTE — SWALLOW BEDSIDE ASSESSMENT ADULT - SLP GENERAL OBSERVATIONS
Pt encountered awake and alert, sitting upright OOB to chair. A&Ox4. Vocal quality and speech intelligibly WNL for age/gender. Slight L labial weakness. Pt endorsed short term memory loss and word finding deficits- recommend speech-language evaluation. Pt denied history of dysphagia, pneumonia, GERD, or weight loss.

## 2020-08-15 NOTE — PROGRESS NOTE ADULT - SUBJECTIVE AND OBJECTIVE BOX
HPI:  69 y/o F with a significant PMHx of, HTN, Hypothyroidism,  carpal tunnel syndrome, lymphedema admitted to my service discharged 20 to [facility] p/w Altered mental status.     Patient had a complicated hospital course last admission with elevated cardiac enzymes from demand ischemia due to A.fib with rvr, refused cardiac cath had normal stress test, course complicated with Enterococcus UTI and colitis requiring antibiotics discharged to [facility] p/w AMS x 1 day. As per [facility] patient very agitated, altered refusing to take medications.     In the ed patient had CT head with no acute findings, CT abdomen shows 5-6 mm ureteral stones with mild hydronephrosis.   Patient was seen by urology,     Currently patient alert, awake, oriented x 3, denies abdominal pain/ nausea/ vomiting, noted to have fever 103 F, received Ciprofloxacin and Flagyl. (13 Aug 2020 20:23)      Patient is a 70y old  Female who presents with a chief complaint of AMS x 1 day (14 Aug 2020 17:06)      SUBJECTIVE / OVERNIGHT EVENTS: s/p RRT and Stroke called as patient noted to have difficulty in word finding.   CT,CTA negative foe acute findings. Patient refusing MRI Brain.       Currently patient AAO X3, refusing MRI Brain.     MEDICATIONS  (STANDING):  apixaban 5 milliGRAM(s) Oral every 12 hours  aspirin enteric coated 81 milliGRAM(s) Oral daily  atorvastatin 80 milliGRAM(s) Oral at bedtime  cloNIDine 0.2 milliGRAM(s) Oral three times a day  hydrALAZINE 50 milliGRAM(s) Oral three times a day  labetalol 300 milliGRAM(s) Oral three times a day  levothyroxine 75 MICROGram(s) Oral daily  polyethylene glycol 3350 17 Gram(s) Oral daily  senna 2 Tablet(s) Oral at bedtime  vancomycin  IVPB 1250 milliGRAM(s) IV Intermittent every 12 hours    MEDICATIONS  (PRN):  ALPRAZolam 0.5 milliGRAM(s) Oral every 8 hours PRN anxiety      CAPILLARY BLOOD GLUCOSE        I&O's Summary    13 Aug 2020 07:01  -  14 Aug 2020 07:00  --------------------------------------------------------  IN: 650 mL / OUT: 95 mL / NET: 555 mL    14 Aug 2020 07:01  -  14 Aug 2020 21:20  --------------------------------------------------------  IN: 600 mL / OUT: 1200 mL / NET: -600 mL    T(C): 36.6 (08-15-20 @ 16:06), Max: 39.1 (08-15-20 @ 12:34)  HR: 70 (08-15-20 @ 16:06) (70 - 87)  BP: 120/68 (08-15-20 @ 16:06) (120/68 - 172/82)  RR: 18 (08-15-20 @ 16:06) (18 - 18)  SpO2: 93% (08-15-20 @ 16:06) (93% - 97%)      PHYSICAL EXAM:  GENERAL: NAD, well-developed  HEAD:  Atraumatic, Normocephalic  EYES: EOMI, PERRLA, conjunctiva and sclera clear  NECK: Supple, No JVD  CHEST/LUNG: Clear to auscultation bilaterally; No wheeze  HEART: Regular rate and rhythm; No murmurs, rubs, or gallops  ABDOMEN: Soft, Nontender, rlq tenderness, Nondistended; Bowel sounds present  EXTREMITIES:  2+ Peripheral Pulses, No clubbing, cyanosis, or edema  PSYCH: AAOx3  NEUROLOGY: non-focal  SKIN: No rashes or lesions    LABS:                        11.3   11.94 )-----------( 221      ( 14 Aug 2020 11:10 )             36.7     08-14    140  |  103  |  21  ----------------------------<  217<H>  4.2   |  26  |  1.08    Ca    8.6      14 Aug 2020 11:10    TPro  6.9  /  Alb  3.8  /  TBili  0.7  /  DBili  x   /  AST  12  /  ALT  8<L>  /  AlkPhos  69  08-13    PT/INR - ( 13 Aug 2020 15:10 )   PT: 17.3 sec;   INR: 1.48 ratio         PTT - ( 13 Aug 2020 15:10 )  PTT:46.8 sec      Urinalysis Basic - ( 13 Aug 2020 17:00 )    Color: Light Yellow / Appearance: Slightly Turbid / S.015 / pH: x  Gluc: x / Ketone: Negative  / Bili: Negative / Urobili: Negative   Blood: x / Protein: 30 mg/dL / Nitrite: Negative   Leuk Esterase: Moderate / RBC: 46 /hpf / WBC 6 /HPF   Sq Epi: x / Non Sq Epi: 6 /hpf / Bacteria: Negative        RADIOLOGY & ADDITIONAL TESTS:    Imaging Personally Reviewed:    Consultant(s) Notes Reviewed:      Care Discussed with Consultants/Other Providers:

## 2020-08-15 NOTE — SWALLOW BEDSIDE ASSESSMENT ADULT - COMMENTS
Hospital Course: s/p cystoscopy with left ureteral stent placement. ID consulted for Enterococcus Bacteremia. Suspect urinary source of enterococcus. Recommend IV access and being switched to IV Vancomycin pending susceptibilities. Course c/b code stroke on 8/15. Patient with fever T-102.9 and elevated /98 with difficulty word finding/ Aphasia. Neuro consulted, NIHSS: 6. Neuro impression: dysfluent speech, paraparesis both legs, not knowing month 2/2 potential L hemispheric dysfunction versus toxic metabolic encephalopathy in the setting of ongoing urosepsis 2/2 possible cardioembolic given known afib. now demonstrating imaged prox basilar stenosis, R V4 stenosis, 5mm ectasia R cav ICA, 3mm R Acomm fusiform aneurysm with CTA showing diffuse calcifications concern for underlying atherosclerosis. Pt refused MRI. S&S recommended. No overt, clinical s/s of aspiration/penetration noted.

## 2020-08-15 NOTE — CONSULT NOTE ADULT - ATTENDING COMMENTS
70-year-old right-handed lady first evaluated at [facility] on 8/15/2020 with aphasia.  History and exam as above.  ROS otherwise negative.  CT head (8/14/2020) to my eye showed moderate periventricular chronic ischemic changes.  There might have been a chronic left corona radiata lacunar infarct.  CTA neck (8/14/2020) to my eye showed scattered atherosclerotic plaque but no significant stenosis.  CTA head (8/14/2020) to my eye showed severe stenosis in the distal left vertebral artery and proximal basilar artery.  There was mild atherosclerotic plaque involving the right supraclinoid ICA.  Small ACOM region aneurysm on the right, measured at 3 mm; possibly mild fusiform dilatation of the right cavernous ICA.  Impression.  She was recently diagnosed with atrial fibrillation and treated with Eliquis.  She was recently admitted to [facility] with "change in mental status" and a non-ST wave MI.  She was discharged to rehabilitation, and readmitted with urosepsis and required ureteral stenting.  On 8/14/2020 she was noted to have word finding difficulty, and on exam has a mild motor aphasia.  Her presentation is consistent with left brain dysfunction, most likely acute ischemic stroke, probably due to cardioembolism related to atrial fibrillation.  Her stroke is probably left hemispheric, but in the absence of an imaged lesion, a small posterior circulation infarct, perhaps in the thalamus, cannot be ruled out.  She also has presumably asymptomatic, severe vertebrobasilar junction stenosis (asymptomatic unless her infarct turns out to be thalamic), most likely due to large artery intracranial atherosclerosis, as well as an incidental, unruptured 3 mm ACOM region aneurysm which should be monitored for stability.  She has severe claustrophobia and is refusing MRI.  Suggest.  MRI brain if she will agree; if she refuses MRI, then repeat CT head; repeat CTA head yearly; continue Eliquis; PT/OT/speech therapy.
discussed with Dr. Wallace. Patient is a STAR patient, with multiple medical comorbidities and high potential for readmission.   Advance care planning was reviewed with patient at this time and HCP form updated. >16 minutes spent on ACP.  Patient goals in line with ongoing medical management including FULL CODE status. Likely d/c back to [facility]. Upon return to home, patient expresses that she has resources in place including HHA. Will sign off as goals are established. Please reconsult if issues arise

## 2020-08-15 NOTE — SWALLOW BEDSIDE ASSESSMENT ADULT - SWALLOW EVAL: DIAGNOSIS
Pt seen for clinical bedside swallow evaluation after code stroke. Today, Pt presented with 1) Adequate oral and pharyngeal swallow function with no overt, clinical s/s of aspiration/penetration with regular consistencies and thin liquids. However, Pt accepted minimal amount of PO despite encouragement. Of note, Pt endorsed word finding deficits r/t short term memory loss. Therefore, recommend speech language evaluation to further evaluate.

## 2020-08-15 NOTE — CHART NOTE - NSCHARTNOTEFT_GEN_A_CORE
MEDICINE NP    TIANA BROWN  70y Female    Patient is a 70y old  Female who presents with a chief complaint of AMS x 1 day (14 Aug 2020 17:06)       > Event Summary:  Notified by RN, Patient with fever T-102.9 and elevated /98 2/2 @10PM due BP medications of Hydralazine, Clonidine, and Labetalol po given.   Patient AAOX3, appears ill. Denies cough, sob.  C/o abdominal cramping 2/2 ABX side effect    -Vital Signs Last 24 Hrs  T(C): 39 (14 Aug 2020 23:48), Max: 39 (14 Aug 2020 23:48)  T(F): 102.2 (14 Aug 2020 23:48), Max: 102.2 (14 Aug 2020 23:48)  HR: 101 (14 Aug 2020 23:48) (71 - 101)  BP: 201/98 (14 Aug 2020 23:48) (153/76 - 201/98)  RR: 18 (14 Aug 2020 23:48) (18 - 18)  SpO2: 92% (14 Aug 2020 23:48) (92% - 98%)    > Physical Assessment:  General:  Appears ill.   Neurology: A&Ox3. AOX3 w/ +word finding difficult.   CV: +S1S2, IRRR. +Soft murmur.   No peripheral edema  Respiratory: Even, unlabored.  CTA B/L.    Abdomen:  +BS.  Soft, NT, ND.  No palpable mass  : +Cruz with gem yellow urine   MSK: MAEx4 with LE weakness L>R +4/5.    Skin: +Feverish to touch      > Assessment & Plan:  - HPI: 70 year old female PMHx CAD s/p 3 stent 3 years ago, HTN Hypothyroidism, Carpal Tunnel Syndrome, Lymphedema, recent admission 8/13/2020 with AMS/ NSTEMI with episode of Afib+RVR (on Eliquis), refused cardiac cath had normal stress test, course complicated with Enterococcus UTI and Colitis requiring antibiotics discharged to [facility].  Presents now with AMS x 1 day.   As per [facility] patient very agitated, altered refusing to take medications.   Admitted with Sepsis/ UTI / Obstruction Left Ureteral calculus and [place].  S/p Ureteral Stent 8/13.  Now with Difficult word finding and fever    1) Difficult Word Finding - Unclear last baseline  -RRT / Code Stroke Called    2) Elevated BP: Likely 2/2 receiving @10PM medications late.   -s/p Due BP medications of Hydralazine, Clonidine, and Labetalol po given. - Will repeat in 30 minutes       2) Fever - recurrent : Likely 2/2 Sepsis  -Tylenol iv  -F/u CXR ordered   -Culture - Blood (08.13.20 @ 20:36)   -  Enterococcus species: Detec  -Culture - Blood (08.13.20 @ 20:36) -   Gram Stain:  Growth in anaerobic bottle: Gram Positive Cocci in Pairs and Chains  -f/u Repeat BCX in AM  -C/w Current ABX Vancomycin  -F/u ID in AM        EDGARDO Akhtar-BC  Medicine Department MEDICINE NP    TIANA BROWN  70y Female    Patient is a 70y old  Female who presents with a chief complaint of AMS x 1 day (14 Aug 2020 17:06)       > Event Summary:  Notified by RN, Patient with fever T-102.9 and elevated /98 2/2 @10PM due BP medications of Hydralazine, Clonidine, and Labetalol po given.   Patient AAOX3, appears ill. Denies cough, sob.  C/o abdominal cramping 2/2 ABX side effect    -Vital Signs Last 24 Hrs  T(C): 39 (14 Aug 2020 23:48), Max: 39 (14 Aug 2020 23:48)  T(F): 102.2 (14 Aug 2020 23:48), Max: 102.2 (14 Aug 2020 23:48)  HR: 101 (14 Aug 2020 23:48) (71 - 101)  BP: 201/98 (14 Aug 2020 23:48) (153/76 - 201/98)  RR: 18 (14 Aug 2020 23:48) (18 - 18)  SpO2: 92% (14 Aug 2020 23:48) (92% - 98%)    > Physical Assessment:  General:  Appears ill.   Neurology: A&Ox3. AOX3 w/ +word finding difficult.   CV: +S1S2, IRRR. +Soft murmur.   No peripheral edema  Respiratory: Even, unlabored.  CTA B/L.    Abdomen:  +BS.  Soft, NT, ND.  No palpable mass  : +Cruz with gem yellow urine   MSK: MAEx4 with LE weakness L>R +4/5.    Skin: +Feverish to touch      > Assessment & Plan:  - HPI: 70 year old female PMHx CAD s/p 3 stent 3 years ago, HTN Hypothyroidism, Carpal Tunnel Syndrome, Lymphedema, recent admission 8/13/2020 with AMS/ NSTEMI with episode of Afib+RVR (on Eliquis), refused cardiac cath had normal stress test, course complicated with Enterococcus UTI and Colitis requiring antibiotics discharged to [facility].  Presents now with AMS x 1 day.   As per [facility] patient very agitated, altered refusing to take medications.   Admitted with Sepsis/ UTI / Obstruction Left Ureteral calculus and [place].  S/p Ureteral Stent 8/13.  BCX +Enterococcus species: Dete, started on Vancomycin.  Now with Difficulty Word Finding / Aphagia, Fever.     1) Difficult Word Finding - Unclear last baseline  -Concern for CVA vs ICH-- RRT / Code Stroke Called and responded to bedside. (see flowsheet)  -F/u post RRT/ Code Stroke  recommendations     2) Elevated BP: Likely 2/2 receiving @10PM medications late.   -s/p Due BP medications of Hydralazine, Clonidine, and Labetalol po given. - Will repeat in 30 minutes       2) Fever - recurrent : Likely 2/2 Sepsis/ Enterococcus Bacteremia   -Tylenol iv  -F/u CXR ordered   -Culture - Blood (08.13.20 @ 20:36)   - Enterococcus species: Detec  -Culture - Blood (08.13.20 @ 20:36) -  Gram Stain:  Growth in anaerobic bottle: Gram Positive Cocci in Pairs and Chains  -f/u Repeat BCX in AM  -C/w Current ABX Vancomycin  -F/u ID         EDGARDO Akhtar-BC  Medicine Department  #[id number]        >>> ADDEND: (08-15-20 @ 02:30)  Follow-up  Post RRT / Code Stroke Recommendations  -Post RRT Recommendations appreciated   -Post Code Stroke Recommendations appreciated - Day team to f/u Final recommendations in AM   -F/u CT/CTA head/neck final report   -Vitals /BP now back to baseline - VS q4hrs      T(C): 37.1 (08-15-20 @ 02:09), Max: 39 (08-14-20 @ 23:48)  T(F): 98.7 (08-15-20 @ 02:09), Max: 102.2 (08-14-20 @ 23:48)  HR: 92 (08-15-20 @ 02:09) (71 - 101)  BP: 152/72 (08-15-20 @ 02:09) (152/72 - 201/98)  RR: 18 (08-15-20 @ 02:09) (18 - 18)  SpO2: 98% (08-15-20 @ 02:09) (92% - 98%) MEDICINE NP    TIANA BROWN  70y Female    Patient is a 70y old  Female who presents with a chief complaint of AMS x 1 day (14 Aug 2020 17:06)       > Event Summary:  Notified by RN, Patient with fever T-102.9 and elevated /98 2/2 @10PM due BP medications of Hydralazine, Clonidine, and Labetalol po given.   Patient AAOX3, appears ill. Denies cough, sob.  C/o abdominal cramping 2/2 ABX side effect    -Vital Signs Last 24 Hrs  T(C): 39 (14 Aug 2020 23:48), Max: 39 (14 Aug 2020 23:48)  T(F): 102.2 (14 Aug 2020 23:48), Max: 102.2 (14 Aug 2020 23:48)  HR: 101 (14 Aug 2020 23:48) (71 - 101)  BP: 201/98 (14 Aug 2020 23:48) (153/76 - 201/98)  RR: 18 (14 Aug 2020 23:48) (18 - 18)  SpO2: 92% (14 Aug 2020 23:48) (92% - 98%)    > Physical Assessment:  General:  Appears ill.   Neurology: A&Ox3. AOX3 w/ +word finding difficult.   CV: +S1S2, IRRR. +Soft murmur.   No peripheral edema  Respiratory: Even, unlabored.  CTA B/L.    Abdomen:  +BS.  Soft, NT, ND.  No palpable mass  : +Cruz with gem yellow urine   MSK: MAEx4 with LE weakness L>R +4/5.    Skin: +Feverish to touch      > Assessment & Plan:  - HPI: 70 year old female PMHx CAD s/p 3 stent 3 years ago, HTN Hypothyroidism, Carpal Tunnel Syndrome, Lymphedema, recent admission 8/13/2020 with AMS/ NSTEMI with episode of Afib+RVR (on Eliquis), refused cardiac cath had normal stress test, course complicated with Enterococcus UTI and Colitis requiring antibiotics discharged to [facility].  Presents now with AMS x 1 day.   As per [facility] patient very agitated, altered refusing to take medications.   Admitted with Sepsis/ UTI / Obstruction Left Ureteral calculus and [place].  S/p Ureteral Stent 8/13.  BCX +Enterococcus species: Dete, started on Vancomycin.  Now with Difficulty Word Finding / Aphagia, Fever.     1) Difficult Word Finding - ICH +/- Sepsis   -Concern for CVA vs ICH-- RRT / Code Stroke Called and responded to bedside. (see flowsheet)  -F/u post RRT/ Code Stroke  recommendations     2) Elevated BP: Likely 2/2 receiving @10PM medications late.   -s/p Due BP medications of Hydralazine, Clonidine, and Labetalol po given. - Will repeat in 30 minutes   -Vital q4hrs       2) Fever - recurrent : Likely 2/2 Sepsis/ Enterococcus Bacteremia   -Tylenol iv  -F/u CXR ordered   -Culture - Blood (08.13.20 @ 20:36)   - Enterococcus species: Detec  -Culture - Blood (08.13.20 @ 20:36) -  Gram Stain:  Growth in anaerobic bottle: Gram Positive Cocci in Pairs and Chains  -f/u Repeat BCX in AM  -C/w Current ABX Vancomycin  -F/u ID         EDGARDO Akhtar-BC  Medicine Department  #[id number]        >>> ADDEND: (08-15-20 @ 02:30)   Follow-up  Post RRT / Code Stroke   -Repeat BP  155/72.  T-98  -F/u Labs wnl. -Uptrending Leukocytosis:  c/w ABX  -Post RRT Recommendations appreciated   -Post Code Stroke Recommendations appreciated - Day team to f/u Final recommendations in AM   -F/u CT/CTA head/neck final report - Prelim w/ Stenosis / Occlusion:  Neurology following   -Vitals /BP now back to baseline - VS q4hrs  --Will Endorse to Attending     VS:  T(C): 37.1 T(F): 98.7 HR: 92 BP: 152/72 RR: 18 SpO2: 98% (08-15-20 @ 02:09)     < from: CT Angio Neck w/ IV Cont (08.15.20 @ 01:29) >  CT HEAD: No acute intracranial hemorrhage. These findings were discussed with Dr. Moran on 8/15/2020 at 1:30 AM by Dr. Mcginnis with read back confirmation.    CTA BRAIN: Patent  1. 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  2. V4 segment right vertebral artery demonstrates irregular stenoses with a long segment of occlusion/near occlusion in the distal aspect. Distal reconstitution occurs.  3. 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  4. 5 mm fusiform ectasia of the cavernous segment of the right ICA.    CTA NECK: Patent cervical vasculature. No flow limiting stenosis or occlusion.  < end of copied text >      Jerrica Summers, ANTONIO-BC  Medicine Department  #[id number] MEDICINE NP    TIANA BROWN  70y Female    Patient is a 70y old  Female who presents with a chief complaint of AMS x 1 day (14 Aug 2020 17:06)       > Event Summary:  Notified by RN, Patient with fever T-102.9 and elevated /98 2/2 @10PM due BP medications of Hydralazine, Clonidine, and Labetalol po given.   Patient AAOX3, appears ill. Denies cough, sob.  C/o abdominal cramping 2/2 ABX side effect    -Vital Signs Last 24 Hrs  T(C): 39 (14 Aug 2020 23:48), Max: 39 (14 Aug 2020 23:48)  T(F): 102.2 (14 Aug 2020 23:48), Max: 102.2 (14 Aug 2020 23:48)  HR: 101 (14 Aug 2020 23:48) (71 - 101)  BP: 201/98 (14 Aug 2020 23:48) (153/76 - 201/98)  RR: 18 (14 Aug 2020 23:48) (18 - 18)  SpO2: 92% (14 Aug 2020 23:48) (92% - 98%)    > Physical Assessment:  General:  Appears ill. Face flushed   Neurology: A&Ox3. AOX3 w/ +word finding difficult.   CV: +S1S2, IRRR. +Soft murmur.   No peripheral edema  Respiratory: Even, unlabored.  CTA B/L.    Abdomen:  +BS.  Soft, NT, ND.  No palpable mass  : +Cruz with gem yellow urine   MSK: MAEx4 with LE weakness L>R +4/5.    Skin: +Feverish to touch      > Assessment & Plan:  - HPI: 70 year old female PMHx CAD s/p 3 stent 3 years ago, HTN Hypothyroidism, Carpal Tunnel Syndrome, Lymphedema, recent admission 8/13/2020 with AMS/ NSTEMI with episode of Afib+RVR (on Eliquis), refused cardiac cath had normal stress test, course complicated with Enterococcus UTI and Colitis requiring antibiotics discharged to [facility].  Presents now with AMS x 1 day.   As per [facility] patient very agitated, altered refusing to take medications.   Admitted with Sepsis/ UTI / Obstruction Left Ureteral calculus and [place].  S/p Ureteral Stent 8/13.  BCX +Enterococcus species: Dete, started on Vancomycin.  Now with Difficulty Word Finding / Aphagia, Fever.     1) Difficult Word Finding - ICH +/- Sepsis   -Concern for CVA vs ICH-- RRT / Code Stroke Called and responded to bedside. (see flowsheet)  -F/u post RRT/ Code Stroke  recommendations     2) Elevated BP: Likely 2/2 receiving @10PM medications late.   -s/p Due BP medications of Hydralazine, Clonidine, and Labetalol po given. - Will repeat in 30 minutes   -Vital q4hrs       2) Fever - recurrent : Likely 2/2 Sepsis/ Enterococcus Bacteremia   -Tylenol iv  -F/u CXR ordered   -Culture - Blood (08.13.20 @ 20:36)   - Enterococcus species: Detec  -Culture - Blood (08.13.20 @ 20:36) -  Gram Stain:  Growth in anaerobic bottle: Gram Positive Cocci in Pairs and Chains  -f/u Repeat BCX in AM  -C/w Current ABX Vancomycin  -F/u ID         EDGARDO Akhtar-BC  Medicine Department  #[id number]        >>> ADDEND: (08-15-20 @ 02:30)   Follow-up  Post RRT / Code Stroke   -Repeat BP  155/72.  T-98  -F/u Labs wnl. -Uptrending Leukocytosis:  c/w ABX  -Post RRT Recommendations appreciated   -Post Code Stroke Recommendations appreciated - Day team to f/u Final recommendations in AM   -F/u CT/CTA head/neck final report - Prelim w/ Stenosis / Occlusion:  Neurology following   -Vitals /BP now back to baseline - VS q4hrs  --Will Endorse to Attending     VS:  T(C): 37.1 T(F): 98.7 HR: 92 BP: 152/72 RR: 18 SpO2: 98% (08-15-20 @ 02:09)     < from: CT Angio Neck w/ IV Cont (08.15.20 @ 01:29) >  CT HEAD: No acute intracranial hemorrhage. These findings were discussed with Dr. Moran on 8/15/2020 at 1:30 AM by Dr. Mcginnis with read back confirmation.    CTA BRAIN: Patent  1. 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  2. V4 segment right vertebral artery demonstrates irregular stenoses with a long segment of occlusion/near occlusion in the distal aspect. Distal reconstitution occurs.  3. 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  4. 5 mm fusiform ectasia of the cavernous segment of the right ICA.    CTA NECK: Patent cervical vasculature. No flow limiting stenosis or occlusion.  < end of copied text >      Jerrica Summers, EDGARDO-BC  Medicine Department  #[id number]

## 2020-08-15 NOTE — PROVIDER CONTACT NOTE (OTHER) - BACKGROUND
pt admitted 8/13- for AMS, prior admission for a.fib RVR & + UTI then d/c to [facility], readmitted for AMS.

## 2020-08-15 NOTE — PHYSICAL THERAPY INITIAL EVALUATION ADULT - PERTINENT HX OF CURRENT PROBLEM, REHAB EVAL
71 yo F, DC on 8/3 after being admitted for NSTEMI and later developed a UTI, colitis and A fib with RVR sent from [facility] for AMS and fever. Pt able to answer questions, but appears lethargic. Reports LUQ abd pain. Urology team consulted for 5x6mm L proximal ureteral stone with mild hydro in the setting of fever to 103F, hypertension to 170s/100s, mild leukocytosis to 10.5, UA with moderate LE/moderate blood, elevated lactate and inc in Cr. Pt underwent Cystoscopy with stent placement 8/13.

## 2020-08-15 NOTE — CONSULT NOTE ADULT - ASSESSMENT
Assessment: 69yo RH woman h/o HTN, hypothyroidism, carpal tunnel, lymphedema on Eliquis for new onset afib admitted for urosepsis with code stroke called for word finding difficulty LWK 8/14/2020 unclear time. Neurological examination at this time demonstrates mild dysfluent speech with paresis of both legs (though may be from patient effort/positioning in bed) and patient not knowing the month. Patient on eliquis and unclear LWK so not a tpa candidate. Pending CTA H & N for potential evaluation for mechanical thrombectomy.    Impression: dysfluent speech, paraparesis both legs, not knowing month 2/2 potential L hemispheric dysfunction versus toxic metabolic encephalopathy in the setting of ongoing urosepsis 2/2 possible cardioembolic given known afib    Recommendations:  [ ] f/u CT/CTA final read  [ ] MRI brain w/o contrast  [ ] q4h neuro checks  [ ] blood pressure goals to be determined based on vessel imaging  [ ] echo w/ bubble  [ ] a1c, fasting lipid profile  [ ] c/w eliquis  [ ] PT/OT  [ ] speech/swallow    -Management & disposition to be discussed with Neurology Attending Dr. Diaz Assessment: 71yo RH woman h/o HTN, hypothyroidism, carpal tunnel, lymphedema on Eliquis for new onset afib admitted for urosepsis with code stroke called for word finding difficulty LWK 8/14/2020 unclear time. Neurological examination at this time demonstrates mild dysfluent speech with paresis of both legs (though may be from patient effort/positioning in bed) and patient not knowing the month. Patient on eliquis and unclear LWK so not a tpa candidate. No LVO so not a candidate for mechanical thrombectomy.     Impression: dysfluent speech, paraparesis both legs, not knowing month 2/2 potential L hemispheric dysfunction versus toxic metabolic encephalopathy in the setting of ongoing urosepsis 2/2 possible cardioembolic given known afib  now demonstrating imaged prox basilar stenosis, R V4 stenosis, 5mm ectasia R cav ICA, 3mm R Acomm fusiform aneurysm with CTA showing diffuse calcifications concern for underlying atherosclerosis    Recommendations:  [ ] f/u CT/CTA final read  [ ] MRI brain w/o contrast  [ ] q4h neuro checks  [ ] blood pressure goals to be determined based on vessel imaging  [ ] echo w/ bubble  [ ] a1c, fasting lipid profile  [ ] c/w eliquis  [ ] if no contraindication, atorvastatin 80mg daily to titrate for LDL < 70  [ ] PT/OT  [ ] speech/swallow    -Management & disposition to be discussed with Neurology Attending Dr. Diaz Assessment: 69yo RH woman h/o HTN, hypothyroidism, carpal tunnel, lymphedema on Eliquis for new onset afib admitted for urosepsis with code stroke called for word finding difficulty LWK 8/14/2020 unclear time. Neurological examination at this time demonstrates mild dysfluent speech with paresis of both legs (though may be from patient effort/positioning in bed) and patient not knowing the month. Patient on eliquis and unclear LWK so not a tpa candidate. No LVO so not a candidate for mechanical thrombectomy.     Impression: dysfluent speech, paraparesis both legs, not knowing month 2/2 potential L hemispheric dysfunction versus toxic metabolic encephalopathy in the setting of ongoing urosepsis 2/2 possible cardioembolic given known afib  now demonstrating imaged prox basilar stenosis, R V4 stenosis, 5mm ectasia R cav ICA, 3mm R Acomm fusiform aneurysm with CTA showing diffuse calcifications concern for underlying atherosclerosis    Recommendations:  [x] f/u CT/CTA final read  [ ] MRI brain w/o contrast  [ ] AM team to decide for possible MRA [place]  [ ] q4h neuro checks  [ ] modified permissive -180  [ ] echo w/ bubble  [ ] a1c, fasting lipid profile  [ ] c/w eliquis  [ ] if no contraindication, atorvastatin 80mg daily to titrate for LDL < 70  [ ] PT/OT  [ ] speech/swallow    -Management & disposition d/w Stroke fellow Dr. Root; to be discussed with Stroke Attending Dr. Libman

## 2020-08-15 NOTE — PHYSICAL THERAPY INITIAL EVALUATION ADULT - PRECAUTIONS/LIMITATIONS, REHAB EVAL
CONT: CT/ CTA Head 8/15: CT HEAD: No acute intracranial hemorrhage. CTA BRAIN: 1. 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 V4 segment right vertebral artery demonstrates irregular stenoses with a long segment of occlusion/near occlusion in the distal aspect. Distal reconstitution occurs. 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  5 mm fusiform ectasia of the cavernous segment of the right ICA. fall precautions/CONT: CT/ CTA Head 8/15: CT HEAD: No acute intracranial hemorrhage. CTA BRAIN: 1. 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 V4 segment right vertebral artery demonstrates irregular stenoses with a long segment of occlusion/near occlusion in the distal aspect. Distal reconstitution occurs. 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  5 mm fusiform ectasia of the cavernous segment of the right ICA.

## 2020-08-15 NOTE — PROVIDER CONTACT NOTE (OTHER) - ACTION/TREATMENT ORDERED:
NP Jerrica Summers MD orders IV tylenol stat, blood cultures x2 stat, IV protonix. RRT/ Code stroke for speech impairment/AMS stat. no further inteventions @ this time, will cont to cherry

## 2020-08-15 NOTE — SWALLOW BEDSIDE ASSESSMENT ADULT - ASR SWALLOW ASPIRATION MONITOR
throat clearing/fever/pneumonia/If noted:  D/C p.o. intake, provide non-oral nutrition/hydration/meds and consult this service @[id number]./change of breathing pattern/cough/gurgly voice/upper respiratory infection

## 2020-08-15 NOTE — PHYSICAL THERAPY INITIAL EVALUATION ADULT - GAIT DEVIATIONS NOTED, PT EVAL
decreased ruth ann/decreased weight-shifting ability/increased time in double stance/decreased velocity of limb motion/decreased stride length

## 2020-08-16 DIAGNOSIS — R78.81 BACTEREMIA: ICD-10-CM

## 2020-08-16 LAB
-  AMPICILLIN: SIGNIFICANT CHANGE UP
-  CIPROFLOXACIN: SIGNIFICANT CHANGE UP
-  CIPROFLOXACIN: SIGNIFICANT CHANGE UP
-  GENTAMICIN SYNERGY: SIGNIFICANT CHANGE UP
-  LEVOFLOXACIN: SIGNIFICANT CHANGE UP
-  LEVOFLOXACIN: SIGNIFICANT CHANGE UP
-  NITROFURANTOIN: SIGNIFICANT CHANGE UP
-  NITROFURANTOIN: SIGNIFICANT CHANGE UP
-  STREPTOMYCIN SYNERGY: SIGNIFICANT CHANGE UP
-  TETRACYCLINE: SIGNIFICANT CHANGE UP
-  TETRACYCLINE: SIGNIFICANT CHANGE UP
-  VANCOMYCIN: SIGNIFICANT CHANGE UP
ANION GAP SERPL CALC-SCNC: 11 MMOL/L — SIGNIFICANT CHANGE UP (ref 5–17)
BUN SERPL-MCNC: 22 MG/DL — SIGNIFICANT CHANGE UP (ref 7–23)
CALCIUM SERPL-MCNC: 8.8 MG/DL — SIGNIFICANT CHANGE UP (ref 8.4–10.5)
CHLORIDE SERPL-SCNC: 100 MMOL/L — SIGNIFICANT CHANGE UP (ref 96–108)
CO2 SERPL-SCNC: 27 MMOL/L — SIGNIFICANT CHANGE UP (ref 22–31)
CREAT SERPL-MCNC: 1.1 MG/DL — SIGNIFICANT CHANGE UP (ref 0.5–1.3)
CULTURE RESULTS: SIGNIFICANT CHANGE UP
GLUCOSE SERPL-MCNC: 103 MG/DL — HIGH (ref 70–99)
HCT VFR BLD CALC: 31.2 % — LOW (ref 34.5–45)
HGB BLD-MCNC: 9.8 G/DL — LOW (ref 11.5–15.5)
MCHC RBC-ENTMCNC: 29.5 PG — SIGNIFICANT CHANGE UP (ref 27–34)
MCHC RBC-ENTMCNC: 31.4 GM/DL — LOW (ref 32–36)
MCV RBC AUTO: 94 FL — SIGNIFICANT CHANGE UP (ref 80–100)
METHOD TYPE: SIGNIFICANT CHANGE UP
NRBC # BLD: 0 /100 WBCS — SIGNIFICANT CHANGE UP (ref 0–0)
ORGANISM # SPEC MICROSCOPIC CNT: SIGNIFICANT CHANGE UP
PLATELET # BLD AUTO: 219 K/UL — SIGNIFICANT CHANGE UP (ref 150–400)
POTASSIUM SERPL-MCNC: 3.9 MMOL/L — SIGNIFICANT CHANGE UP (ref 3.5–5.3)
POTASSIUM SERPL-SCNC: 3.9 MMOL/L — SIGNIFICANT CHANGE UP (ref 3.5–5.3)
RBC # BLD: 3.32 M/UL — LOW (ref 3.8–5.2)
RBC # FLD: 15 % — HIGH (ref 10.3–14.5)
SODIUM SERPL-SCNC: 138 MMOL/L — SIGNIFICANT CHANGE UP (ref 135–145)
SPECIMEN SOURCE: SIGNIFICANT CHANGE UP
VANCOMYCIN TROUGH SERPL-MCNC: 16.9 UG/ML — SIGNIFICANT CHANGE UP (ref 10–20)
WBC # BLD: 6.58 K/UL — SIGNIFICANT CHANGE UP (ref 3.8–10.5)
WBC # FLD AUTO: 6.58 K/UL — SIGNIFICANT CHANGE UP (ref 3.8–10.5)

## 2020-08-16 PROCEDURE — 99232 SBSQ HOSP IP/OBS MODERATE 35: CPT

## 2020-08-16 RX ORDER — ACETAMINOPHEN 500 MG
1000 TABLET ORAL ONCE
Refills: 0 | Status: COMPLETED | OUTPATIENT
Start: 2020-08-16 | End: 2020-08-16

## 2020-08-16 RX ADMIN — Medication 300 MILLIGRAM(S): at 21:44

## 2020-08-16 RX ADMIN — Medication 0.2 MILLIGRAM(S): at 21:44

## 2020-08-16 RX ADMIN — Medication 0.5 MILLIGRAM(S): at 21:44

## 2020-08-16 RX ADMIN — Medication 400 MILLIGRAM(S): at 04:28

## 2020-08-16 RX ADMIN — ATORVASTATIN CALCIUM 80 MILLIGRAM(S): 80 TABLET, FILM COATED ORAL at 21:44

## 2020-08-16 RX ADMIN — Medication 50 MILLIGRAM(S): at 21:44

## 2020-08-16 RX ADMIN — Medication 166.67 MILLIGRAM(S): at 17:35

## 2020-08-16 RX ADMIN — NYSTATIN CREAM 1 APPLICATION(S): 100000 CREAM TOPICAL at 05:54

## 2020-08-16 RX ADMIN — Medication 166.67 MILLIGRAM(S): at 05:58

## 2020-08-16 RX ADMIN — POLYETHYLENE GLYCOL 3350 17 GRAM(S): 17 POWDER, FOR SOLUTION ORAL at 05:58

## 2020-08-16 RX ADMIN — Medication 50 MILLIGRAM(S): at 05:54

## 2020-08-16 RX ADMIN — Medication 75 MICROGRAM(S): at 05:54

## 2020-08-16 RX ADMIN — Medication 50 MILLIGRAM(S): at 13:19

## 2020-08-16 RX ADMIN — Medication 1000 MILLIGRAM(S): at 05:45

## 2020-08-16 RX ADMIN — APIXABAN 5 MILLIGRAM(S): 2.5 TABLET, FILM COATED ORAL at 17:35

## 2020-08-16 RX ADMIN — NYSTATIN CREAM 1 APPLICATION(S): 100000 CREAM TOPICAL at 17:34

## 2020-08-16 RX ADMIN — Medication 300 MILLIGRAM(S): at 13:19

## 2020-08-16 RX ADMIN — Medication 81 MILLIGRAM(S): at 12:35

## 2020-08-16 RX ADMIN — Medication 0.5 MILLIGRAM(S): at 13:19

## 2020-08-16 RX ADMIN — APIXABAN 5 MILLIGRAM(S): 2.5 TABLET, FILM COATED ORAL at 05:54

## 2020-08-16 RX ADMIN — Medication 1 TABLET(S): at 12:35

## 2020-08-16 RX ADMIN — Medication 0.2 MILLIGRAM(S): at 13:19

## 2020-08-16 RX ADMIN — Medication 300 MILLIGRAM(S): at 05:54

## 2020-08-16 RX ADMIN — Medication 10 MILLIGRAM(S): at 05:38

## 2020-08-16 RX ADMIN — Medication 0.2 MILLIGRAM(S): at 05:54

## 2020-08-16 NOTE — PROGRESS NOTE ADULT - SUBJECTIVE AND OBJECTIVE BOX
HPI:  71 y/o F with a significant PMHx of, HTN, Hypothyroidism,  carpal tunnel syndrome, lymphedema admitted to my service discharged 08/03/20 to [facility] p/w Altered mental status.     Patient had a complicated hospital course last admission with elevated cardiac enzymes from demand ischemia due to A.fib with rvr, refused cardiac cath had normal stress test, course complicated with Enterococcus UTI and colitis requiring antibiotics discharged to [facility] p/w AMS x 1 day. As per [facility] patient very agitated, altered refusing to take medications.     In the ed patient had CT head with no acute findings, CT abdomen shows 5-6 mm ureteral stones with mild hydronephrosis.   Patient was seen by urology,     Currently patient alert, awake, oriented x 3, denies abdominal pain/ nausea/ vomiting, noted to have fever 103 F, received Ciprofloxacin and Flagyl. (13 Aug 2020 20:23)      Patient is a 70y old  Female who presents with a chief complaint of AMS x 1 day (14 Aug 2020 17:06)      SUBJECTIVE / OVERNIGHT EVENTS: Patient feels ok, spiking fevers.   Currently patient AAO X3, refusing MRI Brain.     MEDICATIONS  (STANDING):  apixaban 5 milliGRAM(s) Oral every 12 hours  aspirin enteric coated 81 milliGRAM(s) Oral daily  atorvastatin 80 milliGRAM(s) Oral at bedtime  cloNIDine 0.2 milliGRAM(s) Oral three times a day  hydrALAZINE 50 milliGRAM(s) Oral three times a day  labetalol 300 milliGRAM(s) Oral three times a day  lactobacillus acidophilus 1 Tablet(s) Oral daily  levothyroxine 75 MICROGram(s) Oral daily  nystatin Powder 1 Application(s) Topical two times a day  polyethylene glycol 3350 17 Gram(s) Oral two times a day  senna 2 Tablet(s) Oral at bedtime  vancomycin  IVPB 1250 milliGRAM(s) IV Intermittent every 12 hours    MEDICATIONS  (PRN):  acetaminophen   Tablet .. 650 milliGRAM(s) Oral every 6 hours PRN Temp greater or equal to 38C (100.4F)  ALPRAZolam 0.5 milliGRAM(s) Oral every 8 hours PRN anxiety  CAPILLARY BLOOD GLUCOSE        I&O's Summary    13 Aug 2020 07:01  -  14 Aug 2020 07:00  --------------------------------------------------------  IN: 650 mL / OUT: 95 mL / NET: 555 mL    14 Aug 2020 07:01  -  14 Aug 2020 21:20  --------------------------------------------------------  IN: 600 mL / OUT: 1200 mL / NET: -600 mL    T(C): 37.6 (08-16-20 @ 16:06), Max: 37.6 (08-16-20 @ 16:06)  HR: 80 (08-16-20 @ 16:06) (72 - 80)  BP: 135/79 (08-16-20 @ 16:06) (135/79 - 196/114)  RR: 18 (08-16-20 @ 16:06) (18 - 18)  SpO2: 93% (08-16-20 @ 16:06) (93% - 97%)    PHYSICAL EXAM:  GENERAL: NAD, well-developed  HEAD:  Atraumatic, Normocephalic  EYES: EOMI, PERRLA, conjunctiva and sclera clear  NECK: Supple, No JVD  CHEST/LUNG: Clear to auscultation bilaterally; No wheeze  HEART: Regular rate and rhythm; No murmurs, rubs, or gallops  ABDOMEN: Soft, Nontender, rlq tenderness, Nondistended; Bowel sounds present  EXTREMITIES:  2+ Peripheral Pulses, No clubbing, cyanosis, or edema  PSYCH: AAOx3  NEUROLOGY: non-focal  SKIN: No rashes or lesions    LABS:                                        9.8    6.58  )-----------( 219      ( 16 Aug 2020 06:33 )             31.2           LIVER FUNCTIONS - ( 15 Aug 2020 06:19 )  Alb: 3.2 g/dL / Pro: 6.1 g/dL / ALK PHOS: 65 U/L / ALT: 6 U/L / AST: 9 U/L / GGT: x           PT/INR - ( 15 Aug 2020 06:19 )   PT: 15.4 sec;   INR: 1.31 ratio         PTT - ( 15 Aug 2020 06:19 )  PTT:41.3 sec  138|100|22<103  3.9|27|1.10  8.8,--,--  08-16 @ 06:33      RADIOLOGY & ADDITIONAL TESTS:    Imaging Personally Reviewed:    Consultant(s) Notes Reviewed:      Care Discussed with Consultants/Other Providers:

## 2020-08-16 NOTE — SPEECH LANGUAGE PATHOLOGY EVALUATION - COMMENTS
Hospital Course: s/p cystoscopy with left ureteral stent placement. ID consulted for Enterococcus Bacteremia. Suspect urinary source of enterococcus. Recommend IV access and being switched to IV Vancomycin pending susceptibilities. Course c/b code stroke on 8/15. Patient with fever T-102.9 and elevated /98 with difficulty word finding/ Aphasia. Neuro consulted, NIHSS: 6. Neuro impression: dysfluent speech, paraparesis both legs, not knowing month 2/2 potential L hemispheric dysfunction versus toxic metabolic encephalopathy in the setting of ongoing urosepsis 2/2 possible cardioembolic given known afib. now demonstrating imaged prox basilar stenosis, R V4 stenosis, 5mm ectasia R cav ICA, 3mm R Acomm fusiform aneurysm with CTA showing diffuse calcifications concern for underlying atherosclerosis. Pt refused MRI. S&S recommended.

## 2020-08-16 NOTE — PROGRESS NOTE ADULT - SUBJECTIVE AND OBJECTIVE BOX
Patient is a 70y old  Female who presents with a chief complaint of AMS x 1 day (15 Aug 2020 12:47)    Being followed by ID for        Interval history:  Events noted  pt with code stroke yesterday   pt also with fevers  Pt is currently eating breakfast  asking for milk for her coffee and denies complaints  No other acute events        PAST MEDICAL & SURGICAL HISTORY:  Lymphedema  Essential hypertension  Carpal tunnel syndrome of right wrist  Obesity (BMI 30-39.9)  Type 2 diabetes mellitus without complication  Hypothyroid  Essential hypertension  Obesity  HTN (hypertension)  DM (diabetes mellitus)  No significant past surgical history  S/P carpal tunnel release    Allergies    penicillin (Hives)  penicillin (Rash)  sulfa drugs (Unknown)  Tetracycline Hydrochloride (Unknown)    Intolerances      Antimicrobials:    vancomycin  IVPB 1250 milliGRAM(s) IV Intermittent every 12 hours    MEDICATIONS  (STANDING):  apixaban 5 milliGRAM(s) Oral every 12 hours  aspirin enteric coated 81 milliGRAM(s) Oral daily  atorvastatin 80 milliGRAM(s) Oral at bedtime  cloNIDine 0.2 milliGRAM(s) Oral three times a day  hydrALAZINE 50 milliGRAM(s) Oral three times a day  labetalol 300 milliGRAM(s) Oral three times a day  lactobacillus acidophilus 1 Tablet(s) Oral daily  levothyroxine 75 MICROGram(s) Oral daily  nystatin Powder 1 Application(s) Topical two times a day  polyethylene glycol 3350 17 Gram(s) Oral two times a day  senna 2 Tablet(s) Oral at bedtime  vancomycin  IVPB 1250 milliGRAM(s) IV Intermittent every 12 hours      Vital Signs Last 24 Hrs  T(C): 37 (08-16-20 @ 04:40), Max: 39.3 (08-15-20 @ 20:00)  T(F): 98.6 (08-16-20 @ 04:40), Max: 102.8 (08-15-20 @ 20:00)  HR: 69 (08-16-20 @ 04:40) (69 - 88)  BP: 169/84 (08-16-20 @ 04:40) (120/68 - 200/98)  BP(mean): --  RR: 18 (08-16-20 @ 04:40) (18 - 18)  SpO2: 95% (08-16-20 @ 04:40) (93% - 96%)    Physical Exam:    Constitutional  sitting in bed eating breakfast  speech fluent , poor historian    HEENT PERRLA EOMI,No pallor or icterus    No oral exudate or erythema    Neck supple no JVD or LN    Chest bibasilar crackles    CVS  S1 S2     Abd soft BS normal No tenderness     Ext No cyanosis clubbing or edema    IV site no erythema tenderness or discharge    Joints no swelling or LOM    pts speech is fluid and she is moving all extremities      Lab Data:                          9.8    6.58  )-----------( 219      ( 16 Aug 2020 06:33 )             31.2       08-16    138  |  100  |  22  ----------------------------<  103<H>  3.9   |  27  |  1.10    Ca    8.8      16 Aug 2020 06:33  Phos  2.8     08-15  Mg     1.9     08-15    TPro  6.1  /  Alb  3.2<L>  /  TBili  0.4  /  DBili  x   /  AST  9<L>  /  ALT  6<L>  /  AlkPhos  65  08-15          .Urine  08-14-20   >100,000 CFU/ml Gram positive organisms  --  --      .Urine Clean Catch (Midstream)  08-13-20   >100,000 CFU/ml Gram positive organisms  --  --      .Blood Blood-Peripheral  08-13-20   Growth in anaerobic bottle: Enterococcus faecalis  See previous culture [id number]  --  Blood Culture PCR    Culture - Urine (07.26.20 @ 22:21)    -  Nitrofurantoin: S <=32 Should not be used to treat pyelonephritis.    -  Tetra/Doxy: R >8    -  Vancomycin: S 2    -  Ciprofloxacin: R >2    -  Levofloxacin: R >4    -  Ampicillin: S <=2 Predicts results to ampicillin/sulbactam, amoxacillin-clavulanate and  piperacillin-tazobactam.    Specimen Source: .Urine Clean Catch (Midstream)    Culture Results:   >100,000 CFU/ml Enterococcus faecalis    Organism Identification: Enterococcus faecalis    Organism: Enterococcus faecalis    Method Type: KRISTOPHER    < from: CT Angio Neck w/ IV Cont (08.15.20 @ 01:29) >  EXAM:  CT ANGIO BRAIN (W)AW IC                          EXAM:  CT ANGIO NECK (W)AW IC                          EXAM:  CT BRAIN STROKE PROTOCOL                            PROCEDURE DATE:  08/15/2020            INTERPRETATION:  HISTORY: Stroke code; receptive aphasia, altered mental status.    COMPARISON: CT head 8/13/2020.    TECHNIQUE: Noncontrast CT head and CT angiogram of the neck and brain was performed after administration of intravenous contrast. MIP and 3D reconstructions were performed.RAPID AI was utilized for preliminary assessment of intracranial hemorrhage.    Total of 70 cc Omnipaque 350 intravenous contrast were administered without complication.    FINDINGS:    CT HEAD:  Streak artifact from dental hardware degrades evaluation of the posterior fossa.    There is no acute intracranial mass-effect, hemorrhage, midline shift, or abnormal extra-axial fluid collection.    Ventricles, sulci, and cisterns are normal in size for the patient's age without hydrocephalus. Basal cisterns are patent.    Senescent changes are in keeping with the patient's age. Chronic lacunar infarct in the left corona radiata extending into the external capsule.    Right maxillary sinus retention cyst versus polyp. Calvarium is intact.    CTA BRAIN    INTERNAL CAROTID ARTERIES:  The intracranial segments of the ICA are patent without hemodynamically significant stenosis or occlusion. Calcifications are noted in the bilateral carotid siphons. The ICA bifurcations are unremarkable. Fusiform ectasia of the cavernous segment of the right ICA, measures up to 5 mm in caliber.    ANTERIOR CEREBRAL ARTERIES: No flow-limiting stenosis or occlusion. 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    MIDDLE CEREBRAL ARTERIES: No proximal flow-limiting stenosis or occlusion. MCA bifurcations are unremarkable without aneurysm.    POSTERIOR CEREBRAL ARTERIES: No proximal flow-limiting stenosis or occlusion. Right-sided posterior communicating artery is present..    VERTEBROBASILAR SYSTEM: 7 mm in length severe stenosis/near occlusion in the proximal basilar artery. Basilar tip is unremarkable. Irregular stenoses of the V4 segment of the right vertebral artery with a long segment of occlusion/near occlusion in thethe distal V4 segment. The right vertebral artery reconstitutes near the insertion of the basilar artery.    CTA NECK    Study partially limited by motion streak artifact.    RIGHT CAROTID SYSTEM: Normal in course and caliber without flow-limiting stenosis or occlusion.    LEFT CAROTID SYSTEM: Normal in course and caliber without flow-limiting stenosis or occlusion.    VERTEBRAL SYSTEM:  Normal in course and caliber  without flow-limiting stenosis or occlusion. Origin of the vertebral arteries are unremarkable.    AORTIC ARCH: Origin of the great vessels are unremarkable.    IMPRESSION:    CT HEAD: No acute intracranial hemorrhage. These findings were discussed with Dr. Moran on 8/15/2020 at 1:30 AM by Dr. Mcginnis with read back confirmation.    CTA BRAIN: Patent  1. 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  2. V4 segment right vertebral artery demonstrates irregular stenoses with a long segment of occlusion/near occlusion in the distal aspect. Distal reconstitution occurs.  3. 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  4. 5 mm fusiform ectasia of the cavernous segment of the right ICA.    CTA NECK: Patent cervical vasculature. No flow limiting stenosis or occlusion.    These findings were discussed with Dr. Moran on 8/15/2020 at 1:59 AM by Dr. Mcginnis with read back confirmation.        ZAHEER MCGINNIS M.D., RADIOLOGY RESIDENT  This document has been electronically signed.  ROSENDO LIM M.D., ATTENDING RADIOLOGIST  This document has been electronically signed. Aug 15 2020  2:00AM        < end of copied text >              Vancomycin Level, Trough: 16.9 ug/mL (08-16-20 @ 05:29)      WBC Count: 6.58 (08-16-20 @ 06:33)  WBC Count: 13.08 (08-15-20 @ 06:19)  WBC Count: 13.31 (08-15-20 @ 01:26)  WBC Count: 11.94 (08-14-20 @ 11:10)  WBC Count: 10.52 (08-13-20 @ 15:10)    < from: Xray Chest 1 View- PORTABLE-Urgent (08.15.20 @ 02:49) >    EXAM:  XR CHEST PORTABLE URGENT 1V                            PROCEDURE DATE:  08/15/2020            INTERPRETATION:  HISTORY: Fever    TECHNIQUE: A single AP view of the chest was obtained.    COMPARISON: 7/20/2020    FINDINGS:  The cardiac silhouette is normal in size. There is a probable trace left pleural effusion. No focal consolidation is seen.. The hilar and mediastinal structures appear unremarkable. The osseous structures are intact.    IMPRESSION: Trace left pleural effusion. Otherwise, clear lungs.                  SAHWN REVELES M.D., ATTENDING RADIOLOGIST  This document has been electronically signed. Aug 15 2020  9:35AM        < end of copied text > Patient is a 70y old  Female who presents with a chief complaint of AMS x 1 day (15 Aug 2020 12:47)    Being followed by ID for        Interval history:  Events noted  pt with code stroke yesterday   pt also with fevers  Pt is currently eating breakfast  asking for milk for her coffee and denies complaints  does not remember yesterdays events  does not recall the fevers  No other acute events        PAST MEDICAL & SURGICAL HISTORY:  Lymphedema  Essential hypertension  Carpal tunnel syndrome of right wrist  Obesity (BMI 30-39.9)  Type 2 diabetes mellitus without complication  Hypothyroid  Essential hypertension  Obesity  HTN (hypertension)  DM (diabetes mellitus)  No significant past surgical history  S/P carpal tunnel release    Allergies    penicillin (Hives)  penicillin (Rash)  sulfa drugs (Unknown)  Tetracycline Hydrochloride (Unknown)    Intolerances      Antimicrobials:    vancomycin  IVPB 1250 milliGRAM(s) IV Intermittent every 12 hours    MEDICATIONS  (STANDING):  apixaban 5 milliGRAM(s) Oral every 12 hours  aspirin enteric coated 81 milliGRAM(s) Oral daily  atorvastatin 80 milliGRAM(s) Oral at bedtime  cloNIDine 0.2 milliGRAM(s) Oral three times a day  hydrALAZINE 50 milliGRAM(s) Oral three times a day  labetalol 300 milliGRAM(s) Oral three times a day  lactobacillus acidophilus 1 Tablet(s) Oral daily  levothyroxine 75 MICROGram(s) Oral daily  nystatin Powder 1 Application(s) Topical two times a day  polyethylene glycol 3350 17 Gram(s) Oral two times a day  senna 2 Tablet(s) Oral at bedtime  vancomycin  IVPB 1250 milliGRAM(s) IV Intermittent every 12 hours      Vital Signs Last 24 Hrs  T(C): 37 (08-16-20 @ 04:40), Max: 39.3 (08-15-20 @ 20:00)  T(F): 98.6 (08-16-20 @ 04:40), Max: 102.8 (08-15-20 @ 20:00)  HR: 69 (08-16-20 @ 04:40) (69 - 88)  BP: 169/84 (08-16-20 @ 04:40) (120/68 - 200/98)  BP(mean): --  RR: 18 (08-16-20 @ 04:40) (18 - 18)  SpO2: 95% (08-16-20 @ 04:40) (93% - 96%)    Physical Exam:    Constitutional  sitting in bed eating breakfast  speech fluent , poor historian    HEENT PERRLA EOMI,No pallor or icterus    No oral exudate or erythema    Neck supple no JVD or LN    Chest bibasilar crackles    CVS  S1 S2     Abd soft BS normal No tenderness     Ext No cyanosis clubbing or edema    IV site no erythema tenderness or discharge    Joints no swelling or LOM    pts speech is fluid and she is moving all extremities      Lab Data:                          9.8    6.58  )-----------( 219      ( 16 Aug 2020 06:33 )             31.2       08-16    138  |  100  |  22  ----------------------------<  103<H>  3.9   |  27  |  1.10    Ca    8.8      16 Aug 2020 06:33  Phos  2.8     08-15  Mg     1.9     08-15    TPro  6.1  /  Alb  3.2<L>  /  TBili  0.4  /  DBili  x   /  AST  9<L>  /  ALT  6<L>  /  AlkPhos  65  08-15          .Urine  08-14-20   >100,000 CFU/ml Gram positive organisms  --  --      .Urine Clean Catch (Midstream)  08-13-20   >100,000 CFU/ml Gram positive organisms  --  --      .Blood Blood-Peripheral  08-13-20   Growth in anaerobic bottle: Enterococcus faecalis  See previous culture [id number]  --  Blood Culture PCR    Culture - Urine (07.26.20 @ 22:21)    -  Nitrofurantoin: S <=32 Should not be used to treat pyelonephritis.    -  Tetra/Doxy: R >8    -  Vancomycin: S 2    -  Ciprofloxacin: R >2    -  Levofloxacin: R >4    -  Ampicillin: S <=2 Predicts results to ampicillin/sulbactam, amoxacillin-clavulanate and  piperacillin-tazobactam.    Specimen Source: .Urine Clean Catch (Midstream)    Culture Results:   >100,000 CFU/ml Enterococcus faecalis    Organism Identification: Enterococcus faecalis    Organism: Enterococcus faecalis    Method Type: KRISTOPHER    < from: CT Angio Neck w/ IV Cont (08.15.20 @ 01:29) >  EXAM:  CT ANGIO BRAIN (W)AW IC                          EXAM:  CT ANGIO NECK (W)AW IC                          EXAM:  CT BRAIN STROKE PROTOCOL                            PROCEDURE DATE:  08/15/2020            INTERPRETATION:  HISTORY: Stroke code; receptive aphasia, altered mental status.    COMPARISON: CT head 8/13/2020.    TECHNIQUE: Noncontrast CT head and CT angiogram of the neck and brain was performed after administration of intravenous contrast. MIP and 3D reconstructions were performed.RAPID AI was utilized for preliminary assessment of intracranial hemorrhage.    Total of 70 cc Omnipaque 350 intravenous contrast were administered without complication.    FINDINGS:    CT HEAD:  Streak artifact from dental hardware degrades evaluation of the posterior fossa.    There is no acute intracranial mass-effect, hemorrhage, midline shift, or abnormal extra-axial fluid collection.    Ventricles, sulci, and cisterns are normal in size for the patient's age without hydrocephalus. Basal cisterns are patent.    Senescent changes are in keeping with the patient's age. Chronic lacunar infarct in the left corona radiata extending into the external capsule.    Right maxillary sinus retention cyst versus polyp. Calvarium is intact.    CTA BRAIN    INTERNAL CAROTID ARTERIES:  The intracranial segments of the ICA are patent without hemodynamically significant stenosis or occlusion. Calcifications are noted in the bilateral carotid siphons. The ICA bifurcations are unremarkable. Fusiform ectasia of the cavernous segment of the right ICA, measures up to 5 mm in caliber.    ANTERIOR CEREBRAL ARTERIES: No flow-limiting stenosis or occlusion. 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    MIDDLE CEREBRAL ARTERIES: No proximal flow-limiting stenosis or occlusion. MCA bifurcations are unremarkable without aneurysm.    POSTERIOR CEREBRAL ARTERIES: No proximal flow-limiting stenosis or occlusion. Right-sided posterior communicating artery is present..    VERTEBROBASILAR SYSTEM: 7 mm in length severe stenosis/near occlusion in the proximal basilar artery. Basilar tip is unremarkable. Irregular stenoses of the V4 segment of the right vertebral artery with a long segment of occlusion/near occlusion in thethe distal V4 segment. The right vertebral artery reconstitutes near the insertion of the basilar artery.    CTA NECK    Study partially limited by motion streak artifact.    RIGHT CAROTID SYSTEM: Normal in course and caliber without flow-limiting stenosis or occlusion.    LEFT CAROTID SYSTEM: Normal in course and caliber without flow-limiting stenosis or occlusion.    VERTEBRAL SYSTEM:  Normal in course and caliber  without flow-limiting stenosis or occlusion. Origin of the vertebral arteries are unremarkable.    AORTIC ARCH: Origin of the great vessels are unremarkable.    IMPRESSION:    CT HEAD: No acute intracranial hemorrhage. These findings were discussed with Dr. Moran on 8/15/2020 at 1:30 AM by Dr. Mcginnis with read back confirmation.    CTA BRAIN: Patent  1. 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  2. V4 segment right vertebral artery demonstrates irregular stenoses with a long segment of occlusion/near occlusion in the distal aspect. Distal reconstitution occurs.  3. 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  4. 5 mm fusiform ectasia of the cavernous segment of the right ICA.    CTA NECK: Patent cervical vasculature. No flow limiting stenosis or occlusion.    These findings were discussed with Dr. Moran on 8/15/2020 at 1:59 AM by Dr. Mcginnis with read back confirmation.        ZAHEER MCGINNIS M.D., RADIOLOGY RESIDENT  This document has been electronically signed.  ROSENDO LIM M.D., ATTENDING RADIOLOGIST  This document has been electronically signed. Aug 15 2020  2:00AM        < end of copied text >              Vancomycin Level, Trough: 16.9 ug/mL (08-16-20 @ 05:29)      WBC Count: 6.58 (08-16-20 @ 06:33)  WBC Count: 13.08 (08-15-20 @ 06:19)  WBC Count: 13.31 (08-15-20 @ 01:26)  WBC Count: 11.94 (08-14-20 @ 11:10)  WBC Count: 10.52 (08-13-20 @ 15:10)    < from: Xray Chest 1 View- PORTABLE-Urgent (08.15.20 @ 02:49) >    EXAM:  XR CHEST PORTABLE URGENT 1V                            PROCEDURE DATE:  08/15/2020            INTERPRETATION:  HISTORY: Fever    TECHNIQUE: A single AP view of the chest was obtained.    COMPARISON: 7/20/2020    FINDINGS:  The cardiac silhouette is normal in size. There is a probable trace left pleural effusion. No focal consolidation is seen.. The hilar and mediastinal structures appear unremarkable. The osseous structures are intact.    IMPRESSION: Trace left pleural effusion. Otherwise, clear lungs.                  SHAWN REVELES M.D., ATTENDING RADIOLOGIST  This document has been electronically signed. Aug 15 2020  9:35AM        < end of copied text >

## 2020-08-16 NOTE — SPEECH LANGUAGE PATHOLOGY EVALUATION - SLP PERTINENT HISTORY OF CURRENT PROBLEM
71 y/o F with a significant PMHx of, HTN, Hypothyroidism, carpal tunnel syndrome, lymphedema admitted to my service discharged 08/03/20 to [facility] p/w Altered mental status. Patient had a complicated hospital course last admission with elevated cardiac enzymes from demand ischemia due to A.fib with rvr, refused cardiac cath, normal stress test with course complicated with Enterococcus UTI and colitis requiring antibiotics discharged to [facility] p/w AMS x 1 day. As per [facility] patient very agitated, altered refusing to take medications. In the ED patient had CT head with no acute findings, CT abdomen shows 5-6 mm ureteral stones with mild hydronephrosis. Admitted with acute metabolic encephalopathy due to high fevers in the setting of ureteral stone with hydronephrosis. Last Urine cultures positive for Enterococcus, started on Zyvox. Patient was seen by urology.

## 2020-08-16 NOTE — SPEECH LANGUAGE PATHOLOGY EVALUATION - SLP GENERAL OBSERVATIONS
Pt encountered awake and alert, sitintg upright in bed. Vocal quality and Speech intelligibly WNL for age/gender. Pt cooperative throughout the evaluation.

## 2020-08-16 NOTE — PROVIDER CONTACT NOTE (OTHER) - ACTION/TREATMENT ORDERED:
Alonso Sexton made aware, PA orders IV tylenol stat and give PO B/P meds stat, reassess VS in 1 hr, no further intervention @ this time, will cont to marta.

## 2020-08-16 NOTE — PROVIDER CONTACT NOTE (OTHER) - ASSESSMENT
pt presents very lethargic & forgetful @ times, B/P elevated 176/97 electronic, 200/95 manual, HR 88, temp 102.8 orally, O2 sat 93% on RA, pt c/o feeling cold/hot @ the same time, cold packs given for fever,  light bed linen applied, pt disoriented to time only, bed alarm on, reoriented as needed,

## 2020-08-16 NOTE — SPEECH LANGUAGE PATHOLOGY EVALUATION - SLP DIAGNOSIS
Pt seen for speech-language evaluation s/p code stroke and c/o word finding deficits. During swallow evaluation on 8/16 Pt endorsed difficulty word finding is due to short term memory deficits. Today, Pt presented with adequate grammatical/syntactical structure to express complex thoughts/ideas. Adequate speech/voice with good articulatory precision and overall good speech intelligibility in known and unknown contexts. Pt demonstrated deficits with short term recall. Recommend OT referral for higher level cognitive evaluation.

## 2020-08-16 NOTE — SPEECH LANGUAGE PATHOLOGY EVALUATION - SLP SHORT TERM MEMORY
impaired/Noted during paragraph retell and auditory comprehension of short paragraph length information (3 sentences)

## 2020-08-17 ENCOUNTER — TRANSCRIPTION ENCOUNTER (OUTPATIENT)
Age: 70
End: 2020-08-17

## 2020-08-17 LAB
ANION GAP SERPL CALC-SCNC: 11 MMOL/L — SIGNIFICANT CHANGE UP (ref 5–17)
APTT BLD: 52.5 SEC — HIGH (ref 27.5–35.5)
BUN SERPL-MCNC: 17 MG/DL — SIGNIFICANT CHANGE UP (ref 7–23)
CALCIUM SERPL-MCNC: 8.9 MG/DL — SIGNIFICANT CHANGE UP (ref 8.4–10.5)
CHLORIDE SERPL-SCNC: 99 MMOL/L — SIGNIFICANT CHANGE UP (ref 96–108)
CO2 SERPL-SCNC: 27 MMOL/L — SIGNIFICANT CHANGE UP (ref 22–31)
CREAT SERPL-MCNC: 0.94 MG/DL — SIGNIFICANT CHANGE UP (ref 0.5–1.3)
GLUCOSE SERPL-MCNC: 111 MG/DL — HIGH (ref 70–99)
HCT VFR BLD CALC: 29.9 % — LOW (ref 34.5–45)
HGB BLD-MCNC: 9.6 G/DL — LOW (ref 11.5–15.5)
INR BLD: 1.63 RATIO — HIGH (ref 0.88–1.16)
MCHC RBC-ENTMCNC: 29.8 PG — SIGNIFICANT CHANGE UP (ref 27–34)
MCHC RBC-ENTMCNC: 32.1 GM/DL — SIGNIFICANT CHANGE UP (ref 32–36)
MCV RBC AUTO: 92.9 FL — SIGNIFICANT CHANGE UP (ref 80–100)
NRBC # BLD: 0 /100 WBCS — SIGNIFICANT CHANGE UP (ref 0–0)
PLATELET # BLD AUTO: 243 K/UL — SIGNIFICANT CHANGE UP (ref 150–400)
POTASSIUM SERPL-MCNC: 3.5 MMOL/L — SIGNIFICANT CHANGE UP (ref 3.5–5.3)
POTASSIUM SERPL-SCNC: 3.5 MMOL/L — SIGNIFICANT CHANGE UP (ref 3.5–5.3)
PROTHROM AB SERPL-ACNC: 18.8 SEC — HIGH (ref 10.6–13.6)
RBC # BLD: 3.22 M/UL — LOW (ref 3.8–5.2)
RBC # FLD: 15.1 % — HIGH (ref 10.3–14.5)
SODIUM SERPL-SCNC: 137 MMOL/L — SIGNIFICANT CHANGE UP (ref 135–145)
WBC # BLD: 6.76 K/UL — SIGNIFICANT CHANGE UP (ref 3.8–10.5)
WBC # FLD AUTO: 6.76 K/UL — SIGNIFICANT CHANGE UP (ref 3.8–10.5)

## 2020-08-17 PROCEDURE — 99222 1ST HOSP IP/OBS MODERATE 55: CPT

## 2020-08-17 PROCEDURE — 99232 SBSQ HOSP IP/OBS MODERATE 35: CPT

## 2020-08-17 RX ADMIN — Medication 0.2 MILLIGRAM(S): at 04:46

## 2020-08-17 RX ADMIN — Medication 300 MILLIGRAM(S): at 15:00

## 2020-08-17 RX ADMIN — APIXABAN 5 MILLIGRAM(S): 2.5 TABLET, FILM COATED ORAL at 04:46

## 2020-08-17 RX ADMIN — Medication 81 MILLIGRAM(S): at 11:55

## 2020-08-17 RX ADMIN — Medication 166.67 MILLIGRAM(S): at 11:01

## 2020-08-17 RX ADMIN — NYSTATIN CREAM 1 APPLICATION(S): 100000 CREAM TOPICAL at 17:42

## 2020-08-17 RX ADMIN — Medication 50 MILLIGRAM(S): at 04:46

## 2020-08-17 RX ADMIN — Medication 50 MILLIGRAM(S): at 21:05

## 2020-08-17 RX ADMIN — Medication 300 MILLIGRAM(S): at 04:46

## 2020-08-17 RX ADMIN — SENNA PLUS 2 TABLET(S): 8.6 TABLET ORAL at 21:06

## 2020-08-17 RX ADMIN — Medication 75 MICROGRAM(S): at 04:46

## 2020-08-17 RX ADMIN — APIXABAN 5 MILLIGRAM(S): 2.5 TABLET, FILM COATED ORAL at 17:42

## 2020-08-17 RX ADMIN — Medication 1 TABLET(S): at 11:55

## 2020-08-17 RX ADMIN — POLYETHYLENE GLYCOL 3350 17 GRAM(S): 17 POWDER, FOR SOLUTION ORAL at 21:11

## 2020-08-17 RX ADMIN — Medication 50 MILLIGRAM(S): at 15:00

## 2020-08-17 RX ADMIN — NYSTATIN CREAM 1 APPLICATION(S): 100000 CREAM TOPICAL at 04:46

## 2020-08-17 RX ADMIN — Medication 0.2 MILLIGRAM(S): at 15:00

## 2020-08-17 RX ADMIN — Medication 300 MILLIGRAM(S): at 21:06

## 2020-08-17 RX ADMIN — Medication 166.67 MILLIGRAM(S): at 17:43

## 2020-08-17 RX ADMIN — Medication 0.2 MILLIGRAM(S): at 21:05

## 2020-08-17 NOTE — CONSULT NOTE ADULT - PROBLEM SELECTOR RECOMMENDATION 3
In SR now. Continue NOAC.
.  In the setting of recent UTI and now obstructing calculi  -Abx as per Medicine/ID

## 2020-08-17 NOTE — OCCUPATIONAL THERAPY INITIAL EVALUATION ADULT - LIVES WITH, PROFILE
Pt received from rehab, prior to admission pt lives with "a friend" in apartment with elevator access. Pt had [facility] 7 days a week/7 hours a day, assist with ADLs as needed

## 2020-08-17 NOTE — PROVIDER CONTACT NOTE (OTHER) - ACTION/TREATMENT ORDERED:
Jamila (med NP) notified & aware. Pt to receive PM BP meds & recheck VS in ah hour. Will cont to monitor.

## 2020-08-17 NOTE — OCCUPATIONAL THERAPY INITIAL EVALUATION ADULT - PERTINENT HX OF CURRENT PROBLEM, REHAB EVAL
69 yo F with a significant PMHx of, HTN, Hypothyroidism,  carpal tunnel syndrome, lymphedema admitted to my service discharged 08/03/20 to [facility] p/w Altered mental status. Pt had a complicated hospital course last admission with elevated cardiac enzymes from demand ischemia due to A.fib with rvr, refused cardiac cath had normal stress test, See below

## 2020-08-17 NOTE — PROVIDER CONTACT NOTE (OTHER) - BACKGROUND
8/13: ED. AMS, (+)UTI/bacteremia w/ obstructing uretal stone.   8/15: RRT for AMS & impaired speech. CT head, CXR & BCX (-) 8/13: ED. AMS, (+)UTI/bacteremia w/ obstructing uretal stone. OR --> cystoscopy with L stent placement   8/15: RRT for AMS & impaired speech. CT head, CXR & BCX (-)

## 2020-08-17 NOTE — CONSULT NOTE ADULT - PROBLEM SELECTOR RECOMMENDATION 9
seems to have resolved with IV abx and ureteral obstruction resolution.
.  In the setting of multiple hospitalizations and change in routine  -has Xanax 0.5mg q8h PRN ordered  -c/w supportive care

## 2020-08-17 NOTE — PROGRESS NOTE ADULT - SUBJECTIVE AND OBJECTIVE BOX
HPI:  71 y/o F with a significant PMHx of, HTN, Hypothyroidism,  carpal tunnel syndrome, lymphedema admitted to my service discharged 08/03/20 to [facility] p/w Altered mental status.     Patient had a complicated hospital course last admission with elevated cardiac enzymes from demand ischemia due to A.fib with rvr, refused cardiac cath had normal stress test, course complicated with Enterococcus UTI and colitis requiring antibiotics discharged to [facility] p/w AMS x 1 day. As per [facility] patient very agitated, altered refusing to take medications.     In the ed patient had CT head with no acute findings, CT abdomen shows 5-6 mm ureteral stones with mild hydronephrosis.   Patient was seen by urology,     Currently patient alert, awake, oriented x 3, denies abdominal pain/ nausea/ vomiting, noted to have fever 103 F, received Ciprofloxacin and Flagyl. (13 Aug 2020 20:23)      Patient is a 70y old  Female who presents with a chief complaint of AMS x 1 day (14 Aug 2020 17:06)      SUBJECTIVE / OVERNIGHT EVENTS: Patient feels ok, remains afebrile.   Currently patient AAO X3,     MEDICATIONS  (STANDING):  apixaban 5 milliGRAM(s) Oral every 12 hours  aspirin enteric coated 81 milliGRAM(s) Oral daily  atorvastatin 80 milliGRAM(s) Oral at bedtime  cloNIDine 0.2 milliGRAM(s) Oral three times a day  hydrALAZINE 50 milliGRAM(s) Oral three times a day  labetalol 300 milliGRAM(s) Oral three times a day  lactobacillus acidophilus 1 Tablet(s) Oral daily  levothyroxine 75 MICROGram(s) Oral daily  nystatin Powder 1 Application(s) Topical two times a day  polyethylene glycol 3350 17 Gram(s) Oral two times a day  senna 2 Tablet(s) Oral at bedtime  vancomycin  IVPB 1250 milliGRAM(s) IV Intermittent every 12 hours    MEDICATIONS  (PRN):  acetaminophen   Tablet .. 650 milliGRAM(s) Oral every 6 hours PRN Temp greater or equal to 38C (100.4F)  ALPRAZolam 0.5 milliGRAM(s) Oral every 8 hours PRN anxiety        I&O's Summary    13 Aug 2020 07:01  -  14 Aug 2020 07:00  --------------------------------------------------------  IN: 650 mL / OUT: 95 mL / NET: 555 mL    14 Aug 2020 07:01  -  14 Aug 2020 21:20  --------------------------------------------------------  IN: 600 mL / OUT: 1200 mL / NET: -600 mL    T(C): 36.8 (08-17-20 @ 20:30), Max: 37.7 (08-17-20 @ 14:41)  HR: 81 (08-17-20 @ 20:30) (68 - 81)  BP: 180/98 (08-17-20 @ 20:30) (122/62 - 181/92)  RR: 18 (08-17-20 @ 20:30) (18 - 18)  SpO2: 95% (08-17-20 @ 20:30) (95% - 99%)        PHYSICAL EXAM:  GENERAL: NAD, well-developed  HEAD:  Atraumatic, Normocephalic  EYES: EOMI, PERRLA, conjunctiva and sclera clear  NECK: Supple, No JVD  CHEST/LUNG: Clear to auscultation bilaterally; No wheeze  HEART: Regular rate and rhythm; No murmurs, rubs, or gallops  ABDOMEN: Soft, Nontender, rlq tenderness, Nondistended; Bowel sounds present  EXTREMITIES:  2+ Peripheral Pulses, No clubbing, cyanosis, or edema  PSYCH: AAOx3  NEUROLOGY: non-focal  SKIN: No rashes or lesions                          9.6    6.76  )-----------( 243      ( 17 Aug 2020 06:38 )             29.9             PT/INR - ( 17 Aug 2020 08:23 )   PT: 18.8 sec;   INR: 1.63 ratio         PTT - ( 17 Aug 2020 08:23 )  PTT:52.5 sec  137|99|17<111  3.5|27|0.94  8.9,--,--  08-17 @ 06:37      RADIOLOGY & ADDITIONAL TESTS:    Imaging Personally Reviewed:    Consultant(s) Notes Reviewed:      Care Discussed with Consultants/Other Providers:

## 2020-08-17 NOTE — CONSULT NOTE ADULT - PROBLEM SELECTOR RECOMMENDATION 2
Likely source of E. Faecalis is  or GI (colon wall thickening). No new cardiac murmur heard and the bacteremia seems to be clearing quickly after ureteral stenting and IV abx. Doubt endocarditis. TTE will be fairly insensitive given her body habitus and prior echo descriptions. Favor medical therapy.
.  Functional decline after fall several weeks ago  -PT eval, return to [facility] when appropriate

## 2020-08-17 NOTE — DISCHARGE NOTE NURSING/CASE MANAGEMENT/SOCIAL WORK - NSDCPEPTSTRK_GEN_ALL_CORE
Stroke education booklet/Need for follow up after discharge/Stroke support groups for patients, families, and friends/Stroke warning signs and symptoms/Signs and symptoms of stroke/Risk factors for stroke/Call 911 for stroke/Prescribed medications

## 2020-08-17 NOTE — CONSULT NOTE ADULT - PROBLEM SELECTOR RECOMMENDATION 5
Continue asa/statin.
.  Complex medical decision making in the setting of recurrent admissions. Emotional support provided, questions answered. Patient has no end-stage/terminal diagnosis. Readmitted from [facility] [place] after likely unavoidable event. Patient has adequate support at home with [facility].    Active Psychosocial Referrals: HAVEN CONTRERAS    Palliative Care will SIGN OFF: goals are established and symptoms are managed.  For new or uncontrolled symptoms, please page the Palliative Medicine team ([facility] #[id number] / [facility] #[id number]). The service is available 24/7 (including nights & weekends) to provide symptom management recommendations over the phone as appropriate

## 2020-08-17 NOTE — DISCHARGE NOTE NURSING/CASE MANAGEMENT/SOCIAL WORK - PATIENT PORTAL LINK FT
You can access the FollowMyHealth Patient Portal offered by [facility] by registering at the following website: [email or web address]. By joining [facility]s FollowMyHealth portal, you will also be able to view your health information using other applications (apps) compatible with our system.

## 2020-08-17 NOTE — OCCUPATIONAL THERAPY INITIAL EVALUATION ADULT - ADDITIONAL COMMENTS
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 V4 segment right vertebral artery demonstrates irregular stenoses with a long segment of occlusion/near occlusion in the distal aspect. Distal reconstitution occurs. 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 5 mm fusiform ectasia of the cavernous segment of the right ICA.  CTA NECK: (-)

## 2020-08-17 NOTE — DISCHARGE NOTE NURSING/CASE MANAGEMENT/SOCIAL WORK - NSDCVIVACCINE_GEN_ALL_CORE_FT
Mom called regarding patient who was sent home from  with a rash to face, abdomen, and legs today.  Patient finished antibiotic on Saturday. Per Carine, rash is likely post viral and mom can give 1 tsp of Benadryl. Mom understands instructions.   
No Vaccines Administered.

## 2020-08-17 NOTE — CONSULT NOTE ADULT - PROBLEM SELECTOR RECOMMENDATION 4
BP well controlled on current regimen.
.  In addition to the EM visit, an advance care planning meeting was performed  Start time: 11:40AM  End time: 11:56AM  Total time: 16min  A face to face meeting to discuss advance care planning was held today regarding: TIANA BROWN  Primary decision maker: Patient is able to participate in decision making  Alternate/surrogate:  Discussed advance directives including, but not limited to, healthcare proxy and code status.  Decision regarding code status: Full Code  Documentation completed today: HCP    See GOC section

## 2020-08-17 NOTE — PROVIDER CONTACT NOTE (OTHER) - ASSESSMENT
pt AOx4, hypertensive otherwise VSS, and resting in bed. PM VS shows 180/98. Pt has been trending hypertensive during PM VS for past few days which resolves after receiving her PM BP meds.

## 2020-08-17 NOTE — PROGRESS NOTE ADULT - SUBJECTIVE AND OBJECTIVE BOX
Follow Up:  enterococcus bacteremia    Interval History: afebrile overnight. denies acute complaints. no more reported diarrhea     REVIEW OF SYSTEMS  [  ] ROS unobtainable because:    [ x ] All other systems negative except as noted below    Constitutional:  [ ] fever [ ] chills  [ ] weight loss  [ ] weakness  Skin:  [ ] rash [ ] phlebitis	  Eyes: [ ] icterus [ ] pain  [ ] discharge	  ENMT: [ ] sore throat  [ ] thrush [ ] ulcers [ ] exudates  Respiratory: [ ] dyspnea [ ] hemoptysis [ ] cough [ ] sputum	  Cardiovascular:  [ ] chest pain [ ] palpitations [ ] edema	  Gastrointestinal:  [ ] nausea [ ] vomiting [ ] diarrhea [ ] constipation [ ] pain	  Genitourinary:  [ ] dysuria [ ] frequency [ ] hematuria [ ] discharge [ ] flank pain  [ ] incontinence  Musculoskeletal:  [ ] myalgias [ ] arthralgias [ ] arthritis  [ ] back pain  Neurological:  [ ] headache [ ] seizures  [ ] confusion/altered mental status    Allergies  penicillin (Hives)  penicillin (Rash)  sulfa drugs (Unknown)  Tetracycline Hydrochloride (Unknown)        ANTIMICROBIALS:  vancomycin  IVPB 1250 every 12 hours      OTHER MEDS:  MEDICATIONS  (STANDING):  acetaminophen   Tablet .. 650 every 6 hours PRN  ALPRAZolam 0.5 every 8 hours PRN  apixaban 5 every 12 hours  aspirin enteric coated 81 daily  atorvastatin 80 at bedtime  cloNIDine 0.2 three times a day  hydrALAZINE 50 three times a day  labetalol 300 three times a day  levothyroxine 75 daily  polyethylene glycol 3350 17 two times a day  senna 2 at bedtime      Vital Signs Last 24 Hrs  T(C): 37.7 (17 Aug 2020 14:41), Max: 37.7 (17 Aug 2020 14:41)  T(F): 99.9 (17 Aug 2020 14:41), Max: 99.9 (17 Aug 2020 14:41)  HR: 69 (17 Aug 2020 14:41) (67 - 80)  BP: 181/92 (17 Aug 2020 14:41) (135/79 - 189/107)  BP(mean): --  RR: 18 (17 Aug 2020 14:41) (18 - 18)  SpO2: 99% (17 Aug 2020 14:41) (93% - 99%)    PHYSICAL EXAMINATION:  General: Alert and Awake, NAD  HEENT: PERRL, EOMI  Neck: Supple  Cardiac: RRR, No M/R/G  Resp: CTAB, No Wh/Rh/Ra  Abdomen: NBS, NT/ND, No HSM, No rigidity or guarding  MSK: No LE edema. No Calf tenderness  : No aguilar  Skin: No rashes or lesions. Skin is warm and dry to the touch.   Neuro: Alert and Awake. CN 2-12 Grossly intact. Moves all four extremities spontaneously.  Psych: Calm, Pleasant, Cooperative                          9.6    6.76  )-----------( 243      ( 17 Aug 2020 06:38 )             29.9       08-17    137  |  99  |  17  ----------------------------<  111<H>  3.5   |  27  |  0.94    Ca    8.9      17 Aug 2020 06:37            MICROBIOLOGY:  v  .Urine  08-14-20   >100,000 CFU/ml Enterococcus faecalis  --  Enterococcus faecalis      .Urine Clean Catch (Midstream)  08-13-20   >100,000 CFU/ml Enterococcus faecalis  --  Enterococcus faecalis      .Blood Blood-Peripheral  08-13-20   Growth in anaerobic bottle: Enterococcus faecalis  See previous culture [id number]  --  Blood Culture PCR  Enterococcus faecalis      .Urine Clean Catch (Midstream)  07-26-20   >100,000 CFU/ml Enterococcus faecalis  --  Enterococcus faecalis    RADIOLOGY:    <The imaging below has been reviewed and visualized by me independently. Findings as detailed in report below>    EXAM:  XR CHEST PORTABLE URGENT 1V                        PROCEDURE DATE:  08/15/2020    Trace left pleural effusion. Otherwise, clear lungs.

## 2020-08-17 NOTE — CONSULT NOTE ADULT - ASSESSMENT
69 y/o F with a  history of CAD, PAF, HTN, Hypothyroidism, lymphedema admitted to from [facility] with Altered mental status and enterococcus bacteremia with likely  source. Now s/p ureteral stent placement and transient delirium

## 2020-08-17 NOTE — CONSULT NOTE ADULT - SUBJECTIVE AND OBJECTIVE BOX
Patient seen and evaluated @   Chief Complaint:     HPI:  69 y/o F with a significant PMHx of, HTN, Hypothyroidism,  carpal tunnel syndrome, lymphedema admitted to my service discharged 08/03/20 to [facility] p/w Altered mental status.     Patient had a complicated hospital course last admission with elevated cardiac enzymes from demand ischemia due to A.fib with rvr, refused cardiac cath had normal stress test, course complicated with Enterococcus UTI and colitis requiring antibiotics discharged to [facility] p/w AMS x 1 day. As per [facility] patient very agitated, altered refusing to take medications.   In the ed patient had CT head with no acute findings, CT abdomen shows 5-6 mm ureteral stones with mild hydronephrosis.   Patient was seen by urology,   Currently patient alert, awake, oriented x 3, denies abdominal pain/ nausea/ vomiting, noted to have fever 103 F, received Ciprofloxacin and Flagyl. (13 Aug 2020 20:23)    She has undergone Ureteral stent placement and IV abx for E. faecalis in blood and urine.  Her mental status has returned to her baseline.  No chest pain or orthopnea throughout the hospital stay.     PMH:   Lymphedema  Essential hypertension  Carpal tunnel syndrome of right wrist  Obesity (BMI 30-39.9)  Type 2 diabetes mellitus without complication  Hypothyroid  Essential hypertension  Obesity  HTN (hypertension)  DM (diabetes mellitus)    PSH:   Ureteral stents.  S/P carpal tunnel release    Medications:   acetaminophen   Tablet .. 650 milliGRAM(s) Oral every 6 hours PRN  ALPRAZolam 0.5 milliGRAM(s) Oral every 8 hours PRN  apixaban 5 milliGRAM(s) Oral every 12 hours  aspirin enteric coated 81 milliGRAM(s) Oral daily  atorvastatin 80 milliGRAM(s) Oral at bedtime  cloNIDine 0.2 milliGRAM(s) Oral three times a day  hydrALAZINE 50 milliGRAM(s) Oral three times a day  labetalol 300 milliGRAM(s) Oral three times a day  lactobacillus acidophilus 1 Tablet(s) Oral daily  levothyroxine 75 MICROGram(s) Oral daily  nystatin Powder 1 Application(s) Topical two times a day  polyethylene glycol 3350 17 Gram(s) Oral two times a day  senna 2 Tablet(s) Oral at bedtime  vancomycin  IVPB 1250 milliGRAM(s) IV Intermittent every 12 hours    Allergies:  penicillin (Hives)  penicillin (Rash)  sulfa drugs (Unknown)  Tetracycline Hydrochloride (Unknown)    FAMILY HISTORY:  Family history of myocardial infarction (Sibling)  Family history of lung cancer    Social History: Lives at assisted living.  Smoking: No     Review of Systems:  chronic leg edema,   The remainder of the ROS is negative.    Physical Exam:  T(C): 36.9 (08-17-20 @ 17:22), Max: 37.7 (08-17-20 @ 14:41)  HR: 68 (08-17-20 @ 17:22) (67 - 80)  BP: 122/62 (08-17-20 @ 17:22) (122/62 - 189/107)  RR: 18 (08-17-20 @ 17:22) (18 - 18)  SpO2: 98% (08-17-20 @ 17:22) (93% - 99%)  Wt(kg): --    08-16 @ 07:01  -  08-17 @ 07:00  --------------------------------------------------------  IN: 950 mL / OUT: 1500 mL / NET: -550 mL    08-17 @ 07:01  -  08-17 @ 20:16  --------------------------------------------------------  IN: 800 mL / OUT: 500 mL / NET: 300 mL    Appearance: Morbidly obese in NAD  Eyes:  no scleral icterus   HENT: moist oral mucosa  Cardiovascular: Regular rhythm, Normal S1 and S2, 1/6 JEM at RUSB; diffuse non-pitting peripheral edema; no JVD  Respiratory: Clear to auscultation anteriorly  Gastrointestinal: Soft, +BS  Neurologic: No focal weakness  Lymphatic: + lymphedema  Psychiatry: A&Ox3    Skin: No rashes, occasional ecchymoses on arms.    Cardiovascular Diagnostic Testing:  Echo:< from: Transthoracic Echocardiogram (07.21.20 @ 07:27) >  CONCLUSIONS:  1. Mitral annular calcification, and calcified mitral  leaflets with normal diastolic opening. Trace mitral  regurgitation.  2. Mildly dilated left atrium.  LA volume index = 39 cc/m2.  3. Severe concentric left ventricular hypertrophy.  4. Endocardium not well visualized; grossly hyperdynamic  left ventricular systolic function.   Mild diastolic  dysfunction (stage I).  There is a mild intracavitary  gradient of 19 mmHg.  5. Normal right ventricular size and function.  6. Trivial pericardial effusion.    ------------------------------------------------------------------------  Confirmed on  7/21/2020 - 17:53:59 by Jeet Coppola MD  ------------------------------------------------------------------------    < end of copied text >       Cath:< from: Cardiac Cath Lab - Adult (03.12.18 @ 16:47) >  CORONARY VESSELS: The coronary circulation is right dominant.  LM:   --  LM: Normal.  LAD:   --  Mid LAD: There was a 50 % stenosis.  --  D1: There was a 30 % stenosis.  CX:   --  Circumflex: Normal.  RCA:   --  Proximal RCA: There was a 30 % stenosis.    < end of copied text >         Imaging:    Labs:                        9.6    6.76  )-----------( 243      ( 17 Aug 2020 06:38 )             29.9     08-17    137  |  99  |  17  ----------------------------<  111<H>  3.5   |  27  |  0.94    Ca    8.9      17 Aug 2020 06:37      PT/INR - ( 17 Aug 2020 08:23 )   PT: 18.8 sec;   INR: 1.63 ratio         PTT - ( 17 Aug 2020 08:23 )  PTT:52.5 sec            Thyroid Stimulating Hormone, Serum: 0.58 uIU/mL (08-13 @ 23:19)

## 2020-08-17 NOTE — CONSULT NOTE ADULT - PROBLEM SELECTOR PROBLEM 5
Coronary artery disease involving native coronary artery of native heart without angina pectoris
Encounter for palliative care

## 2020-08-17 NOTE — OCCUPATIONAL THERAPY INITIAL EVALUATION ADULT - PRECAUTIONS/LIMITATIONS, REHAB EVAL
course complicated with Enterococcus UTI and colitis requiring antibiotics discharged to [facility] p/w AMS x 1 day. As per [facility] patient very agitated, altered refusing to take medications. In the ed patient had CT head with no acute findings, CT abdomen shows 5-6 mm ureteral stones with mild hydronephrosis. fall precautions/course complicated with Enterococcus UTI and colitis requiring antibiotics discharged to [facility] p/w AMS x 1 day. As per [facility] patient very agitated, altered refusing to take medications. In the ed patient had CT head with no acute findings, CT abdomen shows 5-6 mm ureteral stones with mild hydronephrosis.

## 2020-08-17 NOTE — PROVIDER CONTACT NOTE (OTHER) - ASSESSMENT
Pt resting in bed. /100 manual, all other VSS. Pt asymptomatic, denies sob and chest pain at this time. Pt due for AM antihypertensive meds in 1 hr.

## 2020-08-18 DIAGNOSIS — F43.20 ADJUSTMENT DISORDER, UNSPECIFIED: ICD-10-CM

## 2020-08-18 LAB — VANCOMYCIN TROUGH SERPL-MCNC: 22.7 UG/ML — HIGH (ref 10–20)

## 2020-08-18 PROCEDURE — 99222 1ST HOSP IP/OBS MODERATE 55: CPT

## 2020-08-18 PROCEDURE — 99232 SBSQ HOSP IP/OBS MODERATE 35: CPT

## 2020-08-18 RX ORDER — LABETALOL HCL 100 MG
400 TABLET ORAL THREE TIMES A DAY
Refills: 0 | Status: DISCONTINUED | OUTPATIENT
Start: 2020-08-18 | End: 2020-08-18

## 2020-08-18 RX ORDER — LABETALOL HCL 100 MG
5 TABLET ORAL ONCE
Refills: 0 | Status: DISCONTINUED | OUTPATIENT
Start: 2020-08-18 | End: 2020-08-18

## 2020-08-18 RX ORDER — LABETALOL HCL 100 MG
400 TABLET ORAL THREE TIMES A DAY
Refills: 0 | Status: DISCONTINUED | OUTPATIENT
Start: 2020-08-18 | End: 2020-08-20

## 2020-08-18 RX ORDER — LABETALOL HCL 100 MG
400 TABLET ORAL ONCE
Refills: 0 | Status: COMPLETED | OUTPATIENT
Start: 2020-08-18 | End: 2020-08-18

## 2020-08-18 RX ORDER — HYDRALAZINE HCL 50 MG
25 TABLET ORAL ONCE
Refills: 0 | Status: COMPLETED | OUTPATIENT
Start: 2020-08-18 | End: 2020-08-18

## 2020-08-18 RX ADMIN — APIXABAN 5 MILLIGRAM(S): 2.5 TABLET, FILM COATED ORAL at 05:41

## 2020-08-18 RX ADMIN — Medication 166.67 MILLIGRAM(S): at 05:41

## 2020-08-18 RX ADMIN — Medication 50 MILLIGRAM(S): at 13:16

## 2020-08-18 RX ADMIN — APIXABAN 5 MILLIGRAM(S): 2.5 TABLET, FILM COATED ORAL at 18:40

## 2020-08-18 RX ADMIN — NYSTATIN CREAM 1 APPLICATION(S): 100000 CREAM TOPICAL at 05:41

## 2020-08-18 RX ADMIN — NYSTATIN CREAM 1 APPLICATION(S): 100000 CREAM TOPICAL at 18:01

## 2020-08-18 RX ADMIN — Medication 10 MILLIGRAM(S): at 21:46

## 2020-08-18 RX ADMIN — Medication 400 MILLIGRAM(S): at 21:46

## 2020-08-18 RX ADMIN — Medication 0.2 MILLIGRAM(S): at 03:38

## 2020-08-18 RX ADMIN — Medication 25 MILLIGRAM(S): at 01:45

## 2020-08-18 RX ADMIN — SENNA PLUS 2 TABLET(S): 8.6 TABLET ORAL at 21:42

## 2020-08-18 RX ADMIN — Medication 0.2 MILLIGRAM(S): at 21:42

## 2020-08-18 RX ADMIN — Medication 50 MILLIGRAM(S): at 05:41

## 2020-08-18 RX ADMIN — Medication 400 MILLIGRAM(S): at 13:16

## 2020-08-18 RX ADMIN — Medication 0.2 MILLIGRAM(S): at 13:16

## 2020-08-18 RX ADMIN — Medication 400 MILLIGRAM(S): at 09:15

## 2020-08-18 RX ADMIN — Medication 50 MILLIGRAM(S): at 21:42

## 2020-08-18 RX ADMIN — Medication 75 MICROGRAM(S): at 05:41

## 2020-08-18 RX ADMIN — Medication 81 MILLIGRAM(S): at 12:23

## 2020-08-18 NOTE — BEHAVIORAL HEALTH ASSESSMENT NOTE - HPI (INCLUDE ILLNESS QUALITY, SEVERITY, DURATION, TIMING, CONTEXT, MODIFYING FACTORS, ASSOCIATED SIGNS AND SYMPTOMS)
69 yo F, domiciles with a roommate, no pphx or any psych admissions, with significant PMHx of, HTN, Hypothyroidism, carpal tunnel syndrome, lymphedema admitted for altered mental status and UTI. Psychiatry consult called for SI and agitation.  Patient presents A&Ox4, agitated and hostile, states she does not need a psychiatry consult, she is just angry and upset with how she has been treated at [facility] thus far. Patient was laughing on the phone saying " can you believe psychiatry is here to talk to me out of all people". Patient denies ever saying "just kill me" as perviously stated by staff.  She is currently angry and upset with the staff, her current roommate and the noise and admits wanting to be transferred to a new room. She states no one comes by to help her needs so she reports wanting to file a complaint. She denies any feelings of anxiety, depression, clarissa, hallucinations or paranoia. She denies any suicidal or homicidal ideations. 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 Psychiatry consult called for SI and agitation.  Patient presents A&Ox4, agitated and hostile, states she does not need a psychiatry consult, she is just angry and upset with how she has been treated at [facility] thus far. Patient was laughing on the phone saying " can you believe psychiatry is here to talk to me out of all people". Patient denies ever saying "just kill me" as perviously stated by staff.  She is currently angry and upset with the staff, her current roommate and the noise and admits wanting to be transferred to a new room. She states no one comes by to help her needs so she reports wanting to file a complaint. She denies any feelings of anxiety, depression, clarissa, hallucinations or paranoia. She denies any suicidal or homicidal ideations. no access to guns. no drug hx.

## 2020-08-18 NOTE — PROVIDER CONTACT NOTE (OTHER) - ACTION/TREATMENT ORDERED:
Vielka AGUILAR notified & aware. Hold vanco dose for tonight and give AM dose as scheduled. PA agreed to come assess pt at the bedside. Will cont to monitor. Vielka AGUILAR notified & aware, agreed to come assess pt @ bedside. Hold vanco dose for tonight and give AM dose as scheduled. Pt to receive PM BP meds & recheck VS in 1hr. Will cont to monitor.

## 2020-08-18 NOTE — BEHAVIORAL HEALTH ASSESSMENT NOTE - SUMMARY
69 yo F, domiciles with a roommate, no pphx or any psych admissions, with significant PMHx of, HTN, Hypothyroidism, carpal tunnel syndrome, lymphedema admitted for altered mental status and UTI. Psychiatry consult called for SI and agitation.  Patient presents A&Ox4, agitated and hostile. Patient denies ever saying "just kill me" as perviously stated by staff.  She is currently angry and upset with the staff, her current roommate and the noise and admits wanting to be transferred to a new room. She denies any feelings of anxiety, depression, clarissa, hallucinations or paranoia. She denies any suicidal or homicidal ideations. D/c 1:1 observation.

## 2020-08-18 NOTE — BEHAVIORAL HEALTH ASSESSMENT NOTE - NSBHCHARTREVIEWLAB_PSY_A_CORE FT
CBC Full  -  ( 17 Aug 2020 06:38 )  WBC Count : 6.76 K/uL  RBC Count : 3.22 M/uL  Hemoglobin : 9.6 g/dL  Hematocrit : 29.9 %  Platelet Count - Automated : 243 K/uL  Mean Cell Volume : 92.9 fl  Mean Cell Hemoglobin : 29.8 pg  Mean Cell Hemoglobin Concentration : 32.1 gm/dL

## 2020-08-18 NOTE — PROVIDER CONTACT NOTE (OTHER) - BACKGROUND
8/13: ED. AMS, (+)UTI/bacteremia w/ obstructing uretal stone. OR --> cystoscopy with L stent placement   8/15: RRT for AMS & impaired speech. CT head, CXR & BCX (-)

## 2020-08-18 NOTE — BEHAVIORAL HEALTH ASSESSMENT NOTE - RISK ASSESSMENT
Low Acute Suicide Risk no acute risk, denies SI no acute risk, denies SI/hi, no hx suicide attempts, future oriented

## 2020-08-18 NOTE — PROVIDER CONTACT NOTE (OTHER) - ASSESSMENT
pt AOx4, hypertensive otherwise VSS, and resting in bed. As per neuro pt's SBP goal is 160-180, manual BP @ 2000 is 180/100, pt pending PM BP meds. During assessment RN found R posterior thigh redness & swelling. When asked, pt states "it's just lymphedema" and does not recall when she first noticed the swelling. Pt also due for PM vanco, pt AOx4, hypertensive otherwise VSS, and resting in bed. As per neurology pt's SBP goal is 160-180, manual BP @ 2000 is 180/100, pt pending PM BP meds. During assessment RN found R posterior thigh redness & swelling. When asked, pt states "it's just lymphedema" and does not recall when she first noticed the swelling - RLE elevated. Pt also due for 6PM vanco, PM trough shows 22.7.

## 2020-08-18 NOTE — PROVIDER CONTACT NOTE (OTHER) - ASSESSMENT
pt AOx4, hypertensive otherwise VSS, and resting in bed. @ 2030 /98, mult PM meds given, recheck BP @ 2230 shows 148/90. Pt hypertensive again @ 0030 170/100 (manual). Pt is irritable & yelling at RN about checking BP.

## 2020-08-18 NOTE — PROVIDER CONTACT NOTE (OTHER) - ASSESSMENT
Pt hypertensive again @ 0030 170/100 (manual) and was given hydralazine 25mg PO. s/p 1 hour recheck BP shows 190/108 Pt hypertensive again @ 0030 170/100 (manual) and was given hydralazine 25mg PO. s/p 1 hour recheck BP shows 190/108 (manual). Pt c/o feeling hot, oral temp was 98.5 & ice packs was given, room temp is set to cool 68F. Pt also c/o 7/10 headache however refusing Tylenol & yelling at staff "Tylenol is going to make my stomach bleed."

## 2020-08-18 NOTE — CHART NOTE - NSCHARTNOTEFT_GEN_A_CORE
Reviewed nursing note regarding pt stating "just kill me." Pt did not have any suicidal ideations but expressed her frustration". 1:1 called, Psych consulted, will notify Dr. Hernandez.    Magdaleno Hearn, LILLY  [id number]

## 2020-08-18 NOTE — CHART NOTE - NSCHARTNOTEFT_GEN_A_CORE
Notified by RN that patient with a electronic BP of 195/101 mmHg with repeat manual /100 mm/Hg. Patient with a BP reading of 180/98 mm/Hg on 8/17/2020 at 20:30 in which she was given at that time her scheduled medications, 0.2 mg PO of Clonidine, 50 mg PO of Hydralazine, and 300 mg PO of Labetalol. Blood pressure was then rechecked Notified by RN that patient with a electronic BP of 195/101 mmHg on 8/18/2020 at 0:05 with repeat manual /100 mm/Hg on 8/18/2020 at 0:39. Patient with a BP reading of 180/98 mm/Hg on 8/17/2020 at 20:30 in which she was given at that time her scheduled medications, 0.2 mg PO of Clonidine, 50 mg PO of Hydralazine, and 300 mg PO of Labetalol. Blood pressure was then rechecked at 22:31 on 8/17/2020 with a reading of 148/90 mm/Hg. Patient seen and evaluated at bedside. Patient also c/o frontal headache, that she frequently gets. Patient denies any acute changes in vision, weakness, chest pain, palpitations, or SOB.    Vital Signs Last 24 Hrs  T(C): 37.1 (18 Aug 2020 00:05), Max: 37.7 (17 Aug 2020 14:41)  T(F): 98.8 (18 Aug 2020 00:05), Max: 99.9 (17 Aug 2020 14:41)  HR: 74 (18 Aug 2020 00:39) (67 - 81)  BP: 170/100 (18 Aug 2020 00:39) (122/62 - 195/101)  RR: 18 (18 Aug 2020 00:05) (18 - 18)  SpO2: 96% (18 Aug 2020 00:05) (94% - 99%)    < from: CT Head No Cont (08.13.20 @ 18:05) >    IMPRESSION:  No CT evidence of acute intracranial hemorrhage, extra-axial collection or mass effect.    < end of copied text >      Physical Exam:         ASSESSMENT/PLAN:     Impression: Hypertensive Urgency, with headache likely 2ry to Elevated BP  > Hydralazine 25 mg PO x1 dose given now.  > Repeat BP in 1-2 hours for monitoring.   > Offered Tylenol for headache, which at this time patient refusing.   > If headache worsens/persists or focal deficits after treatement of elevated BP consider further imaging.  > F/u with primary care team and cardiology in AM regarding adjusting patient's current BP medication regimen.    RN made aware of above management plan.    Jamila Lopes PA-C  Department of Medicine   #[id number] Notified by RN that patient with a electronic BP of 195/101 mmHg on 8/18/2020 at 0:05 with repeat manual /100 mm/Hg on 8/18/2020 at 0:39. Patient with a BP reading of 180/98 mm/Hg on 8/17/2020 at 20:30 in which she was given at that time her scheduled medications, 0.2 mg PO of Clonidine, 50 mg PO of Hydralazine, and 300 mg PO of Labetalol. Blood pressure was then rechecked at 22:31 on 8/17/2020 with a reading of 148/90 mm/Hg. Patient seen and evaluated at bedside. Patient also c/o frontal headache, that she frequently gets. Patient denies any acute changes in vision, weakness, chest pain, palpitations, or SOB.    Vital Signs Last 24 Hrs  T(C): 37.1 (18 Aug 2020 00:05), Max: 37.7 (17 Aug 2020 14:41)  T(F): 98.8 (18 Aug 2020 00:05), Max: 99.9 (17 Aug 2020 14:41)  HR: 74 (18 Aug 2020 00:39) (67 - 81)  BP: 170/100 (18 Aug 2020 00:39) (122/62 - 195/101)  RR: 18 (18 Aug 2020 00:05) (18 - 18)  SpO2: 96% (18 Aug 2020 00:05) (94% - 99%)    Physical Exam:   Appearance: Morbidly obese, alert.  Cardiovascular: Regular rate, Normal S1 and S2.  Respiratory: Clear to auscultation anteriorly  Gastrointestinal: Soft, +BS  Neurologic: No focal weakness or deficits  Lymphatic: + lymphedema    ASSESSMENT/PLAN:   69 y/o F with a  history of CAD, PAF, HTN, Hypothyroidism, lymphedema admitted to from [facility] with Altered mental status and enterococcus bacteremia with likely  source. Now s/p ureteral stent placement and transient delirium. Now presents with ongoing hypertensive episode, with headache likely 2ry to elevated blood pressure.    Impression: Hypertensive Urgency, with headache likely 2ry to Elevated Blood Pressure   > Hydralazine 25 mg PO x1 dose given now.  > Repeat BP in 1-2 hours for monitoring.   > Offered Tylenol for headache, which at this time patient refusing.   > If headache worsens/persists or focal deficits after treatement of elevated BP consider further imaging, such as CT head.  > Patient is pending MR head due to CTA findings on 8/15/2020, which team is aware of.   > F/u with primary care team and cardiology in AM regarding adjusting patient's current BP medication regimen.    RN made aware of above management plan.    Jamila Lopes PA-C  Department of Medicine   #[id number] Notified by RN that patient with a electronic BP of 195/101 mmHg on 8/18/2020 at 0:05 with repeat manual /100 mm/Hg on 8/18/2020 at 0:39. Patient with a BP reading of 180/98 mm/Hg on 8/17/2020 at 20:30 in which she was given at that time her scheduled medications, 0.2 mg PO of Clonidine, 50 mg PO of Hydralazine, and 300 mg PO of Labetalol. Blood pressure was then rechecked at 22:31 on 8/17/2020 with a reading of 148/90 mm/Hg. Patient seen and evaluated at bedside. Patient also c/o frontal headache, that she frequently gets. Patient denies any acute changes in vision, weakness, chest pain, palpitations, or SOB.    Vital Signs Last 24 Hrs  T(C): 37.1 (18 Aug 2020 00:05), Max: 37.7 (17 Aug 2020 14:41)  T(F): 98.8 (18 Aug 2020 00:05), Max: 99.9 (17 Aug 2020 14:41)  HR: 74 (18 Aug 2020 00:39) (67 - 81)  BP: 170/100 (18 Aug 2020 00:39) (122/62 - 195/101)  RR: 18 (18 Aug 2020 00:05) (18 - 18)  SpO2: 96% (18 Aug 2020 00:05) (94% - 99%)    Physical Exam:   Appearance: Morbidly obese, alert.  Cardiovascular: Regular rate, Normal S1 and S2.  Respiratory: Clear to auscultation anteriorly  Gastrointestinal: Soft, +BS  Neurologic: No focal weakness or deficits  Lymphatic: + lymphedema    ASSESSMENT/PLAN:   69 y/o F with a  history of CAD, PAF, HTN, Hypothyroidism, lymphedema admitted to from [facility] with Altered mental status and enterococcus bacteremia with likely  source. Now s/p ureteral stent placement and transient delirium. Now presents with ongoing hypertensive episode, with headache likely 2ry to elevated blood pressure.    Impression: Likely Hypertensive Urgency, with headache likely 2ry to Elevated Blood Pressure   > Hydralazine 25 mg PO x1 dose given now.  > Repeat BP in 1-2 hours for monitoring.   > Offered Tylenol for headache, which at this time patient refusing.   > If headache worsens/persists or focal deficits after treatement of elevated BP consider further imaging, such as CT head.  > Patient is pending MR head due to CTA findings on 8/15/2020, which team is aware of.   > F/u with primary care team and cardiology in AM regarding adjusting patient's current BP medication regimen.    RN made aware of above management plan.    Jamila Lopes PA-C  Department of Medicine   #[id number] Notified by RN that patient with a electronic BP of 195/101 mmHg on 8/18/2020 at 0:05 with repeat manual /100 mm/Hg on 8/18/2020 at 0:39. Patient with a BP reading of 180/98 mm/Hg on 8/17/2020 at 20:30 in which she was given at that time her scheduled medications, 0.2 mg PO of Clonidine, 50 mg PO of Hydralazine, and 300 mg PO of Labetalol. Blood pressure was then rechecked at 22:31 on 8/17/2020 with a reading of 148/90 mm/Hg. Patient seen and evaluated at bedside. Patient also c/o frontal headache, that she frequently gets. Patient denies any acute changes in vision, weakness, chest pain, palpitations, or SOB.    Vital Signs Last 24 Hrs  T(C): 37.1 (18 Aug 2020 00:05), Max: 37.7 (17 Aug 2020 14:41)  T(F): 98.8 (18 Aug 2020 00:05), Max: 99.9 (17 Aug 2020 14:41)  HR: 74 (18 Aug 2020 00:39) (67 - 81)  BP: 170/100 (18 Aug 2020 00:39) (122/62 - 195/101)  RR: 18 (18 Aug 2020 00:05) (18 - 18)  SpO2: 96% (18 Aug 2020 00:05) (94% - 99%)    Physical Exam:   Appearance: Morbidly obese, alert and agitated.  Cardiovascular: Regular rate, Normal S1 and S2.  Respiratory: Clear to auscultation anteriorly  Gastrointestinal: Soft, +BS  Neurologic: No focal weakness or deficits  Lymphatic: + lymphedema    ASSESSMENT/PLAN:   69 y/o F with a  history of CAD, PAF, HTN, Hypothyroidism, lymphedema admitted to from [facility] with Altered mental status and enterococcus bacteremia with likely  source. Now s/p ureteral stent placement and transient delirium. Now presents with ongoing hypertensive episode, with headache likely 2ry to elevated blood pressure.    Impression: Likely Hypertensive Urgency, with headache likely 2ry to Elevated Blood Pressure   > Hydralazine 25 mg PO x1 dose given now.  > Repeat BP in 1-2 hours for monitoring.   > Offered Tylenol for headache, which at this time patient refusing.   > If headache worsens/persists or focal deficits after treatement of elevated BP consider further imaging, such as CT head.  > Patient is pending MR head due to CTA findings on 8/15/2020, which team is aware of.   > F/u with primary care team and cardiology in AM regarding adjusting patient's current BP medication regimen.    RN made aware of above management plan.    Jamila Lopes PA-C  Department of Medicine   #[id number] Notified by RN that patient with a electronic BP of 195/101 mmHg on 8/18/2020 at 0:05 with repeat manual /100 mm/Hg on 8/18/2020 at 0:39. Patient with a BP reading of 180/98 mm/Hg on 8/17/2020 at 20:30 in which she was given at that time her scheduled medications, 0.2 mg PO of Clonidine, 50 mg PO of Hydralazine, and 300 mg PO of Labetalol. Blood pressure was then rechecked at 22:31 on 8/17/2020 with a reading of 148/90 mm/Hg. Patient seen and evaluated at bedside. Patient also c/o 7/10 frontal headache, that she frequently gets. Patient denies any acute changes in vision, weakness, chest pain, palpitations, or SOB.    Vital Signs Last 24 Hrs  T(C): 37.1 (18 Aug 2020 00:05), Max: 37.7 (17 Aug 2020 14:41)  T(F): 98.8 (18 Aug 2020 00:05), Max: 99.9 (17 Aug 2020 14:41)  HR: 74 (18 Aug 2020 00:39) (67 - 81)  BP: 170/100 (18 Aug 2020 00:39) (122/62 - 195/101)  RR: 18 (18 Aug 2020 00:05) (18 - 18)  SpO2: 96% (18 Aug 2020 00:05) (94% - 99%)    Physical Exam:   Appearance: Morbidly obese, alert and agitated.  Cardiovascular: Regular rate, Normal S1 and S2.  Respiratory: Clear to auscultation anteriorly  Gastrointestinal: Soft, +BS  Neurologic: No focal weakness or deficits  Lymphatic: + lymphedema    ASSESSMENT/PLAN:   69 y/o F with a  history of CAD, PAF, HTN, Hypothyroidism, lymphedema admitted to from [facility] with Altered mental status and enterococcus bacteremia with likely  source. Now s/p ureteral stent placement and transient delirium. Now presents with ongoing hypertensive episode, with headache likely 2ry to elevated blood pressure.    Impression: Likely Hypertensive Urgency, with headache likely 2ry to Elevated Blood Pressure   > Hydralazine 25 mg PO x1 dose given now.  > Repeat BP in 1-2 hours for monitoring.   > Offered Tylenol for headache, which at this time patient refusing.   > If headache worsens/persists or focal deficits after treatement of elevated BP consider further imaging, such as CT head.  > Patient is pending MR head due to CTA findings on 8/15/2020, which team is aware of.   > F/u with primary care team and cardiology in AM regarding adjusting patient's current BP medication regimen.    RN made aware of above management plan.    Jamila Lopes PA-C  Department of Medicine   #[id number]

## 2020-08-18 NOTE — PROGRESS NOTE ADULT - SUBJECTIVE AND OBJECTIVE BOX
Follow Up:  enterococcus bacteremia    Interval History: afebrile overnight. denies pain.     REVIEW OF SYSTEMS  [  ] ROS unobtainable because:    [ x ] All other systems negative except as noted below    Constitutional:  [ ] fever [ ] chills  [ ] weight loss  [ ] weakness  Skin:  [ ] rash [ ] phlebitis	  Eyes: [ ] icterus [ ] pain  [ ] discharge	  ENMT: [ ] sore throat  [ ] thrush [ ] ulcers [ ] exudates  Respiratory: [ ] dyspnea [ ] hemoptysis [ ] cough [ ] sputum	  Cardiovascular:  [ ] chest pain [ ] palpitations [ ] edema	  Gastrointestinal:  [ ] nausea [ ] vomiting [ ] diarrhea [ ] constipation [ ] pain	  Genitourinary:  [ ] dysuria [ ] frequency [ ] hematuria [ ] discharge [ ] flank pain  [ ] incontinence  Musculoskeletal:  [ ] myalgias [ ] arthralgias [ ] arthritis  [ ] back pain  Neurological:  [ ] headache [ ] seizures  [ ] confusion/altered mental status    Allergies  penicillin (Hives)  penicillin (Rash)  sulfa drugs (Unknown)  Tetracycline Hydrochloride (Unknown)        ANTIMICROBIALS:  vancomycin  IVPB 1250 every 12 hours      OTHER MEDS:  MEDICATIONS  (STANDING):  acetaminophen   Tablet .. 650 every 6 hours PRN  ALPRAZolam 0.5 every 8 hours PRN  apixaban 5 every 12 hours  aspirin enteric coated 81 daily  atorvastatin 80 at bedtime  cloNIDine 0.2 three times a day  hydrALAZINE 50 three times a day  labetalol 400 three times a day  levothyroxine 75 daily  polyethylene glycol 3350 17 two times a day  senna 2 at bedtime      Vital Signs Last 24 Hrs  T(C): 36.4 (18 Aug 2020 16:33), Max: 37.1 (18 Aug 2020 00:05)  T(F): 97.6 (18 Aug 2020 16:33), Max: 98.8 (18 Aug 2020 00:05)  HR: 73 (18 Aug 2020 16:33) (61 - 81)  BP: 181/88 (18 Aug 2020 16:33) (148/90 - 201/102)  BP(mean): --  RR: 18 (18 Aug 2020 16:33) (18 - 20)  SpO2: 95% (18 Aug 2020 16:33) (94% - 96%)    PHYSICAL EXAMINATION:  General: Alert and Awake, NAD  HEENT: PERRL, EOMI  Neck: Supple  Cardiac: RRR, No M/R/G  Resp: CTAB, No Wh/Rh/Ra  Abdomen: NBS, NT/ND, No HSM, No rigidity or guarding  MSK: No LE edema. No Calf tenderness  : No aguilar  Skin: No rashes or lesions. Skin is warm and dry to the touch.   Neuro: Alert and Awake. CN 2-12 Grossly intact. Moves all four extremities spontaneously.  Psych: Calm, Pleasant, Cooperative                        9.6    6.76  )-----------( 243      ( 17 Aug 2020 06:38 )             29.9       08-17    137  |  99  |  17  ----------------------------<  111<H>  3.5   |  27  |  0.94    Ca    8.9      17 Aug 2020 06:37            MICROBIOLOGY:  v  .Blood Blood  08-16-20   No growth to date.  --  --      .Urine  08-14-20   >100,000 CFU/ml Enterococcus faecalis  --  Enterococcus faecalis      .Urine Clean Catch (Midstream)  08-13-20   >100,000 CFU/ml Enterococcus faecalis  --  Enterococcus faecalis      .Blood Blood-Peripheral  08-13-20   Growth in anaerobic bottle: Enterococcus faecalis  See previous culture [id number]  --  Blood Culture PCR  Enterococcus faecalis      .Urine Clean Catch (Midstream)  07-26-20   >100,000 CFU/ml Enterococcus faecalis  --  Enterococcus faecalis    RADIOLOGY:    <The imaging below has been reviewed and visualized by me independently. Findings as detailed in report below>    EXAM:  XR CHEST PORTABLE URGENT 1V                        PROCEDURE DATE:  08/15/2020    Trace left pleural effusion. Otherwise, clear lungs.

## 2020-08-18 NOTE — PROVIDER CONTACT NOTE (OTHER) - ACTION/TREATMENT ORDERED:
Jamila AGUILAR notified & aware. Ok as per MD to give pt's 6AM clonidine 0.2mg PO now & recheck BP in an hour. Moses AGUILAR spoke w/ neurology, pt's SBP goal is <160. Will cont to monitor. Jamila AGUILAR notified & aware. Ok as per MD to give pt's 6AM clonidine 0.2mg PO now & recheck BP in an hour. Moses AGUILAR spoke w/ neurology, pt's SBP goal is 160-180. Will cont to monitor. Jamila AGUILAR notified & aware. Ok as per PA to give pt's 6AM clonidine 0.2mg PO now & recheck BP in an hour. Moses AGUILAR spoke w/ neurology, pt's SBP goal is 160-180. Will cont to monitor.

## 2020-08-18 NOTE — BEHAVIORAL HEALTH ASSESSMENT NOTE - NSBHCHARTREVIEWINVESTIGATE_PSY_A_CORE FT
< from: 12 Lead ECG (08.13.20 @ 15:12) >    Ventricular Rate 93 BPM    Atrial Rate 93 BPM    P-R Interval 168 ms    QRS Duration 72 ms    Q-T Interval 364 ms    QTC Calculation(Bezet) 452 ms    P Axis 26 degrees    R Axis -26 degrees    T Axis 79 degrees    Diagnosis Line SINUS RHYTHM WITH PREMATURE ATRIAL COMPLEXES  POSSIBLE LEFT ATRIAL ENLARGEMENT    ABNORMAL ECG  WHEN COMPARED WITH ECG OF 02-APR-2020 12:03,  no  SIGNIFICANT CHANGES HAVE OCCURRED  Confirmed by ANNEMARIE CORNEJO MD ([id number]) on 8/14/2020 9:40:50 PM    < end of copied text >

## 2020-08-18 NOTE — PROVIDER CONTACT NOTE (OTHER) - ACTION/TREATMENT ORDERED:
Jamila AGUILAR & Hardy Gardner NP notified & aware. RN to try again w/ BP meds & team to reschedule CT head. Will cont to monitor.

## 2020-08-18 NOTE — PROVIDER CONTACT NOTE (OTHER) - ASSESSMENT
pt AOx4, see EMAR for VS, and irritable in bed. Staff unable to reason or communicate with patient without patient raising her voice & being rude. Team increased labetalol dosage d/t recommendations from neurology. RN went to give STAT labetalol 400mg PO however pt refusing, cursing at staff, and stating "just kill me." Pt did not have any suicidal ideations but expressed her frustration. Transporter came to take pt to CT head @ 0720, pt refusing despite ANM Briseyda @ bedside calmly explaining to pt why she needs CTH.

## 2020-08-18 NOTE — BEHAVIORAL HEALTH ASSESSMENT NOTE - NSBHCHARTREVIEWVS_PSY_A_CORE FT
Vital Signs Last 24 Hrs  T(C): 36.7 (18 Aug 2020 10:59), Max: 37.7 (17 Aug 2020 14:41)  T(F): 98.1 (18 Aug 2020 10:59), Max: 99.9 (17 Aug 2020 14:41)  HR: 72 (18 Aug 2020 13:00) (61 - 81)  BP: 175/98 (18 Aug 2020 13:00) (122/62 - 201/102)  BP(mean): --  RR: 20 (18 Aug 2020 13:00) (18 - 20)  SpO2: 95% (18 Aug 2020 13:00) (94% - 99%)

## 2020-08-18 NOTE — PROVIDER CONTACT NOTE (OTHER) - ACTION/TREATMENT ORDERED:
Jamila AGUILAR notified & aware. Jamila AGUILAR notified & aware. RN to given AM BP meds except labetalol. As per PA, push labetalol to 10AM. Will cont to monitor.

## 2020-08-18 NOTE — BEHAVIORAL HEALTH ASSESSMENT NOTE - SUICIDE PROTECTIVE FACTORS
DISPLAY PLAN FREE TEXT Faith beliefs Nondenominational beliefs/Identifies reasons for living/Supportive social network of family or friends/Has future plans

## 2020-08-18 NOTE — PROVIDER CONTACT NOTE (OTHER) - ASSESSMENT
see sofiat previous provider contact notes. s/p clonidine 0.2mg PO see mult previous provider contact notes. s/p clonidine 0.2mg PO @ 0330 d/t /180. now s/p 1 hour repeat /108. pt c/o headache 6/10 however still refusing Tylenol PO.

## 2020-08-18 NOTE — PROVIDER CONTACT NOTE (OTHER) - ACTION/TREATMENT ORDERED:
Jamila (med NP) notified & aware. NP agreeable to come assess pt @ bedside. Will cont to monitor. Jamila (med NP) notified & aware. NP agreeable to come assess pt @ bedside. RN to recheck manual BP @ 0200. Will cont to monitor. Jamila Lopes PA notified & aware. PA agreeable to come assess pt @ bedside. RN to recheck manual BP @ 0200. Will cont to monitor.

## 2020-08-18 NOTE — PROGRESS NOTE ADULT - SUBJECTIVE AND OBJECTIVE BOX
HPI:  71 y/o F with a significant PMHx of, HTN, Hypothyroidism,  carpal tunnel syndrome, lymphedema admitted to my service discharged 08/03/20 to [facility] p/w Altered mental status.     Patient had a complicated hospital course last admission with elevated cardiac enzymes from demand ischemia due to A.fib with rvr, refused cardiac cath had normal stress test, course complicated with Enterococcus UTI and colitis requiring antibiotics discharged to [facility] p/w AMS x 1 day. As per [facility] patient very agitated, altered refusing to take medications.     In the ed patient had CT head with no acute findings, CT abdomen shows 5-6 mm ureteral stones with mild hydronephrosis.   Patient was seen by urology,     Currently patient alert, awake, oriented x 3, denies abdominal pain/ nausea/ vomiting, noted to have fever 103 F, received Ciprofloxacin and Flagyl. (13 Aug 2020 20:23)      Patient is a 70y old  Female who presents with a chief complaint of AMS x 1 day (14 Aug 2020 17:06)      SUBJECTIVE / OVERNIGHT EVENTS: Patient agitated last night, noted to have suicidal thoughts,   Currently patient calm, denies suicidal thoughts, states that she felt suicidal when she HAD covid and does not have any intentions to hurt herself or others.       MEDICATIONS  (STANDING):  apixaban 5 milliGRAM(s) Oral every 12 hours  aspirin enteric coated 81 milliGRAM(s) Oral daily  atorvastatin 80 milliGRAM(s) Oral at bedtime  cloNIDine 0.2 milliGRAM(s) Oral three times a day  hydrALAZINE 50 milliGRAM(s) Oral three times a day  labetalol 300 milliGRAM(s) Oral three times a day  lactobacillus acidophilus 1 Tablet(s) Oral daily  levothyroxine 75 MICROGram(s) Oral daily  nystatin Powder 1 Application(s) Topical two times a day  polyethylene glycol 3350 17 Gram(s) Oral two times a day  senna 2 Tablet(s) Oral at bedtime  vancomycin  IVPB 1250 milliGRAM(s) IV Intermittent every 12 hours    MEDICATIONS  (PRN):  acetaminophen   Tablet .. 650 milliGRAM(s) Oral every 6 hours PRN Temp greater or equal to 38C (100.4F)  ALPRAZolam 0.5 milliGRAM(s) Oral every 8 hours PRN anxiety        I&O's Summary    13 Aug 2020 07:01  -  14 Aug 2020 07:00  --------------------------------------------------------  IN: 650 mL / OUT: 95 mL / NET: 555 mL    14 Aug 2020 07:01  -  14 Aug 2020 21:20  --------------------------------------------------------  IN: 600 mL / OUT: 1200 mL / NET: -600 mL    T(C): 36.4 (08-18-20 @ 16:33), Max: 36.8 (08-18-20 @ 09:53)  HR: 73 (08-18-20 @ 16:33) (61 - 73)  BP: 181/88 (08-18-20 @ 16:33) (175/98 - 183/81)  RR: 18 (08-18-20 @ 16:33) (18 - 20)  SpO2: 95% (08-18-20 @ 16:33) (94% - 95%)      PHYSICAL EXAM:  GENERAL: NAD, well-developed  HEAD:  Atraumatic, Normocephalic  EYES: EOMI, PERRLA, conjunctiva and sclera clear  NECK: Supple, No JVD  CHEST/LUNG: Clear to auscultation bilaterally; No wheeze  HEART: Regular rate and rhythm; No murmurs, rubs, or gallops  ABDOMEN: Soft, Nontender, rlq tenderness, Nondistended; Bowel sounds present  EXTREMITIES:  2+ Peripheral Pulses, No clubbing, cyanosis, or edema  PSYCH: AAOx3  NEUROLOGY: non-focal  SKIN: No rashes or lesions                                            9.6    6.76  )-----------( 243      ( 17 Aug 2020 06:38 )             29.9             PT/INR - ( 17 Aug 2020 08:23 )   PT: 18.8 sec;   INR: 1.63 ratio         PTT - ( 17 Aug 2020 08:23 )  PTT:52.5 sec        RADIOLOGY & ADDITIONAL TESTS:    Imaging Personally Reviewed:    Consultant(s) Notes Reviewed:      Care Discussed with Consultants/Other Providers:

## 2020-08-19 DIAGNOSIS — M79.651 PAIN IN RIGHT THIGH: ICD-10-CM

## 2020-08-19 LAB
ANION GAP SERPL CALC-SCNC: 12 MMOL/L — SIGNIFICANT CHANGE UP (ref 5–17)
BUN SERPL-MCNC: 15 MG/DL — SIGNIFICANT CHANGE UP (ref 7–23)
CALCIUM SERPL-MCNC: 9.1 MG/DL — SIGNIFICANT CHANGE UP (ref 8.4–10.5)
CHLORIDE SERPL-SCNC: 99 MMOL/L — SIGNIFICANT CHANGE UP (ref 96–108)
CO2 SERPL-SCNC: 29 MMOL/L — SIGNIFICANT CHANGE UP (ref 22–31)
CREAT SERPL-MCNC: 0.9 MG/DL — SIGNIFICANT CHANGE UP (ref 0.5–1.3)
GLUCOSE SERPL-MCNC: 127 MG/DL — HIGH (ref 70–99)
HCT VFR BLD CALC: 33.9 % — LOW (ref 34.5–45)
HGB BLD-MCNC: 10.9 G/DL — LOW (ref 11.5–15.5)
MCHC RBC-ENTMCNC: 29.3 PG — SIGNIFICANT CHANGE UP (ref 27–34)
MCHC RBC-ENTMCNC: 32.2 GM/DL — SIGNIFICANT CHANGE UP (ref 32–36)
MCV RBC AUTO: 91.1 FL — SIGNIFICANT CHANGE UP (ref 80–100)
NRBC # BLD: 0 /100 WBCS — SIGNIFICANT CHANGE UP (ref 0–0)
PLATELET # BLD AUTO: 277 K/UL — SIGNIFICANT CHANGE UP (ref 150–400)
POTASSIUM SERPL-MCNC: 3.4 MMOL/L — LOW (ref 3.5–5.3)
POTASSIUM SERPL-SCNC: 3.4 MMOL/L — LOW (ref 3.5–5.3)
RBC # BLD: 3.72 M/UL — LOW (ref 3.8–5.2)
RBC # FLD: 14.5 % — SIGNIFICANT CHANGE UP (ref 10.3–14.5)
SODIUM SERPL-SCNC: 140 MMOL/L — SIGNIFICANT CHANGE UP (ref 135–145)
VANCOMYCIN TROUGH SERPL-MCNC: 17.1 UG/ML — SIGNIFICANT CHANGE UP (ref 10–20)
WBC # BLD: 5.85 K/UL — SIGNIFICANT CHANGE UP (ref 3.8–10.5)
WBC # FLD AUTO: 5.85 K/UL — SIGNIFICANT CHANGE UP (ref 3.8–10.5)

## 2020-08-19 PROCEDURE — 99232 SBSQ HOSP IP/OBS MODERATE 35: CPT

## 2020-08-19 RX ORDER — VANCOMYCIN HCL 1 G
1000 VIAL (EA) INTRAVENOUS EVERY 12 HOURS
Refills: 0 | Status: DISCONTINUED | OUTPATIENT
Start: 2020-08-19 | End: 2020-08-20

## 2020-08-19 RX ORDER — ACETAMINOPHEN 500 MG
650 TABLET ORAL ONCE
Refills: 0 | Status: COMPLETED | OUTPATIENT
Start: 2020-08-19 | End: 2020-08-19

## 2020-08-19 RX ADMIN — SENNA PLUS 2 TABLET(S): 8.6 TABLET ORAL at 22:07

## 2020-08-19 RX ADMIN — Medication 50 MILLIGRAM(S): at 22:07

## 2020-08-19 RX ADMIN — APIXABAN 5 MILLIGRAM(S): 2.5 TABLET, FILM COATED ORAL at 06:31

## 2020-08-19 RX ADMIN — Medication 50 MILLIGRAM(S): at 06:31

## 2020-08-19 RX ADMIN — Medication 250 MILLIGRAM(S): at 17:14

## 2020-08-19 RX ADMIN — Medication 0.2 MILLIGRAM(S): at 06:31

## 2020-08-19 RX ADMIN — Medication 50 MILLIGRAM(S): at 13:46

## 2020-08-19 RX ADMIN — Medication 650 MILLIGRAM(S): at 15:03

## 2020-08-19 RX ADMIN — Medication 0.2 MILLIGRAM(S): at 13:46

## 2020-08-19 RX ADMIN — NYSTATIN CREAM 1 APPLICATION(S): 100000 CREAM TOPICAL at 17:14

## 2020-08-19 RX ADMIN — Medication 400 MILLIGRAM(S): at 22:07

## 2020-08-19 RX ADMIN — Medication 250 MILLIGRAM(S): at 06:37

## 2020-08-19 RX ADMIN — NYSTATIN CREAM 1 APPLICATION(S): 100000 CREAM TOPICAL at 06:31

## 2020-08-19 RX ADMIN — Medication 400 MILLIGRAM(S): at 06:31

## 2020-08-19 RX ADMIN — APIXABAN 5 MILLIGRAM(S): 2.5 TABLET, FILM COATED ORAL at 17:14

## 2020-08-19 RX ADMIN — Medication 75 MICROGRAM(S): at 06:31

## 2020-08-19 RX ADMIN — Medication 650 MILLIGRAM(S): at 16:00

## 2020-08-19 RX ADMIN — Medication 400 MILLIGRAM(S): at 13:46

## 2020-08-19 RX ADMIN — POLYETHYLENE GLYCOL 3350 17 GRAM(S): 17 POWDER, FOR SOLUTION ORAL at 06:31

## 2020-08-19 RX ADMIN — Medication 81 MILLIGRAM(S): at 11:05

## 2020-08-19 RX ADMIN — POLYETHYLENE GLYCOL 3350 17 GRAM(S): 17 POWDER, FOR SOLUTION ORAL at 17:14

## 2020-08-19 RX ADMIN — Medication 0.2 MILLIGRAM(S): at 22:07

## 2020-08-19 NOTE — DIETITIAN INITIAL EVALUATION ADULT. - PHYSICAL APPEARANCE
other (specify)/well nourished Per RN assessment pt with no noted pressure injuries, 2+ bilateral leg edema.  Ht: 64 inches Wt: 209 pounds BMI: 35.9 kg/m2 IBW: 120 pounds (+/-10%) %IBW: 174% None

## 2020-08-19 NOTE — CHART NOTE - NSCHARTNOTEFT_GEN_A_CORE
RN to PA- Patient incidentally found redness and swelling posterior of right thigh. Pt seen at bedside in no acute distress or complaints of pain of site to palpation or rest. Patient believes it is her edema. Legs are elevated.   Site is pink in color, firm yet soft.    Pulses present +2 B/L  Possible Baker's cyst?      Vital Signs Last 24 Hrs  T(C): 36.7 (19 Aug 2020 00:37), Max: 37.1 (18 Aug 2020 04:41)  T(F): 98.1 (19 Aug 2020 00:37), Max: 98.7 (18 Aug 2020 04:41)  HR: 66 (19 Aug 2020 00:37) (61 - 95)  BP: 176/100 (19 Aug 2020 00:37) (175/98 - 201/102)  BP(mean): --  RR: 18 (19 Aug 2020 00:37) (18 - 20)  SpO2: 95% (19 Aug 2020 00:37) (94% - 96%)      Labs:             9.6    6.76  )-----------( 243      ( 17 Aug 2020 06:38 )             29.9     08-17    137  |  99  |  17  ----------------------------<  111<H>  3.5   |  27  |  0.94    Ca    8.9      17 Aug 2020 06:37          Radiology:  < from: [facility] Duplex Lower Ext Vein Scan, Right (08.07.16 @ 21:33) >    EXAM:  DUPLEX EXT VEINS LOWER RT                            PROCEDURE DATE:  Aug  7 2016       INTERPRETATION:  CLINICAL INFORMATION: 66-year-old female presents with   worsening right lower showing leg pain.    COMPARISON: Bilateral DVT [id number].    TECHNIQUE: Duplex sonography of the right lower extremity with color and   spectral Doppler, with and without compression.      FINDINGS:    Limited evaluation due to body habitus.    There is normal compressibility of the right commonfemoral, femoral and   popliteal veins. No calf vein thrombosis is detected.    Doppler examination shows normal spontaneous and phasic flow.    A 2.6 x 2.0 x 0.8 cm right Baker's cyst is again noted.    IMPRESSION: No evidence of right lower extremity deep venous thrombosis.    Right Harris's cyst.    < end of copied text >        Physical Exam:  General: NAD, yet anxious (refuses PRN xanax)  Neurology: A&Ox3  Head:  Normocephalic, atraumatic  Respiratory: CTA B/L  CV: RRR, S1S2, no murmur  Abdominal: Soft, NT, ND no palpable mass  MSK: B/L edema of legs, + peripheral pulses    Assessment & Plan:  HPI:  71 y/o F with a significant PMHx of, HTN, Hypothyroidism,  carpal tunnel syndrome, lymphedema admitted to my service discharged 08/03/20 to [facility] p/w Altered mental status.     Patient had a complicated hospital course last admission with elevated cardiac enzymes from demand ischemia due to A.fib with rvr, refused cardiac cath had normal stress test, course complicated with Enterococcus UTI and colitis requiring antibiotics discharged to [facility] p/w AMS x 1 day. As per [facility] patient very agitated, altered refusing to take medications.   In the ed patient had CT head with no acute findings, CT abdomen shows 5-6 mm ureteral stones with mild hydronephrosis.   Patient was seen by urology,   Currently patient alert, awake, oriented x 3, denies abdominal pain/ nausea/ vomiting, noted to have fever 103 F, received Ciprofloxacin and Flagyl. (13 Aug 2020 20:23)    Plan:   1) ?Baker's cyst  - c/w elevating her legs  - on A/C  - recommend doppler to r/o DVT if worsening of site and symptomatic  - patient refusing doppler at this time  - previous doppler diagnosed as Baker's cyst  - will endorse to day team in AM to f/u with hospitalist whether require a doppler  - c/w monitoring overnight    Karla Mendez PA-C  [phone]

## 2020-08-19 NOTE — PROVIDER CONTACT NOTE (OTHER) - ACTION/TREATMENT ORDERED:
Karla AGUILAR notified & aware. PA to decrease vanco dose for AM dose. Ok to give pt's AM bp meds. Will cont to monitor.

## 2020-08-19 NOTE — PROVIDER CONTACT NOTE (OTHER) - ASSESSMENT
pt AOx4, hypertensive otherwise VSS, and resting in bed. Pt has been hypertensive (see EMAR), as per neurology BP goal is 160-180. Pt's vanco trough 5AM resulted 17.1.

## 2020-08-19 NOTE — DIETITIAN INITIAL EVALUATION ADULT. - ADD RECOMMEND
1. Continue DASH/TLC, Kosher diet per pt preference.  2. Provide PO encouragement and assistance with meals PRN. 3. Will continue to monitor PO intake, labs, weights, BM, skin, clinical course.

## 2020-08-19 NOTE — DIETITIAN INITIAL EVALUATION ADULT. - REASON INDICATOR FOR ASSESSMENT
Assessment warranted for length of stay. Per chart: pt is a 71 y/o F with a significant PMHx of, HTN, Hypothyroidism, carpal tunnel syndrome, lymphedema p/w Altered mental status from [facility].

## 2020-08-19 NOTE — DIETITIAN INITIAL EVALUATION ADULT. - OTHER INFO
Weight Hx per RD notes: 201 lb (7/22), 203.4 lb (7/24) - noted pt with stated dosing weight of 209 lb.    Pt with HgA1c of 5.7% - prediabetes, per H&P pt on Januvia at home.     Per RN pt eating well. Limited information obtained, pt very upset and agitated at visit.     Pt with HgA1c of 5.7% - prediabetes, per H&P pt on Januvia at home. Weight Hx per RD notes: 201 lb (7/22), 203.4 lb (7/24) - noted pt with stated dosing weight of 209 lb.    Per RN pt eating well, however pt states she has not eaten in 3 days. Pt with complaints regarding Kosher food options in house - RD to escalate. Pt requesting to see . Pt states she follows a gluten-free diet however is "letting it slide" as she does not like gluten free options in house. RD dismissed from room and interview incomplete.

## 2020-08-19 NOTE — PROGRESS NOTE ADULT - SUBJECTIVE AND OBJECTIVE BOX
Follow Up:  enterococcus bacteremia    Interval History: afebrile. very anxious about having to leave the floor for TTE. denjes pain     REVIEW OF SYSTEMS  [  ] ROS unobtainable because:    [x  ] All other systems negative except as noted below    Constitutional:  [ ] fever [ ] chills  [ ] weight loss  [ ] weakness  Skin:  [ ] rash [ ] phlebitis	  Eyes: [ ] icterus [ ] pain  [ ] discharge	  ENMT: [ ] sore throat  [ ] thrush [ ] ulcers [ ] exudates  Respiratory: [ ] dyspnea [ ] hemoptysis [ ] cough [ ] sputum	  Cardiovascular:  [ ] chest pain [ ] palpitations [ ] edema	  Gastrointestinal:  [ ] nausea [ ] vomiting [ ] diarrhea [ ] constipation [ ] pain	  Genitourinary:  [ ] dysuria [ ] frequency [ ] hematuria [ ] discharge [ ] flank pain  [ ] incontinence  Musculoskeletal:  [ ] myalgias [ ] arthralgias [ ] arthritis  [ ] back pain  Neurological:  [ ] headache [ ] seizures  [ ] confusion/altered mental status    Allergies  penicillin (Hives)  penicillin (Rash)  sulfa drugs (Unknown)  Tetracycline Hydrochloride (Unknown)        ANTIMICROBIALS:  vancomycin  IVPB 1000 every 12 hours      OTHER MEDS:  MEDICATIONS  (STANDING):  acetaminophen   Tablet .. 650 every 6 hours PRN  ALPRAZolam 0.5 every 8 hours PRN  apixaban 5 every 12 hours  aspirin enteric coated 81 daily  atorvastatin 80 at bedtime  cloNIDine 0.2 three times a day  hydrALAZINE 50 three times a day  labetalol 400 three times a day  levothyroxine 75 daily  polyethylene glycol 3350 17 two times a day  senna 2 at bedtime      Vital Signs Last 24 Hrs  T(C): 36.8 (19 Aug 2020 16:48), Max: 36.8 (18 Aug 2020 20:30)  T(F): 98.2 (19 Aug 2020 16:48), Max: 98.3 (18 Aug 2020 20:30)  HR: 60 (19 Aug 2020 16:48) (60 - 95)  BP: 151/92 (19 Aug 2020 16:48) (151/92 - 192/100)  BP(mean): --  RR: 18 (19 Aug 2020 08:59) (18 - 18)  SpO2: 95% (19 Aug 2020 08:59) (95% - 96%)    PHYSICAL EXAMINATION:  General: Alert and Awake, NAD  HEENT: PERRL, EOMI  Neck: Supple  Cardiac: RRR, No M/R/G  Resp: CTAB, No Wh/Rh/Ra  Abdomen: NBS, NT/ND, No HSM, No rigidity or guarding  MSK: No LE edema. No Calf tenderness  : No aguilar  Skin: No rashes or lesions. Skin is warm and dry to the touch.   Neuro: Alert and Awake. CN 2-12 Grossly intact. Moves all four extremities spontaneously.  Psych: Calm, Pleasant, Cooperative                          10.9   5.85  )-----------( 277      ( 19 Aug 2020 04:28 )             33.9       08-19    140  |  99  |  15  ----------------------------<  127<H>  3.4<L>   |  29  |  0.90    Ca    9.1      19 Aug 2020 04:28            MICROBIOLOGY:  Vancomycin Level, Trough: 17.1 ug/mL (08-19-20 @ 04:28)  v  .Blood Blood  08-16-20   No growth to date.  --  --      .Urine  08-14-20   >100,000 CFU/ml Enterococcus faecalis  --  Enterococcus faecalis      .Urine Clean Catch (Midstream)  08-13-20   >100,000 CFU/ml Enterococcus faecalis  --  Enterococcus faecalis      .Blood Blood-Peripheral  08-13-20   Growth in anaerobic bottle: Enterococcus faecalis  See previous culture [id number]  --  Blood Culture PCR  Enterococcus faecalis      .Urine Clean Catch (Midstream)  07-26-20   >100,000 CFU/ml Enterococcus faecalis  --  Enterococcus faecalis                RADIOLOGY:    <The imaging below has been reviewed and visualized by me independently. Findings as detailed in report below>    EXAM:  XR CHEST PORTABLE URGENT 1V                        PROCEDURE DATE:  08/15/2020    Trace left pleural effusion. Otherwise, clear lungs.

## 2020-08-19 NOTE — PROGRESS NOTE ADULT - SUBJECTIVE AND OBJECTIVE BOX
HPI:  69 y/o F with a significant PMHx of, HTN, Hypothyroidism,  carpal tunnel syndrome, lymphedema admitted to my service discharged 08/03/20 to [facility] p/w Altered mental status.     Patient had a complicated hospital course last admission with elevated cardiac enzymes from demand ischemia due to A.fib with rvr, refused cardiac cath had normal stress test, course complicated with Enterococcus UTI and colitis requiring antibiotics discharged to [facility] p/w AMS x 1 day. As per [facility] patient very agitated, altered refusing to take medications.     In the ed patient had CT head with no acute findings, CT abdomen shows 5-6 mm ureteral stones with mild hydronephrosis.   Patient was seen by urology,     Currently patient alert, awake, oriented x 3, denies abdominal pain/ nausea/ vomiting, noted to have fever 103 F, received Ciprofloxacin and Flagyl. (13 Aug 2020 20:23)      Patient is a 70y old  Female who presents with a chief complaint of AMS x 1 day (14 Aug 2020 17:06)      SUBJECTIVE / OVERNIGHT EVENTS: Patient agitated last night, noted to have suicidal thoughts,   Currently patient calm, denies suicidal thoughts, states that she felt suicidal when she HAD covid and does not have any intentions to hurt herself or others.     MEDICATIONS  (STANDING):  apixaban 5 milliGRAM(s) Oral every 12 hours  aspirin enteric coated 81 milliGRAM(s) Oral daily  atorvastatin 80 milliGRAM(s) Oral at bedtime  cloNIDine 0.2 milliGRAM(s) Oral three times a day  hydrALAZINE 50 milliGRAM(s) Oral three times a day  labetalol 400 milliGRAM(s) Oral three times a day  lactobacillus acidophilus 1 Tablet(s) Oral daily  levothyroxine 75 MICROGram(s) Oral daily  nystatin Powder 1 Application(s) Topical two times a day  polyethylene glycol 3350 17 Gram(s) Oral two times a day  senna 2 Tablet(s) Oral at bedtime  vancomycin  IVPB 1000 milliGRAM(s) IV Intermittent every 12 hours    MEDICATIONS  (PRN):  acetaminophen   Tablet .. 650 milliGRAM(s) Oral every 6 hours PRN Temp greater or equal to 38C (100.4F)  ALPRAZolam 0.5 milliGRAM(s) Oral every 8 hours PRN anxiety        T(C): 36.8 (08-19-20 @ 16:48), Max: 36.8 (08-19-20 @ 16:48)  HR: 60 (08-19-20 @ 16:48) (60 - 69)  BP: 151/92 (08-19-20 @ 16:48) (151/92 - 192/100)  RR: 18 (08-19-20 @ 08:59) (18 - 18)  SpO2: 95% (08-19-20 @ 08:59) (95% - 95%)      PHYSICAL EXAM:  GENERAL: NAD, well-developed  HEAD:  Atraumatic, Normocephalic  EYES: EOMI, conjunctiva and sclera clear  NECK: Supple, No JVD  CHEST/LUNG: Clear to auscultation bilaterally; No wheeze  HEART: Regular rate and rhythm; No murmurs, rubs, or gallops  ABDOMEN: Soft, Nontender, rlq tenderness, Nondistended; Bowel sounds present  EXTREMITIES:  2+ Peripheral Pulses, No clubbing, cyanosis, or edema  PSYCH: AAOx3  NEUROLOGY: non-focal  SKIN: No rashes or lesions                                            10.9   5.85  )-----------( 277      ( 19 Aug 2020 04:28 )             33.9               140|99|15<127  3.4|29|0.90  9.1,--,--  08-19 @ 04:28    RADIOLOGY & ADDITIONAL TESTS:    Imaging Personally Reviewed:    Consultant(s) Notes Reviewed:      Care Discussed with Consultants/Other Providers:

## 2020-08-20 LAB
-  LINEZOLID: SIGNIFICANT CHANGE UP
ORGANISM # SPEC MICROSCOPIC CNT: SIGNIFICANT CHANGE UP
ORGANISM # SPEC MICROSCOPIC CNT: SIGNIFICANT CHANGE UP
VANCOMYCIN TROUGH SERPL-MCNC: 24.1 UG/ML — HIGH (ref 10–20)

## 2020-08-20 PROCEDURE — 99232 SBSQ HOSP IP/OBS MODERATE 35: CPT

## 2020-08-20 RX ORDER — LABETALOL HCL 100 MG
200 TABLET ORAL ONCE
Refills: 0 | Status: COMPLETED | OUTPATIENT
Start: 2020-08-20 | End: 2020-08-20

## 2020-08-20 RX ORDER — VANCOMYCIN HCL 1 G
750 VIAL (EA) INTRAVENOUS EVERY 12 HOURS
Refills: 0 | Status: DISCONTINUED | OUTPATIENT
Start: 2020-08-21 | End: 2020-08-24

## 2020-08-20 RX ORDER — LABETALOL HCL 100 MG
600 TABLET ORAL
Refills: 0 | Status: DISCONTINUED | OUTPATIENT
Start: 2020-08-20 | End: 2020-08-25

## 2020-08-20 RX ORDER — ACETAMINOPHEN 500 MG
1000 TABLET ORAL ONCE
Refills: 0 | Status: COMPLETED | OUTPATIENT
Start: 2020-08-20 | End: 2020-08-20

## 2020-08-20 RX ORDER — LABETALOL HCL 100 MG
400 TABLET ORAL
Refills: 0 | Status: DISCONTINUED | OUTPATIENT
Start: 2020-08-20 | End: 2020-08-25

## 2020-08-20 RX ADMIN — Medication 400 MILLIGRAM(S): at 06:47

## 2020-08-20 RX ADMIN — Medication 0.2 MILLIGRAM(S): at 06:47

## 2020-08-20 RX ADMIN — Medication 0.2 MILLIGRAM(S): at 13:14

## 2020-08-20 RX ADMIN — APIXABAN 5 MILLIGRAM(S): 2.5 TABLET, FILM COATED ORAL at 17:23

## 2020-08-20 RX ADMIN — APIXABAN 5 MILLIGRAM(S): 2.5 TABLET, FILM COATED ORAL at 06:47

## 2020-08-20 RX ADMIN — Medication 400 MILLIGRAM(S): at 17:23

## 2020-08-20 RX ADMIN — Medication 250 MILLIGRAM(S): at 06:19

## 2020-08-20 RX ADMIN — NYSTATIN CREAM 1 APPLICATION(S): 100000 CREAM TOPICAL at 06:47

## 2020-08-20 RX ADMIN — Medication 50 MILLIGRAM(S): at 13:14

## 2020-08-20 RX ADMIN — Medication 200 MILLIGRAM(S): at 15:33

## 2020-08-20 RX ADMIN — Medication 81 MILLIGRAM(S): at 13:14

## 2020-08-20 RX ADMIN — SENNA PLUS 2 TABLET(S): 8.6 TABLET ORAL at 21:53

## 2020-08-20 RX ADMIN — Medication 50 MILLIGRAM(S): at 06:47

## 2020-08-20 RX ADMIN — Medication 0.2 MILLIGRAM(S): at 21:52

## 2020-08-20 RX ADMIN — Medication 1000 MILLIGRAM(S): at 17:40

## 2020-08-20 RX ADMIN — Medication 75 MICROGRAM(S): at 06:19

## 2020-08-20 RX ADMIN — Medication 1 TABLET(S): at 13:14

## 2020-08-20 RX ADMIN — Medication 50 MILLIGRAM(S): at 21:53

## 2020-08-20 RX ADMIN — Medication 400 MILLIGRAM(S): at 13:14

## 2020-08-20 RX ADMIN — Medication 400 MILLIGRAM(S): at 17:24

## 2020-08-20 NOTE — PROVIDER CONTACT NOTE (OTHER) - ACTION/TREATMENT ORDERED:
As per provider, no interventions necessary at this time. Safety maintained. Will continue to monitor.

## 2020-08-20 NOTE — CHART NOTE - NSCHARTNOTEFT_GEN_A_CORE
Notified by RN for for elevated /99. Patient is sitting in bed in NAD. Patient is alert asymptomatic.      Vital Signs Last 24 Hrs  T(C): 36.7 (20 Aug 2020 13:09), Max: 36.9 (20 Aug 2020 08:30)  T(F): 98.1 (20 Aug 2020 13:09), Max: 98.4 (20 Aug 2020 08:30)  HR: 81 (20 Aug 2020 14:11) (60 - 81)  BP: 199/85 (20 Aug 2020 14:11) (151/92 - 206/99)  BP(mean): --  RR: 18 (20 Aug 2020 13:09) (18 - 18)  SpO2: 96% (20 Aug 2020 13:09) (95% - 99%)    08-19    140  |  99  |  15  ----------------------------<  127<H>  3.4<L>   |  29  |  0.90    Ca    9.1      19 Aug 2020 04:28                            10.9   5.85  )-----------( 277      ( 19 Aug 2020 04:28 )             33.9     Radiology :    MEDICATIONS  (STANDING):  apixaban 5 milliGRAM(s) Oral every 12 hours  aspirin enteric coated 81 milliGRAM(s) Oral daily  atorvastatin 80 milliGRAM(s) Oral at bedtime  cloNIDine 0.2 milliGRAM(s) Oral three times a day  hydrALAZINE 50 milliGRAM(s) Oral three times a day  labetalol 200 milliGRAM(s) Oral once  labetalol 600 milliGRAM(s) Oral <User Schedule>  labetalol 400 milliGRAM(s) Oral <User Schedule>  lactobacillus acidophilus 1 Tablet(s) Oral daily  levothyroxine 75 MICROGram(s) Oral daily  nystatin Powder 1 Application(s) Topical two times a day  polyethylene glycol 3350 17 Gram(s) Oral two times a day  senna 2 Tablet(s) Oral at bedtime  vancomycin  IVPB 1000 milliGRAM(s) IV Intermittent every 12 hours    MEDICATIONS  (PRN):  acetaminophen   Tablet .. 650 milliGRAM(s) Oral every 6 hours PRN Temp greater or equal to 38C (100.4F)  ALPRAZolam 0.5 milliGRAM(s) Oral every 8 hours PRN anxiety    Constitutional: AOx3. NAD, non-toxic appearance  Neuro: PERRLA, grossly intact, no focal deficits  Respiratory: clear lungs bilaterally. No wheezing, rhonchi, or crackles. non-labored  Cardiovascular: S1 S2. No murmurs.  Gastrointestinal: soft, ND/NT, +BS in all quadrants.  Extremities/Vascular: +PP b/l LE, no BLE edema, warm to touch      ASSESSMENT/PLAN:   HPI:  70 year old female PMH HTN Hypothyroidism,  carpal tunnel syndrome, lymphedema presented to [facility] on 8/13/20 with encephalopathy. Now with persistent hypertension.     #  Uncontrolled HTN       - Asymptomatic       - All standing dose of medications given -        - Repeated BP in 30 min still elevated to 199/85        - Discussed with Dr. Francis, advised to increase labetalol dose       - Labetalol increased to 600 in AM and in the afternoon, 400 for evening.       - Continue close monitoring of clinical status and vital signs. HD stable.        - STAT 200mg of Labetalol to make up to 600mg now     Jessika Palma PA-C

## 2020-08-20 NOTE — PROGRESS NOTE ADULT - SUBJECTIVE AND OBJECTIVE BOX
HPI:  71 y/o F with a significant PMHx of, HTN, Hypothyroidism,  carpal tunnel syndrome, lymphedema admitted to my service discharged 08/03/20 to [facility] p/w Altered mental status.     Patient had a complicated hospital course last admission with elevated cardiac enzymes from demand ischemia due to A.fib with rvr, refused cardiac cath had normal stress test, course complicated with Enterococcus UTI and colitis requiring antibiotics discharged to [facility] p/w AMS x 1 day. As per [facility] patient very agitated, altered refusing to take medications.     In the ed patient had CT head with no acute findings, CT abdomen shows 5-6 mm ureteral stones with mild hydronephrosis.   Patient was seen by urology,     Currently patient alert, awake, oriented x 3, denies abdominal pain/ nausea/ vomiting, noted to have fever 103 F, received Ciprofloxacin and Flagyl. (13 Aug 2020 20:23)      Patient is a 70y old  Female who presents with a chief complaint of AMS x 1 day (14 Aug 2020 17:06)      SUBJECTIVE / OVERNIGHT EVENTS: Patient feels ok, no complaints.  No events over night.     MEDICATIONS  (STANDING):  apixaban 5 milliGRAM(s) Oral every 12 hours  aspirin enteric coated 81 milliGRAM(s) Oral daily  atorvastatin 80 milliGRAM(s) Oral at bedtime  cloNIDine 0.2 milliGRAM(s) Oral three times a day  hydrALAZINE 50 milliGRAM(s) Oral three times a day  labetalol 400 milliGRAM(s) Oral three times a day  lactobacillus acidophilus 1 Tablet(s) Oral daily  levothyroxine 75 MICROGram(s) Oral daily  nystatin Powder 1 Application(s) Topical two times a day  polyethylene glycol 3350 17 Gram(s) Oral two times a day  senna 2 Tablet(s) Oral at bedtime  vancomycin  IVPB 1000 milliGRAM(s) IV Intermittent every 12 hours    MEDICATIONS  (PRN):  acetaminophen   Tablet .. 650 milliGRAM(s) Oral every 6 hours PRN Temp greater or equal to 38C (100.4F)  ALPRAZolam 0.5 milliGRAM(s) Oral every 8 hours PRN anxiety        T(C): 36.8 (08-19-20 @ 16:48), Max: 36.8 (08-19-20 @ 16:48)  HR: 60 (08-19-20 @ 16:48) (60 - 69)  BP: 151/92 (08-19-20 @ 16:48) (151/92 - 192/100)  RR: 18 (08-19-20 @ 08:59) (18 - 18)  SpO2: 95% (08-19-20 @ 08:59) (95% - 95%)      PHYSICAL EXAM:  GENERAL: NAD, well-developed  HEAD:  Atraumatic, Normocephalic  EYES: EOMI, conjunctiva and sclera clear  NECK: Supple, No JVD  CHEST/LUNG: Clear to auscultation bilaterally; No wheeze  HEART: Regular rate and rhythm; No murmurs, rubs, or gallops  ABDOMEN: Soft, Nontender, rlq tenderness, Nondistended; Bowel sounds present  EXTREMITIES:  2+ Peripheral Pulses, No clubbing, cyanosis, or edema  PSYCH: AAOx3  NEUROLOGY: non-focal  SKIN: No rashes or lesions                                            10.9   5.85  )-----------( 277      ( 19 Aug 2020 04:28 )             33.9               140|99|15<127  3.4|29|0.90  9.1,--,--  08-19 @ 04:28    RADIOLOGY & ADDITIONAL TESTS:    Imaging Personally Reviewed:    Consultant(s) Notes Reviewed:      Care Discussed with Consultants/Other Providers:

## 2020-08-20 NOTE — PROGRESS NOTE ADULT - SUBJECTIVE AND OBJECTIVE BOX
Follow Up:  enterococcus bacteremia    Interval History: afebrile. denies pain.     REVIEW OF SYSTEMS  [  ] ROS unobtainable because:    [x  ] All other systems negative except as noted below    Constitutional:  [ ] fever [ ] chills  [ ] weight loss  [ ] weakness  Skin:  [ ] rash [ ] phlebitis	  Eyes: [ ] icterus [ ] pain  [ ] discharge	  ENMT: [ ] sore throat  [ ] thrush [ ] ulcers [ ] exudates  Respiratory: [ ] dyspnea [ ] hemoptysis [ ] cough [ ] sputum	  Cardiovascular:  [ ] chest pain [ ] palpitations [ ] edema	  Gastrointestinal:  [ ] nausea [ ] vomiting [ ] diarrhea [ ] constipation [ ] pain	  Genitourinary:  [ ] dysuria [ ] frequency [ ] hematuria [ ] discharge [ ] flank pain  [ ] incontinence  Musculoskeletal:  [ ] myalgias [ ] arthralgias [ ] arthritis  [ ] back pain  Neurological:  [ ] headache [ ] seizures  [ ] confusion/altered mental status    Allergies  penicillin (Hives)  penicillin (Rash)  sulfa drugs (Unknown)  Tetracycline Hydrochloride (Unknown)        ANTIMICROBIALS:  vancomycin  IVPB 1000 every 12 hours      OTHER MEDS:  MEDICATIONS  (STANDING):  acetaminophen   Tablet .. 650 every 6 hours PRN  ALPRAZolam 0.5 every 8 hours PRN  apixaban 5 every 12 hours  aspirin enteric coated 81 daily  atorvastatin 80 at bedtime  cloNIDine 0.2 three times a day  hydrALAZINE 50 three times a day  labetalol 600 <User Schedule>  labetalol 400 <User Schedule>  levothyroxine 75 daily  polyethylene glycol 3350 17 two times a day  senna 2 at bedtime      Vital Signs Last 24 Hrs  T(C): 36.8 (20 Aug 2020 16:51), Max: 36.9 (20 Aug 2020 08:30)  T(F): 98.2 (20 Aug 2020 16:51), Max: 98.4 (20 Aug 2020 08:30)  HR: 60 (20 Aug 2020 18:52) (59 - 81)  BP: 150/70 (20 Aug 2020 18:52) (150/70 - 206/99)  BP(mean): --  RR: 17 (20 Aug 2020 16:51) (17 - 18)  SpO2: 98% (20 Aug 2020 16:51) (95% - 99%)    PHYSICAL EXAMINATION:  General: Alert and Awake, NAD  HEENT: PERRL, EOMI  Neck: Supple  Cardiac: RRR, No M/R/G  Resp: CTAB, No Wh/Rh/Ra  Abdomen: NBS, NT/ND, No HSM, No rigidity or guarding  MSK: No LE edema. No Calf tenderness  : No aguilar  Skin: No rashes or lesions. Skin is warm and dry to the touch.   Neuro: Alert and Awake. CN 2-12 Grossly intact. Moves all four extremities spontaneously.  Psych: Calm, Pleasant, Cooperative                        10.9   5.85  )-----------( 277      ( 19 Aug 2020 04:28 )             33.9       08-19    140  |  99  |  15  ----------------------------<  127<H>  3.4<L>   |  29  |  0.90    Ca    9.1      19 Aug 2020 04:28            MICROBIOLOGY:  Vancomycin Level, Trough: 24.1 ug/mL (08-20-20 @ 17:40)  v  .Blood Blood  08-16-20   No growth to date.  --  --      .Urine  08-14-20   >100,000 CFU/ml Enterococcus faecalis  --  Enterococcus faecalis      .Urine Clean Catch (Midstream)  08-13-20   >100,000 CFU/ml Enterococcus faecalis  --  Enterococcus faecalis      .Blood Blood-Peripheral  08-13-20   Growth in anaerobic bottle: Enterococcus faecalis  See previous culture [id number]  --  Blood Culture PCR  Enterococcus faecalis      .Urine Clean Catch (Midstream)  07-26-20   >100,000 CFU/ml Enterococcus faecalis  --  Enterococcus faecalis      RADIOLOGY:    <The imaging below has been reviewed and visualized by me independently. Findings as detailed in report below>    EXAM:  XR CHEST PORTABLE URGENT 1V                        PROCEDURE DATE:  08/15/2020    Trace left pleural effusion. Otherwise, clear lungs.

## 2020-08-20 NOTE — PROVIDER CONTACT NOTE (OTHER) - ASSESSMENT
Pt temperature 97/4, heart rate 69, blood pressure was 188/100 auscultated with the stethoscope, respirations 18 and 99% on room air. Pt has no complaints of chest pain at this time, pt has no c/o of pain. Pt is asymptomatic. No signs and symptoms of discomfort noted. Pt resting comfortably. Safety maintained.

## 2020-08-21 ENCOUNTER — APPOINTMENT (OUTPATIENT)
Dept: GASTROENTEROLOGY | Facility: HOSPITAL | Age: 70
End: 2020-08-21

## 2020-08-21 LAB
ANION GAP SERPL CALC-SCNC: 11 MMOL/L — SIGNIFICANT CHANGE UP (ref 5–17)
BUN SERPL-MCNC: 15 MG/DL — SIGNIFICANT CHANGE UP (ref 7–23)
CALCIUM SERPL-MCNC: 9.1 MG/DL — SIGNIFICANT CHANGE UP (ref 8.4–10.5)
CHLORIDE SERPL-SCNC: 100 MMOL/L — SIGNIFICANT CHANGE UP (ref 96–108)
CO2 SERPL-SCNC: 29 MMOL/L — SIGNIFICANT CHANGE UP (ref 22–31)
CREAT SERPL-MCNC: 0.88 MG/DL — SIGNIFICANT CHANGE UP (ref 0.5–1.3)
CULTURE RESULTS: SIGNIFICANT CHANGE UP
CULTURE RESULTS: SIGNIFICANT CHANGE UP
GLUCOSE SERPL-MCNC: 130 MG/DL — HIGH (ref 70–99)
MAGNESIUM SERPL-MCNC: 1.9 MG/DL — SIGNIFICANT CHANGE UP (ref 1.6–2.6)
POTASSIUM SERPL-MCNC: 3.3 MMOL/L — LOW (ref 3.5–5.3)
POTASSIUM SERPL-SCNC: 3.3 MMOL/L — LOW (ref 3.5–5.3)
SODIUM SERPL-SCNC: 140 MMOL/L — SIGNIFICANT CHANGE UP (ref 135–145)
SPECIMEN SOURCE: SIGNIFICANT CHANGE UP
SPECIMEN SOURCE: SIGNIFICANT CHANGE UP
VANCOMYCIN FLD-MCNC: 16.9 UG/ML — SIGNIFICANT CHANGE UP
VANCOMYCIN TROUGH SERPL-MCNC: 17.8 UG/ML — SIGNIFICANT CHANGE UP (ref 10–20)

## 2020-08-21 PROCEDURE — 99232 SBSQ HOSP IP/OBS MODERATE 35: CPT

## 2020-08-21 PROCEDURE — 93306 TTE W/DOPPLER COMPLETE: CPT | Mod: 26

## 2020-08-21 RX ORDER — POTASSIUM CHLORIDE 20 MEQ
40 PACKET (EA) ORAL EVERY 4 HOURS
Refills: 0 | Status: COMPLETED | OUTPATIENT
Start: 2020-08-21 | End: 2020-08-21

## 2020-08-21 RX ORDER — POTASSIUM CHLORIDE 20 MEQ
40 PACKET (EA) ORAL EVERY 4 HOURS
Refills: 0 | Status: DISCONTINUED | OUTPATIENT
Start: 2020-08-21 | End: 2020-08-21

## 2020-08-21 RX ADMIN — POLYETHYLENE GLYCOL 3350 17 GRAM(S): 17 POWDER, FOR SOLUTION ORAL at 17:14

## 2020-08-21 RX ADMIN — Medication 600 MILLIGRAM(S): at 11:43

## 2020-08-21 RX ADMIN — Medication 400 MILLIGRAM(S): at 17:14

## 2020-08-21 RX ADMIN — APIXABAN 5 MILLIGRAM(S): 2.5 TABLET, FILM COATED ORAL at 17:14

## 2020-08-21 RX ADMIN — APIXABAN 5 MILLIGRAM(S): 2.5 TABLET, FILM COATED ORAL at 05:57

## 2020-08-21 RX ADMIN — NYSTATIN CREAM 1 APPLICATION(S): 100000 CREAM TOPICAL at 05:57

## 2020-08-21 RX ADMIN — NYSTATIN CREAM 1 APPLICATION(S): 100000 CREAM TOPICAL at 17:14

## 2020-08-21 RX ADMIN — Medication 81 MILLIGRAM(S): at 11:43

## 2020-08-21 RX ADMIN — Medication 75 MICROGRAM(S): at 05:57

## 2020-08-21 RX ADMIN — Medication 50 MILLIGRAM(S): at 05:57

## 2020-08-21 RX ADMIN — Medication 250 MILLIGRAM(S): at 06:42

## 2020-08-21 RX ADMIN — Medication 0.2 MILLIGRAM(S): at 05:57

## 2020-08-21 RX ADMIN — POLYETHYLENE GLYCOL 3350 17 GRAM(S): 17 POWDER, FOR SOLUTION ORAL at 06:03

## 2020-08-21 RX ADMIN — Medication 0.2 MILLIGRAM(S): at 21:45

## 2020-08-21 RX ADMIN — Medication 40 MILLIEQUIVALENT(S): at 17:12

## 2020-08-21 RX ADMIN — Medication 600 MILLIGRAM(S): at 05:57

## 2020-08-21 RX ADMIN — Medication 40 MILLIEQUIVALENT(S): at 11:43

## 2020-08-21 RX ADMIN — Medication 0.2 MILLIGRAM(S): at 14:12

## 2020-08-21 RX ADMIN — Medication 50 MILLIGRAM(S): at 14:12

## 2020-08-21 RX ADMIN — Medication 250 MILLIGRAM(S): at 17:13

## 2020-08-21 RX ADMIN — Medication 50 MILLIGRAM(S): at 21:45

## 2020-08-21 NOTE — PROGRESS NOTE ADULT - SUBJECTIVE AND OBJECTIVE BOX
Follow Up:  enterococcus bacteremia    Interval History: afebrile. denies abdominal pain.     REVIEW OF SYSTEMS  [  ] ROS unobtainable because:    [ x ] All other systems negative except as noted below    Constitutional:  [ ] fever [ ] chills  [ ] weight loss  [ ] weakness  Skin:  [ ] rash [ ] phlebitis	  Eyes: [ ] icterus [ ] pain  [ ] discharge	  ENMT: [ ] sore throat  [ ] thrush [ ] ulcers [ ] exudates  Respiratory: [ ] dyspnea [ ] hemoptysis [ ] cough [ ] sputum	  Cardiovascular:  [ ] chest pain [ ] palpitations [ ] edema	  Gastrointestinal:  [ ] nausea [ ] vomiting [ ] diarrhea [ ] constipation [ ] pain	  Genitourinary:  [ ] dysuria [ ] frequency [ ] hematuria [ ] discharge [ ] flank pain  [ ] incontinence  Musculoskeletal:  [ ] myalgias [ ] arthralgias [ ] arthritis  [ ] back pain  Neurological:  [ ] headache [ ] seizures  [ ] confusion/altered mental status    Allergies  penicillin (Hives)  penicillin (Rash)  sulfa drugs (Unknown)  Tetracycline Hydrochloride (Unknown)        ANTIMICROBIALS:  vancomycin  IVPB 750 every 12 hours      OTHER MEDS:  MEDICATIONS  (STANDING):  acetaminophen   Tablet .. 650 every 6 hours PRN  apixaban 5 every 12 hours  aspirin enteric coated 81 daily  atorvastatin 80 at bedtime  bisacodyl Suppository 10 once  cloNIDine 0.2 three times a day  hydrALAZINE 50 three times a day  labetalol 600 <User Schedule>  labetalol 400 <User Schedule>  levothyroxine 75 daily  polyethylene glycol 3350 17 two times a day  senna 2 at bedtime      Vital Signs Last 24 Hrs  T(C): 36.8 (21 Aug 2020 17:15), Max: 36.8 (21 Aug 2020 05:32)  T(F): 98.3 (21 Aug 2020 17:15), Max: 98.3 (21 Aug 2020 17:15)  HR: 67 (21 Aug 2020 17:15) (55 - 67)  BP: 160/93 (21 Aug 2020 17:15) (130/62 - 192/102)  BP(mean): --  RR: 18 (21 Aug 2020 17:15) (17 - 18)  SpO2: 98% (21 Aug 2020 17:15) (96% - 98%)    PHYSICAL EXAMINATION:  General: Alert and Awake, NAD  HEENT: PERRL, EOMI  Neck: Supple  Cardiac: RRR, No M/R/G  Resp: CTAB, No Wh/Rh/Ra  Abdomen: NBS, NT/ND, No HSM, No rigidity or guarding  MSK: No LE edema. No Calf tenderness  : No aguilar  Skin: No rashes or lesions. Skin is warm and dry to the touch.   Neuro: Alert and Awake. CN 2-12 Grossly intact. Moves all four extremities spontaneously.  Psych: Calm, Pleasant, Cooperative        08-21    140  |  100  |  15  ----------------------------<  130<H>  3.3<L>   |  29  |  0.88    Ca    9.1      21 Aug 2020 06:10  Mg     1.9     08-21            MICROBIOLOGY:  Vancomycin Level, Random: 16.9 ug/mL (08-21-20 @ 06:10)  Vancomycin Level, Trough: 17.8 ug/mL (08-21-20 @ 06:10)  v  .Blood Blood  08-16-20   No growth to date.  --  --      .Urine  08-14-20   >100,000 CFU/ml Enterococcus faecalis  --  Enterococcus faecalis      .Urine Clean Catch (Midstream)  08-13-20   >100,000 CFU/ml Enterococcus faecalis  --  Enterococcus faecalis      .Blood Blood-Peripheral  08-13-20   Growth in anaerobic bottle: Enterococcus faecalis  See previous culture [id number]  --  Blood Culture PCR  Enterococcus faecalis      .Urine Clean Catch (Midstream)  07-26-20   >100,000 CFU/ml Enterococcus faecalis  --  Enterococcus faecalis      RADIOLOGY:    <The imaging below has been reviewed and visualized by me independently. Findings as detailed in report below>    EXAM:  XR CHEST PORTABLE URGENT 1V                        PROCEDURE DATE:  08/15/2020    Trace left pleural effusion. Otherwise, clear lungs.

## 2020-08-21 NOTE — PROVIDER CONTACT NOTE (OTHER) - ACTION/TREATMENT ORDERED:
As per Kain, PA continue to monitor pt at this time because as per neurology goal for pt SBP  160s to 180s. No interventios ordered at this time. Will continue to monitor.

## 2020-08-21 NOTE — PROGRESS NOTE ADULT - SUBJECTIVE AND OBJECTIVE BOX
HPI:  69 y/o F with a significant PMHx of, HTN, Hypothyroidism,  carpal tunnel syndrome, lymphedema admitted to my service discharged 08/03/20 to [facility] p/w Altered mental status.     Patient had a complicated hospital course last admission with elevated cardiac enzymes from demand ischemia due to A.fib with rvr, refused cardiac cath had normal stress test, course complicated with Enterococcus UTI and colitis requiring antibiotics discharged to [facility] p/w AMS x 1 day. As per [facility] patient very agitated, altered refusing to take medications.     In the ed patient had CT head with no acute findings, CT abdomen shows 5-6 mm ureteral stones with mild hydronephrosis.   Patient was seen by urology,     Currently patient alert, awake, oriented x 3, denies abdominal pain/ nausea/ vomiting, noted to have fever 103 F, received Ciprofloxacin and Flagyl. (13 Aug 2020 20:23)      Patient is a 70y old  Female who presents with a chief complaint of AMS x 1 day (14 Aug 2020 17:06)      SUBJECTIVE / OVERNIGHT EVENTS: Patient feels ok, no complaints.  Discussed at length discharge planning with patient on oral antibiotics possibly Monday.   No events over night.     T(C): 36.8 (08-21-20 @ 17:15), Max: 36.8 (08-21-20 @ 17:15)  HR: 67 (08-21-20 @ 17:15) (65 - 67)  BP: 160/93 (08-21-20 @ 17:15) (130/62 - 160/93)  RR: 18 (08-21-20 @ 17:15) (17 - 18)  SpO2: 98% (08-21-20 @ 17:15) (96% - 98%)    MEDICATIONS  (STANDING):  apixaban 5 milliGRAM(s) Oral every 12 hours  aspirin enteric coated 81 milliGRAM(s) Oral daily  atorvastatin 80 milliGRAM(s) Oral at bedtime  bisacodyl Suppository 10 milliGRAM(s) Rectal once  cloNIDine 0.2 milliGRAM(s) Oral three times a day  hydrALAZINE 50 milliGRAM(s) Oral three times a day  labetalol 600 milliGRAM(s) Oral <User Schedule>  labetalol 400 milliGRAM(s) Oral <User Schedule>  lactobacillus acidophilus 1 Tablet(s) Oral daily  levothyroxine 75 MICROGram(s) Oral daily  nystatin Powder 1 Application(s) Topical two times a day  polyethylene glycol 3350 17 Gram(s) Oral two times a day  senna 2 Tablet(s) Oral at bedtime  vancomycin  IVPB 750 milliGRAM(s) IV Intermittent every 12 hours    MEDICATIONS  (PRN):  acetaminophen   Tablet .. 650 milliGRAM(s) Oral every 6 hours PRN Temp greater or equal to 38C (100.4F)      PHYSICAL EXAM:  GENERAL: NAD, well-developed  HEAD:  Atraumatic, Normocephalic  EYES: EOMI, conjunctiva and sclera clear  NECK: Supple, No JVD  CHEST/LUNG: Clear to auscultation bilaterally; No wheeze  HEART: Regular rate and rhythm; No murmurs, rubs, or gallops  ABDOMEN: Soft, Nontender, rlq tenderness, Nondistended; Bowel sounds present  EXTREMITIES:  2+ Peripheral Pulses, No clubbing, cyanosis, or edema  PSYCH: AAOx3  NEUROLOGY: non-focal  SKIN: No rashes or lesions                                                  140|100|15<130  3.3|29|0.88  9.1,1.9,--  08-21 @ 06:10    RADIOLOGY & ADDITIONAL TESTS:    Imaging Personally Reviewed:    Consultant(s) Notes Reviewed:      Care Discussed with Consultants/Other Providers:

## 2020-08-21 NOTE — CHART NOTE - NSCHARTNOTEFT_GEN_A_CORE
CC: /102      HPI:  Called by RN to evaluate patient for /102 manually this AM. Patient seen and assessed at bedside, NAD, alert and awake. Patient's was upset and tearful regarding her elevated BP and scared she will have to get more stents. She denied dizziness, changes in vision, palpitations, CP, SOB, abdominal  pain, N/V/D, numbness/tingling, or extremity weakness. She endorsed an intermittent headache that is NOT new and unchanged from prior. Patient also noted to be hypertensive overnight however BP this AM is more elevated; she was asymptomatic overnight.         ROS:  CONSTITUTIONAL:  No fever, chills, rigors  CARDIOVASCULAR:  No chest pain or palpitations  RESPIRATORY:   No SOB, cough, wheezing  GASTROINTESTINAL:  No abd pain, N/V/D  NEUROLOGIC:  (+) intermittent headache. NO  visual disturbances  SKIN: No rashes        PAST MEDICAL & SURGICAL HISTORY:  Lymphedema  Essential hypertension  Carpal tunnel syndrome of right wrist  Obesity (BMI 30-39.9)  Type 2 diabetes mellitus without complication  Hypothyroid  Essential hypertension  Obesity  HTN (hypertension)  DM (diabetes mellitus)  No significant past surgical history  S/P carpal tunnel release          Vital Signs Last 24 Hrs  T(C): 36.8 (21 Aug 2020 05:32), Max: 36.9 (20 Aug 2020 08:30)  T(F): 98.2 (21 Aug 2020 05:32), Max: 98.4 (20 Aug 2020 08:30)  HR: 65 (21 Aug 2020 05:32) (55 - 81)  BP: 192/102 (21 Aug 2020 05:32) (150/70 - 206/99)  RR: 17 (21 Aug 2020 05:32) (17 - 18)  SpO2: 97% (21 Aug 2020 05:32) (95% - 98%)      Physical Exam:  General: WN/WD, NAD, AOx3, nontoxic appearing  Mental status: Awake, alert, and in no apparent distress. Oriented to person, place and time. Language function is normal. Recent memory and concentration were normal.   Head:  NC/AT  Cranial Nerves: PERRL. EOMI. There was no facial asymmetry. The palate was upgoing symmetrically and tongue was midline. Shoulder shrug was full bilaterally.  CV: RRR, S1S2   Respiratory: CTA B/L, nonlabored  MSK: No BLLE edema, + peripheral pulses, FROM all 4 extremity  Motor exam: Bulk and tone were normal. Strength was 5/5 in all four extremities.   Skin: (+) warm, dry   Sensation: Intact to light touch  Psych: Appropriate affect         Labs:    08-21    140  |  100  |  15  ----------------------------<  130<H>  3.3<L>   |  29  |  0.88    Ca    9.1      21 Aug 2020 06:10  Mg     1.9     08-21            Radiology:  < from: CT Angio Neck w/ IV Cont (08.15.20 @ 01:29) >  CT HEAD: No acute intracranial hemorrhage. These findings were discussed with Dr. Moran on 8/15/2020 at 1:30 AM by Dr. Mcginnis with read back confirmation.  CTA BRAIN: Patent  1. 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  2. V4 segment right vertebral artery demonstrates irregular stenoses with a long segment of occlusion/near occlusion in the distal aspect. Distal reconstitution occurs.  3. 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  4. 5 mm fusiform ectasia of the cavernous segment of the right ICA.  CTA NECK: Patent cervical vasculature. No flow limiting stenosis or occlusion.  < end of copied text >        Assessment & Plan:  71 y/o F with a significant PMHx of, HTN, Hypothyroidism,  carpal tunnel syndrome, lymphedema admitted to my service discharged 08/03/20 to [facility] p/w Altered mental status.   Patient now presenting acutely with hypertensive urgency, BP this AM noted to be 192/102 manually.    #Hypertensive urgency  -No focal deficits on exam, patient is asymptomatic  -Patient received clonidine 0.2mg, hydralazine 50mg, and labetalol 600mg this AM at 05:57  -Vital signs to be repeated in 1-2 hours to ensure proper response to medications  -Consider further up-titration of medications vs changing to different agent if patient is persistently hypertensive  -Consider hydralazine IVP if patient remains hypertensive when vitals are rechecked  -Cardiology is following patient  -TTE pending  -Will continue to closely monitor patient/vitals  -Primary Team to follow up in AM, attending to follow       Kain Alejandra PA-C  Dept of Medicine  [id number]

## 2020-08-21 NOTE — PROVIDER CONTACT NOTE (OTHER) - ASSESSMENT
pt BP elevated post hydralizine and clonidine administration SBP 170s. Denies headaches. Pt requesting to sleep.  Denies pain. Denies chest pain and SOB.

## 2020-08-22 LAB
ANION GAP SERPL CALC-SCNC: 13 MMOL/L — SIGNIFICANT CHANGE UP (ref 5–17)
BUN SERPL-MCNC: 15 MG/DL — SIGNIFICANT CHANGE UP (ref 7–23)
CALCIUM SERPL-MCNC: 9.3 MG/DL — SIGNIFICANT CHANGE UP (ref 8.4–10.5)
CHLORIDE SERPL-SCNC: 99 MMOL/L — SIGNIFICANT CHANGE UP (ref 96–108)
CO2 SERPL-SCNC: 27 MMOL/L — SIGNIFICANT CHANGE UP (ref 22–31)
CREAT SERPL-MCNC: 1.02 MG/DL — SIGNIFICANT CHANGE UP (ref 0.5–1.3)
GLUCOSE SERPL-MCNC: 134 MG/DL — HIGH (ref 70–99)
HCT VFR BLD CALC: 34.8 % — SIGNIFICANT CHANGE UP (ref 34.5–45)
HGB BLD-MCNC: 11.2 G/DL — LOW (ref 11.5–15.5)
MCHC RBC-ENTMCNC: 29.3 PG — SIGNIFICANT CHANGE UP (ref 27–34)
MCHC RBC-ENTMCNC: 32.2 GM/DL — SIGNIFICANT CHANGE UP (ref 32–36)
MCV RBC AUTO: 91.1 FL — SIGNIFICANT CHANGE UP (ref 80–100)
NRBC # BLD: 0 /100 WBCS — SIGNIFICANT CHANGE UP (ref 0–0)
PLATELET # BLD AUTO: 347 K/UL — SIGNIFICANT CHANGE UP (ref 150–400)
POTASSIUM SERPL-MCNC: 3.8 MMOL/L — SIGNIFICANT CHANGE UP (ref 3.5–5.3)
POTASSIUM SERPL-SCNC: 3.8 MMOL/L — SIGNIFICANT CHANGE UP (ref 3.5–5.3)
RBC # BLD: 3.82 M/UL — SIGNIFICANT CHANGE UP (ref 3.8–5.2)
RBC # FLD: 14.4 % — SIGNIFICANT CHANGE UP (ref 10.3–14.5)
SODIUM SERPL-SCNC: 139 MMOL/L — SIGNIFICANT CHANGE UP (ref 135–145)
VANCOMYCIN TROUGH SERPL-MCNC: 19.2 UG/ML — SIGNIFICANT CHANGE UP (ref 10–20)
WBC # BLD: 7 K/UL — SIGNIFICANT CHANGE UP (ref 3.8–10.5)
WBC # FLD AUTO: 7 K/UL — SIGNIFICANT CHANGE UP (ref 3.8–10.5)

## 2020-08-22 RX ADMIN — Medication 400 MILLIGRAM(S): at 18:12

## 2020-08-22 RX ADMIN — Medication 250 MILLIGRAM(S): at 05:29

## 2020-08-22 RX ADMIN — Medication 0.2 MILLIGRAM(S): at 05:22

## 2020-08-22 RX ADMIN — Medication 600 MILLIGRAM(S): at 11:52

## 2020-08-22 RX ADMIN — Medication 81 MILLIGRAM(S): at 11:51

## 2020-08-22 RX ADMIN — Medication 0.2 MILLIGRAM(S): at 22:39

## 2020-08-22 RX ADMIN — Medication 50 MILLIGRAM(S): at 13:23

## 2020-08-22 RX ADMIN — APIXABAN 5 MILLIGRAM(S): 2.5 TABLET, FILM COATED ORAL at 18:12

## 2020-08-22 RX ADMIN — APIXABAN 5 MILLIGRAM(S): 2.5 TABLET, FILM COATED ORAL at 05:23

## 2020-08-22 RX ADMIN — Medication 250 MILLIGRAM(S): at 19:10

## 2020-08-22 RX ADMIN — NYSTATIN CREAM 1 APPLICATION(S): 100000 CREAM TOPICAL at 05:26

## 2020-08-22 RX ADMIN — Medication 0.2 MILLIGRAM(S): at 13:23

## 2020-08-22 RX ADMIN — Medication 50 MILLIGRAM(S): at 22:39

## 2020-08-22 RX ADMIN — SENNA PLUS 2 TABLET(S): 8.6 TABLET ORAL at 22:39

## 2020-08-22 RX ADMIN — Medication 600 MILLIGRAM(S): at 05:26

## 2020-08-22 RX ADMIN — Medication 75 MICROGRAM(S): at 05:23

## 2020-08-22 RX ADMIN — Medication 50 MILLIGRAM(S): at 05:23

## 2020-08-22 NOTE — PROGRESS NOTE ADULT - SUBJECTIVE AND OBJECTIVE BOX
HPI:  71 y/o F with a significant PMHx of, HTN, Hypothyroidism,  carpal tunnel syndrome, lymphedema admitted to my service discharged 08/03/20 to [facility] p/w Altered mental status.     Patient had a complicated hospital course last admission with elevated cardiac enzymes from demand ischemia due to A.fib with rvr, refused cardiac cath had normal stress test, course complicated with Enterococcus UTI and colitis requiring antibiotics discharged to [facility] p/w AMS x 1 day. As per [facility] patient very agitated, altered refusing to take medications.     In the ed patient had CT head with no acute findings, CT abdomen shows 5-6 mm ureteral stones with mild hydronephrosis.   Patient was seen by urology,     Currently patient alert, awake, oriented x 3, denies abdominal pain/ nausea/ vomiting, noted to have fever 103 F, received Ciprofloxacin and Flagyl. (13 Aug 2020 20:23)      Patient is a 70y old  Female who presents with a chief complaint of AMS x 1 day (14 Aug 2020 17:06)      SUBJECTIVE / OVERNIGHT EVENTS: Patient feels ok, no complaints.  Discussed at length discharge planning with patient on oral antibiotics possibly Monday.   No events over night.     T(C): 36.4 (08-22-20 @ 21:50), Max: 36.8 (08-22-20 @ 16:40)  HR: 62 (08-22-20 @ 21:50) (62 - 69)  BP: 166/108 (08-22-20 @ 21:50) (159/90 - 180/88)  RR: 18 (08-22-20 @ 21:50) (18 - 18)  SpO2: 96% (08-22-20 @ 21:50) (96% - 98%)    MEDICATIONS  (STANDING):  apixaban 5 milliGRAM(s) Oral every 12 hours  aspirin enteric coated 81 milliGRAM(s) Oral daily  atorvastatin 80 milliGRAM(s) Oral at bedtime  bisacodyl Suppository 10 milliGRAM(s) Rectal once  cloNIDine 0.2 milliGRAM(s) Oral three times a day  hydrALAZINE 50 milliGRAM(s) Oral three times a day  labetalol 600 milliGRAM(s) Oral <User Schedule>  labetalol 400 milliGRAM(s) Oral <User Schedule>  lactobacillus acidophilus 1 Tablet(s) Oral daily  levothyroxine 75 MICROGram(s) Oral daily  nystatin Powder 1 Application(s) Topical two times a day  polyethylene glycol 3350 17 Gram(s) Oral two times a day  senna 2 Tablet(s) Oral at bedtime  vancomycin  IVPB 750 milliGRAM(s) IV Intermittent every 12 hours    MEDICATIONS  (PRN):  acetaminophen   Tablet .. 650 milliGRAM(s) Oral every 6 hours PRN Temp greater or equal to 38C (100.4F)      PHYSICAL EXAM:  GENERAL: NAD, well-developed  HEAD:  Atraumatic, Normocephalic  EYES: EOMI, conjunctiva and sclera clear  NECK: Supple, No JVD  CHEST/LUNG: Clear to auscultation bilaterally; No wheeze  HEART: Regular rate and rhythm; No murmurs, rubs, or gallops  ABDOMEN: Soft, Nontender, rlq tenderness, Nondistended; Bowel sounds present  EXTREMITIES:  2+ Peripheral Pulses, No clubbing, cyanosis, or edema  PSYCH: AAOx3  NEUROLOGY: non-focal  SKIN: No rashes or lesions                                                  140|100|15<130  3.3|29|0.88  9.1,1.9,--  08-21 @ 06:10    RADIOLOGY & ADDITIONAL TESTS:    Imaging Personally Reviewed:    Consultant(s) Notes Reviewed:      Care Discussed with Consultants/Other Providers:

## 2020-08-23 ENCOUNTER — TRANSCRIPTION ENCOUNTER (OUTPATIENT)
Age: 70
End: 2020-08-23

## 2020-08-23 LAB
ANION GAP SERPL CALC-SCNC: 12 MMOL/L — SIGNIFICANT CHANGE UP (ref 5–17)
BUN SERPL-MCNC: 13 MG/DL — SIGNIFICANT CHANGE UP (ref 7–23)
CALCIUM SERPL-MCNC: 9.2 MG/DL — SIGNIFICANT CHANGE UP (ref 8.4–10.5)
CHLORIDE SERPL-SCNC: 101 MMOL/L — SIGNIFICANT CHANGE UP (ref 96–108)
CO2 SERPL-SCNC: 27 MMOL/L — SIGNIFICANT CHANGE UP (ref 22–31)
CREAT SERPL-MCNC: 0.86 MG/DL — SIGNIFICANT CHANGE UP (ref 0.5–1.3)
GLUCOSE SERPL-MCNC: 134 MG/DL — HIGH (ref 70–99)
HCT VFR BLD CALC: 36.2 % — SIGNIFICANT CHANGE UP (ref 34.5–45)
HGB BLD-MCNC: 11.5 G/DL — SIGNIFICANT CHANGE UP (ref 11.5–15.5)
MCHC RBC-ENTMCNC: 29 PG — SIGNIFICANT CHANGE UP (ref 27–34)
MCHC RBC-ENTMCNC: 31.8 GM/DL — LOW (ref 32–36)
MCV RBC AUTO: 91.2 FL — SIGNIFICANT CHANGE UP (ref 80–100)
NRBC # BLD: 0 /100 WBCS — SIGNIFICANT CHANGE UP (ref 0–0)
PLATELET # BLD AUTO: 345 K/UL — SIGNIFICANT CHANGE UP (ref 150–400)
POTASSIUM SERPL-MCNC: 3.5 MMOL/L — SIGNIFICANT CHANGE UP (ref 3.5–5.3)
POTASSIUM SERPL-SCNC: 3.5 MMOL/L — SIGNIFICANT CHANGE UP (ref 3.5–5.3)
RBC # BLD: 3.97 M/UL — SIGNIFICANT CHANGE UP (ref 3.8–5.2)
RBC # FLD: 14.2 % — SIGNIFICANT CHANGE UP (ref 10.3–14.5)
SODIUM SERPL-SCNC: 140 MMOL/L — SIGNIFICANT CHANGE UP (ref 135–145)
WBC # BLD: 6.3 K/UL — SIGNIFICANT CHANGE UP (ref 3.8–10.5)
WBC # FLD AUTO: 6.3 K/UL — SIGNIFICANT CHANGE UP (ref 3.8–10.5)

## 2020-08-23 RX ORDER — LORATADINE 10 MG/1
10 TABLET ORAL DAILY
Refills: 0 | Status: DISCONTINUED | OUTPATIENT
Start: 2020-08-23 | End: 2020-08-25

## 2020-08-23 RX ORDER — HYDRALAZINE HCL 50 MG
10 TABLET ORAL ONCE
Refills: 0 | Status: COMPLETED | OUTPATIENT
Start: 2020-08-23 | End: 2020-08-23

## 2020-08-23 RX ADMIN — Medication 250 MILLIGRAM(S): at 17:50

## 2020-08-23 RX ADMIN — Medication 50 MILLIGRAM(S): at 14:57

## 2020-08-23 RX ADMIN — Medication 600 MILLIGRAM(S): at 06:01

## 2020-08-23 RX ADMIN — Medication 0.2 MILLIGRAM(S): at 21:24

## 2020-08-23 RX ADMIN — APIXABAN 5 MILLIGRAM(S): 2.5 TABLET, FILM COATED ORAL at 17:09

## 2020-08-23 RX ADMIN — Medication 0.2 MILLIGRAM(S): at 06:01

## 2020-08-23 RX ADMIN — Medication 1 TABLET(S): at 12:06

## 2020-08-23 RX ADMIN — POLYETHYLENE GLYCOL 3350 17 GRAM(S): 17 POWDER, FOR SOLUTION ORAL at 17:10

## 2020-08-23 RX ADMIN — Medication 81 MILLIGRAM(S): at 12:06

## 2020-08-23 RX ADMIN — Medication 75 MICROGRAM(S): at 06:01

## 2020-08-23 RX ADMIN — Medication 600 MILLIGRAM(S): at 12:05

## 2020-08-23 RX ADMIN — Medication 250 MILLIGRAM(S): at 06:00

## 2020-08-23 RX ADMIN — Medication 50 MILLIGRAM(S): at 06:01

## 2020-08-23 RX ADMIN — APIXABAN 5 MILLIGRAM(S): 2.5 TABLET, FILM COATED ORAL at 06:01

## 2020-08-23 RX ADMIN — Medication 650 MILLIGRAM(S): at 23:47

## 2020-08-23 RX ADMIN — Medication 0.2 MILLIGRAM(S): at 14:57

## 2020-08-23 RX ADMIN — SENNA PLUS 2 TABLET(S): 8.6 TABLET ORAL at 21:24

## 2020-08-23 RX ADMIN — Medication 50 MILLIGRAM(S): at 21:24

## 2020-08-23 RX ADMIN — NYSTATIN CREAM 1 APPLICATION(S): 100000 CREAM TOPICAL at 17:09

## 2020-08-23 RX ADMIN — Medication 400 MILLIGRAM(S): at 17:09

## 2020-08-23 RX ADMIN — Medication 10 MILLIGRAM(S): at 17:50

## 2020-08-23 NOTE — PROVIDER CONTACT NOTE (OTHER) - RECOMMENDATIONS
Follow provider orders
Alonso Sexton made aware, RN recommends IV tylenol & give B/P meds early.
Follow provider orders
IV tylenol?
LILLY Summers RN recommends IV tylenol & blood cultures.
Notify Provider
Notify Provider, AM BP meds?
Notify provider; give meds early?
Follow provider orders

## 2020-08-23 NOTE — DISCHARGE NOTE PROVIDER - CARE PROVIDER_API CALL
Hoenig, David M  UROLOGY  [facility] Dept of Urology, [address] [place] [address]  Phone: [phone]  Fax: [phone]  Follow Up Time:

## 2020-08-23 NOTE — PROGRESS NOTE ADULT - SUBJECTIVE AND OBJECTIVE BOX
HPI:  69 y/o F with a significant PMHx of, HTN, Hypothyroidism,  carpal tunnel syndrome, lymphedema admitted to my service discharged 08/03/20 to [facility] p/w Altered mental status.     Patient had a complicated hospital course last admission with elevated cardiac enzymes from demand ischemia due to A.fib with rvr, refused cardiac cath had normal stress test, course complicated with Enterococcus UTI and colitis requiring antibiotics discharged to [facility] p/w AMS x 1 day. As per [facility] patient very agitated, altered refusing to take medications.     In the ed patient had CT head with no acute findings, CT abdomen shows 5-6 mm ureteral stones with mild hydronephrosis.   Patient was seen by urology,     Currently patient alert, awake, oriented x 3, denies abdominal pain/ nausea/ vomiting, noted to have fever 103 F, received Ciprofloxacin and Flagyl. (13 Aug 2020 20:23)      Patient is a 70y old  Female who presents with a chief complaint of AMS x 1 day (14 Aug 2020 17:06)      SUBJECTIVE / OVERNIGHT EVENTS: Patient feels ok, no complaints.  Discussed at length discharge planning with patient on oral antibiotics possibly Monday.   No events over night.       T(C): 36.4 (08-23-20 @ 16:43), Max: 36.6 (08-23-20 @ 05:25)  HR: 64 (08-23-20 @ 16:43) (63 - 65)  BP: 215/100 (08-23-20 @ 17:05) (134/89 - 230/112)  RR: 18 (08-23-20 @ 16:43) (18 - 18)  SpO2: 97% (08-23-20 @ 16:43) (95% - 97%)    MEDICATIONS  (STANDING):  apixaban 5 milliGRAM(s) Oral every 12 hours  aspirin enteric coated 81 milliGRAM(s) Oral daily  atorvastatin 80 milliGRAM(s) Oral at bedtime  cloNIDine 0.2 milliGRAM(s) Oral three times a day  hydrALAZINE 50 milliGRAM(s) Oral three times a day  hydrALAZINE Injectable 10 milliGRAM(s) IV Push once  labetalol 600 milliGRAM(s) Oral <User Schedule>  labetalol 400 milliGRAM(s) Oral <User Schedule>  lactobacillus acidophilus 1 Tablet(s) Oral daily  levothyroxine 75 MICROGram(s) Oral daily  nystatin Powder 1 Application(s) Topical two times a day  polyethylene glycol 3350 17 Gram(s) Oral two times a day  senna 2 Tablet(s) Oral at bedtime  vancomycin  IVPB 750 milliGRAM(s) IV Intermittent every 12 hours    MEDICATIONS  (PRN):  acetaminophen   Tablet .. 650 milliGRAM(s) Oral every 6 hours PRN Temp greater or equal to 38C (100.4F)    PHYSICAL EXAM:  GENERAL: NAD, well-developed  HEAD:  Atraumatic, Normocephalic  EYES: EOMI, conjunctiva and sclera clear  NECK: Supple, No JVD  CHEST/LUNG: Clear to auscultation bilaterally; No wheeze  HEART: Regular rate and rhythm; No murmurs, rubs, or gallops  ABDOMEN: Soft, Nontender, rlq tenderness, Nondistended; Bowel sounds present  EXTREMITIES:  2+ Peripheral Pulses, No clubbing, cyanosis, or edema  PSYCH: AAOx3  NEUROLOGY: non-focal  SKIN: No rashes or lesions                                                              11.5   6.30  )-----------( 345      ( 23 Aug 2020 05:17 )             36.2               140|101|13<134  3.5|27|0.86  9.2,--,--  08-23 @ 05:17    RADIOLOGY & ADDITIONAL TESTS:    Imaging Personally Reviewed:    Consultant(s) Notes Reviewed:      Care Discussed with Consultants/Other Providers:

## 2020-08-23 NOTE — DISCHARGE NOTE PROVIDER - HOSPITAL COURSE
71 y/o F with a significant PMHx of, HTN, Hypothyroidism,  carpal tunnel syndrome, lymphedema admitted to my service discharged 08/03/20 to [facility] p/w Altered mental status.     For acute metabolic encephalopathy; resolving. 2/2  to high fevers in the setting of ureteral stone with hydronephrosis s/p cystoscopy and L ureteral stent placed,     patient tolerated procedure very well. .Continue with Vancomycin as per ID. Echo shows no vegetations. D/C planning on Monday.     For  bacteremia, ID consulted. Blood cultures positive for Enterococcus, Urine cultures positive for Enterococcus. Pt started on Vancomycin with repeat     cultures showing no growth. For htn, patient with hx of hypertensive urgency. Continue labetalol, hydralazine and clonidine.      Patient has hx of afib, continue Eliquis and asa.    For hx of CAD, with stent 3 years ago, continue aspirin.

## 2020-08-23 NOTE — PROVIDER CONTACT NOTE (OTHER) - ASSESSMENT
Pt's BP is 168/86 and HR is 64. Pt has no c/o of chest pain, neurological checks remain stable. Pt's O2 Saturation on room air is 96%.

## 2020-08-23 NOTE — PROVIDER CONTACT NOTE (OTHER) - ACTION/TREATMENT ORDERED:
As per provider, no interventions necessary at this time. As per neurology pt's goal SBP is 160-180. Safety maintained. Will continue to monitor.

## 2020-08-23 NOTE — DISCHARGE NOTE PROVIDER - NSDCMRMEDTOKEN_GEN_ALL_CORE_FT
acetaminophen 325 mg oral tablet: 2 tab(s) orally every 6 hours, As needed, Moderate Pain (4 - 6)  ALPRAZolam 1 mg oral tablet: 0.5 milligram(s) orally every 8 hours  apixaban 5 mg oral tablet: 1 tab(s) orally every 12 hours  aspirin 81 mg oral delayed release tablet: 1 tab(s) orally once a day  atorvastatin 80 mg oral tablet: 1 tab(s) orally once a day (at bedtime)  bisacodyl 10 mg rectal suppository: 1 suppository(ies) rectal once a day, As needed, Constipation  cholecalciferol oral tablet: 2000 unit(s) orally once a day  cloNIDine 0.1 mg oral tablet: 2 tab(s) orally 3 times a day  clopidogrel 75 mg oral tablet: 1 tab(s) orally once a day  ethacrynic acid 25 mg oral tablet: 1 tab(s) orally once a day  folic acid 1 mg oral tablet: 1 tab(s) orally once a day  gabapentin 300 mg oral capsule: 2 cap(s) orally 3 times a day  hydrALAZINE 50 mg oral tablet: 1 tab(s) orally 3 times a day  hydrocortisone 1% topical cream: 1 application topically 3 times a day, As needed, Itching  Januvia 100 mg oral tablet: 1 tab(s) orally once a day  labetalol 300 mg oral tablet: 2 tab(s) orally 3 times a day  lactulose 10 g/15 mL oral syrup: 15 milliliter(s) orally once a day   levothyroxine 75 mcg (0.075 mg) oral tablet: 1 tab(s) orally once a day  menthol-benzocaine 3.6 mg-15 mg mucous membrane lozenge:  mucous membrane   pantoprazole 40 mg oral delayed release tablet: 1 tab(s) orally once a day  polyethylene glycol 3350 oral powder for reconstitution: 17 gram(s) orally once a day  potassium chloride 20 mEq oral powder for reconstitution: 2 packet(s) orally once a day  pregabalin 50 mg oral capsule: 1 cap(s) orally 2 times a day  senna oral tablet: 2 tab(s) orally once a day (at bedtime)  Vitamin B-12 1000 mcg oral tablet: 1 tab(s) orally once a day  Vitamin B1: 1000 microgram(s) orally once a day  Vitamin C 500 mg oral capsule: 1 cap(s) orally once a day  zinc oxide 20% topical ointment: 1 application topically 2 times a day acetaminophen 325 mg oral tablet: 2 tab(s) orally every 6 hours, As needed, Moderate Pain (4 - 6)  ALPRAZolam 1 mg oral tablet: 0.5 milligram(s) orally every 8 hours  apixaban 5 mg oral tablet: 1 tab(s) orally every 12 hours  aspirin 81 mg oral delayed release tablet: 1 tab(s) orally once a day  atorvastatin 80 mg oral tablet: 1 tab(s) orally once a day (at bedtime)  cholecalciferol oral tablet: 2000 unit(s) orally once a day  cloNIDine 0.1 mg oral tablet: 2 tab(s) orally 3 times a day  folic acid 1 mg oral tablet: 1 tab(s) orally once a day  gabapentin 300 mg oral capsule: 2 cap(s) orally 3 times a day  hydrALAZINE 50 mg oral tablet: 1 tab(s) orally 3 times a day  Januvia 100 mg oral tablet: 1 tab(s) orally once a day  labetalol 300 mg oral tablet: 2 tab(s) orally 3 times a day  lactobacillus acidophilus oral capsule: 1 cap(s) orally once a day  levothyroxine 75 mcg (0.075 mg) oral tablet: 1 tab(s) orally once a day  nystatin 100,000 units/g topical powder: 1 application topically 2 times a day  pantoprazole 40 mg oral delayed release tablet: 1 tab(s) orally once a day  polyethylene glycol 3350 oral powder for reconstitution: 17 gram(s) orally once a day  pregabalin 50 mg oral capsule: 1 cap(s) orally 2 times a day  senna oral tablet: 2 tab(s) orally once a day (at bedtime)  Vitamin B-12 1000 mcg oral tablet: 1 tab(s) orally once a day  Vitamin B1: 1000 microgram(s) orally once a day  Vitamin C 500 mg oral capsule: 1 cap(s) orally once a day acetaminophen 325 mg oral tablet: 2 tab(s) orally every 6 hours, As needed, Temp greater or equal to 38C (100.4F)  ALPRAZolam 1 mg oral tablet: 0.5 milligram(s) orally every 8 hours  apixaban 5 mg oral tablet: 1 tab(s) orally every 12 hours  aspirin 81 mg oral delayed release tablet: 1 tab(s) orally once a day  atorvastatin 80 mg oral tablet: 1 tab(s) orally once a day (at bedtime)  cholecalciferol oral tablet: 2000 unit(s) orally once a day  cloNIDine 0.1 mg oral tablet: 2 tab(s) orally 3 times a day  folic acid 1 mg oral tablet: 1 tab(s) orally once a day  hydrALAZINE 50 mg oral tablet: 1 tab(s) orally 4 times a day  lactobacillus acidophilus oral capsule: 1 cap(s) orally once a day  levothyroxine 75 mcg (0.075 mg) oral tablet: 1 tab(s) orally once a day  nystatin 100,000 units/g topical powder: 1 application topically 2 times a day  pantoprazole 40 mg oral delayed release tablet: 1 tab(s) orally once a day  polyethylene glycol 3350 oral powder for reconstitution: 17 gram(s) orally once a day  pregabalin 50 mg oral capsule: 1 cap(s) orally 2 times a day  senna oral tablet: 2 tab(s) orally once a day (at bedtime)  Vitamin B-12 1000 mcg oral tablet: 1 tab(s) orally once a day  Vitamin B1: 1000 microgram(s) orally once a day  Vitamin C 500 mg oral capsule: 1 cap(s) orally once a day acetaminophen 325 mg oral tablet: 2 tab(s) orally every 6 hours, As needed, Temp greater or equal to 38C (100.4F)  ALPRAZolam 1 mg oral tablet: 0.5 milligram(s) orally every 8 hours  apixaban 5 mg oral tablet: 1 tab(s) orally every 12 hours  aspirin 81 mg oral delayed release tablet: 1 tab(s) orally once a day  atorvastatin 80 mg oral tablet: 1 tab(s) orally once a day (at bedtime)  cholecalciferol oral tablet: 2000 unit(s) orally once a day  cloNIDine 0.1 mg oral tablet: 2 tab(s) orally 3 times a day  folic acid 1 mg oral tablet: 1 tab(s) orally once a day  hydrALAZINE 50 mg oral tablet: 1 tab(s) orally 4 times a day  lactobacillus acidophilus oral capsule: 1 cap(s) orally once a day  levothyroxine 75 mcg (0.075 mg) oral tablet: 1 tab(s) orally once a day  linezolid 600 mg oral tablet: 1 tab(s) orally every 12 hours thru 8/29/20  nystatin 100,000 units/g topical powder: 1 application topically 2 times a day  pantoprazole 40 mg oral delayed release tablet: 1 tab(s) orally once a day  polyethylene glycol 3350 oral powder for reconstitution: 17 gram(s) orally once a day  pregabalin 50 mg oral capsule: 1 cap(s) orally 2 times a day  senna oral tablet: 2 tab(s) orally once a day (at bedtime)  Vitamin B-12 1000 mcg oral tablet: 1 tab(s) orally once a day  Vitamin B1: 1000 microgram(s) orally once a day  Vitamin C 500 mg oral capsule: 1 cap(s) orally once a day

## 2020-08-23 NOTE — DISCHARGE NOTE PROVIDER - NSDCCPCAREPLAN_GEN_ALL_CORE_FT
PRINCIPAL DISCHARGE DIAGNOSIS  Diagnosis: Ureteral stone with hydronephrosis  Assessment and Plan of Treatment: Status post cystoscopy with  L ureteral stent placed with urology.  Follow up with urology, Dr. Hoenig after discharge.        SECONDARY DISCHARGE DIAGNOSES  Diagnosis: Acute metabolic encephalopathy  Assessment and Plan of Treatment: Secondary to high fevers in the setting of ureteral stone with hydronephrosis  Resolved    Diagnosis: Sepsis  Assessment and Plan of Treatment: -Blood cultures positive for Enterococcus, Urine cultures positive for Enterococcus.   -Started on IV vancomycin  -Repeat Blood cultures no growth.  Call you Health care provider upon arrival home to make a one week follow up appointment.  If you develop fever, chills, malaise, or change in mental status call your Health Care Provider or go to the Emergency Department.  Nutrition is important, eat small frequent meals to help ensure you get adequate calories.  Do not stay in bed all day!  Increase your activity daily as tolerated.      Diagnosis: Atrial fibrillation  Assessment and Plan of Treatment: Atrial fibrillation is the most common heart rhythm problem & has the risk of stroke & heart attack  It helps if you control your blood pressure, not drink more than 1-2 alcohol drinks per day, cut down on caffeine, getting treatment for over active thyroid gland, & getting exercise  Call your doctor if you feel your heart racing or beating unusually, chest tightness or pain, lightheaded, faint, shortness of breath especially with exercise  It is important to take your heart medication as prescribed  You may be on anticoagulation which is very important to take as directed - you may need blood work to monitor drug levels      Diagnosis: HTN (hypertension)  Assessment and Plan of Treatment: Low salt diet  Activity as tolerated.  Take all medication as prescribed.  Follow up with your medical doctor for routine blood pressure monitoring at your next visit.  Notify your doctor if you have any of the following symptoms:   Dizziness, Lightheadedness, Blurry vision, Headache, Chest pain, Shortness of breath      Diagnosis: CAD (coronary artery disease)  Assessment and Plan of Treatment: Coronary artery disease is a condition where the arteries the supply the heart muscle get clogges with fatty deposits & puts you at risk for a heart attack  Call your doctor if you have any new pain, pressure, or discomfort in the center of your chest, pain, tingling or discomfort in arms, back, neck, jaw, or stomach, shortness of breath, nausea, vomiting, burping or heartburn, sweating, cold and clammy skin, racing or abnormal heartbeat for more than 10 minutes or if they keep coming & going.  Call 911 and do not tr to get to hospital by care  You can help yourself with lefestyle changes (quitting smoking if you smoke), eat lots of fruits & vegetables & low fat dairy products, not a lot of meat & fatty foods, walk or some form of physical activity most days of the week, lose weight if you are overweight  Take your cardiac medication as prescribed to lower cholesterol, to lower blood pressure, aspirin to prevent blood clots, and diabetes control  Make sure to keep appointments with doctor for cardiac follow up care

## 2020-08-23 NOTE — CHART NOTE - NSCHARTNOTEFT_GEN_A_CORE
[facility] Medicine Event Note    Notified by RN /100.  Patient seen at bedside: NAD, laying in bed.  Patient yelling during exam, stating that shes "upset" friends  are not visiting her. Denies any CP, HA, palpitations, dizziness, SOB, abd pain, N/V/D.    Vital Signs Last 24 Hrs  T(C): 36.4 (23 Aug 2020 16:43), Max: 36.6 (23 Aug 2020 05:25)  T(F): 97.6 (23 Aug 2020 16:43), Max: 97.9 (23 Aug 2020 09:39)  HR: 65 (23 Aug 2020 17:43) (62 - 65)  BP: 224/100 (23 Aug 2020 17:43) (134/89 - 230/112)  BP(mean): --  RR: 18 (23 Aug 2020 16:43) (18 - 18)  SpO2: 97% (23 Aug 2020 16:43) (95% - 97%)    PHYSICAL EXAM:  GENERAL: Laying down, in no acute distress  PSYCH: AAOx3  HEAD:  Atraumatic, Normocephalic  EYES: EOMI, PERRLA, conjunctiva and sclera clear  NECK: Supple, No JVD  CHEST/LUNG: Breath sounds clear to auscultation bilaterally.  No wheezes, rales, rhonchi or crackles  HEART: +S1/S2.  Regular rate and rhythm.  No murmurs, rubs or gallops  ABDOMEN: Bowel sounds present in all 4 quadrants.  Soft, Nontender, Nondistended  EXTREMITIES: No edema or erythema noted in bilateral lower extremities  NEUROLOGY: Cranial nerves 2-12 grossly intact  SKIN: No rashes or lesions      Labetalol 400 mg PO standing dose given.   Discussed above with Dr. Rasmussen and confirmed  to also give hydralazine 10 mg IV.  Will continue to monitor and endorse to night team.    Tara Anna PA-C  Dept of Medicine  #[id number] [facility] Medicine Event Note    Notified by RN /100.  Patient seen at bedside: NAD, laying in bed.  Patient yelling during exam, stating that shes "upset" friends  are not visiting her. Denies any CP, HA, palpitations, dizziness, SOB, abd pain, N/V/D.    Vital Signs Last 24 Hrs  T(C): 36.4 (23 Aug 2020 16:43), Max: 36.6 (23 Aug 2020 05:25)  T(F): 97.6 (23 Aug 2020 16:43), Max: 97.9 (23 Aug 2020 09:39)  HR: 65 (23 Aug 2020 17:43) (62 - 65)  BP: 224/100 (23 Aug 2020 17:43) (134/89 - 230/112)  BP(mean): --  RR: 18 (23 Aug 2020 16:43) (18 - 18)  SpO2: 97% (23 Aug 2020 16:43) (95% - 97%)    PHYSICAL EXAM:  GENERAL: Laying down, in no acute distress  PSYCH: AAOx3  HEAD:  Atraumatic, Normocephalic  EYES: EOMI, PERRLA, conjunctiva and sclera clear  NECK: Supple, No JVD  CHEST/LUNG: Breath sounds clear to auscultation bilaterally.  No wheezes, rales, rhonchi or crackles  HEART: +S1/S2.  Regular rate and rhythm.  No murmurs, rubs or gallops  ABDOMEN: Bowel sounds present in all 4 quadrants.  Soft, Nontender, Nondistended  EXTREMITIES: No edema or erythema noted in bilateral lower extremities  NEUROLOGY: Cranial nerves 2-12 grossly intact  SKIN: No rashes or lesions    Reassured patient and discussed with her that staying calm will help with her BP.  Labetalol 400 mg PO standing dose given.   Discussed above with Dr. Rasmussen and confirmed  to also give hydralazine 10 mg IV.  Will continue to monitor and endorse to night team.    Tara Anna PA-C  Dept of Medicine  #[id number]

## 2020-08-24 LAB
HCT VFR BLD CALC: 34.7 % — SIGNIFICANT CHANGE UP (ref 34.5–45)
HGB BLD-MCNC: 11.4 G/DL — LOW (ref 11.5–15.5)
MCHC RBC-ENTMCNC: 29.5 PG — SIGNIFICANT CHANGE UP (ref 27–34)
MCHC RBC-ENTMCNC: 32.9 GM/DL — SIGNIFICANT CHANGE UP (ref 32–36)
MCV RBC AUTO: 89.9 FL — SIGNIFICANT CHANGE UP (ref 80–100)
NRBC # BLD: 0 /100 WBCS — SIGNIFICANT CHANGE UP (ref 0–0)
PLATELET # BLD AUTO: 333 K/UL — SIGNIFICANT CHANGE UP (ref 150–400)
RBC # BLD: 3.86 M/UL — SIGNIFICANT CHANGE UP (ref 3.8–5.2)
RBC # FLD: 14.3 % — SIGNIFICANT CHANGE UP (ref 10.3–14.5)
SARS-COV-2 RNA SPEC QL NAA+PROBE: SIGNIFICANT CHANGE UP
VANCOMYCIN TROUGH SERPL-MCNC: 19 UG/ML — SIGNIFICANT CHANGE UP (ref 10–20)
WBC # BLD: 6.54 K/UL — SIGNIFICANT CHANGE UP (ref 3.8–10.5)
WBC # FLD AUTO: 6.54 K/UL — SIGNIFICANT CHANGE UP (ref 3.8–10.5)

## 2020-08-24 PROCEDURE — 99232 SBSQ HOSP IP/OBS MODERATE 35: CPT

## 2020-08-24 RX ORDER — LABETALOL HCL 100 MG
2 TABLET ORAL
Qty: 120 | Refills: 0
Start: 2020-08-24 | End: 2020-09-22

## 2020-08-24 RX ORDER — LINEZOLID 600 MG/300ML
600 INJECTION, SOLUTION INTRAVENOUS EVERY 12 HOURS
Refills: 0 | Status: DISCONTINUED | OUTPATIENT
Start: 2020-08-25 | End: 2020-08-25

## 2020-08-24 RX ORDER — ETHACRYNIC ACID 25 MG/1
1 TABLET ORAL
Qty: 0 | Refills: 0 | DISCHARGE

## 2020-08-24 RX ORDER — NYSTATIN CREAM 100000 [USP'U]/G
1 CREAM TOPICAL
Qty: 0 | Refills: 0 | DISCHARGE
Start: 2020-08-24

## 2020-08-24 RX ORDER — ACETAMINOPHEN 500 MG
2 TABLET ORAL
Qty: 0 | Refills: 0 | DISCHARGE
Start: 2020-08-24

## 2020-08-24 RX ORDER — POTASSIUM CHLORIDE 20 MEQ
2 PACKET (EA) ORAL
Qty: 0 | Refills: 0 | DISCHARGE

## 2020-08-24 RX ORDER — HYDRALAZINE HCL 50 MG
1 TABLET ORAL
Qty: 0 | Refills: 0 | DISCHARGE

## 2020-08-24 RX ORDER — LACTOBACILLUS ACIDOPHILUS 100MM CELL
1 CAPSULE ORAL
Qty: 0 | Refills: 0 | DISCHARGE
Start: 2020-08-24

## 2020-08-24 RX ORDER — LABETALOL HCL 100 MG
2 TABLET ORAL
Qty: 0 | Refills: 0 | DISCHARGE

## 2020-08-24 RX ORDER — SITAGLIPTIN 50 MG/1
1 TABLET, FILM COATED ORAL
Qty: 0 | Refills: 0 | DISCHARGE

## 2020-08-24 RX ORDER — LABETALOL HCL 100 MG
2 TABLET ORAL
Qty: 60 | Refills: 0
Start: 2020-08-24 | End: 2020-09-22

## 2020-08-24 RX ADMIN — Medication 1 TABLET(S): at 12:14

## 2020-08-24 RX ADMIN — LORATADINE 10 MILLIGRAM(S): 10 TABLET ORAL at 12:14

## 2020-08-24 RX ADMIN — Medication 50 MILLIGRAM(S): at 05:41

## 2020-08-24 RX ADMIN — Medication 81 MILLIGRAM(S): at 12:14

## 2020-08-24 RX ADMIN — APIXABAN 5 MILLIGRAM(S): 2.5 TABLET, FILM COATED ORAL at 17:50

## 2020-08-24 RX ADMIN — Medication 0.2 MILLIGRAM(S): at 20:21

## 2020-08-24 RX ADMIN — NYSTATIN CREAM 1 APPLICATION(S): 100000 CREAM TOPICAL at 05:40

## 2020-08-24 RX ADMIN — NYSTATIN CREAM 1 APPLICATION(S): 100000 CREAM TOPICAL at 17:50

## 2020-08-24 RX ADMIN — Medication 650 MILLIGRAM(S): at 00:35

## 2020-08-24 RX ADMIN — Medication 0.2 MILLIGRAM(S): at 12:14

## 2020-08-24 RX ADMIN — APIXABAN 5 MILLIGRAM(S): 2.5 TABLET, FILM COATED ORAL at 05:41

## 2020-08-24 RX ADMIN — Medication 250 MILLIGRAM(S): at 17:50

## 2020-08-24 RX ADMIN — Medication 75 MICROGRAM(S): at 05:00

## 2020-08-24 RX ADMIN — Medication 600 MILLIGRAM(S): at 10:07

## 2020-08-24 RX ADMIN — Medication 250 MILLIGRAM(S): at 05:40

## 2020-08-24 RX ADMIN — Medication 600 MILLIGRAM(S): at 05:41

## 2020-08-24 RX ADMIN — Medication 0.2 MILLIGRAM(S): at 05:41

## 2020-08-24 RX ADMIN — POLYETHYLENE GLYCOL 3350 17 GRAM(S): 17 POWDER, FOR SOLUTION ORAL at 05:41

## 2020-08-24 RX ADMIN — Medication 10 MILLIGRAM(S): at 09:46

## 2020-08-24 RX ADMIN — Medication 50 MILLIGRAM(S): at 10:06

## 2020-08-24 RX ADMIN — Medication 400 MILLIGRAM(S): at 17:50

## 2020-08-24 RX ADMIN — Medication 50 MILLIGRAM(S): at 22:06

## 2020-08-24 NOTE — PROGRESS NOTE ADULT - PROBLEM SELECTOR PLAN 7
Patient with hx labile blood pressure, hypertensive urgency in the past will continue with Labetalol, Hydralazine and Clonidine.  Increased Labetalol today 600 mg BID and 400 mg qhs.
Patient with hx labile blood pressure, hypertensive urgency in the past will continue with Labetalol, Hydralazine and Clonidine.  Increased Labetalol last night with elevated SBP around 160
Patient with hx labile blood pressure, hypertensive urgency in the past will continue with Labetalol, Hydralazine and Clonidine.  Increased Labetalol today 600 mg BID and 400 mg qhs.
s/p stent 3 years ago, continue with Aspirin for now.

## 2020-08-24 NOTE — PROGRESS NOTE ADULT - PROBLEM SELECTOR PLAN 3
Pian back of Right thigh- Bakers cyst noted on previous Doppler, repeat Doppler.   Rt leg elevation.
Antibiotics, fluids, plan as above.
Antibiotics, fluids, plan as above.
Pian back of Right thigh- Bakers cyst noted on previous Doppler, repeat Doppler.   Rt leg elevation.
resolving
resolving
Antibiotics, fluids, plan as above.
Pian back of Right thigh- Bakers cyst noted on previous Doppler, repeat Doppler.   Rt leg elevation.

## 2020-08-24 NOTE — PROVIDER CONTACT NOTE (OTHER) - ASSESSMENT
Pt's BP was 176/90 auscultated with stethoscope. Pt has no c/o of chest pain. Pulses are present in all extremities.

## 2020-08-24 NOTE — PROGRESS NOTE ADULT - PROBLEM SELECTOR PLAN 8
s/p stent 3 years ago, continue with Aspirin for now.

## 2020-08-24 NOTE — PROVIDER CONTACT NOTE (OTHER) - BACKGROUND
Pt admitted for obstructing proximal left calculus and + UTI. As per neurology team pt SBP to remain 160-180.

## 2020-08-24 NOTE — PROGRESS NOTE ADULT - PROBLEM SELECTOR PLAN 4
Rate controlled, resume Eliquis.    Continue with Aspirin.
resolving
Rate controlled, resume Eliquis.    Continue with Aspirin.
Urology consult appreciated, s/p Cystoscopy with left ureteral stent placement.   Continue with abx and aguilar for now.
Urology consult appreciated, s/p Cystoscopy with left ureteral stent placement.   Continue with abx and aguilar for now.
Urology consult appreciated, s/p Cystoscopy with left ureteral stent placement.   Continue with abx.   Void trial.
resolving

## 2020-08-24 NOTE — PROGRESS NOTE ADULT - PROBLEM SELECTOR PLAN 6
Rate controlled, resume Eliquis.    Continue with Aspirin.
s/p stent 3 years ago, continue with Aspirin for now.
Rate controlled, resume Eliquis.    Continue with Aspirin.
Patient with hx labile blood pressure, hypertensive urgency in the past will continue with Labetalol, Hydralazine and Clonidine.
Patient with hx labile blood pressure, hypertensive urgency in the past will continue with Labetalol, Hydralazine and Clonidine.
Patient with hx labile blood pressure, hypertensive urgency in the past will continue with Labetalol, Hydralazine and Clonidine.  Increased Labetalol last night with elevated SBP around 160
Rate controlled, resume Eliquis.    Continue with Aspirin.
s/p stent 3 years ago, continue with Aspirin for now.

## 2020-08-24 NOTE — PROGRESS NOTE ADULT - PROBLEM SELECTOR PROBLEM 1
Acute metabolic encephalopathy

## 2020-08-24 NOTE — PROGRESS NOTE ADULT - PROBLEM SELECTOR PROBLEM 5
Essential hypertension
Ureteral stone with hydronephrosis
Atrial fibrillation, unspecified type
Essential hypertension
Ureteral stone with hydronephrosis

## 2020-08-24 NOTE — PROGRESS NOTE ADULT - SUBJECTIVE AND OBJECTIVE BOX
Follow Up:  enterococcus bacteremia    Interval History: afebrile. denies pain. denies urinary symptoms.     REVIEW OF SYSTEMS  [  ] ROS unobtainable because:    [ x ] All other systems negative except as noted below    Constitutional:  [ ] fever [ ] chills  [ ] weight loss  [ ] weakness  Skin:  [ ] rash [ ] phlebitis	  Eyes: [ ] icterus [ ] pain  [ ] discharge	  ENMT: [ ] sore throat  [ ] thrush [ ] ulcers [ ] exudates  Respiratory: [ ] dyspnea [ ] hemoptysis [ ] cough [ ] sputum	  Cardiovascular:  [ ] chest pain [ ] palpitations [ ] edema	  Gastrointestinal:  [ ] nausea [ ] vomiting [ ] diarrhea [ ] constipation [ ] pain	  Genitourinary:  [ ] dysuria [ ] frequency [ ] hematuria [ ] discharge [ ] flank pain  [ ] incontinence  Musculoskeletal:  [ ] myalgias [ ] arthralgias [ ] arthritis  [ ] back pain  Neurological:  [ ] headache [ ] seizures  [ ] confusion/altered mental status    Allergies  penicillin (Hives)  penicillin (Rash)  sulfa drugs (Unknown)  Tetracycline Hydrochloride (Unknown)        ANTIMICROBIALS:  vancomycin  IVPB 750 every 12 hours      OTHER MEDS:  MEDICATIONS  (STANDING):  acetaminophen   Tablet .. 650 every 6 hours PRN  apixaban 5 every 12 hours  aspirin enteric coated 81 daily  atorvastatin 80 at bedtime  cloNIDine 0.2 three times a day  hydrALAZINE 50 three times a day  labetalol 600 <User Schedule>  labetalol 400 <User Schedule>  levothyroxine 75 daily  loratadine 10 daily  polyethylene glycol 3350 17 two times a day  senna 2 at bedtime      Vital Signs Last 24 Hrs  T(C): 36.5 (24 Aug 2020 12:02), Max: 36.6 (24 Aug 2020 05:35)  T(F): 97.7 (24 Aug 2020 12:02), Max: 97.8 (24 Aug 2020 05:35)  HR: 62 (24 Aug 2020 12:02) (62 - 70)  BP: 160/80 (24 Aug 2020 12:02) (140/80 - 200/90)  BP(mean): --  RR: 18 (24 Aug 2020 12:02) (18 - 18)  SpO2: 95% (24 Aug 2020 12:02) (94% - 96%)    PHYSICAL EXAMINATION:  General: Alert and Awake, NAD  HEENT: PERRL, EOMI  Neck: Supple  Cardiac: RRR, No M/R/G  Resp: CTAB, No Wh/Rh/Ra  Abdomen: NBS, NT/ND, No HSM, No rigidity or guarding  MSK: No LE edema. No Calf tenderness  : No aguilar  Skin: No rashes or lesions. Skin is warm and dry to the touch.   Neuro: Alert and Awake. CN 2-12 Grossly intact. Moves all four extremities spontaneously.  Psych: Calm, Pleasant, Cooperative                          11.4   6.54  )-----------( 333      ( 24 Aug 2020 04:12 )             34.7       08-23    140  |  101  |  13  ----------------------------<  134<H>  3.5   |  27  |  0.86    Ca    9.2      23 Aug 2020 05:17            MICROBIOLOGY:    Vancomycin Level, Trough: 19.0 ug/mL (08-24-20 @ 04:12)    .Blood Blood  08-16-20   No Growth Final  --  --      .Urine  08-14-20   >100,000 CFU/ml Enterococcus faecalis  --  Enterococcus faecalis      .Urine Clean Catch (Midstream)  08-13-20   >100,000 CFU/ml Enterococcus faecalis  --  Enterococcus faecalis      .Blood Blood-Peripheral  08-13-20   Growth in anaerobic bottle: Enterococcus faecalis  See previous culture [id number]  --  Blood Culture PCR  Enterococcus faecalis      .Urine Clean Catch (Midstream)  07-26-20   >100,000 CFU/ml Enterococcus faecalis  --  Enterococcus faecalis    RADIOLOGY:    <The imaging below has been reviewed and visualized by me independently. Findings as detailed in report below>    EXAM:  XR CHEST PORTABLE URGENT 1V                        PROCEDURE DATE:  08/15/2020    Trace left pleural effusion. Otherwise, clear lungs.

## 2020-08-24 NOTE — PROGRESS NOTE ADULT - SUBJECTIVE AND OBJECTIVE BOX
HPI:  71 y/o F with a significant PMHx of, HTN, Hypothyroidism,  carpal tunnel syndrome, lymphedema admitted to my service discharged 08/03/20 to [facility] p/w Altered mental status.     Patient had a complicated hospital course last admission with elevated cardiac enzymes from demand ischemia due to A.fib with rvr, refused cardiac cath had normal stress test, course complicated with Enterococcus UTI and colitis requiring antibiotics discharged to [facility] p/w AMS x 1 day. As per [facility] patient very agitated, altered refusing to take medications.     In the ed patient had CT head with no acute findings, CT abdomen shows 5-6 mm ureteral stones with mild hydronephrosis.   Patient was seen by urology,     Currently patient alert, awake, oriented x 3, denies abdominal pain/ nausea/ vomiting, noted to have fever 103 F, received Ciprofloxacin and Flagyl. (13 Aug 2020 20:23)      Patient is a 70y old  Female who presents with a chief complaint of AMS x 1 day (14 Aug 2020 17:06)      SUBJECTIVE / OVERNIGHT EVENTS: Patient feels ok, no complaints.  D/C planning, COVID test sent         T(C): 36.6 (08-24-20 @ 21:52), Max: 36.6 (08-24-20 @ 21:52)  HR: 79 (08-24-20 @ 21:52) (62 - 79)  BP: 160/108 (08-24-20 @ 21:52) (160/80 - 173/90)  RR: 18 (08-24-20 @ 21:52) (18 - 18)  SpO2: 96% (08-24-20 @ 21:52) (95% - 96%)      MEDICATIONS  (STANDING):  apixaban 5 milliGRAM(s) Oral every 12 hours  aspirin enteric coated 81 milliGRAM(s) Oral daily  atorvastatin 80 milliGRAM(s) Oral at bedtime  cloNIDine 0.2 milliGRAM(s) Oral three times a day  hydrALAZINE 50 milliGRAM(s) Oral three times a day  labetalol 600 milliGRAM(s) Oral <User Schedule>  labetalol 400 milliGRAM(s) Oral <User Schedule>  lactobacillus acidophilus 1 Tablet(s) Oral daily  levothyroxine 75 MICROGram(s) Oral daily  loratadine 10 milliGRAM(s) Oral daily  nystatin Powder 1 Application(s) Topical two times a day  polyethylene glycol 3350 17 Gram(s) Oral two times a day  senna 2 Tablet(s) Oral at bedtime    MEDICATIONS  (PRN):  acetaminophen   Tablet .. 650 milliGRAM(s) Oral every 6 hours PRN Temp greater or equal to 38C (100.4F)    PHYSICAL EXAM:  GENERAL: NAD, well-developed  HEAD:  Atraumatic, Normocephalic  EYES: EOMI, conjunctiva and sclera clear  NECK: Supple, No JVD  CHEST/LUNG: Clear to auscultation bilaterally; No wheeze  HEART: Regular rate and rhythm; No murmurs, rubs, or gallops  ABDOMEN: Soft, Nontender, rlq tenderness, Nondistended; Bowel sounds present  EXTREMITIES:  2+ Peripheral Pulses, No clubbing, cyanosis, or edema  PSYCH: AAOx3  NEUROLOGY: non-focal  SKIN: No rashes or lesions                                              11.4   6.54  )-----------( 333      ( 24 Aug 2020 04:12 )             34.7               RADIOLOGY & ADDITIONAL TESTS:    Imaging Personally Reviewed:    Consultant(s) Notes Reviewed:      Care Discussed with Consultants/Other Providers:

## 2020-08-24 NOTE — PROGRESS NOTE ADULT - PROBLEM SELECTOR PROBLEM 7
Essential hypertension
Essential hypertension
Coronary artery disease involving native coronary artery of native heart without angina pectoris
Essential hypertension

## 2020-08-24 NOTE — PROGRESS NOTE ADULT - PROBLEM SELECTOR PROBLEM 3
Right thigh pain
Sepsis, due to unspecified organism, unspecified whether acute organ dysfunction present
Right thigh pain

## 2020-08-24 NOTE — PROGRESS NOTE ADULT - REASON FOR ADMISSION
AMS x 1 day

## 2020-08-24 NOTE — PROGRESS NOTE ADULT - PROBLEM SELECTOR PROBLEM 6
Atrial fibrillation, unspecified type
Coronary artery disease involving native coronary artery of native heart without angina pectoris
Atrial fibrillation, unspecified type
Coronary artery disease involving native coronary artery of native heart without angina pectoris
Essential hypertension

## 2020-08-24 NOTE — PROGRESS NOTE ADULT - PROBLEM SELECTOR PROBLEM 4
Sepsis, due to unspecified organism, unspecified whether acute organ dysfunction present
Atrial fibrillation, unspecified type
Sepsis, due to unspecified organism, unspecified whether acute organ dysfunction present
Ureteral stone with hydronephrosis
Atrial fibrillation, unspecified type
Sepsis, due to unspecified organism, unspecified whether acute organ dysfunction present

## 2020-08-24 NOTE — PROGRESS NOTE ADULT - ASSESSMENT
70 year old female PMH HTN Hypothyroidism,  carpal tunnel syndrome, lymphedema presented to [facility] on 8/13/20 with encephalopathy. In the ED febrile to 103.1 and tachycardic to > 90. On presentation WBC of 10.52 with 83.7% PMN. ANIKA to Cr of 1.35. Lactic acid of 2.3.     U/A with 6 WBC and 46 RBC.   Blood cultures with 2/2 Enterococcus (based on biofire).   COVID19 PCR (8/13) negative.   CT A/P (8/13) with Obstructing 5 x 6 mm proximal left ureteral calculus with mild hydronephrosis and Circumferential mural thickening involving the distal transverse colon through the rectum consistent with clinical history of recent colitis.   8/13 s/p Cystoscopy with stent placement.    Suspect urinary source of enterococcus  BCx 8/16 with NGTD     Overall, Enterococcus Bacteremia, Leukocytosis, Fever, Pyelonephritis, Hydronephrosis, ANIKA, Therapeutic Drug monitoring    --Supratherapeutic Vanco level noted. Agree with Vancomycin 1g IV Q12H. Check trough prior to fourth dose  --Continue to monitor renal function and vancomycin troughs to avoid nephrotoxicity / otoxicity secondary to vancomycin  --Recommend TTE  --Continue to follow CBC with diff  --Continue to follow renal function (Cr/BUN)  --Continue to follow temperature curve  --Urology recommendations appreciated  ---If diarrhea recommend C diff and GI PCR    I will continue to follow. Please feel free to contact me with any further questions.    Delfino Gomez M.D.  [facility] Division of Infectious Disease  8AM-5PM: Pager Number [phone]  After Hours (or if no response): Please contact the Infectious Diseases Office at [phone]
70 year old female PMH HTN Hypothyroidism,  carpal tunnel syndrome, lymphedema presented to [facility] on 8/13/20 with encephalopathy. In the ED febrile to 103.1 and tachycardic to > 90. On presentation WBC of 10.52 with 83.7% PMN. ANIKA to Cr of 1.35. Lactic acid of 2.3.     U/A with 6 WBC and 46 RBC.   Blood cultures with 2/2 Enterococcus (based on biofire).   COVID19 PCR (8/13) negative.   CT A/P (8/13) with Obstructing 5 x 6 mm proximal left ureteral calculus with mild hydronephrosis and Circumferential mural thickening involving the distal transverse colon through the rectum consistent with clinical history of recent colitis.   8/13 s/p Cystoscopy with stent placement.    Suspect urinary source of enterococcus  BCx 8/16 with NGTD     Overall, Enterococcus Bacteremia, Leukocytosis, Fever, Pyelonephritis, Hydronephrosis, ANIKA, Therapeutic Drug monitoring    --Vancomycin level noted. Continue Vancomycin 1.25g IV Q12H. Check trough prior to fourth dose  --Continue to monitor renal function and vancomycin troughs to avoid nephrotoxicity / otoxicity secondary to vancomycin  --Recommend TTE  --Continue to follow CBC with diff  --Continue to follow renal function (Cr/BUN)  --Continue to follow temperature curve  --Urology recommendations appreciated  ---If diarrhea recommend C diff and GI PCR    I will continue to follow. Please feel free to contact me with any further questions.    Delfino Gomez M.D.  [facility] Division of Infectious Disease  8AM-5PM: Pager Number [phone]  After Hours (or if no response): Please contact the Infectious Diseases Office at [phone]
69 y/o F with a significant PMHx of, HTN, Hypothyroidism,  carpal tunnel syndrome, lymphedema admitted to my service discharged 08/03/20 to [facility] p/w Altered mental status.
69 y/o F with a significant PMHx of, HTN, Hypothyroidism,  carpal tunnel syndrome, lymphedema admitted to my service discharged 08/03/20 to [facility] p/w Altered mental status.
69 y/o F with a significant PMHx of, HTN, Hypothyroidism,  carpal tunnel syndrome, lymphedema admitted to my service discharged 08/03/20 to [facility] p/w Altered mental status.
69 y/o F with a significant PMHx of, HTN, Hypothyroidism,  carpal tunnel syndrome, lymphedema admitted to my service discharged 08/03/20 to [facility] p/w Altered mental status.
69 y/o F with a significant PMHx of, HTN, Hypothyroidism,  carpal tunnel syndrome, lymphedema admitted to my service discharged 08/03/20 to [facility] p/w Altered mental status.
70 year old female PMH HTN Hypothyroidism,  carpal tunnel syndrome, lymphedema presented to [facility] on 8/13/20 with encephalopathy. In the ED febrile to 103.1 and tachycardic to > 90. On presentation WBC of 10.52 with 83.7% PMN. ANKIA to Cr of 1.35. Lactic acid of 2.3.     U/A with 6 WBC and 46 RBC.   Blood cultures with 2/2 Enterococcus (based on biofire).   COVID19 PCR (8/13) negative.   CT A/P (8/13) with Obstructing 5 x 6 mm proximal left ureteral calculus with mild hydronephrosis and Circumferential mural thickening involving the distal transverse colon through the rectum consistent with clinical history of recent colitis.   8/13 s/p Cystoscopy with stent placement.    Suspect urinary source of enterococcus  BCx 8/16 with NGTD     Per  - Vancomycin will no be accepted at [facility]  Enterococcus isolate is Linezolid Susceptible  Would continue Vancomycin while patient admitted and will complete tail end of course with Linezolid     Overall, Enterococcus Bacteremia, Leukocytosis, Fever, Pyelonephritis, Hydronephrosis, ANIKA, Therapeutic Drug monitoring    --Vancomycin trough of 24.1 noted. Recommend decreasing dose to Vancomycin 750 mg IV Q12H (to start tomorrow AM). Check trough prior to next fourth dose. Target trough of 15-20  --Continue to monitor renal function and vancomycin troughs to avoid nephrotoxicity / otoxicity secondary to vancomycin  --Recommend TTE (ordered and pending)  --Continue to follow CBC with diff  --Continue to follow renal function (Cr/BUN)  --Continue to follow temperature curve  --Urology recommendations appreciated    I will continue to follow. Please feel free to contact me with any further questions.    Delfino Gomez M.D.  [facility] Division of Infectious Disease  8AM-5PM: Pager Number [phone]  After Hours (or if no response): Please contact the Infectious Diseases Office at [phone]     The above assessment and plan were discussed with medicine NP
70 year old female PMH HTN Hypothyroidism,  carpal tunnel syndrome, lymphedema presented to [facility] on 8/13/20 with encephalopathy. In the ED febrile to 103.1 and tachycardic to > 90. On presentation WBC of 10.52 with 83.7% PMN. ANIKA to Cr of 1.35. Lactic acid of 2.3.     U/A with 6 WBC and 46 RBC.   Blood cultures with 2/2 Enterococcus (based on biofire).   COVID19 PCR (8/13) negative.   CT A/P (8/13) with Obstructing 5 x 6 mm proximal left ureteral calculus with mild hydronephrosis and Circumferential mural thickening involving the distal transverse colon through the rectum consistent with clinical history of recent colitis.   8/13 s/p Cystoscopy with stent placement.    Suspect urinary source of enterococcus  BCx 8/16 with NGTD     Per  - Vancomycin will no be accepted at [facility]  Enterococcus isolate is Linezolid Susceptible  Would continue Vancomycin while patient admitted and will complete tail end of course with Linezolid (can switch over tomorrow - End Date of antibiotics: 8/29/20)    TTE limited but bacteremia was transient (and with clear source - i.e. urinary); would not pursue IFEANYI    Overall, Enterococcus Bacteremia, Leukocytosis, Fever, Pyelonephritis, Hydronephrosis, ANIKA, Therapeutic Drug monitoring    -Continue Vancomycin 750 mg IV Q12H. Check trough prior to next fourth dose. Target trough of 15-20  --Continue to monitor renal function and vancomycin troughs to avoid nephrotoxicity / otoxicity secondary to vancomycin  --Can switch to Linezolid 600 mg PO Q12H tomorrow (End Date of antibiotics: 8/29/20)  --Urology recommendations appreciated    I will sign off at this time. Please feel free to contact me with any further questions or concerns.    Delfino Gomez M.D.  [facility] Division of Infectious Disease  8AM-5PM: Pager Number [phone]  After Hours (or if no response): Please contact the Infectious Diseases Office at [phone]     The above assessment and plan were discussed with medicine PA
70 year old female PMH HTN Hypothyroidism,  carpal tunnel syndrome, lymphedema presented to [facility] on 8/13/20 with encephalopathy. In the ED febrile to 103.1 and tachycardic to > 90. On presentation WBC of 10.52 with 83.7% PMN. ANIKA to Cr of 1.35. Lactic acid of 2.3.     U/A with 6 WBC and 46 RBC.   Blood cultures with 2/2 Enterococcus (based on biofire).   COVID19 PCR (8/13) negative.   CT A/P (8/13) with Obstructing 5 x 6 mm proximal left ureteral calculus with mild hydronephrosis and Circumferential mural thickening involving the distal transverse colon through the rectum consistent with clinical history of recent colitis.   8/13 s/p Cystoscopy with stent placement.    Suspect urinary source of enterococcus  Patient without IV access and being treated with PO Linezolid  Would benefit from obtaining IV access and being switched to IV Vancomycin pending susceptibilities    Overall, Enterococcus Bacteremia, Leukocytosis, Fever, Pyelonephritis, Hydronephrosis, ANIKA     --await sensitivities of the enterococcus  --Recommend TTE  - why still spiking. reculture,  -urology follow up -s/p stent 8/13  no flank pain at preseent  --Continue to follow CBC with diff  --Continue to follow renal function (Cr/BUN)  --Continue to follow temperature curve  --Follow up on Enterococcus susceptibilities   --Followup on preliminary urine cultures  --Urology recommendations appreciated  - check c diff and GI PCR , could pt have ischemic colitis. Follow lactate abdomen is benign and patient denies abdominal pain
70 year old female PMH HTN Hypothyroidism,  carpal tunnel syndrome, lymphedema presented to [facility] on 8/13/20 with encephalopathy. In the ED febrile to 103.1 and tachycardic to > 90. On presentation WBC of 10.52 with 83.7% PMN. ANIKA to Cr of 1.35. Lactic acid of 2.3.     U/A with 6 WBC and 46 RBC.   Blood cultures with 2/2 Enterococcus (based on biofire).   COVID19 PCR (8/13) negative.   CT A/P (8/13) with Obstructing 5 x 6 mm proximal left ureteral calculus with mild hydronephrosis and Circumferential mural thickening involving the distal transverse colon through the rectum consistent with clinical history of recent colitis.   8/13 s/p Cystoscopy with stent placement.    Suspect urinary source of enterococcus  BCx 8/16 with NGTD     Per  - Vancomycin will no be accepted at [facility]  Enterococcus isolate is Linezolid Susceptible  Would continue Vancomycin while patient admitted and will complete tail end of course with Linezolid     TTE limited but bacteremia was transient (and with clear source - i.e. urinary); would not pursue IFEANYI    Overall, Enterococcus Bacteremia, Leukocytosis, Fever, Pyelonephritis, Hydronephrosis, ANIKA, Therapeutic Drug monitoring    -Continue Vancomycin 750 mg IV Q12H. Check trough prior to next fourth dose. Target trough of 15-20  --Continue to monitor renal function and vancomycin troughs to avoid nephrotoxicity / otoxicity secondary to vancomycin  --Continue to follow CBC with diff  --Continue to follow renal function (Cr/BUN)  --Continue to follow temperature curve  --Urology recommendations appreciated    I will be away over this upcoming weekend. Please contact the Infectious Diseases Office with any further questions or concerns.     Delfino Gomez M.D.  [facility] Division of Infectious Disease  8AM-5PM: Pager Number [phone]  After Hours (or if no response): Please contact the Infectious Diseases Office at [phone]     The above assessment and plan were discussed with Dr. Francis
70 year old female PMH HTN Hypothyroidism,  carpal tunnel syndrome, lymphedema presented to [facility] on 8/13/20 with encephalopathy. In the ED febrile to 103.1 and tachycardic to > 90. On presentation WBC of 10.52 with 83.7% PMN. ANIKA to Cr of 1.35. Lactic acid of 2.3.     U/A with 6 WBC and 46 RBC.   Blood cultures with 2/2 Enterococcus (based on biofire).   COVID19 PCR (8/13) negative.   CT A/P (8/13) with Obstructing 5 x 6 mm proximal left ureteral calculus with mild hydronephrosis and Circumferential mural thickening involving the distal transverse colon through the rectum consistent with clinical history of recent colitis.   8/13 s/p Cystoscopy with stent placement.    Suspect urinary source of enterococcus  BCx 8/16 with NGTD     Overall, Enterococcus Bacteremia, Leukocytosis, Fever, Pyelonephritis, Hydronephrosis, ANIKA, Therapeutic Drug monitoring    --Vancomycin level noted. Continue Vancomycin 1.25g IV Q12H. Check trough prior to fourth dose  --Continue to monitor renal function and vancomycin troughs to avoid nephrotoxicity / otoxicity secondary to vancomycin  --Recommend TTE  --Continue to follow CBC with diff  --Continue to follow renal function (Cr/BUN)  --Continue to follow temperature curve  --Urology recommendations appreciated  ---If diarrhea recommend C diff and GI PCR    I will continue to follow. Please feel free to contact me with any further questions.    Delfino Gomez M.D.  [facility] Division of Infectious Disease  8AM-5PM: Pager Number [phone]  After Hours (or if no response): Please contact the Infectious Diseases Office at [phone]
71 y/o F with a significant PMHx of, HTN, Hypothyroidism,  carpal tunnel syndrome, lymphedema admitted to my service discharged 08/03/20 to [facility] p/w Altered mental status.
71 y/o F with a significant PMHx of, HTN, Hypothyroidism,  carpal tunnel syndrome, lymphedema admitted to my service discharged 08/03/20 to [facility] p/w Altered mental status.
71 y/o F with a significant PMHx of, HTN, Hypothyroidism,  carpal tunnel syndrome, lymphedema admitted to my service discharged 08/03/20 to [facility] p/w Altered mental status.
A/P: 70y Female s/p cystoscopy with L ureteral stent placement  - DVT prophylaxis/OOB  - Incentive spirometry  - Strict I&O's. Monitor Urine Color.  - Analgesia and antiemetics as needed  - Diet  - Encourage PO hydration  - Please add pt's home meds  - AM labs  - Continue ABX  - Add Flomax QHS if not contraindicated   - Follow up blood and urine cultures  - Appreciate Medical Team care.     Will continue to follow patient. Will ultimately need to follow up outpatient with Dr. Yu or Dr. Hoenig for definitive stone management at the [facility] [phone]    If any questions arise overnight, please page Urology team at [phone]
A/P: 70y Female with UTI/bacteremia in the setting of obstructing ureteral stone now s/p cystoscopy with L ureteral stent placement 8/13    - Continue antibiotics  - Followup ID recs  - Followup urine and blood cultures  - Can dc aguilar when afebrile >24 hours  - Patient will need to followup with Dr. Hoenig after discharge for management of her ureteral stone. This cannot be done until after a full course of appropriate antibiotics    [facility]  [address] [place] [id number] [phone]
69 y/o F with a significant PMHx of, HTN, Hypothyroidism,  carpal tunnel syndrome, lymphedema admitted to my service discharged 08/03/20 to [facility] p/w Altered mental status.
69 y/o F with a significant PMHx of, HTN, Hypothyroidism,  carpal tunnel syndrome, lymphedema admitted to my service discharged 08/03/20 to [facility] p/w Altered mental status.
71 y/o F with a significant PMHx of, HTN, Hypothyroidism,  carpal tunnel syndrome, lymphedema admitted to my service discharged 08/03/20 to [facility] p/w Altered mental status.

## 2020-08-24 NOTE — PROGRESS NOTE ADULT - PROBLEM SELECTOR PLAN 5
Urology consult appreciated, s/p Cystoscopy with left ureteral stent placement.   Continue with abx.   Patient passed Void trial.
Patient with hx labile blood pressure, hypertensive urgency in the past will continue with Labetalol, Hydralazine and Clonidine.
Urology consult appreciated, s/p Cystoscopy with left ureteral stent placement.   Continue with abx.   Patient passed Void trial.
Patient with hx labile blood pressure, hypertensive urgency in the past will continue with Labetalol, Hydralazine and Clonidine.
Rate controlled, resume Eliquis.    Continue with Aspirin.
Urology consult appreciated, s/p Cystoscopy with left ureteral stent placement.   Continue with abx.   Patient passed Void trial.

## 2020-08-24 NOTE — PROGRESS NOTE ADULT - PROBLEM SELECTOR PROBLEM 2
Bacteremia
Ureteral stone with hydronephrosis
Ureteral stone with hydronephrosis
Bacteremia

## 2020-08-24 NOTE — PROGRESS NOTE ADULT - PROVIDER SPECIALTY LIST ADULT
Hospitalist
Infectious Disease
Urology
Urology
Infectious Disease
Infectious Disease
Hospitalist
Hospitalist

## 2020-08-24 NOTE — PROGRESS NOTE ADULT - PROBLEM SELECTOR PLAN 2
Blood cultures positive for Enterococcus, Urine cultures positive for Enterococcus, on Vancomycin, follow up Echo.
Blood cultures positive for Enterococcus, Urine cultures positive for Enterococcus, on Vancomycin, follow up sensitivities. Follow up Echo.
Blood cultures positive for Enterococcus, Urine cultures positive for Enterococcus, on Vancomycin. Repeat Blood cultures no growth.
Urology consult appreciated, s/p Cystoscopy with left ureteral stent placement.   Continue with abx and aguilar for now.
Urology consult appreciated, s/p Cystoscopy with left ureteral stent placement.   Continue with abx and aguilar fro now.
Blood cultures positive for Enterococcus, Urine cultures positive for Enterococcus, on Vancomycin. Repeat Blood cultures no growth.

## 2020-08-24 NOTE — PROGRESS NOTE ADULT - PROBLEM/PLAN-3
DISPLAY PLAN FREE TEXT
Statement Selected
DISPLAY PLAN FREE TEXT

## 2020-08-24 NOTE — PROVIDER CONTACT NOTE (OTHER) - ACTION/TREATMENT ORDERED:
As per provider no interventions necessary at this time. Safety maintained, bed in lowest position. Will continue to monitor.

## 2020-08-25 VITALS — SYSTOLIC BLOOD PRESSURE: 160 MMHG | DIASTOLIC BLOOD PRESSURE: 80 MMHG

## 2020-08-25 PROCEDURE — 80053 COMPREHEN METABOLIC PANEL: CPT

## 2020-08-25 PROCEDURE — 87040 BLOOD CULTURE FOR BACTERIA: CPT

## 2020-08-25 PROCEDURE — 93306 TTE W/DOPPLER COMPLETE: CPT

## 2020-08-25 PROCEDURE — 82330 ASSAY OF CALCIUM: CPT

## 2020-08-25 PROCEDURE — 83605 ASSAY OF LACTIC ACID: CPT

## 2020-08-25 PROCEDURE — 84443 ASSAY THYROID STIM HORMONE: CPT

## 2020-08-25 PROCEDURE — 86850 RBC ANTIBODY SCREEN: CPT

## 2020-08-25 PROCEDURE — 92523 SPEECH SOUND LANG COMPREHEN: CPT

## 2020-08-25 PROCEDURE — 83735 ASSAY OF MAGNESIUM: CPT

## 2020-08-25 PROCEDURE — 80048 BASIC METABOLIC PNL TOTAL CA: CPT

## 2020-08-25 PROCEDURE — 97161 PT EVAL LOW COMPLEX 20 MIN: CPT

## 2020-08-25 PROCEDURE — 87186 SC STD MICRODIL/AGAR DIL: CPT

## 2020-08-25 PROCEDURE — 82947 ASSAY GLUCOSE BLOOD QUANT: CPT

## 2020-08-25 PROCEDURE — 85730 THROMBOPLASTIN TIME PARTIAL: CPT

## 2020-08-25 PROCEDURE — 82435 ASSAY OF BLOOD CHLORIDE: CPT

## 2020-08-25 PROCEDURE — 93005 ELECTROCARDIOGRAM TRACING: CPT

## 2020-08-25 PROCEDURE — 99285 EMERGENCY DEPT VISIT HI MDM: CPT | Mod: 25

## 2020-08-25 PROCEDURE — U0003: CPT

## 2020-08-25 PROCEDURE — 85610 PROTHROMBIN TIME: CPT

## 2020-08-25 PROCEDURE — 82803 BLOOD GASES ANY COMBINATION: CPT

## 2020-08-25 PROCEDURE — 97116 GAIT TRAINING THERAPY: CPT

## 2020-08-25 PROCEDURE — 80202 ASSAY OF VANCOMYCIN: CPT

## 2020-08-25 PROCEDURE — 70498 CT ANGIOGRAPHY NECK: CPT

## 2020-08-25 PROCEDURE — 96375 TX/PRO/DX INJ NEW DRUG ADDON: CPT

## 2020-08-25 PROCEDURE — 71045 X-RAY EXAM CHEST 1 VIEW: CPT

## 2020-08-25 PROCEDURE — 86900 BLOOD TYPING SEROLOGIC ABO: CPT

## 2020-08-25 PROCEDURE — C2617: CPT

## 2020-08-25 PROCEDURE — 86901 BLOOD TYPING SEROLOGIC RH(D): CPT

## 2020-08-25 PROCEDURE — 85014 HEMATOCRIT: CPT

## 2020-08-25 PROCEDURE — 97535 SELF CARE MNGMENT TRAINING: CPT

## 2020-08-25 PROCEDURE — 76000 FLUOROSCOPY <1 HR PHYS/QHP: CPT

## 2020-08-25 PROCEDURE — C1758: CPT

## 2020-08-25 PROCEDURE — 82565 ASSAY OF CREATININE: CPT

## 2020-08-25 PROCEDURE — 82962 GLUCOSE BLOOD TEST: CPT

## 2020-08-25 PROCEDURE — 84100 ASSAY OF PHOSPHORUS: CPT

## 2020-08-25 PROCEDURE — 83615 LACTATE (LD) (LDH) ENZYME: CPT

## 2020-08-25 PROCEDURE — C1769: CPT

## 2020-08-25 PROCEDURE — 84295 ASSAY OF SERUM SODIUM: CPT

## 2020-08-25 PROCEDURE — 81001 URINALYSIS AUTO W/SCOPE: CPT

## 2020-08-25 PROCEDURE — 74176 CT ABD & PELVIS W/O CONTRAST: CPT

## 2020-08-25 PROCEDURE — 85027 COMPLETE CBC AUTOMATED: CPT

## 2020-08-25 PROCEDURE — 84132 ASSAY OF SERUM POTASSIUM: CPT

## 2020-08-25 PROCEDURE — 87150 DNA/RNA AMPLIFIED PROBE: CPT

## 2020-08-25 PROCEDURE — 97530 THERAPEUTIC ACTIVITIES: CPT

## 2020-08-25 PROCEDURE — 87086 URINE CULTURE/COLONY COUNT: CPT

## 2020-08-25 PROCEDURE — 70496 CT ANGIOGRAPHY HEAD: CPT

## 2020-08-25 PROCEDURE — 92610 EVALUATE SWALLOWING FUNCTION: CPT

## 2020-08-25 PROCEDURE — 97165 OT EVAL LOW COMPLEX 30 MIN: CPT

## 2020-08-25 PROCEDURE — 96374 THER/PROPH/DIAG INJ IV PUSH: CPT

## 2020-08-25 PROCEDURE — 97110 THERAPEUTIC EXERCISES: CPT

## 2020-08-25 PROCEDURE — 70450 CT HEAD/BRAIN W/O DYE: CPT

## 2020-08-25 RX ORDER — LINEZOLID 600 MG/300ML
1 INJECTION, SOLUTION INTRAVENOUS
Qty: 0 | Refills: 0 | DISCHARGE
Start: 2020-08-25

## 2020-08-25 RX ADMIN — LORATADINE 10 MILLIGRAM(S): 10 TABLET ORAL at 12:45

## 2020-08-25 RX ADMIN — Medication 0.2 MILLIGRAM(S): at 06:05

## 2020-08-25 RX ADMIN — Medication 75 MICROGRAM(S): at 06:05

## 2020-08-25 RX ADMIN — Medication 600 MILLIGRAM(S): at 06:05

## 2020-08-25 RX ADMIN — Medication 81 MILLIGRAM(S): at 12:45

## 2020-08-25 RX ADMIN — APIXABAN 5 MILLIGRAM(S): 2.5 TABLET, FILM COATED ORAL at 06:05

## 2020-08-25 RX ADMIN — LINEZOLID 600 MILLIGRAM(S): 600 INJECTION, SOLUTION INTRAVENOUS at 06:04

## 2020-08-25 RX ADMIN — Medication 50 MILLIGRAM(S): at 06:05

## 2020-08-25 RX ADMIN — NYSTATIN CREAM 1 APPLICATION(S): 100000 CREAM TOPICAL at 06:05

## 2020-08-25 RX ADMIN — Medication 1 TABLET(S): at 12:45

## 2020-08-25 RX ADMIN — Medication 600 MILLIGRAM(S): at 12:45

## 2020-08-25 NOTE — PROVIDER CONTACT NOTE (OTHER) - REASON
/100 (manual)
/106 (manual)
/98
Elevated BP
Pt /86
Pt BP elevated in 170s
Pt hypertensive
Pt hypertensive and febrile
Pt refusing labetalol 400mg PO & CT Head
Pt's BP is 188/100
Pt's BP was 176/90 and sequential compression stockings.
Repeat /108 (manual)
elevated temp & B/P
elevated temp, B/P, epigastric pain.
hypertensive & R post thigh swelling noted
pt BP elevated post hydralizine and clonidine administration SBP 170s
pt refusing  MRI of head
pt's IV red, mild swelling noted & pt c/o pain at site
pt's bp 206/99
Update on BP
s/p hydralazine 25mg PO, repeat /108 (manual)
Clarifying BP meds/ parameters

## 2020-08-25 NOTE — PROVIDER CONTACT NOTE (OTHER) - ASSESSMENT
pt AOx4, hypertensive otherwise VSS, and resting in bed. pt's IV red, mild swelling, and small&hard lump noted & pt c/o pain at site. IV (L#22) was placed 8/23 & pt has been receiving vancomycin IV. RN was able to flush start of shift.

## 2020-08-25 NOTE — PROVIDER CONTACT NOTE (OTHER) - ACTION/TREATMENT ORDERED:
Mally Dawn PA notified & aware. PA agreed to come assess @ bedside, apply heat packs & raise for now. Will cont to monitor.

## 2020-08-25 NOTE — PROVIDER CONTACT NOTE (OTHER) - SITUATION
Was told in report and on active issues in provider handoff. Systolic BP goal of 160-180/ different parameters in EMAR. Pt is in 130's/ BP meds due. Clarifying orders
/100 (manual)
/100 taken manually
/106 (manual)
/98
Pt /86
Pt BP elevated in 170s
Pt BP elevated. Pt took BP meds at 1400 and due again at 1800. Pt states she does not have a headache and feels fine, but is very upset with her dinner/ menu options
Pt hypertensive and febrile
Pt refusing MRI of head. Pt assessed @bedside by Tito COPELAND. As per MD pt is consentable, A&Ox4 yet refusing MRI
Pt refusing labetalol 400mg PO & CT Head
Pt's BP is 188/100
Pt's BP was 176/90.   Are sequential compression stockings needed for pt.
Repeat /108 (manual)
elevated temp & B/P
elevated temp & B/P, epigastric pain
hypertensive & R post thigh swelling noted
pt BP elevated post hydralizine and clonidine administration SBP 170s
pt's IV red, mild swelling noted & pt c/o pain at site
pt's bp elevated
After BP meds given pressure came down
s/p hydralazine 25mg PO, repeat /108 (manual)

## 2020-09-15 NOTE — PHYSICAL THERAPY INITIAL EVALUATION ADULT - CRITERIA FOR SKILLED THERAPEUTIC INTERVENTIONS
Pt advised states verbal understanding.  States he will decline appt at this time.   predicted duration of therapy intervention/risk reduction/prevention/rehab potential/anticipated discharge recommendation/therapy frequency/functional limitations in following categories/impairments found

## 2020-09-17 ENCOUNTER — APPOINTMENT (OUTPATIENT)
Dept: UROLOGY | Facility: CLINIC | Age: 70
End: 2020-09-17

## 2020-09-18 ENCOUNTER — RX RENEWAL (OUTPATIENT)
Age: 70
End: 2020-09-18

## 2020-10-01 ENCOUNTER — APPOINTMENT (OUTPATIENT)
Dept: UROLOGY | Facility: CLINIC | Age: 70
End: 2020-10-01
Payer: MEDICARE

## 2020-10-01 ENCOUNTER — RX RENEWAL (OUTPATIENT)
Age: 70
End: 2020-10-01

## 2020-10-01 VITALS
RESPIRATION RATE: 16 BRPM | SYSTOLIC BLOOD PRESSURE: 103 MMHG | TEMPERATURE: 97.3 F | HEART RATE: 69 BPM | OXYGEN SATURATION: 96 % | DIASTOLIC BLOOD PRESSURE: 68 MMHG

## 2020-10-01 DIAGNOSIS — E11.42 TYPE 2 DIABETES MELLITUS WITH DIABETIC POLYNEUROPATHY: ICD-10-CM

## 2020-10-01 DIAGNOSIS — G47.33 OBSTRUCTIVE SLEEP APNEA (ADULT) (PEDIATRIC): ICD-10-CM

## 2020-10-01 PROCEDURE — 99214 OFFICE O/P EST MOD 30 MIN: CPT

## 2020-10-01 RX ORDER — TAMSULOSIN HYDROCHLORIDE 0.4 MG/1
0.4 CAPSULE ORAL
Qty: 30 | Refills: 2 | Status: ACTIVE | COMMUNITY
Start: 2020-10-01 | End: 1900-01-01

## 2020-10-01 NOTE — PHYSICAL EXAM
[General Appearance - Well Developed] : well developed [General Appearance - Well Nourished] : well nourished [Normal Appearance] : normal appearance [Well Groomed] : well groomed [General Appearance - In No Acute Distress] : no acute distress [] : no respiratory distress [Respiration, Rhythm And Depth] : normal respiratory rhythm and effort [Exaggerated Use Of Accessory Muscles For Inspiration] : no accessory muscle use [Oriented To Time, Place, And Person] : oriented to person, place, and time [Affect] : the affect was normal [Mood] : the mood was normal [Not Anxious] : not anxious [FreeTextEntry1] : appears without gross defect, but gait not observed due to use of wheelchair [No Focal Deficits] : no focal deficits

## 2020-10-01 NOTE — ASSESSMENT
[FreeTextEntry1] : CT scan reviewed with pt, as well as operative films for stent placement.  \par \par CT showed approx 6 to 7 mm stone left prox ureter with hydro, and several stones on right, up to 9 mm length in lower pole, without hydro.\par \par I had long discussion with patient about their stones, and about options, risks, and benefits of all treatments.  Main options for definitive stone treatment include ESWL, URS, PCNL.  \par \par ESWL success best with smaller, less dense stones, and with short skin to stone distance and favorable location of the stone within the urinary tract, while URS is more successful treatment with multiple stones, more dense stones, or challenging body habitus or stone location.  PCNL is best option for larger, more dense and complex stones, and particularly those involving the lower pole.  Non-definitive stone treatment options for drainage, using either stents or nephrostomy, also reviewed: these are of lower immediate surgical risks, but incur multiple procedures to manage and may have their own complications and effects on quality of life.  Still, nephrostomy or nephroureteral catheter can allow maintenance of urinary system drainage without surgical risks, and management in office with exchanges (avoiding the anesthesia and testing which would be present with bilateral internal stent exchange).\par \par Risks of nontreatment with obstruction can lead to very high rate of renal function loss in stone-bearing kidney over the next months to years.\par \par In this patient's case, I recommend... left URS with laser/stone removal as best chance for maximal success in single session with least amount of uncertainty for stone treatment in a patient requiring anticoagulation, and for most rapid removal of stent.\par \par Risks/benefits/success/recovery expectations all reviewed at length, particularly with respect to patient's comorbidities, and inclusive of infection/sepsis, bleeding, need for secondary procedures or secondary stages such as embolization or open surgery, and even risks of death due to acute issues superimposed on comorbidities.\par \par Pt prefers to undergo: left URS with laser/stone removal \par \par Will schedule.\par \par Urine culture today, PST appt to schedule once surgery scheduled.\par

## 2020-10-05 LAB — BACTERIA UR CULT: ABNORMAL

## 2020-10-20 ENCOUNTER — APPOINTMENT (OUTPATIENT)
Dept: CARDIOLOGY | Facility: CLINIC | Age: 70
End: 2020-10-20

## 2020-10-28 ENCOUNTER — NON-APPOINTMENT (OUTPATIENT)
Age: 70
End: 2020-10-28

## 2020-11-04 ENCOUNTER — APPOINTMENT (OUTPATIENT)
Dept: CARDIOLOGY | Facility: CLINIC | Age: 70
End: 2020-11-04

## 2020-11-06 ENCOUNTER — APPOINTMENT (OUTPATIENT)
Dept: UROLOGY | Facility: CLINIC | Age: 70
End: 2020-11-06

## 2020-11-06 ENCOUNTER — OUTPATIENT (OUTPATIENT)
Dept: OUTPATIENT SERVICES | Facility: HOSPITAL | Age: 70
LOS: 1 days | End: 2020-11-06
Payer: MEDICARE

## 2020-11-06 DIAGNOSIS — Z11.59 ENCOUNTER FOR SCREENING FOR OTHER VIRAL DISEASES: ICD-10-CM

## 2020-11-06 DIAGNOSIS — Z98.89 OTHER SPECIFIED POSTPROCEDURAL STATES: Chronic | ICD-10-CM

## 2020-11-06 PROCEDURE — U0003: CPT

## 2020-11-07 LAB — SARS-COV-2 RNA SPEC QL NAA+PROBE: SIGNIFICANT CHANGE UP

## 2020-11-09 ENCOUNTER — TRANSCRIPTION ENCOUNTER (OUTPATIENT)
Age: 70
End: 2020-11-09

## 2020-11-09 ENCOUNTER — OUTPATIENT (OUTPATIENT)
Dept: OUTPATIENT SERVICES | Facility: HOSPITAL | Age: 70
LOS: 1 days | End: 2020-11-09
Payer: MEDICARE

## 2020-11-09 ENCOUNTER — APPOINTMENT (OUTPATIENT)
Dept: UROLOGY | Facility: HOSPITAL | Age: 70
End: 2020-11-09

## 2020-11-09 DIAGNOSIS — Z98.89 OTHER SPECIFIED POSTPROCEDURAL STATES: Chronic | ICD-10-CM

## 2020-11-09 PROCEDURE — 82553 CREATINE MB FRACTION: CPT

## 2020-11-09 PROCEDURE — 52353 CYSTOURETERO W/LITHOTRIPSY: CPT | Mod: 74

## 2020-11-09 PROCEDURE — 85027 COMPLETE CBC AUTOMATED: CPT

## 2020-11-09 PROCEDURE — 84484 ASSAY OF TROPONIN QUANT: CPT

## 2020-11-09 PROCEDURE — 93005 ELECTROCARDIOGRAM TRACING: CPT

## 2020-11-09 PROCEDURE — 80048 BASIC METABOLIC PNL TOTAL CA: CPT

## 2020-11-09 PROCEDURE — 82550 ASSAY OF CK (CPK): CPT

## 2020-11-09 PROCEDURE — 83735 ASSAY OF MAGNESIUM: CPT

## 2020-11-20 ENCOUNTER — APPOINTMENT (OUTPATIENT)
Dept: CARDIOLOGY | Facility: CLINIC | Age: 70
End: 2020-11-20
Payer: MEDICARE

## 2020-11-20 ENCOUNTER — NON-APPOINTMENT (OUTPATIENT)
Age: 70
End: 2020-11-20

## 2020-11-20 VITALS — OXYGEN SATURATION: 98 % | HEART RATE: 75 BPM | SYSTOLIC BLOOD PRESSURE: 130 MMHG | DIASTOLIC BLOOD PRESSURE: 86 MMHG

## 2020-11-20 DIAGNOSIS — N20.0 CALCULUS OF KIDNEY: ICD-10-CM

## 2020-11-20 DIAGNOSIS — Z23 ENCOUNTER FOR IMMUNIZATION: ICD-10-CM

## 2020-11-20 DIAGNOSIS — N20.1 CALCULUS OF URETER: ICD-10-CM

## 2020-11-20 PROCEDURE — 93000 ELECTROCARDIOGRAM COMPLETE: CPT

## 2020-11-20 PROCEDURE — G0008: CPT

## 2020-11-20 PROCEDURE — 99214 OFFICE O/P EST MOD 30 MIN: CPT

## 2020-11-20 PROCEDURE — 90682 RIV4 VACC RECOMBINANT DNA IM: CPT

## 2020-11-23 ENCOUNTER — MED ADMIN CHARGE (OUTPATIENT)
Age: 70
End: 2020-11-23

## 2020-11-23 PROBLEM — Z23 ENCOUNTER FOR IMMUNIZATION: Status: ACTIVE | Noted: 2020-11-23

## 2020-11-24 NOTE — PHYSICAL EXAM
[Normal Conjunctiva] : the conjunctiva exhibited no abnormalities [No Oral Pallor] : no oral pallor [Normal Jugular Venous V Waves Present] : normal jugular venous V waves present [Respiration, Rhythm And Depth] : normal respiratory rhythm and effort [Auscultation Breath Sounds / Voice Sounds] : lungs were clear to auscultation bilaterally [Heart Sounds] : normal S1 and S2 [Murmurs] : no murmurs present [Arterial Pulses Normal] : the arterial pulses were normal [Bowel Sounds] : normal bowel sounds [Abdomen Soft] : soft [Abdomen Tenderness] : non-tender [Abdomen Mass (___ Cm)] : no abdominal mass palpated [Cyanosis, Localized] : no localized cyanosis [] : no ischemic changes [Skin Turgor] : normal skin turgor [Oriented To Time, Place, And Person] : oriented to person, place, and time [FreeTextEntry1] : Gait was not assessed.

## 2020-11-24 NOTE — HISTORY OF PRESENT ILLNESS
[FreeTextEntry1] : She was recently taken to the hospital for removal of ureteral stent.  At that time she was noted to be in atrial fibrillation with a rapid ventricular response and the procedure was called off.\par She returns back to her nursing facility where she has lost a significant amount of weight and is most concerned about the frequency of her fingersticks for elevated blood sugars.\par She has no particular complaints of palpitations, or syncope.\par She continues to rely on a wheelchair for moving around.

## 2020-11-24 NOTE — DISCUSSION/SUMMARY
[FreeTextEntry1] : She is a 70-year-old, obese woman with much improved hypertension, Lymphedema and coronary artery disease, status post drug-eluting stents.  She has no anginal symptoms or congestive heart failure and has a chronic  infection.  She is currently in sinus rhythm with paroxysms of atrial fibrillation.\par She is tolerating her current dose of oral anticoagulation and calcium channel blocker.\par I encouraged her to remain active and to keep her current doses of medications as is.\par She may proceed with any planned ureteral stent removal whenever feasible.\par She may discontinue the oral anticoagulation 5 doses prior, if need be.\par Routine follow-up in 3 months.

## 2020-11-25 ENCOUNTER — APPOINTMENT (OUTPATIENT)
Dept: UROLOGY | Facility: CLINIC | Age: 70
End: 2020-11-25

## 2020-12-13 ENCOUNTER — RESULT REVIEW (OUTPATIENT)
Age: 70
End: 2020-12-13

## 2021-01-15 NOTE — PROGRESS NOTE ADULT - PROBLEM/PLAN-3
DISPLAY PLAN FREE TEXT
yes
DISPLAY PLAN FREE TEXT

## 2021-03-17 NOTE — ED ADULT TRIAGE NOTE - ACCOMPANIED BY
Spoke to inform that the requested order was faxed to [facility]. Patient had no further questions or concerns.    Self

## 2021-05-13 ENCOUNTER — NON-APPOINTMENT (OUTPATIENT)
Age: 71
End: 2021-05-13

## 2021-05-13 ENCOUNTER — APPOINTMENT (OUTPATIENT)
Dept: UROLOGY | Facility: CLINIC | Age: 71
End: 2021-05-13
Payer: MEDICARE

## 2021-05-13 DIAGNOSIS — E66.01 MORBID (SEVERE) OBESITY DUE TO EXCESS CALORIES: ICD-10-CM

## 2021-05-13 DIAGNOSIS — N39.0 GRAM-NEGATIVE SEPSIS, UNSPECIFIED: ICD-10-CM

## 2021-05-13 DIAGNOSIS — A41.50 GRAM-NEGATIVE SEPSIS, UNSPECIFIED: ICD-10-CM

## 2021-05-13 PROCEDURE — 99215 OFFICE O/P EST HI 40 MIN: CPT

## 2021-05-13 NOTE — PHYSICAL EXAM
[General Appearance - Well Developed] : well developed [General Appearance - Well Nourished] : well nourished [Normal Appearance] : normal appearance [Well Groomed] : well groomed [General Appearance - In No Acute Distress] : no acute distress [] : no respiratory distress [Respiration, Rhythm And Depth] : normal respiratory rhythm and effort [Exaggerated Use Of Accessory Muscles For Inspiration] : no accessory muscle use [FreeTextEntry1] : wheelchair assist

## 2021-05-13 NOTE — HISTORY OF PRESENT ILLNESS
[FreeTextEntry1] : Pt with sig delayed f/u- 70 F with PMHx of DM, HTN and hypothyroidism, discharged on August 3rd 2020\par after being admitted for NSTEMI and later developed a UTI, colitis and A fib\par with RVR sent from NH for AMS and fever. Urology team consulted for 5x6mm L\par proximal ureteral stone with mild hydro in the setting of fever to 103F,\par hypertension to 170s/100s, mild leukocytosis to 10.5, UA with moderate\par LE/moderate blood, elevated lactate to 2.3 and bump in Cr from 1.17 to 1.35. Pt\par underwent emergent cysto, left stent placement. NOw on eliquis. Sig urge/frequency, recurrent hematuria with stent in place.\par \par Was to have surgery 11/9/2020- canceled due to new onset Afib. Seen by Dr. Lisker, but not reimaged or re-sent from care facility\par \par Today, pt is inconsolable, screaming, non-communicative. Unwilling to void to provide f/u urine specimen [None] : no symptoms

## 2021-05-13 NOTE — ASSESSMENT
[FreeTextEntry1] : Sig delayed f/u- now retained stent after initial placement for sepsis, left prox ureter stone, also with known small right renal stone. Earlier planned treatment delayed.\par \par Needs surgical intervention, stent removal or exchange + URS---> but given time, needs updated CT scan and urine culture (can be done through care facility)\par \par Will need to reschedule surgery asap, but needs new imaging, cardiology clearance, and f/u with legal guardian to discuss case

## 2021-05-25 NOTE — PROGRESS NOTE ADULT - PROBLEM SELECTOR PROBLEM 3
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      She was admitted 2/1/21-2/3/21 with a new PE, started on Rivaroxaban. Switched to Eliquis 3/25/21 with RUE DVT.     She was admitted 4/26/21-5/11/21 with sickle cell pain crisis complicated by worsening PE in setting of low Apixaban levels, acute hypoxic respiratory failure, pneumonia, acute chest syndrome s/p exchange transfusion on 4/30 and 5/4. After 2nd exchange her oxygen requirement dramatically improved from 20L to 1-2L 5/5 and she was off oxygen as of 5/6.      She was seen today for hematology follow-up.     Interval History:  Jennifer was seen today for follow-up. She continues to deal with significant pain in her low back and sides. She has been trying to manage with her home pain meds but notes that she still is uncomfortable and thinks she needs an infusion. She has had multiple ED visits since last week for this pain. She has had a few migraines on the right, typical for her, has long history of migraines, no stroke symptoms. She does have nausea with her migraines. She notes improvement in anxiety with Hydroxyzine. Depression OK, continues to try to find her own place and is staying at hotels intermittently, at home now.    Denies fevers, dizziness, chest pain, SOB, cough, abdominal pain, bowel or urinary concerns. Does note swelling in her left leg. Is taking Xarelto BID and will transition to once daily dosing later this week.     Current Outpatient Medications    Medication Sig Dispense Refill     acetaminophen (TYLENOL) 325 MG tablet Take 2 tablets (650 mg) by mouth every 6 hours as needed for mild pain 120 tablet 3     albuterol (PROAIR HFA/PROVENTIL HFA/VENTOLIN HFA) 108 (90 Base) MCG/ACT inhaler Inhale 2 puffs into the lungs every 6 hours as needed for shortness of breath / dyspnea or wheezing 8.5 g 3     albuterol (PROVENTIL) (2.5 MG/3ML) 0.083% neb solution Take 1 vial (2.5 mg) by nebulization every 6 hours as needed for shortness of breath / dyspnea or wheezing 12 mL 4     ARIPiprazole (ABILIFY) 2 MG tablet Take 1 tablet (2 mg) by mouth daily 30 tablet 3     budesonide-formoterol (SYMBICORT) 160-4.5 MCG/ACT Inhaler Inhale 1 puff into the lungs every evening 10.2 g 3     celecoxib (CELEBREX) 100 MG capsule Take 1 capsule (100 mg) by mouth 2 times daily 60 capsule 3     diclofenac (VOLTAREN) 1 % topical gel Apply 4 g topically 4 times daily as needed for moderate pain Apply to back, legs, and arms for pain 150 g 3     diphenhydrAMINE (BENADRYL) 25 MG capsule Take 1-2 capsules (25-50 mg) by mouth nightly as needed for sleep 60 capsule 3     EPINEPHrine (ANY BX GENERIC EQUIV) 0.3 MG/0.3ML injection 2-pack Inject 0.3 mLs (0.3 mg) into the muscle as needed for anaphylaxis 1 each 1     Hydroxyurea 1000 MG TABS Take 2,000 mg by mouth daily 60 tablet 3     hydrOXYzine (ATARAX) 25 MG tablet Take 1 tablet (25 mg) by mouth 3 times daily as needed for anxiety 30 tablet 1     JADENU 360 MG tablet Take 4 tablets (1,440 mg) by mouth every evening 30 tablet 0     lidocaine-prilocaine (EMLA) 2.5-2.5 % external cream Apply topically as needed for moderate pain 30 g 1     medroxyPROGESTERone (DEPO-PROVERA) 150 MG/ML IM injection Inject 150 mg into the muscle       naloxone (NARCAN) 4 MG/0.1ML nasal spray Spray 1 spray (4 mg) into one nostril alternating nostrils once as needed for opioid reversal every 2-3 minutes until assistance arrives 0.2 mL 1     ondansetron (ZOFRAN) 8 MG tablet         oxyCODONE (OXYCONTIN) 10 MG 12 hr tablet Take 1 tablet (10 mg) by mouth every 12 hours 60 tablet 0     oxyCODONE IR (ROXICODONE) 15 MG tablet Take 1 tablet (15mg) by mouth every 4-6 hours as needed for severe pain. Goal 4 per day. Max 6 per day. 60 tablet 0     [START ON 5/29/2021] rivaroxaban ANTICOAGULANT (XARELTO ANTICOAGULANT) 20 MG TABS tablet Take 1 tablet (20 mg) by mouth daily (with dinner) Starting on 5/29 after completing your 15mg twice daily dosing on 5/28 30 tablet 2     rivaroxaban ANTICOAGULANT (XARELTO) 15 MG TABS tablet Take 1 tablet (15 mg) by mouth 2 times daily (with meals) for 17 days 34 tablet 0     sertraline (ZOLOFT) 100 MG tablet Take 2 tablets (200 mg) by mouth daily 180 tablet 3     Physical Examination:  /88 (BP Location: Right arm, Patient Position: Sitting, Cuff Size: Adult Regular)   Pulse 115   Temp 98.3  F (36.8  C) (Oral)   Resp 16   Wt 76.4 kg (168 lb 8 oz)   SpO2 96%   BMI 28.92 kg/m    Wt Readings from Last 10 Encounters:   05/24/21 72.6 kg (160 lb)   05/22/21 73.9 kg (163 lb)   05/19/21 74.1 kg (163 lb 6.4 oz)   05/10/21 72.2 kg (159 lb 2.8 oz)   04/26/21 75.8 kg (167 lb 1.6 oz)   04/25/21 77.6 kg (171 lb)   04/24/21 77.9 kg (171 lb 11.2 oz)   04/22/21 76.2 kg (168 lb)   04/21/21 76.3 kg (168 lb 3.4 oz)   04/16/21 76.3 kg (168 lb 3.4 oz)     Constitutional: Well-appearing female in no acute distress.  Eyes: EOMI, PERRL. No scleral icterus.  ENT: Oral mucosa is moist without lesions or thrush.   Lymphatic: Neck is supple without cervical or supraclavicular lymphadenopathy.   Cardiovascular: Regular rate and rhythm. No murmurs, gallops, or rubs. Left leg 1+ peripheral edema.  Respiratory: Clear to auscultation bilaterally. No wheezes or crackles.  Gastrointestinal: Bowel sounds present. Abdomen soft, non-tender.  Neurologic: Sequela of prior right sided stroke.   Skin: No rashes, petechiae, or bruising noted on exposed skin. Port in left chest without erythema,  still slightly tender.     Laboratory Data:  Results for HIMANSHU AL (MRN [id number]) as of 5/26/2021 16:35   5/26/2021 11:37   Sodium 137   Potassium 4.0   Chloride 106   Carbon Dioxide 25   Urea Nitrogen 12   Creatinine 0.53   GFR Estimate >90   GFR Estimate If Black >90   Calcium 8.9   Anion Gap 6   Albumin 3.6   Protein Total 7.8   Bilirubin Total 2.3 (H)   Alkaline Phosphatase 86    (H)    (H)   Ferritin 4,586 (H)   Glucose 100 (H)   WBC 10.4   Hemoglobin 8.7 (L)   Hematocrit 26.7 (L)   Platelet Count 257   RBC Count 2.96 (L)   MCV 90   MCH 29.4   MCHC 32.6   RDW 20.2 (H)   Diff Method Automated Method   % Neutrophils 63.0   % Lymphocytes 17.0   % Monocytes 6.3   % Eosinophils 11.4   % Basophils 1.9   % Immature Granulocytes 0.4   Nucleated RBCs 1 (H)   Absolute Neutrophil 6.5   Absolute Lymphocytes 1.8   Absolute Monocytes 0.7   Absolute Eosinophils 1.2 (H)   Absolute Basophils 0.2   Abs Immature Granulocytes 0.0   Absolute Nucleated RBC 0.1       Assessment and Plan:    1. Sickle Cell Disease  HgbSS complicated by hx stroke, acute chest, iron overload, chronic pain and recently complicated by prolonged hospitalization for complicated pain crisis with worsening PE, acute chest, possible pneumonia, acute hypoxic respiratory failure. Did require exchange x 2 inpatient. Clinically appears well today though she has had multiple ED visits for pain despite no clear trigger or concerning findings on exam.      Labs: Hgb stable at 8.7. Plt and WBC WNL. ALT/AST elevated but improved, suspect 2/2 iron deposition. Confirmed <3000mg Tylenol daily. Ferritin remains elevated but improved. Remainder stable.      Meds: Stopped Voxeletor. Continue Hydrea 2000mg daily. Contineu Jadenu 4 tablets daily. Now on Desferral over 48 hours every other weekend-this weekend will get 2nd dose. We discussed Jr and she is interested in this-will discuss with Dr. Duncan.     Pain Control: Continue OxyContin 10mg  BID. Continue Oxycodone to 15mg PRN. Continue Tylenol PRN, and Celebrex PRN. OK to use Voltaren gel as needed. Avoid escalation of PO meds at this time.      Port still slightly tender but improving.      Follow-up: Has follow up with Dr. Duncan 6/4.     2. Neuro  History of stroke. ASA on hold while on anticoagulation for PE.      PT referral for right leg weakness-scheduled next week.     Migraines, chronic issue, more recently. Push fluids and monitor, could send to headache clinic if needed. She understands going to ED with any headache with other neuro concern given stroke history.      3. Psych  History of anxiety and depression. Continue Sertraline 200mg daily and Abilify 2mg daily and Hydroxyzine PRN. Overall mood stable. She did say that if her depression is worse she tends to shut out others and not talk and if this happens we should still continue trying to reach out to her. Denies thoughts of self harm.      Follows with outside therapist every week.      OK to use Benadryl PRN insomnia.      4. Pulm  History of asthma, continue Symbicort and Albuterol PRN. Has pulm visit scheduled.       Bilateral PE diagnosed 2/1/21 was on Xarelto. RUE DVT 3/25/21 switched to Eliquis. Worsening PE diagnosed 4/27/21, heme consult inpatient, Eliquis levels were low, switched back to Xarelto and levels were WNL. Now on 15mg BID until 5/29, then switch to 20mg daily. Has worsening left leg swelling today-will get LLE US to r/o DVT.    Acute chest/hypoxic respiratory failure/pneumonia: Issues inpatient, received abx and exchange x 2 with improvement in O2. Discharged without oxygen. Denies any breathing concerns or fevers. Sating 100% on RA.     5. Other  OK for Pfiezer or Moderna COVID vaccine, gave her number to schedule.     Did not discuss sickle cell health maintenance today.     Unable to get patient in to infusion today due to limited availability. She called later stating she was going to ED-called to give report.  Asked if they could get LLE US in ED to r/o DVT.    60 minutes spent on the date of the encounter doing chart review, review of test results, interpretation of tests, patient visit, documentation and discussion with other provider(s)     Andrei Machado PA-C  [facility]  [address] [place] [address] [id number] [phone]     Essential hypertension

## 2021-06-10 NOTE — PATIENT PROFILE ADULT. - NS PRO PT RIGHT SUPPORT PERSON
Chief Complaint  Memory Loss    Subjective            Willa Sierra presents to [facility] for Memory loss  History of Present Illness   New patient referred by Dr. Ernst for Memory loss  Patient is here for memory loss and is currently taking Namenda 10mg 1 qd, per pt daughter (gigi) patient memory has been getting worst x's 3-4 months.     Patient daughter states she thinks  medication has helped some    Pt had bypass surgery     Lack of interest in usual activities. 3 daughters care for her  She is still able to cook  Pt co headaches all the time  Hx of colon cancer.     ==================================  MRI Brain 3-  Comparison   Impression:  1. Age-related atrophic changes and moderate periventricular and scattered deep white matter chronic small vessel ischemic demyelination.   2. No evidence of acute ischemic change  3. Mild mucosal thickening in the ethmoid air cells and right maxillary sinus    CT Head 8-  Impression:  Small vessel ischemic change.  Atrophy atherosclerotic calcification      Family History   Problem Relation Age of Onset   • Heart disease Mother    • Diabetes Mother    • Heart disease Father    • Diabetes Father    • Stroke Father    • Heart disease Sister    • Liver disease Sister    • Cancer Brother    • Diabetes Brother    • Heart disease Brother        Past Medical History:   Diagnosis Date   • Anemia    • Arthritis    • Cataract    • Depression    • Osteoporosis    • Thyroid disease    • TIA (transient ischemic attack)        Social History     Socioeconomic History   • Marital status:      Spouse name: Not on file   • Number of children: Not on file   • Years of education: Not on file   • Highest education level: Not on file   Tobacco Use   • Smoking status: Never Smoker   • Smokeless tobacco: Never Used   Substance and Sexual Activity   • Alcohol use: No   • Drug use: No         Current Outpatient Medications:   •   ALPRAZolam (XANAX) 2 MG tablet, Take 2 mg by mouth 2 (Two) Times a Day As Needed. for anxiety, Disp: , Rfl: 0  •  bumetanide (BUMEX) 0.5 MG tablet, bumetanide 0.5 mg tablet, Disp: , Rfl:   •  Calcium Carbonate-Vit D-Min (CALCIUM 1200) 5081-4420 MG-UNIT chewable tablet, CALCIUM 1200 7015-7444 MG-UNIT CHEW, Disp: , Rfl:   •  CBD (cannabidiol) oral oil, Take  by mouth As Needed., Disp: , Rfl:   •  clopidogrel (PLAVIX) 75 MG tablet, Take 75 mg by mouth Daily., Disp: , Rfl: 0  •  Cyanocobalamin (B-12) 1000 MCG/ML kit, 1 mL., Disp: , Rfl:   •  denosumab (PROLIA) 60 MG/ML solution prefilled syringe syringe, PROLIA 60 MG/ML SOSY, Disp: , Rfl:   •  DULoxetine (CYMBALTA) 30 MG capsule, Take 30 mg by mouth Daily., Disp: , Rfl: 2  •  folic acid (FOLVITE) 400 MCG tablet, Take 400 mcg by mouth Daily., Disp: , Rfl:   •  hydrALAZINE (APRESOLINE) 25 MG tablet, Every 12 (Twelve) Hours., Disp: , Rfl:   •  HYDROcodone-acetaminophen (NORCO)  MG per tablet, Take 1 tablet by mouth 4 (Four) Times a Day., Disp: , Rfl: 0  •  hydroxychloroquine (PLAQUENIL) 200 MG tablet, TAKE ONE TABLET BY MOUTH EVERY DAY with food OR MILK, Disp: , Rfl: 0  •  hydrOXYzine (ATARAX) 25 MG tablet, Take 25 mg by mouth Every 6 (Six) Hours As Needed. for anxiety, Disp: , Rfl: 1  •  indomethacin (INDOCIN) 50 MG capsule, Take 50 mg by mouth As Needed (MIgraine)., Disp: , Rfl:   •  levothyroxine (SYNTHROID, LEVOTHROID) 100 MCG tablet, Take 100 mcg by mouth Daily., Disp: , Rfl: 0  •  losartan (COZAAR) 25 MG tablet, Take 25 mg by mouth Daily., Disp: , Rfl: 0  •  Magnesium 100 MG capsule, Take 165 mg by mouth Daily., Disp: , Rfl:   •  meclizine (ANTIVERT) 25 MG tablet, Take 25 mg by mouth 3 (Three) Times a Day As Needed for dizziness., Disp: , Rfl:   •  memantine (NAMENDA) 10 MG tablet, Take 1 tablet by mouth Daily., Disp: , Rfl: 3  •  metoclopramide (REGLAN) 10 MG tablet, Take 1 tablet by mouth three times a day before meals for 30 days, Disp: , Rfl:   •   "metroNIDAZOLE (FLAGYL) 500 MG tablet, metronidazole 500 mg tablet, Disp: , Rfl:   •  montelukast (Singulair) 10 MG tablet, Daily., Disp: , Rfl:   •  pantoprazole (PROTONIX) 40 MG EC tablet, Take 40 mg by mouth Daily., Disp: , Rfl: 0  •  potassium chloride (K-DUR) 10 MEQ CR tablet, 1 TABLET WITH FOOD ONCE A DAY PRN ORALLY 15, Disp: , Rfl:   •  predniSONE (DELTASONE) 10 MG tablet, prednisone 10 mg tablet, Disp: , Rfl:   •  risperiDONE (risperDAL) 0.5 MG tablet, Take 0.05 mg by mouth Daily., Disp: , Rfl: 0  •  sorbitol 70 % solution solution, TAKE 50ML TWO TIMES AS DIRECTED PER WRITTEN INSTRUCTIONS, Disp: , Rfl: 0  •  vitamin B-6 (PYRIDOXINE) 50 MG tablet, Take 100 mg by mouth Daily., Disp: , Rfl:   •  doxycycline (VIBRAMYICN) 100 MG tablet, , Disp: , Rfl:     Review of Systems   Constitutional: Positive for activity change and fatigue.   HENT: Negative for facial swelling and nosebleeds.    Eyes: Positive for pain and visual disturbance.   Respiratory: Positive for chest tightness. Negative for shortness of breath.    Cardiovascular: Positive for leg swelling.   Gastrointestinal: Positive for constipation and diarrhea.   Endocrine: Positive for cold intolerance. Negative for heat intolerance.   Genitourinary: Negative for dysuria and urgency.   Musculoskeletal: Positive for back pain and neck pain.   Neurological: Positive for dizziness and headaches.   Psychiatric/Behavioral: Positive for agitation, confusion and sleep disturbance.          Maximum   Score Patient's   Score Questions   5 5 \"What is the year?Season?Date?Day of the week?Month?\"   5 3 \"Where are we now: State?County?Town/city?Hospital?Floor?\"   3 3 3 Unrelated objects Number of trials:___   5 1 Count backward from 100 by sevens or spell WORLD backwards   3 3 Name 3 things from above   2 2 Identify 2 objects   1 1 Repeat the phrase: No ifs, ands,or buts.   3 3 Take paper in right hand, fold it in half, and put it on the floor.   1 1 Please read this and " "do what it says. \"Close your eyes\"   1 0 Make up and write a sentence about anything. Noun and verb   1 1 Copy this picture 10 angles must be present.   30 23 Total MMSE       Objective   Vital Signs:   /91 (BP Location: Left arm, Patient Position: Sitting, Cuff Size: Large Adult)   Pulse 70   Temp 97.7 °F (36.5 °C) (Temporal)   Ht 152.4 cm (60\")   Wt 89.4 kg (197 lb)   BMI 38.47 kg/m²     Physical Exam  Vitals reviewed.   HENT:      Head: Normocephalic.      Mouth/Throat:      Mouth: Mucous membranes are moist.   Eyes:      Pupils: Pupils are equal, round, and reactive to light.   Cardiovascular:      Rate and Rhythm: Normal rate.      Pulses: Normal pulses.   Pulmonary:      Effort: Pulmonary effort is normal. No respiratory distress.   Abdominal:      General: Abdomen is flat.   Musculoskeletal:         General: Normal range of motion.   Skin:     General: Skin is warm.   Neurological:      General: No focal deficit present.      Mental Status: She is alert and oriented to person, place, and time.      Deep Tendon Reflexes:      Reflex Scores:       Bicep reflexes are 1+ on the right side and 1+ on the left side.       Patellar reflexes are 1+ on the right side and 0 on the left side.  Psychiatric:         Mood and Affect: Mood normal.        Result Review :                Neurologic Exam     Mental Status   Oriented to person, place, and time.   Oriented to person.   Oriented to place.   Attention: normal. Concentration: decreased.   Level of consciousness: alert  Knowledge: consistent with education.   Able to name object.     Cranial Nerves   Cranial nerves II through XII intact.     CN III, IV, VI   Pupils are equal, round, and reactive to light.    Motor Exam   Muscle bulk: normal    Sensory Exam   Light touch normal.     Gait, Coordination, and Reflexes     Gait  Gait: wide-based    Tremor   Resting tremor: absent  Intention tremor: absent    Reflexes   Right biceps: 1+  Left biceps: 1+  Right " patellar: 1+  Left patellar: 0             Assessment and Plan    Diagnoses and all orders for this visit:    1. Memory loss (Primary)    2. Other headache syndrome    Other orders  -     memantine (NAMENDA) 10 MG tablet; Take 1 tablet by mouth Daily.; Refill: 3      Pt has mild cognitive and memory impairment,     MRI to evaluate for causes of memory impairment and or ha  Check copper level due to gastric bypass      Follow Up   Return in about 6 months (around 12/16/2021).  Patient was given instructions and counseling regarding her condition or for health maintenance advice. Please see specific information pulled into the AVS if appropriate.         This document has been electronically signed by Joseph Seipel, MD on June 16, 2021 10:35 EDT     Yes

## 2021-06-25 ENCOUNTER — APPOINTMENT (OUTPATIENT)
Dept: CT IMAGING | Facility: CLINIC | Age: 71
End: 2021-06-25
Payer: MEDICARE

## 2021-06-25 ENCOUNTER — OUTPATIENT (OUTPATIENT)
Dept: OUTPATIENT SERVICES | Facility: HOSPITAL | Age: 71
LOS: 1 days | End: 2021-06-25
Payer: MEDICARE

## 2021-06-25 DIAGNOSIS — N20.0 CALCULUS OF KIDNEY: ICD-10-CM

## 2021-06-25 DIAGNOSIS — Z98.89 OTHER SPECIFIED POSTPROCEDURAL STATES: Chronic | ICD-10-CM

## 2021-06-25 PROCEDURE — 74176 CT ABD & PELVIS W/O CONTRAST: CPT | Mod: 26,ME

## 2021-06-25 PROCEDURE — 74176 CT ABD & PELVIS W/O CONTRAST: CPT

## 2021-06-25 PROCEDURE — G1004: CPT

## 2021-07-02 ENCOUNTER — APPOINTMENT (OUTPATIENT)
Dept: UROLOGY | Facility: CLINIC | Age: 71
End: 2021-07-02

## 2021-07-20 ENCOUNTER — APPOINTMENT (OUTPATIENT)
Dept: UROLOGY | Facility: CLINIC | Age: 71
End: 2021-07-20
Payer: MEDICARE

## 2021-07-20 DIAGNOSIS — N39.0 URINARY TRACT INFECTION, SITE NOT SPECIFIED: ICD-10-CM

## 2021-07-20 DIAGNOSIS — E11.9 TYPE 2 DIABETES MELLITUS W/OUT COMPLICATIONS: ICD-10-CM

## 2021-07-20 DIAGNOSIS — Z96.0 PRESENCE OF UROGENITAL IMPLANTS: ICD-10-CM

## 2021-07-20 DIAGNOSIS — N20.1 CALCULUS OF URETER: ICD-10-CM

## 2021-07-20 DIAGNOSIS — N20.0 CALCULUS OF KIDNEY: ICD-10-CM

## 2021-07-20 PROCEDURE — 99443: CPT

## 2021-07-29 NOTE — ASSESSMENT
[FreeTextEntry1] : \par \par Recommend OR for possible left stent ESWL (retained) and left URS with laser, stent exchange.\par \par Medical clearance.\par \par Appreciate Isiah's assistance as pt proxy-> all questions answered. Will need to hold asa for procedure.\par will schedule as soon as possible

## 2021-07-29 NOTE — HISTORY OF PRESENT ILLNESS
[Home] : at home, [unfilled] , at the time of the visit. [Other Location: e.g. Home (Enter Location, City,State)___] : at [unfilled] [FreeTextEntry3] : [facility] [FreeTextEntry1] : The patient-doctor relationship has been established in a face to face fashion via real time video/audio HIPAA compliant communication using telemedicine software. The patient's identity has been confirmed. The patient was previously emailed a copy of the telemedicine consent. They have had a chance to review and has now given verbal consent and has requested care to be assessed and treated via telemedicine. They understand there may be limitations in this process, and that they may need further followup care in the office and/or hospital settings.\par \par Verbal consent given on Jul 20 2021  1:20PM\par \par TIANA BROWN is a 71 year F who presents for f/u of stones, stent. D/w pt's advocate, with power of ; recent ct findings, surgical and nonsurgical options.\par \par 07/20/2021 \par \par The patient denies fevers, chills, nausea and/or vomiting, and no unexplained weight loss.\par \par All pertinent parts of the patient PFSH (past medical, family, and social histories), laboratory, radiological studies and available physician notes were reviewed prior to starting the face-to-face portion of the telemedicine visit. Questionnaire results, where appropriate, were discussed with the patient.\par

## 2021-08-06 ENCOUNTER — OUTPATIENT (OUTPATIENT)
Dept: OUTPATIENT SERVICES | Facility: HOSPITAL | Age: 71
LOS: 1 days | End: 2021-08-06
Payer: MEDICARE

## 2021-08-06 VITALS
OXYGEN SATURATION: 96 % | DIASTOLIC BLOOD PRESSURE: 69 MMHG | WEIGHT: 160.06 LBS | HEART RATE: 98 BPM | SYSTOLIC BLOOD PRESSURE: 101 MMHG | HEIGHT: 62 IN | TEMPERATURE: 98 F | RESPIRATION RATE: 18 BRPM

## 2021-08-06 DIAGNOSIS — N20.0 CALCULUS OF KIDNEY: ICD-10-CM

## 2021-08-06 DIAGNOSIS — I48.20 CHRONIC ATRIAL FIBRILLATION, UNSPECIFIED: ICD-10-CM

## 2021-08-06 DIAGNOSIS — E03.9 HYPOTHYROIDISM, UNSPECIFIED: ICD-10-CM

## 2021-08-06 DIAGNOSIS — Z96.0 PRESENCE OF UROGENITAL IMPLANTS: ICD-10-CM

## 2021-08-06 DIAGNOSIS — Z98.890 OTHER SPECIFIED POSTPROCEDURAL STATES: Chronic | ICD-10-CM

## 2021-08-06 DIAGNOSIS — Z01.818 ENCOUNTER FOR OTHER PREPROCEDURAL EXAMINATION: ICD-10-CM

## 2021-08-06 DIAGNOSIS — Z98.89 OTHER SPECIFIED POSTPROCEDURAL STATES: Chronic | ICD-10-CM

## 2021-08-06 LAB
ANION GAP SERPL CALC-SCNC: 15 MMOL/L — SIGNIFICANT CHANGE UP (ref 5–17)
BUN SERPL-MCNC: 27 MG/DL — HIGH (ref 7–23)
CALCIUM SERPL-MCNC: 8.8 MG/DL — SIGNIFICANT CHANGE UP (ref 8.4–10.5)
CHLORIDE SERPL-SCNC: 97 MMOL/L — SIGNIFICANT CHANGE UP (ref 96–108)
CO2 SERPL-SCNC: 27 MMOL/L — SIGNIFICANT CHANGE UP (ref 22–31)
CREAT SERPL-MCNC: 1.01 MG/DL — SIGNIFICANT CHANGE UP (ref 0.5–1.3)
GLUCOSE SERPL-MCNC: 118 MG/DL — HIGH (ref 70–99)
HCT VFR BLD CALC: 37 % — SIGNIFICANT CHANGE UP (ref 34.5–45)
HGB BLD-MCNC: 11.7 G/DL — SIGNIFICANT CHANGE UP (ref 11.5–15.5)
MCHC RBC-ENTMCNC: 28.3 PG — SIGNIFICANT CHANGE UP (ref 27–34)
MCHC RBC-ENTMCNC: 31.6 GM/DL — LOW (ref 32–36)
MCV RBC AUTO: 89.4 FL — SIGNIFICANT CHANGE UP (ref 80–100)
NRBC # BLD: 0 /100 WBCS — SIGNIFICANT CHANGE UP (ref 0–0)
PLATELET # BLD AUTO: 462 K/UL — HIGH (ref 150–400)
POTASSIUM SERPL-MCNC: 3.1 MMOL/L — LOW (ref 3.5–5.3)
POTASSIUM SERPL-SCNC: 3.1 MMOL/L — LOW (ref 3.5–5.3)
RBC # BLD: 4.14 M/UL — SIGNIFICANT CHANGE UP (ref 3.8–5.2)
RBC # FLD: 14.7 % — HIGH (ref 10.3–14.5)
SODIUM SERPL-SCNC: 139 MMOL/L — SIGNIFICANT CHANGE UP (ref 135–145)
WBC # BLD: 8.21 K/UL — SIGNIFICANT CHANGE UP (ref 3.8–10.5)
WBC # FLD AUTO: 8.21 K/UL — SIGNIFICANT CHANGE UP (ref 3.8–10.5)

## 2021-08-06 PROCEDURE — 87086 URINE CULTURE/COLONY COUNT: CPT

## 2021-08-06 PROCEDURE — 85027 COMPLETE CBC AUTOMATED: CPT

## 2021-08-06 PROCEDURE — 80048 BASIC METABOLIC PNL TOTAL CA: CPT

## 2021-08-06 PROCEDURE — G0463: CPT

## 2021-08-06 PROCEDURE — 83036 HEMOGLOBIN GLYCOSYLATED A1C: CPT

## 2021-08-06 PROCEDURE — 87186 SC STD MICRODIL/AGAR DIL: CPT

## 2021-08-06 RX ORDER — HYDRALAZINE HCL 50 MG
1 TABLET ORAL
Qty: 0 | Refills: 0 | DISCHARGE

## 2021-08-06 RX ORDER — PREGABALIN 225 MG/1
1 CAPSULE ORAL
Qty: 0 | Refills: 0 | DISCHARGE

## 2021-08-06 RX ORDER — LIDOCAINE HCL 20 MG/ML
0.2 VIAL (ML) INJECTION ONCE
Refills: 0 | Status: DISCONTINUED | OUTPATIENT
Start: 2021-08-16 | End: 2021-08-30

## 2021-08-06 RX ORDER — FOLIC ACID 0.8 MG
1 TABLET ORAL
Qty: 0 | Refills: 0 | DISCHARGE

## 2021-08-06 RX ORDER — THIAMINE MONONITRATE (VIT B1) 100 MG
1000 TABLET ORAL
Qty: 0 | Refills: 0 | DISCHARGE

## 2021-08-06 RX ORDER — PANTOPRAZOLE SODIUM 20 MG/1
1 TABLET, DELAYED RELEASE ORAL
Qty: 0 | Refills: 0 | DISCHARGE

## 2021-08-06 RX ORDER — CIPROFLOXACIN LACTATE 400MG/40ML
400 VIAL (ML) INTRAVENOUS ONCE
Refills: 0 | Status: DISCONTINUED | OUTPATIENT
Start: 2021-08-16 | End: 2021-08-30

## 2021-08-06 RX ORDER — ASCORBIC ACID 60 MG
1 TABLET,CHEWABLE ORAL
Qty: 0 | Refills: 0 | DISCHARGE

## 2021-08-06 NOTE — H&P PST ADULT - NSICDXPASTMEDICALHX_GEN_ALL_CORE_FT
PAST MEDICAL HISTORY:  2019 novel coronavirus disease (COVID-19) As per patient, diagnosed with Covid 1 year ago when residing in a nursing home and was managed in nursing home; Pt denies any hospitalizations for covid or intubations.    Carpal tunnel syndrome of right wrist     HTN (hypertension)     Hypothyroid on synthroid    Lymphedema     Obesity BMI 29.3    Type 2 diabetes mellitus without complication diet-controlled     PAST MEDICAL HISTORY:  2019 novel coronavirus disease (COVID-19) As per patient, diagnosed with Covid 1 year ago when residing in a nursing home and was managed in nursing home; Pt denies any hospitalizations for covid or intubations.    Carpal tunnel syndrome of right wrist     HTN (hypertension)     Hypothyroid on synthroid    Lymphedema     Obesity BMI 29.3    Sepsis Urosepsis due to septic stone 8/2020    Type 2 diabetes mellitus without complication diet-controlled

## 2021-08-06 NOTE — H&P PST ADULT - NSICDXPASTSURGICALHX_GEN_ALL_CORE_FT
PAST SURGICAL HISTORY:  S/P carpal tunnel release     S/P cystoscopy Pt is a poor historian; As per charts, patient had a cysto with stent placement 8/2020

## 2021-08-06 NOTE — H&P PST ADULT - HISTORY OF PRESENT ILLNESS
71 y/o F with a significant PMHx of, HTN, Hypothyroidism (on synthroid),  carpal tunnel syndrome, lymphedema.  8/2020 admitted due to [facility] p/w Altered mental status and acute metabolic encephalopathy;   was found with a septic stone of 6 mm in the left proximal ureter, and L ureteral stent placed.  currently presenting for left ureteroscopy, stone extraction and possible stent removal    As per patient, diagnosed with Covid 1 year ago when residing in a nursing home  Covid vaccine received 71 y/o F poor historian with a PMH HTN, AFib (on eliquis), Hypothyroidism (on synthroid), DMT2 (diet-controlled), urosepsis (8/2020 secondary to septic stone s/p cystoscopy with stone extraction), carpal tunnel syndrome, renal calculi and lymphedema. Pt presents to PST today for a scheduled cystoscopy, left stent placement, left ureteroscopy with laser stone and possible renal ESWL.      As per patient, diagnosed with Covid 1 year ago when residing in a nursing home and was managed in nursing home; Pt denies any hospitalizations for covid or intubations.  Pt reports she had the Covid vaccine but is unsure of which vaccine or when.  Denies Recent travel, Exposure or Covid symptoms   Pt currently residing at [facility] [phone] 71 y/o F poor historian with a PMH HTN, AFib (on eliquis), Hypothyroidism (on synthroid), DMT2 (diet-controlled), urosepsis (8/2020 secondary to septic stone s/p cystoscopy with stone extraction), carpal tunnel syndrome, renal calculi and lymphedema. Pt presents to PST today for a scheduled cystoscopy, left stent placement, left ureteroscopy with laser stone and possible renal ESWL.      As per patient, diagnosed with Covid 1 year ago when residing in a nursing home and was managed at the nursing home; Pt denies any hospitalizations for covid or intubations.  Pt reports she had the Covid vaccine but is unsure of which vaccine or when.  Denies Recent travel, Exposure or Covid symptoms   Pt currently residing at [facility] [phone] 69 y/o F poor historian with a PMH HTN, AFib (on eliquis), Hypothyroidism (on synthroid), DMT2 (diet-controlled), urosepsis (8/2020 secondary to septic stone s/p cystoscopy with stone extraction), carpal tunnel syndrome, renal calculi and lymphedema. Pt presents to PST today for a scheduled cystoscopy, left stent placement, left ureteroscopy with laser stone and possible renal ESWL of stent due to long term indwelling.      As per patient, diagnosed with Covid 1 year ago when residing in a nursing home and was managed at the nursing home; Pt denies any hospitalizations for covid or intubations.  Pt reports she had the Covid vaccine but is unsure of which vaccine or when.  Denies Recent travel, Exposure or Covid symptoms   Pt currently residing at [facility] [phone]

## 2021-08-06 NOTE — H&P PST ADULT - HEALTH CARE MAINTENANCE
Pt reports she had the Covid vaccine but is unsure of which vaccine or when.  Pt unsure if she received the flu vaccine 2020

## 2021-08-06 NOTE — H&P PST ADULT - NSICDXPROBLEM_GEN_ALL_CORE_FT
PROBLEM DIAGNOSES  Problem: Calculus of kidney  Assessment and Plan: Pt is scheduled to have a cystoscopy, left stent placement, left ureteroscopy with laser stone, possible renal ESWL  CBC, BMP, HGA1C Urine culture obtained and sent to lab.  Fingerstick on DOS  Pending Covid PCR test at [facility] 8/13/21  Pending Cardiac evaluation and EKG    Problem: Chronic atrial fibrillation  Assessment and Plan: Advised pt to hold Eliquis 3 days prior to procedure; Last dose 8/12/21 PM Dose  Advised patient to continue taking anti-arrhythmic medications DOS  Hold ASA 81 mg 7 days prior to procedure; Last dose 8/9/21 in the morning    Problem: Hypothyroidism  Assessment and Plan: Advised pt to continue taking synthroid DOS       PROBLEM DIAGNOSES  Problem: Calculus of kidney  Assessment and Plan: Pt is scheduled to have a cystoscopy, left stent placement, left ureteroscopy with laser stone, possible renal ESWL  CBC, BMP, HGA1C Urine culture obtained and sent to lab.  KUB Xray ordered for possible renal ESWL  Fingerstick on DOS  Pending Covid PCR test at [facility] 8/13/21  Pending Cardiac evaluation and EKG    Problem: Chronic atrial fibrillation  Assessment and Plan: Advised pt to hold Eliquis 3 days prior to procedure; Last dose 8/12/21 PM Dose  Advised patient to continue taking anti-arrhythmic medications DOS  Hold ASA 81 mg 7 days prior to procedure; Last dose 8/9/21 in the morning    Problem: Hypothyroidism  Assessment and Plan: Advised pt to continue taking synthroid DOS

## 2021-08-06 NOTE — H&P PST ADULT - NSSUBSTANCEUSE_GEN_ALL_CORE_SD
Detail Level: Detailed
Add 44892 Cpt? (Important Note: In 2017 The Use Of 60630 Is Being Tracked By Cms To Determine Future Global Period Reimbursement For Global Periods): yes
never used

## 2021-08-07 LAB
A1C WITH ESTIMATED AVERAGE GLUCOSE RESULT: 5.4 % — SIGNIFICANT CHANGE UP (ref 4–5.6)
ESTIMATED AVERAGE GLUCOSE: 108 MG/DL — SIGNIFICANT CHANGE UP (ref 68–114)

## 2021-08-10 ENCOUNTER — NON-APPOINTMENT (OUTPATIENT)
Age: 71
End: 2021-08-10

## 2021-08-10 PROBLEM — A41.9 SEPSIS, UNSPECIFIED ORGANISM: Chronic | Status: ACTIVE | Noted: 2021-08-06

## 2021-08-10 PROBLEM — I89.0 LYMPHEDEMA, NOT ELSEWHERE CLASSIFIED: Chronic | Status: ACTIVE | Noted: 2018-08-16

## 2021-08-10 PROBLEM — U07.1 COVID-19: Chronic | Status: ACTIVE | Noted: 2021-08-06

## 2021-08-10 RX ORDER — LINEZOLID 600 MG/1
600 TABLET, FILM COATED ORAL
Qty: 14 | Refills: 0 | Status: ACTIVE | OUTPATIENT
Start: 2021-08-10

## 2021-08-12 ENCOUNTER — NON-APPOINTMENT (OUTPATIENT)
Age: 71
End: 2021-08-12

## 2021-08-12 ENCOUNTER — APPOINTMENT (OUTPATIENT)
Dept: CARDIOLOGY | Facility: CLINIC | Age: 71
End: 2021-08-12
Payer: MEDICARE

## 2021-08-12 VITALS — DIASTOLIC BLOOD PRESSURE: 70 MMHG | OXYGEN SATURATION: 95 % | SYSTOLIC BLOOD PRESSURE: 114 MMHG | HEART RATE: 100 BPM

## 2021-08-12 DIAGNOSIS — Z01.810 ENCOUNTER FOR PREPROCEDURAL CARDIOVASCULAR EXAMINATION: ICD-10-CM

## 2021-08-12 DIAGNOSIS — E78.5 HYPERLIPIDEMIA, UNSPECIFIED: ICD-10-CM

## 2021-08-12 DIAGNOSIS — I11.9 HYPERTENSIVE HEART DISEASE W/OUT HEART FAILURE: ICD-10-CM

## 2021-08-12 PROCEDURE — 99214 OFFICE O/P EST MOD 30 MIN: CPT

## 2021-08-12 PROCEDURE — 93000 ELECTROCARDIOGRAM COMPLETE: CPT

## 2021-08-12 RX ORDER — DILTIAZEM HYDROCHLORIDE 120 MG/1
120 CAPSULE, EXTENDED RELEASE ORAL DAILY
Refills: 0 | Status: ACTIVE | COMMUNITY
Start: 2021-08-12

## 2021-08-12 RX ORDER — GABAPENTIN 300 MG/1
300 CAPSULE ORAL 3 TIMES DAILY
Qty: 180 | Refills: 2 | Status: DISCONTINUED | COMMUNITY
Start: 2020-04-02 | End: 2021-08-12

## 2021-08-12 RX ORDER — DIPHENHYDRAMINE HCL 25 MG
CAPSULE ORAL
Refills: 0 | Status: DISCONTINUED | COMMUNITY
End: 2021-08-12

## 2021-08-12 RX ORDER — BUMETANIDE 2 MG/1
2 TABLET ORAL DAILY
Refills: 0 | Status: ACTIVE | COMMUNITY
Start: 2021-08-12

## 2021-08-12 RX ORDER — POTASSIUM CHLORIDE 1500 MG/1
20 TABLET, FILM COATED, EXTENDED RELEASE ORAL
Refills: 0 | Status: ACTIVE | COMMUNITY
Start: 2021-08-12

## 2021-08-12 RX ORDER — SIMVASTATIN 20 MG/1
20 TABLET, FILM COATED ORAL
Qty: 30 | Refills: 4 | Status: DISCONTINUED | COMMUNITY
End: 2021-08-12

## 2021-08-12 RX ORDER — ATORVASTATIN CALCIUM 40 MG/1
40 TABLET, FILM COATED ORAL
Qty: 90 | Refills: 1 | Status: ACTIVE | COMMUNITY
Start: 2021-08-12

## 2021-08-12 RX ORDER — CLONIDINE HYDROCHLORIDE 0.3 MG/1
TABLET ORAL
Refills: 0 | Status: DISCONTINUED | COMMUNITY
End: 2021-08-12

## 2021-08-12 RX ORDER — LABETALOL HYDROCHLORIDE 300 MG/1
300 TABLET, FILM COATED ORAL EVERY 8 HOURS
Qty: 180 | Refills: 2 | Status: DISCONTINUED | COMMUNITY
End: 2021-08-12

## 2021-08-12 RX ORDER — TAMSULOSIN HYDROCHLORIDE 0.4 MG/1
0.4 CAPSULE ORAL
Qty: 90 | Refills: 1 | Status: DISCONTINUED | COMMUNITY
Start: 2021-08-12 | End: 2021-08-12

## 2021-08-12 RX ORDER — CEPHALEXIN 500 MG/1
500 CAPSULE ORAL
Qty: 14 | Refills: 0 | Status: DISCONTINUED | COMMUNITY
Start: 2020-10-05 | End: 2021-08-12

## 2021-08-12 RX ORDER — LOSARTAN POTASSIUM 25 MG/1
25 TABLET, FILM COATED ORAL DAILY
Refills: 0 | Status: ACTIVE | COMMUNITY
Start: 2021-08-12

## 2021-08-12 RX ORDER — CIPROFLOXACIN HYDROCHLORIDE 750 MG/1
TABLET, FILM COATED ORAL
Refills: 0 | Status: DISCONTINUED | COMMUNITY
End: 2021-08-12

## 2021-08-12 RX ORDER — MIRABEGRON 25 MG/1
25 TABLET, FILM COATED, EXTENDED RELEASE ORAL
Refills: 0 | Status: ACTIVE | COMMUNITY
Start: 2021-08-12

## 2021-08-12 RX ORDER — CLONIDINE HYDROCHLORIDE 0.1 MG/1
0.1 TABLET ORAL TWICE DAILY
Refills: 0 | Status: DISCONTINUED | COMMUNITY
End: 2021-08-12

## 2021-08-12 NOTE — REASON FOR VISIT
[FreeTextEntry1] : Ms. Baez presents today for preoperative cardiovascular evaluation prior to a left ureteral stent removal on 8/16/2021 with Dr. Hoenig at [facility].

## 2021-08-12 NOTE — PROGRESS NOTE ADULT - GASTROINTESTINAL COMMENTS
Alert-The patient is alert, awake and responds to voice. The patient is oriented to time, place, and person. The triage nurse is able to obtain subjective information.
very obese abdomen

## 2021-08-12 NOTE — HISTORY OF PRESENT ILLNESS
[FreeTextEntry1] : Ms. Christensen is seen and examined today in her wheelchair. She is accompanied by her health care aide. \par Today she is somnolent and has to be awakened repeatedly for interview and examination. She is aware of the proposed procedure and does not voice any particular complaints other than being overly warm from being outside today. \par \par \par Prior:\par She was recently taken to the hospital for removal of ureteral stent.  At that time she was noted to be in atrial fibrillation with a rapid ventricular response and the procedure was called off.\par She returns back to her nursing facility where she has lost a significant amount of weight and is most concerned about the frequency of her fingersticks for elevated blood sugars.\par She has no particular complaints of palpitations, or syncope.\par She continues to rely on a wheelchair for moving around.

## 2021-08-12 NOTE — CARDIOLOGY SUMMARY
[___] : [unfilled] [LVEF ___%] : LVEF [unfilled]% [None] : no pulmonary hypertension [Mild] : mild mitral regurgitation [___] : [unfilled] [de-identified] : 8/12/2021. Sinus tachycardia. Rate 102 bpm.  [de-identified] : 11/18/2021:\par Exercise capacity: 5 METS, poor for age and gender. 59% of MPHR.\par No chest pain with exercise.\par Symptoms: Shortness of breath, leg pain.\par HR Response: This is a submaximal exercise test due to the patient's overwhelming symptoms.\par BP Response: Resting hypertension with normal response to exercise.\par Heart Rhythm: Sinus Rhythm.\par Baseline ECG: Normal.\par ECG Abnormalities: None.\par ECG Changes: No ischemic changes.\par Arrhythmia: None.  [de-identified] : 8/21/2020. EF 70%\par Calcified trileaflet aortic vavle with decreased opening. Peak transaortic valve gradient equals 18 mm Hg, mean transaortic valve gradient equals 10 mm Hg, estimated aortic valve area equals 2 sqcm (by continuity equation), aortic valve velocity time integral equals 47 cm, consistent with mild aortic stenosis.\par Mild concentric left ventricular hypertrophy.\par Normal left ventricular systolic function. \par Mild diastolic dysfunction.

## 2021-08-12 NOTE — PHYSICAL EXAM
[Normal Conjunctiva] : the conjunctiva exhibited no abnormalities [No Oral Pallor] : no oral pallor [Normal Jugular Venous V Waves Present] : normal jugular venous V waves present [Respiration, Rhythm And Depth] : normal respiratory rhythm and effort [Auscultation Breath Sounds / Voice Sounds] : lungs were clear to auscultation bilaterally [Heart Sounds] : normal S1 and S2 [Arterial Pulses Normal] : the arterial pulses were normal [Bowel Sounds] : normal bowel sounds [Abdomen Soft] : soft [Abdomen Tenderness] : non-tender [Abdomen Mass (___ Cm)] : no abdominal mass palpated [Cyanosis, Localized] : no localized cyanosis [] : no ischemic changes [Skin Turgor] : normal skin turgor [FreeTextEntry1] : O

## 2021-08-12 NOTE — DISCUSSION/SUMMARY
[FreeTextEntry1] : She is a 71 year old woman with much improved hypertension, lymphedema and coronary artery disease, status post drug-eluting stents and paroxysmal atrial fibrillation. \par \par Hypertension- Well controlled on her current medications. No changes to her regimen were suggested. \par CAD- She will continue with atorvastatin for cardiovascular risk reduction. Her daily aspirin should be continued throughout the perioperative period.\par PAF- Currently in sinus rhythm. She will continue with diltiazem at 120 mg daily for rhythm control. She may stop her Eliquis 48 hours prior to the planned procedure and will resume therapy once the risk of bleeding has subsided.\par Lower extremity edema- Much improved. She will continue with bumetanide 2 mg daily. Labs reviewed recent K+ 3.1mmoL/L. Will increase K-Dur extended release potassium to 40 mEq BID x 3 days for repletion. \par She has no anginal symptoms or congestive heart failure and has a chronic  infection. \par ECG today reveals sinus tachycardia at a rate of 102 bpm.  \par \par From a cardiovascular standpoint she is optimized and may proceed with any planned ureteral stent removal as scheduled without any further cardiac testing or reservation. \par \par Routine follow-up in 3 months.

## 2021-08-13 ENCOUNTER — OUTPATIENT (OUTPATIENT)
Dept: OUTPATIENT SERVICES | Facility: HOSPITAL | Age: 71
LOS: 1 days | End: 2021-08-13
Payer: MEDICARE

## 2021-08-13 DIAGNOSIS — Z98.890 OTHER SPECIFIED POSTPROCEDURAL STATES: Chronic | ICD-10-CM

## 2021-08-13 DIAGNOSIS — Z98.89 OTHER SPECIFIED POSTPROCEDURAL STATES: Chronic | ICD-10-CM

## 2021-08-13 DIAGNOSIS — Z11.52 ENCOUNTER FOR SCREENING FOR COVID-19: ICD-10-CM

## 2021-08-13 LAB — SARS-COV-2 RNA SPEC QL NAA+PROBE: SIGNIFICANT CHANGE UP

## 2021-08-15 ENCOUNTER — TRANSCRIPTION ENCOUNTER (OUTPATIENT)
Age: 71
End: 2021-08-15

## 2021-08-16 ENCOUNTER — OUTPATIENT (OUTPATIENT)
Dept: OUTPATIENT SERVICES | Facility: HOSPITAL | Age: 71
LOS: 1 days | End: 2021-08-16
Payer: MEDICARE

## 2021-08-16 ENCOUNTER — RESULT REVIEW (OUTPATIENT)
Age: 71
End: 2021-08-16

## 2021-08-16 ENCOUNTER — APPOINTMENT (OUTPATIENT)
Dept: UROLOGY | Facility: HOSPITAL | Age: 71
End: 2021-08-16

## 2021-08-16 VITALS
OXYGEN SATURATION: 94 % | SYSTOLIC BLOOD PRESSURE: 129 MMHG | HEART RATE: 68 BPM | DIASTOLIC BLOOD PRESSURE: 84 MMHG | TEMPERATURE: 98 F | RESPIRATION RATE: 16 BRPM

## 2021-08-16 VITALS
SYSTOLIC BLOOD PRESSURE: 149 MMHG | TEMPERATURE: 98 F | RESPIRATION RATE: 16 BRPM | WEIGHT: 164.02 LBS | HEIGHT: 63.78 IN | OXYGEN SATURATION: 100 % | HEART RATE: 102 BPM | DIASTOLIC BLOOD PRESSURE: 103 MMHG

## 2021-08-16 DIAGNOSIS — Z98.89 OTHER SPECIFIED POSTPROCEDURAL STATES: Chronic | ICD-10-CM

## 2021-08-16 DIAGNOSIS — N20.0 CALCULUS OF KIDNEY: ICD-10-CM

## 2021-08-16 DIAGNOSIS — Z96.0 PRESENCE OF UROGENITAL IMPLANTS: ICD-10-CM

## 2021-08-16 DIAGNOSIS — Z98.890 OTHER SPECIFIED POSTPROCEDURAL STATES: Chronic | ICD-10-CM

## 2021-08-16 LAB
BASE EXCESS BLDV CALC-SCNC: 8.7 MMOL/L — HIGH (ref -2–2)
CA-I SERPL-SCNC: 1.21 MMOL/L — SIGNIFICANT CHANGE UP (ref 1.15–1.33)
CHLORIDE BLDV-SCNC: 107 MMOL/L — SIGNIFICANT CHANGE UP (ref 96–108)
CO2 BLDV-SCNC: 34 MMOL/L — HIGH (ref 22–26)
GAS PNL BLDV: 145 MMOL/L — SIGNIFICANT CHANGE UP (ref 136–145)
GAS PNL BLDV: SIGNIFICANT CHANGE UP
GAS PNL BLDV: SIGNIFICANT CHANGE UP
GLUCOSE BLDC GLUCOMTR-MCNC: 121 MG/DL — HIGH (ref 70–99)
GLUCOSE BLDC GLUCOMTR-MCNC: 94 MG/DL — SIGNIFICANT CHANGE UP (ref 70–99)
GLUCOSE BLDV-MCNC: 132 MG/DL — HIGH (ref 70–99)
HCO3 BLDV-SCNC: 33 MMOL/L — HIGH (ref 22–29)
HCT VFR BLDA CALC: 41 % — SIGNIFICANT CHANGE UP (ref 34.5–46.5)
HGB BLD CALC-MCNC: 13.5 G/DL — SIGNIFICANT CHANGE UP (ref 11.7–16.1)
LACTATE BLDV-MCNC: 1.2 MMOL/L — SIGNIFICANT CHANGE UP (ref 0.7–2)
PCO2 BLDV: 42 MMHG — SIGNIFICANT CHANGE UP (ref 39–42)
PH BLDV: 7.5 — HIGH (ref 7.32–7.43)
PO2 BLDV: 59 MMHG — HIGH (ref 25–45)
POTASSIUM BLDV-SCNC: 4 MMOL/L — SIGNIFICANT CHANGE UP (ref 3.5–5.1)
SAO2 % BLDV: 91.5 % — HIGH (ref 67–88)

## 2021-08-16 PROCEDURE — 74420 UROGRAPHY RTRGR +-KUB: CPT | Mod: 26

## 2021-08-16 PROCEDURE — U0005: CPT

## 2021-08-16 PROCEDURE — 76000 FLUOROSCOPY <1 HR PHYS/QHP: CPT

## 2021-08-16 PROCEDURE — 52352 CYSTOURETERO W/STONE REMOVE: CPT | Mod: LT

## 2021-08-16 PROCEDURE — U0003: CPT

## 2021-08-16 PROCEDURE — C9803: CPT

## 2021-08-16 RX ORDER — ONDANSETRON 8 MG/1
1 TABLET, FILM COATED ORAL
Qty: 0 | Refills: 0 | DISCHARGE

## 2021-08-16 RX ORDER — ONDANSETRON 8 MG/1
4 TABLET, FILM COATED ORAL ONCE
Refills: 0 | Status: DISCONTINUED | OUTPATIENT
Start: 2021-08-16 | End: 2021-08-16

## 2021-08-16 RX ORDER — FENTANYL CITRATE 50 UG/ML
25 INJECTION INTRAVENOUS
Refills: 0 | Status: DISCONTINUED | OUTPATIENT
Start: 2021-08-16 | End: 2021-08-16

## 2021-08-16 RX ORDER — APREPITANT 80 MG/1
40 CAPSULE ORAL ONCE
Refills: 0 | Status: COMPLETED | OUTPATIENT
Start: 2021-08-16 | End: 2021-08-16

## 2021-08-16 RX ORDER — SODIUM CHLORIDE 9 MG/ML
3 INJECTION INTRAMUSCULAR; INTRAVENOUS; SUBCUTANEOUS EVERY 8 HOURS
Refills: 0 | Status: DISCONTINUED | OUTPATIENT
Start: 2021-08-16 | End: 2021-08-16

## 2021-08-16 RX ORDER — FENTANYL CITRATE 50 UG/ML
50 INJECTION INTRAVENOUS
Refills: 0 | Status: DISCONTINUED | OUTPATIENT
Start: 2021-08-16 | End: 2021-08-16

## 2021-08-16 NOTE — BRIEF OPERATIVE NOTE - OPERATION/FINDINGS
cystoscopy, left retrograde pyelogram, left ureteral stent removal with basket, removal of left ureteral stent cystoscopy, left retrograde pyelogram, left ureteral calculus removal with basket, removal of left ureteral stent

## 2021-08-16 NOTE — ASU DISCHARGE PLAN (ADULT/PEDIATRIC) - NURSING INSTRUCTIONS
******************************************************************************************  Next dose of TYLENOL may be taken at or after _____________ PM if needed. DO NOT take any additional products containing TYLENOL or ACETAMINOPHEN, such as VICODIN, PERCOCET, NORCO, EXCEDRIN, and any over-the-counter cold medications until this time. DO NOT CONSUME MORE THAN 1983-2885 MG of TYLENOL (acetaminophen) in a 24-hour period.   ******************************************************************************************  Next dose of NSAIDs (ibuprofen, motrin, advil, naproxen, or aleve) may be taken at or after _____________ PM if prescribed by your surgeon.

## 2021-08-16 NOTE — PRE-ANESTHESIA EVALUATION ADULT - NSANTHPMHFT_GEN_ALL_CORE
69 y/o F poor historian, cognitively impaired with a PMH HTN, AFib (on eliquis), Hypothyroidism (on synthroid), DMT2 (diet-controlled), urosepsis (8/2020 secondary to septic stone s/p cystoscopy with stone extraction), carpal tunnel syndrome, renal calculi and lymphedema.

## 2021-08-16 NOTE — ASU DISCHARGE PLAN (ADULT/PEDIATRIC) - CARE PROVIDER_API CALL
Hoenig, David M (MD)  Urology  [facility]- Dept. of Urology, [address] [place] [address]  Phone: [phone]  Fax: [phone]  Follow Up Time:

## 2021-08-16 NOTE — ASU PATIENT PROFILE, ADULT - MEDICATIONS BROUGHT TO HOSPITAL, PROFILE
no O-T Plasty Text: The defect edges were debeveled with a #15c scalpel blade.  Given the location of the defect, shape of the defect and the proximity to free margins an O-T plasty was deemed most appropriate.  Using a sterile surgical marker, an appropriate O-T plasty was drawn incorporating the defect and placing the expected incisions within the relaxed skin tension lines where possible.    The area thus outlined was incised deep to adipose tissue with a #15 scalpel blade.  The skin margins were undermined to an appropriate distance in all directions utilizing iris scissors.

## 2021-08-16 NOTE — ASU DISCHARGE PLAN (ADULT/PEDIATRIC) - ASU DC SPECIAL INSTRUCTIONSFT
Resume home medications as instructed by prescriptions; continue the Linezolid antibiotics from home. You may take over the counter ibuprofen and/or acetaminophen as needed for pain.     Please follow up with Dr. Hoenig in 2-3 weeks. His office will reach out to you to schedule the appointment.     Follow-up with primary care doctor as well.     Call 911 and return to the ED for chest pain, shortness of breath, significant increase in pain, or significant change in color of surgical sites. Resume home medications as instructed by prescriptions; continue the Linezolid antibiotics from home. You may take over the counter ibuprofen  600 mg every 6  hours and/or acetaminophen  650 every 6 hours s needed for pain.     Please follow up with Dr. Hoenig in 2-3 weeks. His office will reach out to you to schedule the appointment.     Follow-up with primary care doctor as well.     Call 911 and return to the ED for chest pain, shortness of breath, significant increase in pain, or significant change in color of surgical sites.

## 2021-08-16 NOTE — ASU PREOP CHECKLIST - DENTURES
Chief Complaint  Flu #1      Active Problems    1  Need for Hib vaccination (V03 81) (Z23)   2  Need for influenza vaccination (V04 81) (Z23)   3  Need for MMR vaccine (V06 4) (Z23)   4  Need for pneumococcal vaccination (V03 82) (Z23)   5  Need for varicella vaccine (V05 4) (Z23)   6  Sacral pit (685 1) (Q82 6)   7  Well child visit (V20 2) (Z00 129)    Current Meds   1  No Reported Medications Recorded    Allergies    1  No Known Drug Allergies    2  No Known Environmental Allergies   3   No Known Food Allergies    Plan  Need for influenza vaccination    · Fluzone Quadrivalent 0 25 ML Intramuscular Suspension Prefilled Syringe    Signatures   Electronically signed by : Mery Pierre, ; Oct 10 2017  8:32AM EST                       (Author)    Electronically signed by : MOHAMUD Chapman ; Oct 10 2017  4:00PM EST                       (Co-author)
no

## 2021-08-17 ENCOUNTER — INPATIENT (INPATIENT)
Facility: HOSPITAL | Age: 71
LOS: 6 days | Discharge: NURSING, REHAB OR LONG-TERM CARE | DRG: 65 | End: 2021-08-24
Attending: PSYCHIATRY & NEUROLOGY | Admitting: PSYCHIATRY & NEUROLOGY
Payer: MEDICARE

## 2021-08-17 VITALS
DIASTOLIC BLOOD PRESSURE: 101 MMHG | RESPIRATION RATE: 16 BRPM | WEIGHT: 149.91 LBS | HEIGHT: 62 IN | OXYGEN SATURATION: 95 % | SYSTOLIC BLOOD PRESSURE: 159 MMHG | HEART RATE: 93 BPM | TEMPERATURE: 98 F

## 2021-08-17 DIAGNOSIS — I63.9 CEREBRAL INFARCTION, UNSPECIFIED: ICD-10-CM

## 2021-08-17 DIAGNOSIS — Z98.89 OTHER SPECIFIED POSTPROCEDURAL STATES: Chronic | ICD-10-CM

## 2021-08-17 DIAGNOSIS — Z98.890 OTHER SPECIFIED POSTPROCEDURAL STATES: Chronic | ICD-10-CM

## 2021-08-17 LAB
ALBUMIN SERPL ELPH-MCNC: 3.8 G/DL — SIGNIFICANT CHANGE UP (ref 3.3–5)
ALP SERPL-CCNC: 103 U/L — SIGNIFICANT CHANGE UP (ref 40–120)
ALT FLD-CCNC: 6 U/L — LOW (ref 10–45)
ANION GAP SERPL CALC-SCNC: 14 MMOL/L — SIGNIFICANT CHANGE UP (ref 5–17)
APTT BLD: 58.2 SEC — HIGH (ref 27.5–35.5)
AST SERPL-CCNC: 14 U/L — SIGNIFICANT CHANGE UP (ref 10–40)
BASOPHILS # BLD AUTO: 0.05 K/UL — SIGNIFICANT CHANGE UP (ref 0–0.2)
BASOPHILS NFR BLD AUTO: 0.8 % — SIGNIFICANT CHANGE UP (ref 0–2)
BILIRUB SERPL-MCNC: 0.7 MG/DL — SIGNIFICANT CHANGE UP (ref 0.2–1.2)
BLD GP AB SCN SERPL QL: NEGATIVE — SIGNIFICANT CHANGE UP
BUN SERPL-MCNC: 23 MG/DL — SIGNIFICANT CHANGE UP (ref 7–23)
CA-I SERPL-SCNC: 1.17 MMOL/L — SIGNIFICANT CHANGE UP (ref 1.15–1.33)
CALCIUM SERPL-MCNC: 9.2 MG/DL — SIGNIFICANT CHANGE UP (ref 8.4–10.5)
CHLORIDE BLDV-SCNC: 106 MMOL/L — SIGNIFICANT CHANGE UP (ref 96–108)
CHLORIDE SERPL-SCNC: 105 MMOL/L — SIGNIFICANT CHANGE UP (ref 96–108)
CO2 BLDV-SCNC: 36 MMOL/L — HIGH (ref 22–26)
CO2 SERPL-SCNC: 26 MMOL/L — SIGNIFICANT CHANGE UP (ref 22–31)
CREAT SERPL-MCNC: 1.07 MG/DL — SIGNIFICANT CHANGE UP (ref 0.5–1.3)
EOSINOPHIL # BLD AUTO: 0.07 K/UL — SIGNIFICANT CHANGE UP (ref 0–0.5)
EOSINOPHIL NFR BLD AUTO: 1.1 % — SIGNIFICANT CHANGE UP (ref 0–6)
GAS PNL BLDV: 141 MMOL/L — SIGNIFICANT CHANGE UP (ref 136–145)
GAS PNL BLDV: SIGNIFICANT CHANGE UP
GLUCOSE BLDV-MCNC: 105 MG/DL — HIGH (ref 70–99)
GLUCOSE SERPL-MCNC: 104 MG/DL — HIGH (ref 70–99)
HCO3 BLDV-SCNC: 34 MMOL/L — HIGH (ref 22–29)
HCT VFR BLD CALC: 40.5 % — SIGNIFICANT CHANGE UP (ref 34.5–45)
HCT VFR BLDA CALC: 40 % — SIGNIFICANT CHANGE UP (ref 34.5–46.5)
HGB BLD CALC-MCNC: 13.2 G/DL — SIGNIFICANT CHANGE UP (ref 11.7–16.1)
HGB BLD-MCNC: 12.8 G/DL — SIGNIFICANT CHANGE UP (ref 11.5–15.5)
IMM GRANULOCYTES NFR BLD AUTO: 0.3 % — SIGNIFICANT CHANGE UP (ref 0–1.5)
INR BLD: 2.03 RATIO — HIGH (ref 0.88–1.16)
LACTATE BLDV-MCNC: 1.9 MMOL/L — SIGNIFICANT CHANGE UP (ref 0.7–2)
LYMPHOCYTES # BLD AUTO: 1.26 K/UL — SIGNIFICANT CHANGE UP (ref 1–3.3)
LYMPHOCYTES # BLD AUTO: 19.3 % — SIGNIFICANT CHANGE UP (ref 13–44)
MCHC RBC-ENTMCNC: 28.1 PG — SIGNIFICANT CHANGE UP (ref 27–34)
MCHC RBC-ENTMCNC: 31.6 GM/DL — LOW (ref 32–36)
MCV RBC AUTO: 89 FL — SIGNIFICANT CHANGE UP (ref 80–100)
MONOCYTES # BLD AUTO: 0.49 K/UL — SIGNIFICANT CHANGE UP (ref 0–0.9)
MONOCYTES NFR BLD AUTO: 7.5 % — SIGNIFICANT CHANGE UP (ref 2–14)
NEUTROPHILS # BLD AUTO: 4.64 K/UL — SIGNIFICANT CHANGE UP (ref 1.8–7.4)
NEUTROPHILS NFR BLD AUTO: 71 % — SIGNIFICANT CHANGE UP (ref 43–77)
NRBC # BLD: 0 /100 WBCS — SIGNIFICANT CHANGE UP (ref 0–0)
PCO2 BLDV: 45 MMHG — HIGH (ref 39–42)
PH BLDV: 7.49 — HIGH (ref 7.32–7.43)
PLATELET # BLD AUTO: 330 K/UL — SIGNIFICANT CHANGE UP (ref 150–400)
PO2 BLDV: 51 MMHG — HIGH (ref 25–45)
POTASSIUM BLDV-SCNC: 4.2 MMOL/L — SIGNIFICANT CHANGE UP (ref 3.5–5.1)
POTASSIUM SERPL-MCNC: 4.7 MMOL/L — SIGNIFICANT CHANGE UP (ref 3.5–5.3)
POTASSIUM SERPL-SCNC: 4.7 MMOL/L — SIGNIFICANT CHANGE UP (ref 3.5–5.3)
PROT SERPL-MCNC: 7.5 G/DL — SIGNIFICANT CHANGE UP (ref 6–8.3)
PROTHROM AB SERPL-ACNC: 23.5 SEC — HIGH (ref 10.6–13.6)
RBC # BLD: 4.55 M/UL — SIGNIFICANT CHANGE UP (ref 3.8–5.2)
RBC # FLD: 14.1 % — SIGNIFICANT CHANGE UP (ref 10.3–14.5)
RH IG SCN BLD-IMP: POSITIVE — SIGNIFICANT CHANGE UP
SAO2 % BLDV: 83.8 % — SIGNIFICANT CHANGE UP (ref 67–88)
SARS-COV-2 RNA SPEC QL NAA+PROBE: SIGNIFICANT CHANGE UP
SODIUM SERPL-SCNC: 145 MMOL/L — SIGNIFICANT CHANGE UP (ref 135–145)
TROPONIN T, HIGH SENSITIVITY RESULT: 21 NG/L — SIGNIFICANT CHANGE UP (ref 0–51)
WBC # BLD: 6.53 K/UL — SIGNIFICANT CHANGE UP (ref 3.8–10.5)
WBC # FLD AUTO: 6.53 K/UL — SIGNIFICANT CHANGE UP (ref 3.8–10.5)

## 2021-08-17 PROCEDURE — 99291 CRITICAL CARE FIRST HOUR: CPT

## 2021-08-17 PROCEDURE — 0042T: CPT

## 2021-08-17 PROCEDURE — 71045 X-RAY EXAM CHEST 1 VIEW: CPT | Mod: 26

## 2021-08-17 PROCEDURE — 88300 SURGICAL PATH GROSS: CPT | Mod: 26

## 2021-08-17 PROCEDURE — 93010 ELECTROCARDIOGRAM REPORT: CPT

## 2021-08-17 PROCEDURE — 70498 CT ANGIOGRAPHY NECK: CPT | Mod: 26,MA

## 2021-08-17 PROCEDURE — 70496 CT ANGIOGRAPHY HEAD: CPT | Mod: 26,MA

## 2021-08-17 RX ORDER — ACETAMINOPHEN 500 MG
2 TABLET ORAL
Qty: 0 | Refills: 0 | DISCHARGE

## 2021-08-17 RX ORDER — LEVOTHYROXINE SODIUM 125 MCG
37.5 TABLET ORAL AT BEDTIME
Refills: 0 | Status: DISCONTINUED | OUTPATIENT
Start: 2021-08-17 | End: 2021-08-24

## 2021-08-17 RX ORDER — ASPIRIN/CALCIUM CARB/MAGNESIUM 324 MG
300 TABLET ORAL DAILY
Refills: 0 | Status: DISCONTINUED | OUTPATIENT
Start: 2021-08-17 | End: 2021-08-18

## 2021-08-17 RX ORDER — ATORVASTATIN CALCIUM 80 MG/1
80 TABLET, FILM COATED ORAL AT BEDTIME
Refills: 0 | Status: DISCONTINUED | OUTPATIENT
Start: 2021-08-17 | End: 2021-08-24

## 2021-08-17 RX ORDER — POTASSIUM CHLORIDE 20 MEQ
1 PACKET (EA) ORAL
Qty: 0 | Refills: 0 | DISCHARGE

## 2021-08-17 RX ORDER — TAMSULOSIN HYDROCHLORIDE 0.4 MG/1
0.4 CAPSULE ORAL AT BEDTIME
Refills: 0 | Status: DISCONTINUED | OUTPATIENT
Start: 2021-08-17 | End: 2021-08-24

## 2021-08-17 RX ORDER — LINEZOLID 600 MG/300ML
1 INJECTION, SOLUTION INTRAVENOUS
Qty: 0 | Refills: 0 | DISCHARGE

## 2021-08-17 RX ORDER — MIRABEGRON 50 MG/1
25 TABLET, EXTENDED RELEASE ORAL DAILY
Refills: 0 | Status: DISCONTINUED | OUTPATIENT
Start: 2021-08-17 | End: 2021-08-24

## 2021-08-17 RX ADMIN — Medication 37.5 MICROGRAM(S): at 23:49

## 2021-08-17 RX ADMIN — Medication 300 MILLIGRAM(S): at 18:11

## 2021-08-17 NOTE — PHYSICAL THERAPY INITIAL EVALUATION ADULT - GENERAL OBSERVATIONS, REHAB EVAL
pt rashid 30 min eval poor. pt rec'd in bed, SDU monitoring, NAD, agreeable to session, A&Ox2-3, periods of confusion. pt p/w L hemiparesis, pt not allowing PT to range LLE 2/2 reported knee pain. see initial evaluation document for functional assessment. Pt left in bed, alarm, RN Kat aware, NAD, all lines intact, cb in reach, all needs met/5Ps.

## 2021-08-17 NOTE — PHYSICAL THERAPY INITIAL EVALUATION ADULT - ADDITIONAL COMMENTS
pt admitted from [facility]. History obtained from HCP Adela ([phone]). pt was dependent with ADLS and functional mobility at rehab facility. pt has been at rehab facility for over a year. pt was participating in PT and OT at rehab facility. pt was ambulating with staff with RW at rehab facility. of note, pt required much encouragement from staff and rehab (per HCP) to ambulate.

## 2021-08-17 NOTE — H&P ADULT - TIME BILLING
71-year-old right-handed lady first evaluated at [facility] on 8/18/2021 with left hemiparesis.  History and exam as above with minor changes.  ROS otherwise negative.  Exam.  Very poor historian and had difficulty cooperating with the exam as a whole; alert but mildly inattentive, requiring repeated questioning for mental status exam; could not state her age or the year, but was oriented to place and could name the president; right eye: Moderate-severe ptosis; pupil 3.5 mm (left pupil 3 mm); moderate-severe adduction deficit (barely crosses midline); mild-moderate depression deficit; severe upgaze palsy (bilateral); mild or mild-moderate left facial palsy; mild dysarthria; left arm-drift; left leg-very slight movement, not versus gravity and with significant complaints of pain (chronic as per her attendant); sensory-appears intact to tactile stimuli but sensory exam of questionable reliability; gait not tested; remainder of neurologic exam was nonfocal.    CT head (8/17/2021) to my eye showed subtle hypodensity in the right paramedian ventral midbrain and paramedian thalamus suggestive of acute infarction; a more well-defined hypodensity involving the right anterior thalamus consistent with a possible subacute infarct.  CTA neck (8/17/2021) was unremarkable.  CTA head (8/17/2021) to my eye showed right V4 occlusion; proximal basilar stenosis, officially read as severe but to my eye perhaps moderate, with more distal diffuse fusiform dilatation or perhaps simply ectasia of the basilar; fusiform dilatation of the right ICA.  MRI brain (8/18/2021) to my eye showed an acute small infarct involving the right paramedian ventral midbrain extending to the ventral border of the midbrain, with acute infarction involving the right paramedian versus anterior thalamic region (but unclear to me whether this corresponded to the apparently subacute right anterior thalamic infarct seen on CT).    Impression.  At baseline, she is wheelchair-bound, apparently due to "arthritis" and also with most likely at least mild cognitive impairment.  She has a history of atrial fibrillation, on Eliquis, and also on aspirin for coronary stents which were implanted years ago.  The Eliquis was held since approximately 8/12/2021, and aspirin was held since approximately 8/9/2021 for ureteral stents on 8/15/2021.  She was last known normal at around 6 PM on 8/16/2021.  On 8/17/2021 she was found by her nursing home attendant with a left hemiparesis.  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  Mechanism is uncertain, either cardioembolism to the right P1 segment, related to atrial fibrillation, since the infarct extends to the ventral border of the midbrain, versus intracranial large artery atherosclerosis (basilar stenosis) with artery-artery embolism, versus small vessel disease (stroke of "unknown" cause, but suspect cardioembolism).  Plan.  MRI brain; if she cannot swallow, then aspirin suppository for now, but hopefully can restart Eliquis within the next day or 2; when Eliquis is restarted, prefer to avoid aspirin if cardiology agrees, since her stents are years old; atorvastatin 80 mg daily, target LDL less than 70; PT/OT/speech therapy.

## 2021-08-17 NOTE — PHYSICAL THERAPY INITIAL EVALUATION ADULT - BALANCE TRAINING, PT EVAL
GOAL: Pt will demonstrate at least "fair" static/dynamic sitting and standing balance within 2 weeks

## 2021-08-17 NOTE — PHYSICAL THERAPY INITIAL EVALUATION ADULT - PERTINENT HX OF CURRENT PROBLEM, REHAB EVAL
72 y/o RH female with h/o afib on eliquis (last dose 8/12), CAD s/p stents on aspirin (last dose 8/9), arthritis, hypothyroidism, carpal tunnel syndrome, lymphedema, recent removal of left ureteral stent & stone (8/16/21) presenting as code stroke for left hemiparesis. This morning patient was noted by all aides/staff that she seemed "off" and that her face had some asymmetry to it, including drooping of right eyelid, and her speech was "very off".

## 2021-08-17 NOTE — PHYSICAL THERAPY INITIAL EVALUATION ADULT - PRECAUTIONS/LIMITATIONS, REHAB EVAL
HISTORY AND RESULTS CONTINUED: CT BRAIN: Interim right thalamic infarct may be subacute. NECK CTA (-). BRAIN CTA: Unchanged occlusion of the V4 segment of the right vertebral artery. Unchanged fusiform ectasia proximal basilar artery with high-grade proximal to mid stenosis. Unchanged fusiform dilatation of the cavernous segment of the right internal carotid artery. PERFUSION MAPS: No evidence for core infarct or penumbra. Hypoperfusion within the posterior circulation. CHEST XRAY: no significant findings./fall precautions

## 2021-08-17 NOTE — H&P ADULT - NSHPPHYSICALEXAM_GEN_ALL_CORE
MS: left eye open, right eyelid completely shut, noted right gaze preference, alert, oriented to location, month, could not tell me age, speech mildly dysarthric with impaired naming, repetition, intact comprehension can follow some commands  CN: II - XII - VFF to threat grossly, EOM demonstrate right gaze preference overcome by OCR, V1-3 intact to LT b/l, moderate left facial droop, noted weakness of eyelid opening of left, complete ptosis of right eyelid, hearing intact to finger rubbing b/l, tongue protrudes midline  Motor: right arm and leg at least antigravity and without drift, left arm with prompting able to lift antigravity however cannot maintain for more than a few seconds, left leg unable to move antigravity however able to somewhat move in 2D horizontal plane  Sensation: intact to LT throughout  Cortical: no extinction upon dss  Gait: deferred

## 2021-08-17 NOTE — ED PROVIDER NOTE - ATTENDING CONTRIBUTION TO CARE
Patient endorsed to the neurology team at the time of admission. Based on patient's history and physical exam, as well as the results of today's workup, I feel that patient warrants admission to the hospital for further workup/evaluation and continued management. I discussed the findings of today's workup with the patient and addressed the patient's questions and concerns. The patient was agreeable with admission. Our team spoke with the inpatient receiving team who accepted the patient for admission and subsequently took over the patient's care at the time of admission. The receiving team will follow up on pending labs, analgesia, any clinical imaging results, ancillary findings, reassess, and disposition as clinically indicated. Details of patient and plan conveyed to receiving physician team and conveyed back for understanding. There were no questions at this time about the patient's status, disposition, and plan. Patient's care to be taken over by receiving physician team at this time, all decisions regarding the progression of care will be made at their discretion.

## 2021-08-17 NOTE — CHART NOTE - NSCHARTNOTEFT_GEN_A_CORE
Pt did not attend first hour of afternoon group, facilitator reported she thought pt had an appt. Pt was in her bed sleeping. This counselor asked pt if she plans to nap before group to set her clock. Several days last week, pt was not at the beginning of group and was found lying in her bed. Pt seems annoyed by counselors comment.    Obtain screening lower extremity venous ultrasound in patients who meet 1 or more of the following criteria as patient is high risk for DVT/PE on admission:   [] History of DVT/PE  []Hypercoagulable states (Factor V Leiden, Cancer, OCP, etc. )  [x]Prolonged immobility (hemiplegia/hemiparesis/post operative or any other extended immobilization)  [] Transferred from outside facility (Rehab or Long term care)  [] Age </= to 50    LE Doppler pending

## 2021-08-17 NOTE — ED PROVIDER NOTE - PHYSICAL EXAMINATION
PHYSICAL EXAM:  GENERAL: Sitting comfortable in bed, in no acute distress  HENMT: Atraumatic, moist mucous membranes, no oropharyngeal exudates or vesicles, uvula is midline EYES: Clear bilaterally, PERRL, EOMs intact b/l  HEART: irregularly irregular, S1/S2, no murmur/gallops/rubs  RESPIRATORY: Clear to auscultation bilaterally, no wheezes/rhonchi/rales  ABDOMEN: +BS, soft, nontender, nondistended  EXTREMITIES: No lower extremity edema, +2 radial pulses b/l  NEURO:  A&Ox1, following commands, responding to noxious stimuli and ys/no questions, LUE weakness against gravity,  strength moderately intact, LLE weakness, left sided facial droop  Heme/LYMPH: No ecchymosis or bruising, no anterior/posterior cervical or supraclavicular LAD  SKIN:  Skin normal color for race, warm, dry and intact. No evidence of rash.

## 2021-08-17 NOTE — STROKE CODE NOTE - DISPOSITION
The management and treatment decisions which include alteplase and mechanical thrombectomy were discussed with stroke fellow Dr. Esthela oJ and with neurovascular attending Dr. Richard Libman.

## 2021-08-17 NOTE — ED ADULT NURSE REASSESSMENT NOTE - NS ED NURSE REASSESS COMMENT FT1
Report given to holding RADHA Vila at 17-Aug-2021 14:00 by primary RN Alondra HARDY. Pt not assessed by holding RN at all. Pt returned back to ED RADHA Fuchs for upgrade to stroke unit. No evaluations or VS entered by Holding RN. Pt currently endorses right eye blurriness associated with partial vision loss, hypertensive to 165/100. Denies headache at this time. Report given to holding RADHA Vila at 17-Aug-2021 14:00 by primary RN Alondra HARDY. Pt not assessed by holding RN at all. Pt returned back to ED RADHA Fuchs for upgrade to stroke unit. No evaluations or VS entered by Holding RN. Pt currently endorses right eye blurriness associated with partial vision loss, hypertensive to 165/100. Denies headache at this time. Report given to Primary RADHA Cantu. Report given to holding RADHA Vila at 17-Aug-2021 14:00 by primary RN Alondra HARDY. Pt not assessed by holding RN at all. Pt returned back to ED RADHA Fuchs for upgrade to stroke unit. No evaluations or VS entered by Holding RN but VS obtained by holding pca with comments:   RN notified of bp . Pt currently endorses right eye blurriness associated with partial vision loss, hypertensive to 165/100. Denies headache at this time. Report given to Primary RADHA Cantu. Report given to holding RADHA Vila at 17-Aug-2021 14:00 by primary RN Alondra HARDY. Pt not assessed by holding RN at all. Pt returned back to ED RADHA Fuchs for upgrade to stroke unit. No evaluations or VS entered by Holding RN but VS obtained by holding pca with comments:   RN notified of bp . At 1505, Holding RN Shanice requested covid PCR swab to be completed by ED RN.       Pt currently endorses right eye blurriness associated with partial vision loss, hypertensive to 165/100. Denies headache at this time. Report given to Primary RN Alondra. Report given to holding RADHA Vila at 17-Aug-2021 14:00 by primary RN Alondra HARDY. Pt not assessed by holding RN at all. Pt returned back to ED RADHA Fuchs for upgrade to stroke unit. No evaluations or VS entered by Holding RN but VS obtained by holding pca with comments:   RN notified of bp . At 1505, Holding RADHA Prasad requested covid PCR swab to be completed by ED RN.       Pt currently endorses right eye blurriness associated with partial vision loss and right eye ptosis, hypertensive to 165/100. Denies headache at this time. No facial droop noted at this time. Report given to Primary RADHA Cantu.

## 2021-08-17 NOTE — ED PROVIDER NOTE - NSICDXPASTMEDICALHX_GEN_ALL_CORE_FT
PAST MEDICAL HISTORY:  2019 novel coronavirus disease (COVID-19) As per patient, diagnosed with Covid 1 year ago when residing in a nursing home and was managed in nursing home; Pt denies any hospitalizations for covid or intubations.    Carpal tunnel syndrome of right wrist     HTN (hypertension)     Hypothyroid on synthroid    Lymphedema     Obesity BMI 29.3    Sepsis Urosepsis due to septic stone 8/2020    Type 2 diabetes mellitus without complication diet-controlled

## 2021-08-17 NOTE — H&P ADULT - NSHPLABSRESULTS_GEN_ALL_CORE
12.8   6.53  )-----------( 330      ( 17 Aug 2021 12:28 )             40.5     08-17    145  |  105  |  23  ----------------------------<  104<H>  4.7   |  26  |  1.07    Ca    9.2      17 Aug 2021 12:28    TPro  7.5  /  Alb  3.8  /  TBili  0.7  /  DBili  x   /  AST  14  /  ALT  6<L>  /  AlkPhos  103  08-17    CT Angio Head w/ IV Cont (08.17.21 @ 12:35)    IMPRESSION:    Brain CT: No acute hemorrhage, mass effect or extra-axial collections. Interim right thalamic infarct may be subacute. Brain MRI can be obtained for further evaluation.    Neck CTA: No hemodynamically significant stenosis.    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 Unchanged fusiform ectasia proximal basilar artery with high-grade proximal to mid stenosis. Unchanged fusiform dilatation of the cavernous segment of the right internal carotid artery.  No large vessel occlusion of the anterior middle or posterior cerebral arteries.    Perfusion maps: No evidence for core infarct or penumbra. Hypoperfusion within the posterior circulation.

## 2021-08-17 NOTE — ED ADULT NURSE NOTE - OBJECTIVE STATEMENT
71y Female PMHx Afib on Eliquis, hypothyroidism, carpal tunnel, and lymphedema BIBEMS from nursing home for AMS and left sided weakness/facial droop. Pt was last seen normal yesterday at 6pm and aide found her this morning w/ symptoms. Pt hasn't taken eliquis since 8/12 in preparation for procedure done yesterday. Upon exam pt A&Ox3, PERRL 3mm, +L sided facial droop, +L arm/leg drift, L arm/leg weakness, b/l equal sensation in all extremities, no loss or bladder or bowel. Dysphagia failed, MD notified. Pt taken directly to CT from triage, IV placed, labs drawn and sent, neuro at bedside, pt is admitted to Neuro for stroke, awaiting bed.

## 2021-08-17 NOTE — H&P ADULT - HISTORY OF PRESENT ILLNESS
72yo RH woman w/ h/o afib on eliquis (last dose 8/12), CAD s/p stents on aspirin (last dose 8/9), arthritis, hypothyroidism, carpal tunnel syndrome, lymphedema, recent removal of left ureteral stent & stone (8/16/21) presenting as code stroke for left hemiparesis with LWK 8/16/21 6p. Aide at bedside explains that patient had left the hospital after left ureteral stone and stent were removed and had last been evaluated at her baseline 6p 8/16. This morning patient was noted by all aides/staff that she seemed "off" and that her face had some asymmetry to it, including drooping of right eyelid, and her speech was "very off".    At baseline, patient is wheelchair bound due to underlying arthritis, stays in a nursing home requiring assistance with most ADLs.  70yo RH woman w/ h/o afib on eliquis (last dose 8/12), CAD s/p stents on aspirin (last dose 8/9), arthritis, hypothyroidism, carpal tunnel syndrome, lymphedema, recent removal of left ureteral stent & stone (8/16/21) presenting as code stroke for left hemiparesis with LWK 8/16/21 6p. Aide at bedside explains that patient had left the hospital after left ureteral stone and stent were removed and had last been evaluated at her baseline 6p 8/16. This morning patient was noted by all aides/staff that she seemed "off" and that her face had some asymmetry to it, including drooping of right eyelid, and her speech was "very off".    At baseline, patient is wheelchair bound due to underlying arthritis, stays in a nursing home requiring assistance with most ADLs.     NIHSS: 11  pre-MRS: 4

## 2021-08-17 NOTE — CHART NOTE - NSCHARTNOTEFT_GEN_A_CORE
Called by neurology for pt who underwent L URS and basket removal of L ureteral calculus.   Pt had stroke at nursing home and was sent in. Admitted for stroke.  Called about anticoagulation. Okay to resume Eliquis now.    d/w Dr. Villalpando

## 2021-08-17 NOTE — PHYSICAL THERAPY INITIAL EVALUATION ADULT - PASSIVE RANGE OF MOTION EXAMINATION, REHAB EVAL
L shoulder flexion limited to 90 degrees 2/2 pt reported pain at BP cuff site. L knee with little movement from flexed position of 90 degrees (pt reported knee pain), no other ROM deficits identified/deficits as listed below

## 2021-08-17 NOTE — ED CLERICAL - NS ED CLERK NOTE PRE-ARRIVAL INFORMATION; ADDITIONAL PRE-ARRIVAL INFORMATION
CC/Reason For referral: Left side weakness. CT scan needed.  Preferred Consultant(if applicable):  Who admits for you (if needed):  Do you have documents you would like to fax over?  Would you still like to speak to an ED attending? Yes

## 2021-08-17 NOTE — H&P ADULT - ASSESSMENT
72yo RH woman w/ h/o afib on eliquis (last dose ), CAD s/p stents on aspirin (last dose ), arthritis, hypothyroidism, carpal tunnel syndrome, lymphedema, recent removal of left ureteral stent & stone (21) presenting as code stroke for left hemiparesis with LWK 21 6p. Neurological examination demonstrates right complete ptosis, left hemiparesis. CT/CTA/CTP upon personal review demonstrates subacute R thalamic infarct, potential age-indeterminate paramedian right midbrain infarct, high-grade prox-mid basilar stenosis, R ?P1 occlusion versus hypoplastic R P1. Patient out of window for tpa. Given concern for subacute infarcts and unclear radiographical evidence of LVO along with patient's poor baseline, not deemed a candidate for mechanical thrombectomy.     Impression: Wells syndrome (R third nerve palsy, left hemiparesis) 2/2 right paramedian brain infarct & thalamic infarct 2/2  versus cardioembolic (given concern for possible P1 occlusion and patient had been off eliquis for several days)    Plan:  admit to Stroke Unit under Dr. Libman  q2h neuro checks  MRI head w/o contrast  permissive HTN bp to go no higher than 220/110 for 24h followed by gradual normotension  noted in ED to fail dysphagia screen  asp 300mg [place] - may continue for now - with clarification from Cardiology regarding cardiac stent placement, may be able to be off asp  c/w eliquis - at this time patient cannot swallow  a1c, lipid profile  atorvastatin 80mg daily to be titrated to LDL < 70  PT/OT/SS  DVT ppx: lovenox 40mg     to consult patient's Cardiologist Dr. Lisker  to consult Urology given recent stent removal for putting patient back on eliquis  med rec pending pharmacy for completion    case d/w Dr. Esthela Jo Stroke Fellow, Dr. Richard Libman Neurovascular Attending 70yo RH woman w/ h/o afib on eliquis (last dose ), CAD s/p stents on aspirin (last dose ), arthritis, hypothyroidism, carpal tunnel syndrome, lymphedema, recent removal of left ureteral stent & stone (21) presenting as code stroke for left hemiparesis with LWK 21 6p. Neurological examination demonstrates right complete ptosis, left hemiparesis. CT/CTA/CTP upon personal review demonstrates subacute R thalamic infarct, potential age-indeterminate paramedian right midbrain infarct, high-grade prox-mid basilar stenosis, R ?P1 occlusion versus hypoplastic R P1. Patient out of window for tpa. Given concern for subacute infarcts and unclear radiographical evidence of LVO along with patient's poor baseline, not deemed a candidate for mechanical thrombectomy.     Impression: Wells syndrome (R third nerve palsy, left hemiparesis) 2/2 right paramedian brain infarct & thalamic infarct 2/2  versus cardioembolic (given concern for possible P1 occlusion and patient had been off eliquis for several days)    Plan:  admit to Stroke Unit under Dr. Libman  q2h neuro checks  MRI head w/o contrast  permissive HTN bp to go no higher than 220/110 for 24h followed by gradual normotension  noted in ED to fail dysphagia screen  asp 300mg [place] - may continue for now - with clarification from Cardiology regarding cardiac stent placement, may be able to be off asp  will hold off on eliquis for now - at this time patient cannot swallow - will re-evaluate in few days for resuming AC  a1c, lipid profile  atorvastatin 80mg daily to be titrated to LDL < 70  PT/OT/SS  DVT ppx: lovenox 40mg     to consult patient's Cardiologist Dr. Lisker  to consult Urology given recent stent removal for putting patient back on eliquis  med rec pending pharmacy for completion    case d/w Dr. Esthela Jo Stroke Fellow, Dr. Richard Libman Neurovascular Attending 70yo RH woman w/ h/o afib on eliquis (last dose ), CAD s/p stents on aspirin (last dose ), arthritis, hypothyroidism, carpal tunnel syndrome, lymphedema, recent removal of left ureteral stent & stone (21) presenting as code stroke for left hemiparesis with LWK 21 6p. Neurological examination demonstrates right complete ptosis, left hemiparesis. CT/CTA/CTP upon personal review demonstrates subacute R thalamic infarct, potential age-indeterminate paramedian right midbrain infarct, high-grade prox-mid basilar stenosis, R ?P1 occlusion versus hypoplastic R P1. Patient out of window for tpa. Given concern for subacute infarcts and unclear radiographical evidence of LVO along with patient's poor baseline, not deemed a candidate for mechanical thrombectomy.     Impression: Wells syndrome (R third nerve palsy, left hemiparesis) 2/2 right paramedian brain infarct & thalamic infarct 2/2  versus cardioembolic (given concern for possible P1 occlusion and patient had been off eliquis for several days)    Plan:  admit to Stroke Unit under Dr. Libman  q2h neuro checks  MRI head w/o contrast  permissive HTN bp to go no higher than 220/110 for 24h followed by gradual normotension  noted in ED to fail dysphagia screen  asp 300mg [place] - may continue for now - with clarification from Cardiology regarding cardiac stent placement, may be able to be off asp  will hold off on eliquis for now - at this time patient cannot swallow - will re-evaluate in few days for resuming AC  a1c, lipid profile  atorvastatin 80mg daily to be titrated to LDL < 70  PT/OT/SS  DVT ppx: lovenox 40mg     to consult patient's Cardiologist Dr. Lisker  to consult Urology given recent stent removal for putting patient back on eliquis  med rec pending pharmacy for completion    case d/w Dr. Esthela Jo Stroke Fellow, Dr. Richard Libman Neurovascular Attending    d/w patient's HCP Linus Morrison () - she reports patient has been at rehab [address] for over a year and has had disposition problem with getting to home  updated Linus with all of the above

## 2021-08-17 NOTE — ED PROVIDER NOTE - CLINICAL SUMMARY MEDICAL DECISION MAKING FREE TEXT BOX
[facility], [facility]: 71 F h.o afib, eliquis held x 2 weeks, p/w AMS/code stroke. CT shows subthalamic infarct. will obtain labs and disc plan with neuro. As pt last well known is 6 pm and h/o AC she is not tpa candidate and not thrombectomy candidate. [facility], [facility]: 71 F h.o afib, eliquis held x 2 weeks, p/w AMS/code stroke. CT shows subthalamic infarct. will obtain labs and disc plan with neuro. As pt last well known is 6 pm and h/o AC she is not tpa candidate and not thrombectomy candidate.  Will follow up on labs, analgesia, imaging, reassess and disposition to the inpatient team as clinically indicated.

## 2021-08-17 NOTE — ED PROVIDER NOTE - OBJECTIVE STATEMENT
71 F HTN, Hypothyroidism,  carpal tunnel syndrome, lymphedema, AFib on eliquis, p/w AMS/left sided weakness and facial droop from NH ([facility]). She was in this facility yesterday for a urologic procedure and sent back. She was last seen normal at 6 PM yesterday before being found this AM by RN and promptly sent to ED. Upon further history from facility her eliquis was stopped on 8/12 for her procedure. History is limited 2/2 AMS.

## 2021-08-18 DIAGNOSIS — R13.10 DYSPHAGIA, UNSPECIFIED: ICD-10-CM

## 2021-08-18 LAB
A1C WITH ESTIMATED AVERAGE GLUCOSE RESULT: 5.4 % — SIGNIFICANT CHANGE UP (ref 4–5.6)
ANION GAP SERPL CALC-SCNC: 17 MMOL/L — SIGNIFICANT CHANGE UP (ref 5–17)
APTT BLD: 33.7 SEC — SIGNIFICANT CHANGE UP (ref 27.5–35.5)
BUN SERPL-MCNC: 19 MG/DL — SIGNIFICANT CHANGE UP (ref 7–23)
CALCIUM SERPL-MCNC: 10.3 MG/DL — SIGNIFICANT CHANGE UP (ref 8.4–10.5)
CHLORIDE SERPL-SCNC: 105 MMOL/L — SIGNIFICANT CHANGE UP (ref 96–108)
CHOLEST SERPL-MCNC: 202 MG/DL — HIGH
CO2 SERPL-SCNC: 26 MMOL/L — SIGNIFICANT CHANGE UP (ref 22–31)
COVID-19 SPIKE DOMAIN AB INTERP: POSITIVE
COVID-19 SPIKE DOMAIN ANTIBODY RESULT: >250 U/ML — HIGH
CREAT SERPL-MCNC: 0.98 MG/DL — SIGNIFICANT CHANGE UP (ref 0.5–1.3)
ESTIMATED AVERAGE GLUCOSE: 108 MG/DL — SIGNIFICANT CHANGE UP (ref 68–114)
GLUCOSE SERPL-MCNC: 81 MG/DL — SIGNIFICANT CHANGE UP (ref 70–99)
HCT VFR BLD CALC: 43.4 % — SIGNIFICANT CHANGE UP (ref 34.5–45)
HDLC SERPL-MCNC: 32 MG/DL — LOW
HGB BLD-MCNC: 13.9 G/DL — SIGNIFICANT CHANGE UP (ref 11.5–15.5)
INR BLD: 1.5 RATIO — HIGH (ref 0.88–1.16)
LIPID PNL WITH DIRECT LDL SERPL: 132 MG/DL — HIGH
MCHC RBC-ENTMCNC: 28.8 PG — SIGNIFICANT CHANGE UP (ref 27–34)
MCHC RBC-ENTMCNC: 32 GM/DL — SIGNIFICANT CHANGE UP (ref 32–36)
MCV RBC AUTO: 90 FL — SIGNIFICANT CHANGE UP (ref 80–100)
NON HDL CHOLESTEROL: 170 MG/DL — HIGH
NRBC # BLD: 0 /100 WBCS — SIGNIFICANT CHANGE UP (ref 0–0)
PLATELET # BLD AUTO: 305 K/UL — SIGNIFICANT CHANGE UP (ref 150–400)
POTASSIUM SERPL-MCNC: 3.7 MMOL/L — SIGNIFICANT CHANGE UP (ref 3.5–5.3)
POTASSIUM SERPL-SCNC: 3.7 MMOL/L — SIGNIFICANT CHANGE UP (ref 3.5–5.3)
PROTHROM AB SERPL-ACNC: 17.7 SEC — HIGH (ref 10.6–13.6)
RBC # BLD: 4.82 M/UL — SIGNIFICANT CHANGE UP (ref 3.8–5.2)
RBC # FLD: 13.7 % — SIGNIFICANT CHANGE UP (ref 10.3–14.5)
SARS-COV-2 IGG+IGM SERPL QL IA: >250 U/ML — HIGH
SARS-COV-2 IGG+IGM SERPL QL IA: POSITIVE
SODIUM SERPL-SCNC: 148 MMOL/L — HIGH (ref 135–145)
SURGICAL PATHOLOGY STUDY: SIGNIFICANT CHANGE UP
TRIGL SERPL-MCNC: 188 MG/DL — HIGH
WBC # BLD: 6.59 K/UL — SIGNIFICANT CHANGE UP (ref 3.8–10.5)
WBC # FLD AUTO: 6.59 K/UL — SIGNIFICANT CHANGE UP (ref 3.8–10.5)

## 2021-08-18 PROCEDURE — 43753 TX GASTRO INTUB W/ASP: CPT

## 2021-08-18 PROCEDURE — 99222 1ST HOSP IP/OBS MODERATE 55: CPT

## 2021-08-18 PROCEDURE — 99221 1ST HOSP IP/OBS SF/LOW 40: CPT | Mod: 25

## 2021-08-18 PROCEDURE — 93970 EXTREMITY STUDY: CPT | Mod: 26

## 2021-08-18 PROCEDURE — 31575 DIAGNOSTIC LARYNGOSCOPY: CPT

## 2021-08-18 PROCEDURE — 70551 MRI BRAIN STEM W/O DYE: CPT | Mod: 26

## 2021-08-18 PROCEDURE — 71045 X-RAY EXAM CHEST 1 VIEW: CPT | Mod: 26

## 2021-08-18 PROCEDURE — 99223 1ST HOSP IP/OBS HIGH 75: CPT

## 2021-08-18 RX ORDER — ENOXAPARIN SODIUM 100 MG/ML
40 INJECTION SUBCUTANEOUS DAILY
Refills: 0 | Status: DISCONTINUED | OUTPATIENT
Start: 2021-08-18 | End: 2021-08-20

## 2021-08-18 RX ORDER — ASPIRIN/CALCIUM CARB/MAGNESIUM 324 MG
81 TABLET ORAL DAILY
Refills: 0 | Status: DISCONTINUED | OUTPATIENT
Start: 2021-08-18 | End: 2021-08-20

## 2021-08-18 RX ADMIN — Medication 37.5 MICROGRAM(S): at 22:12

## 2021-08-18 RX ADMIN — ENOXAPARIN SODIUM 40 MILLIGRAM(S): 100 INJECTION SUBCUTANEOUS at 22:12

## 2021-08-18 RX ADMIN — TAMSULOSIN HYDROCHLORIDE 0.4 MILLIGRAM(S): 0.4 CAPSULE ORAL at 21:39

## 2021-08-18 RX ADMIN — ATORVASTATIN CALCIUM 80 MILLIGRAM(S): 80 TABLET, FILM COATED ORAL at 21:39

## 2021-08-18 RX ADMIN — Medication 300 MILLIGRAM(S): at 13:07

## 2021-08-18 NOTE — SWALLOW BEDSIDE ASSESSMENT ADULT - SLP PERTINENT HISTORY OF CURRENT PROBLEM
70yo RH woman w/ h/o afib on eliquis (last dose 8/12), CAD s/p stents on aspirin (last dose 8/9), arthritis, hypothyroidism, carpal tunnel syndrome, lymphedema, recent removal of left ureteral stent & stone (8/16/21) presenting as code stroke for left hemiparesis with LWK 8/16/21 6p. Aide at bedside explains that patient had left the hospital after left ureteral stone and stent were removed and had last been evaluated at her baseline 6p 8/16. This morning patient was noted by all aides/staff that she seemed "off" and that her face had some asymmetry to it, including drooping of right eyelid, and her speech was "very off". At baseline, patient is wheelchair bound due to underlying arthritis, stays in a nursing home requiring assistance with most ADLs. NIHSS: 11.

## 2021-08-18 NOTE — OCCUPATIONAL THERAPY INITIAL EVALUATION ADULT - ASR WT BEARING STATUS EVAL
Continued: CT Angio Head (8/17): Brain CT: No acute hemorrhage, mass effect or extra-axial collections. Interim right thalamic infarct may be subacute. Brain MRI can be obtained for further evaluation. 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 Unchanged fusiform ectasia proximal basilar artery with high-grade proximal to mid stenosis. Unchanged fusiform dilatation of the cavernous segment of the right internal carotid artery.

## 2021-08-18 NOTE — SPEECH LANGUAGE PATHOLOGY EVALUATION - SLP ORIENTATION
Grossly A&Ox3 (to self, initially to place however when asked again by PM&R MD pt reported "[place]," to month independently and to date and year with field of two choices).

## 2021-08-18 NOTE — SPEECH LANGUAGE PATHOLOGY EVALUATION - SLP DIAGNOSIS
70yo RH woman w/ h/o afib on eliquis, CAD s/p stents on aspirin, arthritis, hypothyroidism, carpal tunnel syndrome, lymphedema, recent removal of left ureteral stent & stone (8/16/21) presenting as code stroke for left hemiparesis. CT/CTA/CTP upon neuro review demonstrates subacute R thalamic infarct, potential age-indeterminate paramedian right midbrain infarct, high-grade prox-mid basilar stenosis, R ?P1 occlusion versus hypoplastic R P1. Pt presents with cognitive-linguistic deficits including visual deficits, L sided inattention, slowed at times absent responsiveness, reduced initiation, impaired short term memory, flat affect, poor eye contact, perseveration of thought, impaired problem solving, reduced cognitive flexibility, reduced task comprehension, reduced cognitive endurance. Pt with mild to moderate dysarthria of speech characterized by reduced vocal volume, slowed rate of speech, mildly impaired articulatory contact.

## 2021-08-18 NOTE — OCCUPATIONAL THERAPY INITIAL EVALUATION ADULT - IMPAIRMENTS CONTRIBUTING IMPAIRED BED MOBILITY, REHAB EVAL
impaired balance/cognition/impaired coordination/impaired motor control/impaired postural control/decreased ROM/decreased strength

## 2021-08-18 NOTE — SWALLOW BEDSIDE ASSESSMENT ADULT - PHARYNGEAL PHASE
Delayed pharyngeal swallow/Decreased laryngeal elevation Delayed pharyngeal swallow/Decreased laryngeal elevation/Wet vocal quality post oral intake/Delayed throat clear post oral intake

## 2021-08-18 NOTE — SWALLOW BEDSIDE ASSESSMENT ADULT - ADDITIONAL RECOMMENDATIONS
Maintain good oral hygiene.  This service will continue to follow to assess for improved swallow function and candidacy for PO diet.

## 2021-08-18 NOTE — SPEECH LANGUAGE PATHOLOGY EVALUATION - COMMENTS
Not assessed given visual deficits Opposites: Intact  Phrase completion: Intact   Picture description task not performed due to visual deficits yes/no questions re: simple paragraph: Intact WFL Hx cont: Neuro: MS: left eye open, right eyelid completely shut, noted right gaze preference, alert, oriented to location, month, could not tell me age, speech mildly dysarthric with impaired naming, repetition, intact comprehension can follow some commands. CN: II - XII - VFF to threat grossly, EOM demonstrate right gaze preference overcome by OCR, V1-3 intact to LT b/l, moderate left facial droop, noted weakness of eyelid opening of left, complete ptosis of right eyelid, hearing intact to finger rubbing b/l, tongue protrudes midline. CT/CTA/CTP upon neuro review demonstrates subacute R thalamic infarct, potential age-indeterminate paramedian right midbrain infarct, high-grade prox-mid basilar stenosis, R ?P1 occlusion versus hypoplastic R P1. Patient out of window for tpa. Given concern for subacute infarcts and unclear radiographical evidence of LVO along with patient's poor baseline, not deemed a candidate for mechanical thrombectomy. Neuro Impression: Wells syndrome (R third nerve palsy, left hemiparesis) 2/2 right paramedian brain infarct & thalamic infarct 2/2  versus cardioembolic (given concern for possible P1 occlusion and patient had been off eliquis for several days).  CXR clear lungs.  SPEECH, LANGUAGE, SWALLOW HX: s/p bedside swallow evaluation 8/15/20 with recommendation for regular texture diet. Seen for speech-language evaluation 20 s/p code stroke and c/o word finding deficits and STM deficits. Pt demonstrated deficits with short term recall. Recommend OT referral for higher level cognitive evaluation. Pt would benefit from restorative speech and cognitive-linguistic tx post discharge.  and NP made aware. Divergent naming: reduced cognitive flexibility and impaired word fluency noted (3 words in one minute)  Convergent naming: reduced task comprehension, breakdown as complexity increases  Impaired cognitive endurance as assessment continued Not assessed due to visual deficits

## 2021-08-18 NOTE — PROGRESS NOTE ADULT - SUBJECTIVE AND OBJECTIVE BOX
THE PATIENT WAS SEEN AND EXAMINED BY ME WITH THE HOUSESTAFF AND STROKE TEAM DURING MORNING ROUNDS.   HPI:  72yo RH woman w/ h/o afib on eliquis (last dose 8/12), CAD s/p stents on aspirin (last dose 8/9), arthritis, hypothyroidism, carpal tunnel syndrome, lymphedema, recent removal of left ureteral stent & stone (8/16/21) presenting as code stroke for left hemiparesis with LWK 8/16/21 6p. Aide at bedside explains that patient had left the hospital after left ureteral stone and stent were removed and had last been evaluated at her baseline 6p 8/16. This morning patient was noted by all aides/staff that she seemed "off" and that her face had some asymmetry to it, including drooping of right eyelid, and her speech was "very off".    At baseline, patient is wheelchair bound due to underlying arthritis, stays in a nursing home requiring assistance with most ADLs.     NIHSS: 11  pre-MRS: 4 (17 Aug 2021 13:54)      ROS: Otherwise negative.    SUBJECTIVE: No events overnight.  No new neurologic complaints.      aspirin Suppository 300 milliGRAM(s) Rectal daily  atorvastatin 80 milliGRAM(s) Oral at bedtime  levothyroxine Injectable 37.5 MICROGram(s) IV Push at bedtime  mirabegron ER 25 milliGRAM(s) Oral daily  tamsulosin 0.4 milliGRAM(s) Oral at bedtime      PHYSICAL EXAM:   Vital Signs Last 24 Hrs  T(C): 37 (18 Aug 2021 04:00), Max: 37.1 (17 Aug 2021 20:00)  T(F): 98.6 (18 Aug 2021 04:00), Max: 98.7 (17 Aug 2021 20:00)  HR: 94 (18 Aug 2021 06:00) (76 - 101)  BP: 173/101 (18 Aug 2021 06:00) (159/101 - 210/115)  BP(mean): 123 (18 Aug 2021 06:00) (119 - 183)  RR: 14 (18 Aug 2021 06:00) (12 - 23)  SpO2: 95% (18 Aug 2021 06:00) (90% - 97%)    General: No acute distress  HEENT: EOM intact, visual fields full  Abdomen: Soft, nontender, nondistended   Extremities: No edema    NEUROLOGICAL EXAM:  Mental status: Awake, alert, oriented x3, speech fluent, follows commands, no neglect, normal memory   Cranial Nerves: No facial asymmetry, no nystagmus, no dysarthria,  tongue midline  Motor exam: Normal tone, no drift, 5/5 RUE, 5/5 RLE, 5/5 LUE, 5/5 LLE, normal fine finger movements.  Sensation: Intact to light touch   Coordination/ Gait: No dysmetria, JESU intact and symmetric bilaterally, gait not tested    LABS:                        12.8   6.53  )-----------( 330      ( 17 Aug 2021 12:28 )             40.5    08-17    145  |  105  |  23  ----------------------------<  104<H>  4.7   |  26  |  1.07    Ca    9.2      17 Aug 2021 12:28    TPro  7.5  /  Alb  3.8  /  TBili  0.7  /  DBili  x   /  AST  14  /  ALT  6<L>  /  AlkPhos  103  08-17  PT/INR - ( 17 Aug 2021 12:28 )   PT: 23.5 sec;   INR: 2.03 ratio         PTT - ( 17 Aug 2021 12:28 )  PTT:58.2 sec      IMAGING: Reviewed by me.

## 2021-08-18 NOTE — SPEECH LANGUAGE PATHOLOGY EVALUATION - SLP GENERAL OBSERVATIONS
Pt received in bed. Left sided inattention noted. + ptosis of right eye. Initially pt with eye closing and responsiveness, however as evaluation continued pt + eye opening.

## 2021-08-18 NOTE — SWALLOW BEDSIDE ASSESSMENT ADULT - SLP GENERAL OBSERVATIONS
Pt received in bed. Grossly A&Ox3 (to self, initially to place however when asked again by PM&R MD pt reported "[place]," to month independently and to date and year with field of two choices). Left sided inattention noted. + ptosis of right eye. Initially pt with eye closing and responsiveness, however as evaluation continued pt + eye opening. + cognitive deficits. Slowed at times absent responsiveness, poor initiation. Mild to moderate dysarthria of speech characterized by reduced vocal volume and mildly reduced articulatory contact.

## 2021-08-18 NOTE — SPEECH LANGUAGE PATHOLOGY EVALUATION - SLP SHORT TERM MEMORY
Immediate recall of digits/words in sequence: Intact, 6/6; Recall of three unrelated words after 2-3 minute delay: 0/3/impaired

## 2021-08-18 NOTE — OCCUPATIONAL THERAPY INITIAL EVALUATION ADULT - NS ASR FOLLOW COMMAND OT EVAL
requires cueing and encouragement, attention cues/50% of the time/able to follow single-step instructions

## 2021-08-18 NOTE — SWALLOW BEDSIDE ASSESSMENT ADULT - SWALLOW EVAL: DIAGNOSIS
70yo RH woman w/ h/o afib on eliquis, CAD s/p stents on aspirin, arthritis, hypothyroidism, carpal tunnel syndrome, lymphedema, recent removal of left ureteral stent & stone (8/16/21) presenting as code stroke for left hemiparesis. CT/CTA/CTP upon neuro review demonstrates subacute R thalamic infarct, potential age-indeterminate paramedian right midbrain infarct, high-grade prox-mid basilar stenosis, R ?P1 occlusion versus hypoplastic R P1. Pt presents with an oral and suspected pharyngeal dysphagia characterized by reduced oral grading to utensil, delayed oral transit time, lingual pumping, suspected delay in trigger of the pharyngeal swallow, reduced hyolaryngeal elevation upon palpation and overt s/s laryngeal 72yo RH woman w/ h/o afib on eliquis, CAD s/p stents on aspirin, arthritis, hypothyroidism, carpal tunnel syndrome, lymphedema, recent removal of left ureteral stent & stone (8/16/21) presenting as code stroke for left hemiparesis. CT/CTA/CTP upon neuro review demonstrates subacute R thalamic infarct, potential age-indeterminate paramedian right midbrain infarct, high-grade prox-mid basilar stenosis, R ?P1 occlusion versus hypoplastic R P1. Pt presents with an oral and suspected pharyngeal dysphagia characterized by reduced oral grading to utensil, delayed oral transit time, lingual pumping, suspected delay in trigger of the pharyngeal swallow, reduced hyolaryngeal elevation upon palpation and overt s/s laryngeal penetration/aspiration with most conservative textures (subtle change in vocal quality, delayed throat clearing post PO intake).

## 2021-08-18 NOTE — OCCUPATIONAL THERAPY INITIAL EVALUATION ADULT - LIVES WITH, PROFILE
Pt lives at an halfway, requires assist with all ADLs and transfers. Pt has been WC bound and has not walked since july 2020

## 2021-08-18 NOTE — OCCUPATIONAL THERAPY INITIAL EVALUATION ADULT - STRENGTHENING, PT EVAL
GOAL: Pt will increase LUE/LLE strength to 3+/5 to increase participation in functional tasks in 4 weeks

## 2021-08-18 NOTE — OCCUPATIONAL THERAPY INITIAL EVALUATION ADULT - PERTINENT HX OF CURRENT PROBLEM, REHAB EVAL
71F h/o afib on eliquis (last dose 8/12), CAD s/p stents on aspirin (last dose 8/9), arthritis, hypothyroidism, carpal tunnel syndrome, lymphedema, recent removal of left ureteral stent & stone (8/16/21) presenting as code stroke for left hemiparesis with LWK 8/16/21 6p.

## 2021-08-18 NOTE — SWALLOW BEDSIDE ASSESSMENT ADULT - SWALLOW EVAL: PATIENT/FAMILY GOALS STATEMENT
History received from pt's HHA who reports pt received a regular texture diet PTA and was eating/drinking. HHA reports when pt seated upright, no difficulty swallowing noted. However, at times, pt eats/drinks in a reclined position and HHA does note "choking" when this occurs. HHA reports just PTA pt began refusing PO.

## 2021-08-18 NOTE — CONSULT NOTE ADULT - SUBJECTIVE AND OBJECTIVE BOX
CC: NGT placement    HPI: 70yo female with PMHx afib on eliquis, CAD s/p stents on aspirin, arthritis, hypothyroidism, lymphedema, admitted s/p CVA. ENT called for NGT placement. PT failed swallow evaluation at bedside this morning, now requiring NGT placement. Pt is a poor historian, unable to obtain history.        PAST MEDICAL & SURGICAL HISTORY:  HTN (hypertension)    Obesity  BMI 29.3    Hypothyroid  on synthroid    Type 2 diabetes mellitus without complication  diet-controlled    Carpal tunnel syndrome of right wrist    Lymphedema    2019 novel coronavirus disease (COVID-19)  As per patient, diagnosed with Covid 1 year ago when residing in a nursing home and was managed in nursing home; Pt denies any hospitalizations for covid or intubations.    Sepsis  Urosepsis due to septic stone 8/2020    S/P carpal tunnel release    S/P cystoscopy  Pt is a poor historian; As per charts, patient had a cysto with stent placement 8/2020      Allergies    penicillin (Hives)  penicillin (Rash)  sulfa drugs (Unknown)  Tetracycline Hydrochloride (Unknown)    Intolerances      MEDICATIONS  (STANDING):  aspirin Suppository 300 milliGRAM(s) Rectal daily  atorvastatin 80 milliGRAM(s) Oral at bedtime  levothyroxine Injectable 37.5 MICROGram(s) IV Push at bedtime  mirabegron ER 25 milliGRAM(s) Oral daily  tamsulosin 0.4 milliGRAM(s) Oral at bedtime    MEDICATIONS  (PRN):      Social History: unknown smoking hx    Family history: per chart, no significant family hx    ROS:   unable to obtain due to pt's condition     Vital Signs Last 24 Hrs  T(C): 36.9 (18 Aug 2021 08:00), Max: 37.1 (17 Aug 2021 20:00)  T(F): 98.5 (18 Aug 2021 08:00), Max: 98.7 (17 Aug 2021 20:00)  HR: 106 (18 Aug 2021 14:00) (76 - 110)  BP: 133/118 (18 Aug 2021 14:00) (133/118 - 210/115)  BP(mean): 125 (18 Aug 2021 14:00) (119 - 183)  RR: 22 (18 Aug 2021 14:00) (12 - 23)  SpO2: 95% (18 Aug 2021 14:00) (90% - 97%)                          13.9   6.59  )-----------( 305      ( 18 Aug 2021 06:54 )             43.4    08-18    148<H>  |  105  |  19  ----------------------------<  81  3.7   |  26  |  0.98    Ca    10.3      18 Aug 2021 06:53    TPro  7.5  /  Alb  3.8  /  TBili  0.7  /  DBili  x   /  AST  14  /  ALT  6<L>  /  AlkPhos  103  08-17   PT/INR - ( 18 Aug 2021 07:02 )   PT: 17.7 sec;   INR: 1.50 ratio         PTT - ( 18 Aug 2021 07:02 )  PTT:33.7 sec    PHYSICAL EXAM:  Gen: NAD  Skin: No rashes, bruises, or lesions  Head: Normocephalic, Atraumatic  Face: left facial droop, no edema, erythema, or fluctuance. Parotid glands soft without mass  Eyes: no scleral injection  Nose: Nares bilaterally patent, no discharge  Mouth: No Stridor / Drooling / Trismus.  Mucosa moist, tongue/uvula midline, oropharynx clear  Neck: Flat, supple, no lymphadenopathy, trachea midline, no masses  Lymphatic: No lymphadenopathy  Resp: breathing easily, no stridor  CV: no peripheral edema/cyanosis  GI: nondistended   Peripheral vascular: no JVD or edema  Neuro: left facial droop        Fiberoptic Indirect laryngoscopy:  (Scope #2 used)    Reason for Laryngoscopy: NGT placement    Patient was unable to cooperate with mirror.  Nasopharynx, oropharynx, and hypopharynx clear, no bleeding. Tongue base, posterior pharyngeal wall, vallecula, epiglottis, and subglottis appear normal. No erythema, edema, pooling of secretions, masses or lesions. Airway patent, no foreign body visualized. No glottic/supraglottic edema. True vocal cords, arytenoids, vestibular folds, ventricles, pyriform sinuses, and aryepiglottic folds appear normal bilaterally. Vocal cords mobile with good contact b/l.    Procedure Note:  Procedure: NGT placement  Diagnosis: dysphagia  Verbal consent obtained from patient. Lube applied to small keofeed tube. Keofeed advanced through left nare, meeting resistance and coiling after being advanced through esophageal inlet. Keofeed removed and [place] sump advanced through left nare under visualization using indirect laryngoscope. Tip of tube visualized in pyriform sinus. Pt asked to swallow. Tube advanced through esophageal inlet without resistance. Placement confirmed with auscultation of air in stomach. Pending CXR confirmation.     IMAGING/ADDITIONAL STUDIES:   pending chest xray

## 2021-08-18 NOTE — CONSULT NOTE ADULT - SUBJECTIVE AND OBJECTIVE BOX
Patient is a 71y old  Female who presents with a chief complaint of stroke (17 Aug 2021 13:54)    Admission HPI:  72yo RH woman w/ h/o afib on eliquis (last dose 8/12), CAD s/p stents on aspirin (last dose 8/9), arthritis, hypothyroidism, carpal tunnel syndrome, lymphedema, recent removal of left ureteral stent & stone (8/16/21) presenting as code stroke for left hemiparesis with LWK 8/16/21 6p. Aide at bedside explains that patient had left the hospital after left ureteral stone and stent were removed and had last been evaluated at her baseline 6p 8/16. This morning patient was noted by all aides/staff that she seemed "off" and that her face had some asymmetry to it, including drooping of right eyelid, and her speech was "very off".    At baseline, patient is wheelchair bound due to underlying arthritis, stays in a nursing home requiring assistance with most ADLs.     NIHSS: 11  pre-MRS: 4 (17 Aug 2021 13:54)    Interval History:  Patient had imaging  Brain CT: No acute hemorrhage, mass effect or extra-axial collections. Interim right thalamic infarct may be subacute. Brain MRI can be obtained for further evaluation.  Neck CTA: No hemodynamically significant stenosis.  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 Unchanged fusiform ectasia proximal basilar artery with high-grade proximal to mid stenosis. Unchanged fusiform dilatation of the cavernous segment of the right internal carotid artery.  No large vessel occlusion of the anterior middle or posterior cerebral arteries.  Perfusion maps: No evidence for core infarct or penumbra. Hypoperfusion within the posterior circulation.    REVIEW OF SYSTEMS: No chest pain, shortness of breath, nausea, vomiting or diarhea; other ROS neg     PAST MEDICAL & SURGICAL HISTORY  DM (diabetes mellitus)  HTN (hypertension)  Obesity  Essential hypertension  Hypothyroid  Type 2 diabetes mellitus without complication  Obesity (BMI 30-39.9)  Carpal tunnel syndrome of right wrist  Essential hypertension  Lymphedema  2019 novel coronavirus disease (COVID-19)  Sepsis  S/P carpal tunnel release  S/P cystoscopy    FUNCTIONAL HISTORY:   Was at [facility]    CURRENT FUNCTIONAL STATUS:  Max A Bed Mob    FAMILY HISTORY   Family history of lung cancer  Family history of myocardial infarction (Sibling)  No pertinent family history in first degree relatives    MEDICATIONS   aspirin Suppository 300 milliGRAM(s) Rectal daily  atorvastatin 80 milliGRAM(s) Oral at bedtime  levothyroxine Injectable 37.5 MICROGram(s) IV Push at bedtime  mirabegron ER 25 milliGRAM(s) Oral daily  tamsulosin 0.4 milliGRAM(s) Oral at bedtime    ALLERGIES  penicillin (Hives)  penicillin (Rash)  sulfa drugs (Unknown)  Tetracycline Hydrochloride (Unknown)      VITALS  T(C): 37 (08-18-21 @ 04:00), Max: 37.1 (08-17-21 @ 20:00)  HR: 94 (08-18-21 @ 06:00) (76 - 101)  BP: 173/101 (08-18-21 @ 06:00) (159/101 - 210/115)  RR: 14 (08-18-21 @ 06:00) (12 - 23)  SpO2: 95% (08-18-21 @ 06:00) (90% - 97%)  Wt(kg): --    PHYSICAL EXAM  Constitutional - NAD, Comfortable  HEENT - NCAT, EOMI  Neck - Supple, No limited ROM  Chest - CTA bilaterally, No wheeze, No rhonchi, No crackles  Cardiovascular - RRR, S1S2, No murmurs  Abdomen - BS+, Soft, NTND  Extremities - No C/C/E, No calf tenderness   Neurologic Exam -                    Cognitive - Awake, Alert, AAO to self, place, date, year, situation     Communication - Fluent, No dysarthria, no aphasia     Cranial Nerves - CN 2-12 intact     Motor - No focal deficits      Sensory - Intact to LT     Reflexes - DTR Intact, No primitive reflexive  Psychiatric - Mood stable, Affect WNL    RECENT LABS/IMAGING  CBC Full  -  ( 18 Aug 2021 06:54 )  WBC Count : 6.59 K/uL  RBC Count : 4.82 M/uL  Hemoglobin : 13.9 g/dL  Hematocrit : 43.4 %  Platelet Count - Automated : 305 K/uL  Mean Cell Volume : 90.0 fl  Mean Cell Hemoglobin : 28.8 pg  Mean Cell Hemoglobin Concentration : 32.0 gm/dL  Auto Neutrophil # : x  Auto Lymphocyte # : x  Auto Monocyte # : x  Auto Eosinophil # : x  Auto Basophil # : x  Auto Neutrophil % : x  Auto Lymphocyte % : x  Auto Monocyte % : x  Auto Eosinophil % : x  Auto Basophil % : x    08-18    148<H>  |  105  |  19  ----------------------------<  81  3.7   |  26  |  0.98    Ca    10.3      18 Aug 2021 06:53    TPro  7.5  /  Alb  3.8  /  TBili  0.7  /  DBili  x   /  AST  14  /  ALT  6<L>  /  AlkPhos  103  08-17    Impression:  72 yo with functional deficits secondary to diagnosis of debility, h/o CVA    Plan:  PT- ROM, Bed Mob, Transfers, Amb w AD and bracing as needed  OT- ADLs, bracing  SLP- Dysphagia eval and treat  Prec- Falls, Cardiac  DVT Prophylaxis- SCDs  Skin- Turn q2 h  Dispo-  Patient is a 71y old  Female who presents with a chief complaint of stroke (17 Aug 2021 13:54)    Admission HPI:  70yo RH woman w/ h/o afib on eliquis (last dose 8/12), CAD s/p stents on aspirin (last dose 8/9), arthritis, hypothyroidism, carpal tunnel syndrome, lymphedema, recent removal of left ureteral stent & stone (8/16/21) presenting as code stroke for left hemiparesis with LWK 8/16/21 6p. Aide at bedside explains that patient had left the hospital after left ureteral stone and stent were removed and had last been evaluated at her baseline 6p 8/16. This morning patient was noted by all aides/staff that she seemed "off" and that her face had some asymmetry to it, including drooping of right eyelid, and her speech was "very off".    At baseline, patient is wheelchair bound due to underlying arthritis, stays in a nursing home requiring assistance with most ADLs.     NIHSS: 11  pre-MRS: 4 (17 Aug 2021 13:54)    Interval History:  Patient had imaging  Brain CT: No acute hemorrhage, mass effect or extra-axial collections. Interim right thalamic infarct may be subacute. Brain MRI can be obtained for further evaluation.  Neck CTA: No hemodynamically significant stenosis.  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 Unchanged fusiform ectasia proximal basilar artery with high-grade proximal to mid stenosis. Unchanged fusiform dilatation of the cavernous segment of the right internal carotid artery.  No large vessel occlusion of the anterior middle or posterior cerebral arteries.  Perfusion maps: No evidence for core infarct or penumbra. Hypoperfusion within the posterior circulation.  Patient's long time caregiver at bedside.    REVIEW OF SYSTEMS: Confused, c/o LBP which per caregiver is baseline, no other c/os    PAST MEDICAL & SURGICAL HISTORY  DM (diabetes mellitus)  HTN (hypertension)  Obesity  Essential hypertension  Hypothyroid  Type 2 diabetes mellitus without complication  Obesity (BMI 30-39.9)  Carpal tunnel syndrome of right wrist  Essential hypertension  Lymphedema  2019 novel coronavirus disease (COVID-19)  Sepsis  S/P carpal tunnel release  S/P cystoscopy    FUNCTIONAL HISTORY:   Was at [facility]- per caregiver has not been ambulatory since July 2020    CURRENT FUNCTIONAL STATUS:  Max A Bed Mob    FAMILY HISTORY   Family history of lung cancer  Family history of myocardial infarction (Sibling)  No pertinent family history in first degree relatives    MEDICATIONS   aspirin Suppository 300 milliGRAM(s) Rectal daily  atorvastatin 80 milliGRAM(s) Oral at bedtime  levothyroxine Injectable 37.5 MICROGram(s) IV Push at bedtime  mirabegron ER 25 milliGRAM(s) Oral daily  tamsulosin 0.4 milliGRAM(s) Oral at bedtime    ALLERGIES  penicillin (Hives)  penicillin (Rash)  sulfa drugs (Unknown)  Tetracycline Hydrochloride (Unknown)      VITALS  T(C): 37 (08-18-21 @ 04:00), Max: 37.1 (08-17-21 @ 20:00)  HR: 94 (08-18-21 @ 06:00) (76 - 101)  BP: 173/101 (08-18-21 @ 06:00) (159/101 - 210/115)  RR: 14 (08-18-21 @ 06:00) (12 - 23)  SpO2: 95% (08-18-21 @ 06:00) (90% - 97%)  Wt(kg): --    PHYSICAL EXAM  Constitutional - NAD, Comfortable  HEENT - NCAT, EOMI  Extremities - 1+ No calf tenderness   Neurologic Exam -                 Alert  AAO x 1  Follows verbal instruction  LUE 1/5, LLE 2/5; RUE/RLE 4/5  Speech dysarthric    Psychiatric - Mood stable, Affect WNL    RECENT LABS/IMAGING  CBC Full  -  ( 18 Aug 2021 06:54 )  WBC Count : 6.59 K/uL  RBC Count : 4.82 M/uL  Hemoglobin : 13.9 g/dL  Hematocrit : 43.4 %  Platelet Count - Automated : 305 K/uL  Mean Cell Volume : 90.0 fl  Mean Cell Hemoglobin : 28.8 pg  Mean Cell Hemoglobin Concentration : 32.0 gm/dL  Auto Neutrophil # : x  Auto Lymphocyte # : x  Auto Monocyte # : x  Auto Eosinophil # : x  Auto Basophil # : x  Auto Neutrophil % : x  Auto Lymphocyte % : x  Auto Monocyte % : x  Auto Eosinophil % : x  Auto Basophil % : x    08-18    148<H>  |  105  |  19  ----------------------------<  81  3.7   |  26  |  0.98    Ca    10.3      18 Aug 2021 06:53    TPro  7.5  /  Alb  3.8  /  TBili  0.7  /  DBili  x   /  AST  14  /  ALT  6<L>  /  AlkPhos  103  08-17    Impression:  72 yo with functional deficits secondary to diagnosis of debility, h/o CVA    Plan:  PT- ROM, Bed Mob, Transfers, Amb w AD and bracing as needed  OT- ADLs, bracing  SLP- Dysphagia eval and treat  Prec- Falls, Cardiac  DVT Prophylaxis- SCDs  Skin- Turn q2 h  Dispo- [facility]

## 2021-08-18 NOTE — OCCUPATIONAL THERAPY INITIAL EVALUATION ADULT - PRECAUTIONS/LIMITATIONS, REHAB EVAL
cont: Aide at bedside explains that patient had left the hospital after left ureteral stone and stent were removed and had last been evaluated at her baseline 6p 8/16. This morning patient was noted by all aides/staff that she seemed "off" and that her face had some asymmetry to it, including drooping of right eyelid, and her speech was "very off".At baseline, patient is wheelchair bound due to underlying arthritis, stays in a nursing home requiring assistance with most ADLs.

## 2021-08-18 NOTE — SWALLOW BEDSIDE ASSESSMENT ADULT - COMMENTS
Hx cont: Neuro: MS: left eye open, right eyelid completely shut, noted right gaze preference, alert, oriented to location, month, could not tell me age, speech mildly dysarthric with impaired naming, repetition, intact comprehension can follow some commands. CN: II - XII - VFF to threat grossly, EOM demonstrate right gaze preference overcome by OCR, V1-3 intact to LT b/l, moderate left facial droop, noted weakness of eyelid opening of left, complete ptosis of right eyelid, hearing intact to finger rubbing b/l, tongue protrudes midline. CT/CTA/CTP upon neuro review demonstrates subacute R thalamic infarct, potential age-indeterminate paramedian right midbrain infarct, high-grade prox-mid basilar stenosis, R ?P1 occlusion versus hypoplastic R P1. Patient out of window for tpa. Given concern for subacute infarcts and unclear radiographical evidence of LVO along with patient's poor baseline, not deemed a candidate for mechanical thrombectomy. Neuro Impression: Wells syndrome (R third nerve palsy, left hemiparesis) 2/2 right paramedian brain infarct & thalamic infarct 2/2  versus cardioembolic (given concern for possible P1 occlusion and patient had been off eliquis for several days).  CXR clear lungs.  SPEECH, LANGUAGE, SWALLOW HX: s/p bedside swallow evaluation 8/15/20 with recommendation for regular texture diet. Seen for speech-language evaluation 20 s/p code stroke and c/o word finding deficits and STM deficits. Pt demonstrated deficits with short term recall. Recommend OT referral for higher level cognitive evaluation.

## 2021-08-18 NOTE — SPEECH LANGUAGE PATHOLOGY EVALUATION - SLP PATIENT/FAMILY GOALS STATEMENT
HHA reports that pt with change in cognitive status since neuro event. HHA reports that prior pt would "sit and watch her movies" and "have conversations."

## 2021-08-18 NOTE — PROGRESS NOTE ADULT - ASSESSMENT
ASSESSMENT:     NEURO: Continue close monitoring for neurologic deterioration, permissive HTN to gradual normotension, titrate statin to LDL goal less than 70, MRI Brain w/o, MRA Head w/o and Neck w/contrast. Physical therapy/OT/Speech eval/treatment.     ANTITHROMBOTIC THERAPY:     PULMONARY: CXR clear, protecting airway, saturating well     CARDIOVASCULAR: check TTE, cardiac monitoring                              SBP goal:     GASTROINTESTINAL:  dysphagia screen       Diet:     RENAL: BUN/Cr within normal limits, good urine output      Na Goal: Greater than 135     Cruz:    HEMATOLOGY: H/H without change, Platelets normal      DVT ppx: Heparin s.c [] LMWH []     ID: afebrile, no leukocytosis     OTHER:     DISPOSITION: Rehab or home depending on PT eval once stable and workup is complete    CORE MEASURES:        Admission NIHSS:      TPA: [] YES [] NO      LDL/HDL:     Depression Screen:      Statin Therapy:     Dysphagia Screen: [] PASS [] FAIL     Smoking [] YES [] NO      Afib [] YES [] NO     Stroke Education [] YES [] NO    Obtain screening lower extremity venous ultrasound in patients who meet 1 or more of the following criteria as patient is high risk for DVT/PE on admission:   [] History of DVT/PE  []Hypercoagulable states (Factor V Leiden, Cancer, OCP, etc. )  []Prolonged immobility (hemiplegia/hemiparesis/post operative or any other extended immobilization)  [] Transferred from outside facility (Rehab or Long term care)  [] Age </= to 50

## 2021-08-19 ENCOUNTER — TRANSCRIPTION ENCOUNTER (OUTPATIENT)
Age: 71
End: 2021-08-19

## 2021-08-19 DIAGNOSIS — E03.9 HYPOTHYROIDISM, UNSPECIFIED: ICD-10-CM

## 2021-08-19 DIAGNOSIS — E11.9 TYPE 2 DIABETES MELLITUS WITHOUT COMPLICATIONS: ICD-10-CM

## 2021-08-19 DIAGNOSIS — I63.9 CEREBRAL INFARCTION, UNSPECIFIED: ICD-10-CM

## 2021-08-19 DIAGNOSIS — I10 ESSENTIAL (PRIMARY) HYPERTENSION: ICD-10-CM

## 2021-08-19 LAB
ANION GAP SERPL CALC-SCNC: 18 MMOL/L — HIGH (ref 5–17)
BUN SERPL-MCNC: 28 MG/DL — HIGH (ref 7–23)
CALCIUM SERPL-MCNC: 9.6 MG/DL — SIGNIFICANT CHANGE UP (ref 8.4–10.5)
CHLORIDE SERPL-SCNC: 106 MMOL/L — SIGNIFICANT CHANGE UP (ref 96–108)
CO2 SERPL-SCNC: 24 MMOL/L — SIGNIFICANT CHANGE UP (ref 22–31)
CREAT SERPL-MCNC: 1.16 MG/DL — SIGNIFICANT CHANGE UP (ref 0.5–1.3)
GLUCOSE SERPL-MCNC: 161 MG/DL — HIGH (ref 70–99)
HCT VFR BLD CALC: 39.4 % — SIGNIFICANT CHANGE UP (ref 34.5–45)
HGB BLD-MCNC: 12.7 G/DL — SIGNIFICANT CHANGE UP (ref 11.5–15.5)
MAGNESIUM SERPL-MCNC: 2.6 MG/DL — SIGNIFICANT CHANGE UP (ref 1.6–2.6)
MCHC RBC-ENTMCNC: 28.6 PG — SIGNIFICANT CHANGE UP (ref 27–34)
MCHC RBC-ENTMCNC: 32.2 GM/DL — SIGNIFICANT CHANGE UP (ref 32–36)
MCV RBC AUTO: 88.7 FL — SIGNIFICANT CHANGE UP (ref 80–100)
NRBC # BLD: 0 /100 WBCS — SIGNIFICANT CHANGE UP (ref 0–0)
PHOSPHATE SERPL-MCNC: 3.4 MG/DL — SIGNIFICANT CHANGE UP (ref 2.5–4.5)
PLATELET # BLD AUTO: 313 K/UL — SIGNIFICANT CHANGE UP (ref 150–400)
POTASSIUM SERPL-MCNC: 3.2 MMOL/L — LOW (ref 3.5–5.3)
POTASSIUM SERPL-MCNC: 4.1 MMOL/L — SIGNIFICANT CHANGE UP (ref 3.5–5.3)
POTASSIUM SERPL-SCNC: 3.2 MMOL/L — LOW (ref 3.5–5.3)
POTASSIUM SERPL-SCNC: 4.1 MMOL/L — SIGNIFICANT CHANGE UP (ref 3.5–5.3)
RBC # BLD: 4.44 M/UL — SIGNIFICANT CHANGE UP (ref 3.8–5.2)
RBC # FLD: 14.1 % — SIGNIFICANT CHANGE UP (ref 10.3–14.5)
SODIUM SERPL-SCNC: 148 MMOL/L — HIGH (ref 135–145)
WBC # BLD: 9.44 K/UL — SIGNIFICANT CHANGE UP (ref 3.8–10.5)
WBC # FLD AUTO: 9.44 K/UL — SIGNIFICANT CHANGE UP (ref 3.8–10.5)

## 2021-08-19 PROCEDURE — 99223 1ST HOSP IP/OBS HIGH 75: CPT

## 2021-08-19 RX ORDER — DILTIAZEM HCL 120 MG
30 CAPSULE, EXT RELEASE 24 HR ORAL EVERY 6 HOURS
Refills: 0 | Status: DISCONTINUED | OUTPATIENT
Start: 2021-08-19 | End: 2021-08-22

## 2021-08-19 RX ORDER — ACETAMINOPHEN 500 MG
650 TABLET ORAL EVERY 6 HOURS
Refills: 0 | Status: DISCONTINUED | OUTPATIENT
Start: 2021-08-19 | End: 2021-08-22

## 2021-08-19 RX ORDER — POTASSIUM CHLORIDE 20 MEQ
40 PACKET (EA) ORAL ONCE
Refills: 0 | Status: COMPLETED | OUTPATIENT
Start: 2021-08-19 | End: 2021-08-19

## 2021-08-19 RX ADMIN — ATORVASTATIN CALCIUM 80 MILLIGRAM(S): 80 TABLET, FILM COATED ORAL at 21:38

## 2021-08-19 RX ADMIN — TAMSULOSIN HYDROCHLORIDE 0.4 MILLIGRAM(S): 0.4 CAPSULE ORAL at 21:37

## 2021-08-19 RX ADMIN — Medication 30 MILLIGRAM(S): at 13:07

## 2021-08-19 RX ADMIN — Medication 81 MILLIGRAM(S): at 11:35

## 2021-08-19 RX ADMIN — Medication 30 MILLIGRAM(S): at 17:08

## 2021-08-19 RX ADMIN — Medication 650 MILLIGRAM(S): at 13:30

## 2021-08-19 RX ADMIN — ENOXAPARIN SODIUM 40 MILLIGRAM(S): 100 INJECTION SUBCUTANEOUS at 11:35

## 2021-08-19 RX ADMIN — Medication 40 MILLIEQUIVALENT(S): at 06:36

## 2021-08-19 RX ADMIN — Medication 650 MILLIGRAM(S): at 12:45

## 2021-08-19 RX ADMIN — Medication 37.5 MICROGRAM(S): at 22:34

## 2021-08-19 RX ADMIN — MIRABEGRON 25 MILLIGRAM(S): 50 TABLET, EXTENDED RELEASE ORAL at 11:35

## 2021-08-19 RX ADMIN — Medication 30 MILLIGRAM(S): at 23:14

## 2021-08-19 NOTE — PROGRESS NOTE ADULT - SUBJECTIVE AND OBJECTIVE BOX
THE PATIENT WAS SEEN AND EXAMINED BY ME WITH THE HOUSESTAFF AND STROKE TEAM DURING MORNING ROUNDS.   HPI:  72yo RH woman w/ h/o afib on eliquis (last dose 8/12), CAD s/p stents on aspirin (last dose 8/9), arthritis, hypothyroidism, carpal tunnel syndrome, lymphedema, recent removal of left ureteral stent & stone (8/16/21) presented as code stroke for left hemiparesis with LWK 8/16/21 6p. Aide at bedside explained that patient had left the hospital after left ureteral stone and stent were removed and had last been evaluated at her baseline 6p 8/16. This morning patient was noted by all aides/staff that she seemed "off" and that her face had some asymmetry to it, including drooping of right eyelid, and her speech was "very off". At baseline, patient is wheelchair bound due to underlying arthritis, stays in a nursing home requiring assistance with most ADLs.  NIHSS: 11 pre-MRS: 4     SUBJECTIVE: No events overnight.  No new neurologic complaints.  ROS reported negative unless otherwise noted.    aspirin  chewable 81 milliGRAM(s) Oral daily  atorvastatin 80 milliGRAM(s) Oral at bedtime  enoxaparin Injectable 40 milliGRAM(s) SubCutaneous daily  levothyroxine Injectable 37.5 MICROGram(s) IV Push at bedtime  mirabegron ER 25 milliGRAM(s) Oral daily  tamsulosin 0.4 milliGRAM(s) Oral at bedtime      PHYSICAL EXAM:   Vital Signs Last 24 Hrs  T(C): 36.7 (19 Aug 2021 04:00), Max: 37.5 (18 Aug 2021 18:00)  T(F): 98.1 (19 Aug 2021 04:00), Max: 99.5 (18 Aug 2021 18:00)  HR: 93 (19 Aug 2021 06:00) (93 - 110)  BP: 179/98 (19 Aug 2021 06:00) (133/118 - 193/125)  BP(mean): 117 (19 Aug 2021 06:00) (117 - 145)  RR: 18 (19 Aug 2021 06:00) (17 - 22)  SpO2: 95% (19 Aug 2021 06:00) (94% - 96%)    General: No acute distress  HEENT: right eye: Moderate-severe ptosis; pupil 3.5 mm (left pupil 3 mm); moderate-severe adduction deficit (barely crosses midline); mild-moderate depression deficit; severe upgaze palsy (bilateral)   Abdomen: Soft, nontender, nondistended   Extremities: No edema    NEUROLOGICAL EXAM:  Mental status:  had difficulty cooperating with the exam as a whole; alert but mildly inattentive, requiring repeated questioning for mental status exam; could not state her age or the year, but was oriented to place and could name the president  Cranial Nerves: right eye: Moderate-severe ptosis; pupil 3.5 mm (left pupil 3 mm); moderate-severe adduction deficit (barely crosses midline); mild-moderate depression deficit; severe upgaze palsy (bilateral); mild or mild-moderate left facial palsy; mild dysarthria  Motor exam:  ,left arm-drift; left leg-very slight movement, not versus gravity and with significant complaints of pain (chronic as per her attendant)  Sensation: Intact to light touch   Coordination/ Gait:      LABS:                        12.7   9.44  )-----------( 313      ( 19 Aug 2021 05:16 )             39.4    08-19    148<H>  |  106  |  28<H>  ----------------------------<  161<H>  3.2<L>   |  24  |  1.16    Ca    9.6      19 Aug 2021 05:16    TPro  7.5  /  Alb  3.8  /  TBili  0.7  /  DBili  x   /  AST  14  /  ALT  6<L>  /  AlkPhos  103  08-17  PT/INR - ( 18 Aug 2021 07:02 )   PT: 17.7 sec;   INR: 1.50 ratio         PTT - ( 18 Aug 2021 07:02 )  PTT:33.7 sec      IMAGING: Reviewed by me.     (08.17.21)  Brain CT: No acute hemorrhage, mass effect or extra-axial collections. Interim right thalamic infarct may be subacute. Brain MRI can be obtained for further evaluation.  Neck CTA: No hemodynamically significant stenosis.  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 Unchanged fusiform ectasia proximal basilar artery with high-grade proximal to mid stenosis. Unchanged fusiform dilatation of the cavernous segment of the right internal carotid artery.  No large vessel occlusion of the anterior middle or posterior cerebral arteries.  Perfusion maps: No evidence for core infarct or penumbra. Hypoperfusion within the posterior circulation.    MR Head No Cont (08.18.21)  Acute right thalamic and right midbrain infarct.     THE PATIENT WAS SEEN AND EXAMINED BY ME WITH THE HOUSESTAFF AND STROKE TEAM DURING MORNING ROUNDS.   HPI:  70yo RH woman w/ h/o afib on eliquis (last dose 8/12), CAD s/p stents on aspirin (last dose 8/9), arthritis, hypothyroidism, carpal tunnel syndrome, lymphedema, recent removal of left ureteral stent & stone (8/16/21) presented as code stroke for left hemiparesis with LWK 8/16/21 6p. Aide at bedside explained that patient had left the hospital after left ureteral stone and stent were removed and had last been evaluated at her baseline 6p 8/16. This morning patient was noted by all aides/staff that she seemed "off" and that her face had some asymmetry to it, including drooping of right eyelid, and her speech was "very off". At baseline, patient is wheelchair bound due to underlying arthritis, stays in a nursing home requiring assistance with most ADLs.  NIHSS: 11 pre-MRS: 4     SUBJECTIVE: No events overnight.  RN reports some generalized discomfort at times. No new neurologic complaints.  ROS unable to obtain, reported negative unless otherwise noted.    aspirin  chewable 81 milliGRAM(s) Oral daily  atorvastatin 80 milliGRAM(s) Oral at bedtime  enoxaparin Injectable 40 milliGRAM(s) SubCutaneous daily  levothyroxine Injectable 37.5 MICROGram(s) IV Push at bedtime  mirabegron ER 25 milliGRAM(s) Oral daily  tamsulosin 0.4 milliGRAM(s) Oral at bedtime      PHYSICAL EXAM:   Vital Signs Last 24 Hrs  T(C): 36.7 (19 Aug 2021 04:00), Max: 37.5 (18 Aug 2021 18:00)  T(F): 98.1 (19 Aug 2021 04:00), Max: 99.5 (18 Aug 2021 18:00)  HR: 93 (19 Aug 2021 06:00) (93 - 110)  BP: 179/98 (19 Aug 2021 06:00) (133/118 - 193/125)  BP(mean): 117 (19 Aug 2021 06:00) (117 - 145)  RR: 18 (19 Aug 2021 06:00) (17 - 22)  SpO2: 95% (19 Aug 2021 06:00) (94% - 96%)    General: No acute distress  HEENT: right eye: Moderate-severe ptosis; pupil 3.5 mm (left pupil 3 mm); moderate-severe adduction deficit (barely crosses midline); mild-moderate depression deficit; severe upgaze palsy (bilateral)   Abdomen: Soft, nontender, nondistended   Extremities: No edema    NEUROLOGICAL EXAM:  Mental status:  had difficulty cooperating with the exam as a whole; somewhat alert but  inattentive, requiring repeated questioning for mental status exam; could not state her age or the year, but was oriented to place and and age, follows some simple 1 step commands   Cranial Nerves: right eye: Moderate-severe ptosis; pupil 3.5 mm > left pupil 3 mm; moderate-severe adduction deficit (barely crosses midline); mild-moderate depression deficit; severe upgaze palsy (bilateral); mild or mild-moderate left facial palsy;  dysarthria  Motor exam:  right side moves against gravity, ,left arm-drift; left leg-very slight movement, not versus gravity and with significant complaints of pain (chronic as per report)  Sensation: Intact to light touch   Coordination/ Gait:  gait not assessed     LABS:                        12.7   9.44  )-----------( 313      ( 19 Aug 2021 05:16 )             39.4    08-19    148<H>  |  106  |  28<H>  ----------------------------<  161<H>  3.2<L>   |  24  |  1.16    Ca    9.6      19 Aug 2021 05:16    TPro  7.5  /  Alb  3.8  /  TBili  0.7  /  DBili  x   /  AST  14  /  ALT  6<L>  /  AlkPhos  103  08-17  PT/INR - ( 18 Aug 2021 07:02 )   PT: 17.7 sec;   INR: 1.50 ratio         PTT - ( 18 Aug 2021 07:02 )  PTT:33.7 sec      IMAGING: Reviewed by me.     (08.17.21)  Brain CT: No acute hemorrhage, mass effect or extra-axial collections. Interim right thalamic infarct may be subacute. Brain MRI can be obtained for further evaluation.  Neck CTA: No hemodynamically significant stenosis.  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 Unchanged fusiform ectasia proximal basilar artery with high-grade proximal to mid stenosis. Unchanged fusiform dilatation of the cavernous segment of the right internal carotid artery.  No large vessel occlusion of the anterior middle or posterior cerebral arteries.  Perfusion maps: No evidence for core infarct or penumbra. Hypoperfusion within the posterior circulation.    MR Head No Cont (08.18.21)  Acute right thalamic and right midbrain infarct.

## 2021-08-19 NOTE — DIETITIAN INITIAL EVALUATION ADULT. - REASON INDICATOR FOR ASSESSMENT
Nutrition Consult for Enteral Nutrition  Source: medical record, nurse, pt's carla Samuels at bedside

## 2021-08-19 NOTE — CONSULT NOTE ADULT - PROBLEM SELECTOR RECOMMENDATION 9
Neurology care appreciated  restrained for safety  speech and swallow   A fib, off AC, resume when okay by neuro  Feeding tube
- f/u CXR for confirmation of placement prior to starting tube feeds  - call with questions/concerns [phone]

## 2021-08-19 NOTE — CONSULT NOTE ADULT - SUBJECTIVE AND OBJECTIVE BOX
Patient is a 70 Y/O female with PMHx of afib on eliquis (last dose date unclear), CAD s/p stents on aspirin (last dose 8/9), arthritis, hypothyroidism, carpal tunnel syndrome, lymphedema, recent removal of left ureteral stent & stone (8/16/21) presenting as code stroke for left hemiparesis with LWK 8/16/21 6p. Aide at bedside explains that patient had left the hospital after left ureteral stone and stent were removed and had last been evaluated at her baseline 6p 8/16. This morning patient was noted by all aides/staff that she seemed "off" and that her face had some asymmetry to it, including drooping of right eyelid, and her speech was "very off".    At baseline, patient is wheelchair bound due to underlying arthritis, stays in a nursing home requiring assistance with most ADLs.     She is in bed, comfortable able to answer questions with one word answers. Denies pain.      NIHSS: 11  pre-MRS: 4 (17 Aug 2021 13:54)    PMH:   PAF    DM (diabetes mellitus)    HTN (hypertension)    Obesity    Essential hypertension    Hypothyroid    Type 2 diabetes mellitus without complication    Obesity (BMI 30-39.9)    Carpal tunnel syndrome of right wrist    Essential hypertension    Lymphedema    2019 novel coronavirus disease (COVID-19)    Sepsis      PSH:   S/P carpal tunnel release    No significant past surgical history    S/P cystoscopy      Medications:   aspirin  chewable 81 milliGRAM(s) Oral daily  atorvastatin 80 milliGRAM(s) Oral at bedtime  enoxaparin Injectable 40 milliGRAM(s) SubCutaneous daily  levothyroxine Injectable 37.5 MICROGram(s) IV Push at bedtime  mirabegron ER 25 milliGRAM(s) Oral daily  tamsulosin 0.4 milliGRAM(s) Oral at bedtime    Allergies:  penicillin (Hives)  penicillin (Rash)  sulfa drugs (Unknown)  Tetracycline Hydrochloride (Unknown)    FAMILY HISTORY:  Family history of lung cancer    Family history of myocardial infarction (Sibling)      Social History: lives in asssicsted living.  Smoking: No     Review of Systems:  [x ]Unable to obtain       Physical Exam:  T(C): 36.7 (08-19-21 @ 04:00), Max: 37.5 (08-18-21 @ 18:00)  HR: 108 (08-19-21 @ 08:00) (93 - 110)  BP: 172/116 (08-19-21 @ 08:00) (133/118 - 193/125)  RR: 25 (08-19-21 @ 08:00) (17 - 25)  SpO2: 97% (08-19-21 @ 08:00) (94% - 97%)  Wt(kg): --    08-18 @ 07:01  -  08-19 @ 07:00  --------------------------------------------------------  IN: 730 mL / OUT: 350 mL / NET: 380 mL    08-19 @ 07:01  -  08-19 @ 09:38  --------------------------------------------------------  IN: 40 mL / OUT: 0 mL / NET: 40 mL      Daily     Daily     Appearance: NGT in place.  Eyes: not opening eyes on command.  HENT: dry oral mucosa  Cardiovascular: Regular rhythm, Normal S1 and S2, no murmur, rub; trace peripheral edema; no JVD  Respiratory: Clear to auscultation anteriorly  Gastrointestinal: Soft, +BS  Musculoskeletal: No clubbing   Neurologic: Left hemiparesis.  Psychiatry: Sleepy, unable to assess.  Skin: No rashes, ecchymoses, or cyanosis    Cardiovascular Diagnostic Testing:  ECG:    Echo:< from: TTE with Doppler (w/Cont) (Transthoracic Echocardiogram) (08.21.20 @ 06:54) >  Conclusions:  1. Calcified trileaflet aortic valve with decreased  opening. Peak transaortic valve gradient equals 18 mm Hg,  mean transaortic valve gradient equals 10 mm Hg, estimated  aortic valve area equals 2 sqcm (by continuity equation),  aortic valve velocity time integral equals 47 cm,  consistent with mild aortic stenosis.  2. Mild concentric left ventricular hypertrophy.  3. Normal left ventricular systolic function. Peak left  ventricular outflow tract gradient equals 5 mm Hg, mean  gradient is equal to 3 mm Hg, LVOT velocity time integral  equals 27 cm.  4. Mild diastolic dysfunction (Stage I).  *** No previous Echo exam.  Unable to rule out endocarditis.  Consider IFEANYI if  clinically indicated.  ------------------------------------------------------------------------  Confirmed on  8/21/2020 - 10:07:09 by JARED Luu  ------------------------------------------------------------------------    < end of copied text >      Stress Testing:   Interpretation of Telemetry: ST    Imaging:  c< from: CT Angio Head w/ IV Cont (08.17.21 @ 12:35) >  Brain CT: No acute hemorrhage, mass effect or extra-axial collections. Interim right thalamic infarct may be subacute. Brain MRI can be obtained for further evaluation.    Neck CTA: No hemodynamically significant stenosis.    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 Unchanged fusiform ectasia proximal basilar artery with high-grade proximal to mid stenosis. Unchanged fusiform dilatation of the cavernous segment of the right internal carotid artery.  No large vessel occlusion of the anterior middle or posterior cerebral arteries.    Perfusion maps: No evidence for core infarct or penumbra. Hypoperfusion within the posterior circulation.      < end of copied text >      Labs:                        12.7   9.44  )-----------( 313      ( 19 Aug 2021 05:16 )             39.4     08-19    148<H>  |  106  |  28<H>  ----------------------------<  161<H>  3.2<L>   |  24  |  1.16    Ca    9.6      19 Aug 2021 05:16    TPro  7.5  /  Alb  3.8  /  TBili  0.7  /  DBili  x   /  AST  14  /  ALT  6<L>  /  AlkPhos  103  08-17    PT/INR - ( 18 Aug 2021 07:02 )   PT: 17.7 sec;   INR: 1.50 ratio         PTT - ( 18 Aug 2021 07:02 )  PTT:33.7 sec

## 2021-08-19 NOTE — DISCHARGE NOTE PROVIDER - NSDCMRMEDTOKEN_GEN_ALL_CORE_FT
acetaminophen 500 mg oral tablet: 2 tab(s) orally every 8 hours, As Needed  aspirin 81 mg oral delayed release tablet: 1 tab(s) orally once a day  atorvastatin 40 mg oral tablet: 1 tab(s) orally once a day  bumetanide 2 mg oral tablet: 1 tab(s) orally 2 times a day  Cozaar 25 mg oral tablet: 1 tab(s) orally once a day  dilTIAZem 120 mg/24 hours oral tablet, extended release: 1 tab(s) orally once a day  Eliquis 5 mg oral tablet: 1 tab(s) orally 2 times a day  Flomax 0.4 mg oral capsule: 1 cap(s) orally once a day  K-Dur 20 oral tablet, extended release: 2 tab(s) orally once a day  levothyroxine 75 mcg (0.075 mg) oral tablet: 1 tab(s) orally once a day  Mylanta oral suspension 40 mg/5 mL: 20 milliliter(s) orally 4 times a day (between meals &amp; at bedtime) as needed  Myrbetriq 25 mg oral tablet, extended release: 1 tab(s) orally once a day   aspirin 81 mg oral delayed release tablet: 1 tab(s) orally once a day  atorvastatin 80 mg oral tablet: 1 tab(s) orally once a day (at bedtime)  bumetanide 2 mg oral tablet: 1 tab(s) orally 2 times a day  Cozaar 25 mg oral tablet: 1 tab(s) orally once a day  dilTIAZem 120 mg/24 hours oral tablet, extended release: 1 tab(s) orally once a day  Eliquis 5 mg oral tablet: 1 tab(s) orally 2 times a day  Flomax 0.4 mg oral capsule: 1 cap(s) orally once a day  levothyroxine 75 mcg (0.075 mg) oral tablet: 1 tab(s) orally once a day  Myrbetriq 25 mg oral tablet, extended release: 1 tab(s) orally once a day   aspirin 81 mg oral delayed release tablet: 1 tab(s) orally once a day  atorvastatin 80 mg oral tablet: 1 tab(s) orally once a day (at bedtime)  Cozaar 25 mg oral tablet: 1 tab(s) orally once a day  dilTIAZem 120 mg/24 hours oral tablet, extended release: 1 tab(s) orally once a day  Eliquis 5 mg oral tablet: 1 tab(s) orally 2 times a day  Flomax 0.4 mg oral capsule: 1 cap(s) orally once a day  levothyroxine 75 mcg (0.075 mg) oral tablet: 1 tab(s) orally once a day  Myrbetriq 25 mg oral tablet, extended release: 1 tab(s) orally once a day   aspirin 81 mg oral delayed release tablet: 1 tab(s) orally once a day  atorvastatin 80 mg oral tablet: 1 tab(s) orally once a day (at bedtime)  bumetanide 2 mg oral tablet: 1 tab(s) orally 2 times a day  Cozaar 25 mg oral tablet: 1 tab(s) orally once a day  dilTIAZem 120 mg/24 hours oral tablet, extended release: 1 tab(s) orally once a day  Eliquis 5 mg oral tablet: 1 tab(s) orally 2 times a day  Flomax 0.4 mg oral capsule: 1 cap(s) orally once a day  Klor-Con M20 oral tablet, extended release: 1 tab(s) orally once a day  levothyroxine 75 mcg (0.075 mg) oral tablet: 1 tab(s) orally once a day  Myrbetriq 25 mg oral tablet, extended release: 1 tab(s) orally once a day   aspirin 81 mg oral delayed release tablet: 1 tab(s) orally once a day  atorvastatin 80 mg oral tablet: 1 tab(s) orally once a day (at bedtime)  bumetanide 2 mg oral tablet: 1 tab(s) orally 2 times a day  Cardizem 60 mg oral tablet: 1 tab(s) orally every 6 hours  Cozaar 25 mg oral tablet: 1 tab(s) orally once a day  Eliquis 5 mg oral tablet: 1 tab(s) orally 2 times a day  Flomax 0.4 mg oral capsule: 1 cap(s) orally once a day  Klor-Con M20 oral tablet, extended release: 1 tab(s) orally once a day  levothyroxine 75 mcg (0.075 mg) oral tablet: 1 tab(s) orally once a day  Myrbetriq 25 mg oral tablet, extended release: 1 tab(s) orally once a day

## 2021-08-19 NOTE — DISCHARGE NOTE PROVIDER - CARE PROVIDERS DIRECT ADDRESSES
[email or web address]

## 2021-08-19 NOTE — DIETITIAN INITIAL EVALUATION ADULT. - ENERGY INTAKE
EN provision so far: (8/18): 280 ml, (8/19): 320 ml so far today; titrating up to goal. Current EN order of Jevity 1.5 via NGT with goal of 60 ml/hr x 24 hours would provide 1440 ml total volume, 2160 kcal, 92 gm protein and 1094 ml free water. Meets 29 kcal/kg and 1.2 g/kg based on weight of 164 pounds from 8/6/21

## 2021-08-19 NOTE — DIETITIAN INITIAL EVALUATION ADULT. - ENTERAL
1. Recommend changing enteral nutrition formula to Jevity 1.2 (noted pt with elevated sodium) with goal rate of 60 ml/hr x 24 hours. At goal, feeding provides 1440 ml, 1728 kcal, 80 gm protein and 1162 ml free water. Meets 31 kcal/kg and 1.4 g/kg protein based on IBW+10% of 54.8 kg. Recommend 150 ml free water flushes every 4 hours for adequate hydration or per MD. (additional 900 ml free water daily)

## 2021-08-19 NOTE — DIETITIAN INITIAL EVALUATION ADULT. - ORAL INTAKE PTA/DIET HISTORY
Per discussion with carla Samuels at bedside, pt with intact appetite for the most part at the nursing home. Pt "eats what she wants." Eats regular texture food at nursing home. Typically eats ~3 meals/day. Pt's aide confirms pt with no known food allergies. Denies micronutrient or nutrient supplement use and none noted per H&P.

## 2021-08-19 NOTE — DISCHARGE NOTE PROVIDER - NSDCCPCAREPLAN_GEN_ALL_CORE_FT
PRINCIPAL DISCHARGE DIAGNOSIS  Diagnosis: Stroke  Assessment and Plan of Treatment: You had a stroke in your brain, which is a blockage of a blood vessel in your brain causing less blood flow to an area of your brain. As a result, there is some damage to that area of your brain.  You will need to take the following medications to prevent another stroke:  1. aspirin 81mg daily (blood thinner) per Dr. Lisker  2. atorvastatin 80mg at bedtime daily to lower your cholesterol  3. apixaban 5mg twice a day for your irregular heartbeat (blood thinner)  Please follow up with the neurologist (brain doctor) in 1 week. Continue taking medications as prescribed. Monitor your blood pressure. Reduce fat, cholesterol and salt in your diet. Increase intake of fruits and vegetables. Limit alcohol to minimum and do not smoke. You may be at risk for falling, make changes to your home to help you walk easier. Keep up to date on vaccinations.  If you experience any symptoms of facial drooping, slurred speech, arm or leg weakness, severe headache, vision changes or any worsening symptoms, notify provider immediately and return to ER.

## 2021-08-19 NOTE — DISCHARGE NOTE PROVIDER - DETAILS OF MALNUTRITION DIAGNOSIS/DIAGNOSES
This patient has been assessed with a concern for Malnutrition and was treated during this hospitalization for the following Nutrition diagnosis/diagnoses:     -  08/19/2021: Moderate protein-calorie malnutrition

## 2021-08-19 NOTE — DIETITIAN INITIAL EVALUATION ADULT. - OTHER INFO
Upon RD visit, pt resting in bed with NGT in place and RD observed Jevity 1.5 infusing at 50 ml/hr. Per discussion with nurse, pt with no nausea, vomiting, constipation or diarrhea at this time, however no bowel movement yet since admit. No bowel regimen noted.     Noted pt failed speech therapist evaluation upon admit, however was able to consume some applesauce today and plan for MBS tomorrow by speech therapist.    Pt's aide is unsure of pt's body weight, however does report pt has lost weight over past few months. Noted dosing weight of 150 pounds (8/17/21)-? accuracy. Per RD note from 8/19/20, pt weighed 209 pounds during last admit. Per Yelitza DOHERTY, weights as follows: 264 pounds (1/19/20), 266 pounds (5/9/20), 209 pounds (8/19/20), 175 pounds (11/9/20), 164 pounds (8/6/21), 149 pounds (8/27/21)- dosing weight. Pt with a 26 lb/14.8% weight loss x 9 months and a 15 lb/9% weight loss x 2 weeks, again ? accuracy as pt appears to look more nourished than current weight suggests.    Nutrition education not appropriate at this time

## 2021-08-19 NOTE — DIETITIAN INITIAL EVALUATION ADULT. - PERTINENT MEDS FT
MEDICATIONS  (STANDING):  aspirin  chewable 81 milliGRAM(s) Oral daily  atorvastatin 80 milliGRAM(s) Oral at bedtime  diltiazem    Tablet 30 milliGRAM(s) Oral every 6 hours  enoxaparin Injectable 40 milliGRAM(s) SubCutaneous daily  levothyroxine Injectable 37.5 MICROGram(s) IV Push at bedtime  mirabegron ER 25 milliGRAM(s) Oral daily  tamsulosin 0.4 milliGRAM(s) Oral at bedtime    MEDICATIONS  (PRN):  acetaminophen    Suspension .. 650 milliGRAM(s) Oral every 6 hours PRN Severe Pain (7 - 10)

## 2021-08-19 NOTE — CHART NOTE - NSCHARTNOTEFT_GEN_A_CORE
70 y/o RH F with PMH of afib on eliquis, CAD s/p stents on aspirin, arthritis, hypothyroidism, carpal tunnel syndrome, lymphedema, recent removal of left ureteral stent & stone (8/16/21) p/w subacute R thalamic infarct, potential age-indeterminate paramedian right midbrain infarct, high-grade prox-mid basilar stenosis, R ?P1 occlusion versus hypoplastic R P1. Pt seen for initial bedside swallow evaluation and speech language evaluation yesterday, 8/18/21. Bedside swallow evaluation revealed an oral and suspected pharyngeal dysphagia characterized by reduced oral grading to utensil, delayed oral transit time, lingual pumping, suspected delay in trigger of the pharyngeal swallow, reduced hyolaryngeal elevation upon palpation and overt s/s laryngeal penetration/aspiration with most conservative textures (subtle change in vocal quality, delayed throat clearing post PO intake). Recommendations at that time: NPO, with non-oral nutrition/hydration/medications. Speech Language Evaluation revealed cognitive-linguistic deficits including visual deficits, L sided inattention, slowed at times absent responsiveness, reduced initiation, impaired short term memory, flat affect, poor eye contact, perseveration of thought, impaired problem solving, reduced cognitive flexibility, reduced task comprehension, reduced cognitive endurance. Pt with mild to moderate dysarthria of speech characterized by reduced vocal volume, slowed rate of speech, mildly impaired articulatory contact.    Pt was seen today for bedside swallow re-evaluation. She was received at bedside sleeping. HHA at [facility], Mallory, present for evaluation. Pt roused with verbal/tactile stimuli and repositioning upright. +room air, +tele (HR: 106, O2: 96%, RR: 16-23). Per RNAlissa, pt borderline tachy since admission. +Left sided inattention noted, +ptosis of right eye. Initially pt with eye closing; however as evaluation continued pt + eye opening. +cognitive deficits (slowed responsiveness, poor initiation), +mild to moderate dysarthria of speech characterized by reduced vocal volume and mildly reduced articulatory contact. Improvement in vocal volume with cueing. Dried secretions on labial and lingual structures. Meticulous oral care provided to remove secretions. Pt was trialed with honey-thick liquid via tsp, nectar-thick liquid via tsp/straw, thin liquid via straw, thin puree, and thick puree. The swallow sequence was characterized by reduced oral grading to utensil, suspected premature spillage of nectar-thick and thin liquids via straw, mildly delayed oral transit time v. delayed trigger in pharyngeal swallow, reduced hyolaryngeal elevation upon palpation, and audible swallow with nectar-thick liquids and thin liquids suggestive of possible reduced coordination of swallow mechanism. No overt clinical s/s of penetration or aspiration across all trials. MBS scheduled for tomorrow. POC discussed with pt/HHA. Purposeful and proactive rounding completed. 5 P's addressed. Pt left in no distress.     Impressions:  Pt presents with oral dysphagia and suspected pharyngeal dysphagia. Clinical improvement since previous swallow evaluation yesterday as no overt clinical s/s of aspiration or penetration observed. MBS scheduled for tomorrow.     Recommendations:  1) Continue NPO, with non-oral nutrition/hydration/medications via NGT.  2) MBS scheduled for tomorrow.   3) Excellent oral care and aspiration precautions for secretions and enteral feeds.   4) This service will continue to f/u for dysarthria tx, cognitive-linguistic tx, and possibly dysphagia tx (pending results of MBS) as schedule permits.    Discussed results and recommendations with pt, pt's HHA at [facility], Antonio RN, Alissa, and NP, Kilo.    Sadia Cantrell M.S., CCC-SLP ext. [phone]

## 2021-08-19 NOTE — DISCHARGE NOTE PROVIDER - CARE PROVIDER_API CALL
Lisker, Jay J (MD)  Cardiovascular Disease; Internal Medicine  [address], [address]  [place] [address]  Phone: [phone]  Fax: [phone]  Established Patient  Follow Up Time: 1 week    He Haney)  Neurology; Vascular Neurology  [address], [address]  [place] [address]  Phone: [phone]  Fax: [phone]  Follow Up Time: 1 week

## 2021-08-19 NOTE — DIETITIAN INITIAL EVALUATION ADULT. - ADD RECOMMEND
1. Monitor EN tolerance, electrolytes, GI status, weight, labs, skin integrity 2. Follow up with speech therapist pending MBS results for PO diet advancement as feasible 2. Monitor EN tolerance, electrolytes, GI status, weight, labs, skin integrity 3. Follow up with speech therapist pending [facility] results for PO diet advancement as feasible 4. Malnutrition notification placed in chart

## 2021-08-19 NOTE — DISCHARGE NOTE PROVIDER - PROVIDER TOKENS
PROVIDER:[TOKEN:[[id number]],FOLLOWUP:[1 week],ESTABLISHEDPATIENT:[T]],PROVIDER:[TOKEN:[[id number]],FOLLOWUP:[1 week]]

## 2021-08-19 NOTE — DIETITIAN NUTRITION RISK NOTIFICATION - TREATMENT: THE FOLLOWING DIET HAS BEEN RECOMMENDED
Diet, NPO with Tube Feed:   Tube Feeding Modality: Nasogastric  Jevity 1.5 Ezio (JEVITY1.5RTH)  Total Volume for 24 Hours (mL): 1440  Continuous  Starting Tube Feed Rate {mL per Hour}: 10  Increase Tube Feed Rate by (mL): 10     Every 4 hours  Until Goal Tube Feed Rate (mL per Hour): 60  Tube Feed Duration (in Hours): 24  Tube Feed Start Time: 17:00 (08-18-21 @ 16:49) [Active]

## 2021-08-19 NOTE — DISCHARGE NOTE PROVIDER - HOSPITAL COURSE
70yo RH woman w/ h/o afib on eliquis (last dose 8/12), CAD s/p stents on aspirin (last dose 8/9), arthritis, hypothyroidism, carpal tunnel syndrome, lymphedema, recent removal of left ureteral stent & stone (8/16/21) presented as code stroke for left hemiparesis with LWK 8/16/21 6p. Aide at bedside explained that patient had left the hospital after left ureteral stone and stent were removed and had last been evaluated at her baseline 6p 8/16. This morning patient was noted by all aides/staff that she seemed "off" and that her face had some asymmetry to it, including drooping of right eyelid, and her speech was "very off". At baseline, patient is wheelchair bound due to underlying arthritis, stays in a nursing home requiring assistance with most ADLs.      NIHSS: 11   Pre-MRS: 4     (08.17.21)  Brain CT: No acute hemorrhage, mass effect or extra-axial collections. Interim right thalamic infarct may be subacute. Brain MRI can be obtained for further evaluation.  Neck CTA: No hemodynamically significant stenosis.  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 Unchanged fusiform ectasia proximal basilar artery with high-grade proximal to mid stenosis. Unchanged fusiform dilatation of the cavernous segment of the right internal carotid artery.  No large vessel occlusion of the anterior middle or posterior cerebral arteries.  Perfusion maps: No evidence for core infarct or penumbra. Hypoperfusion within the posterior circulation.    MR Head No Cont (08.18.21)  Acute right thalamic and right midbrain infarct.    Impression.  On 8/17/2021 she was found by her nursing home attendant with a left hemiparesis.  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  Mechanism is uncertain, either cardioembolism to the right P1 segment, related to atrial fibrillation, since the infarct extends to the ventral border of the midbrain, versus intracranial large artery atherosclerosis (basilar stenosis) with artery-artery embolism, versus small vessel disease (stroke of "unknown" cause, but suspect cardioembolism).       ANTITHROMBOTIC THERAPY: Restarting home AC apixaban for secondary stroke prevention. Continuing ASA 81 mg as recommended by Dr. Lisker (Cardio) due to history of CAD w/stents    TTE 72yo RH woman w/ h/o afib on eliquis (last dose 8/12), CAD s/p stents on aspirin (last dose 8/9), arthritis, hypothyroidism, carpal tunnel syndrome, lymphedema, recent removal of left ureteral stent & stone (8/16/21) presented as code stroke for left hemiparesis with LWK 8/16/21 6p. Aide at bedside explained that patient had left the hospital after left ureteral stone and stent were removed and had last been evaluated at her baseline 6p 8/16. This morning patient was noted by all aides/staff that she seemed "off" and that her face had some asymmetry to it, including drooping of right eyelid, and her speech was "very off". At baseline, patient is wheelchair bound due to underlying arthritis, stays in a nursing home requiring assistance with most ADLs.      NIHSS: 11   Pre-MRS: 4     (08.17.21)  Brain CT: No acute hemorrhage, mass effect or extra-axial collections. Interim right thalamic infarct may be subacute. Brain MRI can be obtained for further evaluation.  Neck CTA: No hemodynamically significant stenosis.  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 Unchanged fusiform ectasia proximal basilar artery with high-grade proximal to mid stenosis. Unchanged fusiform dilatation of the cavernous segment of the right internal carotid artery.  No large vessel occlusion of the anterior middle or posterior cerebral arteries.  Perfusion maps: No evidence for core infarct or penumbra. Hypoperfusion within the posterior circulation.    MR Head No Cont (08.18.21)  Acute right thalamic and right midbrain infarct.    Impression.  On 8/17/2021 she was found by her nursing home attendant with a left hemiparesis.  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  Mechanism is uncertain, either cardioembolism to the right P1 segment, related to atrial fibrillation, since the infarct extends to the ventral border of the midbrain, versus intracranial large artery atherosclerosis (basilar stenosis) with artery-artery embolism, versus small vessel disease (stroke of "unknown" cause, but suspect cardioembolism).       ANTITHROMBOTIC THERAPY: Restarting home AC apixaban 5mg BID for secondary stroke prevention. Continuing ASA 81 mg as recommended by Dr. Lisker (Cardio) due to history of CAD w/stents.   Pt restarted on home cardizem and losartan. 70yo RH woman w/ h/o afib on eliquis (last dose 8/12), CAD s/p stents on aspirin (last dose 8/9), arthritis, hypothyroidism, carpal tunnel syndrome, lymphedema, recent removal of left ureteral stent & stone (8/16/21) presented as code stroke for left hemiparesis with LWK 8/16/21 6p. Aide at bedside explained that patient had left the hospital after left ureteral stone and stent were removed and had last been evaluated at her baseline 6p 8/16. This morning patient was noted by all aides/staff that she seemed "off" and that her face had some asymmetry to it, including drooping of right eyelid, and her speech was "very off". At baseline, patient is wheelchair bound due to underlying arthritis, stays in a nursing home requiring assistance with most ADLs.      NIHSS: 11   Pre-MRS: 4     (08.17.21)  Brain CT: No acute hemorrhage, mass effect or extra-axial collections. Interim right thalamic infarct may be subacute. Brain MRI can be obtained for further evaluation.  Neck CTA: No hemodynamically significant stenosis.  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 Unchanged fusiform ectasia proximal basilar artery with high-grade proximal to mid stenosis. Unchanged fusiform dilatation of the cavernous segment of the right internal carotid artery.  No large vessel occlusion of the anterior middle or posterior cerebral arteries.  Perfusion maps: No evidence for core infarct or penumbra. Hypoperfusion within the posterior circulation.    MR Head No Cont (08.18.21)  Acute right thalamic and right midbrain infarct.    Impression.  On 8/17/2021 she was found by her nursing home attendant with a left hemiparesis.  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  Mechanism is uncertain, either cardioembolism to the right P1 segment, related to atrial fibrillation, since the infarct extends to the ventral border of the midbrain, versus intracranial large artery atherosclerosis (basilar stenosis) with artery-artery embolism, versus small vessel disease (stroke of "unknown" cause, but suspect cardioembolism).       ANTITHROMBOTIC THERAPY: Restarting home AC apixaban 5mg BID for secondary stroke prevention. Continuing ASA 81 mg as recommended by Dr. Lisker (Cardio) due to history of CAD w/stents.   Pt restarted on home cardizem and losartan. Will hold home bumetanide and have pt follow up with Dr. Lisker. 70yo RH woman w/ h/o afib on eliquis (last dose 8/12), CAD s/p stents on aspirin (last dose 8/9), arthritis, hypothyroidism, carpal tunnel syndrome, lymphedema, recent removal of left ureteral stent & stone (8/16/21) presented as code stroke for left hemiparesis with LWK 8/16/21 6p. Aide at bedside explained that patient had left the hospital after left ureteral stone and stent were removed and had last been evaluated at her baseline 6p 8/16. This morning patient was noted by all aides/staff that she seemed "off" and that her face had some asymmetry to it, including drooping of right eyelid, and her speech was "very off". At baseline, patient is wheelchair bound due to underlying arthritis, stays in a nursing home requiring assistance with most ADLs.      NIHSS: 11   Pre-MRS: 4     (08.17.21)  Brain CT: No acute hemorrhage, mass effect or extra-axial collections. Interim right thalamic infarct may be subacute. Brain MRI can be obtained for further evaluation.  Neck CTA: No hemodynamically significant stenosis.  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 Unchanged fusiform ectasia proximal basilar artery with high-grade proximal to mid stenosis. Unchanged fusiform dilatation of the cavernous segment of the right internal carotid artery.  No large vessel occlusion of the anterior middle or posterior cerebral arteries.  Perfusion maps: No evidence for core infarct or penumbra. Hypoperfusion within the posterior circulation.    MR Head No Cont (08.18.21)  Acute right thalamic and right midbrain infarct.    Impression.  On 8/17/2021 she was found by her nursing home attendant with a left hemiparesis.  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  Mechanism is uncertain, either cardioembolism to the right P1 segment, related to atrial fibrillation, since the infarct extends to the ventral border of the midbrain, versus intracranial large artery atherosclerosis (basilar stenosis) with artery-artery embolism, versus small vessel disease (stroke of "unknown" cause, but suspect cardioembolism).       ANTITHROMBOTIC THERAPY: Restarting home AC apixaban 5mg BID for secondary stroke prevention. Continuing ASA 81 mg as recommended by Dr. Lisker (Cardio) due to history of CAD w/stents.   Pt restarted on home cardizem and losartan and bumetanide 71-year-old right-handed lady first evaluated at [facility] on 8/18/2021 with left hemiparesis.  CT head (8/17/2021)  showed subtle hypodensity in the right paramedian ventral midbrain and paramedian thalamus suggestive of acute infarction; a more well-defined hypodensity involving the right anterior thalamus consistent with a possible subacute infarct.  CTA neck (8/17/2021) was unremarkable. At baseline, she is wheelchair-bound, apparently due to "arthritis" and also with most likely at least mild cognitive impairment.  She has a history of atrial fibrillation, on Eliquis, and also on aspirin for coronary stents which were implanted years ago.  The Eliquis was held since approximately 8/12/2021, and aspirin was held since approximately 8/9/2021 for ureteral stents on 8/15/2021.  She was last known normal at around 6 PM on 8/16/2021.  CTA head (8/17/2021)  showed right V4 occlusion; proximal basilar stenosis, officially read as severe but  perhaps moderate, with more distal diffuse fusiform dilatation or perhaps simply ectasia of the basilar; fusiform dilatation of the right ICA.  MRI brain (8/18/2021)  showed an acute small infarct involving the right paramedian ventral midbrain extending to the ventral border of the midbrain, with acute infarction involving the right paramedian versus anterior thalamic region (but unclear to me whether this corresponded to the apparently subacute right anterior thalamic infarct seen on CT).      Impression.  On 8/17/2021 she was found by her nursing home attendant with a left hemiparesis.  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  Mechanism is uncertain, either cardioembolism to the right P1 segment, related to atrial fibrillation, since the infarct extends to the ventral border of the midbrain, versus intracranial large artery atherosclerosis (basilar stenosis) with artery-artery embolism, versus small vessel disease (stroke of "unknown" cause, but suspect cardioembolism).       NIHSS: 11   Pre-MRS: 4     (08.17.21)  Brain CT: No acute hemorrhage, mass effect or extra-axial collections. Interim right thalamic infarct may be subacute. Brain MRI can be obtained for further evaluation.  Neck CTA: No hemodynamically significant stenosis.  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 Unchanged fusiform ectasia proximal basilar artery with high-grade proximal to mid stenosis. Unchanged fusiform dilatation of the cavernous segment of the right internal carotid artery.  No large vessel occlusion of the anterior middle or posterior cerebral arteries.  Perfusion maps: No evidence for core infarct or penumbra. Hypoperfusion within the posterior circulation.    LE duplex: no evidence for DVT 8/18/21.    MR Head No Cont (08.18.21)  Acute right thalamic and right midbrain infarct.    CTH No Cont (8/21/21):  There is mild edema related to known lacunar infarcts in the right thalamus and midbrain. There is no hemorrhage or mass effect. There is moderate underlying chronic microvascular ischemic change with mild central atrophy.      Patient was restarted on home AC apixaban 5mg BID for secondary stroke prevention. Continuing ASA 81 mg as recommended by Dr. Lisker (Cardio) due to history of CAD w/stents.   Pt restarted on home losartan and bumetanide. Patient had an episode of rapid a flutter with elevated blood pressures she was rate controlled with IV Lopressor and Digoxin. Home Cardizem dose was increased to 60mg q6h per Cardiology recommendations.  Patient initially failed bedside dysphagia screen. Patient had re-eval by SLP and MBS on 8/20 passed with recommendations for D2 nectar diet. Patient was evaluated by PT/OT and recommended for [facility]. Patient was neurologically stable and discharged to rehab facility

## 2021-08-19 NOTE — DISCHARGE NOTE PROVIDER - NSDCQMSTROKERISK_NEU_ALL_CORE
History of a stroke or TIA Atrial fibrillation/Diabetes/High blood pressure/History of a stroke or TIA

## 2021-08-19 NOTE — DIETITIAN INITIAL EVALUATION ADULT. - CHIEF COMPLAINT
"70 y/o RH woman w/ h/o afib on eliquis (last dose 8/12), CAD s/p stents on aspirin (last dose 8/9), arthritis, hypothyroidism, carpal tunnel syndrome, lymphedema, recent removal of left ureteral stent & stone (8/16/21) presented as code stroke for left hemiparesis with LWK 8/16/21"

## 2021-08-19 NOTE — PROGRESS NOTE ADULT - ASSESSMENT
ASSESSMENT: 71-year-old right-handed lady first evaluated at [facility] on 8/18/2021 with left hemiparesis.  CT head (8/17/2021)  showed subtle hypodensity in the right paramedian ventral midbrain and paramedian thalamus suggestive of acute infarction; a more well-defined hypodensity involving the right anterior thalamus consistent with a possible subacute infarct.  CTA neck (8/17/2021) was unremarkable. At baseline, she is wheelchair-bound, apparently due to "arthritis" and also with most likely at least mild cognitive impairment.  She has a history of atrial fibrillation, on Eliquis, and also on aspirin for coronary stents which were implanted years ago.  The Eliquis was held since approximately 8/12/2021, and aspirin was held since approximately 8/9/2021 for ureteral stents on 8/15/2021.  She was last known normal at around 6 PM on 8/16/2021.  CTA head (8/17/2021)  showed right V4 occlusion; proximal basilar stenosis, officially read as severe but  perhaps moderate, with more distal diffuse fusiform dilatation or perhaps simply ectasia of the basilar; fusiform dilatation of the right ICA.  MRI brain (8/18/2021)  showed an acute small infarct involving the right paramedian ventral midbrain extending to the ventral border of the midbrain, with acute infarction involving the right paramedian versus anterior thalamic region (but unclear to me whether this corresponded to the apparently subacute right anterior thalamic infarct seen on CT).      Impression.  On 8/17/2021 she was found by her nursing home attendant with a left hemiparesis.  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  Mechanism is uncertain, either cardioembolism to the right P1 segment, related to atrial fibrillation, since the infarct extends to the ventral border of the midbrain, versus intracranial large artery atherosclerosis (basilar stenosis) with artery-artery embolism, versus small vessel disease (stroke of "unknown" cause, but suspect cardioembolism).       NEURO: neurologically without acute change, Continue close monitoring for neurologic deterioration, permissive HTN with gradual normotension as tolerated,, titrate statin to LDL goal less than 70, MRI Brain as noted. Physical therapy/OT/PMR: [facility].    ANTITHROMBOTIC THERAPY: ASA to be continued per d/w DR. Lisker. Anticoagulation to be resumed once stable enteral access is obtained     PULMONARY: CXR clear, protecting airway, saturating well     CARDIOVASCULAR: check TTE, cardiac monitoring to ensure rate control, will home regimen gradually as tolerated                              SBP goal: 140-180mmHg with gradual normotension as tolerated avoiding rapid fluctuations and hypotension     GASTROINTESTINAL:  dysphagia screen failed, SLP eval for NPO and re-eval. NGT in place.     Diet: NPO W/ NGT TF     RENAL: BUN slightly elevated /Cr without acute change, good urine output , monitor for further hypernatremia, KCl supplement for hypokalemia      Na Goal: Greater than 135     Cruz:    HEMATOLOGY: H/H without anemia, no active bleeding noted, Platelets 313, she should have all age and risk appropriate malignancy screenings, LE duplex no evidence for DVT 8/18/21..      DVT ppx: Heparin s.c [] LMWH [x]     ID: afebrile, no leukocytosis , no si/sx of infection          DISPOSITION: [facility]       CORE MEASURES:        Admission NIHSS: 11     TPA: [] YES [x] NO      LDL/HDL: 132/32     Depression Screen: na- unable to participate      Statin Therapy: y      Dysphagia Screen: [] PASS [x] FAIL     Smoking [] YES [x] NO      Afib [x] YES [] NO     Stroke Education [x] YES [] NO    Obtain screening lower extremity venous ultrasound in patients who meet 1 or more of the following criteria as patient is high risk for DVT/PE on admission:   [] History of DVT/PE  []Hypercoagulable states (Factor V Leiden, Cancer, OCP, etc. )  [x]Prolonged immobility (hemiplegia/hemiparesis/post operative or any other extended immobilization)  [x Transferred from outside facility (Rehab or Long term care)  [] Age </= to 50

## 2021-08-19 NOTE — CONSULT NOTE ADULT - SUBJECTIVE AND OBJECTIVE BOX
Patient seen and evaluated @   Chief Complaint:     HPI:  70yo RH woman w/ h/o afib on eliquis (last dose date unclear), CAD s/p stents on aspirin (last dose ), arthritis, hypothyroidism, carpal tunnel syndrome, lymphedema, recent removal of left ureteral stent & stone (21) presenting as code stroke for left hemiparesis with LWK 21 6p. Aide at bedside explains that patient had left the hospital after left ureteral stone and stent were removed and had last been evaluated at her baseline 6p . This morning patient was noted by all aides/staff that she seemed "off" and that her face had some asymmetry to it, including drooping of right eyelid, and her speech was "very off".    At baseline, patient is wheelchair bound due to underlying arthritis, stays in a nursing home requiring assistance with most ADLs.     She is in bed, comfortable able to answer questions with one word answers. Denies pain.      NIHSS: 11  pre-MRS: 4 (17 Aug 2021 13:54)    PMH:   PAF    DM (diabetes mellitus)    HTN (hypertension)    Obesity    Essential hypertension    Hypothyroid    Type 2 diabetes mellitus without complication    Obesity (BMI 30-39.9)    Carpal tunnel syndrome of right wrist    Essential hypertension    Lymphedema    2019 novel coronavirus disease (COVID-19)    Sepsis      PSH:   S/P carpal tunnel release    No significant past surgical history    S/P cystoscopy      Medications:   aspirin  chewable 81 milliGRAM(s) Oral daily  atorvastatin 80 milliGRAM(s) Oral at bedtime  enoxaparin Injectable 40 milliGRAM(s) SubCutaneous daily  levothyroxine Injectable 37.5 MICROGram(s) IV Push at bedtime  mirabegron ER 25 milliGRAM(s) Oral daily  tamsulosin 0.4 milliGRAM(s) Oral at bedtime    Allergies:  penicillin (Hives)  penicillin (Rash)  sulfa drugs (Unknown)  Tetracycline Hydrochloride (Unknown)    FAMILY HISTORY:  Family history of lung cancer    Family history of myocardial infarction (Sibling)      Social History: lives in asssicsted living.  Smoking: No     Review of Systems:  [x ]Unable to obtain       Physical Exam:  T(C): 36.7 (21 @ 04:00), Max: 37.5 (21 @ 18:00)  HR: 108 (21 @ 08:00) (93 - 110)  BP: 172/116 (21 @ 08:00) (133/118 - 193/125)  RR: 25 (21 @ 08:00) (17 - 25)  SpO2: 97% (21 @ 08:00) (94% - 97%)  Wt(kg): --     @ 07:01  -   @ 07:00  --------------------------------------------------------  IN: 730 mL / OUT: 350 mL / NET: 380 mL     @ 07:01  -   @ 09:38  --------------------------------------------------------  IN: 40 mL / OUT: 0 mL / NET: 40 mL      Daily     Daily     Appearance: NGT in place.  Eyes: not opening eyes on command.  HENT: dry oral mucosa  Cardiovascular: Regular rhythm, Normal S1 and S2, no murmur, rub; trace peripheral edema; no JVD  Respiratory: Clear to auscultation anteriorly  Gastrointestinal: Soft, +BS  Musculoskeletal: No clubbing   Neurologic: Left hemiparesis.  Psychiatry: Sleepy, unable to assess.  Skin: No rashes, ecchymoses, or cyanosis    Cardiovascular Diagnostic Testing:  ECG:    Echo:< from: TTE with Doppler (w/Cont) (Transthoracic Echocardiogram) (20 @ 06:54) >  Conclusions:  1. Calcified trileaflet aortic valve with decreased  opening. Peak transaortic valve gradient equals 18 mm Hg,  mean transaortic valve gradient equals 10 mm Hg, estimated  aortic valve area equals 2 sqcm (by continuity equation),  aortic valve velocity time integral equals 47 cm,  consistent with mild aortic stenosis.  2. Mild concentric left ventricular hypertrophy.  3. Normal left ventricular systolic function. Peak left  ventricular outflow tract gradient equals 5 mm Hg, mean  gradient is equal to 3 mm Hg, LVOT velocity time integral  equals 27 cm.  4. Mild diastolic dysfunction (Stage I).  *** No previous Echo exam.  Unable to rule out endocarditis.  Consider IFEANYI if  clinically indicated.  ------------------------------------------------------------------------  Confirmed on  2020 - 10:07:09 by JARED Luu  ------------------------------------------------------------------------    < end of copied text >      Stress Testing:   Interpretation of Telemetry: ST    Imaging:  c< from: CT Angio Head w/ IV Cont (21 @ 12:35) >  Brain CT: No acute hemorrhage, mass effect or extra-axial collections. Interim right thalamic infarct may be subacute. Brain MRI can be obtained for further evaluation.    Neck CTA: No hemodynamically significant stenosis.    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 Unchanged fusiform ectasia proximal basilar artery with high-grade proximal to mid stenosis. Unchanged fusiform dilatation of the cavernous segment of the right internal carotid artery.  No large vessel occlusion of the anterior middle or posterior cerebral arteries.    Perfusion maps: No evidence for core infarct or penumbra. Hypoperfusion within the posterior circulation.      < end of copied text >      Labs:                        12.7   9.44  )-----------( 313      ( 19 Aug 2021 05:16 )             39.4     08-    148<H>  |  106  |  28<H>  ----------------------------<  161<H>  3.2<L>   |  24  |  1.16    Ca    9.6      19 Aug 2021 05:16    TPro  7.5  /  Alb  3.8  /  TBili  0.7  /  DBili  x   /  AST  14  /  ALT  6<L>  /  AlkPhos  103  08-17    PT/INR - ( 18 Aug 2021 07:02 )   PT: 17.7 sec;   INR: 1.50 ratio         PTT - ( 18 Aug 2021 07:02 )  PTT:33.7 sec        Total Cholesterol: 202  LDL: --  HDL: 32  T

## 2021-08-20 ENCOUNTER — TRANSCRIPTION ENCOUNTER (OUTPATIENT)
Age: 71
End: 2021-08-20

## 2021-08-20 LAB
ALBUMIN SERPL ELPH-MCNC: 2.9 G/DL — LOW (ref 3.3–5)
ALP SERPL-CCNC: 77 U/L — SIGNIFICANT CHANGE UP (ref 40–120)
ALT FLD-CCNC: 6 U/L — LOW (ref 10–45)
ANION GAP SERPL CALC-SCNC: 11 MMOL/L — SIGNIFICANT CHANGE UP (ref 5–17)
ANION GAP SERPL CALC-SCNC: 15 MMOL/L — SIGNIFICANT CHANGE UP (ref 5–17)
APTT BLD: 47.6 SEC — HIGH (ref 27.5–35.5)
AST SERPL-CCNC: 9 U/L — LOW (ref 10–40)
BASOPHILS # BLD AUTO: 0.05 K/UL — SIGNIFICANT CHANGE UP (ref 0–0.2)
BASOPHILS # BLD AUTO: 0.05 K/UL — SIGNIFICANT CHANGE UP (ref 0–0.2)
BASOPHILS NFR BLD AUTO: 0.5 % — SIGNIFICANT CHANGE UP (ref 0–2)
BASOPHILS NFR BLD AUTO: 0.5 % — SIGNIFICANT CHANGE UP (ref 0–2)
BILIRUB SERPL-MCNC: 0.3 MG/DL — SIGNIFICANT CHANGE UP (ref 0.2–1.2)
BUN SERPL-MCNC: 32 MG/DL — HIGH (ref 7–23)
BUN SERPL-MCNC: 32 MG/DL — HIGH (ref 7–23)
CALCIUM SERPL-MCNC: 8.8 MG/DL — SIGNIFICANT CHANGE UP (ref 8.4–10.5)
CALCIUM SERPL-MCNC: 9.7 MG/DL — SIGNIFICANT CHANGE UP (ref 8.4–10.5)
CHLORIDE SERPL-SCNC: 107 MMOL/L — SIGNIFICANT CHANGE UP (ref 96–108)
CHLORIDE SERPL-SCNC: 109 MMOL/L — HIGH (ref 96–108)
CK SERPL-CCNC: 278 U/L — HIGH (ref 25–170)
CO2 SERPL-SCNC: 24 MMOL/L — SIGNIFICANT CHANGE UP (ref 22–31)
CO2 SERPL-SCNC: 27 MMOL/L — SIGNIFICANT CHANGE UP (ref 22–31)
CREAT SERPL-MCNC: 1.04 MG/DL — SIGNIFICANT CHANGE UP (ref 0.5–1.3)
CREAT SERPL-MCNC: 1.17 MG/DL — SIGNIFICANT CHANGE UP (ref 0.5–1.3)
EOSINOPHIL # BLD AUTO: 0.08 K/UL — SIGNIFICANT CHANGE UP (ref 0–0.5)
EOSINOPHIL # BLD AUTO: 0.08 K/UL — SIGNIFICANT CHANGE UP (ref 0–0.5)
EOSINOPHIL NFR BLD AUTO: 0.8 % — SIGNIFICANT CHANGE UP (ref 0–6)
EOSINOPHIL NFR BLD AUTO: 0.9 % — SIGNIFICANT CHANGE UP (ref 0–6)
GAS PNL BLDA: SIGNIFICANT CHANGE UP
GLUCOSE BLDC GLUCOMTR-MCNC: 164 MG/DL — HIGH (ref 70–99)
GLUCOSE SERPL-MCNC: 151 MG/DL — HIGH (ref 70–99)
GLUCOSE SERPL-MCNC: 184 MG/DL — HIGH (ref 70–99)
HCT VFR BLD CALC: 35.7 % — SIGNIFICANT CHANGE UP (ref 34.5–45)
HCT VFR BLD CALC: 39.3 % — SIGNIFICANT CHANGE UP (ref 34.5–45)
HGB BLD-MCNC: 11.2 G/DL — LOW (ref 11.5–15.5)
HGB BLD-MCNC: 12.5 G/DL — SIGNIFICANT CHANGE UP (ref 11.5–15.5)
IMM GRANULOCYTES NFR BLD AUTO: 0.5 % — SIGNIFICANT CHANGE UP (ref 0–1.5)
IMM GRANULOCYTES NFR BLD AUTO: 0.5 % — SIGNIFICANT CHANGE UP (ref 0–1.5)
INR BLD: 1.77 RATIO — HIGH (ref 0.88–1.16)
LYMPHOCYTES # BLD AUTO: 1.1 K/UL — SIGNIFICANT CHANGE UP (ref 1–3.3)
LYMPHOCYTES # BLD AUTO: 1.1 K/UL — SIGNIFICANT CHANGE UP (ref 1–3.3)
LYMPHOCYTES # BLD AUTO: 11.7 % — LOW (ref 13–44)
LYMPHOCYTES # BLD AUTO: 11.9 % — LOW (ref 13–44)
MAGNESIUM SERPL-MCNC: 2.3 MG/DL — SIGNIFICANT CHANGE UP (ref 1.6–2.6)
MCHC RBC-ENTMCNC: 28.6 PG — SIGNIFICANT CHANGE UP (ref 27–34)
MCHC RBC-ENTMCNC: 28.7 PG — SIGNIFICANT CHANGE UP (ref 27–34)
MCHC RBC-ENTMCNC: 31.4 GM/DL — LOW (ref 32–36)
MCHC RBC-ENTMCNC: 31.8 GM/DL — LOW (ref 32–36)
MCV RBC AUTO: 90.1 FL — SIGNIFICANT CHANGE UP (ref 80–100)
MCV RBC AUTO: 91.1 FL — SIGNIFICANT CHANGE UP (ref 80–100)
MONOCYTES # BLD AUTO: 0.82 K/UL — SIGNIFICANT CHANGE UP (ref 0–0.9)
MONOCYTES # BLD AUTO: 0.88 K/UL — SIGNIFICANT CHANGE UP (ref 0–0.9)
MONOCYTES NFR BLD AUTO: 8.7 % — SIGNIFICANT CHANGE UP (ref 2–14)
MONOCYTES NFR BLD AUTO: 9.6 % — SIGNIFICANT CHANGE UP (ref 2–14)
NEUTROPHILS # BLD AUTO: 7.05 K/UL — SIGNIFICANT CHANGE UP (ref 1.8–7.4)
NEUTROPHILS # BLD AUTO: 7.33 K/UL — SIGNIFICANT CHANGE UP (ref 1.8–7.4)
NEUTROPHILS NFR BLD AUTO: 76.6 % — SIGNIFICANT CHANGE UP (ref 43–77)
NEUTROPHILS NFR BLD AUTO: 77.8 % — HIGH (ref 43–77)
NRBC # BLD: 0 /100 WBCS — SIGNIFICANT CHANGE UP (ref 0–0)
NRBC # BLD: 0 /100 WBCS — SIGNIFICANT CHANGE UP (ref 0–0)
PHOSPHATE SERPL-MCNC: 3.9 MG/DL — SIGNIFICANT CHANGE UP (ref 2.5–4.5)
PLATELET # BLD AUTO: 289 K/UL — SIGNIFICANT CHANGE UP (ref 150–400)
PLATELET # BLD AUTO: 290 K/UL — SIGNIFICANT CHANGE UP (ref 150–400)
POTASSIUM SERPL-MCNC: 3.5 MMOL/L — SIGNIFICANT CHANGE UP (ref 3.5–5.3)
POTASSIUM SERPL-MCNC: 3.8 MMOL/L — SIGNIFICANT CHANGE UP (ref 3.5–5.3)
POTASSIUM SERPL-SCNC: 3.5 MMOL/L — SIGNIFICANT CHANGE UP (ref 3.5–5.3)
POTASSIUM SERPL-SCNC: 3.8 MMOL/L — SIGNIFICANT CHANGE UP (ref 3.5–5.3)
PROT SERPL-MCNC: 6.3 G/DL — SIGNIFICANT CHANGE UP (ref 6–8.3)
PROTHROM AB SERPL-ACNC: 20.7 SEC — HIGH (ref 10.6–13.6)
RBC # BLD: 3.92 M/UL — SIGNIFICANT CHANGE UP (ref 3.8–5.2)
RBC # BLD: 4.36 M/UL — SIGNIFICANT CHANGE UP (ref 3.8–5.2)
RBC # FLD: 14.5 % — SIGNIFICANT CHANGE UP (ref 10.3–14.5)
RBC # FLD: 14.7 % — HIGH (ref 10.3–14.5)
SARS-COV-2 RNA SPEC QL NAA+PROBE: SIGNIFICANT CHANGE UP
SODIUM SERPL-SCNC: 145 MMOL/L — SIGNIFICANT CHANGE UP (ref 135–145)
SODIUM SERPL-SCNC: 148 MMOL/L — HIGH (ref 135–145)
TROPONIN T, HIGH SENSITIVITY RESULT: 25 NG/L — SIGNIFICANT CHANGE UP (ref 0–51)
WBC # BLD: 9.21 K/UL — SIGNIFICANT CHANGE UP (ref 3.8–10.5)
WBC # BLD: 9.43 K/UL — SIGNIFICANT CHANGE UP (ref 3.8–10.5)
WBC # FLD AUTO: 9.21 K/UL — SIGNIFICANT CHANGE UP (ref 3.8–10.5)
WBC # FLD AUTO: 9.43 K/UL — SIGNIFICANT CHANGE UP (ref 3.8–10.5)

## 2021-08-20 PROCEDURE — 74230 X-RAY XM SWLNG FUNCJ C+: CPT | Mod: 26

## 2021-08-20 PROCEDURE — 70450 CT HEAD/BRAIN W/O DYE: CPT | Mod: 26

## 2021-08-20 RX ORDER — ATORVASTATIN CALCIUM 80 MG/1
1 TABLET, FILM COATED ORAL
Qty: 0 | Refills: 0 | DISCHARGE

## 2021-08-20 RX ORDER — SODIUM CHLORIDE 9 MG/ML
250 INJECTION INTRAMUSCULAR; INTRAVENOUS; SUBCUTANEOUS ONCE
Refills: 0 | Status: COMPLETED | OUTPATIENT
Start: 2021-08-20 | End: 2021-08-20

## 2021-08-20 RX ORDER — ATORVASTATIN CALCIUM 80 MG/1
1 TABLET, FILM COATED ORAL
Qty: 0 | Refills: 0 | DISCHARGE
Start: 2021-08-20

## 2021-08-20 RX ORDER — POTASSIUM CHLORIDE 20 MEQ
2 PACKET (EA) ORAL
Qty: 0 | Refills: 0 | DISCHARGE

## 2021-08-20 RX ORDER — PANTOPRAZOLE SODIUM 20 MG/1
40 TABLET, DELAYED RELEASE ORAL DAILY
Refills: 0 | Status: DISCONTINUED | OUTPATIENT
Start: 2021-08-20 | End: 2021-08-20

## 2021-08-20 RX ORDER — SODIUM CHLORIDE 9 MG/ML
1000 INJECTION INTRAMUSCULAR; INTRAVENOUS; SUBCUTANEOUS
Refills: 0 | Status: DISCONTINUED | OUTPATIENT
Start: 2021-08-20 | End: 2021-08-22

## 2021-08-20 RX ORDER — LOSARTAN POTASSIUM 100 MG/1
25 TABLET, FILM COATED ORAL DAILY
Refills: 0 | Status: DISCONTINUED | OUTPATIENT
Start: 2021-08-20 | End: 2021-08-20

## 2021-08-20 RX ORDER — LOSARTAN POTASSIUM 100 MG/1
25 TABLET, FILM COATED ORAL DAILY
Refills: 0 | Status: DISCONTINUED | OUTPATIENT
Start: 2021-08-20 | End: 2021-08-22

## 2021-08-20 RX ORDER — POTASSIUM CHLORIDE 20 MEQ
1 PACKET (EA) ORAL
Qty: 0 | Refills: 0 | DISCHARGE
Start: 2021-08-20

## 2021-08-20 RX ORDER — PANTOPRAZOLE SODIUM 20 MG/1
40 TABLET, DELAYED RELEASE ORAL EVERY 12 HOURS
Refills: 0 | Status: DISCONTINUED | OUTPATIENT
Start: 2021-08-20 | End: 2021-08-24

## 2021-08-20 RX ORDER — ACETAMINOPHEN 500 MG
2 TABLET ORAL
Qty: 0 | Refills: 0 | DISCHARGE

## 2021-08-20 RX ORDER — BUMETANIDE 0.25 MG/ML
1 INJECTION INTRAMUSCULAR; INTRAVENOUS
Qty: 0 | Refills: 0 | DISCHARGE
Start: 2021-08-20

## 2021-08-20 RX ORDER — APIXABAN 2.5 MG/1
5 TABLET, FILM COATED ORAL EVERY 12 HOURS
Refills: 0 | Status: DISCONTINUED | OUTPATIENT
Start: 2021-08-20 | End: 2021-08-24

## 2021-08-20 RX ORDER — ASPIRIN/CALCIUM CARB/MAGNESIUM 324 MG
81 TABLET ORAL DAILY
Refills: 0 | Status: DISCONTINUED | OUTPATIENT
Start: 2021-08-20 | End: 2021-08-24

## 2021-08-20 RX ORDER — BUMETANIDE 0.25 MG/ML
1 INJECTION INTRAMUSCULAR; INTRAVENOUS
Qty: 0 | Refills: 0 | DISCHARGE

## 2021-08-20 RX ADMIN — TAMSULOSIN HYDROCHLORIDE 0.4 MILLIGRAM(S): 0.4 CAPSULE ORAL at 21:55

## 2021-08-20 RX ADMIN — Medication 30 MILLIGRAM(S): at 12:02

## 2021-08-20 RX ADMIN — MIRABEGRON 25 MILLIGRAM(S): 50 TABLET, EXTENDED RELEASE ORAL at 12:02

## 2021-08-20 RX ADMIN — SODIUM CHLORIDE 500 MILLILITER(S): 9 INJECTION INTRAMUSCULAR; INTRAVENOUS; SUBCUTANEOUS at 19:30

## 2021-08-20 RX ADMIN — Medication 30 MILLIGRAM(S): at 17:43

## 2021-08-20 RX ADMIN — Medication 30 MILLIGRAM(S): at 06:03

## 2021-08-20 RX ADMIN — APIXABAN 5 MILLIGRAM(S): 2.5 TABLET, FILM COATED ORAL at 17:43

## 2021-08-20 RX ADMIN — ATORVASTATIN CALCIUM 80 MILLIGRAM(S): 80 TABLET, FILM COATED ORAL at 21:59

## 2021-08-20 RX ADMIN — LOSARTAN POTASSIUM 25 MILLIGRAM(S): 100 TABLET, FILM COATED ORAL at 12:02

## 2021-08-20 RX ADMIN — Medication 37.5 MICROGRAM(S): at 21:52

## 2021-08-20 NOTE — SWALLOW VFSS/MBS ASSESSMENT ADULT - COMMENTS
Hx cont:   Neuro: MS: left eye open, right eyelid completely shut, noted right gaze preference, alert, oriented to location, month, could not tell me age, speech mildly dysarthric with impaired naming, repetition, intact comprehension can follow some commands. CN: II - XII - VFF to threat grossly, EOM demonstrate right gaze preference overcome by OCR, V1-3 intact to LT b/l, moderate left facial droop, noted weakness of eyelid opening of left, complete ptosis of right eyelid, hearing intact to finger rubbing b/l, tongue protrudes midline. CT/CTA/CTP upon neuro review demonstrates subacute R thalamic infarct, potential age-indeterminate paramedian right midbrain infarct, high-grade prox-mid basilar stenosis, R ?P1 occlusion versus hypoplastic R P1. Patient out of window for tpa. Given concern for subacute infarcts and unclear radiographical evidence of LVO along with patient's poor baseline, not deemed a candidate for mechanical thrombectomy. Neuro Impression: Wells syndrome (R third nerve palsy, left hemiparesis) 2/2 right paramedian brain infarct & thalamic infarct 2/2  versus cardioembolic (given concern for possible P1 occlusion and patient had been off eliquis for several days). CXR clear lungs.    SPEECH, LANGUAGE, SWALLOW HX:  Pt is known to this service from bedside swallow evaluation completed 8/15/20 with recommendation for regular texture diet. Seen for speech-language evaluation 20 s/p code stroke and c/o word finding deficits and STM deficits. Pt demonstrated deficits with short term recall. Recommend OT referral for higher level cognitive evaluation.

## 2021-08-20 NOTE — PROGRESS NOTE ADULT - ASSESSMENT
Patient is a 70 Y/O female with PMHx of afib on eliquis (last dose date unclear), CAD s/p stents on aspirin (last dose 8/9), arthritis, hypothyroidism, carpal tunnel syndrome, lymphedema, recent removal of left ureteral stent & stone (8/16/21) presenting as code stroke for left hemiparesis with LWK 8/16/21 6p. Aide at bedside explains that patient had left the hospital after left ureteral stone and stent were removed and had last been evaluated at her baseline 6p 8/16. This morning patient was noted by all aides/staff that she seemed "off" and that her face had some asymmetry to it, including drooping of right eyelid, and her speech was "very off".         Problem/Recommendation - 1:  Problem: Stroke. Recommendation: Neurology care appreciated  restrained for safety  speech and swallow   A fib, off AC, resume when okay by neuro  Feeding tube.     Problem/Recommendation - 2:  ·  Problem: Dysphagia.  Recommendation: feeding tube   failed speech and swallow.   GI eval fro PEG placement      Problem/Recommendation - 3:  ·  Problem: Hypothyroid.  Recommendation: synthroid.      Problem/Recommendation - 4:  ·  Problem: Type 2 diabetes mellitus without complication.  Recommendation: sliding scale  hydration as tolerated.      Problem/Recommendation - 5:  ·  Problem: HTN (hypertension).  Recommendation: resume BP meds.      Problem/Recommendation - 6:  Problem: Hypothyroid.

## 2021-08-20 NOTE — PROVIDER CONTACT NOTE (CHANGE IN STATUS NOTIFICATION) - ASSESSMENT
Patient unresponsive and No longer verbalizing. Patient withdraws in all extremities to painful stimuli. Pupils both reactive.

## 2021-08-20 NOTE — DISCHARGE NOTE NURSING/CASE MANAGEMENT/SOCIAL WORK - NSDCPEFALRISK_GEN_ALL_CORE
For information on Fall & injury Prevention, visit [email or web address]

## 2021-08-20 NOTE — CONSULT NOTE ADULT - CONSULT REASON
Evaluate Rehabilitation Needs
Dysphagia
stroke, cad, PAF
NGT placement
medical management
Infliximab Pregnancy And Lactation Text: This medication is Pregnancy Category B and is considered safe during pregnancy. It is unknown if this medication is excreted in breast milk.

## 2021-08-20 NOTE — PROGRESS NOTE ADULT - SUBJECTIVE AND OBJECTIVE BOX
THE PATIENT WAS SEEN AND EXAMINED BY ME WITH THE HOUSESTAFF AND STROKE TEAM DURING MORNING ROUNDS.   HPI: 70yo RH woman w/ h/o afib on Eliquis (last dose 8/12), CAD s/p stents on aspirin (last dose 8/9), arthritis, hypothyroidism, carpal tunnel syndrome, lymphedema, recent removal of left ureteral stent & stone (8/16/21) presented as code stroke for left hemiparesis with LWK 8/16/21 6p. Aide at bedside explained that patient had left the hospital after left ureteral stone and stent were removed and had last been evaluated at her baseline 6p 8/16. This morning patient was noted by all aides/staff that she seemed "off" and that her face had some asymmetry to it, including drooping of right eyelid, and her speech was "very off". At baseline, patient is wheelchair bound due to underlying arthritis, stays in a nursing home requiring assistance with most ADLs.  NIHSS: 11 pre-MRS: 4, on admission.     SUBJECTIVE: No events overnight. No new neurologic complaints.  ROS unable to obtain, reported negative unless otherwise noted.    acetaminophen    Suspension .. 650 milliGRAM(s) Oral every 6 hours PRN  apixaban 5 milliGRAM(s) Oral every 12 hours  aspirin enteric coated 81 milliGRAM(s) Oral daily  atorvastatin 80 milliGRAM(s) Oral at bedtime  diltiazem    Tablet 30 milliGRAM(s) Oral every 6 hours  levothyroxine Injectable 37.5 MICROGram(s) IV Push at bedtime  losartan 25 milliGRAM(s) Oral daily  mirabegron ER 25 milliGRAM(s) Oral daily  tamsulosin 0.4 milliGRAM(s) Oral at bedtime    PHYSICAL EXAM:   Vital Signs Last 24 Hrs  T(C): 36.6 (20 Aug 2021 04:00), Max: 36.7 (19 Aug 2021 16:00)  T(F): 97.9 (20 Aug 2021 04:00), Max: 98 (19 Aug 2021 16:00)  HR: 108 (20 Aug 2021 12:00) (92 - 109)  BP: 154/92 (20 Aug 2021 12:00) (135/78 - 185/103)  BP(mean): 136 (20 Aug 2021 10:00) (95 - 136)  RR: 18 (20 Aug 2021 12:00) (15 - 21)  SpO2: 95% (20 Aug 2021 12:00) (94% - 98%)    General: No acute distress  HEENT: right eye: Moderate-severe ptosis; pupil 3.5 mm (left pupil 3 mm); moderate-severe adduction deficit (barely crosses midline); mild-moderate depression deficit; severe upgaze palsy (bilateral)   Abdomen: Soft, nontender, nondistended   Extremities: No edema    NEUROLOGICAL EXAM:  Mental status: sleeping in bed, awakes to verbal output, remains somewhat alert but  inattentive, requiring repeated questioning for mental status exam; oriented to place and age, follows some simple 1 step commands, IDs objects  Cranial Nerves: right eye: Moderate-severe ptosis; pupil 3.5 mm > left pupil 3 mm; moderate-severe adduction deficit (barely crosses midline); mild-moderate depression deficit; severe upgaze palsy (bilateral); mild or mild-moderate left facial palsy;  moderate dysarthria  Motor exam:  right side moves against gravity, ,left arm-drift; left leg-very slight movement, not versus gravity and with significant complaints of pain (chronic as per report)  Sensation: Intact to light touch   Coordination/ Gait:  gait not assessed     LABS:                        12.5   9.43  )-----------( 289      ( 20 Aug 2021 05:01 )             39.3    08-20    145  |  107  |  32<H>  ----------------------------<  184<H>  3.5   |  27  |  1.04    Ca    9.7      20 Aug 2021 05:01  Phos  3.4     08-19  Mg     2.6     08-19      IMAGING: Reviewed by me.       (08.17.21)  Brain CT: No acute hemorrhage, mass effect or extra-axial collections. Interim right thalamic infarct may be subacute. Brain MRI can be obtained for further evaluation.  Neck CTA: No hemodynamically significant stenosis.  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 Unchanged fusiform ectasia proximal basilar artery with high-grade proximal to mid stenosis. Unchanged fusiform dilatation of the cavernous segment of the right internal carotid artery.  No large vessel occlusion of the anterior middle or posterior cerebral arteries.  Perfusion maps: No evidence for core infarct or penumbra. Hypoperfusion within the posterior circulation.    MR Head No Cont (08.18.21)  Acute right thalamic and right midbrain infarct.

## 2021-08-20 NOTE — CHART NOTE - NSCHARTNOTEFT_GEN_A_CORE
Nutrition Follow Up Note  Patient seen for: Malnutrition Follow up     Chart reviewed, events noted. Pt passed speech therapist evaluation today, ordered for dysphagia 2 + nectar thickened liquids. Plan for d/c today.    Source:    [x] EMR        [x] RN      [x] Other: Abril Agudelo at bedside    -If unable to interview patient:  [x] Disoriented/confused/inappropriate to interview    Diet Order:   Diet, Dysphagia 2 Mechanical Soft-Nectar Consistency Fluid (08-20-21)    - Is current order appropriate/adequate? [x] Yes     - PO intake:    [x] 50-75%  Fair       - Nutrition-related concerns: Upon RD visit, pt resting in bed. Pt passed MBS today and diet advanced to dysphagia 2 + nectar thickened liquids. RD observed pt consumed ~50% of lunch tray fed by aide. No nausea, vomiting, constipation or diarrhea at this time per nurse. Last bowel movement 8/20 per flow sheets.    No daily weights at this time    Nutritionally Pertinent MEDICATIONS  (STANDING):  atorvastatin  diltiazem    Tablet  levothyroxine Injectable  losartan  tamsulosin    Pertinent Labs: 08-20 @ 05:01: Na 145, BUN 32<H>, Cr 1.04, <H>, K+ 3.5, Phos --, Mg --, Alk Phos --, ALT/SGPT --, AST/SGOT --, HbA1c --    A1C with Estimated Average Glucose Result: 5.4 % (08-18-21 @ 08:46)  A1C with Estimated Average Glucose Result: 5.4 % (08-07-21 @ 00:02)    Finger Sticks: none at this time    Skin per nursing documentation: no pressure injuries per flow sheets  Edema: none noted per flow sheets    Estimated Needs:   [x] no change since previous assessment    Previous Nutrition Diagnosis: Moderate, chronic malnutrition  Nutrition Diagnosis is: [x] ongoing, being addressed with PO diet    New Nutrition Diagnosis: [x] Not applicable    Nutrition Care Plan:  [x] In Progress    Nutrition Recommendations:      1. Continue most appropriate diet per speech therapist  2. RD to add NC diet mighty shakes 2x/day to optimize nutritional intake  3. Recommend nursing to continue to encourage adequate intake and provide feeding assistance at meals as needed     Monitoring and Evaluation:   Continue to monitor nutritional intake, tolerance to diet prescription, weights, labs, skin integrity    RD remains available upon request and will follow up per protocol  Mindy Wylie MS, RD, CDN, CNSC pgr #[phone]

## 2021-08-20 NOTE — CONSULT NOTE ADULT - ASSESSMENT
72yo female seen for NGT placement after failing bedside swallow assessment today. Attempted placement with keofeed, met with resistance. [place] sump tube advanced through esophageal inlet without resistance. Placement confirmed with ausculation of air in stomach. Pending CXR for confirmation prior to starting tube feeds. 
71 F w acute CVA  -await swallow eval  -given advanced age, on eliquis and aspirin, PPI is indicated      Advanced care planning forms were discussed. Code status including forceful chest compressions, defibrillation and intubation were discussed. The risks benefits and alternatives to pertinent gastrointestinal procedures and interventions were discussed in detail and all questions were answered. Duration: 15 Minutes.      Darrel Burrell M.D.   Gastroenterology and Hepatology  [address] [place], [place]  Office: [phone]  Cell: [phone]
71 year old with a history of HTN, CAD (Prior stents in 2015), PAF and renal stones, now with an acute stroke.  1. PAF: Off a/c prior to ureteral stent removal. a/c to be restarted when okay with neuro eliquis 5 bid.   2. Continue aspirin 81 qd for underlying CAD.  3. If fails swallow test, will need NGT.  4. Permissive HTN per neuro. restart cardizem when appropriate.  d/w neuro team.

## 2021-08-20 NOTE — PROVIDER CONTACT NOTE (CHANGE IN STATUS NOTIFICATION) - SITUATION
Transport here to  patient. Patient is lethargic and unarousable. Patient Bp taken and was 73/51. Patient withdraws to pain in all extremities.

## 2021-08-20 NOTE — SWALLOW VFSS/MBS ASSESSMENT ADULT - LARYNGEAL PENETRATION DURING THE SWALLOW - SILENT
trace shallow transient penetration from the laryngeal surface of the epiglottis (appears 2/2 presence of large bore NGT)

## 2021-08-20 NOTE — SWALLOW VFSS/MBS ASSESSMENT ADULT - ROSENBEK'S PENETRATION ASPIRATION SCALE
(2) contrast enters airway, remains above the vocal cords, no residue remains (penetration) (1) no aspiration, contrast does not enter airway WITH large bore NGT in place. PAS 1 (no aspiration, contrast does not enter airway) when NGT was removed./(8) contrast passes glottis, visible subglottic residue remains, absent patient response (aspiration) (8) contrast passes glottis, visible subglottic residue remains, absent patient response (aspiration)

## 2021-08-20 NOTE — CHART NOTE - NSCHARTNOTEFT_GEN_A_CORE
Called by RN to evaluate patient. Upon my assessment Patient Bp 73/51, patient withdrawing to pain in all extremities. Of note patient started on Losartan 25mg today.  After 250cc bolus patient vitals and mental status not significantly improved so rapid responsive was called. Once patient stabilized by rapid responsive team took patient to urgent CTH as patient was also started on Eliquis earlier today. Patient repeat CTH unchanged. After aggressive fluid resuscitation patient exam:    General: No acute distress  HEENT: right eye: Moderate-severe ptosis; pupil 3.5 mm (left pupil 3 mm); moderate-severe adduction deficit (barely crosses midline); mild-moderate depression deficit; severe upgaze palsy (bilateral)   Abdomen: Soft, nontender, nondistended   Extremities: No edema    NEUROLOGICAL EXAM:  Mental status: awakes to verbal output, remains somewhat alert but  inattentive, requiring repeated questioning for mental status exam; oriented to place and age, follows some simple 1 step commands, IDs objects  Cranial Nerves: right eye: Moderate-severe ptosis; pupil 3.5 mm > left pupil 3 mm; moderate-severe adduction deficit (barely crosses midline); mild-moderate depression deficit; severe upgaze palsy (bilateral); mild or mild-moderate left facial palsy;  moderate dysarthria  Motor exam:  right side moves against gravity, ,left arm-drift; left leg-very slight movement, not versus gravity and with significant complaints of pain (chronic as per report)  Sensation: Intact to light touch   Coordination/ Gait:  gait not assessed       At this time patient is stable and will continue to monitor. Called by RN to evaluate patient. Upon my assessment Patient Bp 73/51, patient withdrawing to pain in all extremities. Of note patient started on Losartan 25mg today and was given her diltiazem at 1700.  After 250cc bolus patient vitals and mental status not significantly improved so rapid responsive was called. Once patient stabilized by rapid responsive team took patient to urgent CTH as patient was also started on Eliquis earlier today. Patient repeat CTH unchanged. After aggressive fluid resuscitation patient exam:    General: No acute distress  HEENT: right eye: Moderate-severe ptosis; pupil 3.5 mm (left pupil 3 mm); moderate-severe adduction deficit (barely crosses midline); mild-moderate depression deficit; severe upgaze palsy (bilateral)   Abdomen: Soft, nontender, nondistended   Extremities: No edema    NEUROLOGICAL EXAM:  Mental status: awakes to verbal output, remains somewhat alert but  inattentive, requiring repeated questioning for mental status exam; oriented to place and age, follows some simple 1 step commands, IDs objects  Cranial Nerves: right eye: Moderate-severe ptosis; pupil 3.5 mm > left pupil 3 mm; moderate-severe adduction deficit (barely crosses midline); mild-moderate depression deficit; severe upgaze palsy (bilateral); mild or mild-moderate left facial palsy;  moderate dysarthria  Motor exam:  right side moves against gravity, ,left arm-drift; left leg-very slight movement, not versus gravity and with significant complaints of pain (chronic as per report)  Sensation: Intact to light touch   Coordination/ Gait:  gait not assessed       At this time patient is stable and will continue to monitor.

## 2021-08-20 NOTE — SWALLOW VFSS/MBS ASSESSMENT ADULT - SLP GENERAL OBSERVATIONS
Pt was received in radiology suite in DEZ chair accompanied by HHA at nursing [place], Abril, and RN, Glenn (given b/l wrist restraints 2/2 presence of NGT). +large bore NGT, +room air, +Left sided inattention, +ptosis of right eye, +cognitive deficits, +mild to moderate dysarthria. Dried secretions on labial and lingual structures. Oral care provided to remove secretions. Pt unable to consistently follow basic commands.

## 2021-08-20 NOTE — PROGRESS NOTE ADULT - ASSESSMENT
71-year-old right-handed lady first evaluated at [facility] on 8/18/2021 with left hemiparesis.  CT head (8/17/2021)  showed subtle hypodensity in the right paramedian ventral midbrain and paramedian thalamus suggestive of acute infarction; a more well-defined hypodensity involving the right anterior thalamus consistent with a possible subacute infarct.  CTA neck (8/17/2021) was unremarkable. At baseline, she is wheelchair-bound, apparently due to "arthritis" and also with most likely at least mild cognitive impairment.  She has a history of atrial fibrillation, on Eliquis, and also on aspirin for coronary stents which were implanted years ago.  The Eliquis was held since approximately 8/12/2021, and aspirin was held since approximately 8/9/2021 for ureteral stents on 8/15/2021.  She was last known normal at around 6 PM on 8/16/2021.  CTA head (8/17/2021)  showed right V4 occlusion; proximal basilar stenosis, officially read as severe but  perhaps moderate, with more distal diffuse fusiform dilatation or perhaps simply ectasia of the basilar; fusiform dilatation of the right ICA.  MRI brain (8/18/2021)  showed an acute small infarct involving the right paramedian ventral midbrain extending to the ventral border of the midbrain, with acute infarction involving the right paramedian versus anterior thalamic region (but unclear to me whether this corresponded to the apparently subacute right anterior thalamic infarct seen on CT).      Impression.  On 8/17/2021 she was found by her nursing home attendant with a left hemiparesis.  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  Mechanism is uncertain, either cardioembolism to the right P1 segment, related to atrial fibrillation, since the infarct extends to the ventral border of the midbrain, versus intracranial large artery atherosclerosis (basilar stenosis) with artery-artery embolism, versus small vessel disease (stroke of "unknown" cause, but suspect cardioembolism).       NEURO: neurologically without acute change, Continue close monitoring for neurologic deterioration, permissive HTN with gradual normotension as tolerated, titrate statin to LDL goal less than 70, MRI Brain as noted. Physical therapy/OT/PMR: [facility].    ANTITHROMBOTIC THERAPY: ASA to be continued per d/w DR. Lisker. Restarted Eliquis 5mg BID for afib.     PULMONARY: CXR clear (8/17), protecting airway, saturating well     CARDIOVASCULAR: Cardiac monitoring to ensure rate control, will home regimen gradually as tolerated                              SBP goal: 140-180mmHg with gradual normotension as tolerated avoiding rapid fluctuations and hypotension     GASTROINTESTINAL: dysphagia screen failed, SLP eval for NPO and re-eval. MBS today passed with recommendations for D2 nectar diet.      Diet: D2 Nectar     RENAL: BUN slightly elevated /Cr without acute change, good urine output , monitor for further hypernatremia, KCl supplement for hypokalemia, now resolved.     Na Goal: Greater than 135     Cruz: No    HEMATOLOGY: H/H without anemia, no active bleeding noted, Platelets 289, she should have all age and risk appropriate malignancy screenings, LE duplex no evidence for DVT 8/18/21.     DVT ppx: Heparin s.c [] LMWH [x]     ID: afebrile, no leukocytosis , no si/sx of infection     DISPOSITION: [facility]     CORE MEASURES:        Admission NIHSS: 11     TPA: [] YES [x] NO      LDL/HDL: 132/32     Depression Screen: na- unable to participate      Statin Therapy: y      Dysphagia Screen: [] PASS [x] FAIL     Smoking [] YES [x] NO      Afib [x] YES [] NO     Stroke Education [x] YES [] NO    Obtain screening lower extremity venous ultrasound in patients who meet 1 or more of the following criteria as patient is high risk for DVT/PE on admission:   [] History of DVT/PE  []Hypercoagulable states (Factor V Leiden, Cancer, OCP, etc. )  [x]Prolonged immobility (hemiplegia/hemiparesis/post operative or any other extended immobilization)  [x Transferred from outside facility (Rehab or Long term care)  [] Age </= to 50

## 2021-08-20 NOTE — SWALLOW VFSS/MBS ASSESSMENT ADULT - ADDITIONAL INFORMATION
+laryngeal calcifications   +non-obstructing osteophyte on anterior cervical spine at the level of C5-C6 (intermittent trace retention of material with retrograde flow noted throughout evaluation).

## 2021-08-20 NOTE — SWALLOW VFSS/MBS ASSESSMENT ADULT - ORAL PHASE
mildly delayed oral transit time within functional limits piecemeal deglutition (x2 swallows) with soft solids/Delayed oral transit time/Reduced anterior - posterior transport premature spillage (to AE fold with small cup sips and to pyriform sinus with straw sips); penetration before the swallow from the laryngeal surface of the epiglottis with complete retrieval (with cup sips); mild oral residue which passively spilled over into pyriform sinus with trace penetration during the swallow with complete retrieval

## 2021-08-20 NOTE — SWALLOW VFSS/MBS ASSESSMENT ADULT - SLP PERTINENT HISTORY OF CURRENT PROBLEM
72yo RH woman w/ h/o afib on eliquis (last dose 8/12), CAD s/p stents on aspirin (last dose 8/9), arthritis, hypothyroidism, carpal tunnel syndrome, lymphedema, recent removal of left ureteral stent & stone (8/16/21) presenting as code stroke for left hemiparesis with LWK 8/16/21 6p. Aide at bedside explains that patient had left the hospital after left ureteral stone and stent were removed and had last been evaluated at her baseline 6p 8/16. This morning patient was noted by all aides/staff that she seemed "off" and that her face had some asymmetry to it, including drooping of right eyelid, and her speech was "very off". At baseline, patient is wheelchair bound due to underlying arthritis, stays in a nursing home requiring assistance with most ADLs. NIHSS: 11.

## 2021-08-20 NOTE — SWALLOW VFSS/MBS ASSESSMENT ADULT - ADDITIONAL RECOMMENDATIONS
Pt may be appropriate for subjective texture upgrade at bedside to soft textures if able to demonstrate improvement in timeliness/efficiency of mastication as no penetration or aspiration noted with soft textures.

## 2021-08-20 NOTE — SWALLOW VFSS/MBS ASSESSMENT ADULT - ORAL PREPARATORY PHASE
Functional Prolonged (though generally efficient) mastication of soft solid Poor bolus formation Anterior loss/Poor bolus formation

## 2021-08-20 NOTE — DISCHARGE NOTE NURSING/CASE MANAGEMENT/SOCIAL WORK - PATIENT PORTAL LINK FT
You can access the FollowMyHealth Patient Portal offered by [facility] by registering at the following website: [email or web address]. By joining [facility]’s FollowMyHealth portal, you will also be able to view your health information using other applications (apps) compatible with our system.

## 2021-08-20 NOTE — SWALLOW VFSS/MBS ASSESSMENT ADULT - RECOMMENDED FEEDING/EATING TECHNIQUES
encourage secondary dry swallow; meds in puree; slow rate/maintain upright posture during/after eating for 30 mins/oral hygiene/small sips/bites

## 2021-08-20 NOTE — PROGRESS NOTE ADULT - SUBJECTIVE AND OBJECTIVE BOX
Name of Patient : TIAAN BROWN  MRN: [id number]  Date of visit: 08-20-21       Subjective: Patient seen and examined. No new events except as noted.   calm   L side wekaness       MEDICATIONS:  MEDICATIONS  (STANDING):  apixaban 5 milliGRAM(s) Oral every 12 hours  aspirin enteric coated 81 milliGRAM(s) Oral daily  atorvastatin 80 milliGRAM(s) Oral at bedtime  diltiazem    Tablet 30 milliGRAM(s) Oral every 6 hours  levothyroxine Injectable 37.5 MICROGram(s) IV Push at bedtime  losartan 25 milliGRAM(s) Oral daily  mirabegron ER 25 milliGRAM(s) Oral daily  tamsulosin 0.4 milliGRAM(s) Oral at bedtime      PHYSICAL EXAM:  T(C): 36.6 (08-20-21 @ 04:00), Max: 36.6 (08-20-21 @ 04:00)  HR: 78 (08-20-21 @ 20:59) (78 - 108)  BP: 102/66 (08-20-21 @ 20:59) (73/51 - 185/103)  RR: 16 (08-20-21 @ 20:59) (15 - 23)  SpO2: 97% (08-20-21 @ 20:59) (90% - 99%)  Wt(kg): --  I&O's Summary    19 Aug 2021 07:01  -  20 Aug 2021 07:00  --------------------------------------------------------  IN: 1660 mL / OUT: 350 mL / NET: 1310 mL          Appearance: awake, confused   HEENT:  PERRLA   Lymphatic: No lymphadenopathy   Cardiovascular: Normal S1 S2, no JVD  Respiratory: normal effort , clear  Gastrointestinal:  Soft, Non-tender  Skin: No rashes,  warm to touch  Psychiatry:  Mood & affect appropriate  Musculuskeletal: L side weakness       All labs, Imaging and EKGs personally reviewed     08-19-21 @ 07:01  -  08-20-21 @ 07:00  --------------------------------------------------------  IN: 1660 mL / OUT: 350 mL / NET: 1310 mL                          11.2   9.21  )-----------( 290      ( 20 Aug 2021 20:22 )             35.7               08-20    148<H>  |  109<H>  |  32<H>  ----------------------------<  151<H>  3.8   |  24  |  1.17    Ca    8.8      20 Aug 2021 20:22  Phos  3.9     08-20  Mg     2.3     08-20    TPro  6.3  /  Alb  2.9<L>  /  TBili  0.3  /  DBili  x   /  AST  9<L>  /  ALT  6<L>  /  AlkPhos  77  08-20    PT/INR - ( 20 Aug 2021 20:22 )   PT: 20.7 sec;   INR: 1.77 ratio         PTT - ( 20 Aug 2021 20:22 )  PTT:47.6 sec       CARDIAC MARKERS ( 20 Aug 2021 20:22 )  x     / x     / 278 U/L / x     / x                ABG - ( 20 Aug 2021 20:13 )  pH, Arterial: 7.46  pH, Blood: x     /  pCO2: 41    /  pO2: 106   / HCO3: 29    / Base Excess: 4.9   /  SaO2: 98.8

## 2021-08-20 NOTE — SWALLOW VFSS/MBS ASSESSMENT ADULT - ASPIRATION AFTER SWALLOW - SILENT
Penetration noted after the swallow from the laryngeal surface of the epiglottis (2/2 presence of large bore NGT). Penetration noted to descend further with eventual SILENT ASPIRATION (delayed coughing after 1 minute noted). Large bore NGT subsequently pulled as it appeared to cause incomplete epiglottic inversion/laryngeal closure. No instances of penetration or aspiration noted on all subsequent cup/straw sips of nectar-thick liquid once NGT was pulled.

## 2021-08-20 NOTE — CONSULT NOTE ADULT - SUBJECTIVE AND OBJECTIVE BOX
Chief Complaint:  Patient is a 71y old  Female who presents with a chief complaint of stroke (20 Aug 2021 15:57)      Date of service: 08-20-21 @ 22:34    HPI:    The patient is a 71 F who presented w hemiparesis.  She was found to have a CVA.  She cannot provide history.      Allergies:  penicillin (Hives)  penicillin (Rash)  sulfa drugs (Unknown)  Tetracycline Hydrochloride (Unknown)      Home Medications:    Hospital Medications:  acetaminophen    Suspension .. 650 milliGRAM(s) Oral every 6 hours PRN  apixaban 5 milliGRAM(s) Oral every 12 hours  aspirin enteric coated 81 milliGRAM(s) Oral daily  atorvastatin 80 milliGRAM(s) Oral at bedtime  diltiazem    Tablet 30 milliGRAM(s) Oral every 6 hours  levothyroxine Injectable 37.5 MICROGram(s) IV Push at bedtime  losartan 25 milliGRAM(s) Oral daily  mirabegron ER 25 milliGRAM(s) Oral daily  pantoprazole  Injectable 40 milliGRAM(s) IV Push every 12 hours  sodium chloride 0.9%. 1000 milliLiter(s) IV Continuous <Continuous>  tamsulosin 0.4 milliGRAM(s) Oral at bedtime      PMHX/PSHX:  DM (diabetes mellitus)    HTN (hypertension)    Obesity    Essential hypertension    Hypothyroid    Type 2 diabetes mellitus without complication    Obesity (BMI 30-39.9)    Carpal tunnel syndrome of right wrist    Essential hypertension    Lymphedema    2019 novel coronavirus disease (COVID-19)    Sepsis    S/P carpal tunnel release    No significant past surgical history    S/P cystoscopy        Family history:  Family history of lung cancer    Family history of myocardial infarction (Sibling)    No pertinent family history in first degree relatives        Social History:   Denies ethanol use.  Denies illicit drug use.    ROS:     General:  No wt loss, fevers, chills, night sweats, fatigue,   Eyes:  Good vision, no reported pain  ENT:  No sore throat, pain, runny nose, dysphagia  CV:  No pain, palpitations, hypo/hypertension  Resp:  No dyspnea, cough, tachypnea, wheezing  GI:  See HPI  :  No pain, bleeding, incontinence, nocturia  Muscle:  No pain, weakness  Neuro:  No weakness, tingling, memory problems  Psych:  No fatigue, insomnia, mood problems, depression  Endocrine:  No polyuria, polydipsia, cold/heat intolerance  Heme:  No petechiae, ecchymosis, easy bruisability  Integumentary:  No rash, edema      PHYSICAL EXAM:     GENERAL:  Appears stated age, well-groomed, well-nourished, no distress  HEENT:  NC/AT,  conjunctivae anicteric, clear and pink,   NECK: supple, trachea midline  CHEST:  Full & symmetric excursion, no increased effort, breath sounds clear  HEART:  Regular rhythm, no JVD  ABDOMEN:  Soft, non-tender, non-distended, normoactive bowel sounds,  no masses , no hepatosplenomegaly  EXTREMITIES:  no cyanosis,clubbing or edema  SKIN:  No rash, erythema, or, ecchymoses, no jaundice  NEURO:  Alert, non-focal, no asterixis  PSYCH: Appropriate affect, oriented to place and time  RECTAL: Deferred      Vital Signs:  Vital Signs Last 24 Hrs  T(C): 36.9 (20 Aug 2021 20:00), Max: 36.9 (20 Aug 2021 20:00)  T(F): 98.4 (20 Aug 2021 20:00), Max: 98.4 (20 Aug 2021 20:00)  HR: 86 (20 Aug 2021 22:00) (78 - 108)  BP: 95/65 (20 Aug 2021 22:00) (73/51 - 185/103)  BP(mean): 81 (20 Aug 2021 22:00) (48 - 136)  RR: 21 (20 Aug 2021 22:00) (15 - 23)  SpO2: 96% (20 Aug 2021 22:00) (90% - 99%)  Daily     Daily     LABS: Labs personally reviewed by me:                        11.2   9.21  )-----------( 290      ( 20 Aug 2021 20:22 )             35.7     08-20    148<H>  |  109<H>  |  32<H>  ----------------------------<  151<H>  3.8   |  24  |  1.17    Ca    8.8      20 Aug 2021 20:22  Phos  3.9     08-20  Mg     2.3     08-20    TPro  6.3  /  Alb  2.9<L>  /  TBili  0.3  /  DBili  x   /  AST  9<L>  /  ALT  6<L>  /  AlkPhos  77  08-20    LIVER FUNCTIONS - ( 20 Aug 2021 20:22 )  Alb: 2.9 g/dL / Pro: 6.3 g/dL / ALK PHOS: 77 U/L / ALT: 6 U/L / AST: 9 U/L / GGT: x           PT/INR - ( 20 Aug 2021 20:22 )   PT: 20.7 sec;   INR: 1.77 ratio         PTT - ( 20 Aug 2021 20:22 )  PTT:47.6 sec        Imaging personally reviewed by me:

## 2021-08-20 NOTE — SWALLOW VFSS/MBS ASSESSMENT ADULT - DIAGNOSTIC IMPRESSIONS
72 y/o RH F with PMH of Afib on eliquis, CAD s/p stents on aspirin, arthritis, hypothyroidism, carpal tunnel syndrome, lymphedema, recent removal of left ureteral stent & stone (8/16/21) p/w subacute R thalamic infarct, potential age-indeterminate paramedian right midbrain infarct, high-grade prox-mid basilar stenosis, R ?P1 occlusion versus hypoplastic R P1. Pt was seen today for Modified Barium Swallow Study (MBS) which revealed moderate dillon-pharyngeal dysphagia. Pt was trialed with honey-thick liquids, nectar-thick liquids, thin liquids, thin puree, thick puree, mechanical soft, and soft solid. Pt demonstrated premature spillage to the pyriform sinus with SILENT ASPIRATION of thin liquids. Single instance of silent aspiration of nectar-thick liquids (2/2 presence of large bore NGT resulting in incomplete epiglottic inversion/incomplete laryngeal closure). Once large bore NGT removed, NO further episodes or penetration or aspiration with nectar-thick liquids. Trace shallow transient penetration of honey-thick liquids with complete retrieval (suspect 2/2 negative impact of NGT). Although, pt demonstrated penetration with full retrieval with small cup sips of thin liquid, this clinician is not recommending thin liquids as pt will likely be fed via straw given inability to self feed. No penetration or aspiration with puree textures, mechanical soft textures, or soft solids. Prolonged and disorganized mastication of soft solid. Improvement in timeliness with mechanical soft texture. Disorders: reduced lingual strength/ROM/Rate of motion, reduced BOT to posterior pharyngeal wall contact, reduced laryngeal closure, reduced supraglottic sensation, and reduced subglottic sensation. OF NOTE, pt not a candidate for strategies (unable to follow commands). 72 y/o RH F with PMH of Afib on eliquis, CAD s/p stents on aspirin, arthritis, hypothyroidism, carpal tunnel syndrome, lymphedema, recent removal of left ureteral stent & stone (8/16/21) p/w subacute R thalamic infarct, potential age-indeterminate paramedian right midbrain infarct, high-grade prox-mid basilar stenosis, R ?P1 occlusion versus hypoplastic R P1. Pt was seen today for Modified Barium Swallow Study (MBS) which revealed moderate dillon-pharyngeal dysphagia. Pt was trialed with honey-thick liquids, nectar-thick liquids, thin liquids, thin puree, thick puree, mechanical soft, and soft solid. Pt demonstrated anterior leakage and premature spillage to the pyriform sinus with SILENT ASPIRATION of thin liquids. Single instance of silent aspiration of nectar-thick liquids (2/2 presence of large bore NGT resulting in incomplete epiglottic inversion/incomplete laryngeal closure). Once large bore NGT removed, NO further episodes or penetration or aspiration with nectar-thick liquids. Trace shallow transient penetration of honey-thick liquids with complete retrieval (suspect 2/2 negative impact of NGT). Although, pt demonstrated penetration with full retrieval with small cup sips of thin liquid, this clinician is not recommending thin liquids as pt will likely be fed via straw given inability to self feed. No penetration or aspiration with puree textures, mechanical soft textures, or soft solids. Prolonged and disorganized mastication of soft solid. Improvement in timeliness with mechanical soft texture. Disorders: reduced lingual strength/ROM/Rate of motion, reduced BOT to posterior pharyngeal wall contact, reduced laryngeal closure, reduced supraglottic sensation, and reduced subglottic sensation. OF NOTE, pt not a candidate for strategies (unable to follow commands).

## 2021-08-20 NOTE — RAPID RESPONSE TEAM SUMMARY - NSSITUATIONBACKGROUNDRRT_GEN_ALL_CORE
71-year-old PMHx afib on eliquis, CAD s/p stents on aspirin, arthritis, hypothyroidism, lymphedema p/w left hemiparesis.  CT head (8/17/2021)  showed subtle hypodensity in the right paramedian ventral midbrain and paramedian thalamus suggestive of acute infarction; a more well-defined hypodensity involving the right anterior thalamus consistent with a possible subacute infarct.  RRT called for hypotension 76/40, other VSS. On exam, more lethargic than baseline but rest of neuro exam baseline, as per primary team at bedside. Pt had been restarted on home losartan and cardizem today, including cardizem given at 5pm. Started on 1L IVF bolus. DDx likely AMS in setting of hypotension from recent med administration vs metabolic encephalopathy vs evolution of CVA. Labs obtained. BP improved to 95/57. Patient sent for CT head.

## 2021-08-21 LAB
ALBUMIN SERPL ELPH-MCNC: 2.2 G/DL — LOW (ref 3.3–5)
ALP SERPL-CCNC: 64 U/L — SIGNIFICANT CHANGE UP (ref 40–120)
ALT FLD-CCNC: 6 U/L — LOW (ref 10–45)
ANION GAP SERPL CALC-SCNC: 11 MMOL/L — SIGNIFICANT CHANGE UP (ref 5–17)
AST SERPL-CCNC: 10 U/L — SIGNIFICANT CHANGE UP (ref 10–40)
BILIRUB SERPL-MCNC: 0.4 MG/DL — SIGNIFICANT CHANGE UP (ref 0.2–1.2)
BUN SERPL-MCNC: 28 MG/DL — HIGH (ref 7–23)
CALCIUM SERPL-MCNC: 7.8 MG/DL — LOW (ref 8.4–10.5)
CHLORIDE SERPL-SCNC: 116 MMOL/L — HIGH (ref 96–108)
CO2 SERPL-SCNC: 20 MMOL/L — LOW (ref 22–31)
CREAT SERPL-MCNC: 0.88 MG/DL — SIGNIFICANT CHANGE UP (ref 0.5–1.3)
GLUCOSE SERPL-MCNC: 105 MG/DL — HIGH (ref 70–99)
HCT VFR BLD CALC: 34.8 % — SIGNIFICANT CHANGE UP (ref 34.5–45)
HGB BLD-MCNC: 10.8 G/DL — LOW (ref 11.5–15.5)
MCHC RBC-ENTMCNC: 28.9 PG — SIGNIFICANT CHANGE UP (ref 27–34)
MCHC RBC-ENTMCNC: 31 GM/DL — LOW (ref 32–36)
MCV RBC AUTO: 93 FL — SIGNIFICANT CHANGE UP (ref 80–100)
NRBC # BLD: 0 /100 WBCS — SIGNIFICANT CHANGE UP (ref 0–0)
PLATELET # BLD AUTO: 263 K/UL — SIGNIFICANT CHANGE UP (ref 150–400)
POTASSIUM SERPL-MCNC: 3.7 MMOL/L — SIGNIFICANT CHANGE UP (ref 3.5–5.3)
POTASSIUM SERPL-SCNC: 3.7 MMOL/L — SIGNIFICANT CHANGE UP (ref 3.5–5.3)
PROT SERPL-MCNC: 5.1 G/DL — LOW (ref 6–8.3)
RBC # BLD: 3.74 M/UL — LOW (ref 3.8–5.2)
RBC # FLD: 14.8 % — HIGH (ref 10.3–14.5)
SODIUM SERPL-SCNC: 147 MMOL/L — HIGH (ref 135–145)
WBC # BLD: 9.28 K/UL — SIGNIFICANT CHANGE UP (ref 3.8–10.5)
WBC # FLD AUTO: 9.28 K/UL — SIGNIFICANT CHANGE UP (ref 3.8–10.5)

## 2021-08-21 PROCEDURE — 93010 ELECTROCARDIOGRAM REPORT: CPT

## 2021-08-21 RX ORDER — MINERAL OIL
133 OIL (ML) MISCELLANEOUS ONCE
Refills: 0 | Status: COMPLETED | OUTPATIENT
Start: 2021-08-21 | End: 2021-08-22

## 2021-08-21 RX ADMIN — ATORVASTATIN CALCIUM 80 MILLIGRAM(S): 80 TABLET, FILM COATED ORAL at 21:02

## 2021-08-21 RX ADMIN — APIXABAN 5 MILLIGRAM(S): 2.5 TABLET, FILM COATED ORAL at 05:55

## 2021-08-21 RX ADMIN — TAMSULOSIN HYDROCHLORIDE 0.4 MILLIGRAM(S): 0.4 CAPSULE ORAL at 21:01

## 2021-08-21 RX ADMIN — Medication 30 MILLIGRAM(S): at 05:52

## 2021-08-21 RX ADMIN — APIXABAN 5 MILLIGRAM(S): 2.5 TABLET, FILM COATED ORAL at 17:09

## 2021-08-21 RX ADMIN — Medication 37.5 MICROGRAM(S): at 21:02

## 2021-08-21 RX ADMIN — Medication 81 MILLIGRAM(S): at 11:13

## 2021-08-21 RX ADMIN — PANTOPRAZOLE SODIUM 40 MILLIGRAM(S): 20 TABLET, DELAYED RELEASE ORAL at 05:55

## 2021-08-21 RX ADMIN — Medication 30 MILLIGRAM(S): at 11:13

## 2021-08-21 RX ADMIN — PANTOPRAZOLE SODIUM 40 MILLIGRAM(S): 20 TABLET, DELAYED RELEASE ORAL at 17:09

## 2021-08-21 RX ADMIN — Medication 30 MILLIGRAM(S): at 17:09

## 2021-08-21 RX ADMIN — MIRABEGRON 25 MILLIGRAM(S): 50 TABLET, EXTENDED RELEASE ORAL at 11:13

## 2021-08-21 NOTE — PROGRESS NOTE ADULT - ASSESSMENT
71-year-old right-handed lady first evaluated at [facility] on 8/18/2021 with left hemiparesis.  CT head (8/17/2021)  showed subtle hypodensity in the right paramedian ventral midbrain and paramedian thalamus suggestive of acute infarction; a more well-defined hypodensity involving the right anterior thalamus consistent with a possible subacute infarct.  CTA neck (8/17/2021) was unremarkable. At baseline, she is wheelchair-bound, apparently due to "arthritis" and also with most likely at least mild cognitive impairment.  She has a history of atrial fibrillation, on Eliquis, and also on aspirin for coronary stents which were implanted years ago.  The Eliquis was held since approximately 8/12/2021, and aspirin was held since approximately 8/9/2021 for ureteral stents on 8/15/2021.  She was last known normal at around 6 PM on 8/16/2021.  CTA head (8/17/2021)  showed right V4 occlusion; proximal basilar stenosis, officially read as severe but  perhaps moderate, with more distal diffuse fusiform dilatation or perhaps simply ectasia of the basilar; fusiform dilatation of the right ICA.  MRI brain (8/18/2021)  showed an acute small infarct involving the right paramedian ventral midbrain extending to the ventral border of the midbrain, with acute infarction involving the right paramedian versus anterior thalamic region (but unclear to me whether this corresponded to the apparently subacute right anterior thalamic infarct seen on CT).      Impression.  On 8/17/2021 she was found by her nursing home attendant with a left hemiparesis.  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  Mechanism is uncertain, either cardioembolism to the right P1 segment, related to atrial fibrillation, since the infarct extends to the ventral border of the midbrain, versus intracranial large artery atherosclerosis (basilar stenosis) with artery-artery embolism, versus small vessel disease (stroke of "unknown" cause, but suspect cardioembolism).       NEURO: neurologically noted with hypotension yesterday, improved after IVF. Continue close monitoring for neurologic deterioration, permissive HTN with gradual normotension as tolerated, titrate statin to LDL goal less than 70, MRI Brain as noted. Physical therapy/OT/PMR: [facility].    ANTITHROMBOTIC THERAPY: ASA to be continued per d/w DR. Lisker. Restarted Eliquis 5mg BID for afib on 8/20.     PULMONARY: CXR clear (8/17), protecting airway, saturating well     CARDIOVASCULAR: Cardiac monitoring to ensure rate control, will home regimen gradually as tolerated                              SBP goal: 140-180mmHg with gradual normotension as tolerated avoiding rapid fluctuations and hypotension     GASTROINTESTINAL: dysphagia screen failed, SLP eval for NPO and re-eval. MBS on 8/20 passed with recommendations for D2 nectar diet.      Diet: D2 Nectar     RENAL: BUN slightly elevated /Cr without acute change, good urine output , monitor for further hypernatremia, KCl supplement for hypokalemia, now resolved.     Na Goal: Greater than 135     Cruz: No    HEMATOLOGY: H/H without anemia, no active bleeding noted, Platelets 263, she should have all age and risk appropriate malignancy screenings, LE duplex no evidence for DVT 8/18/21.     DVT ppx: Heparin s.c [] LMWH [x]     ID: afebrile, no leukocytosis , no si/sx of infection     DISPOSITION: [facility]     CORE MEASURES:        Admission NIHSS: 11     TPA: [] YES [x] NO      LDL/HDL: 132/32     Depression Screen: na- unable to participate      Statin Therapy: y      Dysphagia Screen: [] PASS [x] FAIL     Smoking [] YES [x] NO      Afib [x] YES [] NO     Stroke Education [x] YES [] NO    Obtain screening lower extremity venous ultrasound in patients who meet 1 or more of the following criteria as patient is high risk for DVT/PE on admission:   [] History of DVT/PE  []Hypercoagulable states (Factor V Leiden, Cancer, OCP, etc. )  [x]Prolonged immobility (hemiplegia/hemiparesis/post operative or any other extended immobilization)  [x Transferred from outside facility (Rehab or Long term care)  [] Age </= to 50   71-year-old right-handed lady first evaluated at [facility] on 8/18/2021 with left hemiparesis.  CT head (8/17/2021)  showed subtle hypodensity in the right paramedian ventral midbrain and paramedian thalamus suggestive of acute infarction; a more well-defined hypodensity involving the right anterior thalamus consistent with a possible subacute infarct.  CTA neck (8/17/2021) was unremarkable. At baseline, she is wheelchair-bound, apparently due to "arthritis" and also with most likely at least mild cognitive impairment.  She has a history of atrial fibrillation, on Eliquis, and also on aspirin for coronary stents which were implanted years ago.  The Eliquis was held since approximately 8/12/2021, and aspirin was held since approximately 8/9/2021 for ureteral stents on 8/15/2021.  She was last known normal at around 6 PM on 8/16/2021.  CTA head (8/17/2021)  showed right V4 occlusion; proximal basilar stenosis, officially read as severe but  perhaps moderate, with more distal diffuse fusiform dilatation or perhaps simply ectasia of the basilar; fusiform dilatation of the right ICA.  MRI brain (8/18/2021)  showed an acute small infarct involving the right paramedian ventral midbrain extending to the ventral border of the midbrain, with acute infarction involving the right paramedian versus anterior thalamic region (but unclear to me whether this corresponded to the apparently subacute right anterior thalamic infarct seen on CT).      Impression.  On 8/17/2021 she was found by her nursing home attendant with a left hemiparesis.  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  Mechanism is uncertain, either cardioembolism to the right P1 segment, related to atrial fibrillation, since the infarct extends to the ventral border of the midbrain, versus intracranial large artery atherosclerosis (basilar stenosis) with artery-artery embolism, versus small vessel disease (stroke of "unknown" cause, but suspect cardioembolism).       NEURO: neurologically noted with hypotension yesterday, improved after IVF overnight.  This morning more lethargic on exam. Continue close monitoring for neurologic deterioration,  gradual normotension as tolerated, titrate statin to LDL goal less than 70, MRI Brain as noted. Physical therapy/OT/PMR: [facility].    ANTITHROMBOTIC THERAPY: ASA to be continued per d/w DR. Lisker. Restarted Eliquis 5mg BID for afib on 8/20.     PULMONARY: CXR clear (8/17), protecting airway, saturating well     CARDIOVASCULAR: Cardiac monitoring to ensure rate control, will home regimen gradually as tolerated, holding home losartan for now as blood pressures in 130-140s systolic.                           SBP goal: 140-180mmHg with gradual normotension as tolerated avoiding rapid fluctuations and hypotension     GASTROINTESTINAL: dysphagia screen failed, SLP eval for NPO and re-eval. MBS on 8/20 passed with recommendations for D2 nectar diet.      Diet: D2 Nectar     RENAL: BUN slightly elevated /Cr without acute change, good urine output , monitor for further hypernatremia, KCl supplement for hypokalemia, now resolved.     Na Goal: Greater than 135     Cruz: No    HEMATOLOGY: H/H without anemia, no active bleeding noted, Platelets 263, she should have all age and risk appropriate malignancy screenings, LE duplex no evidence for DVT 8/18/21.     DVT ppx: Heparin s.c [] LMWH [x]     ID: afebrile, no leukocytosis , no si/sx of infection     DISPOSITION: [facility]     CORE MEASURES:        Admission NIHSS: 11     TPA: [] YES [x] NO      LDL/HDL: 132/32     Depression Screen: na- unable to participate      Statin Therapy: y      Dysphagia Screen: [] PASS [x] FAIL     Smoking [] YES [x] NO      Afib [x] YES [] NO     Stroke Education [x] YES [] NO    Obtain screening lower extremity venous ultrasound in patients who meet 1 or more of the following criteria as patient is high risk for DVT/PE on admission:   [] History of DVT/PE  []Hypercoagulable states (Factor V Leiden, Cancer, OCP, etc. )  [x]Prolonged immobility (hemiplegia/hemiparesis/post operative or any other extended immobilization)  [x Transferred from outside facility (Rehab or Long term care)  [] Age </= to 50

## 2021-08-21 NOTE — PROGRESS NOTE ADULT - SUBJECTIVE AND OBJECTIVE BOX
THE PATIENT WAS SEEN AND EXAMINED BY ME WITH THE HOUSESTAFF AND STROKE TEAM DURING MORNING ROUNDS.   HPI: 72yo RH woman w/ h/o afib on Eliquis (last dose 8/12), CAD s/p stents on aspirin (last dose 8/9), arthritis, hypothyroidism, carpal tunnel syndrome, lymphedema, recent removal of left ureteral stent & stone (8/16/21) presented as code stroke for left hemiparesis with LWK 8/16/21 6p. Aide at bedside explained that patient had left the hospital after left ureteral stone and stent were removed and had last been evaluated at her baseline 6p 8/16. This morning patient was noted by all aides/staff that she seemed "off" and that her face had some asymmetry to it, including drooping of right eyelid, and her speech was "very off". At baseline, patient is wheelchair bound due to underlying arthritis, stays in a nursing home requiring assistance with most ADLs.  NIHSS: 11 pre-MRS: 4, on admission.     SUBJECTIVE: Hypotensive overnight, RRT called, CTH stable, improved after IVF.     acetaminophen    Suspension .. 650 milliGRAM(s) Oral every 6 hours PRN  apixaban 5 milliGRAM(s) Oral every 12 hours  aspirin enteric coated 81 milliGRAM(s) Oral daily  atorvastatin 80 milliGRAM(s) Oral at bedtime  diltiazem    Tablet 30 milliGRAM(s) Oral every 6 hours  levothyroxine Injectable 37.5 MICROGram(s) IV Push at bedtime  losartan 25 milliGRAM(s) Oral daily  mirabegron ER 25 milliGRAM(s) Oral daily  pantoprazole  Injectable 40 milliGRAM(s) IV Push every 12 hours  sodium chloride 0.9%. 1000 milliLiter(s) IV Continuous <Continuous>  tamsulosin 0.4 milliGRAM(s) Oral at bedtime    PHYSICAL EXAM:   Vital Signs Last 24 Hrs  T(C): 36.7 (21 Aug 2021 04:00), Max: 37 (21 Aug 2021 00:00)  T(F): 98.1 (21 Aug 2021 04:00), Max: 98.6 (21 Aug 2021 00:00)  HR: 80 (21 Aug 2021 06:00) (77 - 108)  BP: 135/80 (21 Aug 2021 06:00) (73/51 - 185/103)  BP(mean): 98 (21 Aug 2021 06:00) (48 - 136)  RR: 17 (21 Aug 2021 06:00) (13 - 23)  SpO2: 97% (21 Aug 2021 06:00) (90% - 100%)    General: No acute distress  HEENT: right eye: Moderate-severe ptosis; pupil 3.5 mm (left pupil 3 mm); moderate-severe adduction deficit (barely crosses midline); mild-moderate depression deficit; severe upgaze palsy (bilateral)   Abdomen: Soft, nontender, nondistended   Extremities: No edema    NEUROLOGICAL EXAM:  Mental status: sleeping in bed, awakes to verbal output, remains somewhat alert but  inattentive, requiring repeated questioning for mental status exam; oriented to place and age, follows some simple 1 step commands, IDs objects  Cranial Nerves: right eye: Moderate-severe ptosis; pupil 3.5 mm > left pupil 3 mm; moderate-severe adduction deficit (barely crosses midline); mild-moderate depression deficit; severe upgaze palsy (bilateral); mild or mild-moderate left facial palsy;  moderate dysarthria  Motor exam:  right side moves against gravity, ,left arm-drift; left leg-very slight movement, not versus gravity and with significant complaints of pain (chronic as per report)  Sensation: Intact to light touch   Coordination/ Gait:  gait not assessed     LABS:                        10.8   9.28  )-----------( 263      ( 21 Aug 2021 05:27 )             34.8    08-21    147<H>  |  116<H>  |  28<H>  ----------------------------<  105<H>  3.7   |  20<L>  |  0.88    Ca    7.8<L>      21 Aug 2021 05:27  Phos  3.9     08-20  Mg     2.3     08-20    TPro  5.1<L>  /  Alb  2.2<L>  /  TBili  0.4  /  DBili  x   /  AST  10  /  ALT  6<L>  /  AlkPhos  64  08-21  PT/INR - ( 20 Aug 2021 20:22 )   PT: 20.7 sec;   INR: 1.77 ratio         PTT - ( 20 Aug 2021 20:22 )  PTT:47.6 sec      IMAGING: Reviewed by me.     CTH No Cont (8/21/21):  There is mild edema related to known lacunar infarcts in the right thalamus and midbrain. There is no hemorrhage or mass effect. There is moderate underlying chronic microvascular ischemic change with mild central atrophy.    (08.17.21)  Brain CT: No acute hemorrhage, mass effect or extra-axial collections. Interim right thalamic infarct may be subacute. Brain MRI can be obtained for further evaluation.  Neck CTA: No hemodynamically significant stenosis.  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 Unchanged fusiform ectasia proximal basilar artery with high-grade proximal to mid stenosis. Unchanged fusiform dilatation of the cavernous segment of the right internal carotid artery.  No large vessel occlusion of the anterior middle or posterior cerebral arteries.  Perfusion maps: No evidence for core infarct or penumbra. Hypoperfusion within the posterior circulation.    MR Head No Cont (08.18.21)  Acute right thalamic and right midbrain infarct.   THE PATIENT WAS SEEN AND EXAMINED BY ME WITH THE HOUSESTAFF AND STROKE TEAM DURING MORNING ROUNDS.   HPI: 72yo RH woman w/ h/o afib on Eliquis (last dose 8/12), CAD s/p stents on aspirin (last dose 8/9), arthritis, hypothyroidism, carpal tunnel syndrome, lymphedema, recent removal of left ureteral stent & stone (8/16/21) presented as code stroke for left hemiparesis with LWK 8/16/21 6p. Aide at bedside explained that patient had left the hospital after left ureteral stone and stent were removed and had last been evaluated at her baseline 6p 8/16. This morning patient was noted by all aides/staff that she seemed "off" and that her face had some asymmetry to it, including drooping of right eyelid, and her speech was "very off". At baseline, patient is wheelchair bound due to underlying arthritis, stays in a nursing home requiring assistance with most ADLs.  NIHSS: 11 pre-MRS: 4, on admission.     SUBJECTIVE: Hypotensive overnight, RRT called, CTH stable, improved after IVF.     acetaminophen    Suspension .. 650 milliGRAM(s) Oral every 6 hours PRN  apixaban 5 milliGRAM(s) Oral every 12 hours  aspirin enteric coated 81 milliGRAM(s) Oral daily  atorvastatin 80 milliGRAM(s) Oral at bedtime  diltiazem    Tablet 30 milliGRAM(s) Oral every 6 hours  levothyroxine Injectable 37.5 MICROGram(s) IV Push at bedtime  losartan 25 milliGRAM(s) Oral daily  mirabegron ER 25 milliGRAM(s) Oral daily  pantoprazole  Injectable 40 milliGRAM(s) IV Push every 12 hours  sodium chloride 0.9%. 1000 milliLiter(s) IV Continuous <Continuous>  tamsulosin 0.4 milliGRAM(s) Oral at bedtime    PHYSICAL EXAM:   Vital Signs Last 24 Hrs  T(C): 36.7 (21 Aug 2021 04:00), Max: 37 (21 Aug 2021 00:00)  T(F): 98.1 (21 Aug 2021 04:00), Max: 98.6 (21 Aug 2021 00:00)  HR: 80 (21 Aug 2021 06:00) (77 - 108)  BP: 135/80 (21 Aug 2021 06:00) (73/51 - 185/103)  BP(mean): 98 (21 Aug 2021 06:00) (48 - 136)  RR: 17 (21 Aug 2021 06:00) (13 - 23)  SpO2: 97% (21 Aug 2021 06:00) (90% - 100%)    General: No acute distress  HEENT: right eye: Moderate-severe ptosis; pupil 3.5 mm (left pupil 3 mm); moderate-severe adduction deficit (barely crosses midline); mild-moderate depression deficit; severe upgaze palsy (bilateral)   Abdomen: Soft, nontender, nondistended   Extremities: No edema    NEUROLOGICAL EXAM:  Mental status: sleeping in bed, awakes to noxious stimuli, not following commands, not answering any questions   Cranial Nerves: right eye: Moderate-severe ptosis; pupil 3.5 mm > left pupil 3 mm; moderate-severe adduction deficit (barely crosses midline); mild-moderate depression deficit; severe upgaze palsy (bilateral); mild or mild-moderate left facial palsy;  moderate dysarthria  Motor exam:  right side moves against gravity, left arm-drift; left leg-very slight movement, not versus gravity and with significant complaints of pain (chronic as per report)  Sensation: Intact to light touch   Coordination/ Gait:  gait not assessed     LABS:                        10.8   9.28  )-----------( 263      ( 21 Aug 2021 05:27 )             34.8    08-21    147<H>  |  116<H>  |  28<H>  ----------------------------<  105<H>  3.7   |  20<L>  |  0.88    Ca    7.8<L>      21 Aug 2021 05:27  Phos  3.9     08-20  Mg     2.3     08-20    TPro  5.1<L>  /  Alb  2.2<L>  /  TBili  0.4  /  DBili  x   /  AST  10  /  ALT  6<L>  /  AlkPhos  64  08-21  PT/INR - ( 20 Aug 2021 20:22 )   PT: 20.7 sec;   INR: 1.77 ratio         PTT - ( 20 Aug 2021 20:22 )  PTT:47.6 sec      IMAGING: Reviewed by me.     CTH No Cont (8/21/21):  There is mild edema related to known lacunar infarcts in the right thalamus and midbrain. There is no hemorrhage or mass effect. There is moderate underlying chronic microvascular ischemic change with mild central atrophy.    (08.17.21)  Brain CT: No acute hemorrhage, mass effect or extra-axial collections. Interim right thalamic infarct may be subacute. Brain MRI can be obtained for further evaluation.  Neck CTA: No hemodynamically significant stenosis.  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 Unchanged fusiform ectasia proximal basilar artery with high-grade proximal to mid stenosis. Unchanged fusiform dilatation of the cavernous segment of the right internal carotid artery.  No large vessel occlusion of the anterior middle or posterior cerebral arteries.  Perfusion maps: No evidence for core infarct or penumbra. Hypoperfusion within the posterior circulation.    MR Head No Cont (08.18.21)  Acute right thalamic and right midbrain infarct.   THE PATIENT WAS SEEN AND EXAMINED BY ME WITH THE HOUSESTAFF AND STROKE TEAM DURING MORNING ROUNDS.   HPI: 72yo RH woman w/ h/o afib on Eliquis (last dose 8/12), CAD s/p stents on aspirin (last dose 8/9), arthritis, hypothyroidism, carpal tunnel syndrome, lymphedema, recent removal of left ureteral stent & stone (8/16/21) presented as code stroke for left hemiparesis with LWK 8/16/21 6p. Aide at bedside explained that patient had left the hospital after left ureteral stone and stent were removed and had last been evaluated at her baseline 6p 8/16. This morning patient was noted by all aides/staff that she seemed "off" and that her face had some asymmetry to it, including drooping of right eyelid, and her speech was "very off". At baseline, patient is wheelchair bound due to underlying arthritis, stays in a nursing home requiring assistance with most ADLs.  NIHSS: 11 pre-MRS: 4, on admission.     SUBJECTIVE: Hypotensive overnight, RRT called, CTH stable, improved after IVF.     acetaminophen    Suspension .. 650 milliGRAM(s) Oral every 6 hours PRN  apixaban 5 milliGRAM(s) Oral every 12 hours  aspirin enteric coated 81 milliGRAM(s) Oral daily  atorvastatin 80 milliGRAM(s) Oral at bedtime  diltiazem    Tablet 30 milliGRAM(s) Oral every 6 hours  levothyroxine Injectable 37.5 MICROGram(s) IV Push at bedtime  losartan 25 milliGRAM(s) Oral daily  mirabegron ER 25 milliGRAM(s) Oral daily  pantoprazole  Injectable 40 milliGRAM(s) IV Push every 12 hours  sodium chloride 0.9%. 1000 milliLiter(s) IV Continuous <Continuous>  tamsulosin 0.4 milliGRAM(s) Oral at bedtime    PHYSICAL EXAM:   Vital Signs Last 24 Hrs  T(C): 36.7 (21 Aug 2021 04:00), Max: 37 (21 Aug 2021 00:00)  T(F): 98.1 (21 Aug 2021 04:00), Max: 98.6 (21 Aug 2021 00:00)  HR: 80 (21 Aug 2021 06:00) (77 - 108)  BP: 135/80 (21 Aug 2021 06:00) (73/51 - 185/103)  BP(mean): 98 (21 Aug 2021 06:00) (48 - 136)  RR: 17 (21 Aug 2021 06:00) (13 - 23)  SpO2: 97% (21 Aug 2021 06:00) (90% - 100%)    General: No acute distress  HEENT: right eye: Moderate-severe ptosis; pupil 3.5 mm (left pupil 3 mm); moderate-severe adduction deficit (barely crosses midline); mild-moderate depression deficit; severe upgaze palsy (bilateral)   Abdomen: Soft, nontender, nondistended   Extremities: No edema    NEUROLOGICAL EXAM:  Mental status:  Sleeping in bed, awakes to verbal/noxious stimuli. Had some difficulty cooperating with the exam as a whole; somewhat alert but inattentive, requiring repeated questioning for mental status exam; could not state her age or the year, but was oriented to place and age, follows some simple 1 step commands   Cranial Nerves: right eye: Moderate-severe ptosis; pupil 3.5 mm > left pupil 3 mm; moderate-severe adduction deficit (barely crosses midline); mild-moderate depression deficit; severe upgaze palsy (bilateral); mild or mild-moderate left facial palsy;  moderate dysarthria  Motor exam:  right side moves against gravity, left arm-drift; left leg-very slight movement, not versus gravity and with significant complaints of pain (chronic as per report)  Sensation: Intact to light touch   Coordination/ Gait:  gait not assessed     LABS:                        10.8   9.28  )-----------( 263      ( 21 Aug 2021 05:27 )             34.8    08-21    147<H>  |  116<H>  |  28<H>  ----------------------------<  105<H>  3.7   |  20<L>  |  0.88    Ca    7.8<L>      21 Aug 2021 05:27  Phos  3.9     08-20  Mg     2.3     08-20    TPro  5.1<L>  /  Alb  2.2<L>  /  TBili  0.4  /  DBili  x   /  AST  10  /  ALT  6<L>  /  AlkPhos  64  08-21  PT/INR - ( 20 Aug 2021 20:22 )   PT: 20.7 sec;   INR: 1.77 ratio         PTT - ( 20 Aug 2021 20:22 )  PTT:47.6 sec      IMAGING: Reviewed by me.     CTH No Cont (8/21/21):  There is mild edema related to known lacunar infarcts in the right thalamus and midbrain. There is no hemorrhage or mass effect. There is moderate underlying chronic microvascular ischemic change with mild central atrophy.    (08.17.21)  Brain CT: No acute hemorrhage, mass effect or extra-axial collections. Interim right thalamic infarct may be subacute. Brain MRI can be obtained for further evaluation.  Neck CTA: No hemodynamically significant stenosis.  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 Unchanged fusiform ectasia proximal basilar artery with high-grade proximal to mid stenosis. Unchanged fusiform dilatation of the cavernous segment of the right internal carotid artery.  No large vessel occlusion of the anterior middle or posterior cerebral arteries.  Perfusion maps: No evidence for core infarct or penumbra. Hypoperfusion within the posterior circulation.    MR Head No Cont (08.18.21)  Acute right thalamic and right midbrain infarct.

## 2021-08-21 NOTE — PROGRESS NOTE ADULT - ASSESSMENT
Patient is a 70 Y/O female with PMHx of afib on eliquis (last dose date unclear), CAD s/p stents on aspirin (last dose 8/9), arthritis, hypothyroidism, carpal tunnel syndrome, lymphedema, recent removal of left ureteral stent & stone (8/16/21) presenting as code stroke for left hemiparesis with LWK 8/16/21 6p. Aide at bedside explains that patient had left the hospital after left ureteral stone and stent were removed and had last been evaluated at her baseline 6p 8/16. This morning patient was noted by all aides/staff that she seemed "off" and that her face had some asymmetry to it, including drooping of right eyelid, and her speech was "very off".         Problem/Recommendation - 1:  Problem: Stroke. Recommendation: Neurology care appreciated  restrained for safety  speech and swallow   A fib, off AC, resume when okay by neuro  hypernatremia , on NS, switch to D5W 60Cc/hr and repeat tomorrow      Problem/Recommendation - 2:  ·  Problem: Dysphagia.  Recommendation:  failed speech and swallow.   GI eval     Problem/Recommendation - 3:  ·  Problem: Hypothyroid.  Recommendation: synthroid.      Problem/Recommendation - 4:  ·  Problem: Type 2 diabetes mellitus without complication.  Recommendation: sliding scale  hydration as tolerated.      Problem/Recommendation - 5:  ·  Problem: HTN (hypertension).  Recommendation: resume BP meds.      Problem/Recommendation - 6:  Problem: Hypothyroid.

## 2021-08-21 NOTE — PROGRESS NOTE ADULT - SUBJECTIVE AND OBJECTIVE BOX
Chief Complaint:  Patient is a 71y old  Female who presents with a chief complaint of stroke (21 Aug 2021 07:44)      Date of service 08-21-21 @ 15:09      Interval Events:   no acute events  Hospital Medications:  acetaminophen    Suspension .. 650 milliGRAM(s) Oral every 6 hours PRN  apixaban 5 milliGRAM(s) Oral every 12 hours  aspirin enteric coated 81 milliGRAM(s) Oral daily  atorvastatin 80 milliGRAM(s) Oral at bedtime  diltiazem    Tablet 30 milliGRAM(s) Oral every 6 hours  levothyroxine Injectable 37.5 MICROGram(s) IV Push at bedtime  losartan 25 milliGRAM(s) Oral daily  mirabegron ER 25 milliGRAM(s) Oral daily  pantoprazole  Injectable 40 milliGRAM(s) IV Push every 12 hours  sodium chloride 0.9%. 1000 milliLiter(s) IV Continuous <Continuous>  tamsulosin 0.4 milliGRAM(s) Oral at bedtime        Review of Systems:  does not provide  PHYSICAL EXAM:   Vital Signs:  Vital Signs Last 24 Hrs  T(C): 36.7 (21 Aug 2021 04:00), Max: 37 (21 Aug 2021 00:00)  T(F): 98.1 (21 Aug 2021 04:00), Max: 98.6 (21 Aug 2021 00:00)  HR: 65 (21 Aug 2021 14:00) (65 - 100)  BP: 143/76 (21 Aug 2021 14:00) (73/51 - 170/72)  BP(mean): 95 (21 Aug 2021 14:00) (48 - 117)  RR: 13 (21 Aug 2021 14:00) (13 - 23)  SpO2: 100% (21 Aug 2021 14:00) (90% - 100%)  Daily     Daily       PHYSICAL EXAM:     GENERAL:  Appears stated age, well-groomed, well-nourished, no distress  HEENT:  NC/AT,  conjunctivae anicteric, clear and pink,   NECK: supple, trachea midline  CHEST:  Full & symmetric excursion, no increased effort, breath sounds clear  HEART:  Regular rhythm, no JVD  ABDOMEN:  Soft, non-tender, non-distended, normoactive bowel sounds,  no masses , no hepatosplenomegaly  EXTREMITIES:  no cyanosis,clubbing or edema  SKIN:  No rash, erythema, or, ecchymoses, no jaundice  NEURO:  Alert, non-focal, no asterixis  PSYCH: Appropriate affect, oriented to place and time  RECTAL: large stool burden      LABS Personally reviewed by me:                        10.8   9.28  )-----------( 263      ( 21 Aug 2021 05:27 )             34.8     Mean Cell Volume: 93.0 fl (08-21-21 @ 05:27)    08-21    147<H>  |  116<H>  |  28<H>  ----------------------------<  105<H>  3.7   |  20<L>  |  0.88    Ca    7.8<L>      21 Aug 2021 05:27  Phos  3.9     08-20  Mg     2.3     08-20    TPro  5.1<L>  /  Alb  2.2<L>  /  TBili  0.4  /  DBili  x   /  AST  10  /  ALT  6<L>  /  AlkPhos  64  08-21    LIVER FUNCTIONS - ( 21 Aug 2021 05:27 )  Alb: 2.2 g/dL / Pro: 5.1 g/dL / ALK PHOS: 64 U/L / ALT: 6 U/L / AST: 10 U/L / GGT: x           PT/INR - ( 20 Aug 2021 20:22 )   PT: 20.7 sec;   INR: 1.77 ratio         PTT - ( 20 Aug 2021 20:22 )  PTT:47.6 sec                            10.8   9.28  )-----------( 263      ( 21 Aug 2021 05:27 )             34.8                         11.2   9.21  )-----------( 290      ( 20 Aug 2021 20:22 )             35.7                         12.5   9.43  )-----------( 289      ( 20 Aug 2021 05:01 )             39.3                         12.7   9.44  )-----------( 313      ( 19 Aug 2021 05:16 )             39.4       Imaging personally reviewed by me:

## 2021-08-21 NOTE — PROGRESS NOTE ADULT - ASSESSMENT
71 F w acute CVA  1. Dysphagia, cleared for dysphagia diet    2. CVA    3. Constipation, large stool on rectal exam  -smog enema

## 2021-08-21 NOTE — PROGRESS NOTE ADULT - SUBJECTIVE AND OBJECTIVE BOX
Name of Patient : TIANA BROWN  MRN: [id number]  Date of visit: 08-21-21       Subjective: Patient seen and examined. No new events except as noted.       MEDICATIONS:  MEDICATIONS  (STANDING):  apixaban 5 milliGRAM(s) Oral every 12 hours  aspirin enteric coated 81 milliGRAM(s) Oral daily  atorvastatin 80 milliGRAM(s) Oral at bedtime  diltiazem    Tablet 30 milliGRAM(s) Oral every 6 hours  levothyroxine Injectable 37.5 MICROGram(s) IV Push at bedtime  losartan 25 milliGRAM(s) Oral daily  mineral oil enema 133 milliLiter(s) Rectal once  mirabegron ER 25 milliGRAM(s) Oral daily  pantoprazole  Injectable 40 milliGRAM(s) IV Push every 12 hours  sodium chloride 0.9%. 1000 milliLiter(s) (50 mL/Hr) IV Continuous <Continuous>  tamsulosin 0.4 milliGRAM(s) Oral at bedtime      PHYSICAL EXAM:  T(C): 36.8 (08-21-21 @ 18:00), Max: 37 (08-21-21 @ 00:00)  HR: 69 (08-21-21 @ 18:00) (65 - 96)  BP: 152/91 (08-21-21 @ 18:00) (73/51 - 170/72)  RR: 13 (08-21-21 @ 18:00) (13 - 23)  SpO2: 99% (08-21-21 @ 18:00) (90% - 100%)  Wt(kg): --  I&O's Summary        Appearance: calm  HEENT:  PERRLA   Lymphatic: No lymphadenopathy   Cardiovascular: Normal S1 S2, no JVD  Respiratory: normal effort , clear  Gastrointestinal:  Soft, Non-tender  Skin: No rashes,  warm to touch  Psychiatry:  Mood & affect appropriate  Musculuskeletal: No edema      All labs, Imaging and EKGs personally reviewed                           10.8   9.28  )-----------( 263      ( 21 Aug 2021 05:27 )             34.8               08-21    147<H>  |  116<H>  |  28<H>  ----------------------------<  105<H>  3.7   |  20<L>  |  0.88    Ca    7.8<L>      21 Aug 2021 05:27  Phos  3.9     08-20  Mg     2.3     08-20    TPro  5.1<L>  /  Alb  2.2<L>  /  TBili  0.4  /  DBili  x   /  AST  10  /  ALT  6<L>  /  AlkPhos  64  08-21    PT/INR - ( 20 Aug 2021 20:22 )   PT: 20.7 sec;   INR: 1.77 ratio         PTT - ( 20 Aug 2021 20:22 )  PTT:47.6 sec       CARDIAC MARKERS ( 20 Aug 2021 20:22 )  x     / x     / 278 U/L / x     / x                ABG - ( 20 Aug 2021 20:13 )  pH, Arterial: 7.46  pH, Blood: x     /  pCO2: 41    /  pO2: 106   / HCO3: 29    / Base Excess: 4.9   /  SaO2: 98.8

## 2021-08-22 DIAGNOSIS — I48.0 PAROXYSMAL ATRIAL FIBRILLATION: ICD-10-CM

## 2021-08-22 LAB
ALBUMIN SERPL ELPH-MCNC: 3 G/DL — LOW (ref 3.3–5)
ALBUMIN SERPL ELPH-MCNC: 3 G/DL — LOW (ref 3.3–5)
ALP SERPL-CCNC: 82 U/L — SIGNIFICANT CHANGE UP (ref 40–120)
ALP SERPL-CCNC: 86 U/L — SIGNIFICANT CHANGE UP (ref 40–120)
ALT FLD-CCNC: 6 U/L — LOW (ref 10–45)
ALT FLD-CCNC: 7 U/L — LOW (ref 10–45)
ANION GAP SERPL CALC-SCNC: 10 MMOL/L — SIGNIFICANT CHANGE UP (ref 5–17)
ANION GAP SERPL CALC-SCNC: 11 MMOL/L — SIGNIFICANT CHANGE UP (ref 5–17)
AST SERPL-CCNC: 7 U/L — LOW (ref 10–40)
AST SERPL-CCNC: 8 U/L — LOW (ref 10–40)
BILIRUB SERPL-MCNC: 0.5 MG/DL — SIGNIFICANT CHANGE UP (ref 0.2–1.2)
BILIRUB SERPL-MCNC: 0.7 MG/DL — SIGNIFICANT CHANGE UP (ref 0.2–1.2)
BUN SERPL-MCNC: 22 MG/DL — SIGNIFICANT CHANGE UP (ref 7–23)
BUN SERPL-MCNC: 24 MG/DL — HIGH (ref 7–23)
CALCIUM SERPL-MCNC: 9.5 MG/DL — SIGNIFICANT CHANGE UP (ref 8.4–10.5)
CALCIUM SERPL-MCNC: 9.7 MG/DL — SIGNIFICANT CHANGE UP (ref 8.4–10.5)
CHLORIDE SERPL-SCNC: 114 MMOL/L — HIGH (ref 96–108)
CHLORIDE SERPL-SCNC: 116 MMOL/L — HIGH (ref 96–108)
CO2 SERPL-SCNC: 25 MMOL/L — SIGNIFICANT CHANGE UP (ref 22–31)
CO2 SERPL-SCNC: 26 MMOL/L — SIGNIFICANT CHANGE UP (ref 22–31)
CREAT SERPL-MCNC: 0.79 MG/DL — SIGNIFICANT CHANGE UP (ref 0.5–1.3)
CREAT SERPL-MCNC: 0.86 MG/DL — SIGNIFICANT CHANGE UP (ref 0.5–1.3)
GLUCOSE SERPL-MCNC: 109 MG/DL — HIGH (ref 70–99)
GLUCOSE SERPL-MCNC: 146 MG/DL — HIGH (ref 70–99)
HCT VFR BLD CALC: 37.1 % — SIGNIFICANT CHANGE UP (ref 34.5–45)
HGB BLD-MCNC: 11.5 G/DL — SIGNIFICANT CHANGE UP (ref 11.5–15.5)
MCHC RBC-ENTMCNC: 28.7 PG — SIGNIFICANT CHANGE UP (ref 27–34)
MCHC RBC-ENTMCNC: 31 GM/DL — LOW (ref 32–36)
MCV RBC AUTO: 92.5 FL — SIGNIFICANT CHANGE UP (ref 80–100)
NRBC # BLD: 0 /100 WBCS — SIGNIFICANT CHANGE UP (ref 0–0)
PLATELET # BLD AUTO: 244 K/UL — SIGNIFICANT CHANGE UP (ref 150–400)
POTASSIUM SERPL-MCNC: 3.5 MMOL/L — SIGNIFICANT CHANGE UP (ref 3.5–5.3)
POTASSIUM SERPL-MCNC: 4.3 MMOL/L — SIGNIFICANT CHANGE UP (ref 3.5–5.3)
POTASSIUM SERPL-SCNC: 3.5 MMOL/L — SIGNIFICANT CHANGE UP (ref 3.5–5.3)
POTASSIUM SERPL-SCNC: 4.3 MMOL/L — SIGNIFICANT CHANGE UP (ref 3.5–5.3)
PROT SERPL-MCNC: 6.3 G/DL — SIGNIFICANT CHANGE UP (ref 6–8.3)
PROT SERPL-MCNC: 6.4 G/DL — SIGNIFICANT CHANGE UP (ref 6–8.3)
RBC # BLD: 4.01 M/UL — SIGNIFICANT CHANGE UP (ref 3.8–5.2)
RBC # FLD: 14.4 % — SIGNIFICANT CHANGE UP (ref 10.3–14.5)
SODIUM SERPL-SCNC: 150 MMOL/L — HIGH (ref 135–145)
SODIUM SERPL-SCNC: 152 MMOL/L — HIGH (ref 135–145)
WBC # BLD: 7.5 K/UL — SIGNIFICANT CHANGE UP (ref 3.8–10.5)
WBC # FLD AUTO: 7.5 K/UL — SIGNIFICANT CHANGE UP (ref 3.8–10.5)

## 2021-08-22 RX ORDER — DILTIAZEM HCL 120 MG
30 CAPSULE, EXT RELEASE 24 HR ORAL EVERY 6 HOURS
Refills: 0 | Status: DISCONTINUED | OUTPATIENT
Start: 2021-08-22 | End: 2021-08-23

## 2021-08-22 RX ORDER — ACETAMINOPHEN 500 MG
650 TABLET ORAL EVERY 6 HOURS
Refills: 0 | Status: DISCONTINUED | OUTPATIENT
Start: 2021-08-22 | End: 2021-08-24

## 2021-08-22 RX ORDER — SODIUM CHLORIDE 9 MG/ML
1000 INJECTION, SOLUTION INTRAVENOUS
Refills: 0 | Status: DISCONTINUED | OUTPATIENT
Start: 2021-08-22 | End: 2021-08-24

## 2021-08-22 RX ORDER — LOSARTAN POTASSIUM 100 MG/1
25 TABLET, FILM COATED ORAL DAILY
Refills: 0 | Status: DISCONTINUED | OUTPATIENT
Start: 2021-08-22 | End: 2021-08-24

## 2021-08-22 RX ADMIN — Medication 650 MILLIGRAM(S): at 21:55

## 2021-08-22 RX ADMIN — MIRABEGRON 25 MILLIGRAM(S): 50 TABLET, EXTENDED RELEASE ORAL at 11:26

## 2021-08-22 RX ADMIN — Medication 30 MILLIGRAM(S): at 11:26

## 2021-08-22 RX ADMIN — TAMSULOSIN HYDROCHLORIDE 0.4 MILLIGRAM(S): 0.4 CAPSULE ORAL at 21:52

## 2021-08-22 RX ADMIN — Medication 30 MILLIGRAM(S): at 17:12

## 2021-08-22 RX ADMIN — APIXABAN 5 MILLIGRAM(S): 2.5 TABLET, FILM COATED ORAL at 17:12

## 2021-08-22 RX ADMIN — PANTOPRAZOLE SODIUM 40 MILLIGRAM(S): 20 TABLET, DELAYED RELEASE ORAL at 17:11

## 2021-08-22 RX ADMIN — PANTOPRAZOLE SODIUM 40 MILLIGRAM(S): 20 TABLET, DELAYED RELEASE ORAL at 05:18

## 2021-08-22 RX ADMIN — ATORVASTATIN CALCIUM 80 MILLIGRAM(S): 80 TABLET, FILM COATED ORAL at 21:52

## 2021-08-22 RX ADMIN — Medication 81 MILLIGRAM(S): at 11:26

## 2021-08-22 RX ADMIN — LOSARTAN POTASSIUM 25 MILLIGRAM(S): 100 TABLET, FILM COATED ORAL at 03:22

## 2021-08-22 RX ADMIN — Medication 37.5 MICROGRAM(S): at 21:55

## 2021-08-22 RX ADMIN — Medication 30 MILLIGRAM(S): at 00:09

## 2021-08-22 RX ADMIN — Medication 650 MILLIGRAM(S): at 22:30

## 2021-08-22 RX ADMIN — Medication 133 MILLILITER(S): at 16:13

## 2021-08-22 RX ADMIN — Medication 30 MILLIGRAM(S): at 05:18

## 2021-08-22 RX ADMIN — APIXABAN 5 MILLIGRAM(S): 2.5 TABLET, FILM COATED ORAL at 05:18

## 2021-08-22 NOTE — PROGRESS NOTE ADULT - ASSESSMENT
Patient is a 72 Y/O female with PMHx of afib on eliquis (last dose date unclear), CAD s/p stents on aspirin (last dose 8/9), arthritis, hypothyroidism, carpal tunnel syndrome, lymphedema, recent removal of left ureteral stent & stone (8/16/21) presenting as code stroke for left hemiparesis with LWK 8/16/21 6p. Aide at bedside explains that patient had left the hospital after left ureteral stone and stent were removed and had last been evaluated at her baseline 6p 8/16. This morning patient was noted by all aides/staff that she seemed "off" and that her face had some asymmetry to it, including drooping of right eyelid, and her speech was "very off".         Problem/Recommendation - 1:  Problem: Stroke. Recommendation: Neurology care appreciated  speech and swallow   A fib, resumed eliquis   hypernatremia , on D51.2 NS monitor na level     Problem/Recommendation - 2:  ·  Problem: Dysphagia.  Recommendation:  failed speech and swallow.   GI eval     Problem/Recommendation - 3:  ·  Problem: Hypothyroid.  Recommendation: synthroid.      Problem/Recommendation - 4:  ·  Problem: Type 2 diabetes mellitus without complication.  Recommendation: sliding scale  hydration as tolerated.      Problem/Recommendation - 5:  ·  Problem: HTN (hypertension).  Recommendation: resume BP meds.      Problem/Recommendation - 6:  Problem: Hypothyroid.

## 2021-08-22 NOTE — PROGRESS NOTE ADULT - ASSESSMENT
71-year-old right-handed lady first evaluated at [facility] on 8/18/2021 with left hemiparesis.  CT head (8/17/2021)  showed subtle hypodensity in the right paramedian ventral midbrain and paramedian thalamus suggestive of acute infarction; a more well-defined hypodensity involving the right anterior thalamus consistent with a possible subacute infarct.  CTA neck (8/17/2021) was unremarkable. At baseline, she is wheelchair-bound, apparently due to "arthritis" and also with most likely at least mild cognitive impairment.  She has a history of atrial fibrillation, on Eliquis, and also on aspirin for coronary stents which were implanted years ago.  The Eliquis was held since approximately 8/12/2021, and aspirin was held since approximately 8/9/2021 for ureteral stents on 8/15/2021.  She was last known normal at around 6 PM on 8/16/2021.  CTA head (8/17/2021)  showed right V4 occlusion; proximal basilar stenosis, officially read as severe but  perhaps moderate, with more distal diffuse fusiform dilatation or perhaps simply ectasia of the basilar; fusiform dilatation of the right ICA.  MRI brain (8/18/2021)  showed an acute small infarct involving the right paramedian ventral midbrain extending to the ventral border of the midbrain, with acute infarction involving the right paramedian versus anterior thalamic region (but unclear to me whether this corresponded to the apparently subacute right anterior thalamic infarct seen on CT).      Impression.  On 8/17/2021 she was found by her nursing home attendant with a left hemiparesis.  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  Mechanism is uncertain, either cardioembolism to the right P1 segment, related to atrial fibrillation, since the infarct extends to the ventral border of the midbrain, versus intracranial large artery atherosclerosis (basilar stenosis) with artery-artery embolism, versus small vessel disease (stroke of "unknown" cause, but suspect cardioembolism).       NEURO: neurologically noted with hypotension yesterday, improved after IVF overnight.  This morning more lethargic on exam. Continue close monitoring for neurologic deterioration,  gradual normotension as tolerated, titrate statin to LDL goal less than 70, MRI Brain as noted. Physical therapy/OT/PMR: [facility].    ANTITHROMBOTIC THERAPY: ASA to be continued per d/w DR. Lisker. Restarted Eliquis 5mg BID for afib on 8/20.     PULMONARY: CXR clear (8/17), protecting airway, saturating well     CARDIOVASCULAR: Cardiac monitoring to ensure rate control, will home regimen gradually as tolerated.                          SBP goal: 140-180mmHg with gradual normotension as tolerated avoiding rapid fluctuations and hypotension     GASTROINTESTINAL: dysphagia screen failed, SLP eval for NPO and re-eval. MBS on 8/20 passed with recommendations for D2 nectar diet.      Diet: D2 Nectar     RENAL: BUN slightly elevated /Cr without acute change, good urine output , dc IVF d/t hypernatremia, KCl supplement for hypokalemia, now resolved.      Na Goal: Greater than 135     Cruz: No    HEMATOLOGY: H/H without anemia, no active bleeding noted, Platelets 244, she should have all age and risk appropriate malignancy screenings, LE duplex no evidence for DVT 8/18/21.     DVT ppx: Heparin s.c [] LMWH [x]     ID: afebrile, no leukocytosis , no si/sx of infection     DISPOSITION: [facility]     CORE MEASURES:        Admission NIHSS: 11     TPA: [] YES [x] NO      LDL/HDL: 132/32     Depression Screen: na- unable to participate      Statin Therapy: y      Dysphagia Screen: [] PASS [x] FAIL     Smoking [] YES [x] NO      Afib [x] YES [] NO     Stroke Education [x] YES [] NO    Obtain screening lower extremity venous ultrasound in patients who meet 1 or more of the following criteria as patient is high risk for DVT/PE on admission:   [] History of DVT/PE  []Hypercoagulable states (Factor V Leiden, Cancer, OCP, etc. )  [x]Prolonged immobility (hemiplegia/hemiparesis/post operative or any other extended immobilization)  [x Transferred from outside facility (Rehab or Long term care)  [] Age </= to 50   71-year-old right-handed lady first evaluated at [facility] on 8/18/2021 with left hemiparesis.  CT head (8/17/2021)  showed subtle hypodensity in the right paramedian ventral midbrain and paramedian thalamus suggestive of acute infarction; a more well-defined hypodensity involving the right anterior thalamus consistent with a possible subacute infarct.  CTA neck (8/17/2021) was unremarkable. At baseline, she is wheelchair-bound, apparently due to "arthritis" and also with most likely at least mild cognitive impairment.  She has a history of atrial fibrillation, on Eliquis, and also on aspirin for coronary stents which were implanted years ago.  The Eliquis was held since approximately 8/12/2021, and aspirin was held since approximately 8/9/2021 for ureteral stents on 8/15/2021.  She was last known normal at around 6 PM on 8/16/2021.  CTA head (8/17/2021)  showed right V4 occlusion; proximal basilar stenosis, officially read as severe but  perhaps moderate, with more distal diffuse fusiform dilatation or perhaps simply ectasia of the basilar; fusiform dilatation of the right ICA.  MRI brain (8/18/2021)  showed an acute small infarct involving the right paramedian ventral midbrain extending to the ventral border of the midbrain, with acute infarction involving the right paramedian versus anterior thalamic region (but unclear to me whether this corresponded to the apparently subacute right anterior thalamic infarct seen on CT).      Impression.  On 8/17/2021 she was found by her nursing home attendant with a left hemiparesis.  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  Mechanism is uncertain, either cardioembolism to the right P1 segment, related to atrial fibrillation, since the infarct extends to the ventral border of the midbrain, versus intracranial large artery atherosclerosis (basilar stenosis) with artery-artery embolism, versus small vessel disease (stroke of "unknown" cause, but suspect cardioembolism).       NEURO: neurologically noted with hypotension on 8/20 improved after IVF, held losartan on 8/23, hypertensive this AM. Restarted losartan. This morning more interactive with exam. Continue close monitoring for neurologic deterioration,  gradual normotension as tolerated, titrate statin to LDL goal less than 70, MRI Brain as noted. Physical therapy/OT/PMR: [facility].    ANTITHROMBOTIC THERAPY: ASA to be continued per d/w DR. Lisker. Restarted Eliquis 5mg BID for afib on 8/20.     PULMONARY: CXR clear (8/17), protecting airway, saturating well     CARDIOVASCULAR: Cardiac monitoring to ensure rate control, will home regimen gradually as tolerated.                          SBP goal: 140-180mmHg with gradual normotension as tolerated avoiding rapid fluctuations and hypotension     GASTROINTESTINAL: dysphagia screen failed, SLP eval for NPO and re-eval. MBS on 8/20 passed with recommendations for D2 nectar diet.      Diet: D2 Nectar     RENAL: BUN slightly elevated /Cr without acute change, good urine output , dc ns d/t hypernatremia, started on D51/2ns, will check BMP q12. KCl supplement for hypokalemia, now resolved.      Na Goal: Greater than 135     Cruz: No    HEMATOLOGY: H/H without anemia, no active bleeding noted, Platelets 244, she should have all age and risk appropriate malignancy screenings, LE duplex no evidence for DVT 8/18/21.     DVT ppx: Heparin s.c [] LMWH [x]     ID: afebrile, no leukocytosis , no si/sx of infection     DISPOSITION: [facility]     CORE MEASURES:        Admission NIHSS: 11     TPA: [] YES [x] NO      LDL/HDL: 132/32     Depression Screen: na- unable to participate      Statin Therapy: y      Dysphagia Screen: [] PASS [x] FAIL     Smoking [] YES [x] NO      Afib [x] YES [] NO     Stroke Education [x] YES [] NO    Obtain screening lower extremity venous ultrasound in patients who meet 1 or more of the following criteria as patient is high risk for DVT/PE on admission:   [] History of DVT/PE  []Hypercoagulable states (Factor V Leiden, Cancer, OCP, etc. )  [x]Prolonged immobility (hemiplegia/hemiparesis/post operative or any other extended immobilization)  [x Transferred from outside facility (Rehab or Long term care)  [] Age </= to 50

## 2021-08-22 NOTE — PROGRESS NOTE ADULT - SUBJECTIVE AND OBJECTIVE BOX
THE PATIENT WAS SEEN AND EXAMINED BY ME WITH THE HOUSESTAFF AND STROKE TEAM DURING MORNING ROUNDS.   HPI: 70yo RH woman w/ h/o afib on Eliquis (last dose 8/12), CAD s/p stents on aspirin (last dose 8/9), arthritis, hypothyroidism, carpal tunnel syndrome, lymphedema, recent removal of left ureteral stent & stone (8/16/21) presented as code stroke for left hemiparesis with LWK 8/16/21 6p. Aide at bedside explained that patient had left the hospital after left ureteral stone and stent were removed and had last been evaluated at her baseline 6p 8/16. This morning patient was noted by all aides/staff that she seemed "off" and that her face had some asymmetry to it, including drooping of right eyelid, and her speech was "very off". At baseline, patient is wheelchair bound due to underlying arthritis, stays in a nursing home requiring assistance with most ADLs.  NIHSS: 11 pre-MRS: 4, on admission.     SUBJECTIVE: Hypertensive overnight, AM BP meds given early.  No new neurologic complaints.      acetaminophen    Suspension .. 650 milliGRAM(s) Oral every 6 hours PRN  apixaban 5 milliGRAM(s) Oral every 12 hours  aspirin enteric coated 81 milliGRAM(s) Oral daily  atorvastatin 80 milliGRAM(s) Oral at bedtime  diltiazem    Tablet 30 milliGRAM(s) Oral every 6 hours  levothyroxine Injectable 37.5 MICROGram(s) IV Push at bedtime  losartan 25 milliGRAM(s) Oral daily  mineral oil enema 133 milliLiter(s) Rectal once  mirabegron ER 25 milliGRAM(s) Oral daily  pantoprazole  Injectable 40 milliGRAM(s) IV Push every 12 hours  sodium chloride 0.9%. 1000 milliLiter(s) IV Continuous <Continuous>  tamsulosin 0.4 milliGRAM(s) Oral at bedtime    PHYSICAL EXAM:   Vital Signs Last 24 Hrs  T(C): 36.6 (22 Aug 2021 04:52), Max: 36.8 (21 Aug 2021 18:00)  T(F): 97.8 (22 Aug 2021 04:52), Max: 98.2 (21 Aug 2021 18:00)  HR: 76 (22 Aug 2021 06:00) (65 - 79)  BP: 177/90 (22 Aug 2021 06:00) (133/81 - 198/85)  BP(mean): 115 (22 Aug 2021 06:00) (91 - 125)  RR: 21 (22 Aug 2021 06:00) (11 - 21)  SpO2: 96% (22 Aug 2021 06:00) (96% - 100%)    General: No acute distress  HEENT: right eye: Moderate-severe ptosis; pupil 3.5 mm (left pupil 3 mm); moderate-severe adduction deficit (barely crosses midline); mild-moderate depression deficit; severe upgaze palsy (bilateral)   Abdomen: Soft, nontender, nondistended   Extremities: No edema    NEUROLOGICAL EXAM:  Mental status:  Sleeping in bed, awakes to verbal/noxious stimuli. Had some difficulty cooperating with the exam as a whole; somewhat alert but inattentive, requiring repeated questioning for mental status exam; could not state her age or the year, but was oriented to place and age, follows some simple 1 step commands   Cranial Nerves: right eye: Moderate-severe ptosis; pupil 3.5 mm > left pupil 3 mm; moderate-severe adduction deficit (barely crosses midline); mild-moderate depression deficit; severe upgaze palsy (bilateral); mild or mild-moderate left facial palsy;  moderate dysarthria  Motor exam:  right side moves against gravity, left arm-drift; left leg-very slight movement, not versus gravity and with significant complaints of pain (chronic as per report)  Sensation: Intact to light touch   Coordination/ Gait:  gait not assessed     LABS:                        11.5   7.50  )-----------( 244      ( 22 Aug 2021 05:08 )             37.1    08-22    152<H>  |  116<H>  |  24<H>  ----------------------------<  109<H>  4.3   |  26  |  0.86    Ca    9.7      22 Aug 2021 05:08  Phos  3.9     08-20  Mg     2.3     08-20    TPro  6.4  /  Alb  3.0<L>  /  TBili  0.7  /  DBili  x   /  AST  8<L>  /  ALT  7<L>  /  AlkPhos  86  08-22  PT/INR - ( 20 Aug 2021 20:22 )   PT: 20.7 sec;   INR: 1.77 ratio         PTT - ( 20 Aug 2021 20:22 )  PTT:47.6 sec      IMAGING: Reviewed by me.     CTH No Cont (8/21/21):  There is mild edema related to known lacunar infarcts in the right thalamus and midbrain. There is no hemorrhage or mass effect. There is moderate underlying chronic microvascular ischemic change with mild central atrophy.    (08.17.21)  Brain CT: No acute hemorrhage, mass effect or extra-axial collections. Interim right thalamic infarct may be subacute. Brain MRI can be obtained for further evaluation.  Neck CTA: No hemodynamically significant stenosis.  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 Unchanged fusiform ectasia proximal basilar artery with high-grade proximal to mid stenosis. Unchanged fusiform dilatation of the cavernous segment of the right internal carotid artery.  No large vessel occlusion of the anterior middle or posterior cerebral arteries.  Perfusion maps: No evidence for core infarct or penumbra. Hypoperfusion within the posterior circulation.    MR Head No Cont (08.18.21)  Acute right thalamic and right midbrain infarct.     THE PATIENT WAS SEEN AND EXAMINED BY ME WITH THE HOUSESTAFF AND STROKE TEAM DURING MORNING ROUNDS.   HPI: 70yo RH woman w/ h/o afib on Eliquis (last dose 8/12), CAD s/p stents on aspirin (last dose 8/9), arthritis, hypothyroidism, carpal tunnel syndrome, lymphedema, recent removal of left ureteral stent & stone (8/16/21) presented as code stroke for left hemiparesis with LWK 8/16/21 6p. Aide at bedside explained that patient had left the hospital after left ureteral stone and stent were removed and had last been evaluated at her baseline 6p 8/16. This morning patient was noted by all aides/staff that she seemed "off" and that her face had some asymmetry to it, including drooping of right eyelid, and her speech was "very off". At baseline, patient is wheelchair bound due to underlying arthritis, stays in a nursing home requiring assistance with most ADLs.  NIHSS: 11 pre-MRS: 4, on admission.     SUBJECTIVE: Hypertensive overnight, AM BP meds given early.  No new neurologic complaints.      acetaminophen    Suspension .. 650 milliGRAM(s) Oral every 6 hours PRN  apixaban 5 milliGRAM(s) Oral every 12 hours  aspirin enteric coated 81 milliGRAM(s) Oral daily  atorvastatin 80 milliGRAM(s) Oral at bedtime  diltiazem    Tablet 30 milliGRAM(s) Oral every 6 hours  levothyroxine Injectable 37.5 MICROGram(s) IV Push at bedtime  losartan 25 milliGRAM(s) Oral daily  mineral oil enema 133 milliLiter(s) Rectal once  mirabegron ER 25 milliGRAM(s) Oral daily  pantoprazole  Injectable 40 milliGRAM(s) IV Push every 12 hours  sodium chloride 0.9%. 1000 milliLiter(s) IV Continuous <Continuous>  tamsulosin 0.4 milliGRAM(s) Oral at bedtime    PHYSICAL EXAM:   Vital Signs Last 24 Hrs  T(C): 36.6 (22 Aug 2021 04:52), Max: 36.8 (21 Aug 2021 18:00)  T(F): 97.8 (22 Aug 2021 04:52), Max: 98.2 (21 Aug 2021 18:00)  HR: 76 (22 Aug 2021 06:00) (65 - 79)  BP: 177/90 (22 Aug 2021 06:00) (133/81 - 198/85)  BP(mean): 115 (22 Aug 2021 06:00) (91 - 125)  RR: 21 (22 Aug 2021 06:00) (11 - 21)  SpO2: 96% (22 Aug 2021 06:00) (96% - 100%)    General: No acute distress  HEENT: right eye: Moderate-severe ptosis; pupil 3.5 mm (left pupil 3 mm); moderate-severe adduction deficit (barely crosses midline); mild-moderate depression deficit; severe upgaze palsy (bilateral)   Abdomen: Soft, nontender, nondistended   Extremities: No edema    NEUROLOGICAL EXAM:  Mental status:  Sleeping in bed, awakes to verbal/noxious stimuli. Had some difficulty cooperating with the exam as a whole; somewhat alert but inattentive,  could not state the year, but was oriented to place and age, follows some simple 1 step commands   Cranial Nerves: right eye: Moderate-severe ptosis; pupil 3.5 mm > left pupil 3 mm; moderate-severe adduction deficit (barely crosses midline); mild-moderate depression deficit; severe upgaze palsy (bilateral); mild or mild-moderate left facial palsy;  moderate dysarthria  Motor exam:  right side moves against gravity, left arm-antigravity, left leg-very slight movement, not versus gravity and with significant complaints of pain (chronic as per report)  Sensation: Intact to light touch   Coordination/ Gait:  gait not assessed     LABS:                        11.5   7.50  )-----------( 244      ( 22 Aug 2021 05:08 )             37.1    08-22    152<H>  |  116<H>  |  24<H>  ----------------------------<  109<H>  4.3   |  26  |  0.86    Ca    9.7      22 Aug 2021 05:08  Phos  3.9     08-20  Mg     2.3     08-20    TPro  6.4  /  Alb  3.0<L>  /  TBili  0.7  /  DBili  x   /  AST  8<L>  /  ALT  7<L>  /  AlkPhos  86  08-22  PT/INR - ( 20 Aug 2021 20:22 )   PT: 20.7 sec;   INR: 1.77 ratio         PTT - ( 20 Aug 2021 20:22 )  PTT:47.6 sec      IMAGING: Reviewed by me.     CTH No Cont (8/21/21):  There is mild edema related to known lacunar infarcts in the right thalamus and midbrain. There is no hemorrhage or mass effect. There is moderate underlying chronic microvascular ischemic change with mild central atrophy.    (08.17.21)  Brain CT: No acute hemorrhage, mass effect or extra-axial collections. Interim right thalamic infarct may be subacute. Brain MRI can be obtained for further evaluation.  Neck CTA: No hemodynamically significant stenosis.  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 Unchanged fusiform ectasia proximal basilar artery with high-grade proximal to mid stenosis. Unchanged fusiform dilatation of the cavernous segment of the right internal carotid artery.  No large vessel occlusion of the anterior middle or posterior cerebral arteries.  Perfusion maps: No evidence for core infarct or penumbra. Hypoperfusion within the posterior circulation.    MR Head No Cont (08.18.21)  Acute right thalamic and right midbrain infarct.

## 2021-08-22 NOTE — PROGRESS NOTE ADULT - SUBJECTIVE AND OBJECTIVE BOX
Name of Patient : TIANA BROWN  MRN: [id number]  Date of visit: 08-22-21       Subjective: Patient seen and examined. No new events except as noted.   Doing okay     MEDICATIONS:  MEDICATIONS  (STANDING):  apixaban 5 milliGRAM(s) Oral every 12 hours  aspirin enteric coated 81 milliGRAM(s) Oral daily  atorvastatin 80 milliGRAM(s) Oral at bedtime  dextrose 5% + sodium chloride 0.45%. 1000 milliLiter(s) (75 mL/Hr) IV Continuous <Continuous>  diltiazem    Tablet 30 milliGRAM(s) Oral every 6 hours  levothyroxine Injectable 37.5 MICROGram(s) IV Push at bedtime  losartan 25 milliGRAM(s) Oral daily  mirabegron ER 25 milliGRAM(s) Oral daily  pantoprazole  Injectable 40 milliGRAM(s) IV Push every 12 hours  tamsulosin 0.4 milliGRAM(s) Oral at bedtime      PHYSICAL EXAM:  T(C): 36.3 (08-22-21 @ 20:00), Max: 36.8 (08-22-21 @ 08:00)  HR: 75 (08-22-21 @ 21:28) (70 - 91)  BP: 171/95 (08-22-21 @ 21:28) (154/89 - 198/85)  RR: 15 (08-22-21 @ 20:00) (12 - 21)  SpO2: 100% (08-22-21 @ 20:00) (95% - 100%)  Wt(kg): --  I&O's Summary    21 Aug 2021 07:01  -  22 Aug 2021 07:00  --------------------------------------------------------  IN: 840 mL / OUT: 300 mL / NET: 540 mL          Appearance: calm 	  HEENT:  PERRLA   Lymphatic: No lymphadenopathy   Cardiovascular: Normal S1 S2, no JVD  Respiratory: normal effort , clear  Gastrointestinal:  Soft, Non-tender  Skin: No rashes,  warm to touch  Psychiatry:  Mood & affect appropriate  Musculuskeletal: Weakness       All labs, Imaging and EKGs personally reviewed       08-21-21 @ 07:01 - 08-22-21 @ 07:00  --------------------------------------------------------  IN: 840 mL / OUT: 300 mL / NET: 540 mL                          11.5   7.50  )-----------( 244      ( 22 Aug 2021 05:08 )             37.1               08-22    150<H>  |  114<H>  |  22  ----------------------------<  146<H>  3.5   |  25  |  0.79    Ca    9.5      22 Aug 2021 16:52    TPro  6.3  /  Alb  3.0<L>  /  TBili  0.5  /  DBili  x   /  AST  7<L>  /  ALT  6<L>  /  AlkPhos  82  08-22

## 2021-08-23 LAB
ALBUMIN SERPL ELPH-MCNC: 3 G/DL — LOW (ref 3.3–5)
ALP SERPL-CCNC: 81 U/L — SIGNIFICANT CHANGE UP (ref 40–120)
ALT FLD-CCNC: 5 U/L — LOW (ref 10–45)
ANION GAP SERPL CALC-SCNC: 12 MMOL/L — SIGNIFICANT CHANGE UP (ref 5–17)
AST SERPL-CCNC: 7 U/L — LOW (ref 10–40)
BILIRUB SERPL-MCNC: 0.6 MG/DL — SIGNIFICANT CHANGE UP (ref 0.2–1.2)
BUN SERPL-MCNC: 19 MG/DL — SIGNIFICANT CHANGE UP (ref 7–23)
CALCIUM SERPL-MCNC: 9.1 MG/DL — SIGNIFICANT CHANGE UP (ref 8.4–10.5)
CHLORIDE SERPL-SCNC: 112 MMOL/L — HIGH (ref 96–108)
CO2 SERPL-SCNC: 25 MMOL/L — SIGNIFICANT CHANGE UP (ref 22–31)
CREAT SERPL-MCNC: 0.83 MG/DL — SIGNIFICANT CHANGE UP (ref 0.5–1.3)
GLUCOSE SERPL-MCNC: 123 MG/DL — HIGH (ref 70–99)
HCT VFR BLD CALC: 36.5 % — SIGNIFICANT CHANGE UP (ref 34.5–45)
HGB BLD-MCNC: 11.3 G/DL — LOW (ref 11.5–15.5)
MCHC RBC-ENTMCNC: 29.1 PG — SIGNIFICANT CHANGE UP (ref 27–34)
MCHC RBC-ENTMCNC: 31 GM/DL — LOW (ref 32–36)
MCV RBC AUTO: 94.1 FL — SIGNIFICANT CHANGE UP (ref 80–100)
NRBC # BLD: 0 /100 WBCS — SIGNIFICANT CHANGE UP (ref 0–0)
PLATELET # BLD AUTO: 235 K/UL — SIGNIFICANT CHANGE UP (ref 150–400)
POTASSIUM SERPL-MCNC: 3.2 MMOL/L — LOW (ref 3.5–5.3)
POTASSIUM SERPL-SCNC: 3.2 MMOL/L — LOW (ref 3.5–5.3)
PROT SERPL-MCNC: 6.4 G/DL — SIGNIFICANT CHANGE UP (ref 6–8.3)
RBC # BLD: 3.88 M/UL — SIGNIFICANT CHANGE UP (ref 3.8–5.2)
RBC # FLD: 14.2 % — SIGNIFICANT CHANGE UP (ref 10.3–14.5)
SARS-COV-2 RNA SPEC QL NAA+PROBE: SIGNIFICANT CHANGE UP
SODIUM SERPL-SCNC: 149 MMOL/L — HIGH (ref 135–145)
WBC # BLD: 7.21 K/UL — SIGNIFICANT CHANGE UP (ref 3.8–10.5)
WBC # FLD AUTO: 7.21 K/UL — SIGNIFICANT CHANGE UP (ref 3.8–10.5)

## 2021-08-23 PROCEDURE — 93010 ELECTROCARDIOGRAM REPORT: CPT

## 2021-08-23 RX ORDER — DIGOXIN 250 MCG
250 TABLET ORAL
Refills: 0 | Status: DISCONTINUED | OUTPATIENT
Start: 2021-08-23 | End: 2021-08-23

## 2021-08-23 RX ORDER — DILTIAZEM HCL 120 MG
60 CAPSULE, EXT RELEASE 24 HR ORAL EVERY 6 HOURS
Refills: 0 | Status: DISCONTINUED | OUTPATIENT
Start: 2021-08-23 | End: 2021-08-24

## 2021-08-23 RX ORDER — HYDRALAZINE HCL 50 MG
2.5 TABLET ORAL ONCE
Refills: 0 | Status: COMPLETED | OUTPATIENT
Start: 2021-08-23 | End: 2021-08-23

## 2021-08-23 RX ORDER — DILTIAZEM HCL 120 MG
30 CAPSULE, EXT RELEASE 24 HR ORAL ONCE
Refills: 0 | Status: COMPLETED | OUTPATIENT
Start: 2021-08-23 | End: 2021-08-23

## 2021-08-23 RX ORDER — POTASSIUM CHLORIDE 20 MEQ
40 PACKET (EA) ORAL ONCE
Refills: 0 | Status: COMPLETED | OUTPATIENT
Start: 2021-08-23 | End: 2021-08-23

## 2021-08-23 RX ORDER — SODIUM CHLORIDE 9 MG/ML
250 INJECTION INTRAMUSCULAR; INTRAVENOUS; SUBCUTANEOUS ONCE
Refills: 0 | Status: COMPLETED | OUTPATIENT
Start: 2021-08-23 | End: 2021-08-23

## 2021-08-23 RX ORDER — METOPROLOL TARTRATE 50 MG
2.5 TABLET ORAL ONCE
Refills: 0 | Status: COMPLETED | OUTPATIENT
Start: 2021-08-23 | End: 2021-08-23

## 2021-08-23 RX ORDER — METOPROLOL TARTRATE 50 MG
5 TABLET ORAL ONCE
Refills: 0 | Status: COMPLETED | OUTPATIENT
Start: 2021-08-23 | End: 2021-08-23

## 2021-08-23 RX ADMIN — Medication 5 MILLIGRAM(S): at 08:50

## 2021-08-23 RX ADMIN — Medication 2.5 MILLIGRAM(S): at 04:33

## 2021-08-23 RX ADMIN — Medication 2.5 MILLIGRAM(S): at 08:15

## 2021-08-23 RX ADMIN — Medication 30 MILLIGRAM(S): at 09:10

## 2021-08-23 RX ADMIN — Medication 40 MILLIEQUIVALENT(S): at 07:36

## 2021-08-23 RX ADMIN — SODIUM CHLORIDE 500 MILLILITER(S): 9 INJECTION INTRAMUSCULAR; INTRAVENOUS; SUBCUTANEOUS at 12:43

## 2021-08-23 RX ADMIN — SODIUM CHLORIDE 75 MILLILITER(S): 9 INJECTION, SOLUTION INTRAVENOUS at 17:15

## 2021-08-23 RX ADMIN — Medication 81 MILLIGRAM(S): at 11:27

## 2021-08-23 RX ADMIN — APIXABAN 5 MILLIGRAM(S): 2.5 TABLET, FILM COATED ORAL at 17:11

## 2021-08-23 RX ADMIN — ATORVASTATIN CALCIUM 80 MILLIGRAM(S): 80 TABLET, FILM COATED ORAL at 21:42

## 2021-08-23 RX ADMIN — MIRABEGRON 25 MILLIGRAM(S): 50 TABLET, EXTENDED RELEASE ORAL at 11:27

## 2021-08-23 RX ADMIN — APIXABAN 5 MILLIGRAM(S): 2.5 TABLET, FILM COATED ORAL at 05:41

## 2021-08-23 RX ADMIN — Medication 60 MILLIGRAM(S): at 17:11

## 2021-08-23 RX ADMIN — PANTOPRAZOLE SODIUM 40 MILLIGRAM(S): 20 TABLET, DELAYED RELEASE ORAL at 05:41

## 2021-08-23 RX ADMIN — PANTOPRAZOLE SODIUM 40 MILLIGRAM(S): 20 TABLET, DELAYED RELEASE ORAL at 17:11

## 2021-08-23 RX ADMIN — Medication 60 MILLIGRAM(S): at 11:27

## 2021-08-23 RX ADMIN — Medication 30 MILLIGRAM(S): at 00:00

## 2021-08-23 RX ADMIN — Medication 30 MILLIGRAM(S): at 05:41

## 2021-08-23 RX ADMIN — TAMSULOSIN HYDROCHLORIDE 0.4 MILLIGRAM(S): 0.4 CAPSULE ORAL at 21:42

## 2021-08-23 RX ADMIN — LOSARTAN POTASSIUM 25 MILLIGRAM(S): 100 TABLET, FILM COATED ORAL at 02:50

## 2021-08-23 RX ADMIN — Medication 37.5 MICROGRAM(S): at 21:42

## 2021-08-23 RX ADMIN — Medication 250 MICROGRAM(S): at 14:34

## 2021-08-23 RX ADMIN — Medication 2.5 MILLIGRAM(S): at 07:29

## 2021-08-23 NOTE — PROVIDER CONTACT NOTE (OTHER) - SITUATION
At 0200, /105, HR 72. At 0239, repeat /102, HR 74
At 0200, /99, HR 71. Rechecked at 0229, /91, HR 72

## 2021-08-23 NOTE — PROGRESS NOTE ADULT - SUBJECTIVE AND OBJECTIVE BOX
Name of Patient : TIANA BROWN  MRN: [id number]  Date of visit: 08-23-21       Subjective: Patient seen and examined. No new events except as noted.   elevated HR a flutter RVR   asymptomatic         MEDICATIONS:  MEDICATIONS  (STANDING):  apixaban 5 milliGRAM(s) Oral every 12 hours  aspirin enteric coated 81 milliGRAM(s) Oral daily  atorvastatin 80 milliGRAM(s) Oral at bedtime  dextrose 5% + sodium chloride 0.45%. 1000 milliLiter(s) (75 mL/Hr) IV Continuous <Continuous>  diltiazem    Tablet 60 milliGRAM(s) Oral every 6 hours  levothyroxine Injectable 37.5 MICROGram(s) IV Push at bedtime  losartan 25 milliGRAM(s) Oral daily  mirabegron ER 25 milliGRAM(s) Oral daily  pantoprazole  Injectable 40 milliGRAM(s) IV Push every 12 hours  tamsulosin 0.4 milliGRAM(s) Oral at bedtime      PHYSICAL EXAM:  T(C): 36.8 (08-23-21 @ 19:10), Max: 36.9 (08-23-21 @ 15:44)  HR: 91 (08-23-21 @ 20:00) (72 - 141)  BP: 159/93 (08-23-21 @ 20:00) (107/79 - 219/111)  RR: 19 (08-23-21 @ 20:00) (14 - 28)  SpO2: 98% (08-23-21 @ 20:00) (95% - 99%)  Wt(kg): --  I&O's Summary    22 Aug 2021 07:01  -  23 Aug 2021 07:00  --------------------------------------------------------  IN: 1260 mL / OUT: 1000 mL / NET: 260 mL    23 Aug 2021 07:01  -  23 Aug 2021 23:23  --------------------------------------------------------  IN: 0 mL / OUT: 800 mL / NET: -800 mL          Appearance: Normal	  HEENT:  PERRLA   Lymphatic: No lymphadenopathy   Cardiovascular: Normal S1 S2, no JVD  Respiratory: normal effort , clear  Gastrointestinal:  Soft, Non-tender  Skin: No rashes,  warm to touch  Psychiatry:  Mood & affect appropriate  Musculuskeletal: weakness       All labs, Imaging and EKGs personally reviewed     08-22-21 @ 07:01  -  08-23-21 @ 07:00  --------------------------------------------------------  IN: 1260 mL / OUT: 1000 mL / NET: 260 mL    08-23-21 @ 07:01  -  08-23-21 @ 23:23  --------------------------------------------------------  IN: 0 mL / OUT: 800 mL / NET: -800 mL                            11.3   7.21  )-----------( 235      ( 23 Aug 2021 00:37 )             36.5               08-23    149<H>  |  112<H>  |  19  ----------------------------<  123<H>  3.2<L>   |  25  |  0.83    Ca    9.1      23 Aug 2021 00:37    TPro  6.4  /  Alb  3.0<L>  /  TBili  0.6  /  DBili  x   /  AST  7<L>  /  ALT  5<L>  /  AlkPhos  81  08-23

## 2021-08-23 NOTE — PROGRESS NOTE ADULT - ASSESSMENT
71-year-old right-handed lady first evaluated at [facility] on 8/18/2021 with left hemiparesis.  CT head (8/17/2021)  showed subtle hypodensity in the right paramedian ventral midbrain and paramedian thalamus suggestive of acute infarction; a more well-defined hypodensity involving the right anterior thalamus consistent with a possible subacute infarct.  CTA neck (8/17/2021) was unremarkable. At baseline, she is wheelchair-bound, apparently due to "arthritis" and also with most likely at least mild cognitive impairment.  She has a history of atrial fibrillation, on Eliquis, and also on aspirin for coronary stents which were implanted years ago.  The Eliquis was held since approximately 8/12/2021, and aspirin was held since approximately 8/9/2021 for ureteral stents on 8/15/2021.  She was last known normal at around 6 PM on 8/16/2021.  CTA head (8/17/2021)  showed right V4 occlusion; proximal basilar stenosis, officially read as severe but  perhaps moderate, with more distal diffuse fusiform dilatation or perhaps simply ectasia of the basilar; fusiform dilatation of the right ICA.  MRI brain (8/18/2021)  showed an acute small infarct involving the right paramedian ventral midbrain extending to the ventral border of the midbrain, with acute infarction involving the right paramedian versus anterior thalamic region (but unclear to me whether this corresponded to the apparently subacute right anterior thalamic infarct seen on CT).      Impression.  On 8/17/2021 she was found by her nursing home attendant with a left hemiparesis.  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  Mechanism is uncertain, either cardioembolism to the right P1 segment, related to atrial fibrillation, since the infarct extends to the ventral border of the midbrain, versus intracranial large artery atherosclerosis (basilar stenosis) with artery-artery embolism, versus small vessel disease (stroke of "unknown" cause, but suspect cardioembolism).       NEURO: neurologically noted with hypotension on 8/20 improved after IVF, held losartan on 8/23, hypertensive this AM. Restarted losartan. This morning more interactive with exam. Continue close monitoring for neurologic deterioration,  gradual normotension as tolerated, titrate statin to LDL goal less than 70, MRI Brain as noted. Physical therapy/OT/PMR: [facility].    ANTITHROMBOTIC THERAPY: ASA to be continued per d/w DR. Lisker. Restarted Eliquis 5mg BID for afib on 8/20.     PULMONARY: CXR clear (8/17), protecting airway, saturating well     CARDIOVASCULAR: Cardiac monitoring to ensure rate control, will home regimen gradually as tolerated.                          SBP goal: 140-180mmHg with gradual normotension as tolerated avoiding rapid fluctuations and hypotension     GASTROINTESTINAL: dysphagia screen failed, SLP eval for NPO and re-eval. MBS on 8/20 passed with recommendations for D2 nectar diet.      Diet: D2 Nectar     RENAL: BUN slightly elevated /Cr without acute change, good urine output , dc ns d/t hypernatremia, started on D51/2ns, will check BMP q12. KCl supplement for hypokalemia, now resolved.      Na Goal: Greater than 135     Cruz: No    HEMATOLOGY: H/H without anemia, no active bleeding noted, Platelets 244, she should have all age and risk appropriate malignancy screenings, LE duplex no evidence for DVT 8/18/21.     DVT ppx: Heparin s.c [] LMWH [x]     ID: afebrile, no leukocytosis , no si/sx of infection     DISPOSITION: [facility]     CORE MEASURES:        Admission NIHSS: 11     TPA: [] YES [x] NO      LDL/HDL: 132/32     Depression Screen: na- unable to participate      Statin Therapy: y      Dysphagia Screen: [] PASS [x] FAIL     Smoking [] YES [x] NO      Afib [x] YES [] NO     Stroke Education [x] YES [] NO    Obtain screening lower extremity venous ultrasound in patients who meet 1 or more of the following criteria as patient is high risk for DVT/PE on admission:   [] History of DVT/PE  []Hypercoagulable states (Factor V Leiden, Cancer, OCP, etc. )  [x]Prolonged immobility (hemiplegia/hemiparesis/post operative or any other extended immobilization)  [x Transferred from outside facility (Rehab or Long term care)  [] Age </= to 50   71-year-old right-handed lady first evaluated at [facility] on 8/18/2021 with left hemiparesis.  CT head (8/17/2021)  showed subtle hypodensity in the right paramedian ventral midbrain and paramedian thalamus suggestive of acute infarction; a more well-defined hypodensity involving the right anterior thalamus consistent with a possible subacute infarct.  CTA neck (8/17/2021) was unremarkable. At baseline, she is wheelchair-bound, apparently due to "arthritis" and also with most likely at least mild cognitive impairment.  She has a history of atrial fibrillation, on Eliquis, and also on aspirin for coronary stents which were implanted years ago.  The Eliquis was held since approximately 8/12/2021, and aspirin was held since approximately 8/9/2021 for ureteral stents on 8/15/2021.  She was last known normal at around 6 PM on 8/16/2021.  CTA head (8/17/2021)  showed right V4 occlusion; proximal basilar stenosis, officially read as severe but  perhaps moderate, with more distal diffuse fusiform dilatation or perhaps simply ectasia of the basilar; fusiform dilatation of the right ICA.  MRI brain (8/18/2021)  showed an acute small infarct involving the right paramedian ventral midbrain extending to the ventral border of the midbrain, with acute infarction involving the right paramedian versus anterior thalamic region (but unclear to me whether this corresponded to the apparently subacute right anterior thalamic infarct seen on CT).      Impression.  On 8/17/2021 she was found by her nursing home attendant with a left hemiparesis.  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  Mechanism is uncertain, either cardioembolism to the right P1 segment, related to atrial fibrillation, since the infarct extends to the ventral border of the midbrain, versus intracranial large artery atherosclerosis (basilar stenosis) with artery-artery embolism, versus small vessel disease (stroke of "unknown" cause, but suspect cardioembolism).       NEURO: neurologically without acute change, no associated neurological changes noted with hypotension at this time, Continue close monitoring for neurologic deterioration,  gradual normotension as tolerated, titrate statin to LDL goal less than 70, MRI Brain as noted. Physical therapy/OT/PMR: [facility].    ANTITHROMBOTIC THERAPY: ASA to be continued per d/w DR. Lisker. Restarted Eliquis 5mg BID for afib on 8/20.     PULMONARY: CXR clear (8/17), protecting airway, saturating well     CARDIOVASCULAR: Cardiac monitoring with rapid a flutter, cardizem titrated at the recommendation of Dr. Lisker, goal of normotension overall ,  home regimen gradually as tolerated.                          SBP goal: 140-180mmHg with gradual normotension as tolerated avoiding rapid fluctuations and hypotension, large variations in SBP noted but no noted neurological sequelae , continue close monitoring and further guidance per cardio.     GASTROINTESTINAL: dysphagia screen failed, SLP eval for NPO and re-eval. MBS on 8/20 passed with recommendations for D2 nectar diet.      Diet: D2 Nectar     RENAL: BUN  /Cr without acute change, good urine output , 250 cc ivf bolus with NS,   on D51/2ns, will check BMP q12. KCl supplement for hypokalemia      Na Goal: Greater than 135     Cruz: No    HEMATOLOGY: H/H without acute change, no active bleeding noted, Platelets 244, she should have all age and risk appropriate malignancy screenings, LE duplex no evidence for DVT 8/18/21.     DVT ppx: Heparin s.c [] LMWH [x]     ID: afebrile, no leukocytosis , no si/sx of infection     DISPOSITION: [facility]     CORE MEASURES:        Admission NIHSS: 11     TPA: [] YES [x] NO      LDL/HDL: 132/32     Depression Screen: na- unable to participate      Statin Therapy: y      Dysphagia Screen: [] PASS [x] FAIL     Smoking [] YES [x] NO      Afib [x] YES [] NO     Stroke Education [x] YES [] NO    Obtain screening lower extremity venous ultrasound in patients who meet 1 or more of the following criteria as patient is high risk for DVT/PE on admission:   [] History of DVT/PE  []Hypercoagulable states (Factor V Leiden, Cancer, OCP, etc. )  [x]Prolonged immobility (hemiplegia/hemiparesis/post operative or any other extended immobilization)  [x Transferred from outside facility (Rehab or Long term care)  [] Age </= to 50

## 2021-08-23 NOTE — PROGRESS NOTE ADULT - SUBJECTIVE AND OBJECTIVE BOX
THE PATIENT WAS SEEN AND EXAMINED BY ME WITH THE HOUSESTAFF AND STROKE TEAM DURING MORNING ROUNDS.   HPI:  70yo RH woman w/ h/o afib on Eliquis (last dose 8/12), CAD s/p stents on aspirin (last dose 8/9), arthritis, hypothyroidism, carpal tunnel syndrome, lymphedema, recent removal of left ureteral stent & stone (8/16/21) presented as code stroke for left hemiparesis with LWK 8/16/21 6p. Aide at bedside explained that patient had left the hospital after left ureteral stone and stent were removed and had last been evaluated at her baseline 6p 8/16. This morning patient was noted by all aides/staff that she seemed "off" and that her face had some asymmetry to it, including drooping of right eyelid, and her speech was "very off". At baseline, patient is wheelchair bound due to underlying arthritis, stays in a nursing home requiring assistance with most ADLs.  NIHSS: 11 pre-MRS: 4, on admission.     SUBJECTIVE: No events overnight.  No new neurologic complaints.  ROS reported negative unless otherwise noted.    acetaminophen    Suspension .. 650 milliGRAM(s) Oral every 6 hours PRN  apixaban 5 milliGRAM(s) Oral every 12 hours  aspirin enteric coated 81 milliGRAM(s) Oral daily  atorvastatin 80 milliGRAM(s) Oral at bedtime  dextrose 5% + sodium chloride 0.45%. 1000 milliLiter(s) IV Continuous <Continuous>  diltiazem    Tablet 30 milliGRAM(s) Oral every 6 hours  levothyroxine Injectable 37.5 MICROGram(s) IV Push at bedtime  losartan 25 milliGRAM(s) Oral daily  mirabegron ER 25 milliGRAM(s) Oral daily  pantoprazole  Injectable 40 milliGRAM(s) IV Push every 12 hours  tamsulosin 0.4 milliGRAM(s) Oral at bedtime      PHYSICAL EXAM:   Vital Signs Last 24 Hrs  T(C): 36.8 (23 Aug 2021 06:00), Max: 36.8 (22 Aug 2021 08:00)  T(F): 98.2 (23 Aug 2021 06:00), Max: 98.2 (22 Aug 2021 08:00)  HR: 93 (23 Aug 2021 06:00) (70 - 93)  BP: 162/74 (23 Aug 2021 06:00) (157/79 - 219/111)  BP(mean): 100 (23 Aug 2021 06:00) (100 - 159)  RR: 25 (23 Aug 2021 06:00) (12 - 25)  SpO2: 98% (23 Aug 2021 06:00) (94% - 100%)    General: No acute distress  HEENT: right eye: Moderate-severe ptosis; pupil 3.5 mm (left pupil 3 mm); moderate-severe adduction deficit (barely crosses midline); mild-moderate depression deficit; severe upgaze palsy (bilateral)   Abdomen: Soft, nontender, nondistended   Extremities: No edema    NEUROLOGICAL EXAM:  Mental status:  Sleeping in bed, awakes to verbal/noxious stimuli. Had some difficulty cooperating with the exam as a whole; somewhat alert but inattentive,  could not state the year, but was oriented to place and age, follows some simple 1 step commands   Cranial Nerves: right eye: Moderate-severe ptosis; pupil 3.5 mm > left pupil 3 mm; moderate-severe adduction deficit (barely crosses midline); mild-moderate depression deficit; severe upgaze palsy (bilateral); mild or mild-moderate left facial palsy;  moderate dysarthria  Motor exam:  right side moves against gravity, left arm-antigravity, left leg-very slight movement, not versus gravity and with significant complaints of pain (chronic as per report)  Sensation: Intact to light touch   Coordination/ Gait:  gait not assessed     LABS:                        11.3   7.21  )-----------( 235      ( 23 Aug 2021 00:37 )             36.5    08-23    149<H>  |  112<H>  |  19  ----------------------------<  123<H>  3.2<L>   |  25  |  0.83    Ca    9.1      23 Aug 2021 00:37    TPro  6.4  /  Alb  3.0<L>  /  TBili  0.6  /  DBili  x   /  AST  7<L>  /  ALT  5<L>  /  AlkPhos  81  08-23        IMAGING: Reviewed by me.   CT No Cont (8/21/21):  There is mild edema related to known lacunar infarcts in the right thalamus and midbrain. There is no hemorrhage or mass effect. There is moderate underlying chronic microvascular ischemic change with mild central atrophy.    (08.17.21)  Brain CT: No acute hemorrhage, mass effect or extra-axial collections. Interim right thalamic infarct may be subacute. Brain MRI can be obtained for further evaluation.  Neck CTA: No hemodynamically significant stenosis.  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 Unchanged fusiform ectasia proximal basilar artery with high-grade proximal to mid stenosis. Unchanged fusiform dilatation of the cavernous segment of the right internal carotid artery.  No large vessel occlusion of the anterior middle or posterior cerebral arteries.  Perfusion maps: No evidence for core infarct or penumbra. Hypoperfusion within the posterior circulation.    MR Head No Cont (08.18.21)  Acute right thalamic and right midbrain infarct.   THE PATIENT WAS SEEN AND EXAMINED BY ME WITH THE HOUSESTAFF AND STROKE TEAM DURING MORNING ROUNDS.   HPI:  70yo RH woman w/ h/o afib on Eliquis (last dose 8/12), CAD s/p stents on aspirin (last dose 8/9), arthritis, hypothyroidism, carpal tunnel syndrome, lymphedema, recent removal of left ureteral stent & stone (8/16/21) presented as code stroke for left hemiparesis with LWK 8/16/21 6p. Aide at bedside explained that patient had left the hospital after left ureteral stone and stent were removed and had last been evaluated at her baseline 6p 8/16. This morning patient was noted by all aides/staff that she seemed "off" and that her face had some asymmetry to it, including drooping of right eyelid, and her speech was "very off". At baseline, patient is wheelchair bound due to underlying arthritis, stays in a nursing home requiring assistance with most ADLs.  NIHSS: 11 pre-MRS: 4, on admission.     SUBJECTIVE: Noted tachycardia and mild hypotension this am.  No new neurologic complaints.  ROS reported negative unless otherwise noted.    acetaminophen    Suspension .. 650 milliGRAM(s) Oral every 6 hours PRN  apixaban 5 milliGRAM(s) Oral every 12 hours  aspirin enteric coated 81 milliGRAM(s) Oral daily  atorvastatin 80 milliGRAM(s) Oral at bedtime  dextrose 5% + sodium chloride 0.45%. 1000 milliLiter(s) IV Continuous <Continuous>  diltiazem    Tablet 30 milliGRAM(s) Oral every 6 hours  levothyroxine Injectable 37.5 MICROGram(s) IV Push at bedtime  losartan 25 milliGRAM(s) Oral daily  mirabegron ER 25 milliGRAM(s) Oral daily  pantoprazole  Injectable 40 milliGRAM(s) IV Push every 12 hours  tamsulosin 0.4 milliGRAM(s) Oral at bedtime      PHYSICAL EXAM:   Vital Signs Last 24 Hrs  T(C): 36.8 (23 Aug 2021 06:00), Max: 36.8 (22 Aug 2021 08:00)  T(F): 98.2 (23 Aug 2021 06:00), Max: 98.2 (22 Aug 2021 08:00)  HR: 93 (23 Aug 2021 06:00) (70 - 93)  BP: 162/74 (23 Aug 2021 06:00) (157/79 - 219/111)  BP(mean): 100 (23 Aug 2021 06:00) (100 - 159)  RR: 25 (23 Aug 2021 06:00) (12 - 25)  SpO2: 98% (23 Aug 2021 06:00) (94% - 100%)    General: No acute distress  HEENT: right eye: Moderate-severe ptosis; pupil 3.5 mm (left pupil 3 mm); moderate-severe adduction deficit (barely crosses midline); mild-moderate depression deficit; severe upgaze palsy (bilateral)   Abdomen: Soft, nontender, nondistended   Extremities: No edema    NEUROLOGICAL EXAM:  Mental status:  Sleeping in bed, awakes to verbal/noxious stimuli. Had some difficulty cooperating with the exam as a whole; somewhat alert but inattentive,  could not state the year, but was oriented to place and age, follows some simple 1 step commands   Cranial Nerves: right eye: Moderate-severe ptosis; pupil 3.5 mm > left pupil 3 mm; moderate-severe adduction deficit (barely crosses midline); mild-moderate depression deficit; severe upgaze palsy (bilateral); mild or mild-moderate left facial palsy;  moderate dysarthria  Motor exam:  right side moves against gravity, left arm-antigravity, left leg-very slight movement, not versus gravity and with significant complaints of pain (chronic as per report)  Sensation: Intact to light touch   Coordination/ Gait:  gait not assessed     LABS:                        11.3   7.21  )-----------( 235      ( 23 Aug 2021 00:37 )             36.5    08-23    149<H>  |  112<H>  |  19  ----------------------------<  123<H>  3.2<L>   |  25  |  0.83    Ca    9.1      23 Aug 2021 00:37    TPro  6.4  /  Alb  3.0<L>  /  TBili  0.6  /  DBili  x   /  AST  7<L>  /  ALT  5<L>  /  AlkPhos  81  08-23        IMAGING: Reviewed by me.   CTH No Cont (8/21/21):  There is mild edema related to known lacunar infarcts in the right thalamus and midbrain. There is no hemorrhage or mass effect. There is moderate underlying chronic microvascular ischemic change with mild central atrophy.    (08.17.21)  Brain CT: No acute hemorrhage, mass effect or extra-axial collections. Interim right thalamic infarct may be subacute. Brain MRI can be obtained for further evaluation.  Neck CTA: No hemodynamically significant stenosis.  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 Unchanged fusiform ectasia proximal basilar artery with high-grade proximal to mid stenosis. Unchanged fusiform dilatation of the cavernous segment of the right internal carotid artery.  No large vessel occlusion of the anterior middle or posterior cerebral arteries.  Perfusion maps: No evidence for core infarct or penumbra. Hypoperfusion within the posterior circulation.    MR Head No Cont (08.18.21)  Acute right thalamic and right midbrain infarct.

## 2021-08-23 NOTE — PROGRESS NOTE ADULT - SUBJECTIVE AND OBJECTIVE BOX
INTERVAL HPI/OVERNIGHT EVENTS:    pt seen and examined  + bm yesterday after mineral oul enema    MEDICATIONS  (STANDING):  apixaban 5 milliGRAM(s) Oral every 12 hours  aspirin enteric coated 81 milliGRAM(s) Oral daily  atorvastatin 80 milliGRAM(s) Oral at bedtime  dextrose 5% + sodium chloride 0.45%. 1000 milliLiter(s) (75 mL/Hr) IV Continuous <Continuous>  diltiazem    Tablet 60 milliGRAM(s) Oral every 6 hours  levothyroxine Injectable 37.5 MICROGram(s) IV Push at bedtime  losartan 25 milliGRAM(s) Oral daily  mirabegron ER 25 milliGRAM(s) Oral daily  pantoprazole  Injectable 40 milliGRAM(s) IV Push every 12 hours  tamsulosin 0.4 milliGRAM(s) Oral at bedtime    MEDICATIONS  (PRN):  acetaminophen    Suspension .. 650 milliGRAM(s) Oral every 6 hours PRN Temp greater or equal to 38C (100.4F), Mild Pain (1 - 3)      Allergies    penicillin (Hives)  penicillin (Rash)  sulfa drugs (Unknown)  Tetracycline Hydrochloride (Unknown)    Intolerances        Review of Systems:  does not provide    Vital Signs Last 24 Hrs  T(C): 36.6 (23 Aug 2021 07:25), Max: 36.8 (23 Aug 2021 06:00)  T(F): 97.8 (23 Aug 2021 07:25), Max: 98.2 (23 Aug 2021 06:00)  HR: 138 (23 Aug 2021 09:16) (70 - 140)  BP: 133/103 (23 Aug 2021 09:16) (133/103 - 219/111)  BP(mean): 112 (23 Aug 2021 09:16) (100 - 159)  RR: 22 (23 Aug 2021 09:16) (12 - 25)  SpO2: 97% (23 Aug 2021 09:16) (94% - 100%)    PHYSICAL EXAM:    GENERAL:  Appears stated age, well-groomed, well-nourished, no distress  HEENT:  NC/AT,  conjunctivae anicteric, clear and pink,   NECK: supple, trachea midline  CHEST:  Full & symmetric excursion, no increased effort, breath sounds clear  HEART:  Regular rhythm, no JVD  ABDOMEN:  Soft, non-tender, non-distended, normoactive bowel sounds,  no masses , no hepatosplenomegaly  EXTREMITIES:  no cyanosis, clubbing or edema  SKIN:  No rash, erythema, or, ecchymoses, no jaundice  NEURO:  Alert, non-focal, no asterixis  PSYCH: Appropriate affect, oriented to place and time    LABS:                        11.3   7.21  )-----------( 235      ( 23 Aug 2021 00:37 )             36.5     08-23    149<H>  |  112<H>  |  19  ----------------------------<  123<H>  3.2<L>   |  25  |  0.83    Ca    9.1      23 Aug 2021 00:37    TPro  6.4  /  Alb  3.0<L>  /  TBili  0.6  /  DBili  x   /  AST  7<L>  /  ALT  5<L>  /  AlkPhos  81  08-23          RADIOLOGY & ADDITIONAL TESTS:

## 2021-08-23 NOTE — PROGRESS NOTE ADULT - ASSESSMENT
71 F w acute CVA  1. Dysphagia, cleared for dysphagia diet    2. CVA    3. Constipation, large stool on rectal exam  -mineral oil enema given 8/22    The plan of care was discussed with the physician assistant and modifications were made to the notation where appropriate.   Differential diagnosis and plan of care discussed with patient after the evaluation  35 minutes spent on total encounter of which more than fifty percent of the encounter was spent counseling and/or coordinating care by the attending physician.    [facility]  Gastroenterology and Hepatology  [address] [place], [place]  Office: [phone]  Cell: [phone]

## 2021-08-23 NOTE — PROVIDER CONTACT NOTE (OTHER) - ASSESSMENT
At 0200, /99, HR 71. Rechecked at 0229, /91, HR 72. Pt neurologically similar
At 0200, /105, HR 72. At 0239, repeat /102, HR 74. pt neurologically similar, resting in bed, denies pain

## 2021-08-23 NOTE — PROGRESS NOTE ADULT - ASSESSMENT
Patient is a 72 Y/O female with PMHx of afib on eliquis (last dose date unclear), CAD s/p stents on aspirin (last dose 8/9), arthritis, hypothyroidism, carpal tunnel syndrome, lymphedema, recent removal of left ureteral stent & stone (8/16/21) presenting as code stroke for left hemiparesis with LWK 8/16/21 6p. Aide at bedside explains that patient had left the hospital after left ureteral stone and stent were removed and had last been evaluated at her baseline 6p 8/16. This morning patient was noted by all aides/staff that she seemed "off" and that her face had some asymmetry to it, including drooping of right eyelid, and her speech was "very off".         Problem/Recommendation - 1:  Problem: Stroke. Recommendation: Neurology care appreciated  speech and swallow   A fib, resumed eliquis   hypernatremia , on D51.2 NS monitor na level    a flutter noted  lopressor and Cardizem dosing   can five dig if no improvement in HR   card follow up      Problem/Recommendation - 2:  ·  Problem: Dysphagia.  Recommendation:  failed speech and swallow.   GI eval     Problem/Recommendation - 3:  ·  Problem: Hypothyroid.  Recommendation: synthroid.      Problem/Recommendation - 4:  ·  Problem: Type 2 diabetes mellitus without complication.  Recommendation: sliding scale  hydration as tolerated.      Problem/Recommendation - 5:  ·  Problem: HTN (hypertension).  Recommendation: resume BP meds.      Problem/Recommendation - 6:  Problem: Hypothyroid.

## 2021-08-24 VITALS
DIASTOLIC BLOOD PRESSURE: 86 MMHG | HEART RATE: 89 BPM | SYSTOLIC BLOOD PRESSURE: 158 MMHG | RESPIRATION RATE: 20 BRPM | OXYGEN SATURATION: 98 %

## 2021-08-24 LAB
ALBUMIN SERPL ELPH-MCNC: 2.7 G/DL — LOW (ref 3.3–5)
ALP SERPL-CCNC: 81 U/L — SIGNIFICANT CHANGE UP (ref 40–120)
ALT FLD-CCNC: 6 U/L — LOW (ref 10–45)
ANION GAP SERPL CALC-SCNC: 11 MMOL/L — SIGNIFICANT CHANGE UP (ref 5–17)
AST SERPL-CCNC: 7 U/L — LOW (ref 10–40)
BILIRUB SERPL-MCNC: 0.5 MG/DL — SIGNIFICANT CHANGE UP (ref 0.2–1.2)
BUN SERPL-MCNC: 15 MG/DL — SIGNIFICANT CHANGE UP (ref 7–23)
CALCIUM SERPL-MCNC: 8.8 MG/DL — SIGNIFICANT CHANGE UP (ref 8.4–10.5)
CHLORIDE SERPL-SCNC: 110 MMOL/L — HIGH (ref 96–108)
CO2 SERPL-SCNC: 21 MMOL/L — LOW (ref 22–31)
CREAT SERPL-MCNC: 0.77 MG/DL — SIGNIFICANT CHANGE UP (ref 0.5–1.3)
GLUCOSE SERPL-MCNC: 114 MG/DL — HIGH (ref 70–99)
HCT VFR BLD CALC: 35.8 % — SIGNIFICANT CHANGE UP (ref 34.5–45)
HGB BLD-MCNC: 11.4 G/DL — LOW (ref 11.5–15.5)
MCHC RBC-ENTMCNC: 28.9 PG — SIGNIFICANT CHANGE UP (ref 27–34)
MCHC RBC-ENTMCNC: 31.8 GM/DL — LOW (ref 32–36)
MCV RBC AUTO: 90.9 FL — SIGNIFICANT CHANGE UP (ref 80–100)
NRBC # BLD: 0 /100 WBCS — SIGNIFICANT CHANGE UP (ref 0–0)
PLATELET # BLD AUTO: 275 K/UL — SIGNIFICANT CHANGE UP (ref 150–400)
POTASSIUM SERPL-MCNC: 3.5 MMOL/L — SIGNIFICANT CHANGE UP (ref 3.5–5.3)
POTASSIUM SERPL-SCNC: 3.5 MMOL/L — SIGNIFICANT CHANGE UP (ref 3.5–5.3)
PROT SERPL-MCNC: 6.1 G/DL — SIGNIFICANT CHANGE UP (ref 6–8.3)
RBC # BLD: 3.94 M/UL — SIGNIFICANT CHANGE UP (ref 3.8–5.2)
RBC # FLD: 13.8 % — SIGNIFICANT CHANGE UP (ref 10.3–14.5)
SARS-COV-2 RNA SPEC QL NAA+PROBE: SIGNIFICANT CHANGE UP
SODIUM SERPL-SCNC: 142 MMOL/L — SIGNIFICANT CHANGE UP (ref 135–145)
WBC # BLD: 7.78 K/UL — SIGNIFICANT CHANGE UP (ref 3.8–10.5)
WBC # FLD AUTO: 7.78 K/UL — SIGNIFICANT CHANGE UP (ref 3.8–10.5)

## 2021-08-24 PROCEDURE — 97110 THERAPEUTIC EXERCISES: CPT

## 2021-08-24 PROCEDURE — 70496 CT ANGIOGRAPHY HEAD: CPT | Mod: MA

## 2021-08-24 PROCEDURE — 88300 SURGICAL PATH GROSS: CPT

## 2021-08-24 PROCEDURE — 82435 ASSAY OF BLOOD CHLORIDE: CPT

## 2021-08-24 PROCEDURE — 92610 EVALUATE SWALLOWING FUNCTION: CPT

## 2021-08-24 PROCEDURE — 85730 THROMBOPLASTIN TIME PARTIAL: CPT

## 2021-08-24 PROCEDURE — C9399: CPT

## 2021-08-24 PROCEDURE — 80048 BASIC METABOLIC PNL TOTAL CA: CPT

## 2021-08-24 PROCEDURE — 71045 X-RAY EXAM CHEST 1 VIEW: CPT

## 2021-08-24 PROCEDURE — 83735 ASSAY OF MAGNESIUM: CPT

## 2021-08-24 PROCEDURE — 85027 COMPLETE CBC AUTOMATED: CPT

## 2021-08-24 PROCEDURE — 92523 SPEECH SOUND LANG COMPREHEN: CPT

## 2021-08-24 PROCEDURE — 92611 MOTION FLUOROSCOPY/SWALLOW: CPT

## 2021-08-24 PROCEDURE — 97530 THERAPEUTIC ACTIVITIES: CPT

## 2021-08-24 PROCEDURE — 97161 PT EVAL LOW COMPLEX 20 MIN: CPT

## 2021-08-24 PROCEDURE — 82330 ASSAY OF CALCIUM: CPT

## 2021-08-24 PROCEDURE — 86900 BLOOD TYPING SEROLOGIC ABO: CPT

## 2021-08-24 PROCEDURE — C1769: CPT

## 2021-08-24 PROCEDURE — 85014 HEMATOCRIT: CPT

## 2021-08-24 PROCEDURE — 93970 EXTREMITY STUDY: CPT

## 2021-08-24 PROCEDURE — 86850 RBC ANTIBODY SCREEN: CPT

## 2021-08-24 PROCEDURE — 76000 FLUOROSCOPY <1 HR PHYS/QHP: CPT

## 2021-08-24 PROCEDURE — 84484 ASSAY OF TROPONIN QUANT: CPT

## 2021-08-24 PROCEDURE — U0005: CPT

## 2021-08-24 PROCEDURE — C1889: CPT

## 2021-08-24 PROCEDURE — 82962 GLUCOSE BLOOD TEST: CPT

## 2021-08-24 PROCEDURE — 84100 ASSAY OF PHOSPHORUS: CPT

## 2021-08-24 PROCEDURE — 74230 X-RAY XM SWLNG FUNCJ C+: CPT

## 2021-08-24 PROCEDURE — 97166 OT EVAL MOD COMPLEX 45 MIN: CPT

## 2021-08-24 PROCEDURE — 85610 PROTHROMBIN TIME: CPT

## 2021-08-24 PROCEDURE — 86901 BLOOD TYPING SEROLOGIC RH(D): CPT

## 2021-08-24 PROCEDURE — 80061 LIPID PANEL: CPT

## 2021-08-24 PROCEDURE — 82947 ASSAY GLUCOSE BLOOD QUANT: CPT

## 2021-08-24 PROCEDURE — 80053 COMPREHEN METABOLIC PANEL: CPT

## 2021-08-24 PROCEDURE — 83605 ASSAY OF LACTIC ACID: CPT

## 2021-08-24 PROCEDURE — 84295 ASSAY OF SERUM SODIUM: CPT

## 2021-08-24 PROCEDURE — 92526 ORAL FUNCTION THERAPY: CPT

## 2021-08-24 PROCEDURE — 82550 ASSAY OF CK (CPK): CPT

## 2021-08-24 PROCEDURE — 99291 CRITICAL CARE FIRST HOUR: CPT

## 2021-08-24 PROCEDURE — 85025 COMPLETE CBC W/AUTO DIFF WBC: CPT

## 2021-08-24 PROCEDURE — U0003: CPT

## 2021-08-24 PROCEDURE — 70450 CT HEAD/BRAIN W/O DYE: CPT | Mod: MA

## 2021-08-24 PROCEDURE — 83036 HEMOGLOBIN GLYCOSYLATED A1C: CPT

## 2021-08-24 PROCEDURE — 82803 BLOOD GASES ANY COMBINATION: CPT

## 2021-08-24 PROCEDURE — 85018 HEMOGLOBIN: CPT

## 2021-08-24 PROCEDURE — 70551 MRI BRAIN STEM W/O DYE: CPT

## 2021-08-24 PROCEDURE — 0042T: CPT

## 2021-08-24 PROCEDURE — 93005 ELECTROCARDIOGRAM TRACING: CPT

## 2021-08-24 PROCEDURE — 70498 CT ANGIOGRAPHY NECK: CPT | Mod: MA

## 2021-08-24 PROCEDURE — 86769 SARS-COV-2 COVID-19 ANTIBODY: CPT

## 2021-08-24 PROCEDURE — 84132 ASSAY OF SERUM POTASSIUM: CPT

## 2021-08-24 RX ORDER — DILTIAZEM HCL 120 MG
1 CAPSULE, EXT RELEASE 24 HR ORAL
Qty: 0 | Refills: 0 | DISCHARGE
Start: 2021-08-24

## 2021-08-24 RX ADMIN — Medication 60 MILLIGRAM(S): at 11:55

## 2021-08-24 RX ADMIN — LOSARTAN POTASSIUM 25 MILLIGRAM(S): 100 TABLET, FILM COATED ORAL at 05:22

## 2021-08-24 RX ADMIN — APIXABAN 5 MILLIGRAM(S): 2.5 TABLET, FILM COATED ORAL at 05:21

## 2021-08-24 RX ADMIN — PANTOPRAZOLE SODIUM 40 MILLIGRAM(S): 20 TABLET, DELAYED RELEASE ORAL at 05:21

## 2021-08-24 RX ADMIN — Medication 60 MILLIGRAM(S): at 00:43

## 2021-08-24 RX ADMIN — Medication 60 MILLIGRAM(S): at 05:21

## 2021-08-24 RX ADMIN — Medication 81 MILLIGRAM(S): at 11:55

## 2021-08-24 RX ADMIN — MIRABEGRON 25 MILLIGRAM(S): 50 TABLET, EXTENDED RELEASE ORAL at 11:55

## 2021-08-24 NOTE — PROGRESS NOTE ADULT - ATTENDING COMMENTS
He Haney MD  Vascular Neurology
Patient seen and examined with PA/RN/Resident and fellow.  Agree with hx, exam assessment and plan as outlined above.
Patient seen and examined with resident/PA/RN and Fellow. Agree with hx exam assessment and plan as above.

## 2021-08-24 NOTE — PROGRESS NOTE ADULT - REASON FOR ADMISSION
stroke

## 2021-08-24 NOTE — PROGRESS NOTE ADULT - SUBJECTIVE AND OBJECTIVE BOX
Name of Patient : TIANA BROWN  MRN: [id number]  Date of visit: 08-24-21       Subjective: Patient seen and examined. No new events except as noted.       MEDICATIONS:  MEDICATIONS  (STANDING):apixaban 5 milliGRAM(s) Oral every 12 hours  aspirin enteric coated 81 milliGRAM(s) Oral daily  atorvastatin 80 milliGRAM(s) Oral at bedtime  diltiazem    Tablet 60 milliGRAM(s) Oral every 6 hours  levothyroxine Injectable 37.5 MICROGram(s) IV Push at bedtime  losartan 25 milliGRAM(s) Oral daily  mirabegron ER 25 milliGRAM(s) Oral daily  pantoprazole  Injectable 40 milliGRAM(s) IV Push every 12 hours  tamsulosin 0.4 milliGRAM(s) Oral at bedtime      PHYSICAL EXAM:  T(C): 37 (08-24-21 @ 07:00), Max: 37 (08-24-21 @ 06:00)  HR: 89 (08-24-21 @ 16:00) (82 - 104)  BP: 158/86 (08-24-21 @ 16:00) (129/92 - 176/91)  RR: 20 (08-24-21 @ 16:00) (16 - 23)  SpO2: 98% (08-24-21 @ 16:00) (97% - 99%)  Wt(kg): --  I&O's Summary    23 Aug 2021 07:01  -  24 Aug 2021 07:00  --------------------------------------------------------  IN: 1260 mL / OUT: 1300 mL / NET: -40 mL          Appearance: Normal	  HEENT:  PERRLA   Lymphatic: No lymphadenopathy   Cardiovascular: Normal S1 S2, no JVD  Respiratory: normal effort , clear  Gastrointestinal:  Soft, Non-tender  Skin: No rashes,  warm to touch  Psychiatry:  Mood & affect appropriate  Musculuskeletal: No edema      All labs, Imaging and EKGs personally reviewed     08-23-21 @ 07:01  -  08-24-21 @ 07:00  --------------------------------------------------------  IN: 1260 mL / OUT: 1300 mL / NET: -40 mL                            11.4   7.78  )-----------( 275      ( 24 Aug 2021 06:03 )             35.8               08-24    142  |  110<H>  |  15  ----------------------------<  114<H>  3.5   |  21<L>  |  0.77    Ca    8.8      24 Aug 2021 06:03    TPro  6.1  /  Alb  2.7<L>  /  TBili  0.5  /  DBili  x   /  AST  7<L>  /  ALT  6<L>  /  AlkPhos  81  08-24

## 2021-08-24 NOTE — PROGRESS NOTE ADULT - ASSESSMENT
71-year-old right-handed lady first evaluated at [facility] on 8/18/2021 with left hemiparesis.  CT head (8/17/2021)  showed subtle hypodensity in the right paramedian ventral midbrain and paramedian thalamus suggestive of acute infarction; a more well-defined hypodensity involving the right anterior thalamus consistent with a possible subacute infarct.  CTA neck (8/17/2021) was unremarkable. At baseline, she is wheelchair-bound, apparently due to "arthritis" and also with most likely at least mild cognitive impairment.  She has a history of atrial fibrillation, on Eliquis, and also on aspirin for coronary stents which were implanted years ago.  The Eliquis was held since approximately 8/12/2021, and aspirin was held since approximately 8/9/2021 for ureteral stents on 8/15/2021.  She was last known normal at around 6 PM on 8/16/2021.  CTA head (8/17/2021)  showed right V4 occlusion; proximal basilar stenosis, officially read as severe but  perhaps moderate, with more distal diffuse fusiform dilatation or perhaps simply ectasia of the basilar; fusiform dilatation of the right ICA.  MRI brain (8/18/2021)  showed an acute small infarct involving the right paramedian ventral midbrain extending to the ventral border of the midbrain, with acute infarction involving the right paramedian versus anterior thalamic region (but unclear to me whether this corresponded to the apparently subacute right anterior thalamic infarct seen on CT).      Impression.  On 8/17/2021 she was found by her nursing home attendant with a left hemiparesis.  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  Mechanism is uncertain, either cardioembolism to the right P1 segment, related to atrial fibrillation, since the infarct extends to the ventral border of the midbrain, versus intracranial large artery atherosclerosis (basilar stenosis) with artery-artery embolism, versus small vessel disease (stroke of "unknown" cause, but suspect cardioembolism).       NEURO: neurologically without acute change, no associated neurological changes noted with hypotension at this time, Continue close monitoring for neurologic deterioration,  gradual normotension as tolerated, titrate statin to LDL goal less than 70, MRI Brain as noted. Physical therapy/OT/PMR: [facility].    ANTITHROMBOTIC THERAPY: ASA to be continued per d/w DR. Lisker. Restarted Eliquis 5mg BID for afib on 8/20.     PULMONARY: CXR clear (8/17), protecting airway, saturating well     CARDIOVASCULAR: Cardiac monitoring with rapid a flutter, cardizem titrated at the recommendation of Dr. Lisker, now post digoxin and metoprolol iv with rate control achieved at this time, goal of normotension overall ,  home regimen gradually as tolerated.                          SBP goal: 140-180mmHg with gradual normotension as tolerated avoiding rapid fluctuations and hypotension, large variations in SBP noted but no noted neurological sequelae , continue close monitoring and further guidance per cardio.     GASTROINTESTINAL: dysphagia screen failed, SLP eval for NPO and re-eval. MBS on 8/20 passed with recommendations for D2 nectar diet.      Diet: D2 Nectar     RENAL: BUN  /Cr without acute change, good urine output ,  off D51/2ns - now normonatremic, hypokalemia resolved post supplementation      Na Goal: Greater than 135     Cruz: No    HEMATOLOGY: H/H without acute change, no active bleeding noted, Platelets 244, she should have all age and risk appropriate malignancy screenings, LE duplex no evidence for DVT 8/18/21.     DVT ppx: Heparin s.c [] LMWH [x]     ID: afebrile, no leukocytosis , no si/sx of infection     DISPOSITION: [facility]     CORE MEASURES:        Admission NIHSS: 11     TPA: [] YES [x] NO      LDL/HDL: 132/32     Depression Screen: na- unable to participate      Statin Therapy: y      Dysphagia Screen: [] PASS [x] FAIL     Smoking [] YES [x] NO      Afib [x] YES [] NO     Stroke Education [x] YES [] NO    Obtain screening lower extremity venous ultrasound in patients who meet 1 or more of the following criteria as patient is high risk for DVT/PE on admission:   [] History of DVT/PE  []Hypercoagulable states (Factor V Leiden, Cancer, OCP, etc. )  [x]Prolonged immobility (hemiplegia/hemiparesis/post operative or any other extended immobilization)  [x Transferred from outside facility (Rehab or Long term care)  [] Age </= to 50   71-year-old right-handed lady first evaluated at [facility] on 8/18/2021 with left hemiparesis.  CT head (8/17/2021)  showed subtle hypodensity in the right paramedian ventral midbrain and paramedian thalamus suggestive of acute infarction; a more well-defined hypodensity involving the right anterior thalamus consistent with a possible subacute infarct.  CTA neck (8/17/2021) was unremarkable. At baseline, she is wheelchair-bound, apparently due to "arthritis" and also with most likely at least mild cognitive impairment.  She has a history of atrial fibrillation, on Eliquis, and also on aspirin for coronary stents which were implanted years ago.  The Eliquis was held since approximately 8/12/2021, and aspirin was held since approximately 8/9/2021 for ureteral stents on 8/15/2021.  She was last known normal at around 6 PM on 8/16/2021.  CTA head (8/17/2021)  showed right V4 occlusion; proximal basilar stenosis, officially read as severe but  perhaps moderate, with more distal diffuse fusiform dilatation or perhaps simply ectasia of the basilar; fusiform dilatation of the right ICA.  MRI brain (8/18/2021)  showed an acute small infarct involving the right paramedian ventral midbrain extending to the ventral border of the midbrain, with acute infarction involving the right paramedian versus anterior thalamic region (but unclear to me whether this corresponded to the apparently subacute right anterior thalamic infarct seen on CT).      Impression.  On 8/17/2021 she was found by her nursing home attendant with a left hemiparesis.  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  Mechanism is uncertain, either cardioembolism to the right P1 segment, related to atrial fibrillation, since the infarct extends to the ventral border of the midbrain, versus intracranial large artery atherosclerosis (basilar stenosis) with artery-artery embolism, versus small vessel disease (stroke of "unknown" cause, but suspect cardioembolism).       NEURO: neurologically without acute change- slightly more interactive today, no associated neurological changes noted with hypotension at this time, Continue close monitoring for neurologic deterioration,  gradual normotension as tolerated, titrate statin to LDL goal less than 70, MRI Brain as noted. Physical therapy/OT/PMR: [facility].    ANTITHROMBOTIC THERAPY: Apixaban    PULMONARY: CXR clear (8/17), protecting airway, saturating well     CARDIOVASCULAR: Cardiac monitoring with rapid a flutter, cardizem titrated at the recommendation of Dr. Lisker, now post digoxin and metoprolol iv with rate control achieved at this time, goal of normotension overall ,  home regimen gradually as tolerated.                          SBP goal: 140-180mmHg with gradual normotension as tolerated avoiding rapid fluctuations and hypotension, large variations in SBP noted but no noted neurological sequelae , continue close monitoring and further guidance per cardio.     GASTROINTESTINAL: dysphagia screen failed, SLP eval for NPO and re-eval. MBS on 8/20 passed with recommendations for D2 nectar diet.      Diet: D2 Nectar     RENAL: BUN  /Cr without acute change, good urine output ,  off D51/2ns - now normonatremic, hypokalemia resolved post supplementation      Na Goal: Greater than 135     Cruz: No    HEMATOLOGY: H/H without acute change, no active bleeding noted, Platelets 275, she should have all age and risk appropriate malignancy screenings, LE duplex no evidence for DVT 8/18/21.     DVT ppx: Heparin s.c [] LMWH [x]     ID: afebrile, no leukocytosis , no si/sx of infection     DISPOSITION: [facility]     CORE MEASURES:        Admission NIHSS: 11     TPA: [] YES [x] NO      LDL/HDL: 132/32     Depression Screen: na- unable to participate      Statin Therapy: y      Dysphagia Screen: [] PASS [x] FAIL     Smoking [] YES [x] NO      Afib [x] YES [] NO     Stroke Education [x] YES [] NO    Obtain screening lower extremity venous ultrasound in patients who meet 1 or more of the following criteria as patient is high risk for DVT/PE on admission:   [] History of DVT/PE  []Hypercoagulable states (Factor V Leiden, Cancer, OCP, etc. )  [x]Prolonged immobility (hemiplegia/hemiparesis/post operative or any other extended immobilization)  [x Transferred from outside facility (Rehab or Long term care)  [] Age </= to 50

## 2021-08-24 NOTE — PROGRESS NOTE ADULT - PROVIDER SPECIALTY LIST ADULT
Gastroenterology
Internal Medicine
Neurology
Neurology
Gastroenterology
Internal Medicine
Neurology
Gastroenterology
Internal Medicine
Neurology
Internal Medicine
Internal Medicine
Neurology

## 2021-08-24 NOTE — PROGRESS NOTE ADULT - NUTRITIONAL ASSESSMENT
This patient has been assessed with a concern for Malnutrition and has been determined to have a diagnosis/diagnoses of Moderate protein-calorie malnutrition.    This patient is being managed with:   Diet Dysphagia 2 Mechanical Soft-Nectar Consistency Fluid-  Entered: Aug 20 2021 11:57AM    

## 2021-08-24 NOTE — PROGRESS NOTE ADULT - ASSESSMENT
Patient is a 70 Y/O female with PMHx of afib on eliquis (last dose date unclear), CAD s/p stents on aspirin (last dose 8/9), arthritis, hypothyroidism, carpal tunnel syndrome, lymphedema, recent removal of left ureteral stent & stone (8/16/21) presenting as code stroke for left hemiparesis with LWK 8/16/21 6p. Aide at bedside explains that patient had left the hospital after left ureteral stone and stent were removed and had last been evaluated at her baseline 6p 8/16. This morning patient was noted by all aides/staff that she seemed "off" and that her face had some asymmetry to it, including drooping of right eyelid, and her speech was "very off".         Problem/Recommendation - 1:  Problem: Stroke. Recommendation: Neurology care appreciated  speech and swallow   A fib, resumed eliquis   hypernatremia , on D51.2 NS monitor na level    a flutter noted  lopressor and Cardizem dosing   HR controlled, SP one dose dig  card follow up      Problem/Recommendation - 2:  ·  Problem: Dysphagia.  Recommendation:  failed speech and swallow.   GI eval     Problem/Recommendation - 3:  ·  Problem: Hypothyroid.  Recommendation: synthroid.      Problem/Recommendation - 4:  ·  Problem: Type 2 diabetes mellitus without complication.  Recommendation: sliding scale  hydration as tolerated.      Problem/Recommendation - 5:  ·  Problem: HTN (hypertension).  Recommendation: resume BP meds.      Problem/Recommendation - 6:  Problem: Hypothyroid.

## 2021-08-24 NOTE — PROGRESS NOTE ADULT - SUBJECTIVE AND OBJECTIVE BOX
Chief Complaint:  Patient is a 71y old  Female who presents with a chief complaint of stroke (24 Aug 2021 07:00)      Date of service 08-24-21 @ 22:04      Interval Events: positive BM    Hospital Medications:  acetaminophen    Suspension .. 650 milliGRAM(s) Oral every 6 hours PRN  apixaban 5 milliGRAM(s) Oral every 12 hours  aspirin enteric coated 81 milliGRAM(s) Oral daily  atorvastatin 80 milliGRAM(s) Oral at bedtime  diltiazem    Tablet 60 milliGRAM(s) Oral every 6 hours  levothyroxine Injectable 37.5 MICROGram(s) IV Push at bedtime  losartan 25 milliGRAM(s) Oral daily  mirabegron ER 25 milliGRAM(s) Oral daily  pantoprazole  Injectable 40 milliGRAM(s) IV Push every 12 hours  tamsulosin 0.4 milliGRAM(s) Oral at bedtime        Review of Systems:      PHYSICAL EXAM:   Vital Signs:  Vital Signs Last 24 Hrs  T(C): 37 (24 Aug 2021 07:00), Max: 37 (24 Aug 2021 06:00)  T(F): 98.6 (24 Aug 2021 07:00), Max: 98.6 (24 Aug 2021 06:00)  HR: 89 (24 Aug 2021 16:00) (82 - 104)  BP: 158/86 (24 Aug 2021 16:00) (129/92 - 176/91)  BP(mean): 102 (24 Aug 2021 16:00) (77 - 128)  RR: 20 (24 Aug 2021 16:00) (16 - 23)  SpO2: 98% (24 Aug 2021 16:00) (97% - 99%)  Daily     Daily       PHYSICAL EXAM:     GENERAL:  Appears stated age, well-groomed, well-nourished, no distress  HEENT:  NC/AT,  conjunctivae anicteric, clear and pink,   NECK: supple, trachea midline  CHEST:  Full & symmetric excursion, no increased effort, breath sounds clear  HEART:  Regular rhythm, no JVD  ABDOMEN:  Soft, non-tender, non-distended, normoactive bowel sounds,  no masses , no hepatosplenomegaly  EXTREMITIES:  no cyanosis,clubbing or edema  SKIN:  No rash, erythema, or, ecchymoses, no jaundice  NEURO:  Alert, non-focal, no asterixis  PSYCH: Appropriate affect, oriented to place and time  RECTAL: Deferred      LABS Personally reviewed by me:                        11.4   7.78  )-----------( 275      ( 24 Aug 2021 06:03 )             35.8     Mean Cell Volume: 90.9 fl (08-24-21 @ 06:03)    08-24    142  |  110<H>  |  15  ----------------------------<  114<H>  3.5   |  21<L>  |  0.77    Ca    8.8      24 Aug 2021 06:03    TPro  6.1  /  Alb  2.7<L>  /  TBili  0.5  /  DBili  x   /  AST  7<L>  /  ALT  6<L>  /  AlkPhos  81  08-24    LIVER FUNCTIONS - ( 24 Aug 2021 06:03 )  Alb: 2.7 g/dL / Pro: 6.1 g/dL / ALK PHOS: 81 U/L / ALT: 6 U/L / AST: 7 U/L / GGT: x                                       11.4   7.78  )-----------( 275      ( 24 Aug 2021 06:03 )             35.8                         11.3   7.21  )-----------( 235      ( 23 Aug 2021 00:37 )             36.5                         11.5   7.50  )-----------( 244      ( 22 Aug 2021 05:08 )             37.1       Imaging personally reviewed by me:

## 2021-08-24 NOTE — PROGRESS NOTE ADULT - SUBJECTIVE AND OBJECTIVE BOX
THE PATIENT WAS SEEN AND EXAMINED BY ME WITH THE HOUSESTAFF AND STROKE TEAM DURING MORNING ROUNDS.   HPI:  70yo RH woman w/ h/o afib on Eliquis (last dose 8/12), CAD s/p stents on aspirin (last dose 8/9), arthritis, hypothyroidism, carpal tunnel syndrome, lymphedema, recent removal of left ureteral stent & stone (8/16/21) presented as code stroke for left hemiparesis with LWK 8/16/21 6p. Aide at bedside explained that patient had left the hospital after left ureteral stone and stent were removed and had last been evaluated at her baseline 6p 8/16. This morning patient was noted by all aides/staff that she seemed "off" and that her face had some asymmetry to it, including drooping of right eyelid, and her speech was "very off". At baseline, patient is wheelchair bound due to underlying arthritis, stays in a nursing home requiring assistance with most ADLs.  NIHSS: 11 pre-MRS: 4, on admission.       SUBJECTIVE: No events overnight.  No new neurologic complaints.  ROS reported negative unless otherwise noted.    acetaminophen    Suspension .. 650 milliGRAM(s) Oral every 6 hours PRN  apixaban 5 milliGRAM(s) Oral every 12 hours  aspirin enteric coated 81 milliGRAM(s) Oral daily  atorvastatin 80 milliGRAM(s) Oral at bedtime  dextrose 5% + sodium chloride 0.45%. 1000 milliLiter(s) IV Continuous <Continuous>  diltiazem    Tablet 60 milliGRAM(s) Oral every 6 hours  levothyroxine Injectable 37.5 MICROGram(s) IV Push at bedtime  losartan 25 milliGRAM(s) Oral daily  mirabegron ER 25 milliGRAM(s) Oral daily  pantoprazole  Injectable 40 milliGRAM(s) IV Push every 12 hours  tamsulosin 0.4 milliGRAM(s) Oral at bedtime      PHYSICAL EXAM:   Vital Signs Last 24 Hrs  T(C): 37 (24 Aug 2021 07:00), Max: 37 (24 Aug 2021 06:00)  T(F): 98.6 (24 Aug 2021 07:00), Max: 98.6 (24 Aug 2021 06:00)  HR: 94 (24 Aug 2021 06:00) (82 - 141)  BP: 154/89 (24 Aug 2021 06:00) (107/79 - 174/86)  BP(mean): 106 (24 Aug 2021 06:00) (77 - 128)  RR: 21 (24 Aug 2021 06:00) (17 - 28)  SpO2: 98% (24 Aug 2021 06:00) (96% - 100%)    General: No acute distress  HEENT: right eye: Moderate-severe ptosis; pupil 3.5 mm (left pupil 3 mm); moderate-severe adduction deficit (barely crosses midline); mild-moderate depression deficit; severe upgaze palsy (bilateral)   Abdomen: Soft, nontender, nondistended   Extremities: No edema    NEUROLOGICAL EXAM:  Mental status:  Sleeping in bed, awakes to verbal/noxious stimuli. Had some difficulty cooperating with the exam as a whole; somewhat alert but inattentive,  could not state the year, but was oriented to place and age, follows some simple 1 step commands   Cranial Nerves: right eye: Moderate-severe ptosis; pupil 3.5 mm > left pupil 3 mm; moderate-severe adduction deficit (barely crosses midline); mild-moderate depression deficit; severe upgaze palsy (bilateral); mild or mild-moderate left facial palsy;  moderate dysarthria  Motor exam:  right side moves against gravity, left arm-antigravity, left leg-very slight movement, not versus gravity and with significant complaints of pain (chronic as per report)  Sensation: Intact to light touch   Coordination/ Gait:  gait not assessed   LABS:                        11.4   7.78  )-----------( 275      ( 24 Aug 2021 06:03 )             35.8    08-24    142  |  110<H>  |  15  ----------------------------<  114<H>  3.5   |  21<L>  |  0.77    Ca    8.8      24 Aug 2021 06:03    TPro  6.1  /  Alb  2.7<L>  /  TBili  0.5  /  DBili  x   /  AST  7<L>  /  ALT  6<L>  /  AlkPhos  81  08-24        IMAGING: Reviewed by me.   CT No Cont (8/21/21):  There is mild edema related to known lacunar infarcts in the right thalamus and midbrain. There is no hemorrhage or mass effect. There is moderate underlying chronic microvascular ischemic change with mild central atrophy.    (08.17.21)  Brain CT: No acute hemorrhage, mass effect or extra-axial collections. Interim right thalamic infarct may be subacute. Brain MRI can be obtained for further evaluation.  Neck CTA: No hemodynamically significant stenosis.  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 Unchanged fusiform ectasia proximal basilar artery with high-grade proximal to mid stenosis. Unchanged fusiform dilatation of the cavernous segment of the right internal carotid artery.  No large vessel occlusion of the anterior middle or posterior cerebral arteries.  Perfusion maps: No evidence for core infarct or penumbra. Hypoperfusion within the posterior circulation.    MR Head No Cont (08.18.21)  Acute right thalamic and right midbrain infarct.     THE PATIENT WAS SEEN AND EXAMINED BY ME WITH THE HOUSESTAFF AND STROKE TEAM DURING MORNING ROUNDS.   HPI:  72yo RH woman w/ h/o afib on Eliquis (last dose 8/12), CAD s/p stents on aspirin (last dose 8/9), arthritis, hypothyroidism, carpal tunnel syndrome, lymphedema, recent removal of left ureteral stent & stone (8/16/21) presented as code stroke for left hemiparesis with LWK 8/16/21 6p. Aide at bedside explained that patient had left the hospital after left ureteral stone and stent were removed and had last been evaluated at her baseline 6p 8/16. This morning patient was noted by all aides/staff that she seemed "off" and that her face had some asymmetry to it, including drooping of right eyelid, and her speech was "very off". At baseline, patient is wheelchair bound due to underlying arthritis, stays in a nursing home requiring assistance with most ADLs.  NIHSS: 11 pre-MRS: 4, on admission.     SUBJECTIVE: No events overnight.  No new neurologic complaints.  ROS reported negative unless otherwise noted.    acetaminophen    Suspension .. 650 milliGRAM(s) Oral every 6 hours PRN  apixaban 5 milliGRAM(s) Oral every 12 hours  aspirin enteric coated 81 milliGRAM(s) Oral daily  atorvastatin 80 milliGRAM(s) Oral at bedtime  dextrose 5% + sodium chloride 0.45%. 1000 milliLiter(s) IV Continuous <Continuous>  diltiazem    Tablet 60 milliGRAM(s) Oral every 6 hours  levothyroxine Injectable 37.5 MICROGram(s) IV Push at bedtime  losartan 25 milliGRAM(s) Oral daily  mirabegron ER 25 milliGRAM(s) Oral daily  pantoprazole  Injectable 40 milliGRAM(s) IV Push every 12 hours  tamsulosin 0.4 milliGRAM(s) Oral at bedtime      PHYSICAL EXAM:   Vital Signs Last 24 Hrs  T(C): 37 (24 Aug 2021 07:00), Max: 37 (24 Aug 2021 06:00)  T(F): 98.6 (24 Aug 2021 07:00), Max: 98.6 (24 Aug 2021 06:00)  HR: 94 (24 Aug 2021 06:00) (82 - 141)  BP: 154/89 (24 Aug 2021 06:00) (107/79 - 174/86)  BP(mean): 106 (24 Aug 2021 06:00) (77 - 128)  RR: 21 (24 Aug 2021 06:00) (17 - 28)  SpO2: 98% (24 Aug 2021 06:00) (96% - 100%)    General: No acute distress  HEENT: right eye: Moderate-severe ptosis; pupil 3.5 mm (left pupil 3 mm); moderate-severe adduction deficit (barely crosses midline); mild-moderate depression deficit; severe upgaze palsy (bilateral)   Abdomen: Soft, nontender, nondistended   Extremities: No edema    NEUROLOGICAL EXAM:  Mental status:  Sleeping in bed, awakes to verbal/noxious stimuli.oriented to place and self, follows some simple 1 step commands , interactive   Cranial Nerves: right eye: Moderate-severe ptosis; pupil 3.5 mm > left pupil 3 mm; moderate-severe adduction deficit (barely crosses midline); mild-moderate depression deficit; severe upgaze palsy (bilateral); mild or mild-moderate left facial palsy;  moderate dysarthria  Motor exam:  right side moves against gravity, left arm-antigravity, left leg-very slight movement, not versus gravity and with significant complaints of pain (chronic as per report)  Sensation: Intact to light touch   Coordination/ Gait:  gait not assessed   LABS:                        11.4   7.78  )-----------( 275      ( 24 Aug 2021 06:03 )             35.8    08-24    142  |  110<H>  |  15  ----------------------------<  114<H>  3.5   |  21<L>  |  0.77    Ca    8.8      24 Aug 2021 06:03    TPro  6.1  /  Alb  2.7<L>  /  TBili  0.5  /  DBili  x   /  AST  7<L>  /  ALT  6<L>  /  AlkPhos  81  08-24        IMAGING: Reviewed by me.   [place] No Cont (8/21/21):  There is mild edema related to known lacunar infarcts in the right thalamus and midbrain. There is no hemorrhage or mass effect. There is moderate underlying chronic microvascular ischemic change with mild central atrophy.    (08.17.21)  Brain CT: No acute hemorrhage, mass effect or extra-axial collections. Interim right thalamic infarct may be subacute. Brain MRI can be obtained for further evaluation.  Neck CTA: No hemodynamically significant stenosis.  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 Unchanged fusiform ectasia proximal basilar artery with high-grade proximal to mid stenosis. Unchanged fusiform dilatation of the cavernous segment of the right internal carotid artery.  No large vessel occlusion of the anterior middle or posterior cerebral arteries.  Perfusion maps: No evidence for core infarct or penumbra. Hypoperfusion within the posterior circulation.    MR Head No Cont (08.18.21)  Acute right thalamic and right midbrain infarct.

## 2021-09-02 NOTE — PROGRESS NOTE ADULT - PROBLEM SELECTOR PLAN 8
DVT ppx-on Eliquis High Dose Vitamin A Counseling: Side effects reviewed, pt to contact office should one occur.

## 2021-09-22 NOTE — H&P ADULT - PROBLEM SELECTOR PLAN 4
IMPROVE VTE score:  [ ] Previous VTE                                                3  [ ] Thrombophilia                                             2  [ ] Lower limb paralysis                                  2        (unable to hold up >15 seconds)    [ ] Current Cancer (within 6 months)            2   [x] Immobilization > 24 hrs                              1  [ ] ICU/CCU stay > 24 hours                            1  [x] Age > 60                                                         1  IMPROVE Score 2, will start Lovenox 40 mg OD
5/5 strength and intact sensation to light touch bilaterally in upper and lower extremity.

## 2022-01-07 NOTE — ASU PREOP CHECKLIST - PATIENT SENT TO
oriented to person, place and time , normal sensation , short and long term memory intact operating room

## 2022-01-21 ENCOUNTER — INPATIENT (INPATIENT)
Facility: HOSPITAL | Age: 72
LOS: 31 days | Discharge: NURSING, REHAB OR LONG-TERM CARE | DRG: 870 | End: 2022-02-22
Attending: HOSPITALIST | Admitting: STUDENT IN AN ORGANIZED HEALTH CARE EDUCATION/TRAINING PROGRAM
Payer: MEDICARE

## 2022-01-21 VITALS
HEIGHT: 62 IN | RESPIRATION RATE: 30 BRPM | SYSTOLIC BLOOD PRESSURE: 88 MMHG | WEIGHT: 144.84 LBS | DIASTOLIC BLOOD PRESSURE: 58 MMHG | HEART RATE: 102 BPM

## 2022-01-21 DIAGNOSIS — A41.9 SEPSIS, UNSPECIFIED ORGANISM: ICD-10-CM

## 2022-01-21 DIAGNOSIS — Z98.89 OTHER SPECIFIED POSTPROCEDURAL STATES: Chronic | ICD-10-CM

## 2022-01-21 DIAGNOSIS — Z98.890 OTHER SPECIFIED POSTPROCEDURAL STATES: Chronic | ICD-10-CM

## 2022-01-21 LAB
A1C WITH ESTIMATED AVERAGE GLUCOSE RESULT: 5.7 % — HIGH (ref 4–5.6)
ALBUMIN SERPL ELPH-MCNC: 2.9 G/DL — LOW (ref 3.3–5)
ALBUMIN SERPL ELPH-MCNC: 3.8 G/DL — SIGNIFICANT CHANGE UP (ref 3.3–5)
ALP SERPL-CCNC: 70 U/L — SIGNIFICANT CHANGE UP (ref 40–120)
ALP SERPL-CCNC: 90 U/L — SIGNIFICANT CHANGE UP (ref 40–120)
ALT FLD-CCNC: 11 U/L — SIGNIFICANT CHANGE UP (ref 10–45)
ALT FLD-CCNC: 11 U/L — SIGNIFICANT CHANGE UP (ref 10–45)
ANION GAP SERPL CALC-SCNC: 20 MMOL/L — HIGH (ref 5–17)
ANION GAP SERPL CALC-SCNC: 20 MMOL/L — HIGH (ref 5–17)
ANION GAP SERPL CALC-SCNC: 25 MMOL/L — HIGH (ref 5–17)
APPEARANCE UR: CLEAR — SIGNIFICANT CHANGE UP
APTT BLD: 45.2 SEC — HIGH (ref 27.5–35.5)
APTT BLD: 53.2 SEC — HIGH (ref 27.5–35.5)
AST SERPL-CCNC: 18 U/L — SIGNIFICANT CHANGE UP (ref 10–40)
AST SERPL-CCNC: 19 U/L — SIGNIFICANT CHANGE UP (ref 10–40)
BACTERIA # UR AUTO: ABNORMAL
BASE EXCESS BLDV CALC-SCNC: 4.3 MMOL/L — HIGH (ref -2–2)
BASOPHILS # BLD AUTO: 0 K/UL — SIGNIFICANT CHANGE UP (ref 0–0.2)
BASOPHILS # BLD AUTO: 0.03 K/UL — SIGNIFICANT CHANGE UP (ref 0–0.2)
BASOPHILS NFR BLD AUTO: 0 % — SIGNIFICANT CHANGE UP (ref 0–2)
BASOPHILS NFR BLD AUTO: 0.2 % — SIGNIFICANT CHANGE UP (ref 0–2)
BILIRUB SERPL-MCNC: 0.6 MG/DL — SIGNIFICANT CHANGE UP (ref 0.2–1.2)
BILIRUB SERPL-MCNC: 0.8 MG/DL — SIGNIFICANT CHANGE UP (ref 0.2–1.2)
BILIRUB UR-MCNC: NEGATIVE — SIGNIFICANT CHANGE UP
BUN SERPL-MCNC: 100 MG/DL — HIGH (ref 7–23)
BUN SERPL-MCNC: 101 MG/DL — HIGH (ref 7–23)
BUN SERPL-MCNC: 97 MG/DL — HIGH (ref 7–23)
CA-I SERPL-SCNC: 1.11 MMOL/L — LOW (ref 1.15–1.33)
CALCIUM SERPL-MCNC: 8.1 MG/DL — LOW (ref 8.4–10.5)
CALCIUM SERPL-MCNC: 8.4 MG/DL — SIGNIFICANT CHANGE UP (ref 8.4–10.5)
CALCIUM SERPL-MCNC: 9.6 MG/DL — SIGNIFICANT CHANGE UP (ref 8.4–10.5)
CHLORIDE BLDV-SCNC: 119 MMOL/L — HIGH (ref 96–108)
CHLORIDE SERPL-SCNC: 113 MMOL/L — HIGH (ref 96–108)
CHLORIDE SERPL-SCNC: 119 MMOL/L — HIGH (ref 96–108)
CHLORIDE SERPL-SCNC: 119 MMOL/L — HIGH (ref 96–108)
CHLORIDE UR-SCNC: 28 MMOL/L — SIGNIFICANT CHANGE UP
CK MB BLD-MCNC: 0.2 % — SIGNIFICANT CHANGE UP (ref 0–3.5)
CK MB CFR SERPL CALC: 2.4 NG/ML — SIGNIFICANT CHANGE UP (ref 0–3.8)
CK SERPL-CCNC: 1013 U/L — HIGH (ref 25–170)
CO2 BLDV-SCNC: 28 MMOL/L — HIGH (ref 22–26)
CO2 SERPL-SCNC: 19 MMOL/L — LOW (ref 22–31)
CO2 SERPL-SCNC: 22 MMOL/L — SIGNIFICANT CHANGE UP (ref 22–31)
CO2 SERPL-SCNC: 23 MMOL/L — SIGNIFICANT CHANGE UP (ref 22–31)
COLOR SPEC: YELLOW — SIGNIFICANT CHANGE UP
CORTIS AM PEAK SERPL-MCNC: 30.1 UG/DL — HIGH (ref 6–18.4)
CREAT ?TM UR-MCNC: 171 MG/DL — SIGNIFICANT CHANGE UP
CREAT SERPL-MCNC: 4.09 MG/DL — HIGH (ref 0.5–1.3)
CREAT SERPL-MCNC: 4.12 MG/DL — HIGH (ref 0.5–1.3)
CREAT SERPL-MCNC: 4.74 MG/DL — HIGH (ref 0.5–1.3)
DIFF PNL FLD: ABNORMAL
EOSINOPHIL # BLD AUTO: 0 K/UL — SIGNIFICANT CHANGE UP (ref 0–0.5)
EOSINOPHIL # BLD AUTO: 0.03 K/UL — SIGNIFICANT CHANGE UP (ref 0–0.5)
EOSINOPHIL NFR BLD AUTO: 0 % — SIGNIFICANT CHANGE UP (ref 0–6)
EOSINOPHIL NFR BLD AUTO: 0.2 % — SIGNIFICANT CHANGE UP (ref 0–6)
EPI CELLS # UR: SIGNIFICANT CHANGE UP
ESTIMATED AVERAGE GLUCOSE: 117 MG/DL — HIGH (ref 68–114)
GAS PNL BLDA: SIGNIFICANT CHANGE UP
GAS PNL BLDV: 159 MMOL/L — HIGH (ref 136–145)
GAS PNL BLDV: SIGNIFICANT CHANGE UP
GAS PNL BLDV: SIGNIFICANT CHANGE UP
GLUCOSE BLDC GLUCOMTR-MCNC: 108 MG/DL — HIGH (ref 70–99)
GLUCOSE BLDC GLUCOMTR-MCNC: 128 MG/DL — HIGH (ref 70–99)
GLUCOSE BLDV-MCNC: 174 MG/DL — HIGH (ref 70–99)
GLUCOSE SERPL-MCNC: 132 MG/DL — HIGH (ref 70–99)
GLUCOSE SERPL-MCNC: 141 MG/DL — HIGH (ref 70–99)
GLUCOSE SERPL-MCNC: 163 MG/DL — HIGH (ref 70–99)
GLUCOSE UR QL: NEGATIVE — SIGNIFICANT CHANGE UP
GRAM STN FLD: SIGNIFICANT CHANGE UP
HCO3 BLDV-SCNC: 27 MMOL/L — SIGNIFICANT CHANGE UP (ref 22–29)
HCT VFR BLD CALC: 39.7 % — SIGNIFICANT CHANGE UP (ref 34.5–45)
HCT VFR BLD CALC: 50 % — HIGH (ref 34.5–45)
HCT VFR BLDA CALC: 44 % — SIGNIFICANT CHANGE UP (ref 34.5–46.5)
HGB BLD CALC-MCNC: 14.7 G/DL — SIGNIFICANT CHANGE UP (ref 11.7–16.1)
HGB BLD-MCNC: 12 G/DL — SIGNIFICANT CHANGE UP (ref 11.5–15.5)
HGB BLD-MCNC: 14.9 G/DL — SIGNIFICANT CHANGE UP (ref 11.5–15.5)
IMM GRANULOCYTES NFR BLD AUTO: 0.6 % — SIGNIFICANT CHANGE UP (ref 0–1.5)
INR BLD: 2.21 RATIO — HIGH (ref 0.88–1.16)
INR BLD: 2.39 RATIO — HIGH (ref 0.88–1.16)
KETONES UR-MCNC: NEGATIVE — SIGNIFICANT CHANGE UP
LACTATE BLDV-MCNC: 3.2 MMOL/L — HIGH (ref 0.7–2)
LEGIONELLA AG UR QL: NEGATIVE — SIGNIFICANT CHANGE UP
LEUKOCYTE ESTERASE UR-ACNC: NEGATIVE — SIGNIFICANT CHANGE UP
LYMPHOCYTES # BLD AUTO: 1.01 K/UL — SIGNIFICANT CHANGE UP (ref 1–3.3)
LYMPHOCYTES # BLD AUTO: 1.51 K/UL — SIGNIFICANT CHANGE UP (ref 1–3.3)
LYMPHOCYTES # BLD AUTO: 11.5 % — LOW (ref 13–44)
LYMPHOCYTES # BLD AUTO: 6.9 % — LOW (ref 13–44)
MAGNESIUM SERPL-MCNC: 2.4 MG/DL — SIGNIFICANT CHANGE UP (ref 1.6–2.6)
MAGNESIUM SERPL-MCNC: 2.4 MG/DL — SIGNIFICANT CHANGE UP (ref 1.6–2.6)
MANUAL SMEAR VERIFICATION: SIGNIFICANT CHANGE UP
MCHC RBC-ENTMCNC: 28 PG — SIGNIFICANT CHANGE UP (ref 27–34)
MCHC RBC-ENTMCNC: 28.2 PG — SIGNIFICANT CHANGE UP (ref 27–34)
MCHC RBC-ENTMCNC: 29.8 GM/DL — LOW (ref 32–36)
MCHC RBC-ENTMCNC: 30.2 GM/DL — LOW (ref 32–36)
MCV RBC AUTO: 93.4 FL — SIGNIFICANT CHANGE UP (ref 80–100)
MCV RBC AUTO: 94 FL — SIGNIFICANT CHANGE UP (ref 80–100)
MONOCYTES # BLD AUTO: 1.14 K/UL — HIGH (ref 0–0.9)
MONOCYTES # BLD AUTO: 1.25 K/UL — HIGH (ref 0–0.9)
MONOCYTES NFR BLD AUTO: 8.6 % — SIGNIFICANT CHANGE UP (ref 2–14)
MONOCYTES NFR BLD AUTO: 8.7 % — SIGNIFICANT CHANGE UP (ref 2–14)
NEUTROPHILS # BLD AUTO: 10.33 K/UL — HIGH (ref 1.8–7.4)
NEUTROPHILS # BLD AUTO: 12.31 K/UL — HIGH (ref 1.8–7.4)
NEUTROPHILS NFR BLD AUTO: 78.8 % — HIGH (ref 43–77)
NEUTROPHILS NFR BLD AUTO: 80.2 % — HIGH (ref 43–77)
NEUTS BAND # BLD: 4.3 % — SIGNIFICANT CHANGE UP (ref 0–8)
NITRITE UR-MCNC: NEGATIVE — SIGNIFICANT CHANGE UP
NRBC # BLD: 0 /100 WBCS — SIGNIFICANT CHANGE UP (ref 0–0)
NT-PROBNP SERPL-SCNC: 2128 PG/ML — HIGH (ref 0–300)
OSMOLALITY UR: 393 MOS/KG — SIGNIFICANT CHANGE UP (ref 300–900)
PCO2 BLDV: 35 MMHG — LOW (ref 39–42)
PH BLDV: 7.5 — HIGH (ref 7.32–7.43)
PH UR: 6 — SIGNIFICANT CHANGE UP (ref 5–8)
PHOSPHATE SERPL-MCNC: 4.9 MG/DL — HIGH (ref 2.5–4.5)
PHOSPHATE SERPL-MCNC: 5 MG/DL — HIGH (ref 2.5–4.5)
PLAT MORPH BLD: NORMAL — SIGNIFICANT CHANGE UP
PLATELET # BLD AUTO: 359 K/UL — SIGNIFICANT CHANGE UP (ref 150–400)
PLATELET # BLD AUTO: 426 K/UL — HIGH (ref 150–400)
PO2 BLDV: 43 MMHG — SIGNIFICANT CHANGE UP (ref 25–45)
POTASSIUM BLDV-SCNC: 4.2 MMOL/L — SIGNIFICANT CHANGE UP (ref 3.5–5.1)
POTASSIUM SERPL-MCNC: 3.4 MMOL/L — LOW (ref 3.5–5.3)
POTASSIUM SERPL-MCNC: 3.7 MMOL/L — SIGNIFICANT CHANGE UP (ref 3.5–5.3)
POTASSIUM SERPL-MCNC: 4.1 MMOL/L — SIGNIFICANT CHANGE UP (ref 3.5–5.3)
POTASSIUM SERPL-SCNC: 3.4 MMOL/L — LOW (ref 3.5–5.3)
POTASSIUM SERPL-SCNC: 3.7 MMOL/L — SIGNIFICANT CHANGE UP (ref 3.5–5.3)
POTASSIUM SERPL-SCNC: 4.1 MMOL/L — SIGNIFICANT CHANGE UP (ref 3.5–5.3)
POTASSIUM UR-SCNC: 84 MMOL/L — SIGNIFICANT CHANGE UP
PROCALCITONIN SERPL-MCNC: 0.87 NG/ML — HIGH (ref 0.02–0.1)
PROT SERPL-MCNC: 6.5 G/DL — SIGNIFICANT CHANGE UP (ref 6–8.3)
PROT SERPL-MCNC: 8.1 G/DL — SIGNIFICANT CHANGE UP (ref 6–8.3)
PROT UR-MCNC: NEGATIVE — SIGNIFICANT CHANGE UP
PROTHROM AB SERPL-ACNC: 25.5 SEC — HIGH (ref 10.6–13.6)
PROTHROM AB SERPL-ACNC: 27.5 SEC — HIGH (ref 10.6–13.6)
RAPID RVP RESULT: DETECTED
RAPID RVP RESULT: SIGNIFICANT CHANGE UP
RBC # BLD: 4.25 M/UL — SIGNIFICANT CHANGE UP (ref 3.8–5.2)
RBC # BLD: 5.32 M/UL — HIGH (ref 3.8–5.2)
RBC # FLD: 16.5 % — HIGH (ref 10.3–14.5)
RBC # FLD: 16.6 % — HIGH (ref 10.3–14.5)
RBC BLD AUTO: SIGNIFICANT CHANGE UP
RBC CASTS # UR COMP ASSIST: 21 /HPF — HIGH (ref 0–4)
SAO2 % BLDV: 73.8 % — SIGNIFICANT CHANGE UP (ref 67–88)
SARS-COV-2 RNA SPEC QL NAA+PROBE: DETECTED
SARS-COV-2 RNA SPEC QL NAA+PROBE: SIGNIFICANT CHANGE UP
SODIUM SERPL-SCNC: 158 MMOL/L — HIGH (ref 135–145)
SODIUM SERPL-SCNC: 161 MMOL/L — CRITICAL HIGH (ref 135–145)
SODIUM SERPL-SCNC: 162 MMOL/L — CRITICAL HIGH (ref 135–145)
SODIUM UR-SCNC: 28 MMOL/L — SIGNIFICANT CHANGE UP
SP GR SPEC: 1.03 — HIGH (ref 1.01–1.02)
SPECIMEN SOURCE: SIGNIFICANT CHANGE UP
TROPONIN T, HIGH SENSITIVITY RESULT: 191 NG/L — HIGH (ref 0–51)
TROPONIN T, HIGH SENSITIVITY RESULT: 239 NG/L — HIGH (ref 0–51)
UROBILINOGEN FLD QL: NEGATIVE — SIGNIFICANT CHANGE UP
WBC # BLD: 13.12 K/UL — HIGH (ref 3.8–10.5)
WBC # BLD: 14.57 K/UL — HIGH (ref 3.8–10.5)
WBC # FLD AUTO: 13.12 K/UL — HIGH (ref 3.8–10.5)
WBC # FLD AUTO: 14.57 K/UL — HIGH (ref 3.8–10.5)
WBC UR QL: 3 /HPF — SIGNIFICANT CHANGE UP (ref 0–5)

## 2022-01-21 PROCEDURE — 99292 CRITICAL CARE ADDL 30 MIN: CPT | Mod: 25

## 2022-01-21 PROCEDURE — 74176 CT ABD & PELVIS W/O CONTRAST: CPT | Mod: 26,MA

## 2022-01-21 PROCEDURE — 70450 CT HEAD/BRAIN W/O DYE: CPT | Mod: 26,MA

## 2022-01-21 PROCEDURE — 93308 TTE F-UP OR LMTD: CPT | Mod: 26

## 2022-01-21 PROCEDURE — 71045 X-RAY EXAM CHEST 1 VIEW: CPT | Mod: 26,76

## 2022-01-21 PROCEDURE — 93010 ELECTROCARDIOGRAM REPORT: CPT

## 2022-01-21 PROCEDURE — 31500 INSERT EMERGENCY AIRWAY: CPT | Mod: GC

## 2022-01-21 PROCEDURE — 76604 US EXAM CHEST: CPT | Mod: 26,GC

## 2022-01-21 PROCEDURE — 93308 TTE F-UP OR LMTD: CPT | Mod: 26,GC

## 2022-01-21 PROCEDURE — 99291 CRITICAL CARE FIRST HOUR: CPT | Mod: CS,GC

## 2022-01-21 PROCEDURE — 99291 CRITICAL CARE FIRST HOUR: CPT | Mod: 25

## 2022-01-21 PROCEDURE — 43753 TX GASTRO INTUB W/ASP: CPT | Mod: 59,GC

## 2022-01-21 PROCEDURE — 71250 CT THORAX DX C-: CPT | Mod: 26,MA

## 2022-01-21 RX ORDER — INSULIN LISPRO 100/ML
VIAL (ML) SUBCUTANEOUS EVERY 4 HOURS
Refills: 0 | Status: DISCONTINUED | OUTPATIENT
Start: 2022-01-21 | End: 2022-01-23

## 2022-01-21 RX ORDER — CEFEPIME 1 G/1
1000 INJECTION, POWDER, FOR SOLUTION INTRAMUSCULAR; INTRAVENOUS ONCE
Refills: 0 | Status: COMPLETED | OUTPATIENT
Start: 2022-01-21 | End: 2022-01-21

## 2022-01-21 RX ORDER — ACYCLOVIR SODIUM 500 MG
650 VIAL (EA) INTRAVENOUS EVERY 24 HOURS
Refills: 0 | Status: DISCONTINUED | OUTPATIENT
Start: 2022-01-21 | End: 2022-01-24

## 2022-01-21 RX ORDER — KETAMINE HYDROCHLORIDE 100 MG/ML
70 INJECTION INTRAMUSCULAR; INTRAVENOUS ONCE
Refills: 0 | Status: DISCONTINUED | OUTPATIENT
Start: 2022-01-21 | End: 2022-01-21

## 2022-01-21 RX ORDER — ACYCLOVIR SODIUM 500 MG
VIAL (EA) INTRAVENOUS
Refills: 0 | Status: DISCONTINUED | OUTPATIENT
Start: 2022-01-21 | End: 2022-01-21

## 2022-01-21 RX ORDER — PANTOPRAZOLE SODIUM 20 MG/1
40 TABLET, DELAYED RELEASE ORAL DAILY
Refills: 0 | Status: DISCONTINUED | OUTPATIENT
Start: 2022-01-21 | End: 2022-01-22

## 2022-01-21 RX ORDER — ROCURONIUM BROMIDE 10 MG/ML
70 VIAL (ML) INTRAVENOUS ONCE
Refills: 0 | Status: DISCONTINUED | OUTPATIENT
Start: 2022-01-21 | End: 2022-01-21

## 2022-01-21 RX ORDER — LEVOTHYROXINE SODIUM 125 MCG
56 TABLET ORAL AT BEDTIME
Refills: 0 | Status: DISCONTINUED | OUTPATIENT
Start: 2022-01-21 | End: 2022-01-28

## 2022-01-21 RX ORDER — DEXAMETHASONE 0.5 MG/5ML
6 ELIXIR ORAL DAILY
Refills: 0 | Status: COMPLETED | OUTPATIENT
Start: 2022-01-21 | End: 2022-01-31

## 2022-01-21 RX ORDER — HEPARIN SODIUM 5000 [USP'U]/ML
5000 INJECTION INTRAVENOUS; SUBCUTANEOUS EVERY 12 HOURS
Refills: 0 | Status: DISCONTINUED | OUTPATIENT
Start: 2022-01-21 | End: 2022-01-22

## 2022-01-21 RX ORDER — SENNA PLUS 8.6 MG/1
2 TABLET ORAL AT BEDTIME
Refills: 0 | Status: DISCONTINUED | OUTPATIENT
Start: 2022-01-21 | End: 2022-01-21

## 2022-01-21 RX ORDER — LEVOTHYROXINE SODIUM 125 MCG
37.5 TABLET ORAL AT BEDTIME
Refills: 0 | Status: DISCONTINUED | OUTPATIENT
Start: 2022-01-21 | End: 2022-01-21

## 2022-01-21 RX ORDER — ATORVASTATIN CALCIUM 80 MG/1
80 TABLET, FILM COATED ORAL AT BEDTIME
Refills: 0 | Status: DISCONTINUED | OUTPATIENT
Start: 2022-01-21 | End: 2022-02-22

## 2022-01-21 RX ORDER — KETAMINE HYDROCHLORIDE 100 MG/ML
66 INJECTION INTRAMUSCULAR; INTRAVENOUS ONCE
Refills: 0 | Status: DISCONTINUED | OUTPATIENT
Start: 2022-01-21 | End: 2022-01-21

## 2022-01-21 RX ORDER — NOREPINEPHRINE BITARTRATE/D5W 8 MG/250ML
0.05 PLASTIC BAG, INJECTION (ML) INTRAVENOUS
Qty: 8 | Refills: 0 | Status: DISCONTINUED | OUTPATIENT
Start: 2022-01-21 | End: 2022-01-25

## 2022-01-21 RX ORDER — SODIUM CHLORIDE 9 MG/ML
500 INJECTION INTRAMUSCULAR; INTRAVENOUS; SUBCUTANEOUS ONCE
Refills: 0 | Status: COMPLETED | OUTPATIENT
Start: 2022-01-21 | End: 2022-01-21

## 2022-01-21 RX ORDER — MEROPENEM 1 G/30ML
1000 INJECTION INTRAVENOUS EVERY 12 HOURS
Refills: 0 | Status: DISCONTINUED | OUTPATIENT
Start: 2022-01-21 | End: 2022-01-25

## 2022-01-21 RX ORDER — SODIUM CHLORIDE 9 MG/ML
1000 INJECTION, SOLUTION INTRAVENOUS ONCE
Refills: 0 | Status: COMPLETED | OUTPATIENT
Start: 2022-01-21 | End: 2022-01-21

## 2022-01-21 RX ORDER — ROCURONIUM BROMIDE 10 MG/ML
66 VIAL (ML) INTRAVENOUS ONCE
Refills: 0 | Status: DISCONTINUED | OUTPATIENT
Start: 2022-01-21 | End: 2022-01-21

## 2022-01-21 RX ORDER — CEFEPIME 1 G/1
INJECTION, POWDER, FOR SOLUTION INTRAMUSCULAR; INTRAVENOUS
Refills: 0 | Status: DISCONTINUED | OUTPATIENT
Start: 2022-01-21 | End: 2022-01-21

## 2022-01-21 RX ORDER — HEPARIN SODIUM 5000 [USP'U]/ML
5000 INJECTION INTRAVENOUS; SUBCUTANEOUS EVERY 12 HOURS
Refills: 0 | Status: DISCONTINUED | OUTPATIENT
Start: 2022-01-21 | End: 2022-01-21

## 2022-01-21 RX ORDER — LEVETIRACETAM 250 MG/1
1000 TABLET, FILM COATED ORAL ONCE
Refills: 0 | Status: DISCONTINUED | OUTPATIENT
Start: 2022-01-21 | End: 2022-01-21

## 2022-01-21 RX ORDER — CHLORHEXIDINE GLUCONATE 213 G/1000ML
15 SOLUTION TOPICAL EVERY 12 HOURS
Refills: 0 | Status: DISCONTINUED | OUTPATIENT
Start: 2022-01-21 | End: 2022-01-27

## 2022-01-21 RX ORDER — VANCOMYCIN HCL 1 G
1000 VIAL (EA) INTRAVENOUS ONCE
Refills: 0 | Status: COMPLETED | OUTPATIENT
Start: 2022-01-21 | End: 2022-01-21

## 2022-01-21 RX ORDER — SODIUM CHLORIDE 9 MG/ML
1000 INJECTION, SOLUTION INTRAVENOUS
Refills: 0 | Status: DISCONTINUED | OUTPATIENT
Start: 2022-01-21 | End: 2022-01-22

## 2022-01-21 RX ORDER — SODIUM CHLORIDE 9 MG/ML
1000 INJECTION INTRAMUSCULAR; INTRAVENOUS; SUBCUTANEOUS ONCE
Refills: 0 | Status: COMPLETED | OUTPATIENT
Start: 2022-01-21 | End: 2022-01-21

## 2022-01-21 RX ORDER — PROPOFOL 10 MG/ML
20 INJECTION, EMULSION INTRAVENOUS
Qty: 1000 | Refills: 0 | Status: DISCONTINUED | OUTPATIENT
Start: 2022-01-21 | End: 2022-01-25

## 2022-01-21 RX ORDER — ACETAMINOPHEN 500 MG
650 TABLET ORAL EVERY 6 HOURS
Refills: 0 | Status: DISCONTINUED | OUTPATIENT
Start: 2022-01-21 | End: 2022-02-22

## 2022-01-21 RX ORDER — SENNA PLUS 8.6 MG/1
10 TABLET ORAL AT BEDTIME
Refills: 0 | Status: DISCONTINUED | OUTPATIENT
Start: 2022-01-21 | End: 2022-02-22

## 2022-01-21 RX ORDER — POLYETHYLENE GLYCOL 3350 17 G/17G
17 POWDER, FOR SOLUTION ORAL DAILY
Refills: 0 | Status: DISCONTINUED | OUTPATIENT
Start: 2022-01-21 | End: 2022-01-21

## 2022-01-21 RX ORDER — ASPIRIN/CALCIUM CARB/MAGNESIUM 324 MG
81 TABLET ORAL DAILY
Refills: 0 | Status: DISCONTINUED | OUTPATIENT
Start: 2022-01-21 | End: 2022-02-03

## 2022-01-21 RX ORDER — CHLORHEXIDINE GLUCONATE 213 G/1000ML
1 SOLUTION TOPICAL
Refills: 0 | Status: DISCONTINUED | OUTPATIENT
Start: 2022-01-21 | End: 2022-02-22

## 2022-01-21 RX ORDER — CEFEPIME 1 G/1
1000 INJECTION, POWDER, FOR SOLUTION INTRAMUSCULAR; INTRAVENOUS ONCE
Refills: 0 | Status: DISCONTINUED | OUTPATIENT
Start: 2022-01-21 | End: 2022-01-21

## 2022-01-21 RX ADMIN — SODIUM CHLORIDE 500 MILLILITER(S): 9 INJECTION INTRAMUSCULAR; INTRAVENOUS; SUBCUTANEOUS at 09:40

## 2022-01-21 RX ADMIN — Medication 250 MILLIGRAM(S): at 09:31

## 2022-01-21 RX ADMIN — SODIUM CHLORIDE 1000 MILLILITER(S): 9 INJECTION INTRAMUSCULAR; INTRAVENOUS; SUBCUTANEOUS at 10:00

## 2022-01-21 RX ADMIN — Medication 163 MILLIGRAM(S): at 23:05

## 2022-01-21 RX ADMIN — Medication 1 MILLIGRAM(S): at 21:16

## 2022-01-21 RX ADMIN — CEFEPIME 1000 MILLIGRAM(S): 1 INJECTION, POWDER, FOR SOLUTION INTRAMUSCULAR; INTRAVENOUS at 09:37

## 2022-01-21 RX ADMIN — SODIUM CHLORIDE 1000 MILLILITER(S): 9 INJECTION, SOLUTION INTRAVENOUS at 15:15

## 2022-01-21 RX ADMIN — MEROPENEM 100 MILLIGRAM(S): 1 INJECTION INTRAVENOUS at 18:15

## 2022-01-21 RX ADMIN — Medication 56 MICROGRAM(S): at 21:30

## 2022-01-21 RX ADMIN — Medication 650 MILLIGRAM(S): at 18:12

## 2022-01-21 RX ADMIN — SODIUM CHLORIDE 75 MILLILITER(S): 9 INJECTION, SOLUTION INTRAVENOUS at 19:53

## 2022-01-21 RX ADMIN — CEFEPIME 100 MILLIGRAM(S): 1 INJECTION, POWDER, FOR SOLUTION INTRAMUSCULAR; INTRAVENOUS at 09:07

## 2022-01-21 RX ADMIN — SODIUM CHLORIDE 1000 MILLILITER(S): 9 INJECTION INTRAMUSCULAR; INTRAVENOUS; SUBCUTANEOUS at 11:43

## 2022-01-21 RX ADMIN — ETOMIDATE 20 MILLIGRAM(S): 2 INJECTION INTRAVENOUS at 22:30

## 2022-01-21 RX ADMIN — SODIUM CHLORIDE 500 MILLILITER(S): 9 INJECTION INTRAMUSCULAR; INTRAVENOUS; SUBCUTANEOUS at 09:05

## 2022-01-21 RX ADMIN — Medication 650 MILLIGRAM(S): at 19:55

## 2022-01-21 RX ADMIN — Medication 6.16 MICROGRAM(S)/KG/MIN: at 12:26

## 2022-01-21 RX ADMIN — SODIUM CHLORIDE 1000 MILLILITER(S): 9 INJECTION INTRAMUSCULAR; INTRAVENOUS; SUBCUTANEOUS at 09:30

## 2022-01-21 RX ADMIN — Medication 90 MILLIGRAM(S): at 22:30

## 2022-01-21 NOTE — ED PROVIDER NOTE - CARE PLAN
Principal Discharge DX:	Severe sepsis   1 Principal Discharge DX:	Septic shock  Secondary Diagnosis:	Rhabdomyolysis  Secondary Diagnosis:	ANIKA (acute kidney injury)

## 2022-01-21 NOTE — CONSULT NOTE ADULT - SUBJECTIVE AND OBJECTIVE BOX
Neurology - Consult Note - 01-21-22  -  Demar Ahuja MD  PGY-2 Neurology  Spectra: [phone] ([facility]), [phone] ([facility])  -    Name: TIANA BROWN; 71y (1950)    Reason for Admission: Sepsis        Chief Complaint:   HPI:     71 yr old female with PMHx, Afib on apixaban, HFrEF (50% 8/21/21),CAD s/p stents, NSTEMI (2020), arthritis, wheelchair bound, hypothyroidism, s/p left ureteral stent removal (8/2021) with residual non obstructive Rt renal calculi, E.faecalis UTI (8/2021) CVA with left sided residual weakness (8/2021), sacral decubitus, pressure heel ulcers s/p COVID-19 (2020), baseline pt is AOx2 assist with ADL.     Pt presented to [facility] from [facility] when found to have AMS, fever with Tmax of 104. Pt in E.D. found to be obtunded, mottled, hypotensive SBP 80's refractory to 2.5 liters IVF requiring norepi gtt, fever with Temp of 104.5. In addition found to have hypernatremia with Na+ of 161, ANIKA with sCr 4.74 (baseline 0.77-1.22), AGMA of 25, with contraction alkalosis with serum HCO3 of 23, vph 7.50, vPCO2 of 35, lactate of 3.2, procalcitonin of 0.82, hsTrop of 239, Hgb of 14.9 (baseline from 8/2021 of 11). Pt with 9 liters total body water deficit.       Pt received CT C/A/P/H (1/21/22) re demonstration of  Non obstructing right renal calculus, significant stool burden distending the rectum and sigmoid colon with mild perirectal fat stranding suggestive of fecal impaction and possible stercoral proctitis, No acute transcortical infarct, intracranial hemorrhage, however did show Chronic lacunar infarcts with chronic small vessel disease. Started on cefepime, vanco, tylenol.      Consult called and pt admitted MICU for AMS secondary to metabolic encephalopathy, septic shock of unknown origin, possible aspiration pneum   (21 Jan 2022 13:03)      Review of Systems:  unable to obtain ROS d/t non-responsiveness     penicillin (Hives)  penicillin (Rash)  sulfa drugs (Unknown)  Tetracycline Hydrochloride (Unknown)      PMHx:   DM (diabetes mellitus)  HTN (hypertension)  Obesity  Essential hypertension  Hypothyroid  Type 2 diabetes mellitus without complication  Obesity (BMI 30-39.9)  Carpal tunnel syndrome of right wrist  Essential hypertension  Lymphedema  2019 novel coronavirus disease (COVID-19)  Sepsis      PFHx:   Family history of lung cancer  Family history of myocardial infarction (Sibling)  No pertinent family history in first degree relatives      PSuHx:   S/P carpal tunnel release  No significant past surgical history  S/P cystoscopy        Medications:  MEDICATIONS  (STANDING):  acyclovir IVPB 165 milliGRAM(s) IV Intermittent every 8 hours  aspirin  chewable 81 milliGRAM(s) Oral daily  atorvastatin 80 milliGRAM(s) Oral at bedtime  bisacodyl Suppository 10 milliGRAM(s) Rectal once  chlorhexidine 4% Liquid 1 Application(s) Topical <User Schedule>  dexAMETHasone  Injectable 6 milliGRAM(s) IV Push daily  dextrose 5%. 1000 milliLiter(s) (75 mL/Hr) IV Continuous <Continuous>  heparin   Injectable 5000 Unit(s) SubCutaneous every 12 hours  insulin lispro (ADMELOG) corrective regimen sliding scale   SubCutaneous every 4 hours  levothyroxine Injectable 56 MICROGram(s) IV Push at bedtime  meropenem  IVPB 1000 milliGRAM(s) IV Intermittent every 12 hours  norepinephrine Infusion 0.05 MICROgram(s)/kG/Min (6.16 mL/Hr) IV Continuous <Continuous>  pantoprazole  Injectable 40 milliGRAM(s) IV Push daily  senna Syrup 10 milliLiter(s) Oral at bedtime    MEDICATIONS  (PRN):  acetaminophen    Suspension .. 650 milliGRAM(s) Oral every 6 hours PRN Temp greater or equal to 38C (100.4F)      Vitals:  T(C): 37.5 (01-21-22 @ 20:15), Max: 40.3 (01-21-22 @ 08:50)  HR: 83 (01-21-22 @ 20:45) (83 - 115)  BP: 94/61 (01-21-22 @ 20:45) (75/62 - 153/65)  RR: 25 (01-21-22 @ 20:45) (18 - 42)  SpO2: 100% (01-21-22 @ 20:45) (79% - 100%)    Physical Examination: INCOMPLETE    General: in no acute distress, non-diaphoretic  Head: normocephalic, atraumatic  Eyes: non-icteric sclera, no conjunctival injection  Abdomen: soft, non-tender  Extremities: no lower extremity edema, no deformities    Neurologic:    - Mental Status: Alert, awake, oriented to person, place, and time; speech is fluent with intact naming, repetition, and comprehension; good overall fund of knowledge; immediate recall is 3/3 words and delayed recall is 3/3 words at 5 minutes; able to spell WORLD backwards and perform serial 7 subtraction; able to read and write a sentence.    - Cranial Nerves:  II: Visual fields are full to confrontation; pupils are equal, round, and reactive to light   III, IV, VI: Extraocular movements are intact without nystagmus  V: Facial sensation is intact in the V1-V3 distribution bilaterally  VII: Face is symmetric with normal eye closure and smile  VIII: Hearing is intact to conversation  IX, X: Uvula is midline and soft palate rises symmetrically  XI: Head turning intact  XII: Tongue protrudes in the midline    - Motor/Strength Testing:                                 Right           Left  Deltoid                     5                 5  Biceps                      5                 5  Triceps                     5                 5  Wrist Ext (radial)       5                 5  Wrist Flex                 5                 5  Interphalangeal        5                 5  APB (Thumb)            5                 5    Hip Flex                   5                  5  Knee Flex                 5                  5  Knee Ext	      5                  5  Dorsiflex                  5                  5  Plantarflex               5                  5    - There is no pronator drift. Normal muscle bulk and tone throughout.    - Reflexes:   Bicep (C5/C6):                  R 2+ --- L 2+   Brachioradialis (C5/C6) :   R 2+ --- L 2+   Patella (L3/L4) :                 R 2+ --- L 2+   Ankle (S1) :                       R 2+ --- L 2+     - Plant responses down bilaterally.    - Sensory: Intact throughout to light touch.   - Coordination: Finger-nose-finger intact without dysmetria.    - Gait: Normal steps, base, arm swing, and turning. Romberg testing is negative.    Labs:                        12.0   14.57 )-----------( 359      ( 21 Jan 2022 14:31 )             39.7     01-21    162<HH>  |  119<H>  |  100<H>  ----------------------------<  132<H>  3.7   |  22  |  4.09<H>    Ca    8.4      21 Jan 2022 18:47  Phos  4.9     01-21  Mg     2.4     01-21    TPro  6.5  /  Alb  2.9<L>  /  TBili  0.6  /  DBili  x   /  AST  19  /  ALT  11  /  AlkPhos  70  01-21    CAPILLARY BLOOD GLUCOSE      POCT Blood Glucose.: 108 mg/dL (21 Jan 2022 18:27)    LIVER FUNCTIONS - ( 21 Jan 2022 14:30 )  Alb: 2.9 g/dL / Pro: 6.5 g/dL / ALK PHOS: 70 U/L / ALT: 11 U/L / AST: 19 U/L / GGT: x         PT/INR - ( 21 Jan 2022 14:30 )   PT: 25.5 sec;   INR: 2.21 ratio    PTT - ( 21 Jan 2022 14:30 )  PTT:45.2 sec      Radiology:  CT Head No Cont:  (21 Jan 2022 10:37)    FINDINGS:  Streak artifact limits evaluation of the posterior fossa. Within these   limits:    No acute transcortical infarction or acute intracranial hemorrhage.   Chronic lacunar infarct in the left corona radiata and right thalamus.    White matter hypoattenuating foci are noted, compatible with small vessel   disease.    No hydrocephalus. No extra-axial fluid collections.    The visualized intraorbital contents are unremarkable. Mucous retention   cyst in the right maxillary sinus. The mastoid air cells are clear. The   visualized soft tissues and osseous structures appear normal.    IMPRESSION:  Streak artifact limits evaluation of the posterior fossa. Within these   limits:    Neurology - Consult Note - 01-21-22  -  Demar Ahuja MD  PGY-2 Neurology  Spectra: [phone] ([facility]), [phone] ([facility])  -    Name: TIANA BROWN; 71y (1950)    Reason for Admission: Sepsis        Chief Complaint:   HPI:     71 yr old female with PMHx, Afib on apixaban, HFrEF (50% 8/21/21),CAD s/p stents, NSTEMI (2020), arthritis, wheelchair bound, hypothyroidism, s/p left ureteral stent removal (8/2021) with residual non obstructive Rt renal calculi, E.faecalis UTI (8/2021) CVA with left sided residual weakness (8/2021), sacral decubitus, pressure heel ulcers s/p COVID-19 (2020), baseline pt is AOx2 assist with ADL.     Pt presented to [facility] when found to have AMS, fever with Tmax of 104. Pt in E.D. found to be obtunded, mottled, hypotensive SBP 80's refractory to 2.5 liters IVF requiring norepi gtt, fever with Temp of 104.5. In addition found to have hypernatremia with Na+ of 161, ANIKA with sCr 4.74 (baseline 0.77-1.22), AGMA of 25, with contraction alkalosis with serum HCO3 of 23, vph 7.50, vPCO2 of 35, lactate of 3.2, procalcitonin of 0.82, hsTrop of 239, Hgb of 14.9 (baseline from 8/2021 of 11). Pt with 9 liters total body water deficit.       Pt received CT C/A/P/H (1/21/22) re demonstration of  Non obstructing right renal calculus, significant stool burden distending the rectum and sigmoid colon with mild perirectal fat stranding suggestive of fecal impaction and possible stercoral proctitis, No acute transcortical infarct, intracranial hemorrhage, however did show Chronic lacunar infarcts with chronic small vessel disease. Started on cefepime, vanco, tylenol.      Consult called and pt admitted MICU for AMS secondary to metabolic encephalopathy, septic shock of unknown origin, possible aspiration pneum   (21 Jan 2022 13:03)      Review of Systems:  unable to obtain ROS d/t non-responsiveness     penicillin (Hives)  penicillin (Rash)  sulfa drugs (Unknown)  Tetracycline Hydrochloride (Unknown)      PMHx:   DM (diabetes mellitus)  HTN (hypertension)  Obesity  Essential hypertension  Hypothyroid  Type 2 diabetes mellitus without complication  Obesity (BMI 30-39.9)  Carpal tunnel syndrome of right wrist  Essential hypertension  Lymphedema  2019 novel coronavirus disease (COVID-19)  Sepsis      PFHx:   Family history of lung cancer  Family history of myocardial infarction (Sibling)  No pertinent family history in first degree relatives      PSuHx:   S/P carpal tunnel release  No significant past surgical history  S/P cystoscopy        Medications:  MEDICATIONS  (STANDING):  acyclovir IVPB 165 milliGRAM(s) IV Intermittent every 8 hours  aspirin  chewable 81 milliGRAM(s) Oral daily  atorvastatin 80 milliGRAM(s) Oral at bedtime  bisacodyl Suppository 10 milliGRAM(s) Rectal once  chlorhexidine 4% Liquid 1 Application(s) Topical <User Schedule>  dexAMETHasone  Injectable 6 milliGRAM(s) IV Push daily  dextrose 5%. 1000 milliLiter(s) (75 mL/Hr) IV Continuous <Continuous>  heparin   Injectable 5000 Unit(s) SubCutaneous every 12 hours  insulin lispro (ADMELOG) corrective regimen sliding scale   SubCutaneous every 4 hours  levothyroxine Injectable 56 MICROGram(s) IV Push at bedtime  meropenem  IVPB 1000 milliGRAM(s) IV Intermittent every 12 hours  norepinephrine Infusion 0.05 MICROgram(s)/kG/Min (6.16 mL/Hr) IV Continuous <Continuous>  pantoprazole  Injectable 40 milliGRAM(s) IV Push daily  senna Syrup 10 milliLiter(s) Oral at bedtime    MEDICATIONS  (PRN):  acetaminophen    Suspension .. 650 milliGRAM(s) Oral every 6 hours PRN Temp greater or equal to 38C (100.4F)      Vitals:  T(C): 37.5 (01-21-22 @ 20:15), Max: 40.3 (01-21-22 @ 08:50)  HR: 83 (01-21-22 @ 20:45) (83 - 115)  BP: 94/61 (01-21-22 @ 20:45) (75/62 - 153/65)  RR: 25 (01-21-22 @ 20:45) (18 - 42)  SpO2: 100% (01-21-22 @ 20:45) (79% - 100%)    Physical Examination:     General: unresponsive, non-diaphoretic  Head: normocephalic, atraumatic  Eyes: non-icteric sclera, no conjunctival injection  Abdomen: soft, non-distended  Extremities: no lower extremity edema, no deformities    Neurologic: limited examination i/s/o decreased level of consciousness    - Mental Status: eyes closed, does not respond to verbal stimulation, groans to noxious stimuli, otherwise no verbal output     - Cranial Nerves: PERRL; right gaze which breaks to VOR; face is grossly symmetric at rest     - Motor/Strength Testing: incomplete    - Reflexes: incomplete  Bicep (C5/C6):                  R 2+ --- L 2+   Brachioradialis (C5/C6) :   R 2+ --- L 2+   Patella (L3/L4) :                 R 2+ --- L 2+   Ankle (S1) :                       R 2+ --- L 2+     - Plant responses down bilaterally.    - Sensory: Intact throughout to noxious stimuli  - Coordination: Unable to assess d/t decreased level of consciousness     Labs:                        12.0   14.57 )-----------( 359      ( 21 Jan 2022 14:31 )             39.7     01-21    162<HH>  |  119<H>  |  100<H>  ----------------------------<  132<H>  3.7   |  22  |  4.09<H>    Ca    8.4      21 Jan 2022 18:47  Phos  4.9     01-21  Mg     2.4     01-21    TPro  6.5  /  Alb  2.9<L>  /  TBili  0.6  /  DBili  x   /  AST  19  /  ALT  11  /  AlkPhos  70  01-21    CAPILLARY BLOOD GLUCOSE      POCT Blood Glucose.: 108 mg/dL (21 Jan 2022 18:27)    LIVER FUNCTIONS - ( 21 Jan 2022 14:30 )  Alb: 2.9 g/dL / Pro: 6.5 g/dL / ALK PHOS: 70 U/L / ALT: 11 U/L / AST: 19 U/L / GGT: x         PT/INR - ( 21 Jan 2022 14:30 )   PT: 25.5 sec;   INR: 2.21 ratio    PTT - ( 21 Jan 2022 14:30 )  PTT:45.2 sec      Radiology:  CT Head No Cont:  (21 Jan 2022 10:37)    FINDINGS:  Streak artifact limits evaluation of the posterior fossa. Within these   limits:    No acute transcortical infarction or acute intracranial hemorrhage.   Chronic lacunar infarct in the left corona radiata and right thalamus.    White matter hypoattenuating foci are noted, compatible with small vessel   disease.    No hydrocephalus. No extra-axial fluid collections.    The visualized intraorbital contents are unremarkable. Mucous retention   cyst in the right maxillary sinus. The mastoid air cells are clear. The   visualized soft tissues and osseous structures appear normal.    IMPRESSION:  Streak artifact limits evaluation of the posterior fossa. Within these   limits:    Neurology - Consult Note - 01-21-22  -  Demar Ahuja MD  PGY-2 Neurology  Spectra: [phone] ([facility]), [phone] ([facility])  -    Name: TIANA BROWN; 71y (1950)    Reason for Admission: Sepsis        Chief Complaint:   HPI:     71 yr old female with PMHx, Afib on apixaban, HFrEF (50% 8/21/21),CAD s/p stents, NSTEMI (2020), arthritis, wheelchair bound, hypothyroidism, s/p left ureteral stent removal (8/2021) with residual non obstructive Rt renal calculi, E.faecalis UTI (8/2021) CVA with left sided residual weakness (8/2021), sacral decubitus, pressure heel ulcers s/p COVID-19 (2020), baseline pt is AOx2 assist with ADL.     Pt presented to [facility] from [facility] when found to have AMS, fever with Tmax of 104. Pt in E.D. found to be obtunded, mottled, hypotensive SBP 80's refractory to 2.5 liters IVF requiring norepi gtt, fever with Temp of 104.5. In addition found to have hypernatremia with Na+ of 161, ANIKA with sCr 4.74 (baseline 0.77-1.22), AGMA of 25, with contraction alkalosis with serum HCO3 of 23, vph 7.50, vPCO2 of 35, lactate of 3.2, procalcitonin of 0.82, hsTrop of 239, Hgb of 14.9 (baseline from 8/2021 of 11). Pt with 9 liters total body water deficit.       Pt received CT C/A/P/H (1/21/22) re demonstration of  Non obstructing right renal calculus, significant stool burden distending the rectum and sigmoid colon with mild perirectal fat stranding suggestive of fecal impaction and possible stercoral proctitis, No acute transcortical infarct, intracranial hemorrhage, however did show Chronic lacunar infarcts with chronic small vessel disease. Started on cefepime, vanco, tylenol.      Consult called and pt admitted MICU for AMS secondary to metabolic encephalopathy, septic shock of unknown origin, possible aspiration pneum   (21 Jan 2022 13:03)      Review of Systems:  unable to obtain ROS d/t non-responsiveness     penicillin (Hives)  penicillin (Rash)  sulfa drugs (Unknown)  Tetracycline Hydrochloride (Unknown)      PMHx:   DM (diabetes mellitus)  HTN (hypertension)  Obesity  Essential hypertension  Hypothyroid  Type 2 diabetes mellitus without complication  Obesity (BMI 30-39.9)  Carpal tunnel syndrome of right wrist  Essential hypertension  Lymphedema  2019 novel coronavirus disease (COVID-19)  Sepsis      PFHx:   Family history of lung cancer  Family history of myocardial infarction (Sibling)  No pertinent family history in first degree relatives      PSuHx:   S/P carpal tunnel release  No significant past surgical history  S/P cystoscopy        Medications:  MEDICATIONS  (STANDING):  acyclovir IVPB 165 milliGRAM(s) IV Intermittent every 8 hours  aspirin  chewable 81 milliGRAM(s) Oral daily  atorvastatin 80 milliGRAM(s) Oral at bedtime  bisacodyl Suppository 10 milliGRAM(s) Rectal once  chlorhexidine 4% Liquid 1 Application(s) Topical <User Schedule>  dexAMETHasone  Injectable 6 milliGRAM(s) IV Push daily  dextrose 5%. 1000 milliLiter(s) (75 mL/Hr) IV Continuous <Continuous>  heparin   Injectable 5000 Unit(s) SubCutaneous every 12 hours  insulin lispro (ADMELOG) corrective regimen sliding scale   SubCutaneous every 4 hours  levothyroxine Injectable 56 MICROGram(s) IV Push at bedtime  meropenem  IVPB 1000 milliGRAM(s) IV Intermittent every 12 hours  norepinephrine Infusion 0.05 MICROgram(s)/kG/Min (6.16 mL/Hr) IV Continuous <Continuous>  pantoprazole  Injectable 40 milliGRAM(s) IV Push daily  senna Syrup 10 milliLiter(s) Oral at bedtime    MEDICATIONS  (PRN):  acetaminophen    Suspension .. 650 milliGRAM(s) Oral every 6 hours PRN Temp greater or equal to 38C (100.4F)      Vitals:  T(C): 37.5 (01-21-22 @ 20:15), Max: 40.3 (01-21-22 @ 08:50)  HR: 83 (01-21-22 @ 20:45) (83 - 115)  BP: 94/61 (01-21-22 @ 20:45) (75/62 - 153/65)  RR: 25 (01-21-22 @ 20:45) (18 - 42)  SpO2: 100% (01-21-22 @ 20:45) (79% - 100%)    Physical Examination:     General: unresponsive, non-diaphoretic  Head: normocephalic, atraumatic  Eyes: non-icteric sclera, no conjunctival injection  Abdomen: soft, non-distended  Extremities: no lower extremity edema, no deformities    Neurologic: limited examination i/s/o decreased level of consciousness    - Mental Status: eyes closed, does not respond to verbal stimulation, groans to noxious stimuli, otherwise no verbal output     - Cranial Nerves: PERRL; right gaze which breaks to VOR; face is grossly symmetric at rest     - Motor/Strength Testing: (motor strength examination s/p lorazepam sedation)   -- seen moving all extremities (unclear whether pt moved LLE) spontaneously    - Plant responses down bilaterally.    - Sensory:   -- prior to lorazepam, patient grimaced to noxious stimulation in bilateral lower extremities  -- subsequent sensation examination s/p lorazepam sedation -- no grimacing to noxious stimulation    - Coordination: Unable to assess d/t decreased level of consciousness     Labs:                        12.0   14.57 )-----------( 359      ( 21 Jan 2022 14:31 )             39.7     01-21    162<HH>  |  119<H>  |  100<H>  ----------------------------<  132<H>  3.7   |  22  |  4.09<H>    Ca    8.4      21 Jan 2022 18:47  Phos  4.9     01-21  Mg     2.4     01-21    TPro  6.5  /  Alb  2.9<L>  /  TBili  0.6  /  DBili  x   /  AST  19  /  ALT  11  /  AlkPhos  70  01-21    CAPILLARY BLOOD GLUCOSE      POCT Blood Glucose.: 108 mg/dL (21 Jan 2022 18:27)    LIVER FUNCTIONS - ( 21 Jan 2022 14:30 )  Alb: 2.9 g/dL / Pro: 6.5 g/dL / ALK PHOS: 70 U/L / ALT: 11 U/L / AST: 19 U/L / GGT: x         PT/INR - ( 21 Jan 2022 14:30 )   PT: 25.5 sec;   INR: 2.21 ratio    PTT - ( 21 Jan 2022 14:30 )  PTT:45.2 sec      Radiology:  CT Head No Cont:  (21 Jan 2022 10:37)    FINDINGS:  Streak artifact limits evaluation of the posterior fossa. Within these   limits:    No acute transcortical infarction or acute intracranial hemorrhage.   Chronic lacunar infarct in the left corona radiata and right thalamus.    White matter hypoattenuating foci are noted, compatible with small vessel   disease.    No hydrocephalus. No extra-axial fluid collections.    The visualized intraorbital contents are unremarkable. Mucous retention   cyst in the right maxillary sinus. The mastoid air cells are clear. The   visualized soft tissues and osseous structures appear normal.    IMPRESSION:  Streak artifact limits evaluation of the posterior fossa. Within these   limits:    Neurology - Consult Note - 01-21-22  -  Demar Ahuja MD  PGY-2 Neurology  Spectra: [phone] ([facility]), [phone] ([facility])  -    Name: TIANA BROWN; 71y (1950)    Reason for Admission: Sepsis        Chief Complaint:   HPI:     71 yr old female with PMHx, Afib on apixaban, HFrEF (50% 8/21/21),CAD s/p stents, NSTEMI (2020), arthritis, wheelchair bound, hypothyroidism, s/p left ureteral stent removal (8/2021) with residual non obstructive Rt renal calculi, E.faecalis UTI (8/2021) CVA with left sided residual weakness (8/2021), sacral decubitus, pressure heel ulcers s/p COVID-19 (2020), baseline pt is AOx2 assist with ADL.     Pt presented to [facility] when found to have AMS, fever with Tmax of 104. Pt in E.D. found to be obtunded, mottled, hypotensive SBP 80's refractory to 2.5 liters IVF requiring norepi gtt, fever with Temp of 104.5. In addition found to have hypernatremia with Na+ of 161, ANIKA with sCr 4.74 (baseline 0.77-1.22), AGMA of 25, with contraction alkalosis with serum HCO3 of 23, vph 7.50, vPCO2 of 35, lactate of 3.2, procalcitonin of 0.82, hsTrop of 239, Hgb of 14.9 (baseline from 8/2021 of 11). Pt with 9 liters total body water deficit.       Pt received CT C/A/P/H (1/21/22) re demonstration of  Non obstructing right renal calculus, significant stool burden distending the rectum and sigmoid colon with mild perirectal fat stranding suggestive of fecal impaction and possible stercoral proctitis, No acute transcortical infarct, intracranial hemorrhage, however did show Chronic lacunar infarcts with chronic small vessel disease. Started on cefepime, vanco, tylenol.      Consult called and pt admitted MICU for AMS secondary to metabolic encephalopathy, septic shock of unknown origin, possible aspiration pneum   (21 Jan 2022 13:03)      Review of Systems:  unable to obtain ROS d/t non-responsiveness     penicillin (Hives)  penicillin (Rash)  sulfa drugs (Unknown)  Tetracycline Hydrochloride (Unknown)      PMHx:   DM (diabetes mellitus)  HTN (hypertension)  Obesity  Essential hypertension  Hypothyroid  Type 2 diabetes mellitus without complication  Obesity (BMI 30-39.9)  Carpal tunnel syndrome of right wrist  Essential hypertension  Lymphedema  2019 novel coronavirus disease (COVID-19)  Sepsis      PFHx:   Family history of lung cancer  Family history of myocardial infarction (Sibling)  No pertinent family history in first degree relatives      PSuHx:   S/P carpal tunnel release  No significant past surgical history  S/P cystoscopy        Medications:  MEDICATIONS  (STANDING):  acyclovir IVPB 165 milliGRAM(s) IV Intermittent every 8 hours  aspirin  chewable 81 milliGRAM(s) Oral daily  atorvastatin 80 milliGRAM(s) Oral at bedtime  bisacodyl Suppository 10 milliGRAM(s) Rectal once  chlorhexidine 4% Liquid 1 Application(s) Topical <User Schedule>  dexAMETHasone  Injectable 6 milliGRAM(s) IV Push daily  dextrose 5%. 1000 milliLiter(s) (75 mL/Hr) IV Continuous <Continuous>  heparin   Injectable 5000 Unit(s) SubCutaneous every 12 hours  insulin lispro (ADMELOG) corrective regimen sliding scale   SubCutaneous every 4 hours  levothyroxine Injectable 56 MICROGram(s) IV Push at bedtime  meropenem  IVPB 1000 milliGRAM(s) IV Intermittent every 12 hours  norepinephrine Infusion 0.05 MICROgram(s)/kG/Min (6.16 mL/Hr) IV Continuous <Continuous>  pantoprazole  Injectable 40 milliGRAM(s) IV Push daily  senna Syrup 10 milliLiter(s) Oral at bedtime    MEDICATIONS  (PRN):  acetaminophen    Suspension .. 650 milliGRAM(s) Oral every 6 hours PRN Temp greater or equal to 38C (100.4F)      Vitals:  T(C): 37.5 (01-21-22 @ 20:15), Max: 40.3 (01-21-22 @ 08:50)  HR: 83 (01-21-22 @ 20:45) (83 - 115)  BP: 94/61 (01-21-22 @ 20:45) (75/62 - 153/65)  RR: 25 (01-21-22 @ 20:45) (18 - 42)  SpO2: 100% (01-21-22 @ 20:45) (79% - 100%)    Physical Examination:     General: unresponsive, non-diaphoretic  Head: normocephalic, atraumatic  Eyes: non-icteric sclera, no conjunctival injection  Abdomen: soft, non-distended  Extremities: no lower extremity edema, no deformities    Neurologic: limited examination i/s/o decreased level of consciousness    - Mental Status: eyes closed, does not respond to verbal stimulation, groans to noxious stimuli, otherwise no verbal output     - Cranial Nerves: PERRL; right gaze which breaks to VOR; face is grossly symmetric at rest     - Motor/Strength Testing: (motor strength examination s/p lorazepam sedation)   -- seen moving all extremities (unclear whether pt moved LLE) spontaneously    - Plant responses down bilaterally.    - Sensory:   -- prior to lorazepam, patient grimaced to noxious stimulation in bilateral lower extremities  -- subsequent sensation examination s/p lorazepam sedation -- no grimacing to noxious stimulation    - Coordination: Unable to assess d/t decreased level of consciousness     Labs:                        12.0   14.57 )-----------( 359      ( 21 Jan 2022 14:31 )             39.7     01-21    162<HH>  |  119<H>  |  100<H>  ----------------------------<  132<H>  3.7   |  22  |  4.09<H>    Ca    8.4      21 Jan 2022 18:47  Phos  4.9     01-21  Mg     2.4     01-21    TPro  6.5  /  Alb  2.9<L>  /  TBili  0.6  /  DBili  x   /  AST  19  /  ALT  11  /  AlkPhos  70  01-21    CAPILLARY BLOOD GLUCOSE      POCT Blood Glucose.: 108 mg/dL (21 Jan 2022 18:27)    LIVER FUNCTIONS - ( 21 Jan 2022 14:30 )  Alb: 2.9 g/dL / Pro: 6.5 g/dL / ALK PHOS: 70 U/L / ALT: 11 U/L / AST: 19 U/L / GGT: x         PT/INR - ( 21 Jan 2022 14:30 )   PT: 25.5 sec;   INR: 2.21 ratio    PTT - ( 21 Jan 2022 14:30 )  PTT:45.2 sec      Radiology:  CT Head No Cont:  (21 Jan 2022 10:37)    FINDINGS:  Streak artifact limits evaluation of the posterior fossa. Within these   limits:    No acute transcortical infarction or acute intracranial hemorrhage.   Chronic lacunar infarct in the left corona radiata and right thalamus.    White matter hypoattenuating foci are noted, compatible with small vessel   disease.    No hydrocephalus. No extra-axial fluid collections.    The visualized intraorbital contents are unremarkable. Mucous retention   cyst in the right maxillary sinus. The mastoid air cells are clear. The   visualized soft tissues and osseous structures appear normal.    IMPRESSION:  Streak artifact limits evaluation of the posterior fossa. Within these   limits:       MR Head No Cont (08.18.21 @ 12:14)  IMPRESSION:    Acute right thalamic and right midbrain infarct.        CT Angio Neck w/ IV Cont (08.17.21 @ 12:34)  IMPRESSION:    Brain CT: No acute hemorrhage, mass effect or extra-axial collections. Interim right thalamic infarct may be subacute. Brain MRI can be obtained for further evaluation.    Neck CTA: No hemodynamically significant stenosis.    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 Unchanged fusiform ectasia proximal basilar artery with high-grade proximal to mid stenosis. Unchanged fusiform dilatation of the cavernous segment of the right internal carotid artery.  No large vessel occlusion of the anterior middle or posterior cerebral arteries.    Perfusion maps: No evidence for core infarct or penumbra. Hypoperfusion within the posterior circulation.

## 2022-01-21 NOTE — ED ADULT NURSE NOTE - OBJECTIVE STATEMENT
71y female hx of CVA(unknown deficits), bacteremia d/t uro sepsis bibems from [facility] for AMS. pt normally a&ox2 as per family, when staff checked on pt at 7am pt unresponsive to painful stimuli,  as per EMS. unk last known normal. Pt noted to be tachypneic in ER to high 30s, resp labored sonorus respirations. pt placed on NC satting 94-95%. pt febrile rectally 104.5. temp sensing aguilar placed. Aguilar catheter inserted using sterile technique. Second RN present to confirm sterility. Bedside drainage to gravity. Stat lock in place. 200cc dark urine drained. 2PIV placed. IV abx infusing. pt placed on cooling blanket.. [email or web address] gave rectal tylenol @ 8am PTA. PT on cm, ekg completed. MD at bedside performing US. will reassess.

## 2022-01-21 NOTE — CONSULT NOTE ADULT - SUBJECTIVE AND OBJECTIVE BOX
CHIEF COMPLAINT:    HPI:  71 yr old female with PMHx, Afib on apixaban, CAD s/p stents, arthritis, wheelchair bound, hypothyroidism, s/p left ureteral stent removal (2021), CVA with left sided residual weakness (2021),  s/p COVID-19 ()            PAST MEDICAL & SURGICAL HISTORY:  HTN (hypertension)    Obesity  BMI 29.3    Hypothyroid  on synthroid    Type 2 diabetes mellitus without complication  diet-controlled    Carpal tunnel syndrome of right wrist    Lymphedema     novel coronavirus disease (COVID-19)  As per patient, diagnosed with Covid 1 year ago when residing in a nursing home and was managed in nursing home; Pt denies any hospitalizations for covid or intubations.    Sepsis  Urosepsis due to septic stone 2020    S/P carpal tunnel release    S/P cystoscopy  Pt is a poor historian; As per charts, patient had a cysto with stent placement 2020        FAMILY HISTORY:  Family history of lung cancer    Family history of myocardial infarction (Sibling)        SOCIAL HISTORY:  Smoking: [ ] Never Smoked [ ] Former Smoker (__ packs x ___ years) [ ] Current Smoker  (__ packs x ___ years)  Substance Use: [ ] Never Used [ ] Used ____  EtOH Use:  Marital Status: [ ] Single [ ]  [ ]  [ ]   Sexual History:   Occupation:  Recent Travel:  Country of Birth:  Advance Directives:    Allergies    penicillin (Hives)  penicillin (Rash)  sulfa drugs (Unknown)  Tetracycline Hydrochloride (Unknown)    Intolerances        HOME MEDICATIONS:    REVIEW OF SYSTEMS:            OBJECTIVE:  ICU Vital Signs Last 24 Hrs  T(C): 38.9 (2022 10:15), Max: 40.3 (2022 08:50)  T(F): 102 (2022 10:15), Max: 104.5 (2022 08:50)  HR: 99 (2022 10:15) (99 - 115)  BP: 108/58 (2022 10:15) (88/58 - 108/58)  BP(mean): --  ABP: --  ABP(mean): --  RR: 36 (2022 10:15) (30 - 38)  SpO2: 97% (2022 10:15) (94% - 97%)        CAPILLARY BLOOD GLUCOSE      POCT Blood Glucose.: 149 mg/dL (2022 08:36)      PHYSICAL EXAM:        HOSPITAL MEDICATIONS:  MEDICATIONS  (STANDING):  ketamine Injectable 70 milliGRAM(s) IV Push Once  rocuronium Injectable 70 milliGRAM(s) IV Push Once    MEDICATIONS  (PRN):      LABS:                        14.9   13.12 )-----------( 426      ( 2022 08:57 )             50.0     Hgb Trend: 14.9<--  01    161<HH>  |  113<H>  |  97<H>  ----------------------------<  163<H>  4.1   |  23  |  4.74<H>    Ca    9.6      2022 08:57    TPro  8.1  /  Alb  3.8  /  TBili  0.8  /  DBili  x   /  AST  18  /  ALT  11  /  AlkPhos  90      Creatinine Trend: 4.74<--  PT/INR - ( 2022 08:57 )   PT: 27.5 sec;   INR: 2.39 ratio         PTT - ( 2022 08:57 )  PTT:53.2 sec  Urinalysis Basic - ( 2022 09:07 )    Color: Yellow / Appearance: Clear / S.030 / pH: x  Gluc: x / Ketone: Negative  / Bili: Negative / Urobili: Negative   Blood: x / Protein: Negative / Nitrite: Negative   Leuk Esterase: Negative / RBC: 21 /hpf / WBC 3 /HPF   Sq Epi: x / Non Sq Epi: Few / Bacteria: Moderate        Venous Blood Gas:   @ 08:55  7.50/35/43/27/73.8  VBG Lactate: 3.2      MICROBIOLOGY:     RADIOLOGY:  [ ] Reviewed and interpreted by me    EKG:        Ammy ANP-BC (ext. [id number])           CHIEF COMPLAINT:    HPI:  71 yr old female with PMHx, Afib on apixaban, CAD s/p stents, NSTEMI, arthritis, wheelchair bound, hypothyroidism, s/p left ureteral stent removal (2021) with residual non obstructive Rt renal calculi, CVA with left sided residual weakness (2021), sacral decubitus, pressure heel ulces, s/p COVID-19 (), baseline pt is AOx2 assist with ADL.     Pt presented to [facility] from [facility] when found to have AMS, fever with Tmax of 104. Pt in E.D. found to be obtunded, mottled, hypotensive SBP 80's refractory to 2.5 liters IVF requiring norepi gtt. In addition pt found to have hypernatremia with Na+ of 161, ANIKA with sCr 4.74 (baseline 0.77-1.22), AGMA of 25, with contraction alkalosis with serum HCO3 of 23, vph 7.50, vPCO2 of 35, lactate of 3.2, procalcitonin of 0.82, hsTrop of 239, Hgb of 12.9 (baseline from 2021 of 11). Pt with 9 liters total body water deficit. Pt received CT C/A/P/H (22) re demonstration of  Non obstructing right renal calculus, significant stool burden distending the rectum and sigmoid colon with mild perirectal fat stranding suggestive of fecal impaction and possible stercoral proctitis, No acute transcortical infarct, intracranial hemorrhage, however did show Chronic lacunar infarcts with chronic small vessel disease.            PAST MEDICAL & SURGICAL HISTORY:  HTN (hypertension)    Obesity  BMI 29.3    Hypothyroid  on synthroid    Type 2 diabetes mellitus without complication  diet-controlled    Carpal tunnel syndrome of right wrist    Lymphedema     novel coronavirus disease (COVID-19)  As per patient, diagnosed with Covid 1 year ago when residing in a nursing home and was managed in nursing home; Pt denies any hospitalizations for covid or intubations.    Sepsis  Urosepsis due to septic stone 2020    S/P carpal tunnel release    S/P cystoscopy  Pt is a poor historian; As per charts, patient had a cysto with stent placement 2020        FAMILY HISTORY:  Family history of lung cancer    Family history of myocardial infarction (Sibling)        SOCIAL HISTORY:  Smoking: [ ] Never Smoked [ ] Former Smoker (__ packs x ___ years) [ ] Current Smoker  (__ packs x ___ years)  Substance Use: [ ] Never Used [ ] Used ____  EtOH Use:  Marital Status: [ ] Single [ ]  [ ]  [ ]   Sexual History:   Occupation:  Recent Travel:  Country of Birth:  Advance Directives:    Allergies    penicillin (Hives)  penicillin (Rash)  sulfa drugs (Unknown)  Tetracycline Hydrochloride (Unknown)    Intolerances        HOME MEDICATIONS:    REVIEW OF SYSTEMS:            OBJECTIVE:  ICU Vital Signs Last 24 Hrs  T(C): 38.9 (2022 10:15), Max: 40.3 (2022 08:50)  T(F): 102 (2022 10:15), Max: 104.5 (2022 08:50)  HR: 99 (2022 10:15) (99 - 115)  BP: 108/58 (2022 10:15) (88/58 - 108/58)  BP(mean): --  ABP: --  ABP(mean): --  RR: 36 (2022 10:15) (30 - 38)  SpO2: 97% (2022 10:15) (94% - 97%)        CAPILLARY BLOOD GLUCOSE      POCT Blood Glucose.: 149 mg/dL (2022 08:36)      PHYSICAL EXAM:        HOSPITAL MEDICATIONS:  MEDICATIONS  (STANDING):  ketamine Injectable 70 milliGRAM(s) IV Push Once  rocuronium Injectable 70 milliGRAM(s) IV Push Once    MEDICATIONS  (PRN):      LABS:                        14.9   13.12 )-----------( 426      ( 2022 08:57 )             50.0     Hgb Trend: 14.9<--  21    161<HH>  |  113<H>  |  97<H>  ----------------------------<  163<H>  4.1   |  23  |  4.74<H>    Ca    9.6      2022 08:57    TPro  8.1  /  Alb  3.8  /  TBili  0.8  /  DBili  x   /  AST  18  /  ALT  11  /  AlkPhos  90      Creatinine Trend: 4.74<--  PT/INR - ( 2022 08:57 )   PT: 27.5 sec;   INR: 2.39 ratio         PTT - ( 2022 08:57 )  PTT:53.2 sec  Urinalysis Basic - ( 2022 09:07 )    Color: Yellow / Appearance: Clear / S.030 / pH: x  Gluc: x / Ketone: Negative  / Bili: Negative / Urobili: Negative   Blood: x / Protein: Negative / Nitrite: Negative   Leuk Esterase: Negative / RBC: 21 /hpf / WBC 3 /HPF   Sq Epi: x / Non Sq Epi: Few / Bacteria: Moderate        Venous Blood Gas:   @ 08:55  7.50/35/43/27/73.8  VBG Lactate: 3.2      MICROBIOLOGY:     RADIOLOGY:  [ ] Reviewed and interpreted by me    EKG:        Ammy ANP-BC (ext. [id number])

## 2022-01-21 NOTE — CHART NOTE - NSCHARTNOTEFT_GEN_A_CORE
Discussed with HCP Jerrod patient's declining condition and need for intubation for airway protection given obtunded state with seizures   Jerrod states that patient expressed to her home health aid that she was hopeful to get stronger and thus would like to continue with mechanical ventilation if indicated    Jose Moe D.O.  PGY-5 EM/IM/CC

## 2022-01-21 NOTE — ED PROVIDER NOTE - PROGRESS NOTE DETAILS
Kyle-PGY3: spoke to MICU re: consult, pending recs. MICU at bedside. PT BP had improved, then upon return from CT again SBP in 80s. Pt still lethargic, RR improved to 20s.   Cap. Refill:    >2 sec  Pulses:    Full    Skin:     Normal      Lungs:        Rhonchi  Heart:   Regular       I have re-evaluated the patient's fluid status and reviewed the vital signs.  Clinical evaluation demonstrates pt is appropriately responding to fluid resuscitation. Will give addtl liter and if remains hypotensive w start pressors. Lucks-PGY3: pt accepted by MICU, deferring LP to MICU team per discussion.

## 2022-01-21 NOTE — CONSULT NOTE ADULT - ASSESSMENT
72 yo female w/ PMHx CVA (residual L HP), AFib (on apixaban), HFrEF (50% 8/2021), CAD (s/p stents), NSTEMI (2020), wheelchair bound, baseline AOx2 w/ assist w/ ADLs, s/p COVID-19 (2020), presenting to [facility] from SNF after being found to have AMS, fever w/ Tmax 104, found to be obtuned, mottled, hypotensive SBP 80s refractory to 2.5L IVF requiring norepinephrine, fever 104.5, hypernatremic to 161, ANIKA w/ sCr 4.75 (baseline 0.77-1.22), AGMA 25, w/ contraction alkalosis w/ serum HCO3 of 23, lactate 3.2, w/ 9L total body water deficit. Neurology consulted d/t concern for seizure. During this encounter, patient seen right gaze deviation w/o nystagmus, twitching in RUE, distal LUE, and distal RLE. Patient now s/p lorazepam 1mg IVP.     Impression: right gaze deviation w/o nystagmus, twitching in RUE, distal LUE, and distal RLE w/ significant metabolic abnormalities; most likely etiology is metabolic myoclonus vs less likely focal/generalized seizures.    Plan:  [x] s/p lorazepam 1mg IVP  [] primary management is to correct underlying metabolic abnormalities  [] routine EEG (ordered as awake and asleep)  [] no need for AEDs at this time  [] Q4H neuro checks    Please call Neurology Spectra [phone] if signs of worsening neurological examination    * Case and plan discussed with Epilepsy Fellow Dr. 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 Neurology consulted d/t concern for seizure. During this encounter, patient seen right gaze deviation w/o nystagmus, twitching in RUE, distal LUE, and distal RLE. Patient now s/p lorazepam 1mg IVP.     Impression: right gaze deviation w/o nystagmus, twitching in RUE, distal LUE, and distal RLE w/ significant metabolic abnormalities; most likely etiology is metabolic myoclonus vs less likely focal/generalized seizures.    Plan:  [x] s/p lorazepam 1mg IVP  [] primary management is to correct underlying metabolic abnormalities  [] routine EEG (ordered as awake and asleep)  [] no need for AEDs at this time  [] avoid hypotension given patient's h/o ischemic stroke  [] c/w home antithrombotic therapy  [] Q4H neuro checks    Please call Neurology Spectra [phone] if signs of worsening neurological examination    * Case and plan discussed with Epilepsy Fellow Dr. Carlitos Valles *  * Case and plan to be discussed with Neurology Attending in AM *

## 2022-01-21 NOTE — PATIENT PROFILE ADULT - FALL HARM RISK - HARM RISK INTERVENTIONS

## 2022-01-21 NOTE — H&P ADULT - ASSESSMENT
Assessment:    Plan:    #Neuro:  -Hx CVA with residual left sided weak/flaccid   -presents with AMS secondary to metabolic encephalopathy  -CT 1/21/22 without acute changes, re demonstration of lacuna infarcts  -Neuro checks q 2 hrs and prn for changes  -activity as tolerated  -physical therapy consult when stable  -will obtain LP to r/o bacterial/viral infections    #Pulm:  -Supplemental O2 prn to maintain SPO2 > 92%  -Bronchodilators q 6 hrs prn for sob/wheezes  -HOB >/= 30 degree angle  -Chest PT q 2 hrs     #CV:  -Hx Afib on apixaban  -Hx remote NSTEMI  #GI/:    #ID:    #FEN/ENDO/HEME:           Assessment:    Plan:    #Neuro:  -Hx CVA with residual left sided weak/flaccid   -presents with AMS secondary to metabolic encephalopathy  -CT 1/21/22 without acute changes, re demonstration of lacuna infarcts  -Neuro checks q 2 hrs and prn for changes  -activity as tolerated  -physical therapy consult when stable  -will obtain LP to r/o bacterial/viral infections    #Pulm:  -Supplemental O2 prn to maintain SPO2 > 92%  -Bronchodilators q 6 hrs prn for sob/wheezes  -HOB >/= 30 degree angle  -Chest PT q 2 hrs     #CV:  -Hx Afib on apixaban  -CAD s/p stents  -Hx remote NSTEMI  #GI/:    #ID:    #FEN/ENDO/HEME:           Assessment:  71 yr old female with stated PMHx significant for afib on apixaban, cad s/p stents, HFrEF, CVA with left sided residual weakness who presents from nursing home with AMS, fever. Found to be hypernatremic, hypotensive refractory to IVF requiring vasopressor therapy.      consult and admission to MICU AMS secondary to metabolic encephalopathy, septic shock of unknown origin, possible aspiration pneum      Plan:    #Neuro:  -Hx CVA with residual left sided weak/flaccid   -presents with AMS secondary to metabolic encephalopathy  -CT 1/21/22 without acute changes, re demonstration of lacuna infarcts  -Neuro checks q 2 hrs and prn for changes  -activity as tolerated  -physical therapy consult when stable  -will obtain LP to r/o bacterial/viral infections    #Pulm:  -Supplemental O2 prn to maintain SPO2 > 92%  -Bronchodilators q 6 hrs prn for sob/wheezes  -HOB >/= 30 degree angle  -Chest PT q 2 hrs     #CV:  -Hx Afib on apixaban  -CAD s/p stents  -Hx NSTEMI 8/2021, HFrEF  -ECG now and q am x3  -Cardiac Enzymes now and q am x 3  -obtain TTE to eval RVFx/LVFx, progression of disease  -home med apixaban 5 mg q 12 hrs - will continue  -home med fo cozaar 25 mg , cardizem 60 mg  will hold at present  -atorvastatin 80 mg qhs - will continue  -ASA 81 mg qd    #GI/:  -ANIKA sCr 4.74 (baseline 0.77-1.22) - most likely pre-renal  -CT A/P demonstrated fecal impaction, stercoral proctitis  -will place NGT  -place aguilar  -strict I & O's - keep even  -miralax qd   -senna qhs    #ID:  -Pan culture  -obtain MRSA PCR  -Obtain urine for legionella  -cefepime 1 gm IV q 12 hrs  -received vanco 1 gm IV x1  -dose vanco by trough of 15-20  -vanco trough in a.m. at 0800  -will obtain LP    #FEN/ENDO/HEME:  -hypernatremia - free water deficit of 9 liters  -contraction alkalosis  -AGMA  -resp alkalosis  -Do not correct Na+ > 0.3 meq/hr (goal Na+ in 24 hrs 154 meq)  -obtain CMP/Mg++/PO--4/CBC w diff/PT/PTT/INR now and q. a.m.  -obtain HgbA1c  -POC glucose q 4 hrs with ISS - maintain glucose 140-180  -BMP/Mg++/PO--4 q 4 hrs  -trend lactate  -abg prn  -obtain urine lytes/osmol           Assessment:  71 yr old female with stated PMHx significant for afib on apixaban, cad s/p stents, HFrEF, CVA with left sided residual weakness who presents from nursing home with AMS, fever. Found to be hypernatremic, hypotensive refractory to IVF requiring vasopressor therapy.      consult and admission to MICU AMS secondary to metabolic encephalopathy, septic shock of unknown origin, possible aspiration pneum      Plan:    #Neuro:  -Hx CVA with residual left sided weak/flaccid   -presents with AMS secondary to metabolic encephalopathy  -CT 1/21/22 without acute changes, re demonstration of lacuna infarcts  -Neuro checks q 2 hrs and prn for changes  -activity as tolerated  -physical therapy consult when stable  -will obtain LP to r/o bacterial/viral infections    #Pulm:  -Supplemental O2 prn to maintain SPO2 > 92%  -Bronchodilators q 6 hrs prn for sob/wheezes  -HOB >/= 30 degree angle  -Chest PT q 2 hrs     #CV:  -Hx Afib on apixaban  -CAD s/p stents  -Hx NSTEMI 8/2021, HFrEF  -ECG now and q am x3  -Cardiac Enzymes now and q am x 3  -obtain TTE to eval RVFx/LVFx, progression of disease  -home med apixaban 5 mg q 12 hrs - will continue  -home med fo cozaar 25 mg , cardizem 60 mg  will hold at present  -atorvastatin 80 mg qhs - will continue  -ASA 81 mg qd    #GI/:  -ANIKA sCr 4.74 (baseline 0.77-1.22) - most likely pre-renal  -CT A/P demonstrated fecal impaction, stercoral proctitis  -will place NGT  -place aguilar  -strict I & O's - keep even  -miralax qd   -senna qhs    #ID:  -Pan culture  -obtain MRSA PCR  -Obtain urine for legionella  -cefepime 1 gm IV q 12 hrs  -received vanco 1 gm IV x1  -dose vanco by trough of 15-20  -vanco trough in a.m. at 0800  -consider ampicillin therapy  -will obtain LP    #FEN/ENDO/HEME:  -hypernatremia - free water deficit of 9 liters  -contraction alkalosis  -AGMA  -resp alkalosis  -Do not correct Na+ > 0.3 meq/hr (goal Na+ in 24 hrs 154 meq)  -obtain CMP/Mg++/PO--4/CBC w diff/PT/PTT/INR now and q. a.m.  -obtain HgbA1c  -POC glucose q 4 hrs with ISS - maintain glucose 140-180  -BMP/Mg++/PO--4 q 4 hrs  -trend lactate  -abg prn  -obtain urine lytes/osmol

## 2022-01-21 NOTE — ED PROVIDER NOTE - ATTENDING CONTRIBUTION TO CARE
71F hx of prior stroke w L sided hemiparesis, aphasia, NSTEMI, dysphagia on modified diet, Afib on AC, CHF, pressure ulcers heels and sacral decub BIBEMS from [facility] for AMS. Pt found to be febrile this AM to 104F, given rectal tylenol at 8AM. Pt with dec responsiveness from baseline, On arrival pt is hypotensive, febrile, somnolent, unresponsive, but protecting airway, NCAT Tachypnea, Rhonchi BL and transmitted upper airway sounds, very dry mm, abd soft ntnd, ext warm, mottled skin. Suspect severe sepsis with resulting toxic metabolic encephalopathy made worse by underlying CVA. LAbs, UA/AC, CXR, IVF resuscitation, broad spectrum abx, imaging given severe sepsis and unable to give hx. TBA.

## 2022-01-21 NOTE — H&P ADULT - RS GEN PE MLT RESP DETAILS PC
airway patent/breath sounds equal/no wheezes/diminished breath sounds, L/diminished breath sounds, R

## 2022-01-21 NOTE — ED CLERICAL - NS ED CLERK NOTE PRE-ARRIVAL INFORMATION; ADDITIONAL PRE-ARRIVAL INFORMATION
CC/Reason For referral: hx cva, nstemi, afib.  diaphoretic, bradycardia, not responding to pain or stimuli, fever 101  Preferred Consultant(if applicable):  Who admits for you (if needed):  Do you have documents you would like to fax over?  Would you still like to speak to an ED attending?  please call after patient is seen

## 2022-01-21 NOTE — ED PROVIDER NOTE - PHYSICAL EXAMINATION
CONSTITUTIONAL: toxic appearing  SKIN: mottled extremities, no petechiae.  EYES: pink conjunctiva, anicteric  ENT: tongue and uvular midline, no exudates, dry mucous membranes  NECK: Supple; no meningismus, no JVD  CARD: tachycardic, regular rhythm, no murmurs, equal radial pulses bilaterally 2+  RESP: bilateral rhonchi, tachypneic  ABD: Soft, non-tender, non-distended, no peritoneal signs  EXT: mottled extremities, no edema  NEURO: Unresponsive to painful stimuli

## 2022-01-21 NOTE — H&P ADULT - ATTENDING COMMENTS
Patient seen and examined. Patient critically ill requiring frequent bedside visits with therapy change. Patient has an extensive medical history including Afib (AC), CHF (EF 50%), CAD, CVA with L hemiparesis, and arthritis with wheelchair dependence who presents from [facility] with lethargy and AMS.    In the ED she is found to be hypotensive and hypoxemic. She is COVID negative initially but with a temperature 104. Patient seen and examined. Patient critically ill requiring frequent bedside visits with therapy change. Patient has an extensive medical history including Afib (AC), CHF (EF 50%), CAD, CVA with L hemiparesis, and arthritis with wheelchair dependence who presents from [facility] with lethargy and AMS.    In the ED she is found to be hypotensive and hypoxemic. She is COVID negative initially but with a temperature 104. She is altered but intermittently groaning. In the ED she had SBP as low as 60 which improves with IVF. On POCUS she has a virtual IVC and has Alines on lung US. She has a panCT scan which shows relatively clear lungs with some lower lung infiltrates and significant stool burden within the colon. Reportedly she is able to feed herself with some assistance and is AAOx2-3 at baseline.    1. Cardiovascular - patient is in shock requiring vasopressors to maintain MAP > 65. Will continue IVF resuscitation as patient with a virtual IVC. Continue to monitor response to fluids.  - May need stress dose steroids if no improvement with further fluid resuscitation  2. Infectious Disease - COVID negative but will check full RVP  - Given fevers and AMS there is a concern for meningitis/encephalitis - though patient unable to get LP as received Eliquis this morning by report from [facility] (9AM meds?)  - Will cover with broad antibiotics - patient with PCN alergy  - UA negative - patient with history of UTI  - Followup blood cultures - sepsis is hopefully contributing to her mental status problems  - wound care evaluation of sacral wound  3. Renal - Acute Renal Insufficiency due to ATN and hypotension plus hypernatremia in setting of dehydration  - Continue IVF resuscitation with serial BMP monitoring and avoid overcorrection  4. Gastro - oropharyngeal dysphagia - will place NGT for feeds/meds  - patient with significant stool burden - will start bowel regimen and may require disimpaction  5. Neuro - patient with AMS in the setting of sepsis, hypotension, and hyponatremia  - unclear if patient with meningitis/encephalitis - but unless alternative etiology found LP would be appropriate once Eliquis out of system  - CTH noted without acute findings but old lacunar infarcts  6. Pulmonary - patient with hypoxemia requiring NRB. She is saturating well and will decrease FiO2 as able  - She is presently able to protect her airways and does not require intubation  - Maintain O2 sat > 90%    - DVT proph: SCDs. Will hold off Eliquis until post LP if that is required  - POCUS for shock state. Grossly normal to mild reduction in LVF with virtual IVC. Concern for hypovolemia  - Patient is FULL CODE. Will continue GOC with family    Long term prognosis extremely poor. Patient is at high risk of clinical deterioration during this hospitalization. She is requiring vasopressor support.

## 2022-01-21 NOTE — PROCEDURE NOTE - NSTRACHPOSTINTU_RESP_A_CORE
a1c 8.4% and elevated sugar in ER  Increase metformin to 1000mg bid   Has lab appt set for one month from now  Cont to monitor am blood sugars (have been 150's)  On acei and statin  ophtho appt April 2020 with dr. dupont   Appropriate capnography/Breath sounds bilateral/Breath sounds equal

## 2022-01-21 NOTE — H&P ADULT - HISTORY OF PRESENT ILLNESS
71 yr old female with PMHx, Afib on apixaban, CAD s/p stents, NSTEMI, arthritis, wheelchair bound, hypothyroidism, s/p left ureteral stent removal (8/2021) with residual non obstructive Rt renal calculi, CVA with left sided residual weakness (8/2021), sacral decubitus, pressure heel ulcers s/p COVID-19 (2020), baseline pt is AOx2 assist with ADL.     Pt presented to [facility] from [facility] when found to have AMS, fever with Tmax of 104. Pt in E.D. found to be obtunded, mottled, hypotensive SBP 80's refractory to 2.5 liters IVF requiring norepi gtt, fever with Temp of 104.5. In addition found to have hypernatremia with Na+ of 161, ANIKA with sCr 4.74 (baseline 0.77-1.22), AGMA of 25, with contraction alkalosis with serum HCO3 of 23, vph 7.50, vPCO2 of 35, lactate of 3.2, procalcitonin of 0.82, hsTrop of 239, Hgb of 12.9 (baseline from 8/2021 of 11). Pt with 9 liters total body water deficit.       Pt received CT C/A/P/H (1/21/22) re demonstration of  Non obstructing right renal calculus, significant stool burden distending the rectum and sigmoid colon with mild perirectal fat stranding suggestive of fecal impaction and possible stercoral proctitis, No acute transcortical infarct, intracranial hemorrhage, however did show Chronic lacunar infarcts with chronic small vessel disease. Started on cefepime, vanco, tylenol.      Consult called and pt admitted MICU for AMS secondary to metabolic encephalopathy, septic shock of unknown origin, possible aspiration pneum      71 yr old female with PMHx, Afib on apixaban, CAD s/p stents, NSTEMI, arthritis, wheelchair bound, hypothyroidism, s/p left ureteral stent removal (8/2021) with residual non obstructive Rt renal calculi, E.faecalis UTI (8/2021) CVA with left sided residual weakness (8/2021), sacral decubitus, pressure heel ulcers s/p COVID-19 (2020), baseline pt is AOx2 assist with ADL.     Pt presented to [facility] from [facility] when found to have AMS, fever with Tmax of 104. Pt in E.D. found to be obtunded, mottled, hypotensive SBP 80's refractory to 2.5 liters IVF requiring norepi gtt, fever with Temp of 104.5. In addition found to have hypernatremia with Na+ of 161, ANIKA with sCr 4.74 (baseline 0.77-1.22), AGMA of 25, with contraction alkalosis with serum HCO3 of 23, vph 7.50, vPCO2 of 35, lactate of 3.2, procalcitonin of 0.82, hsTrop of 239, Hgb of 12.9 (baseline from 8/2021 of 11). Pt with 9 liters total body water deficit.       Pt received CT C/A/P/H (1/21/22) re demonstration of  Non obstructing right renal calculus, significant stool burden distending the rectum and sigmoid colon with mild perirectal fat stranding suggestive of fecal impaction and possible stercoral proctitis, No acute transcortical infarct, intracranial hemorrhage, however did show Chronic lacunar infarcts with chronic small vessel disease. Started on cefepime, vanco, tylenol.      Consult called and pt admitted MICU for AMS secondary to metabolic encephalopathy, septic shock of unknown origin, possible aspiration pneum      71 yr old female with PMHx, Afib on apixaban, CAD s/p stents, NSTEMI (2020), arthritis, wheelchair bound, hypothyroidism, s/p left ureteral stent removal (8/2021) with residual non obstructive Rt renal calculi, E.faecalis UTI (8/2021) CVA with left sided residual weakness (8/2021), sacral decubitus, pressure heel ulcers s/p COVID-19 (2020), baseline pt is AOx2 assist with ADL.     Pt presented to [facility] from [facility] when found to have AMS, fever with Tmax of 104. Pt in E.D. found to be obtunded, mottled, hypotensive SBP 80's refractory to 2.5 liters IVF requiring norepi gtt, fever with Temp of 104.5. In addition found to have hypernatremia with Na+ of 161, ANIKA with sCr 4.74 (baseline 0.77-1.22), AGMA of 25, with contraction alkalosis with serum HCO3 of 23, vph 7.50, vPCO2 of 35, lactate of 3.2, procalcitonin of 0.82, hsTrop of 239, Hgb of 12.9 (baseline from 8/2021 of 11). Pt with 9 liters total body water deficit.       Pt received CT C/A/P/H (1/21/22) re demonstration of  Non obstructing right renal calculus, significant stool burden distending the rectum and sigmoid colon with mild perirectal fat stranding suggestive of fecal impaction and possible stercoral proctitis, No acute transcortical infarct, intracranial hemorrhage, however did show Chronic lacunar infarcts with chronic small vessel disease. Started on cefepime, vanco, tylenol.      Consult called and pt admitted MICU for AMS secondary to metabolic encephalopathy, septic shock of unknown origin, possible aspiration pneum      71 yr old female with PMHx, Afib on apixaban, HFrEF (50% 8/21/21),CAD s/p stents, NSTEMI (2020), arthritis, wheelchair bound, hypothyroidism, s/p left ureteral stent removal (8/2021) with residual non obstructive Rt renal calculi, E.faecalis UTI (8/2021) CVA with left sided residual weakness (8/2021), sacral decubitus, pressure heel ulcers s/p COVID-19 (2020), baseline pt is AOx2 assist with ADL.     Pt presented to [facility] from [facility] when found to have AMS, fever with Tmax of 104. Pt in E.D. found to be obtunded, mottled, hypotensive SBP 80's refractory to 2.5 liters IVF requiring norepi gtt, fever with Temp of 104.5. In addition found to have hypernatremia with Na+ of 161, ANIKA with sCr 4.74 (baseline 0.77-1.22), AGMA of 25, with contraction alkalosis with serum HCO3 of 23, vph 7.50, vPCO2 of 35, lactate of 3.2, procalcitonin of 0.82, hsTrop of 239, Hgb of 12.9 (baseline from 8/2021 of 11). Pt with 9 liters total body water deficit.       Pt received CT C/A/P/H (1/21/22) re demonstration of  Non obstructing right renal calculus, significant stool burden distending the rectum and sigmoid colon with mild perirectal fat stranding suggestive of fecal impaction and possible stercoral proctitis, No acute transcortical infarct, intracranial hemorrhage, however did show Chronic lacunar infarcts with chronic small vessel disease. Started on cefepime, vanco, tylenol.      Consult called and pt admitted MICU for AMS secondary to metabolic encephalopathy, septic shock of unknown origin, possible aspiration pneum      71 yr old female with PMHx, Afib on apixaban, HFrEF (50% 8/21/21),CAD s/p stents, NSTEMI (2020), arthritis, wheelchair bound, hypothyroidism, s/p left ureteral stent removal (8/2021) with residual non obstructive Rt renal calculi, E.faecalis UTI (8/2021) CVA with left sided residual weakness (8/2021), sacral decubitus, pressure heel ulcers s/p COVID-19 (2020), baseline pt is AOx2 assist with ADL.     Pt presented to [facility] from [facility] when found to have AMS, fever with Tmax of 104. Pt in E.D. found to be obtunded, mottled, hypotensive SBP 80's refractory to 2.5 liters IVF requiring norepi gtt, fever with Temp of 104.5. In addition found to have hypernatremia with Na+ of 161, ANIKA with sCr 4.74 (baseline 0.77-1.22), AGMA of 25, with contraction alkalosis with serum HCO3 of 23, vph 7.50, vPCO2 of 35, lactate of 3.2, procalcitonin of 0.82, hsTrop of 239, Hgb of 14.9 (baseline from 8/2021 of 11). Pt with 9 liters total body water deficit.       Pt received CT C/A/P/H (1/21/22) re demonstration of  Non obstructing right renal calculus, significant stool burden distending the rectum and sigmoid colon with mild perirectal fat stranding suggestive of fecal impaction and possible stercoral proctitis, No acute transcortical infarct, intracranial hemorrhage, however did show Chronic lacunar infarcts with chronic small vessel disease. Started on cefepime, vanco, tylenol.      Consult called and pt admitted MICU for AMS secondary to metabolic encephalopathy, septic shock of unknown origin, possible aspiration pneum

## 2022-01-21 NOTE — ED PROVIDER NOTE - CLINICAL SUMMARY MEDICAL DECISION MAKING FREE TEXT BOX
Kyle-[id number]: 71 year old female with PMH CVA with left hemiparesis, HTN, atrial fibrillation on AC presents with unresponsiveness this morning. Per collateral, pt typically verbal and A&Ox2, noted to be lethargic, febrile, and unresponsive this morning. Unclear etiology, ddx includes infectious, metabolic, ICH. Per discussion with next of kin Rick, pt full code. Appears to be protecting airway at this time. Will obtain labs, imaging, supportive treatment with likely admission for further evaluation/management.

## 2022-01-21 NOTE — PROGRESS NOTE ADULT - ATTENDING COMMENTS
Dr. Swenson (Attending Physician)  N - SZ - right gaze preference will order continuous EEG, will start valproic acid for seizures, LP to defer for Eliquis to clear  P - Venti mask at 50%, may need intubation for airway protection if mental status not improving or continues to have seizures.  C - NE at 0.06 mcg/kg/min  GI - npo   - ANIKA on CKD, will check bmp q6 hours  H - start SQH TID  ID - Covering for meningitis with vanc by level, vane, acyclovir renally dose  E - start dexamethasone, synthroid home dose TFTs pending  GOC - will address DNR/DNI and goc status with family

## 2022-01-21 NOTE — ED PROVIDER NOTE - OBJECTIVE STATEMENT
71 year old female with PMH CVA with left hemiparesis, HTN, atrial fibrillation on AC presents with unresponsiveness this morning. Per collateral, pt typically verbal and A&Ox2, noted to be lethargic, febrile, and unresponsive this morning. Denies any additional complaints.

## 2022-01-22 LAB
ALBUMIN SERPL ELPH-MCNC: 2.5 G/DL — LOW (ref 3.3–5)
ALBUMIN SERPL ELPH-MCNC: 3 G/DL — LOW (ref 3.3–5)
ALP SERPL-CCNC: 73 U/L — SIGNIFICANT CHANGE UP (ref 40–120)
ALP SERPL-CCNC: 81 U/L — SIGNIFICANT CHANGE UP (ref 40–120)
ALT FLD-CCNC: 12 U/L — SIGNIFICANT CHANGE UP (ref 10–45)
ALT FLD-CCNC: 9 U/L — LOW (ref 10–45)
ANION GAP SERPL CALC-SCNC: 16 MMOL/L — SIGNIFICANT CHANGE UP (ref 5–17)
ANION GAP SERPL CALC-SCNC: 17 MMOL/L — SIGNIFICANT CHANGE UP (ref 5–17)
ANION GAP SERPL CALC-SCNC: 19 MMOL/L — HIGH (ref 5–17)
ANION GAP SERPL CALC-SCNC: 20 MMOL/L — HIGH (ref 5–17)
ANION GAP SERPL CALC-SCNC: 20 MMOL/L — HIGH (ref 5–17)
APTT BLD: 98.3 SEC — HIGH (ref 27.5–35.5)
AST SERPL-CCNC: 13 U/L — SIGNIFICANT CHANGE UP (ref 10–40)
AST SERPL-CCNC: 19 U/L — SIGNIFICANT CHANGE UP (ref 10–40)
BILIRUB SERPL-MCNC: 0.4 MG/DL — SIGNIFICANT CHANGE UP (ref 0.2–1.2)
BILIRUB SERPL-MCNC: 0.6 MG/DL — SIGNIFICANT CHANGE UP (ref 0.2–1.2)
BUN SERPL-MCNC: 102 MG/DL — HIGH (ref 7–23)
BUN SERPL-MCNC: 103 MG/DL — HIGH (ref 7–23)
BUN SERPL-MCNC: 88 MG/DL — HIGH (ref 7–23)
BUN SERPL-MCNC: 93 MG/DL — HIGH (ref 7–23)
BUN SERPL-MCNC: 94 MG/DL — HIGH (ref 7–23)
CALCIUM SERPL-MCNC: 8.1 MG/DL — LOW (ref 8.4–10.5)
CALCIUM SERPL-MCNC: 8.3 MG/DL — LOW (ref 8.4–10.5)
CALCIUM SERPL-MCNC: 8.5 MG/DL — SIGNIFICANT CHANGE UP (ref 8.4–10.5)
CALCIUM SERPL-MCNC: 8.6 MG/DL — SIGNIFICANT CHANGE UP (ref 8.4–10.5)
CALCIUM SERPL-MCNC: 8.8 MG/DL — SIGNIFICANT CHANGE UP (ref 8.4–10.5)
CHLORIDE SERPL-SCNC: 116 MMOL/L — HIGH (ref 96–108)
CHLORIDE SERPL-SCNC: 117 MMOL/L — HIGH (ref 96–108)
CHLORIDE SERPL-SCNC: 117 MMOL/L — HIGH (ref 96–108)
CHLORIDE SERPL-SCNC: 120 MMOL/L — HIGH (ref 96–108)
CHLORIDE SERPL-SCNC: 122 MMOL/L — HIGH (ref 96–108)
CK MB BLD-MCNC: 0.3 % — SIGNIFICANT CHANGE UP (ref 0–3.5)
CK MB CFR SERPL CALC: 3.4 NG/ML — SIGNIFICANT CHANGE UP (ref 0–3.8)
CK SERPL-CCNC: 983 U/L — HIGH (ref 25–170)
CO2 SERPL-SCNC: 17 MMOL/L — LOW (ref 22–31)
CO2 SERPL-SCNC: 18 MMOL/L — LOW (ref 22–31)
CO2 SERPL-SCNC: 19 MMOL/L — LOW (ref 22–31)
CREAT SERPL-MCNC: 2.3 MG/DL — HIGH (ref 0.5–1.3)
CREAT SERPL-MCNC: 2.61 MG/DL — HIGH (ref 0.5–1.3)
CREAT SERPL-MCNC: 2.83 MG/DL — HIGH (ref 0.5–1.3)
CREAT SERPL-MCNC: 3.35 MG/DL — HIGH (ref 0.5–1.3)
CREAT SERPL-MCNC: 3.71 MG/DL — HIGH (ref 0.5–1.3)
CULTURE RESULTS: NO GROWTH — SIGNIFICANT CHANGE UP
CULTURE RESULTS: NO GROWTH — SIGNIFICANT CHANGE UP
GAS PNL BLDA: SIGNIFICANT CHANGE UP
GLUCOSE BLDC GLUCOMTR-MCNC: 116 MG/DL — HIGH (ref 70–99)
GLUCOSE BLDC GLUCOMTR-MCNC: 128 MG/DL — HIGH (ref 70–99)
GLUCOSE BLDC GLUCOMTR-MCNC: 129 MG/DL — HIGH (ref 70–99)
GLUCOSE BLDC GLUCOMTR-MCNC: 132 MG/DL — HIGH (ref 70–99)
GLUCOSE BLDC GLUCOMTR-MCNC: 137 MG/DL — HIGH (ref 70–99)
GLUCOSE BLDC GLUCOMTR-MCNC: 161 MG/DL — HIGH (ref 70–99)
GLUCOSE SERPL-MCNC: 139 MG/DL — HIGH (ref 70–99)
GLUCOSE SERPL-MCNC: 154 MG/DL — HIGH (ref 70–99)
GLUCOSE SERPL-MCNC: 167 MG/DL — HIGH (ref 70–99)
GLUCOSE SERPL-MCNC: 168 MG/DL — HIGH (ref 70–99)
GLUCOSE SERPL-MCNC: 183 MG/DL — HIGH (ref 70–99)
HCT VFR BLD CALC: 37.3 % — SIGNIFICANT CHANGE UP (ref 34.5–45)
HGB BLD-MCNC: 11.3 G/DL — LOW (ref 11.5–15.5)
MAGNESIUM SERPL-MCNC: 2.1 MG/DL — SIGNIFICANT CHANGE UP (ref 1.6–2.6)
MAGNESIUM SERPL-MCNC: 2.2 MG/DL — SIGNIFICANT CHANGE UP (ref 1.6–2.6)
MAGNESIUM SERPL-MCNC: 2.2 MG/DL — SIGNIFICANT CHANGE UP (ref 1.6–2.6)
MCHC RBC-ENTMCNC: 28.1 PG — SIGNIFICANT CHANGE UP (ref 27–34)
MCHC RBC-ENTMCNC: 30.3 GM/DL — LOW (ref 32–36)
MCV RBC AUTO: 92.8 FL — SIGNIFICANT CHANGE UP (ref 80–100)
MRSA PCR RESULT.: DETECTED
NRBC # BLD: 0 /100 WBCS — SIGNIFICANT CHANGE UP (ref 0–0)
PHOSPHATE SERPL-MCNC: 3.4 MG/DL — SIGNIFICANT CHANGE UP (ref 2.5–4.5)
PHOSPHATE SERPL-MCNC: 3.7 MG/DL — SIGNIFICANT CHANGE UP (ref 2.5–4.5)
PHOSPHATE SERPL-MCNC: 4.3 MG/DL — SIGNIFICANT CHANGE UP (ref 2.5–4.5)
PLATELET # BLD AUTO: 325 K/UL — SIGNIFICANT CHANGE UP (ref 150–400)
POTASSIUM SERPL-MCNC: 3.2 MMOL/L — LOW (ref 3.5–5.3)
POTASSIUM SERPL-MCNC: 3.3 MMOL/L — LOW (ref 3.5–5.3)
POTASSIUM SERPL-MCNC: 3.4 MMOL/L — LOW (ref 3.5–5.3)
POTASSIUM SERPL-MCNC: 4.1 MMOL/L — SIGNIFICANT CHANGE UP (ref 3.5–5.3)
POTASSIUM SERPL-MCNC: 4.5 MMOL/L — SIGNIFICANT CHANGE UP (ref 3.5–5.3)
POTASSIUM SERPL-SCNC: 3.2 MMOL/L — LOW (ref 3.5–5.3)
POTASSIUM SERPL-SCNC: 3.3 MMOL/L — LOW (ref 3.5–5.3)
POTASSIUM SERPL-SCNC: 3.4 MMOL/L — LOW (ref 3.5–5.3)
POTASSIUM SERPL-SCNC: 4.1 MMOL/L — SIGNIFICANT CHANGE UP (ref 3.5–5.3)
POTASSIUM SERPL-SCNC: 4.5 MMOL/L — SIGNIFICANT CHANGE UP (ref 3.5–5.3)
PROT SERPL-MCNC: 6.2 G/DL — SIGNIFICANT CHANGE UP (ref 6–8.3)
PROT SERPL-MCNC: 6.3 G/DL — SIGNIFICANT CHANGE UP (ref 6–8.3)
RBC # BLD: 4.02 M/UL — SIGNIFICANT CHANGE UP (ref 3.8–5.2)
RBC # FLD: 16.1 % — HIGH (ref 10.3–14.5)
S AUREUS DNA NOSE QL NAA+PROBE: DETECTED
SODIUM SERPL-SCNC: 152 MMOL/L — HIGH (ref 135–145)
SODIUM SERPL-SCNC: 153 MMOL/L — HIGH (ref 135–145)
SODIUM SERPL-SCNC: 154 MMOL/L — HIGH (ref 135–145)
SODIUM SERPL-SCNC: 155 MMOL/L — HIGH (ref 135–145)
SODIUM SERPL-SCNC: 160 MMOL/L — CRITICAL HIGH (ref 135–145)
SPECIMEN SOURCE: SIGNIFICANT CHANGE UP
SPECIMEN SOURCE: SIGNIFICANT CHANGE UP
T PALLIDUM AB TITR SER: NEGATIVE — SIGNIFICANT CHANGE UP
T3 SERPL-MCNC: 42 NG/DL — LOW (ref 80–200)
TROPONIN T, HIGH SENSITIVITY RESULT: 160 NG/L — HIGH (ref 0–51)
TSH SERPL-MCNC: 1.49 UIU/ML — SIGNIFICANT CHANGE UP (ref 0.27–4.2)
UUN UR-MCNC: 375 MG/DL — SIGNIFICANT CHANGE UP
VANCOMYCIN TROUGH SERPL-MCNC: 12.7 UG/ML — SIGNIFICANT CHANGE UP (ref 10–20)
VIT B12 SERPL-MCNC: 1012 PG/ML — SIGNIFICANT CHANGE UP (ref 232–1245)
WBC # BLD: 13.75 K/UL — HIGH (ref 3.8–10.5)
WBC # FLD AUTO: 13.75 K/UL — HIGH (ref 3.8–10.5)

## 2022-01-22 PROCEDURE — 95720 EEG PHY/QHP EA INCR W/VEEG: CPT

## 2022-01-22 PROCEDURE — 99291 CRITICAL CARE FIRST HOUR: CPT

## 2022-01-22 PROCEDURE — 71045 X-RAY EXAM CHEST 1 VIEW: CPT | Mod: 26

## 2022-01-22 RX ORDER — HEPARIN SODIUM 5000 [USP'U]/ML
5000 INJECTION INTRAVENOUS; SUBCUTANEOUS EVERY 8 HOURS
Refills: 0 | Status: DISCONTINUED | OUTPATIENT
Start: 2022-01-22 | End: 2022-01-22

## 2022-01-22 RX ORDER — HEPARIN SODIUM 5000 [USP'U]/ML
INJECTION INTRAVENOUS; SUBCUTANEOUS
Qty: 25000 | Refills: 0 | Status: DISCONTINUED | OUTPATIENT
Start: 2022-01-22 | End: 2022-01-22

## 2022-01-22 RX ORDER — POTASSIUM CHLORIDE 20 MEQ
20 PACKET (EA) ORAL ONCE
Refills: 0 | Status: COMPLETED | OUTPATIENT
Start: 2022-01-22 | End: 2022-01-22

## 2022-01-22 RX ORDER — HEPARIN SODIUM 5000 [USP'U]/ML
5500 INJECTION INTRAVENOUS; SUBCUTANEOUS EVERY 6 HOURS
Refills: 0 | Status: DISCONTINUED | OUTPATIENT
Start: 2022-01-22 | End: 2022-01-25

## 2022-01-22 RX ORDER — POLYETHYLENE GLYCOL 3350 17 G/17G
17 POWDER, FOR SOLUTION ORAL DAILY
Refills: 0 | Status: DISCONTINUED | OUTPATIENT
Start: 2022-01-22 | End: 2022-01-24

## 2022-01-22 RX ORDER — POTASSIUM CHLORIDE 20 MEQ
20 PACKET (EA) ORAL
Refills: 0 | Status: DISCONTINUED | OUTPATIENT
Start: 2022-01-22 | End: 2022-01-22

## 2022-01-22 RX ORDER — SODIUM CHLORIDE 9 MG/ML
1000 INJECTION, SOLUTION INTRAVENOUS
Refills: 0 | Status: DISCONTINUED | OUTPATIENT
Start: 2022-01-22 | End: 2022-01-24

## 2022-01-22 RX ORDER — VANCOMYCIN HCL 1 G
1000 VIAL (EA) INTRAVENOUS ONCE
Refills: 0 | Status: COMPLETED | OUTPATIENT
Start: 2022-01-22 | End: 2022-01-22

## 2022-01-22 RX ORDER — HEPARIN SODIUM 5000 [USP'U]/ML
5500 INJECTION INTRAVENOUS; SUBCUTANEOUS ONCE
Refills: 0 | Status: DISCONTINUED | OUTPATIENT
Start: 2022-01-22 | End: 2022-01-22

## 2022-01-22 RX ORDER — ETOMIDATE 2 MG/ML
20 INJECTION INTRAVENOUS ONCE
Refills: 0 | Status: COMPLETED | OUTPATIENT
Start: 2022-01-22 | End: 2022-01-21

## 2022-01-22 RX ORDER — HEPARIN SODIUM 5000 [USP'U]/ML
2500 INJECTION INTRAVENOUS; SUBCUTANEOUS EVERY 6 HOURS
Refills: 0 | Status: DISCONTINUED | OUTPATIENT
Start: 2022-01-22 | End: 2022-01-23

## 2022-01-22 RX ORDER — POLYETHYLENE GLYCOL 3350 17 G/17G
17 POWDER, FOR SOLUTION ORAL DAILY
Refills: 0 | Status: DISCONTINUED | OUTPATIENT
Start: 2022-01-22 | End: 2022-01-22

## 2022-01-22 RX ORDER — HEPARIN SODIUM 5000 [USP'U]/ML
INJECTION INTRAVENOUS; SUBCUTANEOUS
Qty: 25000 | Refills: 0 | Status: DISCONTINUED | OUTPATIENT
Start: 2022-01-22 | End: 2022-01-25

## 2022-01-22 RX ORDER — POTASSIUM CHLORIDE 20 MEQ
40 PACKET (EA) ORAL EVERY 4 HOURS
Refills: 0 | Status: COMPLETED | OUTPATIENT
Start: 2022-01-22 | End: 2022-01-22

## 2022-01-22 RX ORDER — PANTOPRAZOLE SODIUM 20 MG/1
40 TABLET, DELAYED RELEASE ORAL DAILY
Refills: 0 | Status: DISCONTINUED | OUTPATIENT
Start: 2022-01-22 | End: 2022-01-27

## 2022-01-22 RX ORDER — SUCCINYLCHOLINE CHLORIDE 100 MG/5ML
90 SYRINGE (ML) INTRAVENOUS ONCE
Refills: 0 | Status: COMPLETED | OUTPATIENT
Start: 2022-01-22 | End: 2022-01-21

## 2022-01-22 RX ADMIN — Medication 81 MILLIGRAM(S): at 12:25

## 2022-01-22 RX ADMIN — Medication 40 MILLIEQUIVALENT(S): at 17:42

## 2022-01-22 RX ADMIN — Medication 56 MICROGRAM(S): at 22:08

## 2022-01-22 RX ADMIN — Medication 1: at 13:09

## 2022-01-22 RX ADMIN — Medication 6 MILLIGRAM(S): at 06:26

## 2022-01-22 RX ADMIN — SODIUM CHLORIDE 50 MILLILITER(S): 9 INJECTION, SOLUTION INTRAVENOUS at 12:24

## 2022-01-22 RX ADMIN — POLYETHYLENE GLYCOL 3350 17 GRAM(S): 17 POWDER, FOR SOLUTION ORAL at 12:25

## 2022-01-22 RX ADMIN — MEROPENEM 100 MILLIGRAM(S): 1 INJECTION INTRAVENOUS at 17:39

## 2022-01-22 RX ADMIN — Medication 10 MILLIGRAM(S): at 03:13

## 2022-01-22 RX ADMIN — CHLORHEXIDINE GLUCONATE 15 MILLILITER(S): 213 SOLUTION TOPICAL at 06:26

## 2022-01-22 RX ADMIN — HEPARIN SODIUM 1200 UNIT(S)/HR: 5000 INJECTION INTRAVENOUS; SUBCUTANEOUS at 20:57

## 2022-01-22 RX ADMIN — Medication 50 MILLIEQUIVALENT(S): at 04:00

## 2022-01-22 RX ADMIN — PANTOPRAZOLE SODIUM 40 MILLIGRAM(S): 20 TABLET, DELAYED RELEASE ORAL at 12:25

## 2022-01-22 RX ADMIN — Medication 40 MILLIEQUIVALENT(S): at 12:45

## 2022-01-22 RX ADMIN — CHLORHEXIDINE GLUCONATE 1 APPLICATION(S): 213 SOLUTION TOPICAL at 06:53

## 2022-01-22 RX ADMIN — MEROPENEM 100 MILLIGRAM(S): 1 INJECTION INTRAVENOUS at 06:30

## 2022-01-22 RX ADMIN — PROPOFOL 7.88 MICROGRAM(S)/KG/MIN: 10 INJECTION, EMULSION INTRAVENOUS at 23:00

## 2022-01-22 RX ADMIN — Medication 163 MILLIGRAM(S): at 23:29

## 2022-01-22 RX ADMIN — HEPARIN SODIUM 5000 UNIT(S): 5000 INJECTION INTRAVENOUS; SUBCUTANEOUS at 06:26

## 2022-01-22 RX ADMIN — CHLORHEXIDINE GLUCONATE 15 MILLILITER(S): 213 SOLUTION TOPICAL at 17:47

## 2022-01-22 RX ADMIN — PROPOFOL 7.88 MICROGRAM(S)/KG/MIN: 10 INJECTION, EMULSION INTRAVENOUS at 23:02

## 2022-01-22 RX ADMIN — ATORVASTATIN CALCIUM 80 MILLIGRAM(S): 80 TABLET, FILM COATED ORAL at 22:09

## 2022-01-22 RX ADMIN — SODIUM CHLORIDE 50 MILLILITER(S): 9 INJECTION, SOLUTION INTRAVENOUS at 22:08

## 2022-01-22 RX ADMIN — HEPARIN SODIUM 1200 UNIT(S)/HR: 5000 INJECTION INTRAVENOUS; SUBCUTANEOUS at 13:44

## 2022-01-22 RX ADMIN — Medication 250 MILLIGRAM(S): at 10:15

## 2022-01-22 NOTE — PROGRESS NOTE ADULT - ASSESSMENT
A/P: 71F Hx afib on apixaban, cad s/p stents, HFrEF, CVA with left sided residual weakness who presents from nursing home with AMS, fever. Found to be hypernatremic, hypotensive refractory to IVF requiring vasopressor, AMS secondary to metabolic encephalopathy s/p intubation, septic shock of unknown origin, possible aspiration pneum.      Plan  #Neuro:  - right gaze deviation w/o nystagmus, twitching in RUE, distal LUE, and distal RLE  - significant metabolic abnormalities, likely etiology: metabolic myoclonus vs less likely focal/generalized sz  - s/p Ativan 1mg IVP, now intubated on Propofol, vEEG pending today, no AEDs at this time per Neuro  -Hx CVA with residual left sided weak/flaccid   -presents with AMS secondary to metabolic encephalopathy s/p intubation for airway protection  -CT 1/21/22 without acute changes, re demonstration of lacuna infarcts  -Neuro checks q 2 hrs and prn for changes  -will obtain LP to r/o bacterial/viral infections    #Pulm:  - Intubated     #CV:  -Hx Afib on apixaban  -CAD s/p stents  -Hx NSTEMI 8/2021, HFrEF  -ECG now and q am x3  -Cardiac Enzymes now and q am x 3  -obtain TTE to eval RVFx/LVFx, progression of disease  -home med apixaban 5 mg q 12 hrs - will continue  -home med fo cozaar 25 mg , cardizem 60 mg  will hold at present  -atorvastatin 80 mg qhs - will continue  -ASA 81 mg qd  -On low dose Levo likely 2/2 sedation     #GI/:  -ANIKA sCr 4.74 (baseline 0.77-1.22) - most likely pre-renal  -CT A/P demonstrated fecal impaction, stercoral proctitis  -will place NGT  -place aguilar  -strict I & O's - keep even  -miralax qd   -senna qhs    #ID:  -Pan culture  -obtain MRSA PCR  -Obtain urine for legionella  -cefepime 1 gm IV q 12 hrs  -received vanco 1 gm IV x1  -dose vanco by trough of 15-20  -vanco trough in a.m. at 0800  -consider ampicillin therapy  -will obtain LP    #FEN/ENDO/HEME:  -hypernatremia - free water deficit of 9 liters  -contraction alkalosis  -AGMA  -resp alkalosis  -Do not correct Na+ > 0.3 meq/hr (goal Na+ in 24 hrs 154 meq)  -obtain CMP/Mg++/PO--4/CBC w diff/PT/PTT/INR now and q. a.m.  -obtain HgbA1c  -POC glucose q 4 hrs with ISS - maintain glucose 140-180  -BMP/Mg++/PO--4 q 4 hrs  -trend lactate  -abg prn  -obtain urine lytes/osmol  ======================= DISPOSITION  =====================  [X] Critical   [ ] Guarded    [ ] Stable    [X] Maintain in ICU  [ ] Downgrade to Telemetry  [ ] Discharge Home

## 2022-01-22 NOTE — PROGRESS NOTE ADULT - ATTENDING COMMENTS
Critically ill patient with frequent bedside visits. Patient seen and examined on rounds and plan of care discussed with team.     Neuro - SZ - right gaze preference will order continuous EEG, will start valproic acid for seizures. Will need LP. Wait for 48 hour from Elliquis, in the interim, switch to heparin for ac.   Resp - Intubated. Minimal vent requirements. Lung protective ventilation strategy.   Cardiovascular - Titrate off pressors as tolerated.   GI - Advance diet. Start free water for hypernatremia.    - ANIKA on CKD, will check bmp q6 hours. In addition, stop D5W as rapid correction of hypernatremia. Recheck Na.   Hem/Onc - start SQH TID  ID - Covering for meningitis with vanc by level, vane, acyclovir renally dose. Will plan for LP tomorrow.   Endo - start dexamethasone, synthroid home dose TFTs pending  Extremities - RLE with swelling and bruising. Also cold. WIll obtain arterial and venous dopplers.   GOC - will address DNR/DNI and goc status with family.

## 2022-01-22 NOTE — PROGRESS NOTE ADULT - SUBJECTIVE AND OBJECTIVE BOX
TIANA BROWN  MRN-[id number]  Patient is a 71y old  Female who presents with a chief complaint of AMS (2022 06:26)    HPI: 71F Afib on apixaban, HFrEF (50% 21),CAD s/p stents, NSTEMI (), arthritis, wheelchair bound, hypothyroidism, s/p left ureteral stent removal/) with residual non obstructive Rt renal calculi, E.faecalis UTI (2021) CVA with left sided residual weakness (2021), sacral decubitus, pressure heel ulcers s/p COVID-19 (), baseline pt is AOx2 assist with ADL. Pt presented to [facility] from [facility] when found to have AMS, fever with Tmax of 104. Pt in E.D. found to be obtunded, mottled, hypotensive SBP 80's refractory to 2.5 liters IVF requiring norepi gtt, fever with Temp of 104.5. In addition found to have hypernatremia with Na+ of 161, ANIKA with sCr 4.74 (baseline 0.77-1.22), AGMA of 25, with contraction alkalosis with serum HCO3 of 23, vph 7.50, vPCO2 of 35, lactate of 3.2, procalcitonin of 0.82, hsTrop of 239, Hgb of 14.9 (baseline from 2021 of 11). Pt with 9 liters total body water deficit. Pt received CT C/A/P/H (22) re demonstration of  Non obstructing right renal calculus, significant stool burden distending the rectum and sigmoid colon with mild perirectal fat stranding suggestive of fecal impaction and possible stercoral proctitis, No acute transcortical infarct, intracranial hemorrhage, however did show Chronic lacunar infarcts with chronic small vessel disease. Started on cefepime, vanco, tylenol.Consult called and pt admitted MICU for AMS secondary to metabolic encephalopathy, septic shock of unknown origin, possible aspiration pneum    REVIEW OF SYSTEMS:    ICU Vital Signs Last 24 Hrs  T(C): 36.8 (2022 04:00), Max: 40.3 (2022 08:50)  T(F): 98.2 (2022 04:00), Max: 104.5 (2022 08:50)  HR: 76 (2022 06:45) (70 - 115)  BP: 142/61 (2022 23:15) (75/62 - 185/75)  BP(mean): 88 (2022 23:15) (65 - 121)  ABP: 104/57 (2022 06:45) (86/50 - 128/70)  ABP(mean): 73 (2022 06:45) (61 - 90)  RR: 16 (2022 06:45) (16 - 45)  SpO2: 100% (2022 06:45) (79% - 100%)    Mode: AC/ CMV (Assist Control/ Continuous Mandatory Ventilation), RR (machine): 16, TV (machine): 450, FiO2: 100, PEEP: 5, ITime: 1    I&O's Summary    2022 07:01  -  2022 07:00  --------------------------------------------------------  IN: 1319.4 mL / OUT: 475 mL / NET: 844.4 mL      CAPILLARY BLOOD GLUCOSE  POCT Blood Glucose.: 132 mg/dL (2022 06:33)    PHYSICAL EXAM:    ============================I/O===========================   I&O's Detail    2022 07:01  -  2022 07:00  --------------------------------------------------------  IN:    dextrose 5%: 900 mL    IV PiggyBack: 150 mL    Norepinephrine: 143.8 mL    Propofol: 125.6 mL  Total IN: 1319.4 mL    OUT:    Indwelling Catheter - Urethral (mL): 475 mL  Total OUT: 475 mL    Total NET: 844.4 mL  ============================ LABS =========================                 11.3   13.75 )-----------( 325      ( 2022 00:46 )             37.3         160<HH>  |  122<H>  |  102<H>  ----------------------------<  168<H>  3.2<L>   |  18<L>  |  3.71<H>    Ca    8.1<L>      2022 00:46  Phos  4.3       Mg     2.2         TPro  6.3  /  Alb  3.0<L>  /  TBili  0.6  /  DBili  x   /  AST  19  /  ALT  12  /  AlkPhos  73      Troponin T, High Sensitivity Result: 160 ng/L (22 @ 00:46)  Troponin T, High Sensitivity Result: 191 ng/L (22 @ 14:30)  Troponin T, High Sensitivity Result: 239 ng/L (22 @ 08:57)    CKMB Units: 3.4 ng/mL (22 @ 00:46)  CKMB Units: 2.4 ng/mL (22 @ 14:30)    Creatine Kinase, Serum: 983 U/L (22 @ 00:46)  Creatine Kinase, Serum: 1013 U/L (22 @ 14:30)  Creatine Kinase, Serum: 1090 U/L (22 @ 08:57)    CPK Mass Assay %: 0.3 % (22 @ 00:46)  CPK Mass Assay %: 0.2 % (22 @ 14:30)    LIVER FUNCTIONS - ( 2022 00:46 )  Alb: 3.0 g/dL / Pro: 6.3 g/dL / ALK PHOS: 73 U/L / ALT: 12 U/L / AST: 19 U/L / GGT: x           PT/INR - ( 2022 14:30 )   PT: 25.5 sec;   INR: 2.21 ratio    PTT - ( 2022 14:30 )  PTT:45.2 sec  ABG - ( 2022 00:41 )  pH, Arterial: 7.41  pH, Blood: x     /  pCO2: 33    /  pO2: 345   / HCO3: 21    / Base Excess: -3.1  /  SaO2: 98.1      Blood Gas Arterial, Lactate: 1.2 mmol/L (22 @ 00:41)  Blood Gas Arterial, Lactate: 0.9 mmol/L (22 @ 14:25)  Blood Gas Venous - Lactate: 2.2 mmol/L (22 @ 11:21)  Blood Gas Venous - Lactate: 3.2 mmol/L (22 @ 08:55)    Urinalysis Basic - ( 2022 09:07 )    Color: Yellow / Appearance: Clear / S.030 / pH: x  Gluc: x / Ketone: Negative  / Bili: Negative / Urobili: Negative   Blood: x / Protein: Negative / Nitrite: Negative   Leuk Esterase: Negative / RBC: 21 /hpf / WBC 3 /HPF   Sq Epi: x / Non Sq Epi: Few / Bacteria: Moderate  ======================Micro/Rad/Cardio=================  Telemetry: Reviewed   EKG: Reviewed  CXR: Reviewed  Culture: Reviewed   Echo: Transthoracic Echocardiogram:   Patient name: TIANA BROWN  YOB: 1950   Age: 70 (F)   MR#: [id number]  Study Date: 2020  Location: [address] [place], [place] [address]Sonographer: Matthew Hargrove [facility]  Study quality: Technically difficult  Referring Physician: Miya Francis MD  Blood Pressure: 128/88 mmHg  Height: 160 cm  Weight: 91 kg  BSA: 1.9 m2  ------------------------------------------------------------------------    PROCEDURE: Transthoracic echocardiogram with 2-D, M-Mode  and complete spectral and color flow Doppler.  INDICATION: Unspecified atrial fibrillation (I48.91)  HISTORY:  ------------------------------------------------------------------------  DIMENSIONS:  Dimensions:     Normal Values:  LA:     4.2 cm    2.0 - 4.0 cm  Ao:     3.3 cm    2.0 - 3.8 cm  SEPTUM: 1.4 cm    0.6 - 1.2 cm  PWT:    1.3 cm    0.6 - 1.1 cm  LVIDd:  4.0 cm    3.0 - 5.6 cm  LVIDs:  2.7 cm    1.8 - 4.0 cm    Derived Variables:  LVMI: 102 g/m2  RWT: 0.65  Ejection Fraction Visual Estimate: >55 %    ------------------------------------------------------------------------  OBSERVATIONS:  Mitral Valve: Mitral annular calcification, and calcified  mitral leaflets with normal diastolic opening. Trace mitral  regurgitation.  Aortic Root: Normal aortic root.  Aortic Valve: Calcified aortic valve with normal opening.  Left Atrium: Mildly dilated left atrium.  LA volume index =  39 cc/m2.  Left Ventricle: Endocardium not well visualized; grossly  hyperdynamic left ventricular systolic function.   Mild  diastolic dysfunction (stage I).There is a mild  intracavitary gradient of 19 mmHg. Severe concentric left  ventricular hypertrophy.  Right Heart: Normal right atrium. Normal right ventricular  size and function. There is mild tricuspid regurgitation.  There is trace pulmonic regurgitation.  Pericardium/PleuraTrivial pericardial effusion.  Hemodynamic: Incomplete tricuspid regurgitation jet  precludes accurate assessment of pulmonary artery systolic  pressure.  ------------------------------------------------------------------------  CONCLUSIONS:  1. Mitral annular calcification, and calcified mitral  leaflets with normal diastolic opening. Trace mitral  regurgitation.  2. Mildly dilated left atrium.  LA volume index = 39 cc/m2.  3. Severe concentric left ventricular hypertrophy.  4. Endocardium not well visualized; grossly hyperdynamic  left ventricular systolic function.   Mild diastolic  dysfunction (stage I).  There is a mild intracavitary  gradient of 19 mmHg.  5. Normal right ventricular size and function.  6. Trivial pericardial effusion.    Transthoracic Echocardiogram:   Patient name: TIANA BROWN  YOB: 1950   Age: 69 (F)   MR#: [id number]  Study Date: 2019  Location: [address] [place], [place] [address]onographer: Stephany Phelan [facility]  Study quality: Technically difficult  Referring Physician: Cassi Galan MD  Blood Pressure: 141/55 mmHg  Height: 157 cm  Weight: 93 kg  BSA: 1.9 m2  ------------------------------------------------------------------------  PROCEDURE: Transthoracic echocardiogram with 2-D, M-Mode  and complete spectral and color flow Doppler.  INDICATION: Dyspnea, unspecified (R06.00)  HISTORY:  ------------------------------------------------------------------------  DIMENSIONS:  Dimensions:     Normal Values:  LA:     5.0 cm    2.0 - 4.0 cm  Ao:     3.2 cm    2.0 - 3.8 cm  SEPTUM: 1.0 cm    0.6 - 1.2 cm  PWT:    1.0 cm    0.6 - 1.1 cm  LVIDd:  5.4 cm    3.0 - 5.6 cm  LVIDs:  3.4 cm    1.8 - 4.0 cm      Derived Variables:  LVMI: 107 g/m2  RWT: 0.37  Ejection Fraction Visual Estimate: >55 %  Peak Velocity (m/sec): AoV=2.3  ------------------------------------------------------------------------  OBSERVATIONS:  Mitral Valve: Mitral annular calcification. Mild mitral  regurgitation.  Aortic Root: Normal aortic root.  Aortic Valve: Calcified aortic valve with decreased  opening. Peak transaortic valve gradient equals 21 mm Hg,  mean transaortic valve gradient equals 12 mm Hg, estimated  aortic valve area equals 1.8 sqcm (by continuity equation),  consistent with mild aortic stenosis.  Left Atrium: Mild left atrial enlargement.  Left Ventricle: Endocardium not well visualized; grossly  normal left ventricular systolic function.   Segmental wall  motion could not be assessed.   Mild diastolic dysfunction  (stage I). Mild concentric left ventricular hypertrophy.  Right Heart: Normal right atrium. Normal right ventricular  size and function. There is trace tricuspid regurgitation.  There is trace pulmonic regurgitation.  Pericardium/PleuraNormal pericardium with no pericardial  effusion.  Hemodynamic: Incomplete tricuspid regurgitation jet  precludes accurate assessment of pulmonary artery systolic  pressure.  ------------------------------------------------------------------------  CONCLUSIONS:  1. Mitral annular calcification. Mild mitral regurgitation.    2. Calcified aortic valve with decreased opening. Peak  transaortic valve gradient equals 21 mm Hg, mean  transaortic valve gradient equals 12 mm Hg, estimated  aortic valve area equals 1.8 sqcm (by continuity equation),  consistent with mild aortic stenosis.  3. Mild left atrial enlargement.  4. Mild concentric left ventricular hypertrophy.  5. Endocardium not well visualized; grossly normal left  ventricular systolic function.   Segmental wall motion  could not be assessed.   Mild diastolic dysfunction (stage  I).  6. Normal right ventricular size and function.      Cath: Cardiac Cath Lab - Adult:   [facility]  Department of Cardiology  [address] [place] [address] [phone]  Cath Lab Report -- Comprehensive Report  Patient: TIANA BROWN  Study date: 2018  Case Physician(s):  Jose Segundo M.D.INDICATIONS: Abnormal stress test.  HISTORY: The patient has a history of coronary artery disease. The patient  has hypertension.  PROCEDURE:  --  Left heart catheterization.  --  Left coronary angiography.  --  Right coronary angiography.    CONTRAST GIVEN: Omnipaque 72 ml.  MEDICATIONS GIVEN: Fentanyl, 25 mcg, IV. Midazolam, 1 mg, IV. Fentanyl, 50  mcg, IV. Verapamil (Isoptin, Calan, Covera), 2.5 mg, IA. Labetalol  (Trandate), 20 mg, IV. Nitroglycerin, 250 mcg, IA. Heparin, 3000 units,  IA.  CORONARY VESSELS: The coronary circulation is right dominant.  LM:   --  LM: Normal.  LAD:   --  Mid LAD: There was a 50 % stenosis.  --  D1: There was a 30 % stenosis.  CX:   --  Circumflex: Normal.  RCA:   --  Proximal RCA: There was a 30 % stenosis.  COMPLICATIONS: There were no complications.  DIAGNOSTIC RECOMMENDATIONS: The patient should continue withthe present  medications  ======================================================  PAST MEDICAL & SURGICAL HISTORY:  HTN (hypertension)    Obesity  BMI 29.3    Hypothyroid  on synthroid    Type 2 diabetes mellitus without complication  diet-controlled    Carpal tunnel syndrome of right wrist    Lymphedema     novel coronavirus disease (COVID-19)  As per patient, diagnosed with Covid 1 year ago when residing in a nursing home and was managed in nursing home; Pt denies any hospitalizations for covid or intubations.    Sepsis  Urosepsis due to septic stone 2020    S/P carpal tunnel release    S/P cystoscopy  Pt is a poor historian; As per charts, patient had a cysto with stent placement 2020 TIANA BROWN  MRN-[id number]  Patient is a 71y old  Female who presents with a chief complaint of AMS (2022 06:26)    HPI: 71F Afib on apixaban, HFrEF (50% 21),CAD s/p stents, NSTEMI (), arthritis, wheelchair bound, hypothyroidism, s/p left ureteral stent removal/) with residual non obstructive Rt renal calculi, E.faecalis UTI (2021) CVA with left sided residual weakness (2021), sacral decubitus, pressure heel ulcers s/p COVID-19 (), baseline pt is AOx2 assist with ADL. Pt presented to [facility] from [facility] when found to have AMS, fever with Tmax of 104. Pt in E.D. found to be obtunded, mottled, hypotensive SBP 80's refractory to 2.5 liters IVF requiring norepi gtt, fever with Temp of 104.5. In addition found to have hypernatremia with Na+ of 161, ANIKA with sCr 4.74 (baseline 0.77-1.22), AGMA of 25, with contraction alkalosis with serum HCO3 of 23, vph 7.50, vPCO2 of 35, lactate of 3.2, procalcitonin of 0.82, hsTrop of 239, Hgb of 14.9 (baseline from 2021 of 11). Pt with 9 liters total body water deficit. Pt received CT C/A/P/H (22) re demonstration of  Non obstructing right renal calculus, significant stool burden distending the rectum and sigmoid colon with mild perirectal fat stranding suggestive of fecal impaction and possible stercoral proctitis, No acute transcortical infarct, intracranial hemorrhage, however did show Chronic lacunar infarcts with chronic small vessel disease. Started on cefepime, vanco, tylenol.Consult called and pt admitted MICU for AMS secondary to metabolic encephalopathy, septic shock of unknown origin, possible aspiration pneum    REVIEW OF SYSTEMS:  - Unable to obtain, intubated/sedated     ICU Vital Signs Last 24 Hrs  T(C): 36.8 (2022 04:00), Max: 40.3 (2022 08:50)  T(F): 98.2 (2022 04:00), Max: 104.5 (2022 08:50)  HR: 76 (2022 06:45) (70 - 115)  BP: 142/61 (2022 23:15) (75/62 - 185/75)  BP(mean): 88 (2022 23:15) (65 - 121)  ABP: 104/57 (2022 06:45) (86/50 - 128/70)  ABP(mean): 73 (2022 06:45) (61 - 90)  RR: 16 (2022 06:45) (16 - 45)  SpO2: 100% (2022 06:45) (79% - 100%)    Mode: AC/ CMV (Assist Control/ Continuous Mandatory Ventilation), RR (machine): 16, TV (machine): 450, FiO2: 100, PEEP: 5, ITime: 1    I&O's Summary    2022 07:01  -  2022 07:00  --------------------------------------------------------  IN: 1319.4 mL / OUT: 475 mL / NET: 844.4 mL      CAPILLARY BLOOD GLUCOSE  POCT Blood Glucose.: 132 mg/dL (2022 06:33)    PHYSICAL EXAM:  General: Critically ill  Neurology: Sedated RASS-2  Respiratory: CTA B/L, intubated    CV: RRR, S1S2, no murmurs  Abdominal: Soft, NTND normoactive BS  Extremities: Trace edema, + peripheral pulses  ============================I/O===========================   I&O's Detail    2022 07:01  -  2022 07:00  --------------------------------------------------------  IN:    dextrose 5%: 900 mL    IV PiggyBack: 150 mL    Norepinephrine: 143.8 mL    Propofol: 125.6 mL  Total IN: 1319.4 mL    OUT:    Indwelling Catheter - Urethral (mL): 475 mL  Total OUT: 475 mL    Total NET: 844.4 mL  ============================ LABS =========================                 11.3   13.75 )-----------( 325      ( 2022 00:46 )             37.3         160<HH>  |  122<H>  |  102<H>  ----------------------------<  168<H>  3.2<L>   |  18<L>  |  3.71<H>    Ca    8.1<L>      2022 00:46  Phos  4.3       Mg     2.2         TPro  6.3  /  Alb  3.0<L>  /  TBili  0.6  /  DBili  x   /  AST  19  /  ALT  12  /  AlkPhos  73      Troponin T, High Sensitivity Result: 160 ng/L (22 @ 00:46)  Troponin T, High Sensitivity Result: 191 ng/L (22 @ 14:30)  Troponin T, High Sensitivity Result: 239 ng/L (22 @ 08:57)    CKMB Units: 3.4 ng/mL (22 @ 00:46)  CKMB Units: 2.4 ng/mL (22 @ 14:30)    Creatine Kinase, Serum: 983 U/L (22 @ 00:46)  Creatine Kinase, Serum: 1013 U/L (22 @ 14:30)  Creatine Kinase, Serum: 1090 U/L (22 @ 08:57)    CPK Mass Assay %: 0.3 % (22 @ 00:46)  CPK Mass Assay %: 0.2 % (22 @ 14:30)    LIVER FUNCTIONS - ( 2022 00:46 )  Alb: 3.0 g/dL / Pro: 6.3 g/dL / ALK PHOS: 73 U/L / ALT: 12 U/L / AST: 19 U/L / GGT: x           PT/INR - ( 2022 14:30 )   PT: 25.5 sec;   INR: 2.21 ratio    PTT - ( 2022 14:30 )  PTT:45.2 sec  ABG - ( 2022 00:41 )  pH, Arterial: 7.41  pH, Blood: x     /  pCO2: 33    /  pO2: 345   / HCO3: 21    / Base Excess: -3.1  /  SaO2: 98.1      Blood Gas Arterial, Lactate: 1.2 mmol/L (22 @ 00:41)  Blood Gas Arterial, Lactate: 0.9 mmol/L (22 @ 14:25)  Blood Gas Venous - Lactate: 2.2 mmol/L (22 @ 11:21)  Blood Gas Venous - Lactate: 3.2 mmol/L (22 @ 08:55)    Urinalysis Basic - ( 2022 09:07 )    Color: Yellow / Appearance: Clear / S.030 / pH: x  Gluc: x / Ketone: Negative  / Bili: Negative / Urobili: Negative   Blood: x / Protein: Negative / Nitrite: Negative   Leuk Esterase: Negative / RBC: 21 /hpf / WBC 3 /HPF   Sq Epi: x / Non Sq Epi: Few / Bacteria: Moderate  ======================Micro/Rad/Cardio=================  Telemetry: Reviewed   EKG: Reviewed  CXR: Reviewed  Culture: Reviewed   Echo: Transthoracic Echocardiogram:   Patient name: TIANA BROWN  YOB: 1950   Age: 70 (F)   MR#: [id number]  Study Date: 2020  Location: [address] [place], [place] [address]Sonographer: Matthew Hargrove RDCS  Study quality: Technically difficult  Referring Physician: Miya Francis MD  Blood Pressure: 128/88 mmHg  Height: 160 cm  Weight: 91 kg  BSA: 1.9 m2  ------------------------------------------------------------------------    PROCEDURE: Transthoracic echocardiogram with 2-D, M-Mode  and complete spectral and color flow Doppler.  INDICATION: Unspecified atrial fibrillation (I48.91)  HISTORY:  ------------------------------------------------------------------------  DIMENSIONS:  Dimensions:     Normal Values:  LA:     4.2 cm    2.0 - 4.0 cm  Ao:     3.3 cm    2.0 - 3.8 cm  SEPTUM: 1.4 cm    0.6 - 1.2 cm  PWT:    1.3 cm    0.6 - 1.1 cm  LVIDd:  4.0 cm    3.0 - 5.6 cm  LVIDs:  2.7 cm    1.8 - 4.0 cm    Derived Variables:  LVMI: 102 g/m2  RWT: 0.65  Ejection Fraction Visual Estimate: >55 %    ------------------------------------------------------------------------  OBSERVATIONS:  Mitral Valve: Mitral annular calcification, and calcified  mitral leaflets with normal diastolic opening. Trace mitral  regurgitation.  Aortic Root: Normal aortic root.  Aortic Valve: Calcified aortic valve with normal opening.  Left Atrium: Mildly dilated left atrium.  LA volume index =  39 cc/m2.  Left Ventricle: Endocardium not well visualized; grossly  hyperdynamic left ventricular systolic function.   Mild  diastolic dysfunction (stage I).There is a mild  intracavitary gradient of 19 mmHg. Severe concentric left  ventricular hypertrophy.  Right Heart: Normal right atrium. Normal right ventricular  size and function. There is mild tricuspid regurgitation.  There is trace pulmonic regurgitation.  Pericardium/PleuraTrivial pericardial effusion.  Hemodynamic: Incomplete tricuspid regurgitation jet  precludes accurate assessment of pulmonary artery systolic  pressure.  ------------------------------------------------------------------------  CONCLUSIONS:  1. Mitral annular calcification, and calcified mitral  leaflets with normal diastolic opening. Trace mitral  regurgitation.  2. Mildly dilated left atrium.  LA volume index = 39 cc/m2.  3. Severe concentric left ventricular hypertrophy.  4. Endocardium not well visualized; grossly hyperdynamic  left ventricular systolic function.   Mild diastolic  dysfunction (stage I).  There is a mild intracavitary  gradient of 19 mmHg.  5. Normal right ventricular size and function.  6. Trivial pericardial effusion.    Transthoracic Echocardiogram:   Patient name: TIANA BROWN  YOB: 1950   Age: 69 (F)   MR#: [id number]  Study Date: 2019  Location: [address] [place], [place] [address]onographer: Stephany Phelan [facility]  Study quality: Technically difficult  Referring Physician: Cassi Galan MD  Blood Pressure: 141/55 mmHg  Height: 157 cm  Weight: 93 kg  BSA: 1.9 m2  ------------------------------------------------------------------------  PROCEDURE: Transthoracic echocardiogram with 2-D, M-Mode  and complete spectral and color flow Doppler.  INDICATION: Dyspnea, unspecified (R06.00)  HISTORY:  ------------------------------------------------------------------------  DIMENSIONS:  Dimensions:     Normal Values:  LA:     5.0 cm    2.0 - 4.0 cm  Ao:     3.2 cm    2.0 - 3.8 cm  SEPTUM: 1.0 cm    0.6 - 1.2 cm  PWT:    1.0 cm    0.6 - 1.1 cm  LVIDd:  5.4 cm    3.0 - 5.6 cm  LVIDs:  3.4 cm    1.8 - 4.0 cm      Derived Variables:  LVMI: 107 g/m2  RWT: 0.37  Ejection Fraction Visual Estimate: >55 %  Peak Velocity (m/sec): AoV=2.3  ------------------------------------------------------------------------  OBSERVATIONS:  Mitral Valve: Mitral annular calcification. Mild mitral  regurgitation.  Aortic Root: Normal aortic root.  Aortic Valve: Calcified aortic valve with decreased  opening. Peak transaortic valve gradient equals 21 mm Hg,  mean transaortic valve gradient equals 12 mm Hg, estimated  aortic valve area equals 1.8 sqcm (by continuity equation),  consistent with mild aortic stenosis.  Left Atrium: Mild left atrial enlargement.  Left Ventricle: Endocardium not well visualized; grossly  normal left ventricular systolic function.   Segmental wall  motion could not be assessed.   Mild diastolic dysfunction  (stage I). Mild concentric left ventricular hypertrophy.  Right Heart: Normal right atrium. Normal right ventricular  size and function. There is trace tricuspid regurgitation.  There is trace pulmonic regurgitation.  Pericardium/PleuraNormal pericardium with no pericardial  effusion.  Hemodynamic: Incomplete tricuspid regurgitation jet  precludes accurate assessment of pulmonary artery systolic  pressure.  ------------------------------------------------------------------------  CONCLUSIONS:  1. Mitral annular calcification. Mild mitral regurgitation.    2. Calcified aortic valve with decreased opening. Peak  transaortic valve gradient equals 21 mm Hg, mean  transaortic valve gradient equals 12 mm Hg, estimated  aortic valve area equals 1.8 sqcm (by continuity equation),  consistent with mild aortic stenosis.  3. Mild left atrial enlargement.  4. Mild concentric left ventricular hypertrophy.  5. Endocardium not well visualized; grossly normal left  ventricular systolic function.   Segmental wall motion  could not be assessed.   Mild diastolic dysfunction (stage  I).  6. Normal right ventricular size and function.      Cath: Cardiac Cath Lab - Adult:   [facility]  Department of Cardiology  [address] [place] [address] [phone]  Cath Lab Report -- Comprehensive Report  Patient: TIANA BROWN  Study date: 2018  Case Physician(s):  Jose Segundo M.D.INDICATIONS: Abnormal stress test.  HISTORY: The patient has a history of coronary artery disease. The patient  has hypertension.  PROCEDURE:  --  Left heart catheterization.  --  Left coronary angiography.  --  Right coronary angiography.    CONTRAST GIVEN: Omnipaque 72 ml.  MEDICATIONS GIVEN: Fentanyl, 25 mcg, IV. Midazolam, 1 mg, IV. Fentanyl, 50  mcg, IV. Verapamil (Isoptin, Calan, Covera), 2.5 mg, IA. Labetalol  (Trandate), 20 mg, IV. Nitroglycerin, 250 mcg, IA. Heparin, 3000 units,  IA.  CORONARY VESSELS: The coronary circulation is right dominant.  LM:   --  LM: Normal.  LAD:   --  Mid LAD: There was a 50 % stenosis.  --  D1: There was a 30 % stenosis.  CX:   --  Circumflex: Normal.  RCA:   --  Proximal RCA: There was a 30 % stenosis.  COMPLICATIONS: There were no complications.  DIAGNOSTIC RECOMMENDATIONS: The patient should continue withthe present  medications  ======================================================  PAST MEDICAL & SURGICAL HISTORY:  HTN (hypertension)    Obesity  BMI 29.3    Hypothyroid  on synthroid    Type 2 diabetes mellitus without complication  diet-controlled    Carpal tunnel syndrome of right wrist    Lymphedema     novel coronavirus disease (COVID-19)  As per patient, diagnosed with Covid 1 year ago when residing in a nursing home and was managed in nursing home; Pt denies any hospitalizations for covid or intubations.    Sepsis  Urosepsis due to septic stone 2020    S/P carpal tunnel release    S/P cystoscopy  Pt is a poor historian; As per charts, patient had a cysto with stent placement 2020

## 2022-01-23 ENCOUNTER — RESULT REVIEW (OUTPATIENT)
Age: 72
End: 2022-01-23

## 2022-01-23 LAB
-  AMPICILLIN/SULBACTAM: SIGNIFICANT CHANGE UP
-  CEFAZOLIN: SIGNIFICANT CHANGE UP
-  CLINDAMYCIN: SIGNIFICANT CHANGE UP
-  ERYTHROMYCIN: SIGNIFICANT CHANGE UP
-  GENTAMICIN: SIGNIFICANT CHANGE UP
-  LINEZOLID: SIGNIFICANT CHANGE UP
-  OXACILLIN: SIGNIFICANT CHANGE UP
-  PENICILLIN: SIGNIFICANT CHANGE UP
-  RIFAMPIN: SIGNIFICANT CHANGE UP
-  TETRACYCLINE: SIGNIFICANT CHANGE UP
-  TRIMETHOPRIM/SULFAMETHOXAZOLE: SIGNIFICANT CHANGE UP
-  VANCOMYCIN: SIGNIFICANT CHANGE UP
ALBUMIN SERPL ELPH-MCNC: 2.4 G/DL — LOW (ref 3.3–5)
ALBUMIN SERPL ELPH-MCNC: 2.7 G/DL — LOW (ref 3.3–5)
ALP SERPL-CCNC: 79 U/L — SIGNIFICANT CHANGE UP (ref 40–120)
ALP SERPL-CCNC: 80 U/L — SIGNIFICANT CHANGE UP (ref 40–120)
ALT FLD-CCNC: 10 U/L — SIGNIFICANT CHANGE UP (ref 10–45)
ALT FLD-CCNC: 10 U/L — SIGNIFICANT CHANGE UP (ref 10–45)
ANION GAP SERPL CALC-SCNC: 13 MMOL/L — SIGNIFICANT CHANGE UP (ref 5–17)
ANION GAP SERPL CALC-SCNC: 15 MMOL/L — SIGNIFICANT CHANGE UP (ref 5–17)
APPEARANCE CSF: CLEAR — SIGNIFICANT CHANGE UP
APTT BLD: 33.6 SEC — SIGNIFICANT CHANGE UP (ref 27.5–35.5)
APTT BLD: 94.9 SEC — HIGH (ref 27.5–35.5)
AST SERPL-CCNC: 12 U/L — SIGNIFICANT CHANGE UP (ref 10–40)
AST SERPL-CCNC: 13 U/L — SIGNIFICANT CHANGE UP (ref 10–40)
BASOPHILS # BLD AUTO: 0.01 K/UL — SIGNIFICANT CHANGE UP (ref 0–0.2)
BASOPHILS NFR BLD AUTO: 0.1 % — SIGNIFICANT CHANGE UP (ref 0–2)
BILIRUB SERPL-MCNC: 0.3 MG/DL — SIGNIFICANT CHANGE UP (ref 0.2–1.2)
BILIRUB SERPL-MCNC: 0.4 MG/DL — SIGNIFICANT CHANGE UP (ref 0.2–1.2)
BUN SERPL-MCNC: 78 MG/DL — HIGH (ref 7–23)
BUN SERPL-MCNC: 80 MG/DL — HIGH (ref 7–23)
BUN SERPL-MCNC: 84 MG/DL — HIGH (ref 7–23)
BUN SERPL-MCNC: 86 MG/DL — HIGH (ref 7–23)
CALCIUM SERPL-MCNC: 8.5 MG/DL — SIGNIFICANT CHANGE UP (ref 8.4–10.5)
CALCIUM SERPL-MCNC: 8.7 MG/DL — SIGNIFICANT CHANGE UP (ref 8.4–10.5)
CALCIUM SERPL-MCNC: 8.8 MG/DL — SIGNIFICANT CHANGE UP (ref 8.4–10.5)
CALCIUM SERPL-MCNC: 8.9 MG/DL — SIGNIFICANT CHANGE UP (ref 8.4–10.5)
CHLORIDE SERPL-SCNC: 115 MMOL/L — HIGH (ref 96–108)
CHLORIDE SERPL-SCNC: 115 MMOL/L — HIGH (ref 96–108)
CHLORIDE SERPL-SCNC: 116 MMOL/L — HIGH (ref 96–108)
CHLORIDE SERPL-SCNC: 119 MMOL/L — HIGH (ref 96–108)
CO2 SERPL-SCNC: 17 MMOL/L — LOW (ref 22–31)
CO2 SERPL-SCNC: 19 MMOL/L — LOW (ref 22–31)
CO2 SERPL-SCNC: 19 MMOL/L — LOW (ref 22–31)
CO2 SERPL-SCNC: 20 MMOL/L — LOW (ref 22–31)
COLOR CSF: SIGNIFICANT CHANGE UP
CREAT SERPL-MCNC: 1.62 MG/DL — HIGH (ref 0.5–1.3)
CREAT SERPL-MCNC: 1.74 MG/DL — HIGH (ref 0.5–1.3)
CREAT SERPL-MCNC: 1.94 MG/DL — HIGH (ref 0.5–1.3)
CREAT SERPL-MCNC: 2.13 MG/DL — HIGH (ref 0.5–1.3)
CSF PCR RESULT: SIGNIFICANT CHANGE UP
CULTURE RESULTS: SIGNIFICANT CHANGE UP
EOSINOPHIL # BLD AUTO: 0 K/UL — SIGNIFICANT CHANGE UP (ref 0–0.5)
EOSINOPHIL NFR BLD AUTO: 0 % — SIGNIFICANT CHANGE UP (ref 0–6)
GAS PNL BLDA: SIGNIFICANT CHANGE UP
GLUCOSE BLDC GLUCOMTR-MCNC: 121 MG/DL — HIGH (ref 70–99)
GLUCOSE BLDC GLUCOMTR-MCNC: 146 MG/DL — HIGH (ref 70–99)
GLUCOSE BLDC GLUCOMTR-MCNC: 164 MG/DL — HIGH (ref 70–99)
GLUCOSE CSF-MCNC: 86 MG/DL — HIGH (ref 40–70)
GLUCOSE SERPL-MCNC: 131 MG/DL — HIGH (ref 70–99)
GLUCOSE SERPL-MCNC: 160 MG/DL — HIGH (ref 70–99)
GLUCOSE SERPL-MCNC: 181 MG/DL — HIGH (ref 70–99)
GLUCOSE SERPL-MCNC: 251 MG/DL — HIGH (ref 70–99)
HCT VFR BLD CALC: 32.8 % — LOW (ref 34.5–45)
HCT VFR BLD CALC: 34.1 % — LOW (ref 34.5–45)
HGB BLD-MCNC: 10.4 G/DL — LOW (ref 11.5–15.5)
HGB BLD-MCNC: 10.5 G/DL — LOW (ref 11.5–15.5)
IMM GRANULOCYTES NFR BLD AUTO: 0.9 % — SIGNIFICANT CHANGE UP (ref 0–1.5)
INR BLD: 1.27 RATIO — HIGH (ref 0.88–1.16)
INR BLD: 1.4 RATIO — HIGH (ref 0.88–1.16)
LABORATORY COMMENT REPORT: SIGNIFICANT CHANGE UP
LDH CSF L TO P-CCNC: 26 U/L — SIGNIFICANT CHANGE UP
LDH FLD-CCNC: 26 U/L — SIGNIFICANT CHANGE UP
LYMPHOCYTES # BLD AUTO: 0.73 K/UL — LOW (ref 1–3.3)
LYMPHOCYTES # BLD AUTO: 6.5 % — LOW (ref 13–44)
LYMPHOCYTES # CSF: 56 % — SIGNIFICANT CHANGE UP (ref 40–80)
MAGNESIUM SERPL-MCNC: 2.1 MG/DL — SIGNIFICANT CHANGE UP (ref 1.6–2.6)
MCHC RBC-ENTMCNC: 28.1 PG — SIGNIFICANT CHANGE UP (ref 27–34)
MCHC RBC-ENTMCNC: 28.2 PG — SIGNIFICANT CHANGE UP (ref 27–34)
MCHC RBC-ENTMCNC: 30.8 GM/DL — LOW (ref 32–36)
MCHC RBC-ENTMCNC: 31.7 GM/DL — LOW (ref 32–36)
MCV RBC AUTO: 88.6 FL — SIGNIFICANT CHANGE UP (ref 80–100)
MCV RBC AUTO: 91.7 FL — SIGNIFICANT CHANGE UP (ref 80–100)
METHOD TYPE: SIGNIFICANT CHANGE UP
MONOCYTES # BLD AUTO: 0.64 K/UL — SIGNIFICANT CHANGE UP (ref 0–0.9)
MONOCYTES NFR BLD AUTO: 5.7 % — SIGNIFICANT CHANGE UP (ref 2–14)
MONOS+MACROS NFR CSF: 9 % — LOW (ref 15–45)
NEUTROPHILS # BLD AUTO: 9.76 K/UL — HIGH (ref 1.8–7.4)
NEUTROPHILS # CSF: 35 % — HIGH (ref 0–6)
NEUTROPHILS NFR BLD AUTO: 86.8 % — HIGH (ref 43–77)
NRBC # BLD: 0 /100 WBCS — SIGNIFICANT CHANGE UP (ref 0–0)
NRBC # BLD: 0 /100 WBCS — SIGNIFICANT CHANGE UP (ref 0–0)
NRBC NFR CSF: 1 /UL — SIGNIFICANT CHANGE UP (ref 0–5)
ORGANISM # SPEC MICROSCOPIC CNT: SIGNIFICANT CHANGE UP
ORGANISM # SPEC MICROSCOPIC CNT: SIGNIFICANT CHANGE UP
PHOSPHATE SERPL-MCNC: 3.4 MG/DL — SIGNIFICANT CHANGE UP (ref 2.5–4.5)
PLATELET # BLD AUTO: 237 K/UL — SIGNIFICANT CHANGE UP (ref 150–400)
PLATELET # BLD AUTO: 251 K/UL — SIGNIFICANT CHANGE UP (ref 150–400)
POTASSIUM SERPL-MCNC: 3.8 MMOL/L — SIGNIFICANT CHANGE UP (ref 3.5–5.3)
POTASSIUM SERPL-MCNC: 3.9 MMOL/L — SIGNIFICANT CHANGE UP (ref 3.5–5.3)
POTASSIUM SERPL-MCNC: 4.1 MMOL/L — SIGNIFICANT CHANGE UP (ref 3.5–5.3)
POTASSIUM SERPL-MCNC: 4.2 MMOL/L — SIGNIFICANT CHANGE UP (ref 3.5–5.3)
POTASSIUM SERPL-SCNC: 3.8 MMOL/L — SIGNIFICANT CHANGE UP (ref 3.5–5.3)
POTASSIUM SERPL-SCNC: 3.9 MMOL/L — SIGNIFICANT CHANGE UP (ref 3.5–5.3)
POTASSIUM SERPL-SCNC: 4.1 MMOL/L — SIGNIFICANT CHANGE UP (ref 3.5–5.3)
POTASSIUM SERPL-SCNC: 4.2 MMOL/L — SIGNIFICANT CHANGE UP (ref 3.5–5.3)
PROT CSF-MCNC: 48 MG/DL — HIGH (ref 15–45)
PROT SERPL-MCNC: 6.1 G/DL — SIGNIFICANT CHANGE UP (ref 6–8.3)
PROT SERPL-MCNC: 6.2 G/DL — SIGNIFICANT CHANGE UP (ref 6–8.3)
PROTHROM AB SERPL-ACNC: 15.1 SEC — HIGH (ref 10.6–13.6)
PROTHROM AB SERPL-ACNC: 16.5 SEC — HIGH (ref 10.6–13.6)
RBC # BLD: 3.7 M/UL — LOW (ref 3.8–5.2)
RBC # BLD: 3.72 M/UL — LOW (ref 3.8–5.2)
RBC # CSF: 2 /UL — HIGH (ref 0–0)
RBC # FLD: 15.6 % — HIGH (ref 10.3–14.5)
RBC # FLD: 15.9 % — HIGH (ref 10.3–14.5)
SODIUM SERPL-SCNC: 147 MMOL/L — HIGH (ref 135–145)
SODIUM SERPL-SCNC: 150 MMOL/L — HIGH (ref 135–145)
SODIUM SERPL-SCNC: 150 MMOL/L — HIGH (ref 135–145)
SODIUM SERPL-SCNC: 151 MMOL/L — HIGH (ref 135–145)
SOURCE HSV 1/2: SIGNIFICANT CHANGE UP
SPECIMEN SOURCE: SIGNIFICANT CHANGE UP
TUBE TYPE: SIGNIFICANT CHANGE UP
WBC # BLD: 11.24 K/UL — HIGH (ref 3.8–10.5)
WBC # BLD: 8.79 K/UL — SIGNIFICANT CHANGE UP (ref 3.8–10.5)
WBC # FLD AUTO: 11.24 K/UL — HIGH (ref 3.8–10.5)
WBC # FLD AUTO: 8.79 K/UL — SIGNIFICANT CHANGE UP (ref 3.8–10.5)

## 2022-01-23 PROCEDURE — 70450 CT HEAD/BRAIN W/O DYE: CPT | Mod: 26

## 2022-01-23 PROCEDURE — 95720 EEG PHY/QHP EA INCR W/VEEG: CPT

## 2022-01-23 PROCEDURE — 93970 EXTREMITY STUDY: CPT | Mod: 26

## 2022-01-23 PROCEDURE — 99291 CRITICAL CARE FIRST HOUR: CPT | Mod: 25

## 2022-01-23 PROCEDURE — 62270 DX LMBR SPI PNXR: CPT | Mod: GC

## 2022-01-23 PROCEDURE — 88108 CYTOPATH CONCENTRATE TECH: CPT | Mod: 26

## 2022-01-23 PROCEDURE — 93926 LOWER EXTREMITY STUDY: CPT | Mod: 26,RT

## 2022-01-23 RX ORDER — MIDAZOLAM HYDROCHLORIDE 1 MG/ML
2 INJECTION, SOLUTION INTRAMUSCULAR; INTRAVENOUS ONCE
Refills: 0 | Status: DISCONTINUED | OUTPATIENT
Start: 2022-01-23 | End: 2022-01-23

## 2022-01-23 RX ORDER — DEXMEDETOMIDINE HYDROCHLORIDE IN 0.9% SODIUM CHLORIDE 4 UG/ML
0.05 INJECTION INTRAVENOUS
Qty: 200 | Refills: 0 | Status: DISCONTINUED | OUTPATIENT
Start: 2022-01-23 | End: 2022-01-24

## 2022-01-23 RX ORDER — FENTANYL CITRATE 50 UG/ML
100 INJECTION INTRAVENOUS ONCE
Refills: 0 | Status: DISCONTINUED | OUTPATIENT
Start: 2022-01-23 | End: 2022-01-23

## 2022-01-23 RX ORDER — MULTIVIT-MIN/FERROUS GLUCONATE 9 MG/15 ML
15 LIQUID (ML) ORAL DAILY
Refills: 0 | Status: DISCONTINUED | OUTPATIENT
Start: 2022-01-23 | End: 2022-01-27

## 2022-01-23 RX ORDER — INSULIN LISPRO 100/ML
VIAL (ML) SUBCUTANEOUS EVERY 6 HOURS
Refills: 0 | Status: DISCONTINUED | OUTPATIENT
Start: 2022-01-23 | End: 2022-01-26

## 2022-01-23 RX ORDER — MIDAZOLAM HYDROCHLORIDE 1 MG/ML
2 INJECTION, SOLUTION INTRAMUSCULAR; INTRAVENOUS
Refills: 0 | Status: DISCONTINUED | OUTPATIENT
Start: 2022-01-23 | End: 2022-01-23

## 2022-01-23 RX ORDER — FENTANYL CITRATE 50 UG/ML
50 INJECTION INTRAVENOUS ONCE
Refills: 0 | Status: DISCONTINUED | OUTPATIENT
Start: 2022-01-23 | End: 2022-01-23

## 2022-01-23 RX ORDER — HYDRALAZINE HCL 50 MG
5 TABLET ORAL ONCE
Refills: 0 | Status: COMPLETED | OUTPATIENT
Start: 2022-01-23 | End: 2022-01-23

## 2022-01-23 RX ORDER — MIDAZOLAM HYDROCHLORIDE 1 MG/ML
2 INJECTION, SOLUTION INTRAMUSCULAR; INTRAVENOUS EVERY 6 HOURS
Refills: 0 | Status: DISCONTINUED | OUTPATIENT
Start: 2022-01-23 | End: 2022-01-26

## 2022-01-23 RX ORDER — MANNITOL
20 POWDER (GRAM) MISCELLANEOUS ONCE
Refills: 0 | Status: DISCONTINUED | OUTPATIENT
Start: 2022-01-23 | End: 2022-01-23

## 2022-01-23 RX ORDER — MANNITOL
20 POWDER (GRAM) MISCELLANEOUS ONCE
Refills: 0 | Status: COMPLETED | OUTPATIENT
Start: 2022-01-23 | End: 2022-01-23

## 2022-01-23 RX ORDER — DESMOPRESSIN ACETATE 0.1 MG/1
0.1 TABLET ORAL ONCE
Refills: 0 | Status: COMPLETED | OUTPATIENT
Start: 2022-01-23 | End: 2022-01-23

## 2022-01-23 RX ADMIN — MIDAZOLAM HYDROCHLORIDE 2 MILLIGRAM(S): 1 INJECTION, SOLUTION INTRAMUSCULAR; INTRAVENOUS at 14:16

## 2022-01-23 RX ADMIN — MEROPENEM 100 MILLIGRAM(S): 1 INJECTION INTRAVENOUS at 17:36

## 2022-01-23 RX ADMIN — CHLORHEXIDINE GLUCONATE 15 MILLILITER(S): 213 SOLUTION TOPICAL at 05:53

## 2022-01-23 RX ADMIN — FENTANYL CITRATE 100 MICROGRAM(S): 50 INJECTION INTRAVENOUS at 17:21

## 2022-01-23 RX ADMIN — HEPARIN SODIUM 1500 UNIT(S)/HR: 5000 INJECTION INTRAVENOUS; SUBCUTANEOUS at 23:04

## 2022-01-23 RX ADMIN — PROPOFOL 7.88 MICROGRAM(S)/KG/MIN: 10 INJECTION, EMULSION INTRAVENOUS at 11:31

## 2022-01-23 RX ADMIN — CHLORHEXIDINE GLUCONATE 1 APPLICATION(S): 213 SOLUTION TOPICAL at 05:53

## 2022-01-23 RX ADMIN — FENTANYL CITRATE 100 MICROGRAM(S): 50 INJECTION INTRAVENOUS at 16:00

## 2022-01-23 RX ADMIN — Medication 15 MILLILITER(S): at 18:26

## 2022-01-23 RX ADMIN — MIDAZOLAM HYDROCHLORIDE 2 MILLIGRAM(S): 1 INJECTION, SOLUTION INTRAMUSCULAR; INTRAVENOUS at 09:33

## 2022-01-23 RX ADMIN — Medication 3: at 02:43

## 2022-01-23 RX ADMIN — ATORVASTATIN CALCIUM 80 MILLIGRAM(S): 80 TABLET, FILM COATED ORAL at 22:49

## 2022-01-23 RX ADMIN — DESMOPRESSIN ACETATE 0.1 MILLIGRAM(S): 0.1 TABLET ORAL at 12:57

## 2022-01-23 RX ADMIN — Medication 81 MILLIGRAM(S): at 12:05

## 2022-01-23 RX ADMIN — Medication 5 MILLIGRAM(S): at 09:20

## 2022-01-23 RX ADMIN — Medication 163 MILLIGRAM(S): at 23:05

## 2022-01-23 RX ADMIN — POLYETHYLENE GLYCOL 3350 17 GRAM(S): 17 POWDER, FOR SOLUTION ORAL at 12:05

## 2022-01-23 RX ADMIN — HEPARIN SODIUM 1200 UNIT(S)/HR: 5000 INJECTION INTRAVENOUS; SUBCUTANEOUS at 02:03

## 2022-01-23 RX ADMIN — PANTOPRAZOLE SODIUM 40 MILLIGRAM(S): 20 TABLET, DELAYED RELEASE ORAL at 12:04

## 2022-01-23 RX ADMIN — PROPOFOL 7.88 MICROGRAM(S)/KG/MIN: 10 INJECTION, EMULSION INTRAVENOUS at 21:15

## 2022-01-23 RX ADMIN — Medication 200 GRAM(S): at 22:49

## 2022-01-23 RX ADMIN — FENTANYL CITRATE 100 MICROGRAM(S): 50 INJECTION INTRAVENOUS at 14:27

## 2022-01-23 RX ADMIN — HEPARIN SODIUM 1500 UNIT(S)/HR: 5000 INJECTION INTRAVENOUS; SUBCUTANEOUS at 21:14

## 2022-01-23 RX ADMIN — HEPARIN SODIUM 1200 UNIT(S)/HR: 5000 INJECTION INTRAVENOUS; SUBCUTANEOUS at 05:00

## 2022-01-23 RX ADMIN — MEROPENEM 100 MILLIGRAM(S): 1 INJECTION INTRAVENOUS at 05:53

## 2022-01-23 RX ADMIN — FENTANYL CITRATE 50 MICROGRAM(S): 50 INJECTION INTRAVENOUS at 06:13

## 2022-01-23 RX ADMIN — SENNA PLUS 10 MILLILITER(S): 8.6 TABLET ORAL at 02:38

## 2022-01-23 RX ADMIN — Medication 5 MILLIGRAM(S): at 17:21

## 2022-01-23 RX ADMIN — Medication 1: at 17:40

## 2022-01-23 RX ADMIN — SODIUM CHLORIDE 50 MILLILITER(S): 9 INJECTION, SOLUTION INTRAVENOUS at 05:53

## 2022-01-23 RX ADMIN — CHLORHEXIDINE GLUCONATE 15 MILLILITER(S): 213 SOLUTION TOPICAL at 17:36

## 2022-01-23 RX ADMIN — PROPOFOL 7.88 MICROGRAM(S)/KG/MIN: 10 INJECTION, EMULSION INTRAVENOUS at 12:04

## 2022-01-23 RX ADMIN — SENNA PLUS 10 MILLILITER(S): 8.6 TABLET ORAL at 22:49

## 2022-01-23 RX ADMIN — PROPOFOL 7.88 MICROGRAM(S)/KG/MIN: 10 INJECTION, EMULSION INTRAVENOUS at 05:53

## 2022-01-23 RX ADMIN — FENTANYL CITRATE 50 MICROGRAM(S): 50 INJECTION INTRAVENOUS at 06:18

## 2022-01-23 RX ADMIN — Medication 6 MILLIGRAM(S): at 05:53

## 2022-01-23 NOTE — DIETITIAN INITIAL EVALUATION ADULT. - CHIEF COMPLAINT
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 P/w AMS and fever and admitted to MICU with metabolic encephalopathy, septic shock of unknown origin, possible aspiration pneum

## 2022-01-23 NOTE — PROCEDURE NOTE - NSPROCDETAILS_GEN_ALL_CORE
nasogastric
patient pre-oxygenated, tube inserted, placement confirmed
location identified, draped/prepped, sterile technique used, needle inserted/introduced/positive blood return obtained via catheter/connected to a pressurized flush line/sutured in place
location identified, draped/prepped, sterile technique used, needle inserted/introduced/procedure aborted/area cleaned in sterile fashion

## 2022-01-23 NOTE — DIETITIAN INITIAL EVALUATION ADULT. - PERTINENT MEDS FT
MEDICATIONS  (STANDING):  acyclovir IVPB  atorvastatin  desmopressin  dexAMETHasone  Injectable  insulin lispro (ADMELOG) corrective regimen sliding scale  lactated ringers.  levothyroxine Injectable  meropenem  IVPB  norepinephrine Infusion  pantoprazole   Suspension  polyethylene glycol 3350  senna Syrup

## 2022-01-23 NOTE — DIETITIAN INITIAL EVALUATION ADULT. - REASON INDICATOR FOR ASSESSMENT
Nutrition Consult warranted for Pressure Injury Stage 2 or >  Source: EMR, Team; Pt intubated/sedated

## 2022-01-23 NOTE — DIETITIAN INITIAL EVALUATION ADULT. - OTHER INFO
Pt intubated with NGT placed 1/21. EN order calls for Nepro @ goal rate of 40ml/hr x 24hrs -- provides 960mL formula, 1728 Kcal, 78 gm protein, 698 mL free water; Receiving additional 200ml free H2O q6hrs in setting of hypernatremia. EN initiated on 1/22, started at trophic rate, upon RD visit today, feeds infusing @30ml/hr.   - Pt sedated on Precedex & propofol; Pt has received 520kcals via lipids in the last 24hrs; if propofol infusion continues at current rate (19.7 mL/hr), will provide 520kcals/day   - Noted Phos initially elevated on admit; K+ & Phos currently WNL    GI: Last BM 1/23 - bowel regimen ordered (miralax, senna). CT A/P (1/21) demonstrated fecal impaction, stercoral proctitis  - Noted pt on abx course at this time    Weight History Per [facility] Growth Chart: 274Ibs (2/2019), 260Ibs (8/2019), 264Ibs (1/2020), 266Ibs (5/2020), 209Ibs (8/2020), 175Ibs (11/2020), 160Ibs (8/2021); Noted prior RD note from 8/2021 states "Pt's aide is unsure of pt's body weight, however does report pt has lost weight over past few months. Noted dosing weight of 150 pounds (8/17/21) ... ? accuracy as pt appears to look more nourished than current weight suggests."  Dosing weight 144.8Ibs (1/21) - Question accuracy of dosing weight as pt visually appears heavier than recorded weight    Pt without documented Hx of DM/Pre-DM, however, A1c 5.7% (1/21) indicative prediabetic status.   - Noted Pt receiving steroid course at this time; Insulin Sliding Scale ordered to optimize BG in-house

## 2022-01-23 NOTE — DIETITIAN INITIAL EVALUATION ADULT. - PERTINENT LABORATORY DATA
01-23 Na150 mmol/L<H> Glu 131 mg/dL<H> K+ 4.1 mmol/L Cr  1.94 mg/dL<H> BUN 84 mg/dL<H> Phos n/a   Alb n/a   PAB n/a

## 2022-01-23 NOTE — PROGRESS NOTE ADULT - ATTENDING COMMENTS
Critically ill patient with frequent bedside visits. Patient seen and examined on rounds and plan of care discussed with team.     Neuro - SZ - right gaze preference will order continuous EEG, will start valproic acid for seizures. No seizures noted on EEG. Lumbar puncture performed at bedside today, sent for evaluation.   Resp - Intubated. Minimal vent requirements. Lung protective ventilation strategy.   Cardiovascular - Titrate off pressors as tolerated.   GI - Advance diet. Start free water for hypernatremia.    - ANIKA on CKD, will check bmp q6 hours. In addition, stop D5W as rapid correction of hypernatremia. Recheck Na.   Hem/Onc - start SQH TID  ID - Covering for meningitis with vanc by level, vane, acyclovir renally dose. Will stop abx depending on LP results.   Endo - start dexamethasone, synthroid home dose TFTs pending  Extremities - RLE with swelling and bruising. Also cold. Await arterial and venous dopplers.   GOC - will address DNR/DNI and goc status with family.      Overall prognosis guarded.

## 2022-01-23 NOTE — DIETITIAN INITIAL EVALUATION ADULT. - PHYSCIAL ASSESSMENT
obese Skin per nursing flowsheets: Unstageable L foot pressure injury; Suspected DTI to sacral spine

## 2022-01-23 NOTE — DIETITIAN INITIAL EVALUATION ADULT. - ADD RECOMMEND
EN recommendations as above; RD to remain available to adjust EN formulary, volume/rate PRN. Consider addition of Multivitamin supplementation pending no existing contraindications. Recommend addition of Misael BID to further facilitate wound healing. Please trend triglyceride levels while pt receiving Propofol infusions. Monitor nutritional intake/tolerance, weights, labs, hydration status, bowels, and skin integrity.

## 2022-01-23 NOTE — PROCEDURE NOTE - NSINDICATIONS_GEN_A_CORE
airway protection/respiratory distress
arterial puncture to obtain ABG's/critical patient
feeds
critical patient/CSF sampling

## 2022-01-23 NOTE — EEG REPORT - NS EEG TEXT BOX
REPORT OF CONTINUOUS VIDEO EEG   [facility]: [address], [place] [address], Ph#: [phone] [facility]: [address], [place] [address], Ph#: [phone] Office: [address] [place] [address], [place] [address] Ph#: [phone]  Patient Name: Charissa Christensen   Age: -, : - MRN #: MR # [id number], Alvarado: -- Referring Physician: - EEG #: [id number]  Study Date: 2022   Start Time: 1:14:01 PM    End Date:  22        End Time: 08:00:00 AM    Study Duration: ~19H  Study Information:  EEG Recording Technique: The patient underwent continuous Video-EEG monitoring, using Telemetry System hardware on the XLTek Digital System. EEG and video data were stored on a computer hard drive with important events saved in digital archive files. The material was reviewed by a physician (electroencephalographer / epileptologist) on a daily basis. Sebastian and seizure detection algorithms were utilized and reviewed. An EEG Technician attended to the patient, and was available throughout daytime work hours.  The epilepsy center neurologist was available in person or on call 24-hours per day.  EEG Placement and Labeling of Electrodes: The EEG was performed utilizing 20 channel referential EEG connections (coronal over temporal over parasagittal montage) using all standard 10-20 electrode placements with EKG, with additional electrodes placed in the inferior temporal region using the modified 10-10 montage electrode placements for elective admissions, or if deemed necessary. Recording was at a sampling rate of 256 samples per second per channel. Time synchronized digital video recording was done simultaneously with EEG recording. A low light infrared camera was used for low light recording.   History:  VEEG performed at bedside COR : PT sedated No hv due to covid protocol Photic performed 72 y/o Female PMH sepsis, covid-19, carpal tunnel syndrome right wrist, hypothyroid, obesity, HTN, type 2 diabetes Pt presented with AMS     Medication LIPITOR DECADRON TYLENOL SUSPNSION  Interpretation:  [Abbreviation Key:  PDR=alpha rhythm/posterior dominant rhythm. A-P=anterior posterior gradient.  Amplitude: ‘very low’:<20; ‘low’:20-50; ‘medium’:; ‘high’:>200uV.  Persistence for periodic/rhythmic patterns (% of epoch) ‘rare’:<1%; ‘occasional’:1-10%; ‘frequent’:10-50%; ‘abundant’:50-90%; ‘continuous’:>90%.  Persistence for sporadic discharges: ‘rare’:<1/hr; ‘occasional’:1/min-1/hr; ‘frequent’:>1/min; ‘abundant’:>1/10 sec.  GRDA=generalized rhythmic delta activity, LRDA=lateralized rhythmic delta activity, TIRDA=temporal intermittent rhythmic delta activity, FIRDA=frontal intermittent rhythmic activity. LPD=PLED=lateralized periodic discharges, GPD=generalized periodic discharges, BiPDs=BiPLEDs=bilateral independent periodic epileptiform discharges, SIRPID=stimulus induced rhythmic, periodic, or ictal appearing discharges.  Modifiers: +F=with fast component, +S=with spike component, +R=with rhythmic component.  S-B=burst suppression pattern.  PFA: paroxysmal fast activity. Max=maximal. N1-drowsy, N2-stage II sleep, N3-slow wave sleep.  HV=hyperventilation, PS=photic stimulation]   Daily EEG Visual Analysis  FINDINGS: The background was mostly continuous, spontaneously variable and reactive, with intermittent periods of discontinuity. No PDR was seen. There was progressive attenuation after ~0630.  Background Slowing: Generalized slowing: diffuse theta/delta slowing Focal Slowing: None was present.  Sleep Background: Drowsiness was characterized by fragmentation, attenuation, and slowing of the background activity.   Stage II sleep transients were not recorded.  Other Non-Epileptiform Findings: None were present.  Interictal Epileptiform Activity:  Intermittent fluctuating generalized periodic discharges with triphasic morphology, occurring up to 1 hz  Events: Clinical events: None recorded. Seizures: None recorded.  Activation Procedures:  Hyperventilation was not performed.   Photic stimulation was not performed.  Artifacts: Intermittent myogenic and movement artifacts were noted.  ECG: The heart rate on single channel ECG was predominantly between 80-90 BPM.  EEG Summary / Classification:  Abnormal EEG in the sedated patient. - Intermittent fluctuating generalized periodic discharges with triphasic morphology, occurring up to 1 hz - Moderate to severe generalized slowing, becoming severe after ~0630  EEG Impression / Clinical Correlate:  The findings of generalized periodic discharges with triphasic morphology are typically seen in metabolic encephalopathy, but may increase the risk for seizures in certain clinical situations. Clinical correlation is advised.  Moderate to severe multifocal/diffuse nonspecific cerebral dysfunction, becoming severe after ~0630.  No seizure seen.  Preliminary Fellow Report, final report pending attending review  Carlitos Valles MD Epilepsy Fellow  Reading Room: [phone] On Call Service After Hours: [phone]    REPORT OF CONTINUOUS VIDEO EEG   [facility]: [address], [place] [address], Ph#: [phone] [facility]: [address], [place] [address], Ph#: [phone] Office: [address] [place] [address], [place] [address] Ph#: [phone]  Patient Name: Charissa Christensen   Age: -, : - MRN #: MR # [id number], Alvarado: -- Referring Physician: - EEG #: [id number]  Study Date: 2022   Start Time: 1:14:01 PM    End Date:  22        End Time: 08:00:00 AM    Study Duration: ~19H  Study Information:  EEG Recording Technique: The patient underwent continuous Video-EEG monitoring, using Telemetry System hardware on the XLTek Digital System. EEG and video data were stored on a computer hard drive with important events saved in digital archive files. The material was reviewed by a physician (electroencephalographer / epileptologist) on a daily basis. Sebastian and seizure detection algorithms were utilized and reviewed. An EEG Technician attended to the patient, and was available throughout daytime work hours.  The epilepsy center neurologist was available in person or on call 24-hours per day.  EEG Placement and Labeling of Electrodes: The EEG was performed utilizing 20 channel referential EEG connections (coronal over temporal over parasagittal montage) using all standard 10-20 electrode placements with EKG, with additional electrodes placed in the inferior temporal region using the modified 10-10 montage electrode placements for elective admissions, or if deemed necessary. Recording was at a sampling rate of 256 samples per second per channel. Time synchronized digital video recording was done simultaneously with EEG recording. A low light infrared camera was used for low light recording.   History:  VEEG performed at bedside COR : PT sedated No hv due to covid protocol Photic performed 70 y/o Female PMH sepsis, covid-19, carpal tunnel syndrome right wrist, hypothyroid, obesity, HTN, type 2 diabetes Pt presented with AMS     Medication LIPITOR DECADRON TYLENOL SUSPNSION  Interpretation:  [Abbreviation Key:  PDR=alpha rhythm/posterior dominant rhythm. A-P=anterior posterior gradient.  Amplitude: ‘very low’:<20; ‘low’:20-50; ‘medium’:; ‘high’:>200uV.  Persistence for periodic/rhythmic patterns (% of epoch) ‘rare’:<1%; ‘occasional’:1-10%; ‘frequent’:10-50%; ‘abundant’:50-90%; ‘continuous’:>90%.  Persistence for sporadic discharges: ‘rare’:<1/hr; ‘occasional’:1/min-1/hr; ‘frequent’:>1/min; ‘abundant’:>1/10 sec.  GRDA=generalized rhythmic delta activity, LRDA=lateralized rhythmic delta activity, TIRDA=temporal intermittent rhythmic delta activity, FIRDA=frontal intermittent rhythmic activity. LPD=PLED=lateralized periodic discharges, GPD=generalized periodic discharges, BiPDs=BiPLEDs=bilateral independent periodic epileptiform discharges, SIRPID=stimulus induced rhythmic, periodic, or ictal appearing discharges.  Modifiers: +F=with fast component, +S=with spike component, +R=with rhythmic component.  S-B=burst suppression pattern.  PFA: paroxysmal fast activity. Max=maximal. N1-drowsy, N2-stage II sleep, N3-slow wave sleep.  HV=hyperventilation, PS=photic stimulation]   Daily EEG Visual Analysis  FINDINGS: The background was mostly continuous, spontaneously variable and reactive, with intermittent periods of discontinuity. No PDR was seen. There was progressive attenuation after ~0630.  Background Slowing: Generalized slowing: diffuse theta/delta slowing Focal Slowing: None was present.  Sleep Background: Stage II sleep transients were not recorded.  Other Non-Epileptiform Findings: None were present.  Interictal Epileptiform Activity:  Intermittent fluctuating generalized periodic discharges with triphasic morphology, occurring up to 1 hz  Events: Clinical events: None recorded. Seizures: None recorded.  Activation Procedures:  Hyperventilation was not performed.   Photic stimulation was not performed.  Artifacts: Intermittent myogenic and movement artifacts were noted.  ECG: The heart rate on single channel ECG was predominantly between 80-90 BPM.  EEG Summary / Classification:  Abnormal EEG in the sedated patient. - Intermittent fluctuating generalized periodic discharges with triphasic morphology, occurring up to 1 hz - Moderate to severe generalized slowing, becoming severe after ~0630  EEG Impression / Clinical Correlate:  The findings of generalized periodic discharges with triphasic morphology are typically seen in metabolic encephalopathy, but may increase the risk for seizures in certain clinical situations. Clinical correlation is advised.  Moderate to severe multifocal/diffuse nonspecific cerebral dysfunction, becoming severe after ~0630.  No seizure seen.  Carlitos Valles MD Epilepsy Fellow  Christos Fernandes MD EEG/Epilepsy Attending   Reading Room: [phone] On Call Service After Hours: [phone]

## 2022-01-23 NOTE — DIETITIAN INITIAL EVALUATION ADULT. - ORAL INTAKE PTA/DIET HISTORY
Unable to obtain subjective information from pt at this time; pt remains intubated/sedated. Pt resides at [facility] and noted per chart at "baseline pt is AOx2 assist with ADL." Per chart, NKFA, and micronutrient supplementation PTA included Klor-Con. Unknown chewing/swallowing function PTA at this time.

## 2022-01-23 NOTE — DIETITIAN INITIAL EVALUATION ADULT. - ENTERAL
Recommend changing EN regimen to Vital AF @ goal rate of 50ml/hr x 24hrs. At goal to provide 1200ml formula, 1440kcals, 90g protein, 973ml free H2O. Propofol at current rate (19.7ml/hr) to provide an additional 520kcals lipids daily. (whole regimen meets ~36kcals/kg & 1.6g protein/kg based on upper IBW of 54.8kg)

## 2022-01-24 LAB
ALBUMIN SERPL ELPH-MCNC: 2.6 G/DL — LOW (ref 3.3–5)
ALP SERPL-CCNC: 79 U/L — SIGNIFICANT CHANGE UP (ref 40–120)
ALT FLD-CCNC: 11 U/L — SIGNIFICANT CHANGE UP (ref 10–45)
ANION GAP SERPL CALC-SCNC: 13 MMOL/L — SIGNIFICANT CHANGE UP (ref 5–17)
APTT BLD: 56.9 SEC — HIGH (ref 27.5–35.5)
APTT BLD: 62.5 SEC — HIGH (ref 27.5–35.5)
AST SERPL-CCNC: 13 U/L — SIGNIFICANT CHANGE UP (ref 10–40)
BASOPHILS # BLD AUTO: 0.01 K/UL — SIGNIFICANT CHANGE UP (ref 0–0.2)
BASOPHILS NFR BLD AUTO: 0.1 % — SIGNIFICANT CHANGE UP (ref 0–2)
BILIRUB SERPL-MCNC: 0.3 MG/DL — SIGNIFICANT CHANGE UP (ref 0.2–1.2)
BUN SERPL-MCNC: 74 MG/DL — HIGH (ref 7–23)
CALCIUM SERPL-MCNC: 8.5 MG/DL — SIGNIFICANT CHANGE UP (ref 8.4–10.5)
CHLORIDE SERPL-SCNC: 114 MMOL/L — HIGH (ref 96–108)
CO2 SERPL-SCNC: 20 MMOL/L — LOW (ref 22–31)
CREAT SERPL-MCNC: 1.45 MG/DL — HIGH (ref 0.5–1.3)
CRYPTOC AG CSF-ACNC: NEGATIVE — SIGNIFICANT CHANGE UP
EOSINOPHIL # BLD AUTO: 0 K/UL — SIGNIFICANT CHANGE UP (ref 0–0.5)
EOSINOPHIL NFR BLD AUTO: 0 % — SIGNIFICANT CHANGE UP (ref 0–6)
GAS PNL BLDA: SIGNIFICANT CHANGE UP
GLUCOSE BLDC GLUCOMTR-MCNC: 133 MG/DL — HIGH (ref 70–99)
GLUCOSE BLDC GLUCOMTR-MCNC: 133 MG/DL — HIGH (ref 70–99)
GLUCOSE BLDC GLUCOMTR-MCNC: 151 MG/DL — HIGH (ref 70–99)
GLUCOSE BLDC GLUCOMTR-MCNC: 191 MG/DL — HIGH (ref 70–99)
GLUCOSE SERPL-MCNC: 198 MG/DL — HIGH (ref 70–99)
GRAM STN FLD: SIGNIFICANT CHANGE UP
HCT VFR BLD CALC: 32.9 % — LOW (ref 34.5–45)
HGB BLD-MCNC: 10.4 G/DL — LOW (ref 11.5–15.5)
IMM GRANULOCYTES NFR BLD AUTO: 0.7 % — SIGNIFICANT CHANGE UP (ref 0–1.5)
INR BLD: 1.1 RATIO — SIGNIFICANT CHANGE UP (ref 0.88–1.16)
INR BLD: 1.13 RATIO — SIGNIFICANT CHANGE UP (ref 0.88–1.16)
LYMPHOCYTES # BLD AUTO: 0.72 K/UL — LOW (ref 1–3.3)
LYMPHOCYTES # BLD AUTO: 7.4 % — LOW (ref 13–44)
MAGNESIUM SERPL-MCNC: 2.2 MG/DL — SIGNIFICANT CHANGE UP (ref 1.6–2.6)
MCHC RBC-ENTMCNC: 28.4 PG — SIGNIFICANT CHANGE UP (ref 27–34)
MCHC RBC-ENTMCNC: 31.6 GM/DL — LOW (ref 32–36)
MCV RBC AUTO: 89.9 FL — SIGNIFICANT CHANGE UP (ref 80–100)
MONOCYTES # BLD AUTO: 0.6 K/UL — SIGNIFICANT CHANGE UP (ref 0–0.9)
MONOCYTES NFR BLD AUTO: 6.1 % — SIGNIFICANT CHANGE UP (ref 2–14)
NEUTROPHILS # BLD AUTO: 8.36 K/UL — HIGH (ref 1.8–7.4)
NEUTROPHILS NFR BLD AUTO: 85.7 % — HIGH (ref 43–77)
NIGHT BLUE STAIN TISS: SIGNIFICANT CHANGE UP
NRBC # BLD: 0 /100 WBCS — SIGNIFICANT CHANGE UP (ref 0–0)
PHOSPHATE SERPL-MCNC: 2.9 MG/DL — SIGNIFICANT CHANGE UP (ref 2.5–4.5)
PLATELET # BLD AUTO: 251 K/UL — SIGNIFICANT CHANGE UP (ref 150–400)
POTASSIUM SERPL-MCNC: 3.4 MMOL/L — LOW (ref 3.5–5.3)
POTASSIUM SERPL-SCNC: 3.4 MMOL/L — LOW (ref 3.5–5.3)
PROT SERPL-MCNC: 6 G/DL — SIGNIFICANT CHANGE UP (ref 6–8.3)
PROTHROM AB SERPL-ACNC: 13.1 SEC — SIGNIFICANT CHANGE UP (ref 10.6–13.6)
PROTHROM AB SERPL-ACNC: 13.5 SEC — SIGNIFICANT CHANGE UP (ref 10.6–13.6)
RBC # BLD: 3.66 M/UL — LOW (ref 3.8–5.2)
RBC # FLD: 15.8 % — HIGH (ref 10.3–14.5)
SODIUM SERPL-SCNC: 147 MMOL/L — HIGH (ref 135–145)
SPECIMEN SOURCE: SIGNIFICANT CHANGE UP
SPECIMEN SOURCE: SIGNIFICANT CHANGE UP
TM INTERPRETATION: SIGNIFICANT CHANGE UP
WBC # BLD: 9.76 K/UL — SIGNIFICANT CHANGE UP (ref 3.8–10.5)
WBC # FLD AUTO: 9.76 K/UL — SIGNIFICANT CHANGE UP (ref 3.8–10.5)

## 2022-01-24 PROCEDURE — 99291 CRITICAL CARE FIRST HOUR: CPT

## 2022-01-24 PROCEDURE — 95718 EEG PHYS/QHP 2-12 HR W/VEEG: CPT

## 2022-01-24 RX ORDER — REMDESIVIR 5 MG/ML
200 INJECTION INTRAVENOUS EVERY 24 HOURS
Refills: 0 | Status: COMPLETED | OUTPATIENT
Start: 2022-01-24 | End: 2022-01-24

## 2022-01-24 RX ORDER — FENTANYL CITRATE 50 UG/ML
25 INJECTION INTRAVENOUS ONCE
Refills: 0 | Status: DISCONTINUED | OUTPATIENT
Start: 2022-01-24 | End: 2022-01-24

## 2022-01-24 RX ORDER — HYDRALAZINE HCL 50 MG
5 TABLET ORAL ONCE
Refills: 0 | Status: COMPLETED | OUTPATIENT
Start: 2022-01-24 | End: 2022-01-24

## 2022-01-24 RX ORDER — VANCOMYCIN HCL 1 G
1000 VIAL (EA) INTRAVENOUS EVERY 24 HOURS
Refills: 0 | Status: DISCONTINUED | OUTPATIENT
Start: 2022-01-24 | End: 2022-01-27

## 2022-01-24 RX ORDER — POLYETHYLENE GLYCOL 3350 17 G/17G
17 POWDER, FOR SOLUTION ORAL EVERY 12 HOURS
Refills: 0 | Status: DISCONTINUED | OUTPATIENT
Start: 2022-01-24 | End: 2022-01-26

## 2022-01-24 RX ORDER — REMDESIVIR 5 MG/ML
INJECTION INTRAVENOUS
Refills: 0 | Status: COMPLETED | OUTPATIENT
Start: 2022-01-24 | End: 2022-01-28

## 2022-01-24 RX ORDER — FENTANYL CITRATE 50 UG/ML
0.5 INJECTION INTRAVENOUS
Qty: 2500 | Refills: 0 | Status: DISCONTINUED | OUTPATIENT
Start: 2022-01-24 | End: 2022-01-24

## 2022-01-24 RX ORDER — REMDESIVIR 5 MG/ML
100 INJECTION INTRAVENOUS EVERY 24 HOURS
Refills: 0 | Status: COMPLETED | OUTPATIENT
Start: 2022-01-25 | End: 2022-01-28

## 2022-01-24 RX ORDER — FENTANYL CITRATE 50 UG/ML
0.5 INJECTION INTRAVENOUS
Qty: 5000 | Refills: 0 | Status: DISCONTINUED | OUTPATIENT
Start: 2022-01-24 | End: 2022-01-26

## 2022-01-24 RX ORDER — POTASSIUM CHLORIDE 20 MEQ
40 PACKET (EA) ORAL EVERY 4 HOURS
Refills: 0 | Status: COMPLETED | OUTPATIENT
Start: 2022-01-24 | End: 2022-01-24

## 2022-01-24 RX ADMIN — Medication 1 ENEMA: at 18:42

## 2022-01-24 RX ADMIN — Medication 56 MICROGRAM(S): at 00:03

## 2022-01-24 RX ADMIN — HEPARIN SODIUM 1500 UNIT(S)/HR: 5000 INJECTION INTRAVENOUS; SUBCUTANEOUS at 21:24

## 2022-01-24 RX ADMIN — Medication 5 MILLIGRAM(S): at 15:00

## 2022-01-24 RX ADMIN — MIDAZOLAM HYDROCHLORIDE 2 MILLIGRAM(S): 1 INJECTION, SOLUTION INTRAMUSCULAR; INTRAVENOUS at 18:42

## 2022-01-24 RX ADMIN — Medication 1: at 12:28

## 2022-01-24 RX ADMIN — Medication 56 MICROGRAM(S): at 22:00

## 2022-01-24 RX ADMIN — PANTOPRAZOLE SODIUM 40 MILLIGRAM(S): 20 TABLET, DELAYED RELEASE ORAL at 12:31

## 2022-01-24 RX ADMIN — PROPOFOL 7.88 MICROGRAM(S)/KG/MIN: 10 INJECTION, EMULSION INTRAVENOUS at 12:47

## 2022-01-24 RX ADMIN — SENNA PLUS 10 MILLILITER(S): 8.6 TABLET ORAL at 22:13

## 2022-01-24 RX ADMIN — Medication 250 MILLIGRAM(S): at 17:25

## 2022-01-24 RX ADMIN — POLYETHYLENE GLYCOL 3350 17 GRAM(S): 17 POWDER, FOR SOLUTION ORAL at 12:31

## 2022-01-24 RX ADMIN — Medication 15 MILLILITER(S): at 12:31

## 2022-01-24 RX ADMIN — MEROPENEM 100 MILLIGRAM(S): 1 INJECTION INTRAVENOUS at 18:43

## 2022-01-24 RX ADMIN — Medication 81 MILLIGRAM(S): at 12:31

## 2022-01-24 RX ADMIN — Medication 40 MILLIEQUIVALENT(S): at 05:31

## 2022-01-24 RX ADMIN — PROPOFOL 7.88 MICROGRAM(S)/KG/MIN: 10 INJECTION, EMULSION INTRAVENOUS at 10:56

## 2022-01-24 RX ADMIN — CHLORHEXIDINE GLUCONATE 1 APPLICATION(S): 213 SOLUTION TOPICAL at 06:27

## 2022-01-24 RX ADMIN — REMDESIVIR 500 MILLIGRAM(S): 5 INJECTION INTRAVENOUS at 18:39

## 2022-01-24 RX ADMIN — Medication 40 MILLIEQUIVALENT(S): at 10:56

## 2022-01-24 RX ADMIN — FENTANYL CITRATE 1.64 MICROGRAM(S)/KG/HR: 50 INJECTION INTRAVENOUS at 17:24

## 2022-01-24 RX ADMIN — FENTANYL CITRATE 25 MICROGRAM(S): 50 INJECTION INTRAVENOUS at 10:55

## 2022-01-24 RX ADMIN — CHLORHEXIDINE GLUCONATE 15 MILLILITER(S): 213 SOLUTION TOPICAL at 17:25

## 2022-01-24 RX ADMIN — Medication 1: at 00:16

## 2022-01-24 RX ADMIN — PROPOFOL 7.88 MICROGRAM(S)/KG/MIN: 10 INJECTION, EMULSION INTRAVENOUS at 17:24

## 2022-01-24 RX ADMIN — MEROPENEM 100 MILLIGRAM(S): 1 INJECTION INTRAVENOUS at 05:32

## 2022-01-24 RX ADMIN — MIDAZOLAM HYDROCHLORIDE 2 MILLIGRAM(S): 1 INJECTION, SOLUTION INTRAMUSCULAR; INTRAVENOUS at 12:47

## 2022-01-24 RX ADMIN — ATORVASTATIN CALCIUM 80 MILLIGRAM(S): 80 TABLET, FILM COATED ORAL at 22:00

## 2022-01-24 RX ADMIN — CHLORHEXIDINE GLUCONATE 15 MILLILITER(S): 213 SOLUTION TOPICAL at 05:30

## 2022-01-24 RX ADMIN — MIDAZOLAM HYDROCHLORIDE 2 MILLIGRAM(S): 1 INJECTION, SOLUTION INTRAMUSCULAR; INTRAVENOUS at 05:30

## 2022-01-24 RX ADMIN — POLYETHYLENE GLYCOL 3350 17 GRAM(S): 17 POWDER, FOR SOLUTION ORAL at 17:26

## 2022-01-24 RX ADMIN — Medication 6 MILLIGRAM(S): at 06:31

## 2022-01-24 NOTE — CHART NOTE - NSCHARTNOTEFT_GEN_A_CORE
Attempted outreach to team at multiple intervals today for medical update and goals of palliative consultation.  Awaiting medical team input. Full consult to follow, likely 1/25.  [phone].

## 2022-01-24 NOTE — CHART NOTE - NSCHARTNOTEFT_GEN_A_CORE
Extensive Conversation with patient's HCP Adela Connor  Updated Adela on patient's current clinical condition and encouraged her to contemplate patient's advanced directives.   Adela shared that patient had expressed to her that she wanted all measures taken to prolong her life; thus would like her to remain Full Code. Will continue to update on patient's condition and prognosis and have ongoing Go[facility] discussions.       Jose Moe D.O.  PGY-5 EM/IM/CC Extensive Conversation with patient's HCP Adela Geeta ([phone])  Updated Adela on patient's current clinical condition and encouraged her to contemplate patient's advanced directives.   Adela shared that patient had expressed to her that she wanted all measures taken to prolong her life; thus would like her to remain Full Code. Will continue to update on patient's condition and prognosis and have ongoing GoC discussions.       Jose Moe D.O.  PGY-5 EM/IM/CC

## 2022-01-24 NOTE — PROGRESS NOTE ADULT - SUBJECTIVE AND OBJECTIVE BOX
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 Pt presented to [facility] from [facility] when found to have AMS, fever with Tmax of 104. Pt in E.D. found to be obtunded, mottled, hypotensive SBP 80's refractory to 2.5 liters IVF requiring norepi gtt, fever with Temp of 104.5. In addition found to have hypernatremia with Na+ of 161, ANIKA with sCr 4.74 (baseline 0.77-1.22), AGMA of 25, with contraction alkalosis with serum HCO3 of 23, vph 7.50, vPCO2 of 35, lactate of 3.2, procalcitonin of 0.82, hsTrop of 239, Hgb of 14.9 (baseline from 8/2021 of 11). Pt with 9 liters total body water deficit. Pt received CT C/A/P/H (1/21/22) re demonstration of  Non obstructing right renal calculus, significant stool burden distending the rectum and sigmoid colon with mild perirectal fat stranding suggestive of fecal impaction and possible stercoral proctitis, No acute transcortical infarct, intracranial hemorrhage, however did show Chronic lacunar infarcts with chronic small vessel disease. Started on cefepime, vanco, tylenol.Consult called and pt admitted MICU for AMS secondary to metabolic encephalopathy, septic shock of unknown origin, possible aspiration pneum    Interval Events:  1/24 LUE swelling - during Am sign out, per RN, LUE Swelling, L axillary flaca no longer with blood draw.  Left Arm w/2 PIV's, per RN, with good flush fwd/aspiration and no extravasation to be noted.  LUE arterial and venous dopplers pending. able to palpate L radial pulse , LUE warm to touch.        REVIEW OF SYSTEMS:  - Unable to obtain, intubated/sedated         OBJECTIVE:  ICU Vital Signs Last 24 Hrs  T(C): 36.7 (24 Jan 2022 00:00), Max: 36.7 (24 Jan 2022 00:00)  T(F): 98.1 (24 Jan 2022 00:00), Max: 98.1 (24 Jan 2022 00:00)  HR: 84 (24 Jan 2022 06:00) (48 - 84)  ABP: 146/134 (24 Jan 2022 06:00) (113/65 - 178/88)  ABP(mean): 140 (24 Jan 2022 06:00) (81 - 146)  RR: 32 (24 Jan 2022 06:00) (16 - 50)  SpO2: 100% (24 Jan 2022 06:00) (99% - 100%)    Mode: AC/ CMV (Assist Control/ Continuous Mandatory Ventilation), RR (machine): 16, TV (machine): 450, FiO2: 30, PEEP: 5, ITime: 1, MAP: 10, PIP: 21    01-23 @ 07:01  -  01-24 @ 07:00  --------------------------------------------------------  IN: 2237.2 mL / OUT: 1415 mL / NET: 822.2 mL          CAPILLARY BLOOD GLUCOSE    POCT Blood Glucose.: 133 mg/dL (24 Jan 2022 06:23)      MEDICATIONS:   Antibiotics:  acyclovir IVPB 650 milliGRAM(s) IV Intermittent every 24 hours  meropenem  IVPB 1000 milliGRAM(s) IV Intermittent every 12 hours    Neuro:  acetaminophen    Suspension .. 650 milliGRAM(s) Oral every 6 hours PRN Temp greater or equal to 38C (100.4F)  dexMEDEtomidine Infusion 0.05 MICROgram(s)/kG/Hr IV Continuous <Continuous>  midazolam Injectable 2 milliGRAM(s) IV Push every 6 hours PRN agitation  propofol Infusion 20 MICROgram(s)/kG/Min IV Continuous <Continuous>    Anticoagulation:  aspirin  chewable 81 milliGRAM(s) Oral daily  heparin   Injectable 5500 Unit(s) IV Push every 6 hours PRN For aPTT less than 40  heparin  Infusion.  Unit(s)/Hr IV Continuous <Continuous>    Cardiology:  norepinephrine Infusion 0.05 MICROgram(s)/kG/Min IV Continuous <Continuous>  atorvastatin 80 milliGRAM(s) Oral at bedtime    Endo:     dexAMETHasone  Injectable 6 milliGRAM(s) IV Push daily  insulin lispro (ADMELOG) corrective regimen sliding scale   SubCutaneous every 6 hours  levothyroxine Injectable 56 MICROGram(s) IV Push at bedtime    Pulm:    GI/:  pantoprazole   Suspension 40 milliGRAM(s) Enteral Tube daily  polyethylene glycol 3350 17 Gram(s) Oral daily  senna Syrup 10 milliLiter(s) Oral at bedtime    Other:  chlorhexidine 0.12% Liquid 15 milliLiter(s) Oral Mucosa every 12 hours  chlorhexidine 4% Liquid 1 Application(s) Topical <User Schedule>  lactated ringers. 1000 milliLiter(s) IV Continuous <Continuous>  multivitamin/minerals/iron Oral Solution (CENTRUM) 15 milliLiter(s) Oral daily  potassium chloride   Solution 40 milliEquivalent(s) Oral every 4 hours        PHYSICAL EXAM:    GENERAL:  critically ill, intubated  CHEST/LUNG:  CTA B/L, intubated   HEART: Regular rate and rhythm; No murmurs, rubs, or gallops  ABDOMEN: Soft, Nontender, Nondistended; Bowel sounds present  Extremities: RLE with significant edema and ecchymosis compared to contralateral side. LUE swelling, radial axillary flaca, 2 PIV's, warm to touch, soft, +radial palp pulse  NEUROLOGY: sedated          LABS:                        10.4   9.76  )-----------( 251      ( 24 Jan 2022 01:20 )             32.9     Hgb Trend: 10.4<--, 10.4<--, 10.5<--, 11.3<--, 12.0<--  01-24    147<H>  |  114<H>  |  74<H>  ----------------------------<  198<H>  3.4<L>   |  20<L>  |  1.45<H>    Ca    8.5      24 Jan 2022 01:20  Phos  2.9     01-24  Mg     2.2     01-24    TPro  6.0  /  Alb  2.6<L>  /  TBili  0.3  /  DBili  x   /  AST  13  /  ALT  11  /  AlkPhos  79  01-24    LIVER FUNCTIONS - ( 24 Jan 2022 01:20 )  Alb: 2.6 g/dL / Pro: 6.0 g/dL / ALK PHOS: 79 U/L / ALT: 11 U/L / AST: 13 U/L / GGT: x           Creatinine Trend: 1.45<--, 1.62<--, 1.74<--, 1.94<--, 2.13<--, 2.30<--  PT/INR - ( 24 Jan 2022 06:02 )   PT: 13.1 sec;   INR: 1.10 ratio         PTT - ( 24 Jan 2022 06:02 )  PTT:56.9 sec    Arterial Blood Gas:  01-24 @ 00:43  7.44/33/136/22/97.8/-1.2  ABG lactate: --  Arterial Blood Gas:  01-23 @ 02:18  7.45/31/162/22/97.5/-1.8  ABG lactate: --        MICROBIOLOGY:     Culture - CSF with Gram Stain . (01.24.22 @ 00:09)    Gram Stain:   polymorphonuclear leukocytes seen  No organisms seen  by cytocentrifuge    Specimen Source: .CSF CSF      Culture - Urine (01.21.22 @ 18:50)    Specimen Source: Catheterized Catheterized    Culture Results:   No growth      Culture - Sputum . (01.21.22 @ 18:47)    -  Rifampin: R >2 Should not be used as monotherapy    -  Tetra/Doxy: S 2    -  Trimethoprim/Sulfamethoxazole: S 1/19    -  Vancomycin: S 2    -  Ampicillin/Sulbactam: R <=8/4    -  Clindamycin: R >4    -  Erythromycin: R >4    -  Gentamicin: S <=1 Should not be used as monotherapy    -  Linezolid: S 1    -  Oxacillin: R >2    -  Penicillin: R >8    Gram Stain:   Few polymorphonuclear leukocytes per low power field  Few Squamous epithelial cells per low power field  Numerous Gram Positive Rods per oil power field  Few Gram positive cocci in pairs per oil power field  Rare Gram Negative Rods per oil power field    -  Cefazolin: R <=4    Specimen Source: .Sputum Sputum    Culture Results:   Moderate Methicillin Resistant Staphylococcus aureus  Normal Respiratory Chitra present    Organism Identification: Methicillin resistant Staphylococcus aureus    Organism: Methicillin resistant Staphylococcus aureus    Method Type: KRISTOPHER      Culture - Blood (01.21.22 @ 13:31)    Specimen Source: .Blood Blood-Peripheral    Culture Results:   No growth to date.    Culture - Blood (01.21.22 @ 13:24)    Specimen Source: .Blood Blood-Peripheral    Culture Results:   No growth to date.        RADIOLOGY:  < from: CT Head No Cont (01.23.22 @ 23:08) >    INTERPRETATION:  Findings significantly limited by streak artifact. On   this limited study there is no large intracranial hemorrhage, mass effect   or midline shift    Recommend repeat head CT.    Follow up official report in the AM    Findings discussed by James COPELAND with Ashlie COPELAND on 1/23 at 11:53 PM    < end of copied text >  < from: Xray Chest 1 View- PORTABLE-Urgent (Xray Chest 1 View- PORTABLE-Urgent .) (01.22.22 @ 02:41) >  IMPRESSION:  Tubes and lines as above.  Trace left basilar linear atelectasis without focal consolidation.    --- End of Report ---    < end of copied text >    < from: CT Abdomen and Pelvis No Cont (01.21.22 @ 10:47) >    IMPRESSION:  Nonobstructing right renal calculus as previously demonstrated.  Suggestion of stercoral proctitis.    < end of copied text >      TTE with Doppler  (08.21.20 @ 06:54)   EF (Smith Rule): 70 %Doppler Peak Velocity (m/sec):  AoV=2.1      Conclusions:  1. Calcified trileaflet aortic valve with decreased  opening. Peak transaortic valve gradient equals 18 mm Hg,  mean transaortic valve gradient equals 10 mm Hg, estimated  aortic valve area equals 2 sqcm (by continuity equation),  aortic valve velocity time integral equals 47 cm,  consistent with mild aortic stenosis.  2. Mild concentric left ventricular hypertrophy.  3. Normal left ventricular systolic function. Peak left  ventricular outflow tract gradient equals 5 mm Hg, mean  gradient is equal to 3 mm Hg, LVOT velocity time integral  equals 27 cm.  4. Mild diastolic dysfunction (Stage I).  *** No previous Echo exam.  Unable to rule out endocarditis.  Consider IFEANYI if  clinically indicated.                  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 Pt presented to [facility] from [facility] when found to have AMS, fever with Tmax of 104. Pt in E.D. found to be obtunded, mottled, hypotensive SBP 80's refractory to 2.5 liters IVF requiring norepi gtt, fever with Temp of 104.5. In addition found to have hypernatremia with Na+ of 161, ANIKA with sCr 4.74 (baseline 0.77-1.22), AGMA of 25, with contraction alkalosis with serum HCO3 of 23, vph 7.50, vPCO2 of 35, lactate of 3.2, procalcitonin of 0.82, hsTrop of 239, Hgb of 14.9 (baseline from 8/2021 of 11). Pt with 9 liters total body water deficit. Pt received CT C/A/P/H (1/21/22) re demonstration of  Non obstructing right renal calculus, significant stool burden distending the rectum and sigmoid colon with mild perirectal fat stranding suggestive of fecal impaction and possible stercoral proctitis, No acute transcortical infarct, intracranial hemorrhage, however did show Chronic lacunar infarcts with chronic small vessel disease. Started on cefepime, vanco, tylenol.Consult called and pt admitted MICU for AMS secondary to metabolic encephalopathy, septic shock of unknown origin, possible aspiration pneum    Interval Events:  1/24 LUE swelling - during Am sign out, per RN, LUE Swelling, L axillary flaca no longer with blood draw.  Left Arm w/2 PIV's, per RN, with good flush fwd/aspiration and no extravasation to be noted.  LUE arterial and venous dopplers pending. able to palpate L radial pulse , LUE warm to touch.        REVIEW OF SYSTEMS:  - Unable to obtain, intubated/sedated         OBJECTIVE:  ICU Vital Signs Last 24 Hrs  T(C): 36.7 (24 Jan 2022 00:00), Max: 36.7 (24 Jan 2022 00:00)  T(F): 98.1 (24 Jan 2022 00:00), Max: 98.1 (24 Jan 2022 00:00)  HR: 84 (24 Jan 2022 06:00) (48 - 84)  ABP: 146/134 (24 Jan 2022 06:00) (113/65 - 178/88)  ABP(mean): 140 (24 Jan 2022 06:00) (81 - 146)  RR: 32 (24 Jan 2022 06:00) (16 - 50)  SpO2: 100% (24 Jan 2022 06:00) (99% - 100%)    Mode: AC/ CMV (Assist Control/ Continuous Mandatory Ventilation), RR (machine): 16, TV (machine): 450, FiO2: 30, PEEP: 5, ITime: 1, MAP: 10, PIP: 21    01-23 @ 07:01  -  01-24 @ 07:00  --------------------------------------------------------  IN: 2237.2 mL / OUT: 1415 mL / NET: 822.2 mL          CAPILLARY BLOOD GLUCOSE    POCT Blood Glucose.: 133 mg/dL (24 Jan 2022 06:23)      MEDICATIONS:   Antibiotics:  acyclovir IVPB 650 milliGRAM(s) IV Intermittent every 24 hours  meropenem  IVPB 1000 milliGRAM(s) IV Intermittent every 12 hours    Neuro:  acetaminophen    Suspension .. 650 milliGRAM(s) Oral every 6 hours PRN Temp greater or equal to 38C (100.4F)  dexMEDEtomidine Infusion 0.05 MICROgram(s)/kG/Hr IV Continuous <Continuous>  midazolam Injectable 2 milliGRAM(s) IV Push every 6 hours PRN agitation  propofol Infusion 20 MICROgram(s)/kG/Min IV Continuous <Continuous>    Anticoagulation:  aspirin  chewable 81 milliGRAM(s) Oral daily  heparin   Injectable 5500 Unit(s) IV Push every 6 hours PRN For aPTT less than 40  heparin  Infusion.  Unit(s)/Hr IV Continuous <Continuous>    Cardiology:  norepinephrine Infusion 0.05 MICROgram(s)/kG/Min IV Continuous <Continuous>  atorvastatin 80 milliGRAM(s) Oral at bedtime    Endo:     dexAMETHasone  Injectable 6 milliGRAM(s) IV Push daily  insulin lispro (ADMELOG) corrective regimen sliding scale   SubCutaneous every 6 hours  levothyroxine Injectable 56 MICROGram(s) IV Push at bedtime    Pulm:    GI/:  pantoprazole   Suspension 40 milliGRAM(s) Enteral Tube daily  polyethylene glycol 3350 17 Gram(s) Oral daily  senna Syrup 10 milliLiter(s) Oral at bedtime    Other:  chlorhexidine 0.12% Liquid 15 milliLiter(s) Oral Mucosa every 12 hours  chlorhexidine 4% Liquid 1 Application(s) Topical <User Schedule>  lactated ringers. 1000 milliLiter(s) IV Continuous <Continuous>  multivitamin/minerals/iron Oral Solution (CENTRUM) 15 milliLiter(s) Oral daily  potassium chloride   Solution 40 milliEquivalent(s) Oral every 4 hours        PHYSICAL EXAM:    GENERAL:  critically ill, intubated  CHEST/LUNG:  CTA B/L, intubated   HEART: Regular rate and rhythm; No murmurs, rubs, or gallops  ABDOMEN: Soft, Nontender, Nondistended; Bowel sounds present  Extremities: RLE with significant edema and ecchymosis compared to contralateral side. LUE swelling, radial axillary flaca, 2 PIV's, warm to touch, soft, +radial palp pulse  NEUROLOGY: sedated          LABS:                        10.4   9.76  )-----------( 251      ( 24 Jan 2022 01:20 )             32.9     Hgb Trend: 10.4<--, 10.4<--, 10.5<--, 11.3<--, 12.0<--  01-24    147<H>  |  114<H>  |  74<H>  ----------------------------<  198<H>  3.4<L>   |  20<L>  |  1.45<H>    Ca    8.5      24 Jan 2022 01:20  Phos  2.9     01-24  Mg     2.2     01-24    TPro  6.0  /  Alb  2.6<L>  /  TBili  0.3  /  DBili  x   /  AST  13  /  ALT  11  /  AlkPhos  79  01-24    LIVER FUNCTIONS - ( 24 Jan 2022 01:20 )  Alb: 2.6 g/dL / Pro: 6.0 g/dL / ALK PHOS: 79 U/L / ALT: 11 U/L / AST: 13 U/L / GGT: x           Creatinine Trend: 1.45<--, 1.62<--, 1.74<--, 1.94<--, 2.13<--, 2.30<--  PT/INR - ( 24 Jan 2022 06:02 )   PT: 13.1 sec;   INR: 1.10 ratio         PTT - ( 24 Jan 2022 06:02 )  PTT:56.9 sec    Arterial Blood Gas:  01-24 @ 00:43  7.44/33/136/22/97.8/-1.2  ABG lactate: --  Arterial Blood Gas:  01-23 @ 02:18  7.45/31/162/22/97.5/-1.8  ABG lactate: --        MICROBIOLOGY:     Culture - CSF with Gram Stain . (01.24.22 @ 00:09)    Gram Stain:   polymorphonuclear leukocytes seen  No organisms seen  by cytocentrifuge    Specimen Source: .CSF CSF      Culture - Urine (01.21.22 @ 18:50)    Specimen Source: Catheterized Catheterized    Culture Results:   No growth      Culture - Sputum . (01.21.22 @ 18:47)    -  Rifampin: R >2 Should not be used as monotherapy    -  Tetra/Doxy: S 2    -  Trimethoprim/Sulfamethoxazole: S 1/19    -  Vancomycin: S 2    -  Ampicillin/Sulbactam: R <=8/4    -  Clindamycin: R >4    -  Erythromycin: R >4    -  Gentamicin: S <=1 Should not be used as monotherapy    -  Linezolid: S 1    -  Oxacillin: R >2    -  Penicillin: R >8    Gram Stain:   Few polymorphonuclear leukocytes per low power field  Few Squamous epithelial cells per low power field  Numerous Gram Positive Rods per oil power field  Few Gram positive cocci in pairs per oil power field  Rare Gram Negative Rods per oil power field    -  Cefazolin: R <=4    Specimen Source: .Sputum Sputum    Culture Results:   Moderate Methicillin Resistant Staphylococcus aureus  Normal Respiratory Chitra present    Organism Identification: Methicillin resistant Staphylococcus aureus    Organism: Methicillin resistant Staphylococcus aureus    Method Type: KRISTOPHER      Culture - Blood (01.21.22 @ 13:31)    Specimen Source: .Blood Blood-Peripheral    Culture Results:   No growth to date.    Culture - Blood (01.21.22 @ 13:24)    Specimen Source: .Blood Blood-Peripheral    Culture Results:   No growth to date.        RADIOLOGY:  < from: CT Head No Cont (01.23.22 @ 23:08) >    INTERPRETATION:  Findings significantly limited by streak artifact. On   this limited study there is no large intracranial hemorrhage, mass effect   or midline shift    Recommend repeat head CT.    Follow up official report in the AM    Findings discussed by James COPELAND with Ashlie COPELAND on 1/23 at 11:53 PM    < end of copied text >  < from: Xray Chest 1 View- PORTABLE-Urgent (Xray Chest 1 View- PORTABLE-Urgent .) (01.22.22 @ 02:41) >  IMPRESSION:  Tubes and lines as above.  Trace left basilar linear atelectasis without focal consolidation.    --- End of Report ---    < end of copied text >    < from: CT Abdomen and Pelvis No Cont (01.21.22 @ 10:47) >    IMPRESSION:  Nonobstructing right renal calculus as previously demonstrated.  Suggestion of stercoral proctitis.    < end of copied text >      TTE with Doppler  (08.21.20 @ 06:54)   EF (Smith Rule): 70 %Doppler Peak Velocity (m/sec):  AoV=2.1      Conclusions:  1. Calcified trileaflet aortic valve with decreased  opening. Peak transaortic valve gradient equals 18 mm Hg,  mean transaortic valve gradient equals 10 mm Hg, estimated  aortic valve area equals 2 sqcm (by continuity equation),  aortic valve velocity time integral equals 47 cm,  consistent with mild aortic stenosis.  2. Mild concentric left ventricular hypertrophy.  3. Normal left ventricular systolic function. Peak left  ventricular outflow tract gradient equals 5 mm Hg, mean  gradient is equal to 3 mm Hg, LVOT velocity time integral  equals 27 cm.  4. Mild diastolic dysfunction (Stage I).  *** No previous Echo exam.  Unable to rule out endocarditis.  Consider IFEANYI if  clinically indicated.      VEEG 1/24:   The findings of generalized periodic discharges with triphasic morphology are typically seen in metabolic encephalopathy, but may increase the risk for seizures in certain clinical situations. Clinical correlation is advised.   Moderate to severe multifocal/diffuse nonspecific cerebral dysfunction, becoming severe after ~0630.   No seizure seen.              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 Pt presented to [facility] from [facility] when found to have AMS, fever with Tmax of 104. Pt in E.D. found to be obtunded, mottled, hypotensive SBP 80's refractory to 2.5 liters IVF requiring norepi gtt, fever with Temp of 104.5. In addition found to have hypernatremia with Na+ of 161, ANIKA with sCr 4.74 (baseline 0.77-1.22), AGMA of 25, with contraction alkalosis with serum HCO3 of 23, vph 7.50, vPCO2 of 35, lactate of 3.2, procalcitonin of 0.82, hsTrop of 239, Hgb of 14.9 (baseline from 8/2021 of 11). Pt with 9 liters total body water deficit. Pt received CT C/A/P/H (1/21/22) re demonstration of  Non obstructing right renal calculus, significant stool burden distending the rectum and sigmoid colon with mild perirectal fat stranding suggestive of fecal impaction and possible stercoral proctitis, No acute transcortical infarct, intracranial hemorrhage, however did show Chronic lacunar infarcts with chronic small vessel disease. Started on cefepime, vanco, tylenol.Consult called and pt admitted MICU for AMS secondary to metabolic encephalopathy, septic shock of unknown origin, possible aspiration pneum    Interval Events:  1/24 LUE swelling - during Am sign out, per RN, LUE Swelling, L axillary flaca no longer with blood draw.  Left Arm w/2 PIV's, per RN, with good flush fwd/aspiration and no extravasation to be noted.  LUE arterial and venous dopplers pending. able to palpate L radial pulse , LUE warm to touch.        REVIEW OF SYSTEMS:  - Unable to obtain, intubated/sedated         OBJECTIVE:  ICU Vital Signs Last 24 Hrs  T(C): 36.7 (24 Jan 2022 00:00), Max: 36.7 (24 Jan 2022 00:00)  T(F): 98.1 (24 Jan 2022 00:00), Max: 98.1 (24 Jan 2022 00:00)  HR: 84 (24 Jan 2022 06:00) (48 - 84)  ABP: 146/134 (24 Jan 2022 06:00) (113/65 - 178/88)  ABP(mean): 140 (24 Jan 2022 06:00) (81 - 146)  RR: 32 (24 Jan 2022 06:00) (16 - 50)  SpO2: 100% (24 Jan 2022 06:00) (99% - 100%)    Mode: AC/ CMV (Assist Control/ Continuous Mandatory Ventilation), RR (machine): 16, TV (machine): 450, FiO2: 30, PEEP: 5, ITime: 1, MAP: 10, PIP: 21    01-23 @ 07:01  -  01-24 @ 07:00  --------------------------------------------------------  IN: 2237.2 mL / OUT: 1415 mL / NET: 822.2 mL          CAPILLARY BLOOD GLUCOSE    POCT Blood Glucose.: 133 mg/dL (24 Jan 2022 06:23)      MEDICATIONS:   Antibiotics:  acyclovir IVPB 650 milliGRAM(s) IV Intermittent every 24 hours  meropenem  IVPB 1000 milliGRAM(s) IV Intermittent every 12 hours    Neuro:  acetaminophen    Suspension .. 650 milliGRAM(s) Oral every 6 hours PRN Temp greater or equal to 38C (100.4F)  dexMEDEtomidine Infusion 0.05 MICROgram(s)/kG/Hr IV Continuous <Continuous>  midazolam Injectable 2 milliGRAM(s) IV Push every 6 hours PRN agitation  propofol Infusion 20 MICROgram(s)/kG/Min IV Continuous <Continuous>    Anticoagulation:  aspirin  chewable 81 milliGRAM(s) Oral daily  heparin   Injectable 5500 Unit(s) IV Push every 6 hours PRN For aPTT less than 40  heparin  Infusion.  Unit(s)/Hr IV Continuous <Continuous>    Cardiology:  norepinephrine Infusion 0.05 MICROgram(s)/kG/Min IV Continuous <Continuous>  atorvastatin 80 milliGRAM(s) Oral at bedtime    Endo:     dexAMETHasone  Injectable 6 milliGRAM(s) IV Push daily  insulin lispro (ADMELOG) corrective regimen sliding scale   SubCutaneous every 6 hours  levothyroxine Injectable 56 MICROGram(s) IV Push at bedtime    Pulm:    GI/:  pantoprazole   Suspension 40 milliGRAM(s) Enteral Tube daily  polyethylene glycol 3350 17 Gram(s) Oral daily  senna Syrup 10 milliLiter(s) Oral at bedtime    Other:  chlorhexidine 0.12% Liquid 15 milliLiter(s) Oral Mucosa every 12 hours  chlorhexidine 4% Liquid 1 Application(s) Topical <User Schedule>  lactated ringers. 1000 milliLiter(s) IV Continuous <Continuous>  multivitamin/minerals/iron Oral Solution (CENTRUM) 15 milliLiter(s) Oral daily  potassium chloride   Solution 40 milliEquivalent(s) Oral every 4 hours        PHYSICAL EXAM:    GENERAL:  critically ill, intubated  CHEST/LUNG:  CTA B/L, intubated   HEART: Regular rate and rhythm; No murmurs, rubs, or gallops  ABDOMEN: Soft, Nontender, Nondistended; Bowel sounds present  Extremities:   RLE with significant edema and ecchymosis compared to contralateral side (1/23 Bilateral arterial and venous LED negative).   LUE swelling, radial axillary flaca, 2 PIV's, warm to touch, soft, +radial palp pulse,   Antecubital IV noted to be erythematous and RN to remove.   NEUROLOGY: sedated          LABS:                        10.4   9.76  )-----------( 251      ( 24 Jan 2022 01:20 )             32.9     Hgb Trend: 10.4<--, 10.4<--, 10.5<--, 11.3<--, 12.0<--  01-24    147<H>  |  114<H>  |  74<H>  ----------------------------<  198<H>  3.4<L>   |  20<L>  |  1.45<H>    Ca    8.5      24 Jan 2022 01:20  Phos  2.9     01-24  Mg     2.2     01-24    TPro  6.0  /  Alb  2.6<L>  /  TBili  0.3  /  DBili  x   /  AST  13  /  ALT  11  /  AlkPhos  79  01-24    LIVER FUNCTIONS - ( 24 Jan 2022 01:20 )  Alb: 2.6 g/dL / Pro: 6.0 g/dL / ALK PHOS: 79 U/L / ALT: 11 U/L / AST: 13 U/L / GGT: x           Creatinine Trend: 1.45<--, 1.62<--, 1.74<--, 1.94<--, 2.13<--, 2.30<--  PT/INR - ( 24 Jan 2022 06:02 )   PT: 13.1 sec;   INR: 1.10 ratio         PTT - ( 24 Jan 2022 06:02 )  PTT:56.9 sec    Arterial Blood Gas:  01-24 @ 00:43  7.44/33/136/22/97.8/-1.2  ABG lactate: --  Arterial Blood Gas:  01-23 @ 02:18  7.45/31/162/22/97.5/-1.8  ABG lactate: --        MICROBIOLOGY:     Culture - CSF with Gram Stain . (01.24.22 @ 00:09)    Gram Stain:   polymorphonuclear leukocytes seen  No organisms seen  by cytocentrifuge    Specimen Source: .CSF CSF      Culture - Urine (01.21.22 @ 18:50)    Specimen Source: Catheterized Catheterized    Culture Results:   No growth      Culture - Sputum . (01.21.22 @ 18:47)    -  Rifampin: R >2 Should not be used as monotherapy    -  Tetra/Doxy: S 2    -  Trimethoprim/Sulfamethoxazole: S 1/19    -  Vancomycin: S 2    -  Ampicillin/Sulbactam: R <=8/4    -  Clindamycin: R >4    -  Erythromycin: R >4    -  Gentamicin: S <=1 Should not be used as monotherapy    -  Linezolid: S 1    -  Oxacillin: R >2    -  Penicillin: R >8    Gram Stain:   Few polymorphonuclear leukocytes per low power field  Few Squamous epithelial cells per low power field  Numerous Gram Positive Rods per oil power field  Few Gram positive cocci in pairs per oil power field  Rare Gram Negative Rods per oil power field    -  Cefazolin: R <=4    Specimen Source: .Sputum Sputum    Culture Results:   Moderate Methicillin Resistant Staphylococcus aureus  Normal Respiratory Chitra present    Organism Identification: Methicillin resistant Staphylococcus aureus    Organism: Methicillin resistant Staphylococcus aureus    Method Type: KRISTOPHER      Culture - Blood (01.21.22 @ 13:31)    Specimen Source: .Blood Blood-Peripheral    Culture Results:   No growth to date.    Culture - Blood (01.21.22 @ 13:24)    Specimen Source: .Blood Blood-Peripheral    Culture Results:   No growth to date.        RADIOLOGY:  < from: CT Head No Cont (01.23.22 @ 23:08) >    INTERPRETATION:  Findings significantly limited by streak artifact. On   this limited study there is no large intracranial hemorrhage, mass effect   or midline shift    Recommend repeat head CT.    Follow up official report in the AM    Findings discussed by James COPELAND with Ashlie COPELAND on 1/23 at 11:53 PM    < end of copied text >  < from: Xray Chest 1 View- PORTABLE-Urgent (Xray Chest 1 View- PORTABLE-Urgent .) (01.22.22 @ 02:41) >  IMPRESSION:  Tubes and lines as above.  Trace left basilar linear atelectasis without focal consolidation.    --- End of Report ---    < end of copied text >    < from: CT Abdomen and Pelvis No Cont (01.21.22 @ 10:47) >    IMPRESSION:  Nonobstructing right renal calculus as previously demonstrated.  Suggestion of stercoral proctitis.    < end of copied text >      TTE with Doppler  (08.21.20 @ 06:54)   EF (Smith Rule): 70 %Doppler Peak Velocity (m/sec):  AoV=2.1      Conclusions:  1. Calcified trileaflet aortic valve with decreased  opening. Peak transaortic valve gradient equals 18 mm Hg,  mean transaortic valve gradient equals 10 mm Hg, estimated  aortic valve area equals 2 sqcm (by continuity equation),  aortic valve velocity time integral equals 47 cm,  consistent with mild aortic stenosis.  2. Mild concentric left ventricular hypertrophy.  3. Normal left ventricular systolic function. Peak left  ventricular outflow tract gradient equals 5 mm Hg, mean  gradient is equal to 3 mm Hg, LVOT velocity time integral  equals 27 cm.  4. Mild diastolic dysfunction (Stage I).  *** No previous Echo exam.  Unable to rule out endocarditis.  Consider IFEANYI if  clinically indicated.      VEEG 1/24:   The findings of generalized periodic discharges with triphasic morphology are typically seen in metabolic encephalopathy, but may increase the risk for seizures in certain clinical situations. Clinical correlation is advised.   Moderate to severe multifocal/diffuse nonspecific cerebral dysfunction, becoming severe after ~0630.   No seizure seen.

## 2022-01-24 NOTE — EEG REPORT - NS EEG TEXT BOX
REPORT OF CONTINUOUS VIDEO EEG   [facility]: [address], [place] [address], Ph#: [phone] [facility]: [id number] [place], [place] [address], [place] [address], Ph#: [phone] Office: [address] [place] [address], [place] [address] Ph#: [phone]  Patient Name: Charissa Christensen   Age: -, : - MRN #: MR # [id number], Alvarado: -- Referring Physician: - EEG #: [id number]  Study Date: 2022   Start Time: 0800   End Date:  22        End Time: 07:55:00 AM    Study Duration: ~24H  Study Information:  EEG Recording Technique: The patient underwent continuous Video-EEG monitoring, using Telemetry System hardware on the XLTek Digital System. EEG and video data were stored on a computer hard drive with important events saved in digital archive files. The material was reviewed by a physician (electroencephalographer / epileptologist) on a daily basis. Sebastian and seizure detection algorithms were utilized and reviewed. An EEG Technician attended to the patient, and was available throughout daytime work hours.  The epilepsy center neurologist was available in person or on call 24-hours per day.  EEG Placement and Labeling of Electrodes: The EEG was performed utilizing 20 channel referential EEG connections (coronal over temporal over parasagittal montage) using all standard 10-20 electrode placements with EKG, with additional electrodes placed in the inferior temporal region using the modified 10-10 montage electrode placements for elective admissions, or if deemed necessary. Recording was at a sampling rate of 256 samples per second per channel. Time synchronized digital video recording was done simultaneously with EEG recording. A low light infrared camera was used for low light recording.   History:  VEEG performed at bedside COR : PT sedated No hv due to covid protocol Photic performed 70 y/o Female PMH sepsis, covid-19, carpal tunnel syndrome right wrist, hypothyroid, obesity, HTN, type 2 diabetes Pt presented with AMS    Interpretation:  [Abbreviation Key:  PDR=alpha rhythm/posterior dominant rhythm. A-P=anterior posterior gradient.  Amplitude: ‘very low’:<20; ‘low’:20-50; ‘medium’:; ‘high’:>200uV.  Persistence for periodic/rhythmic patterns (% of epoch) ‘rare’:<1%; ‘occasional’:1-10%; ‘frequent’:10-50%; ‘abundant’:50-90%; ‘continuous’:>90%.  Persistence for sporadic discharges: ‘rare’:<1/hr; ‘occasional’:1/min-1/hr; ‘frequent’:>1/min; ‘abundant’:>1/10 sec.  GRDA=generalized rhythmic delta activity, LRDA=lateralized rhythmic delta activity, TIRDA=temporal intermittent rhythmic delta activity, FIRDA=frontal intermittent rhythmic activity. LPD=PLED=lateralized periodic discharges, GPD=generalized periodic discharges, BiPDs=BiPLEDs=bilateral independent periodic epileptiform discharges, SIRPID=stimulus induced rhythmic, periodic, or ictal appearing discharges.  Modifiers: +F=with fast component, +S=with spike component, +R=with rhythmic component.  S-B=burst suppression pattern.  PFA: paroxysmal fast activity. Max=maximal. N1-drowsy, N2-stage II sleep, N3-slow wave sleep.  HV=hyperventilation, PS=photic stimulation]   Daily EEG Visual Analysis  FINDINGS: The background was mostly continuous, spontaneously variable and reactive, with intermittent periods of discontinuity. No PDR was seen.   Background Slowing: Generalized slowing: diffuse theta/delta slowing Focal Slowing: None was present.  Sleep Background: Stage II sleep transients were not recorded.  Other Non-Epileptiform Findings: None were present.  Interictal Epileptiform Activity:  Intermittent fluctuating generalized periodic discharges with triphasic morphology, occurring up to 0.5 hz  Events: Clinical events: None recorded. Seizures: None recorded.  Activation Procedures:  Hyperventilation was not performed.   Photic stimulation was not performed.  Artifacts: Intermittent myogenic and movement artifacts were noted.  ECG: The heart rate on single channel ECG was predominantly between 80-90 BPM.  EEG Summary / Classification:  Abnormal EEG in the sedated patient. - Intermittent fluctuating generalized periodic discharges with triphasic morphology - Moderate to severe generalized slowing  EEG Impression / Clinical Correlate:  The findings of generalized periodic discharges with triphasic morphology are typically seen in metabolic encephalopathy, but may increase the risk for seizures in certain clinical situations. Clinical correlation is advised.  Moderate to severe multifocal/diffuse nonspecific cerebral dysfunction, becoming severe after ~0630.  No seizure seen.  Christos Fernandes MD EEG/Epilepsy Attending   **In absence of additional clinical concerns, recommend consideration for discontinuation of current EEG study with reconnection in future if warranted.  Reading Room: [phone] On Call Service After Hours: [phone]

## 2022-01-24 NOTE — PROGRESS NOTE ADULT - ATTENDING COMMENTS
71F Hx afib on apixaban, cad s/p stents, HFrEF, CVA with left sided residual weakness who presents from nursing home p/w AMS. Pt w/ hypernatremic, septic shock and covid +.    Neuro - SZ in ER likely metabolic in nature. appreciate Neuro reccs. EEG w.o active sz -will d/c. s/p Lumbar puncture negative for infection and fluid minimal inflammation. wean sedation for vacation as tolerated to assess mental status  Resp - Intubated. Minimal vent requirements. Lung protective ventilation strategy.   Cardiovascular -  off pressors but maintain map >65. hep gtt for chronic AF  GI - Advance diet    - ANIKA on CKD,  closely monitor Na   Hem/Onc - start SQH TID  ID -  fluid cx negative will stop meningitis coverage. cont empiric coverage for aspiration pna. cont dexamethasone   Extremities - f/u pending arterial and venous dopplers of UE  Skin cont wound care, f/u wound care/nursing reccs

## 2022-01-24 NOTE — ADVANCED PRACTICE NURSE CONSULT - RECOMMEDATIONS
Will recommend the followin. Sacrum, b/l buttocks: continue to monitor, routine pericare with Kobe  2. LEft foot: apply Cavilon daily, continue with boots  3. Continue with T&P  4. nutrition support as pt condition allows  Tx plan discussed with RN

## 2022-01-24 NOTE — CONSULT NOTE ADULT - SUBJECTIVE AND OBJECTIVE BOX
HPI:     71 yr old female with PMHx, Afib on apixaban, HFrEF (50% 8/21/21),CAD s/p stents, NSTEMI (2020), arthritis, wheelchair bound, hypothyroidism, s/p left ureteral stent removal (8/2021) with residual non obstructive Rt renal calculi, E.faecalis UTI (8/2021) CVA with left sided residual weakness (8/2021), sacral decubitus, pressure heel ulcers s/p COVID-19 (2020), baseline pt is AOx2 assist with ADL.     Pt presented to [facility] from [facility] when found to have AMS, fever with Tmax of 104. Pt in E.D. found to be obtunded, mottled, hypotensive SBP 80's refractory to 2.5 liters IVF requiring norepi gtt, fever with Temp of 104.5. In addition found to have hypernatremia with Na+ of 161, ANIKA with sCr 4.74 (baseline 0.77-1.22), AGMA of 25, with contraction alkalosis with serum HCO3 of 23, vph 7.50, vPCO2 of 35, lactate of 3.2, procalcitonin of 0.82, hsTrop of 239, Hgb of 14.9 (baseline from 8/2021 of 11). Pt with 9 liters total body water deficit.       Pt received CT C/A/P/H (1/21/22) re demonstration of  Non obstructing right renal calculus, significant stool burden distending the rectum and sigmoid colon with mild perirectal fat stranding suggestive of fecal impaction and possible stercoral proctitis, No acute transcortical infarct, intracranial hemorrhage, however did show Chronic lacunar infarcts with chronic small vessel disease. Started on cefepime, vanco, tylenol.      Consult called and pt admitted MICU for AMS secondary to metabolic encephalopathy, septic shock of unknown origin, possible aspiration pneum   (21 Jan 2022 13:03)    PERTINENT PM/SXH:   DM (diabetes mellitus)    HTN (hypertension)    Obesity    Essential hypertension    Hypothyroid    Type 2 diabetes mellitus without complication    Obesity (BMI 30-39.9)    Carpal tunnel syndrome of right wrist    Essential hypertension    Lymphedema    2019 novel coronavirus disease (COVID-19)    Sepsis      S/P carpal tunnel release    No significant past surgical history    S/P cystoscopy      FAMILY HISTORY:  Family history of lung cancer    Family history of myocardial infarction (Sibling)      ITEMS NOT CHECKED ARE NOT PRESENT    SOCIAL HISTORY:   Significant other/partner[ ]  Children[ ]  Mosque/Spirituality:  Substance hx:  [ ]   Tobacco hx:  [ ]   Alcohol hx: [ ]   Home Opioid hx:  [ ] I-Stop Reference No:  Living Situation: [ ]Home  [ ]Long term care  [ ]Rehab [ ]Other    ADVANCE DIRECTIVES:    DNR  MOLST  [ ]  Living Will  [ ]   DECISION MAKER(s):  [ ] Health Care Proxy(s)  [ ] Surrogate(s)  [ ] Guardian           Name(s): Phone Number(s):    BASELINE (I)ADL(s) (prior to admission):  [place]: [ ]Total  [ ] Moderate [ ]Dependent    Allergies    penicillin (Hives)  penicillin (Rash)  sulfa drugs (Unknown)  Tetracycline Hydrochloride (Unknown)    Intolerances    MEDICATIONS  (STANDING):  acyclovir IVPB 650 milliGRAM(s) IV Intermittent every 24 hours  aspirin  chewable 81 milliGRAM(s) Oral daily  atorvastatin 80 milliGRAM(s) Oral at bedtime  chlorhexidine 0.12% Liquid 15 milliLiter(s) Oral Mucosa every 12 hours  chlorhexidine 4% Liquid 1 Application(s) Topical <User Schedule>  dexAMETHasone  Injectable 6 milliGRAM(s) IV Push daily  dexMEDEtomidine Infusion 0.05 MICROgram(s)/kG/Hr (0.82 mL/Hr) IV Continuous <Continuous>  fentaNYL    Injectable 25 MICROGram(s) IV Push once  heparin  Infusion.  Unit(s)/Hr (12 mL/Hr) IV Continuous <Continuous>  insulin lispro (ADMELOG) corrective regimen sliding scale   SubCutaneous every 6 hours  lactated ringers. 1000 milliLiter(s) (50 mL/Hr) IV Continuous <Continuous>  levothyroxine Injectable 56 MICROGram(s) IV Push at bedtime  meropenem  IVPB 1000 milliGRAM(s) IV Intermittent every 12 hours  multivitamin/minerals/iron Oral Solution (CENTRUM) 15 milliLiter(s) Oral daily  norepinephrine Infusion 0.05 MICROgram(s)/kG/Min (6.16 mL/Hr) IV Continuous <Continuous>  pantoprazole   Suspension 40 milliGRAM(s) Enteral Tube daily  polyethylene glycol 3350 17 Gram(s) Oral daily  potassium chloride   Solution 40 milliEquivalent(s) Oral every 4 hours  propofol Infusion 20 MICROgram(s)/kG/Min (7.88 mL/Hr) IV Continuous <Continuous>  senna Syrup 10 milliLiter(s) Oral at bedtime    MEDICATIONS  (PRN):  acetaminophen    Suspension .. 650 milliGRAM(s) Oral every 6 hours PRN Temp greater or equal to 38C (100.4F)  heparin   Injectable 5500 Unit(s) IV Push every 6 hours PRN For aPTT less than 40  midazolam Injectable 2 milliGRAM(s) IV Push every 6 hours PRN agitation    PRESENT SYMPTOMS: [ ]  Due to Covid 19 infection data obtained from nursing assessment.  Source if other than patient:  [ ]Family   [ ]Team     Pain: [ ]yes [ ]no  QOL impact -   Location -                    Aggravating factors -  Quality -  Radiation -  Timing-  Severity (0-10 scale):  Minimal acceptable level (0-10 scale):     CPOT:    https://www.[facility].org/getattachment/[email or web address]-7d0o-[email or web address]/Critical-Care-Pain-Observation-Tool-(CPOT)      PAIN AD Score:     http://geriatrictoolkit.[facility]/cog/painad.pdf (press ctrl +  left click to view)    Dyspnea:                           [ ]Mild [ ]Moderate [ ]Severe  Anxiety:                             [ ]Mild [ ]Moderate [ ]Severe  Fatigue:                             [ ]Mild [ ]Moderate [ ]Severe  Nausea:                             [ ]Mild [ ]Moderate [ ]Severe  Loss of appetite:              [ ]Mild [ ]Moderate [ ]Severe  Constipation:                    [ ]Mild [ ]Moderate [ ]Severe    Other Symptoms:  [ ]All other review of systems negative     Palliative Performance Status Version 2:         %    http://npcrc.org/files/news/palliative_performance_scale_ppsv2.pdf  PHYSICAL EXAM: DUE TO COVID-19 INFECTION  physical exam findings from the clinician caring for this patient directly are used.   Vital Signs Last 24 Hrs  T(C): 36.7 (24 Jan 2022 00:00), Max: 36.7 (24 Jan 2022 00:00)  T(F): 98.1 (24 Jan 2022 00:00), Max: 98.1 (24 Jan 2022 00:00)  HR: 81 (24 Jan 2022 09:31) (48 - 84)  BP: 147/67 (24 Jan 2022 09:00) (138/64 - 147/67)  BP(mean): 96 (24 Jan 2022 09:00) (90 - 96)  RR: 35 (24 Jan 2022 09:00) (16 - 50)  SpO2: 100% (24 Jan 2022 09:31) (99% - 100%) I&O's Summary    23 Jan 2022 07:01  -  24 Jan 2022 07:00  --------------------------------------------------------  IN: 2237.2 mL / OUT: 1550 mL / NET: 687.2 mL    24 Jan 2022 07:01  -  24 Jan 2022 10:15  --------------------------------------------------------  IN: 75.3 mL / OUT: 95 mL / NET: -19.7 mL      GENERAL:  [ ]Alert  [ ]Oriented x   [ ]Lethargic  [ ]Cachexia  [ ]Unarousable  [ ]Verbal  [ ]Non-Verbal  Behavioral:   [ ] Anxiety  [ ] Delirium [ ] Agitation [ ] Other  HEENT:  [ ]Normal   [ ]Dry mouth   [ ]ET Tube/Trach  [ ]Oral lesions  PULMONARY:   [ ]Clear [ ]Tachypnea  [ ]Audible excessive secretions   [ ]Rhonchi        [ ]Right [ ]Left [ ]Bilateral  [ ]Crackles        [ ]Right [ ]Left [ ]Bilateral  [ ]Wheezing     [ ]Right [ ]Left [ ]Bilateral  [ ]Diminished breath sounds [ ]right [ ]left [ ]bilateral  CARDIOVASCULAR:    [ ]Regular [ ]Irregular [ ]Tachy  [ ]Amaury [ ]Murmur [ ]Other  GASTROINTESTINAL:  [ ]Soft  [ ]Distended   [ ]+BS  [ ]Non tender [ ]Tender  [ ]PEG [ ]OGT/ NGT  Last BM:   GENITOURINARY:  [ ]Normal [ ] Incontinent   [ ]Oliguria/Anuria   [ ]Cruz  MUSCULOSKELETAL:   [ ]Normal   [ ]Weakness  [ ]Bed/Wheelchair bound [ ]Edema  NEUROLOGIC:   [ ]No focal deficits  [ ]Cognitive impairment  [ ]Dysphagia [ ]Dysarthria [ ]Paresis [ ]Other   SKIN:   [ ]Normal    [ ]Rash  [ ]Pressure ulcer(s)       Present on admission [ ]y [ ]n    CRITICAL CARE:  [ ] Shock Present  [ ]Septic [ ]Cardiogenic [ ]Neurologic [ ]Hypovolemic  [ ]  Vasopressors [ ]  Inotropes   [ ]Respiratory failure present [ ]Mechanical ventilation [ ]Non-invasive ventilatory support [ ]High flow  Mode: AC/ CMV (Assist Control/ Continuous Mandatory Ventilation), RR (machine): 16, TV (machine): 450, FiO2: 30, PEEP: 5, ITime: 1, MAP: 10, PIP: 20   [ ]Acute  [ ]Chronic [ ]Hypoxic  [ ]Hypercarbic [ ]Other  [ ]Other organ failure     LABS:                        10.4   9.76  )-----------( 251      ( 24 Jan 2022 01:20 )             32.9   01-24    147<H>  |  114<H>  |  74<H>  ----------------------------<  198<H>  3.4<L>   |  20<L>  |  1.45<H>    Ca    8.5      24 Jan 2022 01:20  Phos  2.9     01-24  Mg     2.2     01-24    TPro  6.0  /  Alb  2.6<L>  /  TBili  0.3  /  DBili  x   /  AST  13  /  ALT  11  /  AlkPhos  79  01-24  PT/INR - ( 24 Jan 2022 06:02 )   PT: 13.1 sec;   INR: 1.10 ratio         PTT - ( 24 Jan 2022 06:02 )  PTT:56.9 sec          Procalcitonin, Serum: 0.87 ng/mL (01-21-22 @ 08:57)      RADIOLOGY & ADDITIONAL STUDIES:    PROTEIN CALORIE MALNUTRITION PRESENT: [ ]mild [ ]moderate [ ]severe [ ]underweight [ ]morbid obesity  https://www.andeal.org/vault/6095/web/files/ONC/Table_Clinical%20Characteristics%20to%20Document%20Malnutrition-White%20JV%20et%20al%202012.pdf    Height (cm): 157.5 (01-21-22 @ 08:32), 157.5 (08-17-21 @ 12:03), 162 (08-16-21 @ 12:45)  Weight (kg): 65.7 (01-21-22 @ 08:32), 68 (08-17-21 @ 12:03), 74.4 (08-16-21 @ 12:45)  BMI (kg/m2): 26.5 (01-21-22 @ 08:32), 27.4 (08-17-21 @ 12:03), 28.3 (08-16-21 @ 12:45)    [ ]PPSV2 < or = to 30% [ ]significant weight loss  [ ]poor nutritional intake  [ ]anasarca      [ ]Artificial Nutrition      REFERRALS:   [ ]Chaplaincy  [ ]Hospice  [ ]Child Life  [ ]Social Work  [ ]Case management [ ]Holistic Therapy     Goals of Care Document:

## 2022-01-24 NOTE — ADVANCED PRACTICE NURSE CONSULT - REASON FOR CONSULT
Requested by staff to assess skin status of pt a/w  pressure injuries. PMH is noted::   71 yr old female with PMHx, Afib on apixaban, HFrEF (50% 8/21/21),CAD s/p stents, NSTEMI (2020), arthritis, wheelchair bound, hypothyroidism, s/p left ureteral stent removal (8/2021) with residual non obstructive Rt renal calculi, E.faecalis UTI (8/2021) CVA with left sided residual weakness (8/2021), sacral decubitus, pressure heel ulcers s/p COVID-19 (2020), baseline pt is AOx2 assist with ADL.     Pt presented to [facility] from [facility] when found to have AMS, fever with Tmax of 104. Pt in E.D. found to be obtunded, mottled, hypotensive SBP 80's refractory to 2.5 liters IVF requiring norepi gtt, fever with Temp of 104.5. In addition found to have hypernatremia with Na+ of 161, ANIKA with sCr 4.74 (baseline 0.77-1.22), AGMA of 25, with contraction alkalosis with serum HCO3 of 23, vph 7.50, vPCO2 of 35, lactate of 3.2, procalcitonin of 0.82, hsTrop of 239, Hgb of 14.9 (baseline from 8/2021 of 11). Pt with 9 liters total body water deficit.       Pt received CT C/A/P/H (1/21/22) re demonstration of  Non obstructing right renal calculus, significant stool burden distending the rectum and sigmoid colon with mild perirectal fat stranding suggestive of fecal impaction and possible stercoral proctitis, No acute transcortical infarct, intracranial hemorrhage, however did show Chronic lacunar infarcts with chronic small vessel disease. Started on cefepime, vanco, tylenol.      Consult called and pt admitted MICU for AMS secondary to metabolic encephalopathy, septic shock of unknown origin, possible aspiration pneum

## 2022-01-24 NOTE — ADVANCED PRACTICE NURSE CONSULT - ASSESSMENT
The pt was encountered in the 5ICU- Mrs Christensen is on Airborne Isolation as she is COvid + she is intubated, sedated , continuous EEG in progress. A Total Care SPort support surface is in use and staff are assisting her with T&P using the z-corona cushion, complete Cair boots are in place to off-load the heels.  AS the pt receives enteral feeds , she was seen by nutrition.  UPon assessment, she presents with a deep tissue injury to the sacrum and b/l buttocks measuring 6cmx 8cm x0cm. There is nonblanchable deep maroon discoloration of intact skin noted in this area with some intact skin noted between the purple areas.  Will recommend routine pericare with Kobe and to continue to monitor for changes as the wound may continue to evolve.  On the lateral aspect of the left foot is a clear blister measuring 1cmx 2cm x0cm. the blister was intact with no drainage. Will classify as a stage 2 pressure injury. It was difficult to palpate the pedal pulses due to edema. Will recommend the application of Cavilon daily

## 2022-01-24 NOTE — EEG REPORT - NS EEG TEXT BOX
REPORT OF CONTINUOUS VIDEO EEG   [facility]: [address], [place] [address], Ph#: [phone] [facility]: [id number] [place], [place] [address], [place] [address], Ph#: [phone] Office: [address] [place] [address], [place] [address] Ph#: [phone]  Patient Name: Charissa Christensen   Age: -, : - MRN #: MR # [id number], Alvarado: -- Referring Physician: - EEG #: [id number]  Study Date: 2022   Start Time: 0800   End Date:  22        End Time: 16:00:00  Study Duration: ~8H  Study Information:  EEG Recording Technique: The patient underwent continuous Video-EEG monitoring, using Telemetry System hardware on the XLTek Digital System. EEG and video data were stored on a computer hard drive with important events saved in digital archive files. The material was reviewed by a physician (electroencephalographer / epileptologist) on a daily basis. Sebastian and seizure detection algorithms were utilized and reviewed. An EEG Technician attended to the patient, and was available throughout daytime work hours.  The epilepsy center neurologist was available in person or on call 24-hours per day.  EEG Placement and Labeling of Electrodes: The EEG was performed utilizing 20 channel referential EEG connections (coronal over temporal over parasagittal montage) using all standard 10-20 electrode placements with EKG, with additional electrodes placed in the inferior temporal region using the modified 10-10 montage electrode placements for elective admissions, or if deemed necessary. Recording was at a sampling rate of 256 samples per second per channel. Time synchronized digital video recording was done simultaneously with EEG recording. A low light infrared camera was used for low light recording.   History:  VEEG performed at bedside COR : PT sedated No hv due to covid protocol Photic performed 72 y/o Female PMH sepsis, covid-19, carpal tunnel syndrome right wrist, hypothyroid, obesity, HTN, type 2 diabetes Pt presented with AMS    Interpretation:  [Abbreviation Key:  PDR=alpha rhythm/posterior dominant rhythm. A-P=anterior posterior gradient.  Amplitude: ‘very low’:<20; ‘low’:20-50; ‘medium’:; ‘high’:>200uV.  Persistence for periodic/rhythmic patterns (% of epoch) ‘rare’:<1%; ‘occasional’:1-10%; ‘frequent’:10-50%; ‘abundant’:50-90%; ‘continuous’:>90%.  Persistence for sporadic discharges: ‘rare’:<1/hr; ‘occasional’:1/min-1/hr; ‘frequent’:>1/min; ‘abundant’:>1/10 sec.  GRDA=generalized rhythmic delta activity, LRDA=lateralized rhythmic delta activity, TIRDA=temporal intermittent rhythmic delta activity, FIRDA=frontal intermittent rhythmic activity. LPD=PLED=lateralized periodic discharges, GPD=generalized periodic discharges, BiPDs=BiPLEDs=bilateral independent periodic epileptiform discharges, SIRPID=stimulus induced rhythmic, periodic, or ictal appearing discharges.  Modifiers: +F=with fast component, +S=with spike component, +R=with rhythmic component.  S-B=burst suppression pattern.  PFA: paroxysmal fast activity. Max=maximal. N1-drowsy, N2-stage II sleep, N3-slow wave sleep.  HV=hyperventilation, PS=photic stimulation]   Daily EEG Visual Analysis  FINDINGS: The background was mostly continuous, spontaneously variable and reactive, with intermittent periods of discontinuity. No PDR was seen.   Background Slowing: Generalized slowing: diffuse theta/delta slowing Focal Slowing: None was present.  Sleep Background: Stage II sleep transients were not recorded.  Other Non-Epileptiform Findings: None were present.  Interictal Epileptiform Activity:  Intermittent fluctuating generalized periodic discharges with triphasic morphology, occurring up to 0.5 hz  Events: Clinical events: None recorded. Seizures: None recorded.  Activation Procedures:  Hyperventilation was not performed.   Photic stimulation was not performed.  Artifacts: Intermittent myogenic and movement artifacts were noted.  ECG: The heart rate on single channel ECG was predominantly between 80-90 BPM.  EEG Summary / Classification:  Abnormal EEG in the sedated patient. - Intermittent fluctuating generalized periodic discharges with triphasic morphology - Moderate to severe generalized slowing  EEG Impression / Clinical Correlate:  The findings of generalized periodic discharges with triphasic morphology are typically seen in metabolic encephalopathy, but may increase the risk for seizures in certain clinical situations. Clinical correlation is advised.  Moderate to severe multifocal/diffuse nonspecific cerebral dysfunction, becoming severe after ~0630.  No seizure seen.  Christos Fernandes MD EEG/Epilepsy Attending  Reading Room: [phone] On Call Service After Hours: [phone]

## 2022-01-24 NOTE — PHARMACOTHERAPY INTERVENTION NOTE - COMMENTS
JHONATAN Christensen is a 71F Hx afib on apixaban, cad s/p stents, HFrEF, CVA with left sided residual weakness who presents from nursing home with AMS, fever. Found to be hypernatremic, hypotensive refractory to IVF requiring vasopressor, AMS secondary to metabolic encephalopathy s/p intubation, septic shock of unknown origin, possible aspiration pna.  Has tolerated cephalosporins in past despite her penicillin allergy.     Now on meropenem, vancomycin, and remdesivir.  Spoke to Dr. Moe - will probably be able to narrow antimicrobials tomorrow since CSF and BCx so far negative.       Thank you,  Paulina Hickey, PharmD, [facility]  Clinical Pharmacist, Infectious Diseases  Cell: [phone]/Pager: [phone]

## 2022-01-24 NOTE — CHART NOTE - NSCHARTNOTEFT_GEN_A_CORE
L axillary flaca no longer working.  unable  to flush fwd or aspirate. no longer with working waveform.   dc'd with hemostasis obtained. site dressed w/ gauze and tegaderm. dressing c/d/i

## 2022-01-24 NOTE — PROGRESS NOTE ADULT - ASSESSMENT
#Neuro:  - sedation with propofol 50, versed 2mg q6h  - noted to be stephanie/HTN, now resolved  - 1/23 pm CTH; prelim stable from prior, limited due to artifact  - CTH 1/21/22 without acute changes, re demonstration of lacuna infarcts  - Hx CVA with residual left sided weak/flaccid  - presents with AMS secondary to metabolic encephalopathy  - - Neuro checks q 2 hrs and prn for changes  - s/p LP, glucose 86, protein 48    #Pulm:  - intubated   - Vent Mode: AC RR : 16,  TV : 450 , FiO2: 30 , PEEP: 5  ( 24 Jan 2022 00:43 ) pH: 7.44  /  pCO2: 33    /  pO2: 136   / HCO3: 22    / Base Excess: -1.2  /  SaO2: 97.8    ( 23 Jan 2022 02:18 ) pH: 7.45  /  pCO2: 31    /  pO2: 162   / HCO3: 22    / Base Excess: -1.8  /  SaO2: 97.5    -Bronchodilators q 6 hrs prn for sob/wheezes  -HOB >/= 30 degree angle  -Chest PT q2 hrs    #CV:  - stephanie to 50, HTN to 190s    ---  Levophed _____  - hydralazine 5mg PRN for hypertension  - Hx Afib on apixaban (home med held)- currently on Heparin gtt , 1/24 6am: PTT:56.9 sec  - CAD s/p stents  - ASA 81 mg qd  - Hx NSTEMI 8/2021, HFrEF  - home med fo cozaar 25 mg , cardizem 60 mg  will hold at present        #GI/:  -CT A/P demonstrated fecal impaction, stercoral proctitis  -NGT, aguilar  -strict I & O's - keep even  - Bowel regiment - miralax senna      #ID:  -meropenam, acyclovir to empirically cover for meningitis  - s/p LP, glucose 86, protein 48      #FEN/ENDO/HEME:  - hypernatremia - down to 147, holding free water  - contraction alkalosis  - trend lactate  - dexAMETHasone    - ISS  - levothyroxine    #Neuro:  - sedation with propofol 50, versed 2mg q6h  - noted to be stephanie/HTN, now resolved  - 1/23 pm CTH; prelim stable from prior, limited due to artifact  - CTH 1/21/22 without acute changes, re demonstration of lacuna infarcts  - Hx CVA with residual left sided weak/flaccid  - presents with AMS secondary to metabolic encephalopathy  - Neuro checks q 2 hrs and prn for changes  - s/p LP, glucose 86, protein 48  - 1/24 VEEG neg for seizures, morphology are typically seen in metabolic encephalopathy, but may increase the risk for seizures in certain clinical situations. Clinical correlation is advised.       #Pulm:  - intubated   - Vent Mode: AC RR : 16,  TV : 450 , FiO2: 30 , PEEP: 5  ( 24 Jan 2022 00:43 ) pH: 7.44  /  pCO2: 33    /  pO2: 136   / HCO3: 22    / Base Excess: -1.2  /  SaO2: 97.8    ( 23 Jan 2022 02:18 ) pH: 7.45  /  pCO2: 31    /  pO2: 162   / HCO3: 22    / Base Excess: -1.8  /  SaO2: 97.5    -Bronchodilators q 6 hrs prn for sob/wheezes  -HOB >/= 30 degree angle  -Chest PT q2 hrs    #CV:  - hydralazine 5mg PRN for hypertension  - Hx Afib on apixaban (home med held)  - currently on Heparin gtt , 1/24 6am: PTT:56.9 sec  - CAD s/p stents  - ASA 81 mg qd  - Hx NSTEMI 8/2021, HFrEF  - home med fo cozaar 25 mg , cardizem 60 mg  will hold at present        #GI/:  - CT A/P demonstrated fecal impaction, stercoral proctitis  - NGT, aguilar  - strict I & O's - keep even  - Bowel regiment - miralax senna      #Endo:  - decadron   - ISS  - levothyroxine       #Renal:  - IVF - LR  @ 50  - hypernatremia - down to 147, holding free water  - contraction alkalosis  - trend lactate      #Heme/onc:  - 1/23 LED neg  - mechanical dvt pxx w/ SCD's bilateral       #ID:  - meropenem, acyclovir to empirically cover for meningitis  - s/p LP, glucose 86, protein 48      GOC - will address DNR/DNI and goc status with family.           #Neuro:  - sedation with propofol 50, versed 2mg q6h  - noted to be stephanie/HTN, now resolved  - 1/23 pm CTH; prelim stable from prior, limited due to artifact  - CTH 1/21/22 without acute changes, re demonstration of lacuna infarcts  - Hx CVA with residual left sided weak/flaccid  - presents with AMS secondary to metabolic encephalopathy  - Neuro checks q 2 hrs and prn for changes  - s/p LP, glucose 86, protein 48  - 1/24 VEEG neg for seizures, morphology are typically seen in metabolic encephalopathy, but may increase the risk for seizures in certain clinical situations. Clinical correlation is advised.       #Pulm:  - intubated   - Vent Mode: AC RR : 16,  TV : 450 , FiO2: 30 , PEEP: 5  ( 24 Jan 2022 00:43 ) pH: 7.44  /  pCO2: 33    /  pO2: 136   / HCO3: 22    / Base Excess: -1.2  /  SaO2: 97.8    ( 23 Jan 2022 02:18 ) pH: 7.45  /  pCO2: 31    /  pO2: 162   / HCO3: 22    / Base Excess: -1.8  /  SaO2: 97.5    -Bronchodilators q 6 hrs prn for sob/wheezes  -HOB >/= 30 degree angle  -Chest PT q2 hrs    #CV:  - hydralazine 5mg PRN for hypertension  - Hx Afib on apixaban (home med held)  - currently on Heparin gtt , 1/24 6am: PTT:56.9 sec  - CAD s/p stents  - ASA 81 mg qd  - Hx NSTEMI 8/2021, HFrEF  - home med fo cozaar 25 mg , cardizem 60 mg  will hold at present        #GI/:  - CT A/P demonstrated fecal impaction, stercoral proctitis  - NGT, aguilar  - strict I & O's - keep even  - Bowel regiment - miralax senna      #Endo:  - decadron   - ISS  - levothyroxine       #Renal:  - IVF - LR  @ 50  - hypernatremia - down to 147, holding free water  - contraction alkalosis  - trend lactate  - 1/24 cr improving 1.45 (prior 1.62)      #Heme/onc:  - 1/23 LED neg  - mechanical dvt pxx w/ SCD's bilateral       #ID:  - meropenem, acyclovir to empirically cover for meningitis  - s/p LP, glucose 86, protein 48      GOC - will address DNR/DNI and goc status with family.           ASSESSMENT: 71F Hx afib on apixaban, cad s/p stents, HFrEF, CVA with left sided residual weakness who presents from nursing home with AMS, fever. Found to be hypernatremic, hypotensive refractory to IVF requiring vasopressor, AMS secondary to metabolic encephalopathy s/p intubation, septic shock of unknown origin, possible aspiration pna.      #Neuro:  - sedation with propofol 50, versed 2mg q6h  - 1/24 goal to wean off propofol to obtain a neuro exam and transition to fentanyl gtt w/ versed 2mgq6 prn's for vent synchrony/agitation  - 1/24 VEEG dc'd today;  24hrs read neg for seizures, ?metabolic encephalopathy    - 1/23 noted to be stephanie/HTN, concern for [place], Resolved. CTH obtained (1/23); prelim stable from prior, limited due to artifact  - 1/21 CTH without acute changes, re demonstration of lacuna infarcts  -  Hx CVA with residual left sided weak/flaccid  -  presents with AMS secondary to metabolic encephalopathy  -  Neuro checks q 2 hrs and prn for changes  -  s/p LP, glucose 86, protein 48        #Pulm:  - intubated   - Vent Mode: AC RR : 16,  TV : 450 , FiO2: 30 , PEEP: 5  1/24 pH: 7.44  /  pCO2: 33    /  pO2: 136   / HCO3: 22    / Base Excess: -1.2  /  SaO2: 97.8    -Bronchodilators q 6 hrs prn for sob/wheezes  -HOB >/= 30 degree angle  -Chest PT q2 hrs      #CV:  - Left Axillary a line discontinued , no longer working w/ troubleshooting  - hydralazine 5mg PRN for hypertension  - Hx Afib on apixaban (home med held)  - currently on Heparin gtt , 1/24 6am: PTT:56.9 sec  - CAD s/p stents  - ASA 81 mg qd  - Hx NSTEMI 8/2021, HFrEF  - home med fo cozaar 25 mg , cardizem 60 mg  will hold at present        #GI/:  - CT A/P demonstrated fecal impaction, stercoral proctitis  - NGT, aguilar  - strict I & O's - keep even  - Pending BM, on  miralax senna, will consider SMOG enema      #Endo:  - decadron   - ISS  - levothyroxine       #Renal:  - hypernatremia - down to 147, holding free water  - contraction alkalosis  - 1/24 cr improving 1.45 (prior 1.62)      #Heme/onc:  - 1/24 LUE swelling, flaca d/c'd no longer functional.  L arm with 2 patent PIV's. Antecubital IV noted to be erythematous and RN to remove.   - 1/24 LUE art/nilesh doppler pending   - 1/23 LED neg  - 1/23 RLE with significant edema and ecchymosis compared to contralateral side (arterial and venous LED's both negative).   - mechanical dvt pxx w/ SCD's bilateral      #ID:  - continue meropenem empirically cover for meningitis  - 1/24 acyclovir CSF PCR negative   - s/p LP, glucose 86, protein 48         GOC - will address DNR/DNI and goc status with family.           ASSESSMENT: 71F Hx afib on apixaban, cad s/p stents, HFrEF, CVA with left sided residual weakness who presents from nursing home with AMS, fever. Found to be hypernatremic, hypotensive refractory to IVF requiring vasopressor, AMS secondary to metabolic encephalopathy s/p intubation, septic shock of unknown origin, possible aspiration pna.      #Neuro:  - sedation with propofol 50, versed 2mg q6h  - 1/24 goal to wean off propofol to obtain a neuro exam and transition to fentanyl gtt w/ versed 2mgq6 prn's for vent synchrony/agitation  - 1/24 VEEG dc'd today;  24hrs read neg for seizures, ?metabolic encephalopathy    - 1/23 noted to be stephanie/HTN, concern for C[place]ings, Resolved. CTH obtained (1/23); prelim stable from prior, limited due to artifact  - 1/21 CTH without acute changes, re demonstration of lacuna infarcts  -  Hx CVA with residual left sided weak/flaccid  -  presents with AMS secondary to metabolic encephalopathy  -  Neuro checks q 2 hrs and prn for changes  -  s/p LP, glucose 86, protein 48        #Pulm:  - intubated   - Vent Mode: AC RR : 16,  TV : 450 , FiO2: 30 , PEEP: 5  1/24 pH: 7.44  /  pCO2: 33    /  pO2: 136   / HCO3: 22    / Base Excess: -1.2  /  SaO2: 97.8    -Bronchodilators q 6 hrs prn for sob/wheezes  -HOB >/= 30 degree angle  -Chest PT q2 hrs      #CV:  - Left Axillary a line discontinued , no longer working w/ troubleshooting  - hydralazine 5mg PRN for hypertension  - Hx Afib on apixaban (home med held)  - currently on Heparin gtt , 1/24 6am: PTT:56.9 sec  - CAD s/p stents  - ASA 81 mg qd  - Hx NSTEMI 8/2021, HFrEF  - home med fo cozaar 25 mg , cardizem 60 mg  will hold at present        #GI/:  - CT A/P demonstrated fecal impaction, stercoral proctitis  - NGT, aguilar  - strict I & O's - keep even  - Pending BM, on  miralax senna, will consider SMOG enema      #Endo:  - decadron   - ISS  - levothyroxine       #Renal:  - hypernatremia - down to 147, free water decr to 200BID  - contraction alkalosis  - 1/24 cr improving 1.45 (prior 1.62)      #Heme/onc:  - 1/24 LUE swelling, flaca d/c'd no longer functional.  L arm with 2 patent PIV's. Antecubital IV noted to be erythematous and RN to remove.   - 1/24 LUE art/nilesh doppler pending   - 1/23 LED neg  - 1/23 RLE with significant edema and ecchymosis compared to contralateral side (arterial and venous LED's both negative).   - mechanical dvt pxx w/ SCD's bilateral      #ID:  - continue meropenem empirically cover for meningitis  - 1/24 acyclovir CSF PCR negative   - s/p LP, glucose 86, protein 48         GOC - will address DNR/DNI and goc status with family.

## 2022-01-25 LAB
ALBUMIN SERPL ELPH-MCNC: 2.6 G/DL — LOW (ref 3.3–5)
ALBUMIN SERPL ELPH-MCNC: 2.7 G/DL — LOW (ref 3.3–5)
ALP SERPL-CCNC: 76 U/L — SIGNIFICANT CHANGE UP (ref 40–120)
ALP SERPL-CCNC: 78 U/L — SIGNIFICANT CHANGE UP (ref 40–120)
ALT FLD-CCNC: 20 U/L — SIGNIFICANT CHANGE UP (ref 10–45)
ALT FLD-CCNC: 22 U/L — SIGNIFICANT CHANGE UP (ref 10–45)
ANION GAP SERPL CALC-SCNC: 11 MMOL/L — SIGNIFICANT CHANGE UP (ref 5–17)
ANION GAP SERPL CALC-SCNC: 11 MMOL/L — SIGNIFICANT CHANGE UP (ref 5–17)
APTT BLD: 45.8 SEC — HIGH (ref 27.5–35.5)
APTT BLD: 55 SEC — HIGH (ref 27.5–35.5)
AST SERPL-CCNC: 21 U/L — SIGNIFICANT CHANGE UP (ref 10–40)
AST SERPL-CCNC: 23 U/L — SIGNIFICANT CHANGE UP (ref 10–40)
BILIRUB SERPL-MCNC: 0.2 MG/DL — SIGNIFICANT CHANGE UP (ref 0.2–1.2)
BILIRUB SERPL-MCNC: 0.2 MG/DL — SIGNIFICANT CHANGE UP (ref 0.2–1.2)
BUN SERPL-MCNC: 57 MG/DL — HIGH (ref 7–23)
BUN SERPL-MCNC: 62 MG/DL — HIGH (ref 7–23)
CALCIUM SERPL-MCNC: 8.3 MG/DL — LOW (ref 8.4–10.5)
CALCIUM SERPL-MCNC: 8.4 MG/DL — SIGNIFICANT CHANGE UP (ref 8.4–10.5)
CHLORIDE SERPL-SCNC: 116 MMOL/L — HIGH (ref 96–108)
CHLORIDE SERPL-SCNC: 116 MMOL/L — HIGH (ref 96–108)
CO2 SERPL-SCNC: 20 MMOL/L — LOW (ref 22–31)
CO2 SERPL-SCNC: 20 MMOL/L — LOW (ref 22–31)
CREAT SERPL-MCNC: 0.95 MG/DL — SIGNIFICANT CHANGE UP (ref 0.5–1.3)
CREAT SERPL-MCNC: 1.12 MG/DL — SIGNIFICANT CHANGE UP (ref 0.5–1.3)
GAS PNL BLDA: SIGNIFICANT CHANGE UP
GAS PNL BLDA: SIGNIFICANT CHANGE UP
GLUCOSE BLDC GLUCOMTR-MCNC: 138 MG/DL — HIGH (ref 70–99)
GLUCOSE BLDC GLUCOMTR-MCNC: 142 MG/DL — HIGH (ref 70–99)
GLUCOSE BLDC GLUCOMTR-MCNC: 181 MG/DL — HIGH (ref 70–99)
GLUCOSE BLDC GLUCOMTR-MCNC: 184 MG/DL — HIGH (ref 70–99)
GLUCOSE SERPL-MCNC: 147 MG/DL — HIGH (ref 70–99)
GLUCOSE SERPL-MCNC: 210 MG/DL — HIGH (ref 70–99)
HCT VFR BLD CALC: 31.4 % — LOW (ref 34.5–45)
HGB BLD-MCNC: 9.7 G/DL — LOW (ref 11.5–15.5)
INR BLD: 1.12 RATIO — SIGNIFICANT CHANGE UP (ref 0.88–1.16)
MAGNESIUM SERPL-MCNC: 2.2 MG/DL — SIGNIFICANT CHANGE UP (ref 1.6–2.6)
MAGNESIUM SERPL-MCNC: 2.3 MG/DL — SIGNIFICANT CHANGE UP (ref 1.6–2.6)
MCHC RBC-ENTMCNC: 27.7 PG — SIGNIFICANT CHANGE UP (ref 27–34)
MCHC RBC-ENTMCNC: 30.9 GM/DL — LOW (ref 32–36)
MCV RBC AUTO: 89.7 FL — SIGNIFICANT CHANGE UP (ref 80–100)
NRBC # BLD: 0 /100 WBCS — SIGNIFICANT CHANGE UP (ref 0–0)
PHOSPHATE SERPL-MCNC: 2.6 MG/DL — SIGNIFICANT CHANGE UP (ref 2.5–4.5)
PHOSPHATE SERPL-MCNC: 2.9 MG/DL — SIGNIFICANT CHANGE UP (ref 2.5–4.5)
PLATELET # BLD AUTO: 264 K/UL — SIGNIFICANT CHANGE UP (ref 150–400)
POTASSIUM SERPL-MCNC: 3.9 MMOL/L — SIGNIFICANT CHANGE UP (ref 3.5–5.3)
POTASSIUM SERPL-MCNC: 4.6 MMOL/L — SIGNIFICANT CHANGE UP (ref 3.5–5.3)
POTASSIUM SERPL-SCNC: 3.9 MMOL/L — SIGNIFICANT CHANGE UP (ref 3.5–5.3)
POTASSIUM SERPL-SCNC: 4.6 MMOL/L — SIGNIFICANT CHANGE UP (ref 3.5–5.3)
PROT SERPL-MCNC: 5.5 G/DL — LOW (ref 6–8.3)
PROT SERPL-MCNC: 6 G/DL — SIGNIFICANT CHANGE UP (ref 6–8.3)
PROTHROM AB SERPL-ACNC: 13.4 SEC — SIGNIFICANT CHANGE UP (ref 10.6–13.6)
RBC # BLD: 3.5 M/UL — LOW (ref 3.8–5.2)
RBC # FLD: 16 % — HIGH (ref 10.3–14.5)
SODIUM SERPL-SCNC: 147 MMOL/L — HIGH (ref 135–145)
SODIUM SERPL-SCNC: 147 MMOL/L — HIGH (ref 135–145)
T4 SERPL-MCNC: 6.4 UG/DL — SIGNIFICANT CHANGE UP
VANCOMYCIN TROUGH SERPL-MCNC: 14.6 UG/ML — SIGNIFICANT CHANGE UP (ref 10–20)
WBC # BLD: 8.91 K/UL — SIGNIFICANT CHANGE UP (ref 3.8–10.5)
WBC # FLD AUTO: 8.91 K/UL — SIGNIFICANT CHANGE UP (ref 3.8–10.5)

## 2022-01-25 PROCEDURE — 99223 1ST HOSP IP/OBS HIGH 75: CPT

## 2022-01-25 PROCEDURE — 99291 CRITICAL CARE FIRST HOUR: CPT

## 2022-01-25 PROCEDURE — 93931 UPPER EXTREMITY STUDY: CPT | Mod: 26

## 2022-01-25 PROCEDURE — 93971 EXTREMITY STUDY: CPT | Mod: 26,LT

## 2022-01-25 RX ORDER — HEPARIN SODIUM 5000 [USP'U]/ML
1200 INJECTION INTRAVENOUS; SUBCUTANEOUS
Qty: 25000 | Refills: 0 | Status: DISCONTINUED | OUTPATIENT
Start: 2022-01-25 | End: 2022-01-25

## 2022-01-25 RX ORDER — MIDODRINE HYDROCHLORIDE 2.5 MG/1
5 TABLET ORAL EVERY 8 HOURS
Refills: 0 | Status: DISCONTINUED | OUTPATIENT
Start: 2022-01-25 | End: 2022-01-26

## 2022-01-25 RX ORDER — MIDODRINE HYDROCHLORIDE 2.5 MG/1
10 TABLET ORAL EVERY 8 HOURS
Refills: 0 | Status: DISCONTINUED | OUTPATIENT
Start: 2022-01-25 | End: 2022-01-25

## 2022-01-25 RX ORDER — FENTANYL CITRATE 50 UG/ML
50 INJECTION INTRAVENOUS ONCE
Refills: 0 | Status: DISCONTINUED | OUTPATIENT
Start: 2022-01-25 | End: 2022-01-25

## 2022-01-25 RX ADMIN — Medication 15 MILLILITER(S): at 11:45

## 2022-01-25 RX ADMIN — Medication 56 MICROGRAM(S): at 21:41

## 2022-01-25 RX ADMIN — REMDESIVIR 500 MILLIGRAM(S): 5 INJECTION INTRAVENOUS at 18:26

## 2022-01-25 RX ADMIN — CHLORHEXIDINE GLUCONATE 15 MILLILITER(S): 213 SOLUTION TOPICAL at 06:11

## 2022-01-25 RX ADMIN — MIDODRINE HYDROCHLORIDE 10 MILLIGRAM(S): 2.5 TABLET ORAL at 22:31

## 2022-01-25 RX ADMIN — FENTANYL CITRATE 1.64 MICROGRAM(S)/KG/HR: 50 INJECTION INTRAVENOUS at 09:16

## 2022-01-25 RX ADMIN — Medication 1: at 11:49

## 2022-01-25 RX ADMIN — ATORVASTATIN CALCIUM 80 MILLIGRAM(S): 80 TABLET, FILM COATED ORAL at 21:41

## 2022-01-25 RX ADMIN — CHLORHEXIDINE GLUCONATE 15 MILLILITER(S): 213 SOLUTION TOPICAL at 17:01

## 2022-01-25 RX ADMIN — Medication 250 MILLIGRAM(S): at 17:00

## 2022-01-25 RX ADMIN — POLYETHYLENE GLYCOL 3350 17 GRAM(S): 17 POWDER, FOR SOLUTION ORAL at 06:11

## 2022-01-25 RX ADMIN — Medication 1: at 18:26

## 2022-01-25 RX ADMIN — Medication 81 MILLIGRAM(S): at 11:45

## 2022-01-25 RX ADMIN — Medication 6 MILLIGRAM(S): at 06:12

## 2022-01-25 RX ADMIN — HEPARIN SODIUM 13 UNIT(S)/HR: 5000 INJECTION INTRAVENOUS; SUBCUTANEOUS at 13:15

## 2022-01-25 RX ADMIN — MIDODRINE HYDROCHLORIDE 10 MILLIGRAM(S): 2.5 TABLET ORAL at 17:01

## 2022-01-25 RX ADMIN — FENTANYL CITRATE 50 MICROGRAM(S): 50 INJECTION INTRAVENOUS at 19:07

## 2022-01-25 RX ADMIN — CHLORHEXIDINE GLUCONATE 1 APPLICATION(S): 213 SOLUTION TOPICAL at 06:48

## 2022-01-25 RX ADMIN — SENNA PLUS 10 MILLILITER(S): 8.6 TABLET ORAL at 21:41

## 2022-01-25 RX ADMIN — MEROPENEM 100 MILLIGRAM(S): 1 INJECTION INTRAVENOUS at 06:48

## 2022-01-25 RX ADMIN — PANTOPRAZOLE SODIUM 40 MILLIGRAM(S): 20 TABLET, DELAYED RELEASE ORAL at 11:45

## 2022-01-25 RX ADMIN — MIDODRINE HYDROCHLORIDE 10 MILLIGRAM(S): 2.5 TABLET ORAL at 09:11

## 2022-01-25 NOTE — PROGRESS NOTE ADULT - SUBJECTIVE AND OBJECTIVE BOX
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 Pt presented to [facility] from [facility] when found to have AMS, fever with Tmax of 104. Pt in E.D. found to be obtunded, mottled, hypotensive SBP 80's refractory to 2.5 liters IVF requiring norepi gtt, fever with Temp of 104.5. In addition found to have hypernatremia with Na+ of 161, ANIKA with sCr 4.74 (baseline 0.77-1.22), AGMA of 25, with contraction alkalosis with serum HCO3 of 23, vph 7.50, vPCO2 of 35, lactate of 3.2, procalcitonin of 0.82, hsTrop of 239, Hgb of 14.9 (baseline from 8/2021 of 11). Pt with 9 liters total body water deficit. Pt received CT C/A/P/H (1/21/22) re demonstration of  Non obstructing right renal calculus, significant stool burden distending the rectum and sigmoid colon with mild perirectal fat stranding suggestive of fecal impaction and possible stercoral proctitis, No acute transcortical infarct, intracranial hemorrhage, however did show Chronic lacunar infarcts with chronic small vessel disease. Started on cefepime, vanco, tylenol.Consult called and pt admitted MICU for AMS secondary to metabolic encephalopathy, septic shock of unknown origin, possible aspiration pneum    Interval Events:  1/24 LUE swelling - during Am sign out, per RN, LUE Swelling, L axillary flaca no longer with blood draw.  Left Arm w/2 PIV's, per RN, with good flush fwd/aspiration and no extravasation to be noted.  LUE arterial and venous dopplers pending. able to palpate L radial pulse , LUE warm to touch.        REVIEW OF SYSTEMS:  - Unable to obtain, intubated/sedated       I&O's Detail    24 Jan 2022 07:01  -  25 Jan 2022 07:00  --------------------------------------------------------  IN:    Enteral Tube Flush: 400 mL    FentaNYL: 147 mL    Heparin Infusion: 288 mL    Nepro: 960 mL    Propofol: 280.7 mL  Total IN: 2075.7 mL    OUT:    Indwelling Catheter - Urethral (mL): 1160 mL  Total OUT: 1160 mL    Total NET: 915.7 mL      25 Jan 2022 07:01  -  25 Jan 2022 09:19  --------------------------------------------------------  IN:    FentaNYL: 9.8 mL    Heparin Infusion: 12 mL    Nepro: 40 mL  Total IN: 61.8 mL    OUT:    Indwelling Catheter - Urethral (mL): 50 mL  Total OUT: 50 mL    Total NET: 11.8 mL                Mode: AC/ CMV (Assist Control/ Continuous Mandatory Ventilation), RR (machine): 16, TV (machine): 450, FiO2: 30, PEEP: 5, ITime: 1, MAP: 10, PIP: 21    01-23 @ 07:01  -  01-24 @ 07:00  --------------------------------------------------------  IN: 2237.2 mL / OUT: 1415 mL / NET: 822.2 mL          CAPILLARY BLOOD GLUCOSE    POCT Blood Glucose.: 133 mg/dL (24 Jan 2022 06:23)    MEDICATIONS  (STANDING):  aspirin  chewable 81 milliGRAM(s) Oral daily  atorvastatin 80 milliGRAM(s) Oral at bedtime  chlorhexidine 0.12% Liquid 15 milliLiter(s) Oral Mucosa every 12 hours  chlorhexidine 4% Liquid 1 Application(s) Topical <User Schedule>  dexAMETHasone  Injectable 6 milliGRAM(s) IV Push daily  fentaNYL   Infusion... 0.5 MICROgram(s)/kG/Hr (1.64 mL/Hr) IV Continuous <Continuous>  heparin  Infusion 1200 Unit(s)/Hr (12 mL/Hr) IV Continuous <Continuous>  insulin lispro (ADMELOG) corrective regimen sliding scale   SubCutaneous every 6 hours  levothyroxine Injectable 56 MICROGram(s) IV Push at bedtime  meropenem  IVPB 1000 milliGRAM(s) IV Intermittent every 12 hours  midodrine 10 milliGRAM(s) Oral every 8 hours  multivitamin/minerals/iron Oral Solution (CENTRUM) 15 milliLiter(s) Oral daily  pantoprazole   Suspension 40 milliGRAM(s) Enteral Tube daily  polyethylene glycol 3350 17 Gram(s) Oral every 12 hours  remdesivir  IVPB   IV Intermittent   remdesivir  IVPB 100 milliGRAM(s) IV Intermittent every 24 hours  senna Syrup 10 milliLiter(s) Oral at bedtime  vancomycin  IVPB 1000 milliGRAM(s) IV Intermittent every 24 hours    MEDICATIONS  (PRN):  acetaminophen    Suspension .. 650 milliGRAM(s) Oral every 6 hours PRN Temp greater or equal to 38C (100.4F)  midazolam Injectable 2 milliGRAM(s) IV Push every 6 hours PRN agitation      PHYSICAL EXAM:    GENERAL:  critically ill, intubated  CHEST/LUNG:  CTA B/L, intubated   HEART: Regular rate and rhythm; No murmurs, rubs, or gallops  ABDOMEN: Soft, Nontender, Nondistended; Bowel sounds present  Extremities:   RLE with significant edema and ecchymosis compared to contralateral side (1/23 Bilateral arterial and venous LED negative).   LUE swelling, radial axillary flaca, 2 PIV's, warm to touch, soft, +radial palp pulse,   Antecubital IV noted to be erythematous and RN to remove.   NEUROLOGY: sedated      .  LABS:                         9.7    8.91  )-----------( 264      ( 25 Jan 2022 01:54 )             31.4     01-25    147<H>  |  116<H>  |  62<H>  ----------------------------<  147<H>  3.9   |  20<L>  |  1.12    Ca    8.4      25 Jan 2022 01:54  Phos  2.6     01-25  Mg     2.2     01-25    TPro  5.5<L>  /  Alb  2.6<L>  /  TBili  0.2  /  DBili  x   /  AST  23  /  ALT  22  /  AlkPhos  76  01-25    PT/INR - ( 25 Jan 2022 05:58 )   PT: 13.4 sec;   INR: 1.12 ratio         PTT - ( 25 Jan 2022 05:58 )  PTT:55.0 sec          RADIOLOGY, EKG & ADDITIONAL TESTS: Reviewed.             MICROBIOLOGY:     Culture - CSF with Gram Stain . (01.24.22 @ 00:09)    Gram Stain:   polymorphonuclear leukocytes seen  No organisms seen  by cytocentrifuge    Specimen Source: .CSF CSF      Culture - Urine (01.21.22 @ 18:50)    Specimen Source: Catheterized Catheterized    Culture Results:   No growth      Culture - Sputum . (01.21.22 @ 18:47)    -  Rifampin: R >2 Should not be used as monotherapy    -  Tetra/Doxy: S 2    -  Trimethoprim/Sulfamethoxazole: S 1/19    -  Vancomycin: S 2    -  Ampicillin/Sulbactam: R <=8/4    -  Clindamycin: R >4    -  Erythromycin: R >4    -  Gentamicin: S <=1 Should not be used as monotherapy    -  Linezolid: S 1    -  Oxacillin: R >2    -  Penicillin: R >8    Gram Stain:   Few polymorphonuclear leukocytes per low power field  Few Squamous epithelial cells per low power field  Numerous Gram Positive Rods per oil power field  Few Gram positive cocci in pairs per oil power field  Rare Gram Negative Rods per oil power field    -  Cefazolin: R <=4    Specimen Source: .Sputum Sputum    Culture Results:   Moderate Methicillin Resistant Staphylococcus aureus  Normal Respiratory Chitra present    Organism Identification: Methicillin resistant Staphylococcus aureus    Organism: Methicillin resistant Staphylococcus aureus    Method Type: KRISTOPHER      Culture - Blood (01.21.22 @ 13:31)    Specimen Source: .Blood Blood-Peripheral    Culture Results:   No growth to date.    Culture - Blood (01.21.22 @ 13:24)    Specimen Source: .Blood Blood-Peripheral    Culture Results:   No growth to date.        RADIOLOGY:  < from: CT Head No Cont (01.23.22 @ 23:08) >    INTERPRETATION:  Findings significantly limited by streak artifact. On   this limited study there is no large intracranial hemorrhage, mass effect   or midline shift    Recommend repeat head CT.    Follow up official report in the AM    Findings discussed by James COPELAND with Ashlie COPELAND on 1/23 at 11:53 PM    < end of copied text >  < from: Xray Chest 1 View- PORTABLE-Urgent (Xray Chest 1 View- PORTABLE-Urgent .) (01.22.22 @ 02:41) >  IMPRESSION:  Tubes and lines as above.  Trace left basilar linear atelectasis without focal consolidation.    --- End of Report ---    < end of copied text >    < from: CT Abdomen and Pelvis No Cont (01.21.22 @ 10:47) >    IMPRESSION:  Nonobstructing right renal calculus as previously demonstrated.  Suggestion of stercoral proctitis.    < end of copied text >      TTE with Doppler  (08.21.20 @ 06:54)   EF (Smith Rule): 70 %Doppler Peak Velocity (m/sec):  AoV=2.1      Conclusions:  1. Calcified trileaflet aortic valve with decreased  opening. Peak transaortic valve gradient equals 18 mm Hg,  mean transaortic valve gradient equals 10 mm Hg, estimated  aortic valve area equals 2 sqcm (by continuity equation),  aortic valve velocity time integral equals 47 cm,  consistent with mild aortic stenosis.  2. Mild concentric left ventricular hypertrophy.  3. Normal left ventricular systolic function. Peak left  ventricular outflow tract gradient equals 5 mm Hg, mean  gradient is equal to 3 mm Hg, LVOT velocity time integral  equals 27 cm.  4. Mild diastolic dysfunction (Stage I).  *** No previous Echo exam.  Unable to rule out endocarditis.  Consider IEFANYI if  clinically indicated.      VEEG 1/24:   The findings of generalized periodic discharges with triphasic morphology are typically seen in metabolic encephalopathy, but may increase the risk for seizures in certain clinical situations. Clinical correlation is advised.   Moderate to severe multifocal/diffuse nonspecific cerebral dysfunction, becoming severe after ~0630.   No seizure seen.

## 2022-01-25 NOTE — CHART NOTE - NSCHARTNOTEFT_GEN_A_CORE
Per primary team, no role for palliative as of yet. Awaiting sedation wean and assessment of mental status.  If palliative consult needed, please reach out.   [phone] HCP in chart.

## 2022-01-25 NOTE — PROGRESS NOTE ADULT - ASSESSMENT
ASSESSMENT: 71F Hx afib on apixaban, cad s/p stents, HFrEF, CVA with left sided residual weakness who presents from nursing home with AMS, fever. Found to be hypernatremic, hypotensive refractory to IVF requiring vasopressor, AMS secondary to metabolic encephalopathy s/p intubation, septic shock of unknown origin, possible aspiration pna.      #Neuro:  - sedation with propofol 50, versed 2mg q6h  - 1/24 goal to wean off propofol to obtain a neuro exam and transition to fentanyl gtt w/ versed 2mgq6 prn's for vent synchrony/agitation  - 1/24 VEEG dc'd today;  24hrs read neg for seizures, ?metabolic encephalopathy    - 1/23 noted to be stephanie/HTN, concern for [place], Resolved. CTH obtained (1/23); prelim stable from prior, limited due to artifact  - 1/21 CTH without acute changes, re demonstration of lacuna infarcts  -  Hx CVA with residual left sided weak/flaccid  -  presents with AMS secondary to metabolic encephalopathy  -  Neuro checks q 2 hrs and prn for changes  -  s/p LP, glucose 86, protein 48        #Pulm:  - intubated   - Vent Mode: AC RR : 16,  TV : 450 , FiO2: 30 , PEEP: 5  1/24 pH: 7.44  /  pCO2: 33    /  pO2: 136   / HCO3: 22    / Base Excess: -1.2  /  SaO2: 97.8    -Bronchodilators q 6 hrs prn for sob/wheezes  -HOB >/= 30 degree angle  -Chest PT q2 hrs      #CV:  - Left Axillary a line discontinued , no longer working w/ troubleshooting  - hydralazine 5mg PRN for hypertension  - Hx Afib on apixaban (home med held)  - currently on Heparin gtt , 1/24 6am: PTT:56.9 sec  - CAD s/p stents  - ASA 81 mg qd  - Hx NSTEMI 8/2021, HFrEF  - home med fo cozaar 25 mg , cardizem 60 mg  will hold at present        #GI/:  - CT A/P demonstrated fecal impaction, stercoral proctitis  - NGT, aguilar  - strict I & O's - keep even  - Pending BM, on  miralax senna, will consider SMOG enema      #Endo:  - decadron   - ISS  - levothyroxine       #Renal:  - hypernatremia - down to 147, free water decr to 200BID  - contraction alkalosis  - 1/24 cr improving 1.45 (prior 1.62)      #Heme/onc:  - 1/24 LUE swelling, flaca d/c'd no longer functional.  L arm with 2 patent PIV's. Antecubital IV noted to be erythematous and RN to remove.   - 1/24 LUE art/nilesh doppler pending   - 1/23 LED neg  - 1/23 RLE with significant edema and ecchymosis compared to contralateral side (arterial and venous LED's both negative).   - mechanical dvt pxx w/ SCD's bilateral      #ID:  - continue meropenem empirically cover for meningitis  - 1/24 acyclovir CSF PCR negative   - s/p LP, glucose 86, protein 48         GOC - will address DNR/DNI and goc status with family.           ASSESSMENT: 71F Hx afib on apixaban, cad s/p stents, HFrEF, CVA with left sided residual weakness who presents from nursing home with AMS, fever. Found to be hypernatremic, hypotensive refractory to IVF requiring vasopressor, AMS secondary to metabolic encephalopathy s/p intubation, septic shock of unknown origin, possible aspiration pna.      #Neuro:  - sedation with fent, plan to wean as tolerated   - 1/24 VEEG Negative likely-metabolic encephalopathy    - 1/21 CTH without acute changes, re demonstration of lacuna infarcts   -  Hx CVA with residual left sided weak/flaccid  -  presents with AMS secondary to metabolic encephalopathy  -  Neuro checks q 2 hrs and prn for changes  -  s/p LP, glucose 86, protein 48- negative. all abx and anti-viral dcd    #Pulm:  - intubated on AC 30%/450/16/5, plt 17  - plan to cpap is pt becomes more awake with sedation wean     #CV:  - Hx NSTEMI 8/2021, HFrEF- hyperdynamic   - CAD s/p stents  - Hx Afib on apixaban at home  - currently on Heparin gtt @ 1300. re check PTT later. was 45   - ASA 81 mg qd      #GI/:  - CT A/P demonstrated fecal impaction, stercoral proctitis- had BM this AM 1/25   - NGT, aguilar  - strict I & O's - keep even      #Endo:  - decadron   - ISS  - levothyroxine       #Renal:  - hypernatremia - down to 147, free water 250 q6  - contraction alkalosis  - 1/24 cr improving .95       #Heme/onc:  - 1/24 LUE swelling, flaca d/c'd no longer functional- arterial duplex showing small pseudo anusurym in L ax artery. duplex neg- call vascular?   - 1/23 LED neg  - 1/23 RLE with significant edema and ecchymosis compared to contralateral side (arterial and venous LED's both negative).   - mechanical dvt pxx w/ SCD's bilateral      #ID:  AMS:  - Lumbar puncture negative  -c/w vanco only for MSSA in SCx on 1/21  COVID:  -c/w remdsivir 1/24-   -c/w decadron 1/24-   - follow up ESR      GOC - will address DNR/DNI and goc status with family.

## 2022-01-25 NOTE — CONSULT NOTE ADULT - ASSESSMENT
71F with PMHx Afib on apixaban, CAD s/p stents, HFrEF, CVA with left sided residual weakness who presents from nursing home with AMS and fever found to be hypernatremic, hypotensive refractory to IVF requiring vasopressor, AMS secondary to metabolic encephalopathy s/p intubation, septic shock of unknown origin, possible aspiration PNA. Vascular Surgery consulted for L axillary artery PSA s/p removal of A-line 01/24.     PLAN:   - Hold heparin gtt  - Plan to hold compression with U/S   - Obtain arterial duplex after PSA compression     Patient seen/examined with Fellow, Dr. Plunkett  Plan discussed with Attending, Dr. Watkins

## 2022-01-25 NOTE — CHART NOTE - NSCHARTNOTEFT_GEN_A_CORE
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     Arm abducted and secured with tape. Pseudoaneurysm visualized on ultrasound and neck identified, compression held for 35 minutes with cessation of flow in pseudoaneurysm neck observed. Arm repositioned and tape removed, with good radial and ulnar pulses.     Recommendations:  - Continuing to hold heparin  - Obtain official Duplex to evaluate for resolution of pseudoaneurysm after compression    Vascular Surgery   [id number]

## 2022-01-25 NOTE — PROGRESS NOTE ADULT - ATTENDING COMMENTS
Agree with above except as noted. 71F Hx afib on apixaban, cad s/p stents, HFrEF, CVA with left sided residual weakness who presents from nursing home p/w AMS. Pt w/ hypernatremic, septic shock and covid +.    Neuro - SZ in ER likely metabolic in nature. appreciate Neuro reccs. repeat EEG w/o active sz    s/p Lumbar puncture negative for infection and fluid minimal inflammation   wean sedation for vacation as tolerated to assess mental status    Resp - Intubated. Minimal vent requirements. Lung protective ventilation strategy. once awake daily PS trials as tolerated    Cardiovascular -  off pressors but maintain map >65. hep gtt for chronic AF  US prelim showing poss pseudoaneurysm, vascular consult after official results reported    GI - bowel regimen cont for stercoral ulcer   - ANIKA on CKD,  closely monitor Na, cont free water    Hem/Onc - dvt ppx SQH    ID -  fluid cx negative will stop meningitis coverage. cont empiric coverage for aspiration pna. cont dexamethasone for covid, does not need rdv    Skin cont wound care, f/u wound care/nursing reccs for dti

## 2022-01-25 NOTE — CONSULT NOTE ADULT - SUBJECTIVE AND OBJECTIVE BOX
VASCULAR SURGERY CONSULT NOTE  --------------------------------------------------------------------------------------------  Patient is a 71F Afib on apixaban, HFrEF (50% 8/21/21),CAD s/p stents, NSTEMI (2020), arthritis, wheelchair bound, hypothyroidism, s/p left ureteral stent removal/2021) with residual non obstructive Rt renal calculi, E.faecalis UTI (8/2021) CVA with left sided residual weakness (8/2021), sacral decubitus, pressure heel ulcers s/p COVID-19 (2020), baseline pt is AOx2 assist with ADL. Pt presented [facility] when found to have AMS, fever with Tmax of 104. Pt in E.D. found to be obtunded, mottled, hypotensive SBP 80's refractory to 2.5 liters IVF requiring norepi gtt, fever with Temp of 104.5. In addition found to have hypernatremia with Na+ of 161, ANIKA with sCr 4.74 (baseline 0.77-1.22), AGMA of 25, with contraction alkalosis with serum HCO3 of 23, vph 7.50, vPCO2 of 35, lactate of 3.2, procalcitonin of 0.82, hsTrop of 239, Hgb of 14.9 (baseline from 8/2021 of 11). Pt with 9 liters total body water deficit. Pt received CT C/A/P/H (1/21/22) re demonstration of  Non obstructing right renal calculus, significant stool burden distending the rectum and sigmoid colon with mild perirectal fat stranding suggestive of fecal impaction and possible stercoral proctitis, No acute transcortical infarct, intracranial hemorrhage, however did show Chronic lacunar infarcts with chronic small vessel disease. Started on cefepime, vanco, tylenol.Consult called and pt admitted MICU for AMS secondary to metabolic encephalopathy, septic shock of unknown origin, possible aspiration pneum (25 Jan 2022 09:16)    Vascular Surgery consulted for L axillary artery PSA s/p removal of A-line 01/24 as it was no longer working  Swelling was noted, arm warm to touch, palpable radial   2 functioning PIV in LUE with good aspiration     PAST MEDICAL & SURGICAL HISTORY:  HTN (hypertension)    Obesity  BMI 29.3    Hypothyroid  on synthroid    Type 2 diabetes mellitus without complication  diet-controlled    Carpal tunnel syndrome of right wrist    Lymphedema    2019 novel coronavirus disease (COVID-19)  As per patient, diagnosed with Covid 1 year ago when residing in a nursing home and was managed in nursing home; Pt denies any hospitalizations for covid or intubations.    Sepsis  Urosepsis due to septic stone 8/2020    S/P carpal tunnel release    S/P cystoscopy  Pt is a poor historian; As per charts, patient had a cysto with stent placement 8/2020      FAMILY HISTORY:  Family history of lung cancer  Family history of myocardial infarction (Sibling)    ALLERGIES: penicillin (Hives)  penicillin (Rash)  sulfa drugs (Unknown)  Tetracycline Hydrochloride (Unknown)      CURRENT MEDICATIONS  MEDICATIONS (STANDING): aspirin  chewable 81 milliGRAM(s) Oral daily  atorvastatin 80 milliGRAM(s) Oral at bedtime  dexAMETHasone  Injectable 6 milliGRAM(s) IV Push daily  fentaNYL   Infusion... 0.5 MICROgram(s)/kG/Hr IV Continuous <Continuous>  insulin lispro (ADMELOG) corrective regimen sliding scale   SubCutaneous every 6 hours  levothyroxine Injectable 56 MICROGram(s) IV Push at bedtime  midodrine 10 milliGRAM(s) Oral every 8 hours  multivitamin/minerals/iron Oral Solution (CENTRUM) 15 milliLiter(s) Oral daily  pantoprazole   Suspension 40 milliGRAM(s) Enteral Tube daily  polyethylene glycol 3350 17 Gram(s) Oral every 12 hours  remdesivir  IVPB   IV Intermittent   remdesivir  IVPB 100 milliGRAM(s) IV Intermittent every 24 hours  senna Syrup 10 milliLiter(s) Oral at bedtime  vancomycin  IVPB 1000 milliGRAM(s) IV Intermittent every 24 hours    MEDICATIONS (PRN):acetaminophen    Suspension .. 650 milliGRAM(s) Oral every 6 hours PRN Temp greater or equal to 38C (100.4F)  midazolam Injectable 2 milliGRAM(s) IV Push every 6 hours PRN agitation    --------------------------------------------------------------------------------------------    Vitals:   T(C): 36.8 (01-25-22 @ 19:00), Max: 37.5 (01-25-22 @ 08:00)  HR: 68 (01-25-22 @ 21:14) (51 - 72)  BP: 145/75 (01-25-22 @ 21:00) (84/50 - 170/86)  RR: 32 (01-25-22 @ 21:00) (16 - 45)  SpO2: 100% (01-25-22 @ 21:14) (99% - 100%)  CAPILLARY BLOOD GLUCOSE      POCT Blood Glucose.: 184 mg/dL (25 Jan 2022 18:24)  POCT Blood Glucose.: 181 mg/dL (25 Jan 2022 11:30)  POCT Blood Glucose.: 142 mg/dL (25 Jan 2022 06:08)  POCT Blood Glucose.: 138 mg/dL (25 Jan 2022 00:13)    CAPILLARY BLOOD GLUCOSE      POCT Blood Glucose.: 184 mg/dL (25 Jan 2022 18:24)  POCT Blood Glucose.: 181 mg/dL (25 Jan 2022 11:30)  POCT Blood Glucose.: 142 mg/dL (25 Jan 2022 06:08)  POCT Blood Glucose.: 138 mg/dL (25 Jan 2022 00:13)      01-24 @ 07:01  -  01-25 @ 07:00  --------------------------------------------------------  IN:    Enteral Tube Flush: 400 mL    FentaNYL: 147 mL    Heparin Infusion: 288 mL    Nepro: 960 mL    Propofol: 280.7 mL  Total IN: 2075.7 mL    OUT:    Indwelling Catheter - Urethral (mL): 1160 mL  Total OUT: 1160 mL    Total NET: 915.7 mL      01-25 @ 07:01  -  01-25 @ 22:02  --------------------------------------------------------  IN:    Enteral Tube Flush: 100 mL    FentaNYL: 54.2 mL    Free Water: 50 mL    Heparin: 113 mL    Heparin Infusion: 12 mL    IV PiggyBack: 500 mL    Nepro: 560 mL  Total IN: 1389.2 mL    OUT:    Indwelling Catheter - Urethral (mL): 720 mL  Total OUT: 720 mL    Total NET: 669.2 mL        Height (cm): 157.5 (01-21 @ 08:32)  Weight (kg): 65.7 (01-21 @ 08:32)  BMI (kg/m2): 26.5 (01-21 @ 08:32)  BSA (m2): 1.67 (01-21 @ 08:32)    PHYSICAL EXAM:   General: intubated  Cardio: RRR  Resp: Mode: AC/ CMV (Assist Control/ Continuous Mandatory Ventilation)  RR (machine): 16  TV (machine): 450  FiO2: 30  PEEP: 5  ITime: 1  MAP: 10  PIP: 25  Vascular: All 4 extremities warm, B/l radial pulses palpable, L ulnar palpable, prior axillary artery a-line site hemostatic, mild surrounding ecchymosis and soft compartments, no pulsatile masses/hematoma, PSA non-palpable, good cap refill, cannot cooperate for neurosensory exam   --------------------------------------------------------------------------------------------    LABS  CBC (01-25 @ 01:54)                              9.7<L>                         8.91    )----------------(  264        --    % Neutrophils, --    % Lymphocytes, ANC: --                                  31.4<L>    BMP (01-25 @ 12:00)             147<H>  |  116<H>  |  57<H> 		Ca++ --      Ca 8.3<L>             ---------------------------------( 210<H>		Mg 2.3                4.6     |  20<L>   |  0.95  			Ph 2.9     BMP (01-25 @ 01:54)             147<H>  |  116<H>  |  62<H> 		Ca++ --      Ca 8.4                ---------------------------------( 147<H>		Mg 2.2                3.9     |  20<L>   |  1.12  			Ph 2.6       LFTs (01-25 @ 12:00)      TPro 6.0 / Alb 2.7<L> / TBili 0.2 / DBili -- / AST 21 / ALT 20 / AlkPhos 78  LFTs (01-25 @ 01:54)      TPro 5.5<L> / Alb 2.6<L> / TBili 0.2 / DBili -- / AST 23 / ALT 22 / AlkPhos 76    Coags (01-25 @ 12:00)  aPTT 45.8<H> / INR -- / PT --  Coags (01-25 @ 05:58)  aPTT 55.0<H> / INR 1.12 / PT 13.4      ABG (01-25 @ 17:37)     7.42 / 35 / 120<H> / 23 / -1.4 / 98.0%     Lactate:    ABG (01-25 @ 01:48)     7.41 / 34 / 133<H> / 22 / -2.6<L> / 97.3%     Lactate:        --------------------------------------------------------------------------------------------    MICROBIOLOGY    -> .CSF CSF Culture (01-24 @ 00:09)       polymorphonuclear leukocytes seen  No organisms seen  by cytocentrifuge    NG    No growth to date.    -> Catheterized Catheterized Culture (01-21 @ 18:50)     NG    NG    No growth    -> .Sputum Sputum Culture (01-21 @ 18:47)       Few polymorphonuclear leukocytes per low power field  Few Squamous epithelial cells per low power field  Numerous Gram Positive Rods per oil power field  Few Gram positive cocci in pairs per oil power field  Rare Gram Negative Rods per oil power field    Methicillin resistant Staphylococcus aureus    Moderate Methicillin Resistant Staphylococcus aureus  Normal Respiratory Chitra present    -> Clean Catch Clean Catch (Midstream) Culture (01-21 @ 14:46)     NG    NG    No growth    -> .Blood Blood-Peripheral Culture (01-21 @ 13:31)     NG    NG    No growth to date.    -> .Blood Blood-Peripheral Culture (01-21 @ 13:24)     NG    NG    No growth to date.      -------------------------------------------------------------------------------------------  IMAGING  < from: [facility] Duplex Upper Ext Vein Scan, Left (01.25.22 @ 14:14) >  FINDINGS:    There is an early division of the left brachial artery into radial and   ulnar branches in the upper arm, a common anatomic variant.    The left internal jugular, subclavian, axillary and brachial veins are   patent and compressible where applicable.  The basilic and cephalic veins   (superficial veins) are patent and without thrombus.    Doppler examination shows normal spontaneous and phasic flow.    IMPRESSION:  No evidence of left upper extremity deep venous thrombosis.      < from: [facility] Duplex Upper Extrem Arterial Limited, Left (01.25.22 @ 14:16) >  FINDINGS: There is edema in the superficial soft tissues of the left   upper extremity.    There is a pseudoaneurysm with a narrow neck, arising from the left   axillary artery. The pseudoaneurysm measures approximately 0.8 x 0.6 cm,   with an adjacent hematoma measuring approximately 4.4 x 0.7 x 1.4 cm.    There is an early division of the left brachial artery into radial and   ulnar branches in the upper arm, a common anatomic variant    There is unimpeded flow through the left subclavian, axillary, brachial,   radial and ulnar arteries.    IMPRESSION: There is a small pseudoaneurysm arising from the left   axillary artery.

## 2022-01-26 LAB
ALBUMIN SERPL ELPH-MCNC: 2.6 G/DL — LOW (ref 3.3–5)
ALP SERPL-CCNC: 85 U/L — SIGNIFICANT CHANGE UP (ref 40–120)
ALT FLD-CCNC: 23 U/L — SIGNIFICANT CHANGE UP (ref 10–45)
ANION GAP SERPL CALC-SCNC: 11 MMOL/L — SIGNIFICANT CHANGE UP (ref 5–17)
APTT BLD: 32.4 SEC — SIGNIFICANT CHANGE UP (ref 27.5–35.5)
AST SERPL-CCNC: 29 U/L — SIGNIFICANT CHANGE UP (ref 10–40)
BASOPHILS # BLD AUTO: 0.07 K/UL — SIGNIFICANT CHANGE UP (ref 0–0.2)
BASOPHILS NFR BLD AUTO: 0.6 % — SIGNIFICANT CHANGE UP (ref 0–2)
BILIRUB SERPL-MCNC: 0.2 MG/DL — SIGNIFICANT CHANGE UP (ref 0.2–1.2)
BUN SERPL-MCNC: 56 MG/DL — HIGH (ref 7–23)
CALCIUM SERPL-MCNC: 8.3 MG/DL — LOW (ref 8.4–10.5)
CHLORIDE SERPL-SCNC: 118 MMOL/L — HIGH (ref 96–108)
CO2 SERPL-SCNC: 20 MMOL/L — LOW (ref 22–31)
CREAT SERPL-MCNC: 0.79 MG/DL — SIGNIFICANT CHANGE UP (ref 0.5–1.3)
CULTURE RESULTS: NO GROWTH — SIGNIFICANT CHANGE UP
CULTURE RESULTS: SIGNIFICANT CHANGE UP
CULTURE RESULTS: SIGNIFICANT CHANGE UP
EOSINOPHIL # BLD AUTO: 0.05 K/UL — SIGNIFICANT CHANGE UP (ref 0–0.5)
EOSINOPHIL NFR BLD AUTO: 0.4 % — SIGNIFICANT CHANGE UP (ref 0–6)
ERYTHROCYTE [SEDIMENTATION RATE] IN BLOOD: 71 MM/HR — HIGH (ref 0–20)
GAS PNL BLDA: SIGNIFICANT CHANGE UP
GAS PNL BLDA: SIGNIFICANT CHANGE UP
GLUCOSE BLDC GLUCOMTR-MCNC: 117 MG/DL — HIGH (ref 70–99)
GLUCOSE BLDC GLUCOMTR-MCNC: 149 MG/DL — HIGH (ref 70–99)
GLUCOSE BLDC GLUCOMTR-MCNC: 161 MG/DL — HIGH (ref 70–99)
GLUCOSE BLDC GLUCOMTR-MCNC: 98 MG/DL — SIGNIFICANT CHANGE UP (ref 70–99)
GLUCOSE SERPL-MCNC: 149 MG/DL — HIGH (ref 70–99)
HCT VFR BLD CALC: 33.9 % — LOW (ref 34.5–45)
HGB BLD-MCNC: 10.2 G/DL — LOW (ref 11.5–15.5)
IMM GRANULOCYTES NFR BLD AUTO: 5.5 % — HIGH (ref 0–1.5)
LYMPHOCYTES # BLD AUTO: 1.13 K/UL — SIGNIFICANT CHANGE UP (ref 1–3.3)
LYMPHOCYTES # BLD AUTO: 9.8 % — LOW (ref 13–44)
MAGNESIUM SERPL-MCNC: 2.4 MG/DL — SIGNIFICANT CHANGE UP (ref 1.6–2.6)
MCHC RBC-ENTMCNC: 28.4 PG — SIGNIFICANT CHANGE UP (ref 27–34)
MCHC RBC-ENTMCNC: 30.1 GM/DL — LOW (ref 32–36)
MCV RBC AUTO: 94.4 FL — SIGNIFICANT CHANGE UP (ref 80–100)
MONOCYTES # BLD AUTO: 1.03 K/UL — HIGH (ref 0–0.9)
MONOCYTES NFR BLD AUTO: 8.9 % — SIGNIFICANT CHANGE UP (ref 2–14)
NEUTROPHILS # BLD AUTO: 8.61 K/UL — HIGH (ref 1.8–7.4)
NEUTROPHILS NFR BLD AUTO: 74.8 % — SIGNIFICANT CHANGE UP (ref 43–77)
NRBC # BLD: 0 /100 WBCS — SIGNIFICANT CHANGE UP (ref 0–0)
PHOSPHATE SERPL-MCNC: 2.5 MG/DL — SIGNIFICANT CHANGE UP (ref 2.5–4.5)
PLATELET # BLD AUTO: 275 K/UL — SIGNIFICANT CHANGE UP (ref 150–400)
POTASSIUM SERPL-MCNC: 4.4 MMOL/L — SIGNIFICANT CHANGE UP (ref 3.5–5.3)
POTASSIUM SERPL-SCNC: 4.4 MMOL/L — SIGNIFICANT CHANGE UP (ref 3.5–5.3)
PROT SERPL-MCNC: 5.8 G/DL — LOW (ref 6–8.3)
RBC # BLD: 3.59 M/UL — LOW (ref 3.8–5.2)
RBC # FLD: 15.9 % — HIGH (ref 10.3–14.5)
SODIUM SERPL-SCNC: 149 MMOL/L — HIGH (ref 135–145)
SPECIMEN SOURCE: SIGNIFICANT CHANGE UP
VDRL CSF-TITR: SIGNIFICANT CHANGE UP
WBC # BLD: 11.52 K/UL — HIGH (ref 3.8–10.5)
WBC # FLD AUTO: 11.52 K/UL — HIGH (ref 3.8–10.5)
WNV IGG CSF IA-ACNC: NEGATIVE — SIGNIFICANT CHANGE UP
WNV IGM CSF IA-ACNC: NEGATIVE — SIGNIFICANT CHANGE UP

## 2022-01-26 PROCEDURE — 93931 UPPER EXTREMITY STUDY: CPT | Mod: 26,LT

## 2022-01-26 PROCEDURE — 99291 CRITICAL CARE FIRST HOUR: CPT | Mod: 25

## 2022-01-26 RX ORDER — INSULIN LISPRO 100/ML
VIAL (ML) SUBCUTANEOUS EVERY 6 HOURS
Refills: 0 | Status: DISCONTINUED | OUTPATIENT
Start: 2022-01-26 | End: 2022-02-22

## 2022-01-26 RX ORDER — HYDRALAZINE HCL 50 MG
5 TABLET ORAL EVERY 4 HOURS
Refills: 0 | Status: DISCONTINUED | OUTPATIENT
Start: 2022-01-26 | End: 2022-02-22

## 2022-01-26 RX ORDER — SODIUM CHLORIDE 9 MG/ML
1000 INJECTION INTRAMUSCULAR; INTRAVENOUS; SUBCUTANEOUS
Refills: 0 | Status: DISCONTINUED | OUTPATIENT
Start: 2022-01-26 | End: 2022-01-26

## 2022-01-26 RX ORDER — POLYETHYLENE GLYCOL 3350 17 G/17G
17 POWDER, FOR SOLUTION ORAL DAILY
Refills: 0 | Status: DISCONTINUED | OUTPATIENT
Start: 2022-01-26 | End: 2022-01-27

## 2022-01-26 RX ORDER — HYDRALAZINE HCL 50 MG
5 TABLET ORAL ONCE
Refills: 0 | Status: COMPLETED | OUTPATIENT
Start: 2022-01-26 | End: 2022-01-26

## 2022-01-26 RX ORDER — POTASSIUM PHOSPHATE, MONOBASIC POTASSIUM PHOSPHATE, DIBASIC 236; 224 MG/ML; MG/ML
15 INJECTION, SOLUTION INTRAVENOUS ONCE
Refills: 0 | Status: COMPLETED | OUTPATIENT
Start: 2022-01-26 | End: 2022-01-26

## 2022-01-26 RX ORDER — SODIUM CHLORIDE 9 MG/ML
1000 INJECTION, SOLUTION INTRAVENOUS
Refills: 0 | Status: DISCONTINUED | OUTPATIENT
Start: 2022-01-26 | End: 2022-01-28

## 2022-01-26 RX ADMIN — CHLORHEXIDINE GLUCONATE 15 MILLILITER(S): 213 SOLUTION TOPICAL at 05:20

## 2022-01-26 RX ADMIN — Medication 81 MILLIGRAM(S): at 11:43

## 2022-01-26 RX ADMIN — Medication 5 MILLIGRAM(S): at 19:42

## 2022-01-26 RX ADMIN — Medication 5 MILLIGRAM(S): at 16:45

## 2022-01-26 RX ADMIN — Medication 1: at 11:49

## 2022-01-26 RX ADMIN — POTASSIUM PHOSPHATE, MONOBASIC POTASSIUM PHOSPHATE, DIBASIC 62.5 MILLIMOLE(S): 236; 224 INJECTION, SOLUTION INTRAVENOUS at 03:46

## 2022-01-26 RX ADMIN — ATORVASTATIN CALCIUM 80 MILLIGRAM(S): 80 TABLET, FILM COATED ORAL at 22:23

## 2022-01-26 RX ADMIN — CHLORHEXIDINE GLUCONATE 1 APPLICATION(S): 213 SOLUTION TOPICAL at 05:21

## 2022-01-26 RX ADMIN — PANTOPRAZOLE SODIUM 40 MILLIGRAM(S): 20 TABLET, DELAYED RELEASE ORAL at 11:44

## 2022-01-26 RX ADMIN — REMDESIVIR 500 MILLIGRAM(S): 5 INJECTION INTRAVENOUS at 18:27

## 2022-01-26 RX ADMIN — Medication 15 MILLILITER(S): at 12:14

## 2022-01-26 RX ADMIN — Medication 6 MILLIGRAM(S): at 05:20

## 2022-01-26 RX ADMIN — MIDODRINE HYDROCHLORIDE 5 MILLIGRAM(S): 2.5 TABLET ORAL at 05:20

## 2022-01-26 RX ADMIN — Medication 250 MILLIGRAM(S): at 18:27

## 2022-01-26 RX ADMIN — CHLORHEXIDINE GLUCONATE 15 MILLILITER(S): 213 SOLUTION TOPICAL at 18:27

## 2022-01-26 NOTE — PROGRESS NOTE ADULT - ATTENDING COMMENTS
Agree with above except as noted. 71F Hx afib on apixaban, cad s/p stents, HFrEF, CVA with left sided residual weakness who presents from nursing home p/w AMS. Pt w/ hypernatremic, septic shock and covid +.    Neuro - SZ in ER likely metabolic in nature. appreciate Neuro reccs. repeat EEG w/o active sz    s/p Lumbar puncture negative for infection and fluid minimal inflammation   minimizing sedation as tolerated to assess mental status    Resp - Intubated. Minimal vent requirements. Lung protective ventilation strategy. cont daily PS trials as tolerated    Cardiovascular -  off pressors but maintain map >65. hep gtt for chronic AF  US w/ pseudoaneurysm now thrombosed, vascular consult appreciated  no further interventions     GI - bowel regimen cont for stercoral ulcer   - ANIKA on CKD,  closely monitor Na, cont free water    Hem/Onc - dvt ppx SQH    ID -  fluid cx negative will stop meningitis coverage. cont empiric coverage for aspiration pna. cont dexamethasone for covid, does not need rdv    Skin cont wound care, f/u wound care/nursing reccs for dti. Agree with above except as noted. 71F Hx afib on apixaban, cad s/p stents, HFrEF, CVA with left sided residual weakness who presents from nursing home p/w AMS. Pt w/ hypernatremic, septic shock and covid +.    Neuro - SZ in ER likely metabolic in nature. appreciate Neuro reccs. repeat EEG w/o active sz    s/p Lumbar puncture negative for infection and fluid minimal inflammation   minimizing sedation as tolerated to assess mental status    Resp - Intubated. Minimal vent requirements. Lung protective ventilation strategy. cont daily PS trials as tolerated    Cardiovascular -  off pressors but maintain map >65. hep gtt for chronic AF  US w/ pseudoaneurysm now thrombosed, vascular consult appreciated  no further interventions. ext warm and well perfused    GI - bowel regimen cont for stercoral ulcer   - ANIKA on CKD,  closely monitor Na, cont free water    Hem/Onc - dvt ppx SQH    ID -  fluid cx negative will stop meningitis coverage. cont empiric coverage for aspiration pna. cont dexamethasone for covid, does not need rdv    Skin cont wound care, f/u wound care/nursing reccs for dti.

## 2022-01-26 NOTE — CHART NOTE - NSCHARTNOTEFT_GEN_A_CORE
Repeat arterial duplex reviewed. Pseudoaneurysm thrombose; no further vascular intervention.    Please call back with questions or concerns if needed.

## 2022-01-26 NOTE — PROGRESS NOTE ADULT - SUBJECTIVE AND OBJECTIVE BOX
INTERVAL HPI/OVERNIGHT EVENTS:  L axillary A line was removed ; c/b pseudoaneurysm. Heparin gtt placed on hold. P duplex study.  Midodrine added 5 mg TID.      Patient is a 71y old  Female who presents with a chief complaint of AMS (25 Jan 2022 18:00)      ROS:  Unable to ROS , intubated and sedated.     Allergies    penicillin (Hives)  penicillin (Rash)  sulfa drugs (Unknown)  Tetracycline Hydrochloride (Unknown)    Intolerances        ANTIBIOTICS/RELEVANT:  antimicrobials  remdesivir  IVPB   IV Intermittent   remdesivir  IVPB 100 milliGRAM(s) IV Intermittent every 24 hours  vancomycin  IVPB 1000 milliGRAM(s) IV Intermittent every 24 hours    immunologic:    OTHER:  acetaminophen    Suspension .. 650 milliGRAM(s) Oral every 6 hours PRN  aspirin  chewable 81 milliGRAM(s) Oral daily  atorvastatin 80 milliGRAM(s) Oral at bedtime  chlorhexidine 0.12% Liquid 15 milliLiter(s) Oral Mucosa every 12 hours  chlorhexidine 4% Liquid 1 Application(s) Topical <User Schedule>  dexAMETHasone  Injectable 6 milliGRAM(s) IV Push daily  fentaNYL   Infusion... 0.5 MICROgram(s)/kG/Hr IV Continuous <Continuous>  insulin lispro (ADMELOG) corrective regimen sliding scale   SubCutaneous every 6 hours  levothyroxine Injectable 56 MICROGram(s) IV Push at bedtime  midazolam Injectable 2 milliGRAM(s) IV Push every 6 hours PRN  midodrine 5 milliGRAM(s) Oral every 8 hours  multivitamin/minerals/iron Oral Solution (CENTRUM) 15 milliLiter(s) Oral daily  pantoprazole   Suspension 40 milliGRAM(s) Enteral Tube daily  senna Syrup 10 milliLiter(s) Oral at bedtime      Objective:  Vital Signs Last 24 Hrs  T(C): 37.1 (26 Jan 2022 08:00), Max: 37.3 (26 Jan 2022 03:00)  T(F): 98.8 (26 Jan 2022 08:00), Max: 99.1 (26 Jan 2022 03:00)  HR: 66 (26 Jan 2022 08:00) (51 - 72)  BP: 156/77 (26 Jan 2022 08:00) (94/52 - 170/86)  BP(mean): 110 (26 Jan 2022 08:00) (0 - 120)  RR: 34 (26 Jan 2022 08:00) (16 - 45)  SpO2: 100% (26 Jan 2022 08:00) (99% - 100%)    PHYSICAL EXAM:  General -   HEENT -   CV -   Resp -   Abdomen -   Extremities -   Skin -       LABS:                        10.2   11.52 )-----------( 275      ( 26 Jan 2022 01:30 )             33.9     01-26    149<H>  |  118<H>  |  56<H>  ----------------------------<  149<H>  4.4   |  20<L>  |  0.79    Ca    8.3<L>      26 Jan 2022 01:30  Phos  2.5     01-26  Mg     2.4     01-26    TPro  5.8<L>  /  Alb  2.6<L>  /  TBili  0.2  /  DBili  x   /  AST  29  /  ALT  23  /  AlkPhos  85  01-26    PT/INR - ( 25 Jan 2022 05:58 )   PT: 13.4 sec;   INR: 1.12 ratio         PTT - ( 26 Jan 2022 01:30 )  PTT:32.4 sec        MICROBIOLOGY:        RADIOLOGY & ADDITIONAL STUDIES: INTERVAL HPI/OVERNIGHT EVENTS:  L axillary A line was removed ; c/b pseudoaneurysm. Heparin gtt placed on hold. P duplex study.  Midodrine added 5 mg TID.      Patient is a 71y old  Female who presents with a chief complaint of AMS (25 Jan 2022 18:00)      ROS:  Unable to ROS , intubated and sedated.     Allergies    penicillin (Hives)  penicillin (Rash)  sulfa drugs (Unknown)  Tetracycline Hydrochloride (Unknown)    Intolerances        ANTIBIOTICS/RELEVANT:  antimicrobials  remdesivir  IVPB   IV Intermittent   remdesivir  IVPB 100 milliGRAM(s) IV Intermittent every 24 hours  vancomycin  IVPB 1000 milliGRAM(s) IV Intermittent every 24 hours    immunologic:    OTHER:  acetaminophen    Suspension .. 650 milliGRAM(s) Oral every 6 hours PRN  aspirin  chewable 81 milliGRAM(s) Oral daily  atorvastatin 80 milliGRAM(s) Oral at bedtime  chlorhexidine 0.12% Liquid 15 milliLiter(s) Oral Mucosa every 12 hours  chlorhexidine 4% Liquid 1 Application(s) Topical <User Schedule>  dexAMETHasone  Injectable 6 milliGRAM(s) IV Push daily  fentaNYL   Infusion... 0.5 MICROgram(s)/kG/Hr IV Continuous <Continuous>  insulin lispro (ADMELOG) corrective regimen sliding scale   SubCutaneous every 6 hours  levothyroxine Injectable 56 MICROGram(s) IV Push at bedtime  midazolam Injectable 2 milliGRAM(s) IV Push every 6 hours PRN  midodrine 5 milliGRAM(s) Oral every 8 hours  multivitamin/minerals/iron Oral Solution (CENTRUM) 15 milliLiter(s) Oral daily  pantoprazole   Suspension 40 milliGRAM(s) Enteral Tube daily  senna Syrup 10 milliLiter(s) Oral at bedtime      Objective:  Vital Signs Last 24 Hrs  T(C): 37.1 (26 Jan 2022 08:00), Max: 37.3 (26 Jan 2022 03:00)  T(F): 98.8 (26 Jan 2022 08:00), Max: 99.1 (26 Jan 2022 03:00)  HR: 66 (26 Jan 2022 08:00) (51 - 72)  BP: 156/77 (26 Jan 2022 08:00) (94/52 - 170/86)  BP(mean): 110 (26 Jan 2022 08:00) (0 - 120)  RR: 34 (26 Jan 2022 08:00) (16 - 45)  SpO2: 100% (26 Jan 2022 08:00) (99% - 100%)    PHYSICAL EXAM:  GENERAL:  intubated, on fent gtt   CHEST/LUNG:  CTA B/L, intubated   HEART: s1 & s2 +   ABDOMEN: Soft, Nontender, Nondistended; Bowel sounds present x 4 quadrants   Extremities: + L axilla with hematoma   RLE with significant edema and ecchymosis compared to contralateral side (1/23 Bilateral arterial and venous LED negative).   LUE swelling, radial axillary flaca, 2 PIV's, warm to touch, soft, +radial palp pulse,   Antecubital IV noted to be erythematous and RN to remove.   NEUROLOGY: withdraws from pain       LABS:                        10.2   11.52 )-----------( 275      ( 26 Jan 2022 01:30 )             33.9     01-26    149<H>  |  118<H>  |  56<H>  ----------------------------<  149<H>  4.4   |  20<L>  |  0.79    Ca    8.3<L>      26 Jan 2022 01:30  Phos  2.5     01-26  Mg     2.4     01-26    TPro  5.8<L>  /  Alb  2.6<L>  /  TBili  0.2  /  DBili  x   /  AST  29  /  ALT  23  /  AlkPhos  85  01-26    PT/INR - ( 25 Jan 2022 05:58 )   PT: 13.4 sec;   INR: 1.12 ratio         PTT - ( 26 Jan 2022 01:30 )  PTT:32.4 sec        MICROBIOLOGY:    Culture - Fungal, CSF (01.24.22 @ 00:09)    Specimen Source: .CSF CSF    Culture Results:   Testing in progress    Culture - Acid Fast - CSF (01.24.22 @ 00:09)    Specimen Source: .CSF CSF    Acid Fast Bacilli Smear:   Less than 5 cc of CSF received,  unable to perform AFB smear.    Culture - CSF with Gram Stain . (01.24.22 @ 00:09)    Gram Stain:   polymorphonuclear leukocytes seen  No organisms seen  by cytocentrifuge    Specimen Source: .CSF CSF    Culture Results:   No growth to date.    Culture - Sputum . (01.21.22 @ 18:47)    Gram Stain:     Organism Identification: Methicillin resistant Staphylococcus aureus    Organism: Methicillin resistant Staphylococcus aureus    Method Type: KRISTOPHER    Culture - Urine (01.21.22 @ 14:46)    Specimen Source: Clean Catch Clean Catch (Midstream)    Culture Results:   No growth        RADIOLOGY & ADDITIONAL STUDIES:    < from: Xray Chest 1 View- PORTABLE-Urgent (Xray Chest 1 View- PORTABLE-Urgent .) (01.22.22 @ 02:41) >    PROCEDURE DATE:  01/21/2022          INTERPRETATION:  EXAMINATION: XR CHEST URGENT, XR CHEST URGENT    CLINICAL INDICATION: Enteric tube positioning, status post intubation    TECHNIQUE: Single frontal, portable view of the chest was obtained.    COMPARISON: Chest radiograph 1/21/2022 at 8:56 AM.    FINDINGS:    Chest radiograph 1/21/2022 at 3:35 PM:    Very Limited evaluation secondary to patient positioning.  Enteric tube likely coiled stomach.  The heart size is not well evaluated.  The small amount of left lung tissue visualized is clear.  Unable to evaluate for pleural effusion or pneumothorax in this   positioning.    Chest radiograph 1/22/2022 at 2:34 AM:    Interval placement of endotracheal tube with tip terminating   approximately 1.5 cm from the julian. Enteric tube seen coursing under   the diaphragm with tip excluded from the image.  The heart is normal in size.  Trace left basilar linear atelectasis. No focal consolidations.  No pleural effusion or pneumothorax.    IMPRESSION:  Tubes and lines as above.  Trace left basilar linear atelectasis without focal consolidation.    < end of copied text >   INTERVAL HPI/OVERNIGHT EVENTS:  L axillary A line was removed ; c/b pseudoaneurysm. Heparin gtt placed on hold. P duplex study.  Midodrine added 5 mg TID.      Patient is a 71y old  Female who presents with a chief complaint of AMS .      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 Pt presented to [facility] from [facility] when found to have AMS, fever with Tmax of 104. Pt in E.D. found to be obtunded, mottled, hypotensive SBP 80's refractory to 2.5 liters IVF requiring norepi gtt, fever with Temp of 104.5. In addition found to have hypernatremia with Na+ of 161, ANIKA with sCr 4.74 (baseline 0.77-1.22), AGMA of 25, with contraction alkalosis with serum HCO3 of 23, vph 7.50, vPCO2 of 35, lactate of 3.2, procalcitonin of 0.82, hsTrop of 239, Hgb of 14.9 (baseline from 8/2021 of 11). Pt with 9 liters total body water deficit. Pt received CT C/A/P/H (1/21/22) re demonstration of  Non obstructing right renal calculus, significant stool burden distending the rectum and sigmoid colon with mild perirectal fat stranding suggestive of fecal impaction and possible stercoral proctitis, No acute transcortical infarct, intracranial hemorrhage, however did show Chronic lacunar infarcts with chronic small vessel disease. Started on cefepime, vanco, tylenol.Consult called and pt admitted MICU for AMS secondary to metabolic encephalopathy, septic shock of unknown origin, possible aspiration pneum      ROS:  Unable to ROS , intubated and sedated.     Allergies    penicillin (Hives)  penicillin (Rash)  sulfa drugs (Unknown)  Tetracycline Hydrochloride (Unknown)    Intolerances        ANTIBIOTICS/RELEVANT:  antimicrobials  remdesivir  IVPB   IV Intermittent   remdesivir  IVPB 100 milliGRAM(s) IV Intermittent every 24 hours  vancomycin  IVPB 1000 milliGRAM(s) IV Intermittent every 24 hours    immunologic:    OTHER:  acetaminophen    Suspension .. 650 milliGRAM(s) Oral every 6 hours PRN  aspirin  chewable 81 milliGRAM(s) Oral daily  atorvastatin 80 milliGRAM(s) Oral at bedtime  chlorhexidine 0.12% Liquid 15 milliLiter(s) Oral Mucosa every 12 hours  chlorhexidine 4% Liquid 1 Application(s) Topical <User Schedule>  dexAMETHasone  Injectable 6 milliGRAM(s) IV Push daily  fentaNYL   Infusion... 0.5 MICROgram(s)/kG/Hr IV Continuous <Continuous>  insulin lispro (ADMELOG) corrective regimen sliding scale   SubCutaneous every 6 hours  levothyroxine Injectable 56 MICROGram(s) IV Push at bedtime  midazolam Injectable 2 milliGRAM(s) IV Push every 6 hours PRN  midodrine 5 milliGRAM(s) Oral every 8 hours  multivitamin/minerals/iron Oral Solution (CENTRUM) 15 milliLiter(s) Oral daily  pantoprazole   Suspension 40 milliGRAM(s) Enteral Tube daily  senna Syrup 10 milliLiter(s) Oral at bedtime      Objective:  Vital Signs Last 24 Hrs  T(C): 37.1 (26 Jan 2022 08:00), Max: 37.3 (26 Jan 2022 03:00)  T(F): 98.8 (26 Jan 2022 08:00), Max: 99.1 (26 Jan 2022 03:00)  HR: 66 (26 Jan 2022 08:00) (51 - 72)  BP: 156/77 (26 Jan 2022 08:00) (94/52 - 170/86)  BP(mean): 110 (26 Jan 2022 08:00) (0 - 120)  RR: 34 (26 Jan 2022 08:00) (16 - 45)  SpO2: 100% (26 Jan 2022 08:00) (99% - 100%)    PHYSICAL EXAM:  GENERAL:  intubated, on fent gtt   CHEST/LUNG:  CTA B/L, intubated   HEART: s1 & s2 +   ABDOMEN: Soft, Nontender, Nondistended; Bowel sounds present x 4 quadrants   Extremities: + L axilla with hematoma   RLE with significant edema and ecchymosis compared to contralateral side (1/23 Bilateral arterial and venous LED negative).   LUE swelling, radial axillary flaca, 2 PIV's, warm to touch, soft, +radial palp pulse,   Antecubital IV noted to be erythematous and RN to remove.   NEUROLOGY: withdraws from pain       LABS:                        10.2   11.52 )-----------( 275      ( 26 Jan 2022 01:30 )             33.9     01-26    149<H>  |  118<H>  |  56<H>  ----------------------------<  149<H>  4.4   |  20<L>  |  0.79    Ca    8.3<L>      26 Jan 2022 01:30  Phos  2.5     01-26  Mg     2.4     01-26    TPro  5.8<L>  /  Alb  2.6<L>  /  TBili  0.2  /  DBili  x   /  AST  29  /  ALT  23  /  AlkPhos  85  01-26    PT/INR - ( 25 Jan 2022 05:58 )   PT: 13.4 sec;   INR: 1.12 ratio         PTT - ( 26 Jan 2022 01:30 )  PTT:32.4 sec        MICROBIOLOGY:    Culture - Fungal, CSF (01.24.22 @ 00:09)    Specimen Source: .CSF CSF    Culture Results:   Testing in progress    Culture - Acid Fast - CSF (01.24.22 @ 00:09)    Specimen Source: .CSF CSF    Acid Fast Bacilli Smear:   Less than 5 cc of CSF received,  unable to perform AFB smear.    Culture - CSF with Gram Stain . (01.24.22 @ 00:09)    Gram Stain:   polymorphonuclear leukocytes seen  No organisms seen  by cytocentrifuge    Specimen Source: .CSF CSF    Culture Results:   No growth to date.    Culture - Sputum . (01.21.22 @ 18:47)    Gram Stain:     Organism Identification: Methicillin resistant Staphylococcus aureus    Organism: Methicillin resistant Staphylococcus aureus    Method Type: KRISTOPHER    Culture - Urine (01.21.22 @ 14:46)    Specimen Source: Clean Catch Clean Catch (Midstream)    Culture Results:   No growth        RADIOLOGY & ADDITIONAL STUDIES:    < from: Xray Chest 1 View- PORTABLE-Urgent (Xray Chest 1 View- PORTABLE-Urgent .) (01.22.22 @ 02:41) >    PROCEDURE DATE:  01/21/2022          INTERPRETATION:  EXAMINATION: XR CHEST URGENT, XR CHEST URGENT    CLINICAL INDICATION: Enteric tube positioning, status post intubation    TECHNIQUE: Single frontal, portable view of the chest was obtained.    COMPARISON: Chest radiograph 1/21/2022 at 8:56 AM.    FINDINGS:    Chest radiograph 1/21/2022 at 3:35 PM:    Very Limited evaluation secondary to patient positioning.  Enteric tube likely coiled stomach.  The heart size is not well evaluated.  The small amount of left lung tissue visualized is clear.  Unable to evaluate for pleural effusion or pneumothorax in this   positioning.    Chest radiograph 1/22/2022 at 2:34 AM:    Interval placement of endotracheal tube with tip terminating   approximately 1.5 cm from the julian. Enteric tube seen coursing under   the diaphragm with tip excluded from the image.  The heart is normal in size.  Trace left basilar linear atelectasis. No focal consolidations.  No pleural effusion or pneumothorax.    IMPRESSION:  Tubes and lines as above.  Trace left basilar linear atelectasis without focal consolidation.    < end of copied text >   INTERVAL HPI/OVERNIGHT EVENTS:  L axillary A line was removed ; c/b pseudoaneurysm. Heparin gtt placed on hold. P duplex study.  Midodrine added 5 mg TID.      Patient is a 71y old  Female who presents with a chief complaint of AMS .      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 Pt presented [facility] from [facility] when found to have AMS, fever with Tmax of 104. Pt in E.D. found to be obtunded, mottled, hypotensive SBP 80's refractory to 2.5 liters IVF requiring norepi gtt, fever with Temp of 104.5. In addition found to have hypernatremia with Na+ of 161, ANIKA with sCr 4.74 (baseline 0.77-1.22), AGMA of 25, with contraction alkalosis with serum HCO3 of 23, vph 7.50, vPCO2 of 35, lactate of 3.2, procalcitonin of 0.82, hsTrop of 239, Hgb of 14.9 (baseline from 8/2021 of 11). Pt with 9 liters total body water deficit. Pt received CT C/A/P/H (1/21/22) re demonstration of  Non obstructing right renal calculus, significant stool burden distending the rectum and sigmoid colon with mild perirectal fat stranding suggestive of fecal impaction and possible stercoral proctitis, No acute transcortical infarct, intracranial hemorrhage, however did show Chronic lacunar infarcts with chronic small vessel disease. Started on cefepime, vanco, tylenol.Consult called and pt admitted MICU for AMS secondary to metabolic encephalopathy, septic shock of unknown origin, possible aspiration pneum      ROS:  Unable to ROS , intubated and sedated.     Allergies    penicillin (Hives)  penicillin (Rash)  sulfa drugs (Unknown)  Tetracycline Hydrochloride (Unknown)    Intolerances        ANTIBIOTICS/RELEVANT:  antimicrobials  remdesivir  IVPB   IV Intermittent   remdesivir  IVPB 100 milliGRAM(s) IV Intermittent every 24 hours  vancomycin  IVPB 1000 milliGRAM(s) IV Intermittent every 24 hours    immunologic:    OTHER:  acetaminophen    Suspension .. 650 milliGRAM(s) Oral every 6 hours PRN  aspirin  chewable 81 milliGRAM(s) Oral daily  atorvastatin 80 milliGRAM(s) Oral at bedtime  chlorhexidine 0.12% Liquid 15 milliLiter(s) Oral Mucosa every 12 hours  chlorhexidine 4% Liquid 1 Application(s) Topical <User Schedule>  dexAMETHasone  Injectable 6 milliGRAM(s) IV Push daily  fentaNYL   Infusion... 0.5 MICROgram(s)/kG/Hr IV Continuous <Continuous>  insulin lispro (ADMELOG) corrective regimen sliding scale   SubCutaneous every 6 hours  levothyroxine Injectable 56 MICROGram(s) IV Push at bedtime  midazolam Injectable 2 milliGRAM(s) IV Push every 6 hours PRN  midodrine 5 milliGRAM(s) Oral every 8 hours  multivitamin/minerals/iron Oral Solution (CENTRUM) 15 milliLiter(s) Oral daily  pantoprazole   Suspension 40 milliGRAM(s) Enteral Tube daily  senna Syrup 10 milliLiter(s) Oral at bedtime      Objective:  Vital Signs Last 24 Hrs  T(C): 37.1 (26 Jan 2022 08:00), Max: 37.3 (26 Jan 2022 03:00)  T(F): 98.8 (26 Jan 2022 08:00), Max: 99.1 (26 Jan 2022 03:00)  HR: 66 (26 Jan 2022 08:00) (51 - 72)  BP: 156/77 (26 Jan 2022 08:00) (94/52 - 170/86)  BP(mean): 110 (26 Jan 2022 08:00) (0 - 120)  RR: 34 (26 Jan 2022 08:00) (16 - 45)  SpO2: 100% (26 Jan 2022 08:00) (99% - 100%)    PHYSICAL EXAM:  GENERAL:  intubated, on fent gtt   CHEST/LUNG:  CTA B/L, intubated   HEART: s1 & s2 +   ABDOMEN: Soft, Nontender, Nondistended; Bowel sounds present x 4 quadrants   Extremities: + L axilla with hematoma   RLE with significant edema and ecchymosis compared to contralateral side (1/23 Bilateral arterial and venous LED negative).   LUE swelling, radial axillary flaca, 2 PIV's, warm to touch, soft, +radial palp pulse,   Antecubital IV noted to be erythematous and RN to remove.   NEUROLOGY: withdraws from pain       LABS:                        10.2   11.52 )-----------( 275      ( 26 Jan 2022 01:30 )             33.9     01-26    149<H>  |  118<H>  |  56<H>  ----------------------------<  149<H>  4.4   |  20<L>  |  0.79    Ca    8.3<L>      26 Jan 2022 01:30  Phos  2.5     01-26  Mg     2.4     01-26    TPro  5.8<L>  /  Alb  2.6<L>  /  TBili  0.2  /  DBili  x   /  AST  29  /  ALT  23  /  AlkPhos  85  01-26    PT/INR - ( 25 Jan 2022 05:58 )   PT: 13.4 sec;   INR: 1.12 ratio         PTT - ( 26 Jan 2022 01:30 )  PTT:32.4 sec        MICROBIOLOGY:    Culture - Fungal, CSF (01.24.22 @ 00:09)    Specimen Source: .CSF CSF    Culture Results:   Testing in progress    Culture - Acid Fast - CSF (01.24.22 @ 00:09)    Specimen Source: .CSF CSF    Acid Fast Bacilli Smear:   Less than 5 cc of CSF received,  unable to perform AFB smear.    Culture - CSF with Gram Stain . (01.24.22 @ 00:09)    Gram Stain:   polymorphonuclear leukocytes seen  No organisms seen  by cytocentrifuge    Specimen Source: .CSF CSF    Culture Results:   No growth to date.    Culture - Sputum . (01.21.22 @ 18:47)    Gram Stain:     Organism Identification: Methicillin resistant Staphylococcus aureus    Organism: Methicillin resistant Staphylococcus aureus    Method Type: KRISTOPHER    Culture - Urine (01.21.22 @ 14:46)    Specimen Source: Clean Catch Clean Catch (Midstream)    Culture Results:   No growth    Culture - Blood (01.21.22 @ 13:31)    Specimen Source: .Blood Blood-Peripheral    Culture Results:   No growth to date.        RADIOLOGY & ADDITIONAL STUDIES:    < from: Xray Chest 1 View- PORTABLE-Urgent (Xray Chest 1 View- PORTABLE-Urgent .) (01.22.22 @ 02:41) >    PROCEDURE DATE:  01/21/2022          INTERPRETATION:  EXAMINATION: XR CHEST URGENT, XR CHEST URGENT    CLINICAL INDICATION: Enteric tube positioning, status post intubation    TECHNIQUE: Single frontal, portable view of the chest was obtained.    COMPARISON: Chest radiograph 1/21/2022 at 8:56 AM.    FINDINGS:    Chest radiograph 1/21/2022 at 3:35 PM:    Very Limited evaluation secondary to patient positioning.  Enteric tube likely coiled stomach.  The heart size is not well evaluated.  The small amount of left lung tissue visualized is clear.  Unable to evaluate for pleural effusion or pneumothorax in this   positioning.    Chest radiograph 1/22/2022 at 2:34 AM:    Interval placement of endotracheal tube with tip terminating   approximately 1.5 cm from the julian. Enteric tube seen coursing under   the diaphragm with tip excluded from the image.  The heart is normal in size.  Trace left basilar linear atelectasis. No focal consolidations.  No pleural effusion or pneumothorax.    IMPRESSION:  Tubes and lines as above.  Trace left basilar linear atelectasis without focal consolidation.    < end of copied text >

## 2022-01-26 NOTE — PROGRESS NOTE ADULT - ASSESSMENT
ASSESSMENT: 71F Hx afib on apixaban, cad s/p stents, HFrEF, CVA with left sided residual weakness who presents from nursing home with AMS, fever. Found to be hypernatremic, hypotensive refractory to IVF requiring vasopressor, AMS secondary to metabolic encephalopathy s/p intubation, septic shock of unknown origin, possible aspiration pna.      #Neuro:  AMS 2/2 Metabolic encephalopathy   -  Hx CVA with residual left sided weak/flaccid  -Fent @ 0.5 ; wean as tolerated   - 1/24 VEEG Negative likely-metabolic encephalopathy    - 1/21 CTH without acute changes, re demonstration of lacuna infarcts   -  Neuro checks q 2 hrs and prn for changes  -  s/p LP, glucose 86, protein 48- negative. all abx and anti-viral dcd    #Pulm:  Acute hypoxic respiratory failure 2/2 COVID-19 PNA and superimposed MRSA PNA   - intubated on AC16/450/30/5  -ABG - ( 26 Jan 2022 01:39 )pH, Arterial: 7.37  pH, Blood: x     /  pCO2: 39    /  pO2: 70    / HCO3: 22    / Base Excess: -2.5  /  SaO2: 94.3    -PS trail as tolerated   -ABG PRN   -c/w COVID-19 PNA tx   -c/w Vancomycin for MRSA PNA         #CV:  - Hx NSTEMI 8/2021, HFrEF- hyperdynamic   - CAD s/p stents  - ASA 81 mg qd    - Hx Afib on apixaban at home  -heparin gtt on hold 2/2 pseudoaneurysm in L axillary post a line removal    -bleeding precaution       #GI  Oropharyngeal dysphagia   -OGT w TF as tolerated   - CT A/P demonstrated fecal impaction, stercoral proctitis- had BM this AM 1/25   -BM 1/26   -c/w bowel regimen        -UO 30 ml /hr   - strict I & O's ; + 1400 ml  -c/w free h20       #Endo:  - decadron   - ISS    Hypothyroidism   -c/w  levothyroxine       #Renal:  -UO ~ 30 ml /hr ; + 1400   -trend BUN / cr     Hpernatremia  -worsening   -increase  free  water 250 q4, add 0.222% @ 100 ml /hr x 1 L   -daily BMP       #Heme/onc:  - 1/24 LUE swelling, flaca d/c'd no longer functional- arterial duplex showing small pseudo anusurym in L ax artery. duplex neg  - vascular team input appreciated  - Hold heparin gtt/ Plan to hold compression with U/S  & Obtain arterial duplex after PSA compression   - 1/23 LED neg  - 1/23 RLE with significant edema and ecchymosis compared to contralateral side (arterial and venous LED's both negative).         #ID:  MRSA PNA   - on vancomycin 1g q 24 hrs , 1/21 --->   -level 14.6 1/25   -trend WBC  -trend T curve     AMS:  -LP NGTD       COVID-19 PNA   -c/w remdsivir 1/24- 1/28  -c/w decadron 1/21- 1/31      PPX  DVT ppx : - mechanical dvt pxx w/ SCD's bilateral  GI ppx ; PPI       GOC  -FULL      ASSESSMENT: 71F Hx afib on apixaban, cad s/p stents, HFrEF, CVA with left sided residual weakness who presents from nursing home with AMS, fever. Found to be hypernatremic, hypotensive refractory to IVF requiring vasopressor, AMS secondary to metabolic encephalopathy s/p intubation, septic shock of unknown origin, possible aspiration pna.      #Neuro:  AMS 2/2 Metabolic encephalopathy   -  Hx CVA with residual left sided weak/flaccid  -Fent @ 0.5 ; wean as tolerated   - 1/24 VEEG Negative likely-metabolic encephalopathy    - 1/21 CTH without acute changes, re demonstration of lacuna infarcts   -  Neuro checks q 2 hrs and prn for changes  -  s/p LP, glucose 86, protein 48- negative. all abx and anti-viral dcd    #Pulm:  Acute hypoxic respiratory failure 2/2 COVID-19 PNA and superimposed MRSA PNA   - intubated on AC16/450/30/5  -ABG - ( 26 Jan 2022 01:39 )pH, Arterial: 7.37  pH, Blood: x     /  pCO2: 39    /  pO2: 70    / HCO3: 22    / Base Excess: -2.5  /  SaO2: 94.3    -PS trail as tolerated   -ABG PRN   -c/w COVID-19 PNA tx   -c/w Vancomycin for MRSA PNA         #CV:    Hypotension   -resolved  -stopped midodrine as 's     - Hx NSTEMI 8/2021, HFrEF- hyperdynamic   - CAD s/p stents  - c/w ASA 81 mg qd    - Hx Afib on apixaban at home  -heparin gtt on hold 2/2 pseudoaneurysm in L axillary post a line removal    -bleeding precaution       #GI  Oropharyngeal dysphagia   -OGT w TF as tolerated   - CT A/P demonstrated fecal impaction, stercoral proctitis- had BM this AM 1/25   -BM 1/26   -c/w bowel regimen        -UO 30 ml /hr   - strict I & O's ; + 1400 ml  -c/w free h20       #Endo:  - decadron   - ISS    Hypothyroidism   -c/w  levothyroxine       #Renal:  -UO ~ 30 ml /hr ; + 1400   -trend BUN / cr     Hpernatremia  -worsening   -increase  free  water 250 q4, add 0.222% @ 100 ml /hr x 1 L   -daily BMP       #Heme/onc:  - 1/24 LUE swelling, flaca d/c'd no longer functional- arterial duplex showing small pseudo anusurym in L ax artery. duplex neg  - vascular team input appreciated  - Hold heparin gtt/ Plan to hold compression with U/S  & Obtain arterial duplex after PSA compression   - 1/23 LED neg  - 1/23 RLE with significant edema and ecchymosis compared to contralateral side (arterial and venous LED's both negative).         #ID:  MRSA PNA   - on vancomycin 1g q 24 hrs , 1/21 --->   -level 14.6 1/25   -trend WBC  -trend T curve     AMS:  -LP NGTD       COVID-19 PNA   -c/w remdsivir 1/24- 1/28  -c/w decadron 1/21- 1/31      PPX  DVT ppx : - mechanical dvt pxx w/ SCD's bilateral  GI ppx ; PPI       GOC  -FULL      ASSESSMENT: 71F Hx afib on apixaban, cad s/p stents, HFrEF, CVA with left sided residual weakness who presents from nursing home with AMS, fever. Found to be hypernatremic, hypotensive refractory to IVF requiring vasopressor, AMS secondary to metabolic encephalopathy s/p intubation, septic shock of unknown origin, possible aspiration pna.      #Neuro:  AMS 2/2 Metabolic encephalopathy   -  Hx CVA with residual left sided weak/flaccid  -Fent @ 0.5 ; wean as tolerated   - 1/24 VEEG Negative likely-metabolic encephalopathy    - 1/21 CTH without acute changes, re demonstration of lacuna infarcts   -  Neuro checks q 2 hrs and prn for changes  -  s/p LP, glucose 86, protein 48- negative. all abx and anti-viral dcd    #Pulm:  Acute hypoxic respiratory failure 2/2 COVID-19 PNA and superimposed MRSA PNA   - intubated on AC16/450/30/5  -ABG - ( 26 Jan 2022 01:39 )pH, Arterial: 7.37  pH, Blood: x     /  pCO2: 39    /  pO2: 70    / HCO3: 22    / Base Excess: -2.5  /  SaO2: 94.3    -PS trail as tolerated   -ABG PRN   -c/w COVID-19 PNA tx   -c/w Vancomycin for MRSA PNA         #CV:    Hypotension   -resolved  -stopped midodrine as 's     - Hx NSTEMI 8/2021, HFrEF- hyperdynamic   - CAD s/p stents  - c/w ASA 81 mg qd    - Hx Afib on apixaban at home  -heparin gtt on hold 2/2 pseudoaneurysm in L axillary post a line removal    -bleeding precaution       #GI  Oropharyngeal dysphagia   -OGT w TF as tolerated   - CT A/P demonstrated fecal impaction, stercoral proctitis- had BM this AM 1/25   -BM 1/26   -c/w bowel regimen        -UO 30 ml /hr   - strict I & O's ; + 1400 ml  -c/w free h20       #Endo:  - decadron   - ISS    Hypothyroidism   -c/w  levothyroxine       #Renal:  -UO ~ 30 ml /hr ; + 1400   -trend BUN / cr     Hpernatremia  -worsening   -increase  free  water 250 q4, add 0.222% @ 100 ml /hr x 1 L   -daily BMP       #Heme/onc:  - 1/24 LUE swelling, flaca d/c'd no longer functional- arterial duplex showing small pseudo anusurym in L ax artery. duplex neg  - vascular team input appreciated  - Hold heparin gtt/ Plan to hold compression with U/S  & Obtain arterial duplex after PSA compression   - 1/23 LED neg  - 1/23 RLE with significant edema and ecchymosis compared to contralateral side (arterial and venous LED's both negative).         #ID:  MRSA PNA   - on vancomycin 1g q 24 hrs , 1/21 --->   -BCx x 2 1/21  NGTD   -level 14.6 1/25   -trend WBC  -trend T curve     AMS:  -LP NGTD       COVID-19 PNA   -c/w remdsivir 1/24- 1/28  -c/w decadron 1/21- 1/31      PPX  DVT ppx : - mechanical dvt pxx w/ SCD's bilateral  GI ppx ; PPI       GOC  -FULL      ASSESSMENT: 71F Hx afib on apixaban, cad s/p stents, HFrEF, CVA with left sided residual weakness who presents from nursing home with AMS, fever. Found to be hypernatremic, hypotensive refractory to IVF requiring vasopressor, AMS secondary to metabolic encephalopathy s/p intubation, septic shock of unknown origin, possible aspiration pna.      #Neuro:  AMS 2/2 Metabolic encephalopathy   -  Hx CVA with residual left sided weak/flaccid  -Fent @ 0.5 ; wean as tolerated   - 1/24 VEEG Negative likely-metabolic encephalopathy    - 1/21 CTH without acute changes, re demonstration of lacuna infarcts   -  Neuro checks q 2 hrs and prn for changes  -  s/p LP, glucose 86, protein 48- negative. all abx and anti-viral dcd    #Pulm:  Acute hypoxic respiratory failure 2/2 COVID-19 PNA and superimposed MRSA PNA   - intubated on AC16/450/30/5  -ABG - ( 26 Jan 2022 01:39 )pH, Arterial: 7.37  pH, Blood: x     /  pCO2: 39    /  pO2: 70    / HCO3: 22    / Base Excess: -2.5  /  SaO2: 94.3    -PS trail as tolerated   -ABG PRN   -c/w COVID-19 PNA tx   -c/w Vancomycin for MRSA PNA         #CV:    Hypotension   -resolved  -stopped midodrine as 's     - Hx NSTEMI 8/2021, HFrEF- hyperdynamic   - CAD s/p stents  - c/w ASA 81 mg qd    - Hx Afib on apixaban at home  -heparin gtt on hold 2/2 pseudoaneurysm in L axillary post a line removal    -bleeding precaution       #GI  Oropharyngeal dysphagia   -OGT w TF as tolerated   - CT A/P demonstrated fecal impaction, stercoral proctitis- had BM this AM 1/25   -BM 1/26   -c/w bowel regimen        -UO 30 ml /hr   - strict I & O's ; + 1400 ml  -c/w free h20       #Endo:  -TF   - ISS q 6 hrs     Hypothyroidism   -c/w  levothyroxine   -check TSH       #Renal:  -UO ~ 30 ml /hr ; + 1400   -trend BUN / cr     Hpernatremia  -worsening   -increase  free  water 250 q4, add 0.222% @ 100 ml /hr x 1 L   -daily BMP       #Heme/onc:  - 1/24 LUE swelling, flaca d/c'd no longer functional- arterial duplex showing small pseudo anusurym in L ax artery. duplex neg  - vascular team input appreciated  - Hold heparin gtt/ Plan to hold compression with U/S  & Obtain arterial duplex after PSA compression   - 1/23 LED neg  - 1/23 RLE with significant edema and ecchymosis compared to contralateral side (arterial and venous LED's both negative).         #ID:  MRSA PNA   - on vancomycin 1g q 24 hrs , 1/21 --->   -BCx x 2 1/21  NGTD   -level 14.6 1/25   -trend WBC  -trend T curve     AMS:  -LP NGTD       COVID-19 PNA   -c/w remdsivir 1/24- 1/28  -c/w decadron 1/21- 1/31      PPX  DVT ppx : - mechanical dvt pxx w/ SCD's bilateral  GI ppx ; PPI       GOC  -FULL

## 2022-01-27 LAB
ALBUMIN SERPL ELPH-MCNC: 2.6 G/DL — LOW (ref 3.3–5)
ALP SERPL-CCNC: 77 U/L — SIGNIFICANT CHANGE UP (ref 40–120)
ALT FLD-CCNC: 27 U/L — SIGNIFICANT CHANGE UP (ref 10–45)
ANION GAP SERPL CALC-SCNC: 10 MMOL/L — SIGNIFICANT CHANGE UP (ref 5–17)
APTT BLD: 19.3 SEC — LOW (ref 27.5–35.5)
AST SERPL-CCNC: 27 U/L — SIGNIFICANT CHANGE UP (ref 10–40)
BASE EXCESS BLDV CALC-SCNC: -0.1 MMOL/L — SIGNIFICANT CHANGE UP (ref -2–2)
BASOPHILS # BLD AUTO: 0.02 K/UL — SIGNIFICANT CHANGE UP (ref 0–0.2)
BASOPHILS NFR BLD AUTO: 0.2 % — SIGNIFICANT CHANGE UP (ref 0–2)
BILIRUB DIRECT SERPL-MCNC: 0.1 MG/DL — SIGNIFICANT CHANGE UP (ref 0–0.3)
BILIRUB INDIRECT FLD-MCNC: 0.2 MG/DL — SIGNIFICANT CHANGE UP (ref 0.2–1)
BILIRUB SERPL-MCNC: 0.3 MG/DL — SIGNIFICANT CHANGE UP (ref 0.2–1.2)
BUN SERPL-MCNC: 42 MG/DL — HIGH (ref 7–23)
CA-I SERPL-SCNC: 1.2 MMOL/L — SIGNIFICANT CHANGE UP (ref 1.15–1.33)
CALCIUM SERPL-MCNC: 8.3 MG/DL — LOW (ref 8.4–10.5)
CHLORIDE BLDV-SCNC: 115 MMOL/L — HIGH (ref 96–108)
CHLORIDE SERPL-SCNC: 117 MMOL/L — HIGH (ref 96–108)
CO2 BLDV-SCNC: 25 MMOL/L — SIGNIFICANT CHANGE UP (ref 22–26)
CO2 SERPL-SCNC: 20 MMOL/L — LOW (ref 22–31)
CREAT SERPL-MCNC: 0.66 MG/DL — SIGNIFICANT CHANGE UP (ref 0.5–1.3)
EOSINOPHIL # BLD AUTO: 0.1 K/UL — SIGNIFICANT CHANGE UP (ref 0–0.5)
EOSINOPHIL NFR BLD AUTO: 1 % — SIGNIFICANT CHANGE UP (ref 0–6)
GAS PNL BLDA: SIGNIFICANT CHANGE UP
GAS PNL BLDV: 146 MMOL/L — HIGH (ref 136–145)
GAS PNL BLDV: SIGNIFICANT CHANGE UP
GAS PNL BLDV: SIGNIFICANT CHANGE UP
GLUCOSE BLDC GLUCOMTR-MCNC: 101 MG/DL — HIGH (ref 70–99)
GLUCOSE BLDC GLUCOMTR-MCNC: 140 MG/DL — HIGH (ref 70–99)
GLUCOSE BLDC GLUCOMTR-MCNC: 148 MG/DL — HIGH (ref 70–99)
GLUCOSE BLDC GLUCOMTR-MCNC: 87 MG/DL — SIGNIFICANT CHANGE UP (ref 70–99)
GLUCOSE BLDV-MCNC: 92 MG/DL — SIGNIFICANT CHANGE UP (ref 70–99)
GLUCOSE SERPL-MCNC: 97 MG/DL — SIGNIFICANT CHANGE UP (ref 70–99)
HCO3 BLDV-SCNC: 24 MMOL/L — SIGNIFICANT CHANGE UP (ref 22–29)
HCT VFR BLD CALC: 30.4 % — LOW (ref 34.5–45)
HCT VFR BLDA CALC: 31 % — LOW (ref 34.5–46.5)
HGB BLD CALC-MCNC: 10.3 G/DL — LOW (ref 11.7–16.1)
HGB BLD-MCNC: 9.2 G/DL — LOW (ref 11.5–15.5)
HOROWITZ INDEX BLDV+IHG-RTO: 30 — SIGNIFICANT CHANGE UP
IMM GRANULOCYTES NFR BLD AUTO: 6.1 % — HIGH (ref 0–1.5)
INR BLD: 1.05 RATIO — SIGNIFICANT CHANGE UP (ref 0.88–1.16)
LACTATE BLDV-MCNC: 0.8 MMOL/L — SIGNIFICANT CHANGE UP (ref 0.7–2)
LYMPHOCYTES # BLD AUTO: 1.26 K/UL — SIGNIFICANT CHANGE UP (ref 1–3.3)
LYMPHOCYTES # BLD AUTO: 12.7 % — LOW (ref 13–44)
MCHC RBC-ENTMCNC: 27.7 PG — SIGNIFICANT CHANGE UP (ref 27–34)
MCHC RBC-ENTMCNC: 30.3 GM/DL — LOW (ref 32–36)
MCV RBC AUTO: 91.6 FL — SIGNIFICANT CHANGE UP (ref 80–100)
MONOCYTES # BLD AUTO: 0.9 K/UL — SIGNIFICANT CHANGE UP (ref 0–0.9)
MONOCYTES NFR BLD AUTO: 9 % — SIGNIFICANT CHANGE UP (ref 2–14)
NEUTROPHILS # BLD AUTO: 7.07 K/UL — SIGNIFICANT CHANGE UP (ref 1.8–7.4)
NEUTROPHILS NFR BLD AUTO: 71 % — SIGNIFICANT CHANGE UP (ref 43–77)
NON-GYNECOLOGICAL CYTOLOGY STUDY: SIGNIFICANT CHANGE UP
NRBC # BLD: 0 /100 WBCS — SIGNIFICANT CHANGE UP (ref 0–0)
PCO2 BLDV: 35 MMHG — LOW (ref 39–42)
PH BLDV: 7.44 — HIGH (ref 7.32–7.43)
PLATELET # BLD AUTO: 213 K/UL — SIGNIFICANT CHANGE UP (ref 150–400)
PO2 BLDV: 89 MMHG — HIGH (ref 25–45)
POTASSIUM BLDV-SCNC: 3.8 MMOL/L — SIGNIFICANT CHANGE UP (ref 3.5–5.1)
POTASSIUM SERPL-MCNC: 3.9 MMOL/L — SIGNIFICANT CHANGE UP (ref 3.5–5.3)
POTASSIUM SERPL-SCNC: 3.9 MMOL/L — SIGNIFICANT CHANGE UP (ref 3.5–5.3)
PROT SERPL-MCNC: 5.6 G/DL — LOW (ref 6–8.3)
PROTHROM AB SERPL-ACNC: 12.6 SEC — SIGNIFICANT CHANGE UP (ref 10.6–13.6)
RBC # BLD: 3.32 M/UL — LOW (ref 3.8–5.2)
RBC # FLD: 15.6 % — HIGH (ref 10.3–14.5)
SAO2 % BLDV: 96.7 % — HIGH (ref 67–88)
SODIUM SERPL-SCNC: 147 MMOL/L — HIGH (ref 135–145)
TSH SERPL-MCNC: 0.8 UIU/ML — SIGNIFICANT CHANGE UP (ref 0.27–4.2)
WBC # BLD: 9.96 K/UL — SIGNIFICANT CHANGE UP (ref 3.8–10.5)
WBC # FLD AUTO: 9.96 K/UL — SIGNIFICANT CHANGE UP (ref 3.8–10.5)

## 2022-01-27 PROCEDURE — 99291 CRITICAL CARE FIRST HOUR: CPT

## 2022-01-27 PROCEDURE — 71045 X-RAY EXAM CHEST 1 VIEW: CPT | Mod: 26

## 2022-01-27 PROCEDURE — 99222 1ST HOSP IP/OBS MODERATE 55: CPT

## 2022-01-27 RX ORDER — MORPHINE SULFATE 50 MG/1
4 CAPSULE, EXTENDED RELEASE ORAL ONCE
Refills: 0 | Status: DISCONTINUED | OUTPATIENT
Start: 2022-01-27 | End: 2022-01-27

## 2022-01-27 RX ORDER — PANTOPRAZOLE SODIUM 20 MG/1
40 TABLET, DELAYED RELEASE ORAL DAILY
Refills: 0 | Status: DISCONTINUED | OUTPATIENT
Start: 2022-01-27 | End: 2022-01-28

## 2022-01-27 RX ORDER — MORPHINE SULFATE 50 MG/1
8 CAPSULE, EXTENDED RELEASE ORAL ONCE
Refills: 0 | Status: DISCONTINUED | OUTPATIENT
Start: 2022-01-27 | End: 2022-01-27

## 2022-01-27 RX ORDER — MORPHINE SULFATE 50 MG/1
2 CAPSULE, EXTENDED RELEASE ORAL ONCE
Refills: 0 | Status: DISCONTINUED | OUTPATIENT
Start: 2022-01-27 | End: 2022-01-27

## 2022-01-27 RX ORDER — ENOXAPARIN SODIUM 100 MG/ML
60 INJECTION SUBCUTANEOUS EVERY 12 HOURS
Refills: 0 | Status: DISCONTINUED | OUTPATIENT
Start: 2022-01-27 | End: 2022-02-03

## 2022-01-27 RX ORDER — MULTIVIT-MIN/FERROUS GLUCONATE 9 MG/15 ML
15 LIQUID (ML) ORAL DAILY
Refills: 0 | Status: DISCONTINUED | OUTPATIENT
Start: 2022-01-27 | End: 2022-02-22

## 2022-01-27 RX ORDER — POLYETHYLENE GLYCOL 3350 17 G/17G
17 POWDER, FOR SOLUTION ORAL DAILY
Refills: 0 | Status: DISCONTINUED | OUTPATIENT
Start: 2022-01-27 | End: 2022-02-22

## 2022-01-27 RX ORDER — HYDRALAZINE HCL 50 MG
5 TABLET ORAL ONCE
Refills: 0 | Status: COMPLETED | OUTPATIENT
Start: 2022-01-27 | End: 2022-01-27

## 2022-01-27 RX ADMIN — Medication 56 MICROGRAM(S): at 00:51

## 2022-01-27 RX ADMIN — CHLORHEXIDINE GLUCONATE 1 APPLICATION(S): 213 SOLUTION TOPICAL at 06:28

## 2022-01-27 RX ADMIN — Medication 15 MILLILITER(S): at 14:46

## 2022-01-27 RX ADMIN — ATORVASTATIN CALCIUM 80 MILLIGRAM(S): 80 TABLET, FILM COATED ORAL at 22:41

## 2022-01-27 RX ADMIN — Medication 5 MILLIGRAM(S): at 10:13

## 2022-01-27 RX ADMIN — PANTOPRAZOLE SODIUM 40 MILLIGRAM(S): 20 TABLET, DELAYED RELEASE ORAL at 14:46

## 2022-01-27 RX ADMIN — Medication 6 MILLIGRAM(S): at 06:15

## 2022-01-27 RX ADMIN — Medication 5 MILLIGRAM(S): at 21:00

## 2022-01-27 RX ADMIN — Medication 56 MICROGRAM(S): at 22:00

## 2022-01-27 RX ADMIN — Medication 81 MILLIGRAM(S): at 14:46

## 2022-01-27 RX ADMIN — SENNA PLUS 10 MILLILITER(S): 8.6 TABLET ORAL at 23:02

## 2022-01-27 RX ADMIN — SODIUM CHLORIDE 50 MILLILITER(S): 9 INJECTION, SOLUTION INTRAVENOUS at 01:37

## 2022-01-27 RX ADMIN — Medication 5 MILLIGRAM(S): at 06:15

## 2022-01-27 RX ADMIN — ENOXAPARIN SODIUM 60 MILLIGRAM(S): 100 INJECTION SUBCUTANEOUS at 17:31

## 2022-01-27 RX ADMIN — CHLORHEXIDINE GLUCONATE 15 MILLILITER(S): 213 SOLUTION TOPICAL at 06:28

## 2022-01-27 RX ADMIN — REMDESIVIR 500 MILLIGRAM(S): 5 INJECTION INTRAVENOUS at 17:32

## 2022-01-27 NOTE — CHART NOTE - NSCHARTNOTEFT_GEN_A_CORE
Nutrition Follow Up Note  Patient seen for: follow-up    Chart reviewed, events noted - "Pt is a 70 y/o female, with a PMH of Afib on apixaban, HFrEF, CAD s/p stents, NSTEMI (2020), wheelchair bound, hypothyroidism, CVA with L sided residual weakness (8/2021), sacral decubitus, pressure heel ulcers s/p COVID-19 (2020), who presented with AMS and fever and admitted to MICU with metabolic encephalopathy, septic shock of unknown origin, s/p intubation, possible aspiration pneumonia, now s/p extubation 1/27"    Source: [] Patient       [x] Medical record        [x] RN        [] Family at bedside       [] Other:    -If unable to interview patient: [] Trach/Vent/BiPAP  [x] Disoriented/confused/inappropriate to interview    Diet Order: Diet, NPO with Tube Feed:   Tube Feeding Modality: Nasogastric  Nepro with Carb Steady (NEPRORTH)  Total Volume for 24 Hours (mL): 960  Continuous  Starting Tube Feed Rate {mL per Hour}: 10  Increase Tube Feed Rate by (mL): 10     Every 4 hours  Until Goal Tube Feed Rate (mL per Hour): 40  Tube Feed Duration (in Hours): 24  Tube Feed Start Time: 11:45  Free Water Flush  Bolus   Total Volume per Flush (mL): 300   Frequency: Every 6 Hours   Total Daily Volume of Flush (mL): 120    Start Time: 11:45  No Carb Prosource TF     Qty per Day:  2 (01-27-22 @ 12:57) [Active]    Diet, NPO with Tube Feed:   Tube Feeding Modality: Nasogastric  Vital AF (VITALAFRTH)  Continuous  Starting Tube Feed Rate {mL per Hour}: 10  Increase Tube Feed Rate by (mL): 10     Every 4 hours  Until Goal Tube Feed Rate (mL per Hour): 50  Tube Feed Duration (in Hours): 24  Tube Feed Start Time: 17:00  Misael(7 Gm Arginine/7 Gm Glut/1.2 Gm HMB     Qty per Day:  2 (01-23-22 @ 16:12) [Pending Verification By Attending]    Current EN regimen: provides 960mL formula, 1728 Kcal, 78 g protein, 698 mL free water. Meets 31.4 kcal/kg and 1.4 g/kg protein based on IBW+10% 121 pounds/55 kg.      EN provision:   (1/27): tube feeds not running at time of RD visit, held as pt getting extubated per chart.   (1/26): 360 mL, meets 37% of EN provision  (1/25): 960 mL, meets 100% of EN provision  (1/24): 960 mL, meets 100% of EN provision  (1/23): 640 mL, meets 67% of EN provision  Overall, meets 76% of EN provision.     Nutrition-related concerns:  - Pt extubated today, placed on venturi mask per airway removal note.  - Pt on Pressors: Y [  ] N [ X ]     - Pt on Propofol: Y [  ] N [ X ]    GI: Last BM on 1/26 with loose stools per nursing flow sheet, currently on bowel regimen (Miralax, Senna syrup).     Weights:   Dosing weight: 144.8 pounds (1/21)  Per [facility] HIE:  160 pounds (8/2021); 175 pounds (11/2020)  No new weights listed. Will continue to monitor as able.     Nutritionally Pertinent Medications: Lovenox, Admelog, Multvitamin, Protonix, Miralax, Lipitor, Decadron, Synthroid (IV), Senna syrup    Labs: on 1/27  Na 147 (H)  BUN 42 (H)    Fingersticks:  (1/27);  (1/26); 138-184 (1/25)  Hb A1c 5.7 (H) (1/21)    Skin per wound care: Stage 2 pressure injury on L-lateral foot; Deep tissue injury on b/l buttocks, sacrum   Edema per nursing documentation: +1 Edema generalized    Estimated Needs:   [x] no change since previous assessment  Based on IBW+10% of 121 pounds   Energy needs: 30-35 kcal/day (0544-9173 calories)  Protein needs: 1.3-1.6 g/kg (71.24-87.68 g protein)   [] recalculated:     Previous Nutrition Diagnosis:   1. Increased nutrient needs  Nutrition Diagnosis is: [x] ongoing  [] resolved [] not applicable     New Nutrition Diagnosis: [x] Not applicable    Nutrition Care Plan:  [x] In Progress - pt's estimated nutrient needs being met with EN regimen, micronutrient supplementation  [] Achieved  [] Not applicable    Nutrition Interventions: Education Provided: [] Yes:  [x] No: pt not an appropriate candidate for diet education as pt intubated at time of RD visit.     Recommendations:      1. Recommend to change EN regimen to: Vital AF 1.2 @60mL/hr x 24 hrs to provide 1440mL formula, 1728 kcal, 108g protein, 1168 mL free water; it also provides 31.4 kcal/kg and 2.0 g/kg protein based on IBW+10% of 121 pounds/55 kg.   2. If medically feasible, recommend to add vitamin C to aid in wound healing.   3. Continue micronutrient supplementation: multivitamin as per team.  4. Monitor weight trends, labs (basic metabolic panel, glucose, electrolytes) hydration status, BM, GI distress, skin integrity   5. RD to remain available to adjust EN formulary as needed/able.     Monitoring and Evaluation:   Continue to monitor nutritional intake, tolerance to diet prescription, weights, labs, skin integrity    Lamin Wallace, Dietetic Intern Pager#[phone]   RD remains available upon request and will follow up per protocol Nutrition Follow Up Note  Patient seen for: follow-up    Chart reviewed, events noted - "Pt is a 70 y/o female, with a PMH of Afib on apixaban, HFrEF, CAD s/p stents, NSTEMI (2020), wheelchair bound, hypothyroidism, CVA with L sided residual weakness (8/2021), sacral decubitus, pressure heel ulcers s/p COVID-19 (2020), who presented with AMS and fever and admitted to MICU with metabolic encephalopathy, septic shock of unknown origin, s/p intubation, possible aspiration pneumonia, now s/p extubation 1/27"    Source: [] Patient       [x] Medical record        [ ] RN        [] Family at bedside       [x] Other: RD observation    -If unable to interview patient: [] Trach/Vent/BiPAP  [x] Disoriented/confused/inappropriate to interview    - Pt on Pressors: Y [  ] N [ X ]     - Pt on Propofol: Y [  ] N [ X ]    Diet Order: Diet, NPO with Tube Feed:   Tube Feeding Modality: Nasogastric  Nepro with Carb Steady (NEPRORTH)  Total Volume for 24 Hours (mL): 960  Continuous  Starting Tube Feed Rate {mL per Hour}: 10  Increase Tube Feed Rate by (mL): 10     Every 4 hours  Until Goal Tube Feed Rate (mL per Hour): 40  Tube Feed Duration (in Hours): 24  Tube Feed Start Time: 11:45  Free Water Flush  Bolus   Total Volume per Flush (mL): 300   Frequency: Every 6 Hours   Total Daily Volume of Flush (mL): 120    Start Time: 11:45  No Carb Prosource TF     Qty per Day:  2 (01-27-22 @ 12:57) [Active]    Current EN regimen: provides 960mL formula, 1728 Kcal, 78 g protein, 698 mL free water. Meets 31.4 kcal/kg and 1.4 g/kg protein based on IBW+10% 121 pounds/55 kg.      EN provision:   (1/27): tube feeds not running at time of RD visit, held as pt getting extubated per chart.   (1/26): 360 mL, meets 37% of EN provision  (1/25): 960 mL, meets 100% of EN provision  (1/24): 960 mL, meets 100% of EN provision  (1/23): 640 mL, meets 67% of EN provision, tube feeds held for LP  4-day average 730 mL, meets 76% of EN provision, which provides 1315 kcals, 59 g protein.    Pt met 80% of lower estimated energy needs and 83% of lower estimated protein needs.     Nutrition-related concerns:  - Pt extubated today 1/27, placed on venturi mask per airway removal note.  - Per critical care note, CT A/P demonstrated fecal impaction, stercoral proctitis- had BM this AM 1/25   - Pt with hypernatremia, ordered for 300mL water flushes q4 hours.   - Pt on IV levothyroxine  - Continues on multivitamin  - Pt with history of T2DM on Admelog sliding scale. Noted, on Decadron. Pt receiving dextrose 5% per flow sheet.     GI: Last BM on 1/26 with loose stools early in the day followed by soft stools per nursing flow sheet, currently on bowel regimen (Miralax, Senna syrup).     Weights:   Dosing weight: 144.8 pounds/65.7 kg (1/21)  Per [facility] HIE:  160 pounds/72.7 kg (8/2021); 175 pounds/79.5 kg (11/2020)  No new weights listed. Will continue to monitor as able.     Nutritionally Pertinent Medications: Lovenox, Admelog, Multvitamin, Protonix, Miralax, Lipitor, Decadron, Synthroid (IV), Senna syrup    Labs: on 1/27  Na 147 (H)  BUN 42 (H)    Fingersticks:  (1/27);  (1/26); 138-184 (1/25)  Hb A1c 5.7 (H) (1/21)    Skin per wound care: Stage 2 pressure injury on L-lateral foot; Deep tissue injury on b/l buttocks, sacrum   Edema per nursing documentation: +1 Edema generalized    Estimated Needs:   [x] no change since previous assessment  Based on IBW+10% of 121 pounds/55 kg  Energy needs: 30-35 kcal/day (5411-2832 calories)  Protein needs: 1.3-1.6 g/kg (71.24-87.68 g protein)   Defer fluid needs to team   [] recalculated:     Previous Nutrition Diagnosis: Increased nutrient needs  Nutrition Diagnosis is: [x] ongoing  [] resolved [] not applicable     New Nutrition Diagnosis: [x] Not applicable    Nutrition Care Plan:  [x] In Progress - pt's estimated nutrient needs being met with EN regimen, micronutrient supplementation  [] Achieved  [] Not applicable    Nutrition Interventions: Education Provided: [] Yes:  [x] No: pt not an appropriate candidate for diet education as pt intubated at time of RD visit.     Recommendations:      1. If pt to continue on EN, recommend to change EN regimen to: Glucerna 1.2 @60mL/hr x 24 hrs to provide 1440mL formula, 1728 kcal, 86.4g protein, 1159 mL free water; it also provides 31.4 kcal/kg and 1.6 g/kg protein based on IBW+10% of 121 pounds/55 kg.  - If medically feasible, consider Misael 2x/day to aid in wound healing  - It pt to be on PO diet, consider consistent carbohydrate diet w/evening snack, low sodium diet. Consistency deferred to team.   2. If medically feasible, recommend to add vitamin C to aid in wound healing.   3. Continue micronutrient supplementation: multivitamin as per team.  4. Monitor weight trends, labs (basic metabolic panel, glucose, electrolytes) hydration status, BM, GI distress, skin integrity   5. RD to remain available to adjust EN formulary as needed/able.     Monitoring and Evaluation:   Continue to monitor nutritional intake, tolerance to diet prescription, weights, labs, skin integrity    Lamin Wallace, Dietetic Intern Pager#[phone]   RD remains available upon request and will follow up per protocol Nutrition Follow Up Note  Patient seen for: follow-up    Chart reviewed, events noted - "Pt is a 70 y/o female, with a PMH of Afib on apixaban, HFrEF, CAD s/p stents, NSTEMI (2020), wheelchair bound, hypothyroidism, CVA with L sided residual weakness (8/2021), sacral decubitus, pressure heel ulcers s/p COVID-19 (2020), who presented with AMS and fever and admitted to MICU with metabolic encephalopathy, septic shock of unknown origin, s/p intubation, possible aspiration pneumonia, now s/p extubation 1/27"    Source: [] Patient       [x] Medical record        [ ] RN        [] Family at bedside       [x] Other: RD observation    -If unable to interview patient: [] Trach/Vent/BiPAP  [x] Disoriented/confused/inappropriate to interview    - Pt on Pressors: Y [  ] N [ X ]     - Pt on Propofol: Y [  ] N [ X ]    Diet Order: Diet, NPO with Tube Feed:   Tube Feeding Modality: Nasogastric  Nepro with Carb Steady (NEPRORTH)  Total Volume for 24 Hours (mL): 960  Continuous  Starting Tube Feed Rate {mL per Hour}: 10  Increase Tube Feed Rate by (mL): 10     Every 4 hours  Until Goal Tube Feed Rate (mL per Hour): 40  Tube Feed Duration (in Hours): 24  Tube Feed Start Time: 11:45  Free Water Flush  Bolus   Total Volume per Flush (mL): 300   Frequency: Every 6 Hours   Total Daily Volume of Flush (mL): 120    Start Time: 11:45  No Carb Prosource TF     Qty per Day:  2 (01-27-22 @ 12:57) [Active]    Current EN regimen: provides 960mL formula, 1728 Kcal, 78 g protein, 698 mL free water. Meets 31.4 kcal/kg and 1.4 g/kg protein based on IBW+10% 121 pounds/55 kg.      EN provision:   (1/27): tube feeds not running at time of RD visit, held as pt getting extubated per chart.   (1/26): 360 mL, meets 37% of EN provision  (1/25): 960 mL, meets 100% of EN provision  (1/24): 960 mL, meets 100% of EN provision  (1/23): 640 mL, meets 67% of EN provision, tube feeds held for LP  4-day EN average with No Carb Prosource TF  x2 daily: 730 mL, meets 76% of EN provision, which provides 1395 kcals, 81 g protein.    Pt met 85% of lower estimated energy needs and meeting protein needs     Nutrition-related concerns:  - Pt extubated today 1/27, placed on venturi mask per airway removal note.  - Per critical care note, CT A/P demonstrated fecal impaction, stercoral proctitis- had BM this AM 1/25   - Pt with hypernatremia, ordered for 300mL water flushes q4 hours.   - Pt on IV levothyroxine  - Continues on multivitamin  - Pt with history of T2DM on Admelog sliding scale. Noted, on Decadron. Pt receiving dextrose 5% per flow sheet.     GI: Last BM on 1/26 with loose stools early in the day followed by soft stools per nursing flow sheet, currently on bowel regimen (Miralax, Senna syrup).     Weights:   Dosing weight: 144.8 pounds/65.7 kg (1/21)  Per [facility] HIE:  160 pounds/72.7 kg (8/2021); 175 pounds/79.5 kg (11/2020)  No new weights listed. Will continue to monitor as able.     Nutritionally Pertinent Medications: Lovenox, Admelog, Multvitamin, Protonix, Miralax, Lipitor, Decadron, Synthroid (IV), Senna syrup    Labs: on 1/27  Na 147 (H)  BUN 42 (H)    Fingersticks:  (1/27);  (1/26); 138-184 (1/25)  Hb A1c 5.7 (H) (1/21)    Skin per wound care: Stage 2 pressure injury on L-lateral foot; Deep tissue injury on b/l buttocks, sacrum   Edema per nursing documentation: +1 Edema generalized    Estimated Needs:   [x] no change since previous assessment  Based on IBW+10% of 121 pounds/55 kg  Energy needs: 30-35 kcal/day (2150-2670 calories)  Protein needs: 1.3-1.6 g/kg (71.24-87.68 g protein)   Defer fluid needs to team   [] recalculated:     Previous Nutrition Diagnosis: Increased nutrient needs  Nutrition Diagnosis is: [x] ongoing  [] resolved [] not applicable     New Nutrition Diagnosis: [x] Not applicable    Nutrition Care Plan:  [x] In Progress - pt's estimated nutrient needs being met with EN regimen, micronutrient supplementation  [] Achieved  [] Not applicable    Nutrition Interventions: Education Provided: [] Yes:  [x] No: pt not an appropriate candidate for diet education as pt intubated at time of RD visit.     Recommendations:      1. If pt to continue on EN, recommend to change EN regimen to: Glucerna 1.2 @60mL/hr x 24 hrs to provide 1440mL formula, 1728 kcal, 86.4g protein, 1159 mL free water; it also provides 31.4 kcal/kg and 1.6 g/kg protein based on IBW+10% of 121 pounds/55 kg.  - If medically feasible, consider Misael 2x/day to aid in wound healing  - It pt to be on PO diet, consider consistent carbohydrate diet w/evening snack, low sodium diet. Consistency deferred to team.   2. If medically feasible, recommend to add vitamin C to aid in wound healing.   3. Continue micronutrient supplementation: multivitamin as per team.  4. Monitor weight trends, labs (basic metabolic panel, glucose, electrolytes) hydration status, BM, GI distress, skin integrity   5. RD to remain available to adjust EN formulary as needed/able.     Monitoring and Evaluation:   Continue to monitor nutritional intake, tolerance to diet prescription, weights, labs, skin integrity    Lamin Wallace, Dietetic Intern Pager#[phone]   RD remains available upon request and will follow up per protocol

## 2022-01-27 NOTE — CONSULT NOTE ADULT - ASSESSMENT
A/P:  71F Hx afib on apixaban, cad s/p stents, HFrEF, CVA with left sided residual weakness who presents from nursing home with AMS, fever. Found to be hypernatremic, hypotensive refractory to IVF requiring vasopressor, AMS secondary to metabolic encephalopathy s/p intubation, septic shock of unknown origin, possible aspiration pna.    Wound Consult requested to assist w/ management of Evolving Sacral / buttocks DTI  Incontinence of urine and stool      Buttocks and sacrum - TRIAD paste bid and prn soiling       continue w/ Pericare as per protocol  BLE elevation  Abx per MICU  Moisturize intact skin w/ SWEEN cream BID  Nutritional optimization as tolerated in pt w/ Increased nutritional needs        MVI & Vit C to promote wound healing and high quality protein  Continue turning and positioning w/ offloading assistive devices as per protocol  Waffle Cushion to chair when oob to chair  Continue w/ low air loss bed surface   Care as per medicine, will follow w/ you  Upon discharge f/u as outpatient at [facility] [address] [place], [place] [phone]  Seen w/ attng and D/w team & RN  Thank you for this consult  Siria Finch PA-C [id number]  we spent 50minutes face to face w/ this pt of which more than 50% of the time was spent counseling & coordinating care of this pt.

## 2022-01-27 NOTE — AIRWAY REMOVAL NOTE  ADULT & PEDS - ARTIFICAL AIRWAY REMOVAL COMMENTS
Written order for extubation verified. The patient was identified by full name and birth date compared to the identification band. Present during the procedure was RADHA Cheema

## 2022-01-27 NOTE — PROGRESS NOTE ADULT - SUBJECTIVE AND OBJECTIVE BOX
INTERVAL HPI/OVERNIGHT EVENTS: remains on PS down to 5/5/30.     Patient is a 71y old  Female who presents with a chief complaint of AMS (26 Jan 2022 08:26)    71F Afib on apixaban, HFrEF (50% 8/21/21),CAD s/p stents, NSTEMI (2020), arthritis, wheelchair bound, hypothyroidism, s/p left ureteral stent removal/2021) with residual non obstructive Rt renal calculi, E.faecalis UTI (8/2021) CVA with left sided residual weakness (8/2021), sacral decubitus, pressure heel ulcers s/p COVID-19 (2020), baseline pt is AOx2 assist with ADL. Pt presented to [facility] from [facility] when found to have AMS, fever with Tmax of 104. Pt in E.D. found to be obtunded, mottled, hypotensive SBP 80's refractory to 2.5 liters IVF requiring norepi gtt, fever with Temp of 104.5. In addition found to have hypernatremia with Na+ of 161, ANIKA with sCr 4.74 (baseline 0.77-1.22), AGMA of 25, with contraction alkalosis with serum HCO3 of 23, vph 7.50, vPCO2 of 35, lactate of 3.2, procalcitonin of 0.82, hsTrop of 239, Hgb of 14.9 (baseline from 8/2021 of 11). Pt with 9 liters total body water deficit. Pt received CT C/A/P/H (1/21/22) re demonstration of  Non obstructing right renal calculus, significant stool burden distending the rectum and sigmoid colon with mild perirectal fat stranding suggestive of fecal impaction and possible stercoral proctitis, No acute transcortical infarct, intracranial hemorrhage, however did show Chronic lacunar infarcts with chronic small vessel disease. Started on cefepime, vanco, tylenol.  Consult called and pt admitted MICU for AMS secondary to metabolic encephalopathy, septic shock of unknown origin, possible aspiration     ROS:  Unable to ROS , intubated. opens eyes and follows commands .     Allergies    penicillin (Hives)  penicillin (Rash)  sulfa drugs (Unknown)  Tetracycline Hydrochloride (Unknown)    Intolerances        ANTIBIOTICS/RELEVANT:  antimicrobials  remdesivir  IVPB   IV Intermittent   remdesivir  IVPB 100 milliGRAM(s) IV Intermittent every 24 hours  vancomycin  IVPB 1000 milliGRAM(s) IV Intermittent every 24 hours    immunologic:    OTHER:  acetaminophen    Suspension .. 650 milliGRAM(s) Oral every 6 hours PRN  aspirin  chewable 81 milliGRAM(s) Oral daily  atorvastatin 80 milliGRAM(s) Oral at bedtime  chlorhexidine 0.12% Liquid 15 milliLiter(s) Oral Mucosa every 12 hours  chlorhexidine 4% Liquid 1 Application(s) Topical <User Schedule>  dexAMETHasone  Injectable 6 milliGRAM(s) IV Push daily  dextrose 5%. 1000 milliLiter(s) IV Continuous <Continuous>  hydrALAZINE Injectable 5 milliGRAM(s) IV Push every 4 hours PRN  insulin lispro (ADMELOG) corrective regimen sliding scale   SubCutaneous every 6 hours  levothyroxine Injectable 56 MICROGram(s) IV Push at bedtime  multivitamin/minerals/iron Oral Solution (CENTRUM) 15 milliLiter(s) Oral daily  pantoprazole   Suspension 40 milliGRAM(s) Enteral Tube daily  polyethylene glycol 3350 17 Gram(s) Oral daily  senna Syrup 10 milliLiter(s) Oral at bedtime      Objective:  Vital Signs Last 24 Hrs  T(C): 36.3 (27 Jan 2022 00:00), Max: 37.2 (26 Jan 2022 12:00)  T(F): 97.3 (27 Jan 2022 00:00), Max: 99 (26 Jan 2022 12:00)  HR: 78 (27 Jan 2022 07:00) (60 - 80)  BP: 141/81 (27 Jan 2022 07:00) (138/82 - 193/92)  BP(mean): 105 (27 Jan 2022 07:00) (103 - 132)  RR: 29 (27 Jan 2022 07:00) (19 - 49)  SpO2: 100% (27 Jan 2022 07:00) (99% - 100%)    PHYSICAL EXAM:  GENERAL:  intubated, off sedation, , comfortable   CHEST/LUNG:  CTA B/L, intubated   HEART: s1 & s2 +   ABDOMEN: Soft, Nontender, Nondistended; Bowel sounds present x 4 quadrants   Extremities: + L axilla edema , hematoma improved   RLE with significant edema and ecchymosis compared to contralateral side (1/23 Bilateral arterial and venous LED negative).   LUE swelling, radial axillary flaca, 2 PIV's, warm to touch, soft, +radial palp pulse,   Antecubital IV noted to be erythematous and RN to remove.   NEUROLOGY: opens eyes to stimuli ,     LABS:                        9.2    9.96  )-----------( 213      ( 27 Jan 2022 03:18 )             30.4     01-27    147<H>  |  117<H>  |  42<H>  ----------------------------<  97  3.9   |  20<L>  |  0.66    Ca    8.3<L>      27 Jan 2022 00:16  Phos  2.5     01-26  Mg     2.4     01-26    TPro  5.6<L>  /  Alb  2.6<L>  /  TBili  0.3  /  DBili  0.1  /  AST  27  /  ALT  27  /  AlkPhos  77  01-27    PT/INR - ( 27 Jan 2022 00:16 )   PT: 12.6 sec;   INR: 1.05 ratio         PTT - ( 27 Jan 2022 00:16 )  PTT:19.3 sec        MICROBIOLOGY:    Culture - Fungal, CSF (01.24.22 @ 00:09)    Specimen Source: .CSF CSF    Culture Results:   Testing in progress    Culture - Acid Fast - CSF (01.24.22 @ 00:09)    Specimen Source: .CSF CSF    Acid Fast Bacilli Smear:   Less than 5 cc of CSF received,  unable to perform AFB smear.    Culture - CSF with Gram Stain . (01.24.22 @ 00:09)    Gram Stain:   polymorphonuclear leukocytes seen  No organisms seen  by cytocentrifuge    Specimen Source: .CSF CSF    Culture Results:   No growth to date.    Culture - Sputum . (01.21.22 @ 18:47)    Gram Stain:     Organism Identification: Methicillin resistant Staphylococcus aureus    Organism: Methicillin resistant Staphylococcus aureus    Method Type: KRISTOPHER    Culture - Urine (01.21.22 @ 14:46)    Specimen Source: Clean Catch Clean Catch (Midstream)    Culture Results:   No growth    Culture - Blood (01.21.22 @ 13:31)    Specimen Source: .Blood Blood-Peripheral    Culture Results:   No growth to date.        RADIOLOGY & ADDITIONAL STUDIES:    < from: Xray Chest 1 View- PORTABLE-Urgent (Xray Chest 1 View- PORTABLE-Urgent .) (01.22.22 @ 02:41) >    PROCEDURE DATE:  01/21/2022          INTERPRETATION:  EXAMINATION: XR CHEST URGENT, XR CHEST URGENT    CLINICAL INDICATION: Enteric tube positioning, status post intubation    TECHNIQUE: Single frontal, portable view of the chest was obtained.    COMPARISON: Chest radiograph 1/21/2022 at 8:56 AM.    FINDINGS:    Chest radiograph 1/21/2022 at 3:35 PM:    Very Limited evaluation secondary to patient positioning.  Enteric tube likely coiled stomach.  The heart size is not well evaluated.  The small amount of left lung tissue visualized is clear.  Unable to evaluate for pleural effusion or pneumothorax in this   positioning.    Chest radiograph 1/22/2022 at 2:34 AM:    Interval placement of endotracheal tube with tip terminating   approximately 1.5 cm from the julian. Enteric tube seen coursing under   the diaphragm with tip excluded from the image.  The heart is normal in size.  Trace left basilar linear atelectasis. No focal consolidations.  No pleural effusion or pneumothorax.    IMPRESSION:  Tubes and lines as above.  Trace left basilar linear atelectasis without focal consolidation.    < end of copied text >     INTERVAL HPI/OVERNIGHT EVENTS: remains on PS down to 5/5/30.     Patient is a 71y old  Female who presents with a chief complaint of AMS (26 Jan 2022 08:26)    71F Afib on apixaban, HFrEF (50% 8/21/21),CAD s/p stents, NSTEMI (2020), arthritis, wheelchair bound, hypothyroidism, s/p left ureteral stent removal/2021) with residual non obstructive Rt renal calculi, E.faecalis UTI (8/2021) CVA with left sided residual weakness (8/2021), sacral decubitus, pressure heel ulcers s/p COVID-19 (2020), baseline pt is AOx2 assist with ADL. Pt presented to [facility] from [facility] when found to have AMS, fever with Tmax of 104. Pt in E.D. found to be obtunded, mottled, hypotensive SBP 80's refractory to 2.5 liters IVF requiring norepi gtt, fever with Temp of 104.5. In addition found to have hypernatremia with Na+ of 161, ANIKA with sCr 4.74 (baseline 0.77-1.22), AGMA of 25, with contraction alkalosis with serum HCO3 of 23, vph 7.50, vPCO2 of 35, lactate of 3.2, procalcitonin of 0.82, hsTrop of 239, Hgb of 14.9 (baseline from 8/2021 of 11). Pt with 9 liters total body water deficit. Pt received CT C/A/P/H (1/21/22) re demonstration of  Non obstructing right renal calculus, significant stool burden distending the rectum and sigmoid colon with mild perirectal fat stranding suggestive of fecal impaction and possible stercoral proctitis, No acute transcortical infarct, intracranial hemorrhage, however did show Chronic lacunar infarcts with chronic small vessel disease. Started on cefepime, vanco, tylenol.  Consult called and pt admitted MICU for AMS secondary to metabolic encephalopathy, septic shock of unknown origin, possible aspiration     ROS:  Unable to ROS , intubated. opens eyes and follows commands .     Allergies    penicillin (Hives)  penicillin (Rash)  sulfa drugs (Unknown)  Tetracycline Hydrochloride (Unknown)    Intolerances        ANTIBIOTICS/RELEVANT:  antimicrobials  remdesivir  IVPB   IV Intermittent   remdesivir  IVPB 100 milliGRAM(s) IV Intermittent every 24 hours  vancomycin  IVPB 1000 milliGRAM(s) IV Intermittent every 24 hours    immunologic:    OTHER:  acetaminophen    Suspension .. 650 milliGRAM(s) Oral every 6 hours PRN  aspirin  chewable 81 milliGRAM(s) Oral daily  atorvastatin 80 milliGRAM(s) Oral at bedtime  chlorhexidine 0.12% Liquid 15 milliLiter(s) Oral Mucosa every 12 hours  chlorhexidine 4% Liquid 1 Application(s) Topical <User Schedule>  dexAMETHasone  Injectable 6 milliGRAM(s) IV Push daily  dextrose 5%. 1000 milliLiter(s) IV Continuous <Continuous>  hydrALAZINE Injectable 5 milliGRAM(s) IV Push every 4 hours PRN  insulin lispro (ADMELOG) corrective regimen sliding scale   SubCutaneous every 6 hours  levothyroxine Injectable 56 MICROGram(s) IV Push at bedtime  multivitamin/minerals/iron Oral Solution (CENTRUM) 15 milliLiter(s) Oral daily  pantoprazole   Suspension 40 milliGRAM(s) Enteral Tube daily  polyethylene glycol 3350 17 Gram(s) Oral daily  senna Syrup 10 milliLiter(s) Oral at bedtime      Objective:  Vital Signs Last 24 Hrs  T(C): 36.3 (27 Jan 2022 00:00), Max: 37.2 (26 Jan 2022 12:00)  T(F): 97.3 (27 Jan 2022 00:00), Max: 99 (26 Jan 2022 12:00)  HR: 78 (27 Jan 2022 07:00) (60 - 80)  BP: 141/81 (27 Jan 2022 07:00) (138/82 - 193/92)  BP(mean): 105 (27 Jan 2022 07:00) (103 - 132)  RR: 29 (27 Jan 2022 07:00) (19 - 49)  SpO2: 100% (27 Jan 2022 07:00) (99% - 100%)    PHYSICAL EXAM:  GENERAL:  intubated, off sedation, , comfortable   CHEST/LUNG:  CTA B/L, intubated   HEART: s1 & s2 +   ABDOMEN: Soft, Nontender, Nondistended; Bowel sounds present x 4 quadrants   Extremities: + L axilla edema , hematoma improved   RLE with significant edema and ecchymosis compared to contralateral side (1/23 Bilateral arterial and venous LED negative).   LUE swelling, radial axillary flaca, 2 PIV's, warm to touch, soft, +radial palp pulse,   Antecubital IV noted to be erythematous and RN to remove.   NEUROLOGY: opens eyes to stimuli ,     LABS:                        9.2    9.96  )-----------( 213      ( 27 Jan 2022 03:18 )             30.4     01-27    147<H>  |  117<H>  |  42<H>  ----------------------------<  97  3.9   |  20<L>  |  0.66    Ca    8.3<L>      27 Jan 2022 00:16  Phos  2.5     01-26  Mg     2.4     01-26    TPro  5.6<L>  /  Alb  2.6<L>  /  TBili  0.3  /  DBili  0.1  /  AST  27  /  ALT  27  /  AlkPhos  77  01-27    PT/INR - ( 27 Jan 2022 00:16 )   PT: 12.6 sec;   INR: 1.05 ratio         PTT - ( 27 Jan 2022 00:16 )  PTT:19.3 sec        MICROBIOLOGY:    Culture - Fungal, CSF (01.24.22 @ 00:09)    Specimen Source: .CSF CSF    Culture Results:   Testing in progress    Culture - Acid Fast - CSF (01.24.22 @ 00:09)    Specimen Source: .CSF CSF    Acid Fast Bacilli Smear:   Less than 5 cc of CSF received,  unable to perform AFB smear.    Culture - CSF with Gram Stain . (01.24.22 @ 00:09)    Gram Stain:   polymorphonuclear leukocytes seen  No organisms seen  by cytocentrifuge    Specimen Source: .CSF CSF    Culture Results:   No growth to date.    Culture - Sputum . (01.21.22 @ 18:47)    Gram Stain:     Organism Identification: Methicillin resistant Staphylococcus aureus    Organism: Methicillin resistant Staphylococcus aureus    Method Type: KRISTOPHER    Culture - Urine (01.21.22 @ 14:46)    Specimen Source: Clean Catch Clean Catch (Midstream)    Culture Results:   No growth    Culture - Blood (01.21.22 @ 13:31)    Specimen Source: .Blood Blood-Peripheral    Culture Results:   No growth to date.        RADIOLOGY & ADDITIONAL STUDIES:    < from: Xray Chest 1 View- PORTABLE-Urgent (Xray Chest 1 View- PORTABLE-Urgent .) (01.22.22 @ 02:41) >    PROCEDURE DATE:  01/21/2022          INTERPRETATION:  EXAMINATION: XR CHEST URGENT, XR CHEST URGENT    CLINICAL INDICATION: Enteric tube positioning, status post intubation    TECHNIQUE: Single frontal, portable view of the chest was obtained.    COMPARISON: Chest radiograph 1/21/2022 at 8:56 AM.    FINDINGS:    Chest radiograph 1/21/2022 at 3:35 PM:    Very Limited evaluation secondary to patient positioning.  Enteric tube likely coiled stomach.  The heart size is not well evaluated.  The small amount of left lung tissue visualized is clear.  Unable to evaluate for pleural effusion or pneumothorax in this   positioning.    Chest radiograph 1/22/2022 at 2:34 AM:    Interval placement of endotracheal tube with tip terminating   approximately 1.5 cm from the julian. Enteric tube seen coursing under   the diaphragm with tip excluded from the image.  The heart is normal in size.  Trace left basilar linear atelectasis. No focal consolidations.  No pleural effusion or pneumothorax.    IMPRESSION:  Tubes and lines as above.  Trace left basilar linear atelectasis without focal consolidation.    < end of copied text >    ACC: [id number] EXAM:  US DPLX UPR EXT ARTS LTD LT                          PROCEDURE DATE:  01/26/2022          INTERPRETATION:  Clinical information: Status post manual compression of   left axillary arterial pseudoaneurysm.    COMPARISON: Left upper extremity arterial duplex study dated 1/25/2022.    TECHNIQUE: Targeted left upper extremity arterial duplex study.    FINDINGS: The left axillary artery and vein are patent and demonstrate   normal spectral waveforms. There is a 3 x 1 cm soft tissue hematoma at   the site of the previously identified axillary artery pseudoaneurysm. No   residual blood flow is demonstrated within it or its neck.    IMPRESSION: 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

## 2022-01-27 NOTE — PROGRESS NOTE ADULT - ATTENDING COMMENTS
Agree with above except as noted. 71F Hx afib on apixaban, cad s/p stents, HFrEF, CVA with left sided residual weakness who presents from nursing home p/w AMS. Pt w/ hypernatremic, septic shock and covid +.    Neuro - Sz in ER likely metabolic in nature. appreciate Neuro reccs. repeat EEG w/o active sz    s/p Lumbar puncture negative for infection and fluid w/ minimal inflammation  pt more awake and following commands and stevens    Resp -  Minimal vent requirements. Lung protective ventilation strategy. today did well on PS trial and now extubated    Cardiovascular - off pressors but maintain map >65. hep gtt for chronic AF  US w/ pseudoaneurysm now thrombosed, vascular consult appreciated  no further interventions. ext warm and well perfused    GI - bowel regimen cont for stercoral ulcer, had BM     - ANIKA on CKD, also hypernatremia- closely monitor Na, cont free water    Hem/Onc - dvt ppx SQH    ID - cont dexamethasone for covid, does not need rdv  CSF fluid cx negative previously. monitor off abx    Skin cont wound care, f/u wound care/nursing reccs for dti

## 2022-01-27 NOTE — CONSULT NOTE ADULT - SUBJECTIVE AND OBJECTIVE BOX
Wound SURGERY CONSULT NOTE    HPI:  71F Afib on apixaban, HFrEF (50% 8/21/21),CAD s/p stents, NSTEMI (2020), arthritis, wheelchair bound, hypothyroidism, s/p left ureteral stent removal/2021) with residual non obstructive Rt renal calculi, E.faecalis UTI (8/2021) CVA with left sided residual weakness (8/2021), sacral decubitus, pressure heel ulcers s/p COVID-19 (2020), baseline pt is AOx2 assist with ADL. Pt presented to [facility] from [facility] when found to have AMS, fever with Tmax of 104. Pt in E.D. found to be obtunded, mottled, hypotensive SBP 80's refractory to 2.5 liters IVF requiring norepi gtt, fever with Temp of 104.5. In addition found to have hypernatremia with Na+ of 161, ANIKA with sCr 4.74 (baseline 0.77-1.22), AGMA of 25, with contraction alkalosis with serum HCO3 of 23, vph 7.50, vPCO2 of 35, lactate of 3.2, procalcitonin of 0.82, hsTrop of 239, Hgb of 14.9 (baseline from 8/2021 of 11). Pt with 9 liters total body water deficit. Pt received CT C/A/P/H (1/21/22) re demonstration of  Non obstructing right renal calculus, significant stool burden distending the rectum and sigmoid colon with mild perirectal fat stranding suggestive of fecal impaction and possible stercoral proctitis, No acute transcortical infarct, intracranial hemorrhage, however did show Chronic lacunar infarcts with chronic small vessel disease. Started on cefepime, vanco, tylenol.  Consult called and pt admitted MICU for AMS secondary to metabolic encephalopathy, septic shock of unknown origin, possible aspiration vs COVID PNA.  Pt now improving- off pressors and extubated.  Wound consult requested to assist w/ management of sacral buttocks pressure injury. Pt unable to  c/o pain, drainage, odor, color change,  worsening swelling. Increasingly sedentary.  Incontinent of urine & stool.  WON evaluated pt and Kobe w/ pericare and offloading reinforced. RD evaluation appreciated and pt started on Nepro TF    Current Diet: Diet, NPO with Tube Feed:   Tube Feeding Modality: Nasogastric  Nepro with Carb Steady (NEPRORTH)  Total Volume for 24 Hours (mL): 960  Continuous  Starting Tube Feed Rate mL per Hour: 10  Increase Tube Feed Rate by (mL): 10     Every 4 hours  Until Goal Tube Feed Rate (mL per Hour): 40  Tube Feed Duration (in Hours): 24  Tube Feed Start Time: 11:45  Free Water Flush  Bolus   Total Volume per Flush (mL): 300   Frequency: Every 6 Hours   Total Daily Volume of Flush (mL): 120    Start Time: 11:45  No Carb Prosource TF     Qty per Day:  2 (01-27-22 @ 12:57)      PAST MEDICAL & SURGICAL HISTORY:  Hypothyroid  Essential hypertension  Obesity  HTN (hypertension)  DM (diabetes mellitus)  S/P carpal tunnel release  2019 novel coronavirus disease, unknown if vaccinated  Urosepsis due to stone 8/2020  S/P cystoscopy w/ stent placement      REVIEW OF SYSTEMS: Pt unable to offer      MEDICATIONS  (STANDING):  aspirin  chewable 81 milliGRAM(s) Oral daily  atorvastatin 80 milliGRAM(s) Oral at bedtime  chlorhexidine 4% Liquid 1 Application(s) Topical <User Schedule>  dexAMETHasone  Injectable 6 milliGRAM(s) IV Push daily  dextrose 5%. 1000 milliLiter(s) (50 mL/Hr) IV Continuous <Continuous>  enoxaparin Injectable 60 milliGRAM(s) SubCutaneous every 12 hours  insulin lispro (ADMELOG) corrective regimen sliding scale   SubCutaneous every 6 hours  levothyroxine Injectable 56 MICROGram(s) IV Push at bedtime  multivitamin/minerals/iron Oral Solution (CENTRUM) 15 milliLiter(s) Oral daily  pantoprazole   Suspension 40 milliGRAM(s) Enteral Tube daily  polyethylene glycol 3350 17 Gram(s) Oral daily  remdesivir  IVPB   IV Intermittent   remdesivir  IVPB 100 milliGRAM(s) IV Intermittent every 24 hours  senna Syrup 10 milliLiter(s) Oral at bedtime    MEDICATIONS  (PRN):  acetaminophen    Suspension .. 650 milliGRAM(s) Oral every 6 hours PRN Temp greater or equal to 38C (100.4F)  hydrALAZINE Injectable 5 milliGRAM(s) IV Push every 4 hours PRN SBP >160      Allergies  penicillin (Hives & Rash)  sulfa drugs (Unknown)  Tetracycline Hydrochloride (Unknown)    SOCIAL HISTORY: single, lives in SNF;  Denies smoking, ETOH, drugs    FAMILY HISTORY: no h/o vascular or skin/ wound healing problems     (+) Family history of lung cancer &  myocardial infarction (Sibling)        PHYSICAL EXAM:  Vital Signs Last 24 Hrs  T(C): 36.6 (27 Jan 2022 16:00), Max: 37.1 (27 Jan 2022 12:00)  T(F): 97.9 (27 Jan 2022 16:00), Max: 98.8 (27 Jan 2022 12:00)  HR: 81 (27 Jan 2022 17:00) (66 - 88)  BP: 153/76 (27 Jan 2022 17:00) (131/76 - 175/86)  BP(mean): 108 (27 Jan 2022 17:00) (98 - 124)  RR: 28 (27 Jan 2022 17:00) (18 - 49)  SpO2: 100% (27 Jan 2022 17:00) (99% - 100%)    guarded but stable, lethargic, arousable, Obese, frail  Total Care Sport bed  HEENT:  NC/AT, PERRL, EOMI, mucosa moist, throat clear, trachea midline, neck supple  Cardiovascular: RRR   Respiratory: CTA  Gastrointestinal: soft NT/ND (+)BS  (+)NGT  Neurology  weakened strength Rt sided, Lt sided paralysis  Musculoskeletal:  passive ROM, no deformities  Vascular: BLE equally warm,  no cyanosis, clubbing, BLE edema   Skin:  pale, moist w/ good turgor   sacrum and b/l buttocks evolving deep tissue injury       upper buttocks/ sacrum  there are several maroon purple hyperpigmented areas each 2cm x 2cm x 0cm      lower buttocks w/ nonblanchable deep maroon linear wound 2cm x 8cm x 0cm       no blistering, open skin, nor drainage  No odor, erythema, increased warmth, tenderness, induration, fluctuance    LABS/ CULTURES/ RADIOLOGY:                        9.2    9.96  )-----------( 213      ( 27 Jan 2022 03:18 )             30.4       147  |  117  |  42  ----------------------------<  97      [01-27-22 @ 00:16]  3.9   |  20  |  0.66        Ca     8.3     [01-27-22 @ 00:16]      Mg     2.4     [01-26-22 @ 01:30]      Phos  2.5     [01-26-22 @ 01:30]    TPro  5.6  /  Alb  2.6  /  TBili  0.3  /  DBili  0.1  /  AST  27  /  ALT  27  /  AlkPhos  77  [01-27-22 @ 00:16]    PT/INR: PT 12.6 , INR 1.05       [01-27-22 @ 00:16]  PTT: 19.3       [01-27-22 @ 00:16]    Culture - Blood (collected 01-21-22 @ 13:31)  Source: .Blood Blood-Peripheral  Final Report (01-26-22 @ 14:01):    No Growth Final    Culture - Blood (collected 01-21-22 @ 13:24)  Source: .Blood Blood-Peripheral  Final Report (01-26-22 @ 14:01):    No Growth Final

## 2022-01-27 NOTE — PROGRESS NOTE ADULT - ASSESSMENT
ASSESSMENT: 71F Hx afib on apixaban, cad s/p stents, HFrEF, CVA with left sided residual weakness who presents from nursing home with AMS, fever. Found to be hypernatremic, hypotensive refractory to IVF requiring vasopressor, AMS secondary to metabolic encephalopathy s/p intubation, septic shock of unknown origin, possible aspiration pna.      #Neuro:  AMS 2/2 Metabolic encephalopathy   -  Hx CVA with residual left sided weak/flaccid  -off sedation ~ 24 hrs   - 1/24 VEEG Negative likely-metabolic encephalopathy    - 1/21 CTH without acute changes, re demonstration of lacuna infarcts   -  Neuro checks q 2 hrs and prn for changes  -  s/p LP, glucose 86, protein 48- negative. all abx and anti-viral dcd    #Pulm:  Acute hypoxic respiratory failure 2/2 COVID-19 PNA and superimposed MRSA PNA   - intubated PS 5/530 %  -ABG - ( 27 Jan 2022 03:26 )pH, Arterial: 7.45  pH, Blood: x     /  pCO2: 34    /  pO2: 151   / HCO3: 24    / Base Excess: -0.1  /  SaO2: 98.5    -PS trail as tolerated   -ABG PRN   -c/w COVID-19 PNA tx   -completed  Vancomycin for MRSA PNA 1/21-1/26        #CV:  HTN  -Hydralazine 5 mg IVP > 160     - Hx NSTEMI 8/2021, HFrEF- hyperdynamic   - CAD s/p stents  - c/w ASA 81 mg qd    - Hx Afib on apixaban at home  -heparin gtt on hold 2/2 pseudoaneurysm in L axillary post a line removal    -bleeding precaution       #GI  Oropharyngeal dysphagia   -OGT w TF as tolerated   - CT A/P demonstrated fecal impaction, stercoral proctitis- had BM this AM 1/25   -BM 1/26   -c/w bowel regimen        -UO 45-75 ml /hr   - strict I & O's ; net even     Hypernatremia   149-->147  -c/w free h20 300 q 4 hrs   -d5. 0.22 @ 50       #Endo:  - decadron   - ISS    Hypothyroidism   -c/w  levothyroxine           #Heme/onc:  - 1/24 LUE swelling, flaca d/c'd no longer functional- arterial duplex showing small pseudo anusurym in L ax artery. duplex neg  - vascular team input appreciated  - Hold heparin gtt/ Plan to hold compression with U/S  & Obtain arterial duplex after PSA compression   - 1/23 LED neg  - 1/23 RLE with significant edema and ecchymosis compared to contralateral side (arterial and venous LED's both negative).         #ID:  MRSA PNA   - on vancomycin 1g q 24 hrs , 1/21 ---> 1/26  -BCx x 2 1/21  NGTD   -level 14.6 1/25   -trend WBC  -trend T curve     AMS:  -LP NGTD       COVID-19 PNA   -c/w remdsivir 1/24- 1/28  -c/w decadron 1/21- 1/31      PPX  DVT ppx :  SCD's bilateral  GI ppx ; PPI       GOC  -FULL      ASSESSMENT: 71F Hx afib on apixaban, cad s/p stents, HFrEF, CVA with left sided residual weakness who presents from nursing home with AMS, fever. Found to be hypernatremic, hypotensive refractory to IVF requiring vasopressor, AMS secondary to metabolic encephalopathy s/p intubation, septic shock of unknown origin, possible aspiration pna.      #Neuro:  AMS 2/2 Metabolic encephalopathy   -  Hx CVA with residual left sided weak/flaccid  -off sedation ~ 24 hrs   - 1/24 VEEG Negative likely-metabolic encephalopathy    - 1/21 CTH without acute changes, re demonstration of lacuna infarcts   -  Neuro checks q 2 hrs and prn for changes  -  s/p LP, glucose 86, protein 48- negative. all abx and anti-viral dcd  -re-check TSH level     #Pulm:  Acute hypoxic respiratory failure 2/2 COVID-19 PNA and superimposed MRSA PNA   - intubated PS 5/530 %  -ABG - ( 27 Jan 2022 03:26 )pH, Arterial: 7.45  pH, Blood: x     /  pCO2: 34    /  pO2: 151   / HCO3: 24    / Base Excess: -0.1  /  SaO2: 98.5    -PS trail as tolerated   -ABG PRN   -c/w COVID-19 PNA tx   -completed  Vancomycin for MRSA PNA 1/21-1/26        #CV:  HTN  -Hydralazine 5 mg IVP > 160     - Hx NSTEMI 8/2021, HFrEF- hyperdynamic   - CAD s/p stents  - c/w ASA 81 mg qd    - Hx Afib on apixaban at home  -heparin gtt placed on 2/2 pseudoaneurysm in L axillary post a line removal  ; DV duplex thrombosed now resolved post compression . Will resume AC with Lovenox 65 mg BID .   -bleeding precaution       #GI  Oropharyngeal dysphagia   -OGT w TF as tolerated   - CT A/P demonstrated fecal impaction, stercoral proctitis- had BM this AM 1/25   -BM 1/26   -c/w bowel regimen        -UO 45-75 ml /hr   - strict I & O's ; net even     Hypernatremia   149-->147  -c/w free h20 300 q 4 hrs   -d5.  @ 50       #Endo:  -NPO with TF   - ISS    Hypothyroidism   -c/w  levothyroxine   -TSH level           #Heme/onc:  - 1/24 LUE swelling, flaca d/c'd no longer functional- arterial duplex showing small pseudo anusurym in L ax artery. duplex neg  - vascular team input appreciated  - Hold heparin gtt/ Plan to hold compression with U/S  & Obtain arterial duplex after PSA compression   -1/26 L axillary area pseudoaneurysm ; thrombosed . will resume AC with Lovenox 65 BID   -bleeding precaution       #ID:  MRSA PNA   - on vancomycin 1g q 24 hrs , 1/21 ---> 1/26  -BCx x 2 1/21  NGTD   -trend WBC  -trend T curve     AMS:  -LP NGTD       COVID-19 PNA   -c/w remdsivir 1/24- 1/28  -c/w decadron 1/21- 1/31      PPX  DVT ppx :  Lovenox SQ for ( A fib )   GI ppx ; PPI       GOC  -FULL      ASSESSMENT: 71F Hx afib on apixaban, cad s/p stents, HFrEF, CVA with left sided residual weakness who presents from nursing home with AMS, fever. Found to be hypernatremic, hypotensive refractory to IVF requiring vasopressor, AMS secondary to metabolic encephalopathy s/p intubation, septic shock of unknown origin, possible aspiration pna.      #Neuro:  AMS 2/2 Metabolic encephalopathy   -  Hx CVA with residual left sided weak/flaccid  -off sedation ~ 24 hrs   - 1/24 VEEG Negative likely-metabolic encephalopathy    - 1/21 CTH without acute changes, re demonstration of lacuna infarcts   -  Neuro checks q 2 hrs and prn for changes  -  s/p LP, glucose 86, protein 48- negative. all abx and anti-viral dcd  -re-check TSH level     #Pulm:  Acute hypoxic respiratory failure 2/2 COVID-19 PNA and superimposed MRSA PNA   - intubated PS 5/530 %  -ABG - ( 27 Jan 2022 03:26 )pH, Arterial: 7.45  pH, Blood: x     /  pCO2: 34    /  pO2: 151   / HCO3: 24    / Base Excess: -0.1  /  SaO2: 98.5    -PS 5/5 ; plan to extubate   -ABG PRN   -c/w COVID-19 PNA tx   -completed  Vancomycin for MRSA PNA 1/21-1/26        #CV:  HTN  -Hydralazine 5 mg IVP > 160     - Hx NSTEMI 8/2021, HFrEF- hyperdynamic   - CAD s/p stents  - c/w ASA 81 mg qd    - Hx Afib on apixaban at home  -heparin gtt placed on 2/2 pseudoaneurysm in L axillary post a line removal  ; DV duplex thrombosed now resolved post compression . Will resume AC with Lovenox 65 mg BID .   -bleeding precaution       #GI  Oropharyngeal dysphagia   -OGT w TF as tolerated   - CT A/P demonstrated fecal impaction, stercoral proctitis- had BM this AM 1/25   -BM 1/26   -c/w bowel regimen        -UO 45-75 ml /hr   - strict I & O's ; net even     Hypernatremia   149-->147  -c/w free h20 300 q 4 hrs   -d5.  @ 50       #Endo:  -NPO with TF   - ISS    Hypothyroidism   -c/w  levothyroxine   -TSH level           #Heme/onc:  - 1/24 LUE swelling, flaca d/c'd no longer functional- arterial duplex showing small pseudo anusurym in L ax artery. duplex neg  - vascular team input appreciated  - Hold heparin gtt/ Plan to hold compression with U/S  & Obtain arterial duplex after PSA compression   -1/26 L axillary area pseudoaneurysm ; thrombosed . will resume AC with Lovenox 65 BID   -bleeding precaution       #ID:  MRSA PNA   - on vancomycin 1g q 24 hrs , 1/21 ---> 1/26  -BCx x 2 1/21  NGTD   -trend WBC  -trend T curve     AMS:  -LP NGTD       COVID-19 PNA   -c/w remdsivir 1/24- 1/28  -c/w decadron 1/21- 1/31      PPX  DVT ppx :  Lovenox SQ for ( A fib )   GI ppx ; PPI       GOC  -FULL

## 2022-01-28 LAB
ALBUMIN SERPL ELPH-MCNC: 2.5 G/DL — LOW (ref 3.3–5)
ALP SERPL-CCNC: 79 U/L — SIGNIFICANT CHANGE UP (ref 40–120)
ALT FLD-CCNC: 26 U/L — SIGNIFICANT CHANGE UP (ref 10–45)
ANION GAP SERPL CALC-SCNC: 10 MMOL/L — SIGNIFICANT CHANGE UP (ref 5–17)
AST SERPL-CCNC: 30 U/L — SIGNIFICANT CHANGE UP (ref 10–40)
BASOPHILS # BLD AUTO: 0.05 K/UL — SIGNIFICANT CHANGE UP (ref 0–0.2)
BASOPHILS NFR BLD AUTO: 0.4 % — SIGNIFICANT CHANGE UP (ref 0–2)
BILIRUB DIRECT SERPL-MCNC: <0.1 MG/DL — SIGNIFICANT CHANGE UP (ref 0–0.3)
BILIRUB INDIRECT FLD-MCNC: >0.2 MG/DL — SIGNIFICANT CHANGE UP (ref 0.2–1)
BILIRUB SERPL-MCNC: 0.3 MG/DL — SIGNIFICANT CHANGE UP (ref 0.2–1.2)
BUN SERPL-MCNC: 30 MG/DL — HIGH (ref 7–23)
CALCIUM SERPL-MCNC: 8.3 MG/DL — LOW (ref 8.4–10.5)
CHLORIDE SERPL-SCNC: 111 MMOL/L — HIGH (ref 96–108)
CO2 SERPL-SCNC: 21 MMOL/L — LOW (ref 22–31)
CREAT SERPL-MCNC: 0.57 MG/DL — SIGNIFICANT CHANGE UP (ref 0.5–1.3)
EOSINOPHIL # BLD AUTO: 0.14 K/UL — SIGNIFICANT CHANGE UP (ref 0–0.5)
EOSINOPHIL NFR BLD AUTO: 1.3 % — SIGNIFICANT CHANGE UP (ref 0–6)
GLUCOSE BLDC GLUCOMTR-MCNC: 110 MG/DL — HIGH (ref 70–99)
GLUCOSE BLDC GLUCOMTR-MCNC: 127 MG/DL — HIGH (ref 70–99)
GLUCOSE BLDC GLUCOMTR-MCNC: 129 MG/DL — HIGH (ref 70–99)
GLUCOSE BLDC GLUCOMTR-MCNC: 79 MG/DL — SIGNIFICANT CHANGE UP (ref 70–99)
GLUCOSE SERPL-MCNC: 123 MG/DL — HIGH (ref 70–99)
HCT VFR BLD CALC: 34.5 % — SIGNIFICANT CHANGE UP (ref 34.5–45)
HGB BLD-MCNC: 10.5 G/DL — LOW (ref 11.5–15.5)
IMM GRANULOCYTES NFR BLD AUTO: 6.2 % — HIGH (ref 0–1.5)
LYMPHOCYTES # BLD AUTO: 1.38 K/UL — SIGNIFICANT CHANGE UP (ref 1–3.3)
LYMPHOCYTES # BLD AUTO: 12.4 % — LOW (ref 13–44)
MAGNESIUM SERPL-MCNC: 2.2 MG/DL — SIGNIFICANT CHANGE UP (ref 1.6–2.6)
MCHC RBC-ENTMCNC: 27.9 PG — SIGNIFICANT CHANGE UP (ref 27–34)
MCHC RBC-ENTMCNC: 30.4 GM/DL — LOW (ref 32–36)
MCV RBC AUTO: 91.8 FL — SIGNIFICANT CHANGE UP (ref 80–100)
MONOCYTES # BLD AUTO: 0.98 K/UL — HIGH (ref 0–0.9)
MONOCYTES NFR BLD AUTO: 8.8 % — SIGNIFICANT CHANGE UP (ref 2–14)
NEUTROPHILS # BLD AUTO: 7.88 K/UL — HIGH (ref 1.8–7.4)
NEUTROPHILS NFR BLD AUTO: 70.9 % — SIGNIFICANT CHANGE UP (ref 43–77)
NRBC # BLD: 0 /100 WBCS — SIGNIFICANT CHANGE UP (ref 0–0)
PHOSPHATE SERPL-MCNC: 2.3 MG/DL — LOW (ref 2.5–4.5)
PLATELET # BLD AUTO: 281 K/UL — SIGNIFICANT CHANGE UP (ref 150–400)
POTASSIUM SERPL-MCNC: 3.6 MMOL/L — SIGNIFICANT CHANGE UP (ref 3.5–5.3)
POTASSIUM SERPL-SCNC: 3.6 MMOL/L — SIGNIFICANT CHANGE UP (ref 3.5–5.3)
PROT SERPL-MCNC: 5.5 G/DL — LOW (ref 6–8.3)
RBC # BLD: 3.76 M/UL — LOW (ref 3.8–5.2)
RBC # FLD: 15.4 % — HIGH (ref 10.3–14.5)
SODIUM SERPL-SCNC: 142 MMOL/L — SIGNIFICANT CHANGE UP (ref 135–145)
WBC # BLD: 11.12 K/UL — HIGH (ref 3.8–10.5)
WBC # FLD AUTO: 11.12 K/UL — HIGH (ref 3.8–10.5)

## 2022-01-28 PROCEDURE — 99291 CRITICAL CARE FIRST HOUR: CPT

## 2022-01-28 RX ORDER — POTASSIUM CHLORIDE 20 MEQ
40 PACKET (EA) ORAL ONCE
Refills: 0 | Status: COMPLETED | OUTPATIENT
Start: 2022-01-28 | End: 2022-01-28

## 2022-01-28 RX ORDER — LEVOTHYROXINE SODIUM 125 MCG
75 TABLET ORAL DAILY
Refills: 0 | Status: DISCONTINUED | OUTPATIENT
Start: 2022-01-28 | End: 2022-01-28

## 2022-01-28 RX ORDER — LEVOTHYROXINE SODIUM 125 MCG
56 TABLET ORAL AT BEDTIME
Refills: 0 | Status: DISCONTINUED | OUTPATIENT
Start: 2022-01-28 | End: 2022-02-22

## 2022-01-28 RX ORDER — DILTIAZEM HCL 120 MG
60 CAPSULE, EXT RELEASE 24 HR ORAL EVERY 6 HOURS
Refills: 0 | Status: DISCONTINUED | OUTPATIENT
Start: 2022-01-28 | End: 2022-02-22

## 2022-01-28 RX ORDER — DILTIAZEM HCL 120 MG
30 CAPSULE, EXT RELEASE 24 HR ORAL EVERY 6 HOURS
Refills: 0 | Status: DISCONTINUED | OUTPATIENT
Start: 2022-01-28 | End: 2022-01-28

## 2022-01-28 RX ORDER — HYDRALAZINE HCL 50 MG
10 TABLET ORAL ONCE
Refills: 0 | Status: COMPLETED | OUTPATIENT
Start: 2022-01-28 | End: 2022-01-28

## 2022-01-28 RX ADMIN — Medication 60 MILLIGRAM(S): at 23:00

## 2022-01-28 RX ADMIN — REMDESIVIR 500 MILLIGRAM(S): 5 INJECTION INTRAVENOUS at 17:13

## 2022-01-28 RX ADMIN — Medication 30 MILLIGRAM(S): at 08:08

## 2022-01-28 RX ADMIN — Medication 60 MILLIGRAM(S): at 17:17

## 2022-01-28 RX ADMIN — Medication 30 MILLIGRAM(S): at 01:26

## 2022-01-28 RX ADMIN — Medication 6 MILLIGRAM(S): at 05:17

## 2022-01-28 RX ADMIN — CHLORHEXIDINE GLUCONATE 1 APPLICATION(S): 213 SOLUTION TOPICAL at 05:17

## 2022-01-28 RX ADMIN — SENNA PLUS 10 MILLILITER(S): 8.6 TABLET ORAL at 22:45

## 2022-01-28 RX ADMIN — Medication 5 MILLIGRAM(S): at 01:18

## 2022-01-28 RX ADMIN — Medication 5 MILLIGRAM(S): at 08:08

## 2022-01-28 RX ADMIN — ATORVASTATIN CALCIUM 80 MILLIGRAM(S): 80 TABLET, FILM COATED ORAL at 22:45

## 2022-01-28 RX ADMIN — Medication 15 MILLILITER(S): at 12:00

## 2022-01-28 RX ADMIN — Medication 81 MILLIGRAM(S): at 12:00

## 2022-01-28 RX ADMIN — Medication 60 MILLIGRAM(S): at 12:01

## 2022-01-28 RX ADMIN — ENOXAPARIN SODIUM 60 MILLIGRAM(S): 100 INJECTION SUBCUTANEOUS at 05:18

## 2022-01-28 RX ADMIN — Medication 10 MILLIGRAM(S): at 03:40

## 2022-01-28 RX ADMIN — Medication 56 MICROGRAM(S): at 22:55

## 2022-01-28 RX ADMIN — ENOXAPARIN SODIUM 60 MILLIGRAM(S): 100 INJECTION SUBCUTANEOUS at 17:17

## 2022-01-28 RX ADMIN — Medication 40 MILLIEQUIVALENT(S): at 05:18

## 2022-01-28 NOTE — CHART NOTE - NSCHARTNOTEFT_GEN_A_CORE
MICU Transfer Note    Transfer from: MICU    Transfer to: (  x) Medicine    (  ) Telemetry     (   ) RCU        (    ) Palliative         (   ) Stroke Unit          (   ) __________________    Accepting physican:      ASSESSMENT & PLAN:   71F Hx afib on apixaban, cad s/p stents, HFrEF, CVA with left sided residual weakness who presents from nursing home with AMS, fever. Found to be hypernatremic, hypotensive refractory to IVF requiring vasopressor, AMS secondary to metabolic encephalopathy s/p intubation, septic shock 2/2 COVID-19 PNA, MRSA PNA , and r/o meningitis. Pt extubated 1/28, awake, following commands , NGT placed , pending speech and swallow. Now , transfer to medical floor.       #Neuro:  AMS 2/2 Metabolic encephalopathy   -  Hx CVA with residual left sided weak/flaccid  -off sedation ~ 24 hrs   - 1/24 VEEG Negative likely-metabolic encephalopathy    - 1/21 CTH without acute changes, re demonstration of lacuna infarcts   -  Neuro checks q 2 hrs and prn for changes  -  s/p LP, glucose 86, protein 48- negative. all abx and anti-viral dcd  -re-check TSH level     #Pulm:  Acute hypoxic respiratory failure 2/2 COVID-19 PNA and superimposed MRSA PNA   - intubated PS 5/530 %  -ABG - ( 27 Jan 2022 03:26 )pH, Arterial: 7.45  pH, Blood: x     /  pCO2: 34    /  pO2: 151   / HCO3: 24    / Base Excess: -0.1  /  SaO2: 98.5    -PS 5/5 ; plan to extubate   -ABG PRN   -c/w COVID-19 PNA tx   -completed  Vancomycin for MRSA PNA 1/21-1/26        #CV:  HTN  -Hydralazine 5 mg IVP > 160     - Hx NSTEMI 8/2021, HFrEF- hyperdynamic   - CAD s/p stents  - c/w ASA 81 mg qd    - Hx Afib on apixaban at home  -heparin gtt placed on 2/2 pseudoaneurysm in L axillary post a line removal  ; DV duplex thrombosed now resolved post compression . Will resume AC with Lovenox 65 mg BID .   -bleeding precaution       #GI  Oropharyngeal dysphagia   -OGT w TF as tolerated   - CT A/P demonstrated fecal impaction, stercoral proctitis- had BM this AM 1/25   -BM 1/26   -c/w bowel regimen        -UO 45-75 ml /hr   - strict I & O's ; net even     Hypernatremia   149-->147  -c/w free h20 300 q 4 hrs   -d5.  @ 50       #Endo:  -NPO with TF   - ISS    Hypothyroidism   -c/w  levothyroxine   -TSH level       #Heme/onc:  - 1/24 LUE swelling, flaca d/c'd no longer functional- arterial duplex showing small pseudo anusurym in L ax artery. duplex neg  - vascular team input appreciated  - Hold heparin gtt/ Plan to hold compression with U/S  & Obtain arterial duplex after PSA compression   -1/26 L axillary area pseudoaneurysm ; thrombosed . will resume AC with Lovenox 65 BID   -bleeding precaution       #ID:  MRSA PNA   - on vancomycin 1g q 24 hrs , 1/21 ---> 1/26  -BCx x 2 1/21  NGTD   -trend WBC  -trend T curve     AMS:  -LP NGTD       COVID-19 PNA   -c/w remdsivir 1/24- 1/28  -c/w decadron 1/21- 1/31      PPX  DVT ppx :  Lovenox SQ for ( A fib )   GI ppx ; PPI       GOC  -FULL     For Followup:    F/ Speech and swallow       Vital Signs Last 24 Hrs  T(C): 37.1 (28 Jan 2022 16:00), Max: 37.2 (28 Jan 2022 12:00)  T(F): 98.8 (28 Jan 2022 16:00), Max: 99 (28 Jan 2022 12:00)  HR: 100 (28 Jan 2022 18:00) (76 - 100)  BP: 152/81 (28 Jan 2022 18:00) (120/58 - 193/90)  BP(mean): 110 (28 Jan 2022 18:00) (83 - 129)  RR: 25 (28 Jan 2022 18:00) (15 - 25)  SpO2: 99% (28 Jan 2022 18:00) (99% - 100%)  I&O's Summary    27 Jan 2022 07:01  -  28 Jan 2022 07:00  --------------------------------------------------------  IN: 1360 mL / OUT: 1130 mL / NET: 230 mL    28 Jan 2022 07:01  -  28 Jan 2022 19:23  --------------------------------------------------------  IN: 1380 mL / OUT: 600 mL / NET: 780 mL        MEDICATIONS  (STANDING):  aspirin  chewable 81 milliGRAM(s) Oral daily  atorvastatin 80 milliGRAM(s) Oral at bedtime  chlorhexidine 4% Liquid 1 Application(s) Topical <User Schedule>  dexAMETHasone  Injectable 6 milliGRAM(s) IV Push daily  diltiazem    Tablet 60 milliGRAM(s) Enteral Tube every 6 hours  enoxaparin Injectable 60 milliGRAM(s) SubCutaneous every 12 hours  insulin lispro (ADMELOG) corrective regimen sliding scale   SubCutaneous every 6 hours  levothyroxine Injectable 56 MICROGram(s) IV Push at bedtime  multivitamin/minerals/iron Oral Solution (CENTRUM) 15 milliLiter(s) Oral daily  polyethylene glycol 3350 17 Gram(s) Oral daily  senna Syrup 10 milliLiter(s) Oral at bedtime    MEDICATIONS  (PRN):  acetaminophen    Suspension .. 650 milliGRAM(s) Oral every 6 hours PRN Temp greater or equal to 38C (100.4F)  hydrALAZINE Injectable 5 milliGRAM(s) IV Push every 4 hours PRN SBP >160        LABS                                            10.5                  Neurophils% (auto):   70.9   (01-28 @ 01:55):    11.12)-----------(281          Lymphocytes% (auto):  12.4                                          34.5                   Eosinphils% (auto):   1.3      Manual%: Neutrophils x    ; Lymphocytes x    ; Eosinophils x    ; Bands%: x    ; Blasts x                                    142    |  111    |  30                  Calcium: 8.3   / iCa: x      (01-28 @ 01:55)    ----------------------------<  123       Magnesium: 2.2                              3.6     |  21     |  0.57             Phosphorous: 2.3      TPro  5.5    /  Alb  2.5    /  TBili  0.3    /  DBili  <0.1   /  AST  30     /  ALT  26     /  AlkPhos  79     28 Jan 2022 01:55        Luzma Kelly [facility]   [phone] MICU Transfer Note    Transfer from: MICU    Transfer to: (  x) Medicine    (  ) Telemetry     (   ) RCU        (    ) Palliative         (   ) Stroke Unit          (   ) __________________    Accepting physician:  Dr. Bradford ( hospitalist)       ASSESSMENT & PLAN:   71F Hx afib on apixaban, cad s/p stents, HFrEF, CVA with left sided residual weakness who presents from nursing home with AMS, fever. Found to be hypernatremic, hypotensive refractory to IVF requiring vasopressor, AMS secondary to metabolic encephalopathy s/p intubation, septic shock 2/2 COVID-19 PNA, MRSA PNA , and r/o meningitis. Pt extubated 1/28, awake, following commands , NGT placed , pending speech and swallow. Now , transfer to medical floor.       #Neuro:  AMS 2/2 Metabolic encephalopathy   -  Hx CVA with residual left sided weak/flaccid  -off sedation ~ 24 hrs   - 1/24 VEEG Negative likely-metabolic encephalopathy    - 1/21 CTH without acute changes, re demonstration of lacuna infarcts   -  Neuro checks q 2 hrs and prn for changes  -  s/p LP, glucose 86, protein 48- negative. all abx and anti-viral dcd  -re-check TSH level     #Pulm:  Acute hypoxic respiratory failure 2/2 COVID-19 PNA and superimposed MRSA PNA   - intubated PS 5/530 %  -ABG - ( 27 Jan 2022 03:26 )pH, Arterial: 7.45  pH, Blood: x     /  pCO2: 34    /  pO2: 151   / HCO3: 24    / Base Excess: -0.1  /  SaO2: 98.5    -PS 5/5 ; plan to extubate   -ABG PRN   -c/w COVID-19 PNA tx   -completed  Vancomycin for MRSA PNA 1/21-1/26        #CV:  HTN  -Hydralazine 5 mg IVP > 160     - Hx NSTEMI 8/2021, HFrEF- hyperdynamic   - CAD s/p stents  - c/w ASA 81 mg qd    - Hx Afib on apixaban at home  -heparin gtt placed on 2/2 pseudoaneurysm in L axillary post a line removal  ; DV duplex thrombosed now resolved post compression . Will resume AC with Lovenox 65 mg BID .   -bleeding precaution       #GI  Oropharyngeal dysphagia   -OGT w TF as tolerated   - CT A/P demonstrated fecal impaction, stercoral proctitis- had BM this AM 1/25   -BM 1/26   -c/w bowel regimen        -UO 45-75 ml /hr   - strict I & O's ; net even     Hypernatremia   149-->147  -c/w free h20 300 q 4 hrs   -d5.  @ 50       #Endo:  -NPO with TF   - ISS    Hypothyroidism   -c/w  levothyroxine   -TSH level       #Heme/onc:  - 1/24 LUE swelling, flaca d/c'd no longer functional- arterial duplex showing small pseudo anusurym in L ax artery. duplex neg  - vascular team input appreciated  - Hold heparin gtt/ Plan to hold compression with U/S  & Obtain arterial duplex after PSA compression   -1/26 L axillary area pseudoaneurysm ; thrombosed . will resume AC with Lovenox 65 BID   -bleeding precaution       #ID:  MRSA PNA   - on vancomycin 1g q 24 hrs , 1/21 ---> 1/26  -BCx x 2 1/21  NGTD   -trend WBC  -trend T curve     AMS:  -LP NGTD       COVID-19 PNA   -c/w remdsivir 1/24- 1/28  -c/w decadron 1/21- 1/31      PPX  DVT ppx :  Lovenox SQ for ( A fib )   GI ppx ; PPI       GOC  -FULL     For Followup:    F/ Speech and swallow       Vital Signs Last 24 Hrs  T(C): 37.1 (28 Jan 2022 16:00), Max: 37.2 (28 Jan 2022 12:00)  T(F): 98.8 (28 Jan 2022 16:00), Max: 99 (28 Jan 2022 12:00)  HR: 100 (28 Jan 2022 18:00) (76 - 100)  BP: 152/81 (28 Jan 2022 18:00) (120/58 - 193/90)  BP(mean): 110 (28 Jan 2022 18:00) (83 - 129)  RR: 25 (28 Jan 2022 18:00) (15 - 25)  SpO2: 99% (28 Jan 2022 18:00) (99% - 100%)  I&O's Summary    27 Jan 2022 07:01  -  28 Jan 2022 07:00  --------------------------------------------------------  IN: 1360 mL / OUT: 1130 mL / NET: 230 mL    28 Jan 2022 07:01  -  28 Jan 2022 19:23  --------------------------------------------------------  IN: 1380 mL / OUT: 600 mL / NET: 780 mL        MEDICATIONS  (STANDING):  aspirin  chewable 81 milliGRAM(s) Oral daily  atorvastatin 80 milliGRAM(s) Oral at bedtime  chlorhexidine 4% Liquid 1 Application(s) Topical <User Schedule>  dexAMETHasone  Injectable 6 milliGRAM(s) IV Push daily  diltiazem    Tablet 60 milliGRAM(s) Enteral Tube every 6 hours  enoxaparin Injectable 60 milliGRAM(s) SubCutaneous every 12 hours  insulin lispro (ADMELOG) corrective regimen sliding scale   SubCutaneous every 6 hours  levothyroxine Injectable 56 MICROGram(s) IV Push at bedtime  multivitamin/minerals/iron Oral Solution (CENTRUM) 15 milliLiter(s) Oral daily  polyethylene glycol 3350 17 Gram(s) Oral daily  senna Syrup 10 milliLiter(s) Oral at bedtime    MEDICATIONS  (PRN):  acetaminophen    Suspension .. 650 milliGRAM(s) Oral every 6 hours PRN Temp greater or equal to 38C (100.4F)  hydrALAZINE Injectable 5 milliGRAM(s) IV Push every 4 hours PRN SBP >160        LABS                                            10.5                  Neurophils% (auto):   70.9   (01-28 @ 01:55):    11.12)-----------(281          Lymphocytes% (auto):  12.4                                          34.5                   Eosinphils% (auto):   1.3      Manual%: Neutrophils x    ; Lymphocytes x    ; Eosinophils x    ; Bands%: x    ; Blasts x                                    142    |  111    |  30                  Calcium: 8.3   / iCa: x      (01-28 @ 01:55)    ----------------------------<  123       Magnesium: 2.2                              3.6     |  21     |  0.57             Phosphorous: 2.3      TPro  5.5    /  Alb  2.5    /  TBili  0.3    /  DBili  <0.1   /  AST  30     /  ALT  26     /  AlkPhos  79     28 Jan 2022 01:55        Luzma Kelly [facility]   [phone]

## 2022-01-28 NOTE — PROGRESS NOTE ADULT - SUBJECTIVE AND OBJECTIVE BOX
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     Pt presented to [facility] from [facility] when found to have AMS, fever with Tmax of 104. Pt in E.D. found to be obtunded, mottled, hypotensive SBP 80's refractory to 2.5 liters IVF requiring norepi gtt, fever with Temp of 104.5. In addition found to have hypernatremia with Na+ of 161, ANIKA with sCr 4.74 (baseline 0.77-1.22), AGMA of 25, with contraction alkalosis with serum HCO3 of 23, vph 7.50, vPCO2 of 35, lactate of 3.2, procalcitonin of 0.82, hsTrop of 239, Hgb of 14.9 (baseline from 8/2021 of 11). Pt with 9 liters total body water deficit.       Pt received CT C/A/P/H (1/21/22) re demonstration of  Non obstructing right renal calculus, significant stool burden distending the rectum and sigmoid colon with mild perirectal fat stranding suggestive of fecal impaction and possible stercoral proctitis, No acute transcortical infarct, intracranial hemorrhage, however did show Chronic lacunar infarcts with chronic small vessel disease. Started on cefepime, vanco, tylenol.      Consult called and pt admitted MICU for AMS secondary to metabolic encephalopathy, septic shock of unknown origin, possible aspiration pneum   (21 Jan 2022 13:03)      Interval Events:  1/27 day shift:  extubated , off sedation, on 2L NC and tolerating well        OBJECTIVE:  ICU Vital Signs Last 24 Hrs  T(C): 37 (28 Jan 2022 08:00), Max: 37.1 (27 Jan 2022 12:00)  T(F): 98.6 (28 Jan 2022 08:00), Max: 98.8 (27 Jan 2022 12:00)  HR: 97 (28 Jan 2022 08:00) (76 - 97)  BP: 171/90 (28 Jan 2022 08:00) (131/76 - 193/90)  BP(mean): 122 (28 Jan 2022 08:00) (98 - 129)  ABP: --  ABP(mean): --  RR: 20 (28 Jan 2022 08:00) (15 - 30)  SpO2: 100% (28 Jan 2022 08:00) (99% - 100%)    Mode: Vent off    01-27-22 @ 07:01  -  01-28-22 @ 07:00  --------------------------------------------------------  IN: 1360 mL / OUT: 1130 mL / NET: 230 mL    01-28-22 @ 07:01  -  01-28-22 @ 08:23  --------------------------------------------------------  IN: 110 mL / OUT: 100 mL / NET: 10 mL      CAPILLARY BLOOD GLUCOSE      POCT Blood Glucose.: 129 mg/dL (28 Jan 2022 06:17)          PHYSICAL EXAM:     GENERAL:  extubated , off sedation, , comfortable   CHEST/LUNG:  CTA B/L, intubated  , 2L NC  HEART: s1 & s2 +   ABDOMEN: Soft, Nontender, Nondistended; Bowel sounds present x 4 quadrants   Extremities: + L axilla edema , hematoma improved   RLE with significant edema and ecchymosis compared to contralateral side (1/23 Bilateral arterial and venous LED negative).   LUE swelling improving,  warm to touch, soft, +radial palp pulse,              MEDICATIONS:   Antibiotics:  remdesivir  IVPB   IV Intermittent   remdesivir  IVPB 100 milliGRAM(s) IV Intermittent every 24 hours    Neuro:  acetaminophen    Suspension .. 650 milliGRAM(s) Oral every 6 hours PRN Temp greater or equal to 38C (100.4F)    Anticoagulation:  aspirin  chewable 81 milliGRAM(s) Oral daily  enoxaparin Injectable 60 milliGRAM(s) SubCutaneous every 12 hours    Cardiology:  diltiazem    Tablet 30 milliGRAM(s) Oral every 6 hours  hydrALAZINE Injectable 5 milliGRAM(s) IV Push every 4 hours PRN    Endo:   atorvastatin 80 milliGRAM(s) Oral at bedtime  dexAMETHasone  Injectable 6 milliGRAM(s) IV Push daily  insulin lispro (ADMELOG) corrective regimen sliding scale   SubCutaneous every 6 hours  levothyroxine Injectable 56 MICROGram(s) IV Push at bedtime    Pulm:    GI/:  pantoprazole   Suspension 40 milliGRAM(s) Enteral Tube daily  polyethylene glycol 3350 17 Gram(s) Oral daily  senna Syrup 10 milliLiter(s) Oral at bedtime    Other:  chlorhexidine 4% Liquid 1 Application(s) Topical <User Schedule>  multivitamin/minerals/iron Oral Solution (CENTRUM) 15 milliLiter(s) Oral daily        LABS:                        10.5   11.12 )-----------( 281      ( 28 Jan 2022 01:55 )             34.5     Hgb Trend: 10.5<--, 9.2<--, 10.2<--, 9.7<--, 10.4<--  01-28    142  |  111<H>  |  30<H>  ----------------------------<  123<H>  3.6   |  21<L>  |  0.57    Ca    8.3<L>      28 Jan 2022 01:55  Phos  2.3     01-28  Mg     2.2     01-28    TPro  5.5<L>  /  Alb  2.5<L>  /  TBili  0.3  /  DBili  <0.1  /  AST  30  /  ALT  26  /  AlkPhos  79  01-28    LIVER FUNCTIONS - ( 28 Jan 2022 01:55 )  Alb: 2.5 g/dL / Pro: 5.5 g/dL / ALK PHOS: 79 U/L / ALT: 26 U/L / AST: 30 U/L / GGT: x           Creatinine Trend: 0.57<--, 0.66<--, 0.79<--, 0.95<--, 1.12<--, 1.45<--  PT/INR - ( 27 Jan 2022 00:16 )   PT: 12.6 sec;   INR: 1.05 ratio         PTT - ( 27 Jan 2022 00:16 )  PTT:19.3 sec      ABG:   ( 27 Jan 2022 03:26 ) pH: 7.45  /  pCO2: 34    /  pO2: 151   / HCO3: 24    / Base Excess: -0.1  /  SaO2: 98.5    ( 26 Jan 2022 16:22 ) pH: 7.43  /  pCO2: 35    /  pO2: 156   / HCO3: 23    / Base Excess: -0.8  /  SaO2: 97.4    ( 26 Jan 2022 01:39 ) pH: 7.37  /  pCO2: 39    /  pO2: 70    / HCO3: 22    / Base Excess: -2.5  /  SaO2: 94.3        Venous Blood Gas:  01-27 @ 00:11  7.44/35/89/24/96.7  VBG Lactate: 0.8      MICROBIOLOGY: Reviewed  Culture - Sputum . (01.21.22 @ 18:47)    Gram Stain:   Few polymorphonuclear leukocytes per low power field  Few Squamous epithelial cells per low power field  Numerous Gram Positive Rods per oil power field  Few Gram positive cocci in pairs per oil power field  Rare Gram Negative Rods per oil power field    -  Ampicillin/Sulbactam: R <=8/4    -  Cefazolin: R <=4    -  Oxacillin: R >2    -  Penicillin: R >8    -  Rifampin: R >2 Should not be used as monotherapy    -  Tetra/Doxy: S 2    -  Trimethoprim/Sulfamethoxazole: S 1/19    -  Vancomycin: S 2    -  Clindamycin: R >4    -  Erythromycin: R >4    -  Gentamicin: S <=1 Should not be used as monotherapy    -  Linezolid: S 1    Specimen Source: .Sputum Sputum    Culture Results:   Moderate Methicillin Resistant Staphylococcus aureus  Normal Respiratory Chitra present    Organism Identification: Methicillin resistant Staphylococcus aureus    Organism: Methicillin resistant Staphylococcus aureus    Method Type: KRISTOPHER        RADIOLOGY: Reviewed and interpreted by me    < from: US Duplex Arterial Upper Ext Ltd, Left (01.26.22 @ 12:34) >    IMPRESSION: 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    < end of copied text >  < from: VA Duplex Upper Extrem Arterial Limited, Left (01.25.22 @ 14:16) >  IMPRESSION: There is a small pseudoaneurysm arising from the left   axillary artery.    Findings were discussed with Dr. Moe 1/25/2022 2:16  PM by Radiology   Technologist with read back confirmation..    < end of copied text >      < from: CT Head No Cont (01.23.22 @ 23:08) >    No definite mass effect or hemorrhage identified.    < end of copied text >    < from: VA Duplex Lower Ext Vein Scan, Bilat (01.23.22 @ 16:48) >  IMPRESSION:  No evidence of deep venous thrombosis in either lower extremity.  Limited visualization of the calf veins.      < end of copied text >      < from: VA Duplex Low Ext Arterial, Ltd, Right (01.23.22 @ 16:47) >  IMPRESSION:    No evidence of significant stenosis or occlusion within the arteries of   the right lower extremity.    < end of copied text >      < from: CT Abdomen and Pelvis No Cont (01.21.22 @ 10:47) >  IMPRESSION:  Nonobstructing right renal calculus as previously demonstrated.  Suggestion of stercoral proctitis.    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     Pt presented to [facility] when found to have AMS, fever with Tmax of 104. Pt in E.D. found to be obtunded, mottled, hypotensive SBP 80's refractory to 2.5 liters IVF requiring norepi gtt, fever with Temp of 104.5. In addition found to have hypernatremia with Na+ of 161, ANIKA with sCr 4.74 (baseline 0.77-1.22), AGMA of 25, with contraction alkalosis with serum HCO3 of 23, vph 7.50, vPCO2 of 35, lactate of 3.2, procalcitonin of 0.82, hsTrop of 239, Hgb of 14.9 (baseline from 8/2021 of 11). Pt with 9 liters total body water deficit.       Pt received CT C/A/P/H (1/21/22) re demonstration of  Non obstructing right renal calculus, significant stool burden distending the rectum and sigmoid colon with mild perirectal fat stranding suggestive of fecal impaction and possible stercoral proctitis, No acute transcortical infarct, intracranial hemorrhage, however did show Chronic lacunar infarcts with chronic small vessel disease. Started on cefepime, vanco, tylenol.      Consult called and pt admitted MICU for AMS secondary to metabolic encephalopathy, septic shock of unknown origin, possible aspiration pneum   (21 Jan 2022 13:03)      Interval Events:  1/28 overnight: HTN , hydralazine ivp, added home cardizem 30mg q6h (half of home dose)  1/27 day shift:  extubated , off sedation, on 2L NC and tolerating well        OBJECTIVE:  ICU Vital Signs Last 24 Hrs  T(C): 37 (28 Jan 2022 08:00), Max: 37.1 (27 Jan 2022 12:00)  T(F): 98.6 (28 Jan 2022 08:00), Max: 98.8 (27 Jan 2022 12:00)  HR: 97 (28 Jan 2022 08:00) (76 - 97)  BP: 171/90 (28 Jan 2022 08:00) (131/76 - 193/90)  BP(mean): 122 (28 Jan 2022 08:00) (98 - 129)  ABP: --  ABP(mean): --  RR: 20 (28 Jan 2022 08:00) (15 - 30)  SpO2: 100% (28 Jan 2022 08:00) (99% - 100%)    Mode: Vent off    01-27-22 @ 07:01  -  01-28-22 @ 07:00  --------------------------------------------------------  IN: 1360 mL / OUT: 1130 mL / NET: 230 mL    01-28-22 @ 07:01  -  01-28-22 @ 08:23  --------------------------------------------------------  IN: 110 mL / OUT: 100 mL / NET: 10 mL      CAPILLARY BLOOD GLUCOSE  POCT Blood Glucose.: 129 mg/dL (28 Jan 2022 06:17)          PHYSICAL EXAM:  GENERAL:  extubated , off sedation, , comfortable   Neurological: AOx2 (name, hospital),  Following Commands x4 (uppers thumbs up, lowers wiggles toes), PERRL, EOMI,  AN, RUE AG, LUE 2/5, BLE wiggles toes.  (Hx CVA with residual left sided weak/flaccid)  CHEST/LUNG:  CTA B/L, intubated  , 2L NC  HEART: s1 & s2 +   ABDOMEN: Soft, Nontender, Nondistended; Bowel sounds present x 4 quadrants   Extremities: + L axilla edema , hematoma improved   RLE with significant edema and ecchymosis compared to contralateral side (1/23 Bilateral arterial and venous LED negative).   LUE swelling improving,  warm to touch, soft, + axillary and +radial pulse on palpation            MEDICATIONS:   Antibiotics:  remdesivir  IVPB   IV Intermittent   remdesivir  IVPB 100 milliGRAM(s) IV Intermittent every 24 hours    Neuro:  acetaminophen    Suspension .. 650 milliGRAM(s) Oral every 6 hours PRN Temp greater or equal to 38C (100.4F)    Anticoagulation:  aspirin  chewable 81 milliGRAM(s) Oral daily  enoxaparin Injectable 60 milliGRAM(s) SubCutaneous every 12 hours    Cardiology:  diltiazem    Tablet 60 milliGRAM(s) Enteral Tube every 6 hours  hydrALAZINE Injectable 5 milliGRAM(s) IV Push every 4 hours PRN    Endo:   atorvastatin 80 milliGRAM(s) Oral at bedtime  dexAMETHasone  Injectable 6 milliGRAM(s) IV Push daily  insulin lispro (ADMELOG) corrective regimen sliding scale   SubCutaneous every 6 hours  levothyroxine Injectable 56 MICROGram(s) IV Push at bedtime    Pulm:    GI/:  polyethylene glycol 3350 17 Gram(s) Oral daily  senna Syrup 10 milliLiter(s) Oral at bedtime    Other:  chlorhexidine 4% Liquid 1 Application(s) Topical <User Schedule>  multivitamin/minerals/iron Oral Solution (CENTRUM) 15 milliLiter(s) Oral daily      LABS:                        10.5   11.12 )-----------( 281      ( 28 Jan 2022 01:55 )             34.5     Hgb Trend: 10.5<--, 9.2<--, 10.2<--, 9.7<--, 10.4<--  01-28    142  |  111<H>  |  30<H>  ----------------------------<  123<H>  3.6   |  21<L>  |  0.57    Ca    8.3<L>      28 Jan 2022 01:55  Phos  2.3     01-28  Mg     2.2     01-28    TPro  5.5<L>  /  Alb  2.5<L>  /  TBili  0.3  /  DBili  <0.1  /  AST  30  /  ALT  26  /  AlkPhos  79  01-28    LIVER FUNCTIONS - ( 28 Jan 2022 01:55 )  Alb: 2.5 g/dL / Pro: 5.5 g/dL / ALK PHOS: 79 U/L / ALT: 26 U/L / AST: 30 U/L / GGT: x           Creatinine Trend: 0.57<--, 0.66<--, 0.79<--, 0.95<--, 1.12<--, 1.45<--  PT/INR - ( 27 Jan 2022 00:16 )   PT: 12.6 sec;   INR: 1.05 ratio         PTT - ( 27 Jan 2022 00:16 )  PTT:19.3 sec      ABG:   ( 27 Jan 2022 03:26 ) pH: 7.45  /  pCO2: 34    /  pO2: 151   / HCO3: 24    / Base Excess: -0.1  /  SaO2: 98.5    ( 26 Jan 2022 16:22 ) pH: 7.43  /  pCO2: 35    /  pO2: 156   / HCO3: 23    / Base Excess: -0.8  /  SaO2: 97.4    ( 26 Jan 2022 01:39 ) pH: 7.37  /  pCO2: 39    /  pO2: 70    / HCO3: 22    / Base Excess: -2.5  /  SaO2: 94.3        Venous Blood Gas:  01-27 @ 00:11  7.44/35/89/24/96.7  VBG Lactate: 0.8      MICROBIOLOGY: Reviewed  Culture - Sputum . (01.21.22 @ 18:47)    Gram Stain:   Few polymorphonuclear leukocytes per low power field  Few Squamous epithelial cells per low power field  Numerous Gram Positive Rods per oil power field  Few Gram positive cocci in pairs per oil power field  Rare Gram Negative Rods per oil power field    -  Ampicillin/Sulbactam: R <=8/4    -  Cefazolin: R <=4    -  Oxacillin: R >2    -  Penicillin: R >8    -  Rifampin: R >2 Should not be used as monotherapy    -  Tetra/Doxy: S 2    -  Trimethoprim/Sulfamethoxazole: S 1/19    -  Vancomycin: S 2    -  Clindamycin: R >4    -  Erythromycin: R >4    -  Gentamicin: S <=1 Should not be used as monotherapy    -  Linezolid: S 1    Specimen Source: .Sputum Sputum    Culture Results:   Moderate Methicillin Resistant Staphylococcus aureus  Normal Respiratory Chitra present    Organism Identification: Methicillin resistant Staphylococcus aureus    Organism: Methicillin resistant Staphylococcus aureus    Method Type: KRISTOPHER        RADIOLOGY:     US Duplex Arterial Upper Ext Ltd, Left (01.26.22 @ 12:34)   IMPRESSION: 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      VA Duplex Upper Extrem Arterial Limited, Left (01.25.22 @ 14:16)   IMPRESSION: There is a small pseudoaneurysm arising from the left   axillary artery.     CT Head No Cont (01.23.22 @ 23:08)    No definite mass effect or hemorrhage identified.        VA Duplex Lower Ext Vein Scan, Bilat (01.23.22 @ 16:48)    IMPRESSION:  No evidence of deep venous thrombosis in either lower extremity.  Limited visualization of the calf veins.       VA Duplex Low Ext Arterial, Ltd, Right (01.23.22 @ 16:47)    IMPRESSION: No evidence of significant stenosis or occlusion within the arteries of   the right lower extremity.    CT Abdomen and Pelvis No Cont (01.21.22 @ 10:47)   IMPRESSION: Nonobstructive right renal calculus as previously demonstrated.  Suggestion of stercoral proctitis.         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     Pt [facility] when found to have AMS, fever with Tmax of 104. Pt in E.D. found to be obtunded, mottled, hypotensive SBP 80's refractory to 2.5 liters IVF requiring norepi gtt, fever with Temp of 104.5. In addition found to have hypernatremia with Na+ of 161, ANIKA with sCr 4.74 (baseline 0.77-1.22), AGMA of 25, with contraction alkalosis with serum HCO3 of 23, vph 7.50, vPCO2 of 35, lactate of 3.2, procalcitonin of 0.82, hsTrop of 239, Hgb of 14.9 (baseline from 8/2021 of 11). Pt with 9 liters total body water deficit.       Pt received CT C/A/P/H (1/21/22) re demonstration of  Non obstructing right renal calculus, significant stool burden distending the rectum and sigmoid colon with mild perirectal fat stranding suggestive of fecal impaction and possible stercoral proctitis, No acute transcortical infarct, intracranial hemorrhage, however did show Chronic lacunar infarcts with chronic small vessel disease. Started on cefepime, vanco, tylenol.      Consult called and pt admitted MICU for AMS secondary to metabolic encephalopathy, septic shock of unknown origin, possible aspiration pneum   (21 Jan 2022 13:03)      Interval Events:  1/28 overnight: HTN , hydralazine ivp, added home cardizem 30mg q6h (half of home dose)  1/27 day shift:  extubated , off sedation, on 2L NC and tolerating well        OBJECTIVE:  ICU Vital Signs Last 24 Hrs  T(C): 37 (28 Jan 2022 08:00), Max: 37.1 (27 Jan 2022 12:00)  T(F): 98.6 (28 Jan 2022 08:00), Max: 98.8 (27 Jan 2022 12:00)  HR: 97 (28 Jan 2022 08:00) (76 - 97)  BP: 171/90 (28 Jan 2022 08:00) (131/76 - 193/90)  BP(mean): 122 (28 Jan 2022 08:00) (98 - 129)  ABP: --  ABP(mean): --  RR: 20 (28 Jan 2022 08:00) (15 - 30)  SpO2: 100% (28 Jan 2022 08:00) (99% - 100%)    Mode: Vent off    01-27-22 @ 07:01  -  01-28-22 @ 07:00  --------------------------------------------------------  IN: 1360 mL / OUT: 1130 mL / NET: 230 mL    01-28-22 @ 07:01  -  01-28-22 @ 08:23  --------------------------------------------------------  IN: 110 mL / OUT: 100 mL / NET: 10 mL      CAPILLARY BLOOD GLUCOSE  POCT Blood Glucose.: 129 mg/dL (28 Jan 2022 06:17)          PHYSICAL EXAM:  GENERAL:  extubated , off sedation, , comfortable   Neurological: AOx2 (name, hospital),  Following Commands x4 (uppers thumbs up, lowers wiggles toes), PERRL, EOMI,  AN, RUE AG, LUE 2/5, BLE wiggles toes.  (Hx CVA with residual left sided weak/flaccid)  CHEST/LUNG:  CTA B/L, intubated  , 2L NC  HEART: s1 & s2 +   ABDOMEN: Soft, Nontender, Nondistended; Bowel sounds present x 4 quadrants   Extremities: + L axilla edema , hematoma improved   RLE with significant edema and ecchymosis compared to contralateral side (1/23 Bilateral arterial and venous LED negative).   LUE swelling improving,  warm to touch, soft, + axillary and +radial pulse on palpation            MEDICATIONS  (STANDING):  aspirin  chewable 81 milliGRAM(s) Oral daily  atorvastatin 80 milliGRAM(s) Oral at bedtime  chlorhexidine 4% Liquid 1 Application(s) Topical <User Schedule>  dexAMETHasone  Injectable 6 milliGRAM(s) IV Push daily  diltiazem    Tablet 60 milliGRAM(s) Enteral Tube every 6 hours  enoxaparin Injectable 60 milliGRAM(s) SubCutaneous every 12 hours  insulin lispro (ADMELOG) corrective regimen sliding scale   SubCutaneous every 6 hours  levothyroxine Injectable 56 MICROGram(s) IV Push at bedtime  multivitamin/minerals/iron Oral Solution (CENTRUM) 15 milliLiter(s) Oral daily  polyethylene glycol 3350 17 Gram(s) Oral daily  remdesivir  IVPB   IV Intermittent   remdesivir  IVPB 100 milliGRAM(s) IV Intermittent every 24 hours  senna Syrup 10 milliLiter(s) Oral at bedtime    MEDICATIONS  (PRN):  acetaminophen    Suspension .. 650 milliGRAM(s) Oral every 6 hours PRN Temp greater or equal to 38C (100.4F)  hydrALAZINE Injectable 5 milliGRAM(s) IV Push every 4 hours PRN SBP >160      LABS:                        10.5   11.12 )-----------( 281      ( 28 Jan 2022 01:55 )             34.5     Hgb Trend: 10.5<--, 9.2<--, 10.2<--, 9.7<--, 10.4<--  01-28    142  |  111<H>  |  30<H>  ----------------------------<  123<H>  3.6   |  21<L>  |  0.57    Ca    8.3<L>      28 Jan 2022 01:55  Phos  2.3     01-28  Mg     2.2     01-28    TPro  5.5<L>  /  Alb  2.5<L>  /  TBili  0.3  /  DBili  <0.1  /  AST  30  /  ALT  26  /  AlkPhos  79  01-28    LIVER FUNCTIONS - ( 28 Jan 2022 01:55 )  Alb: 2.5 g/dL / Pro: 5.5 g/dL / ALK PHOS: 79 U/L / ALT: 26 U/L / AST: 30 U/L / GGT: x           Creatinine Trend: 0.57<--, 0.66<--, 0.79<--, 0.95<--, 1.12<--, 1.45<--  PT/INR - ( 27 Jan 2022 00:16 )   PT: 12.6 sec;   INR: 1.05 ratio         PTT - ( 27 Jan 2022 00:16 )  PTT:19.3 sec      ABG:   ( 27 Jan 2022 03:26 ) pH: 7.45  /  pCO2: 34    /  pO2: 151   / HCO3: 24    / Base Excess: -0.1  /  SaO2: 98.5    ( 26 Jan 2022 16:22 ) pH: 7.43  /  pCO2: 35    /  pO2: 156   / HCO3: 23    / Base Excess: -0.8  /  SaO2: 97.4    ( 26 Jan 2022 01:39 ) pH: 7.37  /  pCO2: 39    /  pO2: 70    / HCO3: 22    / Base Excess: -2.5  /  SaO2: 94.3        Venous Blood Gas:  01-27 @ 00:11  7.44/35/89/24/96.7  VBG Lactate: 0.8      MICROBIOLOGY: Reviewed  Culture - Sputum . (01.21.22 @ 18:47)    Gram Stain:   Few polymorphonuclear leukocytes per low power field  Few Squamous epithelial cells per low power field  Numerous Gram Positive Rods per oil power field  Few Gram positive cocci in pairs per oil power field  Rare Gram Negative Rods per oil power field    -  Ampicillin/Sulbactam: R <=8/4    -  Cefazolin: R <=4    -  Oxacillin: R >2    -  Penicillin: R >8    -  Rifampin: R >2 Should not be used as monotherapy    -  Tetra/Doxy: S 2    -  Trimethoprim/Sulfamethoxazole: S 1/19    -  Vancomycin: S 2    -  Clindamycin: R >4    -  Erythromycin: R >4    -  Gentamicin: S <=1 Should not be used as monotherapy    -  Linezolid: S 1    Specimen Source: .Sputum Sputum    Culture Results:   Moderate Methicillin Resistant Staphylococcus aureus  Normal Respiratory Chitra present    Organism Identification: Methicillin resistant Staphylococcus aureus    Organism: Methicillin resistant Staphylococcus aureus    Method Type: KRISTOPHER        RADIOLOGY:     US Duplex Arterial Upper Ext Ltd, Left (01.26.22 @ 12:34)   IMPRESSION: 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      VA Duplex Upper Extrem Arterial Limited, Left (01.25.22 @ 14:16)   IMPRESSION: There is a small pseudoaneurysm arising from the left   axillary artery.     CT Head No Cont (01.23.22 @ 23:08)    No definite mass effect or hemorrhage identified.        VA Duplex Lower Ext Vein Scan, Bilat (01.23.22 @ 16:48)    IMPRESSION:  No evidence of deep venous thrombosis in either lower extremity.  Limited visualization of the calf veins.       VA Duplex Low Ext Arterial, Ltd, Right (01.23.22 @ 16:47)    IMPRESSION: No evidence of significant stenosis or occlusion within the arteries of   the right lower extremity.    CT Abdomen and Pelvis No Cont (01.21.22 @ 10:47)   IMPRESSION: Nonobstructive right renal calculus as previously demonstrated.  Suggestion of stercoral proctitis.

## 2022-01-28 NOTE — CHART NOTE - NSCHARTNOTEFT_GEN_A_CORE
MAR Accept Note  Transfer to:  Medicine  Accepting Attending Physician:  Dr. Ortega  Assigned Room:  [address] [place] [address]    Patient seen and examined.   Labs and data reviewed.   No findings precluding transfer of service.       HPI/MICU COURSE:   Please refer to MICU transfer note for full details. Briefly, this is a  71F Hx afib on apixaban, cad s/p stents, HFrEF, CVA with left sided residual weakness who presents from nursing home with AMS, fever. Found to be hypernatremic, hypotensive refractory to IVF requiring vasopressor, AMS secondary to metabolic encephalopathy s/p intubation, septic shock 2/2 COVID-19 PNA, MRSA PNA , and r/o meningitis. Pt extubated 1/28, awake, following commands , NGT placed , pending speech and swallow. Now , transfer to medical floor.     #Neuro:  AMS 2/2 Metabolic encephalopathy   -  Hx CVA with residual left sided weak/flaccid  -off sedation ~ 24 hrs   - 1/24 VEEG Negative likely-metabolic encephalopathy    - 1/21 CTH without acute changes, re demonstration of lacuna infarcts   -  Neuro checks q 2 hrs and prn for changes  -  s/p LP, glucose 86, protein 48- negative. all abx and anti-viral dcd  -re-check TSH level     #Pulm:  Acute hypoxic respiratory failure 2/2 COVID-19 PNA and superimposed MRSA PNA   - intubated PS 5/530 %  -ABG - ( 27 Jan 2022 03:26 )pH, Arterial: 7.45  pH, Blood: x     /  pCO2: 34    /  pO2: 151   / HCO3: 24    / Base Excess: -0.1  /  SaO2: 98.5    -PS 5/5 ; plan to extubate   -ABG PRN   -c/w COVID-19 PNA tx   -completed  Vancomycin for MRSA PNA 1/21-1/26    #CV:  HTN  -Hydralazine 5 mg IVP > 160   - Hx NSTEMI 8/2021, HFrEF- hyperdynamic   - CAD s/p stents  - c/w ASA 81 mg qd  - Hx Afib on apixaban at home  -heparin gtt placed on 2/2 pseudoaneurysm in L axillary post a line removal  ; DV duplex thrombosed now resolved post compression . Will resume AC with Lovenox 65 mg BID .   -bleeding precaution     #GI  Oropharyngeal dysphagia   -OGT w TF as tolerated   - CT A/P demonstrated fecal impaction, stercoral proctitis- had BM this AM 1/25   -BM 1/26   -c/w bowel regimen        -UO 45-75 ml /hr   - strict I & O's ; net even     Hypernatremia   149-->147  -c/w free h20 300 q 4 hrs   -d5.  @ 50     #Endo:  -NPO with TF   - ISS    Hypothyroidism   -c/w  levothyroxine   -TSH level     #Heme/onc:  - 1/24 LUE swelling, flaca d/c'd no longer functional- arterial duplex showing small pseudo anusurym in L ax artery. duplex neg  - vascular team input appreciated  - Hold heparin gtt/ Plan to hold compression with U/S  & Obtain arterial duplex after PSA compression   -1/26 L axillary area pseudoaneurysm ; thrombosed . will resume AC with Lovenox 65 BID   -bleeding precaution     #ID:  MRSA PNA   - on vancomycin 1g q 24 hrs , 1/21 ---> 1/26  -BCx x 2 1/21  NGTD   -trend WBC  -trend T curve     AMS:  -LP NGTD   COVID-19 PNA   -c/w remdsivir 1/24- 1/28  -c/w decadron 1/21- 1/31      PPX  DVT ppx :  Lovenox SQ for ( A fib )   GI ppx ; PPI     GOC  -FULL       For Followup:  F/ Speech and swallow

## 2022-01-28 NOTE — PROGRESS NOTE ADULT - ASSESSMENT
71F Hx afib on apixaban, cad s/p stents, HFrEF, CVA with left sided residual weakness who presents from nursing home with AMS, fever. Found to be hypernatremic, hypotensive refractory to IVF requiring vasopressor, AMS secondary to metabolic encephalopathy s/p intubation, septic shock of unknown origin, possible aspiration pna.    Overnight:  - hypertension    #Neuro:  - off sedation  - 1/24 VEEG Negative likely-metabolic encephalopathy    - 1/21 CTH without acute changes, re demonstration of lacuna infarcts   -  Hx CVA with residual left sided weak/flaccid  -  presents with AMS secondary to metabolic encephalopathy  -  Neuro checks q 4 hrs and prn for changes  -  s/p LP, glucose 86, protein 48- negative. all abx and anti-viral dcd    #Pulm:  -  extubated 1/27  - 2L NC   - 1/27 abg:  pH: 7.45  /  pCO2: 34    /  pO2: 151   / HCO3: 24    / Base Excess: -0.1  /  SaO2: 98.5            #CV:  - Hx NSTEMI 8/2021, HFrEF- hyperdynamic   - CAD s/p stents  - Hx Afib on apixaban at home  - heparin gtt transitioned to Lovenox 60 q12 for AC  - holding hep for afib  - ASA 81 mg qd  - HTN: started on Hydralazine 5 q4 prn, cardizem 30 q6 PO added overnight (half home dose)    #GI/:  - CT A/P demonstrated fecal impaction, stercoral proctitis- had BM this AM 1/25   - NGT, aguilar  - strict I & O's - keep even  - IVL    #Endo:  - decadron   - ISS lowered to low   - levothyroxine       #Renal:  - hypernatremia resolved- 1/28 Na now 142, dc'd fw flushes  - 1/28 cr  .57     (4.74 on admission)    #Heme/onc:  - 1/24 LUE swelling, flaca d/c'd no longer functional- arterial duplex showing small pseudo anusurym in L ax artery. duplex neg   - 1/25 Vascular saw - Repeat arterial duplex reviewed. Pseudoaneurysm thrombose; no further vascular intervention  - 1/23 LED neg  - 1/23 RLE with significant edema and ecchymosis compared to contralateral side (arterial and venous LED's both negative).   - mechanical dvt pxx w/ SCD's bilateral        #ID:  AMS:  - Lumbar puncture negative  -c/w vanco only for MSSA in SCx on 1/21  COVID:  -c/w remdsivir 1/24-   -c/w decadron 1/24-   - follow up ESR      GOC - will address DNR/DNI and goc status with family.           71F Hx afib on apixaban, cad s/p stents, HFrEF, CVA with left sided residual weakness who presents from nursing home with AMS, fever. Found to be hypernatremic, hypotensive refractory to IVF requiring vasopressor, AMS secondary to metabolic encephalopathy s/p intubation, septic shock of unknown origin, possible aspiration pna.      #Neuro:  - off sedation  - 1/24 VEEG Negative likely-metabolic encephalopathy    - 1/21 CTH without acute changes, re demonstration of lacuna infarcts   -  Hx CVA with residual left sided weak/flaccid  -  Neuro checks q 4 hrs and prn for changes  -  s/p LP, glucose 86, protein 48- negative. all abx and anti-viral dcd    #Pulm:  - extubated 1/27  - 2L NC   - 1/27 abg:  pH: 7.45  /  pCO2: 34    /  pO2: 151   / HCO3: 24    / Base Excess: -0.1  /  SaO2: 98.5          #CV:  - Hx NSTEMI 8/2021, HFrEF- hyperdynamic   - CAD s/p stents  - Hx Afib on apixaban at home  - heparin gtt transitioned to Lovenox 60 q12 for AC  - holding hep for afib  - ASA 81 mg qd  - HTN: started on Hydralazine 5 q4 prn, cardizem 30 q6 PO added overnight (half home dose)    #GI/:  - TF w/Nepro , incr as tolerated  - CT A/P demonstrated fecal impaction, stercoral proctitis- had BM this AM 1/25   - NGT, aguilar      #Endo:  - decadron   - ISS lowered to low   - levothyroxine       #Renal:  - hypernatremia resolved- 1/28 Na 142  - Continue Free water flushes  250 q 4  - strict I & O's - keep even  - IVL    #Heme/onc:  - 1/24 LUE swelling, flaca d/c'd no longer functional- arterial duplex showing small pseudo anusurym in L ax artery. duplex neg   - 1/25 Vascular saw - Repeat arterial duplex reviewed. Pseudoaneurysm thrombose; no further vascular intervention  - 1/23 LED neg  - 1/23 RLE with significant edema and ecchymosis compared to contralateral side (arterial and venous LED's both negative).   - mechanical dvt pxx w/ SCD's bilateral      #ID:  AMS:  - Lumbar puncture negative  - c/w vanco only for MSSA in SCx on 1/21  COVID:  - remdsivir 1/24- 1/28 , completion today  - c/w decadron 1/24- 1/2  - follow up ESR      GOC - FULL CODE             71F Hx afib on apixaban, cad s/p stents, HFrEF, CVA with left sided residual weakness who presents from nursing home with AMS, fever. Found to be hypernatremic, hypotensive refractory to IVF requiring vasopressor, AMS secondary to metabolic encephalopathy s/p intubation, septic shock of unknown origin, possible aspiration pna.      #Neuro:  - off sedation  - 1/24 VEEG Negative likely-metabolic encephalopathy    - 1/21 CTH without acute changes, re demonstration of lacuna infarcts   -  Hx CVA with residual left sided weak/flaccid  -  Neuro checks q 4 hrs and prn for changes  -  s/p LP, glucose 86, protein 48- negative. all abx and anti-viral dcd    #Pulm:  - extubated 1/27  - 2L NC   - 1/27 abg:  pH: 7.45  /  pCO2: 34    /  pO2: 151   / HCO3: 24    / Base Excess: -0.1  /  SaO2: 98.5          #CV:  - Hx NSTEMI 8/2021, HFrEF- hyperdynamic   - CAD s/p stents  - Hx Afib on apixaban at home  - heparin gtt transitioned to Lovenox 60 q12 for AC  - holding hep for afib  - ASA 81 mg qd  - HTN: started on Hydralazine 5 q4 prn, cardizem 30 q6 PO added overnight (half home dose)      #Renal:  - hypernatremia resolved- 1/28 Na 142  - Continue Free water flushes  250 q 4  - strict I & O's - keep even  - IVL       #GI:  - TF w/Nepro , incr as tolerated  - CT A/P demonstrated fecal impaction, stercoral proctitis- had BM this AM 1/25   - NGT, aguilar      #Endo:  - decadron   - ISS lowered to low   - levothyroxine       #Heme/onc:  - 1/24 LUE swelling, flaca d/c'd no longer functional- arterial duplex showing small pseudo anusurym in L ax artery. duplex neg   - 1/25 Vascular saw - Repeat arterial duplex reviewed. Pseudoaneurysm thrombose; no further vascular intervention  - 1/23 LED neg  - 1/23 RLE with significant edema and ecchymosis compared to contralateral side (arterial and venous LED's both negative).   - mechanical dvt pxx w/ SCD's bilateral      #ID:  AMS:  - Lumbar puncture negative  - c/w vanco only for MSSA in SCx on 1/21  COVID:  - remdsivir 1/24- 1/28 , completion today  - c/w decadron 1/24- 1/2  - follow up ESR      GOC - FULL CODE             71F Hx afib on apixaban, cad s/p stents, HFrEF, CVA with left sided residual weakness who presents from nursing home with AMS, fever. Found to be hypernatremic, hypotensive refractory to IVF requiring vasopressor, AMS secondary to metabolic encephalopathy s/p intubation, septic shock of unknown origin, possible aspiration pna.      #Neuro:  - off sedation  - 1/24 VEEG Negative likely-metabolic encephalopathy    - 1/21 CTH without acute changes, re demonstration of lacuna infarcts   -  Hx CVA with residual left sided weak/flaccid  -  Neuro checks q 4 hrs and prn for changes  -  s/p LP, glucose 86, protein 48- negative. all abx and anti-viral dcd    #Pulm:  - extubated 1/27  - 2L NC   - 1/27 abg:  pH: 7.45  /  pCO2: 34    /  pO2: 151   / HCO3: 24    / Base Excess: -0.1  /  SaO2: 98.5          #CV:  - Hx NSTEMI 8/2021, HFrEF- hyperdynamic   - CAD s/p stents  - Hx Afib on apixaban at home  - heparin gtt transitioned to Lovenox 60 q12 for AC  - holding hep for afib  - ASA 81 mg qd  - HTN: started on Hydralazine 5 q4 prn, cardizem 30 q6 PO added overnight (half home dose)      #Renal:  - hypernatremia resolved- 1/28 Na 142  - Continue Free water flushes  250 q 4  - strict I & O's - keep even  - IVL       #GI:  - TF w/Nepro , incr as tolerated  - CT A/P demonstrated fecal impaction, stercoral proctitis- had BM this AM 1/25   - NGT, aguilar      #Endo:  - decadron   - ISS lowered to low   - levothyroxine       #Heme/onc:  - 1/24 LUE swelling, flaca d/c'd no longer functional- arterial duplex showing small pseudo anusurym in L ax artery. duplex neg   - 1/25 Vascular saw - Repeat arterial duplex reviewed. Pseudoaneurysm thrombose; no further vascular intervention  - 1/23 LED neg  - 1/23 RLE with significant edema and ecchymosis compared to contralateral side (arterial and venous LED's both negative).   - mechanical dvt pxx w/ SCD's bilateral      #ID:  - MRSA PNA - completed  vancomycin 1g q 24 hrs , 1/21 ---> 1/26  - 1/21 BCx x 2  NGTD   - trend WBC  - trend T curve     AMS:  -LP NGTD     COVID-19 PNA   -completed remdsivir 1/24- 1/28  -c/w decadron 1/21- 1/31       Code status: FULL CODE

## 2022-01-28 NOTE — PROGRESS NOTE ADULT - ATTENDING COMMENTS
Agree with above except as noted. 71F Hx afib on apixaban, cad s/p stents, HFrEF, CVA with left sided residual weakness who presents from nursing home p/w AMS. Pt w/ hypernatremic, septic shock and covid +.    Neuro - pt  awake and following commands and stevens, oriented aox2  Sz in ER likely metabolic in nature. appreciate Neuro reccs. repeat EEG w/o active sz    s/p Lumbar puncture negative for infection and fluid w/ minimal inflammation      Resp -   extubated and doing well on NC    Cardiovascular - off pressors but maintain map >65. hep gtt for chronic AF  US w/ pseudoaneurysm now thrombosed, vascular consult appreciated  no further interventions. ext warm and well perfused    GI - bowel regimen cont for stercoral ulcer, had BM. NGT feed, will need swallow eval prior to eating po     - ANIKA on CKD, also hypernatremia- closely monitor Na, cont free water    Hem/Onc - dvt ppx SQH    ID - complete dexamethasone for covid, does not need rdv  CSF fluid cx negative previously. doing well off abx    Skin cont wound care, f/u wound care/nursing reccs for dti .     If remains well plan for transfer later today

## 2022-01-29 DIAGNOSIS — E87.0 HYPEROSMOLALITY AND HYPERNATREMIA: ICD-10-CM

## 2022-01-29 DIAGNOSIS — E03.9 HYPOTHYROIDISM, UNSPECIFIED: ICD-10-CM

## 2022-01-29 DIAGNOSIS — A41.9 SEPSIS, UNSPECIFIED ORGANISM: ICD-10-CM

## 2022-01-29 DIAGNOSIS — E05.90 THYROTOXICOSIS, UNSPECIFIED WITHOUT THYROTOXIC CRISIS OR STORM: ICD-10-CM

## 2022-01-29 DIAGNOSIS — R13.10 DYSPHAGIA, UNSPECIFIED: ICD-10-CM

## 2022-01-29 DIAGNOSIS — I72.9 ANEURYSM OF UNSPECIFIED SITE: ICD-10-CM

## 2022-01-29 DIAGNOSIS — I48.20 CHRONIC ATRIAL FIBRILLATION, UNSPECIFIED: ICD-10-CM

## 2022-01-29 DIAGNOSIS — G93.41 METABOLIC ENCEPHALOPATHY: ICD-10-CM

## 2022-01-29 DIAGNOSIS — I10 ESSENTIAL (PRIMARY) HYPERTENSION: ICD-10-CM

## 2022-01-29 DIAGNOSIS — Z29.9 ENCOUNTER FOR PROPHYLACTIC MEASURES, UNSPECIFIED: ICD-10-CM

## 2022-01-29 LAB
GLUCOSE BLDC GLUCOMTR-MCNC: 130 MG/DL — HIGH (ref 70–99)
GLUCOSE BLDC GLUCOMTR-MCNC: 172 MG/DL — HIGH (ref 70–99)
GLUCOSE BLDC GLUCOMTR-MCNC: 199 MG/DL — HIGH (ref 70–99)
GLUCOSE BLDC GLUCOMTR-MCNC: 96 MG/DL — SIGNIFICANT CHANGE UP (ref 70–99)

## 2022-01-29 PROCEDURE — 99233 SBSQ HOSP IP/OBS HIGH 50: CPT

## 2022-01-29 RX ADMIN — ATORVASTATIN CALCIUM 80 MILLIGRAM(S): 80 TABLET, FILM COATED ORAL at 22:00

## 2022-01-29 RX ADMIN — Medication 60 MILLIGRAM(S): at 05:35

## 2022-01-29 RX ADMIN — Medication 81 MILLIGRAM(S): at 11:51

## 2022-01-29 RX ADMIN — ENOXAPARIN SODIUM 60 MILLIGRAM(S): 100 INJECTION SUBCUTANEOUS at 05:35

## 2022-01-29 RX ADMIN — Medication 15 MILLILITER(S): at 11:51

## 2022-01-29 RX ADMIN — Medication 1: at 17:56

## 2022-01-29 RX ADMIN — Medication 1: at 11:50

## 2022-01-29 RX ADMIN — Medication 60 MILLIGRAM(S): at 17:02

## 2022-01-29 RX ADMIN — Medication 6 MILLIGRAM(S): at 05:35

## 2022-01-29 RX ADMIN — Medication 60 MILLIGRAM(S): at 11:51

## 2022-01-29 RX ADMIN — POLYETHYLENE GLYCOL 3350 17 GRAM(S): 17 POWDER, FOR SOLUTION ORAL at 11:51

## 2022-01-29 RX ADMIN — ENOXAPARIN SODIUM 60 MILLIGRAM(S): 100 INJECTION SUBCUTANEOUS at 17:02

## 2022-01-29 RX ADMIN — Medication 56 MICROGRAM(S): at 22:00

## 2022-01-29 RX ADMIN — SENNA PLUS 10 MILLILITER(S): 8.6 TABLET ORAL at 22:00

## 2022-01-29 RX ADMIN — Medication 60 MILLIGRAM(S): at 23:11

## 2022-01-29 NOTE — PHYSICAL THERAPY INITIAL EVALUATION ADULT - RANGE OF MOTION EXAMINATION, REHAB EVAL
except bilateral knee flexion 20-90, left UE not tested due to thrombosis of pseudoaneurysm/Right UE ROM was WFL (within functional limits)/bilateral lower extremity ROM was WFL (within functional limits)

## 2022-01-29 NOTE — PROGRESS NOTE ADULT - PROBLEM SELECTOR PLAN 3
EXAM DESCRIPTION:  CT - Head C Spine Cap Wo Con - 5/12/2021 11:19 am

 

CLINICAL HISTORY:  Trauma, head and neck injury.  Chest, abdomen and pelvis pain.

PAIN

 

COMPARISON:  Head C Spine Cap Wo Con dated 3/11/2020; Knee Right 3 View dated 1/26/2021; Head C Spine
 Mpr Wo Con dated 1/26/2021; Ct Stroke Brain Wo Cont dated 3/23/2021; Abdomen   Pelvis W Contrast emilie
ed 11/28/2020; Ct Skull/Thigh dated 11/19/2020

 

TECHNIQUE:  CT head without contrast.

 

CT cervical spine without contrast with coronal and sagittal reformatted images.

 

CT chest, abdomen and pelvis without contrast with coronal and sagittal reformatted images of the spi
ne.

 

All CT scans are performed using dose optimization technique as appropriate and may include automated
 exposure control or mA/KV adjustment according to patient size.

 

FINDINGS:  CT HEAD WITHOUT CONTRAST:

 

No intracranial hemorrhage, hydrocephalus or extra-axial fluid collection.  No areas of brain edema o
r midline shift.

 

The paranasal sinuses and mastoids are clear. The calvarium is intact.

 

 

CT CERVICAL SPINE WITHOUT CONTRAST:

 

No fracture or subluxation. Postsurgical hardware is present spanning C4-7. The prevertebral soft tis
sues are normal in thickness.

 

 

CT CHEST, ABDOMEN, PELVIS WITHOUT CONTRAST:

 

NOTE: Lack of contrast is a significant limitation in the assessment of trauma related findings. Spec
ifically, solid organ, vascular and bowel evaluation is significantly limited.

 

There is a mild opacity in the medial right base which may be an area of atelectasis or infiltrate/ a
spiration. Noncalcified pulmonary nodule is seen in the medial left base measuring 11 mm.This nodule 
has undergone prior workup with PET-CT on 11/19/2020.No pneumothorax or pericardial/pleural fluid. No
 displaced rib fracture.

 

No evidence of intra-abdominal visceral injury, free fluid or free air is seen within the above detai
led limitations.

 

No concerning pelvic findings.

 

Moderate lumbar degenerative changes. T11 vertebral body demonstrates slight central wedging, mildly 
progressive since 03/11/2020 prior trauma CT study. No acute fractures are evident.

 

IMPRESSION:  Negative for acute traumatic findings within the above detailed limitations. Hydralazine 5 mg IVP > 160   - Hx NSTEMI 8/2021, HFrEF- hyperdynamic   - CAD s/p stents  - c/w ASA 81 mg qd    Cardizem

## 2022-01-29 NOTE — PROGRESS NOTE ADULT - PROBLEM SELECTOR PLAN 2
Acute hypoxic respiratory failure 2/2 COVID-19 PNA and superimposed MRSA PNA   - intubated PS 5/530 % now s/p extubation on RA  -completed  Vancomycin for MRSA PNA 1/21-1/26  COVID-19 PNA   -c/w remdsivir 1/24- 1/28  -c/w decadron 1/21- 1/31

## 2022-01-29 NOTE — PROGRESS NOTE ADULT - ASSESSMENT
71F Hx afib on apixaban, cad s/p stents, HFrEF, CVA with left sided residual weakness who presents from nursing home with AMS, fever. Found to be hypernatremic, hypotensive refractory to IVF requiring vasopressor, AMS secondary to metabolic encephalopathy s/p intubation, septic shock 2/2 COVID-19 PNA, MRSA PNA , and r/o meningitis. Pt extubated 1/28, awake, following commands , NGT in place, pending speech and swallow. Now transfered to medical floor.

## 2022-01-29 NOTE — PROGRESS NOTE ADULT - SUBJECTIVE AND OBJECTIVE BOX
[facility] Division of Hospital Medicine  Joi Feliciano MD  Pager (M-F, 8A-5P): [phone], MS Teams PREFERRED  Other Times:  [phone]      SUBJECTIVE / OVERNIGHT EVENTS: Pt seen and examined at bedside.     MEDICATIONS  (STANDING):  aspirin  chewable 81 milliGRAM(s) Oral daily  atorvastatin 80 milliGRAM(s) Oral at bedtime  chlorhexidine 4% Liquid 1 Application(s) Topical <User Schedule>  dexAMETHasone  Injectable 6 milliGRAM(s) IV Push daily  diltiazem    Tablet 60 milliGRAM(s) Enteral Tube every 6 hours  enoxaparin Injectable 60 milliGRAM(s) SubCutaneous every 12 hours  insulin lispro (ADMELOG) corrective regimen sliding scale   SubCutaneous every 6 hours  levothyroxine Injectable 56 MICROGram(s) IV Push at bedtime  multivitamin/minerals/iron Oral Solution (CENTRUM) 15 milliLiter(s) Oral daily  polyethylene glycol 3350 17 Gram(s) Oral daily  senna Syrup 10 milliLiter(s) Oral at bedtime    MEDICATIONS  (PRN):  acetaminophen    Suspension .. 650 milliGRAM(s) Oral every 6 hours PRN Temp greater or equal to 38C (100.4F)  hydrALAZINE Injectable 5 milliGRAM(s) IV Push every 4 hours PRN SBP >160      I&O's Summary    28 Jan 2022 07:01  -  29 Jan 2022 07:00  --------------------------------------------------------  IN: 2400 mL / OUT: 1050 mL / NET: 1350 mL    29 Jan 2022 07:01  -  29 Jan 2022 10:52  --------------------------------------------------------  IN: 160 mL / OUT: 0 mL / NET: 160 mL        PHYSICAL EXAM:  Vital Signs Last 24 Hrs  T(C): 36.5 (29 Jan 2022 10:43), Max: 37.2 (28 Jan 2022 12:00)  T(F): 97.7 (29 Jan 2022 10:43), Max: 99 (28 Jan 2022 12:00)  HR: 104 (29 Jan 2022 10:43) (91 - 104)  BP: 168/84 (29 Jan 2022 10:43) (138/67 - 168/84)  BP(mean): 110 (28 Jan 2022 18:00) (96 - 110)  RR: 18 (29 Jan 2022 10:43) (18 - 25)  SpO2: 93% (29 Jan 2022 10:43) (93% - 100%)  CONSTITUTIONAL: NAD, ill appearing but stable  EYES: PERRLA; conjunctiva and sclera clear  ENMT:  NGT in place, moist mucosa  NECK: Supple, no palpable masses;  RESPIRATORY: Tachypneic, on RA, lungs are clear to auscultation bilaterally  CARDIOVASCULAR: Regular rate and rhythm, normal S1 and S2, no murmur/rub/gallop; non pitting edema in the BLLE  ABDOMEN: Nontender to palpation, normoactive bowel sounds, no rebound/guarding;  MUSCULOSKELETAL:  Normal gait; no clubbing or cyanosis of digits; no joint swelling or tenderness to palpation  PSYCH: A+O to person, place, and time; affect appropriate  NEUROLOGY: Alert, awake, following commands, LUE strength intact  SKIN: No rashes; no palpable lesions    LABS:                        10.5   11.12 )-----------( 281      ( 28 Jan 2022 01:55 )             34.5     01-28    142  |  111<H>  |  30<H>  ----------------------------<  123<H>  3.6   |  21<L>  |  0.57    Ca    8.3<L>      28 Jan 2022 01:55  Phos  2.3     01-28  Mg     2.2     01-28    TPro  5.5<L>  /  Alb  2.5<L>  /  TBili  0.3  /  DBili  <0.1  /  AST  30  /  ALT  26  /  AlkPhos  79  01-28

## 2022-01-29 NOTE — PHYSICAL THERAPY INITIAL EVALUATION ADULT - ADDITIONAL COMMENTS
1/21 CXR left subsegmental atelectasis, CT Abdomen nonobstructing right renal calculus, suggestion of stercoral proctitis; 1/25 VA Duplex Upper Extrem Arterial Limited, small psuedoaneurysm arising from the left axillary artery; 1/26 interval thrombosis of left axillary artery pseudoaneurysm

## 2022-01-29 NOTE — PHYSICAL THERAPY INITIAL EVALUATION ADULT - PERTINENT HX OF CURRENT PROBLEM, REHAB EVAL
as per chart review: afib on apixaban, cad s/p stents, HFrEF, CVA with left sided residual weakness who presents from nursing home with AMS, fever. Found to be hypernatremic, hypotensive refractory to IVF requiring vasopressor, AMS secondary to metabolic encephalopathy s/p intubation, septic shock of unknown origin, possible aspiration pna; tests listed below in additional comments

## 2022-01-30 LAB
ANION GAP SERPL CALC-SCNC: 12 MMOL/L — SIGNIFICANT CHANGE UP (ref 5–17)
BUN SERPL-MCNC: 32 MG/DL — HIGH (ref 7–23)
CALCIUM SERPL-MCNC: 8.8 MG/DL — SIGNIFICANT CHANGE UP (ref 8.4–10.5)
CHLORIDE SERPL-SCNC: 106 MMOL/L — SIGNIFICANT CHANGE UP (ref 96–108)
CO2 SERPL-SCNC: 21 MMOL/L — LOW (ref 22–31)
CREAT SERPL-MCNC: 0.64 MG/DL — SIGNIFICANT CHANGE UP (ref 0.5–1.3)
GLUCOSE BLDC GLUCOMTR-MCNC: 135 MG/DL — HIGH (ref 70–99)
GLUCOSE BLDC GLUCOMTR-MCNC: 159 MG/DL — HIGH (ref 70–99)
GLUCOSE BLDC GLUCOMTR-MCNC: 167 MG/DL — HIGH (ref 70–99)
GLUCOSE BLDC GLUCOMTR-MCNC: 183 MG/DL — HIGH (ref 70–99)
GLUCOSE SERPL-MCNC: 191 MG/DL — HIGH (ref 70–99)
HCT VFR BLD CALC: 37.6 % — SIGNIFICANT CHANGE UP (ref 34.5–45)
HGB BLD-MCNC: 11.5 G/DL — SIGNIFICANT CHANGE UP (ref 11.5–15.5)
MCHC RBC-ENTMCNC: 27.3 PG — SIGNIFICANT CHANGE UP (ref 27–34)
MCHC RBC-ENTMCNC: 30.6 GM/DL — LOW (ref 32–36)
MCV RBC AUTO: 89.3 FL — SIGNIFICANT CHANGE UP (ref 80–100)
NRBC # BLD: 0 /100 WBCS — SIGNIFICANT CHANGE UP (ref 0–0)
PLATELET # BLD AUTO: 284 K/UL — SIGNIFICANT CHANGE UP (ref 150–400)
POTASSIUM SERPL-MCNC: 3.9 MMOL/L — SIGNIFICANT CHANGE UP (ref 3.5–5.3)
POTASSIUM SERPL-SCNC: 3.9 MMOL/L — SIGNIFICANT CHANGE UP (ref 3.5–5.3)
RBC # BLD: 4.21 M/UL — SIGNIFICANT CHANGE UP (ref 3.8–5.2)
RBC # FLD: 15.7 % — HIGH (ref 10.3–14.5)
SARS-COV-2 RNA SPEC QL NAA+PROBE: SIGNIFICANT CHANGE UP
SODIUM SERPL-SCNC: 139 MMOL/L — SIGNIFICANT CHANGE UP (ref 135–145)
WBC # BLD: 10.82 K/UL — HIGH (ref 3.8–10.5)
WBC # FLD AUTO: 10.82 K/UL — HIGH (ref 3.8–10.5)

## 2022-01-30 PROCEDURE — 71045 X-RAY EXAM CHEST 1 VIEW: CPT | Mod: 26

## 2022-01-30 PROCEDURE — 99233 SBSQ HOSP IP/OBS HIGH 50: CPT

## 2022-01-30 RX ORDER — ONDANSETRON 8 MG/1
4 TABLET, FILM COATED ORAL ONCE
Refills: 0 | Status: COMPLETED | OUTPATIENT
Start: 2022-01-30 | End: 2022-01-30

## 2022-01-30 RX ADMIN — Medication 56 MICROGRAM(S): at 21:28

## 2022-01-30 RX ADMIN — Medication 81 MILLIGRAM(S): at 12:05

## 2022-01-30 RX ADMIN — Medication 60 MILLIGRAM(S): at 12:00

## 2022-01-30 RX ADMIN — Medication 15 MILLILITER(S): at 12:00

## 2022-01-30 RX ADMIN — ONDANSETRON 4 MILLIGRAM(S): 8 TABLET, FILM COATED ORAL at 00:52

## 2022-01-30 RX ADMIN — ATORVASTATIN CALCIUM 80 MILLIGRAM(S): 80 TABLET, FILM COATED ORAL at 21:27

## 2022-01-30 RX ADMIN — Medication 1: at 00:35

## 2022-01-30 RX ADMIN — Medication 60 MILLIGRAM(S): at 04:53

## 2022-01-30 RX ADMIN — Medication 6 MILLIGRAM(S): at 04:53

## 2022-01-30 RX ADMIN — ENOXAPARIN SODIUM 60 MILLIGRAM(S): 100 INJECTION SUBCUTANEOUS at 17:44

## 2022-01-30 RX ADMIN — SENNA PLUS 10 MILLILITER(S): 8.6 TABLET ORAL at 21:27

## 2022-01-30 RX ADMIN — Medication 1: at 17:43

## 2022-01-30 RX ADMIN — ENOXAPARIN SODIUM 60 MILLIGRAM(S): 100 INJECTION SUBCUTANEOUS at 04:53

## 2022-01-30 RX ADMIN — Medication 60 MILLIGRAM(S): at 17:44

## 2022-01-30 RX ADMIN — Medication 60 MILLIGRAM(S): at 23:13

## 2022-01-30 RX ADMIN — Medication 5 MILLIGRAM(S): at 04:42

## 2022-01-30 RX ADMIN — POLYETHYLENE GLYCOL 3350 17 GRAM(S): 17 POWDER, FOR SOLUTION ORAL at 12:00

## 2022-01-30 RX ADMIN — Medication 1: at 11:59

## 2022-01-30 NOTE — PROGRESS NOTE ADULT - PROBLEM SELECTOR PLAN 1
AMS 2/2 Metabolic encephalopathy  - baseline AOx2, currently appears to be at baseline  Hx CVA with residual left sided weak/flaccid  1/24 VEEG Negative likely-metabolic encephalopathy    1/21 CTH without acute changes, re demonstration of lacuna infarcts   s/p LP, glucose 86, protein 48- negative. all abx and anti-viral dcd

## 2022-01-30 NOTE — PROGRESS NOTE ADULT - PROBLEM SELECTOR PLAN 3
Hydralazine 5 mg IVP > 160   - Hx NSTEMI 8/2021, HFrEF- hyperdynamic   - CAD s/p stents  - c/w ASA 81 mg qd

## 2022-01-30 NOTE — PROGRESS NOTE ADULT - SUBJECTIVE AND OBJECTIVE BOX
[facility] Division of Hospital Medicine  Dianna Harper MD  Pager (M-F, 8A-5P): [phone]  Other Times:  [phone]        SUBJECTIVE / OVERNIGHT EVENTS: Pt seen and examined at bedside. No acute events.    MEDICATIONS  (STANDING):  aspirin  chewable 81 milliGRAM(s) Oral daily  atorvastatin 80 milliGRAM(s) Oral at bedtime  chlorhexidine 4% Liquid 1 Application(s) Topical <User Schedule>  dexAMETHasone  Injectable 6 milliGRAM(s) IV Push daily  diltiazem    Tablet 60 milliGRAM(s) Enteral Tube every 6 hours  enoxaparin Injectable 60 milliGRAM(s) SubCutaneous every 12 hours  insulin lispro (ADMELOG) corrective regimen sliding scale   SubCutaneous every 6 hours  levothyroxine Injectable 56 MICROGram(s) IV Push at bedtime  multivitamin/minerals/iron Oral Solution (CENTRUM) 15 milliLiter(s) Oral daily  polyethylene glycol 3350 17 Gram(s) Oral daily  senna Syrup 10 milliLiter(s) Oral at bedtime    MEDICATIONS  (PRN):  acetaminophen    Suspension .. 650 milliGRAM(s) Oral every 6 hours PRN Temp greater or equal to 38C (100.4F)  hydrALAZINE Injectable 5 milliGRAM(s) IV Push every 4 hours PRN SBP >160      PHYSICAL EXAM:  Vital Signs Last 24 Hrs  T(C): 37.1 (01-30-22 @ 10:00), Max: 37.1 (01-30-22 @ 06:32)  T(F): 98.7 (01-30-22 @ 10:00), Max: 98.7 (01-30-22 @ 06:32)  HR: 104 (01-30-22 @ 10:00) (92 - 104)  BP: 136/78 (01-30-22 @ 10:00) (136/78 - 169/98)  RR: 18 (01-30-22 @ 10:00) (18 - 18)  SpO2: 93% (01-30-22 @ 10:00) (92% - 96%)  Wt(kg): --    CONSTITUTIONAL: NAD, ill appearing but stable  EYES: PERRLA; conjunctiva and sclera clear  ENMT:  NGT in place, moist mucosa  NECK: Supple, no palpable masses;  RESPIRATORY: Tachypneic, on RA, lungs are clear to auscultation bilaterally  CARDIOVASCULAR: Regular rate and rhythm, normal S1 and S2, no murmur/rub/gallop; non pitting edema in the BLLE  ABDOMEN: Nontender to palpation, normoactive bowel sounds, no rebound/guarding;  MUSCULOSKELETAL:  No clubbing or cyanosis of digits; no joint swelling or tenderness to palpation  PSYCH: A+O to person, place, and time; affect appropriate  NEUROLOGY: Alert, awake, following commands, LUE strength intact  SKIN: No rashes; no palpable lesions    LABS: reviewed                            11.5   10.82 )-----------( 284      ( 30 Jan 2022 13:05 )             37.6                                     CAPILLARY BLOOD GLUCOSE      POCT Blood Glucose.: 183 mg/dL (30 Jan 2022 11:54)

## 2022-01-30 NOTE — SWALLOW BEDSIDE ASSESSMENT ADULT - COMMENTS
Pt intubated 1/21 on admission; extubated 1/28, now awake, following commands , NGT placed , pending speech and swallow. Transferred to medical floor. CT A/P demonstrated fecal impaction, stercoral proctitis; -BM 1/26 ; -c/w bowel regimen; Miralax daily, senna 10mL. 1/30: emesis x1 overnight; tube feeds held; CXR to confirm ngt placement; feeds resumed; no additional emesis but "patient drooling more" Pt intubated 1/21 on admission; extubated 1/28, now awake, following commands , NGT placed , pending speech and swallow. Transferred to medical floor. CT A/P demonstrated fecal impaction, stercoral proctitis; -BM 1/26 ; -c/w bowel regimen; Miralax daily, senna 10mL. 1/30: emesis x1 overnight; tube feeds held; CXR to confirm ngt placement; feeds resumed; no additional emesis but "patient drooling more"    CT Brain 1/21: Chronic lacunar infarct in the left corona radiata and right thalamus. Pt intubated 1/21 on admission; extubated 1/28, now awake, following commands , NGT placed , pending speech and swallow. Transferred to medical floor. CT A/P demonstrated fecal impaction, stercoral proctitis; -BM 1/26 ; -c/w bowel regimen; Miralax daily, senna 10mL. 1/30: emesis x1 overnight; tube feeds held; CXR to confirm ngt placement; feeds resumed; no additional emesis but "patient drooling more"    CT Brain 1/21: Chronic lacunar infarct in the left corona radiata and right thalamus.    Dysphagia hx: known to this service; MBS 8/20/2021 found moderate oropharyngeal dysphagia with silent aspiration of thin liquids; reduced mastication on solids; recommended Dysphagia 2, nectar; dependent for feeding Pt intubated 1/21 on admission; extubated 1/27, now awake, following commands , NGT placed , pending speech and swallow. Transferred to medical floor. CT A/P demonstrated fecal impaction, stercoral proctitis; -BM 1/26 ; -c/w bowel regimen; Miralax daily, senna 10mL. 1/30: emesis x1 overnight; tube feeds held; CXR to confirm ngt placement; feeds resumed; no additional emesis but "patient drooling more"    CT Brain 1/21: Chronic lacunar infarct in the left corona radiata and right thalamus.    Dysphagia hx: known to this service; MBS 8/20/2021 found moderate oropharyngeal dysphagia with silent aspiration of thin liquids; reduced mastication on solids; recommended Dysphagia 2, nectar; dependent for feeding

## 2022-01-31 LAB
ANION GAP SERPL CALC-SCNC: 9 MMOL/L — SIGNIFICANT CHANGE UP (ref 5–17)
BUN SERPL-MCNC: 33 MG/DL — HIGH (ref 7–23)
CALCIUM SERPL-MCNC: 7.9 MG/DL — LOW (ref 8.4–10.5)
CHLORIDE SERPL-SCNC: 109 MMOL/L — HIGH (ref 96–108)
CO2 SERPL-SCNC: 21 MMOL/L — LOW (ref 22–31)
CREAT SERPL-MCNC: 0.55 MG/DL — SIGNIFICANT CHANGE UP (ref 0.5–1.3)
GLUCOSE BLDC GLUCOMTR-MCNC: 144 MG/DL — HIGH (ref 70–99)
GLUCOSE BLDC GLUCOMTR-MCNC: 163 MG/DL — HIGH (ref 70–99)
GLUCOSE BLDC GLUCOMTR-MCNC: 174 MG/DL — HIGH (ref 70–99)
GLUCOSE BLDC GLUCOMTR-MCNC: 179 MG/DL — HIGH (ref 70–99)
GLUCOSE BLDC GLUCOMTR-MCNC: 181 MG/DL — HIGH (ref 70–99)
GLUCOSE SERPL-MCNC: 167 MG/DL — HIGH (ref 70–99)
HCT VFR BLD CALC: 34.2 % — LOW (ref 34.5–45)
HGB BLD-MCNC: 10.7 G/DL — LOW (ref 11.5–15.5)
MCHC RBC-ENTMCNC: 28.1 PG — SIGNIFICANT CHANGE UP (ref 27–34)
MCHC RBC-ENTMCNC: 31.3 GM/DL — LOW (ref 32–36)
MCV RBC AUTO: 89.8 FL — SIGNIFICANT CHANGE UP (ref 80–100)
NRBC # BLD: 0 /100 WBCS — SIGNIFICANT CHANGE UP (ref 0–0)
PLATELET # BLD AUTO: 416 K/UL — HIGH (ref 150–400)
POTASSIUM SERPL-MCNC: 4.2 MMOL/L — SIGNIFICANT CHANGE UP (ref 3.5–5.3)
POTASSIUM SERPL-SCNC: 4.2 MMOL/L — SIGNIFICANT CHANGE UP (ref 3.5–5.3)
RBC # BLD: 3.81 M/UL — SIGNIFICANT CHANGE UP (ref 3.8–5.2)
RBC # FLD: 15.7 % — HIGH (ref 10.3–14.5)
SODIUM SERPL-SCNC: 139 MMOL/L — SIGNIFICANT CHANGE UP (ref 135–145)
WBC # BLD: 13.06 K/UL — HIGH (ref 3.8–10.5)
WBC # FLD AUTO: 13.06 K/UL — HIGH (ref 3.8–10.5)

## 2022-01-31 PROCEDURE — 99233 SBSQ HOSP IP/OBS HIGH 50: CPT

## 2022-01-31 RX ADMIN — Medication 60 MILLIGRAM(S): at 17:35

## 2022-01-31 RX ADMIN — Medication 81 MILLIGRAM(S): at 11:23

## 2022-01-31 RX ADMIN — ATORVASTATIN CALCIUM 80 MILLIGRAM(S): 80 TABLET, FILM COATED ORAL at 21:45

## 2022-01-31 RX ADMIN — Medication 6 MILLIGRAM(S): at 05:01

## 2022-01-31 RX ADMIN — Medication 1: at 17:35

## 2022-01-31 RX ADMIN — Medication 56 MICROGRAM(S): at 21:46

## 2022-01-31 RX ADMIN — Medication 60 MILLIGRAM(S): at 05:01

## 2022-01-31 RX ADMIN — Medication 1: at 11:59

## 2022-01-31 RX ADMIN — Medication 60 MILLIGRAM(S): at 11:23

## 2022-01-31 RX ADMIN — ENOXAPARIN SODIUM 60 MILLIGRAM(S): 100 INJECTION SUBCUTANEOUS at 05:02

## 2022-01-31 RX ADMIN — Medication 1: at 00:14

## 2022-01-31 RX ADMIN — Medication 15 MILLILITER(S): at 11:58

## 2022-01-31 RX ADMIN — ENOXAPARIN SODIUM 60 MILLIGRAM(S): 100 INJECTION SUBCUTANEOUS at 17:35

## 2022-01-31 RX ADMIN — POLYETHYLENE GLYCOL 3350 17 GRAM(S): 17 POWDER, FOR SOLUTION ORAL at 11:23

## 2022-01-31 RX ADMIN — SENNA PLUS 10 MILLILITER(S): 8.6 TABLET ORAL at 21:45

## 2022-01-31 NOTE — PROGRESS NOTE ADULT - PROBLEM SELECTOR PLAN 1
AMS 2/2 Metabolic encephalopathy  - baseline AOx2, currently appears to be at baseline  Hx CVA with residual left sided weak/flaccid  1/24 VEEG Negative likely-metabolic encephalopathy    1/21 CTH without acute changes, re demonstration of lacuna infarcts   s/p LP, glucose 86, protein 48- negative. all abx and anti-viral dcd  mental status waxes and wanes

## 2022-01-31 NOTE — PROGRESS NOTE ADULT - SUBJECTIVE AND OBJECTIVE BOX
Patient is a 71y old  Female who presents with a chief complaint of AMS (30 Jan 2022 13:55)    SUBJECTIVE / OVERNIGHT EVENTS: no acute events overnight, pt remains on tube feeds via NGT     MEDICATIONS  (STANDING):  aspirin  chewable 81 milliGRAM(s) Oral daily  atorvastatin 80 milliGRAM(s) Oral at bedtime  chlorhexidine 4% Liquid 1 Application(s) Topical <User Schedule>  diltiazem    Tablet 60 milliGRAM(s) Enteral Tube every 6 hours  enoxaparin Injectable 60 milliGRAM(s) SubCutaneous every 12 hours  insulin lispro (ADMELOG) corrective regimen sliding scale   SubCutaneous every 6 hours  levothyroxine Injectable 56 MICROGram(s) IV Push at bedtime  multivitamin/minerals/iron Oral Solution (CENTRUM) 15 milliLiter(s) Oral daily  polyethylene glycol 3350 17 Gram(s) Oral daily  senna Syrup 10 milliLiter(s) Oral at bedtime    MEDICATIONS  (PRN):  acetaminophen    Suspension .. 650 milliGRAM(s) Oral every 6 hours PRN Temp greater or equal to 38C (100.4F)  hydrALAZINE Injectable 5 milliGRAM(s) IV Push every 4 hours PRN SBP >160      Vital Signs Last 24 Hrs  T(C): 36.7 (31 Jan 2022 13:47), Max: 36.8 (30 Jan 2022 16:48)  T(F): 98.1 (31 Jan 2022 13:47), Max: 98.3 (30 Jan 2022 16:48)  HR: 81 (31 Jan 2022 13:47) (81 - 101)  BP: 154/93 (31 Jan 2022 13:47) (124/78 - 167/92)  BP(mean): --  RR: 18 (31 Jan 2022 13:47) (18 - 18)  SpO2: 94% (31 Jan 2022 13:47) (91% - 96%)  CAPILLARY BLOOD GLUCOSE      POCT Blood Glucose.: 181 mg/dL (31 Jan 2022 11:58)  POCT Blood Glucose.: 144 mg/dL (31 Jan 2022 06:01)  POCT Blood Glucose.: 174 mg/dL (31 Jan 2022 00:12)  POCT Blood Glucose.: 167 mg/dL (30 Jan 2022 17:36)    I&O's Summary    30 Jan 2022 07:01  -  31 Jan 2022 07:00  --------------------------------------------------------  IN: 2240 mL / OUT: 1175 mL / NET: 1065 mL    31 Jan 2022 07:01  -  31 Jan 2022 15:15  --------------------------------------------------------  IN: 320 mL / OUT: 450 mL / NET: -130 mL    PHYSICAL EXAM:  GENERAL: chronically ill appearing   EYES:  conjunctiva and sclera clear  CHEST/LUNG: no wheezing noted   HEART: +s1/S2   ABDOMEN: Soft, Nontender, Nondistended  EXTREMITIES: no LE edema   PSYCH: AAOx0    LABS:                        10.7   13.06 )-----------( 416      ( 31 Jan 2022 08:37 )             34.2     01-31    139  |  109<H>  |  33<H>  ----------------------------<  167<H>  4.2   |  21<L>  |  0.55    Ca    7.9<L>      31 Jan 2022 08:37

## 2022-02-01 LAB
GLUCOSE BLDC GLUCOMTR-MCNC: 114 MG/DL — HIGH (ref 70–99)
GLUCOSE BLDC GLUCOMTR-MCNC: 125 MG/DL — HIGH (ref 70–99)
GLUCOSE BLDC GLUCOMTR-MCNC: 145 MG/DL — HIGH (ref 70–99)
GLUCOSE BLDC GLUCOMTR-MCNC: 97 MG/DL — SIGNIFICANT CHANGE UP (ref 70–99)

## 2022-02-01 PROCEDURE — 99233 SBSQ HOSP IP/OBS HIGH 50: CPT

## 2022-02-01 RX ADMIN — Medication 1: at 00:07

## 2022-02-01 RX ADMIN — POLYETHYLENE GLYCOL 3350 17 GRAM(S): 17 POWDER, FOR SOLUTION ORAL at 12:53

## 2022-02-01 RX ADMIN — SENNA PLUS 10 MILLILITER(S): 8.6 TABLET ORAL at 22:48

## 2022-02-01 RX ADMIN — Medication 15 MILLILITER(S): at 13:08

## 2022-02-01 RX ADMIN — ATORVASTATIN CALCIUM 80 MILLIGRAM(S): 80 TABLET, FILM COATED ORAL at 22:48

## 2022-02-01 RX ADMIN — ENOXAPARIN SODIUM 60 MILLIGRAM(S): 100 INJECTION SUBCUTANEOUS at 05:14

## 2022-02-01 RX ADMIN — Medication 60 MILLIGRAM(S): at 23:16

## 2022-02-01 RX ADMIN — Medication 60 MILLIGRAM(S): at 00:05

## 2022-02-01 RX ADMIN — Medication 60 MILLIGRAM(S): at 05:14

## 2022-02-01 RX ADMIN — CHLORHEXIDINE GLUCONATE 1 APPLICATION(S): 213 SOLUTION TOPICAL at 13:09

## 2022-02-01 RX ADMIN — Medication 56 MICROGRAM(S): at 22:48

## 2022-02-01 RX ADMIN — Medication 81 MILLIGRAM(S): at 12:53

## 2022-02-01 RX ADMIN — Medication 60 MILLIGRAM(S): at 18:30

## 2022-02-01 RX ADMIN — Medication 60 MILLIGRAM(S): at 12:53

## 2022-02-01 RX ADMIN — ENOXAPARIN SODIUM 60 MILLIGRAM(S): 100 INJECTION SUBCUTANEOUS at 18:32

## 2022-02-01 NOTE — PROGRESS NOTE ADULT - PROBLEM SELECTOR PLAN 2
Acute hypoxic respiratory failure 2/2 COVID-19 PNA and superimposed MRSA PNA   - intubated PS 5/530 % now s/p extubation on RA  -completed  Vancomycin for MRSA PNA 1/21-1/26  COVID-19 PNA   -s.p remdsivir 1/24- 1/28  -s/p decadron 1/21- 1/31

## 2022-02-01 NOTE — PROGRESS NOTE ADULT - SUBJECTIVE AND OBJECTIVE BOX
Patient is a 71y old  Female who presents with a chief complaint of AMS (01 Feb 2022 10:59)    SUBJECTIVE / OVERNIGHT EVENTS: no acute events overnight, pt is more alert and awake today     MEDICATIONS  (STANDING):  aspirin  chewable 81 milliGRAM(s) Oral daily  atorvastatin 80 milliGRAM(s) Oral at bedtime  chlorhexidine 4% Liquid 1 Application(s) Topical <User Schedule>  diltiazem    Tablet 60 milliGRAM(s) Enteral Tube every 6 hours  enoxaparin Injectable 60 milliGRAM(s) SubCutaneous every 12 hours  insulin lispro (ADMELOG) corrective regimen sliding scale   SubCutaneous every 6 hours  levothyroxine Injectable 56 MICROGram(s) IV Push at bedtime  multivitamin/minerals/iron Oral Solution (CENTRUM) 15 milliLiter(s) Oral daily  polyethylene glycol 3350 17 Gram(s) Oral daily  senna Syrup 10 milliLiter(s) Oral at bedtime    MEDICATIONS  (PRN):  acetaminophen    Suspension .. 650 milliGRAM(s) Oral every 6 hours PRN Temp greater or equal to 38C (100.4F)  hydrALAZINE Injectable 5 milliGRAM(s) IV Push every 4 hours PRN SBP >160      Vital Signs Last 24 Hrs  T(C): 36.6 (01 Feb 2022 13:17), Max: 37 (31 Jan 2022 16:23)  T(F): 97.9 (01 Feb 2022 13:17), Max: 98.6 (31 Jan 2022 16:23)  HR: 93 (01 Feb 2022 13:17) (80 - 93)  BP: 163/83 (01 Feb 2022 13:17) (148/83 - 167/99)  BP(mean): --  RR: 18 (01 Feb 2022 13:17) (16 - 18)  SpO2: 93% (01 Feb 2022 13:17) (93% - 97%)  CAPILLARY BLOOD GLUCOSE      POCT Blood Glucose.: 125 mg/dL (01 Feb 2022 12:06)  POCT Blood Glucose.: 145 mg/dL (01 Feb 2022 05:49)  POCT Blood Glucose.: 163 mg/dL (31 Jan 2022 23:58)  POCT Blood Glucose.: 179 mg/dL (31 Jan 2022 17:34)    I&O's Summary    31 Jan 2022 07:01  -  01 Feb 2022 07:00  --------------------------------------------------------  IN: 1320 mL / OUT: 1025 mL / NET: 295 mL    01 Feb 2022 07:01  -  01 Feb 2022 14:25  --------------------------------------------------------  IN: 0 mL / OUT: 0 mL / NET: 0 mL        PHYSICAL EXAM:  GENERAL: NAD  EYES: conjunctiva and sclera clear  NOSE: NGT in place   NECK: Supple, No JVD  CHEST/LUNG: Clear to auscultation bilaterally; No wheeze  HEART: +S1/S2   ABDOMEN: Soft, Nontender, Nondistended  EXTREMITIES: trace b/l LE edema   PSYCH: Alert and awake, following some commands     LABS:                        10.7   13.06 )-----------( 416      ( 31 Jan 2022 08:37 )             34.2     01-31    139  |  109<H>  |  33<H>  ----------------------------<  167<H>  4.2   |  21<L>  |  0.55    Ca    7.9<L>      31 Jan 2022 08:37        Consultant(s) Notes Reviewed: Neurology

## 2022-02-01 NOTE — PROGRESS NOTE ADULT - ASSESSMENT
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 Neurology originally consulted due concern for seizure. EEG noted fluctuating periodic discharges of triphasic morphology. Patient has been subsequently extubated and moved a general floor. Currently following commands, oriented to at least self and similar exam to when previously admitted to neurology with noted acute generalized weakness. LP studies unremarkable for infection.     Impression: Ongoing weakness and fluctuating encephalopathy likely secondary to hospitalization s/p COVID PNA and intubation, resolved hypernatremia and ANIKA with likely underlying delirium and cognitive deficits. Currently improving.     Plan:  [] no need for anti-seizure medications at this time  [] no further neurological imaging or workup at this time  [] avoid hypotension given patient's h/o ischemic stroke  [] continue with anticoagulant therapy for history of afib  [] Q4H neuro checks  [] please have adequate sleeping environment for the patient, light on, curtains open, TV on during the day with regular stimulation and out of bed to chair if possible. During the night, close curtains, TV off, lights off, and minimize interruptions (limit blood draws and vital sign checks if possible). Regular interaction with patient with staff (introducing selves), frequent reorientation, and encouragement of visitors as allowed by hospital and unit policy. Regular toileting.  [] if needed for sleep may administer melatonin 3mg  [] limitation of sedating medications as tolerated  [] maintain electrolytes within normal limits    Case to be seen and discussed with neurology attending, Dr. Diaz.

## 2022-02-01 NOTE — PROGRESS NOTE ADULT - PROBLEM SELECTOR PLAN 1
AMS 2/2 Metabolic encephalopathy  - baseline AOx2, currently appears to be at baseline  Hx CVA with residual left sided weak/flaccid  1/24 VEEG Negative likely-metabolic encephalopathy    1/21 CTH without acute changes, re demonstration of lacuna infarcts   s/p LP, glucose 86, protein 48- negative. all abx and anti-viral dcd  mental status waxes and wanes  overall improved today  appreciate neuro re-eval

## 2022-02-01 NOTE — PROGRESS NOTE ADULT - SUBJECTIVE AND OBJECTIVE BOX
*************************************  NEUROLOGY PROGRESS NOTE  **************************************    TIANA BROWN  Female  MRN-[id number]    Subjective:  Primary team asked to re-evaluate patient. In the interim, the patient had been extubated on 1/27 and transferred to medical floor. Hypernatremia, ANIKA, resolved. S/p remdesevir and decadron.   Patient was seen and examined at the bedside. Awoke easily and was able to nod or shake head to questioning. Denied any headaches, visual problems, extremity pain. Was able to indicate that she felt weak throughout and that the left side is weaker.    MEDICATIONS  (STANDING):  aspirin  chewable 81 milliGRAM(s) Oral daily  atorvastatin 80 milliGRAM(s) Oral at bedtime  chlorhexidine 4% Liquid 1 Application(s) Topical <User Schedule>  diltiazem    Tablet 60 milliGRAM(s) Enteral Tube every 6 hours  enoxaparin Injectable 60 milliGRAM(s) SubCutaneous every 12 hours  insulin lispro (ADMELOG) corrective regimen sliding scale   SubCutaneous every 6 hours  levothyroxine Injectable 56 MICROGram(s) IV Push at bedtime  multivitamin/minerals/iron Oral Solution (CENTRUM) 15 milliLiter(s) Oral daily  polyethylene glycol 3350 17 Gram(s) Oral daily  senna Syrup 10 milliLiter(s) Oral at bedtime    MEDICATIONS  (PRN):  acetaminophen    Suspension .. 650 milliGRAM(s) Oral every 6 hours PRN Temp greater or equal to 38C (100.4F)  hydrALAZINE Injectable 5 milliGRAM(s) IV Push every 4 hours PRN SBP >160    VITAL SIGNS:  Vital Signs Last 24 Hrs  T(C): 36.6 (01 Feb 2022 09:00), Max: 37 (31 Jan 2022 16:23)  T(F): 97.9 (01 Feb 2022 09:00), Max: 98.6 (31 Jan 2022 16:23)  HR: 86 (01 Feb 2022 09:00) (80 - 86)  BP: 167/99 (01 Feb 2022 09:00) (148/83 - 167/99)  BP(mean): --  RR: 18 (01 Feb 2022 09:00) (16 - 18)  SpO2: 94% (01 Feb 2022 09:00) (94% - 97%)    PHYSICAL EXAMINATION:  ENT: NG tube in  Neurologic:  - Mental Status: Eyes closed, awakens easily to verbal stimuli and able to maintain attention. Oriented to self. When presented with option was able to confirm she was in a hospital but did not know the name. Not oriented to time.  Able to follow all simple commands. Some difficulty with crossed commands.   - Language: Speech is severely hypophonic. Able to name (glove, tissue) when presented with options.  - Cranial Nerves II-XII:  VF with reduced blink to threat on the L. Able to look to the right. Can bring eyes to midline but cannot cross, difficulty with supraduction. PERRL 4mm bilaterally. Trace L nasolabial fold flattening.   - Motor:  Able to lift R arm at the elbow against gravity. Able to slightly wiggle fingers on the L arm. Able to wiggle toes bilaterally.   - Reflexes: Plantar responses flexor.  - Sensory:  Intact to light touch throughout with some diminished sensation on the L.   - Gait:  Deferred    LABS:                          10.7   13.06 )-----------( 416      ( 31 Jan 2022 08:37 )             34.2     01-31    139  |  109<H>  |  33<H>  ----------------------------<  167<H>  4.2   |  21<L>  |  0.55    Ca    7.9<L>      31 Jan 2022 08:37    RADIOLOGY & ADDITIONAL STUDIES:      CT Head No Cont (01.23.22 @ 23:08)  VENTRICLES AND SULCI:  Prominent in size compatible with age-appropriate   volume loss  INTRA-AXIAL:  Moderate microvascular type white matter changes are again   seen. Chronic lacunae are seen within the left corona radiata and right   thalamus, unchanged. Nodefinite mass effect or hemorrhage is seen.  EXTRA-AXIAL:  A plaque-like calcification is seen along the left   frontoparietal convexity inner table likely representing an ossified   meningioma, unchanged.  VISUALIZED SINUSES:  Maxillary polyps or retention cysts.  VISUALIZED MASTOIDS:  Clear.  CALVARIUM:  Normal.  MISCELLANEOUS:  ET tube and OG tube in place.    IMPRESSION:  Limited exam.  No definite mass effect or hemorrhage identified.    MR Head No Cont (08.18.21 @ 12:14)  FINDINGS:There is an acute right thalamic and right midbrain infarct corresponding to the lucency seen on the previous brain MRI. No evidence for hemorrhage.    Age-appropriate involutional changes and mild to moderate microvascular ischemic change. Chronic left corona radiata white matter infarct. Left parietal inner table calcification likely represents an osteoma although a calcified meningioma is not completely excluded.    Major flow voids at the skull base are within normal limits.    No hydrocephalus, midline shift or extra-axial collections are present.    The orbits aren't remarkable in appearance. There is a right maxillary sinus mucosal thickening and a small polyp versus retention cysts. Left sphenoid sinus mucosal thickening is present.    The tympanomastoid cavities are free of acute disease.    IMPRESSION:    Acute right thalamic and right midbrain infarct.      1/23 EEG  EEG Summary / Classification:    Abnormal EEG in the sedated patient.  - Intermittent fluctuating generalized periodic discharges with triphasic morphology, occurring up to 1 hz  - Moderate to severe generalized slowing, becoming severe after ~0630    EEG Impression / Clinical Correlate:    The findings of generalized periodic discharges with triphasic morphology are typically seen in metabolic encephalopathy, but may increase the risk for seizures in certain clinical situations. Clinical correlation is advised.   Moderate to severe multifocal/diffuse nonspecific cerebral dysfunction, becoming severe after ~0630.   No seizure seen.    1/24 EEG  EEG Summary / Classification:    Abnormal EEG in the sedated patient.  - Intermittent fluctuating generalized periodic discharges with triphasic morphology  - Moderate to severe generalized slowing    EEG Impression / Clinical Correlate:    4.	The findings of generalized periodic discharges with triphasic morphology are typically seen in metabolic encephalopathy, but may increase the risk for seizures in certain clinical situations. Clinical correlation is advised.   5.	Moderate to severe multifocal/diffuse nonspecific cerebral dysfunction, becoming severe after ~0630.   6.	No seizure seen.

## 2022-02-02 LAB
GLUCOSE BLDC GLUCOMTR-MCNC: 142 MG/DL — HIGH (ref 70–99)
GLUCOSE BLDC GLUCOMTR-MCNC: 151 MG/DL — HIGH (ref 70–99)
GLUCOSE BLDC GLUCOMTR-MCNC: 167 MG/DL — HIGH (ref 70–99)
GLUCOSE BLDC GLUCOMTR-MCNC: 169 MG/DL — HIGH (ref 70–99)

## 2022-02-02 PROCEDURE — 99232 SBSQ HOSP IP/OBS MODERATE 35: CPT

## 2022-02-02 PROCEDURE — 71045 X-RAY EXAM CHEST 1 VIEW: CPT | Mod: 26,77

## 2022-02-02 PROCEDURE — 71045 X-RAY EXAM CHEST 1 VIEW: CPT | Mod: 26

## 2022-02-02 RX ADMIN — Medication 60 MILLIGRAM(S): at 11:34

## 2022-02-02 RX ADMIN — Medication 15 MILLILITER(S): at 11:35

## 2022-02-02 RX ADMIN — Medication 56 MICROGRAM(S): at 21:41

## 2022-02-02 RX ADMIN — Medication 1: at 11:34

## 2022-02-02 RX ADMIN — ENOXAPARIN SODIUM 60 MILLIGRAM(S): 100 INJECTION SUBCUTANEOUS at 18:15

## 2022-02-02 RX ADMIN — CHLORHEXIDINE GLUCONATE 1 APPLICATION(S): 213 SOLUTION TOPICAL at 10:12

## 2022-02-02 RX ADMIN — Medication 1: at 05:59

## 2022-02-02 RX ADMIN — Medication 60 MILLIGRAM(S): at 05:59

## 2022-02-02 RX ADMIN — POLYETHYLENE GLYCOL 3350 17 GRAM(S): 17 POWDER, FOR SOLUTION ORAL at 11:34

## 2022-02-02 RX ADMIN — Medication 650 MILLIGRAM(S): at 22:48

## 2022-02-02 RX ADMIN — ENOXAPARIN SODIUM 60 MILLIGRAM(S): 100 INJECTION SUBCUTANEOUS at 05:59

## 2022-02-02 RX ADMIN — Medication 60 MILLIGRAM(S): at 21:47

## 2022-02-02 RX ADMIN — Medication 81 MILLIGRAM(S): at 11:35

## 2022-02-02 RX ADMIN — SENNA PLUS 10 MILLILITER(S): 8.6 TABLET ORAL at 21:47

## 2022-02-02 RX ADMIN — ATORVASTATIN CALCIUM 80 MILLIGRAM(S): 80 TABLET, FILM COATED ORAL at 21:47

## 2022-02-02 NOTE — PROVIDER CONTACT NOTE (OTHER) - ACTION/TREATMENT ORDERED:
Pt experienced manipulation of NG tube several times today. Likely pt may be in pain or having discomfort. Give PRN tylenol and reevaluate for potential resolution later.

## 2022-02-02 NOTE — SWALLOW BEDSIDE ASSESSMENT ADULT - SPECIFY REASON(S)
to assess the swallow mechanism; r/o dysphagia.
to subjectively reassess the swallow mechanism r/o dysphagia and determine candidacy for initiating an oral diet
The Delivery OB Provider certifies that vaginal examination and/or abdominal examination after the delivery was done and no foreign body was found.

## 2022-02-02 NOTE — SWALLOW BEDSIDE ASSESSMENT ADULT - SWALLOW EVAL: DIAGNOSIS
72 yo F pmhx CVA with residual right sided weakness admitted from nursing home with AMS and fever secondary to metabolic encephalopathy s/p intubation (1/21-27), septic shock 2/2 COVID-19 PNA, MRSA PNA , and r/o meningitis. Pt now being seen for reassessment of swallow as pt with reported improved mental status. Patient continues to present with mentation that does not support oral feeding at this time. Upon phonation, wet vocal quality noted.
72 yo F pmhx CVA with residual right sided weakness admitted from nursing home with AMS and fever secondary to metabolic encephalopathy s/p intubation (1/21-27), septic shock 2/2 COVID-19 PNA, MRSA PNA , and r/o meningitis. Patient presents on bedside swallow evaluation with mentation that does not support oral feeding at this time. Patient is lethargic, drooling with open mouth posture. Unsustained arousal. Absent oral functional response to food/utensil. Tonic bite noted. Oral care and suctioning via yankauer provided.

## 2022-02-02 NOTE — SWALLOW BEDSIDE ASSESSMENT ADULT - COMMENTS
Pt intubated 1/21 on admission; extubated 1/27, now awake, following commands , NGT placed , pending speech and swallow. Transferred to medical floor. CT A/P demonstrated fecal impaction, stercoral proctitis; -BM 1/26 ; -c/w bowel regimen; Miralax daily, senna 10mL. 1/30: emesis x1 overnight; tube feeds held; CXR to confirm ngt placement; feeds resumed; no additional emesis but "patient drooling more"    CT Brain 1/21: Chronic lacunar infarct in the left corona radiata and right thalamus.    Dysphagia hx: known to this service; MBS 8/20/2021 found moderate oropharyngeal dysphagia with silent aspiration of thin liquids; reduced mastication on solids; recommended Dysphagia 2, nectar; dependent for feeding

## 2022-02-02 NOTE — SWALLOW BEDSIDE ASSESSMENT ADULT - MUCOSAL QUALITY
min thick secretions on tongue; oral care provided; otherwise not fully visualized due to tonic bite reflex
thick, dried yellow secretions along soft palate and tongue. SLP provided oral care.

## 2022-02-02 NOTE — SWALLOW BEDSIDE ASSESSMENT ADULT - SLP GENERAL OBSERVATIONS
+NGT, feeds running; lethargic, drooling from right side; unsustained arousal to verbal/tactile stim; tonic bite to [place]
Pt found in bed, awake upon encounter. Pt on contact isolation for MRSA. Open mouth posture noted. Eye contact fleeting. However, given cues/re-direction to speaker, eye contact established and maintained. Minimal phonation achieved. Wet vocal quality at baseline. Pt unable to clear with cued cough.

## 2022-02-02 NOTE — SWALLOW BEDSIDE ASSESSMENT ADULT - SWALLOW EVAL: RECOMMENDED DIET
NPO, with non-oral nutrition/hydration/medications. Consider changing large bore NGT to KFT for patient comfort and to improve swallow efficacy
NPO, with non-oral nutrition/hydration/medications.
Detail Level: Zone

## 2022-02-02 NOTE — PROGRESS NOTE ADULT - SUBJECTIVE AND OBJECTIVE BOX
Patient is a 71y old  Female who presents with a chief complaint of AMS (01 Feb 2022 14:25)    SUBJECTIVE / OVERNIGHT EVENTS: pt following some commands    MEDICATIONS  (STANDING):  aspirin  chewable 81 milliGRAM(s) Oral daily  atorvastatin 80 milliGRAM(s) Oral at bedtime  chlorhexidine 4% Liquid 1 Application(s) Topical <User Schedule>  diltiazem    Tablet 60 milliGRAM(s) Enteral Tube every 6 hours  enoxaparin Injectable 60 milliGRAM(s) SubCutaneous every 12 hours  insulin lispro (ADMELOG) corrective regimen sliding scale   SubCutaneous every 6 hours  levothyroxine Injectable 56 MICROGram(s) IV Push at bedtime  multivitamin/minerals/iron Oral Solution (CENTRUM) 15 milliLiter(s) Oral daily  polyethylene glycol 3350 17 Gram(s) Oral daily  senna Syrup 10 milliLiter(s) Oral at bedtime    MEDICATIONS  (PRN):  acetaminophen    Suspension .. 650 milliGRAM(s) Oral every 6 hours PRN Temp greater or equal to 38C (100.4F)  hydrALAZINE Injectable 5 milliGRAM(s) IV Push every 4 hours PRN SBP >160      Vital Signs Last 24 Hrs  T(C): 37.1 (02 Feb 2022 11:32), Max: 37.4 (01 Feb 2022 21:20)  T(F): 98.8 (02 Feb 2022 11:32), Max: 99.4 (01 Feb 2022 21:20)  HR: 100 (02 Feb 2022 11:32) (80 - 100)  BP: 143/83 (02 Feb 2022 11:32) (120/67 - 143/83)  BP(mean): --  RR: 18 (02 Feb 2022 11:32) (18 - 18)  SpO2: 95% (02 Feb 2022 11:32) (93% - 96%)  CAPILLARY BLOOD GLUCOSE      POCT Blood Glucose.: 167 mg/dL (02 Feb 2022 11:31)  POCT Blood Glucose.: 151 mg/dL (02 Feb 2022 05:46)  POCT Blood Glucose.: 114 mg/dL (01 Feb 2022 23:56)  POCT Blood Glucose.: 97 mg/dL (01 Feb 2022 18:33)    I&O's Summary    01 Feb 2022 07:01  -  02 Feb 2022 07:00  --------------------------------------------------------  IN: 2120 mL / OUT: 900 mL / NET: 1220 mL    02 Feb 2022 07:01  -  02 Feb 2022 14:48  --------------------------------------------------------  IN: 540 mL / OUT: 350 mL / NET: 190 mL      PHYSICAL EXAM:  GENERAL: chronically ill appearing   EYES: conjunctiva and sclera clear  CHEST/LUNG: no wheezing noted   HEART: +S1/S2   ABDOMEN: Soft, Nontender, Nondistended  EXTREMITIES: no peripheral edema   PSYCH: alert and awake, but not following commands

## 2022-02-02 NOTE — PROGRESS NOTE ADULT - PROBLEM SELECTOR PLAN 2
Acute hypoxic respiratory failure 2/2 COVID-19 PNA and superimposed MRSA PNA   - intubated PS 5/530 % now s/p extubation on RA  -completed  Vancomycin for MRSA PNA 1/21-1/26  COVID-19 PNA   -s.p remdsivir 1/24- 1/28  -s/p decadron 1/21- 1/31  -resolved

## 2022-02-02 NOTE — PROGRESS NOTE ADULT - PROBLEM SELECTOR PLAN 1
AMS 2/2 Metabolic encephalopathy  - baseline AOx2, currently appears to be at baseline  Hx CVA with residual left sided weak/flaccid  1/24 VEEG Negative likely-metabolic encephalopathy    1/21 CTH without acute changes, re demonstration of lacuna infarcts   s/p LP, glucose 86, protein 48- negative. all abx and anti-viral dcd  mental status waxes and wanes  appreciate neuro re-eval  following some commands such as opening mouth, but still with increased secretions  pending speech and swallow re-eval

## 2022-02-02 NOTE — SWALLOW BEDSIDE ASSESSMENT ADULT - SLP PERTINENT HISTORY OF CURRENT PROBLEM
71F Hx afib on apixaban, cad s/p stents, HFrEF, CVA with left sided residual weakness who presents from nursing home with AMS, fever. Found to be hypernatremic, hypotensive refractory to IVF requiring vasopressor, AMS secondary to metabolic encephalopathy s/p intubation, septic shock 2/2 COVID-19 PNA, MRSA PNA , and r/o meningitis.
71F Hx afib on apixaban, cad s/p stents, HFrEF, CVA with left sided residual weakness who presents from nursing home with AMS, fever. Found to be hypernatremic, hypotensive refractory to IVF requiring vasopressor, AMS secondary to metabolic encephalopathy s/p intubation, septic shock 2/2 COVID-19 PNA, MRSA PNA , and r/o meningitis.

## 2022-02-03 DIAGNOSIS — D64.9 ANEMIA, UNSPECIFIED: ICD-10-CM

## 2022-02-03 LAB
ANION GAP SERPL CALC-SCNC: 11 MMOL/L — SIGNIFICANT CHANGE UP (ref 5–17)
APTT BLD: 37.5 SEC — HIGH (ref 27.5–35.5)
BLD GP AB SCN SERPL QL: NEGATIVE — SIGNIFICANT CHANGE UP
BUN SERPL-MCNC: 30 MG/DL — HIGH (ref 7–23)
CALCIUM SERPL-MCNC: 8.2 MG/DL — LOW (ref 8.4–10.5)
CHLORIDE SERPL-SCNC: 103 MMOL/L — SIGNIFICANT CHANGE UP (ref 96–108)
CO2 SERPL-SCNC: 21 MMOL/L — LOW (ref 22–31)
CREAT SERPL-MCNC: 0.58 MG/DL — SIGNIFICANT CHANGE UP (ref 0.5–1.3)
D DIMER BLD IA.RAPID-MCNC: 228 NG/ML DDU — SIGNIFICANT CHANGE UP
FERRITIN SERPL-MCNC: 108 NG/ML — SIGNIFICANT CHANGE UP (ref 15–150)
GLUCOSE BLDC GLUCOMTR-MCNC: 158 MG/DL — HIGH (ref 70–99)
GLUCOSE BLDC GLUCOMTR-MCNC: 174 MG/DL — HIGH (ref 70–99)
GLUCOSE BLDC GLUCOMTR-MCNC: 193 MG/DL — HIGH (ref 70–99)
GLUCOSE BLDC GLUCOMTR-MCNC: 196 MG/DL — HIGH (ref 70–99)
GLUCOSE SERPL-MCNC: 184 MG/DL — HIGH (ref 70–99)
HCT VFR BLD CALC: 21.7 % — LOW (ref 34.5–45)
HCT VFR BLD CALC: 22.7 % — LOW (ref 34.5–45)
HCT VFR BLD CALC: 23.2 % — LOW (ref 34.5–45)
HGB BLD-MCNC: 6.9 G/DL — CRITICAL LOW (ref 11.5–15.5)
HGB BLD-MCNC: 7.1 G/DL — LOW (ref 11.5–15.5)
HGB BLD-MCNC: 7.3 G/DL — LOW (ref 11.5–15.5)
INR BLD: 1.33 RATIO — HIGH (ref 0.88–1.16)
LEVETIRACETAM SERPL-MCNC: <1 UG/ML — LOW (ref 10–40)
MCHC RBC-ENTMCNC: 28.2 PG — SIGNIFICANT CHANGE UP (ref 27–34)
MCHC RBC-ENTMCNC: 28.3 PG — SIGNIFICANT CHANGE UP (ref 27–34)
MCHC RBC-ENTMCNC: 28.4 PG — SIGNIFICANT CHANGE UP (ref 27–34)
MCHC RBC-ENTMCNC: 31.3 GM/DL — LOW (ref 32–36)
MCHC RBC-ENTMCNC: 31.5 GM/DL — LOW (ref 32–36)
MCHC RBC-ENTMCNC: 31.8 GM/DL — LOW (ref 32–36)
MCV RBC AUTO: 88.6 FL — SIGNIFICANT CHANGE UP (ref 80–100)
MCV RBC AUTO: 90.3 FL — SIGNIFICANT CHANGE UP (ref 80–100)
MCV RBC AUTO: 90.4 FL — SIGNIFICANT CHANGE UP (ref 80–100)
NRBC # BLD: 0 /100 WBCS — SIGNIFICANT CHANGE UP (ref 0–0)
PLATELET # BLD AUTO: 428 K/UL — HIGH (ref 150–400)
PLATELET # BLD AUTO: 452 K/UL — HIGH (ref 150–400)
PLATELET # BLD AUTO: 463 K/UL — HIGH (ref 150–400)
POTASSIUM SERPL-MCNC: 3 MMOL/L — LOW (ref 3.5–5.3)
POTASSIUM SERPL-SCNC: 3 MMOL/L — LOW (ref 3.5–5.3)
PROTHROM AB SERPL-ACNC: 15.7 SEC — HIGH (ref 10.6–13.6)
RBC # BLD: 2.45 M/UL — LOW (ref 3.8–5.2)
RBC # BLD: 2.51 M/UL — LOW (ref 3.8–5.2)
RBC # BLD: 2.57 M/UL — LOW (ref 3.8–5.2)
RBC # FLD: 16.2 % — HIGH (ref 10.3–14.5)
RH IG SCN BLD-IMP: POSITIVE — SIGNIFICANT CHANGE UP
SODIUM SERPL-SCNC: 135 MMOL/L — SIGNIFICANT CHANGE UP (ref 135–145)
WBC # BLD: 17.34 K/UL — HIGH (ref 3.8–10.5)
WBC # BLD: 18.16 K/UL — HIGH (ref 3.8–10.5)
WBC # BLD: 19.05 K/UL — HIGH (ref 3.8–10.5)
WBC # FLD AUTO: 17.34 K/UL — HIGH (ref 3.8–10.5)
WBC # FLD AUTO: 18.16 K/UL — HIGH (ref 3.8–10.5)
WBC # FLD AUTO: 19.05 K/UL — HIGH (ref 3.8–10.5)

## 2022-02-03 PROCEDURE — 74176 CT ABD & PELVIS W/O CONTRAST: CPT | Mod: 26

## 2022-02-03 PROCEDURE — 99232 SBSQ HOSP IP/OBS MODERATE 35: CPT

## 2022-02-03 RX ORDER — POTASSIUM CHLORIDE 20 MEQ
10 PACKET (EA) ORAL
Refills: 0 | Status: COMPLETED | OUTPATIENT
Start: 2022-02-03 | End: 2022-02-03

## 2022-02-03 RX ORDER — POTASSIUM CHLORIDE 20 MEQ
40 PACKET (EA) ORAL ONCE
Refills: 0 | Status: COMPLETED | OUTPATIENT
Start: 2022-02-03 | End: 2022-02-03

## 2022-02-03 RX ADMIN — Medication 1: at 18:31

## 2022-02-03 RX ADMIN — Medication 60 MILLIGRAM(S): at 11:21

## 2022-02-03 RX ADMIN — Medication 60 MILLIGRAM(S): at 05:14

## 2022-02-03 RX ADMIN — Medication 60 MILLIGRAM(S): at 17:10

## 2022-02-03 RX ADMIN — Medication 100 MILLIEQUIVALENT(S): at 16:34

## 2022-02-03 RX ADMIN — Medication 60 MILLIGRAM(S): at 21:07

## 2022-02-03 RX ADMIN — Medication 40 MILLIEQUIVALENT(S): at 17:10

## 2022-02-03 RX ADMIN — Medication 56 MICROGRAM(S): at 23:07

## 2022-02-03 RX ADMIN — Medication 1: at 12:02

## 2022-02-03 RX ADMIN — Medication 15 MILLILITER(S): at 11:22

## 2022-02-03 RX ADMIN — Medication 1: at 23:58

## 2022-02-03 RX ADMIN — Medication 1: at 00:00

## 2022-02-03 RX ADMIN — Medication 100 MILLIEQUIVALENT(S): at 15:29

## 2022-02-03 RX ADMIN — Medication 100 MILLIEQUIVALENT(S): at 14:30

## 2022-02-03 RX ADMIN — Medication 1: at 05:59

## 2022-02-03 RX ADMIN — ATORVASTATIN CALCIUM 80 MILLIGRAM(S): 80 TABLET, FILM COATED ORAL at 23:07

## 2022-02-03 RX ADMIN — Medication 81 MILLIGRAM(S): at 11:21

## 2022-02-03 RX ADMIN — CHLORHEXIDINE GLUCONATE 1 APPLICATION(S): 213 SOLUTION TOPICAL at 08:31

## 2022-02-03 RX ADMIN — ENOXAPARIN SODIUM 60 MILLIGRAM(S): 100 INJECTION SUBCUTANEOUS at 05:14

## 2022-02-03 NOTE — PROGRESS NOTE ADULT - PROBLEM SELECTOR PLAN 2
AMS 2/2 Metabolic encephalopathy  - baseline AOx2, currently appears to be at baseline  Hx CVA with residual left sided weak/flaccid  1/24 VEEG Negative likely-metabolic encephalopathy    1/21 CTH without acute changes, re demonstration of lacuna infarcts   s/p LP, glucose 86, protein 48- negative. all abx and anti-viral dcd  mental status waxes and wanes  appreciate neuro re-eval  following some commands such as opening mouth, but still with increased secretions  failed repeat speech and swallow eval  c/w NGT, change to KO tube for feeds   will get palliative for GOC

## 2022-02-03 NOTE — PROGRESS NOTE ADULT - SUBJECTIVE AND OBJECTIVE BOX
Patient is a 71y old  Female who presents with a chief complaint of AMS (02 Feb 2022 14:48)    SUBJECTIVE / OVERNIGHT EVENTS: no acute events overnight     MEDICATIONS  (STANDING):  aspirin  chewable 81 milliGRAM(s) Oral daily  atorvastatin 80 milliGRAM(s) Oral at bedtime  chlorhexidine 4% Liquid 1 Application(s) Topical <User Schedule>  diltiazem    Tablet 60 milliGRAM(s) Enteral Tube every 6 hours  enoxaparin Injectable 60 milliGRAM(s) SubCutaneous every 12 hours  insulin lispro (ADMELOG) corrective regimen sliding scale   SubCutaneous every 6 hours  levothyroxine Injectable 56 MICROGram(s) IV Push at bedtime  multivitamin/minerals/iron Oral Solution (CENTRUM) 15 milliLiter(s) Oral daily  polyethylene glycol 3350 17 Gram(s) Oral daily  potassium chloride   Solution 40 milliEquivalent(s) Oral once  potassium chloride  10 mEq/100 mL IVPB 10 milliEquivalent(s) IV Intermittent every 1 hour  senna Syrup 10 milliLiter(s) Oral at bedtime    MEDICATIONS  (PRN):  acetaminophen    Suspension .. 650 milliGRAM(s) Oral every 6 hours PRN Temp greater or equal to 38C (100.4F)  hydrALAZINE Injectable 5 milliGRAM(s) IV Push every 4 hours PRN SBP >160      Vital Signs Last 24 Hrs  T(C): 36.4 (03 Feb 2022 13:35), Max: 37.2 (02 Feb 2022 18:06)  T(F): 97.6 (03 Feb 2022 13:35), Max: 98.9 (02 Feb 2022 18:06)  HR: 91 (03 Feb 2022 13:35) (91 - 109)  BP: 135/76 (03 Feb 2022 13:35) (108/75 - 150/89)  BP(mean): --  RR: 18 (03 Feb 2022 13:35) (18 - 18)  SpO2: 95% (03 Feb 2022 13:35) (94% - 95%)  CAPILLARY BLOOD GLUCOSE      POCT Blood Glucose.: 193 mg/dL (03 Feb 2022 11:54)  POCT Blood Glucose.: 196 mg/dL (03 Feb 2022 05:49)  POCT Blood Glucose.: 169 mg/dL (02 Feb 2022 23:53)  POCT Blood Glucose.: 142 mg/dL (02 Feb 2022 18:14)    I&O's Summary    02 Feb 2022 07:01  -  03 Feb 2022 07:00  --------------------------------------------------------  IN: 1280 mL / OUT: 950 mL / NET: 330 mL    03 Feb 2022 07:01  -  03 Feb 2022 14:34  --------------------------------------------------------  IN: 0 mL / OUT: 350 mL / NET: -350 mL        PHYSICAL EXAM:  GENERAL: NAD, well-developed  HEAD:  Atraumatic, Normocephalic  EYES: conjunctiva and sclera clear  CHEST/LUNG: no wheezing noted   HEART: +S1/S2   ABDOMEN: Soft, Nontender, Nondistended  EXTREMITIES: no LE edema   PSYCH: Alert and awake but nonverbal and not following commands       LABS:                        7.3    17.34 )-----------( 463      ( 03 Feb 2022 08:28 )             23.2     02-03    135  |  103  |  30<H>  ----------------------------<  184<H>  3.0<L>   |  21<L>  |  0.58    Ca    8.2<L>      03 Feb 2022 08:28

## 2022-02-03 NOTE — CONSULT NOTE ADULT - SUBJECTIVE AND OBJECTIVE BOX
Vascular & Interventional Radiology    HPI: 71y Female with afib on apixaban, cad s/p stents, HFrEF, CVA with left sided residual weakness who presents from nursing home with AMS, fever. Found to be hypernatremic, hypotensive refractory to IVF requiring vasopressor, AMS secondary to metabolic encephalopathy s/p intubation, septic shock 2/2 COVID-19 PNA, MRSA PNA. Pt extubated 1/28, awake, following commands , NGT in place, pending speech and swallow. Transferred to medical floor. Now w/ anemia and found to have moderate R RP hematoma.     Allergies: penicillin (Hives)  penicillin (Rash)  sulfa drugs (Unknown)  Tetracycline Hydrochloride (Unknown)    Medications (Abx/Cardiac/Anticoagulation/Blood Products)    Data:  T(C): 37.3  HR: 105  BP: 146/83  RR: 22  SpO2: 95%    -WBC 18.16 / HgB 6.9 / Hct 21.7 / Plt 452  -Na 135 / Cl 103 / BUN 30 / Glucose 184  -K 3.0 / CO2 21 / Cr 0.58  -INR1.33    Imaging: reviewed and as in HPI.    Assessment:   71y Female w/ moderate R RP hematoma in the setting of anticoagulation.     Plan:   - Hold AC.  - Transfuse as needed. Trend CBC.  - Tachycardic but normotensive. Trend vitals.  - NPO.  - If patient decompensates, obtain ICU consult and page IR for re-evaluation.

## 2022-02-03 NOTE — CHART NOTE - NSCHARTNOTEFT_GEN_A_CORE
Hgb in am 7.3, last check 1/31 10's, stat repeat 6.9, INR 1.3, Lovenox was given in am as was ASA.  Consent obtained from surrogate.  Ordered for 2 units with goal Hgb 8  Pt on exam lethargic, minimally interactive, deconditioned  NGT in - no blood seen will flushes  Cruz with no hematuria  BMs without hematochezia or melena per nursing    VSS    Will obtain CT /AP  F/U FOBT  F/u post transfusion CBC, iron panel, retic, hemolytic labs  hold ASA and Lovenox    Medicine attending aware and plan was discussed

## 2022-02-03 NOTE — CHART NOTE - NSCHARTNOTEFT_GEN_A_CORE
Internal Medicine PA Note    Vital Signs Last 24 Hrs  T(C): 37.3 (03 Feb 2022 20:35), Max: 37.3 (03 Feb 2022 20:35)  T(F): 99.1 (03 Feb 2022 20:35), Max: 99.1 (03 Feb 2022 20:35)  HR: 105 (03 Feb 2022 20:35) (91 - 106)  BP: 146/83 (03 Feb 2022 20:35) (108/75 - 152/89)  BP(mean): --  RR: 22 (03 Feb 2022 20:35) (18 - 22)  SpO2: 95% (03 Feb 2022 20:35) (94% - 95%)    Follow up on CT scan  < from: CT Abdomen and Pelvis No Cont (02.03.22 @ 19:05) >    INTERPRETATION:  Moderate right retroperitoneal bleed tracking along the   right paraspinal musculature.  Follow-up official report.    < end of copied text >      #Anemia 2/2 to RP bleed  - CBC, 2PRBC units transfusing now.   - IR paged, f/u IR recs  - VS q4hr, lovenox/ASA discontinued.   - Will continue to monitor closely, Internal Medicine PA Note    Vital Signs Last 24 Hrs  T(C): 37.3 (03 Feb 2022 20:35), Max: 37.3 (03 Feb 2022 20:35)  T(F): 99.1 (03 Feb 2022 20:35), Max: 99.1 (03 Feb 2022 20:35)  HR: 105 (03 Feb 2022 20:35) (91 - 106)  BP: 146/83 (03 Feb 2022 20:35) (108/75 - 152/89)  BP(mean): --  RR: 22 (03 Feb 2022 20:35) (18 - 22)  SpO2: 95% (03 Feb 2022 20:35) (94% - 95%)    Follow up on CT scan  < from: CT Abdomen and Pelvis No Cont (02.03.22 @ 19:05) >    INTERPRETATION:  Moderate right retroperitoneal bleed tracking along the   right paraspinal musculature.  Follow-up official report.    < end of copied text >      #Anemia 2/2 to RP bleed  - patient lethargic, unable to ros given patient's mental status.   - CBC, 2PRBC units transfusing now. transfuse for hgb <8.   - IR paged, f/u IR recs  - VS q4hr, lovenox/ASA discontinued.   - Will continue to monitor closely.  - Plan d/w in length with Dr. Christie (Hospitalist).   - Will endorse to AM team.       Kiana AGUILAR   Dept of Medicine  [phone]

## 2022-02-03 NOTE — PROGRESS NOTE ADULT - PROBLEM SELECTOR PLAN 1
-acute drop in H&H  -no melena or hematochezia per nursing  -repeat CBC STAT  -if true drop, would d/c lovenox, and check CT A/P to r/o RP bleed   -transfuse to keep Hb>8

## 2022-02-04 DIAGNOSIS — Z71.89 OTHER SPECIFIED COUNSELING: ICD-10-CM

## 2022-02-04 DIAGNOSIS — Z51.5 ENCOUNTER FOR PALLIATIVE CARE: ICD-10-CM

## 2022-02-04 LAB
ANION GAP SERPL CALC-SCNC: 8 MMOL/L — SIGNIFICANT CHANGE UP (ref 5–17)
BILIRUB DIRECT SERPL-MCNC: 0.2 MG/DL — SIGNIFICANT CHANGE UP (ref 0–0.3)
BILIRUB INDIRECT FLD-MCNC: 0.8 MG/DL — SIGNIFICANT CHANGE UP (ref 0.2–1)
BILIRUB SERPL-MCNC: 1 MG/DL — SIGNIFICANT CHANGE UP (ref 0.2–1.2)
BUN SERPL-MCNC: 26 MG/DL — HIGH (ref 7–23)
CALCIUM SERPL-MCNC: 8.2 MG/DL — LOW (ref 8.4–10.5)
CHLORIDE SERPL-SCNC: 107 MMOL/L — SIGNIFICANT CHANGE UP (ref 96–108)
CO2 SERPL-SCNC: 22 MMOL/L — SIGNIFICANT CHANGE UP (ref 22–31)
CREAT SERPL-MCNC: 0.58 MG/DL — SIGNIFICANT CHANGE UP (ref 0.5–1.3)
FERRITIN SERPL-MCNC: 148 NG/ML — SIGNIFICANT CHANGE UP (ref 15–150)
GLUCOSE BLDC GLUCOMTR-MCNC: 102 MG/DL — HIGH (ref 70–99)
GLUCOSE BLDC GLUCOMTR-MCNC: 120 MG/DL — HIGH (ref 70–99)
GLUCOSE BLDC GLUCOMTR-MCNC: 136 MG/DL — HIGH (ref 70–99)
GLUCOSE SERPL-MCNC: 113 MG/DL — HIGH (ref 70–99)
HAPTOGLOB SERPL-MCNC: 306 MG/DL — HIGH (ref 34–200)
HCT VFR BLD CALC: 25.9 % — LOW (ref 34.5–45)
HCT VFR BLD CALC: 29.1 % — LOW (ref 34.5–45)
HGB BLD-MCNC: 8.5 G/DL — LOW (ref 11.5–15.5)
HGB BLD-MCNC: 9.4 G/DL — LOW (ref 11.5–15.5)
IRON SATN MFR SERPL: 16 % — SIGNIFICANT CHANGE UP (ref 14–50)
IRON SATN MFR SERPL: 33 UG/DL — SIGNIFICANT CHANGE UP (ref 30–160)
LDH SERPL L TO P-CCNC: 207 U/L — SIGNIFICANT CHANGE UP (ref 50–242)
MCHC RBC-ENTMCNC: 28.7 PG — SIGNIFICANT CHANGE UP (ref 27–34)
MCHC RBC-ENTMCNC: 29.1 PG — SIGNIFICANT CHANGE UP (ref 27–34)
MCHC RBC-ENTMCNC: 32.3 GM/DL — SIGNIFICANT CHANGE UP (ref 32–36)
MCHC RBC-ENTMCNC: 32.8 GM/DL — SIGNIFICANT CHANGE UP (ref 32–36)
MCV RBC AUTO: 88.7 FL — SIGNIFICANT CHANGE UP (ref 80–100)
MCV RBC AUTO: 89 FL — SIGNIFICANT CHANGE UP (ref 80–100)
NRBC # BLD: 0 /100 WBCS — SIGNIFICANT CHANGE UP (ref 0–0)
NRBC # BLD: 0 /100 WBCS — SIGNIFICANT CHANGE UP (ref 0–0)
PLATELET # BLD AUTO: 324 K/UL — SIGNIFICANT CHANGE UP (ref 150–400)
PLATELET # BLD AUTO: 360 K/UL — SIGNIFICANT CHANGE UP (ref 150–400)
POTASSIUM SERPL-MCNC: 3.6 MMOL/L — SIGNIFICANT CHANGE UP (ref 3.5–5.3)
POTASSIUM SERPL-SCNC: 3.6 MMOL/L — SIGNIFICANT CHANGE UP (ref 3.5–5.3)
RBC # BLD: 2.92 M/UL — LOW (ref 3.8–5.2)
RBC # BLD: 3.27 M/UL — LOW (ref 3.8–5.2)
RBC # BLD: 3.27 M/UL — LOW (ref 3.8–5.2)
RBC # FLD: 15.5 % — HIGH (ref 10.3–14.5)
RBC # FLD: 15.8 % — HIGH (ref 10.3–14.5)
RETICS #: 136 K/UL — HIGH (ref 25–125)
RETICS/RBC NFR: 4.2 % — HIGH (ref 0.5–2.5)
SODIUM SERPL-SCNC: 137 MMOL/L — SIGNIFICANT CHANGE UP (ref 135–145)
TIBC SERPL-MCNC: 208 UG/DL — LOW (ref 220–430)
UIBC SERPL-MCNC: 175 UG/DL — SIGNIFICANT CHANGE UP (ref 110–370)
WBC # BLD: 14.15 K/UL — HIGH (ref 3.8–10.5)
WBC # BLD: 16.52 K/UL — HIGH (ref 3.8–10.5)
WBC # FLD AUTO: 14.15 K/UL — HIGH (ref 3.8–10.5)
WBC # FLD AUTO: 16.52 K/UL — HIGH (ref 3.8–10.5)

## 2022-02-04 PROCEDURE — 99223 1ST HOSP IP/OBS HIGH 75: CPT | Mod: GC

## 2022-02-04 PROCEDURE — 99232 SBSQ HOSP IP/OBS MODERATE 35: CPT

## 2022-02-04 PROCEDURE — 99497 ADVNCD CARE PLAN 30 MIN: CPT | Mod: 25

## 2022-02-04 RX ADMIN — Medication 56 MICROGRAM(S): at 23:23

## 2022-02-04 RX ADMIN — Medication 15 MILLILITER(S): at 11:41

## 2022-02-04 RX ADMIN — Medication 60 MILLIGRAM(S): at 23:23

## 2022-02-04 RX ADMIN — Medication 60 MILLIGRAM(S): at 05:10

## 2022-02-04 RX ADMIN — Medication 60 MILLIGRAM(S): at 11:40

## 2022-02-04 RX ADMIN — SENNA PLUS 10 MILLILITER(S): 8.6 TABLET ORAL at 23:26

## 2022-02-04 RX ADMIN — ATORVASTATIN CALCIUM 80 MILLIGRAM(S): 80 TABLET, FILM COATED ORAL at 23:24

## 2022-02-04 RX ADMIN — POLYETHYLENE GLYCOL 3350 17 GRAM(S): 17 POWDER, FOR SOLUTION ORAL at 11:40

## 2022-02-04 RX ADMIN — CHLORHEXIDINE GLUCONATE 1 APPLICATION(S): 213 SOLUTION TOPICAL at 11:41

## 2022-02-04 RX ADMIN — Medication 60 MILLIGRAM(S): at 16:44

## 2022-02-04 NOTE — CONSULT NOTE ADULT - ATTENDING COMMENTS
Patient with small left axillary pseudoaneurysm following arterial line placement. Ultrasound-guided pressure held over PSA with subsequent study showing resolution of PSA. LUE without signs of ischemia. Please reconsult vascular surgery as needed.
Pt seen and examined with ACP.  Agree with above
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 Neurology consulted d/t concern for seizure. During this encounter, patient seen right gaze deviation w/o nystagmus, twitching in RUE, distal LUE, and distal RLE. Patient now s/p lorazepam 1mg IVP.     Impression: right gaze deviation w/o nystagmus, twitching in RUE, distal LUE, and distal RLE w/ significant metabolic abnormalities; most likely etiology is metabolic myoclonus vs less likely focal/generalized seizures. 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
Palliative consulted to assist with goals of care in patient with multiple medical problems and now AMS. Previously intubated in ICU, incidental COVID+ status, now extubated but remains with waxing/waning mental status. Patient with valid HCP in chart, and HCP contacted by this team to discuss overall goals of care. medical update provided and despite concerns regarding overall prognosis, HCP states patients goals had been to stay alive irrespective of perceived quality of life, including resuscitation and intubation. Goals are for all life sustaining treatments. As goals are clearly defined, palliative team signing off. medical care per primary team. reconsult if issues arise.   [phone] >16 minutes spent on ACP

## 2022-02-04 NOTE — PROGRESS NOTE ADULT - SUBJECTIVE AND OBJECTIVE BOX
[facility]-- WOUND TEAM -- FOLLOW UP NOTE  --------------------------------------------------------------------------------    24 hour events/subjective:  afebrile  incontinent of stool  (+)jeff BARRAGAN on hold        Diet:  Diet, NPO:   Except Medications (02-04-22 @ 10:43)      ROS: pt unable to offer    ALLERGIES & MEDICATIONS  --------------------------------------------------------------------------------  Allergies  penicillin (Hives/Rash)  sulfa drugs (Unknown)  Tetracycline Hydrochloride (Unknown)      STANDING INPATIENT MEDICATIONS  atorvastatin 80 milliGRAM(s) Oral at bedtime  chlorhexidine 4% Liquid 1 Application(s) Topical <User Schedule>  diltiazem    Tablet 60 milliGRAM(s) Enteral Tube every 6 hours  insulin lispro (ADMELOG) corrective regimen sliding scale   SubCutaneous every 6 hours  levothyroxine Injectable 56 MICROGram(s) IV Push at bedtime  multivitamin/minerals/iron Oral Solution (CENTRUM) 15 milliLiter(s) Oral daily  polyethylene glycol 3350 17 Gram(s) Oral daily  senna Syrup 10 milliLiter(s) Oral at bedtime      PRN INPATIENT MEDICATION  acetaminophen    Suspension .. 650 milliGRAM(s) Oral every 6 hours PRN  hydrALAZINE Injectable 5 milliGRAM(s) IV Push every 4 hours PRN        VITALS/PHYSICAL EXAM  --------------------------------------------------------------------------------  T(C): 36.6 (02-04-22 @ 09:34), Max: 37.3 (02-03-22 @ 20:35)  HR: 94 (02-04-22 @ 10:59) (93 - 106)  BP: 149/84 (02-04-22 @ 10:59) (118/70 - 160/87)  RR: 20 (02-04-22 @ 09:34) (18 - 22)  SpO2: 97% (02-04-22 @ 09:34) (94% - 97%)  Wt(kg): --        02-03-22 @ 07:01  -  02-04-22 @ 07:00  --------------------------------------------------------  IN: 840 mL / OUT: 1050 mL / NET: -210 mL    02-04-22 @ 07:01  -  02-04-22 @ 14:02  --------------------------------------------------------  IN: 0 mL / OUT: 125 mL / NET: -125 mL    NAD, lethargic, Obese, frail  [employer] Sport bed  HEENT:  NC/AT, PERRL, EOMI, mucosa moist, throat clear, trachea midline, neck supple  Gastrointestinal: soft NT/ND (+)BS  (+)NGT  : aguilar  Neurology  weakened strength Rt sided, Lt sided paralysis  Musculoskeletal:  passive ROM, no deformities  Vascular: BLE equally warm,  no cyanosis, clubbing, BLE edema   Skin:  pale, moist w/ good turgor   sacrum and b/l buttocks evolving deep tissue injury       mid-upper buttocks/ sacrum into gluteal cleft -x 2 -  evolving DTI w/ epidermal slippage          2cm x 2cm x 0cm and 3cm x 2cm x0cm      lower buttocks w/ nonblanchable deep maroon linear wound resolved       no active drainage  No odor, erythema, increased warmth, tenderness, induration, fluctuance          LABS/ CULTURES/ RADIOLOGY:              9.4    16.52 >-----------<  360      [02-04-22 @ 07:37]              29.1     137  |  107  |  26  ----------------------------<  113      [02-04-22 @ 11:06]  3.6   |  22  |  0.58        Ca     8.2     [02-04-22 @ 11:06]    TPro  x   /  Alb  x   /  TBili  1.0  /  DBili  0.2  /  AST  x   /  ALT  x   /  AlkPhos  x   [02-04-22 @ 07:37]    PT/INR: PT 15.7 , INR 1.33       [02-03-22 @ 15:33]  PTT: 37.5       [02-03-22 @ 15:33]    CAPILLARY BLOOD GLUCOSE  POCT Blood Glucose.: 120 mg/dL (04 Feb 2022 12:22)  POCT Blood Glucose.: 136 mg/dL (04 Feb 2022 06:09)  POCT Blood Glucose.: 158 mg/dL (03 Feb 2022 23:57)  POCT Blood Glucose.: 174 mg/dL (03 Feb 2022 18:22)    A1C with Estimated Average Glucose Result: 5.7 % (01-21-22 @ 17:56)  A1C with Estimated Average Glucose Result: 5.4 % (08-18-21 @ 08:46)

## 2022-02-04 NOTE — CONSULT NOTE ADULT - PROBLEM SELECTOR RECOMMENDATION 9
AMS 2/2 Metabolic encephalopathy    Hx CVA with residual left sided weak/flaccid  -pER MEDICAL TEAM mental status waxes and wanes  -CARE PER PRIMARY TEAM

## 2022-02-04 NOTE — CONSULT NOTE ADULT - PROBLEM SELECTOR RECOMMENDATION 3
-discussed GOC  - see GOC note above -discussed at length GOC with HCP  -HCP in chart  -pt is full code per the HCP  -palliative care team signing off as GOC established.   -reconsult if require assistance

## 2022-02-04 NOTE — PROGRESS NOTE ADULT - SUBJECTIVE AND OBJECTIVE BOX
Patient is a 71y old  Female who presents with a chief complaint of AMS (03 Feb 2022 21:40)    SUBJECTIVE / OVERNIGHT EVENTS: patient found to have RP bleed yesterday on imaging, AC d/porfirio.     MEDICATIONS  (STANDING):  atorvastatin 80 milliGRAM(s) Oral at bedtime  chlorhexidine 4% Liquid 1 Application(s) Topical <User Schedule>  diltiazem    Tablet 60 milliGRAM(s) Enteral Tube every 6 hours  insulin lispro (ADMELOG) corrective regimen sliding scale   SubCutaneous every 6 hours  levothyroxine Injectable 56 MICROGram(s) IV Push at bedtime  multivitamin/minerals/iron Oral Solution (CENTRUM) 15 milliLiter(s) Oral daily  polyethylene glycol 3350 17 Gram(s) Oral daily  senna Syrup 10 milliLiter(s) Oral at bedtime    MEDICATIONS  (PRN):  acetaminophen    Suspension .. 650 milliGRAM(s) Oral every 6 hours PRN Temp greater or equal to 38C (100.4F)  hydrALAZINE Injectable 5 milliGRAM(s) IV Push every 4 hours PRN SBP >160      Vital Signs Last 24 Hrs  T(C): 36.6 (04 Feb 2022 09:34), Max: 37.3 (03 Feb 2022 20:35)  T(F): 97.9 (04 Feb 2022 09:34), Max: 99.1 (03 Feb 2022 20:35)  HR: 94 (04 Feb 2022 10:59) (91 - 106)  BP: 149/84 (04 Feb 2022 10:59) (118/70 - 160/87)  BP(mean): --  RR: 20 (04 Feb 2022 09:34) (18 - 22)  SpO2: 97% (04 Feb 2022 09:34) (94% - 97%)  CAPILLARY BLOOD GLUCOSE    POCT Blood Glucose.: 120 mg/dL (04 Feb 2022 12:22)  POCT Blood Glucose.: 136 mg/dL (04 Feb 2022 06:09)  POCT Blood Glucose.: 158 mg/dL (03 Feb 2022 23:57)  POCT Blood Glucose.: 174 mg/dL (03 Feb 2022 18:22)    I&O's Summary    03 Feb 2022 07:01  -  04 Feb 2022 07:00  --------------------------------------------------------  IN: 840 mL / OUT: 1050 mL / NET: -210 mL    04 Feb 2022 07:01  -  04 Feb 2022 12:47  --------------------------------------------------------  IN: 0 mL / OUT: 0 mL / NET: 0 mL      PHYSICAL EXAM:  GENERAL: NAD  EYES:  conjunctiva and sclera clear  CHEST/LUNG: Clear to auscultation bilaterally; No wheeze  HEART: +S1/S2   ABDOMEN: Soft, Nontender, Nondistended  EXTREMITIES: trace b/l LE edema   PSYCH: AAOx3    LABS:                        9.4    16.52 )-----------( 360      ( 04 Feb 2022 07:37 )             29.1     02-04    137  |  107  |  26<H>  ----------------------------<  113<H>  3.6   |  22  |  0.58    Ca    8.2<L>      04 Feb 2022 11:06    TPro  x   /  Alb  x   /  TBili  1.0  /  DBili  0.2  /  AST  x   /  ALT  x   /  AlkPhos  x   02-04    PT/INR - ( 03 Feb 2022 15:33 )   PT: 15.7 sec;   INR: 1.33 ratio         PTT - ( 03 Feb 2022 15:33 )  PTT:37.5 sec

## 2022-02-04 NOTE — CONSULT NOTE ADULT - PROBLEM SELECTOR RECOMMENDATION 2
-discussed at length GOC with HCP  -HCP in chart  -pt is full code per the HCP  -palliative care team signing off as GOC established.   -reconsult if require assistance -discussed GOC  - see GOC note above

## 2022-02-04 NOTE — CONSULT NOTE ADULT - SUBJECTIVE AND OBJECTIVE BOX
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     Pt [facility] from [facility] when found to have AMS, fever with Tmax of 104. Pt in E.D. found to be obtunded, mottled, hypotensive SBP 80's refractory to 2.5 liters IVF requiring norepi gtt, fever with Temp of 104.5. In addition found to have hypernatremia with Na+ of 161, ANIKA with sCr 4.74 (baseline 0.77-1.22), AGMA of 25, with contraction alkalosis with serum HCO3 of 23, vph 7.50, vPCO2 of 35, lactate of 3.2, procalcitonin of 0.82, hsTrop of 239, Hgb of 14.9 (baseline from 8/2021 of 11). Pt with 9 liters total body water deficit.       Pt received CT C/A/P/H (1/21/22) re demonstration of  Non obstructing right renal calculus, significant stool burden distending the rectum and sigmoid colon with mild perirectal fat stranding suggestive of fecal impaction and possible stercoral proctitis, No acute transcortical infarct, intracranial hemorrhage, however did show Chronic lacunar infarcts with chronic small vessel disease. Started on cefepime, vanco, tylenol.       pt admitted MICU for AMS secondary to metabolic encephalopathy, septic shock of unknown origin, possible aspiration pneum   (21 Jan 2022 13:03). Palliative Care team consulted for GOC.    At bedside, pt was in no acute distress. She with NGT. not answering questions.    Spoke with HCP: CRUZ SÁNCHEZ  [phone] who stated that she is primary HCP and   Jerrod Munoz [phone]/ [phone] is the alternate.    Mrs ARROYO   stated that she and pt are good friends.   Pt is FUll CODE.           PERTINENT PM/SXH:   DM (diabetes mellitus)    HTN (hypertension)    Obesity    Essential hypertension    Hypothyroid    Type 2 diabetes mellitus without complication    Obesity (BMI 30-39.9)    Carpal tunnel syndrome of right wrist    Essential hypertension    Lymphedema    2019 novel coronavirus disease (COVID-19)    Sepsis      S/P carpal tunnel release    No significant past surgical history    S/P cystoscopy      FAMILY HISTORY:  Family history of lung cancer    Family history of myocardial infarction (Sibling)      ITEMS NOT CHECKED ARE NOT PRESENT    SOCIAL HISTORY:   Significant other/partner[ ]  Children[ ]  Restoration/Spirituality:  Substance hx:  [ ]   Tobacco hx:  [ ]   Alcohol hx: [ ]   Home Opioid hx:  [ ] I-Stop Reference No:  Living Situation: [ ]Home  [ ]Long term care  [ ]Rehab [ ]Other    ADVANCE DIRECTIVES:    DNR  MOLST  [ ]  Living Will  [ ]   DECISION MAKER(s):  [x ] Health Care Proxy(s)  [ ] Surrogate(s)  [ ] Guardian           Name(s): Phone Number(s): CRUZ SÁNCHEZ  [phone]    BASELINE (I)ADL(s) (prior to admission):  [place]: [x ]Total  [ ] Moderate [ ]Dependent    Allergies    penicillin (Hives)  penicillin (Rash)  sulfa drugs (Unknown)  Tetracycline Hydrochloride (Unknown)    Intolerances    MEDICATIONS  (STANDING):  atorvastatin 80 milliGRAM(s) Oral at bedtime  chlorhexidine 4% Liquid 1 Application(s) Topical <User Schedule>  diltiazem    Tablet 60 milliGRAM(s) Enteral Tube every 6 hours  insulin lispro (ADMELOG) corrective regimen sliding scale   SubCutaneous every 6 hours  levothyroxine Injectable 56 MICROGram(s) IV Push at bedtime  multivitamin/minerals/iron Oral Solution (CENTRUM) 15 milliLiter(s) Oral daily  polyethylene glycol 3350 17 Gram(s) Oral daily  senna Syrup 10 milliLiter(s) Oral at bedtime    MEDICATIONS  (PRN):  acetaminophen    Suspension .. 650 milliGRAM(s) Oral every 6 hours PRN Temp greater or equal to 38C (100.4F)  hydrALAZINE Injectable 5 milliGRAM(s) IV Push every 4 hours PRN SBP >160    PRESENT SYMPTOMS: [x ]Unable to obtain due to poor mentation   Source if other than patient:  [ ]Family   [ ]Team     Pain: [ ]yes [ ]no  QOL impact -   Location -                    Aggravating factors -  Quality -  Radiation -  Timing-  Severity (0-10 scale):  Minimal acceptable level (0-10 scale):     CPOT:    https://www.[facility].org/getattachment/[email or web address]/Critical-Care-Pain-Observation-Tool-(CPOT)      PAIN AD Score:     http://geriatrictoolkit.[facility]/cog/painad.pdf (press ctrl +  left click to view)    Dyspnea:                           [ ]Mild [ ]Moderate [ ]Severe  Anxiety:                             [ ]Mild [ ]Moderate [ ]Severe  Fatigue:                             [ ]Mild [ ]Moderate [ ]Severe  Nausea:                             [ ]Mild [ ]Moderate [ ]Severe  Loss of appetite:              [ ]Mild [ ]Moderate [ ]Severe  Constipation:                    [ ]Mild [ ]Moderate [ ]Severe    Other Symptoms:  [ ]All other review of systems negative     Palliative Performance Status Version 2:    10     %    http://npcrc.org/files/news/palliative_performance_scale_ppsv2.pdf  PHYSICAL EXAM:  Vital Signs Last 24 Hrs  T(C): 36.6 (04 Feb 2022 09:34), Max: 37.3 (03 Feb 2022 20:35)  T(F): 97.9 (04 Feb 2022 09:34), Max: 99.1 (03 Feb 2022 20:35)  HR: 94 (04 Feb 2022 10:59) (91 - 106)  BP: 149/84 (04 Feb 2022 10:59) (118/70 - 160/87)  BP(mean): --  RR: 20 (04 Feb 2022 09:34) (18 - 22)  SpO2: 97% (04 Feb 2022 09:34) (94% - 97%) I&O's Summary    03 Feb 2022 07:01  -  04 Feb 2022 07:00  --------------------------------------------------------  IN: 840 mL / OUT: 1050 mL / NET: -210 mL    04 Feb 2022 07:01  -  04 Feb 2022 12:44  --------------------------------------------------------  IN: 0 mL / OUT: 0 mL / NET: 0 mL      GENERAL:  [ ]Alert  [ ]Oriented x   [ ]Lethargic  [ ]Cachexia  [x ] arousable to pain [ ]Verbal  [x ]Non-Verbal  Behavioral:   [ ] Anxiety  [ ] Delirium [ ] Agitation [ ] Other  HEENT:  [ ]Normal   x]Dry mouth   [ ]ET Tube/Trach  [ ]Oral lesions  PULMONARY:   [x ]Clear [ ]Tachypnea  [ ]Audible excessive secretions   [ ]Rhonchi        [ ]Right [ ]Left [ ]Bilateral  [ ]Crackles        [ ]Right [ ]Left [ ]Bilateral  [ ]Wheezing     [ ]Right [ ]Left [ ]Bilateral  [ ]Diminished breath sounds [ ]right [ ]left [ ]bilateral  CARDIOVASCULAR:    [ ]Regular [ ]Irregular [ ]Tachy  [ ]Amaury [ ]Murmur [ ]Other  GASTROINTESTINAL:  [x ]Soft  [ ]Distended   [ ]+BS  [x ]Non tender [ ]Tender  [ ]PEG [ ]OGT/ NGT  Last BM:   GENITOURINARY:  [ ]Normal [ ] Incontinent   [ ]Oliguria/Anuria   [ x]Cruz  MUSCULOSKELETAL:   [ ]Normal   [ ]Weakness  [x ]Bed/Wheelchair bound [ ]Edema  NEUROLOGIC:   [ ]No focal deficits  [ ]Cognitive impairment  [x ]Dysphagia [ ]Dysarthria [ ]Paresis [ ]Other   SKIN:   [ ]Normal    [ ]Rash  [ ]Pressure ulcer(s)       Present on admission [ ]y [ ]n    CRITICAL CARE:  [ ] Shock Present  [ ]Septic [ ]Cardiogenic [ ]Neurologic [ ]Hypovolemic  [ ]  Vasopressors [ ]  Inotropes   [ ]Respiratory failure present [ ]Mechanical ventilation [ ]Non-invasive ventilatory support [ ]High flow    [ ]Acute  [ ]Chronic [ ]Hypoxic  [ ]Hypercarbic [ ]Other  [ ]Other organ failure     LABS:                        9.4    16.52 )-----------( 360      ( 04 Feb 2022 07:37 )             29.1   02-04    137  |  107  |  26<H>  ----------------------------<  113<H>  3.6   |  22  |  0.58    Ca    8.2<L>      04 Feb 2022 11:06    TPro  x   /  Alb  x   /  TBili  1.0  /  DBili  0.2  /  AST  x   /  ALT  x   /  AlkPhos  x   02-04  PT/INR - ( 03 Feb 2022 15:33 )   PT: 15.7 sec;   INR: 1.33 ratio         PTT - ( 03 Feb 2022 15:33 )  PTT:37.5 sec      RADIOLOGY & ADDITIONAL STUDIES:    PROTEIN CALORIE MALNUTRITION PRESENT: [ ]mild [ ]moderate [ ]severe [ ]underweight [ ]morbid obesity  https://www.andeal.org/vault/2440/web/files/ONC/Table_Clinical%20Characteristics%20to%20Document%20Malnutrition-White%20JV%20et%20al%202012.pdf    Height (cm): 157.5 (01-21-22 @ 08:32), 157.5 (08-17-21 @ 12:03), 162 (08-16-21 @ 12:45)  Weight (kg): 65.7 (01-21-22 @ 08:32), 68 (08-17-21 @ 12:03), 74.4 (08-16-21 @ 12:45)  BMI (kg/m2): 26.5 (01-21-22 @ 08:32), 27.4 (08-17-21 @ 12:03), 28.3 (08-16-21 @ 12:45)    [ ]PPSV2 < or = to 30% [ ]significant weight loss  [ ]poor nutritional intake  [ ]anasarca      [ ]Artificial Nutrition      REFERRALS:   [ ]Chaplaincy  [ ]Hospice  [ ]Child Life  [ ]Social Work  [ ]Case management [ ]Holistic Therapy     Goals of Care Document:    Hx obtained from HCP -  CRUZ SÁNCHEZ  [phone]       HPI:     71 yr old female with PMHx, Afib on apixaban, HFrEF (50% 8/21/21),CAD s/p stents, NSTEMI (2020), arthritis, wheelchair bound, hypothyroidism, s/p left ureteral stent removal (8/2021) with residual non obstructive Rt renal calculi, E.faecalis UTI (8/2021) CVA with left sided residual weakness (8/2021), sacral decubitus, pressure heel ulcers s/p COVID-19 (2020), baseline pt is AOx2 assist with ADL.     Pt presented to [facility] from [facility] when found to have AMS, fever with Tmax of 104. Pt in E.D. found to be obtunded, mottled, hypotensive SBP 80's refractory to 2.5 liters IVF requiring norepi gtt, fever with Temp of 104.5. In addition found to have hypernatremia with Na+ of 161, ANIKA with sCr 4.74 (baseline 0.77-1.22), AGMA of 25, with contraction alkalosis with serum HCO3 of 23, vph 7.50, vPCO2 of 35, lactate of 3.2, procalcitonin of 0.82, hsTrop of 239, Hgb of 14.9 (baseline from 8/2021 of 11). Pt with 9 liters total body water deficit.       Pt received CT C/A/P/H (1/21/22) re demonstration of  Non obstructing right renal calculus, significant stool burden distending the rectum and sigmoid colon with mild perirectal fat stranding suggestive of fecal impaction and possible stercoral proctitis, No acute transcortical infarct, intracranial hemorrhage, however did show Chronic lacunar infarcts with chronic small vessel disease. Started on cefepime, vanco, tylenol.       pt admitted MICU for AMS secondary to metabolic encephalopathy, septic shock of unknown origin, possible aspiration pneum   (21 Jan 2022 13:03). Palliative Care team consulted for GOC.    At bedside, pt was in no acute distress. She with NGT. not answering questions.    Spoke with HCP: CRUZ SÁNCHEZ  [phone] who stated that she is primary HCP and   Jerrod Munoz [phone]/ [phone] is the alternate.    Mrs ARROYO   stated that she and pt are good friends.   Pt is FUll CODE.           PERTINENT PM/SXH:   DM (diabetes mellitus)    HTN (hypertension)    Obesity    Essential hypertension    Hypothyroid    Type 2 diabetes mellitus without complication    Obesity (BMI 30-39.9)    Carpal tunnel syndrome of right wrist    Essential hypertension    Lymphedema    2019 novel coronavirus disease (COVID-19)    Sepsis      S/P carpal tunnel release    No significant past surgical history    S/P cystoscopy      FAMILY HISTORY:  Family history of lung cancer    Family history of myocardial infarction (Sibling)      ITEMS NOT CHECKED ARE NOT PRESENT    SOCIAL HISTORY:   Significant other/partner[ ]  Children[ ]  Jew/Spirituality:  Substance hx:  [ ]   Tobacco hx:  [ ]   Alcohol hx: [ ]   Home Opioid hx:  [ ] I-Stop Reference No:  Living Situation: [x ]Home  [ ]Long term care  [ ]Rehab [ ]Other    ADVANCE DIRECTIVES:    DNR  MOLST  [ ]  Living Will  [ ]   DECISION MAKER(s):  [x ] Health Care Proxy(s)  [ ] Surrogate(s)  [ ] Guardian           Name(s): Phone Number(s): CRUZ SÁNCHEZ  [phone]    BASELINE (I)ADL(s) (prior to admission):  [place]: [x ]Total  [ ] Moderate [ ]Dependent    Allergies    penicillin (Hives)  penicillin (Rash)  sulfa drugs (Unknown)  Tetracycline Hydrochloride (Unknown)    Intolerances    MEDICATIONS  (STANDING):  atorvastatin 80 milliGRAM(s) Oral at bedtime  chlorhexidine 4% Liquid 1 Application(s) Topical <User Schedule>  diltiazem    Tablet 60 milliGRAM(s) Enteral Tube every 6 hours  insulin lispro (ADMELOG) corrective regimen sliding scale   SubCutaneous every 6 hours  levothyroxine Injectable 56 MICROGram(s) IV Push at bedtime  multivitamin/minerals/iron Oral Solution (CENTRUM) 15 milliLiter(s) Oral daily  polyethylene glycol 3350 17 Gram(s) Oral daily  senna Syrup 10 milliLiter(s) Oral at bedtime    MEDICATIONS  (PRN):  acetaminophen    Suspension .. 650 milliGRAM(s) Oral every 6 hours PRN Temp greater or equal to 38C (100.4F)  hydrALAZINE Injectable 5 milliGRAM(s) IV Push every 4 hours PRN SBP >160    PRESENT SYMPTOMS: [x ]Unable to obtain due to poor mentation   Source if other than patient:  [ ]Family   [ ]Team     Pain: [ ]yes [ ]no  QOL impact -   Location -                    Aggravating factors -  Quality -  Radiation -  Timing-  Severity (0-10 scale):  Minimal acceptable level (0-10 scale):     CPOT:    https://www.[facility].org/getattachment/[email or web address]/Critical-Care-Pain-Observation-Tool-(CPOT)      PAIN AD Score: 0    http://geriatrictoolkit.[facility]/cog/painad.pdf (press ctrl +  left click to view)    Dyspnea:                           [ ]Mild [ ]Moderate [ ]Severe  Anxiety:                             [ ]Mild [ ]Moderate [ ]Severe  Fatigue:                             [ ]Mild [ ]Moderate [ ]Severe  Nausea:                             [ ]Mild [ ]Moderate [ ]Severe  Loss of appetite:              [ ]Mild [ ]Moderate [ ]Severe  Constipation:                    [ ]Mild [ ]Moderate [ ]Severe    Other Symptoms:  [ ]All other review of systems negative     Palliative Performance Status Version 2:    10     %    http://npcrc.org/files/news/palliative_performance_scale_ppsv2.pdf  PHYSICAL EXAM:  Vital Signs Last 24 Hrs  T(C): 36.6 (04 Feb 2022 09:34), Max: 37.3 (03 Feb 2022 20:35)  T(F): 97.9 (04 Feb 2022 09:34), Max: 99.1 (03 Feb 2022 20:35)  HR: 94 (04 Feb 2022 10:59) (91 - 106)  BP: 149/84 (04 Feb 2022 10:59) (118/70 - 160/87)  BP(mean): --  RR: 20 (04 Feb 2022 09:34) (18 - 22)  SpO2: 97% (04 Feb 2022 09:34) (94% - 97%) I&O's Summary    03 Feb 2022 07:01  -  04 Feb 2022 07:00  --------------------------------------------------------  IN: 840 mL / OUT: 1050 mL / NET: -210 mL    04 Feb 2022 07:01  -  04 Feb 2022 12:44  --------------------------------------------------------  IN: 0 mL / OUT: 0 mL / NET: 0 mL      GENERAL:  [ ]Alert  [ ]Oriented x   [ ]Lethargic  [ ]Cachexia  [x ] arousable to pain [ ]Verbal  [x ]Non-Verbal  Behavioral:   [ ] Anxiety  [ ] Delirium [ ] Agitation [ ] Other  HEENT:  [ ]Normal   [x]Dry mouth   [ ]ET Tube/Trach  [ ]Oral lesions  PULMONARY:   [x ]Clear [ ]Tachypnea  [ ]Audible excessive secretions   [ ]Rhonchi        [ ]Right [ ]Left [ ]Bilateral  [ ]Crackles        [ ]Right [ ]Left [ ]Bilateral  [ ]Wheezing     [ ]Right [ ]Left [ ]Bilateral  [ ]Diminished breath sounds [ ]right [ ]left [ ]bilateral  CARDIOVASCULAR:    [ ]Regular [ ]Irregular [ ]Tachy  [ ]Amaury [ ]Murmur [ ]Other  GASTROINTESTINAL:  [x ]Soft  [ ]Distended   [ ]+BS  [x ]Non tender [ ]Tender  [ ]PEG [ ]OGT/ NGT  Last BM:   GENITOURINARY:  [ ]Normal [ ] Incontinent   [ ]Oliguria/Anuria   [ x]Cruz  MUSCULOSKELETAL:   [ ]Normal   [ ]Weakness  [x ]Bed/Wheelchair bound [ ]Edema  NEUROLOGIC:   [ ]No focal deficits  [ ]Cognitive impairment  [x ]Dysphagia [ ]Dysarthria [ ]Paresis [ ]Other   SKIN:   [ ]Normal    [ ]Rash  [ ]Pressure ulcer(s)       Present on admission [ ]y [ ]n    CRITICAL CARE:  [ ] Shock Present  [ ]Septic [ ]Cardiogenic [ ]Neurologic [ ]Hypovolemic  [ ]  Vasopressors [ ]  Inotropes   [ ]Respiratory failure present [ ]Mechanical ventilation [ ]Non-invasive ventilatory support [ ]High flow    [ ]Acute  [ ]Chronic [ ]Hypoxic  [ ]Hypercarbic [ ]Other  [ ]Other organ failure     LABS:                        9.4    16.52 )-----------( 360      ( 04 Feb 2022 07:37 )             29.1   02-04    137  |  107  |  26<H>  ----------------------------<  113<H>  3.6   |  22  |  0.58    Ca    8.2<L>      04 Feb 2022 11:06    TPro  x   /  Alb  x   /  TBili  1.0  /  DBili  0.2  /  AST  x   /  ALT  x   /  AlkPhos  x   02-04  PT/INR - ( 03 Feb 2022 15:33 )   PT: 15.7 sec;   INR: 1.33 ratio         PTT - ( 03 Feb 2022 15:33 )  PTT:37.5 sec      RADIOLOGY & ADDITIONAL STUDIES:   < from: CT Abdomen and Pelvis No Cont (02.03.22 @ 19:05) >    ACC: [id number] EXAM:  CT ABDOMEN AND PELVIS                          PROCEDURE DATE:  02/03/2022          INTERPRETATION:  CLINICAL INFORMATION: Hemoglobin drop on therapeutic   Lovenox, assess for retroperitoneal bleed.    COMPARISON: CT abdomen pelvis 1/21/2022.    CONTRAST/COMPLICATIONS:  IV Contrast: NONE  Oral Contrast: NONE  Complications: None reported at time of study completion    PROCEDURE:  CT of the Abdomen and Pelvis was performed.  Sagittal and coronal reformats were performed.    FINDINGS:  Limited evaluation of the vasculature and viscera in the absence of   intravenous contrast. Motion degradation most pronounced in the lower   chest and upper abdomen. Streak artifact related to the patient's arms at   their sides.    LOWER CHEST: Coronary artery and aortic valve calcifications. Minimal   bibasilar hepatic changes.    LIVER: Within normal limits.  BILE DUCTS: Normal caliber.  GALLBLADDER: Within normal limits.  SPLEEN: Within normal limits.  PANCREAS: Within normal limits.  ADRENALS: Redemonstrated left adrenal nodule, measures approximately 1.4   cm.  KIDNEYS/URETERS: Nonobstructing 9 mm calculus in the lower pole of the   right kidney. No hydronephrosis. The right kidney is anteriorly displaced   by a retroperitoneal hematoma, as described below.    BLADDER: Decompressed around a Cruz catheter with expected   intravesicular air.  REPRODUCTIVE ORGANS: Uterus and adnexa within normal limits.    BOWEL: Small hiatal hernia. Enteric tube, tip in the distal stomach.No   bowel obstruction. Colonic diverticula. Appendix is not visualized.  PERITONEUM: No ascites.  VESSELS: Extensive atherosclerotic changes.  RETROPERITONEUM/LYMPH NODES: There is new asymmetric expansion of the   right iliopsoas muscle containing mixed heterogeneity, compatible with   intramuscular hematoma. Hyperdense blood products extend into the extra   muscular retroperitoneum with associated fat stranding. The hematoma is   difficult to delineate from muscle, with the entirety measuring 6.5 x 9.7   cm axially and approximately 21 cm craniocaudally. The contralateral   iliopsoas muscle for comparison measures 2.8 x 2.8 cm at the same level   axially. Presacral edema.  ABDOMINAL WALL: Diffuse anasarca. Small bilateral fat-containing inguinal   hernias.  BONES: Degenerative changes. Unchanged grade 1 retrolisthesis of L1 over   L2 and L2 over L3, and grade 1 anterolisthesis of L4 over L5.    IMPRESSION:  New large right retroperitoneal hematoma.    --- End of Report ---          JENAE COCHRAN MD; Resident Radiologist  This document has been electronically signed.  PRECIOUS BACA MD; Attending Radiologist  This document has been electronically signed. Feb 4 2022 11:20AM    < end of copied text >      PROTEIN CALORIE MALNUTRITION PRESENT: [ ]mild [ ]moderate [ ]severe [ ]underweight [ ]morbid obesity  https://www.andeal.org/vault/2440/web/files/ONC/Table_Clinical%20Characteristics%20to%20Document%20Malnutrition-White%20JV%20et%20al%202012.pdf    Height (cm): 157.5 (01-21-22 @ 08:32), 157.5 (08-17-21 @ 12:03), 162 (08-16-21 @ 12:45)  Weight (kg): 65.7 (01-21-22 @ 08:32), 68 (08-17-21 @ 12:03), 74.4 (08-16-21 @ 12:45)  BMI (kg/m2): 26.5 (01-21-22 @ 08:32), 27.4 (08-17-21 @ 12:03), 28.3 (08-16-21 @ 12:45)    [ x]PPSV2 < or = to 30% [ ]significant weight loss  [ x]poor nutritional intake  [ ]anasarca      [ ]Artificial Nutrition      REFERRALS:   [ ]Chaplaincy  [ ]Hospice  [ ]Child Life  [ ]Social Work  [x ]Case management [ ]Holistic Therapy     Goals of Care Document:

## 2022-02-04 NOTE — CONSULT NOTE ADULT - CONVERSATION DETAILS
Introduced palliative care team.   Spoke with  CRUZ SÁNCHEZ  [phone] who is the HCP. HCP lives in [place] and the -Jerrod Munoz [phone]/ [phone] is the alternate HCP , he  lives in [place].  Both decide for the pt.    Per the HCP, While pt was in the nursing home she was dependent for all ADLs/IADLs. Pt was able to talk on the phone with HCP.   Pt is . Only living relative is cousin Mr. Jerrod Munoz, as her brother and parents are . Pt does not have children.     Prior to going to nursing home s/p CVA pt was over weight and required assistance.  Mrs. CRUZ SÁNCHEZ  was a neighbor who often helped her and they became friends.       CRUZ  DOMENICNGHIA  the HCP stated that pt expressed wishes to her to be full code on numerous occasions. There is a living will where she states that "she wants to live regardless of her quality of life". Patient wants to be intubated and be resuscitated.

## 2022-02-04 NOTE — PROGRESS NOTE ADULT - PROBLEM SELECTOR PLAN 1
-acute drop in H&H  -CT showing moderate RP bleed  -lovenox d/porfirio  -transfused 2 units PRBCs yesterday  -Hb improved to 9.4 after transfusion   -no intervention planned by IR  -monitor closely, if pt decompenstes, MICU consult

## 2022-02-04 NOTE — CHART NOTE - NSCHARTNOTEFT_GEN_A_CORE
Nutrition Follow Up Note  Patient seen for: nutrition follow up/verbal consult to assess TF.     Chart reviewed, events noted. Pt is a 71F Hx afib on apixaban, cad s/p stents, HFrEF, CVA with left sided residual weakness who presents from nursing home with AMS, fever. Found to be hypernatremic, hypotensive refractory to IVF requiring vasopressor, AMS secondary to metabolic encephalopathy s/p intubation, septic shock 2/2 COVID-19 PNA, MRSA PNA , and r/o meningitis. Pt extubated 1/28, awake, following commands, NGT in place. Now transferred to medical floor.     Source: [] Patient       [x] EMR        [x] RN        [] Family at bedside       [] Other:    -If unable to interview patient: [] Trach/Vent/BiPAP  [x] Disoriented/confused/inappropriate to interview    Diet Order:   Diet, NPO with Tube Feed:   Tube Feeding Modality: Nasogastric  Nepro with Carb Steady (NEPRORTH)  Total Volume for 24 Hours (mL): 960  Continuous  Starting Tube Feed Rate {mL per Hour}: 10  Increase Tube Feed Rate by (mL): 10     Every 4 hours  Until Goal Tube Feed Rate (mL per Hour): 40  Tube Feed Duration (in Hours): 24  Tube Feed Start Time: 11:45  Free Water Flush  Bolus   Total Volume per Flush (mL): 300   Frequency: Every 6 Hours   Total Daily Volume of Flush (mL): 120    Start Time: 11:45  No Carb Prosource TF     Qty per Day:  2 (02-04-22)    - Is current order appropriate/adequate? [] Yes  [x]  No:     Current EN regimen: provides 960mL formula, 1728 kcal, 78g protein, 698 mL free water. Meets 31.4 kcal/kg and 1.4 g/kg protein based on IBW+10% 121 pounds/55 kg.      - Nutrition-related concerns:   - Pt previously receiving and tolerating feeds of Nepro at goal rate 40ml/hr. Feeds held at this time.   - Palliative consult ordered to assess GOC pertaining to nutrition/PEG - pt full code at this time.   - Micronutrient supplementation: multivitamin    - Pt remains on IV Levothyroxine     GI:  Last BM 2/3.   Bowel Regimen? [x] Yes   [] No    Weights:   no updated weights available at this time, will continue to monitor weights for changes if any     Nutritionally Pertinent MEDICATIONS  (STANDING):  atorvastatin  diltiazem    Tablet  insulin lispro (ADMELOG) corrective regimen sliding scale  levothyroxine Injectable  multivitamin/minerals/iron Oral Solution (CENTRUM)  polyethylene glycol 3350  senna Syrup    Pertinent Labs: 02-04 @ 11:06: Na 137, BUN 26<H>, Cr 0.58, <H>, K+ 3.6, Phos --, Mg --, Alk Phos --, ALT/SGPT --, AST/SGOT --, HbA1c --    A1C with Estimated Average Glucose Result: 5.7 % (01-21-22 @ 17:56)  A1C with Estimated Average Glucose Result: 5.4 % (08-18-21 @ 08:46)    Finger Sticks:  POCT Blood Glucose.: 120 mg/dL (02-04 @ 12:22)  POCT Blood Glucose.: 136 mg/dL (02-04 @ 06:09)  POCT Blood Glucose.: 158 mg/dL (02-03 @ 23:57)  POCT Blood Glucose.: 174 mg/dL (02-03 @ 18:22)    Skin per nursing documentation: stage II left foot, suspected DTI lower sacrum and upper sacrum  Edema: 4+ generalized.     Estimated Needs:   [x] no change since previous assessment  Based on IBW+10% of 121 pounds/55 kg  Energy needs: 30-35 kcal/day (4595-2984 calories)  Protein needs: 1.3-1.6 g/kg (71.24-87.68 g protein)     Previous Nutrition Diagnosis: Increased Nutrient Needs  Nutrition Diagnosis is: [x] ongoing  [] resolved [] not applicable     New Nutrition Diagnosis: [] Not applicable    Nutrition Care Plan:  [x] In Progress  [] Achieved  [] Not applicable    Nutrition Interventions:     Education Provided:       [] Yes:  [x] No: Not appropriate at this time.     Recommendations:        ****IN PROGRESS/INCOMPLETE****     Monitoring and Evaluation:   Continue to monitor nutritional intake, tolerance to diet prescription, weights, labs, skin integrity    RD remains available upon request and will follow up per protocol    Megan Mahajan MS, RD, CDN, [facility] #[phone] Nutrition Follow Up Note  Patient seen for: nutrition follow up/verbal consult to assess TF.     Chart reviewed, events noted. Pt is a 71F Hx afib on apixaban, cad s/p stents, HFrEF, CVA with left sided residual weakness who presents from nursing home with AMS, fever. Found to be hypernatremic, hypotensive refractory to IVF requiring vasopressor, AMS secondary to metabolic encephalopathy s/p intubation, septic shock 2/2 COVID-19 PNA, MRSA PNA , and r/o meningitis. Pt extubated 1/28, awake, following commands, NGT in place. Now transferred to medical floor.     Source: [] Patient       [x] EMR        [x] RN        [] Family at bedside       [] Other:    -If unable to interview patient: [] Trach/Vent/BiPAP  [x] Disoriented/confused/inappropriate to interview    Diet Order:   Diet, NPO with Tube Feed:   Tube Feeding Modality: Nasogastric  Nepro with Carb Steady (NEPRORTH)  Total Volume for 24 Hours (mL): 960  Continuous  Starting Tube Feed Rate {mL per Hour}: 10  Increase Tube Feed Rate by (mL): 10     Every 4 hours  Until Goal Tube Feed Rate (mL per Hour): 40  Tube Feed Duration (in Hours): 24  Tube Feed Start Time: 11:45  Free Water Flush  Bolus   Total Volume per Flush (mL): 300   Frequency: Every 6 Hours   Total Daily Volume of Flush (mL): 120    Start Time: 11:45  No Carb Prosource TF     Qty per Day:  2 (02-04-22)    - Is current order appropriate/adequate? [] Yes  [x]  No:     Current EN regimen: provides 960mL formula, 1728 kcal, 78g protein, 698 mL free water. Meets 31.4 kcal/kg and 1.4 g/kg protein based on IBW+10% 121 pounds/55 kg.      - Nutrition-related concerns:   - Pt previously receiving and tolerating feeds of Nepro at goal rate 40ml/hr. Feeds held this morning, restarted at 10ml/hr this evening per RN.    - Palliative consult ordered to assess GOC pertaining to nutrition/PEG - pt full code at this time.   - Micronutrient supplementation: multivitamin    - Pt remains on IV Levothyroxine     GI:  Last BM 2/3.   Bowel Regimen? [x] Yes   [] No    Weights:   no updated weights available at this time, will continue to monitor weights for changes if any     Nutritionally Pertinent MEDICATIONS  (STANDING):  atorvastatin  diltiazem    Tablet  insulin lispro (ADMELOG) corrective regimen sliding scale  levothyroxine Injectable  multivitamin/minerals/iron Oral Solution (CENTRUM)  polyethylene glycol 3350  senna Syrup    Pertinent Labs: 02-04 @ 11:06: Na 137, BUN 26<H>, Cr 0.58, <H>, K+ 3.6, Phos --, Mg --, Alk Phos --, ALT/SGPT --, AST/SGOT --, HbA1c --    A1C with Estimated Average Glucose Result: 5.7 % (01-21-22 @ 17:56)  A1C with Estimated Average Glucose Result: 5.4 % (08-18-21 @ 08:46)    Finger Sticks:  POCT Blood Glucose.: 120 mg/dL (02-04 @ 12:22)  POCT Blood Glucose.: 136 mg/dL (02-04 @ 06:09)  POCT Blood Glucose.: 158 mg/dL (02-03 @ 23:57)  POCT Blood Glucose.: 174 mg/dL (02-03 @ 18:22)    Skin per nursing documentation: stage II left foot, suspected DTI lower sacrum and upper sacrum  Edema: 4+ generalized.     Estimated Needs:   [x] no change since previous assessment  Based on IBW+10% of 121 pounds/55 kg  Energy needs: 30-35 kcal/day (5692-2754 calories)  Protein needs: 1.3-1.6 g/kg (71.24-87.68 g protein)     Previous Nutrition Diagnosis: Increased Nutrient Needs  Nutrition Diagnosis is: [x] ongoing  [] resolved [] not applicable     New Nutrition Diagnosis: [] Not applicable    Nutrition Care Plan:  [x] In Progress  [] Achieved  [] Not applicable    Nutrition Interventions:     Education Provided:       [] Yes:  [x] No: Not appropriate at this time.     Recommendations:      1. Recommend change TF regimen to Glucerna 1.2 with goal rate 60mL/hr x 24 hrs to provide 1440mL formula, 1728 kcal, 86.4g protein, 1159 mL free water; provides 31.4 kcal/kg and 1.6 g/kg protein based on upper end IBW of 55 kg. Free water flushes per team or consider FWF of 200ml q6H.   -- Recommend Misael BID.   2. Continue multivitamin, suggest vitamin C supplementation to support wound healing.  3. Monitor GI tolerance to feeds, RD remains available to adjust TF regimen/formulary as needed/upon request.     Monitoring and Evaluation:   Continue to monitor nutritional intake, tolerance to diet prescription, weights, labs, skin integrity    RD remains available upon request and will follow up per protocol    Megan Mhaajan MS, RD, CDN, [facility] #[phone]

## 2022-02-04 NOTE — PROGRESS NOTE ADULT - ASSESSMENT
A/P:  71F Hx afib on apixaban, cad s/p stents, HFrEF, CVA with left sided residual weakness who presents from nursing home with AMS, fever. Found to be hypernatremic, hypotensive refractory to IVF requiring vasopressor, AMS secondary to metabolic encephalopathy s/p intubation, septic shock of unknown origin, possible aspiration pna.    Wound Consult requested to assist w/ management of Evolving Sacral / buttocks DTI  Incontinence of urine and stool      Buttocks and sacrum - TRIAD paste bid and prn soiling       continue w/ Pericare as per protocol  BLE elevation  Abx per medicine  Moisturize intact skin w/ SWEEN cream BID  Nutritional optimization as tolerated in pt w/ Increased nutritional needs        MVI & Vit C to promote wound healing and high quality protein  Continue turning and positioning w/ offloading assistive devices as per protocol  Waffle Cushion to chair when oob to chair  Continue w/ low air loss bed surface   Care as per medicine, will follow w/ you  Upon discharge f/u [facility] [address] [phone]  D/w team & RN  Siria Finch PA-C [id number]  we spent 25minutes face to face w/ this pt of which more than 50% of the time was spent counseling & coordinating care of this pt.

## 2022-02-05 LAB
ANION GAP SERPL CALC-SCNC: 10 MMOL/L — SIGNIFICANT CHANGE UP (ref 5–17)
BUN SERPL-MCNC: 25 MG/DL — HIGH (ref 7–23)
CALCIUM SERPL-MCNC: 7.8 MG/DL — LOW (ref 8.4–10.5)
CHLORIDE SERPL-SCNC: 108 MMOL/L — SIGNIFICANT CHANGE UP (ref 96–108)
CO2 SERPL-SCNC: 22 MMOL/L — SIGNIFICANT CHANGE UP (ref 22–31)
CREAT SERPL-MCNC: 0.53 MG/DL — SIGNIFICANT CHANGE UP (ref 0.5–1.3)
GLUCOSE BLDC GLUCOMTR-MCNC: 101 MG/DL — HIGH (ref 70–99)
GLUCOSE BLDC GLUCOMTR-MCNC: 115 MG/DL — HIGH (ref 70–99)
GLUCOSE BLDC GLUCOMTR-MCNC: 122 MG/DL — HIGH (ref 70–99)
GLUCOSE BLDC GLUCOMTR-MCNC: 124 MG/DL — HIGH (ref 70–99)
GLUCOSE BLDC GLUCOMTR-MCNC: 133 MG/DL — HIGH (ref 70–99)
GLUCOSE SERPL-MCNC: 114 MG/DL — HIGH (ref 70–99)
HCT VFR BLD CALC: 24.5 % — LOW (ref 34.5–45)
HCT VFR BLD CALC: 25 % — LOW (ref 34.5–45)
HGB BLD-MCNC: 7.8 G/DL — LOW (ref 11.5–15.5)
HGB BLD-MCNC: 8.1 G/DL — LOW (ref 11.5–15.5)
MCHC RBC-ENTMCNC: 28.9 PG — SIGNIFICANT CHANGE UP (ref 27–34)
MCHC RBC-ENTMCNC: 28.9 PG — SIGNIFICANT CHANGE UP (ref 27–34)
MCHC RBC-ENTMCNC: 31.8 GM/DL — LOW (ref 32–36)
MCHC RBC-ENTMCNC: 32.4 GM/DL — SIGNIFICANT CHANGE UP (ref 32–36)
MCV RBC AUTO: 89.3 FL — SIGNIFICANT CHANGE UP (ref 80–100)
MCV RBC AUTO: 90.7 FL — SIGNIFICANT CHANGE UP (ref 80–100)
NRBC # BLD: 0 /100 WBCS — SIGNIFICANT CHANGE UP (ref 0–0)
NRBC # BLD: 0 /100 WBCS — SIGNIFICANT CHANGE UP (ref 0–0)
OB PNL STL: NEGATIVE — SIGNIFICANT CHANGE UP
PLATELET # BLD AUTO: 316 K/UL — SIGNIFICANT CHANGE UP (ref 150–400)
PLATELET # BLD AUTO: 332 K/UL — SIGNIFICANT CHANGE UP (ref 150–400)
POTASSIUM SERPL-MCNC: 3.9 MMOL/L — SIGNIFICANT CHANGE UP (ref 3.5–5.3)
POTASSIUM SERPL-SCNC: 3.9 MMOL/L — SIGNIFICANT CHANGE UP (ref 3.5–5.3)
RBC # BLD: 2.7 M/UL — LOW (ref 3.8–5.2)
RBC # BLD: 2.8 M/UL — LOW (ref 3.8–5.2)
RBC # FLD: 15.9 % — HIGH (ref 10.3–14.5)
RBC # FLD: 15.9 % — HIGH (ref 10.3–14.5)
SARS-COV-2 RNA SPEC QL NAA+PROBE: SIGNIFICANT CHANGE UP
SODIUM SERPL-SCNC: 140 MMOL/L — SIGNIFICANT CHANGE UP (ref 135–145)
WBC # BLD: 10.4 K/UL — SIGNIFICANT CHANGE UP (ref 3.8–10.5)
WBC # BLD: 12.21 K/UL — HIGH (ref 3.8–10.5)
WBC # FLD AUTO: 10.4 K/UL — SIGNIFICANT CHANGE UP (ref 3.8–10.5)
WBC # FLD AUTO: 12.21 K/UL — HIGH (ref 3.8–10.5)

## 2022-02-05 PROCEDURE — 99233 SBSQ HOSP IP/OBS HIGH 50: CPT

## 2022-02-05 RX ADMIN — Medication 56 MICROGRAM(S): at 22:03

## 2022-02-05 RX ADMIN — Medication 60 MILLIGRAM(S): at 05:34

## 2022-02-05 RX ADMIN — POLYETHYLENE GLYCOL 3350 17 GRAM(S): 17 POWDER, FOR SOLUTION ORAL at 11:30

## 2022-02-05 RX ADMIN — ATORVASTATIN CALCIUM 80 MILLIGRAM(S): 80 TABLET, FILM COATED ORAL at 22:03

## 2022-02-05 RX ADMIN — Medication 60 MILLIGRAM(S): at 22:02

## 2022-02-05 RX ADMIN — Medication 15 MILLILITER(S): at 11:30

## 2022-02-05 RX ADMIN — Medication 60 MILLIGRAM(S): at 10:05

## 2022-02-05 RX ADMIN — Medication 60 MILLIGRAM(S): at 15:24

## 2022-02-05 RX ADMIN — CHLORHEXIDINE GLUCONATE 1 APPLICATION(S): 213 SOLUTION TOPICAL at 08:30

## 2022-02-05 NOTE — PROGRESS NOTE ADULT - SUBJECTIVE AND OBJECTIVE BOX
[facility] Division of Hospital Medicine  Nacho Manzano  Pager (MACARIOF, 8A-5P): [phone]  Other Times:  [phone]    CC: 70 y/o F with AMS    SUBJECTIVE / OVERNIGHT EVENTS:  No acute events overnight  Hgb stable this am  Unable to participate in ROS    ADDITIONAL REVIEW OF SYSTEMS:    MEDICATIONS  (STANDING):  atorvastatin 80 milliGRAM(s) Oral at bedtime  chlorhexidine 4% Liquid 1 Application(s) Topical <User Schedule>  diltiazem    Tablet 60 milliGRAM(s) Enteral Tube every 6 hours  insulin lispro (ADMELOG) corrective regimen sliding scale   SubCutaneous every 6 hours  levothyroxine Injectable 56 MICROGram(s) IV Push at bedtime  multivitamin/minerals/iron Oral Solution (CENTRUM) 15 milliLiter(s) Oral daily  polyethylene glycol 3350 17 Gram(s) Oral daily  senna Syrup 10 milliLiter(s) Oral at bedtime    MEDICATIONS  (PRN):  acetaminophen    Suspension .. 650 milliGRAM(s) Oral every 6 hours PRN Temp greater or equal to 38C (100.4F)  hydrALAZINE Injectable 5 milliGRAM(s) IV Push every 4 hours PRN SBP >160      I&O's Summary    04 Feb 2022 07:01  -  05 Feb 2022 07:00  --------------------------------------------------------  IN: 940 mL / OUT: 900 mL / NET: 40 mL    05 Feb 2022 07:01  -  05 Feb 2022 14:37  --------------------------------------------------------  IN: 0 mL / OUT: 200 mL / NET: -200 mL        PHYSICAL EXAM:  Vital Signs Last 24 Hrs  T(C): 37.4 (05 Feb 2022 14:17), Max: 37.4 (05 Feb 2022 14:17)  T(F): 99.3 (05 Feb 2022 14:17), Max: 99.3 (05 Feb 2022 14:17)  HR: 92 (05 Feb 2022 14:17) (91 - 98)  BP: 147/81 (05 Feb 2022 14:17) (130/78 - 160/90)  BP(mean): --  RR: 17 (05 Feb 2022 14:17) (17 - 20)  SpO2: 95% (05 Feb 2022 14:17) (94% - 96%)    CONSTITUTIONAL: NAD, thin  EYES: PERRLA; conjunctiva and sclera clear  RESPIRATORY: Normal respiratory effort; lungs are clear to auscultation bilaterally  CARDIOVASCULAR: Regular rate and rhythm, normal S1 and S2, no murmur. trace b/l Le edema  ABDOMEN: Nontender to palpation, normoactive bowel sounds  MUSCULOSKELETAL: no clubbing or cyanosis of digits; no joint swelling or tenderness to palpation  PSYCH: asleep not itneractive    LABS:                        8.1    12.21 )-----------( 332      ( 05 Feb 2022 07:29 )             25.0     02-04    137  |  107  |  26<H>  ----------------------------<  113<H>  3.6   |  22  |  0.58    Ca    8.2<L>      04 Feb 2022 11:06    TPro  x   /  Alb  x   /  TBili  1.0  /  DBili  0.2  /  AST  x   /  ALT  x   /  AlkPhos  x   02-04    PT/INR - ( 03 Feb 2022 15:33 )   PT: 15.7 sec;   INR: 1.33 ratio         PTT - ( 03 Feb 2022 15:33 )  PTT:37.5 sec

## 2022-02-05 NOTE — PROGRESS NOTE ADULT - PROBLEM SELECTOR PLAN 2
AMS 2/2 Metabolic encephalopathy  - baseline AOx2, currently appears to be at baseline  Hx CVA with residual left sided weak/flaccid  1/24 VEEG Negative likely-metabolic encephalopathy    1/21 CTH without acute changes, re demonstration of lacuna infarcts   s/p LP, glucose 86, protein 48- negative. all abx and anti-viral dcd  mental status waxes and wanes  appreciate neuro re-eval  following some commands such as opening mouth, but still with increased secretions  failed repeat speech and swallow eval  c/w NGT, change to KO tube for feeds   Palliative consult for GOC->remains full code

## 2022-02-05 NOTE — PROGRESS NOTE ADULT - PROBLEM SELECTOR PLAN 1
-acute drop in H&H  -CT showing moderate RP bleed  -lovenox d/porfirio  -transfused 2 units PRBCs, hgb stable in 8s  -no intervention planned by IR  -monitor closely, if pt decompenstes, MICU consult

## 2022-02-06 LAB
ANION GAP SERPL CALC-SCNC: 12 MMOL/L — SIGNIFICANT CHANGE UP (ref 5–17)
BUN SERPL-MCNC: 24 MG/DL — HIGH (ref 7–23)
CALCIUM SERPL-MCNC: 7.8 MG/DL — LOW (ref 8.4–10.5)
CHLORIDE SERPL-SCNC: 106 MMOL/L — SIGNIFICANT CHANGE UP (ref 96–108)
CO2 SERPL-SCNC: 19 MMOL/L — LOW (ref 22–31)
CREAT SERPL-MCNC: 0.47 MG/DL — LOW (ref 0.5–1.3)
GLUCOSE BLDC GLUCOMTR-MCNC: 118 MG/DL — HIGH (ref 70–99)
GLUCOSE BLDC GLUCOMTR-MCNC: 118 MG/DL — HIGH (ref 70–99)
GLUCOSE BLDC GLUCOMTR-MCNC: 122 MG/DL — HIGH (ref 70–99)
GLUCOSE BLDC GLUCOMTR-MCNC: 123 MG/DL — HIGH (ref 70–99)
GLUCOSE SERPL-MCNC: 102 MG/DL — HIGH (ref 70–99)
HCT VFR BLD CALC: 24.8 % — LOW (ref 34.5–45)
HCT VFR BLD CALC: 26.6 % — LOW (ref 34.5–45)
HGB BLD-MCNC: 7.8 G/DL — LOW (ref 11.5–15.5)
HGB BLD-MCNC: 8.3 G/DL — LOW (ref 11.5–15.5)
MAGNESIUM SERPL-MCNC: 2 MG/DL — SIGNIFICANT CHANGE UP (ref 1.6–2.6)
MCHC RBC-ENTMCNC: 28.5 PG — SIGNIFICANT CHANGE UP (ref 27–34)
MCHC RBC-ENTMCNC: 29.1 PG — SIGNIFICANT CHANGE UP (ref 27–34)
MCHC RBC-ENTMCNC: 31.2 GM/DL — LOW (ref 32–36)
MCHC RBC-ENTMCNC: 31.5 GM/DL — LOW (ref 32–36)
MCV RBC AUTO: 90.5 FL — SIGNIFICANT CHANGE UP (ref 80–100)
MCV RBC AUTO: 93.3 FL — SIGNIFICANT CHANGE UP (ref 80–100)
NRBC # BLD: 0 /100 WBCS — SIGNIFICANT CHANGE UP (ref 0–0)
NRBC # BLD: 0 /100 WBCS — SIGNIFICANT CHANGE UP (ref 0–0)
PHOSPHATE SERPL-MCNC: 2.6 MG/DL — SIGNIFICANT CHANGE UP (ref 2.5–4.5)
PLATELET # BLD AUTO: 279 K/UL — SIGNIFICANT CHANGE UP (ref 150–400)
PLATELET # BLD AUTO: 302 K/UL — SIGNIFICANT CHANGE UP (ref 150–400)
POTASSIUM SERPL-MCNC: 4.5 MMOL/L — SIGNIFICANT CHANGE UP (ref 3.5–5.3)
POTASSIUM SERPL-SCNC: 4.5 MMOL/L — SIGNIFICANT CHANGE UP (ref 3.5–5.3)
RBC # BLD: 2.74 M/UL — LOW (ref 3.8–5.2)
RBC # BLD: 2.85 M/UL — LOW (ref 3.8–5.2)
RBC # FLD: 15.9 % — HIGH (ref 10.3–14.5)
RBC # FLD: 16.2 % — HIGH (ref 10.3–14.5)
SODIUM SERPL-SCNC: 137 MMOL/L — SIGNIFICANT CHANGE UP (ref 135–145)
WBC # BLD: 7.93 K/UL — SIGNIFICANT CHANGE UP (ref 3.8–10.5)
WBC # BLD: 9.89 K/UL — SIGNIFICANT CHANGE UP (ref 3.8–10.5)
WBC # FLD AUTO: 7.93 K/UL — SIGNIFICANT CHANGE UP (ref 3.8–10.5)
WBC # FLD AUTO: 9.89 K/UL — SIGNIFICANT CHANGE UP (ref 3.8–10.5)

## 2022-02-06 PROCEDURE — 99233 SBSQ HOSP IP/OBS HIGH 50: CPT

## 2022-02-06 RX ADMIN — Medication 60 MILLIGRAM(S): at 06:24

## 2022-02-06 RX ADMIN — Medication 15 MILLILITER(S): at 11:44

## 2022-02-06 RX ADMIN — SENNA PLUS 10 MILLILITER(S): 8.6 TABLET ORAL at 22:46

## 2022-02-06 RX ADMIN — ATORVASTATIN CALCIUM 80 MILLIGRAM(S): 80 TABLET, FILM COATED ORAL at 22:46

## 2022-02-06 RX ADMIN — Medication 56 MICROGRAM(S): at 22:46

## 2022-02-06 RX ADMIN — Medication 60 MILLIGRAM(S): at 23:00

## 2022-02-06 RX ADMIN — Medication 60 MILLIGRAM(S): at 17:07

## 2022-02-06 RX ADMIN — POLYETHYLENE GLYCOL 3350 17 GRAM(S): 17 POWDER, FOR SOLUTION ORAL at 11:44

## 2022-02-06 RX ADMIN — Medication 60 MILLIGRAM(S): at 11:44

## 2022-02-06 NOTE — PROGRESS NOTE ADULT - ASSESSMENT
71F Hx afib on apixaban, cad s/p stents, HFrEF, CVA with left sided residual weakness who presents from nursing home with AMS, fever. Found to be hypernatremic, hypotensive refractory to IVF requiring vasopressor, AMS secondary to metabolic encephalopathy s/p intubation, septic shock 2/2 COVID-19 PNA, MRSA PNA , and r/o meningitis. Pt extubated 1/28, awake, following commands , NGT in place, failed S&S. Now transfered to medical floor.

## 2022-02-06 NOTE — PROGRESS NOTE ADULT - SUBJECTIVE AND OBJECTIVE BOX
[facility] Division of Hospital Medicine  Nacho Maznano  Pager (SEBASTIEN, 8A-5P): [phone]  Other Times:  [phone]    CC: 70 y/o F presenting with AMS    SUBJECTIVE / OVERNIGHT EVENTS:  no acute events overnight  opens eyes to loud questioning  does not participate in ROS    ADDITIONAL REVIEW OF SYSTEMS:    MEDICATIONS  (STANDING):  atorvastatin 80 milliGRAM(s) Oral at bedtime  chlorhexidine 4% Liquid 1 Application(s) Topical <User Schedule>  diltiazem    Tablet 60 milliGRAM(s) Enteral Tube every 6 hours  insulin lispro (ADMELOG) corrective regimen sliding scale   SubCutaneous every 6 hours  levothyroxine Injectable 56 MICROGram(s) IV Push at bedtime  multivitamin/minerals/iron Oral Solution (CENTRUM) 15 milliLiter(s) Oral daily  polyethylene glycol 3350 17 Gram(s) Oral daily  senna Syrup 10 milliLiter(s) Oral at bedtime    MEDICATIONS  (PRN):  acetaminophen    Suspension .. 650 milliGRAM(s) Oral every 6 hours PRN Temp greater or equal to 38C (100.4F)  hydrALAZINE Injectable 5 milliGRAM(s) IV Push every 4 hours PRN SBP >160      I&O's Summary    05 Feb 2022 07:01  -  06 Feb 2022 07:00  --------------------------------------------------------  IN: 720 mL / OUT: 1075 mL / NET: -355 mL    06 Feb 2022 07:01  -  06 Feb 2022 13:19  --------------------------------------------------------  IN: 670 mL / OUT: 0 mL / NET: 670 mL        PHYSICAL EXAM:  Vital Signs Last 24 Hrs  T(C): 37.6 (06 Feb 2022 10:29), Max: 37.6 (06 Feb 2022 10:29)  T(F): 99.6 (06 Feb 2022 10:29), Max: 99.6 (06 Feb 2022 10:29)  HR: 92 (06 Feb 2022 10:29) (91 - 95)  BP: 145/81 (06 Feb 2022 10:29) (145/81 - 154/79)  BP(mean): --  RR: 18 (06 Feb 2022 10:29) (17 - 18)  SpO2: 94% (06 Feb 2022 10:29) (94% - 97%)    CONSTITUTIONAL: NAD, thin  EYES: PERRLA; conjunctiva and sclera clear  RESPIRATORY: Normal respiratory effort; lungs are clear to auscultation bilaterally  CARDIOVASCULAR: Regular rate and rhythm, normal S1 and S2, + systolic murmur. trace b/l Le edema  ABDOMEN: Nontender to palpation, normoactive bowel sounds  MUSCULOSKELETAL: no clubbing or cyanosis of digits; no joint swelling or tenderness to palpation  PSYCH: asleep, opening eyes to voice/light sternal rub    LABS:                        8.3    9.89  )-----------( 279      ( 06 Feb 2022 07:07 )             26.6     02-06    137  |  106  |  24<H>  ----------------------------<  102<H>  4.5   |  19<L>  |  0.47<L>    Ca    7.8<L>      06 Feb 2022 07:12  Phos  2.6     02-06  Mg     2.0     02-06

## 2022-02-06 NOTE — PROGRESS NOTE ADULT - PROBLEM SELECTOR PLAN 1
-acute drop in H&H  -CT showing moderate RP bleed  -lovenox d/porfirio  -transfused 2 units PRBCs, hgb stable in 7s-8s  -no intervention planned by IR  -monitor closely, if pt decompensates, MICU consult

## 2022-02-07 LAB
ANION GAP SERPL CALC-SCNC: 9 MMOL/L — SIGNIFICANT CHANGE UP (ref 5–17)
BUN SERPL-MCNC: 23 MG/DL — SIGNIFICANT CHANGE UP (ref 7–23)
CALCIUM SERPL-MCNC: 7.7 MG/DL — LOW (ref 8.4–10.5)
CHLORIDE SERPL-SCNC: 107 MMOL/L — SIGNIFICANT CHANGE UP (ref 96–108)
CO2 SERPL-SCNC: 23 MMOL/L — SIGNIFICANT CHANGE UP (ref 22–31)
CREAT SERPL-MCNC: 0.49 MG/DL — LOW (ref 0.5–1.3)
GLUCOSE BLDC GLUCOMTR-MCNC: 112 MG/DL — HIGH (ref 70–99)
GLUCOSE BLDC GLUCOMTR-MCNC: 130 MG/DL — HIGH (ref 70–99)
GLUCOSE BLDC GLUCOMTR-MCNC: 141 MG/DL — HIGH (ref 70–99)
GLUCOSE BLDC GLUCOMTR-MCNC: 145 MG/DL — HIGH (ref 70–99)
GLUCOSE SERPL-MCNC: 143 MG/DL — HIGH (ref 70–99)
HCT VFR BLD CALC: 25.4 % — LOW (ref 34.5–45)
HCT VFR BLD CALC: 26.2 % — LOW (ref 34.5–45)
HGB BLD-MCNC: 8.1 G/DL — LOW (ref 11.5–15.5)
HGB BLD-MCNC: 8.2 G/DL — LOW (ref 11.5–15.5)
MCHC RBC-ENTMCNC: 28.7 PG — SIGNIFICANT CHANGE UP (ref 27–34)
MCHC RBC-ENTMCNC: 29.2 PG — SIGNIFICANT CHANGE UP (ref 27–34)
MCHC RBC-ENTMCNC: 31.3 GM/DL — LOW (ref 32–36)
MCHC RBC-ENTMCNC: 31.9 GM/DL — LOW (ref 32–36)
MCV RBC AUTO: 91.6 FL — SIGNIFICANT CHANGE UP (ref 80–100)
MCV RBC AUTO: 91.7 FL — SIGNIFICANT CHANGE UP (ref 80–100)
NRBC # BLD: 0 /100 WBCS — SIGNIFICANT CHANGE UP (ref 0–0)
NRBC # BLD: 0 /100 WBCS — SIGNIFICANT CHANGE UP (ref 0–0)
PLATELET # BLD AUTO: 288 K/UL — SIGNIFICANT CHANGE UP (ref 150–400)
PLATELET # BLD AUTO: 308 K/UL — SIGNIFICANT CHANGE UP (ref 150–400)
POTASSIUM SERPL-MCNC: 4.5 MMOL/L — SIGNIFICANT CHANGE UP (ref 3.5–5.3)
POTASSIUM SERPL-SCNC: 4.5 MMOL/L — SIGNIFICANT CHANGE UP (ref 3.5–5.3)
RBC # BLD: 2.77 M/UL — LOW (ref 3.8–5.2)
RBC # BLD: 2.86 M/UL — LOW (ref 3.8–5.2)
RBC # FLD: 16.1 % — HIGH (ref 10.3–14.5)
RBC # FLD: 16.2 % — HIGH (ref 10.3–14.5)
SODIUM SERPL-SCNC: 139 MMOL/L — SIGNIFICANT CHANGE UP (ref 135–145)
WBC # BLD: 7.47 K/UL — SIGNIFICANT CHANGE UP (ref 3.8–10.5)
WBC # BLD: 8.2 K/UL — SIGNIFICANT CHANGE UP (ref 3.8–10.5)
WBC # FLD AUTO: 7.47 K/UL — SIGNIFICANT CHANGE UP (ref 3.8–10.5)
WBC # FLD AUTO: 8.2 K/UL — SIGNIFICANT CHANGE UP (ref 3.8–10.5)

## 2022-02-07 PROCEDURE — 99233 SBSQ HOSP IP/OBS HIGH 50: CPT

## 2022-02-07 RX ADMIN — Medication 60 MILLIGRAM(S): at 12:33

## 2022-02-07 RX ADMIN — Medication 56 MICROGRAM(S): at 21:52

## 2022-02-07 RX ADMIN — Medication 60 MILLIGRAM(S): at 19:09

## 2022-02-07 RX ADMIN — ATORVASTATIN CALCIUM 80 MILLIGRAM(S): 80 TABLET, FILM COATED ORAL at 21:52

## 2022-02-07 RX ADMIN — Medication 60 MILLIGRAM(S): at 05:33

## 2022-02-07 RX ADMIN — Medication 650 MILLIGRAM(S): at 06:34

## 2022-02-07 RX ADMIN — SENNA PLUS 10 MILLILITER(S): 8.6 TABLET ORAL at 21:52

## 2022-02-07 RX ADMIN — POLYETHYLENE GLYCOL 3350 17 GRAM(S): 17 POWDER, FOR SOLUTION ORAL at 12:34

## 2022-02-07 RX ADMIN — CHLORHEXIDINE GLUCONATE 1 APPLICATION(S): 213 SOLUTION TOPICAL at 12:13

## 2022-02-07 RX ADMIN — Medication 15 MILLILITER(S): at 12:33

## 2022-02-07 RX ADMIN — Medication 650 MILLIGRAM(S): at 05:34

## 2022-02-07 NOTE — PROVIDER CONTACT NOTE (OTHER) - RECOMMENDATIONS
Patient with hx of COVID+ from 1/21/22. Patient no longer requires isolation at this time. Isolation discontinued as per hospital guidelines. No symptoms & >10 days
Break seal and attempt to flush aguilar prior to replacing it, as aguilar was just recently exchanged.
CXR to verify placement of NGT in case pt aspirated. Unable to assess if pt is nauseous or intolerant to feeds since pt is primarily nonverbal.
EKG
Md made aware, ordered 1 units of PRBC's

## 2022-02-07 NOTE — PROGRESS NOTE ADULT - PROBLEM SELECTOR PLAN 2
AMS 2/2 Metabolic encephalopathy  - baseline AOx2, currently appears to be at baseline  Hx CVA with residual left sided weak/flaccid  1/24 VEEG Negative likely-metabolic encephalopathy    1/21 CTH without acute changes, re demonstration of lacuna infarcts   s/p LP, glucose 86, protein 48- negative. all abx and anti-viral dcd  mental status waxes and wanes  appreciate neuro re-eval  following some commands such as opening mouth, but still with increased secretions  failed repeat speech and swallow eval  c/w NGT, change to KO tube for feeds   Palliative consult for GOC->remains full code  if no further improvement in mental status, will need to consider PEG placement

## 2022-02-07 NOTE — PROGRESS NOTE ADULT - PROBLEM SELECTOR PLAN 1
-acute drop in H&H  -CT showing moderate RP bleed  -lovenox d/porfirio  -transfused 2 units PRBCs, hgb stable in 7s-8s  -no intervention planned by IR  -monitor closely, if pt decompensates, MICU consult  -no further transfusion indicated today

## 2022-02-07 NOTE — PROGRESS NOTE ADULT - SUBJECTIVE AND OBJECTIVE BOX
Patient is a 71y old  Female who presents with a chief complaint of AMS (06 Feb 2022 13:18)    SUBJECTIVE / OVERNIGHT EVENTS: no acute events overnight, aguilar replaced     MEDICATIONS  (STANDING):  atorvastatin 80 milliGRAM(s) Oral at bedtime  chlorhexidine 4% Liquid 1 Application(s) Topical <User Schedule>  diltiazem    Tablet 60 milliGRAM(s) Enteral Tube every 6 hours  insulin lispro (ADMELOG) corrective regimen sliding scale   SubCutaneous every 6 hours  levothyroxine Injectable 56 MICROGram(s) IV Push at bedtime  multivitamin/minerals/iron Oral Solution (CENTRUM) 15 milliLiter(s) Oral daily  polyethylene glycol 3350 17 Gram(s) Oral daily  senna Syrup 10 milliLiter(s) Oral at bedtime    MEDICATIONS  (PRN):  acetaminophen    Suspension .. 650 milliGRAM(s) Oral every 6 hours PRN Temp greater or equal to 38C (100.4F)  hydrALAZINE Injectable 5 milliGRAM(s) IV Push every 4 hours PRN SBP >160      Vital Signs Last 24 Hrs  T(C): 36.8 (07 Feb 2022 12:01), Max: 37.4 (06 Feb 2022 22:36)  T(F): 98.3 (07 Feb 2022 12:01), Max: 99.4 (07 Feb 2022 05:26)  HR: 91 (07 Feb 2022 12:01) (90 - 98)  BP: 138/80 (07 Feb 2022 12:01) (138/80 - 166/91)  BP(mean): --  RR: 18 (07 Feb 2022 12:01) (18 - 19)  SpO2: 98% (07 Feb 2022 12:01) (94% - 98%)  CAPILLARY BLOOD GLUCOSE      POCT Blood Glucose.: 112 mg/dL (07 Feb 2022 12:05)  POCT Blood Glucose.: 145 mg/dL (07 Feb 2022 06:03)  POCT Blood Glucose.: 122 mg/dL (06 Feb 2022 23:49)  POCT Blood Glucose.: 123 mg/dL (06 Feb 2022 17:46)    I&O's Summary    06 Feb 2022 07:01  -  07 Feb 2022 07:00  --------------------------------------------------------  IN: 2340 mL / OUT: 1000 mL / NET: 1340 mL    07 Feb 2022 07:01  -  07 Feb 2022 13:32  --------------------------------------------------------  IN: 0 mL / OUT: 200 mL / NET: -200 mL      PHYSICAL EXAM:  GENERAL: NAD  EYES: conjunctiva and sclera clear  CHEST/LUNG: no wheezing noted   HEART: +S1/S2  ABDOMEN: Soft, Nontender, Nondistended  EXTREMITIES: no LE edema   PSYCH: non verbal       LABS:                        8.1    7.47  )-----------( 308      ( 07 Feb 2022 08:16 )             25.4     02-07    139  |  107  |  23  ----------------------------<  143<H>  4.5   |  23  |  0.49<L>    Ca    7.7<L>      07 Feb 2022 08:16  Phos  2.6     02-06  Mg     2.0     02-06

## 2022-02-08 LAB
ANION GAP SERPL CALC-SCNC: 11 MMOL/L — SIGNIFICANT CHANGE UP (ref 5–17)
BUN SERPL-MCNC: 21 MG/DL — SIGNIFICANT CHANGE UP (ref 7–23)
CALCIUM SERPL-MCNC: 7.9 MG/DL — LOW (ref 8.4–10.5)
CHLORIDE SERPL-SCNC: 106 MMOL/L — SIGNIFICANT CHANGE UP (ref 96–108)
CO2 SERPL-SCNC: 24 MMOL/L — SIGNIFICANT CHANGE UP (ref 22–31)
CREAT SERPL-MCNC: 0.46 MG/DL — LOW (ref 0.5–1.3)
GLUCOSE BLDC GLUCOMTR-MCNC: 142 MG/DL — HIGH (ref 70–99)
GLUCOSE BLDC GLUCOMTR-MCNC: 142 MG/DL — HIGH (ref 70–99)
GLUCOSE BLDC GLUCOMTR-MCNC: 144 MG/DL — HIGH (ref 70–99)
GLUCOSE BLDC GLUCOMTR-MCNC: 146 MG/DL — HIGH (ref 70–99)
GLUCOSE SERPL-MCNC: 130 MG/DL — HIGH (ref 70–99)
HCT VFR BLD CALC: 27.5 % — LOW (ref 34.5–45)
HCT VFR BLD CALC: 28.7 % — LOW (ref 34.5–45)
HGB BLD-MCNC: 8.9 G/DL — LOW (ref 11.5–15.5)
HGB BLD-MCNC: 9 G/DL — LOW (ref 11.5–15.5)
MAGNESIUM SERPL-MCNC: 2.2 MG/DL — SIGNIFICANT CHANGE UP (ref 1.6–2.6)
MCHC RBC-ENTMCNC: 28.3 PG — SIGNIFICANT CHANGE UP (ref 27–34)
MCHC RBC-ENTMCNC: 28.8 PG — SIGNIFICANT CHANGE UP (ref 27–34)
MCHC RBC-ENTMCNC: 31.4 GM/DL — LOW (ref 32–36)
MCHC RBC-ENTMCNC: 32.4 GM/DL — SIGNIFICANT CHANGE UP (ref 32–36)
MCV RBC AUTO: 89 FL — SIGNIFICANT CHANGE UP (ref 80–100)
MCV RBC AUTO: 90.3 FL — SIGNIFICANT CHANGE UP (ref 80–100)
NRBC # BLD: 0 /100 WBCS — SIGNIFICANT CHANGE UP (ref 0–0)
NRBC # BLD: 0 /100 WBCS — SIGNIFICANT CHANGE UP (ref 0–0)
PHOSPHATE SERPL-MCNC: 2.8 MG/DL — SIGNIFICANT CHANGE UP (ref 2.5–4.5)
PLATELET # BLD AUTO: 311 K/UL — SIGNIFICANT CHANGE UP (ref 150–400)
PLATELET # BLD AUTO: 324 K/UL — SIGNIFICANT CHANGE UP (ref 150–400)
POTASSIUM SERPL-MCNC: 4.5 MMOL/L — SIGNIFICANT CHANGE UP (ref 3.5–5.3)
POTASSIUM SERPL-SCNC: 4.5 MMOL/L — SIGNIFICANT CHANGE UP (ref 3.5–5.3)
RBC # BLD: 3.09 M/UL — LOW (ref 3.8–5.2)
RBC # BLD: 3.18 M/UL — LOW (ref 3.8–5.2)
RBC # FLD: 16.2 % — HIGH (ref 10.3–14.5)
RBC # FLD: 16.2 % — HIGH (ref 10.3–14.5)
SODIUM SERPL-SCNC: 141 MMOL/L — SIGNIFICANT CHANGE UP (ref 135–145)
WBC # BLD: 8.36 K/UL — SIGNIFICANT CHANGE UP (ref 3.8–10.5)
WBC # BLD: 8.77 K/UL — SIGNIFICANT CHANGE UP (ref 3.8–10.5)
WBC # FLD AUTO: 8.36 K/UL — SIGNIFICANT CHANGE UP (ref 3.8–10.5)
WBC # FLD AUTO: 8.77 K/UL — SIGNIFICANT CHANGE UP (ref 3.8–10.5)

## 2022-02-08 PROCEDURE — 99232 SBSQ HOSP IP/OBS MODERATE 35: CPT

## 2022-02-08 RX ORDER — FUROSEMIDE 40 MG
40 TABLET ORAL ONCE
Refills: 0 | Status: COMPLETED | OUTPATIENT
Start: 2022-02-08 | End: 2022-02-08

## 2022-02-08 RX ADMIN — Medication 56 MICROGRAM(S): at 21:54

## 2022-02-08 RX ADMIN — CHLORHEXIDINE GLUCONATE 1 APPLICATION(S): 213 SOLUTION TOPICAL at 07:50

## 2022-02-08 RX ADMIN — Medication 15 MILLILITER(S): at 11:47

## 2022-02-08 RX ADMIN — Medication 60 MILLIGRAM(S): at 11:48

## 2022-02-08 RX ADMIN — Medication 60 MILLIGRAM(S): at 17:11

## 2022-02-08 RX ADMIN — Medication 60 MILLIGRAM(S): at 05:35

## 2022-02-08 RX ADMIN — ATORVASTATIN CALCIUM 80 MILLIGRAM(S): 80 TABLET, FILM COATED ORAL at 21:54

## 2022-02-08 RX ADMIN — Medication 60 MILLIGRAM(S): at 01:02

## 2022-02-08 RX ADMIN — POLYETHYLENE GLYCOL 3350 17 GRAM(S): 17 POWDER, FOR SOLUTION ORAL at 11:47

## 2022-02-08 RX ADMIN — Medication 40 MILLIGRAM(S): at 15:18

## 2022-02-08 RX ADMIN — SENNA PLUS 10 MILLILITER(S): 8.6 TABLET ORAL at 21:54

## 2022-02-08 NOTE — PROGRESS NOTE ADULT - SUBJECTIVE AND OBJECTIVE BOX
[facility]-- WOUND TEAM -- FOLLOW UP NOTE  --------------------------------------------------------------------------------    24 hour events/subjective:    afebrile  incontinent of stool  (+)jeff  TF on hold      Diet:  Diet, NPO with Tube Feed:   Tube Feeding Modality: Nasogastric  Glucerna 1.2 Ezio (GLUCERNARTH)  Total Volume for 24 Hours (mL): 1440  Continuous  Starting Tube Feed Rate mL per Hour: 40  Increase Tube Feed Rate by (mL): 10     Every 4 hours  Until Goal Tube Feed Rate (mL per Hour): 60  Tube Feed Duration (in Hours): 24  Tube Feed Start Time: 16:00  Free Water Flush  Bolus   Total Volume per Flush (mL): 200   Frequency: Every 6 Hours (02-04-22 @ 15:42)      ROS: pt unable to offer    ALLERGIES & MEDICATIONS  --------------------------------------------------------------------------------  Allergies  penicillin (Hives/(Rash)  sulfa drugs (Unknown)  Tetracycline Hydrochloride (Unknown)      STANDING INPATIENT MEDICATIONS  atorvastatin 80 milliGRAM(s) Oral at bedtime  chlorhexidine 4% Liquid 1 Application(s) Topical <User Schedule>  diltiazem    Tablet 60 milliGRAM(s) Enteral Tube every 6 hours  insulin lispro (ADMELOG) corrective regimen sliding scale   SubCutaneous every 6 hours  levothyroxine Injectable 56 MICROGram(s) IV Push at bedtime  multivitamin/minerals/iron Oral Solution (CENTRUM) 15 milliLiter(s) Oral daily  polyethylene glycol 3350 17 Gram(s) Oral daily  senna Syrup 10 milliLiter(s) Oral at bedtime      PRN INPATIENT MEDICATION  acetaminophen    Suspension .. 650 milliGRAM(s) Oral every 6 hours PRN  hydrALAZINE Injectable 5 milliGRAM(s) IV Push every 4 hours PRN        VITALS/PHYSICAL EXAM  --------------------------------------------------------------------------------  T(C): 37.4 (02-08-22 @ 13:31), Max: 37.6 (02-07-22 @ 17:26)  HR: 99 (02-08-22 @ 15:16) (94 - 101)  BP: 158/78 (02-08-22 @ 15:16) (147/89 - 165/89)  RR: 18 (02-08-22 @ 13:31) (18 - 18)  SpO2: 93% (02-08-22 @ 13:31) (93% - 96%)  Wt(kg): --        02-07-22 @ 07:01  -  02-08-22 @ 07:00  --------------------------------------------------------  IN: 720 mL / OUT: 825 mL / NET: -105 mL    02-08-22 @ 07:01  -  02-08-22 @ 15:51  --------------------------------------------------------  IN: 0 mL / OUT: 350 mL / NET: -350 mL      NAD, lethargic, Obese, frail  Versa Care P500  HEENT:  NC/AT, PERRL, EOMI, mucosa moist, throat clear, trachea midline, neck supple  Gastrointestinal: soft NT/ND (+)BS  (+)NGT  : aguilar  Neurology  weakened strength Rt sided, Lt sided paralysis  Musculoskeletal:  passive ROM, no deformities  Vascular: BLE equally warm,  no cyanosis, clubbing, BLE edema   Skin:  pale, moist w/ good turgor   sacrum and b/l buttocks stage 3 pressure injury      mid-upper buttocks/ sacrum into gluteal cleft moist and granular      5cm x 2cm x 0.3cm      lower buttocks w/ nonblanchable deep maroon linear wound resolved       no active drainage  No odor, erythema, increased warmth, tenderness, induration, fluctuance        LABS/ CULTURES/ RADIOLOGY:              9.0    8.36  >-----------<  324      [02-08-22 @ 08:23]              28.7     141  |  106  |  21  ----------------------------<  130      [02-08-22 @ 08:23]  4.5   |  24  |  0.46        Ca     7.9     [02-08-22 @ 08:23]      Mg     2.2     [02-08-22 @ 08:23]      Phos  2.8     [02-08-22 @ 08:23]    CAPILLARY BLOOD GLUCOSE  POCT Blood Glucose.: 146 mg/dL (08 Feb 2022 12:16)  POCT Blood Glucose.: 142 mg/dL (08 Feb 2022 06:38)  POCT Blood Glucose.: 130 mg/dL (07 Feb 2022 23:59)  POCT Blood Glucose.: 141 mg/dL (07 Feb 2022 17:53)    A1C with Estimated Average Glucose Result: 5.7 % (01-21-22 @ 17:56)  A1C with Estimated Average Glucose Result: 5.4 % (08-18-21 @ 08:46)

## 2022-02-08 NOTE — PROGRESS NOTE ADULT - ASSESSMENT
A/P:  71F Hx afib on apixaban, cad s/p stents, HFrEF, CVA with left sided residual weakness who presents from nursing home with AMS, fever. Found to be hypernatremic, hypotensive refractory to IVF requiring vasopressor, AMS secondary to metabolic encephalopathy s/p intubation, septic shock of unknown origin, possible aspiration pna.    Wound Consult requested to assist w/ management of Evolving Sacral / buttocks DTI  Incontinence of urine and stool      Buttocks and sacrum - TRIAD paste bid and prn soiling       continue w/ Pericare as per protocol  BLE elevation  Abx per medicine  Moisturize intact skin w/ SWEEN cream BID  Nutritional optimization as tolerated in pt w/ Increased nutritional needs        MVI & Vit C to promote wound healing and high quality protein  Continue turning and positioning w/ offloading assistive devices as per protocol  Waffle Cushion to chair when oob to chair  Continue w/ low air loss bed surface   Care as per medicine, will follow w/ you  Upon [facility] [address] [phone]  seen w/ attng and D/w team & RN  ELSA RasmussenC CWS [id number]  we spent 25minutes face to face w/ this pt of which more than 50% of the time was spent counseling & coordinating care of this pt.

## 2022-02-08 NOTE — PROGRESS NOTE ADULT - SUBJECTIVE AND OBJECTIVE BOX
Patient is a 71y old  Female who presents with a chief complaint of AMS (07 Feb 2022 13:32)      SUBJECTIVE / OVERNIGHT EVENTS: no acute events overnight     MEDICATIONS  (STANDING):  atorvastatin 80 milliGRAM(s) Oral at bedtime  chlorhexidine 4% Liquid 1 Application(s) Topical <User Schedule>  diltiazem    Tablet 60 milliGRAM(s) Enteral Tube every 6 hours  insulin lispro (ADMELOG) corrective regimen sliding scale   SubCutaneous every 6 hours  levothyroxine Injectable 56 MICROGram(s) IV Push at bedtime  multivitamin/minerals/iron Oral Solution (CENTRUM) 15 milliLiter(s) Oral daily  polyethylene glycol 3350 17 Gram(s) Oral daily  senna Syrup 10 milliLiter(s) Oral at bedtime    MEDICATIONS  (PRN):  acetaminophen    Suspension .. 650 milliGRAM(s) Oral every 6 hours PRN Temp greater or equal to 38C (100.4F)  hydrALAZINE Injectable 5 milliGRAM(s) IV Push every 4 hours PRN SBP >160      Vital Signs Last 24 Hrs  T(C): 37.4 (08 Feb 2022 13:31), Max: 37.6 (07 Feb 2022 17:26)  T(F): 99.3 (08 Feb 2022 13:31), Max: 99.6 (07 Feb 2022 17:26)  HR: 99 (08 Feb 2022 13:31) (94 - 101)  BP: 156/80 (08 Feb 2022 13:31) (147/89 - 165/89)  BP(mean): --  RR: 18 (08 Feb 2022 13:31) (18 - 18)  SpO2: 93% (08 Feb 2022 13:31) (93% - 96%)  CAPILLARY BLOOD GLUCOSE      POCT Blood Glucose.: 146 mg/dL (08 Feb 2022 12:16)  POCT Blood Glucose.: 142 mg/dL (08 Feb 2022 06:38)  POCT Blood Glucose.: 130 mg/dL (07 Feb 2022 23:59)  POCT Blood Glucose.: 141 mg/dL (07 Feb 2022 17:53)    I&O's Summary    07 Feb 2022 07:01  -  08 Feb 2022 07:00  --------------------------------------------------------  IN: 720 mL / OUT: 825 mL / NET: -105 mL    08 Feb 2022 07:01  -  08 Feb 2022 14:39  --------------------------------------------------------  IN: 0 mL / OUT: 350 mL / NET: -350 mL      PHYSICAL EXAM:  GENERAL: NAD  EYES: conjunctiva and sclera clear  CHEST/LUNG: no wheezing noted   HEART: +S1/S2   ABDOMEN: Soft, Nontender, Nondistended  EXTREMITIES: +peripheral edema   PSYCH: AAOx0      LABS:                        9.0    8.36  )-----------( 324      ( 08 Feb 2022 08:23 )             28.7     02-08    141  |  106  |  21  ----------------------------<  130<H>  4.5   |  24  |  0.46<L>    Ca    7.9<L>      08 Feb 2022 08:23  Phos  2.8     02-08  Mg     2.2     02-08

## 2022-02-08 NOTE — PROGRESS NOTE ADULT - PROBLEM SELECTOR PLAN 2
AMS 2/2 Metabolic encephalopathy  - baseline AOx2, currently appears to be at baseline  Hx CVA with residual left sided weak/flaccid  1/24 VEEG Negative likely-metabolic encephalopathy    1/21 CTH without acute changes, re demonstration of lacuna infarcts   s/p LP, glucose 86, protein 48- negative. all abx and anti-viral dcd  mental status waxes and wanes  appreciate neuro re-eval  following some commands such as opening mouth, but still with increased secretions  failed repeat speech and swallow eval  c/w NGT, change to KO tube for feeds   Palliative consult for GOC->remains full code  if no further improvement in mental status, will need to consider PEG placement. Reached out to Linus Morrison and left message again with callback information. Also discussed with , no other number for the HCP. If I do not hear back from her, will reach out to Jerrod Munoz [phone] who is the alternate.

## 2022-02-09 LAB
ANION GAP SERPL CALC-SCNC: 11 MMOL/L — SIGNIFICANT CHANGE UP (ref 5–17)
APPEARANCE UR: ABNORMAL
BACTERIA # UR AUTO: NEGATIVE — SIGNIFICANT CHANGE UP
BILIRUB UR-MCNC: NEGATIVE — SIGNIFICANT CHANGE UP
BUN SERPL-MCNC: 26 MG/DL — HIGH (ref 7–23)
CALCIUM SERPL-MCNC: 8.2 MG/DL — LOW (ref 8.4–10.5)
CHLORIDE SERPL-SCNC: 101 MMOL/L — SIGNIFICANT CHANGE UP (ref 96–108)
CO2 SERPL-SCNC: 23 MMOL/L — SIGNIFICANT CHANGE UP (ref 22–31)
COLOR SPEC: YELLOW — SIGNIFICANT CHANGE UP
CREAT SERPL-MCNC: 0.5 MG/DL — SIGNIFICANT CHANGE UP (ref 0.5–1.3)
DIFF PNL FLD: ABNORMAL
EPI CELLS # UR: 2 /HPF — SIGNIFICANT CHANGE UP
GLUCOSE BLDC GLUCOMTR-MCNC: 129 MG/DL — HIGH (ref 70–99)
GLUCOSE BLDC GLUCOMTR-MCNC: 132 MG/DL — HIGH (ref 70–99)
GLUCOSE BLDC GLUCOMTR-MCNC: 133 MG/DL — HIGH (ref 70–99)
GLUCOSE SERPL-MCNC: 159 MG/DL — HIGH (ref 70–99)
GLUCOSE UR QL: NEGATIVE — SIGNIFICANT CHANGE UP
HCT VFR BLD CALC: 29.9 % — LOW (ref 34.5–45)
HGB BLD-MCNC: 9.2 G/DL — LOW (ref 11.5–15.5)
HYALINE CASTS # UR AUTO: 2 /LPF — SIGNIFICANT CHANGE UP (ref 0–2)
KETONES UR-MCNC: NEGATIVE — SIGNIFICANT CHANGE UP
LEUKOCYTE ESTERASE UR-ACNC: ABNORMAL
MCHC RBC-ENTMCNC: 28.1 PG — SIGNIFICANT CHANGE UP (ref 27–34)
MCHC RBC-ENTMCNC: 30.8 GM/DL — LOW (ref 32–36)
MCV RBC AUTO: 91.4 FL — SIGNIFICANT CHANGE UP (ref 80–100)
NITRITE UR-MCNC: NEGATIVE — SIGNIFICANT CHANGE UP
NRBC # BLD: 0 /100 WBCS — SIGNIFICANT CHANGE UP (ref 0–0)
PH UR: 6 — SIGNIFICANT CHANGE UP (ref 5–8)
PLATELET # BLD AUTO: 319 K/UL — SIGNIFICANT CHANGE UP (ref 150–400)
POTASSIUM SERPL-MCNC: 3.7 MMOL/L — SIGNIFICANT CHANGE UP (ref 3.5–5.3)
POTASSIUM SERPL-SCNC: 3.7 MMOL/L — SIGNIFICANT CHANGE UP (ref 3.5–5.3)
PROT UR-MCNC: ABNORMAL
RBC # BLD: 3.27 M/UL — LOW (ref 3.8–5.2)
RBC # FLD: 16 % — HIGH (ref 10.3–14.5)
RBC CASTS # UR COMP ASSIST: 5 /HPF — HIGH (ref 0–4)
SODIUM SERPL-SCNC: 135 MMOL/L — SIGNIFICANT CHANGE UP (ref 135–145)
SP GR SPEC: 1.03 — HIGH (ref 1.01–1.02)
UROBILINOGEN FLD QL: NEGATIVE — SIGNIFICANT CHANGE UP
WBC # BLD: 9.06 K/UL — SIGNIFICANT CHANGE UP (ref 3.8–10.5)
WBC # FLD AUTO: 9.06 K/UL — SIGNIFICANT CHANGE UP (ref 3.8–10.5)
WBC UR QL: 405 /HPF — HIGH (ref 0–5)

## 2022-02-09 PROCEDURE — 99232 SBSQ HOSP IP/OBS MODERATE 35: CPT

## 2022-02-09 PROCEDURE — 11721 DEBRIDE NAIL 6 OR MORE: CPT

## 2022-02-09 PROCEDURE — 99232 SBSQ HOSP IP/OBS MODERATE 35: CPT | Mod: 25

## 2022-02-09 PROCEDURE — 71045 X-RAY EXAM CHEST 1 VIEW: CPT | Mod: 26

## 2022-02-09 RX ADMIN — Medication 60 MILLIGRAM(S): at 17:20

## 2022-02-09 RX ADMIN — ATORVASTATIN CALCIUM 80 MILLIGRAM(S): 80 TABLET, FILM COATED ORAL at 21:08

## 2022-02-09 RX ADMIN — Medication 650 MILLIGRAM(S): at 14:51

## 2022-02-09 RX ADMIN — SENNA PLUS 10 MILLILITER(S): 8.6 TABLET ORAL at 21:08

## 2022-02-09 RX ADMIN — Medication 60 MILLIGRAM(S): at 01:15

## 2022-02-09 RX ADMIN — Medication 60 MILLIGRAM(S): at 05:30

## 2022-02-09 RX ADMIN — POLYETHYLENE GLYCOL 3350 17 GRAM(S): 17 POWDER, FOR SOLUTION ORAL at 12:14

## 2022-02-09 RX ADMIN — CHLORHEXIDINE GLUCONATE 1 APPLICATION(S): 213 SOLUTION TOPICAL at 12:15

## 2022-02-09 RX ADMIN — Medication 60 MILLIGRAM(S): at 12:14

## 2022-02-09 RX ADMIN — Medication 15 MILLILITER(S): at 12:14

## 2022-02-09 RX ADMIN — Medication 56 MICROGRAM(S): at 22:48

## 2022-02-09 NOTE — CONSULT NOTE ADULT - SUBJECTIVE AND OBJECTIVE BOX
Patient is a 71y old  Female who presents with a chief complaint of AMS (09 Feb 2022 16:57)      HPI:     71 yr old female with PMHx, Afib on apixaban, HFrEF (50% 8/21/21),CAD s/p stents, NSTEMI (2020), arthritis, wheelchair bound, hypothyroidism, s/p left ureteral stent removal (8/2021) with residual non obstructive Rt renal calculi, E.faecalis UTI (8/2021) CVA with left sided residual weakness (8/2021), sacral decubitus, pressure heel ulcers s/p COVID-19 (2020), baseline pt is AOx2 assist with ADL.     Pt presented [facility] from [facility] when found to have AMS, fever with Tmax of 104. Pt in E.D. found to be obtunded, mottled, hypotensive SBP 80's refractory to 2.5 liters IVF requiring norepi gtt, fever with Temp of 104.5. In addition found to have hypernatremia with Na+ of 161, ANIKA with sCr 4.74 (baseline 0.77-1.22), AGMA of 25, with contraction alkalosis with serum HCO3 of 23, vph 7.50, vPCO2 of 35, lactate of 3.2, procalcitonin of 0.82, hsTrop of 239, Hgb of 14.9 (baseline from 8/2021 of 11). Pt with 9 liters total body water deficit.       Pt received CT C/A/P/H (1/21/22) re demonstration of  Non obstructing right renal calculus, significant stool burden distending the rectum and sigmoid colon with mild perirectal fat stranding suggestive of fecal impaction and possible stercoral proctitis, No acute transcortical infarct, intracranial hemorrhage, however did show Chronic lacunar infarcts with chronic small vessel disease. Started on cefepime, vanco, tylenol.      Consult called and pt admitted MICU for AMS secondary to metabolic encephalopathy, septic shock of unknown origin, possible aspiration pneum   (21 Jan 2022 13:03)    Podiatry consulted for elongated neglected toenails.    PAST MEDICAL & SURGICAL HISTORY:  HTN (hypertension)    Obesity  BMI 29.3    Hypothyroid  on synthroid    Type 2 diabetes mellitus without complication  diet-controlled    Carpal tunnel syndrome of right wrist    Lymphedema    2019 novel coronavirus disease (COVID-19)  As per patient, diagnosed with Covid 1 year ago when residing in a nursing home and was managed in nursing home; Pt denies any hospitalizations for covid or intubations.    Sepsis  Urosepsis due to septic stone 8/2020    S/P carpal tunnel release    S/P cystoscopy  Pt is a poor historian; As per charts, patient had a cysto with stent placement 8/2020        MEDICATIONS  (STANDING):  atorvastatin 80 milliGRAM(s) Oral at bedtime  chlorhexidine 4% Liquid 1 Application(s) Topical <User Schedule>  diltiazem    Tablet 60 milliGRAM(s) Enteral Tube every 6 hours  insulin lispro (ADMELOG) corrective regimen sliding scale   SubCutaneous every 6 hours  levothyroxine Injectable 56 MICROGram(s) IV Push at bedtime  multivitamin/minerals/iron Oral Solution (CENTRUM) 15 milliLiter(s) Oral daily  polyethylene glycol 3350 17 Gram(s) Oral daily  senna Syrup 10 milliLiter(s) Oral at bedtime    MEDICATIONS  (PRN):  acetaminophen    Suspension .. 650 milliGRAM(s) Oral every 6 hours PRN Temp greater or equal to 38C (100.4F)  hydrALAZINE Injectable 5 milliGRAM(s) IV Push every 4 hours PRN SBP >160      Allergies    penicillin (Hives)  penicillin (Rash)  sulfa drugs (Unknown)  Tetracycline Hydrochloride (Unknown)    Intolerances        VITALS:    Vital Signs Last 24 Hrs  T(C): 37.2 (09 Feb 2022 15:00), Max: 38.6 (09 Feb 2022 13:34)  T(F): 98.9 (09 Feb 2022 15:00), Max: 101.4 (09 Feb 2022 13:34)  HR: 107 (09 Feb 2022 13:34) (99 - 109)  BP: 166/83 (09 Feb 2022 13:34) (138/84 - 166/83)  BP(mean): --  RR: 18 (09 Feb 2022 13:34) (16 - 19)  SpO2: 96% (09 Feb 2022 13:34) (94% - 98%)    LABS:                          9.2    9.06  )-----------( 319      ( 09 Feb 2022 08:52 )             29.9       02-09    135  |  101  |  26<H>  ----------------------------<  159<H>  3.7   |  23  |  0.50    Ca    8.2<L>      09 Feb 2022 15:56  Phos  2.8     02-08  Mg     2.2     02-08        CAPILLARY BLOOD GLUCOSE      POCT Blood Glucose.: 133 mg/dL (09 Feb 2022 17:13)  POCT Blood Glucose.: 129 mg/dL (09 Feb 2022 12:12)  POCT Blood Glucose.: 132 mg/dL (09 Feb 2022 06:04)  POCT Blood Glucose.: 142 mg/dL (08 Feb 2022 23:54)          LOWER EXTREMITY PHYSICAL EXAM:    Vasular: DP 2/4, PT 0/4 B/L, CFT <3 seconds B/L, Temperature gradient WNL, B/L.   Neuro: unable to assess.  Musculoskeletal/Ortho: hammertoes 2-5 bilat   Skin: toenails thickened elongated dystrophic with subungual debris x10

## 2022-02-09 NOTE — CONSULT NOTE ADULT - ASSESSMENT
Interventional Radiology    Evaluate for Procedure: PEG tube    HPI: 71y Female with Hx AFib (Apixaban, held), CVA with left sided residual weakness who presents from nursing home with AMS, fever. NGT in place, failed S&S. IR consulted for PEG tube placement.    Allergies: penicillin (Hives)  penicillin (Rash)  sulfa drugs (Unknown)  Tetracycline Hydrochloride (Unknown)    Medications (Abx/Cardiac/Anticoagulation/Blood Products)    diltiazem    Tablet: 60 milliGRAM(s) Enteral Tube (02-09 @ 12:14)  furosemide   Injectable: 40 milliGRAM(s) IV Push (02-08 @ 15:18)    Data:    T(C): 38.6  HR: 107  BP: 166/83  RR: 18  SpO2: 96%    -WBC 9.06 / HgB 9.2 / Hct 29.9 / Plt 319  -Na 135 / Cl 101 / BUN 26 / Glucose 159  -K 3.7 / CO2 23 / Cr 0.50  -ALT -- / Alk Phos -- / T.Bili --  -INR 1.33 / PTT 37.5      Radiology:   CT 2/3    Assessment/Plan:   71y Female with Hx AFib (Apixaban, held), CVA with left sided residual weakness who presents from nursing home with AMS, fever. NGT in place, failed S&S. IR consulted for PEG tube placement.    -- IR will plan to perform PEG tube placement on 2/10/22, please administer water soluble contrast at 10PM tonight  -- NPO at midnight on 2/9  -- hold a.m. anticoagulation on 2/10  -- Please draw AM labs at 4AM (CBC/INR/BMP) 2/10  -- please place IR procedure request order under Dr. Ramírez Interventional Radiology    Evaluate for Procedure: PEG tube    HPI: 71y Female with Hx AFib (Apixaban, held), CVA with left sided residual weakness who presents from nursing home with AMS, fever. NGT in place, failed S&S. IR consulted for PEG tube placement.    Allergies: penicillin (Hives)  penicillin (Rash)  sulfa drugs (Unknown)  Tetracycline Hydrochloride (Unknown)    Medications (Abx/Cardiac/Anticoagulation/Blood Products)    diltiazem    Tablet: 60 milliGRAM(s) Enteral Tube (02-09 @ 12:14)  furosemide   Injectable: 40 milliGRAM(s) IV Push (02-08 @ 15:18)    Data:    T(C): 38.6  HR: 107  BP: 166/83  RR: 18  SpO2: 96%    -WBC 9.06 / HgB 9.2 / Hct 29.9 / Plt 319  -Na 135 / Cl 101 / BUN 26 / Glucose 159  -K 3.7 / CO2 23 / Cr 0.50  -ALT -- / Alk Phos -- / T.Bili --  -INR 1.33 / PTT 37.5      Radiology:   CT 2/3    Assessment/Plan:   71y Female with Hx AFib (Apixaban, held), CVA with left sided residual weakness who presents from nursing home with AMS, fever. NGT in place, failed S&S. IR consulted for G tube placement.    -- IR will plan to perform G tube placement on 2/10/22, please administer water soluble contrast at 10PM tonight  -- NPO at midnight on 2/9  -- hold a.m. anticoagulation on 2/10  -- Please draw AM labs at 4AM (CBC/INR/BMP) 2/10.  -- please place IR procedure request order under Dr. Ramírez.

## 2022-02-09 NOTE — CONSULT NOTE ADULT - ASSESSMENT
Patient seen and evaluated   - toenails debrided x10 using sterile nippers  - all offending ingrowing nail boarders excised   - recommend z flow boots at all times while in bed  - follow up as outpatient for routine care  - thank you for the consult

## 2022-02-09 NOTE — PROGRESS NOTE ADULT - PROBLEM SELECTOR PLAN 2
AMS 2/2 Metabolic encephalopathy  - baseline AOx2, currently appears to be at baseline  Hx CVA with residual left sided weak/flaccid  1/24 VEEG Negative likely-metabolic encephalopathy    1/21 CTH without acute changes, re demonstration of lacuna infarcts   s/p LP, glucose 86, protein 48- negative. all abx and anti-viral dcd  mental status waxes and wanes  appreciate neuro re-eval  following some commands such as opening mouth, but still with increased secretions  failed repeat speech and swallow eval  c/w NGT, change to KO tube for feeds   Palliative consult for GOC->remains full code  if no further improvement in mental status, will need to consider PEG placement. Reached out to Linus Morrison and left message again with callback information. Also discussed with , no other number for the HCP. If I do not hear back from her, will reach out to Jerrod Munoz [phone] who is the alternate. AMS 2/2 Metabolic encephalopathy  - baseline AOx2, currently appears to be at baseline  Hx CVA with residual left sided weak/flaccid  1/24 VEEG Negative likely-metabolic encephalopathy    1/21 CTH without acute changes, re demonstration of lacuna infarcts   s/p LP, glucose 86, protein 48- negative. all abx and anti-viral dcd  mental status waxes and wanes  appreciate neuro re-eval  following some commands such as opening mouth, but still with increased secretions  failed repeat speech and swallow eval  c/w NGT, change to KO tube for feeds   Palliative consult for GOC->remains full code  if no further improvement in mental status, will need to consider PEG placement. Reached out to Linus Morrison and left message again with callback information. Also discussed with , no other number for the HCP. Called again today, but unable to leave VM as inbox was full.  Called Mr. Munoz and discussed plan of care, along with clinical course. Discussed options for nutrition including PEG, as NGT would be temporary. Also discussed palliative options. He would like to proceed with PEG placement as pt would have wanted "everything done to live a long life". He said he would talk to Linus as well. Will place IR consult

## 2022-02-09 NOTE — PROGRESS NOTE ADULT - SUBJECTIVE AND OBJECTIVE BOX
Patient is a 71y old  Female who presents with a chief complaint of AMS (08 Feb 2022 15:51)    SUBJECTIVE / OVERNIGHT EVENTS: no acute events overnight    MEDICATIONS  (STANDING):  atorvastatin 80 milliGRAM(s) Oral at bedtime  chlorhexidine 4% Liquid 1 Application(s) Topical <User Schedule>  diltiazem    Tablet 60 milliGRAM(s) Enteral Tube every 6 hours  insulin lispro (ADMELOG) corrective regimen sliding scale   SubCutaneous every 6 hours  levothyroxine Injectable 56 MICROGram(s) IV Push at bedtime  multivitamin/minerals/iron Oral Solution (CENTRUM) 15 milliLiter(s) Oral daily  polyethylene glycol 3350 17 Gram(s) Oral daily  senna Syrup 10 milliLiter(s) Oral at bedtime    MEDICATIONS  (PRN):  acetaminophen    Suspension .. 650 milliGRAM(s) Oral every 6 hours PRN Temp greater or equal to 38C (100.4F)  hydrALAZINE Injectable 5 milliGRAM(s) IV Push every 4 hours PRN SBP >160      Vital Signs Last 24 Hrs  T(C): 38.6 (09 Feb 2022 13:34), Max: 38.6 (09 Feb 2022 13:34)  T(F): 101.4 (09 Feb 2022 13:34), Max: 101.4 (09 Feb 2022 13:34)  HR: 107 (09 Feb 2022 13:34) (99 - 109)  BP: 166/83 (09 Feb 2022 13:34) (138/84 - 166/83)  BP(mean): --  RR: 18 (09 Feb 2022 13:34) (16 - 19)  SpO2: 96% (09 Feb 2022 13:34) (94% - 98%)  CAPILLARY BLOOD GLUCOSE      POCT Blood Glucose.: 129 mg/dL (09 Feb 2022 12:12)  POCT Blood Glucose.: 132 mg/dL (09 Feb 2022 06:04)  POCT Blood Glucose.: 142 mg/dL (08 Feb 2022 23:54)  POCT Blood Glucose.: 144 mg/dL (08 Feb 2022 18:25)    I&O's Summary    08 Feb 2022 07:01  -  09 Feb 2022 07:00  --------------------------------------------------------  IN: 2840 mL / OUT: 3075 mL / NET: -235 mL    09 Feb 2022 07:01  -  09 Feb 2022 14:36  --------------------------------------------------------  IN: 0 mL / OUT: 250 mL / NET: -250 mL      PHYSICAL EXAM:  GENERAL: chronically ill appearing   EYES: conjunctiva and sclera clear  CHEST/LUNG: no wheezing noted   HEART: +S1/S2   ABDOMEN: Soft, Nontender, Nondistended  EXTREMITIES:  +anasarca   PSYCH: Alert and awake but non verbal     LABS:                        9.2    9.06  )-----------( 319      ( 09 Feb 2022 08:52 )             29.9     02-08    141  |  106  |  21  ----------------------------<  130<H>  4.5   |  24  |  0.46<L>    Ca    7.9<L>      08 Feb 2022 08:23  Phos  2.8     02-08  Mg     2.2     02-08

## 2022-02-09 NOTE — PROGRESS NOTE ADULT - PROBLEM SELECTOR PLAN 1
-acute drop in H&H  -CT showing moderate RP bleed  -lovenox d/porfirio  -transfused 2 units PRBCs, hgb stable in 7s-8s  -no intervention planned by IR  -monitor closely, if pt decompensates, MICU consult  -no further transfusion indicated today -acute drop in H&H  -CT showing moderate RP bleed  -lovenox d/porfirio  -transfused 2 units PRBCs, hgb stable in 7s-8s  -no intervention planned by IR  -monitor closely, if pt decompensates, MICU consult  -no further transfusion indicated at this time

## 2022-02-10 LAB
ALBUMIN SERPL ELPH-MCNC: 2 G/DL — LOW (ref 3.3–5)
ALP SERPL-CCNC: 101 U/L — SIGNIFICANT CHANGE UP (ref 40–120)
ALT FLD-CCNC: 14 U/L — SIGNIFICANT CHANGE UP (ref 10–45)
ANION GAP SERPL CALC-SCNC: 9 MMOL/L — SIGNIFICANT CHANGE UP (ref 5–17)
APTT BLD: 34.2 SEC — SIGNIFICANT CHANGE UP (ref 27.5–35.5)
AST SERPL-CCNC: 16 U/L — SIGNIFICANT CHANGE UP (ref 10–40)
BILIRUB SERPL-MCNC: 0.8 MG/DL — SIGNIFICANT CHANGE UP (ref 0.2–1.2)
BUN SERPL-MCNC: 25 MG/DL — HIGH (ref 7–23)
CALCIUM SERPL-MCNC: 8.1 MG/DL — LOW (ref 8.4–10.5)
CHLORIDE SERPL-SCNC: 104 MMOL/L — SIGNIFICANT CHANGE UP (ref 96–108)
CO2 SERPL-SCNC: 25 MMOL/L — SIGNIFICANT CHANGE UP (ref 22–31)
CREAT SERPL-MCNC: 0.51 MG/DL — SIGNIFICANT CHANGE UP (ref 0.5–1.3)
GLUCOSE BLDC GLUCOMTR-MCNC: 122 MG/DL — HIGH (ref 70–99)
GLUCOSE BLDC GLUCOMTR-MCNC: 142 MG/DL — HIGH (ref 70–99)
GLUCOSE BLDC GLUCOMTR-MCNC: 92 MG/DL — SIGNIFICANT CHANGE UP (ref 70–99)
GLUCOSE BLDC GLUCOMTR-MCNC: 95 MG/DL — SIGNIFICANT CHANGE UP (ref 70–99)
GLUCOSE BLDC GLUCOMTR-MCNC: 98 MG/DL — SIGNIFICANT CHANGE UP (ref 70–99)
GLUCOSE SERPL-MCNC: 88 MG/DL — SIGNIFICANT CHANGE UP (ref 70–99)
HCT VFR BLD CALC: 26.9 % — LOW (ref 34.5–45)
HGB BLD-MCNC: 8.5 G/DL — LOW (ref 11.5–15.5)
INR BLD: 1.21 RATIO — HIGH (ref 0.88–1.16)
MCHC RBC-ENTMCNC: 28.2 PG — SIGNIFICANT CHANGE UP (ref 27–34)
MCHC RBC-ENTMCNC: 31.6 GM/DL — LOW (ref 32–36)
MCV RBC AUTO: 89.4 FL — SIGNIFICANT CHANGE UP (ref 80–100)
NRBC # BLD: 0 /100 WBCS — SIGNIFICANT CHANGE UP (ref 0–0)
PLATELET # BLD AUTO: 332 K/UL — SIGNIFICANT CHANGE UP (ref 150–400)
POTASSIUM SERPL-MCNC: 4.3 MMOL/L — SIGNIFICANT CHANGE UP (ref 3.5–5.3)
POTASSIUM SERPL-SCNC: 4.3 MMOL/L — SIGNIFICANT CHANGE UP (ref 3.5–5.3)
PROT SERPL-MCNC: 5.5 G/DL — LOW (ref 6–8.3)
PROTHROM AB SERPL-ACNC: 14.4 SEC — HIGH (ref 10.6–13.6)
RBC # BLD: 3.01 M/UL — LOW (ref 3.8–5.2)
RBC # FLD: 16.1 % — HIGH (ref 10.3–14.5)
SODIUM SERPL-SCNC: 138 MMOL/L — SIGNIFICANT CHANGE UP (ref 135–145)
WBC # BLD: 7.2 K/UL — SIGNIFICANT CHANGE UP (ref 3.8–10.5)
WBC # FLD AUTO: 7.2 K/UL — SIGNIFICANT CHANGE UP (ref 3.8–10.5)

## 2022-02-10 PROCEDURE — 99233 SBSQ HOSP IP/OBS HIGH 50: CPT

## 2022-02-10 RX ORDER — CEFEPIME 1 G/1
INJECTION, POWDER, FOR SOLUTION INTRAMUSCULAR; INTRAVENOUS
Refills: 0 | Status: DISCONTINUED | OUTPATIENT
Start: 2022-02-10 | End: 2022-02-11

## 2022-02-10 RX ORDER — CEFEPIME 1 G/1
1000 INJECTION, POWDER, FOR SOLUTION INTRAMUSCULAR; INTRAVENOUS EVERY 12 HOURS
Refills: 0 | Status: DISCONTINUED | OUTPATIENT
Start: 2022-02-11 | End: 2022-02-11

## 2022-02-10 RX ORDER — VANCOMYCIN HCL 1 G
1000 VIAL (EA) INTRAVENOUS EVERY 12 HOURS
Refills: 0 | Status: DISCONTINUED | OUTPATIENT
Start: 2022-02-10 | End: 2022-02-11

## 2022-02-10 RX ORDER — CEFEPIME 1 G/1
1000 INJECTION, POWDER, FOR SOLUTION INTRAMUSCULAR; INTRAVENOUS ONCE
Refills: 0 | Status: COMPLETED | OUTPATIENT
Start: 2022-02-10 | End: 2022-02-10

## 2022-02-10 RX ADMIN — Medication 0: at 00:14

## 2022-02-10 RX ADMIN — Medication 56 MICROGRAM(S): at 22:00

## 2022-02-10 RX ADMIN — Medication 60 MILLIGRAM(S): at 12:27

## 2022-02-10 RX ADMIN — ATORVASTATIN CALCIUM 80 MILLIGRAM(S): 80 TABLET, FILM COATED ORAL at 22:56

## 2022-02-10 RX ADMIN — Medication 0: at 05:04

## 2022-02-10 RX ADMIN — Medication 60 MILLIGRAM(S): at 00:14

## 2022-02-10 RX ADMIN — Medication 60 MILLIGRAM(S): at 05:04

## 2022-02-10 RX ADMIN — Medication 60 MILLIGRAM(S): at 17:10

## 2022-02-10 RX ADMIN — Medication 250 MILLIGRAM(S): at 18:45

## 2022-02-10 RX ADMIN — CHLORHEXIDINE GLUCONATE 1 APPLICATION(S): 213 SOLUTION TOPICAL at 10:40

## 2022-02-10 RX ADMIN — CEFEPIME 100 MILLIGRAM(S): 1 INJECTION, POWDER, FOR SOLUTION INTRAMUSCULAR; INTRAVENOUS at 17:33

## 2022-02-10 RX ADMIN — SENNA PLUS 10 MILLILITER(S): 8.6 TABLET ORAL at 22:00

## 2022-02-10 NOTE — CHART NOTE - NSCHARTNOTEFT_GEN_A_CORE
Notified by KANU AGUILAR for fever of 100.1    Patient has been spiking fevers episodically. Last fever was yesterday and patient was pancultured. Culture results pending.    Vital Signs Last 24 Hrs  T(C): 37.8 (10 Feb 2022 14:20), Max: 37.8 (10 Feb 2022 14:20)  T(F): 100.1 (10 Feb 2022 14:20), Max: 100.1 (10 Feb 2022 14:20)  HR: 104 (10 Feb 2022 12:40) (90 - 104)  BP: 142/84 (10 Feb 2022 12:40) (126/80 - 155/93)  BP(mean): --  RR: 19 (10 Feb 2022 12:40) (18 - 19)  SpO2: 95% (10 Feb 2022 12:40) (93% - 96%)  PHYSICAL EXAM:     General: NAD, non-toxic appearance  Neuro: NC, AT, PERRLA, no focal deficits  CV: S1 S2 RRR  Resp: B/L Lungs coarse lung sounds  Abd: soft, NT, ND, + BS X4 quadrants  Ext: no edema, +PP b/l LE, warm to touch                           8.5    7.20  )-----------( 332      ( 10 Feb 2022 07:11 )             26.9     02-10    138  |  104  |  25<H>  ----------------------------<  88  4.3   |  25  |  0.51    Ca    8.1<L>      10 Feb 2022 07:12    TPro  5.5<L>  /  Alb  2.0<L>  /  TBili  0.8  /  DBili  x   /  AST  16  /  ALT  14  /  AlkPhos  101  02-10    .CSF CSF  01-24-22   No growth  --    polymorphonuclear leukocytes seen  No organisms seen  by cytocentrifuge      Catheterized Catheterized  01-21-22   No growth  --  --      .Sputum Sputum  01-21-22   Moderate Methicillin Resistant Staphylococcus aureus  Normal Respiratory Chitra present  --  Methicillin resistant Staphylococcus aureus      Clean Catch Clean Catch (Midstream)  01-21-22   No growth  --  --      .Blood Blood-Peripheral  01-21-22   No Growth Final  --  --      .Blood Blood-Peripheral  01-21-22   No Growth Final  --  --          Assessment & Plan   HPI:     71 yr old female with PMHx, Afib on apixaban, HFrEF (50% 8/21/21),CAD s/p stents, NSTEMI (2020), arthritis, wheelchair bound, hypothyroidism, s/p left ureteral stent removal (8/2021) with residual non obstructive Rt renal calculi, E.faecalis UTI (8/2021) CVA with left sided residual weakness (8/2021), sacral decubitus, pressure heel ulcers s/p COVID-19 (2020), baseline pt is AOx2 assist with ADL.           Pt acutely presenting with fever of     - Stat Cefepime and Vancomycin empirically per Dr Martinez  - CT chest/abd/pelvis  per Dr Martinez  - Continue antipyretic   - Cooling measures  - F/U Blood cx x2 and urine cx  - Repeat BCX in am  - Will continue to monitor  F/U with primary team in am    D/w Dr Martinez

## 2022-02-10 NOTE — PROGRESS NOTE ADULT - PROBLEM SELECTOR PLAN 1
AMS 2/2 Metabolic encephalopathy  - baseline AOx2, currently appears to be at baseline  Hx CVA with residual left sided weak/flaccid  1/24 VEEG Negative likely-metabolic encephalopathy    1/21 CTH without acute changes, re demonstration of lacuna infarcts   s/p LP, glucose 86, protein 48- negative. all abx and anti-viral dcd  mental status waxes and wanes  appreciate neuro re-eval  following some commands such as opening mouth, but still with increased secretions  failed repeat speech and swallow eval  c/w NGT, change to KO tube for feeds   Palliative consult for GOC->remains full code  if no further improvement in mental status, will need to consider PEG placement. Reached out to Linus Morrison and left message again with callback information. Also discussed with , no other number for the HCP. Called again today, but unable to leave VM as inbox was full.  Called Mr. Munoz on 2/9 and discussed plan of care, along with clinical course. Discussed options for nutrition including PEG, as NGT would be temporary. Also discussed palliative options. He would like to proceed with PEG placement as pt would have wanted "everything done to live a long life". He said he would talk to Linus as well.   IR to take patient for PEG placement today. Mr. Munoz updated. F/U with IR after PEG is placed, and can use for tube feeds and medications when cleared by IR.  Febrile to 101 yesterday, BC and UC sent, f/u results.   No fever today  If patient becomes febrile again, would start vanc and cefepime empirically

## 2022-02-10 NOTE — PROGRESS NOTE ADULT - SUBJECTIVE AND OBJECTIVE BOX
Patient is a 71y old  Female who presents with a chief complaint of AMS (2022 16:57)    SUBJECTIVE / OVERNIGHT EVENTS: patient was febrile to 101 yesterday, pan cultured.     MEDICATIONS  (STANDING):  atorvastatin 80 milliGRAM(s) Oral at bedtime  chlorhexidine 4% Liquid 1 Application(s) Topical <User Schedule>  diltiazem    Tablet 60 milliGRAM(s) Enteral Tube every 6 hours  insulin lispro (ADMELOG) corrective regimen sliding scale   SubCutaneous every 6 hours  levothyroxine Injectable 56 MICROGram(s) IV Push at bedtime  multivitamin/minerals/iron Oral Solution (CENTRUM) 15 milliLiter(s) Oral daily  polyethylene glycol 3350 17 Gram(s) Oral daily  senna Syrup 10 milliLiter(s) Oral at bedtime    MEDICATIONS  (PRN):  acetaminophen    Suspension .. 650 milliGRAM(s) Oral every 6 hours PRN Temp greater or equal to 38C (100.4F)  hydrALAZINE Injectable 5 milliGRAM(s) IV Push every 4 hours PRN SBP >160      Vital Signs Last 24 Hrs  T(C): 37.8 (10 Feb 2022 14:20), Max: 37.8 (10 Feb 2022 14:20)  T(F): 100.1 (10 Feb 2022 14:20), Max: 100.1 (10 Feb 2022 14:20)  HR: 104 (10 Feb 2022 12:40) (90 - 104)  BP: 142/84 (10 Feb 2022 12:40) (126/80 - 155/93)  BP(mean): --  RR: 19 (10 Feb 2022 12:40) (18 - 19)  SpO2: 95% (10 Feb 2022 12:40) (93% - 96%)  CAPILLARY BLOOD GLUCOSE      POCT Blood Glucose.: 92 mg/dL (10 Feb 2022 12:26)  POCT Blood Glucose.: 122 mg/dL (10 Feb 2022 04:55)  POCT Blood Glucose.: 142 mg/dL (2022 23:59)  POCT Blood Glucose.: 133 mg/dL (2022 17:13)    I&O's Summary    2022 07:01  -  10 Feb 2022 07:00  --------------------------------------------------------  IN: 1660 mL / OUT: 1050 mL / NET: 610 mL    10 Feb 2022 07:01  -  10 Feb 2022 14:31  --------------------------------------------------------  IN: 40 mL / OUT: 400 mL / NET: -360 mL      PHYSICAL EXAM:  GENERAL: NAD  EYES: conjunctiva and sclera clear  CHEST/LUNG: Clear to auscultation bilaterally; No wheeze  HEART: +S1/S2   ABDOMEN: Soft, Nontender, Nondistended  EXTREMITIES: No LE edema, DTI present on the heels   PSYCH: Alert and awake, but not answering questions       LABS:                        8.5    7.20  )-----------( 332      ( 10 Feb 2022 07:11 )             26.9     02-10    138  |  104  |  25<H>  ----------------------------<  88  4.3   |  25  |  0.51    Ca    8.1<L>      10 Feb 2022 07:12    TPro  5.5<L>  /  Alb  2.0<L>  /  TBili  0.8  /  DBili  x   /  AST  16  /  ALT  14  /  AlkPhos  101  02-10    PT/INR - ( 10 Feb 2022 10:59 )   PT: 14.4 sec;   INR: 1.21 ratio         PTT - ( 10 Feb 2022 10:59 )  PTT:34.2 sec      Urinalysis Basic - ( 2022 19:23 )    Color: Yellow / Appearance: Slightly Turbid / S.026 / pH: x  Gluc: x / Ketone: Negative  / Bili: Negative / Urobili: Negative   Blood: x / Protein: 30 mg/dL / Nitrite: Negative   Leuk Esterase: Large / RBC: 5 /hpf /  /HPF   Sq Epi: x / Non Sq Epi: 2 /hpf / Bacteria: Negative

## 2022-02-10 NOTE — PROGRESS NOTE ADULT - PROBLEM SELECTOR PLAN 2
-acute drop in H&H  -CT showing moderate RP bleed  -lovenox d/porfirio  -transfused 2 units PRBCs, hgb stable in 7s-8s  -no intervention planned by IR  -monitor closely, if pt decompensates, MICU consult  -no further transfusion indicated at this time

## 2022-02-10 NOTE — CHART NOTE - NSCHARTNOTEFT_GEN_A_CORE
Pt scheduled for G tube placement. Noted to have fevers. Per Discussion with primary team [place] and Dr. Martinez, Defer Gtube placement now. Please call us when cleared for procedure.

## 2022-02-11 DIAGNOSIS — R50.9 FEVER, UNSPECIFIED: ICD-10-CM

## 2022-02-11 LAB
ANION GAP SERPL CALC-SCNC: 9 MMOL/L — SIGNIFICANT CHANGE UP (ref 5–17)
BASOPHILS # BLD AUTO: 0.03 K/UL — SIGNIFICANT CHANGE UP (ref 0–0.2)
BASOPHILS NFR BLD AUTO: 0.5 % — SIGNIFICANT CHANGE UP (ref 0–2)
BUN SERPL-MCNC: 19 MG/DL — SIGNIFICANT CHANGE UP (ref 7–23)
CALCIUM SERPL-MCNC: 8.1 MG/DL — LOW (ref 8.4–10.5)
CHLORIDE SERPL-SCNC: 101 MMOL/L — SIGNIFICANT CHANGE UP (ref 96–108)
CO2 SERPL-SCNC: 26 MMOL/L — SIGNIFICANT CHANGE UP (ref 22–31)
CREAT SERPL-MCNC: 0.47 MG/DL — LOW (ref 0.5–1.3)
CULTURE RESULTS: SIGNIFICANT CHANGE UP
EOSINOPHIL # BLD AUTO: 0.32 K/UL — SIGNIFICANT CHANGE UP (ref 0–0.5)
EOSINOPHIL NFR BLD AUTO: 5.7 % — SIGNIFICANT CHANGE UP (ref 0–6)
GLUCOSE BLDC GLUCOMTR-MCNC: 117 MG/DL — HIGH (ref 70–99)
GLUCOSE BLDC GLUCOMTR-MCNC: 129 MG/DL — HIGH (ref 70–99)
GLUCOSE BLDC GLUCOMTR-MCNC: 130 MG/DL — HIGH (ref 70–99)
GLUCOSE SERPL-MCNC: 128 MG/DL — HIGH (ref 70–99)
HCT VFR BLD CALC: 25.1 % — LOW (ref 34.5–45)
HCT VFR BLD CALC: 27.6 % — LOW (ref 34.5–45)
HGB BLD-MCNC: 7.9 G/DL — LOW (ref 11.5–15.5)
HGB BLD-MCNC: 8.6 G/DL — LOW (ref 11.5–15.5)
IMM GRANULOCYTES NFR BLD AUTO: 0.5 % — SIGNIFICANT CHANGE UP (ref 0–1.5)
LYMPHOCYTES # BLD AUTO: 0.89 K/UL — LOW (ref 1–3.3)
LYMPHOCYTES # BLD AUTO: 15.8 % — SIGNIFICANT CHANGE UP (ref 13–44)
MCHC RBC-ENTMCNC: 27.9 PG — SIGNIFICANT CHANGE UP (ref 27–34)
MCHC RBC-ENTMCNC: 28.5 PG — SIGNIFICANT CHANGE UP (ref 27–34)
MCHC RBC-ENTMCNC: 31.2 GM/DL — LOW (ref 32–36)
MCHC RBC-ENTMCNC: 31.5 GM/DL — LOW (ref 32–36)
MCV RBC AUTO: 89.6 FL — SIGNIFICANT CHANGE UP (ref 80–100)
MCV RBC AUTO: 90.6 FL — SIGNIFICANT CHANGE UP (ref 80–100)
MONOCYTES # BLD AUTO: 0.46 K/UL — SIGNIFICANT CHANGE UP (ref 0–0.9)
MONOCYTES NFR BLD AUTO: 8.1 % — SIGNIFICANT CHANGE UP (ref 2–14)
NEUTROPHILS # BLD AUTO: 3.92 K/UL — SIGNIFICANT CHANGE UP (ref 1.8–7.4)
NEUTROPHILS NFR BLD AUTO: 69.4 % — SIGNIFICANT CHANGE UP (ref 43–77)
NRBC # BLD: 0 /100 WBCS — SIGNIFICANT CHANGE UP (ref 0–0)
NRBC # BLD: 0 /100 WBCS — SIGNIFICANT CHANGE UP (ref 0–0)
PLATELET # BLD AUTO: 277 K/UL — SIGNIFICANT CHANGE UP (ref 150–400)
PLATELET # BLD AUTO: 286 K/UL — SIGNIFICANT CHANGE UP (ref 150–400)
POTASSIUM SERPL-MCNC: 3.2 MMOL/L — LOW (ref 3.5–5.3)
POTASSIUM SERPL-SCNC: 3.2 MMOL/L — LOW (ref 3.5–5.3)
RBC # BLD: 2.77 M/UL — LOW (ref 3.8–5.2)
RBC # BLD: 3.08 M/UL — LOW (ref 3.8–5.2)
RBC # FLD: 16 % — HIGH (ref 10.3–14.5)
RBC # FLD: 16.1 % — HIGH (ref 10.3–14.5)
SARS-COV-2 RNA SPEC QL NAA+PROBE: SIGNIFICANT CHANGE UP
SODIUM SERPL-SCNC: 136 MMOL/L — SIGNIFICANT CHANGE UP (ref 135–145)
SPECIMEN SOURCE: SIGNIFICANT CHANGE UP
WBC # BLD: 5.65 K/UL — SIGNIFICANT CHANGE UP (ref 3.8–10.5)
WBC # BLD: 5.89 K/UL — SIGNIFICANT CHANGE UP (ref 3.8–10.5)
WBC # FLD AUTO: 5.65 K/UL — SIGNIFICANT CHANGE UP (ref 3.8–10.5)
WBC # FLD AUTO: 5.89 K/UL — SIGNIFICANT CHANGE UP (ref 3.8–10.5)

## 2022-02-11 PROCEDURE — 74177 CT ABD & PELVIS W/CONTRAST: CPT | Mod: 26

## 2022-02-11 PROCEDURE — 71260 CT THORAX DX C+: CPT | Mod: 26

## 2022-02-11 PROCEDURE — 99223 1ST HOSP IP/OBS HIGH 75: CPT

## 2022-02-11 PROCEDURE — 99233 SBSQ HOSP IP/OBS HIGH 50: CPT

## 2022-02-11 RX ORDER — POTASSIUM CHLORIDE 20 MEQ
40 PACKET (EA) ORAL ONCE
Refills: 0 | Status: COMPLETED | OUTPATIENT
Start: 2022-02-11 | End: 2022-02-11

## 2022-02-11 RX ORDER — MEROPENEM 1 G/30ML
1000 INJECTION INTRAVENOUS EVERY 8 HOURS
Refills: 0 | Status: DISCONTINUED | OUTPATIENT
Start: 2022-02-11 | End: 2022-02-17

## 2022-02-11 RX ADMIN — Medication 60 MILLIGRAM(S): at 05:51

## 2022-02-11 RX ADMIN — SENNA PLUS 10 MILLILITER(S): 8.6 TABLET ORAL at 21:59

## 2022-02-11 RX ADMIN — Medication 15 MILLILITER(S): at 12:25

## 2022-02-11 RX ADMIN — Medication 56 MICROGRAM(S): at 21:58

## 2022-02-11 RX ADMIN — Medication 60 MILLIGRAM(S): at 12:25

## 2022-02-11 RX ADMIN — ATORVASTATIN CALCIUM 80 MILLIGRAM(S): 80 TABLET, FILM COATED ORAL at 21:59

## 2022-02-11 RX ADMIN — MEROPENEM 100 MILLIGRAM(S): 1 INJECTION INTRAVENOUS at 21:58

## 2022-02-11 RX ADMIN — Medication 40 MILLIEQUIVALENT(S): at 14:50

## 2022-02-11 RX ADMIN — CHLORHEXIDINE GLUCONATE 1 APPLICATION(S): 213 SOLUTION TOPICAL at 12:25

## 2022-02-11 RX ADMIN — Medication 60 MILLIGRAM(S): at 18:26

## 2022-02-11 RX ADMIN — CEFEPIME 100 MILLIGRAM(S): 1 INJECTION, POWDER, FOR SOLUTION INTRAMUSCULAR; INTRAVENOUS at 05:51

## 2022-02-11 RX ADMIN — Medication 250 MILLIGRAM(S): at 05:51

## 2022-02-11 RX ADMIN — Medication 60 MILLIGRAM(S): at 00:30

## 2022-02-11 NOTE — PROGRESS NOTE ADULT - PROBLEM SELECTOR PLAN 3
-acute drop in H&H  -CT showing moderate RP bleed  -lovenox d/porfirio  -transfused 2 units PRBCs, hgb stable in 7s-8s  -no intervention planned by IR  -monitor closely, if pt decompensates, MICU consult  -no further transfusion indicated at this time -acute drop in H&H  -CT from last week showing moderate RP bleed  -lovenox d/porfirio  -transfused 2 units PRBCs, hgb stable in 7s-8s  -no intervention planned by IR  -monitor closely, if pt decompensates, MICU consult  -Hb 7.9, f/u CT results to assess interval change in hematoma  -Recheck CBC in the evening, if downtrending, transfuse 1 unit PRBCs

## 2022-02-11 NOTE — PROGRESS NOTE ADULT - PROBLEM SELECTOR PLAN 2
AMS 2/2 Metabolic encephalopathy  - baseline AOx2, currently appears to be at baseline  Hx CVA with residual left sided weak/flaccid  1/24 VEEG Negative likely-metabolic encephalopathy    1/21 CTH without acute changes, re demonstration of lacuna infarcts   s/p LP, glucose 86, protein 48- negative. all abx and anti-viral dcd  mental status waxes and wanes  appreciate neuro re-eval  following some commands such as opening mouth, but still with increased secretions  failed repeat speech and swallow eval  c/w NGT, change to KO tube for feeds   Palliative consult for GOC->remains full code  if no further improvement in mental status, will need to consider PEG placement. Reached out to Linus Morrison and left message again with callback information. Also discussed with , no other number for the HCP. Called again today, but unable to leave VM as inbox was full.  Called Mr. Munoz on 2/9 and discussed plan of care, along with clinical course. Discussed options for nutrition including PEG, as NGT would be temporary. Also discussed palliative options. He would like to proceed with PEG placement as pt would have wanted "everything done to live a long life". He said he would talk to Linus as well.  Was planned for PEG placement on 2/10, but on hold due to fever  Resume tube feeds via NGT, can replace IR order for PEG tube once fever is resolved

## 2022-02-11 NOTE — PROGRESS NOTE ADULT - SUBJECTIVE AND OBJECTIVE BOX
Patient is a 71y old  Female who presents with a chief complaint of AMS (2022 10:20)    SUBJECTIVE / OVERNIGHT EVENTS: febrile yesterday so PEG placed on hold     MEDICATIONS  (STANDING):  atorvastatin 80 milliGRAM(s) Oral at bedtime  cefepime   IVPB 1000 milliGRAM(s) IV Intermittent every 12 hours  cefepime   IVPB      chlorhexidine 4% Liquid 1 Application(s) Topical <User Schedule>  diltiazem    Tablet 60 milliGRAM(s) Enteral Tube every 6 hours  insulin lispro (ADMELOG) corrective regimen sliding scale   SubCutaneous every 6 hours  levothyroxine Injectable 56 MICROGram(s) IV Push at bedtime  multivitamin/minerals/iron Oral Solution (CENTRUM) 15 milliLiter(s) Oral daily  polyethylene glycol 3350 17 Gram(s) Oral daily  senna Syrup 10 milliLiter(s) Oral at bedtime  vancomycin  IVPB 1000 milliGRAM(s) IV Intermittent every 12 hours    MEDICATIONS  (PRN):  acetaminophen    Suspension .. 650 milliGRAM(s) Oral every 6 hours PRN Temp greater or equal to 38C (100.4F)  hydrALAZINE Injectable 5 milliGRAM(s) IV Push every 4 hours PRN SBP >160      Vital Signs Last 24 Hrs  T(C): 37.1 (2022 10:37), Max: 37.8 (10 Feb 2022 14:20)  T(F): 98.7 (2022 10:37), Max: 100.1 (10 Feb 2022 14:20)  HR: 90 (2022 10:37) (88 - 100)  BP: 148/83 (2022 10:37) (113/87 - 148/83)  BP(mean): --  RR: 17 (2022 10:37) (17 - 18)  SpO2: 95% (2022 10:37) (95% - 97%)  CAPILLARY BLOOD GLUCOSE      POCT Blood Glucose.: 129 mg/dL (2022 12:13)  POCT Blood Glucose.: 117 mg/dL (2022 05:45)  POCT Blood Glucose.: 98 mg/dL (10 Feb 2022 23:53)  POCT Blood Glucose.: 95 mg/dL (10 Feb 2022 16:59)    I&O's Summary    10 Feb 2022 07:01  -  2022 07:00  --------------------------------------------------------  IN: 340 mL / OUT: 1200 mL / NET: -860 mL    2022 07:01  -  2022 13:50  --------------------------------------------------------  IN: 0 mL / OUT: 200 mL / NET: -200 mL      PHYSICAL EXAM:  GENERAL: NAD  EYES: conjunctiva and sclera clear  CHEST/LUNG: no wheezing noted   HEART: +S1/S2   ABDOMEN: Soft, Nontender, Nondistended  EXTREMITIES: +diffuse peripheral edema   PSYCH: Alert and awake, opens eyes to name     LABS:                        7.9    5.65  )-----------( 277      ( 2022 11:27 )             25.1     02-11    136  |  101  |  19  ----------------------------<  128<H>  3.2<L>   |  26  |  0.47<L>    Ca    8.1<L>      2022 11:27    TPro  5.5<L>  /  Alb  2.0<L>  /  TBili  0.8  /  DBili  x   /  AST  16  /  ALT  14  /  AlkPhos  101  02-10    PT/INR - ( 10 Feb 2022 10:59 )   PT: 14.4 sec;   INR: 1.21 ratio         PTT - ( 10 Feb 2022 10:59 )  PTT:34.2 sec      Urinalysis Basic - ( 2022 19:23 )    Color: Yellow / Appearance: Slightly Turbid / S.026 / pH: x  Gluc: x / Ketone: Negative  / Bili: Negative / Urobili: Negative   Blood: x / Protein: 30 mg/dL / Nitrite: Negative   Leuk Esterase: Large / RBC: 5 /hpf /  /HPF   Sq Epi: x / Non Sq Epi: 2 /hpf / Bacteria: Negative      Consultant(s) Notes Reviewed:  ID

## 2022-02-11 NOTE — PROGRESS NOTE ADULT - PROBLEM SELECTOR PLAN 1
Multiple fevers   BC with NGTD, UC in lab   CT chest A/P ordered, performed, f/u final read  appreciate ID recs  from asp PNA vs hematoma   had started empiric vanc and cefepime, will change to meropenem for better anaerobic coverage

## 2022-02-11 NOTE — CONSULT NOTE ADULT - SUBJECTIVE AND OBJECTIVE BOX
Patient is a 71y old  Female who presents with a chief complaint of AMS (10 Feb 2022 14:31)    HPI:     71 yr old female with PMHx, Afib on apixaban, HFrEF (50% 8/21/21),CAD s/p stents, NSTEMI (2020), arthritis, wheelchair bound, hypothyroidism, s/p left ureteral stent removal (8/2021) with residual non obstructive Rt renal calculi, E.faecalis UTI (8/2021) CVA with left sided residual weakness (8/2021), sacral decubitus, pressure heel ulcers s/p COVID-19 (2020), baseline pt is AOx2 assist with ADL.     Pt presented to [facility] from [facility] when found to have AMS, fever with Tmax of 104. Pt in E.D. found to be obtunded, mottled, hypotensive SBP 80's refractory to 2.5 liters IVF requiring norepi gtt, fever with Temp of 104.5. In addition found to have hypernatremia with Na+ of 161, ANIKA with sCr 4.74 (baseline 0.77-1.22), AGMA of 25, with contraction alkalosis with serum HCO3 of 23, vph 7.50, vPCO2 of 35, lactate of 3.2, procalcitonin of 0.82, hsTrop of 239, Hgb of 14.9 (baseline from 8/2021 of 11). Pt with 9 liters total body water deficit.       Pt received CT C/A/P/H (1/21/22) re demonstration of  Non obstructing right renal calculus, significant stool burden distending the rectum and sigmoid colon with mild perirectal fat stranding suggestive of fecal impaction and possible stercoral proctitis, No acute transcortical infarct, intracranial hemorrhage, however did show Chronic lacunar infarcts with chronic small vessel disease. Started on cefepime, vanco, tylenol.      Consult called and pt admitted MICU for AMS secondary to metabolic encephalopathy, septic shock of unknown origin, possible aspiration pneum   (21 Jan 2022 13:03)      PAST MEDICAL & SURGICAL HISTORY:  HTN (hypertension)    Obesity  BMI 29.3    Hypothyroid  on synthroid    Type 2 diabetes mellitus without complication  diet-controlled    Carpal tunnel syndrome of right wrist    Lymphedema    2019 novel coronavirus disease (COVID-19)  As per patient, diagnosed with Covid 1 year ago when residing in a nursing home and was managed in nursing home; Pt denies any hospitalizations for covid or intubations.    Sepsis  Urosepsis due to septic stone 8/2020    S/P carpal tunnel release    S/P cystoscopy  Pt is a poor historian; As per charts, patient had a cysto with stent placement 8/2020        Social history:    FAMILY HISTORY:  Family history of lung cancer    Family history of myocardial infarction (Sibling)      REVIEW OF SYSTEMS  General:	Denies any malaise fatigue or chills. Fevers absent    Skin:No rash  	  Ophthalmologic:Denies any visual complaints,discharge redness or photophobia   .         Allergic/Immunologic:	No hives or rash   Allergies    penicillin (Hives)  penicillin (Rash)  sulfa drugs (Unknown)  Tetracycline Hydrochloride (Unknown)    Intolerances        Antimicrobials:    cefepime   IVPB 1000 milliGRAM(s) IV Intermittent every 12 hours  cefepime   IVPB      vancomycin  IVPB 1000 milliGRAM(s) IV Intermittent every 12 hours        Vital Signs Last 24 Hrs  T(C): 36.9 (11 Feb 2022 00:25), Max: 37.8 (10 Feb 2022 14:20)  T(F): 98.4 (11 Feb 2022 00:25), Max: 100.1 (10 Feb 2022 14:20)  HR: 100 (11 Feb 2022 00:25) (88 - 104)  BP: 138/67 (11 Feb 2022 05:18) (113/87 - 143/80)  BP(mean): --  RR: 18 (11 Feb 2022 00:25) (18 - 19)  SpO2: 96% (11 Feb 2022 00:25) (95% - 97%)              No cachexia     Eyes:PERRL EOMI.NO discharge or conjunctival injection    ENMT:No sinus tenderness.No thrush.No pharyngeal exudate or erythema.Fair dental hygiene    Neck:Supple,No LN,no JVD      Respiratory:Good air entry bilaterally,CTA    Cardiovascular:S1 S2 wnl, No murmurs,rub or gallops    Gastrointestinal:Soft BS(+) no tenderness no masses ,No rebound or guarding    Genitourinary:No CVA tendereness     Rectal:    Extremities:No cyanosis,clubbing or edema.                                          8.5    7.20  )-----------( 332      ( 10 Feb 2022 07:11 )             26.9         02-10    138  |  104  |  25<H>  ----------------------------<  88  4.3   |  25  |  0.51    Ca    8.1<L>      10 Feb 2022 07:12    TPro  5.5<L>  /  Alb  2.0<L>  /  TBili  0.8  /  DBili  x   /  AST  16  /  ALT  14  /  AlkPhos  101  02-10      RECENT CULTURES:  02-09 @ 18:54  .Blood Blood-Peripheral  --  --  --    No growth to date.  --      MICROBIOLOGY:  Culture Results:   No growth to date. (02-09 @ 18:54)  Culture Results:   No growth to date. (02-09 @ 18:54)          Radiology:      Assessment:        Recommendations and Plan:    Pager [phone]  After 5 pm/weekends or if no response :[phone]

## 2022-02-11 NOTE — CONSULT NOTE ADULT - ASSESSMENT
71 year old who is bed bound, nursing home resident, right stone with hydro, admitted with fever, renal failure, sepsis and treated with cefepime and stabilizing. On 2/10 placed on cefepime  continues to have intermittent fever  Has multiple sources of infection:    Large rectus hematoma that can cause fever with or without secondary infection    Aspiration possible left sided pna  decubitus ulcer.  UTI.  I suspect it is the hematoma; but imp[ossible to know    would change to meropenem for better anaerobic coverage.

## 2022-02-12 LAB
GLUCOSE BLDC GLUCOMTR-MCNC: 127 MG/DL — HIGH (ref 70–99)
GLUCOSE BLDC GLUCOMTR-MCNC: 128 MG/DL — HIGH (ref 70–99)
GLUCOSE BLDC GLUCOMTR-MCNC: 147 MG/DL — HIGH (ref 70–99)
GLUCOSE BLDC GLUCOMTR-MCNC: 154 MG/DL — HIGH (ref 70–99)
GLUCOSE BLDC GLUCOMTR-MCNC: 155 MG/DL — HIGH (ref 70–99)
GLUCOSE BLDC GLUCOMTR-MCNC: 172 MG/DL — HIGH (ref 70–99)
GLUCOSE BLDC GLUCOMTR-MCNC: 174 MG/DL — HIGH (ref 70–99)
GLUCOSE BLDC GLUCOMTR-MCNC: 45 MG/DL — CRITICAL LOW (ref 70–99)
HCT VFR BLD CALC: 28.6 % — LOW (ref 34.5–45)
HGB BLD-MCNC: 8.8 G/DL — LOW (ref 11.5–15.5)
MCHC RBC-ENTMCNC: 28 PG — SIGNIFICANT CHANGE UP (ref 27–34)
MCHC RBC-ENTMCNC: 30.8 GM/DL — LOW (ref 32–36)
MCV RBC AUTO: 91.1 FL — SIGNIFICANT CHANGE UP (ref 80–100)
NRBC # BLD: 0 /100 WBCS — SIGNIFICANT CHANGE UP (ref 0–0)
PLATELET # BLD AUTO: 292 K/UL — SIGNIFICANT CHANGE UP (ref 150–400)
RBC # BLD: 3.14 M/UL — LOW (ref 3.8–5.2)
RBC # FLD: 16.2 % — HIGH (ref 10.3–14.5)
WBC # BLD: 6.25 K/UL — SIGNIFICANT CHANGE UP (ref 3.8–10.5)
WBC # FLD AUTO: 6.25 K/UL — SIGNIFICANT CHANGE UP (ref 3.8–10.5)

## 2022-02-12 PROCEDURE — 99233 SBSQ HOSP IP/OBS HIGH 50: CPT

## 2022-02-12 RX ADMIN — Medication 56 MICROGRAM(S): at 21:21

## 2022-02-12 RX ADMIN — Medication 1: at 12:43

## 2022-02-12 RX ADMIN — CHLORHEXIDINE GLUCONATE 1 APPLICATION(S): 213 SOLUTION TOPICAL at 08:20

## 2022-02-12 RX ADMIN — Medication 60 MILLIGRAM(S): at 00:29

## 2022-02-12 RX ADMIN — Medication 60 MILLIGRAM(S): at 11:03

## 2022-02-12 RX ADMIN — Medication 60 MILLIGRAM(S): at 17:25

## 2022-02-12 RX ADMIN — ATORVASTATIN CALCIUM 80 MILLIGRAM(S): 80 TABLET, FILM COATED ORAL at 21:21

## 2022-02-12 RX ADMIN — MEROPENEM 100 MILLIGRAM(S): 1 INJECTION INTRAVENOUS at 21:21

## 2022-02-12 RX ADMIN — MEROPENEM 100 MILLIGRAM(S): 1 INJECTION INTRAVENOUS at 06:23

## 2022-02-12 RX ADMIN — POLYETHYLENE GLYCOL 3350 17 GRAM(S): 17 POWDER, FOR SOLUTION ORAL at 11:03

## 2022-02-12 RX ADMIN — Medication 1: at 18:01

## 2022-02-12 RX ADMIN — MEROPENEM 100 MILLIGRAM(S): 1 INJECTION INTRAVENOUS at 13:18

## 2022-02-12 RX ADMIN — Medication 15 MILLILITER(S): at 11:03

## 2022-02-12 RX ADMIN — Medication 60 MILLIGRAM(S): at 06:27

## 2022-02-12 RX ADMIN — SENNA PLUS 10 MILLILITER(S): 8.6 TABLET ORAL at 21:22

## 2022-02-12 NOTE — PROGRESS NOTE ADULT - PROBLEM SELECTOR PLAN 2
AMS 2/2 Metabolic encephalopathy  - baseline AOx2, currently appears to be at baseline  Hx CVA with residual left sided weak/flaccid  1/24 VEEG Negative likely-metabolic encephalopathy    1/21 CTH without acute changes, re demonstration of lacuna infarcts   s/p LP, glucose 86, protein 48- negative. all abx and anti-viral dcd  mental status waxes and wanes  appreciate neuro re-eval  following some commands such as opening mouth, but still with increased secretions  failed repeat speech and swallow eval  c/w NGT, change to KO tube for feeds   Palliative consult for GOC->remains full code  if no further improvement in mental status, will need to consider PEG placement. Reached out to Linus Morrison and left message again with callback information. Also discussed with , no other number for the HCP. Called again today, but unable to leave VM as inbox was full.  Called Mr. Munoz on 2/9 and discussed plan of care, along with clinical course. Discussed options for nutrition including PEG, as NGT would be temporary. Also discussed palliative options. He would like to proceed with PEG placement as pt would have wanted "everything done to live a long life". He said he would talk to Linus as well.  Was planned for PEG placement on 2/10, but on hold due to fever, if afeb tomorrow, reorder PEG procedure  Resume tube feeds via NGT, can replace IR order for PEG tube once fever is resolved

## 2022-02-12 NOTE — PROGRESS NOTE ADULT - SUBJECTIVE AND OBJECTIVE BOX
[facility] Division of Hospital Medicine  Nacho Manzano  Pager (SEBASTIEN, 8A-5P): [phone]  Other Times:  [phone]    CC: 72 y/o F with ams    SUBJECTIVE / OVERNIGHT EVENTS:  no acute events overnight  Afeb  Does not participate in ROS    ADDITIONAL REVIEW OF SYSTEMS:    MEDICATIONS  (STANDING):  atorvastatin 80 milliGRAM(s) Oral at bedtime  chlorhexidine 4% Liquid 1 Application(s) Topical <User Schedule>  diltiazem    Tablet 60 milliGRAM(s) Enteral Tube every 6 hours  insulin lispro (ADMELOG) corrective regimen sliding scale   SubCutaneous every 6 hours  levothyroxine Injectable 56 MICROGram(s) IV Push at bedtime  meropenem  IVPB 1000 milliGRAM(s) IV Intermittent every 8 hours  multivitamin/minerals/iron Oral Solution (CENTRUM) 15 milliLiter(s) Oral daily  polyethylene glycol 3350 17 Gram(s) Oral daily  senna Syrup 10 milliLiter(s) Oral at bedtime    MEDICATIONS  (PRN):  acetaminophen    Suspension .. 650 milliGRAM(s) Oral every 6 hours PRN Temp greater or equal to 38C (100.4F)  hydrALAZINE Injectable 5 milliGRAM(s) IV Push every 4 hours PRN SBP >160      I&O's Summary    11 Feb 2022 07:01  -  12 Feb 2022 07:00  --------------------------------------------------------  IN: 1120 mL / OUT: 570 mL / NET: 550 mL    12 Feb 2022 07:01  -  12 Feb 2022 12:45  --------------------------------------------------------  IN: 0 mL / OUT: 300 mL / NET: -300 mL        PHYSICAL EXAM:  Vital Signs Last 24 Hrs  T(C): 37.8 (12 Feb 2022 10:47), Max: 37.8 (12 Feb 2022 10:47)  T(F): 100.1 (12 Feb 2022 10:47), Max: 100.1 (12 Feb 2022 10:47)  HR: 93 (12 Feb 2022 10:47) (93 - 102)  BP: 160/90 (12 Feb 2022 10:47) (146/74 - 161/89)  BP(mean): --  RR: 18 (12 Feb 2022 10:47) (17 - 18)  SpO2: 96% (12 Feb 2022 10:47) (94% - 98%)    CONSTITUTIONAL: NAD, NGT in place  EYES: PERRLA; conjunctiva and sclera clear  ENMT: Moist oral mucosa, no pharyngeal injection or exudates  NECK: Supple, no palpable masses  RESPIRATORY: Normal respiratory effort; lungs are clear to auscultation bilaterally  CARDIOVASCULAR: Regular rate and rhythm, normal S1 and S2,  + murmur  ABDOMEN: Nontender to palpation, normoactive bowel sounds, no rebound/guarding; No hepatosplenomegaly  MUSCULOSKELETAL:   no clubbing or cyanosis of digits; no joint swelling or tenderness to palpation  PSYCH: awakes to voice  NEUROLOGY: CN 2-12 are intact and symmetric; no gross sensory deficits   SKIN: No rashes; no palpable lesions    LABS:                        8.6    5.89  )-----------( 286      ( 11 Feb 2022 17:05 )             27.6     02-11    136  |  101  |  19  ----------------------------<  128<H>  3.2<L>   |  26  |  0.47<L>    Ca    8.1<L>      11 Feb 2022 11:27                Culture - Urine (collected 09 Feb 2022 22:53)  Source: Catheterized Catheterized  Final Report (11 Feb 2022 14:14):    >=3 organisms. Probable collection contamination.    Culture - Blood (collected 09 Feb 2022 18:54)  Source: .Blood Blood-Peripheral  Preliminary Report (10 Feb 2022 19:01):    No growth to date.    Culture - Blood (collected 09 Feb 2022 18:54)  Source: .Blood Blood-Peripheral  Preliminary Report (10 Feb 2022 19:01):    No growth to date.       [facility] Division of Hospital Medicine  Nacho Manzano  Pager (SEBASTIEN, 8A-5P): [phone]  Other Times:  [phone]    CC: 70 y/o F with ams    SUBJECTIVE / OVERNIGHT EVENTS:  no acute events overnight  Afeb  Does not participate in ROS    ADDITIONAL REVIEW OF SYSTEMS:    MEDICATIONS  (STANDING):  atorvastatin 80 milliGRAM(s) Oral at bedtime  chlorhexidine 4% Liquid 1 Application(s) Topical <User Schedule>  diltiazem    Tablet 60 milliGRAM(s) Enteral Tube every 6 hours  insulin lispro (ADMELOG) corrective regimen sliding scale   SubCutaneous every 6 hours  levothyroxine Injectable 56 MICROGram(s) IV Push at bedtime  meropenem  IVPB 1000 milliGRAM(s) IV Intermittent every 8 hours  multivitamin/minerals/iron Oral Solution (CENTRUM) 15 milliLiter(s) Oral daily  polyethylene glycol 3350 17 Gram(s) Oral daily  senna Syrup 10 milliLiter(s) Oral at bedtime    MEDICATIONS  (PRN):  acetaminophen    Suspension .. 650 milliGRAM(s) Oral every 6 hours PRN Temp greater or equal to 38C (100.4F)  hydrALAZINE Injectable 5 milliGRAM(s) IV Push every 4 hours PRN SBP >160      I&O's Summary    11 Feb 2022 07:01  -  12 Feb 2022 07:00  --------------------------------------------------------  IN: 1120 mL / OUT: 570 mL / NET: 550 mL    12 Feb 2022 07:01  -  12 Feb 2022 12:45  --------------------------------------------------------  IN: 0 mL / OUT: 300 mL / NET: -300 mL        PHYSICAL EXAM:  Vital Signs Last 24 Hrs  T(C): 37.8 (12 Feb 2022 10:47), Max: 37.8 (12 Feb 2022 10:47)  T(F): 100.1 (12 Feb 2022 10:47), Max: 100.1 (12 Feb 2022 10:47)  HR: 93 (12 Feb 2022 10:47) (93 - 102)  BP: 160/90 (12 Feb 2022 10:47) (146/74 - 161/89)  BP(mean): --  RR: 18 (12 Feb 2022 10:47) (17 - 18)  SpO2: 96% (12 Feb 2022 10:47) (94% - 98%)    CONSTITUTIONAL: NAD, NGT in place  EYES: PERRLA; conjunctiva and sclera clear  ENMT: Moist oral mucosa, no pharyngeal injection or exudates  NECK: Supple, no palpable masses  RESPIRATORY: Normal respiratory effort; lungs are clear to auscultation bilaterally  CARDIOVASCULAR: Regular rate and rhythm, normal S1 and S2,  + murmur, + anasarca  ABDOMEN: Nontender to palpation, normoactive bowel sounds, no rebound/guarding; No hepatosplenomegaly  MUSCULOSKELETAL:   no clubbing or cyanosis of digits; no joint swelling or tenderness to palpation  PSYCH: awakes to voice  NEUROLOGY: CN 2-12 are intact and symmetric; no gross sensory deficits   SKIN: No rashes; no palpable lesions    LABS:                        8.6    5.89  )-----------( 286      ( 11 Feb 2022 17:05 )             27.6     02-11    136  |  101  |  19  ----------------------------<  128<H>  3.2<L>   |  26  |  0.47<L>    Ca    8.1<L>      11 Feb 2022 11:27                Culture - Urine (collected 09 Feb 2022 22:53)  Source: Catheterized Catheterized  Final Report (11 Feb 2022 14:14):    >=3 organisms. Probable collection contamination.    Culture - Blood (collected 09 Feb 2022 18:54)  Source: .Blood Blood-Peripheral  Preliminary Report (10 Feb 2022 19:01):    No growth to date.    Culture - Blood (collected 09 Feb 2022 18:54)  Source: .Blood Blood-Peripheral  Preliminary Report (10 Feb 2022 19:01):    No growth to date.

## 2022-02-12 NOTE — PROGRESS NOTE ADULT - PROBLEM SELECTOR PLAN 1
Multiple fevers, currently afeb  BC with NGTD, UC in lab, likely contaminant  CT chest A/P with improvement in hematoma  appreciate ID recs  from asp PNA vs hematoma   had started empiric vanc and cefepime, on meropenem for better anerobic coverage  ID following appreciate recs

## 2022-02-12 NOTE — PROVIDER CONTACT NOTE (OTHER) - ACTION/TREATMENT ORDERED:
Ventura AGUILAR made aware, give insulin as ordered, repeat finger stick in 1 hr, continue to monitor Ventura AGUILAR made aware, give insulin as ordered, repeat finger stick in 1 hr, recheck was (154), will continue to monitor

## 2022-02-12 NOTE — PROGRESS NOTE ADULT - PROBLEM SELECTOR PLAN 3
-acute drop in H&H  -CT from last week showing moderate RP bleed  -lovenox d/porfirio  -transfused 2 units PRBCs, hgb stable in 7s-8s  -no intervention planned by IR  -monitor closely, if pt decompensates, MICU consult  -repeat CT with improvement in hematoma  -trend hgb

## 2022-02-13 LAB
ANION GAP SERPL CALC-SCNC: 9 MMOL/L — SIGNIFICANT CHANGE UP (ref 5–17)
BUN SERPL-MCNC: 19 MG/DL — SIGNIFICANT CHANGE UP (ref 7–23)
CALCIUM SERPL-MCNC: 8.3 MG/DL — LOW (ref 8.4–10.5)
CHLORIDE SERPL-SCNC: 102 MMOL/L — SIGNIFICANT CHANGE UP (ref 96–108)
CO2 SERPL-SCNC: 25 MMOL/L — SIGNIFICANT CHANGE UP (ref 22–31)
CREAT SERPL-MCNC: 0.48 MG/DL — LOW (ref 0.5–1.3)
GLUCOSE BLDC GLUCOMTR-MCNC: 110 MG/DL — HIGH (ref 70–99)
GLUCOSE BLDC GLUCOMTR-MCNC: 129 MG/DL — HIGH (ref 70–99)
GLUCOSE BLDC GLUCOMTR-MCNC: 135 MG/DL — HIGH (ref 70–99)
GLUCOSE SERPL-MCNC: 138 MG/DL — HIGH (ref 70–99)
HCT VFR BLD CALC: 27.5 % — LOW (ref 34.5–45)
HCT VFR BLD CALC: 28.9 % — LOW (ref 34.5–45)
HGB BLD-MCNC: 8.4 G/DL — LOW (ref 11.5–15.5)
HGB BLD-MCNC: 9.1 G/DL — LOW (ref 11.5–15.5)
MCHC RBC-ENTMCNC: 27.6 PG — SIGNIFICANT CHANGE UP (ref 27–34)
MCHC RBC-ENTMCNC: 28.3 PG — SIGNIFICANT CHANGE UP (ref 27–34)
MCHC RBC-ENTMCNC: 30.5 GM/DL — LOW (ref 32–36)
MCHC RBC-ENTMCNC: 31.5 GM/DL — LOW (ref 32–36)
MCV RBC AUTO: 90 FL — SIGNIFICANT CHANGE UP (ref 80–100)
MCV RBC AUTO: 90.5 FL — SIGNIFICANT CHANGE UP (ref 80–100)
NRBC # BLD: 0 /100 WBCS — SIGNIFICANT CHANGE UP (ref 0–0)
NRBC # BLD: 0 /100 WBCS — SIGNIFICANT CHANGE UP (ref 0–0)
PLATELET # BLD AUTO: 273 K/UL — SIGNIFICANT CHANGE UP (ref 150–400)
PLATELET # BLD AUTO: 282 K/UL — SIGNIFICANT CHANGE UP (ref 150–400)
POTASSIUM SERPL-MCNC: 4.1 MMOL/L — SIGNIFICANT CHANGE UP (ref 3.5–5.3)
POTASSIUM SERPL-SCNC: 4.1 MMOL/L — SIGNIFICANT CHANGE UP (ref 3.5–5.3)
RBC # BLD: 3.04 M/UL — LOW (ref 3.8–5.2)
RBC # BLD: 3.21 M/UL — LOW (ref 3.8–5.2)
RBC # FLD: 16 % — HIGH (ref 10.3–14.5)
RBC # FLD: 16.3 % — HIGH (ref 10.3–14.5)
SODIUM SERPL-SCNC: 136 MMOL/L — SIGNIFICANT CHANGE UP (ref 135–145)
WBC # BLD: 5.22 K/UL — SIGNIFICANT CHANGE UP (ref 3.8–10.5)
WBC # BLD: 5.58 K/UL — SIGNIFICANT CHANGE UP (ref 3.8–10.5)
WBC # FLD AUTO: 5.22 K/UL — SIGNIFICANT CHANGE UP (ref 3.8–10.5)
WBC # FLD AUTO: 5.58 K/UL — SIGNIFICANT CHANGE UP (ref 3.8–10.5)

## 2022-02-13 PROCEDURE — 99233 SBSQ HOSP IP/OBS HIGH 50: CPT

## 2022-02-13 PROCEDURE — 99232 SBSQ HOSP IP/OBS MODERATE 35: CPT

## 2022-02-13 RX ADMIN — Medication 60 MILLIGRAM(S): at 06:48

## 2022-02-13 RX ADMIN — MEROPENEM 100 MILLIGRAM(S): 1 INJECTION INTRAVENOUS at 13:04

## 2022-02-13 RX ADMIN — POLYETHYLENE GLYCOL 3350 17 GRAM(S): 17 POWDER, FOR SOLUTION ORAL at 11:45

## 2022-02-13 RX ADMIN — Medication 60 MILLIGRAM(S): at 11:46

## 2022-02-13 RX ADMIN — Medication 60 MILLIGRAM(S): at 01:01

## 2022-02-13 RX ADMIN — CHLORHEXIDINE GLUCONATE 1 APPLICATION(S): 213 SOLUTION TOPICAL at 08:06

## 2022-02-13 RX ADMIN — Medication 15 MILLILITER(S): at 11:45

## 2022-02-13 RX ADMIN — Medication 56 MICROGRAM(S): at 21:51

## 2022-02-13 RX ADMIN — Medication 60 MILLIGRAM(S): at 17:03

## 2022-02-13 RX ADMIN — MEROPENEM 100 MILLIGRAM(S): 1 INJECTION INTRAVENOUS at 21:50

## 2022-02-13 RX ADMIN — SENNA PLUS 10 MILLILITER(S): 8.6 TABLET ORAL at 21:51

## 2022-02-13 RX ADMIN — MEROPENEM 100 MILLIGRAM(S): 1 INJECTION INTRAVENOUS at 06:48

## 2022-02-13 RX ADMIN — ATORVASTATIN CALCIUM 80 MILLIGRAM(S): 80 TABLET, FILM COATED ORAL at 21:54

## 2022-02-13 NOTE — PROGRESS NOTE ADULT - PROBLEM SELECTOR PLAN 2
AMS 2/2 Metabolic encephalopathy  - currently AO*0, awakens to voice  Hx CVA with residual left sided weak/flaccid  1/24 VEEG Negative likely-metabolic encephalopathy    1/21 CTH without acute changes, re demonstration of lacuna infarcts   s/p LP, glucose 86, protein 48- negative. all abx and anti-viral dcd  mental status waxes and wanes  appreciate neuro re-eval  following some commands such as opening mouth, but still with increased secretions  failed repeat speech and swallow eval  c/w NGT, change to KO tube for feeds   Palliative consult for GOC->remains full code  if no further improvement in mental status, will need to consider PEG placement. Reached out to Linus Morrison and left message again with callback information. Also discussed with , no other number for the HCP. Called again today, but unable to leave VM as inbox was full.  Called Mr. Munoz on 2/9 and discussed plan of care, along with clinical course. Discussed options for nutrition including PEG, as NGT would be temporary. Also discussed palliative options. He would like to proceed with PEG placement as pt would have wanted "everything done to live a long life". He said he would talk to Linus as well.  Was planned for PEG placement on 2/10, but on hold due to fever->plan for PEG tube as afeb

## 2022-02-13 NOTE — CONSULT NOTE ADULT - PROVIDER SPECIALTY LIST ADULT
MICU
Neurology
Intervent Radiology
Vascular Surgery
Infectious Disease
Intervent Radiology
Palliative Care
Wound Care
Intervent Radiology
Podiatry
Palliative Care

## 2022-02-13 NOTE — PROGRESS NOTE ADULT - SUBJECTIVE AND OBJECTIVE BOX
[facility] Division of Hospital Medicine  Nacho Manzano  Pager (NENO-F, 8A-5P): [phone]  Other Times:  [phone]    CC: 72 y/o F with AMS    SUBJECTIVE / OVERNIGHT EVENTS:  ONEYDA rosales  Unable to participate in ROS    ADDITIONAL REVIEW OF SYSTEMS:    MEDICATIONS  (STANDING):  atorvastatin 80 milliGRAM(s) Oral at bedtime  chlorhexidine 4% Liquid 1 Application(s) Topical <User Schedule>  diltiazem    Tablet 60 milliGRAM(s) Enteral Tube every 6 hours  insulin lispro (ADMELOG) corrective regimen sliding scale   SubCutaneous every 6 hours  levothyroxine Injectable 56 MICROGram(s) IV Push at bedtime  meropenem  IVPB 1000 milliGRAM(s) IV Intermittent every 8 hours  multivitamin/minerals/iron Oral Solution (CENTRUM) 15 milliLiter(s) Oral daily  polyethylene glycol 3350 17 Gram(s) Oral daily  senna Syrup 10 milliLiter(s) Oral at bedtime    MEDICATIONS  (PRN):  acetaminophen    Suspension .. 650 milliGRAM(s) Oral every 6 hours PRN Temp greater or equal to 38C (100.4F)  hydrALAZINE Injectable 5 milliGRAM(s) IV Push every 4 hours PRN SBP >160      I&O's Summary    12 Feb 2022 07:01  -  13 Feb 2022 07:00  --------------------------------------------------------  IN: 720 mL / OUT: 1300 mL / NET: -580 mL    13 Feb 2022 07:01  -  13 Feb 2022 14:01  --------------------------------------------------------  IN: 0 mL / OUT: 250 mL / NET: -250 mL        PHYSICAL EXAM:  Vital Signs Last 24 Hrs  T(C): 37.7 (13 Feb 2022 11:00), Max: 37.7 (13 Feb 2022 11:00)  T(F): 99.9 (13 Feb 2022 11:00), Max: 99.9 (13 Feb 2022 11:00)  HR: 101 (13 Feb 2022 11:00) (88 - 101)  BP: 160/84 (13 Feb 2022 11:00) (147/90 - 165/93)  BP(mean): --  RR: 16 (13 Feb 2022 11:00) (16 - 16)  SpO2: 95% (13 Feb 2022 11:00) (93% - 96%)    CONSTITUTIONAL: NAD, NGT in place  EYES: PERRLA; conjunctiva and sclera clear  ENMT: Moist oral mucosa, no pharyngeal injection or exudates  NECK: Supple, no palpable masses  RESPIRATORY: Normal respiratory effort; lungs are clear to auscultation bilaterally  CARDIOVASCULAR: Regular rate and rhythm, normal S1 and S2,  + murmur, + anasarca  ABDOMEN: Nontender to palpation, normoactive bowel sounds, no rebound/guarding; No hepatosplenomegaly  MUSCULOSKELETAL:   no clubbing or cyanosis of digits; no joint swelling or tenderness to palpation  PSYCH: awakes to voice  NEUROLOGY: CN 2-12 are intact  SKIN: No rashes; no palpable lesions  LABS:                        8.4    5.22  )-----------( 282      ( 13 Feb 2022 06:50 )             27.5     02-13    136  |  102  |  19  ----------------------------<  138<H>  4.1   |  25  |  0.48<L>    Ca    8.3<L>      13 Feb 2022 06:50                Culture - Blood (collected 11 Feb 2022 17:24)  Source: .Blood Blood-Peripheral  Preliminary Report (12 Feb 2022 18:01):    No growth to date.        RADIOLOGY & ADDITIONAL TESTS:  Results Reviewed:   Imaging Personally Reviewed:  Electrocardiogram Personally Reviewed:    COORDINATION OF CARE:  Care Discussed with Consultants/Other Providers [Y/N]:  Prior or Outpatient Records Reviewed [Y/N]:

## 2022-02-13 NOTE — PROGRESS NOTE ADULT - SUBJECTIVE AND OBJECTIVE BOX
infectious diseases progress note:    Patient is a 71y old  Female who presents with a chief complaint of AMS (12 Feb 2022 12:45)        Sepsis             Allergies    penicillin (Hives)  penicillin (Rash)  sulfa drugs (Unknown)  Tetracycline Hydrochloride (Unknown)    Intolerances        ANTIBIOTICS/RELEVANT:  antimicrobials  meropenem  IVPB 1000 milliGRAM(s) IV Intermittent every 8 hours    immunologic:    OTHER:  acetaminophen    Suspension .. 650 milliGRAM(s) Oral every 6 hours PRN  atorvastatin 80 milliGRAM(s) Oral at bedtime  chlorhexidine 4% Liquid 1 Application(s) Topical <User Schedule>  diltiazem    Tablet 60 milliGRAM(s) Enteral Tube every 6 hours  hydrALAZINE Injectable 5 milliGRAM(s) IV Push every 4 hours PRN  insulin lispro (ADMELOG) corrective regimen sliding scale   SubCutaneous every 6 hours  levothyroxine Injectable 56 MICROGram(s) IV Push at bedtime  multivitamin/minerals/iron Oral Solution (CENTRUM) 15 milliLiter(s) Oral daily  polyethylene glycol 3350 17 Gram(s) Oral daily  senna Syrup 10 milliLiter(s) Oral at bedtime      Objective:  Vital Signs Last 24 Hrs  T(C): 37.1 (13 Feb 2022 06:18), Max: 37.8 (12 Feb 2022 10:47)  T(F): 98.7 (13 Feb 2022 06:18), Max: 100.1 (12 Feb 2022 10:47)  HR: 100 (13 Feb 2022 06:18) (88 - 101)  BP: 165/93 (13 Feb 2022 06:18) (147/90 - 165/93)  BP(mean): --  RR: 16 (13 Feb 2022 06:18) (16 - 18)  SpO2: 94% (13 Feb 2022 06:18) (93% - 96%)       Eyes:DARREL, EOMI  Ear/Nose/Throat: no oral lesion, no sinus tenderness on percussion	  Neck:no JVD, no lymphadenopathy, supple  Respiratory: CTA jase  Cardiovascular: S1S2 RRR, no murmurs  Gastrointestinal:soft, (+) BS, no HSM  Extremities:no e/e/c        LABS:                        8.4    5.22  )-----------( 282      ( 13 Feb 2022 06:50 )             27.5     02-13    136  |  102  |  19  ----------------------------<  138<H>  4.1   |  25  |  0.48<L>    Ca    8.3<L>      13 Feb 2022 06:50              MICROBIOLOGY:    RECENT CULTURES:  02-11 @ 17:24 .Blood Blood-Peripheral                No growth to date.    02-09 @ 22:53 Catheterized Catheterized                >=3 organisms. Probable collection contamination.    02-09 @ 18:54 .Blood Blood-Peripheral                No growth to date.          RESPIRATORY CULTURES:              RADIOLOGY & ADDITIONAL STUDIES:        Pager [phone]  After 5 pm/weekends or if no response :[phone]

## 2022-02-13 NOTE — CONSULT NOTE ADULT - CONSULT REQUESTED DATE/TIME
09-Feb-2022 16:58
11-Feb-2022
11-Feb-2022 00:00
13-Feb-2022 15:42
24-Jan-2022
27-Jan-2022 17:55
21-Jan-2022 10:56
09-Feb-2022 16:31
21-Jan-2022 21:27
25-Jan-2022 18:01
04-Feb-2022 12:44

## 2022-02-13 NOTE — PROGRESS NOTE ADULT - ASSESSMENT
71 year old who is bed bound, nursing home resident, right stone with hydro, admitted with fever, renal failure, sepsis and treated with cefepime and stabilizing. On 2/10 placed on cefepime  continues to have intermittent fever  Has multiple sources of infection:    Large rectus hematoma that can cause fever with or without secondary infection    Aspiration possible left sided pna  decubitus ulcer.  UTI.  I suspect it is the hematoma; but imp[ossible to know     TEMPS DOWN  MENTAL STATUS WITHOUT Change  WOULD COMPELTE 7 DAYS OF MEROPENEM AND DC  PLEASE CALL IF FEVER RETURNS .

## 2022-02-13 NOTE — CONSULT NOTE ADULT - CONSULT REASON
AMS
elongated toenails
GOC, COVID, poor mental status, prolonged vent
c/f seizure
fever
sacral/ buttocks pressure injury
Gastrostomy tube placement
L axillary artery PSA s/p a-line
RP hematoma
PEG tube
GOC

## 2022-02-13 NOTE — CONSULT NOTE ADULT - SUBJECTIVE AND OBJECTIVE BOX
Vascular & Interventional Radiology Brief Consult Note    Evaluate for Procedure: Gastrostomy tube placement    HPI: 71F Hx afib on apixaban, cad s/p stents, HFrEF, CVA with left sided residual weakness who presents from nursing home with AMS, fever. Found to be hypernatremic, hypotensive refractory to IVF requiring vasopressor, AMS secondary to metabolic encephalopathy s/p intubation, septic shock 2/2 COVID-19 PNA, MRSA PNA , and r/o meningitis. Pt extubated 1/28, awake, following commands , NGT in place, failed S&S.    Allergies: penicillin (Hives)  penicillin (Rash)  sulfa drugs (Unknown)  Tetracycline Hydrochloride (Unknown)    Medications (Abx/Cardiac/Anticoagulation/Blood Products)    diltiazem    Tablet: 60 milliGRAM(s) Enteral Tube (02-13 @ 11:46)  meropenem  IVPB: 100 mL/Hr IV Intermittent (02-13 @ 13:04)    Data:    T(C): 37.2  HR: 99  BP: 145/88  RR: 18  SpO2: 96%    -WBC 5.22 / HgB 8.4 / Hct 27.5 / Plt 282  -Na 136 / Cl 102 / BUN 19 / Glucose 138  -K 4.1 / CO2 25 / Cr 0.48  -ALT -- / Alk Phos -- / T.Bili --  -INR1.21    Imaging: Reviewed    Plan:  - Can plan for gastrostomy tube placement Tuesday 2/15 provided no contraindications arise.  - Please place order for IR Procedure, approving attending Dr. Ulrich  - NPO past midnight Monday night into Tuesday  - hold therapeutic and prophylactic anticoagulants  - maintain active type and screen x 2  - maintain active covid screening  - Please draw AM labs - CBC/INR/BMP

## 2022-02-14 LAB
BLD GP AB SCN SERPL QL: NEGATIVE — SIGNIFICANT CHANGE UP
CULTURE RESULTS: SIGNIFICANT CHANGE UP
CULTURE RESULTS: SIGNIFICANT CHANGE UP
GLUCOSE BLDC GLUCOMTR-MCNC: 118 MG/DL — HIGH (ref 70–99)
GLUCOSE BLDC GLUCOMTR-MCNC: 121 MG/DL — HIGH (ref 70–99)
HCT VFR BLD CALC: 28.5 % — LOW (ref 34.5–45)
HGB BLD-MCNC: 8.9 G/DL — LOW (ref 11.5–15.5)
MCHC RBC-ENTMCNC: 28.3 PG — SIGNIFICANT CHANGE UP (ref 27–34)
MCHC RBC-ENTMCNC: 31.2 GM/DL — LOW (ref 32–36)
MCV RBC AUTO: 90.5 FL — SIGNIFICANT CHANGE UP (ref 80–100)
NRBC # BLD: 0 /100 WBCS — SIGNIFICANT CHANGE UP (ref 0–0)
PLATELET # BLD AUTO: 217 K/UL — SIGNIFICANT CHANGE UP (ref 150–400)
RBC # BLD: 3.15 M/UL — LOW (ref 3.8–5.2)
RBC # FLD: 16.4 % — HIGH (ref 10.3–14.5)
RH IG SCN BLD-IMP: POSITIVE — SIGNIFICANT CHANGE UP
SARS-COV-2 RNA SPEC QL NAA+PROBE: SIGNIFICANT CHANGE UP
SPECIMEN SOURCE: SIGNIFICANT CHANGE UP
SPECIMEN SOURCE: SIGNIFICANT CHANGE UP
WBC # BLD: 5.78 K/UL — SIGNIFICANT CHANGE UP (ref 3.8–10.5)
WBC # FLD AUTO: 5.78 K/UL — SIGNIFICANT CHANGE UP (ref 3.8–10.5)

## 2022-02-14 PROCEDURE — 99232 SBSQ HOSP IP/OBS MODERATE 35: CPT

## 2022-02-14 PROCEDURE — 93306 TTE W/DOPPLER COMPLETE: CPT | Mod: 26

## 2022-02-14 RX ORDER — IOHEXOL 300 MG/ML
50 INJECTION, SOLUTION INTRAVENOUS ONCE
Refills: 0 | Status: COMPLETED | OUTPATIENT
Start: 2022-02-14 | End: 2022-02-14

## 2022-02-14 RX ADMIN — CHLORHEXIDINE GLUCONATE 1 APPLICATION(S): 213 SOLUTION TOPICAL at 13:45

## 2022-02-14 RX ADMIN — Medication 60 MILLIGRAM(S): at 23:09

## 2022-02-14 RX ADMIN — Medication 56 MICROGRAM(S): at 23:08

## 2022-02-14 RX ADMIN — POLYETHYLENE GLYCOL 3350 17 GRAM(S): 17 POWDER, FOR SOLUTION ORAL at 13:46

## 2022-02-14 RX ADMIN — MEROPENEM 100 MILLIGRAM(S): 1 INJECTION INTRAVENOUS at 13:48

## 2022-02-14 RX ADMIN — ATORVASTATIN CALCIUM 80 MILLIGRAM(S): 80 TABLET, FILM COATED ORAL at 23:09

## 2022-02-14 RX ADMIN — Medication 15 MILLILITER(S): at 13:47

## 2022-02-14 RX ADMIN — MEROPENEM 100 MILLIGRAM(S): 1 INJECTION INTRAVENOUS at 05:32

## 2022-02-14 RX ADMIN — MEROPENEM 100 MILLIGRAM(S): 1 INJECTION INTRAVENOUS at 23:09

## 2022-02-14 RX ADMIN — Medication 60 MILLIGRAM(S): at 00:55

## 2022-02-14 RX ADMIN — Medication 60 MILLIGRAM(S): at 13:46

## 2022-02-14 RX ADMIN — Medication 60 MILLIGRAM(S): at 05:35

## 2022-02-14 RX ADMIN — SENNA PLUS 10 MILLILITER(S): 8.6 TABLET ORAL at 23:00

## 2022-02-14 RX ADMIN — Medication 60 MILLIGRAM(S): at 18:40

## 2022-02-14 NOTE — PROGRESS NOTE ADULT - PROBLEM SELECTOR PLAN 1
Multiple fevers, currently afeb  BC with NGTD, UC in lab, likely contaminant  CT chest A/P with improvement in hematoma  appreciate ID recs  from asp PNA vs hematoma   had started empiric vanc and cefepime, on meropenem for better anerobic coverage  ID following appreciate recs  likely 2/2 to hematoma, planning 7 day meropenem course  c/w meropenem  currently afebrile

## 2022-02-14 NOTE — PROGRESS NOTE ADULT - SUBJECTIVE AND OBJECTIVE BOX
Patient is a 71y old  Female who presents with a chief complaint of AMS (13 Feb 2022 15:42)    SUBJECTIVE / OVERNIGHT EVENTS: no acute events overnight     MEDICATIONS  (STANDING):  atorvastatin 80 milliGRAM(s) Oral at bedtime  chlorhexidine 4% Liquid 1 Application(s) Topical <User Schedule>  diltiazem    Tablet 60 milliGRAM(s) Enteral Tube every 6 hours  insulin lispro (ADMELOG) corrective regimen sliding scale   SubCutaneous every 6 hours  iohexol 300 mG (iodine)/mL Oral Solution 50 milliLiter(s) Oral once  levothyroxine Injectable 56 MICROGram(s) IV Push at bedtime  meropenem  IVPB 1000 milliGRAM(s) IV Intermittent every 8 hours  multivitamin/minerals/iron Oral Solution (CENTRUM) 15 milliLiter(s) Oral daily  polyethylene glycol 3350 17 Gram(s) Oral daily  senna Syrup 10 milliLiter(s) Oral at bedtime    MEDICATIONS  (PRN):  acetaminophen    Suspension .. 650 milliGRAM(s) Oral every 6 hours PRN Temp greater or equal to 38C (100.4F)  hydrALAZINE Injectable 5 milliGRAM(s) IV Push every 4 hours PRN SBP >160      Vital Signs Last 24 Hrs  T(C): 37.1 (14 Feb 2022 10:19), Max: 37.2 (13 Feb 2022 14:55)  T(F): 98.8 (14 Feb 2022 10:19), Max: 98.9 (13 Feb 2022 14:55)  HR: 96 (14 Feb 2022 10:19) (96 - 100)  BP: 160/93 (14 Feb 2022 10:19) (145/88 - 160/93)  BP(mean): --  RR: 18 (14 Feb 2022 10:19) (18 - 19)  SpO2: 96% (14 Feb 2022 10:19) (96% - 99%)  CAPILLARY BLOOD GLUCOSE      POCT Blood Glucose.: 118 mg/dL (14 Feb 2022 12:05)  POCT Blood Glucose.: 121 mg/dL (14 Feb 2022 06:58)  POCT Blood Glucose.: 121 mg/dL (14 Feb 2022 00:38)  POCT Blood Glucose.: 110 mg/dL (13 Feb 2022 18:19)    I&O's Summary    13 Feb 2022 07:01  -  14 Feb 2022 07:00  --------------------------------------------------------  IN: 720 mL / OUT: 500 mL / NET: 220 mL    14 Feb 2022 07:01  -  14 Feb 2022 14:16  --------------------------------------------------------  IN: 0 mL / OUT: 350 mL / NET: -350 mL    PHYSICAL EXAM:  GENERAL: NAD  EYES: conjunctiva and sclera clear  NOSE: NGT in place   CHEST/LUNG: no wheezing noted   HEART: +S1/S2   ABDOMEN: Soft, Nontender, Nondistended  EXTREMITIES: peripheral edema noted   PSYCH: mutters when name is called but otherwise is nonverbal     LABS:                        8.9    5.78  )-----------( 217      ( 14 Feb 2022 07:18 )             28.5     02-13    136  |  102  |  19  ----------------------------<  138<H>  4.1   |  25  |  0.48<L>    Ca    8.3<L>      13 Feb 2022 06:50

## 2022-02-14 NOTE — PROGRESS NOTE ADULT - PROBLEM SELECTOR PLAN 2
AMS 2/2 Metabolic encephalopathy  - currently AO*0, awakens to voice  Hx CVA with residual left sided weak/flaccid  1/24 VEEG Negative likely-metabolic encephalopathy    1/21 CTH without acute changes, re demonstration of lacuna infarcts   s/p LP, glucose 86, protein 48- negative. all abx and anti-viral dcd  mental status waxes and wanes  appreciate neuro re-eval  following some commands such as opening mouth, but still with increased secretions  failed repeat speech and swallow eval  c/w NGT, change to KO tube for feeds   Palliative consult for GOC->remains full code  if no further improvement in mental status, will need to consider PEG placement. Reached out to Linus Morrison and left message again with callback information. Also discussed with , no other number for the HCP. Called again today, but unable to leave VM as inbox was full.  Called Mr. Munoz on 2/9 and discussed plan of care, along with clinical course. Discussed options for nutrition including PEG, as NGT would be temporary. Also discussed palliative options. He would like to proceed with PEG placement as pt would have wanted "everything done to live a long life". He said he would talk to Linus as well.  Was planned for PEG placement on 2/10, but on hold due to fever, now planned for tomorrow  NPO after midnight tonight

## 2022-02-15 LAB
ANION GAP SERPL CALC-SCNC: 10 MMOL/L — SIGNIFICANT CHANGE UP (ref 5–17)
BUN SERPL-MCNC: 19 MG/DL — SIGNIFICANT CHANGE UP (ref 7–23)
CALCIUM SERPL-MCNC: 8.2 MG/DL — LOW (ref 8.4–10.5)
CHLORIDE SERPL-SCNC: 101 MMOL/L — SIGNIFICANT CHANGE UP (ref 96–108)
CO2 SERPL-SCNC: 26 MMOL/L — SIGNIFICANT CHANGE UP (ref 22–31)
CREAT SERPL-MCNC: 0.41 MG/DL — LOW (ref 0.5–1.3)
GLUCOSE BLDC GLUCOMTR-MCNC: 100 MG/DL — HIGH (ref 70–99)
GLUCOSE BLDC GLUCOMTR-MCNC: 108 MG/DL — HIGH (ref 70–99)
GLUCOSE BLDC GLUCOMTR-MCNC: 126 MG/DL — HIGH (ref 70–99)
GLUCOSE BLDC GLUCOMTR-MCNC: 81 MG/DL — SIGNIFICANT CHANGE UP (ref 70–99)
GLUCOSE BLDC GLUCOMTR-MCNC: 91 MG/DL — SIGNIFICANT CHANGE UP (ref 70–99)
GLUCOSE SERPL-MCNC: 84 MG/DL — SIGNIFICANT CHANGE UP (ref 70–99)
HCT VFR BLD CALC: 29.9 % — LOW (ref 34.5–45)
HGB BLD-MCNC: 9.2 G/DL — LOW (ref 11.5–15.5)
INR BLD: 1.32 RATIO — HIGH (ref 0.88–1.16)
MCHC RBC-ENTMCNC: 28.2 PG — SIGNIFICANT CHANGE UP (ref 27–34)
MCHC RBC-ENTMCNC: 30.8 GM/DL — LOW (ref 32–36)
MCV RBC AUTO: 91.7 FL — SIGNIFICANT CHANGE UP (ref 80–100)
NRBC # BLD: 0 /100 WBCS — SIGNIFICANT CHANGE UP (ref 0–0)
PLATELET # BLD AUTO: 307 K/UL — SIGNIFICANT CHANGE UP (ref 150–400)
POTASSIUM SERPL-MCNC: 3.8 MMOL/L — SIGNIFICANT CHANGE UP (ref 3.5–5.3)
POTASSIUM SERPL-SCNC: 3.8 MMOL/L — SIGNIFICANT CHANGE UP (ref 3.5–5.3)
PROTHROM AB SERPL-ACNC: 15.6 SEC — HIGH (ref 10.6–13.6)
RBC # BLD: 3.26 M/UL — LOW (ref 3.8–5.2)
RBC # FLD: 16.3 % — HIGH (ref 10.3–14.5)
SODIUM SERPL-SCNC: 137 MMOL/L — SIGNIFICANT CHANGE UP (ref 135–145)
WBC # BLD: 5.13 K/UL — SIGNIFICANT CHANGE UP (ref 3.8–10.5)
WBC # FLD AUTO: 5.13 K/UL — SIGNIFICANT CHANGE UP (ref 3.8–10.5)

## 2022-02-15 PROCEDURE — 99232 SBSQ HOSP IP/OBS MODERATE 35: CPT

## 2022-02-15 PROCEDURE — 49440 PLACE GASTROSTOMY TUBE PERC: CPT

## 2022-02-15 RX ADMIN — MEROPENEM 100 MILLIGRAM(S): 1 INJECTION INTRAVENOUS at 06:14

## 2022-02-15 RX ADMIN — CHLORHEXIDINE GLUCONATE 1 APPLICATION(S): 213 SOLUTION TOPICAL at 10:26

## 2022-02-15 RX ADMIN — Medication 56 MICROGRAM(S): at 22:09

## 2022-02-15 RX ADMIN — SENNA PLUS 10 MILLILITER(S): 8.6 TABLET ORAL at 22:10

## 2022-02-15 RX ADMIN — Medication 60 MILLIGRAM(S): at 12:47

## 2022-02-15 RX ADMIN — Medication 60 MILLIGRAM(S): at 06:13

## 2022-02-15 RX ADMIN — MEROPENEM 100 MILLIGRAM(S): 1 INJECTION INTRAVENOUS at 22:37

## 2022-02-15 RX ADMIN — Medication 60 MILLIGRAM(S): at 19:10

## 2022-02-15 RX ADMIN — MEROPENEM 100 MILLIGRAM(S): 1 INJECTION INTRAVENOUS at 14:44

## 2022-02-15 RX ADMIN — ATORVASTATIN CALCIUM 80 MILLIGRAM(S): 80 TABLET, FILM COATED ORAL at 22:10

## 2022-02-15 NOTE — PRE-ANESTHESIA EVALUATION ADULT - TEMPERATURE IN CELSIUS (DEGREES C)
Dusty, urgent EGD for peptic ulcer dz likely next week on your Thursday. I had Tierney Bardales put it on but let me know if you're too full that day and I can schedule some other time.   Thanks, javier 36.9

## 2022-02-15 NOTE — PROGRESS NOTE ADULT - PROBLEM SELECTOR PLAN 1
Multiple fevers, currently afeb  BC with NGTD, UC in lab, likely contaminant  CT chest A/P with improvement in hematoma  appreciate ID recs  from asp PNA vs hematoma   had started empiric vanc and cefepime, on meropenem for better anerobic coverage  ID following appreciate recs  likely 2/2 to hematoma, planning 7 day meropenem course  c/w meropenem  remains afebrile

## 2022-02-15 NOTE — PROCEDURE NOTE - PROCEDURE FINDINGS AND DETAILS
Successful Gastrostomy Placement (18 Fr).    Hold Tube feeds for 12 hours. If there is no bleeding at the puncture site or in the gastrostomy tubing, may start tube feeds through the gastrostomy at 12 hours following the procedure. no

## 2022-02-15 NOTE — PROGRESS NOTE ADULT - PROBLEM SELECTOR PLAN 2
AMS 2/2 Metabolic encephalopathy  - currently AO*0, awakens to voice  Hx CVA with residual left sided weak/flaccid  1/24 VEEG Negative likely-metabolic encephalopathy    1/21 CTH without acute changes, re demonstration of lacuna infarcts   s/p LP, glucose 86, protein 48- negative. all abx and anti-viral dcd  mental status waxes and wanes  appreciate neuro re-eval  following some commands such as opening mouth, but still with increased secretions  failed repeat speech and swallow eval  c/w NGT, change to KO tube for feeds   Palliative consult for GOC->remains full code  if no further improvement in mental status, will need to consider PEG placement. Reached out to Linus Morrison and left message again with callback information. Also discussed with , no other number for the HCP. Called again today, but unable to leave VM as inbox was full.  Called Mr. Munoz on 2/9 and discussed plan of care, along with clinical course. Discussed options for nutrition including PEG, as NGT would be temporary. Also discussed palliative options. He would like to proceed with PEG placement as pt would have wanted "everything done to live a long life". He said he would talk to Linus as well.  Was planned for PEG placement on 2/10, but on hold due to fever, now planned for today 2/15  remains NPO for procedure  f/u with IR, scheduled for today

## 2022-02-15 NOTE — PROGRESS NOTE ADULT - SUBJECTIVE AND OBJECTIVE BOX
Patient is a 71y old  Female who presents with a chief complaint of AMS (14 Feb 2022 14:15)    SUBJECTIVE / OVERNIGHT EVENTS: no acute events overnight     MEDICATIONS  (STANDING):  atorvastatin 80 milliGRAM(s) Oral at bedtime  chlorhexidine 4% Liquid 1 Application(s) Topical <User Schedule>  diltiazem    Tablet 60 milliGRAM(s) Enteral Tube every 6 hours  insulin lispro (ADMELOG) corrective regimen sliding scale   SubCutaneous every 6 hours  levothyroxine Injectable 56 MICROGram(s) IV Push at bedtime  meropenem  IVPB 1000 milliGRAM(s) IV Intermittent every 8 hours  multivitamin/minerals/iron Oral Solution (CENTRUM) 15 milliLiter(s) Oral daily  polyethylene glycol 3350 17 Gram(s) Oral daily  senna Syrup 10 milliLiter(s) Oral at bedtime    MEDICATIONS  (PRN):  acetaminophen    Suspension .. 650 milliGRAM(s) Oral every 6 hours PRN Temp greater or equal to 38C (100.4F)  hydrALAZINE Injectable 5 milliGRAM(s) IV Push every 4 hours PRN SBP >160      Vital Signs Last 24 Hrs  T(C): 36.7 (15 Feb 2022 10:50), Max: 36.8 (14 Feb 2022 14:42)  T(F): 98 (15 Feb 2022 10:50), Max: 98.2 (14 Feb 2022 14:42)  HR: 97 (15 Feb 2022 10:50) (94 - 98)  BP: 165/94 (15 Feb 2022 10:50) (152/84 - 165/94)  BP(mean): --  RR: 18 (15 Feb 2022 10:50) (17 - 18)  SpO2: 93% (15 Feb 2022 10:50) (93% - 97%)  CAPILLARY BLOOD GLUCOSE      POCT Blood Glucose.: 91 mg/dL (15 Feb 2022 06:33)  POCT Blood Glucose.: 126 mg/dL (15 Feb 2022 00:00)  POCT Blood Glucose.: 121 mg/dL (14 Feb 2022 18:44)  POCT Blood Glucose.: 118 mg/dL (14 Feb 2022 12:05)    I&O's Summary    14 Feb 2022 07:01  -  15 Feb 2022 07:00  --------------------------------------------------------  IN: 1340 mL / OUT: 1350 mL / NET: -10 mL    15 Feb 2022 07:01  -  15 Feb 2022 11:28  --------------------------------------------------------  IN: 0 mL / OUT: 400 mL / NET: -400 mL      PHYSICAL EXAM:  GENERAL: chronically ill appearing   EYES: conjunctiva and sclera clear  NOSE: NGT in place   CHEST/LUNG: no wheezing noted, no ronchi   HEART: +S1/S2   ABDOMEN: Soft, Nontender, Nondistended  EXTREMITIES: trace peripheral edema   PSYCH: AAOx0    LABS:                        9.2    5.13  )-----------( 307      ( 15 Feb 2022 07:19 )             29.9     02-15    137  |  101  |  19  ----------------------------<  84  3.8   |  26  |  0.41<L>    Ca    8.2<L>      15 Feb 2022 07:19      PT/INR - ( 15 Feb 2022 07:19 )   PT: 15.6 sec;   INR: 1.32 ratio

## 2022-02-15 NOTE — PROCEDURE NOTE - NSPROCNAME_GEN_A_CORE
Gastric Intubation/Gastric Lavage
Tracheal Intubation
Arterial Puncture/Cannulation
Interventional Radiology
Lumbar Puncture

## 2022-02-15 NOTE — PRE PROCEDURE NOTE - PRE PROCEDURE EVALUATION
Interventional Radiology    HPI: 71F Hx afib on apixaban, cad s/p stents, HFrEF, CVA with left sided residual weakness who presents from nursing home with AMS, fever. Found to be hypernatremic, hypotensive refractory to IVF requiring vasopressor, AMS secondary to metabolic encephalopathy s/p intubation, septic shock 2/2 COVID-19 PNA, MRSA PNA , and r/o meningitis. Pt extubated 1/28, awake, following commands , NGT in place, failed Speech and swallow. Patient presenting for g-tube placement.     Allergies: penicillin (Hives)  penicillin (Rash)  sulfa drugs (Unknown)  Tetracycline Hydrochloride (Unknown)    Medications (Abx/Cardiac/Anticoagulation/Blood Products)    diltiazem    Tablet: 60 milliGRAM(s) Enteral Tube (02-15 @ 12:47)  meropenem  IVPB: 100 mL/Hr IV Intermittent (02-15 @ 06:14)    Data:    T(C): 36.9  HR: 100  BP: 144/88  RR: 18  SpO2: 97%    -WBC 5.13 / HgB 9.2 / Hct 29.9 / Plt 307  -Na 137 / Cl 101 / BUN 19 / Glucose 84  -K 3.8 / CO2 26 / Cr 0.41  -ALT -- / Alk Phos -- / T.Bili --  -INR1.32    Plan: 71y Female presents for g-tube placement   -Risks/Benefits/alternatives explained with the patient's HCP Linus Morrison @[phone] and witnessed informed consent obtained.

## 2022-02-16 LAB
CULTURE RESULTS: SIGNIFICANT CHANGE UP
GLUCOSE BLDC GLUCOMTR-MCNC: 129 MG/DL — HIGH (ref 70–99)
GLUCOSE BLDC GLUCOMTR-MCNC: 144 MG/DL — HIGH (ref 70–99)
GLUCOSE BLDC GLUCOMTR-MCNC: 79 MG/DL — SIGNIFICANT CHANGE UP (ref 70–99)
GLUCOSE BLDC GLUCOMTR-MCNC: 95 MG/DL — SIGNIFICANT CHANGE UP (ref 70–99)
SPECIMEN SOURCE: SIGNIFICANT CHANGE UP

## 2022-02-16 PROCEDURE — 99232 SBSQ HOSP IP/OBS MODERATE 35: CPT

## 2022-02-16 PROCEDURE — 99231 SBSQ HOSP IP/OBS SF/LOW 25: CPT

## 2022-02-16 RX ORDER — FUROSEMIDE 40 MG
40 TABLET ORAL ONCE
Refills: 0 | Status: COMPLETED | OUTPATIENT
Start: 2022-02-16 | End: 2022-02-16

## 2022-02-16 RX ADMIN — Medication 40 MILLIGRAM(S): at 16:54

## 2022-02-16 RX ADMIN — MEROPENEM 100 MILLIGRAM(S): 1 INJECTION INTRAVENOUS at 06:05

## 2022-02-16 RX ADMIN — Medication 60 MILLIGRAM(S): at 18:41

## 2022-02-16 RX ADMIN — Medication 60 MILLIGRAM(S): at 11:58

## 2022-02-16 RX ADMIN — Medication 56 MICROGRAM(S): at 23:29

## 2022-02-16 RX ADMIN — MEROPENEM 100 MILLIGRAM(S): 1 INJECTION INTRAVENOUS at 23:29

## 2022-02-16 RX ADMIN — Medication 60 MILLIGRAM(S): at 00:44

## 2022-02-16 RX ADMIN — Medication 60 MILLIGRAM(S): at 06:04

## 2022-02-16 RX ADMIN — Medication 15 MILLILITER(S): at 11:58

## 2022-02-16 RX ADMIN — SENNA PLUS 10 MILLILITER(S): 8.6 TABLET ORAL at 23:29

## 2022-02-16 RX ADMIN — POLYETHYLENE GLYCOL 3350 17 GRAM(S): 17 POWDER, FOR SOLUTION ORAL at 14:05

## 2022-02-16 RX ADMIN — MEROPENEM 100 MILLIGRAM(S): 1 INJECTION INTRAVENOUS at 14:11

## 2022-02-16 RX ADMIN — ATORVASTATIN CALCIUM 80 MILLIGRAM(S): 80 TABLET, FILM COATED ORAL at 23:29

## 2022-02-16 RX ADMIN — CHLORHEXIDINE GLUCONATE 1 APPLICATION(S): 213 SOLUTION TOPICAL at 09:21

## 2022-02-16 RX ADMIN — Medication 60 MILLIGRAM(S): at 23:29

## 2022-02-16 NOTE — CHART NOTE - NSCHARTNOTEFT_GEN_A_CORE
Nutrition Follow Up Note  Patient seen for: nutrition follow-up    Chart reviewed, events noted. This is "71F Hx afib on apixaban, cad s/p stents, HFrEF, CVA with left sided residual weakness who presents from nursing home with AMS, fever. Found to be hypernatremic, hypotensive refractory to IVF requiring vasopressor, AMS secondary to metabolic encephalopathy s/p intubation, septic shock 2/2 COVID-19 PNA, MRSA PNA , and r/o meningitis. Pt extubated 1/28, awake, following commands , NGT in place, failed s/s. Now s/p PEG placement on 2/15.     Source: [] Patient       [x] EMR        [x] RN        [] Family at bedside       [] Other:    -If unable to interview patient: [] Trach/Vent/BiPAP  [x] Disoriented/confused/inappropriate to interview    Diet Order:   Diet, NPO with Tube Feed:   Tube Feeding Modality: Nasogastric  Glucerna 1.2 Ezio (GLUCERNARTH)  Total Volume for 24 Hours (mL): 1440  Continuous  Starting Tube Feed Rate {mL per Hour}: 40  Increase Tube Feed Rate by (mL): 10     Every 4 hours  Until Goal Tube Feed Rate (mL per Hour): 60  Tube Feed Duration (in Hours): 24  Tube Feed Start Time: 16:00  Free Water Flush  Bolus   Total Volume per Flush (mL): 200   Frequency: Every 6 Hours (02-04-22)    - Is current order appropriate/adequate? [x] Yes  []  No:     - Nutrition-related concerns:  -pt s/p  PEG placement yesterday   -Pt seen at bedside, feeds infusing at goal rate of 60ml/hr x24hrs.   -Per RN not reports of GI distress noted.     GI:  Last BM 2/14___.   Bowel Regimen? [] Yes   [x] No      Weights:  current dosing weight is 65.7Kg (2/15)  68.4lkg (1/31)  59kg (bed weight --2/9)  Bed weight obtained by RD during visit 78.1kg --    Nutritionally Pertinent MEDICATIONS  (STANDING):  atorvastatin  diltiazem    Tablet  furosemide   Injectable  insulin lispro (ADMELOG) corrective regimen sliding scale  levothyroxine Injectable  meropenem  IVPB  multivitamin/minerals/iron Oral Solution (CENTRUM)  polyethylene glycol 3350  senna Syrup    Pertinent Labs:   A1C with Estimated Average Glucose Result: 5.7 % (01-21-22 @ 17:56)    Finger Sticks:  POCT Blood Glucose.: 95 mg/dL (02-16 @ 11:42)  POCT Blood Glucose.: 79 mg/dL (02-16 @ 06:28)  POCT Blood Glucose.: 81 mg/dL (02-15 @ 23:54)  POCT Blood Glucose.: 108 mg/dL (02-15 @ 19:02)      Skin per nursing documentation: left foot stage 2, suspected DTI lower sacrum, suspected DTI to upper sacrum   Edema: +1 generalized +3 bilateral hands, arms, wrists     Estimated Needs:   [x] no change since previous assessment  [] recalculated:  Based on IBW+10% of 121 pounds/55 kg  Energy needs: 30-35 kcal/day (8644-8697 calories)  Protein needs: 1.3-1.6 g/kg (71.24-87.68 g protein)     Previous Nutrition Diagnosis: Increased Nutrient Needs   Nutrition Diagnosis is: [x] ongoing  [] resolved [] not applicable     New Nutrition Diagnosis: [x] Not applicable    Nutrition Care Plan:  [x] In Progress  [] Achieved  [] Not applicable    Nutrition Interventions:     Education Provided:       [] Yes:  [x] No:        Recommendations:         [x] Continue Glucerna 1.2 @ 60mL/hr x 24 hrs to provide 1440mL formula, 1728 kcal, 86.4g protein, 1159 mL free water; provides 31.4 kcal/kg and 1.6 g/kg protein based on upper end IBW of 55 kg. Free water flushes per team or consider FWF of 200ml q6H.     [x] Recommend Misael BID to help with wound healing .      [x] Continue multivitamin to support wound healing, consider addition of ascorbic acid      [x] RD remains available to adjust TF regimen/formulary as needed/upon request.    Monitoring and Evaluation:   Continue to monitor nutritional intake, tolerance to diet prescription, weights, labs, skin integrity      RD remains available upon request and will follow up per protocol  Marisa Angeles RD, CDN, Pager # [phone] Nutrition Follow Up Note  Patient seen for: nutrition follow-up    Chart reviewed, events noted. This is "71F Hx afib on apixaban, cad s/p stents, HFrEF, CVA with left sided residual weakness who presents from nursing home with AMS, fever. Found to be hypernatremic, hypotensive refractory to IVF requiring vasopressor, AMS secondary to metabolic encephalopathy s/p intubation, septic shock 2/2 COVID-19 PNA, MRSA PNA , and r/o meningitis. Pt extubated 1/28, awake, following commands , NGT in place, failed s/s. Now s/p PEG placement on 2/15.     Source: [] Patient       [x] EMR        [x] RN        [] Family at bedside       [] Other:    -If unable to interview patient: [] Trach/Vent/BiPAP  [x] Disoriented/confused/inappropriate to interview    Diet Order:   Diet, NPO with Tube Feed:   Tube Feeding Modality: Nasogastric  Glucerna 1.2 Ezio (GLUCERNARTH)  Total Volume for 24 Hours (mL): 1440  Continuous  Starting Tube Feed Rate {mL per Hour}: 40  Increase Tube Feed Rate by (mL): 10     Every 4 hours  Until Goal Tube Feed Rate (mL per Hour): 60  Tube Feed Duration (in Hours): 24  Tube Feed Start Time: 16:00  Free Water Flush  Bolus   Total Volume per Flush (mL): 200   Frequency: Every 6 Hours (02-04-22)    - Is current order appropriate/adequate? [x] Yes  []  No:     - Nutrition-related concerns:  -pt s/p  PEG placement yesterday   -Pt seen at bedside, feeds infusing at goal rate of 60ml/hr x24hrs.   -Per RN not reports of GI distress noted.     GI:  Last BM 2/14___.   Bowel Regimen? [] Yes   [x] No      Weights:  current dosing weight is 65.7Kg (2/15)      Nutritionally Pertinent MEDICATIONS  (STANDING):  atorvastatin  diltiazem    Tablet  furosemide   Injectable  insulin lispro (ADMELOG) corrective regimen sliding scale  levothyroxine Injectable  meropenem  IVPB  multivitamin/minerals/iron Oral Solution (CENTRUM)  polyethylene glycol 3350  senna Syrup    Pertinent Labs:   A1C with Estimated Average Glucose Result: 5.7 % (01-21-22 @ 17:56)    Finger Sticks:  POCT Blood Glucose.: 95 mg/dL (02-16 @ 11:42)  POCT Blood Glucose.: 79 mg/dL (02-16 @ 06:28)  POCT Blood Glucose.: 81 mg/dL (02-15 @ 23:54)  POCT Blood Glucose.: 108 mg/dL (02-15 @ 19:02)      Skin per nursing documentation: left foot stage 2, suspected DTI lower sacrum, suspected DTI to upper sacrum   Edema: +1 generalized +3 bilateral hands, arms, wrists     Estimated Needs:   [x] no change since previous assessment  [] recalculated:  Based on IBW+10% of 121 pounds/55 kg  Energy needs: 30-35 kcal/day (3047-5448 calories)  Protein needs: 1.3-1.6 g/kg (71.24-87.68 g protein)     Previous Nutrition Diagnosis: Increased Nutrient Needs   Nutrition Diagnosis is: [x] ongoing  [] resolved [] not applicable     New Nutrition Diagnosis: [x] Not applicable    Nutrition Care Plan:  [x] In Progress  [] Achieved  [] Not applicable    Nutrition Interventions:     Education Provided:       [] Yes:  [x] No:        Recommendations:         [x] Continue Glucerna 1.2 @ 60mL/hr x 24 hrs to provide 1440mL formula, 1728 kcal, 86.4g protein, 1159 mL free water; provides 31.4 kcal/kg and 1.6 g/kg protein based on upper end IBW of 55 kg. Free water flushes per team or consider FWF of 200ml q6H.     [x] Recommend Misael BID to help with wound healing .      [x] Continue multivitamin to support wound healing, consider addition of ascorbic acid      [x] RD remains available to adjust TF regimen/formulary as needed/upon request.    Monitoring and Evaluation:   Continue to monitor nutritional intake, tolerance to diet prescription, weights, labs, skin integrity      RD remains available upon request and will follow up per protocol  Marisa Angeles RD, CDN, Pager # [phone]

## 2022-02-16 NOTE — PROGRESS NOTE ADULT - SUBJECTIVE AND OBJECTIVE BOX
Interventional Radiology Follow-Up Note.     Patient seen and examined @ bedside around 7am.     This is a 71y Female s/p Gastrostomy Placement on 2/15 in Interventional Radiology with Dr. Arboleda.    Feeding through G tube successfully.       Medication:     diltiazem    Tablet: (02-16)  meropenem  IVPB: (02-16)    Vitals:   T(F): 98.1, Max: 98.5 (14:11)  HR: 97  BP: 175/93  RR: 17  SpO2: 95%    Physical Exam:  General: Nontoxic, in NAD.  Abdomen: soft, NTND.            LABS:  Na: 137 (02-15 @ 07:19)  K: 3.8 (02-15 @ 07:19)  Cl: 101 (02-15 @ 07:19)  CO2: 26 (02-15 @ 07:19)  BUN: 19 (02-15 @ 07:19)  Cr: 0.41 (02-15 @ 07:19)  Glu: 84(02-15 @ 07:19)    Hgb: 9.2 (02-15 @ 07:19), 8.9 (02-14 @ 07:18), 9.1 (02-13 @ 18:30)  Hct: 29.9 (02-15 @ 07:19), 28.5 (02-14 @ 07:18), 28.9 (02-13 @ 18:30)  WBC: 5.13 (02-15 @ 07:19), 5.78 (02-14 @ 07:18), 5.58 (02-13 @ 18:30)  Plt: 307 (02-15 @ 07:19), 217 (02-14 @ 07:18), 273 (02-13 @ 18:30)    INR: 1.32 02-15-22 @ 07:19  PTT:           Assessment/Plan:   71F Hx afib on apixaban, cad s/p stents, HFrEF, CVA with left sided residual weakness who presents from nursing home with AMS, fever. Found to be hypernatremic, hypotensive refractory to IVF requiring vasopressor, AMS secondary to metabolic encephalopathy s/p intubation, septic shock 2/2 COVID-19 PNA, MRSA PNA , and r/o meningitis. Pt extubated 1/28, awake, following commands , NGT in place, failed Speech and swallow. S/p G tube placement.     - G tube being used successfully.   - IR will sign off.  Please call IR at  [phone] with any questions, concerns, or issues regarding above.    Also available on Teams.

## 2022-02-16 NOTE — PROGRESS NOTE ADULT - PROBLEM SELECTOR PLAN 2
AMS 2/2 Metabolic encephalopathy  - currently AO*0, awakens to voice  Hx CVA with residual left sided weak/flaccid  1/24 VEEG Negative likely-metabolic encephalopathy    1/21 CTH without acute changes, re demonstration of lacuna infarcts   s/p LP, glucose 86, protein 48- negative. all abx and anti-viral dcd  mental status waxes and wanes  appreciate neuro re-eval  following some commands such as opening mouth, but still with increased secretions  failed repeat speech and swallow eval  c/w NGT, change to KO tube for feeds   Palliative consult for GOC->remains full code  if no further improvement in mental status, will need to consider PEG placement. Reached out to Linus Morrison and left message again with callback information. Also discussed with , no other number for the HCP. Called again today, but unable to leave VM as inbox was full.  Called Mr. Munoz on 2/9 and discussed plan of care, along with clinical course. Discussed options for nutrition including PEG, as NGT would be temporary. Also discussed palliative options. He would like to proceed with PEG placement as pt would have wanted "everything done to live a long life". He said he would talk to Linus as well.  Had PEG placed on 2/15, c/w tube feeds  remove NGT

## 2022-02-16 NOTE — PROGRESS NOTE ADULT - PROBLEM SELECTOR PLAN 3
-had acute drop in H&H  -CT from last week showing moderate RP bleed  -lovenox d/porfirio  -transfused 2 units PRBCs, hgb stable in 7s-8s  -no intervention planned by IR  -monitor closely, if pt decompensates, MICU consult  -repeat CT with improvement in hematoma  -trend hgb

## 2022-02-16 NOTE — PROGRESS NOTE ADULT - ASSESSMENT
71F Hx afib on apixaban, cad s/p stents, HFrEF, CVA with left sided residual weakness who presents from nursing home with AMS, fever. Found to be hypernatremic, hypotensive refractory to IVF requiring vasopressor, AMS secondary to metabolic encephalopathy s/p intubation, septic shock 2/2 COVID-19 PNA, MRSA PNA , and r/o meningitis. Pt extubated 1/28, with NGT feeds, now with G tube in place:

## 2022-02-16 NOTE — PROGRESS NOTE ADULT - SUBJECTIVE AND OBJECTIVE BOX
Patient is a 71y old  Female who presents with a chief complaint of AMS (16 Feb 2022 10:08)    SUBJECTIVE / OVERNIGHT EVENTS: patient had PEG tube placed yesterday     MEDICATIONS  (STANDING):  atorvastatin 80 milliGRAM(s) Oral at bedtime  chlorhexidine 4% Liquid 1 Application(s) Topical <User Schedule>  diltiazem    Tablet 60 milliGRAM(s) Enteral Tube every 6 hours  insulin lispro (ADMELOG) corrective regimen sliding scale   SubCutaneous every 6 hours  levothyroxine Injectable 56 MICROGram(s) IV Push at bedtime  meropenem  IVPB 1000 milliGRAM(s) IV Intermittent every 8 hours  multivitamin/minerals/iron Oral Solution (CENTRUM) 15 milliLiter(s) Oral daily  polyethylene glycol 3350 17 Gram(s) Oral daily  senna Syrup 10 milliLiter(s) Oral at bedtime    MEDICATIONS  (PRN):  acetaminophen    Suspension .. 650 milliGRAM(s) Oral every 6 hours PRN Temp greater or equal to 38C (100.4F)  hydrALAZINE Injectable 5 milliGRAM(s) IV Push every 4 hours PRN SBP >160      Vital Signs Last 24 Hrs  T(C): 36.7 (16 Feb 2022 10:20), Max: 36.9 (15 Feb 2022 14:11)  T(F): 98.1 (16 Feb 2022 10:20), Max: 98.5 (15 Feb 2022 14:11)  HR: 91 (16 Feb 2022 10:20) (80 - 100)  BP: 164/99 (16 Feb 2022 10:20) (136/82 - 175/93)  BP(mean): 98 (15 Feb 2022 18:05) (93 - 101)  RR: 18 (16 Feb 2022 10:20) (16 - 18)  SpO2: 95% (16 Feb 2022 10:20) (95% - 100%)  CAPILLARY BLOOD GLUCOSE    POCT Blood Glucose.: 95 mg/dL (16 Feb 2022 11:42)  POCT Blood Glucose.: 79 mg/dL (16 Feb 2022 06:28)  POCT Blood Glucose.: 81 mg/dL (15 Feb 2022 23:54)  POCT Blood Glucose.: 108 mg/dL (15 Feb 2022 19:02)  POCT Blood Glucose.: 100 mg/dL (15 Feb 2022 12:15)    I&O's Summary    15 Feb 2022 07:01  -  16 Feb 2022 07:00  --------------------------------------------------------  IN: 80 mL / OUT: 1800 mL / NET: -1720 mL    16 Feb 2022 07:01  -  16 Feb 2022 12:10  --------------------------------------------------------  IN: 0 mL / OUT: 0 mL / NET: 0 mL    PHYSICAL EXAM:  GENERAL: NAD  HEAD:  Atraumatic, Normocephalic  EYES:  conjunctiva and sclera clear  NECK: Supple, No JVD  CHEST/LUNG: no wheezing noted   HEART: +S1/S2  ABDOMEN: Soft, Nontender, Nondistended, G tube in place   EXTREMITIES: +peripheral edema   PSYCH: AAOx0      LABS:                        9.2    5.13  )-----------( 307      ( 15 Feb 2022 07:19 )             29.9     02-15    137  |  101  |  19  ----------------------------<  84  3.8   |  26  |  0.41<L>    Ca    8.2<L>      15 Feb 2022 07:19      PT/INR - ( 15 Feb 2022 07:19 )   PT: 15.6 sec;   INR: 1.32 ratio           Consultant(s) Notes Reviewed:  IR

## 2022-02-17 LAB
ANION GAP SERPL CALC-SCNC: 10 MMOL/L — SIGNIFICANT CHANGE UP (ref 5–17)
BUN SERPL-MCNC: 17 MG/DL — SIGNIFICANT CHANGE UP (ref 7–23)
CALCIUM SERPL-MCNC: 8.5 MG/DL — SIGNIFICANT CHANGE UP (ref 8.4–10.5)
CHLORIDE SERPL-SCNC: 99 MMOL/L — SIGNIFICANT CHANGE UP (ref 96–108)
CO2 SERPL-SCNC: 28 MMOL/L — SIGNIFICANT CHANGE UP (ref 22–31)
CREAT SERPL-MCNC: 0.43 MG/DL — LOW (ref 0.5–1.3)
GLUCOSE BLDC GLUCOMTR-MCNC: 132 MG/DL — HIGH (ref 70–99)
GLUCOSE BLDC GLUCOMTR-MCNC: 142 MG/DL — HIGH (ref 70–99)
GLUCOSE BLDC GLUCOMTR-MCNC: 153 MG/DL — HIGH (ref 70–99)
GLUCOSE SERPL-MCNC: 126 MG/DL — HIGH (ref 70–99)
HCT VFR BLD CALC: 34 % — LOW (ref 34.5–45)
HGB BLD-MCNC: 10.4 G/DL — LOW (ref 11.5–15.5)
MCHC RBC-ENTMCNC: 28.2 PG — SIGNIFICANT CHANGE UP (ref 27–34)
MCHC RBC-ENTMCNC: 30.6 GM/DL — LOW (ref 32–36)
MCV RBC AUTO: 92.1 FL — SIGNIFICANT CHANGE UP (ref 80–100)
NRBC # BLD: 0 /100 WBCS — SIGNIFICANT CHANGE UP (ref 0–0)
PLATELET # BLD AUTO: 354 K/UL — SIGNIFICANT CHANGE UP (ref 150–400)
POTASSIUM SERPL-MCNC: 3.4 MMOL/L — LOW (ref 3.5–5.3)
POTASSIUM SERPL-SCNC: 3.4 MMOL/L — LOW (ref 3.5–5.3)
RBC # BLD: 3.69 M/UL — LOW (ref 3.8–5.2)
RBC # FLD: 16.2 % — HIGH (ref 10.3–14.5)
SARS-COV-2 RNA SPEC QL NAA+PROBE: SIGNIFICANT CHANGE UP
SODIUM SERPL-SCNC: 137 MMOL/L — SIGNIFICANT CHANGE UP (ref 135–145)
WBC # BLD: 5.15 K/UL — SIGNIFICANT CHANGE UP (ref 3.8–10.5)
WBC # FLD AUTO: 5.15 K/UL — SIGNIFICANT CHANGE UP (ref 3.8–10.5)

## 2022-02-17 PROCEDURE — 99232 SBSQ HOSP IP/OBS MODERATE 35: CPT

## 2022-02-17 RX ADMIN — MEROPENEM 100 MILLIGRAM(S): 1 INJECTION INTRAVENOUS at 06:31

## 2022-02-17 RX ADMIN — SENNA PLUS 10 MILLILITER(S): 8.6 TABLET ORAL at 22:04

## 2022-02-17 RX ADMIN — POLYETHYLENE GLYCOL 3350 17 GRAM(S): 17 POWDER, FOR SOLUTION ORAL at 13:41

## 2022-02-17 RX ADMIN — MEROPENEM 100 MILLIGRAM(S): 1 INJECTION INTRAVENOUS at 13:41

## 2022-02-17 RX ADMIN — CHLORHEXIDINE GLUCONATE 1 APPLICATION(S): 213 SOLUTION TOPICAL at 10:10

## 2022-02-17 RX ADMIN — Medication 60 MILLIGRAM(S): at 13:42

## 2022-02-17 RX ADMIN — Medication 60 MILLIGRAM(S): at 06:31

## 2022-02-17 RX ADMIN — Medication 56 MICROGRAM(S): at 21:55

## 2022-02-17 RX ADMIN — ATORVASTATIN CALCIUM 80 MILLIGRAM(S): 80 TABLET, FILM COATED ORAL at 22:03

## 2022-02-17 RX ADMIN — Medication 1: at 06:30

## 2022-02-17 RX ADMIN — Medication 15 MILLILITER(S): at 13:42

## 2022-02-17 NOTE — PROGRESS NOTE ADULT - SUBJECTIVE AND OBJECTIVE BOX
[facility]-- WOUND TEAM -- FOLLOW UP NOTE  --------------------------------------------------------------------------------    24 hour events/subjective:  afrebrile  incontinnt of urine and stool  edematous upper extremities        Diet:  Diet, NPO with Tube Feed:   Tube Feeding Modality: Nasogastric  Glucerna 1.2 Ezio (GLUCERNARTH)  Total Volume for 24 Hours (mL): 1440  Continuous  Starting Tube Feed Rate mL per Hour: 40  Increase Tube Feed Rate by (mL): 10     Every 4 hours  Until Goal Tube Feed Rate (mL per Hour): 60  Tube Feed Duration (in Hours): 24  Tube Feed Start Time: 16:00  Free Water Flush  Bolus   Total Volume per Flush (mL): 200   Frequency: Every 6 Hours  Misael(7 Gm Arginine/7 Gm Glut/1.2 Gm HMB     Qty per Day:  2 (02-16-22 @ 15:02)      ROS: General/ SKIN/ MSK/ Neuro/ GI see HPI  all other systems negative  pt unable to offer    ALLERGIES & MEDICATIONS  --------------------------------------------------------------------------------  Allergies    penicillin (Hives)  penicillin (Rash)  sulfa drugs (Unknown)  Tetracycline Hydrochloride (Unknown)    Intolerances          STANDING INPATIENT MEDICATIONS    atorvastatin 80 milliGRAM(s) Oral at bedtime  chlorhexidine 4% Liquid 1 Application(s) Topical <User Schedule>  diltiazem    Tablet 60 milliGRAM(s) Enteral Tube every 6 hours  insulin lispro (ADMELOG) corrective regimen sliding scale   SubCutaneous every 6 hours  levothyroxine Injectable 56 MICROGram(s) IV Push at bedtime  meropenem  IVPB 1000 milliGRAM(s) IV Intermittent every 8 hours  multivitamin/minerals/iron Oral Solution (CENTRUM) 15 milliLiter(s) Oral daily  polyethylene glycol 3350 17 Gram(s) Oral daily  senna Syrup 10 milliLiter(s) Oral at bedtime      PRN INPATIENT MEDICATION  acetaminophen    Suspension .. 650 milliGRAM(s) Oral every 6 hours PRN  hydrALAZINE Injectable 5 milliGRAM(s) IV Push every 4 hours PRN        VITALS/PHYSICAL EXAM  --------------------------------------------------------------------------------  T(C): 36.8 (02-17-22 @ 13:22), Max: 36.8 (02-17-22 @ 13:22)  HR: 87 (02-17-22 @ 13:22) (87 - 96)  BP: 137/82 (02-17-22 @ 13:22) (137/82 - 152/86)  RR: 18 (02-17-22 @ 13:22) (17 - 18)  SpO2: 95% (02-17-22 @ 13:22) (92% - 96%)  Wt(kg): --  Height (cm): 157.7 (02-15-22 @ 16:00)  Weight (kg): 65.7 (02-15-22 @ 16:00)  BMI (kg/m2): 26.4 (02-15-22 @ 16:00)  BSA (m2): 1.67 (02-15-22 @ 16:00)      02-16-22 @ 07:01  -  02-17-22 @ 07:00  --------------------------------------------------------  IN: 2160 mL / OUT: 1600 mL / NET: 560 mL            LABS/ CULTURES/ RADIOLOGY:              10.4   5.15  >-----------<  354      [02-17-22 @ 07:27]              34.0     137  |  99  |  17  ----------------------------<  126      [02-17-22 @ 07:27]  3.4   |  28  |  0.43        Ca     8.5     [02-17-22 @ 07:27]                CAPILLARY BLOOD GLUCOSE      POCT Blood Glucose.: 132 mg/dL (17 Feb 2022 11:57)  POCT Blood Glucose.: 153 mg/dL (17 Feb 2022 06:09)  POCT Blood Glucose.: 144 mg/dL (16 Feb 2022 23:58)  POCT Blood Glucose.: 129 mg/dL (16 Feb 2022 18:37)    Culture - Blood (collected 02-11-22 @ 17:24)  Source: .Blood Blood-Peripheral  Final Report (02-16-22 @ 18:00):    No Growth Final  A1C with Estimated Average Glucose Result: 5.7 % (01-21-22 @ 17:56)    NAD, lethargic, Obese, frail  Alert, follows simple commands  Versa Care P500  HEENT:  NC/AT, EOMI, mucosa moist, neck supple  Gastrointestinal: soft NT/ND, g tube  Neurology  weakened strength Rt sided and left upper extremities,   Musculoskeletal:  passive ROM, no deformities  Vascular: BLE equally warm,  no cyanosis, clubbing, BLE edema DP pulses palpable  Skin:  pale, moist w/ good turgor   sacral wound:  4.5x2x0.2cm 40% skin, 60 pink granular wound bed with slight purple discoloration .No odor, erythema, increased warmth, tenderness, induration, fluctuance       [facility]-- WOUND TEAM -- FOLLOW UP NOTE  --------------------------------------------------------------------------------    24 hour events/subjective:  afrebrile  incontinnt of urine and stool      Diet:  Diet, NPO with Tube Feed:   Tube Feeding Modality: Nasogastric  Glucerna 1.2 Ezio (GLUCERNARTH)  Total Volume for 24 Hours (mL): 1440  Continuous  Starting Tube Feed Rate mL per Hour: 40  Increase Tube Feed Rate by (mL): 10     Every 4 hours  Until Goal Tube Feed Rate (mL per Hour): 60  Tube Feed Duration (in Hours): 24  Tube Feed Start Time: 16:00  Free Water Flush  Bolus   Total Volume per Flush (mL): 200   Frequency: Every 6 Hours  Misael(7 Gm Arginine/7 Gm Glut/1.2 Gm HMB     Qty per Day:  2 (02-16-22 @ 15:02)      ROS: General/ SKIN/ MSK/ Neuro/ GI see HPI  all other systems negative  pt unable to offer    ALLERGIES & MEDICATIONS  --------------------------------------------------------------------------------  Allergies    penicillin (Hives)  penicillin (Rash)  sulfa drugs (Unknown)  Tetracycline Hydrochloride (Unknown)    Intolerances          STANDING INPATIENT MEDICATIONS    atorvastatin 80 milliGRAM(s) Oral at bedtime  chlorhexidine 4% Liquid 1 Application(s) Topical <User Schedule>  diltiazem    Tablet 60 milliGRAM(s) Enteral Tube every 6 hours  insulin lispro (ADMELOG) corrective regimen sliding scale   SubCutaneous every 6 hours  levothyroxine Injectable 56 MICROGram(s) IV Push at bedtime  meropenem  IVPB 1000 milliGRAM(s) IV Intermittent every 8 hours  multivitamin/minerals/iron Oral Solution (CENTRUM) 15 milliLiter(s) Oral daily  polyethylene glycol 3350 17 Gram(s) Oral daily  senna Syrup 10 milliLiter(s) Oral at bedtime      PRN INPATIENT MEDICATION  acetaminophen    Suspension .. 650 milliGRAM(s) Oral every 6 hours PRN  hydrALAZINE Injectable 5 milliGRAM(s) IV Push every 4 hours PRN        VITALS/PHYSICAL EXAM  --------------------------------------------------------------------------------  T(C): 36.8 (02-17-22 @ 13:22), Max: 36.8 (02-17-22 @ 13:22)  HR: 87 (02-17-22 @ 13:22) (87 - 96)  BP: 137/82 (02-17-22 @ 13:22) (137/82 - 152/86)  RR: 18 (02-17-22 @ 13:22) (17 - 18)  SpO2: 95% (02-17-22 @ 13:22) (92% - 96%)  Wt(kg): --  Height (cm): 157.7 (02-15-22 @ 16:00)  Weight (kg): 65.7 (02-15-22 @ 16:00)  BMI (kg/m2): 26.4 (02-15-22 @ 16:00)  BSA (m2): 1.67 (02-15-22 @ 16:00)      02-16-22 @ 07:01  -  02-17-22 @ 07:00  --------------------------------------------------------  IN: 2160 mL / OUT: 1600 mL / NET: 560 mL            LABS/ CULTURES/ RADIOLOGY:              10.4   5.15  >-----------<  354      [02-17-22 @ 07:27]              34.0     137  |  99  |  17  ----------------------------<  126      [02-17-22 @ 07:27]  3.4   |  28  |  0.43        Ca     8.5     [02-17-22 @ 07:27]                CAPILLARY BLOOD GLUCOSE      POCT Blood Glucose.: 132 mg/dL (17 Feb 2022 11:57)  POCT Blood Glucose.: 153 mg/dL (17 Feb 2022 06:09)  POCT Blood Glucose.: 144 mg/dL (16 Feb 2022 23:58)  POCT Blood Glucose.: 129 mg/dL (16 Feb 2022 18:37)    Culture - Blood (collected 02-11-22 @ 17:24)  Source: .Blood Blood-Peripheral  Final Report (02-16-22 @ 18:00):    No Growth Final  A1C with Estimated Average Glucose Result: 5.7 % (01-21-22 @ 17:56)    NAD, lethargic, Obese, frail  Alert, follows simple commands  Versa Care P500  HEENT:  NC/AT, EOMI, mucosa moist, neck supple  Gastrointestinal: soft NT/ND, g tube  Neurology  weakened strength Rt sided and left upper extremities,   Musculoskeletal:  passive ROM, no deformities, edematous BUE  Vascular: BLE equally warm,  no cyanosis, clubbing, BLE edema DP pulses palpable  Skin:  pale, moist w/ good turgor   sacral wound:  4.5x2x0.2cm 40% skin, 60 pink granular wound bed with slight purple discoloration .No odor, erythema, increased warmth, tenderness, induration, fluctuance

## 2022-02-17 NOTE — PROGRESS NOTE ADULT - ASSESSMENT
· Assessment	  A/P:  71F Hx afib on apixaban, cad s/p stents, HFrEF, CVA with left sided residual weakness who presents from nursing home with AMS, fever. Found to be hypernatremic, hypotensive refractory to IVF requiring vasopressor, AMS secondary to metabolic encephalopathy s/p intubation, septic shock of unknown origin, possible aspiration pna.    Wound Consult requested to assist w/ management of Evolving Sacral / buttocks DTI  Incontinence of urine and stool    On exam:  improving sacral DTI      Buttocks and sacrum - TRIAD paste bid and prn soiling       continue w/ Pericare as per protocol  BLE elevation  Abx per medicine  Moisturize intact skin w/ SWEEN cream BID  Nutritional optimization as tolerated in pt w/ Increased nutritional needs        MVI & Vit C to promote wound healing and high quality protein  Continue turning and positioning w/ offloading assistive devices as per protocol  Waffle Cushion to chair when oob to chair  Continue w/ low air loss bed surface   Care as per medicine, will follow w/ you  Upon discharge f/u as outpatient at [facility] [address] [place], [place] [phone]  D/W RADHA Villa. MD  Wound Care/Surgery      I spent 25minutes face to face w/ this pt of which more than 50% of the time was spent counseling & coordinating care of this pt.

## 2022-02-17 NOTE — PROGRESS NOTE ADULT - PROBLEM SELECTOR PLAN 1
Multiple fevers, currently afeb  BC with NGTD, UC in lab, likely contaminant  CT chest A/P with improvement in hematoma  appreciate ID recs  from asp PNA vs hematoma   had started empiric vanc and cefepime, on meropenem for better anerobic coverage  ID following appreciate recs  likely 2/2 to hematoma, planning 7 day meropenem course, to be completed today   remains afebrile

## 2022-02-17 NOTE — PROGRESS NOTE ADULT - SUBJECTIVE AND OBJECTIVE BOX
Patient is a 71y old  Female who presents with a chief complaint of AMS (16 Feb 2022 12:09)    SUBJECTIVE / OVERNIGHT EVENTS: no acute events overnight, pt is tolerating PEG tube feeding well, NGT removed     MEDICATIONS  (STANDING):  atorvastatin 80 milliGRAM(s) Oral at bedtime  chlorhexidine 4% Liquid 1 Application(s) Topical <User Schedule>  diltiazem    Tablet 60 milliGRAM(s) Enteral Tube every 6 hours  insulin lispro (ADMELOG) corrective regimen sliding scale   SubCutaneous every 6 hours  levothyroxine Injectable 56 MICROGram(s) IV Push at bedtime  meropenem  IVPB 1000 milliGRAM(s) IV Intermittent every 8 hours  multivitamin/minerals/iron Oral Solution (CENTRUM) 15 milliLiter(s) Oral daily  polyethylene glycol 3350 17 Gram(s) Oral daily  senna Syrup 10 milliLiter(s) Oral at bedtime    MEDICATIONS  (PRN):  acetaminophen    Suspension .. 650 milliGRAM(s) Oral every 6 hours PRN Temp greater or equal to 38C (100.4F)  hydrALAZINE Injectable 5 milliGRAM(s) IV Push every 4 hours PRN SBP >160      Vital Signs Last 24 Hrs  T(C): 36.3 (17 Feb 2022 06:10), Max: 36.7 (16 Feb 2022 14:46)  T(F): 97.4 (17 Feb 2022 06:10), Max: 98 (16 Feb 2022 14:46)  HR: 93 (17 Feb 2022 06:10) (90 - 96)  BP: 146/88 (17 Feb 2022 06:10) (139/81 - 152/86)  BP(mean): --  RR: 18 (17 Feb 2022 06:10) (17 - 18)  SpO2: 94% (17 Feb 2022 06:10) (92% - 96%)  CAPILLARY BLOOD GLUCOSE      POCT Blood Glucose.: 132 mg/dL (17 Feb 2022 11:57)  POCT Blood Glucose.: 153 mg/dL (17 Feb 2022 06:09)  POCT Blood Glucose.: 144 mg/dL (16 Feb 2022 23:58)  POCT Blood Glucose.: 129 mg/dL (16 Feb 2022 18:37)    I&O's Summary    16 Feb 2022 07:01  -  17 Feb 2022 07:00  --------------------------------------------------------  IN: 2160 mL / OUT: 1600 mL / NET: 560 mL        PHYSICAL EXAM:  GENERAL: NAD  EYES:  conjunctiva and sclera clear  CHEST/LUNG: no wheezing noted   HEART: +s1/S2   ABDOMEN: Soft, Nontender, Nondistended  EXTREMITIES: no LE edema   PSYCH: opens eyes to name and mouths answer to questions at times, but does not follow commands     LABS:                        10.4   5.15  )-----------( 354      ( 17 Feb 2022 07:27 )             34.0     02-17    137  |  99  |  17  ----------------------------<  126<H>  3.4<L>   |  28  |  0.43<L>    Ca    8.5      17 Feb 2022 07:27

## 2022-02-17 NOTE — PROGRESS NOTE ADULT - PROBLEM SELECTOR PLAN 2
AMS 2/2 Metabolic encephalopathy  - currently AO*0, awakens to voice  Hx CVA with residual left sided weak/flaccid  1/24 VEEG Negative likely-metabolic encephalopathy    1/21 CTH without acute changes, re demonstration of lacuna infarcts   s/p LP, glucose 86, protein 48- negative. all abx and anti-viral dcd  mental status waxes and wanes  appreciate neuro re-eval  following some commands such as opening mouth, but still with increased secretions  failed repeat speech and swallow eval  c/w NGT, change to KO tube for feeds   Palliative consult for GOC->remains full code  if no further improvement in mental status, will need to consider PEG placement. Reached out to Linus Morrison and left message again with callback information. Also discussed with , no other number for the HCP. Called again today, but unable to leave VM as inbox was full.  Called Mr. Munoz on 2/9 and discussed plan of care, along with clinical course. Discussed options for nutrition including PEG, as NGT would be temporary. Also discussed palliative options. He would like to proceed with PEG placement as pt would have wanted "everything done to live a long life". He said he would talk to Linus as well.  Had PEG placed on 2/15, c/w tube feeds  NGT removed  tolerating feeds well   Discussed with Mr. Munoz on 2/17, updated on patient's clinical status and that we would start discharge planning soon. He will notify Linus.

## 2022-02-17 NOTE — PROGRESS NOTE ADULT - PROBLEM SELECTOR PLAN 3
-had acute drop in H&H  -CT from last week showing moderate RP bleed  -lovenox d/porfirio  -transfused 2 units PRBCs, hgb stable in 7s-8s  -no intervention planned by IR  -monitor closely, if pt decompensates, MICU consult  -repeat CT with improvement in hematoma  -trend hgb, remains stable

## 2022-02-18 LAB
ANION GAP SERPL CALC-SCNC: 8 MMOL/L — SIGNIFICANT CHANGE UP (ref 5–17)
BASE EXCESS BLDA CALC-SCNC: 11.4 MMOL/L — HIGH (ref -2–3)
BUN SERPL-MCNC: 19 MG/DL — SIGNIFICANT CHANGE UP (ref 7–23)
CALCIUM SERPL-MCNC: 8.4 MG/DL — SIGNIFICANT CHANGE UP (ref 8.4–10.5)
CHLORIDE SERPL-SCNC: 99 MMOL/L — SIGNIFICANT CHANGE UP (ref 96–108)
CO2 BLDA-SCNC: 34 MMOL/L — HIGH (ref 19–24)
CO2 SERPL-SCNC: 30 MMOL/L — SIGNIFICANT CHANGE UP (ref 22–31)
CREAT SERPL-MCNC: 0.41 MG/DL — LOW (ref 0.5–1.3)
GAS PNL BLDA: SIGNIFICANT CHANGE UP
GLUCOSE BLDC GLUCOMTR-MCNC: 112 MG/DL — HIGH (ref 70–99)
GLUCOSE BLDC GLUCOMTR-MCNC: 114 MG/DL — HIGH (ref 70–99)
GLUCOSE BLDC GLUCOMTR-MCNC: 129 MG/DL — HIGH (ref 70–99)
GLUCOSE BLDC GLUCOMTR-MCNC: 136 MG/DL — HIGH (ref 70–99)
GLUCOSE BLDC GLUCOMTR-MCNC: 138 MG/DL — HIGH (ref 70–99)
GLUCOSE SERPL-MCNC: 132 MG/DL — HIGH (ref 70–99)
HCO3 BLDA-SCNC: 33 MMOL/L — HIGH (ref 21–28)
HCT VFR BLD CALC: 28.5 % — LOW (ref 34.5–45)
HCT VFR BLD CALC: 28.8 % — LOW (ref 34.5–45)
HGB BLD-MCNC: 8.7 G/DL — LOW (ref 11.5–15.5)
HGB BLD-MCNC: 8.9 G/DL — LOW (ref 11.5–15.5)
HOROWITZ INDEX BLDA+IHG-RTO: 21 — SIGNIFICANT CHANGE UP
MCHC RBC-ENTMCNC: 27.4 PG — SIGNIFICANT CHANGE UP (ref 27–34)
MCHC RBC-ENTMCNC: 28 PG — SIGNIFICANT CHANGE UP (ref 27–34)
MCHC RBC-ENTMCNC: 30.5 GM/DL — LOW (ref 32–36)
MCHC RBC-ENTMCNC: 30.9 GM/DL — LOW (ref 32–36)
MCV RBC AUTO: 89.9 FL — SIGNIFICANT CHANGE UP (ref 80–100)
MCV RBC AUTO: 90.6 FL — SIGNIFICANT CHANGE UP (ref 80–100)
NRBC # BLD: 0 /100 WBCS — SIGNIFICANT CHANGE UP (ref 0–0)
NRBC # BLD: 0 /100 WBCS — SIGNIFICANT CHANGE UP (ref 0–0)
PCO2 BLDA: 34 MMHG — SIGNIFICANT CHANGE UP (ref 32–45)
PH BLDA: 7.6 — CRITICAL HIGH (ref 7.35–7.45)
PLATELET # BLD AUTO: 416 K/UL — HIGH (ref 150–400)
PLATELET # BLD AUTO: 417 K/UL — HIGH (ref 150–400)
PO2 BLDA: 122 MMHG — HIGH (ref 83–108)
POTASSIUM SERPL-MCNC: 3.5 MMOL/L — SIGNIFICANT CHANGE UP (ref 3.5–5.3)
POTASSIUM SERPL-SCNC: 3.5 MMOL/L — SIGNIFICANT CHANGE UP (ref 3.5–5.3)
RBC # BLD: 3.17 M/UL — LOW (ref 3.8–5.2)
RBC # BLD: 3.18 M/UL — LOW (ref 3.8–5.2)
RBC # FLD: 16.1 % — HIGH (ref 10.3–14.5)
RBC # FLD: 16.2 % — HIGH (ref 10.3–14.5)
SAO2 % BLDA: 98.6 % — HIGH (ref 94–98)
SODIUM SERPL-SCNC: 137 MMOL/L — SIGNIFICANT CHANGE UP (ref 135–145)
WBC # BLD: 4.83 K/UL — SIGNIFICANT CHANGE UP (ref 3.8–10.5)
WBC # BLD: 4.98 K/UL — SIGNIFICANT CHANGE UP (ref 3.8–10.5)
WBC # FLD AUTO: 4.83 K/UL — SIGNIFICANT CHANGE UP (ref 3.8–10.5)
WBC # FLD AUTO: 4.98 K/UL — SIGNIFICANT CHANGE UP (ref 3.8–10.5)

## 2022-02-18 PROCEDURE — 99233 SBSQ HOSP IP/OBS HIGH 50: CPT | Mod: GC

## 2022-02-18 PROCEDURE — 99232 SBSQ HOSP IP/OBS MODERATE 35: CPT

## 2022-02-18 PROCEDURE — 70498 CT ANGIOGRAPHY NECK: CPT | Mod: 26

## 2022-02-18 PROCEDURE — 70496 CT ANGIOGRAPHY HEAD: CPT | Mod: 26

## 2022-02-18 RX ORDER — POTASSIUM CHLORIDE 20 MEQ
40 PACKET (EA) ORAL ONCE
Refills: 0 | Status: COMPLETED | OUTPATIENT
Start: 2022-02-18 | End: 2022-02-18

## 2022-02-18 RX ADMIN — POLYETHYLENE GLYCOL 3350 17 GRAM(S): 17 POWDER, FOR SOLUTION ORAL at 13:15

## 2022-02-18 RX ADMIN — Medication 40 MILLIEQUIVALENT(S): at 22:35

## 2022-02-18 RX ADMIN — CHLORHEXIDINE GLUCONATE 1 APPLICATION(S): 213 SOLUTION TOPICAL at 09:02

## 2022-02-18 RX ADMIN — Medication 56 MICROGRAM(S): at 22:07

## 2022-02-18 RX ADMIN — Medication 60 MILLIGRAM(S): at 18:03

## 2022-02-18 RX ADMIN — Medication 60 MILLIGRAM(S): at 13:15

## 2022-02-18 RX ADMIN — SENNA PLUS 10 MILLILITER(S): 8.6 TABLET ORAL at 21:36

## 2022-02-18 RX ADMIN — Medication 5 MILLIGRAM(S): at 22:35

## 2022-02-18 RX ADMIN — Medication 15 MILLILITER(S): at 18:03

## 2022-02-18 RX ADMIN — Medication 60 MILLIGRAM(S): at 05:57

## 2022-02-18 RX ADMIN — ATORVASTATIN CALCIUM 80 MILLIGRAM(S): 80 TABLET, FILM COATED ORAL at 21:36

## 2022-02-18 NOTE — CHART NOTE - NSCHARTNOTESELECT_GEN_ALL_CORE
EEG Prelim
Event Note
Event Note
MAR ACCEPT NOTE
Nutrition Services
Transfer Note
Vascular Fellow Note/Event Note
Compression/Event Note
Event Note
Follow-up note/Nutrition Services
Goals of Care/Event Note
Goals of Care/Event Note
IR/Event Note
L axill flaca dc'd - MICU
Nutrition Services
POCUS
Palliative/Event Note
Palliative/Event Note
anemia/Event Note

## 2022-02-18 NOTE — CHART NOTE - NSCHARTNOTEFT_GEN_A_CORE
TIANA BROWN    patient's CT of head and neck are done, but no official reading.      Interventions taken     Called neuro team at [phone] regarding the result, and no new order at present.  Neuro check Q 4 hr  check UA and ammonia level   cont assess and monitor  f/u with am team.                    Robin Valle ANP-BC  Spectralink #[id number]

## 2022-02-18 NOTE — PROGRESS NOTE ADULT - ASSESSMENT
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 Neurology originally consulted due concern for seizure. EEG noted fluctuating periodic discharges of triphasic morphology. Patient has been subsequently extubated and moved a general floor. Currently following commands, oriented to at least self and similar exam to when previously admitted to neurology with noted acute generalized weakness. LP studies unremarkable for infection.     On 2/18, Neurology re-consulted for decrease in level of consciousness, found to have pH 7.6 on ABG1    Impression: Ongoing weakness and fluctuating encephalopathy likely secondary to hospitalization s/p COVID PNA and intubation, resolved hypernatremia and ANIKA with likely underlying delirium and cognitive deficits. Symptoms improved. On 2/18, Neurology re-consulted for decrease in level of consciousness, found to have pH 7.6 on ABG; most likely that change in mental status represents encephalopathy due to alkalosis    Plan:  [] CTH and CTA H/N, urgent  [] Please call Neurology Spectra #[id number] when CT scans completed  [] Alkalosis workup, as per primary team  [] No need for anti-seizure medications at this time  [] Avoid hypotension given patient's h/o ischemic stroke  [] patient's AC discontinued 2/3 i/s/o RP bleed; would restart AC when medically cleared and if consented to risks and benefits  [] Q4H neuro checks  [] Q4H vital signs    [] Please have adequate sleeping environment for the patient, light on, curtains open, TV on during the day with regular stimulation and out of bed to chair if possible. During the night, close curtains, TV off, lights off, and minimize interruptions (limit blood draws and vital sign checks if possible). Regular interaction with patient with staff (introducing selves), frequent reorientation, and encouragement of visitors as allowed by hospital and unit policy. Regular toileting.  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 Neurology originally consulted due concern for seizure. EEG noted fluctuating periodic discharges of triphasic morphology. Patient has been subsequently extubated and moved a general floor. Currently following commands, oriented to at least self and similar exam to when previously admitted to neurology with noted acute generalized weakness. LP studies unremarkable for infection.     On 2/18, Neurology re-consulted for decrease in level of consciousness, found to have pH 7.6 on ABG1    Impression: Ongoing weakness and fluctuating encephalopathy likely secondary to hospitalization s/p COVID PNA and intubation, resolved hypernatremia and ANIKA with likely underlying delirium and cognitive deficits. Symptoms improved. On 2/18, Neurology re-consulted for decrease in level of consciousness, found to have pH 7.6 on ABG; most likely that change in mental status represents encephalopathy due to alkalosis; will r/o stroke w/ CTH CTA H/N, i/s/o PMHx AFib w/ inpatient discontinuation of AC d/t RP bleed.    Plan:  [] CTH and CTA H/N, urgent  [] Please call Neurology Spectra #[phone] when CT scans completed  [] Alkalosis workup, as per primary team  [] No need for anti-seizure medications at this time  [] Avoid hypotension given patient's h/o ischemic stroke  [] patient's AC discontinued 2/3 i/s/o RP bleed; would restart AC when medically cleared and if consented to risks and benefits  [] Q4H neuro checks  [] Q4H vital signs    [] Please have adequate sleeping environment for the patient, light on, curtains open, TV on during the day with regular stimulation and out of bed to chair if possible. During the night, close curtains, TV off, lights off, and minimize interruptions (limit blood draws and vital sign checks if possible). Regular interaction with patient with staff (introducing selves), frequent reorientation, and encouragement of visitors as allowed by hospital and unit policy. Regular toileting.  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 Neurology originally consulted due concern for seizure. EEG noted fluctuating periodic discharges of triphasic morphology. Patient has been subsequently extubated and moved a general floor. Currently following commands, oriented to at least self and similar exam to when previously admitted to neurology with noted acute generalized weakness. LP studies were unremarkable for infection.     On 2/18, Neurology re-consulted for decrease in level of consciousness, found to have pH 7.6 on 2/18/22 ABG. Of note, patient's hospital course c/b acute drop in H&H, found to have RP bleed on CT 2/3/22, w/ subsequent discontinuation of anticoagulation. Hospital course also c/b multiple fevers (currently afebrile), thought to be 2/2 hematoma, s/p meropenem x7 days.    Impression: Ongoing weakness and fluctuating encephalopathy likely secondary to hospitalization s/p COVID PNA and intubation, resolved hypernatremia and ANIKA with likely underlying delirium and cognitive deficits. Symptoms had improved. On 2/18, Neurology re-consulted for decrease in level of consciousness, found to have pH 7.6 on ABG; most likely that change in mental status represents encephalopathy due to alkalosis; will r/o stroke w/ CTH CTA H/N, i/s/o PMHx AFib w/ inpatient discontinuation of AC d/t RP bleed.    Plan:  [] CTH and CTA H/N, urgent  [] Please call Neurology Spectra #[phone] when CT scans completed  [] Alkalosis workup, as per primary team  [] No need for anti-seizure medications at this time  [] Avoid hypotension given patient's h/o ischemic stroke  [] patient's AC discontinued 2/3 i/s/o RP bleed; would restart AC when medically cleared and if consented to risks and benefits  [] Q4H neuro checks  [] Q4H vital signs    [] Please have adequate sleeping environment for the patient, light on, curtains open, TV on during the day with regular stimulation and out of bed to chair if possible. During the night, close curtains, TV off, lights off, and minimize interruptions (limit blood draws and vital sign checks if possible). Regular interaction with patient with staff (introducing selves), frequent reorientation, and encouragement of visitors as allowed by hospital and unit policy. Regular toileting.  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 Neurology originally consulted due concern for seizure. EEG noted fluctuating periodic discharges of triphasic morphology. Patient has been subsequently extubated and moved a general floor. Currently following commands, oriented to at least self and similar exam to when previously admitted to neurology with noted acute generalized weakness. LP studies were unremarkable for infection.     On 2/18, Neurology re-consulted for decrease in level of consciousness, found to have pH 7.6 on 2/18/22 ABG. Of note, patient's hospital course c/b acute drop in H&H, found to have RP bleed on CT 2/3/22, w/ subsequent discontinuation of anticoagulation. Hospital course also c/b multiple fevers (currently afebrile), thought to be 2/2 hematoma, s/p meropenem x7 days.     LKN: patient w/ change in mental status noted 2/18/2 compared to unknown time on 2/17/22  NIHSS: 20 (see body of subjective and objective for details)    Impression: Ongoing weakness and fluctuating encephalopathy likely secondary to hospitalization s/p COVID PNA and intubation, resolved hypernatremia and ANIKA with likely underlying delirium and cognitive deficits. Symptoms had improved. On 2/18, Neurology re-consulted for decrease in level of consciousness, found to have pH 7.6 on ABG; most likely that change in mental status represents encephalopathy due to alkalosis; will r/o stroke w/ CTH CTA H/N, i/s/o PMHx AFib w/ inpatient discontinuation of AC d/t RP bleed.    Plan:  [] CTH and CTA H/N, urgent  [] Please call Neurology Spectra #[id number] when CT scans completed  [] Alkalosis workup, as per primary team  [] No need for anti-seizure medications at this time  [] Avoid hypotension given patient's h/o ischemic stroke  [] patient's AC discontinued 2/3 i/s/o RP bleed; would restart AC when medically cleared and if consented to risks and benefits  [] Q4H neuro checks  [] Q4H vital signs    [] Please have adequate sleeping environment for the patient, light on, curtains open, TV on during the day with regular stimulation and out of bed to chair if possible. During the night, close curtains, TV off, lights off, and minimize interruptions (limit blood draws and vital sign checks if possible). Regular interaction with patient with staff (introducing selves), frequent reorientation, and encouragement of visitors as allowed by hospital and unit policy. Regular toileting.  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 from SNF after being found to have AMS, fever w/ Tmax 104, found to be obtunded, mottled, hypotensive SBP 80s refractory to 2.5L IVF requiring norepinephrine, fever 104.5, hypernatremic to 161, ANIKA w/ sCr 4.75 (baseline 0.77-1.22), AGMA 25, w/ contraction alkalosis w/ serum HCO3 of 23, lactate 3.2, w/ 9L total body water deficit. Neurology originally consulted due concern for seizure. EEG noted fluctuating periodic discharges of triphasic morphology. Patient has been subsequently extubated and moved a general floor. Currently following commands, oriented to at least self and similar exam to when previously admitted to neurology with noted acute generalized weakness. LP studies were unremarkable for infection.     On 2/18, Neurology re-consulted for decrease in level of consciousness, found to have pH 7.6 on 2/18/22 ABG. Of note, patient's hospital course c/b acute drop in H&H, found to have RP bleed on CT 2/3/22, w/ subsequent discontinuation of anticoagulation. Hospital course also c/b multiple fevers (currently afebrile), thought to be 2/2 hematoma, s/p meropenem x7 days.     LKN: patient w/ change in mental status noted 2/18/2 compared to unknown time on 2/17/22  NIHSS: 20 (see body of subjective and objective for details)    Impression: Ongoing weakness and fluctuating encephalopathy likely secondary to hospitalization s/p COVID PNA and intubation, resolved hypernatremia and ANIKA with likely underlying delirium and cognitive deficits. Symptoms had improved. On 2/18, Neurology re-consulted for decrease in level of consciousness, found to have pH 7.6 on ABG; most likely that change in mental status represents encephalopathy due to alkalosis; will r/o stroke w/ CTH CTA H/N, i/s/o PMHx AFib w/ inpatient discontinuation of AC d/t RP bleed.    Plan:  [] CTH and CTA H/N, urgent  [] Please call Neurology Spectra #[phone] when CT scans completed  [] Alkalosis workup, as per primary team  [] Repeat ABG PRN, as per primary  [] CMP, ammonia, UA  [] No need for anti-seizure medications at this time  [] Avoid hypotension given patient's h/o ischemic stroke  [] patient's AC discontinued 2/3 i/s/o RP bleed; would restart AC when medically cleared and if consented to risks and benefits  [] Q4H neuro checks  [] Q4H vital signs    [] Please have adequate sleeping environment for the patient, light on, curtains open, TV on during the day with regular stimulation and out of bed to chair if possible. During the night, close curtains, TV off, lights off, and minimize interruptions (limit blood draws and vital sign checks if possible). Regular interaction with patient with staff (introducing selves), frequent reorientation, and encouragement of visitors as allowed by hospital and unit policy. Regular toileting.  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 from SNF after being found to have AMS, fever w/ Tmax 104, found to be obtunded, mottled, hypotensive SBP 80s refractory to 2.5L IVF requiring norepinephrine, fever 104.5, hypernatremic to 161, ANIKA w/ sCr 4.75 (baseline 0.77-1.22), AGMA 25, w/ contraction alkalosis w/ serum HCO3 of 23, lactate 3.2, w/ 9L total body water deficit. Neurology originally consulted due concern for seizure. EEG noted fluctuating periodic discharges of triphasic morphology. Patient has been subsequently extubated and moved a general floor. Currently following commands, oriented to at least self and similar exam to when previously admitted to neurology with noted acute generalized weakness. LP studies were unremarkable for infection.     On 2/18, Neurology re-consulted for decrease in level of consciousness, found to have pH 7.6 on 2/18/22 ABG. Of note, patient's hospital course c/b acute drop in H&H, found to have RP bleed on CT 2/3/22, w/ subsequent discontinuation of anticoagulation. Hospital course also c/b multiple fevers (currently afebrile), thought to be 2/2 hematoma, s/p meropenem x7 days.     LKN: patient w/ change in mental status noted 2/18/2 compared to unknown time on 2/17/22  NIHSS: 20 (see body of subjective and objective for details)    Impression: Ongoing weakness and fluctuating encephalopathy likely secondary to hospitalization s/p COVID PNA and intubation, resolved hypernatremia and ANIKA with likely underlying delirium and cognitive deficits. Symptoms had improved. On 2/18, Neurology re-consulted for decrease in level of consciousness, found to have pH 7.6 on ABG; suspect that recent worsening of mental status represents metabolic encephalopathy in setting of alkalemia; will r/o stroke w/ CTH CTA H/N, i/s/o PMHx AFib w/ inpatient discontinuation of AC d/t RP bleed.    Plan:  [] CTH and CTA H/N, urgent  [] Please call Neurology Spectra #[phone] when CT scans completed  [] Alkalemia workup, as per primary team  [] Repeat ABG PRN, as per primary  [] CMP, ammonia, UA  [] No need for anti-seizure medications at this time  [] Avoid hypotension given patient's h/o ischemic stroke  [] patient's AC discontinued 2/3 i/s/o RP bleed; would restart AC when medically cleared and if consented to risks and benefits  [] Q4H neuro checks  [] Q4H vital signs    [] Please have adequate sleeping environment for the patient, light on, curtains open, TV on during the day with regular stimulation and out of bed to chair if possible. During the night, close curtains, TV off, lights off, and minimize interruptions (limit blood draws and vital sign checks if possible). Regular interaction with patient with staff (introducing selves), frequent reorientation, and encouragement of visitors as allowed by hospital and unit policy. Regular toileting.  [] If needed for sleep may administer melatonin 3mg  [] Limitation of sedating medications as tolerated  [] Maintain electrolytes within normal limits

## 2022-02-18 NOTE — PROGRESS NOTE ADULT - PROBLEM SELECTOR PLAN 1
AMS 2/2 Metabolic encephalopathy  - currently AO*0, awakens to voice  Hx CVA with residual left sided weak/flaccid  1/24 VEEG Negative likely-metabolic encephalopathy    1/21 CTH without acute changes, re demonstration of lacuna infarcts   s/p LP, glucose 86, protein 48- negative. all abx and anti-viral dcd  mental status waxes and wanes  appreciate neuro re-eval  Palliative consult for GOC->remains full code  Had PEG placed on 2/15, tolerating tube feeds well   Less responsive today on 2/18, not opening eyes or responding to name   Will get ABG and neurology re-eval, this is a change from baseline

## 2022-02-18 NOTE — PROGRESS NOTE ADULT - PROBLEM SELECTOR PLAN 2
Multiple fevers, currently afeb  BC with NGTD, UC in lab, likely contaminant  CT chest A/P with improvement in hematoma  appreciate ID recs  from asp PNA vs hematoma   had started empiric vanc and cefepime, on meropenem for better anerobic coverage  ID following appreciate recs  likely 2/2 to hematoma, completed 7 day course of meropenem   remains afebrile

## 2022-02-18 NOTE — PROGRESS NOTE ADULT - SUBJECTIVE AND OBJECTIVE BOX
Patient is a 71y old  Female who presents with a chief complaint of AMS (17 Feb 2022 14:07)    SUBJECTIVE / OVERNIGHT EVENTS: patient noted to be more somnolent and less interactive today, same reported by RN. No other acute events overnight     MEDICATIONS  (STANDING):  atorvastatin 80 milliGRAM(s) Oral at bedtime  chlorhexidine 4% Liquid 1 Application(s) Topical <User Schedule>  diltiazem    Tablet 60 milliGRAM(s) Enteral Tube every 6 hours  insulin lispro (ADMELOG) corrective regimen sliding scale   SubCutaneous every 6 hours  levothyroxine Injectable 56 MICROGram(s) IV Push at bedtime  multivitamin/minerals/iron Oral Solution (CENTRUM) 15 milliLiter(s) Oral daily  polyethylene glycol 3350 17 Gram(s) Oral daily  senna Syrup 10 milliLiter(s) Oral at bedtime    MEDICATIONS  (PRN):  acetaminophen    Suspension .. 650 milliGRAM(s) Oral every 6 hours PRN Temp greater or equal to 38C (100.4F)  hydrALAZINE Injectable 5 milliGRAM(s) IV Push every 4 hours PRN SBP >160      Vital Signs Last 24 Hrs  T(C): 36.2 (18 Feb 2022 05:53), Max: 36.8 (17 Feb 2022 13:22)  T(F): 97.2 (18 Feb 2022 05:53), Max: 98.3 (17 Feb 2022 13:22)  HR: 87 (18 Feb 2022 06:05) (87 - 100)  BP: 162/86 (18 Feb 2022 06:05) (137/82 - 170/89)  BP(mean): --  RR: 18 (18 Feb 2022 05:53) (18 - 18)  SpO2: 96% (18 Feb 2022 05:53) (93% - 96%)  CAPILLARY BLOOD GLUCOSE      POCT Blood Glucose.: 138 mg/dL (18 Feb 2022 05:55)  POCT Blood Glucose.: 136 mg/dL (18 Feb 2022 00:00)  POCT Blood Glucose.: 142 mg/dL (17 Feb 2022 18:22)    I&O's Summary    17 Feb 2022 07:01  -  18 Feb 2022 07:00  --------------------------------------------------------  IN: 1700 mL / OUT: 275 mL / NET: 1425 mL    18 Feb 2022 07:01  -  18 Feb 2022 12:20  --------------------------------------------------------  IN: 0 mL / OUT: 0 mL / NET: 0 mL    PHYSICAL EXAM:  GENERAL: NAD  EYES: conjunctiva and sclera clear  CHEST/LUNG: no wheezing noted  HEART: +S1/S2   ABDOMEN: Soft, Nontender, Nondistended; G tube in place   EXTREMITIES: no LE edema   PSYCH: not opening eyes to name, makes grunting sounds but not following any commands     LABS:                        8.7    4.98  )-----------( 417      ( 18 Feb 2022 11:13 )             28.5     02-18    137  |  99  |  19  ----------------------------<  132<H>  3.5   |  30  |  0.41<L>    Ca    8.4      18 Feb 2022 06:47

## 2022-02-18 NOTE — PROGRESS NOTE ADULT - ATTENDING COMMENTS
Interval history as per resident note, personally verified by me    FHx: Non-contributory    ROS: Due to clinical condition unable to assess (JENNA)    Obtunded to stuporous, no speech output, does not follow commands  RUE at least 4/5, LUE at least 3+/5, BLE's at least 2/5  LT/PP intact all  Neutral b/l plantar response    A/P:  Encephalopathy  Atrial fibrillation  Heart failure  Prior strokes  COVID-19 infection  Anemia    - Etiology likely secondary to multiple medical problems but agree prudent to rule out new cerebrovascular event  - Agree with head imaging and labs per resident note  - If above negative and mental status remains poor would consider rEEG to evaluate for focal slowing, epileptiform discharges, or seizures  - Continue to address above medical problems, as you are doing  - Will continue to follow patient with you pending results of above studies

## 2022-02-18 NOTE — PROGRESS NOTE ADULT - SUBJECTIVE AND OBJECTIVE BOX
Neurology  Progress Note  02-18-22    Name: TIANA BROWN 71y    Interval History / ROS: ***    Medications:  MEDICATIONS  (STANDING):  atorvastatin 80 milliGRAM(s) Oral at bedtime  chlorhexidine 4% Liquid 1 Application(s) Topical <User Schedule>  diltiazem    Tablet 60 milliGRAM(s) Enteral Tube every 6 hours  insulin lispro (ADMELOG) corrective regimen sliding scale   SubCutaneous every 6 hours  levothyroxine Injectable 56 MICROGram(s) IV Push at bedtime  multivitamin/minerals/iron Oral Solution (CENTRUM) 15 milliLiter(s) Oral daily  polyethylene glycol 3350 17 Gram(s) Oral daily  senna Syrup 10 milliLiter(s) Oral at bedtime    MEDICATIONS  (PRN):  acetaminophen    Suspension .. 650 milliGRAM(s) Oral every 6 hours PRN Temp greater or equal to 38C (100.4F)  hydrALAZINE Injectable 5 milliGRAM(s) IV Push every 4 hours PRN SBP >160      Vitals:  T(C): 36.2 (02-18-22 @ 05:53), Max: 36.8 (02-17-22 @ 13:22)  HR: 87 (02-18-22 @ 06:05) (87 - 100)  BP: 162/86 (02-18-22 @ 06:05) (137/82 - 170/89)  RR: 18 (02-18-22 @ 05:53) (18 - 18)  SpO2: 96% (02-18-22 @ 05:53) (93% - 96%)    Physical Examination:      Neurological Examination:    Mental status: eyes open to loud verbal stimulation; only verbalization during this encounter was the word "yes," followed simple command ("close your eyes... now open them," "make a fist").  Cranial Nerves: EOM grossly intact at least horizontally; eyes not opened consistently enough to assess BTT; v1-3 intact, left facial droop (drooling through left side of mouth)  Motor: moves upper extremities at least antigravity, unable to assess whether lateralizing; not seen moving bilateral lower extremities.  Sensation: does not withdraw to noxious stimuli applied to nailbeds in b/l LE  Coordination: unable to assess i/s/o decreased level of consciousness  Cortical: unable to assess i/s/o decreased level of consciousness    Labs:                        8.7    4.98  )-----------( 417      ( 18 Feb 2022 11:13 )             28.5     02-18    137  |  99  |  19  ----------------------------<  132<H>  3.5   |  30  |  0.41<L>    Ca    8.4      18 Feb 2022 06:47      CAPILLARY BLOOD GLUCOSE      POCT Blood Glucose.: 129 mg/dL (18 Feb 2022 12:32)     Radiology:  CT Head No Cont (01.23.22 @ 23:08)  FINDINGS: The study is limited by streak artifact from metallic dental   work and external wires    VENTRICLES AND SULCI:  Prominent in size compatible with age-appropriate   volume loss  INTRA-AXIAL:  Moderate microvascular type white matter changes are again   seen. Chronic lacunae are seen within the left corona radiata and right   thalamus, unchanged. Nodefinite mass effect or hemorrhage is seen.  EXTRA-AXIAL:  A plaque-like calcification is seen along the left   frontoparietal convexity inner table likely representing an ossified   meningioma, unchanged.  VISUALIZED SINUSES:  Maxillary polyps or retention cysts.  VISUALIZED MASTOIDS:  Clear.  CALVARIUM:  Normal.  MISCELLANEOUS:  ET tube and OG tube in place.    IMPRESSION:  Limited exam.    No definite mass effect or hemorrhage identified.    Repeat study advised as clinically warranted.    Neurology  Progress Note  02-18-22    Name: TIANA BROWN 71y    Interval History / ROS:   -Hospital course includes multiple fevers (currently afebrile), thought to be 2/2 hematoma given negative w/u; s/p meropenem x7 days  -Acute drop in H&H, transfused 2u pRBCs  -Patient noted to have had a retroperitoneal bleed on CT 2/3/22  -Anticoagulation discontinued on 2/3/22  -Per primary team, patient noted to be more somnolent and less interactive today 2/18/22 as compared to 2/17/22.      Medications:  MEDICATIONS  (STANDING):  atorvastatin 80 milliGRAM(s) Oral at bedtime  chlorhexidine 4% Liquid 1 Application(s) Topical <User Schedule>  diltiazem    Tablet 60 milliGRAM(s) Enteral Tube every 6 hours  insulin lispro (ADMELOG) corrective regimen sliding scale   SubCutaneous every 6 hours  levothyroxine Injectable 56 MICROGram(s) IV Push at bedtime  multivitamin/minerals/iron Oral Solution (CENTRUM) 15 milliLiter(s) Oral daily  polyethylene glycol 3350 17 Gram(s) Oral daily  senna Syrup 10 milliLiter(s) Oral at bedtime    MEDICATIONS  (PRN):  acetaminophen    Suspension .. 650 milliGRAM(s) Oral every 6 hours PRN Temp greater or equal to 38C (100.4F)  hydrALAZINE Injectable 5 milliGRAM(s) IV Push every 4 hours PRN SBP >160      Vitals:  T(C): 36.2 (02-18-22 @ 05:53), Max: 36.8 (02-17-22 @ 13:22)  HR: 87 (02-18-22 @ 06:05) (87 - 100)  BP: 162/86 (02-18-22 @ 06:05) (137/82 - 170/89)  RR: 18 (02-18-22 @ 05:53) (18 - 18)  SpO2: 96% (02-18-22 @ 05:53) (93% - 96%)    Physical Examination:    Constitutional: diaphoretic  Respiratory: no increased work of breathing, no tachypnea, on room air    Neurological Examination:    Mental status: obtunded (eyes open to loud verbal stimulation and vigorous physical stimuli); only verbalization during this encounter was the word "yes," followed simple command ("close your eyes... now open them," "make a fist").  Cranial Nerves: EOM grossly intact at least horizontally; eyes not opened consistently enough to assess BTT; v1-3 intact, left facial droop (drooling through left side of mouth)  Motor: moves upper extremities at least antigravity, unable to assess whether lateralizing; wiggles toes b/l lower extremities  Sensation: does not withdraw to noxious stimuli applied to nailbeds in b/l LE; grimaces to similar stimuli in b/l UE.  Coordination: unable to assess i/s/o decreased level of consciousness  Cortical: unable to assess i/s/o decreased level of consciousness    Labs:                        8.7    4.98  )-----------( 417      ( 18 Feb 2022 11:13 )             28.5     02-18    137  |  99  |  19  ----------------------------<  132<H>  3.5   |  30  |  0.41<L>    Ca    8.4      18 Feb 2022 06:47      CAPILLARY BLOOD GLUCOSE      POCT Blood Glucose.: 129 mg/dL (18 Feb 2022 12:32)     Radiology:  CT Head No Cont (01.23.22 @ 23:08)  FINDINGS: The study is limited by streak artifact from metallic dental   work and external wires    VENTRICLES AND SULCI:  Prominent in size compatible with age-appropriate   volume loss  INTRA-AXIAL:  Moderate microvascular type white matter changes are again   seen. Chronic lacunae are seen within the left corona radiata and right   thalamus, unchanged. No definite mass effect or hemorrhage is seen.  EXTRA-AXIAL:  A plaque-like calcification is seen along the left   frontoparietal convexity inner table likely representing an ossified   meningioma, unchanged.  VISUALIZED SINUSES:  Maxillary polyps or retention cysts.  VISUALIZED MASTOIDS:  Clear.  CALVARIUM:  Normal.  MISCELLANEOUS:  ET tube and OG tube in place.    IMPRESSION:  Limited exam.    No definite mass effect or hemorrhage identified.    Repeat study advised as clinically warranted.    Neurology  Progress Note  02-18-22    Name: TIANA BROWN 71y    Interval History / ROS:   -Hospital course includes multiple fevers (currently afebrile), thought to be 2/2 hematoma given negative w/u; s/p meropenem x7 days  -Acute drop in H&H, transfused 2u pRBCs  -Patient noted to have had a retroperitoneal bleed on CT 2/3/22  -Anticoagulation discontinued on 2/3/22  -Per primary team, patient noted to be more somnolent and less interactive today 2/18/22 as compared to 2/17/22.      Medications:  MEDICATIONS  (STANDING):  atorvastatin 80 milliGRAM(s) Oral at bedtime  chlorhexidine 4% Liquid 1 Application(s) Topical <User Schedule>  diltiazem    Tablet 60 milliGRAM(s) Enteral Tube every 6 hours  insulin lispro (ADMELOG) corrective regimen sliding scale   SubCutaneous every 6 hours  levothyroxine Injectable 56 MICROGram(s) IV Push at bedtime  multivitamin/minerals/iron Oral Solution (CENTRUM) 15 milliLiter(s) Oral daily  polyethylene glycol 3350 17 Gram(s) Oral daily  senna Syrup 10 milliLiter(s) Oral at bedtime    MEDICATIONS  (PRN):  acetaminophen    Suspension .. 650 milliGRAM(s) Oral every 6 hours PRN Temp greater or equal to 38C (100.4F)  hydrALAZINE Injectable 5 milliGRAM(s) IV Push every 4 hours PRN SBP >160      Vitals:  T(C): 36.2 (02-18-22 @ 05:53), Max: 36.8 (02-17-22 @ 13:22)  HR: 87 (02-18-22 @ 06:05) (87 - 100)  BP: 162/86 (02-18-22 @ 06:05) (137/82 - 170/89)  RR: 18 (02-18-22 @ 05:53) (18 - 18)  SpO2: 96% (02-18-22 @ 05:53) (93% - 96%)    Physical Examination:    Constitutional: diaphoretic  Respiratory: no increased work of breathing, no tachypnea, on room air    Neurological Examination:    Mental status: obtunded (eyes open to loud verbal stimulation and vigorous physical stimuli); only verbalization during this encounter was the word "yes," followed simple command ("close your eyes... now open them," "make a fist").  Cranial Nerves: EOM grossly intact at least horizontally; eyes not opened consistently enough to assess BTT; v1-3 intact, left facial droop (drooling through left side of mouth)  Motor: moves upper extremities at least antigravity, unable to assess whether lateralizing; wiggles toes b/l lower extremities  Sensation: does not withdraw to noxious stimuli applied to nailbeds in b/l LE; grimaces to similar stimuli in b/l UE.  Coordination: unable to assess i/s/o decreased level of consciousness  Cortical: unable to assess i/s/o decreased level of consciousness    NIHSS: 20  1A: +2  1B: +2  1C: 0  2: 0  3: 0  4: +2  5A: +2  5B: +2  6A: +3  6B: +3  7: 0  8: +1  9: +3  10: 0  11: 0    Labs:                        8.7    4.98  )-----------( 417      ( 18 Feb 2022 11:13 )             28.5     02-18    137  |  99  |  19  ----------------------------<  132<H>  3.5   |  30  |  0.41<L>    Ca    8.4      18 Feb 2022 06:47      CAPILLARY BLOOD GLUCOSE      POCT Blood Glucose.: 129 mg/dL (18 Feb 2022 12:32)     Radiology:  CT Head No Cont (01.23.22 @ 23:08)  FINDINGS: The study is limited by streak artifact from metallic dental   work and external wires    VENTRICLES AND SULCI:  Prominent in size compatible with age-appropriate   volume loss  INTRA-AXIAL:  Moderate microvascular type white matter changes are again   seen. Chronic lacunae are seen within the left corona radiata and right   thalamus, unchanged. No definite mass effect or hemorrhage is seen.  EXTRA-AXIAL:  A plaque-like calcification is seen along the left   frontoparietal convexity inner table likely representing an ossified   meningioma, unchanged.  VISUALIZED SINUSES:  Maxillary polyps or retention cysts.  VISUALIZED MASTOIDS:  Clear.  CALVARIUM:  Normal.  MISCELLANEOUS:  ET tube and OG tube in place.    IMPRESSION:  Limited exam.    No definite mass effect or hemorrhage identified.    Repeat study advised as clinically warranted.

## 2022-02-19 LAB
AMMONIA BLD-MCNC: 20 UMOL/L — SIGNIFICANT CHANGE UP (ref 11–55)
ANION GAP SERPL CALC-SCNC: 9 MMOL/L — SIGNIFICANT CHANGE UP (ref 5–17)
BASOPHILS # BLD AUTO: 0.04 K/UL — SIGNIFICANT CHANGE UP (ref 0–0.2)
BASOPHILS NFR BLD AUTO: 0.7 % — SIGNIFICANT CHANGE UP (ref 0–2)
BUN SERPL-MCNC: 14 MG/DL — SIGNIFICANT CHANGE UP (ref 7–23)
CALCIUM SERPL-MCNC: 8.3 MG/DL — LOW (ref 8.4–10.5)
CHLORIDE SERPL-SCNC: 98 MMOL/L — SIGNIFICANT CHANGE UP (ref 96–108)
CO2 SERPL-SCNC: 28 MMOL/L — SIGNIFICANT CHANGE UP (ref 22–31)
CREAT SERPL-MCNC: 0.33 MG/DL — LOW (ref 0.5–1.3)
EOSINOPHIL # BLD AUTO: 0.11 K/UL — SIGNIFICANT CHANGE UP (ref 0–0.5)
EOSINOPHIL NFR BLD AUTO: 1.9 % — SIGNIFICANT CHANGE UP (ref 0–6)
GLUCOSE BLDC GLUCOMTR-MCNC: 109 MG/DL — HIGH (ref 70–99)
GLUCOSE BLDC GLUCOMTR-MCNC: 111 MG/DL — HIGH (ref 70–99)
GLUCOSE BLDC GLUCOMTR-MCNC: 126 MG/DL — HIGH (ref 70–99)
GLUCOSE BLDC GLUCOMTR-MCNC: 128 MG/DL — HIGH (ref 70–99)
GLUCOSE BLDC GLUCOMTR-MCNC: 129 MG/DL — HIGH (ref 70–99)
GLUCOSE SERPL-MCNC: 127 MG/DL — HIGH (ref 70–99)
HCT VFR BLD CALC: 33 % — LOW (ref 34.5–45)
HGB BLD-MCNC: 10.1 G/DL — LOW (ref 11.5–15.5)
IMM GRANULOCYTES NFR BLD AUTO: 0.7 % — SIGNIFICANT CHANGE UP (ref 0–1.5)
LYMPHOCYTES # BLD AUTO: 0.81 K/UL — LOW (ref 1–3.3)
LYMPHOCYTES # BLD AUTO: 14.2 % — SIGNIFICANT CHANGE UP (ref 13–44)
MAGNESIUM SERPL-MCNC: 2 MG/DL — SIGNIFICANT CHANGE UP (ref 1.6–2.6)
MCHC RBC-ENTMCNC: 27.9 PG — SIGNIFICANT CHANGE UP (ref 27–34)
MCHC RBC-ENTMCNC: 30.6 GM/DL — LOW (ref 32–36)
MCV RBC AUTO: 91.2 FL — SIGNIFICANT CHANGE UP (ref 80–100)
MONOCYTES # BLD AUTO: 0.46 K/UL — SIGNIFICANT CHANGE UP (ref 0–0.9)
MONOCYTES NFR BLD AUTO: 8.1 % — SIGNIFICANT CHANGE UP (ref 2–14)
NEUTROPHILS # BLD AUTO: 4.23 K/UL — SIGNIFICANT CHANGE UP (ref 1.8–7.4)
NEUTROPHILS NFR BLD AUTO: 74.4 % — SIGNIFICANT CHANGE UP (ref 43–77)
NRBC # BLD: 0 /100 WBCS — SIGNIFICANT CHANGE UP (ref 0–0)
PHOSPHATE SERPL-MCNC: 3.7 MG/DL — SIGNIFICANT CHANGE UP (ref 2.5–4.5)
PLATELET # BLD AUTO: 501 K/UL — HIGH (ref 150–400)
POTASSIUM SERPL-MCNC: 3.7 MMOL/L — SIGNIFICANT CHANGE UP (ref 3.5–5.3)
POTASSIUM SERPL-SCNC: 3.7 MMOL/L — SIGNIFICANT CHANGE UP (ref 3.5–5.3)
RBC # BLD: 3.62 M/UL — LOW (ref 3.8–5.2)
RBC # FLD: 15.9 % — HIGH (ref 10.3–14.5)
SARS-COV-2 RNA SPEC QL NAA+PROBE: SIGNIFICANT CHANGE UP
SODIUM SERPL-SCNC: 135 MMOL/L — SIGNIFICANT CHANGE UP (ref 135–145)
WBC # BLD: 5.69 K/UL — SIGNIFICANT CHANGE UP (ref 3.8–10.5)
WBC # FLD AUTO: 5.69 K/UL — SIGNIFICANT CHANGE UP (ref 3.8–10.5)

## 2022-02-19 PROCEDURE — 99233 SBSQ HOSP IP/OBS HIGH 50: CPT

## 2022-02-19 RX ADMIN — Medication 56 MICROGRAM(S): at 21:45

## 2022-02-19 RX ADMIN — Medication 60 MILLIGRAM(S): at 14:03

## 2022-02-19 RX ADMIN — Medication 15 MILLILITER(S): at 14:03

## 2022-02-19 RX ADMIN — Medication 60 MILLIGRAM(S): at 00:14

## 2022-02-19 RX ADMIN — POLYETHYLENE GLYCOL 3350 17 GRAM(S): 17 POWDER, FOR SOLUTION ORAL at 14:03

## 2022-02-19 RX ADMIN — Medication 60 MILLIGRAM(S): at 05:59

## 2022-02-19 RX ADMIN — ATORVASTATIN CALCIUM 80 MILLIGRAM(S): 80 TABLET, FILM COATED ORAL at 21:45

## 2022-02-19 RX ADMIN — CHLORHEXIDINE GLUCONATE 1 APPLICATION(S): 213 SOLUTION TOPICAL at 14:00

## 2022-02-19 RX ADMIN — SENNA PLUS 10 MILLILITER(S): 8.6 TABLET ORAL at 21:45

## 2022-02-19 RX ADMIN — Medication 60 MILLIGRAM(S): at 18:42

## 2022-02-19 NOTE — PROGRESS NOTE ADULT - SUBJECTIVE AND OBJECTIVE BOX
Patient is a 71y old  Female who presents with a chief complaint of AMS (18 Feb 2022 13:09)      SUBJECTIVE / OVERNIGHT EVENTS: appears comfortable     MEDICATIONS  (STANDING):  atorvastatin 80 milliGRAM(s) Oral at bedtime  chlorhexidine 4% Liquid 1 Application(s) Topical <User Schedule>  diltiazem    Tablet 60 milliGRAM(s) Enteral Tube every 6 hours  insulin lispro (ADMELOG) corrective regimen sliding scale   SubCutaneous every 6 hours  levothyroxine Injectable 56 MICROGram(s) IV Push at bedtime  multivitamin/minerals/iron Oral Solution (CENTRUM) 15 milliLiter(s) Oral daily  polyethylene glycol 3350 17 Gram(s) Oral daily  senna Syrup 10 milliLiter(s) Oral at bedtime    MEDICATIONS  (PRN):  acetaminophen    Suspension .. 650 milliGRAM(s) Oral every 6 hours PRN Temp greater or equal to 38C (100.4F)  hydrALAZINE Injectable 5 milliGRAM(s) IV Push every 4 hours PRN SBP >160        CAPILLARY BLOOD GLUCOSE      POCT Blood Glucose.: 128 mg/dL (19 Feb 2022 12:05)  POCT Blood Glucose.: 129 mg/dL (19 Feb 2022 05:58)  POCT Blood Glucose.: 109 mg/dL (18 Feb 2022 23:59)  POCT Blood Glucose.: 114 mg/dL (18 Feb 2022 17:58)  POCT Blood Glucose.: 112 mg/dL (18 Feb 2022 17:46)    I&O's Summary    18 Feb 2022 07:01  -  19 Feb 2022 07:00  --------------------------------------------------------  IN: 1060 mL / OUT: 1900 mL / NET: -840 mL    19 Feb 2022 07:01  -  19 Feb 2022 17:04  --------------------------------------------------------  IN: 680 mL / OUT: 300 mL / NET: 380 mL        PHYSICAL EXAM:  GENERAL: NAD  EYES: conjunctiva and sclera clear  CHEST/LUNG: no wheezing noted  HEART: +S1/S2   ABDOMEN: Soft, Nontender, Nondistended; G tube in place   EXTREMITIES: no LE edema   PSYCH: not opening eyes to name, makes grunting sounds but not following any commands     LABS:                        10.1   5.69  )-----------( 501      ( 19 Feb 2022 09:13 )             33.0     02-19    135  |  98  |  14  ----------------------------<  127<H>  3.7   |  28  |  0.33<L>    Ca    8.3<L>      19 Feb 2022 09:13  Phos  3.7     02-19  Mg     2.0     02-19                RADIOLOGY & ADDITIONAL TESTS:    Imaging Personally Reviewed:    Consultant(s) Notes Reviewed:      Care Discussed with Consultants/Other Providers:

## 2022-02-19 NOTE — PROGRESS NOTE ADULT - PROBLEM SELECTOR PLAN 1
AMS 2/2 Metabolic encephalopathy  - currently AO*0, awakens to voice  Hx CVA with residual left sided weak/flaccid  1/24 VEEG Negative likely-metabolic encephalopathy    1/21 CTH without acute changes, re demonstration of lacuna infarcts   s/p LP, glucose 86, protein 48- negative. 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

## 2022-02-20 LAB
ANION GAP SERPL CALC-SCNC: 9 MMOL/L — SIGNIFICANT CHANGE UP (ref 5–17)
BASE EXCESS BLDA CALC-SCNC: 8.8 MMOL/L — HIGH (ref -2–3)
BUN SERPL-MCNC: 18 MG/DL — SIGNIFICANT CHANGE UP (ref 7–23)
CALCIUM SERPL-MCNC: 8.3 MG/DL — LOW (ref 8.4–10.5)
CHLORIDE SERPL-SCNC: 100 MMOL/L — SIGNIFICANT CHANGE UP (ref 96–108)
CO2 BLDA-SCNC: 34 MMOL/L — HIGH (ref 19–24)
CO2 SERPL-SCNC: 28 MMOL/L — SIGNIFICANT CHANGE UP (ref 22–31)
CREAT SERPL-MCNC: 0.38 MG/DL — LOW (ref 0.5–1.3)
GAS PNL BLDA: SIGNIFICANT CHANGE UP
GLUCOSE BLDC GLUCOMTR-MCNC: 104 MG/DL — HIGH (ref 70–99)
GLUCOSE BLDC GLUCOMTR-MCNC: 106 MG/DL — HIGH (ref 70–99)
GLUCOSE BLDC GLUCOMTR-MCNC: 114 MG/DL — HIGH (ref 70–99)
GLUCOSE BLDC GLUCOMTR-MCNC: 117 MG/DL — HIGH (ref 70–99)
GLUCOSE SERPL-MCNC: 110 MG/DL — HIGH (ref 70–99)
HCO3 BLDA-SCNC: 33 MMOL/L — HIGH (ref 21–28)
HCT VFR BLD CALC: 29.8 % — LOW (ref 34.5–45)
HGB BLD-MCNC: 9.1 G/DL — LOW (ref 11.5–15.5)
HOROWITZ INDEX BLDA+IHG-RTO: 21 — SIGNIFICANT CHANGE UP
MCHC RBC-ENTMCNC: 27.7 PG — SIGNIFICANT CHANGE UP (ref 27–34)
MCHC RBC-ENTMCNC: 30.5 GM/DL — LOW (ref 32–36)
MCV RBC AUTO: 90.9 FL — SIGNIFICANT CHANGE UP (ref 80–100)
NRBC # BLD: 0 /100 WBCS — SIGNIFICANT CHANGE UP (ref 0–0)
PCO2 BLDA: 41 MMHG — SIGNIFICANT CHANGE UP (ref 32–45)
PH BLDA: 7.51 — HIGH (ref 7.35–7.45)
PLATELET # BLD AUTO: 552 K/UL — HIGH (ref 150–400)
PO2 BLDA: 82 MMHG — LOW (ref 83–108)
POTASSIUM SERPL-MCNC: 3.5 MMOL/L — SIGNIFICANT CHANGE UP (ref 3.5–5.3)
POTASSIUM SERPL-SCNC: 3.5 MMOL/L — SIGNIFICANT CHANGE UP (ref 3.5–5.3)
RBC # BLD: 3.28 M/UL — LOW (ref 3.8–5.2)
RBC # FLD: 16.1 % — HIGH (ref 10.3–14.5)
SAO2 % BLDA: 97.3 % — SIGNIFICANT CHANGE UP (ref 94–98)
SODIUM SERPL-SCNC: 137 MMOL/L — SIGNIFICANT CHANGE UP (ref 135–145)
WBC # BLD: 4.96 K/UL — SIGNIFICANT CHANGE UP (ref 3.8–10.5)
WBC # FLD AUTO: 4.96 K/UL — SIGNIFICANT CHANGE UP (ref 3.8–10.5)

## 2022-02-20 PROCEDURE — 99233 SBSQ HOSP IP/OBS HIGH 50: CPT

## 2022-02-20 RX ADMIN — CHLORHEXIDINE GLUCONATE 1 APPLICATION(S): 213 SOLUTION TOPICAL at 08:54

## 2022-02-20 RX ADMIN — Medication 15 MILLILITER(S): at 11:08

## 2022-02-20 RX ADMIN — ATORVASTATIN CALCIUM 80 MILLIGRAM(S): 80 TABLET, FILM COATED ORAL at 22:22

## 2022-02-20 RX ADMIN — Medication 56 MICROGRAM(S): at 22:22

## 2022-02-20 RX ADMIN — Medication 60 MILLIGRAM(S): at 06:53

## 2022-02-20 RX ADMIN — Medication 60 MILLIGRAM(S): at 11:08

## 2022-02-20 RX ADMIN — Medication 60 MILLIGRAM(S): at 17:03

## 2022-02-20 RX ADMIN — POLYETHYLENE GLYCOL 3350 17 GRAM(S): 17 POWDER, FOR SOLUTION ORAL at 11:08

## 2022-02-20 RX ADMIN — Medication 60 MILLIGRAM(S): at 01:02

## 2022-02-20 NOTE — PROGRESS NOTE ADULT - SUBJECTIVE AND OBJECTIVE BOX
Patient is a 71y old  Female who presents with a chief complaint of AMS (19 Feb 2022 17:04)      SUBJECTIVE / OVERNIGHT EVENTS: Pt appeears lethargic, moaning in pain     MEDICATIONS  (STANDING):  atorvastatin 80 milliGRAM(s) Oral at bedtime  chlorhexidine 4% Liquid 1 Application(s) Topical <User Schedule>  diltiazem    Tablet 60 milliGRAM(s) Enteral Tube every 6 hours  insulin lispro (ADMELOG) corrective regimen sliding scale   SubCutaneous every 6 hours  levothyroxine Injectable 56 MICROGram(s) IV Push at bedtime  multivitamin/minerals/iron Oral Solution (CENTRUM) 15 milliLiter(s) Oral daily  polyethylene glycol 3350 17 Gram(s) Oral daily  senna Syrup 10 milliLiter(s) Oral at bedtime    MEDICATIONS  (PRN):  acetaminophen    Suspension .. 650 milliGRAM(s) Oral every 6 hours PRN Temp greater or equal to 38C (100.4F)  hydrALAZINE Injectable 5 milliGRAM(s) IV Push every 4 hours PRN SBP >160        CAPILLARY BLOOD GLUCOSE      POCT Blood Glucose.: 117 mg/dL (20 Feb 2022 13:59)  POCT Blood Glucose.: 114 mg/dL (20 Feb 2022 06:27)  POCT Blood Glucose.: 111 mg/dL (19 Feb 2022 23:57)  POCT Blood Glucose.: 126 mg/dL (19 Feb 2022 18:18)    I&O's Summary    19 Feb 2022 07:01  -  20 Feb 2022 07:00  --------------------------------------------------------  IN: 2020 mL / OUT: 1250 mL / NET: 770 mL    20 Feb 2022 07:01  -  20 Feb 2022 16:30  --------------------------------------------------------  IN: 600 mL / OUT: 400 mL / NET: 200 mL        PHYSICAL EXAM:  GENERAL: NAD  EYES: conjunctiva and sclera clear  CHEST/LUNG: no wheezing noted  HEART: +S1/S2   ABDOMEN: Soft, Nontender, Nondistended; G tube in place   EXTREMITIES: no LE edema   PSYCH: not opening eyes to name, makes grunting sounds but not following any commands     LABS:                        9.1    4.96  )-----------( 552      ( 20 Feb 2022 08:48 )             29.8     02-20    137  |  100  |  18  ----------------------------<  110<H>  3.5   |  28  |  0.38<L>    Ca    8.3<L>      20 Feb 2022 08:48  Phos  3.7     02-19  Mg     2.0     02-19                RADIOLOGY & ADDITIONAL TESTS:    Imaging Personally Reviewed:    Consultant(s) Notes Reviewed:      Care Discussed with Consultants/Other Providers:

## 2022-02-20 NOTE — PROGRESS NOTE ADULT - ASSESSMENT
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 Patient intubated and admitted to ICU, Neurology originally consulted due concern for seizure. LP studies were unremarkable for infection. EEG noted fluctuating periodic discharges of triphasic morphology, no epileptiform activity, no AEDs recommended. Neurology reconsulted for AMS, ordered CTH/CTA without acute findings, chronic infarcts and stable occlusion of R VA, basilar artery narrowing. On exam, currently following commands, oriented to at least self and did respond verbally with her name which appears to be an improvement from prior from mentation standpoint. L sided increased tone from prior stroke, antigravity bL UEs and generalized weakness bL LEs.     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 ; [id number]  would monitor rhythm closely   would clarify variation in current presentation to that of baseline from nursing home     PLAN  [] check CMP, ionized calcium, B12/folate, NH3, Zinc, B6  [] A1c, lipid profile  [] procal, CPK, lactate, crp, esr    [] neuro checks per routine  [] fall risk, aspiration risk  [] normotension, glucose control (140-180) recommended     [] off AC for Afib due to bleed risk, if not able to return to AC when appropriate at least [id number] from stroke standpoint   [] c/w atorvastatin 80 pending LDL, would prefer lower dose if LDL <70 as more tolerable     [x] EEG 1/24 no seizure activity, triphasic   [x] CTH/CTA no acute findings     [] consider US Duplex bL LE to r/o DVT if not done to date  [] PT/OT/SS consults   [] recommend outpatient RIDGE evaluation if not already done    Attending Attestation to Follow in AM.

## 2022-02-20 NOTE — PROGRESS NOTE ADULT - PROBLEM SELECTOR PLAN 2
Multiple fevers, currently afebrile  BC with NGTD, UC likely contaminant  CT chest A/P with improvement in hematoma  appreciate ID recs  from asp PNA vs hematoma   had started empiric vanc and cefepime, on meropenem for better anerobic coverage  ID following appreciate recs  likely 2/2 to hematoma, completed 7 day course of meropenem   remains afebrile

## 2022-02-20 NOTE — PROGRESS NOTE ADULT - PROBLEM SELECTOR PLAN 1
AMS 2/2 Metabolic encephalopathy  - currently AO*0, awakens to voice  Hx CVA with residual left sided weak/flaccid  1/24 VEEG Negative likely-metabolic encephalopathy    1/21 CTH without acute changes, re demonstration of lacuna infarcts   s/p LP, glucose 86, protein 48- negative. 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

## 2022-02-20 NOTE — PROGRESS NOTE ADULT - ATTENDING COMMENTS
patient seen and examined at bedside. awake, alert NAD  oriented to self and place, follows simple commands, LUE weakness, plantars flexor  D/W nursing staff, patients mentation appears improved over the last 2 weeks

## 2022-02-20 NOTE — PROGRESS NOTE ADULT - ASSESSMENT
71F Hx afib on apixaban, cad s/p stents, HFrEF, CVA with left sided residual weakness who presents from nursing home with AMS, fever. Found to be hypernatremic, hypotensive refractory to IVF requiring vasopressor, AMS secondary to metabolic encephalopathy s/p intubation, septic shock 2/2 COVID-19 PNA, MRSA PNA , and r/o meningitis. Pt extubated 1/28, with NGT feeds, now with G tube in place. Pt now with AMS

## 2022-02-21 LAB
ALBUMIN SERPL ELPH-MCNC: 2.2 G/DL — LOW (ref 3.3–5)
ALP SERPL-CCNC: 101 U/L — SIGNIFICANT CHANGE UP (ref 40–120)
ALT FLD-CCNC: 9 U/L — LOW (ref 10–45)
ANION GAP SERPL CALC-SCNC: 10 MMOL/L — SIGNIFICANT CHANGE UP (ref 5–17)
AST SERPL-CCNC: 12 U/L — SIGNIFICANT CHANGE UP (ref 10–40)
BILIRUB SERPL-MCNC: 0.4 MG/DL — SIGNIFICANT CHANGE UP (ref 0.2–1.2)
BUN SERPL-MCNC: 16 MG/DL — SIGNIFICANT CHANGE UP (ref 7–23)
CALCIUM SERPL-MCNC: 8.4 MG/DL — SIGNIFICANT CHANGE UP (ref 8.4–10.5)
CHLORIDE SERPL-SCNC: 101 MMOL/L — SIGNIFICANT CHANGE UP (ref 96–108)
CO2 SERPL-SCNC: 28 MMOL/L — SIGNIFICANT CHANGE UP (ref 22–31)
CREAT SERPL-MCNC: 0.35 MG/DL — LOW (ref 0.5–1.3)
GLUCOSE BLDC GLUCOMTR-MCNC: 114 MG/DL — HIGH (ref 70–99)
GLUCOSE BLDC GLUCOMTR-MCNC: 117 MG/DL — HIGH (ref 70–99)
GLUCOSE BLDC GLUCOMTR-MCNC: 124 MG/DL — HIGH (ref 70–99)
GLUCOSE BLDC GLUCOMTR-MCNC: 129 MG/DL — HIGH (ref 70–99)
GLUCOSE SERPL-MCNC: 119 MG/DL — HIGH (ref 70–99)
HCT VFR BLD CALC: 30.1 % — LOW (ref 34.5–45)
HGB BLD-MCNC: 9.3 G/DL — LOW (ref 11.5–15.5)
MAGNESIUM SERPL-MCNC: 2 MG/DL — SIGNIFICANT CHANGE UP (ref 1.6–2.6)
MCHC RBC-ENTMCNC: 27.5 PG — SIGNIFICANT CHANGE UP (ref 27–34)
MCHC RBC-ENTMCNC: 30.9 GM/DL — LOW (ref 32–36)
MCV RBC AUTO: 89.1 FL — SIGNIFICANT CHANGE UP (ref 80–100)
NRBC # BLD: 0 /100 WBCS — SIGNIFICANT CHANGE UP (ref 0–0)
PHOSPHATE SERPL-MCNC: 3.4 MG/DL — SIGNIFICANT CHANGE UP (ref 2.5–4.5)
PLATELET # BLD AUTO: 591 K/UL — HIGH (ref 150–400)
POTASSIUM SERPL-MCNC: 3.6 MMOL/L — SIGNIFICANT CHANGE UP (ref 3.5–5.3)
POTASSIUM SERPL-SCNC: 3.6 MMOL/L — SIGNIFICANT CHANGE UP (ref 3.5–5.3)
PROT SERPL-MCNC: 5.4 G/DL — LOW (ref 6–8.3)
RBC # BLD: 3.38 M/UL — LOW (ref 3.8–5.2)
RBC # FLD: 15.9 % — HIGH (ref 10.3–14.5)
SODIUM SERPL-SCNC: 139 MMOL/L — SIGNIFICANT CHANGE UP (ref 135–145)
WBC # BLD: 6.13 K/UL — SIGNIFICANT CHANGE UP (ref 3.8–10.5)
WBC # FLD AUTO: 6.13 K/UL — SIGNIFICANT CHANGE UP (ref 3.8–10.5)

## 2022-02-21 PROCEDURE — 99232 SBSQ HOSP IP/OBS MODERATE 35: CPT

## 2022-02-21 PROCEDURE — 99233 SBSQ HOSP IP/OBS HIGH 50: CPT

## 2022-02-21 RX ADMIN — POLYETHYLENE GLYCOL 3350 17 GRAM(S): 17 POWDER, FOR SOLUTION ORAL at 11:27

## 2022-02-21 RX ADMIN — Medication 60 MILLIGRAM(S): at 07:15

## 2022-02-21 RX ADMIN — Medication 56 MICROGRAM(S): at 21:47

## 2022-02-21 RX ADMIN — SENNA PLUS 10 MILLILITER(S): 8.6 TABLET ORAL at 21:47

## 2022-02-21 RX ADMIN — Medication 60 MILLIGRAM(S): at 01:00

## 2022-02-21 RX ADMIN — Medication 5 MILLIGRAM(S): at 22:55

## 2022-02-21 RX ADMIN — ATORVASTATIN CALCIUM 80 MILLIGRAM(S): 80 TABLET, FILM COATED ORAL at 21:47

## 2022-02-21 RX ADMIN — Medication 60 MILLIGRAM(S): at 23:01

## 2022-02-21 RX ADMIN — Medication 60 MILLIGRAM(S): at 17:33

## 2022-02-21 RX ADMIN — CHLORHEXIDINE GLUCONATE 1 APPLICATION(S): 213 SOLUTION TOPICAL at 09:40

## 2022-02-21 RX ADMIN — Medication 15 MILLILITER(S): at 11:27

## 2022-02-21 RX ADMIN — Medication 60 MILLIGRAM(S): at 11:27

## 2022-02-21 NOTE — PROVIDER CONTACT NOTE (OTHER) - ASSESSMENT
A&Ox0, vss, no s/s of hypoglycemia
A&Ox1, lethargic (baseline), vss except elevated b/p, no s&s of pain or discomfort
Pt is primarily nonverbal, only verbalize name Charissa, able to follow commands such as "squeeze my hand" after multiple repetition to right hand. Pt BP elevated 160s but appears from emesis episode. Pt has tube feeds running at goal rate 40cc/hr since 1/23. However, unable to assess signs and symptoms. Tube feedings held at this time.
Unable to assess patient's orientation status as patient does not verbally respond to questions or follow commands. Pt is alert when her name is called and will make eye contact. Per flowsheets, patient tachy to low 100s since around 11AM. Pt has no s/s of distress or pain at this time. Rest of VS stable. Patient not on remote tele.
upon assessment, pt being administered feeding through [facility] NGT and not through KO feeding tube., order for OK to use NGT tube is in order and order for feedings are also in. NGT is in place with feeding running well.
VSS, afebrile. no s/s of chest pain noted. bloods sent. pt having loose bms
Pt's aguilar drained 325cc as per q4hr check with adequate draining during day. Upon assessing patient further, pt noted to have significant amount of urine between her legs/under her on purple pad. Pt recently had aguilar exchanged on 2/1/22 for same issue. PA is currently on floor to discuss issue.

## 2022-02-21 NOTE — PROGRESS NOTE ADULT - SUBJECTIVE AND OBJECTIVE BOX
Patient is a 71y old  Female who presents with a chief complaint of AMS (20 Feb 2022 19:42)      SUBJECTIVE / OVERNIGHT EVENTS: family friend at bedside, reports the patient opened her eyes and recognized her     MEDICATIONS  (STANDING):  atorvastatin 80 milliGRAM(s) Oral at bedtime  chlorhexidine 4% Liquid 1 Application(s) Topical <User Schedule>  diltiazem    Tablet 60 milliGRAM(s) Enteral Tube every 6 hours  insulin lispro (ADMELOG) corrective regimen sliding scale   SubCutaneous every 6 hours  levothyroxine Injectable 56 MICROGram(s) IV Push at bedtime  multivitamin/minerals/iron Oral Solution (CENTRUM) 15 milliLiter(s) Oral daily  polyethylene glycol 3350 17 Gram(s) Oral daily  senna Syrup 10 milliLiter(s) Oral at bedtime    MEDICATIONS  (PRN):  acetaminophen    Suspension .. 650 milliGRAM(s) Oral every 6 hours PRN Temp greater or equal to 38C (100.4F)  hydrALAZINE Injectable 5 milliGRAM(s) IV Push every 4 hours PRN SBP >160        CAPILLARY BLOOD GLUCOSE      POCT Blood Glucose.: 129 mg/dL (21 Feb 2022 13:21)  POCT Blood Glucose.: 124 mg/dL (21 Feb 2022 06:22)  POCT Blood Glucose.: 106 mg/dL (20 Feb 2022 23:55)  POCT Blood Glucose.: 104 mg/dL (20 Feb 2022 18:07)    I&O's Summary    20 Feb 2022 07:01  -  21 Feb 2022 07:00  --------------------------------------------------------  IN: 1740 mL / OUT: 2450 mL / NET: -710 mL    21 Feb 2022 07:01  -  21 Feb 2022 15:03  --------------------------------------------------------  IN: 420 mL / OUT: 550 mL / NET: -130 mL        PHYSICAL EXAM:  GENERAL: NAD  EYES: conjunctiva and sclera clear  CHEST/LUNG: no wheezing noted  HEART: +S1/S2   ABDOMEN: Soft, Nontender, Nondistended; G tube in place   EXTREMITIES: no LE edema   PSYCH: not opening eyes to name, makes grunting sounds but not following any commands   LABS:                        9.3    6.13  )-----------( 591      ( 21 Feb 2022 09:52 )             30.1     02-21    139  |  101  |  16  ----------------------------<  119<H>  3.6   |  28  |  0.35<L>    Ca    8.4      21 Feb 2022 09:52  Phos  3.4     02-21  Mg     2.0     02-21    TPro  5.4<L>  /  Alb  2.2<L>  /  TBili  0.4  /  DBili  x   /  AST  12  /  ALT  9<L>  /  AlkPhos  101  02-21              RADIOLOGY & ADDITIONAL TESTS:    Imaging Personally Reviewed:    Consultant(s) Notes Reviewed:      Care Discussed with Consultants/Other Providers:

## 2022-02-21 NOTE — PROVIDER CONTACT NOTE (OTHER) - SITUATION
Pt had no urine output in aguilar bag. RN was unable to flush aguilar due to resistance.
Pt being administered feeding through [place] [facility] NGT and not through KO feeding tube
Pt was found was emesis x1, pale yellow in color with small pieces and mucus.
Pt with a hemoglobin of 6.9
Pt's aguilar leaking
Pt had false reading from glucometer of 45 due to not enough blood on test strip. Repeated glucometer 2x after false reading, received reading of 174 and 172
8 beats of wide complex as per remote tele tech
New onset of mild tachycardia that appears to have started in AM

## 2022-02-21 NOTE — PROGRESS NOTE ADULT - ASSESSMENT
71F Hx afib on apixaban, cad s/p stents, HFrEF, CVA with left sided residual weakness who presents from nursing home with AMS, fever. Found to be hypernatremic, hypotensive refractory to IVF requiring vasopressor, AMS secondary to metabolic encephalopathy s/p intubation, septic shock 2/2 COVID-19 PNA, MRSA PNA , and r/o meningitis. Pt extubated 1/28, with NGT feeds, now with G tube in place.

## 2022-02-21 NOTE — PROVIDER CONTACT NOTE (OTHER) - NAME OF MD/NP/PA/DO NOTIFIED:
Unit Leadership/Patient Logistics
Jyothi Alberto, NP
Alok AGUILAR
Delio Victoria, PA
Heavenly AGUILAR
JEFF Roe
Lyndsay Marie, PA
[place] AGUILAR
JEFF Anthony

## 2022-02-21 NOTE — PROVIDER CONTACT NOTE (OTHER) - DATE AND TIME:
02-Feb-2022 22:30
03-Feb-2022 17:30
30-Jan-2022 00:39
01-Feb-2022 13:30
06-Feb-2022 23:18
31-Jan-2022 22:30
12-Feb-2022 12:40
21-Feb-2022 09:00

## 2022-02-21 NOTE — PROVIDER CONTACT NOTE (OTHER) - BACKGROUND
2021      RE: Ifrah Huitron  [address] [place] [address]           [facility]   at the [facility]   Otolaryngology Clinic     Patient: Ifrah Huitron    MRN: [id number]    : 1947    Age/Gender: 74 year old male  Date of Service: 2021  Rendering Provider:   Sherry Esquivel MD     Referring Provider   PCP: Sukhdev Kohler  Referring Physician: Kyree Bearden MD  [address] [place] [address]  Reason for Consultation   Left vocal fold paralysis   H/o MDL with left voice gel IL 21  H/o spindle cell carcinoma  History   HISTORY OF PRESENT ILLNESS: I was asked to consult on Ifrah Huitron, by Dr. Kyree Bearden for evaluation of left vocal fold paralysis. Mr. Huitron is a 74 year old male who presents to us today with dysphonia.      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    He presents today for evaluation. he reports:    Dysphonia  - Dr. Bearden thought there was a problem with his vocal fold  - was told there was an injury during the CT scan in 2021  - woke up one morning with laryngitis  - occurred this past summer  - his voice is squeaky  - high-pitched  - saw an SLP  - everyone he talks to says it has improved  - breath was escaping  - caused him to he dizzy and lightheaded  - breathing therapy helped him  - doesn't have this anymore  - in the last few months   - can talk much longer now without difficulty  - voice feels gravely now  - voice is not strong   - sound of his voice bothers him  - people who know him think his voice is clear  - seems to be getting better over time  - he thinks vocal fold injection didn't improve his voice  - one month ago people thought that his voice was improving  - describes a sore throat always   - has a dry mouth at his palate  - 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 
breathing  - had his kids take over the business due to difficulty speaking due to the air  - would like his voice to be better    Dysphagia  - feels that food and drinks tcan go down the wrong tube  - occurs about 2-3x per week  - has always had this before treatments  - he drinks water while there is food in his mouth to help this go down  - swallows twice to help protect food from the wrong tube    Dyspnea  - no PNAs  - has improved a lot   - not significantly worse with exertion    Throat clearing/cough  - has an occasional coughing episode  - uses cough suppression medication once per week  - this improves his symptoms          PAST MEDICAL HISTORY:   Past Medical History:   Diagnosis Date     Arthritis      Mesothelioma, malignant (H) 6/4/2021     Spindle cell sarcoma (H) 5/30/2021     Thyroid disease     removed pituitary gland     PAST SURGICAL HISTORY:   Past Surgical History:   Procedure Laterality Date     COLONOSCOPY N/A 12/17/2020    Procedure: COLONOSCOPY;  Surgeon: Ken Camacho MD;  Location: [facility]     ENT SURGERY       HERNIA REPAIR       LARYNGOSCOPY, EXCISE VOCAL CORD LESION MICROSCOPIC, COMBINED Left 07/01/2021    Procedure: MICROLARYNGOSCOPY, LEFT TRUE VOCAL CORD INJECTION WITH PROLARYN;  Surgeon: Kyree Bearden MD;  Location: [facility]     PHACOEMULSIFICATION WITH STANDARD INTRAOCULAR LENS IMPLANT Right 03/10/2021    Procedure: Cataract removal with implant.;  Surgeon: Jamir Mac MD;  Location: [facility]     PHACOEMULSIFICATION WITH STANDARD INTRAOCULAR LENS IMPLANT Left 04/05/2021    Procedure: Cataract removal with implant.;  Surgeon: Jamir Mca MD;  Location: [facility]     PICC DOUBLE LUMEN PLACEMENT Right 12/01/2021    5FR DL PICC, basilic vein. L-38cm, 1cm out.     PITUITARY EXCISION       tooth pulled 4/7  Right      CURRENT MEDICATIONS:   Current Outpatient Medications:      acetaminophen (TYLENOL) 500 MG tablet, Take 500-1,000 mg by mouth every 6 hours as needed for 
"mild pain, Disp: , Rfl:      calcium carbonate (TUMS) 500 MG chewable tablet, Take 1 tablet (500 mg) by mouth 3 times daily as needed for heartburn, Disp: , Rfl:      celecoxib (CELEBREX) 200 MG capsule, Take 1 capsule (200 mg) by mouth 2 times daily. Note this is a new a strength! Only one capsule at a time!, Disp: 60 capsule, Rfl: 3     cholecalciferol (VITAMIN D-1000 MAX ST) 1000 units TABS, Take 1,000 Units by mouth daily , Disp: , Rfl:      fluconazole (DIFLUCAN) 200 MG tablet, Take 1 tablet (200 mg) by mouth daily, Disp: 7 tablet, Rfl: 0     guaiFENesin-codeine (GUAIFENESIN AC) 100-10 MG/5ML syrup, Take 10 mLs by mouth every 4 hours as needed for cough, Disp: 118 mL, Rfl: 0     hydrocortisone (CORTEF) 10 MG tablet, Take 20 mg in the morning and 10 mg in the afternoon, Disp: , Rfl:      Insulin Syringe-Needle U-100 27.5G X 5/8\" 2 ML MISC, 1 each daily as needed (with solucortef for adrenal crisis), Disp: 10 each, Rfl: 1     levothyroxine (SYNTHROID/LEVOTHROID) 75 MCG tablet, Take 75 mcg by mouth every morning , Disp: , Rfl:      LORazepam (ATIVAN) 0.5 MG tablet, Take 1 tablet (0.5 mg) by mouth every 4 hours as needed for nausea or vomiting . Caution: causes sedation., Disp: 30 tablet, Rfl: 0     Menthol-Methyl Salicylate (DALIA MALIK GREASELESS) cream, Apply topically every 6 hours as needed, Disp: , Rfl:      metroNIDAZOLE (METROGEL) 0.75 % external gel, Apply topically 2 times daily, Disp: 45 g, Rfl: 11     nystatin (MYCOSTATIN) 894017 UNIT/ML suspension, Take 5 mLs (500,000 Units) by mouth 4 times daily . Start at first signs of white coating on tongue., Disp: 473 mL, Rfl: 0     OLANZapine (ZYPREXA) 5 MG tablet, Take 1 tablet (5 mg) by mouth At Bedtime Continue until your nausea resolves, Disp: 30 tablet, Rfl: 1     omeprazole (PRILOSEC) 20 MG DR capsule, Take 1 capsule (20 mg) by mouth 2 times daily, Disp: 60 capsule, Rfl: 1     ondansetron (ZOFRAN) 4 MG tablet, Take 1-2 tablets (4-8 mg) by mouth every 8 hours "
Sepsis
Pt admitted for fevers, AMS, and hypernatremia. Pt intubated and started on pressors, admitted to ICU. Hx of DM II, CVA, dysphagia.
as needed for nausea, Disp: 40 tablet, Rfl: 1     phosphorus tablet 250 mg (PHOSPHA 250 NEUTRAL) 250 MG per tablet, Take phosphorous 3 times daily starting 12/9. We will follow your phosphorous levels twice weekly, and your outpatient providers will instruct you to adjust the dosing based on your phosphorous levels, Disp: 42 tablet, Rfl: 1     potassium chloride ER (K-TAB) 20 MEQ CR tablet, Take 1 tablet (20 mEq) by mouth daily as needed (If your outpatient providers notify you that your potassium is low) You will receive a call when to take potassium, Disp: 30 tablet, Rfl: 0     prochlorperazine (COMPAZINE) 5 MG tablet, Take 1 tablet (5 mg) by mouth every 6 hours as needed for nausea or vomiting . Caution: causes sedation., Disp: 30 tablet, Rfl: 1     triamcinolone (KENALOG) 0.1 % external cream, Apply topically 2 times daily , Disp: , Rfl:   No current facility-administered medications for this visit.    ALLERGIES: Penicillins    SOCIAL HISTORY:    Social History     Socioeconomic History     Marital status:      Spouse name: Not on file     Number of children: Not on file     Years of education: Not on file     Highest education level: Not on file   Occupational History     Not on file   Tobacco Use     Smoking status: Never Smoker     Smokeless tobacco: Never Used   Substance and Sexual Activity     Alcohol use: Yes     Comment: rare     Drug use: Never     Sexual activity: Not on file   Other Topics Concern     Parent/sibling w/ CABG, MI or angioplasty before 65F 55M? Not Asked   Social History Narrative     Not on file     Social Determinants of Health     Financial Resource Strain: Low Risk      Difficulty of Paying Living Expenses: Not very hard   Food Insecurity: No Food Insecurity     Worried About Running Out of Food in the Last Year: Never true     Ran Out of Food in the Last Year: Never true   Transportation Needs: No Transportation Needs     Lack of Transportation (Medical): No     Lack of 
pt admitted from nursing home with AMS, fever and positive covid, NGT placed.
Pt admitted 1/21 from [facility] with fever, hypernatremia, and hypotensive. Pt was intubated with pressors, placed aguilar and NGT placed at the time. Extubated on 1/27 and was downgraded to 9M on 1/28.
Pt admitted for fevers, AMS, and hypernatremia. Pt intubated and started on pressors, admitted to ICU. Hx of DM II, CVA, dysphagia.
Transportation (Non-Medical): No   Physical Activity: Inactive     Days of Exercise per Week: 0 days     Minutes of Exercise per Session: 0 min   Stress: Not on file   Social Connections: Moderately Integrated     Frequency of Communication with Friends and Family: Twice a week     Frequency of Social Gatherings with Friends and Family: Twice a week     Attends [employer] Services: Never     Active Member of Clubs or Organizations: Yes     Attends Club or Organization Meetings: 1 to 4 times per year     Marital Status:    Intimate Partner Violence: Unknown     Fear of Current or Ex-Partner: No     Emotionally Abused: No     Physically Abused: Not on file     Sexually Abused: No   Housing Stability: Not on file       FAMILY HISTORY:   Family History   Problem Relation Age of Onset     Lupus Mother      ALS Father      Rheumatoid Arthritis Sister      Non-contributory for problems with anesthesia    REVIEW OF SYSTEMS:   The patient was asked a 14 point review of systems regarding constitutional symptoms, eye symptoms, ears, nose, mouth, throat symptoms, cardiovascular symptoms, respiratory symptoms, gastrointestinal symptoms, genitourinary symptoms, musculoskeletal symptoms, integumentary symptoms, neurological symptoms, psychiatric symptoms, endocrine symptoms, hematologic/lymphatic symptoms, and allergic/ immunologic symptoms.   The pertinent factors have been included in the HPI and below.  Patient Supplied Answers to Review of Systems  No flowsheet data found.    Physical Examination   The patient underwent a physical examination as described below. The pertinent positive and negative findings are summarized after the description of the examination.  Constitutional: The patient's developmental and nutritional status was assessed. The patient's voice quality was assessed.  Head and Face: The head and face were inspected for deformities. The sinuses were palpated. The salivary glands were palpated. Facial 
Pt admitted with sepsis hx of lymphedema covid and DM
muscle strength was assessed bilaterally.  Eyes: Extraocular movements and primary gaze alignment were assessed.  Ears, Nose, Mouth and Throat: The ears and nose were examined for deformities. The nasal septum, mucosa, and turbinates were inspected by anterior rhinoscopy. The lips, teeth, and gums were examined for abnormalities. The oral mucosa, tongue, palate, tonsils, lateral and posterior pharynx were inspected for the presence of asymmetry or mucosal lesions.    Neck: The tracheal position was noted, and the neck mass palpated to determine if there were any asymmetries, abnormal neck masses, thyromegally, or thyroid nodules.  Respiratory: The nature of the breathing and chest expansion/symmetry was observed.  Cardiovascular: The patient was examined to determine the presence of any edema or jugular venous distension.  Abdomen: The contour of the abdomen was noted.  Lymphatic: The patient was examined for infraclavicular lymphadenopathy.  Musculoskeletal: The patient was inspected for the presence of skeletal deformities.  Extremities: The extremities were examined for any clubbing or cyanosis.  Skin: The skin was examined for inflammatory or neoplastic conditions.  Neurologic: The patient's orientation, mood, and affect were noted. The cranial nerve  functions were examined.  Other pertinent positive and negative findings on physical examination:      OC/OP: no lesions, uvula midline, soft palate elevates symmetrically  Neck: no lesions, no TH tenderness to palpation    All other physical examination findings were within normal limits and noncontributory.  Procedures   Video Laryngoscopy with Stroboscopy (CPT 35171) and Behavioral & Qualitative Evaluation of Voice and Resonence     Preoperative Diagnosis:  Dysphonia and throat symptoms  Postoperative Diagnosis: Dysphonia and throat symptoms  Indication:  Patient has new or persistent dysphonia and throat symptoms.  This requires evaluation by stroboscopy to 
Sepsis
fully delineate the laryngeal functioning; especially mucosal wave assessment, ultrasharp visualization of lesions on the vocal folds, and overall functioning of the larynx.  Details of Procedure: A 70 degree rigid telescopic laryngoscope or a distal chip flexible scope was used. The lighting was obtained via a light cable connected to a stroboscopic unit. The telescope was inserted either transorally or transnasally until the vocal folds could be visualized. The patient was instructed to sustain the vowel  ee  at a comfortable pitch and loudness as the voice was monitored by a microphone connected to a fundamental frequency tracker. This circuit tracked vocal periodicity, allowing the light to flash in synchrony with the glottal cycles. A setting on the stroboscope was set to change the phase of light strobing with relation to the vocal fundamental frequency, producing an image of 1 to 2 glottal cycles every second. The video images were recorded for analysis. Use of the variable speed, slow and stop scan allowed careful study of pertinent segments of laryngeal function to increase accuracy of clinical assessments of function and dysfunction.  In particular, features of glottal closure, mucosal wave on the vocal fold cover and laryngeal symmetry were analyzed. Lastly, the patient was asked to phonate speech samples and auditory/perceptual evaluation of voice and resonance were performed.  The vocal quality was carefully evaluated for hoarseness, breathiness, loudness, phrase length and intelligibility to determine the source of dysphonia.    Findings:   A. LARYNGOVIDEOSTROBOSCOPY   Anatomic/Physiological Deviations:  Left vocal fold paralysis, bilateral anterior saccular cysts  Mucosal wave: Right:  No restriction     Left: No restriction  Bilateral Vocal Fold Vibration: Asymmetric  Vocal Process: Right: No restriction    Left:  Marked restriction  Vocal Fold closure: Complete glottal closure    Complication(s): 
None  Disposition: Patient tolerated the procedure well                    Fiberoptic Endoscopic Evaluation of Swallowing (CPT 55457)  and Interpretation of Swallowing (CPT 11948)    Indications: See above notes for complete history and physical. Patient complains of dysphagia to both solids and liquids and/or there is suggestion on history and endoscopic exam of the presence of dysphagia causing medical complaints.  Swallowing evaluation is being performed to assess the presence and degree of dysphagia, and to recommend a safe diet.     Pulmonary Status:  No PNA   Current Diet:              regular                                        thin liquids      Consistency Amounts:  Thin Liquid: sip   Puree: 1 tsp  Solid: cookies            Positioning: upright in a chair  Oral Peripheral Exam: See physical exam section.  Anatomic Notes: See Videostroboscopy report for assessment of anatomy and laryngeal functioning  Pharyngeal secretions prior to administration of liquid or food: No  Oral Phase Abnormal Findings: No abnormal behavior observed  Behavioral Adaptations: No abnormal behavior observed and Repeat dry swallows  Pharyngeal Phase Abnormal Findings: penetration with large sips of thins, left sided vallecular residue improves with liquid wash    Recommended Diet:  regular                                        thin liquids              Review of Relevant Clinical Data   I personally reviewed:  Notes:   Op Kyree Stevenson 7/1/21  Dedo exposure, 1mL of voice gel injected    Dr. Kyree Bearden, 6/21/21     Radiology:     CT CHEST/ABDOMEN/PELVIS W CONTRAST 11/22/2021   mediastinal mass          Pathology: CT guided biopsy of anterior mediastinal mass 5/20/21  SPECIMEN(S):   Left pleural mass buopsy, CT guided     FINAL DIAGNOSIS:   Left pleural mass biopsy, CT guided:   - Malignant spindle cell neoplasm with necrosis (see comment)     COMMENT:   Special stains were performed with appropriate controls.  The tumor cells 
  are diffusely positive for CK   AE1/AE3 and CK MIRZA.  There is also focal to patchy staining for D2-40,   CD68, and WT-1.  INI1 is retained in   the tumor cells.  The Ki-67 labeling index is approximately 70%.  The   tumor cells show high PD-L1 expression   (TPS 90%).  No expression of P63, calretinin, DOG1, ALK1, , CK 5/6,   STAT6, SMA, SALL4, desmin, S100,   Myogenin, CD21, and CD23 is identified in the tumor cells.  These findings    together with tumor morphology and   location are most compatible with malignant sarcomatoid mesothelioma.  The    differential diagnosis includes   sarcomatoid carcinoma and sarcomas such as synovial sarcoma and   histiocytic sarcoma.  Additional tests are   pending and may help to classify this tumor.     No fungal or mycobacterial organisms are identified with GMS and Jessenia   stains.      Labs:  Lab Results   Component Value Date    TSH 0.74 10/04/2021     Lab Results   Component Value Date     12/09/2021    CO2 23 12/09/2021    BUN 21 12/09/2021    CREAT 0.7 06/21/2021    PHOS 2.4 (L) 12/09/2021     Lab Results   Component Value Date    WBC 6.2 12/09/2021    HGB 10.6 (L) 12/09/2021    HCT 33.7 (L) 12/09/2021    MCV 87 12/09/2021     12/09/2021     Lab Results   Component Value Date    INR 1.14 12/01/2021     No results found for: OLAF  No components found for: RHEUMATOIDFACTOR,  RF  Lab Results   Component Value Date    CRP 26.9 (H) 11/08/2021     No components found for: CKTOT, URICACID  No components found for: C3, C4, DSDNAAB, NDNAABIFA  No results found for: MPOAB    Patient reported Quality of Life (QOL) Measures   Patient Supplied Answers To VHI Questionnaire  No flowsheet data found.    Patient Supplied Answers To EAT Questionnaire  No flowsheet data found.    Patient Supplied Answers To CSI Questionnaire  No flowsheet data found.    Patient Supplied Answers to Dyspnea Index Questionnaire:  No flowsheet data found.    Impression & Plan     IMPRESSION: 
Mr. Huitron is a 74 year old male who is being seen for the followin. Dysphonia  - in the setting spindle cell carcinoma, with mediastinal extent undergoing chemotherapy  - woke up one morning with laryngitis  - he reports this started around March   - but he also had a CT-guided lung biopsy on 2021 which he reports thinks affected his voice and is around the time when his voice changed  - voice demand is low now   - used to have more trouble talking to clients due to breathing, but this has improved in the past month  - had therapy which improved his breathing  - s/p MDL with voice gel on 21 by Dr. Bearden - though he reports no benefit in his voice after that  - however in the past months the voice has improved  - he can talk now with people without needing to catch his breathe   - however he is still bothered by the gravel sound in his voice  - MPT is 19s  - strobe 21 shows left vocal fold paralysis, bilateral anterior saccular cysts  - symptoms due to vocal fold paralysis and secondary muscle tension  - he does have good closure at this point, therefore would recommend optimization with voice therapy at this point  - will follow up in March when he is at 1 year from initial symptoms to determine if he is still symptomatic and would like further treatment at that point   Plan  - voice therapy       2. Dysphagia  - feels that food and drinks can go down the wrong tube  - occurs about 2-3x per week  - has always had difficulties with this before radiation and chemotherapy treatments  - FEES 21 shows penetration with large sips of thins, left sided vallecular residue improves with liquid wash  - symptoms due to pharyngeal weakness  - discussed his precautions - small bites and liquid wash do help therefore will keep focusing on thsi  - would encourage peanut butter, eggs and ensure/boost for protein rather than meats and seeds  Plan  - swallow precautions  - if worsening in swallow - will 
need repeat check     RETURN VISIT: 3 months      Scribe Disclosure:  I, Katey Ram, am serving as a scribe to document services personally performed by Sherry Esquivel MD at this visit, based upon the provider's statements to me. All documentation has been reviewed by the aforementioned provider prior to being entered into the official medical record.     Thank you for the kind referral and for allowing me to share in the care of Mr. Huitron. If you have any questions, please do not hesitate to contact me.    Sherry Esquivel MD    Laryngology    [facility]  Department of  Otolaryngology - Head and Neck Surgery  Clinics & Surgery Center  [address] [place] [address]  Appointment line: [phone]  Fax: [phone]  [email or web address]          Sherry Esquivel MD

## 2022-02-21 NOTE — PROGRESS NOTE ADULT - PROBLEM SELECTOR PLAN 1
AMS 2/2 Metabolic encephalopathy  - currently AO*0, awakens to voice  Hx CVA with residual left sided weak/flaccid  1/24 VEEG Negative likely-metabolic encephalopathy    1/21 CTH without acute changes, re demonstration of lacuna infarcts   s/p LP, glucose 86, protein 48- negative. 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

## 2022-02-22 ENCOUNTER — TRANSCRIPTION ENCOUNTER (OUTPATIENT)
Age: 72
End: 2022-02-22

## 2022-02-22 VITALS
HEART RATE: 95 BPM | DIASTOLIC BLOOD PRESSURE: 80 MMHG | OXYGEN SATURATION: 95 % | SYSTOLIC BLOOD PRESSURE: 159 MMHG | TEMPERATURE: 98 F | RESPIRATION RATE: 18 BRPM

## 2022-02-22 LAB
ANION GAP SERPL CALC-SCNC: 11 MMOL/L — SIGNIFICANT CHANGE UP (ref 5–17)
BUN SERPL-MCNC: 12 MG/DL — SIGNIFICANT CHANGE UP (ref 7–23)
CALCIUM SERPL-MCNC: 9.1 MG/DL — SIGNIFICANT CHANGE UP (ref 8.4–10.5)
CHLORIDE SERPL-SCNC: 98 MMOL/L — SIGNIFICANT CHANGE UP (ref 96–108)
CO2 SERPL-SCNC: 27 MMOL/L — SIGNIFICANT CHANGE UP (ref 22–31)
CREAT SERPL-MCNC: 0.33 MG/DL — LOW (ref 0.5–1.3)
GLUCOSE BLDC GLUCOMTR-MCNC: 106 MG/DL — HIGH (ref 70–99)
GLUCOSE BLDC GLUCOMTR-MCNC: 147 MG/DL — HIGH (ref 70–99)
GLUCOSE SERPL-MCNC: 126 MG/DL — HIGH (ref 70–99)
HCT VFR BLD CALC: 37.1 % — SIGNIFICANT CHANGE UP (ref 34.5–45)
HGB BLD-MCNC: 11.6 G/DL — SIGNIFICANT CHANGE UP (ref 11.5–15.5)
MCHC RBC-ENTMCNC: 27.4 PG — SIGNIFICANT CHANGE UP (ref 27–34)
MCHC RBC-ENTMCNC: 31.3 GM/DL — LOW (ref 32–36)
MCV RBC AUTO: 87.5 FL — SIGNIFICANT CHANGE UP (ref 80–100)
NRBC # BLD: 0 /100 WBCS — SIGNIFICANT CHANGE UP (ref 0–0)
PLATELET # BLD AUTO: 661 K/UL — HIGH (ref 150–400)
POTASSIUM SERPL-MCNC: 3.6 MMOL/L — SIGNIFICANT CHANGE UP (ref 3.5–5.3)
POTASSIUM SERPL-SCNC: 3.6 MMOL/L — SIGNIFICANT CHANGE UP (ref 3.5–5.3)
RBC # BLD: 4.24 M/UL — SIGNIFICANT CHANGE UP (ref 3.8–5.2)
RBC # FLD: 15.9 % — HIGH (ref 10.3–14.5)
SODIUM SERPL-SCNC: 136 MMOL/L — SIGNIFICANT CHANGE UP (ref 135–145)
WBC # BLD: 6.86 K/UL — SIGNIFICANT CHANGE UP (ref 3.8–10.5)
WBC # FLD AUTO: 6.86 K/UL — SIGNIFICANT CHANGE UP (ref 3.8–10.5)

## 2022-02-22 PROCEDURE — 93308 TTE F-UP OR LMTD: CPT

## 2022-02-22 PROCEDURE — 86789 WEST NILE VIRUS ANTIBODY: CPT

## 2022-02-22 PROCEDURE — 36600 WITHDRAWAL OF ARTERIAL BLOOD: CPT

## 2022-02-22 PROCEDURE — 82728 ASSAY OF FERRITIN: CPT

## 2022-02-22 PROCEDURE — 95700 EEG CONT REC W/VID EEG TECH: CPT

## 2022-02-22 PROCEDURE — 83550 IRON BINDING TEST: CPT

## 2022-02-22 PROCEDURE — 82272 OCCULT BLD FECES 1-3 TESTS: CPT

## 2022-02-22 PROCEDURE — 94002 VENT MGMT INPAT INIT DAY: CPT

## 2022-02-22 PROCEDURE — 80177 DRUG SCRN QUAN LEVETIRACETAM: CPT

## 2022-02-22 PROCEDURE — 87641 MR-STAPH DNA AMP PROBE: CPT

## 2022-02-22 PROCEDURE — P9016: CPT

## 2022-02-22 PROCEDURE — 83036 HEMOGLOBIN GLYCOSYLATED A1C: CPT

## 2022-02-22 PROCEDURE — 86788 WEST NILE VIRUS AB IGM: CPT

## 2022-02-22 PROCEDURE — 49440 PLACE GASTROSTOMY TUBE PERC: CPT

## 2022-02-22 PROCEDURE — 87205 SMEAR GRAM STAIN: CPT

## 2022-02-22 PROCEDURE — 85610 PROTHROMBIN TIME: CPT

## 2022-02-22 PROCEDURE — 87070 CULTURE OTHR SPECIMN AEROBIC: CPT

## 2022-02-22 PROCEDURE — 86900 BLOOD TYPING SEROLOGIC ABO: CPT

## 2022-02-22 PROCEDURE — 84157 ASSAY OF PROTEIN OTHER: CPT

## 2022-02-22 PROCEDURE — 36415 COLL VENOUS BLD VENIPUNCTURE: CPT

## 2022-02-22 PROCEDURE — 93931 UPPER EXTREMITY STUDY: CPT

## 2022-02-22 PROCEDURE — 87086 URINE CULTURE/COLONY COUNT: CPT

## 2022-02-22 PROCEDURE — 93005 ELECTROCARDIOGRAM TRACING: CPT

## 2022-02-22 PROCEDURE — 86901 BLOOD TYPING SEROLOGIC RH(D): CPT

## 2022-02-22 PROCEDURE — 87040 BLOOD CULTURE FOR BACTERIA: CPT

## 2022-02-22 PROCEDURE — 88108 CYTOPATH CONCENTRATE TECH: CPT

## 2022-02-22 PROCEDURE — 82248 BILIRUBIN DIRECT: CPT

## 2022-02-22 PROCEDURE — 71260 CT THORAX DX C+: CPT

## 2022-02-22 PROCEDURE — 80048 BASIC METABOLIC PNL TOTAL CA: CPT

## 2022-02-22 PROCEDURE — 94003 VENT MGMT INPAT SUBQ DAY: CPT

## 2022-02-22 PROCEDURE — 87449 NOS EACH ORGANISM AG IA: CPT

## 2022-02-22 PROCEDURE — 87186 SC STD MICRODIL/AGAR DIL: CPT

## 2022-02-22 PROCEDURE — 82565 ASSAY OF CREATININE: CPT

## 2022-02-22 PROCEDURE — 83540 ASSAY OF IRON: CPT

## 2022-02-22 PROCEDURE — 82435 ASSAY OF BLOOD CHLORIDE: CPT

## 2022-02-22 PROCEDURE — 84436 ASSAY OF TOTAL THYROXINE: CPT

## 2022-02-22 PROCEDURE — 81001 URINALYSIS AUTO W/SCOPE: CPT

## 2022-02-22 PROCEDURE — 86403 PARTICLE AGGLUT ANTBDY SCRN: CPT

## 2022-02-22 PROCEDURE — 82570 ASSAY OF URINE CREATININE: CPT

## 2022-02-22 PROCEDURE — 71250 CT THORAX DX C-: CPT | Mod: MA

## 2022-02-22 PROCEDURE — 85045 AUTOMATED RETICULOCYTE COUNT: CPT

## 2022-02-22 PROCEDURE — 95714 VEEG EA 12-26 HR UNMNTR: CPT

## 2022-02-22 PROCEDURE — 87116 MYCOBACTERIA CULTURE: CPT

## 2022-02-22 PROCEDURE — 74176 CT ABD & PELVIS W/O CONTRAST: CPT

## 2022-02-22 PROCEDURE — 92526 ORAL FUNCTION THERAPY: CPT

## 2022-02-22 PROCEDURE — 82550 ASSAY OF CK (CPK): CPT

## 2022-02-22 PROCEDURE — 96375 TX/PRO/DX INJ NEW DRUG ADDON: CPT

## 2022-02-22 PROCEDURE — 87640 STAPH A DNA AMP PROBE: CPT

## 2022-02-22 PROCEDURE — 95711 VEEG 2-12 HR UNMONITORED: CPT

## 2022-02-22 PROCEDURE — 80202 ASSAY OF VANCOMYCIN: CPT

## 2022-02-22 PROCEDURE — 97161 PT EVAL LOW COMPLEX 20 MIN: CPT

## 2022-02-22 PROCEDURE — 93926 LOWER EXTREMITY STUDY: CPT

## 2022-02-22 PROCEDURE — 87483 CNS DNA AMP PROBE TYPE 12-25: CPT

## 2022-02-22 PROCEDURE — 84480 ASSAY TRIIODOTHYRONINE (T3): CPT

## 2022-02-22 PROCEDURE — 86592 SYPHILIS TEST NON-TREP QUAL: CPT

## 2022-02-22 PROCEDURE — 93306 TTE W/DOPPLER COMPLETE: CPT

## 2022-02-22 PROCEDURE — 82140 ASSAY OF AMMONIA: CPT

## 2022-02-22 PROCEDURE — 82330 ASSAY OF CALCIUM: CPT

## 2022-02-22 PROCEDURE — 93971 EXTREMITY STUDY: CPT

## 2022-02-22 PROCEDURE — 83935 ASSAY OF URINE OSMOLALITY: CPT

## 2022-02-22 PROCEDURE — 82803 BLOOD GASES ANY COMBINATION: CPT

## 2022-02-22 PROCEDURE — 85014 HEMATOCRIT: CPT

## 2022-02-22 PROCEDURE — 86780 TREPONEMA PALLIDUM: CPT

## 2022-02-22 PROCEDURE — 36430 TRANSFUSION BLD/BLD COMPNT: CPT

## 2022-02-22 PROCEDURE — 82436 ASSAY OF URINE CHLORIDE: CPT

## 2022-02-22 PROCEDURE — 85379 FIBRIN DEGRADATION QUANT: CPT

## 2022-02-22 PROCEDURE — U0005: CPT

## 2022-02-22 PROCEDURE — 93970 EXTREMITY STUDY: CPT

## 2022-02-22 PROCEDURE — 83735 ASSAY OF MAGNESIUM: CPT

## 2022-02-22 PROCEDURE — 89051 BODY FLUID CELL COUNT: CPT

## 2022-02-22 PROCEDURE — 70498 CT ANGIOGRAPHY NECK: CPT

## 2022-02-22 PROCEDURE — 82962 GLUCOSE BLOOD TEST: CPT

## 2022-02-22 PROCEDURE — 84132 ASSAY OF SERUM POTASSIUM: CPT

## 2022-02-22 PROCEDURE — 74177 CT ABD & PELVIS W/CONTRAST: CPT

## 2022-02-22 PROCEDURE — 82945 GLUCOSE OTHER FLUID: CPT

## 2022-02-22 PROCEDURE — 0225U NFCT DS DNA&RNA 21 SARSCOV2: CPT

## 2022-02-22 PROCEDURE — 99232 SBSQ HOSP IP/OBS MODERATE 35: CPT

## 2022-02-22 PROCEDURE — C1769: CPT

## 2022-02-22 PROCEDURE — 71045 X-RAY EXAM CHEST 1 VIEW: CPT

## 2022-02-22 PROCEDURE — 82533 TOTAL CORTISOL: CPT

## 2022-02-22 PROCEDURE — 70496 CT ANGIOGRAPHY HEAD: CPT

## 2022-02-22 PROCEDURE — 86923 COMPATIBILITY TEST ELECTRIC: CPT

## 2022-02-22 PROCEDURE — 80053 COMPREHEN METABOLIC PANEL: CPT

## 2022-02-22 PROCEDURE — 85025 COMPLETE CBC W/AUTO DIFF WBC: CPT

## 2022-02-22 PROCEDURE — 83605 ASSAY OF LACTIC ACID: CPT

## 2022-02-22 PROCEDURE — 80076 HEPATIC FUNCTION PANEL: CPT

## 2022-02-22 PROCEDURE — 84443 ASSAY THYROID STIM HORMONE: CPT

## 2022-02-22 PROCEDURE — 84540 ASSAY OF URINE/UREA-N: CPT

## 2022-02-22 PROCEDURE — 87102 FUNGUS ISOLATION CULTURE: CPT

## 2022-02-22 PROCEDURE — 84145 PROCALCITONIN (PCT): CPT

## 2022-02-22 PROCEDURE — 84300 ASSAY OF URINE SODIUM: CPT

## 2022-02-22 PROCEDURE — 83010 ASSAY OF HAPTOGLOBIN QUANT: CPT

## 2022-02-22 PROCEDURE — 84133 ASSAY OF URINE POTASSIUM: CPT

## 2022-02-22 PROCEDURE — 85027 COMPLETE CBC AUTOMATED: CPT

## 2022-02-22 PROCEDURE — 86850 RBC ANTIBODY SCREEN: CPT

## 2022-02-22 PROCEDURE — 84100 ASSAY OF PHOSPHORUS: CPT

## 2022-02-22 PROCEDURE — 85652 RBC SED RATE AUTOMATED: CPT

## 2022-02-22 PROCEDURE — 83880 ASSAY OF NATRIURETIC PEPTIDE: CPT

## 2022-02-22 PROCEDURE — 96365 THER/PROPH/DIAG IV INF INIT: CPT

## 2022-02-22 PROCEDURE — 85730 THROMBOPLASTIN TIME PARTIAL: CPT

## 2022-02-22 PROCEDURE — 99291 CRITICAL CARE FIRST HOUR: CPT | Mod: 25

## 2022-02-22 PROCEDURE — 84484 ASSAY OF TROPONIN QUANT: CPT

## 2022-02-22 PROCEDURE — 82607 VITAMIN B-12: CPT

## 2022-02-22 PROCEDURE — L8699: CPT

## 2022-02-22 PROCEDURE — 82553 CREATINE MB FRACTION: CPT

## 2022-02-22 PROCEDURE — U0003: CPT

## 2022-02-22 PROCEDURE — 85018 HEMOGLOBIN: CPT

## 2022-02-22 PROCEDURE — 87529 HSV DNA AMP PROBE: CPT

## 2022-02-22 PROCEDURE — 84295 ASSAY OF SERUM SODIUM: CPT

## 2022-02-22 PROCEDURE — 70450 CT HEAD/BRAIN W/O DYE: CPT

## 2022-02-22 PROCEDURE — C1725: CPT

## 2022-02-22 PROCEDURE — 82947 ASSAY GLUCOSE BLOOD QUANT: CPT

## 2022-02-22 PROCEDURE — 92610 EVALUATE SWALLOWING FUNCTION: CPT

## 2022-02-22 PROCEDURE — 83615 LACTATE (LD) (LDH) ENZYME: CPT

## 2022-02-22 PROCEDURE — 82247 BILIRUBIN TOTAL: CPT

## 2022-02-22 RX ORDER — LOSARTAN POTASSIUM 100 MG/1
1 TABLET, FILM COATED ORAL
Qty: 0 | Refills: 0 | DISCHARGE

## 2022-02-22 RX ORDER — POLYETHYLENE GLYCOL 3350 17 G/17G
17 POWDER, FOR SOLUTION ORAL
Qty: 0 | Refills: 0 | DISCHARGE
Start: 2022-02-22

## 2022-02-22 RX ORDER — LEVOTHYROXINE SODIUM 125 MCG
1 TABLET ORAL
Qty: 0 | Refills: 0 | DISCHARGE

## 2022-02-22 RX ORDER — SENNA PLUS 8.6 MG/1
10 TABLET ORAL
Qty: 0 | Refills: 0 | DISCHARGE
Start: 2022-02-22

## 2022-02-22 RX ORDER — ASPIRIN/CALCIUM CARB/MAGNESIUM 324 MG
1 TABLET ORAL
Qty: 0 | Refills: 0 | DISCHARGE

## 2022-02-22 RX ORDER — LOSARTAN POTASSIUM 100 MG/1
25 TABLET, FILM COATED ORAL ONCE
Refills: 0 | Status: COMPLETED | OUTPATIENT
Start: 2022-02-22 | End: 2022-02-22

## 2022-02-22 RX ORDER — MULTIVIT-MIN/FERROUS GLUCONATE 9 MG/15 ML
15 LIQUID (ML) ORAL
Qty: 0 | Refills: 0 | DISCHARGE
Start: 2022-02-22

## 2022-02-22 RX ORDER — APIXABAN 2.5 MG/1
1 TABLET, FILM COATED ORAL
Qty: 0 | Refills: 0 | DISCHARGE

## 2022-02-22 RX ORDER — MIRABEGRON 50 MG/1
1 TABLET, EXTENDED RELEASE ORAL
Qty: 0 | Refills: 0 | DISCHARGE

## 2022-02-22 RX ADMIN — Medication 15 MILLILITER(S): at 13:44

## 2022-02-22 RX ADMIN — CHLORHEXIDINE GLUCONATE 1 APPLICATION(S): 213 SOLUTION TOPICAL at 13:59

## 2022-02-22 RX ADMIN — Medication 5 MILLIGRAM(S): at 05:47

## 2022-02-22 RX ADMIN — Medication 60 MILLIGRAM(S): at 05:46

## 2022-02-22 RX ADMIN — Medication 60 MILLIGRAM(S): at 13:44

## 2022-02-22 RX ADMIN — LOSARTAN POTASSIUM 25 MILLIGRAM(S): 100 TABLET, FILM COATED ORAL at 13:44

## 2022-02-22 NOTE — DISCHARGE NOTE PROVIDER - HOSPITAL COURSE
· Assessment    71F Hx afib on apixaban, cad s/p stents, HFrEF, CVA with left sided residual weakness who presents from nursing home with AMS, fever. Found to be hypernatremic, hypotensive   refractory to IVF requiring vasopressor, AMS secondary to metabolic encephalopathy s/p intubation, septic shock 2/2 COVID-19 PNA, MRSA PNA , and r/o meningitis. Pt extubated   1/28, with NGT feeds, now with G tube in place.     Problem/Plan - 1:  ·  Problem: Metabolic encephalopathy.   ·  Plan: AMS 2/2 Metabolic encephalopathy  - currently AO*0, awakens to voice  Hx CVA with residual left sided weak/flaccid  1/24 VEEG Negative likely-metabolic encephalopathy    1/21 CTH without acute changes, re demonstration of lacuna infarcts   s/p LP, glucose 86, protein 48- negative. all abx and anti-viral dcd  mental status waxes and wanes  appreciate neuro re-eval  Palliative consult for GOC->remains full code  Had PEG placed on 2/15, tolerating tube feeds well   CT head 2/18 without infarction, stable occlusion of the intradural segment of the right   vertebral artery  EEG no seizure activity   LE dopplers negative for DVT   Pt's mental status waxes/wanes, family friend at bedside reports this is baseline, on neuro's exam pt oriented to self and place and follows simple commands   CM follow up in long term facility placement.     Problem/Plan - 2:  ·  Problem: Fever.   ·  Plan: Multiple fevers, currently afebrile  BC with NGTD, UC likely contaminant  CT chest A/P with improvement in hematoma  appreciate ID recs  from asp PNA vs hematoma   had started empiric vanc and cefepime, on meropenem for better anerobic coverage  ID following appreciate recs  likely 2/2 to hematoma, completed 7 day course of meropenem   remains afebrile.     Problem/Plan - 3:  ·  Problem: Anemia.   ·  Plan: -had acute drop in H&H  -CT from last week showing moderate RP bleed  -lovenox d/porfirio  -transfused 2 units PRBCs, hgb stable in 7s-8s  -no intervention planned by IR  -repeat CT with improvement in hematoma  -trend hgb, remains stable.     Problem/Plan - 4:  ·  Problem: Sepsis with acute hypoxic respiratory failure and septic shock.   ·  Plan: Acute hypoxic respiratory failure 2/2 COVID-19 PNA and superimposed MRSA PNA   - intubated PS 5/530 % now s/p extubation on RA  -completed  Vancomycin for MRSA PNA 1/21-1/26  COVID-19 PNA   -s.p remdsivir 1/24- 1/28  -s/p decadron 1/21- 1/31  -resolved.     Problem/Plan - 5:  ·  Problem: Hypertension.   ·  Plan: Hydralazine 5 mg IVP > 160   - Hx NSTEMI 8/2021, HFrEF- hyperdynamic   - CAD s/p stents  - now off ASA due to bleeding.     Problem/Plan - 6:  ·  Problem: Chronic atrial fibrillation.   ·  Plan: - Hx Afib on apixaban at home  -heparin gtt placed on 2/2 pseudoaneurysm in L axillary post a line removal  ; DV duplex thrombosed now resolved post compression . Now AC with Lovenox 65 mg BID->d/porfirio iso RP bleed  -bleeding precaution  -Diltiazem 60mg q6h.     Problem/Plan - 7:  ·  Problem: Hypothyroidism.   ·  Plan: -c/w  levothyroxine 56mcg IVP qhs  -TSH level wnl.     Problem/Plan - 8:  ·  Problem: Dysphagia.   ·  Plan: CT A/P demonstrated fecal impaction, stercoral proctitis  -c/w bowel regimen  -Miralax daily, senna 10mL.     Problem/Plan - 9:  ·  Problem: Pseudoaneurysm.   ·  Plan: 1/24 LUE swelling, flaca d/c'd no longer functional- arterial duplex showing small pseudo anusurym in L ax artery. duplex neg  - vascular team input appreciated  - Hold heparin gtt/ Plan to hold compression with U/S  & Obtain arterial duplex after PSA compression   -1/26 L axillary area pseudoaneurysm ; thrombosed . Was on AC with lovenox, now held due to RP bleed.     · Assessment    71F Hx afib on apixaban, cad s/p stents, HFrEF, CVA with left sided residual weakness who presents from nursing home with AMS, fever. Found to be hypernatremic, hypotensive   refractory to IVF requiring vasopressor, AMS secondary to metabolic encephalopathy s/p intubation, septic shock 2/2 COVID-19 PNA, MRSA PNA , and r/o meningitis. Pt extubated   1/28, with NGT feeds, now with G tube in place.     Problem/Plan - 1:  ·  Problem: Metabolic encephalopathy.   ·  Plan: AMS 2/2 Metabolic encephalopathy  - currently AO*0, awakens to voice  Hx CVA with residual left sided weak/flaccid  1/24 VEEG Negative likely-metabolic encephalopathy    1/21 CTH without acute changes, re demonstration of lacuna infarcts   s/p LP, glucose 86, protein 48- negative. all abx and anti-viral dcd  mental status waxes and wanes  appreciate neuro re-eval  Palliative consult for GOC->remains full code  Had PEG placed on 2/15, tolerating tube feeds well   CT head 2/18 without infarction, stable occlusion of the intradural segment of the right   vertebral artery  EEG no seizure activity   LE dopplers negative for DVT   Pt's mental status waxes/wanes, family friend at bedside reports this is baseline, on neuro's exam pt oriented to self and place and follows simple commands   CM follow up in long term facility placement.     Problem/Plan - 2:  ·  Problem: Fever.   ·  Plan: Multiple fevers, currently afebrile  BC with NGTD, UC likely contaminant  CT chest A/P with improvement in hematoma  appreciate ID recs  from asp PNA vs hematoma   had started empiric vanc and cefepime, on meropenem for better anerobic coverage  ID following appreciate recs  likely 2/2 to hematoma, completed 7 day course of meropenem   remains afebrile.     Problem/Plan - 3:  ·  Problem: Anemia.   ·  Plan: -had acute drop in H&H  -CT from last week showing moderate RP bleed  -lovenox d/porfirio  -transfused 2 units PRBCs, hgb stable in 7s-8s  -no intervention planned by IR  -repeat CT with improvement in hematoma  -trend hgb, remains stable.     Problem/Plan - 4:  ·  Problem: Sepsis with acute hypoxic respiratory failure and septic shock.   ·  Plan: Acute hypoxic respiratory failure 2/2 COVID-19 PNA and superimposed MRSA PNA   - intubated PS 5/530 % now s/p extubation on RA  -completed  Vancomycin for MRSA PNA 1/21-1/26  COVID-19 PNA   -s.p remdsivir 1/24- 1/28  -s/p decadron 1/21- 1/31  -resolved.     Problem/Plan - 5:  ·  Problem: Hypertension.   ·  Plan: Hydralazine 5 mg IVP > 160   - Hx NSTEMI 8/2021, HFrEF- hyperdynamic   - CAD s/p stents  - now off ASA due to bleeding.     Problem/Plan - 6:  ·  Problem: Chronic atrial fibrillation.   ·  Plan: - Hx Afib on apixaban at home  -heparin gtt placed on 2/2 pseudoaneurysm in L axillary post a line removal  ; DV duplex thrombosed now resolved post compression . Now AC with Lovenox 65 mg BID->d/porfirio iso RP bleed  -bleeding precaution  -Diltiazem 60mg q6h.     Problem/Plan - 7:  ·  Problem: Hypothyroidism.   Cont current meds  -TSH level wnl.     Problem/Plan - 8:  ·  Problem: Dysphagia.   ·  Plan: CT A/P demonstrated fecal impaction, stercoral proctitis  -c/w bowel regimen  -Miralax daily, senna 10mL.     Problem/Plan - 9:  ·  Problem: Pseudoaneurysm.   ·  Plan: 1/24 LUE swelling, flaca d/c'd no longer functional- arterial duplex showing small pseudo anusurym in L ax artery. duplex neg  - vascular team input appreciated  - Hold heparin gtt/ Plan to hold compression with U/S  & Obtain arterial duplex after PSA compression   -1/26 L axillary area pseudoaneurysm ; thrombosed . Was on AC with lovenox, now held due to RP bleed.   Cleared to go back to nursing home as per Dr Martinez, DC medication reconciliation DW Dr Martinez

## 2022-02-22 NOTE — PROGRESS NOTE ADULT - PROBLEM SELECTOR PROBLEM 4
Chronic atrial fibrillation
Sepsis with acute hypoxic respiratory failure and septic shock
Sepsis with acute hypoxic respiratory failure and septic shock
Hypertension
Hypertension
Sepsis with acute hypoxic respiratory failure and septic shock
Hypertension
Sepsis with acute hypoxic respiratory failure and septic shock
Chronic atrial fibrillation
Sepsis with acute hypoxic respiratory failure and septic shock
Sepsis with acute hypoxic respiratory failure and septic shock
Chronic atrial fibrillation
Hypertension
Sepsis with acute hypoxic respiratory failure and septic shock
Sepsis with acute hypoxic respiratory failure and septic shock
Hypertension
Sepsis with acute hypoxic respiratory failure and septic shock
Sepsis with acute hypoxic respiratory failure and septic shock
Chronic atrial fibrillation
Sepsis with acute hypoxic respiratory failure and septic shock
Chronic atrial fibrillation
Sepsis with acute hypoxic respiratory failure and septic shock

## 2022-02-22 NOTE — PROGRESS NOTE ADULT - SUBJECTIVE AND OBJECTIVE BOX
Patient is a 71y old  Female who presents with a chief complaint of AMS (21 Feb 2022 15:03)    SUBJECTIVE / OVERNIGHT EVENTS: no acute events overnight, pt is much more alert and awake today, mouthing answers to questions     MEDICATIONS  (STANDING):  atorvastatin 80 milliGRAM(s) Oral at bedtime  chlorhexidine 4% Liquid 1 Application(s) Topical <User Schedule>  diltiazem    Tablet 60 milliGRAM(s) Enteral Tube every 6 hours  insulin lispro (ADMELOG) corrective regimen sliding scale   SubCutaneous every 6 hours  levothyroxine Injectable 56 MICROGram(s) IV Push at bedtime  multivitamin/minerals/iron Oral Solution (CENTRUM) 15 milliLiter(s) Oral daily  polyethylene glycol 3350 17 Gram(s) Oral daily  senna Syrup 10 milliLiter(s) Oral at bedtime    MEDICATIONS  (PRN):  acetaminophen    Suspension .. 650 milliGRAM(s) Oral every 6 hours PRN Temp greater or equal to 38C (100.4F)  hydrALAZINE Injectable 5 milliGRAM(s) IV Push every 4 hours PRN SBP >160      Vital Signs Last 24 Hrs  T(C): 36.5 (22 Feb 2022 05:40), Max: 36.7 (21 Feb 2022 13:52)  T(F): 97.7 (22 Feb 2022 05:40), Max: 98.1 (21 Feb 2022 13:52)  HR: 95 (22 Feb 2022 05:40) (84 - 96)  BP: 176/96 (22 Feb 2022 05:40) (155/91 - 176/96)  BP(mean): --  RR: 20 (22 Feb 2022 05:40) (18 - 20)  SpO2: 97% (22 Feb 2022 05:40) (94% - 97%)  CAPILLARY BLOOD GLUCOSE      POCT Blood Glucose.: 106 mg/dL (22 Feb 2022 07:08)  POCT Blood Glucose.: 117 mg/dL (21 Feb 2022 23:48)  POCT Blood Glucose.: 114 mg/dL (21 Feb 2022 18:21)  POCT Blood Glucose.: 129 mg/dL (21 Feb 2022 13:21)    I&O's Summary    21 Feb 2022 07:01  -  22 Feb 2022 07:00  --------------------------------------------------------  IN: 1840 mL / OUT: 950 mL / NET: 890 mL    22 Feb 2022 07:01  -  22 Feb 2022 10:46  --------------------------------------------------------  IN: 0 mL / OUT: 0 mL / NET: 0 mL      PHYSICAL EXAM:  GENERAL: NAD  EYES: conjunctiva and sclera clear  CHEST/LUNG: no wheezing noted   HEART: +S1/S2   ABDOMEN: Soft, Nontender, Nondistended, G tube in place   EXTREMITIES: peripheral edema has resolved   PSYCH: Alert and awake, mouthing answers to questions     LABS:                        11.6   6.86  )-----------( 661      ( 22 Feb 2022 07:55 )             37.1     02-22    136  |  98  |  12  ----------------------------<  126<H>  3.6   |  27  |  0.33<L>    Ca    9.1      22 Feb 2022 07:55  Phos  3.4     02-21  Mg     2.0     02-21    TPro  5.4<L>  /  Alb  2.2<L>  /  TBili  0.4  /  DBili  x   /  AST  12  /  ALT  9<L>  /  AlkPhos  101  02-21        Consultant(s) Notes Reviewed:  Neurology

## 2022-02-22 NOTE — PROGRESS NOTE ADULT - PROBLEM SELECTOR PLAN 5
- Hx Afib on apixaban at home  -heparin gtt placed on 2/2 pseudoaneurysm in L axillary post a line removal  ; DV duplex thrombosed now resolved post compression . Now AC with Lovenox 65 mg BID->d/porfirio iso RP bleed  -bleeding precaution  -Diltiazem 60mg q6h  -AC plan as above
- Hx Afib on apixaban at home  -heparin gtt placed on 2/2 pseudoaneurysm in L axillary post a line removal  ; DV duplex thrombosed now resolved post compression . Now AC with Lovenox 65 mg BID->d/porfirio iso RP bleed  -bleeding precaution  -Diltiazem 60mg q6h  -AC plan as above
Hydralazine 5 mg IVP > 160   - Hx NSTEMI 8/2021, HFrEF- hyperdynamic   - CAD s/p stents  - now off ASA due to bleeding
- Hx Afib on apixaban at home  -heparin gtt placed on 2/2 pseudoaneurysm in L axillary post a line removal  ; DV duplex thrombosed now resolved post compression . Now AC with Lovenox 65 mg BID .   -bleeding precaution  -Diltiazem 60mg q6h  -AC plan as above
Hydralazine 5 mg IVP > 160   - Hx NSTEMI 8/2021, HFrEF- hyperdynamic   - CAD s/p stents  - c/w ASA 81 mg qd
Hydralazine 5 mg IVP > 160   - Hx NSTEMI 8/2021, HFrEF- hyperdynamic   - CAD s/p stents  - c/w ASA 81 mg qd
- Hx Afib on apixaban at home  -heparin gtt placed on 2/2 pseudoaneurysm in L axillary post a line removal  ; DV duplex thrombosed now resolved post compression . Now AC with Lovenox 65 mg BID .   -bleeding precaution  -Diltiazem 60mg q6h  -AC plan as above
resolved s/p D5W
Hydralazine 5 mg IVP > 160   - Hx NSTEMI 8/2021, HFrEF- hyperdynamic   - CAD s/p stents  - now off ASA due to bleeding
- Hx Afib on apixaban at home  -heparin gtt placed on 2/2 pseudoaneurysm in L axillary post a line removal  ; DV duplex thrombosed now resolved post compression . Now AC with Lovenox 65 mg BID->d/porfirio iso RP bleed  -bleeding precaution  -Diltiazem 60mg q6h  -AC plan as above
resolved s/p D5W
- Hx Afib on apixaban at home  -heparin gtt placed on 2/2 pseudoaneurysm in L axillary post a line removal  ; DV duplex thrombosed now resolved post compression . Now AC with Lovenox 65 mg BID->d/porfirio iso RP bleed  -bleeding precaution  -Diltiazem 60mg q6h  -AC plan as above
Hydralazine 5 mg IVP > 160   - Hx NSTEMI 8/2021, HFrEF- hyperdynamic   - CAD s/p stents  - c/w ASA 81 mg qd
Hydralazine 5 mg IVP > 160   - Hx NSTEMI 8/2021, HFrEF- hyperdynamic   - CAD s/p stents  - now off ASA due to bleeding
resolved s/p D5W
Hydralazine 5 mg IVP > 160   - Hx NSTEMI 8/2021, HFrEF- hyperdynamic   - CAD s/p stents  - now off ASA due to bleeding, to be restarted as an outpatient after follow up as patient will need to be on at least ASA due to stroke
Hydralazine 5 mg IVP > 160   - Hx NSTEMI 8/2021, HFrEF- hyperdynamic   - CAD s/p stents  - c/w ASA 81 mg qd
- Hx Afib on apixaban at home  -heparin gtt placed on 2/2 pseudoaneurysm in L axillary post a line removal  ; DV duplex thrombosed now resolved post compression . Now AC with Lovenox 65 mg BID->d/porfirio iso RP bleed  -bleeding precaution  -Diltiazem 60mg q6h  -AC plan as above
Hydralazine 5 mg IVP > 160   - Hx NSTEMI 8/2021, HFrEF- hyperdynamic   - CAD s/p stents  - c/w ASA 81 mg qd
149-->147  -c/w free h20 300 q 4 hrs   -d5.  @ 50
- Hx Afib on apixaban at home  -heparin gtt placed on 2/2 pseudoaneurysm in L axillary post a line removal  ; DV duplex thrombosed now resolved post compression . Now AC with Lovenox 65 mg BID->d/porfirio iso RP bleed  -bleeding precaution  -Diltiazem 60mg q6h  -AC plan as above
resolved s/p D5W
Hydralazine 5 mg IVP > 160   - Hx NSTEMI 8/2021, HFrEF- hyperdynamic   - CAD s/p stents  - c/w ASA 81 mg qd
Hydralazine 5 mg IVP > 160   - Hx NSTEMI 8/2021, HFrEF- hyperdynamic   - CAD s/p stents  - now off ASA due to bleeding
Hydralazine 5 mg IVP > 160   - Hx NSTEMI 8/2021, HFrEF- hyperdynamic   - CAD s/p stents  - now off ASA due to bleeding

## 2022-02-22 NOTE — PROGRESS NOTE ADULT - PROBLEM SELECTOR PROBLEM 2
Fever
Metabolic encephalopathy
Sepsis with acute hypoxic respiratory failure and septic shock
Metabolic encephalopathy
Anemia
Fever
Fever
Metabolic encephalopathy
Sepsis with acute hypoxic respiratory failure and septic shock
Fever
Fever
Metabolic encephalopathy
Sepsis with acute hypoxic respiratory failure and septic shock
Metabolic encephalopathy
Metabolic encephalopathy
Sepsis with acute hypoxic respiratory failure and septic shock
Metabolic encephalopathy
Metabolic encephalopathy
Sepsis with acute hypoxic respiratory failure and septic shock
Metabolic encephalopathy

## 2022-02-22 NOTE — PROGRESS NOTE ADULT - PROBLEM SELECTOR PLAN 6
-c/w  levothyroxine 56mcg IVP qhs  -TSH level wnl
- Hx Afib on apixaban at home  -heparin gtt placed on 2/2 pseudoaneurysm in L axillary post a line removal  ; DV duplex thrombosed now resolved post compression . Now AC with Lovenox 65 mg BID->d/porfirio iso RP bleed  -bleeding precaution  -Diltiazem 60mg q6h  -AC plan as above
- Hx Afib on apixaban at home  -heparin gtt placed on 2/2 pseudoaneurysm in L axillary post a line removal  ; DV duplex thrombosed now resolved post compression . Now AC with Lovenox 65 mg BID->d/porfirio iso RP bleed  -bleeding precaution  -Diltiazem 60mg q6h  -AC plan as above
-c/w  levothyroxine 56mcg IVP qhs  -TSH level wnl
- Hx Afib on apixaban at home  -heparin gtt placed on 2/2 pseudoaneurysm in L axillary post a line removal  ; DV duplex thrombosed now resolved post compression . Now AC with Lovenox 65 mg BID->d/porfirio iso RP bleed  -bleeding precaution  -Diltiazem 60mg q6h  -AC plan as above
- Hx Afib on apixaban at home  -heparin gtt placed on 2/2 pseudoaneurysm in L axillary post a line removal  ; DV duplex thrombosed now resolved post compression . Now AC with Lovenox 65 mg BID->d/porfirio iso RP bleed  -bleeding precaution  -Diltiazem 60mg q6h  -AC plan as above
-c/w  levothyroxine 56mcg IVP qhs  -TSH level wnl
-c/w  levothyroxine 56mcg IVP qhs  -TSH level
- Hx Afib on apixaban at home  -heparin gtt placed on 2/2 pseudoaneurysm in L axillary post a line removal  ; DV duplex thrombosed now resolved post compression . Now AC with Lovenox 65 mg BID->d/porfirio iso RP bleed  -bleeding precaution  -Diltiazem 60mg q6h  -AC plan as above
-c/w  levothyroxine 56mcg IVP qhs  -TSH level wnl
-c/w  levothyroxine 56mcg IVP qhs  -TSH level wnl
- Hx Afib on apixaban at home  -heparin gtt placed on 2/2 pseudoaneurysm in L axillary post a line removal  ; DV duplex thrombosed now resolved post compression . Now AC with Lovenox 65 mg BID->d/porfirio iso RP bleed  -bleeding precaution  -Diltiazem 60mg q6h  -AC plan as above
-c/w  levothyroxine 56mcg IVP qhs  -TSH level wnl
- Hx Afib on apixaban at home  -heparin gtt placed on 2/2 pseudoaneurysm in L axillary post a line removal  ; DV duplex thrombosed now resolved post compression . Now AC with Lovenox 65 mg BID->d/porfirio iso RP bleed  -bleeding precaution  -Diltiazem 60mg q6h  -f/u with cardiology as an outpatient and resume AC after f/u pending cards recs
- Hx Afib on apixaban at home  -heparin gtt placed on 2/2 pseudoaneurysm in L axillary post a line removal  ; DV duplex thrombosed now resolved post compression . Now AC with Lovenox 65 mg BID->d/porfirio iso RP bleed  -bleeding precaution  -Diltiazem 60mg q6h  -AC plan as above
- Hx Afib on apixaban at home  -heparin gtt placed on 2/2 pseudoaneurysm in L axillary post a line removal  ; DV duplex thrombosed now resolved post compression . Now AC with Lovenox 65 mg BID->d/porfirio iso RP bleed  -bleeding precaution  -Diltiazem 60mg q6h
- Hx Afib on apixaban at home  -heparin gtt placed on 2/2 pseudoaneurysm in L axillary post a line removal  ; DV duplex thrombosed now resolved post compression . Now AC with Lovenox 65 mg BID->d/porfirio iso RP bleed  -bleeding precaution  -Diltiazem 60mg q6h  -AC plan as above
- Hx Afib on apixaban at home  -heparin gtt placed on 2/2 pseudoaneurysm in L axillary post a line removal  ; DV duplex thrombosed now resolved post compression . Now AC with Lovenox 65 mg BID->d/porfirio iso RP bleed  -bleeding precaution  -Diltiazem 60mg q6h  -AC plan as above
-c/w  levothyroxine 56mcg IVP qhs  -TSH level wnl
- Hx Afib on apixaban at home  -heparin gtt placed on 2/2 pseudoaneurysm in L axillary post a line removal  ; DV duplex thrombosed now resolved post compression . Now AC with Lovenox 65 mg BID->d/porfirio iso RP bleed  -bleeding precaution  -Diltiazem 60mg q6h  -AC plan as above
-c/w  levothyroxine 56mcg IVP qhs  -TSH level wnl

## 2022-02-22 NOTE — PROGRESS NOTE ADULT - PROBLEM SELECTOR PLAN 7
CT A/P demonstrated fecal impaction, stercoral proctitis  -BM 1/26   -c/w bowel regimen    Miralax daily, senna 10mL
CT A/P demonstrated fecal impaction, stercoral proctitis  -c/w bowel regimen  -Miralax daily, senna 10mL  -speech and swallow recs as above  -GOC
-c/w  levothyroxine 56mcg IVP qhs  -TSH level wnl
CT A/P demonstrated fecal impaction, stercoral proctitis  -c/w bowel regimen  -Miralax daily, senna 10mL  -speech and swallow recs as above  -GOC
-c/w  levothyroxine 56mcg IVP qhs  -TSH level wnl
-c/w  levothyroxine 56mcg IVP qhs  -TSH level wnl
CT A/P demonstrated fecal impaction, stercoral proctitis  -BM 1/26   -c/w bowel regimen    Miralax daily, senna 10mL
CT A/P demonstrated fecal impaction, stercoral proctitis  -c/w bowel regimen  -Miralax daily, senna 10mL  -speech and swallow recs as above  -GOC
CT A/P demonstrated fecal impaction, stercoral proctitis  -c/w bowel regimen  -Miralax daily, senna 10mL  -speech and swallow recs as above  -GOC
CT A/P demonstrated fecal impaction, stercoral proctitis  -c/w bowel regimen  -Miralax daily, senna 10mL  -now that patient is more alert and awake, will try Speech and swallow re-eval as above
-c/w  levothyroxine 56mcg IVP qhs  -TSH level wnl
CT A/P demonstrated fecal impaction, stercoral proctitis  -BM 1/26   -c/w bowel regimen    Miralax daily, senna 10mL
-c/w  levothyroxine 56mcg IVP qhs  -TSH level wnl
CT A/P demonstrated fecal impaction, stercoral proctitis  -c/w bowel regimen  -Miralax daily, senna 10mL  -speech and swallow recs as above  -GOC
-c/w  levothyroxine 56mcg IVP qhs  -TSH level wnl
-c/w  levothyroxine 56mcg IVP qhs  -TSH level wnl
CT A/P demonstrated fecal impaction, stercoral proctitis  -c/w bowel regimen  -Miralax daily, senna 10mL  -now that patient is more alert and awake, will try Speech and swallow re-eval
CT A/P demonstrated fecal impaction, stercoral proctitis  -c/w bowel regimen  -Miralax daily, senna 10mL  -speech and swallow recs as above  -GOC

## 2022-02-22 NOTE — PROGRESS NOTE ADULT - PROBLEM SELECTOR PLAN 10
DVT ppx :  scds  MTV  GOC  -FULL
DVT ppx :  scds  MTV  GOC  -FULL    Case management follow up for discharge planning to Long term care facility, family does not want pt to return to [facility]
DVT ppx :  scds  MTV  GOC  -FULL
DVT ppx :  scds  MTV  GOC  -FULL    Case management follow up for discharge planning to Long term care facility, family does not want pt to return to [facility]
DVT ppx :  scds  MTV  GOC  -FULL

## 2022-02-22 NOTE — PROGRESS NOTE ADULT - PROVIDER SPECIALTY LIST ADULT
Intervent Radiology
Wound Care
Critical Care
Internal Medicine
MICU
Neurology
Neurology
Wound Care
MICU
Critical Care
Hospitalist
Infectious Disease
Neurology
Wound Care
Hospitalist
Hospitalist
MICU
Hospitalist
Internal Medicine
Hospitalist
Internal Medicine
Hospitalist
Internal Medicine
Hospitalist

## 2022-02-22 NOTE — DISCHARGE NOTE NURSING/CASE MANAGEMENT/SOCIAL WORK - PATIENT PORTAL LINK FT
You can access the FollowMyHealth Patient Portal offered by [facility] by registering at the following website: [email or web address]. By joining [facility]’s FollowMyHealth portal, you will also be able to view your health information using other applications (apps) compatible with our system.

## 2022-02-22 NOTE — PROGRESS NOTE ADULT - PROBLEM SELECTOR PLAN 4
Acute hypoxic respiratory failure 2/2 COVID-19 PNA and superimposed MRSA PNA   - intubated PS 5/530 % now s/p extubation on RA  -completed  Vancomycin for MRSA PNA 1/21-1/26  COVID-19 PNA   -s.p remdsivir 1/24- 1/28  -s/p decadron 1/21- 1/31  -resolved
Hydralazine 5 mg IVP > 160   - Hx NSTEMI 8/2021, HFrEF- hyperdynamic   - CAD s/p stents  - c/w ASA 81 mg qd
Hydralazine 5 mg IVP > 160   - Hx NSTEMI 8/2021, HFrEF- hyperdynamic   - CAD s/p stents  - c/w ASA 81 mg qd
Acute hypoxic respiratory failure 2/2 COVID-19 PNA and superimposed MRSA PNA   - intubated PS 5/530 % now s/p extubation on RA  -completed  Vancomycin for MRSA PNA 1/21-1/26  COVID-19 PNA   -s.p remdsivir 1/24- 1/28  -s/p decadron 1/21- 1/31  -resolved
Acute hypoxic respiratory failure 2/2 COVID-19 PNA and superimposed MRSA PNA   - intubated PS 5/530 % now s/p extubation on RA  -completed  Vancomycin for MRSA PNA 1/21-1/26  COVID-19 PNA   -s.p remdsivir 1/24- 1/28  -s/p decadron 1/21- 1/31  -resolved
Hydralazine 5 mg IVP > 160   - Hx NSTEMI 8/2021, HFrEF- hyperdynamic   - CAD s/p stents  - c/w ASA 81 mg qd
- Hx Afib on apixaban at home  -heparin gtt placed on 2/2 pseudoaneurysm in L axillary post a line removal  ; DV duplex thrombosed now resolved post compression . Now AC with Lovenox 65 mg BID .   -bleeding precaution    Diltiazem 60mg q6h
- Hx Afib on apixaban at home  -heparin gtt placed on 2/2 pseudoaneurysm in L axillary post a line removal  ; DV duplex thrombosed now resolved post compression . Now AC with Lovenox 65 mg BID .   -bleeding precaution  -Diltiazem 60mg q6h
Acute hypoxic respiratory failure 2/2 COVID-19 PNA and superimposed MRSA PNA   - intubated PS 5/530 % now s/p extubation on RA  -completed  Vancomycin for MRSA PNA 1/21-1/26  COVID-19 PNA   -s.p remdsivir 1/24- 1/28  -s/p decadron 1/21- 1/31  -resolved
Hydralazine 5 mg IVP > 160   - Hx NSTEMI 8/2021, HFrEF- hyperdynamic   - CAD s/p stents  - c/w ASA 81 mg qd
Hydralazine 5 mg IVP > 160   - Hx NSTEMI 8/2021, HFrEF- hyperdynamic   - CAD s/p stents  - c/w ASA 81 mg qd
Acute hypoxic respiratory failure 2/2 COVID-19 PNA and superimposed MRSA PNA   - intubated PS 5/530 % now s/p extubation on RA  -completed  Vancomycin for MRSA PNA 1/21-1/26  COVID-19 PNA   -s.p remdsivir 1/24- 1/28  -s/p decadron 1/21- 1/31  -resolved
Hydralazine 5 mg IVP > 160   - Hx NSTEMI 8/2021, HFrEF- hyperdynamic   - CAD s/p stents  - c/w ASA 81 mg qd
- Hx Afib on apixaban at home  -heparin gtt placed on 2/2 pseudoaneurysm in L axillary post a line removal  ; DV duplex thrombosed now resolved post compression . Now AC with Lovenox 65 mg BID .   -bleeding precaution  -Diltiazem 60mg q6h
Acute hypoxic respiratory failure 2/2 COVID-19 PNA and superimposed MRSA PNA   - intubated PS 5/530 % now s/p extubation on RA  -completed  Vancomycin for MRSA PNA 1/21-1/26  COVID-19 PNA   -s.p remdsivir 1/24- 1/28  -s/p decadron 1/21- 1/31  -resolved
- Hx Afib on apixaban at home  -heparin gtt placed on 2/2 pseudoaneurysm in L axillary post a line removal  ; DV duplex thrombosed now resolved post compression . Now AC with Lovenox 65 mg BID .   -bleeding precaution  -Diltiazem 60mg q6h
Hydralazine 5 mg IVP > 160   - Hx NSTEMI 8/2021, HFrEF- hyperdynamic   - CAD s/p stents  - c/w ASA 81 mg qd
- Hx Afib on apixaban at home  -heparin gtt placed on 2/2 pseudoaneurysm in L axillary post a line removal  ; DV duplex thrombosed now resolved post compression . Now AC with Lovenox 65 mg BID .   -bleeding precaution  -Diltiazem 60mg q6h
Hydralazine 5 mg IVP > 160   - Hx NSTEMI 8/2021, HFrEF- hyperdynamic   - CAD s/p stents  - c/w ASA 81 mg qd
Acute hypoxic respiratory failure 2/2 COVID-19 PNA and superimposed MRSA PNA   - intubated PS 5/530 % now s/p extubation on RA  -completed  Vancomycin for MRSA PNA 1/21-1/26  COVID-19 PNA   -s.p remdsivir 1/24- 1/28  -s/p decadron 1/21- 1/31  -resolved

## 2022-02-22 NOTE — PROGRESS NOTE ADULT - PROBLEM SELECTOR PROBLEM 7
Dysphagia
Hypothyroidism
Dysphagia
Hypothyroidism
Hypothyroidism
Dysphagia
Dysphagia
Hypothyroidism
Hypothyroidism
Dysphagia
Hypothyroidism
Hypothyroidism
Dysphagia
Hypothyroidism
Hypothyroidism
Dysphagia
Hypothyroidism
Dysphagia
Dysphagia
Hypothyroidism
Dysphagia
Hypothyroidism

## 2022-02-22 NOTE — PROGRESS NOTE ADULT - PROBLEM SELECTOR PROBLEM 9
Pseudoaneurysm
Prophylactic measure
Prophylactic measure
Pseudoaneurysm
Pseudoaneurysm
Prophylactic measure
Pseudoaneurysm
Prophylactic measure
Pseudoaneurysm
Prophylactic measure
Pseudoaneurysm
Pseudoaneurysm
Prophylactic measure
Pseudoaneurysm
Prophylactic measure
Pseudoaneurysm
Pseudoaneurysm

## 2022-02-22 NOTE — DISCHARGE NOTE PROVIDER - NSDCCPCAREPLAN_GEN_ALL_CORE_FT
PRINCIPAL DISCHARGE DIAGNOSIS  Diagnosis: Metabolic encephalopathy  Assessment and Plan of Treatment:   Hx CVA with residual left sided weak/flaccid  1/24 VEEG Negative likely-metabolic encephalopathy    1/21 CTH without acute changes, re demonstration of lacuna infarcts   s/p LP, glucose 86, protein 48- negative. all abx and anti-viral dcd  mental status waxes and wanes. Had neuro consult.  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  EEG no seizure activity   LE dopplers negative for DVT   Pt's mental status waxes/wanes, family friend at bedside reports this is baseline, on neuro's exam pt oriented to self and place and follows simple commands      SECONDARY DISCHARGE DIAGNOSES  Diagnosis: Dysphagia  Assessment and Plan of Treatment:     Diagnosis: Chronic atrial fibrillation  Assessment and Plan of Treatment:     Diagnosis: Fever  Assessment and Plan of Treatment: Multiple fevers, currently afebrile  BC with NGTD, UC likely contaminant  CT chest A/P with improvement in hematoma  Had ID consult   from asp PNA vs hematoma   had started empiric vanc and cefepime, changed to  meropenem for better anerobic coverage  likely 2/2 to hematoma, completed 7 day course of meropenem   remains afebrile    Diagnosis: Hypertension  Assessment and Plan of Treatment:     Diagnosis: Sepsis with acute hypoxic respiratory failure and septic shock  Assessment and Plan of Treatment: Acute hypoxic respiratory failure 2/2 COVID-19 PNA and superimposed MRSA PNA   - intubated ,now s/p extubation on RA  -completed  Vancomycin for MRSA PNA 1/21-1/26  COVID-19 PNA   -s.p remdsivir 1/24- 1/28  -s/p decadron 1/21- 1/31  -resolved.    Diagnosis: Anemia  Assessment and Plan of Treatment:      PRINCIPAL DISCHARGE DIAGNOSIS  Diagnosis: Metabolic encephalopathy  Assessment and Plan of Treatment:   Hx CVA with residual left sided weak/flaccid  1/24 VEEG Negative likely-metabolic encephalopathy    1/21 CTH without acute changes, re demonstration of lacuna infarcts   s/p LP, glucose 86, protein 48- negative. all abx and anti-viral dcd  mental status waxes and wanes. Had neuro consult.  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    Diagnosis: Chronic atrial fibrillation  Assessment and Plan of Treatment: Hx Afib on apixaban at home  -heparin gtt placed on 2/2 pseudoaneurysm in L axillary post a line removal  ; DV duplex thrombosed now resolved post compression . Now AC with Lovenox 65 mg BID->d/porfirio iso RP bleed  -bleeding precaution  -Diltiazem 60mg q6h.      Diagnosis: Fever  Assessment and Plan of Treatment: Multiple fevers, currently afebrile  BC with NGTD, UC likely contaminant  CT chest A/P with improvement in hematoma  Had ID consult   from asp PNA vs hematoma   had started empiric vanc and cefepime, changed to  meropenem for better anerobic coverage  likely 2/2 to hematoma, completed 7 day course of meropenem   remains afebrile    Diagnosis: Pseudoaneurysm  Assessment and Plan of Treatment: 1/24 LUE swelling, flaca d/c'd no longer functional- arterial duplex showing small pseudo anusurym in L ax artery. duplex neg  - vascular team input appreciated  - Hold heparin gtt/ Plan to hold compression with U/S  & Obtain arterial duplex after PSA compression   -1/26 L axillary area pseudoaneurysm ; thrombosed . Was on AC with lovenox, now held due to RP bleed.      Diagnosis: Sepsis with acute hypoxic respiratory failure and septic shock  Assessment and Plan of Treatment: Acute hypoxic respiratory failure 2/2 COVID-19 PNA and superimposed MRSA PNA   - intubated ,now s/p extubation on RA  -completed  Vancomycin for MRSA PNA 1/21-1/26  COVID-19 PNA   -s.p remdsivir 1/24- 1/28  -s/p decadron 1/21- 1/31  -resolved.    Diagnosis: Anemia  Assessment and Plan of Treatment: Problem/Plan - 8:  ·  Problem: Dysphagia.   ·  Plan: CT A/P demonstrated fecal impaction, stercoral proctitis  -c/w bowel regimen  -Miralax daily, senna 10mL.   Problem/Plan - 9:  ·  Problem: Pseudoaneurysm.   ·  Plan: 1/24 LUE swelling, flaca d/c'd no longer functional- arterial duplex showing small pseudo anusurym in L ax artery. duplex neg  - vascular team input appreciated  - Hold heparin gtt/ Plan to hold compression with U/S  & Obtain arterial duplex after PSA compression   -1/26 L axillary area pseudoaneurysm ; thrombosed . Was on AC with lovenox, now held due to RP bleed.       PRINCIPAL DISCHARGE DIAGNOSIS  Diagnosis: Metabolic encephalopathy  Assessment and Plan of Treatment:   Hx CVA with residual left sided weak/flaccid  1/24 VEEG Negative likely-metabolic encephalopathy    1/21 CTH without acute changes, re demonstration of lacuna infarcts   s/p LP, glucose 86, protein 48- negative. all abx and anti-viral dcd  mental status waxes and wanes. Had neuro consult.  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  AC with Lovenox 65 mg BID->d/porfirio secondary to  RP bleed  -Diltiazem 60mg q6h.  May need to resume AC , after seen by cardiology in 1 week of discharge      Diagnosis: Fever  Assessment and Plan of Treatment: Multiple fevers, currently afebrile  BC with NGTD, UC likely contaminant  CT chest A/P with improvement in hematoma  Had ID consult   from asp PNA vs hematoma   had started empiric vanc and cefepime, changed to  meropenem for better anerobic coverage  likely 2/2 to hematoma, completed 7 day course of meropenem   remains afebrile    Diagnosis: Pseudoaneurysm  Assessment and Plan of Treatment: 1/24 LUE swelling, flaca d/c'd no longer functional- arterial duplex showing small pseudo anusurym in L axillary  artery. duplex neg   -1/26 L axillary area pseudoaneurysm ; thrombosed . Was on AC with lovenox, now held due to RP bleed.      Diagnosis: Sepsis with acute hypoxic respiratory failure and septic shock  Assessment and Plan of Treatment: Acute hypoxic respiratory failure 2/2 COVID-19 PNA and superimposed MRSA PNA   - intubated ,now s/p extubation on RA  -completed  Vancomycin for MRSA PNA 1/21-1/26  COVID-19 PNA   -s.p remdsivir 1/24- 1/28  -s/p decadron 1/21- 1/31  -resolved.    Diagnosis: Anemia  Assessment and Plan of Treatment: -had acute drop in H&H  -CT from last week showing moderate RP bleed  -lovenox d/porfirio  -transfused 2 units PRBCs, hgb stable in 7s-8s  -no intervention planned by IR  -repeat CT with improvement in hematoma  -trend hgb,    Diagnosis: Constipation  Assessment and Plan of Treatment: CT A/P with stercoral colitis. Continue bowel regimen     PRINCIPAL DISCHARGE DIAGNOSIS  Diagnosis: Metabolic encephalopathy  Assessment and Plan of Treatment:   Hx CVA with residual left sided weak/flaccid  1/24 VEEG Negative likely-metabolic encephalopathy    1/21 CTH without acute changes, re demonstration of lacuna infarcts   s/p LP, glucose 86, protein 48- negative. all abx and anti-viral dcd  mental status waxes and wanes. Had neuro consult.  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  AC with Lovenox 65 mg BID->d/porfirio secondary to  RP bleed  -Diltiazem 60mg q6h.  May need to resume AC , after seen by cardiology in 2 week of discharge.      Diagnosis: Fever  Assessment and Plan of Treatment: Multiple fevers, currently afebrile  BC with NGTD, UC likely contaminant  CT chest A/P with improvement in hematoma  Had ID consult   from asp PNA vs hematoma   had started empiric vanc and cefepime, changed to  meropenem for better anerobic coverage  likely 2/2 to hematoma, completed 7 day course of meropenem   remains afebrile    Diagnosis: Pseudoaneurysm  Assessment and Plan of Treatment: 1/24 LUE swelling, flaca d/c'd no longer functional- arterial duplex showing small pseudo anusurym in L axillary  artery. duplex neg   -1/26 L axillary area pseudoaneurysm ; thrombosed . Was on AC with lovenox, now held due to RP bleed.      Diagnosis: Sepsis with acute hypoxic respiratory failure and septic shock  Assessment and Plan of Treatment: Acute hypoxic respiratory failure 2/2 COVID-19 PNA and superimposed MRSA PNA   - intubated ,now s/p extubation on RA  -completed  Vancomycin for MRSA PNA 1/21-1/26  COVID-19 PNA   -s.p remdsivir 1/24- 1/28  -s/p decadron 1/21- 1/31  -resolved.    Diagnosis: Anemia  Assessment and Plan of Treatment: -had acute drop in H&H  -CT from last week showing moderate RP bleed  -lovenox d/porfirio  -transfused 2 units PRBCs, hgb stable in 7s-8s  -no intervention planned by IR  -repeat CT with improvement in hematoma  -trend hgb,    Diagnosis: Constipation  Assessment and Plan of Treatment: CT A/P with stercoral colitis. Continue bowel regimen

## 2022-02-22 NOTE — PROGRESS NOTE ADULT - PROBLEM SELECTOR PROBLEM 1
Metabolic encephalopathy
Anemia
Metabolic encephalopathy
Anemia
Fever
Fever
Anemia
Metabolic encephalopathy
Anemia
Fever
Fever
Metabolic encephalopathy
Metabolic encephalopathy
Fever
Metabolic encephalopathy
Metabolic encephalopathy
Fever
Metabolic encephalopathy
Anemia
Metabolic encephalopathy
Fever
Metabolic encephalopathy
Metabolic encephalopathy

## 2022-02-22 NOTE — DISCHARGE NOTE NURSING/CASE MANAGEMENT/SOCIAL WORK - NSDCPEFALRISK_GEN_ALL_CORE
For information on Fall & Injury Prevention, visit: [email or web address] OR  [email or web address] OR  https://www.cdc.gov/steadi/patient.html

## 2022-02-22 NOTE — PROGRESS NOTE ADULT - PROBLEM SELECTOR PROBLEM 5
Hypernatremia
Hypertension
Chronic atrial fibrillation
Hypertension
Chronic atrial fibrillation
Hypernatremia
Hypertension
Chronic atrial fibrillation
Hypertension
Hypertension
Chronic atrial fibrillation
Hypernatremia
Hypertension
Hypernatremia
Hypertension
Hypertension
Hypernatremia
Hypertension
Hypertension

## 2022-02-22 NOTE — PROGRESS NOTE ADULT - PROBLEM SELECTOR PROBLEM 8
Dysphagia
Pseudoaneurysm
Dysphagia
Dysphagia
Pseudoaneurysm
Pseudoaneurysm
Dysphagia
Pseudoaneurysm
Dysphagia
Pseudoaneurysm

## 2022-02-22 NOTE — DISCHARGE NOTE PROVIDER - NSDCMRMEDTOKEN_GEN_ALL_CORE_FT
aspirin 81 mg oral delayed release tablet: 1 tab(s) orally once a day  atorvastatin 80 mg oral tablet: 1 tab(s) orally once a day (at bedtime)  bumetanide 2 mg oral tablet: 1 tab(s) orally 2 times a day  Cardizem 60 mg oral tablet: 1 tab(s) orally every 6 hours  Cozaar 25 mg oral tablet: 1 tab(s) orally once a day  Eliquis 5 mg oral tablet: 1 tab(s) orally 2 times a day  Flomax 0.4 mg oral capsule: 1 cap(s) orally once a day  Klor-Con M20 oral tablet, extended release: 1 tab(s) orally once a day  levothyroxine 75 mcg (0.075 mg) oral tablet: 1 tab(s) orally once a day  Myrbetriq 25 mg oral tablet, extended release: 1 tab(s) orally once a day   atorvastatin 80 mg oral tablet: 1 tab(s) by gastrostomy tube once a day (at bedtime)  Cardizem 60 mg oral tablet: 1 tab(s) by gastrostomy tube every 6 hours  levothyroxine 75 mcg (0.075 mg) oral tablet: 1 tab(s) orally once a day  Multiple Vitamins with Minerals oral liquid: 15 milliliter(s) by gastrostomy tube once a day  polyethylene glycol 3350 oral powder for reconstitution: 17 gram(s) by gastrostomy tube once a day  senna 8.8 mg/5 mL oral syrup: 10 milliliter(s) by gastrostomy tube once a day (at bedtime)   atorvastatin 80 mg oral tablet: 1 tab(s) by gastrostomy tube once a day (at bedtime)  Cardizem 60 mg oral tablet: 1 tab(s) by gastrostomy tube every 6 hours  Cozaar 25 mg oral tablet: 1 tab(s) orally once a day  levothyroxine 75 mcg (0.075 mg) oral tablet: 1 tab(s) orally once a day  Multiple Vitamins with Minerals oral liquid: 15 milliliter(s) by gastrostomy tube once a day  polyethylene glycol 3350 oral powder for reconstitution: 17 gram(s) by gastrostomy tube once a day  senna 8.8 mg/5 mL oral syrup: 10 milliliter(s) by gastrostomy tube once a day (at bedtime)   atorvastatin 80 mg oral tablet: 1 tab(s) by gastrostomy tube once a day (at bedtime)  Cardizem 60 mg oral tablet: 1 tab(s) by gastrostomy tube every 6 hours  Cozaar 25 mg oral tablet: 1 tab(s) orally once a day  levothyroxine 75 mcg (0.075 mg) oral tablet: 1 tab(s) by gastrostomy tube once a day  Multiple Vitamins with Minerals oral liquid: 15 milliliter(s) by gastrostomy tube once a day  polyethylene glycol 3350 oral powder for reconstitution: 17 gram(s) by gastrostomy tube once a day  senna 8.8 mg/5 mL oral syrup: 10 milliliter(s) by gastrostomy tube once a day (at bedtime)

## 2022-02-22 NOTE — PROGRESS NOTE ADULT - REASON FOR ADMISSION
AMS

## 2022-02-22 NOTE — PROGRESS NOTE ADULT - PROBLEM SELECTOR PROBLEM 3
Sepsis with acute hypoxic respiratory failure and septic shock
Anemia
Hypertension
Hypertension
Anemia
Sepsis with acute hypoxic respiratory failure and septic shock
Anemia
Sepsis with acute hypoxic respiratory failure and septic shock
Sepsis with acute hypoxic respiratory failure and septic shock
Anemia
Sepsis with acute hypoxic respiratory failure and septic shock
Hypertension
Anemia
Sepsis with acute hypoxic respiratory failure and septic shock
Anemia
Sepsis with acute hypoxic respiratory failure and septic shock
Anemia
Anemia
Sepsis with acute hypoxic respiratory failure and septic shock
Hypertension
Hypertension

## 2022-02-22 NOTE — PROGRESS NOTE ADULT - PROBLEM SELECTOR PLAN 9
DVT ppx :  Lovenox SQ for ( A fib )   MTV  GOC  -FULL     -now that patient is more alert and awake, will try Speech and swallow re-eval
DVT ppx :  Lovenox SQ for ( A fib )   MTV  GOC  -FULL     Speech and swallow evaluation
DVT ppx :  scds  MTV  GOC  -FULL
1/24 LUE swelling, flaca d/c'd no longer functional- arterial duplex showing small pseudo anusurym in L ax artery. duplex neg  - vascular team input appreciated  - Hold heparin gtt/ Plan to hold compression with U/S  & Obtain arterial duplex after PSA compression   -1/26 L axillary area pseudoaneurysm ; thrombosed . Was on AC with lovenox, now held due to RP bleed
DVT ppx :  scds  MTV  GOC  -FULL
DVT ppx :  scds  MTV  GOC  -FULL
DVT ppx :  Lovenox SQ for ( A fib )   MTV  GOC  -FULL
DVT ppx :  Lovenox SQ for ( A fib )   MTV  GOC  -FULL     Speech and swallow evaluation pending
1/24 LUE swelling, flaca d/c'd no longer functional- arterial duplex showing small pseudo anusurym in L ax artery. duplex neg  - vascular team input appreciated  - Hold heparin gtt/ Plan to hold compression with U/S  & Obtain arterial duplex after PSA compression   -1/26 L axillary area pseudoaneurysm ; thrombosed . Was on AC with lovenox, now held due to RP bleed
DVT ppx :  scds  MTV  GOC  -FULL
1/24 LUE swelling, flaca d/c'd no longer functional- arterial duplex showing small pseudo anusurym in L ax artery. duplex neg  - vascular team input appreciated  - Hold heparin gtt/ Plan to hold compression with U/S  & Obtain arterial duplex after PSA compression   -1/26 L axillary area pseudoaneurysm ; thrombosed . Was on AC with lovenox, now held due to RP bleed
DVT ppx :  scds  MTV  GOC  -FULL
1/24 LUE swelling, flaca d/c'd no longer functional- arterial duplex showing small pseudo anusurym in L ax artery. duplex neg  - vascular team input appreciated  - Hold heparin gtt/ Plan to hold compression with U/S  & Obtain arterial duplex after PSA compression   -1/26 L axillary area pseudoaneurysm ; thrombosed . Was on AC with lovenox, now held due to RP bleed
DVT ppx :  Lovenox SQ for ( A fib )   MTV  GOC  -FULL
1/24 LUE swelling, flaca d/c'd no longer functional- arterial duplex showing small pseudo anusurym in L ax artery. duplex neg  - vascular team input appreciated  - Hold heparin gtt/ Plan to hold compression with U/S  & Obtain arterial duplex after PSA compression   -1/26 L axillary area pseudoaneurysm ; thrombosed . Was on AC with lovenox, now held due to RP bleed
DVT ppx :  scds  MTV  GOC  -FULL
DVT ppx :  Lovenox SQ for ( A fib )   MTV  GOC  -FULL     Speech and swallow evaluation done, NPO still, continue with tube feeds
1/24 LUE swelling, flaca d/c'd no longer functional- arterial duplex showing small pseudo anusurym in L ax artery. duplex neg  - vascular team input appreciated  - Hold heparin gtt/ Plan to hold compression with U/S  & Obtain arterial duplex after PSA compression   -1/26 L axillary area pseudoaneurysm ; thrombosed . Was on AC with lovenox, now held due to RP bleed
DVT ppx :  scds  MTV  GOC  -FULL
1/24 LUE swelling, flaca d/c'd no longer functional- arterial duplex showing small pseudo anusurym in L ax artery. duplex neg  - vascular team input appreciated  - Hold heparin gtt/ Plan to hold compression with U/S  & Obtain arterial duplex after PSA compression   -1/26 L axillary area pseudoaneurysm ; thrombosed . Was on AC with lovenox, now held due to RP bleed
1/24 LUE swelling, flaca d/c'd no longer functional- arterial duplex showing small pseudo anusurym in L ax artery. duplex neg  - vascular team input appreciated  - Hold heparin gtt/ Plan to hold compression with U/S  & Obtain arterial duplex after PSA compression   -1/26 L axillary area pseudoaneurysm ; thrombosed . Was on AC with lovenox, now held due to RP bleed

## 2022-02-22 NOTE — PROGRESS NOTE ADULT - PROBLEM SELECTOR PLAN 8
1/24 LUE swelling, flaca d/c'd no longer functional- arterial duplex showing small pseudo anusurym in L ax artery. duplex neg  - vascular team input appreciated  - Hold heparin gtt/ Plan to hold compression with U/S  & Obtain arterial duplex after PSA compression   -1/26 L axillary area pseudoaneurysm ; thrombosed . Was on AC with lovenox, now held due to RP bleed
CT A/P demonstrated fecal impaction, stercoral proctitis  -c/w bowel regimen  -Miralax daily, senna 10mL  -speech and swallow recs as above  -GOC
CT A/P demonstrated fecal impaction, stercoral proctitis  -c/w bowel regimen  -Miralax daily, senna 10mL  -speech and swallow recs as above  -GOC
1/24 LUE swelling, flaca d/c'd no longer functional- arterial duplex showing small pseudo anusurym in L ax artery. duplex neg  - vascular team input appreciated  - Hold heparin gtt/ Plan to hold compression with U/S  & Obtain arterial duplex after PSA compression   -1/26 L axillary area pseudoaneurysm ; thrombosed . Was on AC with lovenox, now held due to RP bleed
1/24 LUE swelling, flaca d/c'd no longer functional- arterial duplex showing small pseudo anusurym in L ax artery. duplex neg  - vascular team input appreciated  - Hold heparin gtt/ Plan to hold compression with U/S  & Obtain arterial duplex after PSA compression   -1/26 L axillary area pseudoaneurysm ; thrombosed . Was on AC with lovenox, now held due to RP bleed
CT A/P demonstrated fecal impaction, stercoral proctitis  -c/w bowel regimen  -Miralax daily, senna 10mL
1/24 LUE swelling, flaca d/c'd no longer functional- arterial duplex showing small pseudo anusurym in L ax artery. duplex neg  - vascular team input appreciated  - Hold heparin gtt/ Plan to hold compression with U/S  & Obtain arterial duplex after PSA compression   -1/26 L axillary area pseudoaneurysm ; thrombosed . On AC currently with lovenox, plan as above.    -bleeding precaution
1/24 LUE swelling, flaca d/c'd no longer functional- arterial duplex showing small pseudo anusurym in L ax artery. duplex neg  - vascular team input appreciated  - Hold heparin gtt/ Plan to hold compression with U/S  & Obtain arterial duplex after PSA compression   -1/26 L axillary area pseudoaneurysm ; thrombosed . Was on AC with lovenox, now held due to RP bleed
CT A/P demonstrated fecal impaction, stercoral proctitis  -c/w bowel regimen  -Miralax daily, senna 10mL  -speech and swallow recs as above  -GOC
CT A/P demonstrated fecal impaction, stercoral proctitis  -c/w bowel regimen  -Miralax daily, senna 10mL
1/24 LUE swelling, flaca d/c'd no longer functional- arterial duplex showing small pseudo anusurym in L ax artery. duplex neg  - vascular team input appreciated  - Hold heparin gtt/ Plan to hold compression with U/S  & Obtain arterial duplex after PSA compression   -1/26 L axillary area pseudoaneurysm ; thrombosed . will resume AC with Lovenox 65 BID   -bleeding precaution
CT A/P demonstrated fecal impaction, stercoral proctitis  -c/w bowel regimen  -Miralax daily, senna 10mL  -speech and swallow recs as above  -GOC
1/24 LUE swelling, flaca d/c'd no longer functional- arterial duplex showing small pseudo anusurym in L ax artery. duplex neg  - vascular team input appreciated  - Hold heparin gtt/ Plan to hold compression with U/S  & Obtain arterial duplex after PSA compression   -1/26 L axillary area pseudoaneurysm ; thrombosed . will resume AC with Lovenox 65 BID   -bleeding precaution
CT A/P demonstrated fecal impaction, stercoral proctitis  -c/w bowel regimen  -Miralax daily, senna 10mL  -speech and swallow recs as above  -GOC
1/24 LUE swelling, flaca d/c'd no longer functional- arterial duplex showing small pseudo anusurym in L ax artery. duplex neg  - vascular team input appreciated  - Hold heparin gtt/ Plan to hold compression with U/S  & Obtain arterial duplex after PSA compression   -1/26 L axillary area pseudoaneurysm ; thrombosed . will resume AC with Lovenox 65 BID   -bleeding precaution
1/24 LUE swelling, flaca d/c'd no longer functional- arterial duplex showing small pseudo anusurym in L ax artery. duplex neg  - vascular team input appreciated  - Hold heparin gtt/ Plan to hold compression with U/S  & Obtain arterial duplex after PSA compression   -1/26 L axillary area pseudoaneurysm ; thrombosed . Was on AC with lovenox, now held due to RP bleed
CT A/P demonstrated fecal impaction, stercoral proctitis  -c/w bowel regimen  -Miralax daily, senna 10mL  -speech and swallow recs as above  -GOC
CT A/P demonstrated fecal impaction, stercoral proctitis  -c/w bowel regimen  -Miralax daily, senna 10mL  -speech and swallow recs as above  -GOC
1/24 LUE swelling, flaca d/c'd no longer functional- arterial duplex showing small pseudo anusurym in L ax artery. duplex neg  - vascular team input appreciated  - Hold heparin gtt/ Plan to hold compression with U/S  & Obtain arterial duplex after PSA compression   -1/26 L axillary area pseudoaneurysm ; thrombosed . will resume AC with Lovenox 65 BID   -bleeding precaution
CT A/P demonstrated fecal impaction, stercoral proctitis  -c/w bowel regimen  -Miralax daily, senna 10mL  -speech and swallow recs as above  -GOC
1/24 LUE swelling, flaca d/c'd no longer functional- arterial duplex showing small pseudo anusurym in L ax artery. duplex neg  - vascular team input appreciated  - Hold heparin gtt/ Plan to hold compression with U/S  & Obtain arterial duplex after PSA compression   -1/26 L axillary area pseudoaneurysm ; thrombosed . Was on AC with lovenox, now held due to RP bleed
1/24 LUE swelling, flaca d/c'd no longer functional- arterial duplex showing small pseudo anusurym in L ax artery. duplex neg  - vascular team input appreciated  - Hold heparin gtt/ Plan to hold compression with U/S  & Obtain arterial duplex after PSA compression   -1/26 L axillary area pseudoaneurysm ; thrombosed . will resume AC with Lovenox 65 BID   -bleeding precaution
CT A/P demonstrated fecal impaction, stercoral proctitis  -c/w bowel regimen  -Miralax daily, senna 10mL  -speech and swallow recs as above  -GOC
CT A/P demonstrated fecal impaction, stercoral proctitis  -c/w bowel regimen  -Miralax daily, senna 10mL  -speech and swallow recs as above  -GOC
1/24 LUE swelling, flaca d/c'd no longer functional- arterial duplex showing small pseudo anusurym in L ax artery. duplex neg  - vascular team input appreciated  - Hold heparin gtt/ Plan to hold compression with U/S  & Obtain arterial duplex after PSA compression   -1/26 L axillary area pseudoaneurysm ; thrombosed . Was on AC with lovenox, now held due to RP bleed

## 2022-02-22 NOTE — PROGRESS NOTE ADULT - PROBLEM SELECTOR PROBLEM 6
Hypothyroidism
Chronic atrial fibrillation
Hypothyroidism
Chronic atrial fibrillation
Hypothyroidism
Chronic atrial fibrillation
Hypothyroidism
Chronic atrial fibrillation
Hypothyroidism
Chronic atrial fibrillation
Chronic atrial fibrillation
Hypothyroidism
Chronic atrial fibrillation
Chronic atrial fibrillation
Hypothyroidism
Chronic atrial fibrillation
Chronic atrial fibrillation
Hypothyroidism

## 2022-02-23 LAB
CULTURE RESULTS: SIGNIFICANT CHANGE UP
SPECIMEN SOURCE: SIGNIFICANT CHANGE UP

## 2022-03-07 NOTE — SWALLOW BEDSIDE ASSESSMENT ADULT - ORAL PREPARATORY PHASE
Subjective:      Duong Ponce is a 5 y.o. male here with mother. Patient brought in for potty training      History of Present Illness:  HPI  History obtained from mother. Presenting with concerns for toilet training.  Doesn't make it to the toilet consistently, and sometimes has accidents in his pants.  Usually stooling accidents, occasional urine.  History of constipation, and tends to have more voiding accidents when this worsens.  Needs reminding regularly to use toilet.  Has set sitting times, but sometimes fights it.  Has a small amount of stool in his underwear most days of the week.  Leaves early in the morning for school and not able to consistently stool beforehand.  Doesn't consistently stool at school.  Independent about changing himself if he has an accident.  Stools twice daily on average.  Offering MiraLAX most days, 1/2 cap/day, and seems to help.  Sometimes stops if stools are too loose.  No complaints of abdominal pain.  No hematochezia.  Appears formed, soft.  No straining with stooling.  Normal appetite.    Review of Systems   Constitutional: Negative for activity change, appetite change and fever.   HENT: Negative for congestion, mouth sores and sore throat.    Eyes: Negative for discharge and redness.   Respiratory: Negative for cough.    Gastrointestinal: Positive for constipation. Negative for diarrhea and vomiting.   Genitourinary: Negative for difficulty urinating and hematuria.   Skin: Negative for rash and wound.   Neurological: Negative for syncope and headaches.   Psychiatric/Behavioral: Negative for behavioral problems and sleep disturbance.       Objective:     Physical Exam  Constitutional:       General: He is active. He is not in acute distress.  HENT:      Mouth/Throat:      Mouth: Mucous membranes are moist.      Pharynx: Oropharynx is clear.   Cardiovascular:      Rate and Rhythm: Normal rate and regular rhythm.      Heart sounds: S1 normal and S2 normal. No  murmur heard.  Pulmonary:      Effort: Pulmonary effort is normal.      Breath sounds: Normal breath sounds. No wheezing, rhonchi or rales.   Abdominal:      General: There is no distension.      Palpations: Abdomen is soft. There is no mass.      Tenderness: There is no abdominal tenderness. There is no guarding.   Skin:     General: Skin is warm.      Findings: No rash.   Neurological:      General: No focal deficit present.      Mental Status: He is alert.         Assessment:     Duong Ponce is a 5 y.o. male presenting today with constipation, withholding, and associated stooling accidents as above.  No signs of obstruction or underlying organic etiology.  Laughing during abdominal exam.       1. Fecal smearing         Plan:     Discussed likely etiology of current symptoms  Recommended continuing to offer positive reinforcement, and reviewed behavior chart option  Continue MiraLAX and titrate to effect for at least 1-2 soft stools/day to help prevent constipation and worsening withholding  Continue to encourage routine sitting times  Discussed behavioral psychology or therapy as an option to help with resistance to attempts at parental intervention  Referred to [facility] and [facility] and provided contact information  Call for constipation, hematochezia, abdominal pain, appetite change, or any other concerns  Follow up PRN   Reduced oral grading

## 2022-03-16 LAB
CULTURE RESULTS: SIGNIFICANT CHANGE UP
SPECIMEN SOURCE: SIGNIFICANT CHANGE UP

## 2022-03-18 NOTE — H&P ADULT - PROBLEM/PLAN-5
Constitutional: disheveled, malodorous, awake and alert, in no acute distress  HEENT: head normocephalic and atraumatic. 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  no midline spinal TTP  Neurological: oriented x4, no focal neurologic deficit.   Psychiatric: calm and cooperative DISPLAY PLAN FREE TEXT

## 2022-03-21 ENCOUNTER — APPOINTMENT (OUTPATIENT)
Dept: CARDIOLOGY | Facility: CLINIC | Age: 72
End: 2022-03-21
Payer: MEDICARE

## 2022-03-21 ENCOUNTER — NON-APPOINTMENT (OUTPATIENT)
Age: 72
End: 2022-03-21

## 2022-03-21 VITALS — OXYGEN SATURATION: 100 % | HEART RATE: 86 BPM | SYSTOLIC BLOOD PRESSURE: 130 MMHG | DIASTOLIC BLOOD PRESSURE: 88 MMHG

## 2022-03-21 DIAGNOSIS — I48.0 PAROXYSMAL ATRIAL FIBRILLATION: ICD-10-CM

## 2022-03-21 DIAGNOSIS — I10 ESSENTIAL (PRIMARY) HYPERTENSION: ICD-10-CM

## 2022-03-21 PROCEDURE — 99214 OFFICE O/P EST MOD 30 MIN: CPT

## 2022-03-21 PROCEDURE — 93000 ELECTROCARDIOGRAM COMPLETE: CPT

## 2022-03-21 NOTE — HISTORY OF PRESENT ILLNESS
[FreeTextEntry1] : She was seen today in follow-up of her recent hospitalization.  She is accompanied by an aide.  She is currently residing at [facility].\par During her hospitalization in January she had a change in mental status and was ultimately diagnosed with Covid pneumonia requiring intubation.  She was extubated but never regained full faculty use.  She is unable to eat and received a PEG.  The left side of her body appears weaker than the right.  This is an exaggeration of previous site of stroke.\par Because of paroxysmal incisional atrial fibrillation she was on anticoagulation and during the hospital stay was on therapeutic Lovenox.  She had a thrombosed DVT as well as retroperitoneal bleed which caused us to discontinue her oral anticoagulation completely upon discharge.\par Since that time she has no reported bleeding, though she is nonverbal.\par

## 2022-03-21 NOTE — PHYSICAL EXAM
[Normal Conjunctiva] : normal conjunctiva [Normal S1, S2] : normal S1, S2 [No Murmur] : no murmur [No Rub] : no rub [de-identified] : She is in a wheelchair in no acute distress [de-identified] : Trace ankle edema bilaterally. [de-identified] : To auscultation anteriorly. [de-identified] : PEG tube is noted. [de-identified] : Response to direct questioning with nonverbal responses.

## 2022-03-21 NOTE — DISCUSSION/SUMMARY
[FreeTextEntry1] : She is a 71-year-old with longstanding hypertension, coronary disease, paroxysmal atrial fibrillation with a history of prior stroke who presents with recent hospitalization requiring intubation and now has a PEG tube with limited ability to walk.  She is currently in sinus rhythm on ECG, though her risk of stroke as well as DVT is significantly elevated and would benefit from oral anticoagulation.\par I reviewed her case with her healthcare agent and suggested that she restart Eliquis 5 mg every 12 hours.  CBC in 2 weeks to monitor for any reduced hemoglobin would be appropriate.\par If any bleeding should occur I would discontinue Eliquis.\par No change to her antihypertensive regimen.\par Written consultation report given to her aide.

## 2022-07-11 NOTE — PHYSICAL THERAPY INITIAL EVALUATION ADULT - LONG TERM MEMORY, REHAB EVAL
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, confirmed diagnosis on 05/2022), paroxysmal atrial fibrillation (not on anticoagulation), CAD s/p stenting x4, CHF EF 50%, AAA repair 2005, ventral hernia, seizure disorder presented with difficulty swallowing  Patient having worsening dysphagia to solids and liquids  Currently has difficulty swallowing saliva and unable to drink PO  Mostly saliva is yellow-green but had two episode of pink colored saliva, but no jn red hematemesis  Of note, patient is not on anticoagulation and warfarin has been discontinued in outpatient cardiology in 6/2022 (in setting of no further evidence of apical thrombus from prior STEMI ) No current nausea or vomiting  Additionally, patient lost about 20 lb since past 2 months  The patient was diagnosed with distal esophageal adenocarcinoma in 5/2022 with EGD and biopsy  PET-CT scan in June 2022 showed FDG activity in primary esophageal mass, but no other lesions concerning for metastasis  Patient has not started any oncologic treatment however will be undergoing evaluation with medical, surgical and radiation oncology and has recently started undergoing evaluation with thoracic surgery for esophagectomy  Patient denies any abdominal pain, fevers, weakness  Patient does not drink alcohol and his a 50 pack year history of smoking, which he quit in 2015  In ED, patient has stable vital and afebrile   Labs significant for WBC of 3 06, platelet of 387, and Hgb stable at 11 3, compared to baseline 10-11  Alk phos elevated to 174, other LFTs WNL  Troponins negative  CXR did not show any sings of consolidation or effusions  Vitals:    07/11/22 2158   BP: 127/66   Pulse: 68   Resp: 20   Temp: 97 8 °F (36 6 °C)   SpO2: 97%   Physical Exam  Constitutional:       General: He is not in acute distress  Appearance: Normal appearance  He is not ill-appearing  Comments: Actively spitting saliva into emesis bag -- green tinged fluid in bag  HENT:      Nose: No congestion  Mouth/Throat:      Mouth: Mucous membranes are dry  Eyes:      Conjunctiva/sclera: Conjunctivae normal       Pupils: Pupils are equal, round, and reactive to light  Cardiovascular:      Rate and Rhythm: Normal rate and regular rhythm  Pulses: Normal pulses  Heart sounds: No murmur heard  No friction rub  No gallop  Pulmonary:      Effort: Pulmonary effort is normal  No respiratory distress  Breath sounds: No wheezing or rales  Abdominal:      General: Bowel sounds are normal  There is distension  Tenderness: There is no abdominal tenderness  There is no right CVA tenderness, left CVA tenderness or guarding  Hernia: A hernia is present  Comments: Large ventral hernia from epigastric region to below umbilicus   Musculoskeletal:      Right lower leg: Edema present  Left lower leg: Edema present  Comments: 2+ edema up to bilateral ankles   Skin:     General: Skin is warm and dry  Capillary Refill: Capillary refill takes less than 2 seconds  Findings: No bruising or lesion  Neurological:      Mental Status: He is alert and oriented to person, place, and time  Sensory: No sensory deficit  Motor: No weakness     Psychiatric:         Mood and Affect: Mood normal          Behavior: Behavior normal          Principal Problem:    Dysphagia  Active Problems:    CAD (coronary artery disease)    HTN (hypertension)    HLD (hyperlipidemia)    Seizure disorder ([facility] [address] )    Hernia, incisional    Chronic combined systolic and diastolic CHF (congestive heart failure) (HCC)    Paroxysmal atrial fibrillation (HCC)    Esophageal adenocarcinoma (HCC)    Pancytopenia (HCC)    Assessment / Plan  Patient is a 69 M with CHF, CAD s/p stenting, and recently diagnosed primary esophageal adenocarcinoma admitted for worsening dysphagia       · concern for esophageal luminal narrowing with progressive dysphagia requiring evaluation for esophageal stenting vs feeding tube, gastroenterology consulted, appreciate recommendations  · place NPO with sips with meds  · Gentle IV hydration 100cc/hr in setting of CHF and NPO  · speech and swallow eval, appreciate recommendations for diet  · consider esophagram  · continue phenobarbital 64 8 mg bid home regimen for seizure disorder  · PRN labetalol IV for SBP>170  · consider medical oncology and surgical oncology consult  · continue off warfarin, discontinued in 6/2022 by cardiology due to no apical thrombus visualized on echo  · start DVT ppx on lovenox in setting of no acute hgb drop   · Continue ASA 81 mg daily  · IV zofran for nausea PRN    Code Status: Level 3 - DNAR and DNI  Admission Status: OBSERVATION  Disposition: Patient requires Med/Surg  Expected Length of Stay: 1 midnight intact

## 2022-09-16 NOTE — STROKE CODE NOTE - NIH STROKE SCALE: 6A. MOTOR LEG, LEFT, QM
(2) Some effort against gravity; leg falls to bed by 5 secs, but has some effort against gravity Yes

## 2022-10-20 NOTE — PROGRESS NOTE ADULT - PROBLEM SELECTOR PROBLEM 4
Hypothyroid
Need for prophylactic measure
63 y/o male with a PMHx of HTN, DM, HIV+, noncompliant with medications, presents with chest pain, cough, and multiple syncopal episodes. Will check labs, EKG, chest x-ray, CT head, fluids, reassess.
61 y/o male with a PMHx of HTN, DM, HIV+, noncompliant with medications, presents with chest pain, cough, and multiple syncopal episodes. Will check labs, EKG, chest x-ray, CT head. Treat with fluids, reassess.

## 2022-11-18 NOTE — PROGRESS NOTE ADULT - PROVIDER SPECIALTY LIST ADULT
Infectious Disease Received fax from [facility] requesting refill on patient's hydrocodone 5/325 mg

## 2022-11-22 NOTE — SWALLOW BEDSIDE ASSESSMENT ADULT - SLP PRECAUTIONS/LIMITATIONS: VISION
How Many Mls Were Removed From The 40 Mg/Ml (5ml) Vial When Preparing The Injectable Solution?: 0 Unknown

## 2022-12-01 NOTE — PROGRESS NOTE ADULT - PROBLEM SELECTOR PROBLEM 3
Per Nursing Note:  Patient's last menstrual period was 2017. Her periods are  nonexistent  in flow . Problems:  a little vaginal itching after having had augmentin  Birth Control: none. Last Pap: ASCUS obtained 10/2020  She does not have a history of JERRI 2, 3 or cervical cancer. Last Mammogram: had her mammogram today in our office. 1. Have you been to the ER, urgent care clinic, or hospitalized since your last visit? No     2. Have you seen or consulted any other health care providers outside of the [address] [place] [address] since your last visit? No      Annual exam  Yulia Smoker is a ,  56 y.o. female   Patient's last menstrual period was 2017. She presents for her annual checkup. She is having no significant gyn problems. Having some vaginal itching . Just finishing Augmentin for bronchitis. Usually gets yeast infections with abx, tx'd with diflucan or vag supp. [facility] IUD removed 2/10/22. Doing well with combipatch, wishes to continue    Menstrual status:    . Doing well with combipatch    Pap Smear:  Her most recent Pap smear was normal, HPV was ASCUS, obtained neg. She does not have a history of abnormal paps. Sexual history:    She  reports being sexually active and has had partner(s) who are male. She reports using the following methods of birth control/protection: I.U.D. and None. Medical conditions:    Since her last annual GYN exam about one year ago, she has had the following changes in her health history:  - bronchitis      Past Medical History:   Diagnosis Date    Asthma     Dense breast tissue on mammogram 10/22/2020    BI-RADS 1: Negative    Environmental allergies     High cholesterol     Hx of mammogram 10/28/2021    BI-RADS 1: Negative. No mammographic evidence of malignancy    Hypertension     IUD contraception 2017    [facility]. Placed for endometrial protection with HRT.  Removed 2/10/22    MRSA (methicillin resistant staph aureus) culture positive 2010    vulvar    Premature menopause     Routine Papanicolaou smear 05/11/2016    Pap/HPV neg (media tab)    Shingles 2022    X2     Past Surgical History:   Procedure Laterality Date    COLONOSCOPY N/A 10/30/2017    COLONOSCOPY performed by Ritu Lloyd MD at [facility] ENDOSCOPY    COLONOSCOPY,DIAGNOSTIC  10/30/2017         HX COLONOSCOPY      HX HEENT      wisdom teeth       Current Outpatient Medications   Medication Sig Dispense Refill    terconazole (TERAZOL 3) 80 mg vaginal suppository Insert 1 Suppository into vagina nightly for 3 days. 3 Suppository 0    [START ON 12/2/2022] estradiol-norethindrone (CombiPatch) 0.05-0.14 mg/24 hr 1 Patch by TransDERmal route two (2) times a week. 24 Patch 4    guaiFENesin ER (MUCINEX) 600 mg ER tablet Take 600 mg by mouth two (2) times a day. BD Allergy Syringe 1 mL 28 gauge x 1/2\" syrg USE EVERY 3 TO 4 DAYS OR AS DIRECTED      amoxicillin-clavulanate (AUGMENTIN) 875-125 mg per tablet Take 1 Tablet by mouth every twelve (12) hours for 10 days. 20 Tablet 0    atorvastatin (LIPITOR) 10 mg tablet Take 1 Tablet by mouth daily. Indications: stroke prevention 90 Tablet 3    montelukast (SINGULAIR) 10 mg tablet Take 1 Tablet by mouth daily. 90 Tablet 3    mupirocin (BACTROBAN) 2 % ointment Apply  to affected area daily. 22 g 0    valACYclovir (VALTREX) 1 gram tablet Take 1 Tablet by mouth three (3) times daily. 30 Tablet 1    gabapentin (NEURONTIN) 100 mg capsule Take 1 Capsule by mouth three (3) times daily as needed for Pain. Max Daily Amount: 300 mg. 30 Capsule 0    polyethylene glycol (MIRALAX) 17 gram/dose powder Take 17 g by mouth daily. azelastine (ASTELIN) 137 mcg (0.1 %) nasal spray USE 1 SPRAY IN EACH NOSTRIL TWO TIMES A DAY.  USE IN EACH NOSTRIL AS DIRECTED. 3 Each 3    cetirizine (ZYRTEC) 10 mg tablet Take 5 mg by mouth.      losartan (COZAAR) 50 mg tablet TAKE 1 TABLET BY MOUTH ONE TIME A DAY 90 Tablet 3 cholecalciferol, VITAMIN D3, (VITAMIN D3) 5,000 unit tab tablet Take 5,000 Units by mouth daily. ascorbic acid, vitamin C, (VITAMIN C) 1,000 mg tablet Take 1,000 mg by mouth daily. fluticasone propionate (FLONASE) 50 mcg/actuation nasal spray 2 Sprays by Both Nostrils route nightly. Allergies: Bactrim [sulfamethoprim]     Tobacco History:  reports that she has never smoked. She has never used smokeless tobacco.  Alcohol Abuse:  reports current alcohol use of about 4.0 standard drinks per week. Drug Abuse:  reports no history of drug use. Family Medical/Cancer History:   Family History   Problem Relation Age of Onset    Hypertension Mother     Elevated Lipids Mother     Cancer Father         NHL.      Hypertension Father     Diabetes Father     Elevated Lipids Father     Asthma Sister     Stroke Maternal Grandfather     Colon Cancer Maternal Grandmother     Stroke Maternal Grandmother     Asthma Sister     Migraines Sister         Review of Systems - History obtained from the patient  Constitutional: negative for weight loss, fever, night sweats  HEENT: negative for hearing loss, earache, congestion, snoring, sorethroat  CV: negative for chest pain, palpitations, edema  Resp: recent bronchitis  GI: negative for change in bowel habits, abdominal pain, black or bloody stools  : negative for frequency, dysuria, hematuria, vaginal discharge  MSK: negative for back pain, joint pain, muscle pain  Breast: negative for breast lumps, nipple discharge, galactorrhea  Skin :negative for itching, rash, hives  Neuro: negative for dizziness, headache, confusion, weakness  Psych: negative for anxiety, depression, change in mood  Heme/lymph: negative for bleeding, bruising, pallor    Physical Exam    Visit Vitals  /76   Pulse (!) 103   Wt 164 lb (74.4 kg)   LMP 03/01/2017   BMI 30.99 kg/m²       Constitutional  Appearance: well-nourished, well developed, alert, in no acute distress    HENT  Head and Face: appears normal    Neck  Inspection/Palpation: normal appearance, no masses or tenderness  Lymph Nodes: no lymphadenopathy present  Thyroid: gland size normal, nontender, no nodules or masses present on palpation    Chest  Respiratory Effort: breathing unlabored  Auscultation: normal breath sounds    Cardiovascular  Heart: Auscultation: regular rate and rhythm without murmur    Breasts  Inspection of Breasts: breasts symmetrical, no skin changes, no discharge present, nipple appearance normal, no skin retraction present  Palpation of Breasts and Axillae: no masses present on palpation, no breast tenderness  Axillary Lymph Nodes: no lymphadenopathy present    Gastrointestinal  Abdominal Examination: abdomen non-tender to palpation, normal bowel sounds, no masses present  Liver and spleen: no hepatomegaly present, spleen not palpable  Hernias: no hernias identified    Genitourinary  External Genitalia: normal appearance for age, no discharge present, no tenderness present, no inflammatory lesions present, no masses present, mild atrophy present  Vagina: normal vaginal vault, small amount thin white discharge present, no inflammatory lesions present, no masses present  Bladder: non-tender to palpation  Urethra: appears normal  Cervix: normal   Uterus: normal size, shape and consistency  Adnexa: no adnexal tenderness present, no adnexal masses present  Perineum: perineum within normal limits, no evidence of trauma, no rashes or skin lesions present  Anus: anus within normal limits, no hemorrhoids present  Inguinal Lymph Nodes: no lymphadenopathy present    Skin  General Inspection: no rash, no lesions identified    Neurologic/Psychiatric  Mental Status:  Orientation: grossly oriented to person, place and time  Mood and Affect: mood normal, affect appropriate    Assessment & Plan:  Routine gynecologic examination. Pap/HPV due next year. Pap (10/2020) ASCUS/HPV neg -> cotest next year  , doing well on Combipatch. eRX #24, RFx4  Vaginal itching, probable yeast infection due to recent antibiotics. Will treat empirically with Terazol 3. Return to office if symptoms persist.  Counseled re: diet, exercise, healthy lifestyle  Return for yearly wellness visits  Pt counseled regarding co-testing for high risk HPV with pap  Rec screening mammo  Patient verbalized understanding    Orders Placed This Encounter    terconazole (TERAZOL 3) 80 mg vaginal suppository     Sig: Insert 1 Suppository into vagina nightly for 3 days. Dispense:  3 Suppository     Refill:  0    estradiol-norethindrone (CombiPatch) 0.05-0.14 mg/24 hr     Si Patch by TransDERmal route two (2) times a week.      Dispense:  24 Patch     Refill:  4 UTI (urinary tract infection)

## 2022-12-19 NOTE — ED PROVIDER NOTE - NS ED MD DISPO SPECIAL CONSIDERATION1
decreased balance during turns/losing balance/decreased step length/decreased weight-shifting ability Low Suspicion of COVID-19

## 2023-01-16 NOTE — OCCUPATIONAL THERAPY INITIAL EVALUATION ADULT - BED MOBILITY/TRANSFERS, PREVIOUS LEVEL OF FUNCTION, OT EVAL
independent Cyclosporine Counseling:  I discussed with the patient the risks of cyclosporine including but not limited to hypertension, gingival hyperplasia,myelosuppression, immunosuppression, liver damage, kidney damage, neurotoxicity, lymphoma, and serious infections. The patient understands that monitoring is required including baseline blood pressure, CBC, CMP, lipid panel and uric acid, and then 1-2 times monthly CMP and blood pressure.

## 2023-04-04 NOTE — DISCHARGE NOTE PROVIDER - EXTENDED VTE YES NO FOR MLM ENOXAPARIN
management. We will continue with Lexapro  - escitalopram (LEXAPRO) 20 MG tablet; Take 1 tablet by mouth daily  Dispense: 30 tablet; Refill: 2    2. Anxiety  Continue and refill Lexapro and BuSpar  - escitalopram (LEXAPRO) 20 MG tablet; Take 1 tablet by mouth daily  Dispense: 30 tablet; Refill: 2  - busPIRone (BUSPAR) 7.5 MG tablet; Take 1 tablet by mouth 2 times daily  Dispense: 60 tablet; Refill: 2    3. Allergic rhinitis, unspecified seasonality, unspecified trigger  As needed OTC meds      No orders of the defined types were placed in this encounter. Current Outpatient Medications   Medication Sig Dispense Refill    escitalopram (LEXAPRO) 20 MG tablet Take 1 tablet by mouth daily 30 tablet 2    busPIRone (BUSPAR) 7.5 MG tablet Take 1 tablet by mouth 2 times daily 60 tablet 2    SIMPESSE 0.15-0.03 &0.01 MG TABS TAKE 1 TABLET BY MOUTH EVERY DAY      AMETHIA LO 0.1-0.02 & 0.01 MG TABS Take by mouth daily  (Patient not taking: Reported on 1/18/2022)       No current facility-administered medications for this visit. Return in about 3 months (around 7/4/2023), or if symptoms worsen or fail to improve, for anxiety, depression.     Logan Leos MD, MD
,

## 2023-04-10 NOTE — CHART NOTE - NSCHARTNOTEFT_GEN_A_CORE
: Gracie    INDICATION:  Shock and Hypoxemic Respiratory Failure    PROCEDURE:  [x ] LIMITED ECHO  [ x] LIMITED CHEST      FINDINGS:  Lungs: Anterior A-line predominant bilaterally, RLL curtain sign, LLL trace pleural effusion, no large consolidations noted, no significant pleural effusion  Cardiac: Difficult views as patient positioned R lateral decubitus - grossly normal to mildly reduced LV function, no wall motion abnormalities, virtual IVC, LA enlarged, no pericardial effusion    INTERPRETATION:  Lungs - A line predominant with trace lower left pleural effusion  Cardiac - difficult views with grossly normal LV function and virtual IVC - shock state likely due to hypovolemia    Images saved to [facility]    Attending Attestation:  Agree with above. I was present for the entire procedure and have reviewed all images. Simple / Intermediate / Complex Repair - Final Wound Length In Cm: 4.5

## 2023-04-26 NOTE — PROGRESS NOTE ADULT - PROBLEM SELECTOR PLAN 6
Patient reports long history of lymphedema of bilateral lower extremities with R worse than left  - follows with Dr. Maxwell's input  - wound care c/s for dressing changes  - c/w ethacrynic acid (patient has reported sulfa allergy)  - PT consult Abbe Flap (Upper To Lower Lip) Text: The defect of the lower lip was assessed and measured.  Given the location and size of the defect, an Abbe flap was deemed most appropriate.  Using a sterile surgical marker, an appropriate Abbe flap was measured and drawn on the upper lip. Local anesthesia was then infiltrated.  A scalpel was then used to incise the upper lip through and through the skin, vermilion, muscle and mucosa, leaving the flap pedicled on the opposite side.  The flap was then rotated and transferred to the lower lip defect.  The flap was then sutured into place with a three layer technique, closing the orbicularis oris muscle layer with subcutaneous buried sutures, followed by a mucosal layer and an epidermal layer.

## 2023-05-20 NOTE — CHART NOTE - NSCHARTNOTEFT_GEN_A_CORE
CC: Left arm IV infiltrate    HPI: Asked by RN to evaluate patient for left arm IV infiltrate. Patient seen and examined at the bedside. Patient reports mild pain to IV site, and is anxious and tearful. Patient is otherwise asymptomatic. Denies fevers/chills, headaches, dizziness, paresthesias, chest pain, palpitations, dyspnea, abdominal pain, N/V/D.     Vital Signs Last 24 Hrs  T(C): 36.4 (25 Aug 2020 00:13), Max: 36.6 (24 Aug 2020 05:35)  T(F): 97.6 (25 Aug 2020 00:13), Max: 97.8 (24 Aug 2020 05:35)  HR: 61 (25 Aug 2020 00:13) (61 - 79)  BP: 170/100 (25 Aug 2020 00:45) (160/80 - 200/90)  RR: 18 (25 Aug 2020 00:13) (18 - 18)  SpO2: 94% (25 Aug 2020 00:13) (94% - 96%)    PHYSICAL EXAM:  General: NAD, A&Ox3  Skin: Left arm erythematous, mildly swollen (2cm x 2cm), and TTP by IV site.  Respiratory: Lungs clear to auscultation bilaterally. No wheezes, rales, rhonchi. Normal respiratory effort.   Cardiovascular: S1, S2 present. Regular rate and rhythm. No murmurs, rubs, or gallops  Gastrointestinal: BS x4 normoactive. Soft, non-tender, non-distended.   Extremities: 2+ peripheral pulses. FROM. No cyanosis.    A/P  71 y/o F with a significant PMHx of, HTN, Hypothyroidism, carpal tunnel syndrome, lymphedema admitted for acute metabolic encephalopathy likely 2/2 high fevers in the setting of ureteral stone with hydronephrosis s/p cystoscopy and L ureteral stent placed. Patient also found to have enterococcus bacteremia and UTI, and was treated with IV vancomycin. Furthermore, patient with hypertensive urgency, and is currently stable on labetalol, hydralazine, and clonidine.    Now, patient with left arm IV infiltrate. Patient reports mild pain to IV site, and is anxious and tearful. Patient is otherwise asymptomatic. Physical exam significant for left arm being erythematous, mildly swollen (2cm x 2cm), and TTP by IV site.       #Left arm IV infiltrate  -Repeat vitals shows known hypertension (170/100)  -Left arm IV to be removed  -Elevate arm  -Hot packs to be given  -No need for a new IV as patient is to be d/c to [facility] in the AM  -Consider LUE dopplers in AM to r/o DVT if swelling does not subside  -Discussed with RN  -Will continue to monitor  -Will endorse to AM team      ELSA WardC  #[id number] CC: Left arm IV infiltrate    HPI: Asked by RN to evaluate patient for left arm IV infiltrate. Patient seen and examined at the bedside. Patient reports mild pain to IV site, and is anxious and tearful. Patient is otherwise asymptomatic. Denies fevers/chills, headaches, dizziness, paresthesias, chest pain, palpitations, dyspnea, abdominal pain, N/V/D.     Vital Signs Last 24 Hrs  T(C): 36.4 (25 Aug 2020 00:13), Max: 36.6 (24 Aug 2020 05:35)  T(F): 97.6 (25 Aug 2020 00:13), Max: 97.8 (24 Aug 2020 05:35)  HR: 61 (25 Aug 2020 00:13) (61 - 79)  BP: 170/100 (25 Aug 2020 00:45) (160/80 - 200/90)  RR: 18 (25 Aug 2020 00:13) (18 - 18)  SpO2: 94% (25 Aug 2020 00:13) (94% - 96%)    PHYSICAL EXAM:  General: NAD, A&Ox3  Skin: Left arm erythematous, mildly swollen (2cm x 2cm), and TTP by IV site.  Respiratory: Lungs clear to auscultation bilaterally. No wheezes, rales, rhonchi. Normal respiratory effort.   Cardiovascular: S1, S2 present. Regular rate and rhythm. No murmurs, rubs, or gallops  Gastrointestinal: BS x4 normoactive. Soft, non-tender, non-distended.   Extremities: 2+ peripheral pulses. FROM. No cyanosis.    A/P  71 y/o F with a significant PMHx of, HTN, Hypothyroidism, carpal tunnel syndrome, lymphedema admitted for acute metabolic encephalopathy likely 2/2 high fevers in the setting of ureteral stone with hydronephrosis s/p cystoscopy and L ureteral stent placed. Patient also found to have enterococcus bacteremia and UTI, and was treated with IV vancomycin. Furthermore, patient with hypertensive urgency, and is currently stable on labetalol, hydralazine, and clonidine.    Now, patient with left arm IV infiltrate. Patient reports mild pain to IV site, and is anxious and tearful. Patient is otherwise asymptomatic. Physical exam significant for left arm being erythematous, mildly swollen (2cm x 2cm), and TTP by IV site.       #Left arm IV infiltrate  -Repeat vitals shows known hypertension (170/100)  -Left arm IV to be removed  -Elevate arm  -Ice packs to be given  -No need for a new IV as patient is to be d/c to [facility] in the AM  -Consider LUE dopplers in AM to r/o DVT if swelling does not subside  -Discussed with RN  -Will continue to monitor  -Will endorse to AM team      ELSA WardC  #[id number] done

## 2023-05-22 NOTE — INPATIENT CERTIFICATION FOR MEDICARE PATIENTS - NS ICMP CERT SIG IND
It was nice to see you today!  Discussed current concerns and addressed   Reviewed recent labs and diagnostics  Reviewed medications list  Continue to eat a healthy diet, exercise at least 3 times a week or more  Plan and follow up discussed  For any further information related to your condition, copy and paste or go to [email or web address]  To hand surgeon  
I certify as stated above.

## 2023-08-02 NOTE — PHYSICAL THERAPY INITIAL EVALUATION ADULT - WEIGHT-BEARING RESTRICTIONS: SIT/STAND, REHAB EVAL
HEMATOLOGY ONCOLOGY PROGRESS NOTE    REFERRING PROVIDER: Dr. Grover Salinas MD    No chief complaint on file.      HISTORY OF PRESENT ILLNESS:   Seen and examined, chart reviewed.  Discussed with the family at the bedside.  Discussed with the nursing staff and Dr. Solis.  He has been doing poorly today with increasing oxygen requirements.  He is on BiPAP.  Cardiology did not feel that he had any evidence of volume overload.  He was seen by pulmonary who put him on broad-spectrum antimicrobials.  Yesterday and all last evening there was a lot of difficulty with very high PTTs with him on heparin.  It was held on multiple occasions.  He also received vitamin K due to his supratherapeutic INR.  As of early this morning his PTT had come down into reasonable range and he was started on Eliquis and heparin was discontinued given the difficulty managing it.    REVIEW OF SYSTEMS:  Review of Systems     PHYSICAL EXAMINATION:    Chronically ill-appearing, on BiPAP.    VITALS:    Visit Vitals  /67   Pulse 71   Temp 97.7 °F (36.5 °C)   Resp 16   Ht 6' (1.829 m)   Wt 79.5 kg (175 lb 4.3 oz)   SpO2 91%   BMI 23.77 kg/m²        Physical Exam  Constitutional:       Comments: Appears ill.  On BiPAP.  Family at the bedside.   Eyes:      Pupils: Pupils are equal, round, and reactive to light.   Cardiovascular:      Rate and Rhythm: Normal rate. Rhythm irregular.   Pulmonary:      Effort: Pulmonary effort is normal.      Comments: Diminished breath sounds bilaterally.  No audible wheezing.  Abdominal:      General: Abdomen is flat. Bowel sounds are normal.      Palpations: Abdomen is soft.   Musculoskeletal:      Cervical back: Normal range of motion.      Comments: Swelling of the lower extremities more on the right than on the left.  This is a chronic finding.   Skin:     General: Skin is warm and dry.   Neurological:      Mental Status: He is alert.      Comments: Appears confused.            LABS:  Recent Results (from  the past 48 hour(s))   GLUCOSE, BEDSIDE - POINT OF CARE    Collection Time: 07/31/23  5:35 PM   Result Value Ref Range    GLUCOSE, BEDSIDE - POINT OF CARE 171 (H) 70 - 99 mg/dL   GLUCOSE, BEDSIDE - POINT OF CARE    Collection Time: 07/31/23  8:33 PM   Result Value Ref Range    GLUCOSE, BEDSIDE - POINT OF CARE 144 (H) 70 - 99 mg/dL   Partial Thromboplastin Time (PTT)    Collection Time: 08/01/23  2:27 AM   Result Value Ref Range    PTT >200 (HH) 22 - 30 sec   Prothrombin Time (INR/PT)    Collection Time: 08/01/23  5:18 AM   Result Value Ref Range    Protime- PT 81.4 (H) 9.7 - 11.8 sec    INR 9.0 (HH)     CBC No Differential    Collection Time: 08/01/23  5:18 AM   Result Value Ref Range    WBC 3.4 (L) 4.2 - 11.0 K/mcL    RBC 3.97 (L) 4.50 - 5.90 mil/mcL    HGB 11.8 (L) 13.0 - 17.0 g/dL    HCT 34.7 (L) 39.0 - 51.0 %    MCV 87.4 78.0 - 100.0 fl    MCH 29.7 26.0 - 34.0 pg    MCHC 34.0 32.0 - 36.5 g/dL    PLT 96 (L) 140 - 450 K/mcL    RDW-CV 13.4 11.0 - 15.0 %    RDW-SD 43.3 39.0 - 50.0 fL    NRBC 1 (H) <=0 /100 WBC   Basic Metabolic Panel    Collection Time: 08/01/23  5:45 AM   Result Value Ref Range    Fasting Status      Sodium 133 (L) 135 - 145 mmol/L    Potassium 3.6 3.4 - 5.1 mmol/L    Chloride 91 (L) 97 - 110 mmol/L    Carbon Dioxide 30 21 - 32 mmol/L    Anion Gap 16 7 - 19 mmol/L    Glucose 148 (H) 70 - 99 mg/dL    BUN 41 (H) 6 - 20 mg/dL    Creatinine 1.23 (H) 0.67 - 1.17 mg/dL    Glomerular Filtration Rate 60 >=60    BUN/Cr 33 (H) 7 - 25    Calcium 7.8 (L) 8.4 - 10.2 mg/dL   Magnesium    Collection Time: 08/01/23  5:45 AM   Result Value Ref Range    Magnesium 2.3 1.7 - 2.4 mg/dL   Gold Top    Collection Time: 08/01/23  5:45 AM   Result Value Ref Range    Extra Tube Hold for Add Ons    GLUCOSE, BEDSIDE - POINT OF CARE    Collection Time: 08/01/23  8:10 AM   Result Value Ref Range    GLUCOSE, BEDSIDE - POINT OF CARE 155 (H) 70 - 99 mg/dL   Partial Thromboplastin Time (PTT)    Collection Time: 08/01/23  9:47 AM    Result Value Ref Range    PTT >200 (HH) 22 - 30 sec   GLUCOSE, BEDSIDE - POINT OF CARE    Collection Time: 08/01/23 12:05 PM   Result Value Ref Range    GLUCOSE, BEDSIDE - POINT OF CARE 130 (H) 70 - 99 mg/dL   Partial Thromboplastin Time (PTT)    Collection Time: 08/01/23  1:39 PM   Result Value Ref Range    PTT 78 (H) 22 - 30 sec   Prothrombin Time (INR/PT)    Collection Time: 08/01/23  1:39 PM   Result Value Ref Range    Protime- PT 59.7 (H) 9.7 - 11.8 sec    INR 6.5 (HH)     GLUCOSE, BEDSIDE - POINT OF CARE    Collection Time: 08/01/23  4:50 PM   Result Value Ref Range    GLUCOSE, BEDSIDE - POINT OF CARE 156 (H) 70 - 99 mg/dL   Partial Thromboplastin Time (PTT)    Collection Time: 08/01/23  5:02 PM   Result Value Ref Range    PTT 64 (H) 22 - 30 sec   GLUCOSE, BEDSIDE - POINT OF CARE    Collection Time: 08/01/23  8:54 PM   Result Value Ref Range    GLUCOSE, BEDSIDE - POINT OF CARE 138 (H) 70 - 99 mg/dL   Electrocardiogram 12-Lead    Collection Time: 08/01/23 11:07 PM   Result Value Ref Range    Ventricular Rate EKG/Min (BPM) 129     Atrial Rate (BPM) 0     QRS-Interval (MSEC) 88     QT-Interval (MSEC) 220     QTc 322     R Axis (Degrees) 60     T Axis (Degrees) -79     REPORT TEXT       Atrial fibrillation  ventricular-paced complexes  premature ventricular complexes  Abnormal ECG  When compared with ECG of  29-JUL-2023 13:22,  no significant change  Confirmed by REID DÍAZ MD ([id number]) on 8/2/2023 11:29:23 AM     GLUCOSE, BEDSIDE - POINT OF CARE    Collection Time: 08/01/23 11:41 PM   Result Value Ref Range    GLUCOSE, BEDSIDE - POINT OF CARE 132 (H) 70 - 99 mg/dL   Partial Thromboplastin Time (PTT)    Collection Time: 08/01/23 11:42 PM   Result Value Ref Range     (HH) 22 - 30 sec   Basic Metabolic Panel    Collection Time: 08/02/23  5:00 AM   Result Value Ref Range    Fasting Status      Sodium 131 (L) 135 - 145 mmol/L    Potassium 4.2 3.4 - 5.1 mmol/L    Chloride 93 (L) 97 - 110 mmol/L    Carbon  Dioxide 31 21 - 32 mmol/L    Anion Gap 11 7 - 19 mmol/L    Glucose 135 (H) 70 - 99 mg/dL    BUN 53 (H) 6 - 20 mg/dL    Creatinine 1.54 (H) 0.67 - 1.17 mg/dL    Glomerular Filtration Rate 46 (L) >=60    BUN/Cr 34 (H) 7 - 25    Calcium 8.8 8.4 - 10.2 mg/dL   CBC No Differential    Collection Time: 08/02/23  5:00 AM   Result Value Ref Range    WBC 5.7 4.2 - 11.0 K/mcL    RBC 4.07 (L) 4.50 - 5.90 mil/mcL    HGB 12.3 (L) 13.0 - 17.0 g/dL    HCT 36.7 (L) 39.0 - 51.0 %    MCV 90.2 78.0 - 100.0 fl    MCH 30.2 26.0 - 34.0 pg    MCHC 33.5 32.0 - 36.5 g/dL     (L) 140 - 450 K/mcL    RDW-CV 14.1 11.0 - 15.0 %    RDW-SD 46.5 39.0 - 50.0 fL    NRBC 0 <=0 /100 WBC   Partial Thromboplastin Time (PTT)    Collection Time: 08/02/23  5:00 AM   Result Value Ref Range    PTT 59 (H) 22 - 30 sec   Prothrombin Time (INR/PT)    Collection Time: 08/02/23  5:00 AM   Result Value Ref Range    Protime- PT 41.2 (H) 9.7 - 11.8 sec    INR 4.4     NT proBNP    Collection Time: 08/02/23  5:00 AM   Result Value Ref Range    NT-proBNP 29,467 (H) <=450 pg/mL   GLUCOSE, BEDSIDE - POINT OF CARE    Collection Time: 08/02/23  8:05 AM   Result Value Ref Range    GLUCOSE, BEDSIDE - POINT OF CARE 140 (H) 70 - 99 mg/dL   BLOOD GAS, ARTERIAL WITH COOXIMETRY - RESPIRATORY    Collection Time: 08/02/23  9:27 AM   Result Value Ref Range    BASE EXCESS / DEFICIT, ARTERIAL - RESPIRATORY 4 (H) -2 - 3 mmol/L    HCO3, ARTERIAL - RESPIRATORY 32 (H) 22 - 28 mmol/L    O2 CONTENT, ARTERIAL - RESPIRATORY 16 15 - 23 %    PCO2, ARTERIAL - RESPIRATORY 66 (HH) 35 - 48 mm Hg    PH, ARTERIAL - RESPIRATORY 7.29 (L) 7.35 - 7.45 Units    PO2, ARTERIAL - RESPIRATORY 87 83 - 108 mm Hg    O2 SATURATION, ARTERIAL - RESPIRATORY 98 95 - 99 %    CONDITION - RESPIRATORY ;6L NC     CARBOXYHEMOGLOBIN - RESPIRATORY 1.4 <1.5 %    HEMOGLOBIN - RESPIRATORY 11.8 (L) 13.0 - 17.0 g/dL    METHEMOGLOBIN - RESPIRATORY 0.4 <=1.6 %    OXYHEMOGLOBIN, ARTERIAL - RESPIRATORY 95.7 94.0 - 98.0 %    SITE  - RESPIRATORY Right Radial     TEMPERATURE - RESPIRATORY 37.0 degrees   POTASSIUM - RESPIRATORY    Collection Time: 08/02/23  9:27 AM   Result Value Ref Range    POTASSIUM - RESPIRATORY 4.2 3.4 - 5.1 mmol/L   SODIUM - RESPIRATORY    Collection Time: 08/02/23  9:27 AM   Result Value Ref Range    SODIUM - RESPIRATORY 127 (L) 135 - 145 mmol/L   CALCIUM, IONIZED - RESPIRATORY    Collection Time: 08/02/23  9:27 AM   Result Value Ref Range    CALCIUM, IONIZED - RESPIRATORY 1.10 (L) 1.15 - 1.29 mmol/L   LACTIC ACID, ARTERIAL - RESPIRATORY    Collection Time: 08/02/23  9:27 AM   Result Value Ref Range    LACTIC ACID, ARTERIAL - RESPIRATORY 1.6 (H) <1.6 mmol/L   GLUCOSE - RESPIRATORY    Collection Time: 08/02/23  9:27 AM   Result Value Ref Range    GLUCOSE - RESPIRATORY 155 (H) 65 - 99 mg/dL   CHLORIDE - RESPIRATORY    Collection Time: 08/02/23  9:27 AM   Result Value Ref Range    CHLORIDE - RESPIRATORY 94 (L) 97 - 110 mmol/L   TROPONIN I, HIGH SENSITIVITY    Collection Time: 08/02/23  9:47 AM   Result Value Ref Range    Troponin I, High Sensitivity 40 <77 ng/L   Electrocardiogram 12-Lead    Collection Time: 08/02/23 10:34 AM   Result Value Ref Range    Ventricular Rate EKG/Min (BPM) 85     Atrial Rate (BPM) 84     QRS-Interval (MSEC) 160     QT-Interval (MSEC) 440     QTc 523     R Axis (Degrees) -53     T Axis (Degrees) 121     REPORT TEXT       Ventricular-paced rhythm  with occasional  premature ventricular complexes  Abnormal ECG  When compared with ECG of  01-AUG-2023 23:07,  Previous ECG has undetermined rhythm, needs review     GLUCOSE, BEDSIDE - POINT OF CARE    Collection Time: 08/02/23 11:55 AM   Result Value Ref Range    GLUCOSE, BEDSIDE - POINT OF CARE 132 (H) 70 - 99 mg/dL   BLOOD GAS, ARTERIAL WITH COOXIMETRY - RESPIRATORY    Collection Time: 08/02/23 12:04 PM   Result Value Ref Range    BASE EXCESS / DEFICIT, ARTERIAL - RESPIRATORY 4 (H) -2 - 3 mmol/L    HCO3, ARTERIAL - RESPIRATORY 32 (H) 22 - 28 mmol/L     O2 CONTENT, ARTERIAL - RESPIRATORY 16 15 - 23 %    PCO2, ARTERIAL - RESPIRATORY 65 (HH) 35 - 48 mm Hg    PH, ARTERIAL - RESPIRATORY 7.30 (L) 7.35 - 7.45 Units    PO2, ARTERIAL - RESPIRATORY 89 83 - 108 mm Hg    O2 SATURATION, ARTERIAL - RESPIRATORY 98 95 - 99 %    CONDITION - RESPIRATORY ;BIPAP 12  / 5     CARBOXYHEMOGLOBIN - RESPIRATORY 1.5 (H) <1.5 %    FIO2 - RESPIRATORY 40 %    HEMOGLOBIN - RESPIRATORY 11.8 (L) 13.0 - 17.0 g/dL    METHEMOGLOBIN - RESPIRATORY 0.5 <=1.6 %    OXYHEMOGLOBIN, ARTERIAL - RESPIRATORY 96.1 94.0 - 98.0 %    SITE - RESPIRATORY Left Radial     TEMPERATURE - RESPIRATORY 37.0 degrees   GLUCOSE, BEDSIDE - POINT OF CARE    Collection Time: 08/02/23  4:57 PM   Result Value Ref Range    GLUCOSE, BEDSIDE - POINT OF CARE 123 (H) 70 - 99 mg/dL         RADIOLOGICAL DATA:       PATHOLOGICAL DATA:       ASSESSMENT:   Active Hospital Problems    Diagnosis    • Acute respiratory failure with hypoxia (CMD)    • Hypokalemia    • Adenocarcinoma of lung (CMD)    • Cardiomyopathy (CMD)    • Acute pulmonary embolism (CMD)    • Chronic obstructive pulmonary disease (CMD)    1.  Acute hypoxemia.  He has a new pulmonary embolism despite a therapeutic INR.   He was started on heparin.  There is significant difficulty in maintaining a therapeutic dose of heparin as it would get very high.  Part of that may have been due to underlying coagulopathy in the setting of vitamin K deficiency.  He has received 10 mg of oral vitamin K.  As of this morning his PTT had come down to a reasonable range and I started him on Eliquis.  At this point, I am going to discontinue Eliquis and put him on Lovenox a milligram per kilogram subcutaneously every 12.  This may be a little bit more consistent in regards to treating his pulmonary embolism as opposed to something oral right now.  He is on broad-spectrum antimicrobials.  Based on last CT scan I do not think his malignancy is worse.  Despite that his clinical condition is poor.   We will see how he does over the next 24 to 48 hours.  He is a partial DNR more importantly there would be no chest compressions or intubations.    2.  Generalized weakness due to multiple medical problems, advanced malignancy and chemotherapy.    3.  Pancytopenia due to chemotherapy.  Improving.  White count is normal.  Hemoglobin very reasonable at 11.8 and platelets of 110,000.    4.  Advanced non-small cell lung cancer status post cycle #1 of chemotherapy.  Radiographically I do not think his cancer is much different.  The CT scan that he just had was compared to one from March and he just started chemotherapy 2 weeks ago.  Nonetheless his lung cancer is not curable and treatment is palliative.  If his clinical condition continues to decompensate hospice would still be appropriate.    5.  COPD.  On Solu-Medrol.    6.  Ischemic cardiomyopathy with an ejection fraction of 25 to 30%.  He is compensated in regards to his cardiac status.  He also has no evidence of right ventricular overload based on echo.    7.  Confusion and agitation.  We will try morphine to see if that helps especially with his respiratory status.      PLAN/RECOMMENDATIONS:   Discussed with the family at the bedside.    Roosevelt Webber,    8/2/2023    full weight-bearing

## 2023-08-16 NOTE — ED ADULT NURSE NOTE - NSFALLRSKINDICTYPE_ED_ALL_ED
Hi Dr. Owen,  Please see the results of Mr. Cantor's audiogram from today. If you have any questions, please let me know.   Thank you! Kirit Díaz, MARY-A 
Hi Dr. Owen,  Please see the results of Mr. Cantor's audiogram from today. If you have any questions, please let me know.   Thank you! Kirit Díaz, MARY-A 
Impaired Gait

## 2024-04-01 NOTE — SPEECH LANGUAGE PATHOLOGY EVALUATION - SLP PROBLEM SOLVING
for simple ADL scenarios presented verbally, + evidence of reduced cognitive flexibility/impaired with patient

## 2024-06-17 NOTE — ED PROVIDER NOTE - AGGRAVATING FACTORS
4 Eyes Skin Assessment     NAME:  Frances Vickers  YOB: 1955  MEDICAL RECORD NUMBER:  [id number]    The patient is being assessed for  Admission    I agree that at least one RN has performed a thorough Head to Toe Skin Assessment on the patient. ALL assessment sites listed below have been assessed.      Areas assessed by both nurses:    Head, Face, Ears, Shoulders, Back, Chest, Arms, Elbows, Hands, Sacrum. Buttock, Coccyx, Ischium, and Legs. Feet and Heels        Does the Patient have a Wound? No noted wound(s)       David Prevention initiated by RN: No  Wound Care Orders initiated by RN: No    Pressure Injury (Stage 3,4, Unstageable, DTI, NWPT, and Complex wounds) if present, place Wound referral order by RN under : No    New Ostomies, if present place, Ostomy referral order under : No     Nurse 1 eSignature: Electronically signed by Sangeetha Richter RN on 6/17/24 at 6:06 PM EDT    **SHARE this note so that the co-signing nurse can place an eSignature**    Nurse 2 eSignature: Electronically signed by Shira Lawrence RN on 6/17/24 at 7:47 PM EDT    none

## 2024-06-18 NOTE — ED CLERICAL - NSCLERICAL TASK_GEN_ALL_ED
Patient Center Representative  please call and schedule an appointment.  Thank You.  Mychart message sent to the patient.    Pre-Hospital Care Report (PCR)

## 2024-06-27 NOTE — ASU PATIENT PROFILE, ADULT - IS PATIENT PREGNANT?
Requested Prescriptions     Pending Prescriptions Disp Refills    rosuvastatin (CRESTOR) 20 MG tablet [Pharmacy Med Name: Rosuvastatin Calcium 20 MG Oral Tablet] 90 tablet 3     Sig: Take 1 tablet by mouth once daily            Checked Correct Pharmacy: Yes    Any changes since last refill? No     Number: 90    Refills: 3    Last Office Visit: 5/8/2023     Next Office Visit: 7/11/2024         Last Labs: 03.08.2024       
no

## 2024-08-28 NOTE — PROGRESS NOTE ADULT - SUBJECTIVE AND OBJECTIVE BOX
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 Pt presented to [facility] from [facility] when found to have AMS, fever with Tmax of 104. Pt in E.D. found to be obtunded, mottled, hypotensive SBP 80's refractory to 2.5 liters IVF requiring norepi gtt, fever with Temp of 104.5. In addition found to have hypernatremia with Na+ of 161, ANIKA with sCr 4.74 (baseline 0.77-1.22), AGMA of 25, with contraction alkalosis with serum HCO3 of 23, vph 7.50, vPCO2 of 35, lactate of 3.2, procalcitonin of 0.82, hsTrop of 239, Hgb of 14.9 (baseline from 8/2021 of 11). Pt with 9 liters total body water deficit. Pt received CT C/A/P/H (1/21/22) re demonstration of  Non obstructing right renal calculus, significant stool burden distending the rectum and sigmoid colon with mild perirectal fat stranding suggestive of fecal impaction and possible stercoral proctitis, No acute transcortical infarct, intracranial hemorrhage, however did show Chronic lacunar infarcts with chronic small vessel disease. Started on cefepime, vanco, tylenol.Consult called and pt admitted MICU for AMS secondary to metabolic encephalopathy, septic shock of unknown origin, possible aspiration pneum    24 HOUR EVENTS: Preliminary EEG reviewed showing no seizure activity, Na dropped 153 to 151 so free water decreased to 200 from 300.  Uncomfortable intermittently overnight with increasing BP to 170s, gave precedex.  Holding heparin gtt since 4am for LP today to rule out meningitis.    SUBJECTIVE/ROS:  [ ] A ten-point review of systems was otherwise negative except as noted.  [ ] Due to altered mental status/intubation, subjective information were not able to be obtained from the patient. History was obtained, to the extent possible, from review of the chart and collateral sources of information.    MEDS:  acetaminophen    Suspension .. 650 milliGRAM(s) Oral every 6 hours PRN Temp greater or equal to 38C (100.4F)  dexMEDEtomidine Infusion 0.05 MICROgram(s)/kG/Hr IV Continuous <Continuous>  propofol Infusion 20 MICROgram(s)/kG/Min IV Continuous <Continuous>      VITALS:  RR: 17 (01-23-22 @ 06:00) (16 - 34)  SpO2: 100% (01-23-22 @ 06:00) (88% - 100%)  Wt(kg): --  Mechanical Ventilation: Mode: AC/ CMV (Assist Control/ Continuous Mandatory Ventilation), RR (machine): 16, RR (patient): 16, TV (machine): 450, FiO2: 30, PEEP: 5, ITime: 1, MAP: 10, PIP: 24    I/O's:    01-22 @ 07:01  -  01-23 @ 07:00  --------------------------------------------------------  IN:    Dexmedetomidine: 16.4 mL    dextrose 5%: 20 mL    Enteral Tube Flush: 200 mL    Free Water: 1000 mL    Heparin Infusion: 180 mL    IV PiggyBack: 250 mL    IV PiggyBack: 250 mL    Lactated Ringers: 1000 mL    Nepro: 210 mL    Norepinephrine: 39.5 mL    Propofol: 472.8 mL  Total IN: 3638.7 mL    OUT:    Indwelling Catheter - Urethral (mL): 1230 mL  Total OUT: 1230 mL    Total NET: 2408.7 mL        EXAM:  General: Critically ill  Neurology: Sedated RASS-2  Respiratory: CTA B/L, intubated    CV: RRR, S1S2, no murmurs  Abdominal: Soft, NTND normoactive BS  Extremities: RLE with significant edema and ecchymosis compared to contralateral side    IMAGING:  < from: Xray Chest 1 View- PORTABLE-Urgent (Xray Chest 1 View- PORTABLE-Urgent .) (01.22.22 @ 02:41) >    FINDINGS:    Chest radiograph 1/21/2022 at 3:35 PM:    Very Limited evaluation secondary to patient positioning.  Enteric tube likely coiled stomach.  The heart size is not well evaluated.  The small amount of left lung tissue visualized is clear.  Unable to evaluate for pleural effusion or pneumothorax in this   positioning.    Chest radiograph 1/22/2022 at 2:34 AM:    Interval placement of endotracheal tube with tip terminating   approximately 1.5 cm from the julian. Enteric tube seen coursing under   the diaphragm with tip excluded from the image.  The heart is normal in size.  Trace left basilar linear atelectasis. No focal consolidations.  No pleural effusion or pneumothorax.    IMPRESSION:  Tubes and lines as above.  Trace left basilar linear atelectasis without focal consolidation.      < end of copied text >      LABS:  ABG - ( 23 Jan 2022 02:18 )  pH: 7.45  /  pCO2: 31    /  pO2: 162   / HCO3: 22    / Base Excess: -1.8  /  SaO2: 97.5    Lactate: x          VBG - ( 21 Jan 2022 11:21 )  pH: 7.35  /  pCO2: 43    /  pO2: 41    / HCO3: 24    / Base Excess: -2.0  /  SaO2: 66.6   Lactate: 2.2          01-23    151<H>  |  119<H>  |  86<H>  ----------------------------<  160<H>  4.2   |  17<L>  |  2.13<H>    Ca    8.5      23 Jan 2022 02:16  Phos  3.4     01-23  Mg     2.1     01-23    TPro  6.1  /  Alb  2.4<L>  /  TBili  0.4  /  DBili  x   /  AST  13  /  ALT  10  /  AlkPhos  79  01-23                          10.5   11.24 )-----------( 251      ( 23 Jan 2022 02:16 )             34.1     PT/INR - ( 23 Jan 2022 02:16 )   PT: 16.5 sec;   INR: 1.40 ratio         PTT - ( 23 Jan 2022 02:16 )  PTT:94.9 sec  Capillary Blood Glucose      ASSESSMENT: 71F Hx afib on apixaban, cad s/p stents, HFrEF, CVA with left sided residual weakness who presents from nursing home with AMS, fever. Found to be hypernatremic, hypotensive refractory to IVF requiring vasopressor, AMS secondary to metabolic encephalopathy s/p intubation, septic shock of unknown origin, possible aspiration pneum.    PLAN:  #NEURO:  -right gaze deviation w/o nystagmus, twitching in RUE, distal LUE, and distal RLE  -significant metabolic abnormalities, likely etiology: metabolic myoclonus vs less likely focal/generalized sz  -s/p Ativan 1mg IVP, now intubated on Propofol, vEEG underway  -Hx CVA with residual left sided weak/flaccid   -presents with AMS secondary to metabolic encephalopathy s/p intubation for airway protection  -CT 1/21/22 without acute changes, re demonstration of lacuna infarcts  -Neuro checks q 2 hrs and prn for changes  -will obtain LP today to r/o bacterial/viral infections  -valproic acid for possible seizures    #RESPIRATORY:  -intubated  -supplemental O2 prn to maintain sp02 > 92  -bronchodilators q6h prn   -HOB > 30 deg angle  -chest PT q2h    #CARDIOVASCULAR:  -Hx Afib on apixaban (held), has been on hep gtt in ICU (now being held for LP)  -CAD s/p stents  -Hx NSTEMI 8/2021, HFrEF  -home med apixaban 5 mg q 12 hrs - will continue  -home med fo cozaar 25 mg , cardizem 60 mg  will hold at present  -atorvastatin 80 mg qhs - will continue  -ASA 81 mg qd  -On low dose Levo likely 2/2 sedation     #GI/NUTRITION:  -ANIKA sCr 4.74 (baseline 0.77-1.22) - most likely pre-renal  -CT A/P demonstrated fecal impaction, stercoral proctitis  -NGT  -place aguilar  -strict I & O's - keep even  -miralax qd   -senna qhs    #/RENAL:  -hypernatremia, now 151  -contraction alkalosis  -AGMA  -resp alkalosis  -Do not correct Na+ > 0.3 meq/hr (goal Na+ in 24 hrs 150 meq)  -obtain CMP/Mg++/PO--4/CBC w diff/PT/PTT/INR q. a.m.  -POC glucose q 4 hrs with ISS - maintain glucose 140-180  -BMP/Mg++/PO--4 q 4 hrs  -trend lactate  -abg prn  -obtain urine lytes/osmol    #HEMATOLOGIC:  - will obtain RLE arterial duplex and BLLE venous duplex to assess for clots given swelling    #INFECTIOUS DISEASES:  -sputum culture growing SA, urine NGTD  -Covering for meningitis with vanc by level, vane, acyclovir renally dose  -Will plan for LP today    #ENDOCRINE:  -Hgb A1C 5.7  -started dexamethasone 6 qd, home dose synthroid for hypothyroid [facility]

## 2024-11-12 NOTE — PATIENT PROFILE ADULT - NSPROSPHOSPCHAPLAINYN_GEN_A_NUR
Lifestyle modification which include daily exercise at least for 45 minute and Low Calorie intake of 1800- 2000 Kcal per day.   no

## 2024-11-12 NOTE — ASU DISCHARGE PLAN (ADULT/PEDIATRIC) - 
ADDITIONAL INFORMATION
no weight-bearing restrictions
unknown by patient and  dane aguirre to pt

## 2025-01-27 NOTE — PATIENT PROFILE ADULT - NSPROEDALEARNPREF_GEN_A_NUR
Outpatient Care Management Note:  No response to [facility] letter or telephone outreach attempts.  Closing care management episode and removing RN CM from care team.    verbal instruction/skill demonstration/written material

## 2025-01-30 NOTE — ED ADULT NURSE NOTE - PMH
"1/30/2025      Mami Epps  [address]  [place] [address]      Dear Colleague,    Thank you for referring your patient, Mami Epps, to the [facility]. Please see a copy of my visit note below.    HISTORY OF PRESENT ILLNESS    Mami Epps is a 52 year old female, a nurse at the [employer] who is seen in follow up of  her left knee  left knee injury-- laceration that occurred on 1/14.    I saw her 1/23, and the wound looked good. The next day she had siginficant cellulitis, and was admitted at the . Given a couple of days worth of Vanco, and discharged on Doxycycline. I don't think ID was consulted. Ortho not consulted.    HPI: Patient was out of town and took a fall and hit her knee right on the bone and she had to have surgery on her tendon and her bone was open to air so they started her on antibiotics.     ED note is available and the procedure was done in the ER:  The length of the wound was approximately 10 cm. On exam after washout under local anesthesia, it appears to have just neck ('nicked\") both the prepatellar bursa and patellar tendon, the patella tendon remain mobile and was able to flex and extend the knee. The visible portion of the bursa was closed with 3 sutures chromic gut in in buried interrupted fashion. The proximally 1 cm laceration to the patellar ligament was likewise closed multiple sutures of 4 0 chromic gut in simple interrupted fashion.   Procedure was on 1/14.  Placed on Keflex 500 three times daily x 7 days. Last dose was 1/21.   She went to Urgent Care yesterday with concerns of infection was put on Bactrim, got IM Rocephin.    Present symptoms:   Much less pain x 4 days.  No fever/chills     Other PMH:  has a past medical history of Generalized anxiety disorder, History of basal cell carcinoma, Major depression, and STEVE (obstructive sleep apnea).    Surgical:  has a past surgical history that includes Hysterectomy, Pap No Longer Indicated and " Discectomy lumbar anterior.    Family Hx:  family history includes Colon Cancer in her mother; Diabetes in her father.    Social Hx:  reports that she has never smoked. She has never been exposed to tobacco smoke. She has never used smokeless tobacco. She reports that she does not currently use alcohol. She reports that she does not use drugs.    REVIEW OF SYSTEMS:    CONSTITUTIONAL:  NEGATIVE for fever, chills, change in weight  INTEGUMENTARY/SKIN:  NEGATIVE for worrisome rashes, moles or lesions  EYES:  NEGATIVE for vision changes or irritation  ENT/MOUTH:  NEGATIVE for ear, mouth and throat problems  RESP:  NEGATIVE for significant cough or SOB  BREAST:  NEGATIVE for masses, tenderness or discharge  CV:  NEGATIVE for chest pain, palpitations or peripheral edema  GI:  NEGATIVE for nausea, abdominal pain, heartburn, or change in bowel habits  :  Negative   MUSCULOSKELETAL:  See HPI above  NEURO:  NEGATIVE for weakness, dizziness or paresthesias  ENDOCRINE:  NEGATIVE for temperature intolerance, skin/hair changes  HEME/ALLERGY/IMMUNE:  NEGATIVE for bleeding problems  PSYCHIATRIC:  NEGATIVE for changes in mood or affect    PHYSICAL EXAM:  There were no vitals taken for this visit.   GENERAL APPEARANCE: healthy, alert, and no distress     NEURO: Normal strength and tone, mentation intact, and speech normal  VASCULAR:   good pulses, and cappillary refill   LYMPH: no lymphadenopathy   PSYCH:  mentation appears normal and affect normal/bright  RESP: no increased work of breathing     KNEE EXAM:   SKIN: no suspicious lesions or rashes  Transverse wound,   Wound looks good, no erythema today.  Minimal puffiness of the prepatellar bursa  Alignment: normal   Patellofemoral joint: no extensor lag, or pain   Effusion: moderate  ROM: not tested   Tender: over anterior knee    Labs: a wound culture done in the Family medicine clinic on 1/22 showed Staph Epi.  No cultures of any kind were done while in the hospital, it would  appear.     Impression: knee laceration, with partial patellar tendon laceration. Cellulitis of the knee resolving    Plan: we need to monitor her knee for infection.  Sutures out.  Continue antibiotics x 1 more week.  Follow up 1 week for wound check  Work note written. Off x 3 weeks more, at least.   Knee immobilizer x 1 more week    Ordered physical therapy    Wound re-dressed, knee immobilizer reapplied.   Change dressing daily.    ROMANA Lindo MD  Dept. Orthopedic Surgery  [facility]       Again, thank you for allowing me to participate in the care of your patient.        Sincerely,        Kevin Lindo MD    Electronically signed Essential hypertension    Lymphedema

## 2025-02-12 NOTE — ED ADULT NURSE NOTE - BEFAST FACE
No care due was identified.  [facility] Embedded Care Due Messages. Reference number: [id number].   2/12/2025 7:55:41 AM CST   Yes

## 2025-03-05 NOTE — DISCHARGE NOTE NURSING/CASE MANAGEMENT/SOCIAL WORK - PATIENT PORTAL LINK FT
Pediatric Therapy at [facility]  Occupational Therapy Treatment Note    Patient: Jose Bassett Today's Date: 25   MRN: [id number] Time:            : 2022 Therapist: Maria G Walden, OT   Age: 3 y.o. Referring Provider: Amna Mendez CRNP     Diagnosis:  Encounter Diagnosis     ICD-10-CM    1. Developmental delay  R62.50           SUBJECTIVE  Jose Bassett arrived to therapy session with Mother who reported the following medical/social updates: Price woke several times in the middle of the night last night.   He seems tired during this visit.  He had speech session for 45 min before OT this date.    Others present in the treatment area include: parent.    Patient Observations:  Required minimal redirection back to tasks and Signs of fatigue observed: laying on floor, laying on items in the room, closing eyes  Patient is responding to therapeutic strategies to improve participation       HEP: climbing, crawling, moving down inclined surface with all weight through upper body and hands  Authorization Tracking  Visit: 7  Insurance: [facility]  No Shows: 0  Initial Evaluation: 3/7/24  Plan of Care Due: 3/17/24    Goals:   Short Term Goals:   Goal Goal Status CPT Codes   Jose will hold a crayon and make marks on paper  [] New goal           [x] Goal in progress   [] Goal met  [] Goal modified  [x] Goal targeted    [] Goal not targeted [x] Therapeutic Activity  [] Neuromuscular Re-Education  [] Therapeutic Exercise  [] Manual  [] Self-Care  [] Cognitive  [] Sensory Integration    [] Group  [] Other: (Not applicable)   Interventions Performed: used marker to make marks on dry erase board   Jose will make horizontal and vertical lines and imitate a circular scribble with a variety of writing tools.  [] New goal           [] Goal in progress   [] Goal met  [] Goal modified  [x] Goal targeted    [] Goal not targeted [x] Therapeutic Activity  [] Neuromuscular Re-Education  [] Therapeutic Exercise  [] Manual  []  Self-Care  [] Cognitive  [] Sensory Integration    [] Group  [] Other: (Not applicable)   Interventions Performed: dry erase board, making vertical puala    Jose will use neat pincer grasp (thumb opposed to index finger) to  small items such as finger foods during snacks and meals  [] New goal           [] Goal in progress   [] Goal met  [] Goal modified  [x] Goal targeted    [] Goal not targeted [x] Therapeutic Activity  [x] Neuromuscular Re-Education  [] Therapeutic Exercise  [] Manual  [] Self-Care  [] Cognitive  [] Sensory Integration    [] Group  [] Other: (Not applicable)   Interventions Performed: Encouraged variety of grasp patterns by involving weight bearing activities, such as sliding down slide and landing on outstretched arms/hands,   Jose will decrease mouthing of non-food objects with a sensory diet and oral sensory diet in place at home and all locations  [] New goal           [x] Goal in progress   [] Goal met  [] Goal modified  [x] Goal targeted    [] Goal not targeted [] Therapeutic Activity  [x] Neuromuscular Re-Education  [] Therapeutic Exercise  [] Manual  [] Self-Care  [] Cognitive  [x] Sensory Integration    [] Group  [] Other: (Not applicable)   Interventions Performed: heavy muscle work with climbing, sliding:  only had a few sips of drink during visit   Jose will use spoon to scoop for at least 50% of one meal per day  [] New goal           [] Goal in progress   [] Goal met  [] Goal modified  [] Goal targeted    [x] Goal not targeted [] Therapeutic Activity  [] Neuromuscular Re-Education  [] Therapeutic Exercise  [] Manual  [] Self-Care  [] Cognitive  [] Sensory Integration    [] Group  [] Other: (Not applicable)   Interventions Performed:      Jose will stab food with a fork and bring fork to mouth with adult assistance to stab and then gradually fading assistance  [] New goal           [x] Goal in progress   [] Goal met  [] Goal modified  [] Goal targeted    [x] Goal not  targeted [] Therapeutic Activity  [] Neuromuscular Re-Education  [] Therapeutic Exercise  [] Manual  [] Self-Care  [] Cognitive  [] Sensory Integration    [] Group  [] Other: (Not applicable)   Interventions Performed:       Jose will open and close 5-8 circles of communication per session as imitation of gestures improves over course of therapy  [] New goal           [x] Goal in progress   [] Goal met  [] Goal modified  [x] Goal targeted    [] Goal not targeted [x] Therapeutic Activity  [x] Neuromuscular Re-Education  [] Therapeutic Exercise  [] Manual  [] Self-Care  [] Cognitive  [x] Sensory Integration    [] Group  [] Other: (Not applicable)   Interventions Performed: movement and heavy muscle work, prompting, repetition, engagement with fingers, counting, blue fish song and repetition           Long Term Goals  Goal Goal Status   Jose will increase attention to task to 3-5 minutes for at least 2 activities per session by the end of the therapy term  [] New goal         [x] Goal in progress   [] Goal met         [] Goal modified  [x] Goal targeted  [] Goal not targeted   Interventions Performed:  climbing and sliding on foam steps this date to provide movement and heavy muscle work which can help Jose to improve attention, fleeting attention to blocks on wall   Jose will improve motor planning skills as evidenced by initiating an activity and participating in a novel task with minimal verbal and physical cues in 6 months  [] New goal         [x] Goal in progress   [] Goal met         [] Goal modified  [x] Goal targeted  [] Goal not targeted   Interventions Performed:   deep pressure/heavy muscle work which improves motor planning skills from a bottom up approach, sliding on foam structure, stepping stones for walking/alternating feet   Jose will improve upper body and hand strength as evidenced by an increase in cocontraction around shoulders, use of pincer grasp, and ability to maintain grasp of a  writing tool.  [] New goal         [x] Goal in progress   [] Goal met         [] Goal modified  [x] Goal targeted  [] Goal not targeted   Interventions Performed: weight bearing on hands and arms, climbing against gravity.  OT notices huge improvements with cocontraction around shoulders this date.     Jose will improve his ability to regulate his sensory system and his emotions so that his tantrums decrease by 50% in length and frequency, and also to decrease banging head on floor for sensory input  [] New goal         [x] Goal in progress   [] Goal met         [] Goal modified  [x] Goal targeted  [] Goal not targeted   Interventions Performed :  deep pressure/heavy muscle work, climbing      Jose will participate in messy tactile play, starting with dry media and progressing to wet and mixed media, without need to request hand washing.  [] New goal         [x] Goal in progress   [] Goal met         [] Goal modified  [] Goal targeted  [x] Goal not targeted   Interventions Performed:    Jose will improve eye contact, imitation, gestural system, and social interaction over the course of this therapy.  [] New goal         [x] Goal in progress   [] Goal met         [] Goal modified  [x] Goal targeted  [] Goal not targeted   Interventions Performed: movement and heavy muscle work, prompting, floortime strategies and songs                              Patient and Family Training and Education:  Topics: Therapy Plan and Performance in session  Methods: Discussion  Response: Demonstrated understanding  Recipient: Mother    ASSESSMENT  Jose Bassett participated in the treatment session fair.  Barriers to engagement include: fatigue.  Skilled occupational therapy intervention continues to be required at the recommended frequency due to deficits in attention, sensory processing, sensory regulation, fine motor skills, self help skills, play skills, interpersonal skills.  During today’s treatment session, Jose Bassett  demonstrated progress in the areas of upper body strength, interpersonal skills.      PLAN  Continue per plan of care. Jose should continue OT 1-2 times per week and Progress treatment as tolerated.         You can access the FollowMyHealth Patient Portal offered by [facility] by registering at the following website: [email or web address]. By joining [facility]’s FollowMyHealth portal, you will also be able to view your health information using other applications (apps) compatible with our system.

## 2025-04-15 NOTE — PROVIDER CONTACT NOTE (CRITICAL VALUE NOTIFICATION) - PERSON GIVING RESULT:
OCT RNFL 4/15/2025  OD: 89, all green  OS: 99, white N, all green      OCT Mac 4/15/2025  OD: 259, NFC, no IRF/SRF  OS: 258, NFC, no IRF/SRF      Assessment /Plan     Pseudophakia, OU  - Doing well monitor    2. Duane's syndrome, OS, Type 1  - No issues in primary gaze, denies diplopia  - CTM    3. Optic disc drusen OS  - MRI and MRV in 2012 which was wnl. HVF was wnl in past  - Red free fundus photos taken in past to document  - B scan 3/2021 showing drusen OS  - 4/15/2025: DFE and photos stable today   - CTM    4. PVD, OD  - RD precautions  - CTM      RTC 1 year for annual DFE, OCT RNFL, OCT Mac, fundus photos, B scan   JESSICA Renner

## 2025-05-06 NOTE — PROGRESS NOTE ADULT - PROBLEM SELECTOR PLAN 4
Nurse Navigator reviewed pre-procedure ultrasound guided breast biopsy patient education information in person and gave written instructions.  Reviewed medications and need to hold all blood thinners per instructions.    Patient should take all other medications as prescribed.  Patient can eat and drink as normal prior to the procedure.  Be sure to wear a bra with good support and a two piece outfit for comfort.  Patient can bring someone with you but you can also drive yourself.  Plan on being at the breast center for 2-2 and a half hours.  Reviewed the process of a ultrasound biopsy.  The skin is cleaned and a local anesthetic is given to numb the area.  A small skin nick is made for the biopsy needle and then tissue samples are taken.  A tiny marker is then placed inside your breast at the site of the biopsy for future reference.    Pressure is then held on the biopsy site to stop bleeding and steri strips, bandage and waterproof dressing is applied.   A mammogram is then done to validate the tissue marker.  The tissue sample is sent to pathology. Results will come back in 2-3 business days and sent to your referring physician.  Either they or the nurse navigator will call you with the results and recommended follow up needed.  Patients states understanding.     - Patient takes Losartan , Ethacrynic acid and Labetalol   - c/w Losartan , Ethacrynic acid (patient has reported sulfa allergy) and Labetalol with parameters  - Monitor BP closely and adjust medications if clinically indicated.  - DASH diet.

## 2025-05-15 NOTE — DISCHARGE NOTE PROVIDER - REASON FOR ADMISSION
stroke Dizziness    Dizziness is a common problem. It is a feeling of unsteadiness or light-headedness. You may feel like you are about to faint. This condition can be caused by a number of things, including medicines, dehydration, or illness. Drink enough fluid to keep your urine clear or pale yellow. Do not drink alcohol and limit your caffeine and salt intake. Avoid quick movement.  Rise slowly from chairs and steady yourself until you feel okay. In the morning, first sit up on the side of the bed.    SEEK IMMEDIATE MEDICAL CARE IF YOU HAVE THE FOLLOWING SYMPTOMS: vomiting, changes in your vision or speech, weakness in your arms or legs, trouble speaking or swallowing, chest pain, abdominal pain, shortness of breath, sweating, bleeding, headache, neck pain, or fever. Our Emergency Department Referral Coordinators will be reaching out to you in the next 24-48 hours from 9:00am to 5:00pm to schedule a follow up appointment. Please expect a phone call from the hospital in that time frame. If you do not receive a call or if you have any questions or concerns, you can reach them at   [phone].     Dizziness    Dizziness is a common problem. It is a feeling of unsteadiness or light-headedness. You may feel like you are about to faint. This condition can be caused by a number of things, including medicines, dehydration, or illness. Drink enough fluid to keep your urine clear or pale yellow. Do not drink alcohol and limit your caffeine and salt intake. Avoid quick movement.  Rise slowly from chairs and steady yourself until you feel okay. In the morning, first sit up on the side of the bed.    SEEK IMMEDIATE MEDICAL CARE IF YOU HAVE THE FOLLOWING SYMPTOMS: vomiting, changes in your vision or speech, weakness in your arms or legs, trouble speaking or swallowing, chest pain, abdominal pain, shortness of breath, sweating, bleeding, headache, neck pain, or fever.
